# Patient Record
Sex: FEMALE | Race: OTHER | Employment: OTHER | URBAN - METROPOLITAN AREA
[De-identification: names, ages, dates, MRNs, and addresses within clinical notes are randomized per-mention and may not be internally consistent; named-entity substitution may affect disease eponyms.]

---

## 2017-01-01 ENCOUNTER — HOSPITAL ENCOUNTER (OUTPATIENT)
Dept: INFUSION THERAPY | Age: 67
Discharge: HOME | End: 2017-11-20
Payer: MEDICAID

## 2017-01-01 ENCOUNTER — HOSPITAL ENCOUNTER (OUTPATIENT)
Dept: LAB | Age: 67
Discharge: HOME | End: 2017-10-23
Payer: SUBSIDIZED

## 2017-01-01 ENCOUNTER — HOSPITAL ENCOUNTER (OUTPATIENT)
Dept: LAB | Age: 67
Discharge: HOME | End: 2017-12-04
Payer: MEDICAID

## 2017-01-01 ENCOUNTER — HOSPITAL ENCOUNTER (OUTPATIENT)
Dept: LAB | Age: 67
Discharge: HOME | End: 2017-11-30
Payer: MEDICAID

## 2017-01-01 ENCOUNTER — HOSPITAL ENCOUNTER (OUTPATIENT)
Dept: INFUSION THERAPY | Age: 67
Discharge: HOME | End: 2017-10-16
Payer: SUBSIDIZED

## 2017-01-01 ENCOUNTER — HOSPITAL ENCOUNTER (OUTPATIENT)
Dept: LAB | Age: 67
Discharge: HOME | End: 2017-11-06
Payer: MEDICAID

## 2017-01-01 ENCOUNTER — HOSPITAL ENCOUNTER (OUTPATIENT)
Dept: LAB | Age: 67
Discharge: HOME | End: 2017-12-18
Payer: MEDICAID

## 2017-01-01 ENCOUNTER — HOSPITAL ENCOUNTER (OUTPATIENT)
Dept: PET IMAGING | Age: 67
Discharge: HOME | End: 2017-12-14
Payer: SUBSIDIZED

## 2017-01-01 ENCOUNTER — HOSPITAL ENCOUNTER (OUTPATIENT)
Dept: LAB | Age: 67
Discharge: HOME | End: 2017-12-28
Payer: MEDICAID

## 2017-01-01 ENCOUNTER — HOSPITAL ENCOUNTER (OUTPATIENT)
Dept: LAB | Age: 67
Discharge: HOME | End: 2017-11-27
Payer: MEDICAID

## 2017-01-01 ENCOUNTER — HOSPITAL ENCOUNTER (OUTPATIENT)
Dept: LAB | Age: 67
Discharge: HOME | End: 2017-12-21
Payer: MEDICAID

## 2017-01-01 ENCOUNTER — HOSPITAL ENCOUNTER (OUTPATIENT)
Dept: LAB | Age: 67
Discharge: HOME | End: 2017-12-14
Payer: MEDICAID

## 2017-01-01 ENCOUNTER — HOSPITAL ENCOUNTER (OUTPATIENT)
Dept: LAB | Age: 67
Discharge: HOME | End: 2017-10-09
Payer: MEDICAID

## 2017-01-01 ENCOUNTER — HOSPITAL ENCOUNTER (OUTPATIENT)
Dept: LAB | Age: 67
Discharge: HOME | End: 2017-11-13
Payer: MEDICAID

## 2017-01-01 ENCOUNTER — HOSPITAL ENCOUNTER (OUTPATIENT)
Dept: INFUSION THERAPY | Age: 67
Discharge: HOME | End: 2017-12-04
Payer: SUBSIDIZED

## 2017-01-01 ENCOUNTER — HOSPITAL ENCOUNTER (OUTPATIENT)
Dept: INFUSION THERAPY | Age: 67
Discharge: HOME | End: 2017-11-13
Payer: MEDICAID

## 2017-01-01 ENCOUNTER — HOSPITAL ENCOUNTER (OUTPATIENT)
Dept: LAB | Age: 67
Discharge: HOME | End: 2017-10-16
Payer: SUBSIDIZED

## 2017-01-01 ENCOUNTER — HOSPITAL ENCOUNTER (OUTPATIENT)
Dept: INFUSION THERAPY | Age: 67
Discharge: HOME | End: 2017-12-18
Payer: SUBSIDIZED

## 2017-01-01 VITALS
OXYGEN SATURATION: 96 % | WEIGHT: 120 LBS | RESPIRATION RATE: 18 BRPM | DIASTOLIC BLOOD PRESSURE: 55 MMHG | TEMPERATURE: 97.9 F | BODY MASS INDEX: 21.95 KG/M2 | SYSTOLIC BLOOD PRESSURE: 139 MMHG | HEART RATE: 76 BPM

## 2017-01-01 VITALS
DIASTOLIC BLOOD PRESSURE: 52 MMHG | TEMPERATURE: 98 F | RESPIRATION RATE: 16 BRPM | OXYGEN SATURATION: 94 % | HEART RATE: 67 BPM | SYSTOLIC BLOOD PRESSURE: 133 MMHG

## 2017-01-01 VITALS
RESPIRATION RATE: 18 BRPM | TEMPERATURE: 97.7 F | HEART RATE: 68 BPM | SYSTOLIC BLOOD PRESSURE: 134 MMHG | DIASTOLIC BLOOD PRESSURE: 54 MMHG | OXYGEN SATURATION: 93 %

## 2017-01-01 DIAGNOSIS — I82.409 DEEP VEIN THROMBOSIS (DVT) OF LOWER EXTREMITY, UNSPECIFIED CHRONICITY, UNSPECIFIED LATERALITY, UNSPECIFIED VEIN (HCC): Chronic | ICD-10-CM

## 2017-01-01 DIAGNOSIS — E53.8 VITAMIN B12 DEFICIENCY: ICD-10-CM

## 2017-01-01 DIAGNOSIS — C79.51 BONY METASTASIS (HCC): ICD-10-CM

## 2017-01-01 DIAGNOSIS — D70.1 CHEMOTHERAPY-INDUCED NEUTROPENIA (HCC): ICD-10-CM

## 2017-01-01 DIAGNOSIS — D63.8 ANEMIA OF CHRONIC DISEASE: ICD-10-CM

## 2017-01-01 DIAGNOSIS — C78.00 CARCINOMA OF BREAST METASTATIC TO LUNG, UNSPECIFIED LATERALITY (HCC): Chronic | ICD-10-CM

## 2017-01-01 DIAGNOSIS — C50.919 CARCINOMA OF BREAST METASTATIC TO LUNG, UNSPECIFIED LATERALITY (HCC): Chronic | ICD-10-CM

## 2017-01-01 DIAGNOSIS — Z79.01 ANTICOAGULATED ON COUMADIN: Chronic | ICD-10-CM

## 2017-01-01 DIAGNOSIS — C50.919 MALIGNANT NEOPLASM OF FEMALE BREAST, UNSPECIFIED ESTROGEN RECEPTOR STATUS, UNSPECIFIED LATERALITY, UNSPECIFIED SITE OF BREAST (HCC): Chronic | ICD-10-CM

## 2017-01-01 DIAGNOSIS — C50.919 MALIGNANT NEOPLASM OF FEMALE BREAST (HCC): ICD-10-CM

## 2017-01-01 DIAGNOSIS — T45.1X5A CHEMOTHERAPY-INDUCED NEUTROPENIA (HCC): ICD-10-CM

## 2017-01-01 LAB
ABO + RH BLD: NORMAL
ALBUMIN SERPL-MCNC: 2.6 G/DL (ref 3.2–4.6)
ALBUMIN SERPL-MCNC: 2.6 G/DL (ref 3.2–4.6)
ALBUMIN SERPL-MCNC: 2.7 G/DL (ref 3.2–4.6)
ALBUMIN/GLOB SERPL: 0.5 {RATIO} (ref 1.2–3.5)
ALBUMIN/GLOB SERPL: 0.5 {RATIO} (ref 1.2–3.5)
ALBUMIN/GLOB SERPL: 0.6 {RATIO} (ref 1.2–3.5)
ALP SERPL-CCNC: 125 U/L (ref 50–136)
ALP SERPL-CCNC: 175 U/L (ref 50–136)
ALP SERPL-CCNC: 179 U/L (ref 50–136)
ALT SERPL-CCNC: 14 U/L (ref 12–65)
ALT SERPL-CCNC: 19 U/L (ref 12–65)
ALT SERPL-CCNC: 21 U/L (ref 12–65)
ANION GAP SERPL CALC-SCNC: 11 MMOL/L (ref 7–16)
ANION GAP SERPL CALC-SCNC: 12 MMOL/L (ref 7–16)
ANION GAP SERPL CALC-SCNC: 8 MMOL/L
ANION GAP SERPL CALC-SCNC: 8 MMOL/L (ref 7–16)
AST SERPL-CCNC: 21 U/L (ref 15–37)
AST SERPL-CCNC: 22 U/L (ref 15–37)
AST SERPL-CCNC: 31 U/L (ref 15–37)
BASOPHILS # BLD: 0 K/UL (ref 0–0.2)
BASOPHILS # BLD: 0.1 K/UL (ref 0–0.2)
BASOPHILS # BLD: 0.2 K/UL (ref 0–0.2)
BASOPHILS NFR BLD MANUAL: 8 % (ref 0–2)
BASOPHILS NFR BLD: 1 % (ref 0–2)
BASOPHILS NFR BLD: 2 % (ref 0–2)
BASOPHILS NFR BLD: 2 % (ref 0–2)
BILIRUB SERPL-MCNC: 0.5 MG/DL (ref 0.2–1.1)
BILIRUB SERPL-MCNC: 0.5 MG/DL (ref 0.2–1.1)
BILIRUB SERPL-MCNC: 0.9 MG/DL (ref 0.2–1.1)
BLD PROD TYP BPU: NORMAL
BLOOD GROUP ANTIBODIES SERPL: NORMAL
BPU ID: NORMAL
BUN SERPL-MCNC: 19 MG/DL (ref 8–23)
BUN SERPL-MCNC: 22 MG/DL (ref 8–23)
BUN SERPL-MCNC: 23 MG/DL (ref 8–23)
BUN SERPL-MCNC: 25 MG/DL (ref 8–23)
CALCIUM SERPL-MCNC: 7.5 MG/DL (ref 8.3–10.4)
CALCIUM SERPL-MCNC: 7.7 MG/DL (ref 8.3–10.4)
CALCIUM SERPL-MCNC: 7.9 MG/DL (ref 8.3–10.4)
CALCIUM SERPL-MCNC: 7.9 MG/DL (ref 8.3–10.4)
CALCIUM SERPL-MCNC: 8.5 MG/DL (ref 8.3–10.4)
CANCER AG15-3 SERPL-ACNC: 377.2 U/ML (ref 1–35)
CANCER AG15-3 SERPL-ACNC: 405.8 U/ML (ref 1–35)
CANCER AG15-3 SERPL-ACNC: 438.8 U/ML (ref 1–35)
CANCER AG27-29 SERPL-ACNC: 477.2 U/ML (ref 0–38.6)
CANCER AG27-29 SERPL-ACNC: 490.5 U/ML (ref 0–38.6)
CANCER AG27-29 SERPL-ACNC: 503.3 U/ML (ref 0–38.6)
CEA SERPL-MCNC: 49.5 NG/ML (ref 0–3)
CEA SERPL-MCNC: 49.5 NG/ML (ref 0–3)
CEA SERPL-MCNC: 51.1 NG/ML (ref 0–3)
CHLORIDE SERPL-SCNC: 105 MMOL/L (ref 98–107)
CHLORIDE SERPL-SCNC: 105 MMOL/L (ref 98–107)
CHLORIDE SERPL-SCNC: 109 MMOL/L (ref 98–107)
CHLORIDE SERPL-SCNC: 111 MMOL/L (ref 98–107)
CO2 SERPL-SCNC: 23 MMOL/L (ref 21–32)
CO2 SERPL-SCNC: 24 MMOL/L (ref 21–32)
CO2 SERPL-SCNC: 26 MMOL/L (ref 21–32)
CO2 SERPL-SCNC: 26 MMOL/L (ref 21–32)
CREAT SERPL-MCNC: 1.68 MG/DL (ref 0.6–1)
CREAT SERPL-MCNC: 1.88 MG/DL (ref 0.6–1)
CREAT SERPL-MCNC: 2.13 MG/DL (ref 0.6–1)
CREAT SERPL-MCNC: 2.33 MG/DL (ref 0.6–1)
CREAT SERPL-MCNC: 2.76 MG/DL (ref 0.6–1)
CROSSMATCH RESULT,%XM: NORMAL
DIFFERENTIAL METHOD BLD: ABNORMAL
EOSINOPHIL # BLD: 0 K/UL (ref 0–0.8)
EOSINOPHIL NFR BLD: 1 % (ref 0.5–7.8)
ERYTHROCYTE [DISTWIDTH] IN BLOOD BY AUTOMATED COUNT: 17.1 % (ref 11.9–14.6)
ERYTHROCYTE [DISTWIDTH] IN BLOOD BY AUTOMATED COUNT: 17.3 % (ref 11.9–14.6)
ERYTHROCYTE [DISTWIDTH] IN BLOOD BY AUTOMATED COUNT: 17.6 % (ref 11.9–14.6)
ERYTHROCYTE [DISTWIDTH] IN BLOOD BY AUTOMATED COUNT: 17.9 % (ref 11.9–14.6)
ERYTHROCYTE [DISTWIDTH] IN BLOOD BY AUTOMATED COUNT: 18.1 % (ref 11.9–14.6)
ERYTHROCYTE [DISTWIDTH] IN BLOOD BY AUTOMATED COUNT: 18.3 % (ref 11.9–14.6)
GLOBULIN SER CALC-MCNC: 4.7 G/DL (ref 2.3–3.5)
GLOBULIN SER CALC-MCNC: 4.8 G/DL (ref 2.3–3.5)
GLOBULIN SER CALC-MCNC: 5 G/DL (ref 2.3–3.5)
GLUCOSE SERPL-MCNC: 137 MG/DL (ref 65–100)
GLUCOSE SERPL-MCNC: 177 MG/DL (ref 65–100)
GLUCOSE SERPL-MCNC: 218 MG/DL (ref 65–100)
GLUCOSE SERPL-MCNC: 291 MG/DL (ref 65–100)
HCT VFR BLD AUTO: 20.5 % (ref 35.8–46.3)
HCT VFR BLD AUTO: 23.8 % (ref 35.8–46.3)
HCT VFR BLD AUTO: 23.8 % (ref 35.8–46.3)
HCT VFR BLD AUTO: 24 % (ref 35.8–46.3)
HCT VFR BLD AUTO: 24.8 % (ref 35.8–46.3)
HCT VFR BLD AUTO: 25.6 % (ref 35.8–46.3)
HGB BLD-MCNC: 7 G/DL (ref 11.7–15.4)
HGB BLD-MCNC: 7.8 G/DL (ref 11.7–15.4)
HGB BLD-MCNC: 8 G/DL (ref 11.7–15.4)
HGB BLD-MCNC: 8.1 G/DL (ref 11.7–15.4)
HGB BLD-MCNC: 8.3 G/DL (ref 11.7–15.4)
HGB BLD-MCNC: 8.7 G/DL (ref 11.7–15.4)
INR PPP: 1.6 (ref 0.9–1.2)
INR PPP: 2.2 (ref 0.9–1.2)
INR PPP: 2.5
INR PPP: 2.7 (ref 0.9–1.2)
INR PPP: 2.9
INR PPP: 3 (ref 0.9–1.2)
INR PPP: 3.8
INR PPP: 3.8 (ref 0.9–1.2)
INR PPP: 4.1 (ref 0.9–1.2)
INR PPP: >8 (ref 0.9–1.2)
LYMPHOCYTES # BLD: 0.2 K/UL (ref 0.5–4.6)
LYMPHOCYTES # BLD: 0.5 K/UL (ref 0.5–4.6)
LYMPHOCYTES # BLD: 0.6 K/UL (ref 0.5–4.6)
LYMPHOCYTES # BLD: 0.6 K/UL (ref 0.5–4.6)
LYMPHOCYTES # BLD: 0.8 K/UL (ref 0.5–4.6)
LYMPHOCYTES # BLD: 0.9 K/UL (ref 0.5–4.6)
LYMPHOCYTES NFR BLD MANUAL: 10 % (ref 16–44)
LYMPHOCYTES NFR BLD: 13 % (ref 13–44)
LYMPHOCYTES NFR BLD: 20 % (ref 13–44)
LYMPHOCYTES NFR BLD: 21 % (ref 13–44)
LYMPHOCYTES NFR BLD: 25 % (ref 13–44)
LYMPHOCYTES NFR BLD: 29 % (ref 13–44)
MAGNESIUM SERPL-MCNC: 2.1 MG/DL (ref 1.8–2.4)
MAGNESIUM SERPL-MCNC: 2.3 MG/DL (ref 1.8–2.4)
MAGNESIUM SERPL-MCNC: 2.4 MG/DL (ref 1.8–2.4)
MAGNESIUM SERPL-MCNC: 2.8 MG/DL (ref 1.8–2.4)
MCH RBC QN AUTO: 34.7 PG (ref 26.1–32.9)
MCH RBC QN AUTO: 35.1 PG (ref 26.1–32.9)
MCH RBC QN AUTO: 35.5 PG (ref 26.1–32.9)
MCH RBC QN AUTO: 35.9 PG (ref 26.1–32.9)
MCH RBC QN AUTO: 36.8 PG (ref 26.1–32.9)
MCH RBC QN AUTO: 37 PG (ref 26.1–32.9)
MCHC RBC AUTO-ENTMCNC: 32.8 G/DL (ref 31.4–35)
MCHC RBC AUTO-ENTMCNC: 33.3 G/DL (ref 31.4–35)
MCHC RBC AUTO-ENTMCNC: 33.5 G/DL (ref 31.4–35)
MCHC RBC AUTO-ENTMCNC: 34 G/DL (ref 31.4–35)
MCHC RBC AUTO-ENTMCNC: 34 G/DL (ref 31.4–35)
MCHC RBC AUTO-ENTMCNC: 34.1 G/DL (ref 31.4–35)
MCV RBC AUTO: 102 FL (ref 79.6–97.8)
MCV RBC AUTO: 103 FL (ref 79.6–97.8)
MCV RBC AUTO: 104.1 FL (ref 79.6–97.8)
MCV RBC AUTO: 107.4 FL (ref 79.6–97.8)
MCV RBC AUTO: 111.1 FL (ref 79.6–97.8)
MCV RBC AUTO: 112.3 FL (ref 79.6–97.8)
MONOCYTES # BLD: 0.1 K/UL (ref 0.1–1.3)
MONOCYTES # BLD: 0.2 K/UL (ref 0.1–1.3)
MONOCYTES # BLD: 0.3 K/UL (ref 0.1–1.3)
MONOCYTES # BLD: 0.3 K/UL (ref 0.1–1.3)
MONOCYTES # BLD: 0.4 K/UL (ref 0.1–1.3)
MONOCYTES # BLD: 0.6 K/UL (ref 0.1–1.3)
MONOCYTES NFR BLD MANUAL: 6 % (ref 3–9)
MONOCYTES NFR BLD: 13 % (ref 4–12)
MONOCYTES NFR BLD: 14 % (ref 4–12)
MONOCYTES NFR BLD: 14 % (ref 4–12)
MONOCYTES NFR BLD: 7 % (ref 4–12)
MONOCYTES NFR BLD: 8 % (ref 4–12)
NEUTS SEG # BLD: 1.4 K/UL (ref 1.7–8.2)
NEUTS SEG # BLD: 1.6 K/UL (ref 1.7–8.2)
NEUTS SEG # BLD: 1.7 K/UL (ref 1.7–8.2)
NEUTS SEG # BLD: 1.9 K/UL (ref 1.7–8.2)
NEUTS SEG # BLD: 2.7 K/UL (ref 1.7–8.2)
NEUTS SEG # BLD: 3.3 K/UL (ref 1.7–8.2)
NEUTS SEG NFR BLD MANUAL: 76 % (ref 47–75)
NEUTS SEG NFR BLD: 58 % (ref 43–78)
NEUTS SEG NFR BLD: 62 % (ref 43–78)
NEUTS SEG NFR BLD: 63 % (ref 43–78)
NEUTS SEG NFR BLD: 64 % (ref 43–78)
NEUTS SEG NFR BLD: 77 % (ref 43–78)
NRBC # BLD: 0 K/UL (ref 0–0.2)
NRBC # BLD: 0.01 K/UL (ref 0–0.2)
NRBC # BLD: 0.01 K/UL (ref 0–0.2)
NRBC BLD-RTO: 0 PER 100 WBC (ref 0–2)
PHOSPHATE SERPL-MCNC: 1.6 MG/DL (ref 2.3–3.7)
PHOSPHATE SERPL-MCNC: 2.5 MG/DL (ref 2.3–3.7)
PLATELET # BLD AUTO: 129 K/UL (ref 150–450)
PLATELET # BLD AUTO: 145 K/UL (ref 150–450)
PLATELET # BLD AUTO: 147 K/UL (ref 150–450)
PLATELET # BLD AUTO: 151 K/UL (ref 150–450)
PLATELET # BLD AUTO: 240 K/UL (ref 150–450)
PLATELET # BLD AUTO: 248 K/UL (ref 150–450)
PLATELET COMMENTS,PCOM: ADEQUATE
PLATELET COMMENTS,PCOM: SLIGHT
PMV BLD AUTO: 10.5 FL (ref 10.8–14.1)
PMV BLD AUTO: 10.6 FL (ref 10.8–14.1)
PMV BLD AUTO: 10.9 FL (ref 10.8–14.1)
PMV BLD AUTO: 11.2 FL (ref 10.8–14.1)
PMV BLD AUTO: 11.7 FL (ref 10.8–14.1)
PMV BLD AUTO: 12.7 FL (ref 10.8–14.1)
POTASSIUM SERPL-SCNC: 3.4 MMOL/L (ref 3.5–5.1)
POTASSIUM SERPL-SCNC: 3.6 MMOL/L (ref 3.5–5.1)
POTASSIUM SERPL-SCNC: 3.8 MMOL/L (ref 3.5–5.1)
POTASSIUM SERPL-SCNC: 4.2 MMOL/L (ref 3.5–5.1)
PROT SERPL-MCNC: 7.4 G/DL (ref 6.3–8.2)
PROT SERPL-MCNC: 7.4 G/DL (ref 6.3–8.2)
PROT SERPL-MCNC: 7.6 G/DL (ref 6.3–8.2)
PROTHROMBIN TIME: 19.4 SEC (ref 9.6–12)
PROTHROMBIN TIME: 26.5 SEC (ref 9.6–12)
PROTHROMBIN TIME: 30 SEC (ref 11.5–14.5)
PROTHROMBIN TIME: 32.1 SEC (ref 9.6–12)
PROTHROMBIN TIME: 34.8 SEC (ref 11.5–14.5)
PROTHROMBIN TIME: 35.6 SEC (ref 9.6–12)
PROTHROMBIN TIME: 45.1 SEC (ref 9.6–12)
PROTHROMBIN TIME: 45.4 SEC (ref 11.5–14.5)
PROTHROMBIN TIME: 49.8 SEC (ref 9.6–12)
PROTHROMBIN TIME: >96 SEC (ref 9.6–12)
RBC # BLD AUTO: 1.97 M/UL (ref 4.05–5.25)
RBC # BLD AUTO: 2.12 M/UL (ref 4.05–5.25)
RBC # BLD AUTO: 2.16 M/UL (ref 4.05–5.25)
RBC # BLD AUTO: 2.31 M/UL (ref 4.05–5.25)
RBC # BLD AUTO: 2.31 M/UL (ref 4.05–5.25)
RBC # BLD AUTO: 2.51 M/UL (ref 4.05–5.25)
RBC MORPH BLD: ABNORMAL
SODIUM SERPL-SCNC: 139 MMOL/L (ref 136–145)
SODIUM SERPL-SCNC: 143 MMOL/L (ref 136–145)
SPECIMEN EXP DATE BLD: NORMAL
STATUS OF UNIT,%ST: NORMAL
UNIT DIVISION, %UDIV: 0
WBC # BLD AUTO: 2.1 K/UL (ref 4.3–11.1)
WBC # BLD AUTO: 2.3 K/UL (ref 4.3–11.1)
WBC # BLD AUTO: 2.6 K/UL (ref 4.3–11.1)
WBC # BLD AUTO: 2.9 K/UL (ref 4.3–11.1)
WBC # BLD AUTO: 4.3 K/UL (ref 4.3–11.1)
WBC # BLD AUTO: 4.3 K/UL (ref 4.3–11.1)
WBC MORPH BLD: ABNORMAL
WBC MORPH BLD: ABNORMAL

## 2017-01-01 PROCEDURE — 86300 IMMUNOASSAY TUMOR CA 15-3: CPT | Performed by: INTERNAL MEDICINE

## 2017-01-01 PROCEDURE — 82378 CARCINOEMBRYONIC ANTIGEN: CPT | Performed by: INTERNAL MEDICINE

## 2017-01-01 PROCEDURE — 85025 COMPLETE CBC W/AUTO DIFF WBC: CPT | Performed by: INTERNAL MEDICINE

## 2017-01-01 PROCEDURE — 74011250636 HC RX REV CODE- 250/636: Performed by: INTERNAL MEDICINE

## 2017-01-01 PROCEDURE — 83735 ASSAY OF MAGNESIUM: CPT | Performed by: INTERNAL MEDICINE

## 2017-01-01 PROCEDURE — 84100 ASSAY OF PHOSPHORUS: CPT | Performed by: INTERNAL MEDICINE

## 2017-01-01 PROCEDURE — 96402 CHEMO HORMON ANTINEOPL SQ/IM: CPT

## 2017-01-01 PROCEDURE — 36415 COLL VENOUS BLD VENIPUNCTURE: CPT

## 2017-01-01 PROCEDURE — 96374 THER/PROPH/DIAG INJ IV PUSH: CPT

## 2017-01-01 PROCEDURE — 36416 COLLJ CAPILLARY BLOOD SPEC: CPT | Performed by: INTERNAL MEDICINE

## 2017-01-01 PROCEDURE — 96372 THER/PROPH/DIAG INJ SC/IM: CPT

## 2017-01-01 PROCEDURE — 85610 PROTHROMBIN TIME: CPT | Performed by: INTERNAL MEDICINE

## 2017-01-01 PROCEDURE — 36430 TRANSFUSION BLD/BLD COMPNT: CPT

## 2017-01-01 PROCEDURE — 80053 COMPREHEN METABOLIC PANEL: CPT | Performed by: INTERNAL MEDICINE

## 2017-01-01 PROCEDURE — 74011250637 HC RX REV CODE- 250/637: Performed by: NURSE PRACTITIONER

## 2017-01-01 PROCEDURE — 36415 COLL VENOUS BLD VENIPUNCTURE: CPT | Performed by: INTERNAL MEDICINE

## 2017-01-01 PROCEDURE — 80048 BASIC METABOLIC PNL TOTAL CA: CPT | Performed by: INTERNAL MEDICINE

## 2017-01-01 PROCEDURE — 77030003560 HC NDL HUBR BARD -A

## 2017-01-01 PROCEDURE — A9552 F18 FDG: HCPCS

## 2017-01-01 PROCEDURE — 86923 COMPATIBILITY TEST ELECTRIC: CPT

## 2017-01-01 PROCEDURE — 86900 BLOOD TYPING SEROLOGIC ABO: CPT

## 2017-01-01 PROCEDURE — 74011250636 HC RX REV CODE- 250/636: Performed by: NURSE PRACTITIONER

## 2017-01-01 PROCEDURE — 82310 ASSAY OF CALCIUM: CPT

## 2017-01-01 PROCEDURE — P9040 RBC LEUKOREDUCED IRRADIATED: HCPCS

## 2017-01-01 PROCEDURE — 96375 TX/PRO/DX INJ NEW DRUG ADDON: CPT

## 2017-01-01 PROCEDURE — 74011636320 HC RX REV CODE- 636/320: Performed by: INTERNAL MEDICINE

## 2017-01-01 PROCEDURE — 86644 CMV ANTIBODY: CPT

## 2017-01-01 PROCEDURE — 82565 ASSAY OF CREATININE: CPT

## 2017-01-01 PROCEDURE — 77030013131 HC IV BLD ST ICUM -A

## 2017-01-01 RX ORDER — LAMOTRIGINE 25 MG/1
500 TABLET ORAL ONCE
Status: COMPLETED | OUTPATIENT
Start: 2017-01-01 | End: 2017-01-01

## 2017-01-01 RX ORDER — HEPARIN 100 UNIT/ML
500 SYRINGE INTRAVENOUS AS NEEDED
Status: DISPENSED | OUTPATIENT
Start: 2017-01-01 | End: 2017-01-01

## 2017-01-01 RX ORDER — ACETAMINOPHEN 325 MG/1
650 TABLET ORAL ONCE
Status: COMPLETED | OUTPATIENT
Start: 2017-01-01 | End: 2017-01-01

## 2017-01-01 RX ORDER — DIPHENHYDRAMINE HYDROCHLORIDE 50 MG/ML
25 INJECTION, SOLUTION INTRAMUSCULAR; INTRAVENOUS ONCE
Status: COMPLETED | OUTPATIENT
Start: 2017-01-01 | End: 2017-01-01

## 2017-01-01 RX ORDER — SODIUM CHLORIDE 0.9 % (FLUSH) 0.9 %
10 SYRINGE (ML) INJECTION AS NEEDED
Status: DISCONTINUED | OUTPATIENT
Start: 2017-01-01 | End: 2017-01-01 | Stop reason: HOSPADM

## 2017-01-01 RX ORDER — CYANOCOBALAMIN 1000 UG/ML
1000 INJECTION, SOLUTION INTRAMUSCULAR; SUBCUTANEOUS ONCE
Status: COMPLETED | OUTPATIENT
Start: 2017-01-01 | End: 2017-01-01

## 2017-01-01 RX ORDER — FUROSEMIDE 10 MG/ML
20 INJECTION INTRAMUSCULAR; INTRAVENOUS ONCE
Status: COMPLETED | OUTPATIENT
Start: 2017-01-01 | End: 2017-01-01

## 2017-01-01 RX ORDER — SODIUM CHLORIDE 9 MG/ML
250 INJECTION, SOLUTION INTRAVENOUS AS NEEDED
Status: DISCONTINUED | OUTPATIENT
Start: 2017-01-01 | End: 2017-01-01 | Stop reason: HOSPADM

## 2017-01-01 RX ADMIN — DENOSUMAB 120 MG: 120 INJECTION SUBCUTANEOUS at 12:10

## 2017-01-01 RX ADMIN — ACETAMINOPHEN 650 MG: 325 TABLET, FILM COATED ORAL at 13:19

## 2017-01-01 RX ADMIN — DIPHENHYDRAMINE HYDROCHLORIDE 25 MG: 50 INJECTION, SOLUTION INTRAMUSCULAR; INTRAVENOUS at 16:20

## 2017-01-01 RX ADMIN — SODIUM CHLORIDE, PRESERVATIVE FREE 500 UNITS: 5 INJECTION INTRAVENOUS at 17:05

## 2017-01-01 RX ADMIN — Medication 10 ML: at 17:05

## 2017-01-01 RX ADMIN — ERYTHROPOIETIN 40000 UNITS: 40000 INJECTION, SOLUTION INTRAVENOUS; SUBCUTANEOUS at 11:04

## 2017-01-01 RX ADMIN — CYANOCOBALAMIN 1000 MCG: 1000 INJECTION, SOLUTION INTRAMUSCULAR at 11:02

## 2017-01-01 RX ADMIN — DIATRIZOATE MEGLUMINE AND DIATRIZOATE SODIUM 10 ML: 660; 100 LIQUID ORAL; RECTAL at 09:34

## 2017-01-01 RX ADMIN — FULVESTRANT 500 MG: 50 INJECTION INTRAMUSCULAR at 11:10

## 2017-01-01 RX ADMIN — FULVESTRANT 500 MG: 50 INJECTION INTRAMUSCULAR at 12:15

## 2017-01-01 RX ADMIN — FUROSEMIDE 20 MG: 10 INJECTION, SOLUTION INTRAMUSCULAR; INTRAVENOUS at 14:10

## 2017-01-01 RX ADMIN — TBO-FILGRASTIM 300 MCG: 300 INJECTION, SOLUTION SUBCUTANEOUS at 11:06

## 2017-01-01 RX ADMIN — DENOSUMAB 120 MG: 120 INJECTION SUBCUTANEOUS at 11:07

## 2017-01-01 RX ADMIN — SODIUM CHLORIDE 250 ML: 900 INJECTION, SOLUTION INTRAVENOUS at 13:45

## 2017-01-01 RX ADMIN — ERYTHROPOIETIN 40000 UNITS: 40000 INJECTION, SOLUTION INTRAVENOUS; SUBCUTANEOUS at 11:55

## 2017-01-01 RX ADMIN — ERYTHROPOIETIN 40000 UNITS: 40000 INJECTION, SOLUTION INTRAVENOUS; SUBCUTANEOUS at 11:05

## 2017-01-01 RX ADMIN — CYANOCOBALAMIN 1000 MCG: 1000 INJECTION, SOLUTION INTRAMUSCULAR at 11:55

## 2017-01-01 RX ADMIN — FULVESTRANT 500 MG: 50 INJECTION INTRAMUSCULAR at 13:29

## 2017-01-01 RX ADMIN — DENOSUMAB 120 MG: 120 INJECTION SUBCUTANEOUS at 13:29

## 2017-01-02 ENCOUNTER — HOSPITAL ENCOUNTER (OUTPATIENT)
Dept: LAB | Age: 67
Discharge: HOME | End: 2017-01-02
Payer: SUBSIDIZED

## 2017-01-02 DIAGNOSIS — I82.409 DEEP VEIN THROMBOSIS (DVT) OF LOWER EXTREMITY, UNSPECIFIED CHRONICITY, UNSPECIFIED LATERALITY, UNSPECIFIED VEIN (HCC): ICD-10-CM

## 2017-01-02 LAB
INR PPP: 2.5 (ref 0.9–1.2)
PROTHROMBIN TIME: 30.5 SEC (ref 9.6–12)

## 2017-01-02 PROCEDURE — 36415 COLL VENOUS BLD VENIPUNCTURE: CPT | Performed by: INTERNAL MEDICINE

## 2017-01-02 PROCEDURE — 85610 PROTHROMBIN TIME: CPT | Performed by: INTERNAL MEDICINE

## 2017-01-04 ENCOUNTER — APPOINTMENT (OUTPATIENT)
Dept: CT IMAGING | Age: 67
DRG: 189 | End: 2017-01-04
Attending: STUDENT IN AN ORGANIZED HEALTH CARE EDUCATION/TRAINING PROGRAM
Payer: SUBSIDIZED

## 2017-01-04 ENCOUNTER — APPOINTMENT (OUTPATIENT)
Dept: GENERAL RADIOLOGY | Age: 67
DRG: 189 | End: 2017-01-04
Attending: EMERGENCY MEDICINE
Payer: SUBSIDIZED

## 2017-01-04 ENCOUNTER — HOSPITAL ENCOUNTER (INPATIENT)
Age: 67
LOS: 8 days | Discharge: HOME | DRG: 189 | End: 2017-01-12
Attending: STUDENT IN AN ORGANIZED HEALTH CARE EDUCATION/TRAINING PROGRAM | Admitting: INTERNAL MEDICINE
Payer: SUBSIDIZED

## 2017-01-04 DIAGNOSIS — J18.9 COMMUNITY ACQUIRED PNEUMONIA: Primary | ICD-10-CM

## 2017-01-04 DIAGNOSIS — J96.01 ACUTE RESPIRATORY FAILURE WITH HYPOXIA (HCC): ICD-10-CM

## 2017-01-04 DIAGNOSIS — N17.9 ACUTE ON CHRONIC RENAL FAILURE (HCC): ICD-10-CM

## 2017-01-04 DIAGNOSIS — R77.8 TROPONIN LEVEL ELEVATED: ICD-10-CM

## 2017-01-04 DIAGNOSIS — J90 PLEURAL EFFUSION: ICD-10-CM

## 2017-01-04 DIAGNOSIS — N13.30 HYDRONEPHROSIS, UNSPECIFIED HYDRONEPHROSIS TYPE: ICD-10-CM

## 2017-01-04 DIAGNOSIS — J90 PLEURAL EFFUSION, RIGHT: ICD-10-CM

## 2017-01-04 DIAGNOSIS — J90 RECURRENT RIGHT PLEURAL EFFUSION: ICD-10-CM

## 2017-01-04 DIAGNOSIS — N18.9 CKD (CHRONIC KIDNEY DISEASE), UNSPECIFIED STAGE: Chronic | ICD-10-CM

## 2017-01-04 DIAGNOSIS — C78.00 BREAST CANCER METASTASIZED TO LUNG, UNSPECIFIED LATERALITY (HCC): ICD-10-CM

## 2017-01-04 DIAGNOSIS — C50.919 BREAST CANCER METASTASIZED TO LUNG, UNSPECIFIED LATERALITY (HCC): ICD-10-CM

## 2017-01-04 DIAGNOSIS — I10 ACCELERATED HYPERTENSION: ICD-10-CM

## 2017-01-04 DIAGNOSIS — R60.0 BILATERAL EDEMA OF LOWER EXTREMITY: ICD-10-CM

## 2017-01-04 DIAGNOSIS — R31.9 HEMATURIA: ICD-10-CM

## 2017-01-04 DIAGNOSIS — N18.9 ACUTE ON CHRONIC RENAL FAILURE (HCC): ICD-10-CM

## 2017-01-04 DIAGNOSIS — I82.409 DEEP VEIN THROMBOSIS (DVT) OF LOWER EXTREMITY, UNSPECIFIED CHRONICITY, UNSPECIFIED LATERALITY, UNSPECIFIED VEIN (HCC): ICD-10-CM

## 2017-01-04 LAB
ALBUMIN SERPL BCP-MCNC: 3.4 G/DL (ref 3.2–4.6)
ALBUMIN/GLOB SERPL: 0.9 {RATIO} (ref 1.2–3.5)
ALP SERPL-CCNC: 42 U/L (ref 50–136)
ALT SERPL-CCNC: 18 U/L (ref 12–65)
ANION GAP BLD CALC-SCNC: 11 MMOL/L (ref 7–16)
AST SERPL W P-5'-P-CCNC: 31 U/L (ref 15–37)
ATRIAL RATE: 51 BPM
ATRIAL RATE: 61 BPM
BASOPHILS # BLD AUTO: 0 K/UL (ref 0–0.2)
BASOPHILS # BLD: 1 % (ref 0–2)
BILIRUB SERPL-MCNC: 0.6 MG/DL (ref 0.2–1.1)
BUN SERPL-MCNC: 29 MG/DL (ref 8–23)
CALCIUM SERPL-MCNC: 8.3 MG/DL (ref 8.3–10.4)
CALCULATED P AXIS, ECG09: NORMAL DEGREES
CALCULATED P AXIS, ECG09: NORMAL DEGREES
CALCULATED R AXIS, ECG10: 71 DEGREES
CALCULATED R AXIS, ECG10: 75 DEGREES
CALCULATED T AXIS, ECG11: 62 DEGREES
CALCULATED T AXIS, ECG11: 77 DEGREES
CHLORIDE SERPL-SCNC: 116 MMOL/L (ref 98–107)
CO2 SERPL-SCNC: 20 MMOL/L (ref 21–32)
CREAT SERPL-MCNC: 2.7 MG/DL (ref 0.6–1)
DIAGNOSIS, 93000: NORMAL
DIAGNOSIS, 93000: NORMAL
DIASTOLIC BP, ECG02: NORMAL MMHG
DIASTOLIC BP, ECG02: NORMAL MMHG
DIFFERENTIAL METHOD BLD: ABNORMAL
EOSINOPHIL # BLD: 0.1 K/UL (ref 0–0.8)
EOSINOPHIL NFR BLD: 2 % (ref 0.5–7.8)
ERYTHROCYTE [DISTWIDTH] IN BLOOD BY AUTOMATED COUNT: 14.9 % (ref 11.9–14.6)
GLOBULIN SER CALC-MCNC: 3.8 G/DL (ref 2.3–3.5)
GLUCOSE SERPL-MCNC: 54 MG/DL (ref 65–100)
HCT VFR BLD AUTO: 25.6 % (ref 35.8–46.3)
HGB BLD-MCNC: 8.5 G/DL (ref 11.7–15.4)
IMM GRANULOCYTES # BLD: 0 K/UL (ref 0–0.5)
IMM GRANULOCYTES NFR BLD AUTO: 0 % (ref 0–5)
LYMPHOCYTES # BLD AUTO: 33 % (ref 13–44)
LYMPHOCYTES # BLD: 1.1 K/UL (ref 0.5–4.6)
MCH RBC QN AUTO: 36.8 PG (ref 26.1–32.9)
MCHC RBC AUTO-ENTMCNC: 33.2 G/DL (ref 31.4–35)
MCV RBC AUTO: 110.8 FL (ref 79.6–97.8)
MONOCYTES # BLD: 0.2 K/UL (ref 0.1–1.3)
MONOCYTES NFR BLD AUTO: 5 % (ref 4–12)
NEUTS SEG # BLD: 2 K/UL (ref 1.7–8.2)
NEUTS SEG NFR BLD AUTO: 59 % (ref 43–78)
P-R INTERVAL, ECG05: NORMAL MS
P-R INTERVAL, ECG05: NORMAL MS
PLATELET # BLD AUTO: 244 K/UL (ref 150–450)
PMV BLD AUTO: 10.5 FL (ref 10.8–14.1)
POTASSIUM SERPL-SCNC: 4.6 MMOL/L (ref 3.5–5.1)
PROT SERPL-MCNC: 7.2 G/DL (ref 6.3–8.2)
Q-T INTERVAL, ECG07: 470 MS
Q-T INTERVAL, ECG07: 502 MS
QRS DURATION, ECG06: 74 MS
QRS DURATION, ECG06: 76 MS
QTC CALCULATION (BEZET), ECG08: 441 MS
QTC CALCULATION (BEZET), ECG08: 457 MS
RBC # BLD AUTO: 2.31 M/UL (ref 4.05–5.25)
SODIUM SERPL-SCNC: 147 MMOL/L (ref 136–145)
SYSTOLIC BP, ECG01: NORMAL MMHG
SYSTOLIC BP, ECG01: NORMAL MMHG
TROPONIN I SERPL-MCNC: 0.12 NG/ML (ref 0.02–0.05)
VENTRICULAR RATE, ECG03: 50 BPM
VENTRICULAR RATE, ECG03: 53 BPM
WBC # BLD AUTO: 3.3 K/UL (ref 4.3–11.1)

## 2017-01-04 PROCEDURE — 71020 XR CHEST PA LAT: CPT

## 2017-01-04 PROCEDURE — 94640 AIRWAY INHALATION TREATMENT: CPT

## 2017-01-04 PROCEDURE — 93005 ELECTROCARDIOGRAM TRACING: CPT | Performed by: EMERGENCY MEDICINE

## 2017-01-04 PROCEDURE — 84484 ASSAY OF TROPONIN QUANT: CPT | Performed by: EMERGENCY MEDICINE

## 2017-01-04 PROCEDURE — 74011000250 HC RX REV CODE- 250: Performed by: INTERNAL MEDICINE

## 2017-01-04 PROCEDURE — 94760 N-INVAS EAR/PLS OXIMETRY 1: CPT

## 2017-01-04 PROCEDURE — 77030013140 HC MSK NEB VYRM -A

## 2017-01-04 PROCEDURE — 74011000250 HC RX REV CODE- 250: Performed by: STUDENT IN AN ORGANIZED HEALTH CARE EDUCATION/TRAINING PROGRAM

## 2017-01-04 PROCEDURE — 80053 COMPREHEN METABOLIC PANEL: CPT | Performed by: EMERGENCY MEDICINE

## 2017-01-04 PROCEDURE — 74011250637 HC RX REV CODE- 250/637: Performed by: INTERNAL MEDICINE

## 2017-01-04 PROCEDURE — 99285 EMERGENCY DEPT VISIT HI MDM: CPT | Performed by: STUDENT IN AN ORGANIZED HEALTH CARE EDUCATION/TRAINING PROGRAM

## 2017-01-04 PROCEDURE — 74011250636 HC RX REV CODE- 250/636: Performed by: STUDENT IN AN ORGANIZED HEALTH CARE EDUCATION/TRAINING PROGRAM

## 2017-01-04 PROCEDURE — 81001 URINALYSIS AUTO W/SCOPE: CPT | Performed by: INTERNAL MEDICINE

## 2017-01-04 PROCEDURE — 85025 COMPLETE CBC W/AUTO DIFF WBC: CPT | Performed by: EMERGENCY MEDICINE

## 2017-01-04 PROCEDURE — 65270000029 HC RM PRIVATE

## 2017-01-04 PROCEDURE — 99223 1ST HOSP IP/OBS HIGH 75: CPT | Performed by: INTERNAL MEDICINE

## 2017-01-04 PROCEDURE — 87040 BLOOD CULTURE FOR BACTERIA: CPT | Performed by: STUDENT IN AN ORGANIZED HEALTH CARE EDUCATION/TRAINING PROGRAM

## 2017-01-04 PROCEDURE — 93005 ELECTROCARDIOGRAM TRACING: CPT | Performed by: STUDENT IN AN ORGANIZED HEALTH CARE EDUCATION/TRAINING PROGRAM

## 2017-01-04 PROCEDURE — 77010033678 HC OXYGEN DAILY

## 2017-01-04 RX ORDER — ATENOLOL 25 MG/1
25 TABLET ORAL EVERY 12 HOURS
Status: DISCONTINUED | OUTPATIENT
Start: 2017-01-04 | End: 2017-01-12 | Stop reason: HOSPADM

## 2017-01-04 RX ORDER — MONTELUKAST SODIUM 10 MG/1
10 TABLET ORAL
Status: DISCONTINUED | OUTPATIENT
Start: 2017-01-04 | End: 2017-01-12 | Stop reason: HOSPADM

## 2017-01-04 RX ORDER — ALBUTEROL SULFATE 0.83 MG/ML
2.5 SOLUTION RESPIRATORY (INHALATION)
Status: DISCONTINUED | OUTPATIENT
Start: 2017-01-04 | End: 2017-01-12 | Stop reason: HOSPADM

## 2017-01-04 RX ORDER — METOCLOPRAMIDE HYDROCHLORIDE 5 MG/5ML
5 SOLUTION ORAL
Status: DISCONTINUED | OUTPATIENT
Start: 2017-01-04 | End: 2017-01-12 | Stop reason: HOSPADM

## 2017-01-04 RX ORDER — HYDROCODONE BITARTRATE AND ACETAMINOPHEN 10; 325 MG/1; MG/1
1 TABLET ORAL
Status: DISCONTINUED | OUTPATIENT
Start: 2017-01-04 | End: 2017-01-12 | Stop reason: HOSPADM

## 2017-01-04 RX ORDER — ALPRAZOLAM 0.5 MG/1
0.25 TABLET ORAL
Status: DISCONTINUED | OUTPATIENT
Start: 2017-01-04 | End: 2017-01-12 | Stop reason: HOSPADM

## 2017-01-04 RX ORDER — SODIUM CHLORIDE 0.9 % (FLUSH) 0.9 %
5-10 SYRINGE (ML) INJECTION AS NEEDED
Status: DISCONTINUED | OUTPATIENT
Start: 2017-01-04 | End: 2017-01-12 | Stop reason: HOSPADM

## 2017-01-04 RX ORDER — MEGESTROL ACETATE 40 MG/ML
200 SUSPENSION ORAL DAILY
Status: DISCONTINUED | OUTPATIENT
Start: 2017-01-05 | End: 2017-01-12 | Stop reason: HOSPADM

## 2017-01-04 RX ORDER — BUDESONIDE 0.5 MG/2ML
500 INHALANT ORAL
Status: DISCONTINUED | OUTPATIENT
Start: 2017-01-04 | End: 2017-01-12 | Stop reason: HOSPADM

## 2017-01-04 RX ORDER — SODIUM CHLORIDE 0.9 % (FLUSH) 0.9 %
5-10 SYRINGE (ML) INJECTION EVERY 8 HOURS
Status: DISCONTINUED | OUTPATIENT
Start: 2017-01-04 | End: 2017-01-12 | Stop reason: HOSPADM

## 2017-01-04 RX ORDER — GLIPIZIDE 5 MG/1
5 TABLET ORAL 2 TIMES DAILY
Status: DISCONTINUED | OUTPATIENT
Start: 2017-01-04 | End: 2017-01-08

## 2017-01-04 RX ORDER — IPRATROPIUM BROMIDE AND ALBUTEROL SULFATE 2.5; .5 MG/3ML; MG/3ML
3 SOLUTION RESPIRATORY (INHALATION)
Status: COMPLETED | OUTPATIENT
Start: 2017-01-04 | End: 2017-01-04

## 2017-01-04 RX ORDER — LEVOFLOXACIN 5 MG/ML
750 INJECTION, SOLUTION INTRAVENOUS
Status: ACTIVE | OUTPATIENT
Start: 2017-01-04 | End: 2017-01-05

## 2017-01-04 RX ORDER — AMLODIPINE BESYLATE 10 MG/1
10 TABLET ORAL DAILY
Status: DISCONTINUED | OUTPATIENT
Start: 2017-01-05 | End: 2017-01-12 | Stop reason: HOSPADM

## 2017-01-04 RX ORDER — FENTANYL 50 UG/1
1 PATCH TRANSDERMAL
Status: DISCONTINUED | OUTPATIENT
Start: 2017-01-04 | End: 2017-01-12 | Stop reason: HOSPADM

## 2017-01-04 RX ADMIN — GLIPIZIDE 5 MG: 5 TABLET ORAL at 20:20

## 2017-01-04 RX ADMIN — SODIUM CHLORIDE 500 ML: 900 INJECTION, SOLUTION INTRAVENOUS at 17:52

## 2017-01-04 RX ADMIN — MONTELUKAST SODIUM 10 MG: 10 TABLET, FILM COATED ORAL at 22:44

## 2017-01-04 RX ADMIN — ALPRAZOLAM 0.25 MG: 0.5 TABLET ORAL at 20:19

## 2017-01-04 RX ADMIN — BUDESONIDE 500 MCG: 0.5 INHALANT RESPIRATORY (INHALATION) at 19:51

## 2017-01-04 RX ADMIN — Medication 5 ML: at 22:46

## 2017-01-04 RX ADMIN — ALBUTEROL SULFATE 2.5 MG: 2.5 SOLUTION RESPIRATORY (INHALATION) at 19:51

## 2017-01-04 RX ADMIN — IPRATROPIUM BROMIDE AND ALBUTEROL SULFATE 3 ML: 2.5; .5 SOLUTION RESPIRATORY (INHALATION) at 17:05

## 2017-01-04 RX ADMIN — HYDROCODONE BITARTRATE AND ACETAMINOPHEN 1 TABLET: 10; 325 TABLET ORAL at 22:44

## 2017-01-04 RX ADMIN — METOCLOPRAMIDE HYDROCHLORIDE 5 MG: 5 SOLUTION ORAL at 22:44

## 2017-01-04 RX ADMIN — ATENOLOL 25 MG: 25 TABLET ORAL at 20:21

## 2017-01-04 NOTE — ED PROVIDER NOTES
HPI Comments: 21-year-old female  speaking patient rested emergency department with complaints of worsening shortness of breath and substernal chest pain ×3 weeks. Patient states her symptoms seemed to worsen yesterday bring her to the emergency department for evaluation today. She describes a tightness/stabbing sensation at the anterior chest with radiation bilateral arms and her back as well. Patient reports shortness of breath and nonproductive cough at home. She denies any nausea, vomiting, fever, chills, abdominal discomfort or changes in bowel or bladder habits. Of note the patient does have a history of DVT, CAD. In addition the patient has a history of renal insufficiency/failure with renal stent placement recently as well. Patient is also a breast cancer patient status post treatment. Currently taking Coumadin secondary to history of blood clots in the lower extremities. Patient reports some hematuria after starting Coumadin therapy, last INR checked on 1/2/2016 with a value of 2.5. Patient is a 78 y.o. female presenting with shortness of breath. The history is provided by the patient and the spouse. The history is limited by a language barrier. A  was used. Shortness of Breath   This is a new problem. The average episode lasts 3 weeks. The problem occurs continuously. The problem has been gradually worsening. Associated symptoms include cough, chest pain and leg swelling. Pertinent negatives include no fever, no headaches, no coryza, no rhinorrhea, no sore throat, no swollen glands, no ear pain, no neck pain, no sputum production, no hemoptysis, no wheezing, no PND, no orthopnea, no syncope, no vomiting, no abdominal pain, no rash, no leg pain and no claudication. She has had prior hospitalizations. She has had prior ED visits. Associated medical issues include CAD and DVT.  Associated medical issues do not include asthma, COPD, pneumonia, chronic lung disease, heart failure, past MI or recent surgery. Past Medical History:   Diagnosis Date    Acute on chronic diastolic congestive heart failure ([facility] 1/5/2016    Asthma      till age 31    Cancer ([facility]      roverto. breast ca mets bone/lung    Chemotherapy-induced neutropenia ([facility] 12/20/2016    Depression     Diabetes ([facility]      recently dx'ed; ype 2;  does not take at home    DM (diabetes mellitus) ([facility] 11/20/2012    HCAP (healthcare-associated pneumonia) 7/17/2013    Hypertension     Sepsis ([facility] 7/19/2016    Thromboembolus ([facility]      Left leg DVT    Unspecified adverse effect of anesthesia      In [facility] 28 yrs ago after second c -section-she was told her heart stopped d/t anesthesia-hospitalized for 5 days afterwards and followed by cardiologist       Past Surgical History:   Procedure Laterality Date    Hx vascular access  1/26/12    Hx gyn       2 c sections, ovarian cyst-roverto. Family History:   Problem Relation Age of Onset    Heart Attack Mother     Cancer Brother      doen not know details       Social History     Social History    Marital status:      Spouse name: N/A    Number of children: N/A    Years of education: N/A     Occupational History    Not on file. Social History Main Topics    Smoking status: Former Smoker     Packs/day: 1.00     Years: 3.00     Types: Cigarettes     Quit date: 1/1/1978    Smokeless tobacco: Never Used      Comment: quit 30 or more years ago    Alcohol use No    Drug use: No    Sexual activity: Not on file     Other Topics Concern    Not on file     Social History Narrative         ALLERGIES: Review of patient's allergies indicates no known allergies. Review of Systems   Constitutional: Negative for chills, diaphoresis and fever. HENT: Negative for congestion, ear pain, rhinorrhea, sneezing and sore throat. Eyes: Negative for visual disturbance. Respiratory: Positive for cough and shortness of breath.  Negative for hemoptysis, sputum production, chest tightness and wheezing. Cardiovascular: Positive for chest pain and leg swelling. Negative for orthopnea, claudication, syncope and PND. Gastrointestinal: Negative for abdominal pain, blood in stool, diarrhea, nausea and vomiting. Endocrine: Negative for polyuria. Genitourinary: Negative for difficulty urinating, dysuria, flank pain, hematuria and urgency. Musculoskeletal: Negative for back pain, myalgias, neck pain and neck stiffness. Skin: Negative for color change and rash. Neurological: Negative for dizziness, syncope, speech difficulty, weakness, light-headedness, numbness and headaches. Psychiatric/Behavioral: Negative for behavioral problems. Vitals:    01/04/17 1429   BP: 145/40   Pulse: 63   Resp: 18   Temp: 98.2 °F (36.8 °C)   SpO2: 90%   Weight: 60.8 kg (134 lb)   Height: 5' 5\" (1.651 m)            Physical Exam     MDM  Number of Diagnoses or Management Options  Community acquired pneumonia: new and requires workup  Pleural effusion: new and requires workup  Troponin level elevated:   Diagnosis management comments: EKG shows a normal sinus rhythm with a rate of 50 beats a minute. There is no evidence of acute ischemic change. Elevated troponin level noted patient was seated in the ED lobby. History of renal failure. Discussed options for chest imaging to rule out dissection versus PE. Recommends a VQ scan to rule out PE and potentially echo to evaluate for thoracic aortic dissection. Little utility for CT non-contrasted studies at this time. No evidence of acute ischemia on patient's initial EKG. Troponin elevation likely secondary to strain related to pneumonia/pleural effusion. Spoke with the pulmonologist on call who agrees to evaluate the patient for admission and potential thoracentesis.      Spoke with on call cardiology concerning trop elevation, agrees consult on patient as needed       Amount and/or Complexity of Data Reviewed  Clinical lab tests: ordered and reviewed  Tests in the radiology section of CPT®: ordered and reviewed  Tests in the medicine section of CPT®: ordered and reviewed  Independent visualization of images, tracings, or specimens: yes    Risk of Complications, Morbidity, and/or Mortality  Presenting problems: moderate  Diagnostic procedures: low  Management options: moderate    Patient Progress  Patient progress: stable    ED Course       Procedures

## 2017-01-04 NOTE — IP AVS SNAPSHOT
Garrett Ross 
 
 
 [address] [place] [address] 
[phone] Patient: Tobin Brito MRN: [id number] QQJ:3/72/[id number] You are allergic to the following No active allergies Recent Documentation Height Weight Breastfeeding? BMI OB Status Smoking Status 1.651 m 60.8 kg No 22.3 kg/m2 Postmenopausal Former Smoker Unresulted Labs Order Current Status AFB CULTURE + SMEAR W/RFLX ID FROM CULTURE Preliminary result Emergency Contacts Name Discharge Info Relation Home Work Mobile Juancarlos Leggett  Child [2] [phone] [address] [place] [address]  Spouse [3] [phone] About your hospitalization You were admitted on:  January 4, 2017 You last received care in the:  [facility] 8 MED SURG You were discharged on:  January 12, 2017 Unit phone number:  [phone] Why you were hospitalized Your primary diagnosis was:  Acute Respiratory Failure With Hypoxia (Hcc) Your diagnoses also included:  Pleural Effusion, Right, Bilateral Edema Of Lower Extremity, Accelerated Hypertension, Ckd (Chronic Kidney Disease), Breast Cancer Metastasized To Lung (Hcc), Dvt (Deep Venous Thrombosis) (Hcc), Hydronephrosis, Hematuria Providers Seen During Your Hospitalizations Provider Role Specialty Primary office phone Bryanna Ceballos DO Attending Provider Emergency Medicine [phone] Jenn Block MD Attending Provider Pulmonary Disease [phone] Your Primary Care Physician (PCP) Primary Care Physician Office Phone Office Fax  
 [place], [place] D ** None ** ** None ** Follow-up Information Follow up With Details Comments Contact Info Billy Davenport MD     
  
Your Appointments Tuesday January 17, 2017  7:55 AM EST  
LAB with [address]  
2405 [facility] INSURANCE [facility] Susana Stevenson [address] [place] [address]  
[phone] Tuesday January 17, 2017  8:30 AM EST PreChemo Follow Up with SOFI MICHAEL NP1 Kati Nuñez Hematology and Oncology [facility]) JJ/ Juancarlos Ferrariesias 33 [facility] [address]  
[phone] Tuesday January 17, 2017  9:15 AM EST Injection with NUR8  
ST. [address] Nate Spencer [phone] [facility] [address]  
[phone] [address] Current Discharge Medication List  
  
START taking these medications Dose & Instructions Dispensing Information Comments Morning Noon Evening Bedtime  
 furosemide 20 mg tablet Commonly known as:  LASIX Your next dose is: Today, Tomorrow Other:  _________ Dose:  20 mg Take 1 Tab by mouth as needed. Quantity:  20 Tab Refills:  1 CONTINUE these medications which have CHANGED Dose & Instructions Dispensing Information Comments Morning Noon Evening Bedtime  
 albuterol 90 mcg/actuation inhaler Commonly known as:  PROVENTIL HFA, VENTOLIN HFA, PROAIR HFA What changed:  how much to take Your next dose is: Today, Tomorrow Other:  _________ Dose:  2 Puff Take 2 Puffs by inhalation every six (6) hours as needed for Wheezing. Quantity:  1 Inhaler Refills:  11  
     
   
   
   
  
 megestrol 400 mg/10 mL (10 mL) suspension Commonly known as:  MEGACE What changed:  Another medication with the same name was removed. Continue taking this medication, and follow the directions you see here. Your next dose is: Today, Tomorrow Other:  _________ Dose:  200 mg Take 5 mL by mouth daily. Quantity:  240 mL Refills:  2  
     
   
   
   
  
 warfarin 2 mg tablet Commonly known as:  COUMADIN What changed:  Another medication with the same name was removed.  Continue taking this medication, and follow the directions you see here. Your next dose is: Today, Tomorrow Other:  _________ Dose:  2 mg Take 1 Tab by mouth daily. Quantity:  30 Tab Refills:  20  
     
   
   
   
  
  
CONTINUE these medications which have NOT CHANGED Dose & Instructions Dispensing Information Comments Morning Noon Evening Bedtime ALPRAZolam 0.25 mg tablet Commonly known as:  Freedom Curio Your next dose is: Today, Tomorrow Other:  _________ Dose:  0.25 mg Take 1 Tab by mouth three (3) times daily as needed for Anxiety. Max Daily Amount: 0.75 mg. Quantity:  90 Tab Refills:  2  
     
   
   
   
  
 amLODIPine 10 mg tablet Commonly known as:  Shanelle Darting Your next dose is: Today, Tomorrow Other:  _________ Dose:  10 mg Take 1 tablet by mouth daily. Quantity:  30 tablet Refills:  11  
     
   
   
   
  
 atenolol 25 mg tablet Commonly known as:  TENORMIN Your next dose is: Today, Tomorrow Other:  _________ Dose:  25 mg Take 1 Tab by mouth every twelve (12) hours. Quantity:  60 Tab Refills:  0  
     
   
   
   
  
 budesonide-formoterol 160-4.5 mcg/actuation HFA inhaler Commonly known as:  SYMBICORT Your next dose is: Today, Tomorrow Other:  _________ Dose:  2 Puff Take 2 Puffs by inhalation two (2) times a day. Quantity:  1 Inhaler Refills:  11  
     
   
   
   
  
 calcium-vitamin D 500 mg(1,250mg) -200 unit per tablet Commonly known as:  OYSTER SHELL Your next dose is: Today, Tomorrow Other:  _________ Dose:  1 Tab Take 1 Tab by mouth two (2) times daily (with meals). Quantity:  60 Tab Refills:  6  
     
   
   
   
  
 fentaNYL 50 mcg/hr PATCH Commonly known as:  Gerda Ligas Your next dose is: Today, Tomorrow Other:  _________ Dose:  1 Patch 1 Patch by TransDERmal route every seventy-two (72) hours. Max Daily Amount: 1 Patch. Indications: CHRONIC PAIN WITH OPIOID TOLERANCE Quantity:  10 Patch Refills:  0  
     
   
   
   
  
 glipiZIDE 5 mg tablet Commonly known as:  Susannah Isles Your next dose is: Today, Tomorrow Other:  _________ Dose:  5 mg Take 1 Tab by mouth two (2) times a day. Quantity:  60 Tab Refills:  5 HYDROcodone-acetaminophen  mg tablet Commonly known as:  Iven Heaton Your next dose is: Today, Tomorrow Other:  _________ Dose:  1 Tab Take 1 Tab by mouth every four (4) hours as needed. Max Daily Amount: 6 Tabs. Quantity:  90 Tab Refills:  0 LORazepam 1 mg tablet Commonly known as:  ATIVAN Your next dose is: Today, Tomorrow Other:  _________ Dose:  1 mg Take 1 Tab by mouth every six (6) hours as needed for Anxiety. Max Daily Amount: 4 mg. Quantity:  60 Tab Refills:  0  
     
   
   
   
  
 metoclopramide 5 mg/5 mL syrup Commonly known as:  REGLAN Your next dose is: Today, Tomorrow Other:  _________ Dose:  5 mg Take 5 mL by mouth Before breakfast, lunch, dinner and at bedtime. Quantity:  120 mL Refills:  0  
     
   
   
   
  
 montelukast 10 mg tablet Commonly known as:  SINGULAIR Your next dose is: Today, Tomorrow Other:  _________ Dose:  10 mg Take 1 Tab by mouth nightly. Quantity:  30 Tab Refills:  3  
     
   
   
   
  
 nystatin 100,000 unit/mL suspension Commonly known as:  MYCOSTATIN Your next dose is: Today, Tomorrow Other:  _________ Dose:  512992 Units Take 2 mL by mouth four (4) times daily. swish and spit Quantity:  480 mL Refills:  0  
     
   
   
   
  
 palbociclib 125 mg Cap Your next dose is: Today, Tomorrow Other:  _________  Dose:  125 mg  
 Take 125 mg by mouth daily. Take one pill once a day for 3 weeks and then off for one week Quantity:  21 Tab Refills:  5  
     
   
   
   
  
 potassium chloride 20 mEq tablet Commonly known as:  K-DUR, KLOR-CON Your next dose is: Today, Tomorrow Other:  _________ Dose:  20 mEq Take 1 Tab by mouth daily. Quantity:  30 Tab Refills:  3  
     
   
   
   
  
 zolpidem 10 mg tablet Commonly known as:  AMBIEN Your next dose is: Today, Tomorrow Other:  _________ Dose:  10 mg Take 1 Tab by mouth nightly as needed for Sleep. Max Daily Amount: 10 mg.  
 Quantity:  30 Tab Refills:  2 Where to Get Your Medications Information on where to get these meds will be given to you by the nurse or doctor. ! Ask your nurse or doctor about these medications  
  albuterol 90 mcg/actuation inhaler  
 furosemide 20 mg tablet Discharge Instructions Aprenda sobre el derrame pleural - [ Learning About Pleural Effusion ] Qué es el derrame pleural? 
El derrame pleural es la acumulación de líquido en el espacio entre los tejidos que recubren los pulmones y la pared torácica. Debido a la acumulación de líquido, es posible que los pulmones no puedan expandirse completamente, lo cual puede dificultar la respiración. [place], o parte de [place], puede colapsarse. El derrame pleural tiene muchas causas, caterina neumonía, cáncer, inflamación de los tejidos alrededor de los pulmones e insuficiencia cardíaca. El derrame pleural suele diagnosticarse por medio de kim radiografía y un examen físico. El médico escucha cómo el aire fluye en jesus pulmones. Cuáles son los síntomas? Los síntomas del derrame pleural pueden incluir: · Dificultad para respirar. · Falta de aire. · Dolor en el pecho. · [place]. · Tos. Un derrame pleural leve podría no causar ningún síntoma.  

Cómo se trata el derrame pleural? 
 Los médicos quizás tengan que tratar la afección que está causando el derrame pleural. Por ejemplo, pueden darle medicamentos para tratar la neumonía o la insuficiencia cardíaca congestiva. Un derrame pleural leve a menudo desaparece por sí solo sin tratamiento. Extracción de líquido El derrame pleural puede tratarse mediante la eliminación de líquido del espacio entre los tejidos que [employer]. [place] se hace con kim aguja que se coloca en el pecho (toracocentesis). Puede enviarse kim pequeña cantidad del líquido a un laboratorio para averiguar lo que está causando la acumulación de líquido. La extracción del líquido puede ayudar a [employer], tales caterina falta de aire y dolor en el pecho. Puede ayudar a que los pulmones se expandan más ampliamente. En algunos casos, si el derrame pleural no mejora, se puede colocar un catéter en el pecho. Neda es un tubo flexible que permite que el líquido drene de los pulmones. El catéter [employer] en el pecho hasta que el médico lo retira. Algunas personas pueden recibir un tratamiento que elimina el líquido y luego introduce un medicamento en la cavidad torácica. [place] ayuda a evitar que se vuelva a acumular demasiado líquido. La atención de seguimiento es kim parte clave de merrill tratamiento y seguridad. Asegúrese de hacer y acudir a todas las citas, y llame a merrill médico si está teniendo problemas. También es kim buena idea saber los resultados de jesus exámenes y mantener kim lista de los medicamentos que sharmila. Dónde puede encontrar más información en inglés? Joel Rodriguez a [email or web address]-abena.info/. Escriba D311 en la búsqueda para aprender más acerca de \"Aprenda sobre el derrame pleural - [ Learning About Pleural Effusion ]. \" 
Revisado: 23 enamorado, 2016 Versión del contenido: 11.1 © 6061-4279 [employer], [employer].  Las instrucciones de cuidado fueron adaptadas bajo licencia por [employer] (which disclaims liability or warranty for this information). Si usted tiene [place] Wendel afección médica o sobre estas instrucciones, siempre pregunte a merrill profesional de sherrie. [employer] SolutionsDiane niega toda garantía o responsabilidad por merrill uso de esta información. DISCHARGE SUMMARY from Nurse The following personal items are in your possession at time of discharge: 
 
Dental Appliances: None Visual Aid: None Home Medications: None Jewelry: None Clothing: Pajamas Other Valuables: None PATIENT INSTRUCTIONS: 
 
After general anesthesia or intravenous sedation, for 24 hours or while taking prescription Narcotics: · Limit your activities · Do not drive and operate hazardous machinery · Do not make important personal or business decisions · Do  not drink alcoholic beverages · If you have not urinated within 8 hours after discharge, please contact your surgeon on call. Report the following to your surgeon: 
· Excessive pain, swelling, redness or odor of or around the surgical area · Temperature over 100.5 · Nausea and vomiting lasting longer than 4 hours or if unable to take medications · Any signs of decreased circulation or nerve impairment to extremity: change in color, persistent  numbness, tingling, coldness or increase pain · Any questions What to do at Home: *  Please give a list of your current medications to your Primary Care Provider. *  Please update this list whenever your medications are discontinued, doses are 
    changed, or new medications (including over-the-counter products) are added. *  Please carry medication information at all times in case of emergency situations. These are general instructions for a healthy lifestyle: No smoking/ No tobacco products/ Avoid exposure to second hand smoke Surgeon General's Warning:  Quitting smoking now greatly reduces serious risk to your health. Obesity, smoking, and sedentary lifestyle greatly increases your risk for illness A healthy diet, regular physical exercise & weight monitoring are important for maintaining a healthy lifestyle You may be retaining fluid if you have a history of heart failure or if you experience any of the following symptoms:  Weight gain of 3 pounds or more overnight or 5 pounds in a week, increased swelling in our hands or feet or shortness of breath while lying flat in bed. Please call your doctor as soon as you notice any of these symptoms; do not wait until your next office visit. Recognize signs and symptoms of STROKE: 
 
F-face looks uneven A-arms unable to move or move unevenly S-speech slurred or non-existent T-time-call 911 as soon as signs and symptoms begin-DO NOT go Back to bed or wait to see if you get better-TIME IS BRAIN. Warning Signs of HEART ATTACK Call 911 if you have these symptoms: 
? Chest discomfort. Most heart attacks involve discomfort in the center of the chest that lasts more than a few minutes, or that goes away and comes back. It can feel like uncomfortable pressure, squeezing, fullness, or pain. ? Discomfort in other areas of the upper body. Symptoms can include pain or discomfort in one or both arms, the back, neck, jaw, or stomach. ? Shortness of breath with or without chest discomfort. ? Other signs may include breaking out in a cold sweat, nausea, or lightheadedness. Don't wait more than five minutes to call [address]! Fast action can save your life. Calling 911 is almost always the fastest way to get lifesaving treatment. Emergency Medical Services staff can begin treatment when they arrive  up to an hour sooner than if someone gets to the hospital by car. The discharge information has been reviewed with the patient. The patient verbalized understanding.  
 
Discharge medications reviewed with the patient and appropriate educational materials and side effects teaching were provided. Discharge Orders None MyChart Announcement We are excited to announce that we are making your provider's discharge notes available to you in [facility]. You will see these notes when they are completed and signed by the physician that discharged you from your recent hospital stay. If you have any questions or concerns about any information you see in [facility], please call the Health Information Department where you were seen or reach out to your Primary Care Provider for more information about your plan of care. Introducing \[facility] [place] [facility]\"" HEALTH SERVICES! Charles Rodriguez introduce portal paciente MyChart . Ahora se puede acceder a partes de merrill expediente médico, enviar por correo electrónico la oficina de merrill médico y solicitar renovaciones de medicamentos en línea. En merrill navegador de Internet , Bert Nageotte a [email or web address] Olivia casper en el usuario por Cleve Hodgkin? Renee shirley aquí en la sesión [facility]. Verá la página de registro [place]. Ingrese merrill código de [employer] mitzy y caterina aparece a continuación. Usted no tendrá que [employer] código después de beatrice completado el proceso de registro . Si usted no se inscribe antes de la fecha de caducidad , debe solicitar un nuevo código. · MyCSh[facility]ak Código de acceso : -[id number] Expires: 4/4/2017  2:27 PM 
 
Ingresa los últimos cuatro dígitos de merrill Número de Seguro Social ( xxxx ) y fecha de nacimiento ( dd / mm / aaaa ) caterina se indica y olivia clic en Enviar. ted será llevado a la siguiente página de registro . Crear un ID MyChart . Esta será merrill ID de inicio de sesión de MyChart y no puede ser [place] , por lo que pensar en kim que es Kathlynn Civil y fácil de recordar . Crear kim contraseña MyChart . ted puede cambiar merrill contraseña en cualquier momento . Ingrese merrill Password Reset de preguntas y Mej[place] .  [place] se puede utilizar en un momento posterior si usted olvida asher contraseña. Introduzca asher dirección de correo electrónico . Ghislaine Naranjo recibirá kim notificación por correo electrónico cuando la nueva información está disponible en MyChart . Sandra shirley en Registrarse. [place] Angely angel y descargar porciones de asher expediente médico. 
Olga Lidia clic en el enlace de descarga del menú Resumen para descargar kim copia portátil de asher información médica . Si tiene [employer] , por favor visite la sección de preguntas frecuentes del sitio web MyChart . Vanessa, MyChart NO es que se utilizará para las necesidades urgentes. Para emergencias médicas , llame al 911 . Ahora disponible en asher iPhone y Android ! General Information Please provide this summary of care documentation to your next provider. Patient Signature:  ____________________________________________________________ Date:  ____________________________________________________________  
  
[facility] Provider Signature:  ____________________________________________________________ Date:  ____________________________________________________________  
  
  
   
  
Talisha Michael Damon [address] [place] [address] 
[phone] Patient: Elenore Kussmaul MRN: [id number] QQU:[id number] Tiene alergias a lo siguiente No tiene alergias Documentación recientes Height Weight Está amamantando? BMI formerly [facility]) Estado obstétrico Estatus de tabaquísmo 1.651 m 60.8 kg No 22.3 kg/m2 Postmenopausal Former Smoker Unresulted Labs Order Current Status AFB CULTURE + SMEAR W/RFLX ID FROM CULTURE Preliminary result Emergency Contacts Name Discharge Info Relation Home Work Mobile Juancarlos Leggett  Child [2] [phone] 3 [address] [place] [address]  Spouse [3] [phone] Sobre asher hospitalización  Le admitieron el:  January 4, 2017 Asher tratamiento más reciente fue el: [facility] 8 MED SURG Le dieron de thania el:  January 12, 2017 Número de teléfono de la unidad:  [phone] Por qué le ingresaron Asher diagnosis primaria es:  Acute Respiratory Failure With Hypoxia (Hcc) Asher diagnosis también incluye:  Pleural Effusion, Right, Bilateral Edema Of Lower Extremity, Accelerated Hypertension, Ckd (Chronic Kidney Disease), Breast Cancer Metastasized To Lung (Hcc), Dvt (Deep Venous Thrombosis) (Hcc), Hydronephrosis, Hematuria Proveedores de verse fidelina courtney hospitalizaciones Personal Médico Rol Especialidad Teléfono principal de la ofmitchell Guanakito Vasquez DO Attending Provider Emergency Medicine [phone] Tasia Terrell MD Attending Provider Pulmonary Disease [phone] Asher médico de atención primaria (PCP ) Primary Care Physician Office Phone Office Fax  
 [place], [place] D ** None ** ** None ** Follow-up Information Follow up With Details Comments Contact Info Lefty Kruse MD     
  
Courtney citas Tuesday January 17, 2017  7:55 AM EST  
LAB with [address]  
1808 [facility]) Susana [address]  
[phone] Tuesday January 17, 2017  8:30 AM EST PreChemo Follow Up with SOFI MICHAEL NP1 [facility] Hematology and Oncology [facility]) JJ/ Juancarlos Carney 33 [facility] [address] [phone] Tuesday January 17, 2017  9:15 AM EST Injection with NUR8  
ST. [address] [facility] [address] [place] [phone] [facility] [address]  
[phone] [address] Aprobación de la gestión actual lista de medicamentos EMPIECE a julian [employer] Dosis e instrucciones Información de dispensación Comentarios Morning Noon Evening Bedtime  
 furosemide 20 mg tablet También conocido caterina:  LASIX Your next dose is: Today, Tomorrow Other:  _________ Dosis:  20 mg Take 1 Tab by mouth as needed. cantidad:  20 Tab  
recargas:  1 CONTINUAR estos medicamentos que [place] Dosis e instrucciones Información de dispensación Comentarios Morning Noon Evening Bedtime  
 albuterol 90 mcg/actuation inhaler También conocido caterina:  PROVENTIL HFA, VENTOLIN HFA, PROAIR HFA Lo que cambió:  dosis a julian Your next dose is: Today, Tomorrow Other:  _________ Dosis:  2 Puff Take 2 Puffs by inhalation every six (6) hours as needed for Wheezing. cantidad:  1 Inhaler  
recargas:  11  
     
   
   
   
  
 megestrol 400 mg/10 mL (10 mL) suspension También conocido caterina:  MEGACE Lo que cambió:  Otro medicamento con el mismo nombre ha sido eliminado. Siga tomando [employer] instrucciones que se muestran aquí. Your next dose is: Today, Tomorrow Other:  _________ Dosis:  200 mg Take 5 mL by mouth daily. cantidad:  240 mL  
recargas:  2  
     
   
   
   
  
 warfarin 2 mg tablet También conocido caterina:  COUMADIN Lo que cambió:  Otro medicamento con el mismo nombre ha sido eliminado. Siga tomando [employer] y [employer] instrucciones que se muestran aquí. Your next dose is: Today, Tomorrow Other:  _________ Dosis:  2 mg Take 1 Tab by mouth daily. cantidad:  30 Tab  
recargas:  20  
     
   
   
   
  
  
CONTINUAR estos medicamentos que no gan [place] Dosis e instrucciones Información de dispensación Comentarios Morning Noon Evening Bedtime ALPRAZolam 0.25 mg tablet También conocido caterina:  Jesse Issa Your next dose is: Today, Tomorrow Other:  _________ Dosis:  0.25 mg Take 1 Tab by mouth three (3) times daily as needed for Anxiety. Max Daily Amount: 0.75 mg.  
 cantidad:  90 Tab recargas:  2  
     
   
   
   
  
 amLODIPine 10 mg tablet También conocido caterina:  Prakash Dominguez Your next dose is: Today, Tomorrow Other:  _________ Dosis:  10 mg Take 1 tablet by mouth daily. cantidad:  30 tablet  
recargas:  11  
     
   
   
   
  
 atenolol 25 mg tablet También conocido caterina:  TENORMIN Your next dose is: Today, Tomorrow Other:  _________ Dosis:  25 mg Take 1 Tab by mouth every twelve (12) hours. cantidad:  60 Tab  
recargas:  0  
     
   
   
   
  
 budesonide-formoterol 160-4.5 mcg/actuation HFA inhaler También conocido caterina:  SYMBICORT Your next dose is: Today, Tomorrow Other:  _________ Dosis:  2 Puff Take 2 Puffs by inhalation two (2) times a day. cantidad:  1 Inhaler  
recargas:  11  
     
   
   
   
  
 calcium-vitamin D 500 mg(1,250mg) -200 unit per tablet También conocido caterina:  OYSTER SHELL Your next dose is: Today, Tomorrow Other:  _________ Dosis:  1 Tab Take 1 Tab by mouth two (2) times daily (with meals). cantidad:  60 Tab  
recargas:  6  
     
   
   
   
  
 fentaNYL 50 mcg/hr PATCH También conocido caterina:  [place] Mis Your next dose is: Today, Tomorrow Other:  _________ Dosis:  1 Patch 1 Patch by TransDERmal route every seventy-two (72) hours. Max Daily Amount: 1 Patch. Indications: CHRONIC PAIN WITH OPIOID TOLERANCE  
 cantidad:  10 Patch  
recargas:  0  
     
   
   
   
  
 glipiZIDE 5 mg tablet También conocido caterina:  Maxcine Emlore Your next dose is: Today, Tomorrow Other:  _________ Dosis:  5 mg Take 1 Tab by mouth two (2) times a day. cantidad:  60 Tab  
recargas:  5 HYDROcodone-acetaminophen  mg tablet También conocido caterina:  Christi Gomez Your next dose is: Today, Tomorrow Other:  _________ Dosis:  1 Tab Take 1 Tab by mouth every four (4) hours as needed. Max Daily Amount: 6 Tabs. cantidad:  90 Tab  
recargas:  0 LORazepam 1 mg tablet También conocido caterina:  ATIVAN Your next dose is: Today, Tomorrow Other:  _________ Dosis:  1 mg Take 1 Tab by mouth every six (6) hours as needed for Anxiety. Max Daily Amount: 4 mg.  
 cantidad:  60 Tab  
recargas:  0  
     
   
   
   
  
 metoclopramide 5 mg/5 mL syrup También conocido caterina:  REGLAN Your next dose is: Today, Tomorrow Other:  _________ Dosis:  5 mg Take 5 mL by mouth Before breakfast, lunch, dinner and at bedtime. cantidad:  120 mL  
recargas:  0  
     
   
   
   
  
 montelukast 10 mg tablet También conocido caterina:  SINGULAIR Your next dose is: Today, Tomorrow Other:  _________ Dosis:  10 mg Take 1 Tab by mouth nightly. cantidad:  30 Tab  
recargas:  3  
     
   
   
   
  
 nystatin 100,000 unit/mL suspension También conocido caterina:  MYCOSTATIN Your next dose is: Today, Tomorrow Other:  _________ Dosis:  495852 Units Take 2 mL by mouth four (4) times daily. swish and spit  
 cantidad:  480 mL  
recargas:  0  
     
   
   
   
  
 palbociclib 125 mg Cap Your next dose is: Today, Tomorrow Other:  _________ Dosis:  125 mg Take 125 mg by mouth daily. Take one pill once a day for 3 weeks and then off for one week  
 cantidad:  21 Tab  
recargas:  5  
     
   
   
   
  
 potassium chloride 20 mEq tablet También conocido caterina:  K-DUR, [employer] Your next dose is: Today, Tomorrow Other:  _________ Dosis:  20 mEq Take 1 Tab by mouth daily. cantidad:  30 Tab  
recargas:  3  
     
   
   
   
  
 zolpidem 10 mg tablet También conocido caterina:  Sondra Pearson Your next dose is: Today, Tomorrow Other:  _________  Dosis:  10 mg  
 Take 1 Tab by mouth nightly as needed for Sleep. Max Daily Amount: 10 mg.  
 cantidad:  30 Tab  
recargas:  2 Dónde puede recoger jesus medicamentos Información sobre dónde obtener estos medicamentos se le dará a usted por la enfermera o el médico.   
 ! Pregunte a merrill médico o enfermero/a sobre estos medicamentos  
  albuterol 90 mcg/actuation inhaler  
 furosemide 20 mg tablet Discharge Instructions Aprenda sobre el derrame pleural - [ Learning About Pleural Effusion ] Qué es el derrame pleural? 
El derrame pleural es la acumulación de líquido en el espacio entre los tejidos que recubren los pulmones y la pared torácica. Debido a la acumulación de líquido, es posible que los pulmones no puedan expandirse completamente, lo cual puede dificultar la respiración. El [place], o parte de Shira ramirez, puede colapsarse. El derrame pleural tiene muchas causas, caterina neumonía, cáncer, inflamación de los tejidos alrededor de los pulmones e insuficiencia cardíaca. El derrame pleural suele diagnosticarse por medio de kim radiografía y un examen físico. El médico escucha cómo el aire fluye en jesus pulmones. Cuáles son los síntomas? Los síntomas del derrame pleural pueden incluir: · Dificultad para respirar. · Falta de aire. · Dolor en el pecho. · Krysten Wero. · Tos. Un derrame pleural leve podría no causar ningún síntoma. Cómo se trata el derrame pleural? 
Los médicos quizás tengan que tratar la afección que está causando el derrame pleural. Por ejemplo, pueden darle medicamentos para tratar la neumonía o la insuficiencia cardíaca congestiva. Un derrame pleural leve a menudo desaparece por sí solo sin tratamiento. Extracción de líquido El derrame pleural puede tratarse mediante la eliminación de líquido del espacio entre los tejidos que [employer] [place] se hace con kim aguja que se coloca en el pecho (toracocentesis).  Puede enviarse Arthor Duncans pequeña cantidad del líquido a un laboratorio para averiguar lo que está causando la acumulación de líquido. La extracción del líquido puede ayudar a [employer], tales caterina falta de aire y dolor en el pecho. Puede ayudar a que los pulmones se expandan más ampliamente. En algunos casos, si el derrame pleural no mejora, se puede colocar un catéter en el pecho. Neda es un tubo flexible que permite que el líquido drene de los pulmones. El catéter [employer] en el pecho hasta que el médico lo retira. Algunas personas pueden recibir un tratamiento que elimina el líquido y luego introduce un medicamento en la cavidad torácica. [place] ayuda a evitar que se vuelva a acumular demasiado líquido. La atención de seguimiento es kim parte clave de merrill tratamiento y seguridad. Asegúrese de hacer y acudir a todas las citas, y llame a merrill médico si está teniendo problemas. También es kim buena idea saber los resultados de jesus exámenes y mantener kim lista de los medicamentos que sharmila. Dónde puede encontrar más información en inglés? Ni [place] a [email or web address]-abena.info/. Escriba G712 en la búsqueda para aprender más acerca de \"Aprenda sobre el derrame pleural - [ Learning About Pleural Effusion ]. \" 
Revisado: [address] [place], 2016 Versión del contenido: 11.1 © 7648-8738 Healthwise, Incorporated. Las instrucciones de cuidado fueron adaptadas bajo licencia por [employer] (which disclaims liability or warranty for this information). Si usted tiene [employer] Graff afección médica o sobre estas instrucciones, siempre pregunte a merrill profesional de sherrie. Healthwise, Incorporated niega toda garantía o responsabilidad por merrill uso de esta información. DISCHARGE SUMMARY from Nurse The following personal items are in your possession at time of discharge: 
 
Dental Appliances: None Visual Aid: None Home Medications: None Jewelry: None Clothing: Pajamas Other Valuables: None PATIENT INSTRUCTIONS: 
 
After general anesthesia or intravenous sedation, for 24 hours or while taking prescription Narcotics: · Limit your activities · Do not drive and operate hazardous machinery · Do not make important personal or business decisions · Do  not drink alcoholic beverages · If you have not urinated within 8 hours after discharge, please contact your surgeon on call. Report the following to your surgeon: 
· Excessive pain, swelling, redness or odor of or around the surgical area · Temperature over 100.5 · Nausea and vomiting lasting longer than 4 hours or if unable to take medications · Any signs of decreased circulation or nerve impairment to extremity: change in color, persistent  numbness, tingling, coldness or increase pain · Any questions What to do at Home: *  Please give a list of your current medications to your Primary Care Provider. *  Please update this list whenever your medications are discontinued, doses are 
    changed, or new medications (including over-the-counter products) are added. *  Please carry medication information at all times in case of emergency situations. These are general instructions for a healthy lifestyle: No smoking/ No tobacco products/ Avoid exposure to second hand smoke Surgeon General's Warning:  Quitting smoking now greatly reduces serious risk to your health. Obesity, smoking, and sedentary lifestyle greatly increases your risk for illness A healthy diet, regular physical exercise & weight monitoring are important for maintaining a healthy lifestyle You may be retaining fluid if you have a history of heart failure or if you experience any of the following symptoms:  Weight gain of 3 pounds or more overnight or 5 pounds in a week, increased swelling in our hands or feet or shortness of breath while lying flat in bed.   Please call your doctor as soon as you notice any of these symptoms; do not wait until your next office visit. Recognize signs and symptoms of STROKE: 
 
F-face looks uneven A-arms unable to move or move unevenly S-speech slurred or non-existent T-time-call 911 as soon as signs and symptoms begin-DO NOT go Back to bed or wait to see if you get better-TIME IS BRAIN. Warning Signs of HEART ATTACK Call 911 if you have these symptoms: 
? Chest discomfort. Most heart attacks involve discomfort in the center of the chest that lasts more than a few minutes, or that goes away and comes back. It can feel like uncomfortable pressure, squeezing, fullness, or pain. ? Discomfort in other areas of the upper body. Symptoms can include pain or discomfort in one or both arms, the back, neck, jaw, or stomach. ? Shortness of breath with or without chest discomfort. ? Other signs may include breaking out in a cold sweat, nausea, or lightheadedness. Don't wait more than five minutes to call [address]! Fast action can save your life. Calling 911 is almost always the fastest way to get lifesaving treatment. Emergency Medical Services staff can begin treatment when they arrive  up to an hour sooner than if someone gets to the hospital by car. The discharge information has been reviewed with the patient. The patient verbalized understanding. Discharge medications reviewed with the patient and appropriate educational materials and side effects teaching were provided. Discharge Orders Looking for Gamers Announcement We are excited to announce that we are making your provider's discharge notes available to you in [employer]. You will see these notes when they are completed and signed by the physician that discharged you from your recent hospital stay.   If you have any questions or concerns about any information you see in [employer], please call the Health Information Department where you were seen or reach out to your Primary Care Provider for more information about your plan of care. Introducing [facility]! Charles Rodriguez introduce portal paciente MyChart . Ahora se puede acceder a partes de merrill expediente médico, enviar por correo electrónico la oficina de merrill médico y solicitar renovaciones de medicamentos en línea. En merrill navegador de Internet , Tiffany Theodore a [email or web address] Olivia clic en el usuario por Dhiraj Hamm? Greg Bolden clic aquí en la sesión Deborha Majors. Verá la página de registro Sandy. Ingrese merrill código de [employer] mitzy y caterina aparece a continuación. Usted no tendrá que [employer] código después de beatrice completado el proceso de registro . Si usted no se inscribe antes de la fecha de caducidad , debe solicitar un nuevo código. · MyChart Código de acceso : -[id number] Expires: 4/4/2017  2:27 PM 
 
Ingresa los últimos cuatro dígitos de merrill Número de Seguro Social ( xxxx ) y fecha de nacimiento ( dd / mm / aaaa ) caterina se indica y olivia clic en Enviar. Usted será llevado a la siguiente página de registro . Crear un ID MyChart . Esta será merrill ID de inicio de sesión de MyChart y no puede ser [place] , por lo que pensar en kim que es [place] y fácil de recordar . Crear kim contraseña MyChart . Usted puede cambiar merrill contraseña en cualquier momento . Ingrese merrill Password Reset de preguntas y [place] . [place] se puede utilizar en un momento posterior si usted olvida merrill contraseña. Introduzca merrill dirección de correo electrónico . Mae Carl recibirá kim notificación por correo electrónico cuando la nueva información está disponible en MyChart . Jermaine Scott casper en Registrarse. Rekha Pretty angel y descargar porciones de merrill expediente médico. 
Olivia roniic en el enlace de descarga del menú Resumen para descargar kim copia portátil de merrill información médica . Si tiene [employer] , por favor visite la sección de preguntas frecuentes del sitio web MyChart .  Recuerde, MyChart NO es que se utilizará para las [employer]. Para emergencias médicas , llame al 911 . Ahora disponible en merrill iPhone y Android ! General Information Please provide this summary of care documentation to your next provider. Patient Signature:  ____________________________________________________________ Date:  ____________________________________________________________  
  
Joelene Batman Provider Signature:  ____________________________________________________________ Date:  ____________________________________________________________

## 2017-01-04 NOTE — ED TRIAGE NOTES
used during triage. Patient complains of shortness of breath and chest pain for a month. States she is being treated for cancer. Also states she has been on a blood thinner for 2 weeks and has noticed blood in her urine. States she had a stent placed by urology 3 weeks ago.

## 2017-01-04 NOTE — PROGRESS NOTES
available to cover any requests in the emergency room. Thank you for this referral,      Warden Gosselin, [facility]  /Patient [address]  [address] [place] [address], [facility]    [phone]

## 2017-01-04 NOTE — H&P
HISTORY AND PHYSICAL      Jorene Kanner    1/4/2017    Date of Admission:  1/4/2017    The patient's chart is reviewed and the patient is discussed with the staff. Subjective:     Patient is a 78 y.o.  female presents with shortness of breath. 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 She was admitted to this hospital in October with similar situation with obstructed stent and associated R pleural effusion which was felt may represent urinothorax. With thoracentesis and adjustment of stent this resolved. She has continue to follow with urology and oncology. Her oncologist has her on coumadin for her history of DVT in 2013. They had noticed increasing LE edema and performed a TTE with only diastolic dysfunction and a PET scan with moderate right effusion but no progression of disease. She came to the ER with dyspnea but minimal cough, no purulent sputum, and no fever. A CXR shows a large right sided effusion. Labs reveal mildly elevated BNP, creatinine stable in the mid 2's, and normal WBC. She reports some increased hematuria and difficulty urinating recently. She was to follow up with urology tomorrow. She is hypoxic, currently on 2L O2. Review of Systems  A comprehensive review of systems was negative except for that written in the HPI.     Patient Active Problem List   Diagnosis Code    Breast cancer ([facility] C50.919    Bony metastasis ([facility] C79.51    HTN (hypertension) I10    DM (diabetes mellitus) ([facility] E11.9    CKD (chronic kidney disease) N18.9    Iron deficiency anemia D50.9    Acute respiratory failure with hypoxia (HCC) J96.01    Hypomagnesemia E83.42    Hypokalemia E87.6    Asthma J45.909    Anemia D64.9    DVT (deep venous thrombosis) (HCC) I82.409    PND (paroxysmal nocturnal dyspnea) R06.00    SOB (shortness of breath) on exertion R06.02    Leg swelling M79.89    CHF (congestive heart failure) ([facility]) I50.9    Accelerated hypertension I10    Elevated troponin R79.89    Breast cancer metastasized to lung ([facility]) C50.919, C78.00    Pulmonary edema J81.1    Acute on chronic renal failure ([facility]) N17.9, N18.9    Pleural effusion, right J90    Hydronephrosis N13.30    Pancytopenia ([facility]) D61.818    Chemotherapy-induced neutropenia ([facility]) D70.1    Bilateral edema of lower extremity R60.0    Hematuria R31.9       Prior to Admission Medications   Prescriptions Last Dose Informant Patient Reported? Taking? ALPRAZolam (XANAX) 0.25 mg tablet   No No   Sig: Take 1 Tab by mouth three (3) times daily as needed for Anxiety. Max Daily Amount: 0.75 mg. HYDROcodone-acetaminophen (NORCO)  mg tablet   No No   Sig: Take 1 Tab by mouth every four (4) hours as needed. Max Daily Amount: 6 Tabs. LORazepam (ATIVAN) 1 mg tablet   No No   Sig: Take 1 Tab by mouth every six (6) hours as needed for Anxiety. Max Daily Amount: 4 mg. albuterol (PROVENTIL HFA, VENTOLIN HFA, PROAIR HFA) 90 mcg/actuation inhaler   No No   Sig: Take 1 Puff by inhalation every six (6) hours as needed for Wheezing. amlodipine (NORVASC) 10 mg tablet   No No   Sig: Take 1 tablet by mouth daily. atenolol (TENORMIN) 25 mg tablet   No No   Sig: Take 1 Tab by mouth every twelve (12) hours. budesonide-formoterol (SYMBICORT) 160-4.5 mcg/actuation HFA inhaler   No No   Sig: Take 2 Puffs by inhalation two (2) times a day. calcium-vitamin D (OYSTER SHELL) 500 mg(1,250mg) -200 unit per tablet   No No   Sig: Take 1 Tab by mouth two (2) times daily (with meals). fentaNYL (DURAGESIC) 50 mcg/hr PATCH   No No   Si Patch by TransDERmal route every seventy-two (72) hours. Max Daily Amount: 1 Patch. Indications: CHRONIC PAIN WITH OPIOID TOLERANCE   glipiZIDE (GLUCOTROL) 5 mg tablet   No No   Sig: Take 1 Tab by mouth two (2) times a day. megestrol (MEGACE) 400 mg/10 mL (10 mL) suspension   No No   Sig: Take 5 mL by mouth daily. megestrol (MEGACE) 400 mg/10 mL suspension   No No   Sig: Take 20 mL by mouth daily. metoclopramide (REGLAN) 5 mg/5 mL syrup   No No   Sig: Take 5 mL by mouth Before breakfast, lunch, dinner and at bedtime. montelukast (SINGULAIR) 10 mg tablet   No No   Sig: Take 1 Tab by mouth nightly. nystatin (MYCOSTATIN) 100,000 unit/mL suspension   No No   Sig: Take 2 mL by mouth four (4) times daily. swish and spit   palbociclib 125 mg cap   No No   Sig: Take 125 mg by mouth daily. Take one pill once a day for 3 weeks and then off for one week   potassium chloride (K-DUR, KLOR-CON) 20 mEq tablet   No No   Sig: Take 1 Tab by mouth daily. warfarin (COUMADIN) 1 mg tablet   No No   Sig: Take 1 Tab by mouth daily. warfarin (COUMADIN) 2 mg tablet   No No   Sig: Take 1 tab by mouth every Saturday and Sunday   Patient taking differently: 1.5 mg. Take 1 tab by mouth every Saturday and Sunday   warfarin (COUMADIN) 2 mg tablet   No No   Sig: Take 1 Tab by mouth daily. zolpidem (AMBIEN) 10 mg tablet   No No   Sig: Take 1 Tab by mouth nightly as needed for Sleep. Max Daily Amount: 10 mg.       Facility-Administered Medications: None       Past Medical History   Diagnosis Date    Acute on chronic diastolic congestive heart failure ([address] 1/5/2016    Asthma      till age 31    Cancer ([address]      roverto. breast ca mets bone/lung    Chemotherapy-induced neutropenia ([address] 12/20/2016    Depression     Diabetes ([address]      recently dx'ed; ype 2;  does not take at home    DM (diabetes mellitus) ([address] 11/20/2012    HCAP (healthcare-associated pneumonia) 7/17/2013    Hypertension     Sepsis ([address] 7/19/2016    Thromboembolus ([address]      Left leg DVT    Unspecified adverse effect of anesthesia      In [facility] yrs ago after second c -section-she was told her heart stopped d/t anesthesia-hospitalized for 5 days afterwards and followed by cardiologist     Past Surgical History   Procedure Laterality Date    Hx vascular access  1/26/12    Hx gyn       2 c sections, ovarian cyst-roverto. Social History     Social History    Marital status:      Spouse name: N/A    Number of children: N/A    Years of education: N/A     Occupational History    Not on file. Social History Main Topics    Smoking status: Former Smoker     Packs/day: 1.00     Years: 3.00     Types: Cigarettes     Quit date: 1/1/1978    Smokeless tobacco: Never Used      Comment: quit 30 or more years ago    Alcohol use No    Drug use: No    Sexual activity: Not on file     Other Topics Concern    Not on file     Social History Narrative     Family History   Problem Relation Age of Onset    Heart Attack Mother     Cancer Brother      doen not know details     No Known Allergies    Current Facility-Administered Medications   Medication Dose Route Frequency    sodium chloride 0.9 % bolus infusion 500 mL  500 mL IntraVENous ONCE    levoFLOXacin (LEVAQUIN) 750 mg in D5W IVPB  750 mg IntraVENous NOW     Current Outpatient Prescriptions   Medication Sig    warfarin (COUMADIN) 1 mg tablet Take 1 Tab by mouth daily.  warfarin (COUMADIN) 2 mg tablet Take 1 Tab by mouth daily.  fentaNYL (DURAGESIC) 50 mcg/hr PATCH 1 Patch by TransDERmal route every seventy-two (72) hours. Max Daily Amount: 1 Patch. Indications: CHRONIC PAIN WITH OPIOID TOLERANCE    glipiZIDE (GLUCOTROL) 5 mg tablet Take 1 Tab by mouth two (2) times a day.  palbociclib 125 mg cap Take 125 mg by mouth daily. Take one pill once a day for 3 weeks and then off for one week    zolpidem (AMBIEN) 10 mg tablet Take 1 Tab by mouth nightly as needed for Sleep. Max Daily Amount: 10 mg.    HYDROcodone-acetaminophen (NORCO)  mg tablet Take 1 Tab by mouth every four (4) hours as needed. Max Daily Amount: 6 Tabs.     budesonide-formoterol (SYMBICORT) 160-4.5 mcg/actuation HFA inhaler Take 2 Puffs by inhalation two (2) times a day.  LORazepam (ATIVAN) 1 mg tablet Take 1 Tab by mouth every six (6) hours as needed for Anxiety. Max Daily Amount: 4 mg.  atenolol (TENORMIN) 25 mg tablet Take 1 Tab by mouth every twelve (12) hours.  megestrol (MEGACE) 400 mg/10 mL suspension Take 20 mL by mouth daily.  metoclopramide (REGLAN) 5 mg/5 mL syrup Take 5 mL by mouth Before breakfast, lunch, dinner and at bedtime.  montelukast (SINGULAIR) 10 mg tablet Take 1 Tab by mouth nightly.  warfarin (COUMADIN) 2 mg tablet Take 1 tab by mouth every Saturday and Sunday (Patient taking differently: 1.5 mg. Take 1 tab by mouth every Saturday and Sunday)    ALPRAZolam (XANAX) 0.25 mg tablet Take 1 Tab by mouth three (3) times daily as needed for Anxiety. Max Daily Amount: 0.75 mg.  megestrol (MEGACE) 400 mg/10 mL (10 mL) suspension Take 5 mL by mouth daily.  potassium chloride (K-DUR, KLOR-CON) 20 mEq tablet Take 1 Tab by mouth daily.  albuterol (PROVENTIL HFA, VENTOLIN HFA, PROAIR HFA) 90 mcg/actuation inhaler Take 1 Puff by inhalation every six (6) hours as needed for Wheezing.  nystatin (MYCOSTATIN) 100,000 unit/mL suspension Take 2 mL by mouth four (4) times daily. swish and spit    amlodipine (NORVASC) 10 mg tablet Take 1 tablet by mouth daily.  calcium-vitamin D (OYSTER SHELL) 500 mg(1,250mg) -200 unit per tablet Take 1 Tab by mouth two (2) times daily (with meals). Objective:     Vitals:    01/04/17 1429 01/04/17 1705   BP: 145/40    Pulse: 63    Resp: 18    Temp: 98.2 °F (36.8 °C)    SpO2: 90% 96%   Weight: 134 lb (60.8 kg)    Height: 5' 5\" (1.651 m)        PHYSICAL EXAM     Constitutional:  the patient is well developed and in no acute distress  EENMT:  Sclera clear, pupils equal, oral mucosa moist  Respiratory: Decreased on the right. Cardiovascular:  RRR without M,G,R  Gastrointestinal: soft and non-tender; with positive bowel sounds. Musculoskeletal: warm without cyanosis.  There is 2+ B lower leg edema. Skin:  no jaundice or rashes, no wounds   Neurologic: no gross neuro deficits     Psychiatric:  alert and oriented x ppt    Chest Xray:  1/4/17  Large right pleural effusion, right lower lobe infiltrate,  portacatheter, cardiomegaly, clear left lung field      Recent Labs      01/04/17   1438  01/02/17   0827   WBC  3.3*   --    HGB  8.5*   --    HCT  25.6*   --    PLT  244   --    INR   --   2.5*     Recent Labs      01/04/17   1438   NA  147*   K  4.6   CL  116*   GLU  54*   CO2  20*   BUN  29*   CREA  2.70*   CA  8.3   TROIQ  0.12*   ALB  3.4   TBILI  0.6   ALT  18   SGOT  31     No results for input(s): PH, PCO2, PO2, HCO3 in the last 72 hours. No results for input(s): LCAD, LAC in the last 72 hours.     Assessment:  (Medical Decision Making)     Hospital Problems  Date Reviewed: 10/19/2016          Codes Class Noted POA    Bilateral edema of lower extremity ICD-10-CM: R60.0  ICD-9-CM: 782.3  1/4/2017 Unknown        Hematuria ICD-10-CM: R31.9  ICD-9-CM: 599.70  1/4/2017 Unknown        Hydronephrosis ICD-10-CM: N13.30  ICD-9-CM: 324  10/7/2016 Yes        Pleural effusion, right ICD-10-CM: J90  ICD-9-CM: 511.9  10/6/2016 Yes    Overview Addendum 10/8/2016  5:54 PM by Jose Cannon MD     10/6/16 R thoracentesis 800 ml: pleural fluid creatine 4.1 = likely urinothorax             Breast cancer metastasized to lung [facility]) ICD-10-CM: C50.919, C78.00  ICD-9-CM: 174.9, 197.0  10/30/2015 Yes        Accelerated hypertension ICD-10-CM: I10  ICD-9-CM: 401.0  10/20/2015 Yes        DVT (deep venous thrombosis) ([facility] ICD-10-CM: I82.409  ICD-9-CM: 453.40  11/19/2013 Yes    Overview Signed 11/19/2013  6:07 AM by Genoveva Toro     [birth year] There is nonocclusive thrombus within the left common femoral vein, superficial femoral vein, popliteal vein, and posterior tibial vein               * (Principal)Acute respiratory failure with hypoxia ([facility] ICD-10-CM: J96.01  ICD-9-CM: 518.81  7/15/2013 Yes Overview Signed 7/17/2013  6:15 AM by Zulema Yun     7-15-13 intubated   7-16-13 ;bronchoscopy with bal             CKD (chronic kidney disease) (Chronic) ICD-10-CM: N18.9  ICD-9-CM: 585.9  5/14/2013 Yes    Overview Signed 5/14/2013 11:35 AM by Sarah Rios RN     With acute rise- ? Dehydration and monitor                 Patient with dypsnea likely related to her enlarging R pleural effusion. Previously this was felt to be urinothorax related to her blocked ureteral stent. While she has some increased hematuria and dysuria, her creatinine has been stable whereas last time she had EMERY. Has significant LE edema but no change on recent TTE and BNP only mildly elevated. No reason to suspect infection. Plan:  (Medical Decision Making)     --Will admit for further medical management  --Supplemental O2   --Respiratory nebulizer treatments to match her home inhaler regimen. --will hold coumadin tonight and plan for thoracentesis on the right tomorrow. --patient is volume overloaded but will hold lasix until she has been seen by urology to make sure she has no signs of obstruction. --UA now.   --troponin mildly elevated on arrival. Do not suspect MI. Will repeat in 8 hours. --daily labs    More than 50% of the time documented was spent in face-to-face contact with the patient and in the care of the patient on the floor/unit where the patient is located.     Moraima Cruz MD

## 2017-01-05 ENCOUNTER — APPOINTMENT (OUTPATIENT)
Dept: GENERAL RADIOLOGY | Age: 67
DRG: 189 | End: 2017-01-05
Attending: INTERNAL MEDICINE
Payer: SUBSIDIZED

## 2017-01-05 ENCOUNTER — SURGERY (OUTPATIENT)
Age: 67
End: 2017-01-05

## 2017-01-05 LAB
ANION GAP BLD CALC-SCNC: 13 MMOL/L (ref 7–16)
APPEARANCE FLD: CLEAR
APPEARANCE UR: ABNORMAL
BACTERIA URNS QL MICRO: 0 /HPF
BILIRUB UR QL: ABNORMAL
BUN SERPL-MCNC: 31 MG/DL (ref 8–23)
CALCIUM SERPL-MCNC: 7.9 MG/DL (ref 8.3–10.4)
CASTS URNS QL MICRO: ABNORMAL /LPF
CHLORIDE SERPL-SCNC: 114 MMOL/L (ref 98–107)
CO2 SERPL-SCNC: 18 MMOL/L (ref 21–32)
COLOR FLD: YELLOW
COLOR UR: ABNORMAL
CREAT SERPL-MCNC: 2.85 MG/DL (ref 0.6–1)
CRYSTALS URNS QL MICRO: ABNORMAL /LPF
EPI CELLS #/AREA URNS HPF: ABNORMAL /HPF
ERYTHROCYTE [DISTWIDTH] IN BLOOD BY AUTOMATED COUNT: 15 % (ref 11.9–14.6)
GLUCOSE BLD STRIP.AUTO-MCNC: 140 MG/DL (ref 65–100)
GLUCOSE BLD STRIP.AUTO-MCNC: 185 MG/DL (ref 65–100)
GLUCOSE BLD STRIP.AUTO-MCNC: 37 MG/DL (ref 65–100)
GLUCOSE BLD STRIP.AUTO-MCNC: 38 MG/DL (ref 65–100)
GLUCOSE BLD STRIP.AUTO-MCNC: 40 MG/DL (ref 65–100)
GLUCOSE BLD STRIP.AUTO-MCNC: 51 MG/DL (ref 65–100)
GLUCOSE BLD STRIP.AUTO-MCNC: 56 MG/DL (ref 65–100)
GLUCOSE BLD STRIP.AUTO-MCNC: 87 MG/DL (ref 65–100)
GLUCOSE SERPL-MCNC: 53 MG/DL (ref 65–100)
GLUCOSE UR STRIP.AUTO-MCNC: NEGATIVE MG/DL
HCT VFR BLD AUTO: 26.4 % (ref 35.8–46.3)
HGB BLD-MCNC: 8.4 G/DL (ref 11.7–15.4)
HGB UR QL STRIP: ABNORMAL
INR PPP: 2.6 (ref 0.9–1.2)
INR PPP: 3.3 (ref 0.9–1.2)
KETONES UR QL STRIP.AUTO: ABNORMAL MG/DL
LEUKOCYTE ESTERASE UR QL STRIP.AUTO: ABNORMAL
MCH RBC QN AUTO: 36.1 PG (ref 26.1–32.9)
MCHC RBC AUTO-ENTMCNC: 31.8 G/DL (ref 31.4–35)
MCV RBC AUTO: 113.3 FL (ref 79.6–97.8)
NITRITE UR QL STRIP.AUTO: NEGATIVE
NUC CELL # FLD: <100 /CU MM
OTHER OBSERVATIONS,UCOM: ABNORMAL
PH UR STRIP: 5.5 [PH] (ref 5–9)
PLATELET # BLD AUTO: 223 K/UL (ref 150–450)
PMV BLD AUTO: 10.9 FL (ref 10.8–14.1)
POTASSIUM SERPL-SCNC: 4.4 MMOL/L (ref 3.5–5.1)
PROT UR STRIP-MCNC: 300 MG/DL
PROTHROMBIN TIME: 28.4 SEC (ref 9.6–12)
PROTHROMBIN TIME: 36.1 SEC (ref 9.6–12)
RBC # BLD AUTO: 2.33 M/UL (ref 4.05–5.25)
RBC # FLD: NORMAL /CU MM
RBC #/AREA URNS HPF: >100 /HPF
SODIUM SERPL-SCNC: 145 MMOL/L (ref 136–145)
SP GR UR REFRACTOMETRY: 1.02 (ref 1–1.02)
SPECIMEN SOURCE FLD: NORMAL
TROPONIN I SERPL-MCNC: 0.09 NG/ML (ref 0.02–0.05)
UROBILINOGEN UR QL STRIP.AUTO: 0.2 EU/DL (ref 0.2–1)
WBC # BLD AUTO: 3.3 K/UL (ref 4.3–11.1)
WBC URNS QL MICRO: ABNORMAL /HPF

## 2017-01-05 PROCEDURE — C1729 CATH, DRAINAGE: HCPCS | Performed by: INTERNAL MEDICINE

## 2017-01-05 PROCEDURE — 99232 SBSQ HOSP IP/OBS MODERATE 35: CPT | Performed by: INTERNAL MEDICINE

## 2017-01-05 PROCEDURE — 36430 TRANSFUSION BLD/BLD COMPNT: CPT

## 2017-01-05 PROCEDURE — 87116 MYCOBACTERIA CULTURE: CPT | Performed by: INTERNAL MEDICINE

## 2017-01-05 PROCEDURE — 82570 ASSAY OF URINE CREATININE: CPT | Performed by: INTERNAL MEDICINE

## 2017-01-05 PROCEDURE — 36415 COLL VENOUS BLD VENIPUNCTURE: CPT | Performed by: INTERNAL MEDICINE

## 2017-01-05 PROCEDURE — 85027 COMPLETE CBC AUTOMATED: CPT | Performed by: INTERNAL MEDICINE

## 2017-01-05 PROCEDURE — 65270000029 HC RM PRIVATE

## 2017-01-05 PROCEDURE — 71010 XR CHEST PORT: CPT

## 2017-01-05 PROCEDURE — 87205 SMEAR GRAM STAIN: CPT | Performed by: INTERNAL MEDICINE

## 2017-01-05 PROCEDURE — 88305 TISSUE EXAM BY PATHOLOGIST: CPT | Performed by: INTERNAL MEDICINE

## 2017-01-05 PROCEDURE — 94640 AIRWAY INHALATION TREATMENT: CPT

## 2017-01-05 PROCEDURE — 74011250637 HC RX REV CODE- 250/637: Performed by: INTERNAL MEDICINE

## 2017-01-05 PROCEDURE — 83615 LACTATE (LD) (LDH) ENZYME: CPT | Performed by: INTERNAL MEDICINE

## 2017-01-05 PROCEDURE — 0W993ZX DRAINAGE OF RIGHT PLEURAL CAVITY, PERCUTANEOUS APPROACH, DIAGNOSTIC: ICD-10-PCS | Performed by: INTERNAL MEDICINE

## 2017-01-05 PROCEDURE — 84484 ASSAY OF TROPONIN QUANT: CPT | Performed by: INTERNAL MEDICINE

## 2017-01-05 PROCEDURE — 85610 PROTHROMBIN TIME: CPT | Performed by: INTERNAL MEDICINE

## 2017-01-05 PROCEDURE — 74011000250 HC RX REV CODE- 250

## 2017-01-05 PROCEDURE — 76040000019: Performed by: INTERNAL MEDICINE

## 2017-01-05 PROCEDURE — 77010033711 HC HIGH FLOW OXYGEN

## 2017-01-05 PROCEDURE — 74011000250 HC RX REV CODE- 250: Performed by: INTERNAL MEDICINE

## 2017-01-05 PROCEDURE — 32555 ASPIRATE PLEURA W/ IMAGING: CPT | Performed by: INTERNAL MEDICINE

## 2017-01-05 PROCEDURE — 88112 CYTOPATH CELL ENHANCE TECH: CPT | Performed by: INTERNAL MEDICINE

## 2017-01-05 PROCEDURE — P9059 PLASMA, FRZ BETWEEN 8-24HOUR: HCPCS | Performed by: INTERNAL MEDICINE

## 2017-01-05 PROCEDURE — 82962 GLUCOSE BLOOD TEST: CPT

## 2017-01-05 PROCEDURE — 84157 ASSAY OF PROTEIN OTHER: CPT | Performed by: INTERNAL MEDICINE

## 2017-01-05 PROCEDURE — 80048 BASIC METABOLIC PNL TOTAL CA: CPT | Performed by: INTERNAL MEDICINE

## 2017-01-05 PROCEDURE — 82945 GLUCOSE OTHER FLUID: CPT | Performed by: INTERNAL MEDICINE

## 2017-01-05 PROCEDURE — 89050 BODY FLUID CELL COUNT: CPT | Performed by: INTERNAL MEDICINE

## 2017-01-05 PROCEDURE — 87102 FUNGUS ISOLATION CULTURE: CPT | Performed by: INTERNAL MEDICINE

## 2017-01-05 PROCEDURE — 77030013131 HC IV BLD ST ICUM -A

## 2017-01-05 PROCEDURE — 94760 N-INVAS EAR/PLS OXIMETRY 1: CPT

## 2017-01-05 RX ORDER — SODIUM CHLORIDE 9 MG/ML
250 INJECTION, SOLUTION INTRAVENOUS AS NEEDED
Status: DISCONTINUED | OUTPATIENT
Start: 2017-01-05 | End: 2017-01-12 | Stop reason: HOSPADM

## 2017-01-05 RX ORDER — DEXTROSE 50 % IN WATER (D50W) INTRAVENOUS SYRINGE
Status: COMPLETED
Start: 2017-01-05 | End: 2017-01-05

## 2017-01-05 RX ADMIN — ATENOLOL 25 MG: 25 TABLET ORAL at 22:48

## 2017-01-05 RX ADMIN — AMLODIPINE BESYLATE 10 MG: 10 TABLET ORAL at 08:59

## 2017-01-05 RX ADMIN — METOCLOPRAMIDE HYDROCHLORIDE 5 MG: 5 SOLUTION ORAL at 05:28

## 2017-01-05 RX ADMIN — HYDROCODONE BITARTRATE AND ACETAMINOPHEN 1 TABLET: 10; 325 TABLET ORAL at 19:45

## 2017-01-05 RX ADMIN — HYDROCODONE BITARTRATE AND ACETAMINOPHEN 1 TABLET: 10; 325 TABLET ORAL at 03:07

## 2017-01-05 RX ADMIN — BUDESONIDE 500 MCG: 0.5 INHALANT RESPIRATORY (INHALATION) at 07:17

## 2017-01-05 RX ADMIN — METOCLOPRAMIDE HYDROCHLORIDE 5 MG: 5 SOLUTION ORAL at 22:00

## 2017-01-05 RX ADMIN — ALPRAZOLAM 0.25 MG: 0.5 TABLET ORAL at 14:01

## 2017-01-05 RX ADMIN — MONTELUKAST SODIUM 10 MG: 10 TABLET, FILM COATED ORAL at 22:54

## 2017-01-05 RX ADMIN — ALPRAZOLAM 0.25 MG: 0.5 TABLET ORAL at 03:07

## 2017-01-05 RX ADMIN — METOCLOPRAMIDE HYDROCHLORIDE 5 MG: 5 SOLUTION ORAL at 17:21

## 2017-01-05 RX ADMIN — METOCLOPRAMIDE HYDROCHLORIDE 5 MG: 5 SOLUTION ORAL at 11:52

## 2017-01-05 RX ADMIN — DEXTROSE MONOHYDRATE 25 G: 25 INJECTION, SOLUTION INTRAVENOUS at 16:41

## 2017-01-05 RX ADMIN — BUDESONIDE 500 MCG: 0.5 INHALANT RESPIRATORY (INHALATION) at 20:32

## 2017-01-05 RX ADMIN — Medication 10 ML: at 22:54

## 2017-01-05 RX ADMIN — Medication 5 ML: at 05:28

## 2017-01-05 RX ADMIN — ATENOLOL 25 MG: 25 TABLET ORAL at 08:59

## 2017-01-05 RX ADMIN — ALBUTEROL SULFATE 2.5 MG: 2.5 SOLUTION RESPIRATORY (INHALATION) at 07:17

## 2017-01-05 RX ADMIN — ALBUTEROL SULFATE 2.5 MG: 2.5 SOLUTION RESPIRATORY (INHALATION) at 03:09

## 2017-01-05 RX ADMIN — MEGESTROL ACETATE 200 MG: 40 SUSPENSION ORAL at 08:59

## 2017-01-05 RX ADMIN — Medication 10 ML: at 14:58

## 2017-01-05 NOTE — PROGRESS NOTES
Pulmonary Daily Progress Note      Griselda Myers    1/5/2017    Date of Admission:  1/4/2017     Patient is a 78 y.o.  female presents with shortness of breath. 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 She was admitted to this hospital in October with similar situation with obstructed stent and associated R pleural effusion which was felt may represent urinothorax. With thoracentesis and adjustment of stent this resolved. She has continue to follow with urology and oncology. Her oncologist has her on coumadin for her history of DVT in 2013. They had noticed increasing LE edema and performed a TTE with only diastolic dysfunction and a PET scan with moderate right effusion but no progression of disease. She came to the ER with dyspnea but minimal cough, no purulent sputum, and no fever. A CXR shows a large right sided effusion. Labs reveal mildly elevated BNP, creatinine stable in the mid 2's, and normal WBC. She reports some increased hematuria and difficulty urinating recently. She was to follow up with urology tomorrow. She is hypoxic, currently on 2L O2. The patient's chart is reviewed and the patient is discussed with the staff. Subjective:     Patient in bed on 2 LPM. Feels cannot take a deep breath. Also feels miserable and did not sleep. Review of Systems  Limited as per above since speaks Armenian.          Current Facility-Administered Medications   Medication Dose Route Frequency    dextrose (D50W) 50% injection syrg        ALPRAZolam (XANAX) tablet 0.25 mg  0.25 mg Oral TID PRN    amLODIPine (NORVASC) tablet 10 mg  10 mg Oral DAILY    atenolol (TENORMIN) tablet 25 mg  25 mg Oral Q12H    fentaNYL (DURAGESIC) 50 mcg/hr patch 1 Patch  1 Patch TransDERmal Q72H    glipiZIDE (GLUCOTROL) tablet 5 mg  5 mg Oral BID    HYDROcodone-acetaminophen (NORCO)  mg tablet 1 Tab  1 Tab Oral Q4H PRN    megestrol (MEGACE) 400 mg/10 mL (10 mL) oral suspension 200 mg  200 mg Oral DAILY    metoclopramide (REGLAN) 5 mg/5 mL oral syrup 5 mg  5 mg Oral AC&HS    montelukast (SINGULAIR) tablet 10 mg  10 mg Oral QHS    palbociclib (IBRANCE) cap 125 mg  125 mg Oral DAILY    sodium chloride (NS) flush 5-10 mL  5-10 mL IntraVENous Q8H    sodium chloride (NS) flush 5-10 mL  5-10 mL IntraVENous PRN    albuterol (PROVENTIL VENTOLIN) nebulizer solution 2.5 mg  2.5 mg Nebulization Q4H PRN    budesonide (PULMICORT) 500 mcg/2 ml nebulizer suspension  500 mcg Nebulization BID RT           Objective:     Vitals:    01/05/17 0024 01/05/17 0309 01/05/17 0424 01/05/17 0717   BP: 131/49  138/57    Pulse: (!) 56  (!) 50    Resp: 20  19    Temp: 98.1 °F (36.7 °C)  97.8 °F (36.6 °C)    SpO2: 97% 97% 98% 97%   Weight:       Height:           PHYSICAL EXAM     Constitutional:  the patient is well developed and in mild acute distress  EENMT:  Sclera clear, pupils equal, oral mucosa moist  Respiratory: Decreased on the right. No wheezing  Cardiovascular:  RRR without M,G,R  Gastrointestinal: soft and non-tender; with positive bowel sounds. Musculoskeletal: warm without cyanosis. There is 2+ B lower leg edema.   Skin:  no jaundice or rashes, no wounds   Neurologic: no gross neuro deficits     Psychiatric:  alert and oriented x ppt    Chest Xray:  1/4/17  Large right pleural effusion, right lower lobe infiltrate,  portacatheter, cardiomegaly, clear left lung field      Recent Labs      01/04/17   1438  01/02/17   0827   WBC  3.3*   --    HGB  8.5*   --    HCT  25.6*   --    PLT  244   --    INR   --   2.5*     Recent Labs      01/04/17   1438   NA  147*   K  4.6   CL  116*   GLU  54*   CO2  20*   BUN  29*   CREA  2.70*   CA  8.3   TROIQ  0.12*   ALB  3.4   TBILI  0.6   ALT  18   SGOT  31     No results for input(s): PH, PCO2, PO2, HCO3 in the last 72 hours. No results for input(s): LCAD, LAC in the last 72 hours. Assessment:  (Medical Decision Making)     Hospital Problems  Date Reviewed: 10/19/2016          Codes Class Noted POA    Bilateral edema of lower extremity ICD-10-CM: R60.0  ICD-9-CM: 782.3  1/4/2017 Unknown        Hematuria ICD-10-CM: R31.9  ICD-9-CM: 599.70  1/4/2017 Unknown        Hydronephrosis ICD-10-CM: N13.30  ICD-9-CM: 461  10/7/2016 Yes        Pleural effusion, right ICD-10-CM: J90  ICD-9-CM: 511.9  10/6/2016 Yes    Overview Addendum 10/8/2016  5:54 PM by Cody Mota MD     10/6/16 R thoracentesis 800 ml: pleural fluid creatine 4.1 = likely urinothorax             Breast cancer metastasized to lung [facility]) ICD-10-CM: C50.919, C78.00  ICD-9-CM: 174.9, 197.0  10/30/2015 Yes        Accelerated hypertension ICD-10-CM: I10  ICD-9-CM: 401.0  10/20/2015 Yes        DVT (deep venous thrombosis) ([facility] ICD-10-CM: I82.409  ICD-9-CM: 453.40  11/19/2013 Yes    Overview Signed 11/19/2013  6:07 AM by Lucy Hong     52-74-27 There is nonocclusive thrombus within the left common femoral vein, superficial femoral vein, popliteal vein, and posterior tibial vein               * (Principal)Acute respiratory failure with hypoxia ([facility] ICD-10-CM: J96.01  ICD-9-CM: 518.81  7/15/2013 Yes    Overview Signed 7/17/2013  6:15 AM by Lucy Hong     7-15-13 intubated   7-16-13 ;bronchoscopy with bal             CKD (chronic kidney disease) (Chronic) ICD-10-CM: N18.9  ICD-9-CM: 585.9  5/14/2013 Yes    Overview Signed 5/14/2013 11:35 AM by Ann Mohan RN     With acute rise- ? Dehydration and monitor                 Patient with dypsnea likely related to her enlarging R pleural effusion. Previously this was felt to be urinothorax related to her blocked ureteral stent. While she has some increased hematuria and dysuria, her creatinine has been stable whereas last time she had EMERY.    Has significant LE edema but no change on recent TTE and BNP only mildly elevated. No reason to suspect infection. Plan:  (Medical Decision Making)      --awaiting INR to see if can drain fluid. Will check for Cr to see if has urinothorax again. --patient is volume overloaded but will hold lasix until she has been seen by urology to make sure she has no signs of obstruction. --Supplemental O2, will try airvo to see if help work of breathing  --Respiratory nebulizer treatments to match her home inhaler regimen. --UA is still pending as in TNI since was up slightly yeserday. Do not suspect MI  --daily labs    More than 50% of the time documented was spent in face-to-face contact with the patient and in the care of the patient on the floor/unit where the patient is located.     Mayank Mace MD

## 2017-01-05 NOTE — PROGRESS NOTES
Admission assessment. Shift assessment. Pt resting comfortably in bed with family at bedside. Alert and oriented x4. Respirations even and unlabored, no acute distress noted. Dual skin assessment completed with Gradie Friends LPN. Skin clean, dry, intact, and flaky. No evidence of redness or excoriation. BLE and BUE edema. Assessment completed as documented. Pt offers no complaints at this time. Call light in reach, pt encouraged to call for assistance. Will continue to monitor.

## 2017-01-05 NOTE — PROGRESS NOTES
Consent signed for Bronchoscopy and placed in chart. SQBS at 56; patient A/O, given two 4oz apple juice and joelle crackers.  Will continue POC

## 2017-01-05 NOTE — ROUTINE PROCESS
Pt sat up on side of bed for thoracentesis. Consent obtained. Time out performed. Pts vitals monitored throughout procedure. Left and right ultrasound done and pic taken of pleural fluid.  ~1250 ml yellow pleural fluid from R.  Pt tolerated procedure well with no adverse rxn. Specimens sent to the lab x 3 and labeled appropriately. Site dressed appropriately and report given to pts RN. CXR ordered post procedure. Pressure dressing placed to R thoracentesis site post pressure held x 10 min. TRANSFER - OUT REPORT:    Verbal report given to Bc rendon [facility]   for routine progression of care       Report consisted of patients Situation, Background, Assessment and   Recommendations(SBAR). Information from the following report(s) Procedure Summary was reviewed with the receiving nurse. Opportunity for questions and clarification was provided.

## 2017-01-05 NOTE — PROGRESS NOTES
Patient assisted to bathroom. Patient voided 300cc hazy bloody tinged urine. Urine collected and sent to lab. Dr on call paged in regards to patient's urine. Dr August Kenyon returned call; noted Urology consult ordered. No new orders placed.  Will continue POC

## 2017-01-05 NOTE — PROCEDURES
US Guided Thoracentesis Procedure Note--UNILATERAL    Time Out -- Correct patient identified and Everyone Agrees. Procedure:  Right  Thoracentesis with ultrasound guidance    Indication:  Right Pleural effusion     Summary:    After informed consent was obtained, the patient was positioned upright in the usual fashion.  Ultrasound was used to identify the optimal spot for pleural drainage.     The right  intercostal space was anesthetized with 1% Lidocaine to achieve adequate anesthesia.  The pleural space was entered with yellow fluid identified.  Lidocaine was instilled within the pleural space as well.  A small stab incision was made at the site of local anesthesia and the thoracentesis catheter was advanced into the pleural space. Approximately 1250 ml of fluid was obtained with ease.  The procedure was terminated after increased chest discomfort. Air was not aspirated during the procedure.  A bandaid was placed over the incision after adequate hemostasis was achieved.  A CXR is pending. EBL -- 1 ml    Patient stable post procedure    The pleural fluid will be sent for :protein, LDH, cytology, cell count, AFB, Creatine      Spoke with urology earlier. Will get US to r/o obstruction  INR was 2.6 prior to last FFP being given.   Sony Esquivel aware to keep pressure on thoracentesis site given risk for bleeding.     Signed By: Yeltiza Zhang MD

## 2017-01-05 NOTE — PROGRESS NOTES
TRANSFER - IN REPORT:    Verbal report received from Blair (monalisa) on Danyell James  being received from ED (unit) for routine progression of care      Report consisted of patients Situation, Background, Assessment and   Recommendations(SBAR). Information from the following report(s) SBAR, Kardex and ED Summary was reviewed with the receiving nurse. Opportunity for questions and clarification was provided. Assessment completed upon patients arrival to unit and care assumed.

## 2017-01-05 NOTE — PROGRESS NOTES
Reassessment completed. Pt resting comfortably in bed. Respirations even and unlabored. Will continue to monitor.

## 2017-01-05 NOTE — CONSULTS
[facility] [address]       Name:  Ever Monroe   MR#:  [id number]   :  1950   Account #:  [de-identified]   Date of Adm:  2017       DATE OF CONSULTATION: 2017      CONSULTING DOCTOR: Dr. Jessika Garza: History of right hydronephrosis. HISTORY OF ILLNESS: A 62-year-old [place] female who has   metastatic breast cancer. She has cancer involving the bone as   well as retroperitoneal disease. The retroperitoneal disease   causing right ureteral obstruction. She has been seen by Dr. Milady Tamayo and the right hydronephrosis has been managed   with an indwelling ureteral stent. She had a stent placed on   2016. The creatinine at that time had gone up to 3.32 and   had been elevated for some time. She receives chemotherapy from   Oncology. The patient was admitted on 2016, with elevated creatinine   up to 4. 19. CT without contrast showed severe right-sided   hydronephrosis despite the presence of a well positioned stent   on the right. She had replacement of the right ureteral stent on 10/07/2016,   by Dr. Judith Dan. She had an 8-Tajik 24 cm Percuflex stent placed   at that time. Left retrograde pyelogram did not show any   obstruction. She had evidence of fluid overload and also had an   increase in a right-sided pleural effusion. She had a   thoracentesis done on 10/06/2016, by Pulmonology. The fluid was   sent for creatinine level and it returned elevated at 4.1. However, on the same date, the serum creatinine was 4.04. She is currently on Coumadin for history of DVT in . Recently came to the ER with worsening dyspnea and cough with   chest x-ray showing a large right-sided pleural effusion. She   has a serum creatinine of 2.85. INR is 2.8. As noted, the chest   x-ray shows a large right-sided pleural effusion. We are asked   to make recommendations for possible right renal obstruction   with possible urinoma. Review of chart does show that she had PET CT scan done on   12/13/2016, and I have reviewed those images. There is no   significant hydronephrosis and there was a well positioned right   ureteral stent in place at that time. ASSESSMENT:   1. Metastatic breast cancer causing right-sided hydronephrosis,   stent placed in October 2016.   2. Recurrent right pleural effusion; question of urinothorax at   that time. However, because the serum creatinine was elevated,   it is essentially the same level as that found in the pleural   fluid, which would make one think that the fluid was not urine   in the thorax at that time. I am not aware of any imaging   studies that show a direct leak from the kidney at that point. RECOMMENDATIONS: I have discussed this with Dr. Larry Newby who is   going to perform a right thoracentesis on the patient today. She   is having some respiratory distress. Fluid will be sent for   creatinine. I am going to order a renal ultrasound to make sure   that there is no recurrent hydronephrosis. Her creatinine at   this time is near her baseline level and I think the previous   assumption of urothorax may not actually have been due to   urine in the pleural cavity. Will follow closely. Change stent   as needed.         MD Deny Leung / Eva Pearson   D:  01/05/2017   16:19   T:  01/05/2017   16:46   Job #:  [id number]

## 2017-01-05 NOTE — PROGRESS NOTES
Spoke with  [phone] and pt via phone. Pt complains of SOB, requesting breathing treatment. Will administer Norco for pain and Xanax for anxiety. Opportunity provided for pt to ask questions, pt offers no other complaints at this time. Will continue to monitor.

## 2017-01-05 NOTE — PROGRESS NOTES
Spoke with  number [phone] with pt via phone. Pt described SOB symptoms and need for pain medicine as well as home medication. Will administer Norco. Pt offers no further complaints at this time.

## 2017-01-05 NOTE — PROGRESS NOTES
Assumed care of patient. Assessment completed and documented, see docflow. Patient A/O. Sitting up in chair, getting breathing treatment. NAD noted at present. Respirations even and non labored. Patient nodded understanding to call for needs. Call light within reach. Will continue POC.

## 2017-01-05 NOTE — PROGRESS NOTES
Services    Services at [facility] is dedicated to helping providers establish a direct relationship with their non-English, limited-English proficient, or deaf patients. Layne Branch [phone] available    Thursday January 5th (8:00am - 4:30pm)       For on call Monday to Friday (4:30pm - 8:00am)   On call Weekends (24 hours)    please refer to the monthly calendar online in [address] [place] [address], for the corresponding . Thank you for this referral,      Layne Branch, [address] [facility]  /Patient [address] [facility].  [facility] [place] [address] [address] [facility]    [phone]

## 2017-01-06 ENCOUNTER — APPOINTMENT (OUTPATIENT)
Dept: GENERAL RADIOLOGY | Age: 67
DRG: 189 | End: 2017-01-06
Attending: INTERNAL MEDICINE
Payer: SUBSIDIZED

## 2017-01-06 ENCOUNTER — APPOINTMENT (OUTPATIENT)
Dept: ULTRASOUND IMAGING | Age: 67
DRG: 189 | End: 2017-01-06
Attending: UROLOGY
Payer: SUBSIDIZED

## 2017-01-06 LAB
ANION GAP BLD CALC-SCNC: 12 MMOL/L (ref 7–16)
BLD PROD TYP BPU: NORMAL
BLD PROD TYP BPU: NORMAL
BPU ID: NORMAL
BPU ID: NORMAL
BUN SERPL-MCNC: 28 MG/DL (ref 8–23)
CALCIUM SERPL-MCNC: 7.5 MG/DL (ref 8.3–10.4)
CHLORIDE SERPL-SCNC: 115 MMOL/L (ref 98–107)
CO2 SERPL-SCNC: 19 MMOL/L (ref 21–32)
CREAT FLD-MCNC: 2.72 MG/DL
CREAT SERPL-MCNC: 2.56 MG/DL (ref 0.6–1)
GLUCOSE BLD STRIP.AUTO-MCNC: 104 MG/DL (ref 65–100)
GLUCOSE BLD STRIP.AUTO-MCNC: 188 MG/DL (ref 65–100)
GLUCOSE BLD STRIP.AUTO-MCNC: 76 MG/DL (ref 65–100)
GLUCOSE BLD STRIP.AUTO-MCNC: 91 MG/DL (ref 65–100)
GLUCOSE FLD-MCNC: 57 MG/DL
GLUCOSE SERPL-MCNC: 104 MG/DL (ref 65–100)
INR PPP: 2.1 (ref 0.9–1.2)
LDH FLD L TO P-CCNC: 102 U/L
POTASSIUM SERPL-SCNC: 4.2 MMOL/L (ref 3.5–5.1)
PROT FLD-MCNC: 3.1 G/DL
PROTHROMBIN TIME: 22.5 SEC (ref 9.6–12)
SODIUM SERPL-SCNC: 146 MMOL/L (ref 136–145)
SPECIMEN SOURCE FLD: NORMAL
STATUS OF UNIT,%ST: NORMAL
STATUS OF UNIT,%ST: NORMAL
UNIT DIVISION, %UDIV: 0
UNIT DIVISION, %UDIV: NORMAL

## 2017-01-06 PROCEDURE — 76775 US EXAM ABDO BACK WALL LIM: CPT

## 2017-01-06 PROCEDURE — 71010 XR CHEST PORT: CPT

## 2017-01-06 PROCEDURE — 65270000029 HC RM PRIVATE

## 2017-01-06 PROCEDURE — 74011250636 HC RX REV CODE- 250/636: Performed by: INTERNAL MEDICINE

## 2017-01-06 PROCEDURE — 94640 AIRWAY INHALATION TREATMENT: CPT

## 2017-01-06 PROCEDURE — 80048 BASIC METABOLIC PNL TOTAL CA: CPT | Performed by: INTERNAL MEDICINE

## 2017-01-06 PROCEDURE — 94760 N-INVAS EAR/PLS OXIMETRY 1: CPT

## 2017-01-06 PROCEDURE — 74011250637 HC RX REV CODE- 250/637: Performed by: INTERNAL MEDICINE

## 2017-01-06 PROCEDURE — 77030021668 HC NEB PREFIL KT VYRM -A

## 2017-01-06 PROCEDURE — 82962 GLUCOSE BLOOD TEST: CPT

## 2017-01-06 PROCEDURE — 74011000250 HC RX REV CODE- 250: Performed by: INTERNAL MEDICINE

## 2017-01-06 PROCEDURE — 77010033678 HC OXYGEN DAILY

## 2017-01-06 PROCEDURE — 36415 COLL VENOUS BLD VENIPUNCTURE: CPT | Performed by: INTERNAL MEDICINE

## 2017-01-06 PROCEDURE — 99232 SBSQ HOSP IP/OBS MODERATE 35: CPT | Performed by: INTERNAL MEDICINE

## 2017-01-06 PROCEDURE — 85610 PROTHROMBIN TIME: CPT | Performed by: INTERNAL MEDICINE

## 2017-01-06 RX ORDER — FUROSEMIDE 10 MG/ML
40 INJECTION INTRAMUSCULAR; INTRAVENOUS DAILY
Status: COMPLETED | OUTPATIENT
Start: 2017-01-06 | End: 2017-01-07

## 2017-01-06 RX ORDER — AMOXICILLIN AND CLAVULANATE POTASSIUM 500; 125 MG/1; MG/1
1 TABLET, FILM COATED ORAL EVERY 12 HOURS
Status: COMPLETED | OUTPATIENT
Start: 2017-01-06 | End: 2017-01-08

## 2017-01-06 RX ORDER — AMOXICILLIN AND CLAVULANATE POTASSIUM 875; 125 MG/1; MG/1
1 TABLET, FILM COATED ORAL EVERY 12 HOURS
Status: DISCONTINUED | OUTPATIENT
Start: 2017-01-06 | End: 2017-01-06 | Stop reason: DRUGHIGH

## 2017-01-06 RX ADMIN — GLIPIZIDE 5 MG: 5 TABLET ORAL at 08:59

## 2017-01-06 RX ADMIN — METOCLOPRAMIDE HYDROCHLORIDE 5 MG: 5 SOLUTION ORAL at 06:03

## 2017-01-06 RX ADMIN — HYDROCODONE BITARTRATE AND ACETAMINOPHEN 1 TABLET: 10; 325 TABLET ORAL at 21:34

## 2017-01-06 RX ADMIN — ALPRAZOLAM 0.25 MG: 0.5 TABLET ORAL at 11:51

## 2017-01-06 RX ADMIN — ATENOLOL 25 MG: 25 TABLET ORAL at 21:34

## 2017-01-06 RX ADMIN — MEGESTROL ACETATE 200 MG: 40 SUSPENSION ORAL at 08:58

## 2017-01-06 RX ADMIN — FUROSEMIDE 40 MG: 10 INJECTION, SOLUTION INTRAMUSCULAR; INTRAVENOUS at 11:31

## 2017-01-06 RX ADMIN — GLIPIZIDE 5 MG: 5 TABLET ORAL at 17:46

## 2017-01-06 RX ADMIN — BUDESONIDE 500 MCG: 0.5 INHALANT RESPIRATORY (INHALATION) at 20:13

## 2017-01-06 RX ADMIN — AMOXICILLIN AND CLAVULANATE POTASSIUM 1 TABLET: 500; 125 TABLET, FILM COATED ORAL at 21:34

## 2017-01-06 RX ADMIN — AMOXICILLIN AND CLAVULANATE POTASSIUM 1 TABLET: 500; 125 TABLET, FILM COATED ORAL at 11:31

## 2017-01-06 RX ADMIN — METOCLOPRAMIDE HYDROCHLORIDE 5 MG: 5 SOLUTION ORAL at 16:42

## 2017-01-06 RX ADMIN — ATENOLOL 25 MG: 25 TABLET ORAL at 08:59

## 2017-01-06 RX ADMIN — Medication 10 ML: at 21:37

## 2017-01-06 RX ADMIN — BUDESONIDE 500 MCG: 0.5 INHALANT RESPIRATORY (INHALATION) at 07:25

## 2017-01-06 RX ADMIN — Medication 5 ML: at 06:04

## 2017-01-06 RX ADMIN — METOCLOPRAMIDE HYDROCHLORIDE 5 MG: 5 SOLUTION ORAL at 21:34

## 2017-01-06 RX ADMIN — AMLODIPINE BESYLATE 10 MG: 10 TABLET ORAL at 08:59

## 2017-01-06 RX ADMIN — METOCLOPRAMIDE HYDROCHLORIDE 5 MG: 5 SOLUTION ORAL at 11:30

## 2017-01-06 RX ADMIN — Medication 10 ML: at 11:31

## 2017-01-06 RX ADMIN — MONTELUKAST SODIUM 10 MG: 10 TABLET, FILM COATED ORAL at 21:34

## 2017-01-06 NOTE — PROGRESS NOTES
Pulmonary Daily Progress Note      Melvi Forbes    1/6/2017    Date of Admission:  1/4/2017     Patient is a 78 y.o.  female presents with shortness of breath. 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 She was admitted to this hospital in October with similar situation with obstructed stent and associated R pleural effusion which was felt may represent urinothorax. With thoracentesis and adjustment of stent this resolved. She has continue to follow with urology and oncology. Her oncologist has her on coumadin for her history of DVT in 2013. They had noticed increasing LE edema and performed a TTE with only diastolic dysfunction and a PET scan with moderate right effusion but no progression of disease. She came to the ER with dyspnea but minimal cough, no purulent sputum, and no fever. A CXR shows a large right sided effusion. Labs reveal mildly elevated BNP, creatinine stable in the mid 2's, and normal WBC. She reports some increased hematuria and difficulty urinating recently. She was to follow up with urology tomorrow. She is hypoxic, currently on 2L O2. Patient in and less dyspnea since thoracentesis. Removed 1.25 L from right chest on 1/5/17    The patient's chart is reviewed and the patient is discussed with the staff. Subjective:     Patient in and less dyspnea since thoracentesis. Removed 1.25 L from right chest on 1/5/17. Awaiting US of kidneys. Still with edema of legs but not hand as much today. Spoke to patient with  in room. Review of Systems  Limited as per above since speaks Malaysian.          Current Facility-Administered Medications   Medication Dose Route Frequency    furosemide (LASIX) injection 40 mg  40 mg IntraVENous DAILY    0.9% sodium chloride infusion 250 mL  250 mL IntraVENous PRN    ALPRAZolam (XANAX) tablet 0.25 mg  0.25 mg Oral TID PRN    amLODIPine (NORVASC) tablet 10 mg  10 mg Oral DAILY    atenolol (TENORMIN) tablet 25 mg  25 mg Oral Q12H    fentaNYL (DURAGESIC) 50 mcg/hr patch 1 Patch  1 Patch TransDERmal Q72H    glipiZIDE (GLUCOTROL) tablet 5 mg  5 mg Oral BID    HYDROcodone-acetaminophen (NORCO)  mg tablet 1 Tab  1 Tab Oral Q4H PRN    megestrol (MEGACE) 400 mg/10 mL (10 mL) oral suspension 200 mg  200 mg Oral DAILY    metoclopramide (REGLAN) 5 mg/5 mL oral syrup 5 mg  5 mg Oral AC&HS    montelukast (SINGULAIR) tablet 10 mg  10 mg Oral QHS    palbociclib (IBRANCE) cap 125 mg  125 mg Oral DAILY    sodium chloride (NS) flush 5-10 mL  5-10 mL IntraVENous Q8H    sodium chloride (NS) flush 5-10 mL  5-10 mL IntraVENous PRN    albuterol (PROVENTIL VENTOLIN) nebulizer solution 2.5 mg  2.5 mg Nebulization Q4H PRN    budesonide (PULMICORT) 500 mcg/2 ml nebulizer suspension  500 mcg Nebulization BID RT           Objective:     Vitals:    01/06/17 0018 01/06/17 0425 01/06/17 0700 01/06/17 0726   BP:  163/64 144/51    Pulse:  61 65    Resp:  20 19    Temp:  97.5 °F (36.4 °C) 99.2 °F (37.3 °C)    SpO2: 97% 98% 100% 100%   Weight:       Height:           PHYSICAL EXAM     Constitutional:  the patient is well developed and in mild acute distress  EENMT:  Sclera clear, pupils equal, oral mucosa moist  Respiratory: Decreased on the right but better than yesterday. Cardiovascular:  RRR without M,G,R  Gastrointestinal: soft and non-tender; with positive bowel sounds. Musculoskeletal: warm without cyanosis. There is 2+ B lower leg edema. Hand edema is less  Skin:  no jaundice or rashes, no wounds   Neurologic: no gross neuro deficits     Psychiatric:  alert and oriented x ppt    Chest Xray:  1/5 post tap with decrease in effusion    Patient in and less dyspnea since thoracentesis.  Removed 1.25 L from right chest on 1/5/17 1/4/17  Large right pleural effusion, right lower lobe infiltrate,  portacatheter, cardiomegaly, clear left lung field      Recent Labs      01/06/17   0806  01/05/17   1453  01/05/17   0704  01/04/17   1438   WBC   --    --   3.3*  3.3*   HGB   --    --   8.4*  8.5*   HCT   --    --   26.4*  25.6*   PLT   --    --   223  244   INR  2.1*  2.6*  3.3*   --      Recent Labs      01/06/17   0806  01/05/17   0704  01/04/17   1438   NA  146*  145  147*   K  4.2  4.4  4.6   CL  115*  114*  116*   GLU  104*  53*  54*   CO2  19*  18*  20*   BUN  28*  31*  29*   CREA  2.56*  2.85*  2.70*   CA  7.5*  7.9*  8.3   TROIQ   --   0.09*  0.12*   ALB   --    --   3.4   TBILI   --    --   0.6   ALT   --    --   18   SGOT   --    --   31     No results for input(s): PH, PCO2, PO2, HCO3 in the last 72 hours. No results for input(s): LCAD, LAC in the last 72 hours.     Assessment:  (Medical Decision Making)     Hospital Problems  Date Reviewed: 10/19/2016          Codes Class Noted POA    Bilateral edema of lower extremity ICD-10-CM: R60.0  ICD-9-CM: 782.3  1/4/2017 Unknown    Add lasix today    Hematuria ICD-10-CM: R31.9  ICD-9-CM: 599.70  1/4/2017 Unknown    better    Hydronephrosis ICD-10-CM: N13.30  ICD-9-CM: 539  10/7/2016 Yes    R/u with US    Pleural effusion, right ICD-10-CM: J90  ICD-9-CM: 511.9  10/6/2016 Yes    Overview Addendum 10/8/2016  5:54 PM by Jose Cannon MD     10/6/16 R thoracentesis 800 ml: pleural fluid creatine 4.1 = likely urinothorax         On 1/5/17 with 1.25 L removed with CR of 2.72 from pleural flud    Breast cancer metastasized to lung [facility]) ICD-10-CM: C50.919, C78.00  ICD-9-CM: 174.9, 197.0  10/30/2015 Yes        Accelerated hypertension ICD-10-CM: I10  ICD-9-CM: 401.0  10/20/2015 Yes        [facility] ICD-10-CM: I82.409  ICD-9-CM: 453.40  11/19/2013 Yes    Overview Signed 11/19/2013  6:07 AM by Genoveva Toro     12-86-81 There is nonocclusive thrombus within the left common femoral vein, superficial femoral vein, popliteal vein, and posterior tibial vein           On anticoagulation    * (Principal)Acute respiratory failure with hypoxia (HCC) ICD-10-CM: J96.01  ICD-9-CM: 518.81  7/15/2013 Yes    Overview Signed 7/17/2013  6:15 AM by Sherene Klinefelter     7-15-13 intubated   7-16-13 ;bronchoscopy with bal             CKD (chronic kidney disease) (Chronic) ICD-10-CM: N18.9  ICD-9-CM: 585.9  5/14/2013 Yes    Overview Signed 5/14/2013 11:35 AM by Cherelle Lowry RN     With acute rise- ? Dehydration and monitor                 Patient with dypsnea likely related to her enlarging R pleural effusion better post tap. Previously this was felt to be urinothorax related to her blocked ureteral stent. While she has some increased hematuria and dysuria, her creatinine has been stable whereas last time she had EMERY. Has significant LE edema but no change on recent TTE and BNP only mildly elevated. Plan:  (Medical Decision Making)      --awaiting US of renal system today  --will start lasix today, per discussion with urology yesterday  --fluid noted with Cr of 2.72 still likely urinothorax. F/u with urology  --get renal to see since may develop worsening renal failure and neeed to remove fluids. --Supplemental O2,    --Respiratory nebulizer treatments to match her home inhaler regimen. --UA  Is positive. Will start augmentin since was on coumadin and INR is 2.1 and was much higher last few dsay. --daily labs    More than 50% of the time documented was spent in face-to-face contact with the patient and in the care of the patient on the floor/unit where the patient is located.     Kirstie Parish MD

## 2017-01-06 NOTE — PROGRESS NOTES
Assumed care of patient. Assessment completed and documented, see docflow. Patient A/O, resting quietly in bed. 7LNC in place. NAD noted at present. Respirations even and non labored. Call light within reach. Will continue POC.

## 2017-01-06 NOTE — PROGRESS NOTES
Shift assessment. Pt resting comfortably in recliner. Alert and oriented x4. Respirations even and unlabored on hiflow O2, no acute distress noted. Opportunity provided to speak with , pt denies need. Assessment completed as documented. Pt offers no complaints at this time. Call light in reach, pt encouraged to call for assistance. Will continue to monitor.

## 2017-01-06 NOTE — PROGRESS NOTES
Had right thoracentesis yesterday, 1250 ml fluid obtained. Looks like she feels better. Creatinine level of pleural fluid is 2.72, similar to serum level of 2.85. Serum creatinine is near baseline. Don't feel this is due to urine leak. Renal US pending.

## 2017-01-07 LAB
ANION GAP BLD CALC-SCNC: 9 MMOL/L (ref 7–16)
BUN SERPL-MCNC: 30 MG/DL (ref 8–23)
CALCIUM SERPL-MCNC: 7.8 MG/DL (ref 8.3–10.4)
CHLORIDE SERPL-SCNC: 116 MMOL/L (ref 98–107)
CO2 SERPL-SCNC: 22 MMOL/L (ref 21–32)
CREAT SERPL-MCNC: 2.35 MG/DL (ref 0.6–1)
GLUCOSE BLD STRIP.AUTO-MCNC: 103 MG/DL (ref 65–100)
GLUCOSE BLD STRIP.AUTO-MCNC: 108 MG/DL (ref 65–100)
GLUCOSE BLD STRIP.AUTO-MCNC: 59 MG/DL (ref 65–100)
GLUCOSE BLD STRIP.AUTO-MCNC: 92 MG/DL (ref 65–100)
GLUCOSE SERPL-MCNC: 100 MG/DL (ref 65–100)
INR PPP: 2 (ref 0.9–1.2)
POTASSIUM SERPL-SCNC: 4.5 MMOL/L (ref 3.5–5.1)
PROTHROMBIN TIME: 21.9 SEC (ref 9.6–12)
SODIUM SERPL-SCNC: 147 MMOL/L (ref 136–145)

## 2017-01-07 PROCEDURE — 94760 N-INVAS EAR/PLS OXIMETRY 1: CPT

## 2017-01-07 PROCEDURE — 82962 GLUCOSE BLOOD TEST: CPT

## 2017-01-07 PROCEDURE — 74011250636 HC RX REV CODE- 250/636: Performed by: INTERNAL MEDICINE

## 2017-01-07 PROCEDURE — 36415 COLL VENOUS BLD VENIPUNCTURE: CPT | Performed by: INTERNAL MEDICINE

## 2017-01-07 PROCEDURE — 74011250637 HC RX REV CODE- 250/637: Performed by: INTERNAL MEDICINE

## 2017-01-07 PROCEDURE — 87086 URINE CULTURE/COLONY COUNT: CPT | Performed by: INTERNAL MEDICINE

## 2017-01-07 PROCEDURE — 65270000029 HC RM PRIVATE

## 2017-01-07 PROCEDURE — 99232 SBSQ HOSP IP/OBS MODERATE 35: CPT | Performed by: INTERNAL MEDICINE

## 2017-01-07 PROCEDURE — 85610 PROTHROMBIN TIME: CPT | Performed by: INTERNAL MEDICINE

## 2017-01-07 PROCEDURE — 80048 BASIC METABOLIC PNL TOTAL CA: CPT | Performed by: INTERNAL MEDICINE

## 2017-01-07 PROCEDURE — 74011000250 HC RX REV CODE- 250: Performed by: INTERNAL MEDICINE

## 2017-01-07 PROCEDURE — 94640 AIRWAY INHALATION TREATMENT: CPT

## 2017-01-07 RX ORDER — WARFARIN 1 MG/1
1 TABLET ORAL
Status: DISCONTINUED | OUTPATIENT
Start: 2017-01-07 | End: 2017-01-07

## 2017-01-07 RX ORDER — WARFARIN 2.5 MG/1
2.5 TABLET ORAL EVERY EVENING
Status: DISCONTINUED | OUTPATIENT
Start: 2017-01-07 | End: 2017-01-08

## 2017-01-07 RX ADMIN — ALPRAZOLAM 0.25 MG: 0.5 TABLET ORAL at 15:44

## 2017-01-07 RX ADMIN — MONTELUKAST SODIUM 10 MG: 10 TABLET, FILM COATED ORAL at 22:01

## 2017-01-07 RX ADMIN — GLIPIZIDE 5 MG: 5 TABLET ORAL at 08:33

## 2017-01-07 RX ADMIN — BUDESONIDE 500 MCG: 0.5 INHALANT RESPIRATORY (INHALATION) at 19:48

## 2017-01-07 RX ADMIN — ALBUTEROL SULFATE 2.5 MG: 2.5 SOLUTION RESPIRATORY (INHALATION) at 19:48

## 2017-01-07 RX ADMIN — AMOXICILLIN AND CLAVULANATE POTASSIUM 1 TABLET: 500; 125 TABLET, FILM COATED ORAL at 08:44

## 2017-01-07 RX ADMIN — METOCLOPRAMIDE HYDROCHLORIDE 5 MG: 5 SOLUTION ORAL at 12:04

## 2017-01-07 RX ADMIN — METOCLOPRAMIDE HYDROCHLORIDE 5 MG: 5 SOLUTION ORAL at 22:01

## 2017-01-07 RX ADMIN — HYDROCODONE BITARTRATE AND ACETAMINOPHEN 1 TABLET: 10; 325 TABLET ORAL at 22:00

## 2017-01-07 RX ADMIN — METOCLOPRAMIDE HYDROCHLORIDE 5 MG: 5 SOLUTION ORAL at 17:13

## 2017-01-07 RX ADMIN — AMLODIPINE BESYLATE 10 MG: 10 TABLET ORAL at 08:33

## 2017-01-07 RX ADMIN — BUDESONIDE 500 MCG: 0.5 INHALANT RESPIRATORY (INHALATION) at 08:27

## 2017-01-07 RX ADMIN — ATENOLOL 25 MG: 25 TABLET ORAL at 22:01

## 2017-01-07 RX ADMIN — Medication 10 ML: at 08:45

## 2017-01-07 RX ADMIN — GLIPIZIDE 5 MG: 5 TABLET ORAL at 17:13

## 2017-01-07 RX ADMIN — Medication 10 ML: at 22:11

## 2017-01-07 RX ADMIN — ATENOLOL 25 MG: 25 TABLET ORAL at 08:33

## 2017-01-07 RX ADMIN — Medication 5 ML: at 06:00

## 2017-01-07 RX ADMIN — METOCLOPRAMIDE HYDROCHLORIDE 5 MG: 5 SOLUTION ORAL at 06:02

## 2017-01-07 RX ADMIN — FUROSEMIDE 40 MG: 10 INJECTION, SOLUTION INTRAMUSCULAR; INTRAVENOUS at 08:33

## 2017-01-07 RX ADMIN — MEGESTROL ACETATE 200 MG: 40 SUSPENSION ORAL at 08:44

## 2017-01-07 RX ADMIN — WARFARIN SODIUM 2.5 MG: 2.5 TABLET ORAL at 17:16

## 2017-01-07 NOTE — PROGRESS NOTES
Pt sitting up in bedside chair. Resp even and unlabored. No signs of acute distress. Call light within reach.

## 2017-01-07 NOTE — PROGRESS NOTES
Admit Date: 1/4/2017      Subjective:          Review of Systems  Cardio-vascular: no chest pain, + SOB BETTER  GI: no N/V/D  : no dysuria, no hematuria    Objective:     Patient Vitals for the past 8 hrs:   BP Temp Pulse Resp SpO2   01/07/17 1100 153/72 98.4 °F (36.9 °C) 63 18 100 %   01/07/17 0827 - - - - 96 %   01/07/17 0700 156/69 98.2 °F (36.8 °C) 66 20 100 %     01/07 0701 - 01/07 1900  In: 480 [P.O.:480]  Out: -       Physical Exam:   Lungs: DECREASED bs  CV: RR,   Abdomen: soft, not tender, no rebound. Ext: + edema          Data Review   Recent Results (from the past 8 hour(s))   GLUCOSE, POC    Collection Time: 01/07/17  5:45 AM   Result Value Ref Range    Glucose (POC) 59 (L) 65 - 100 mg/dL   GLUCOSE, POC    Collection Time: 01/07/17  6:18 AM   Result Value Ref Range    Glucose (POC) 103 (H) 65 - 215 mg/dL   METABOLIC PANEL, BASIC    Collection Time: 01/07/17  6:35 AM   Result Value Ref Range    Sodium 147 (H) 136 - 145 mmol/L    Potassium 4.5 3.5 - 5.1 mmol/L    Chloride 116 (H) 98 - 107 mmol/L    CO2 22 21 - 32 mmol/L    Anion gap 9 7 - 16 mmol/L    Glucose 100 65 - 100 mg/dL    BUN 30 (H) 8 - 23 MG/DL    Creatinine 2.35 (H) 0.6 - 1.0 MG/DL    GFR est AA 27 (L) >60 ml/min/1.73m2    GFR est non-AA 22 (L) >60 ml/min/1.73m2    Calcium 7.8 (L) 8.3 - 10.4 MG/DL   PROTHROMBIN TIME + INR    Collection Time: 01/07/17  6:35 AM   Result Value Ref Range    Prothrombin time 21.9 (H) 9.6 - 12.0 sec    INR 2.0 (H) 0.9 - 1.2             Assessment:     Principal Problem:    Acute respiratory failure with hypoxia (HCC) (7/15/2013)      Overview: 7-15-13 intubated       7-16-13 ;bronchoscopy with bal    Active Problems:    CKD (chronic kidney disease) (5/14/2013)      Overview: With acute rise- ?  Dehydration and monitor      DVT (deep venous thrombosis) ([facility] (11/19/2013)      Overview: 11-18-13 There is nonocclusive thrombus within the left common femoral       vein, superficial femoral vein, popliteal vein, and posterior tibial vein            Accelerated hypertension (10/20/2015)      Breast cancer metastasized to lung ([address] (10/30/2015)      Pleural effusion, right (10/6/2016)      Overview: 10/6/16 R thoracentesis 800 ml: pleural fluid creatine 4.1 = likely       urinothorax      Hydronephrosis (10/7/2016)      Bilateral edema of lower extremity (1/4/2017)      Hematuria (1/4/2017)        Plan:     Continue with bucky Louis MD

## 2017-01-07 NOTE — PROGRESS NOTES
Spiritual care visit:  Reviewed notes prior to visit  Patient appears calm  Provided supportive presence   Assured patient of our continued support and prayers  Thankful for our care  Signed by Jose Covington, staff , [address] [place] [address]

## 2017-01-07 NOTE — PROGRESS NOTES
Assumed care of patient. Assessment completed and documented, see docflow. Patient sitting quietly in bedside chair. Alert and oriented x4. No signs of acute distress noted. O2 in place @ 7lnc. Pt denies pain. Call light within reach.  Will continue POC

## 2017-01-07 NOTE — PROGRESS NOTES
present at bedside during assessment by Dr. Dilma Brody MD    [address] [place] [address]  [phone]

## 2017-01-07 NOTE — PROGRESS NOTES
Warfarin dosing per pharmacist    Haroon Chicas is a 78 y.o. female. Height: 5' 5\" (165.1 cm)    Weight: 60.8 kg (134 lb)    Indication:  H/o DVT    Goal INR:  2-3    Home dose:  ~14 mg/week    Risk factors or significant drug interactions:  abxs    Other anticoagulants:  none    Daily Monitoring  Date  INR     Warfarin dose HGB              Notes  1/5  3.3/2.6  --  8.4    1/6  2.1  --  --  1/7    2.0  2.5 mg  --  Pharmacy consulted 1//7    Restarting warfarin after doses held considering procedures. Will restart with 2.5 mg tonight. Following daily INR. Thank you,  Kami Diallo, Pharm. D.   Clinical Pharmacist  [phone]

## 2017-01-07 NOTE — PROGRESS NOTES
No events/complaints. AVSS  Cr 2.35  LANA-min R hydro (improved from 7/19/16)  R ureteral obstruction. Cont serial stent changes with Dr. Victor Hugo Waters. No sign of urinothorax. Please call for questions/concerns.

## 2017-01-07 NOTE — CONSULTS
[address] [facility] [address]       Name:  Jose Manuel Loza   MR#:  [id number]   :  1950   Account #:  [de-identified]   Date of Adm:  2017       REASON FOR CONSULTATION: Chronic kidney disease with pleural   effusion. HISTORY: This patient is a 67-year-old [address]   female with a history of diastolic CHF, history of DVT, breast   cancer with metastases to the lung and bone, chronic kidney   disease, right ureteral obstruction with status post stent   placement. She was admitted for progressive shortness of breath. The patient was found to have right pleural effusion that was   tapped today with removal of about 1.3 liters, with some   improvement of symptoms. The patient had this same presentation   when she was admitted last 2016, with pleural effusion   and acute kidney injury on chronic kidney disease. Her   creatinine at that time was up to 4. A stent placed in the right   ureter because of the obstruction with some improvement of her   symptoms. Her creatinine went down to 2.5 prior to discharge. This   time, her creatinine on admission was 2.7, yesterday up to 2.85,   and today on 2017, 2.56. The patient also was started   on Lasix today IV 40 mg. Other labs showed the patient is anemic with a hemoglobin of   8.4. Urinalysis showed protein of 300, leukocyte moderate, RBC   more than 100. Her albumin is 3.4. REVIEW OF SYSTEMS: As above. Shortness of breath improved. No   fever. The patient describes some decreased urine output prior   to admission. No dysuria. No fever. No GI bleed. PREVIOUS MEDICAL HISTORY: History of breast cancer with   metastasis to the bone. History of hypertension. History of   chronic kidney disease stage 4. Type 2 diabetes mellitus. Iron   deficiency anemia. Hypoxia with respiratory failure. Asthma. DVT. Congestive heart failure, type diastolic. Hypertension. Pleural   effusion. Hematuria.     SURGICAL HISTORY: Two C-sections. SOCIAL HISTORY: Former smoker, quit in 1978. MEDICATIONS:    1. Amlodipine 10 mg a day. 2. Augmentin 1 tablet 500/125 q. 12 hours. 3. Tenormin 25 mg q 12 hours. 4. Pulmicort nebulizer. 5. Duragesic 50 mcg per hour, 1 patch every 3 days. 6. Lasix 40 mg IV a day. 7. Glipizide 5 mg twice a day. 8. Megace 200 mg once a day. 9. Reglan 5 mg at bedtime. 10. Singulair 10 mg a day. PHYSICAL EXAMINATION   VITAL SIGNS: Temperature is 98.4, heart rate 58, blood pressure   160/59. The patient had 100% oxygen saturation on 7 L earlier. GENERAL: She speaks in Uzbek with translation. She expressed   that her shortness of breath improved since the thoracentesis. NECK: No lymph nodes or thyromegaly. LUNGS: There are decreased breath sounds at the right side. HEART: Regular rhythm, systolic murmur 2/6 at left sternal   border. No gallop, no rub. ABDOMEN: Soft, mildly tender at the right, but no rebound, no   organomegaly, no mass. EXTREMITIES: There is trace edema. IMPRESSION    1. Chronic kidney disease stage 4, which seems to be stable, with   creatinine of 2.5.   2. Status post right ureteral obstruction with status post stent   replacement 3 weeks ago. It seems to be open now. The   hydronephrosis is much less than the previous described, with   slight improvement of her creatinine compared to the past.   3. Right pleural effusion with recurrence, may be in part   related to her chronic kidney disease. 4. Anemia. 5. Respiratory failure. 6. History of metastatic breast cancer. 7. History of deep venous thrombosis. RECOMMENDATIONS: I agree with diuretic and will continue to   follow.         MD CODY Hernandez / RAY   D:  01/06/2017   14:07   T:  01/06/2017   14:43   Job #:  [id number]

## 2017-01-07 NOTE — PROGRESS NOTES
Pulmonary Daily Progress Note      Rosa Maria Andrews    1/7/2017    Date of Admission:  1/4/2017     Patient is a 78 y.o.  female presents with shortness of breath. 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 She was admitted to this hospital in October with similar situation with obstructed stent and associated R pleural effusion which was felt may represent urinothorax. With thoracentesis and adjustment of stent this resolved. She has continue to follow with urology and oncology. Her oncologist has her on coumadin for her history of DVT in 2013. They had noticed increasing LE edema and performed a TTE with only diastolic dysfunction and a PET scan with moderate right effusion but no progression of disease. She came to the ER with dyspnea but minimal cough, no purulent sputum, and no fever. A CXR shows a large right sided effusion. Labs reveal mildly elevated BNP, creatinine stable in the mid 2's, and normal WBC. She reports some increased hematuria and difficulty urinating recently. She was to follow up with urology tomorrow. She is hypoxic, currently on 2L O2. Patient in and less dyspnea since thoracentesis. Removed 1.25 L from right chest on 1/5/17. Diuresing and edema in legs and arem less. Renal US noted marked improvement of prior right hydronephrolsis compared to old Ultasound  The patient's chart is reviewed and the patient is discussed with the staff. Subjective:     Patient is sittign in chair and still does not feel great. Aware overall shay than when she came in but still not great. No pain. On 3 LPM oxygen and does not have at home.  Edema is less    Review of Systems:  -Fever  -Headaches  -Chest pain  +Dyspnea, -wheezing,+cough  -Abdominal pain,- constipation  +Leg swelling (less)  All other organ systems grossly normal.        Current Facility-Administered Medications   Medication Dose Route Frequency    furosemide (LASIX) injection 40 mg  40 mg IntraVENous DAILY    amoxicillin-clavulanate (AUGMENTIN) 500-125 mg per tablet 1 Tab  1 Tab Oral Q12H    0.9% sodium chloride infusion 250 mL  250 mL IntraVENous PRN    ALPRAZolam (XANAX) tablet 0.25 mg  0.25 mg Oral TID PRN    amLODIPine (NORVASC) tablet 10 mg  10 mg Oral DAILY    atenolol (TENORMIN) tablet 25 mg  25 mg Oral Q12H    fentaNYL (DURAGESIC) 50 mcg/hr patch 1 Patch  1 Patch TransDERmal Q72H    glipiZIDE (GLUCOTROL) tablet 5 mg  5 mg Oral BID    HYDROcodone-acetaminophen (NORCO)  mg tablet 1 Tab  1 Tab Oral Q4H PRN    megestrol (MEGACE) 400 mg/10 mL (10 mL) oral suspension 200 mg  200 mg Oral DAILY    metoclopramide (REGLAN) 5 mg/5 mL oral syrup 5 mg  5 mg Oral AC&HS    montelukast (SINGULAIR) tablet 10 mg  10 mg Oral QHS    palbociclib (IBRANCE) cap 125 mg  125 mg Oral DAILY    sodium chloride (NS) flush 5-10 mL  5-10 mL IntraVENous Q8H    sodium chloride (NS) flush 5-10 mL  5-10 mL IntraVENous PRN    albuterol (PROVENTIL VENTOLIN) nebulizer solution 2.5 mg  2.5 mg Nebulization Q4H PRN    budesonide (PULMICORT) 500 mcg/2 ml nebulizer suspension  500 mcg Nebulization BID RT           Objective:     Vitals:    01/06/17 1943 01/06/17 2013 01/06/17 2328 01/07/17 0353   BP: 156/52  155/66 137/64   Pulse: 65  63 62   Resp: 18  18 18   Temp: 99 °F (37.2 °C)  98.4 °F (36.9 °C) 98.2 °F (36.8 °C)   SpO2: 100% 100% 94% 100%   Weight:       Height:           PHYSICAL EXAM     Constitutional:  the patient is well developed and in mild acute distress  EENMT:  Sclera clear, pupils equal, oral mucosa moist  Respiratory: Decreased on the right but better than yesterday. Cardiovascular:  RRR without M,G,R  Gastrointestinal: soft and non-tender; with positive bowel sounds. Musculoskeletal: warm without cyanosis.  There is 1+ B lower leg edema -- decreased   Skin:  no jaundice or rashes, no wounds   Neurologic: no gross neuro deficits     Psychiatric:  alert and oriented x ppt    Chest Xray:            Recent Labs      01/07/17   0635  01/06/17   0806  01/05/17   1453  01/05/17   0704   01/04/17   1438   WBC   --    --    --   3.3*   --   3.3*   HGB   --    --    --   8.4*   --   8.5*   HCT   --    --    --   26.4*   --   25.6*   PLT   --    --    --   223   --   244   INR  2.0*  2.1*  2.6*  3.3*   < >   --     < > = values in this interval not displayed. Recent Labs      01/06/17   0806  01/05/17   0704  01/04/17   1438   NA  146*  145  147*   K  4.2  4.4  4.6   CL  115*  114*  116*   GLU  104*  53*  54*   CO2  19*  18*  20*   BUN  28*  31*  29*   CREA  2.56*  2.85*  2.70*   CA  7.5*  7.9*  8.3   TROIQ   --   0.09*  0.12*   ALB   --    --   3.4   TBILI   --    --   0.6   ALT   --    --   18   SGOT   --    --   31     No results for input(s): PH, PCO2, PO2, HCO3 in the last 72 hours. No results for input(s): LCAD, LAC in the last 72 hours.     Assessment:  (Medical Decision Making)     Hospital Problems  Date Reviewed: 10/19/2016          Codes Class Noted POA    Bilateral edema of lower extremity ICD-10-CM: R60.0  ICD-9-CM: 782.3  1/4/2017 Unknown    Add lasix today    Hematuria ICD-10-CM: R31.9  ICD-9-CM: 599.70  1/4/2017 Unknown    better    Hydronephrosis ICD-10-CM: N13.30  ICD-9-CM: 796  10/7/2016 Yes    R/u with US    Pleural effusion, right ICD-10-CM: J90  ICD-9-CM: 511.9  10/6/2016 Yes    Overview Addendum 10/8/2016  5:54 PM by William Mandujano MD     10/6/16 R thoracentesis 800 ml: pleural fluid creatine 4.1 = likely urinothorax         On 1/5/17 with 1.25 L removed with CR of 2.72 from pleural flud    Breast cancer metastasized to lung [facility]) ICD-10-CM: C50.919, C78.00  ICD-9-CM: 174.9, 197.0  10/30/2015 Yes        Accelerated hypertension ICD-10-CM: I10  ICD-9-CM: 401.0  10/20/2015 Yes        DVT (deep venous thrombosis) ([facility] ICD-10-CM: I82.409  ICD-9-CM: 453.40  11/19/2013 Yes    Overview Signed 11/19/2013  6:07 AM by Curly Devi     13-17-80 There is nonocclusive thrombus within the left common femoral vein, superficial femoral vein, popliteal vein, and posterior tibial vein           On anticoagulation    * (Principal)Acute respiratory failure with hypoxia ([address] ICD-10-CM: J96.01  ICD-9-CM: 518.81  7/15/2013 Yes    Overview Signed 7/17/2013  6:15 AM by Curly Devi     7-15-13 intubated   7-16-13 ;bronchoscopy with bal             CKD (chronic kidney disease) (Chronic) ICD-10-CM: N18.9  ICD-9-CM: 585.9  5/14/2013 Yes    Overview Signed 5/14/2013 11:35 AM by Virginia De Souza RN     With acute rise- ? Dehydration and monitor                        Plan:  (Medical Decision Making)      --continue lasix IV, cr is pending. Edema is less Nephrology following. US noted not hydronephrosis now.    --in the future if effusion re-occurs may need additional taps  --Supplemental O2,    --continue nebs   --UA  Is positive. On Augmentin D2  --INR is 2.0. Will get pharmacy to regulate since do not plan further procedure. --daily labs  --discussed with  and patient is lonely, this obviously affecting how she feels    More than 50% of the time documented was spent in face-to-face contact with the patient and in the care of the patient on the floor/unit where the patient is located.     Adam Valderrama MD

## 2017-01-07 NOTE — PROGRESS NOTES
Shift assessment complete. Pt resting comfortably in bed. Alert and oriented x4. No signs of acute distress noted. O2 in place @ 7lnc. Pt denies pain. Call light within reach.

## 2017-01-08 LAB
ANION GAP BLD CALC-SCNC: 10 MMOL/L (ref 7–16)
BACTERIA SPEC CULT: NORMAL
BUN SERPL-MCNC: 33 MG/DL (ref 8–23)
CALCIUM SERPL-MCNC: 7.4 MG/DL (ref 8.3–10.4)
CHLORIDE SERPL-SCNC: 115 MMOL/L (ref 98–107)
CO2 SERPL-SCNC: 24 MMOL/L (ref 21–32)
CREAT SERPL-MCNC: 2.36 MG/DL (ref 0.6–1)
GLUCOSE BLD STRIP.AUTO-MCNC: 201 MG/DL (ref 65–100)
GLUCOSE BLD STRIP.AUTO-MCNC: 225 MG/DL (ref 65–100)
GLUCOSE BLD STRIP.AUTO-MCNC: 253 MG/DL (ref 65–100)
GLUCOSE BLD STRIP.AUTO-MCNC: 290 MG/DL (ref 65–100)
GLUCOSE BLD STRIP.AUTO-MCNC: 33 MG/DL (ref 65–100)
GLUCOSE BLD STRIP.AUTO-MCNC: 34 MG/DL (ref 65–100)
GLUCOSE BLD STRIP.AUTO-MCNC: 35 MG/DL (ref 65–100)
GLUCOSE BLD STRIP.AUTO-MCNC: 36 MG/DL (ref 65–100)
GLUCOSE BLD STRIP.AUTO-MCNC: 41 MG/DL (ref 65–100)
GLUCOSE BLD STRIP.AUTO-MCNC: 48 MG/DL (ref 65–100)
GLUCOSE SERPL-MCNC: 223 MG/DL (ref 65–100)
GRAM STN SPEC: NORMAL
GRAM STN SPEC: NORMAL
INR PPP: 2 (ref 0.9–1.2)
POTASSIUM SERPL-SCNC: 4.2 MMOL/L (ref 3.5–5.1)
PROTHROMBIN TIME: 22 SEC (ref 9.6–12)
SERVICE CMNT-IMP: NORMAL
SODIUM SERPL-SCNC: 149 MMOL/L (ref 136–145)

## 2017-01-08 PROCEDURE — 99232 SBSQ HOSP IP/OBS MODERATE 35: CPT | Performed by: INTERNAL MEDICINE

## 2017-01-08 PROCEDURE — 85610 PROTHROMBIN TIME: CPT | Performed by: INTERNAL MEDICINE

## 2017-01-08 PROCEDURE — 74011000250 HC RX REV CODE- 250: Performed by: INTERNAL MEDICINE

## 2017-01-08 PROCEDURE — 74011250637 HC RX REV CODE- 250/637: Performed by: INTERNAL MEDICINE

## 2017-01-08 PROCEDURE — 77030003560 HC NDL HUBR BARD -A

## 2017-01-08 PROCEDURE — 77010033678 HC OXYGEN DAILY

## 2017-01-08 PROCEDURE — 80048 BASIC METABOLIC PNL TOTAL CA: CPT | Performed by: INTERNAL MEDICINE

## 2017-01-08 PROCEDURE — 36415 COLL VENOUS BLD VENIPUNCTURE: CPT | Performed by: INTERNAL MEDICINE

## 2017-01-08 PROCEDURE — 94760 N-INVAS EAR/PLS OXIMETRY 1: CPT

## 2017-01-08 PROCEDURE — 65270000029 HC RM PRIVATE

## 2017-01-08 PROCEDURE — 94640 AIRWAY INHALATION TREATMENT: CPT

## 2017-01-08 PROCEDURE — 82962 GLUCOSE BLOOD TEST: CPT

## 2017-01-08 PROCEDURE — 74011000250 HC RX REV CODE- 250

## 2017-01-08 RX ORDER — DEXTROSE 40 %
1 GEL (GRAM) ORAL AS NEEDED
Status: DISCONTINUED | OUTPATIENT
Start: 2017-01-08 | End: 2017-01-12 | Stop reason: HOSPADM

## 2017-01-08 RX ORDER — DEXTROSE 50 % IN WATER (D50W) INTRAVENOUS SYRINGE
25
Status: COMPLETED | OUTPATIENT
Start: 2017-01-08 | End: 2017-01-08

## 2017-01-08 RX ORDER — DEXTROSE 40 %
GEL (GRAM) ORAL
Status: ACTIVE
Start: 2017-01-08 | End: 2017-01-08

## 2017-01-08 RX ORDER — DEXTROSE 50 % IN WATER (D50W) INTRAVENOUS SYRINGE
Status: COMPLETED
Start: 2017-01-08 | End: 2017-01-08

## 2017-01-08 RX ORDER — WARFARIN 2 MG/1
2 TABLET ORAL EVERY EVENING
Status: DISCONTINUED | OUTPATIENT
Start: 2017-01-08 | End: 2017-01-09

## 2017-01-08 RX ADMIN — BUDESONIDE 500 MCG: 0.5 INHALANT RESPIRATORY (INHALATION) at 07:47

## 2017-01-08 RX ADMIN — WARFARIN SODIUM 2 MG: 2 TABLET ORAL at 17:13

## 2017-01-08 RX ADMIN — BUDESONIDE 500 MCG: 0.5 INHALANT RESPIRATORY (INHALATION) at 20:08

## 2017-01-08 RX ADMIN — ALBUTEROL SULFATE 2.5 MG: 2.5 SOLUTION RESPIRATORY (INHALATION) at 20:08

## 2017-01-08 RX ADMIN — DEXTROSE MONOHYDRATE 25 G: 500 INJECTION PARENTERAL at 07:00

## 2017-01-08 RX ADMIN — Medication 10 ML: at 14:51

## 2017-01-08 RX ADMIN — ATENOLOL 25 MG: 25 TABLET ORAL at 21:02

## 2017-01-08 RX ADMIN — MONTELUKAST SODIUM 10 MG: 10 TABLET, FILM COATED ORAL at 21:02

## 2017-01-08 RX ADMIN — ALBUTEROL SULFATE 2.5 MG: 2.5 SOLUTION RESPIRATORY (INHALATION) at 07:47

## 2017-01-08 RX ADMIN — METOCLOPRAMIDE HYDROCHLORIDE 5 MG: 5 SOLUTION ORAL at 21:02

## 2017-01-08 RX ADMIN — Medication 10 ML: at 06:00

## 2017-01-08 RX ADMIN — ATENOLOL 25 MG: 25 TABLET ORAL at 09:13

## 2017-01-08 RX ADMIN — AMOXICILLIN AND CLAVULANATE POTASSIUM 1 TABLET: 500; 125 TABLET, FILM COATED ORAL at 09:19

## 2017-01-08 RX ADMIN — AMOXICILLIN AND CLAVULANATE POTASSIUM 1 TABLET: 500; 125 TABLET, FILM COATED ORAL at 00:25

## 2017-01-08 RX ADMIN — MEGESTROL ACETATE 200 MG: 40 SUSPENSION ORAL at 09:15

## 2017-01-08 RX ADMIN — DEXTROSE 50 % IN WATER (D50W) INTRAVENOUS SYRINGE 25 G: at 07:00

## 2017-01-08 RX ADMIN — HYDROCODONE BITARTRATE AND ACETAMINOPHEN 1 TABLET: 10; 325 TABLET ORAL at 19:51

## 2017-01-08 RX ADMIN — METOCLOPRAMIDE HYDROCHLORIDE 5 MG: 5 SOLUTION ORAL at 07:30

## 2017-01-08 RX ADMIN — Medication 10 ML: at 22:00

## 2017-01-08 RX ADMIN — AMLODIPINE BESYLATE 10 MG: 10 TABLET ORAL at 09:13

## 2017-01-08 RX ADMIN — METOCLOPRAMIDE HYDROCHLORIDE 5 MG: 5 SOLUTION ORAL at 16:22

## 2017-01-08 NOTE — PROGRESS NOTES
Pt is sitting up on side of the bed. Respirations are even and unlabored. No signs or symptoms of distress are noted. No SOB or pain is reported at this time. Call light is within reach. Will continue to monitor.

## 2017-01-08 NOTE — PROGRESS NOTES
Pulmonary Daily Progress Note      Priyank Esquivel    1/8/2017    Date of Admission:  1/4/2017     Patient is a 78 y.o.  female presents with shortness of breath. 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 She was admitted to this hospital in October with similar situation with obstructed stent and associated R pleural effusion which was felt may represent urinothorax. With thoracentesis and adjustment of stent this resolved. She has continue to follow with urology and oncology. Her oncologist has her on coumadin for her history of DVT in 2013. They had noticed increasing LE edema and performed a TTE with only diastolic dysfunction and a PET scan with moderate right effusion but no progression of disease. She came to the ER with dyspnea but minimal cough, no purulent sputum, and no fever. A CXR shows a large right sided effusion. Labs reveal mildly elevated BNP, creatinine stable in the mid 2's, and normal WBC. She reports some increased hematuria and difficulty urinating recently. She was to follow up with urology tomorrow. She is hypoxic, currently on 2L O2. Patient in and less dyspnea since thoracentesis. Removed 1.25 L from right chest on 1/5/17. Diuresing and edema in legs and arem less. Renal US noted marked improvement of prior right hydronephrolsis compared to old Ultasound  The patient's chart is reviewed and the patient is discussed with the staff. Subjective:     Having diarrhea today. Still does not feel well. On 4 LPM oxygen.      Review of Systems:  -Fever  -Headaches  -Chest pain  +Dyspnea, -wheezing,+cough  -Abdominal pain,- constipation  +Leg swelling (less)  All other organ systems grossly normal.        Current Facility-Administered Medications   Medication Dose Route Frequency    dextrose 40% (GLUTOSE) oral gel 1 Tube  1 Tube Oral PRN    warfarin (COUMADIN) tablet 2.5 mg  2.5 mg Oral QPM    0.9% sodium chloride infusion 250 mL  250 mL IntraVENous PRN    ALPRAZolam (XANAX) tablet 0.25 mg  0.25 mg Oral TID PRN    amLODIPine (NORVASC) tablet 10 mg  10 mg Oral DAILY    atenolol (TENORMIN) tablet 25 mg  25 mg Oral Q12H    fentaNYL (DURAGESIC) 50 mcg/hr patch 1 Patch  1 Patch TransDERmal Q72H    HYDROcodone-acetaminophen (NORCO)  mg tablet 1 Tab  1 Tab Oral Q4H PRN    megestrol (MEGACE) 400 mg/10 mL (10 mL) oral suspension 200 mg  200 mg Oral DAILY    metoclopramide (REGLAN) 5 mg/5 mL oral syrup 5 mg  5 mg Oral AC&HS    montelukast (SINGULAIR) tablet 10 mg  10 mg Oral QHS    palbociclib (IBRANCE) cap 125 mg  125 mg Oral DAILY    sodium chloride (NS) flush 5-10 mL  5-10 mL IntraVENous Q8H    sodium chloride (NS) flush 5-10 mL  5-10 mL IntraVENous PRN    albuterol (PROVENTIL VENTOLIN) nebulizer solution 2.5 mg  2.5 mg Nebulization Q4H PRN    budesonide (PULMICORT) 500 mcg/2 ml nebulizer suspension  500 mcg Nebulization BID RT           Objective:     Vitals:    01/07/17 1950 01/08/17 0003 01/08/17 0353 01/08/17 0700   BP: 155/80 138/56 144/69 139/59   Pulse: 75 66 65 64   Resp: 18 17 16 22   Temp: 99 °F (37.2 °C) 98 °F (36.7 °C) 97.7 °F (36.5 °C) 97.6 °F (36.4 °C)   SpO2: 98% 97% 100% 100%   Weight:       Height:           PHYSICAL EXAM     Constitutional:  the patient is well developed and in mild acute distress  EENMT:  Sclera clear, pupils equal, oral mucosa moist  Respiratory: Decreased on the right but better than yesterday. Cardiovascular:  RRR without M,G,R  Gastrointestinal: soft and non-tender; with positive bowel sounds. Musculoskeletal: warm without cyanosis.  There is 1+ B lower leg edema -- decreased   Skin:  no jaundice or rashes, no wounds   Neurologic: no gross neuro deficits     Psychiatric:  alert and oriented x ppt    Chest Xray:            Recent Labs      01/08/17 3805  01/07/17   0635  01/06/17   0806   INR  2.0*  2.0*  2.1*     Recent Labs      01/08/17   0902  01/07/17   0635  01/06/17   0806   NA  149*  147*  146*   K  4.2  4.5  4.2   CL  115*  116*  115*   GLU  223*  100  104*   CO2  24  22  19*   BUN  33*  30*  28*   CREA  2.36*  2.35*  2.56*   CA  7.4*  7.8*  7.5*     No results for input(s): PH, PCO2, PO2, HCO3 in the last 72 hours. No results for input(s): LCAD, LAC in the last 72 hours. Assessment:  (Medical Decision Making)     Hospital Problems  Date Reviewed: 10/19/2016          Codes Class Noted POA    Bilateral edema of lower extremity ICD-10-CM: R60.0  ICD-9-CM: 782.3  1/4/2017 Unknown    Less since recent diuresis.     Hematuria ICD-10-CM: R31.9  ICD-9-CM: 599.70  1/4/2017 Unknown    better    Hydronephrosis ICD-10-CM: N13.30  ICD-9-CM: 539  10/7/2016 Yes    No longer with this on US    Pleural effusion, right ICD-10-CM: J90  ICD-9-CM: 511.9  10/6/2016 Yes    Overview Addendum 10/8/2016  5:54 PM by Aristeo Beckford MD     10/6/16 R thoracentesis 800 ml: pleural fluid creatine 4.1 = likely urinothorax         On 1/5/17 with 1.25 L removed with CR of 2.72 from pleural flud    Breast cancer metastasized to lung [facility]) ICD-10-CM: C50.919, C78.00  ICD-9-CM: 174.9, 197.0  10/30/2015 Yes        Accelerated hypertension ICD-10-CM: I10  ICD-9-CM: 401.0  10/20/2015 Yes        DVT (deep venous thrombosis) ([facility] ICD-10-CM: I82.409  ICD-9-CM: 453.40  11/19/2013 Yes    Overview Signed 11/19/2013  6:07 AM by Preston Balderas     50-09-05 There is nonocclusive thrombus within the left common femoral vein, superficial femoral vein, popliteal vein, and posterior tibial vein           On anticoagulation    * (Principal)Acute respiratory failure with hypoxia ([address] ICD-10-CM: J96.01  ICD-9-CM: 518.81  7/15/2013 Yes    Overview Signed 7/17/2013  6:15 AM by Preston Balderas     7-15-13 intubated   7-16-13 ;bronchoscopy with bal             CKD (chronic kidney disease) (Chronic) ICD-10-CM: N18.9  ICD-9-CM: 585.9  5/14/2013 Yes    Overview Signed 5/14/2013 11:35 AM by Shayla Flores RN     With acute rise- ? Dehydration and monitor                    Diarrhea  --today x 1, will monitor      Plan:  (Medical Decision Making)      --cxr tomorrow to check effusion  --Supplemental O2,  Taper as tolerate  --continue nebs   --UA  Is positive. On Augmentin D3  --INR is 2.0. Will get pharmacy to regulate since do not plan further procedure. --f/u with renal and adding free water  --urology signed off.  --if able to come down off oxygen tomorrow hopefully home soon. More than 50% of the time documented was spent in face-to-face contact with the patient and in the care of the patient on the floor/unit where the patient is located.     Bernadette Castro MD

## 2017-01-08 NOTE — PROGRESS NOTES
Assumed care of patient. Assessment completed and documented, see docflow. Patient A/Ox3, sitting in bedside recliner. Nightshift RN, Dilia Prakash reports SQBS 31, rechecked 38. IV access lost. Attempts x 3 for IV access. This RN received verbal order to access Right [employer]. Night shift CC, Nurys Meyers RN accessed [employer] and 50Dextrose given. Rechecked SQBS, 253. Verbal orders received to d/c Glipizide. Patient denies pain. NAD noted at present. Respirations even and non labored. Door open, call light within reach;  will continue POC.

## 2017-01-08 NOTE — PROGRESS NOTES
Warfarin dosing per pharmacist    Marina Street is a 78 y.o. female. Height: 5' 5\" (165.1 cm)    Weight: 60.8 kg (134 lb)    Indication:  H/o DVT    Goal INR:  2-3    Home dose:  ~14 mg/week    Risk factors or significant drug interactions:  abxs    Other anticoagulants:  none    Daily Monitoring  Date  INR     Warfarin dose HGB              Notes  1/5  3.3/2.6  --  8.4    1/6  2.1  --  --  1/7    2.0  2.5 mg  --  Pharmacy consulted 1/7 1/8  2.0  2 mg  --    Warfarin restarted yesterday. Change to 2 mg this evening with home dose appears ~14 mg/wk. Following daily INR. Thank you,  Darek Draper, Pharm. D.   Clinical Pharmacist  [phone]

## 2017-01-08 NOTE — PROGRESS NOTES
Admit Date: 1/4/2017      Subjective:          Review of Systems  Cardio-vascular: no chest pain, SOB better  GI: no N/V/D  : no dysuria, no hematuria    Objective:     Patient Vitals for the past 8 hrs:   BP Temp Pulse Resp SpO2   01/08/17 0700 139/59 97.6 °F (36.4 °C) 64 22 100 %   01/08/17 0353 144/69 97.7 °F (36.5 °C) 65 16 100 %     01/08 0701 - 01/08 1900  In: 240 [P.O.:240]  Out: -       Physical Exam:   Lungs:decreased BS  CV: RR,   Abdomen: soft,  tender, no rebound.   Ext: + edema          Data Review   Recent Results (from the past 8 hour(s))   GLUCOSE, POC    Collection Time: 01/08/17  5:46 AM   Result Value Ref Range    Glucose (POC) 41 (L) 65 - 100 mg/dL   GLUCOSE, POC    Collection Time: 01/08/17  6:09 AM   Result Value Ref Range    Glucose (POC) 33 (LL) 65 - 100 mg/dL   GLUCOSE, POC    Collection Time: 01/08/17  6:11 AM   Result Value Ref Range    Glucose (POC) 35 (LL) 65 - 100 mg/dL   GLUCOSE, POC    Collection Time: 01/08/17  6:25 AM   Result Value Ref Range    Glucose (POC) 34 (LL) 65 - 100 mg/dL   GLUCOSE, POC    Collection Time: 01/08/17  6:35 AM   Result Value Ref Range    Glucose (POC) 36 (LL) 65 - 100 mg/dL   GLUCOSE, POC    Collection Time: 01/08/17  6:47 AM   Result Value Ref Range    Glucose (POC) 48 (L) 65 - 100 mg/dL   GLUCOSE, POC    Collection Time: 01/08/17  7:08 AM   Result Value Ref Range    Glucose (POC) 253 (H) 65 - 100 mg/dL   GLUCOSE, POC    Collection Time: 01/08/17  9:00 AM   Result Value Ref Range    Glucose (POC) 225 (H) 65 - 114 mg/dL   METABOLIC PANEL, BASIC    Collection Time: 01/08/17  9:02 AM   Result Value Ref Range    Sodium 149 (H) 136 - 145 mmol/L    Potassium 4.2 3.5 - 5.1 mmol/L    Chloride 115 (H) 98 - 107 mmol/L    CO2 24 21 - 32 mmol/L    Anion gap 10 7 - 16 mmol/L    Glucose 223 (H) 65 - 100 mg/dL    BUN 33 (H) 8 - 23 MG/DL    Creatinine 2.36 (H) 0.6 - 1.0 MG/DL    GFR est AA 26 (L) >60 ml/min/1.73m2    GFR est non-AA 22 (L) >60 ml/min/1.73m2    Calcium 7.4 (L) 8.3 - 10.4 MG/DL   PROTHROMBIN TIME + INR    Collection Time: 01/08/17  9:02 AM   Result Value Ref Range    Prothrombin time 22.0 (H) 9.6 - 12.0 sec    INR 2.0 (H) 0.9 - 1.2             Assessment:     Principal Problem:    Acute respiratory failure with hypoxia ([facility] (7/15/2013)      Overview: 7-15-13 intubated       7-16-13 ;bronchoscopy with bal    Active Problems:    CKD (chronic kidney disease) (5/14/2013)      Overview: With acute rise- ?  Dehydration and monitor      DVT (deep venous thrombosis) ([facility] (11/19/2013)      Overview: 11-18-13 There is nonocclusive thrombus within the left common femoral       vein, superficial femoral vein, popliteal vein, and posterior tibial vein            Accelerated hypertension (10/20/2015)      Breast cancer metastasized to lung ([facility] [address] (10/30/2015)      Pleural effusion, right (10/6/2016)      Overview: 10/6/16 R thoracentesis 800 ml: pleural fluid creatine 4.1 = likely       urinothorax      Hydronephrosis (10/7/2016)      Bilateral edema of lower extremity (1/4/2017)      Hematuria (1/4/2017)        Plan:     Renal F. Stable, but hypernatremia  Add free water abraham Elaine MD

## 2017-01-09 ENCOUNTER — HOME HEALTH ADMISSION (OUTPATIENT)
Dept: HOME HEALTH SERVICES | Facility: HOME HEALTH | Age: 67
End: 2017-01-09

## 2017-01-09 ENCOUNTER — APPOINTMENT (OUTPATIENT)
Dept: GENERAL RADIOLOGY | Age: 67
DRG: 189 | End: 2017-01-09
Attending: INTERNAL MEDICINE
Payer: SUBSIDIZED

## 2017-01-09 PROBLEM — I10 ACCELERATED HYPERTENSION: Status: ACTIVE | Noted: 2017-01-04

## 2017-01-09 PROBLEM — C50.919 BREAST CANCER METASTASIZED TO LUNG (HCC): Chronic | Status: ACTIVE | Noted: 2017-01-04

## 2017-01-09 PROBLEM — J90 PLEURAL EFFUSION, RIGHT: Status: ACTIVE | Noted: 2017-01-04

## 2017-01-09 PROBLEM — C78.00 BREAST CANCER METASTASIZED TO LUNG (HCC): Chronic | Status: ACTIVE | Noted: 2017-01-04

## 2017-01-09 PROBLEM — J96.01 ACUTE RESPIRATORY FAILURE WITH HYPOXIA (HCC): Status: ACTIVE | Noted: 2017-01-04

## 2017-01-09 PROBLEM — N13.30 HYDRONEPHROSIS: Chronic | Status: ACTIVE | Noted: 2017-01-04

## 2017-01-09 PROBLEM — N18.9 CKD (CHRONIC KIDNEY DISEASE): Chronic | Status: ACTIVE | Noted: 2017-01-04

## 2017-01-09 PROBLEM — I82.409 DVT (DEEP VENOUS THROMBOSIS) (HCC): Status: ACTIVE | Noted: 2017-01-04

## 2017-01-09 LAB
ANION GAP BLD CALC-SCNC: 10 MMOL/L (ref 7–16)
BACTERIA SPEC CULT: NORMAL
BUN SERPL-MCNC: 31 MG/DL (ref 8–23)
CALCIUM SERPL-MCNC: 7.1 MG/DL (ref 8.3–10.4)
CHLORIDE SERPL-SCNC: 116 MMOL/L (ref 98–107)
CO2 SERPL-SCNC: 22 MMOL/L (ref 21–32)
CREAT SERPL-MCNC: 2 MG/DL (ref 0.6–1)
GLUCOSE BLD STRIP.AUTO-MCNC: 166 MG/DL (ref 65–100)
GLUCOSE BLD STRIP.AUTO-MCNC: 169 MG/DL (ref 65–100)
GLUCOSE BLD STRIP.AUTO-MCNC: 244 MG/DL (ref 65–100)
GLUCOSE BLD STRIP.AUTO-MCNC: 272 MG/DL (ref 65–100)
GLUCOSE SERPL-MCNC: 157 MG/DL (ref 65–100)
HBA1C MFR BLD: <3.5 % (ref 4.8–6)
INR PPP: 2.3 (ref 0.9–1.2)
POTASSIUM SERPL-SCNC: 4.4 MMOL/L (ref 3.5–5.1)
PROTHROMBIN TIME: 25.4 SEC (ref 9.6–12)
SERVICE CMNT-IMP: NORMAL
SODIUM SERPL-SCNC: 148 MMOL/L (ref 136–145)

## 2017-01-09 PROCEDURE — 80048 BASIC METABOLIC PNL TOTAL CA: CPT | Performed by: INTERNAL MEDICINE

## 2017-01-09 PROCEDURE — 82962 GLUCOSE BLOOD TEST: CPT

## 2017-01-09 PROCEDURE — 74011000250 HC RX REV CODE- 250: Performed by: INTERNAL MEDICINE

## 2017-01-09 PROCEDURE — 36415 COLL VENOUS BLD VENIPUNCTURE: CPT | Performed by: INTERNAL MEDICINE

## 2017-01-09 PROCEDURE — 94760 N-INVAS EAR/PLS OXIMETRY 1: CPT

## 2017-01-09 PROCEDURE — 94640 AIRWAY INHALATION TREATMENT: CPT

## 2017-01-09 PROCEDURE — 85610 PROTHROMBIN TIME: CPT | Performed by: INTERNAL MEDICINE

## 2017-01-09 PROCEDURE — 99232 SBSQ HOSP IP/OBS MODERATE 35: CPT | Performed by: INTERNAL MEDICINE

## 2017-01-09 PROCEDURE — 71010 XR CHEST PORT: CPT

## 2017-01-09 PROCEDURE — 74011250637 HC RX REV CODE- 250/637: Performed by: INTERNAL MEDICINE

## 2017-01-09 PROCEDURE — 65270000029 HC RM PRIVATE

## 2017-01-09 PROCEDURE — 83036 HEMOGLOBIN GLYCOSYLATED A1C: CPT | Performed by: INTERNAL MEDICINE

## 2017-01-09 PROCEDURE — 77030003560 HC NDL HUBR BARD -A

## 2017-01-09 PROCEDURE — 77010033678 HC OXYGEN DAILY

## 2017-01-09 RX ORDER — WARFARIN 2 MG/1
2 TABLET ORAL SEE ADMIN INSTRUCTIONS
Status: DISCONTINUED | OUTPATIENT
Start: 2017-01-09 | End: 2017-01-11

## 2017-01-09 RX ADMIN — METOCLOPRAMIDE HYDROCHLORIDE 5 MG: 5 SOLUTION ORAL at 05:41

## 2017-01-09 RX ADMIN — BUDESONIDE 500 MCG: 0.5 INHALANT RESPIRATORY (INHALATION) at 20:35

## 2017-01-09 RX ADMIN — MONTELUKAST SODIUM 10 MG: 10 TABLET, FILM COATED ORAL at 22:39

## 2017-01-09 RX ADMIN — ALBUTEROL SULFATE 2.5 MG: 2.5 SOLUTION RESPIRATORY (INHALATION) at 20:35

## 2017-01-09 RX ADMIN — ALPRAZOLAM 0.25 MG: 0.5 TABLET ORAL at 22:38

## 2017-01-09 RX ADMIN — MEGESTROL ACETATE 200 MG: 40 SUSPENSION ORAL at 08:51

## 2017-01-09 RX ADMIN — METOCLOPRAMIDE HYDROCHLORIDE 5 MG: 5 SOLUTION ORAL at 15:45

## 2017-01-09 RX ADMIN — Medication 10 ML: at 05:40

## 2017-01-09 RX ADMIN — Medication 10 ML: at 15:45

## 2017-01-09 RX ADMIN — ATENOLOL 25 MG: 25 TABLET ORAL at 08:51

## 2017-01-09 RX ADMIN — ALPRAZOLAM 0.25 MG: 0.5 TABLET ORAL at 00:09

## 2017-01-09 RX ADMIN — AMLODIPINE BESYLATE 10 MG: 10 TABLET ORAL at 08:51

## 2017-01-09 RX ADMIN — Medication 5 ML: at 22:40

## 2017-01-09 RX ADMIN — METOCLOPRAMIDE HYDROCHLORIDE 5 MG: 5 SOLUTION ORAL at 22:40

## 2017-01-09 RX ADMIN — ATENOLOL 25 MG: 25 TABLET ORAL at 22:38

## 2017-01-09 RX ADMIN — BUDESONIDE 500 MCG: 0.5 INHALANT RESPIRATORY (INHALATION) at 06:23

## 2017-01-09 RX ADMIN — METOCLOPRAMIDE HYDROCHLORIDE 5 MG: 5 SOLUTION ORAL at 11:44

## 2017-01-09 NOTE — PROGRESS NOTES
Pt sitting in bedside chair eating breakfast. Respirations even and unlabored on5L hf nc. No complaints or needs at this time. Door open, call bell in reach.

## 2017-01-09 NOTE — PROGRESS NOTES
Pt resting in bed with family at bedside. SOB on exertion, currently on 5l hf nc. Per patient son, pt has no needs or complaints at this time. Will continue to monitor.

## 2017-01-09 NOTE — PROGRESS NOTES
[facility]        Subjective: No new complaints    Review of Systems -   General ROS: negative for - chills, fatigue or fever  Respiratory ROS: no cough, shortness of breath, or wheezing  Cardiovascular ROS: no chest pain or dyspnea on exertion  Gastrointestinal ROS: no abdominal pain, change in bowel habits, or black or bloody stools  Genito-Urinary ROS: no dysuria, trouble voiding, or hematuria  Neurological ROS: no TIA or stroke symptoms        Objective:    Vitals:    01/09/17 0038 01/09/17 0332 01/09/17 0624 01/09/17 0752   BP: 162/73 155/68  160/80   Pulse: 79 69  69   Resp: 22 20  20   Temp: 98.7 °F (37.1 °C) 99.2 °F (37.3 °C)  98.1 °F (36.7 °C)   SpO2: 100% 93% 99% 100%   Weight:       Height:           PE  Gen: in no acute distress  CV: reg rate  Chest: clear  Abd: soft  Ext/Access: no edema     . LAB  Recent Labs      01/09/17   0540  01/08/17   0902  01/07/17   0635   INR  2.3*  2.0*  2.0*     Recent Labs      01/09/17   0540  01/08/17   0902  01/07/17   0635   NA  148*  149*  147*   K  4.4  4.2  4.5   CL  116*  115*  116*   CO2  22  24  22   GLU  157*  223*  100   BUN  31*  33*  30*   CREA  2.00*  2.36*  2.35*   CA  7.1*  7.4*  7.8*           Radiology    A/P:   Patient Active Problem List   Diagnosis Code    Breast cancer ([facility] C50.919    Bony metastasis (HCC) C79.51    HTN (hypertension) I10    DM (diabetes mellitus) (HCC) E11.9    CKD (chronic kidney disease) N18.9    Iron deficiency anemia D50.9    Acute respiratory failure with hypoxia (HCC) J96.01    Hypomagnesemia E83.42    Hypokalemia E87.6    Asthma J45.909    Anemia D64.9    DVT (deep venous thrombosis) (HCC) I82.409    PND (paroxysmal nocturnal dyspnea) R06.00    SOB (shortness of breath) on exertion R06.02    Leg swelling M79.89    CHF (congestive heart failure) (HCC) I50.9    Accelerated hypertension I10    Elevated troponin R79.89    Breast cancer metastasized to lung (HCC) C50.919, C78.00    Pulmonary edema J81.1    Acute on chronic renal failure (HCC) N17.9, N18.9    Pleural effusion, right J90    Hydronephrosis N13.30    Pancytopenia (HCC) D61.818    Chemotherapy-induced neutropenia (HCC) D70.1    Bilateral edema of lower extremity R60.0    Hematuria R31.9       Renal function appears to be at baseline. Following.       Yanira Rushing MD

## 2017-01-09 NOTE — PROGRESS NOTES
Warfarin dosing per pharmacist    Roman Simon is a 78 y.o. female. Height: 5' 5\" (165.1 cm)    Weight: 60.8 kg (134 lb)    Indication:  H/o DVT    Goal INR:  2-3    Home dose:  ~14 mg/week    Risk factors or significant drug interactions:  abxs    Other anticoagulants:  none    Daily Monitoring  Date  INR     Warfarin dose HGB              Notes  1/5  3.3/2.6  --  8.4    1/6  2.1  --  --  1/7    2.0  2.5 mg  --  Pharmacy consulted 1/7 1/8  2.0  2 mg  --  1/9  2.3  Hold  --      Will hold warfarin today per Dr. Cielo Cassidy for possible thoracentesis. Follow for plans to restart.       Thank you,  Roselyn Trotter, PharmD  Clinical Pharmacist  [phone]

## 2017-01-09 NOTE — PROGRESS NOTES
Pt is sitting in bed with the head elevated. Respirations are even and unlabored. No signs or symptoms of distress are noted. No SOB or pain is reported at this time. Call light is within reach. Will continue to monitor.

## 2017-01-09 NOTE — PROGRESS NOTES
Pt sitting in bedside chair eating supper. Respirations even and unlabored on 5L hf nc. No complaints or needs at this time.

## 2017-01-09 NOTE — CONSULTS
[address] [facility] [address]       Name:  Macho Wise   MR#:  [id number]   :  1950   Account #:  [de-identified]   Date of Adm:  2017       DATE OF CONSULTATION: 2017      CONSULTING DOCTOR: Dr. Glen Rodriguez: Hematuria. HISTORY OF ILLNESS: We have been following the patient who has   metastatic breast cancer with right-sided hydronephrosis. Recently, was admitted with a right pleural effusion. A renal   ultrasound done on 2017, shows no hydronephrosis. The patient has been having occasional gross painless hematuria. I interviewed the patient with the assistance of a [place]   . Entire interaction lasted approximately 10 minutes. She states that she has been having gross hematuria   intermittently for about 4 weeks. She takes Coumadin. Her INR   currently 2.3. Coumadin being held for possible repeat   thoracentesis. She had a urinalysis done on 2017, which shows   large blood, 10-20 WBC, greater than 100 RBC, 0 bacteria. Urine culture from 2017, shows no growth. ASSESSMENT: Gross hematuria secondary to stent rubbing against   the bladder mucosa and anticoagulation. RECOMMENDATIONS: I told the patient that some hematuria is   unavoidable with the stent in her situation. I would recommend   that she get off her feet when sees gross hematuria and push   p.o. fluids. We will make her appointment with Dr. Preston Hardy to   discuss subsequent stent changes. All of her questions were   answered with the assistance of the .         MD Ayden Hahn / Sherryle Leech   D:  2017   14:39   T:  2017   14:58   Job #:  [id number]

## 2017-01-09 NOTE — PROGRESS NOTES
Progress Note    Patient: Jose Alejandro Chaudhary MRN: [id number]  SSN: [id number]    YOB: 1950  Age: 78 y.o. Sex: female      Admit Date: 1/4/2017    LOS: 5 days     Subjective:     Presents in street clothes resting in bed. Expresses anxiety regarding her current condition. Endorses CP, SOB. Reports she continues to have episodes of hematuria. Objective:     Vitals:    01/09/17 0332 01/09/17 0624 01/09/17 0752 01/09/17 1141   BP: 155/68  160/80 148/60   Pulse: 69  69 64   Resp: 20  20 20   Temp: 99.2 °F (37.3 °C)  98.1 °F (36.7 °C) 98.3 °F (36.8 °C)   SpO2: 93% 99% 100% 100%   Weight:       Height:            Intake and Output:  Current Shift: 01/09 0701 - 01/09 1900  In: 360 [P.O.:360]  Out: -   Last three shifts: 01/07 1901 - 01/09 0700  In: 800 [P.O.:800]  Out: -     Physical Exam:   GEN: Older  female with anxiety  HEENT: EOMI. Mucous membranes moist.  CARDIO: Regular rate and rhythm  LUNGS: Diminished breath sounds posterior right lung field  ABD: Nontender, nondistended. Bowel sounds minimal. No HSM. EXTREM: No cyanosis, clubbing, or edema in LE    Lab/Data Review:  Lab results reviewed. For significant abnormal values and values requiring intervention, see assessment and plan. Assessment:     Principal Problem:    Acute respiratory failure with hypoxia ([address] (7/15/2013)      Overview: 7-15-13 intubated       7-16-13 ;bronchoscopy with bal    Active Problems:    CKD (chronic kidney disease) (5/14/2013)      Overview: With acute rise- ?  Dehydration and monitor      DVT (deep venous thrombosis) ([address] (11/19/2013)      Overview: 11-18-13 There is nonocclusive thrombus within the left common femoral       vein, superficial femoral vein, popliteal vein, and posterior tibial vein            Accelerated hypertension (10/20/2015)      Breast cancer metastasized to lung ([address] (10/30/2015)      Pleural effusion, right (10/6/2016)      Overview: 10/6/16 R thoracentesis 800 ml: pleural fluid creatine 4.1 = likely       urinothorax      Hydronephrosis (10/7/2016)      Bilateral edema of lower extremity (1/4/2017)      Hematuria (1/4/2017)        Plan:     - Recent CXR demonstrates R sided pleural effusion not significantly changed from imaging prior to thoracocentesis  - Repeat thoracocentesis per discretion of pulmonary team - hold coumadin for procedure. INR currently 2.3.  - Cr improved to baseline- 2.00. BUN decreased to 31. GFR 26. No changes needed in management from nephrology standpoint.  - Wean O2 as tolerated  - Augmentin D4 for UTI  - Will continue to follow w/intervention if further needs arise      Signed By: Anoop Friedman. [place]     January 9, 2017            *ATTENTION:  This note has been created by a medical student for educational purposes only. Please do not refer to the content of this note for clinical decision-making, billing, or other purposes. Please see attending physicians note to obtain clinical information on this patient. *

## 2017-01-09 NOTE — PROGRESS NOTES
present at bedside during assessment by Dr. Nidia Fong MD    [address] [place] [address]  [phone]

## 2017-01-09 NOTE — PROGRESS NOTES
Pt will discharge to her son[place] home [place] ([facility] [address], [address]) therefore, [facility] nursing can see her.

## 2017-01-09 NOTE — PROGRESS NOTES
Care Management Interventions  PCP Verified by CM: Yes  Transition of Care Consult (CM Consult): [address]: Yes  Current Support Network: Lives with Spouse  Confirm Follow Up Transport: Family  Freedom of Choice Offered: Yes  Discharge Location  Discharge Placement: Home with home health  Pt is alert and oriented in all spheres. CNA interpreted for SW. Pt uses a walker to ambulate. Independent with ADLs. No home 02. Pt has had [facility] after hospital stays. SW will consult [place] for nursing diabetes education. SW following for additional dc needs.

## 2017-01-09 NOTE — PROGRESS NOTES
Pulmonary Daily Progress Note      Kenzie Vega    1/9/2017    Date of Admission:  1/4/2017     Patient is a 78 y.o.  female presents with shortness of breath. 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 She was admitted to this hospital in October with similar situation with obstructed stent and associated R pleural effusion which was felt may represent urinothorax. With thoracentesis and adjustment of stent this resolved. She has continue to follow with urology and oncology. Her oncologist has her on coumadin for her history of DVT in 2013. They had noticed increasing LE edema and performed a TTE with only diastolic dysfunction and a PET scan with moderate right effusion but no progression of disease. She came to the ER with dyspnea but minimal cough, no purulent sputum, and no fever. A CXR shows a large right sided effusion. Labs reveal mildly elevated BNP, creatinine stable in the mid 2's, and normal WBC. She reports some increased hematuria and difficulty urinating recently. She was to follow up with urology tomorrow. She is hypoxic, currently on 2L O2. Patient in and less dyspnea since thoracentesis. Removed 1.25 L from right chest on 1/5/17. Diuresing and edema in legs and arem less. Renal US noted marked improvement of prior right hydronephrolsis compared to old Ultasound  The patient's chart is reviewed and the patient is discussed with the staff.     Subjective:   Talking through   Asking to see urologist, still bleeding  Also asking for inhaler,porsche has nebulizer       Current Facility-Administered Medications   Medication Dose Route Frequency    dextrose 40% (GLUTOSE) oral gel 1 Tube  1 Tube Oral PRN    warfarin (COUMADIN) tablet 2 mg  2 mg Oral QPM    0.9% sodium chloride infusion 250 mL 250 mL IntraVENous PRN    ALPRAZolam (XANAX) tablet 0.25 mg  0.25 mg Oral TID PRN    amLODIPine (NORVASC) tablet 10 mg  10 mg Oral DAILY    atenolol (TENORMIN) tablet 25 mg  25 mg Oral Q12H    fentaNYL (DURAGESIC) 50 mcg/hr patch 1 Patch  1 Patch TransDERmal Q72H    HYDROcodone-acetaminophen (NORCO)  mg tablet 1 Tab  1 Tab Oral Q4H PRN    megestrol (MEGACE) 400 mg/10 mL (10 mL) oral suspension 200 mg  200 mg Oral DAILY    metoclopramide (REGLAN) 5 mg/5 mL oral syrup 5 mg  5 mg Oral AC&HS    montelukast (SINGULAIR) tablet 10 mg  10 mg Oral QHS    palbociclib (IBRANCE) cap 125 mg  125 mg Oral DAILY    sodium chloride (NS) flush 5-10 mL  5-10 mL IntraVENous Q8H    sodium chloride (NS) flush 5-10 mL  5-10 mL IntraVENous PRN    albuterol (PROVENTIL VENTOLIN) nebulizer solution 2.5 mg  2.5 mg Nebulization Q4H PRN    budesonide (PULMICORT) 500 mcg/2 ml nebulizer suspension  500 mcg Nebulization BID RT           Objective:     Vitals:    01/09/17 0038 01/09/17 0332 01/09/17 0624 01/09/17 0752   BP: 162/73 155/68  160/80   Pulse: 79 69  69   Resp: 22 20  20   Temp: 98.7 °F (37.1 °C) 99.2 °F (37.3 °C)  98.1 °F (36.7 °C)   SpO2: 100% 93% 99% 100%   Weight:       Height:           PHYSICAL EXAM     Constitutional:  the patient is well developed and in mild acute distress  EENMT:  Sclera clear, pupils equal, oral mucosa moist  Respiratory: Decreased on the right but better than yesterday. Cardiovascular:  RRR without M,G,R  Gastrointestinal: soft and non-tender; with positive bowel sounds. Musculoskeletal: warm without cyanosis.  There is 1+ B lower leg edema -- decreased   Skin:  no jaundice or rashes, no wounds   Neurologic: no gross neuro deficits     Psychiatric:  alert and oriented x ppt    Chest Xray:            Recent Labs      01/09/17   0540  01/08/17   0902  01/07/17   0635   INR  2.3*  2.0*  2.0*     Recent Labs      01/09/17   0540  01/08/17   0902  01/07/17   0635   NA  148*  149*  147*   K  4.4 4.2  4.5   CL  116*  115*  116*   GLU  157*  223*  100   CO2  22  24  22   BUN  31*  33*  30*   CREA  2.00*  2.36*  2.35*   CA  7.1*  7.4*  7.8*     No results for input(s): PH, PCO2, PO2, HCO3 in the last 72 hours. No results for input(s): LCAD, LAC in the last 72 hours. Assessment:  (Medical Decision Making)     Hospital Problems  Date Reviewed: 10/19/2016          Codes Class Noted POA    Bilateral edema of lower extremity ICD-10-CM: R60.0  ICD-9-CM: 782.3  1/4/2017 Unknown    Less since recent diuresis. Hematuria ICD-10-CM: R31.9  ICD-9-CM: 599.70  1/4/2017 Unknown    better    Hydronephrosis ICD-10-CM: N13.30  ICD-9-CM: 044  10/7/2016 Yes    No longer with this on US    Pleural effusion, right ICD-10-CM: J90  ICD-9-CM: 511.9  10/6/2016 Yes    Overview Addendum 10/8/2016  5:54 PM by Cody Mota MD     10/6/16 R thoracentesis 800 ml: pleural fluid creatine 4.1 = likely urinothorax         On 1/5/17 with 1.25 L removed with CR of 2.72 from pleural flud    Breast cancer metastasized to lung [facility]) ICD-10-CM: C50.919, C78.00  ICD-9-CM: 174.9, 197.0  10/30/2015 Yes        Accelerated hypertension ICD-10-CM: I10  ICD-9-CM: 401.0  10/20/2015 Yes        DVT (deep venous thrombosis) ([facility] ICD-10-CM: I82.409  ICD-9-CM: 453.40  11/19/2013 Yes    Overview Signed 11/19/2013  6:07 AM by Lucy Hong     09-69-52 There is nonocclusive thrombus within the left common femoral vein, superficial femoral vein, popliteal vein, and posterior tibial vein           On anticoagulation    * (Principal)Acute respiratory failure with hypoxia ([facility] ICD-10-CM: J96.01  ICD-9-CM: 518.81  7/15/2013 Yes    Overview Signed 7/17/2013  6:15 AM by Lucy Hong     7-15-13 intubated   7-16-13 ;bronchoscopy with bal             CKD (chronic kidney disease) (Chronic) ICD-10-CM: N18.9  ICD-9-CM: 585.9  5/14/2013 Yes    Overview Signed 5/14/2013 11:35 AM by Ann Mohan RN     With acute rise- ?  Dehydration and monitor Diarrhea  --today x 1, will monitor      Plan:  (Medical Decision Making)      - may need thoracentesis again   -will hold coumadin but await cytology, if malignant may need pleurax   - On Augmentin D4  -wean 18   --urology signed off but she is asking to see them again       More than 50% of the time documented was spent in face-to-face contact with the patient and in the care of the patient on the floor/unit where the patient is located.     Luiz Gallardo MD

## 2017-01-10 LAB
ANION GAP BLD CALC-SCNC: 10 MMOL/L (ref 7–16)
BUN SERPL-MCNC: 30 MG/DL (ref 8–23)
CALCIUM SERPL-MCNC: 7.8 MG/DL (ref 8.3–10.4)
CHLORIDE SERPL-SCNC: 115 MMOL/L (ref 98–107)
CO2 SERPL-SCNC: 24 MMOL/L (ref 21–32)
CREAT SERPL-MCNC: 1.85 MG/DL (ref 0.6–1)
GLUCOSE BLD STRIP.AUTO-MCNC: 131 MG/DL (ref 65–100)
GLUCOSE BLD STRIP.AUTO-MCNC: 253 MG/DL (ref 65–100)
GLUCOSE BLD STRIP.AUTO-MCNC: 319 MG/DL (ref 65–100)
GLUCOSE SERPL-MCNC: 108 MG/DL (ref 65–100)
INR PPP: 2.1 (ref 0.9–1.2)
POTASSIUM SERPL-SCNC: 4.6 MMOL/L (ref 3.5–5.1)
PROTHROMBIN TIME: 22.9 SEC (ref 9.6–12)
SODIUM SERPL-SCNC: 149 MMOL/L (ref 136–145)

## 2017-01-10 PROCEDURE — 36415 COLL VENOUS BLD VENIPUNCTURE: CPT | Performed by: INTERNAL MEDICINE

## 2017-01-10 PROCEDURE — 94760 N-INVAS EAR/PLS OXIMETRY 1: CPT

## 2017-01-10 PROCEDURE — 74011250636 HC RX REV CODE- 250/636: Performed by: INTERNAL MEDICINE

## 2017-01-10 PROCEDURE — 74011000250 HC RX REV CODE- 250: Performed by: INTERNAL MEDICINE

## 2017-01-10 PROCEDURE — 77010033678 HC OXYGEN DAILY

## 2017-01-10 PROCEDURE — 99232 SBSQ HOSP IP/OBS MODERATE 35: CPT | Performed by: INTERNAL MEDICINE

## 2017-01-10 PROCEDURE — 65270000029 HC RM PRIVATE

## 2017-01-10 PROCEDURE — 82962 GLUCOSE BLOOD TEST: CPT

## 2017-01-10 PROCEDURE — 74011250637 HC RX REV CODE- 250/637: Performed by: INTERNAL MEDICINE

## 2017-01-10 PROCEDURE — 85610 PROTHROMBIN TIME: CPT | Performed by: INTERNAL MEDICINE

## 2017-01-10 PROCEDURE — 80048 BASIC METABOLIC PNL TOTAL CA: CPT | Performed by: INTERNAL MEDICINE

## 2017-01-10 PROCEDURE — 94640 AIRWAY INHALATION TREATMENT: CPT

## 2017-01-10 RX ORDER — FUROSEMIDE 10 MG/ML
40 INJECTION INTRAMUSCULAR; INTRAVENOUS ONCE
Status: COMPLETED | OUTPATIENT
Start: 2017-01-10 | End: 2017-01-10

## 2017-01-10 RX ADMIN — Medication 5 ML: at 21:26

## 2017-01-10 RX ADMIN — AMLODIPINE BESYLATE 10 MG: 10 TABLET ORAL at 08:15

## 2017-01-10 RX ADMIN — METOCLOPRAMIDE HYDROCHLORIDE 5 MG: 5 SOLUTION ORAL at 06:29

## 2017-01-10 RX ADMIN — MONTELUKAST SODIUM 10 MG: 10 TABLET, FILM COATED ORAL at 21:26

## 2017-01-10 RX ADMIN — FUROSEMIDE 40 MG: 10 INJECTION, SOLUTION INTRAVENOUS at 09:32

## 2017-01-10 RX ADMIN — Medication 10 ML: at 14:50

## 2017-01-10 RX ADMIN — HYDROCODONE BITARTRATE AND ACETAMINOPHEN 1 TABLET: 10; 325 TABLET ORAL at 14:48

## 2017-01-10 RX ADMIN — HYDROCODONE BITARTRATE AND ACETAMINOPHEN 1 TABLET: 10; 325 TABLET ORAL at 19:31

## 2017-01-10 RX ADMIN — ALPRAZOLAM 0.25 MG: 0.5 TABLET ORAL at 08:34

## 2017-01-10 RX ADMIN — ATENOLOL 25 MG: 25 TABLET ORAL at 08:15

## 2017-01-10 RX ADMIN — BUDESONIDE 500 MCG: 0.5 INHALANT RESPIRATORY (INHALATION) at 19:55

## 2017-01-10 RX ADMIN — ALPRAZOLAM 0.25 MG: 0.5 TABLET ORAL at 19:31

## 2017-01-10 RX ADMIN — MEGESTROL ACETATE 200 MG: 40 SUSPENSION ORAL at 08:34

## 2017-01-10 RX ADMIN — ALBUTEROL SULFATE 2.5 MG: 2.5 SOLUTION RESPIRATORY (INHALATION) at 07:30

## 2017-01-10 RX ADMIN — Medication 5 ML: at 06:29

## 2017-01-10 RX ADMIN — METOCLOPRAMIDE HYDROCHLORIDE 5 MG: 5 SOLUTION ORAL at 21:26

## 2017-01-10 RX ADMIN — BUDESONIDE 500 MCG: 0.5 INHALANT RESPIRATORY (INHALATION) at 07:30

## 2017-01-10 RX ADMIN — ATENOLOL 25 MG: 25 TABLET ORAL at 21:26

## 2017-01-10 RX ADMIN — METOCLOPRAMIDE HYDROCHLORIDE 5 MG: 5 SOLUTION ORAL at 15:57

## 2017-01-10 RX ADMIN — METOCLOPRAMIDE HYDROCHLORIDE 5 MG: 5 SOLUTION ORAL at 11:19

## 2017-01-10 NOTE — PROGRESS NOTES
Progress Note    Patient: Saroj Lutz MRN: [id number]  SSN: [id number]    YOB: 1950  Age: 78 y.o. Sex: female      Admit Date: 1/4/2017    LOS: 6 days     Subjective:     Still experiencing SOB and some CP. No nausea, vomiting. Anxious. Objective:     Vitals:    01/10/17 0038 01/10/17 0348 01/10/17 0726 01/10/17 0730   BP: 165/65 161/76 150/72    Pulse: 66 65 69    Resp: 18 18 20    Temp: 99.5 °F (37.5 °C) 98.9 °F (37.2 °C) 98.5 °F (36.9 °C)    SpO2: 99% 98% 100% 100%   Weight:       Height:            Intake and Output:  Current Shift: 01/10 0701 - 01/10 1900  In: 200 [P.O.:200]  Out: -   Last three shifts: 01/08 1901 - 01/10 0700  In: 1160 [P.O.:1160]  Out: -     Physical Exam:   GEN: Anxious appearing  female who presents sitting up in bed in street clothes  HEENT: EOMI. Mucosa moist.  CARDIO: RRR, no MRG  LUNGS: CTAB anteriorly and posteriorly with some diminished breath sounds in posterior right lung field  ABD: Soft, nontender. BS present x 4. MSK: No cyanosis, clubbing, or edema in the extremities    Lab/Data Review:  Lab results reviewed. For significant abnormal values and values requiring intervention, see assessment and plan. Assessment:     Principal Problem:    Acute respiratory failure with hypoxia ([address] (1/4/2017)      Overview: 7-15-13 intubated       7-16-13 ;bronchoscopy with bal    Active Problems:    CKD (chronic kidney disease) (1/4/2017)      Overview: With acute rise- ?  Dehydration and monitor      DVT (deep venous thrombosis) ([address] (1/4/2017)      Overview: 11-18-13 There is nonocclusive thrombus within the left common femoral       vein, superficial femoral vein, popliteal vein, and posterior tibial vein            Accelerated hypertension (1/4/2017)      Breast cancer metastasized to lung ([address] (1/4/2017)      Pleural effusion, right (1/4/2017)      Overview: 10/6/16 R thoracentesis 800 ml: pleural fluid creatine 4.1 = likely       urinothorax Hydronephrosis (1/4/2017)      Bilateral edema of lower extremity (1/4/2017)      Hematuria (1/4/2017)        Plan:     - Cr 1.85 and BUN 30 - remaining stable below baseline.  - Encourage oral fluid intake as hypernatremia persists (Na 149)  - Continue abx for UTI per direction of primary team    Signed By: Goldie Friedman. [place]     January 10, 2017            *ATTENTION:  This note has been created by a medical student for educational purposes only. Please do not refer to the content of this note for clinical decision-making, billing, or other purposes. Please see attending physicians note to obtain clinical information on this patient. *

## 2017-01-10 NOTE — PROGRESS NOTES
Subjective:   Daily Progress Note: 1/10/2017 12:08 PM    No complaints    Current Facility-Administered Medications   Medication Dose Route Frequency    warfarin (COUMADIN) tablet 2 mg (on hold)  2 mg Oral See Admin Instructions    dextrose 40% (GLUTOSE) oral gel 1 Tube  1 Tube Oral PRN    0.9% sodium chloride infusion 250 mL  250 mL IntraVENous PRN    ALPRAZolam (XANAX) tablet 0.25 mg  0.25 mg Oral TID PRN    amLODIPine (NORVASC) tablet 10 mg  10 mg Oral DAILY    atenolol (TENORMIN) tablet 25 mg  25 mg Oral Q12H    fentaNYL (DURAGESIC) 50 mcg/hr patch 1 Patch  1 Patch TransDERmal Q72H    HYDROcodone-acetaminophen (NORCO)  mg tablet 1 Tab  1 Tab Oral Q4H PRN    megestrol (MEGACE) 400 mg/10 mL (10 mL) oral suspension 200 mg  200 mg Oral DAILY    metoclopramide (REGLAN) 5 mg/5 mL oral syrup 5 mg  5 mg Oral AC&HS    montelukast (SINGULAIR) tablet 10 mg  10 mg Oral QHS    palbociclib (IBRANCE) cap 125 mg  125 mg Oral DAILY    sodium chloride (NS) flush 5-10 mL  5-10 mL IntraVENous Q8H    sodium chloride (NS) flush 5-10 mL  5-10 mL IntraVENous PRN    albuterol (PROVENTIL VENTOLIN) nebulizer solution 2.5 mg  2.5 mg Nebulization Q4H PRN    budesonide (PULMICORT) 500 mcg/2 ml nebulizer suspension  500 mcg Nebulization BID RT               Objective:     Visit Vitals    /56 (BP 1 Location: Left arm, BP Patient Position: At rest)    Pulse 63    Temp 98 °F (36.7 °C)    Resp 18    Ht 5' 5\" (1.651 m)    Wt 60.8 kg (134 lb)    LMP 1998    SpO2 98%    Breastfeeding No    BMI 22.3 kg/m2    O2 Flow Rate (L/min): 4 l/min (decreased to 3L) O2 Device: Nasal cannula    Temp (24hrs), Av.7 °F (37.1 °C), Min:98 °F (36.7 °C), Max:99.5 °F (37.5 °C)      01/10 07 - 01/10 1900  In: 200 [P.O.:200]  Out: -   1 - 01/10 0700  In: 1160 [P.O.:1160]  Out: -       Visit Vitals    /56 (BP 1 Location: Left arm, BP Patient Position: At rest)    Pulse 63    Temp 98 °F (36.7 °C)    Resp 18    Ht 5' 5\" (1.651 m)    Wt 60.8 kg (134 lb)    LMP 06/21/1998    SpO2 98%    Breastfeeding No    BMI 22.3 kg/m2     Head: Normocephalic, without obvious abnormality  Neck: no JVD  Lungs: clear to auscultation bilaterally  Heart: regular rate and rhythm  Abdomen: soft, non-tender.   Extremities: no edema          Data Review    Recent Results (from the past 48 hour(s))   GLUCOSE, POC    Collection Time: 01/08/17  1:14 PM   Result Value Ref Range    Glucose (POC) 201 (H) 65 - 100 mg/dL   GLUCOSE, POC    Collection Time: 01/08/17  8:48 PM   Result Value Ref Range    Glucose (POC) 290 (H) 65 - 800 mg/dL   METABOLIC PANEL, BASIC    Collection Time: 01/09/17  5:40 AM   Result Value Ref Range    Sodium 148 (H) 136 - 145 mmol/L    Potassium 4.4 3.5 - 5.1 mmol/L    Chloride 116 (H) 98 - 107 mmol/L    CO2 22 21 - 32 mmol/L    Anion gap 10 7 - 16 mmol/L    Glucose 157 (H) 65 - 100 mg/dL    BUN 31 (H) 8 - 23 MG/DL    Creatinine 2.00 (H) 0.6 - 1.0 MG/DL    GFR est AA 32 (L) >60 ml/min/1.73m2    GFR est non-AA 26 (L) >60 ml/min/1.73m2    Calcium 7.1 (L) 8.3 - 10.4 MG/DL   PROTHROMBIN TIME + INR    Collection Time: 01/09/17  5:40 AM   Result Value Ref Range    Prothrombin time 25.4 (H) 9.6 - 12.0 sec    INR 2.3 (H) 0.9 - 1.2     GLUCOSE, POC    Collection Time: 01/09/17  6:03 AM   Result Value Ref Range    Glucose (POC) 169 (H) 65 - 100 mg/dL   GLUCOSE, POC    Collection Time: 01/09/17 10:43 AM   Result Value Ref Range    Glucose (POC) 166 (H) 65 - 100 mg/dL   GLUCOSE, POC    Collection Time: 01/09/17  4:00 PM   Result Value Ref Range    Glucose (POC) 244 (H) 65 - 100 mg/dL   HEMOGLOBIN A1C W/O EAGSTIMATION    Collection Time: 01/09/17  5:23 PM   Result Value Ref Range    Hemoglobin A1c <3.5 (L) 4.8 - 6.0 %   GLUCOSE, POC    Collection Time: 01/09/17  9:23 PM   Result Value Ref Range    Glucose (POC) 272 (H) 65 - 100 mg/dL   GLUCOSE, POC    Collection Time: 01/10/17  5:56 AM   Result Value Ref Range    Glucose (POC) 131 (H) 65 - 497 mg/dL   METABOLIC PANEL, BASIC    Collection Time: 01/10/17  6:48 AM   Result Value Ref Range    Sodium 149 (H) 136 - 145 mmol/L    Potassium 4.6 3.5 - 5.1 mmol/L    Chloride 115 (H) 98 - 107 mmol/L    CO2 24 21 - 32 mmol/L    Anion gap 10 7 - 16 mmol/L    Glucose 108 (H) 65 - 100 mg/dL    BUN 30 (H) 8 - 23 MG/DL    Creatinine 1.85 (H) 0.6 - 1.0 MG/DL    GFR est AA 35 (L) >60 ml/min/1.73m2    GFR est non-AA 29 (L) >60 ml/min/1.73m2    Calcium 7.8 (L) 8.3 - 10.4 MG/DL   PROTHROMBIN TIME + INR    Collection Time: 01/10/17  6:48 AM   Result Value Ref Range    Prothrombin time 22.9 (H) 9.6 - 12.0 sec    INR 2.1 (H) 0.9 - 1.2         Assessment     Patient Active Problem List    Diagnosis Date Noted    CKD (chronic kidney disease) 01/04/2017    Acute respiratory failure with hypoxia (HCC) 01/04/2017    DVT (deep venous thrombosis) ([facility]) 01/04/2017    Accelerated hypertension 01/04/2017    Breast cancer metastasized to lung (HCC) 01/04/2017    Pleural effusion, right 01/04/2017    Hydronephrosis 01/04/2017    Bilateral edema of lower extremity 01/04/2017    Hematuria 01/04/2017    Chemotherapy-induced neutropenia (HCC) 12/20/2016    Pancytopenia ([facility] 10/10/2016    HTN (hypertension) 10/07/2016    DM (diabetes mellitus) ([facility] 10/07/2016    Acute on chronic renal failure (HCC) 10/07/2016    Pulmonary edema 01/03/2016    Elevated troponin 10/20/2015    PND (paroxysmal nocturnal dyspnea) 02/25/2014    SOB (shortness of breath) on exertion 02/25/2014    Leg swelling 02/25/2014    CHF (congestive heart failure) ([facility]) 02/25/2014    Anemia 11/19/2013    Asthma 10/22/2013    Hypomagnesemia 08/04/2013    Hypokalemia 08/04/2013    Iron deficiency anemia 06/10/2013    Bony metastasis ([facility] 05/10/2012    Breast cancer ([facility] 01/23/2012           Problems Addressed by Nephrology     CKD 3  Hypernatremia    Plan     Renal function stable. PO fluids for hypernatremia.

## 2017-01-10 NOTE — PROGRESS NOTES
Shift assessment. Pt resting comfortably in bed. Alert and oriented x4. Respirations even and unlabored on 4L O2, no acute distress noted. Assessment completed as documented. Pt offers no complaints at this time. Call light in reach, pt encouraged to call for assistance. Will continue to monitor.

## 2017-01-10 NOTE — PROGRESS NOTES
Pt sitting up in bedside chair. Respirations even and unlabored on 5L hf nc. No complaints or needs at this time. Door open and call bell in reach.

## 2017-01-10 NOTE — PROGRESS NOTES
Language line  used to speak with patient. Pt concerns and questions addressed. Currently on 3L hf nc. Door open and call bell in reach.

## 2017-01-10 NOTE — PROGRESS NOTES
Warfarin dosing per pharmacist    Huntley Curling is a 78 y.o. female. Height: 5' 5\" (165.1 cm)    Weight: 60.8 kg (134 lb)    Indication:  H/o DVT    Goal INR:  2-3    Home dose:  ~14 mg/week    Risk factors or significant drug interactions:  abxs    Other anticoagulants:  none    Daily Monitoring  Date  INR     Warfarin dose HGB              Notes  1/5  3.3/2.6  --  8.4    1/6  2.1  --  --  1/7    2.0  2.5 mg  --  Pharmacy consulted 1/7 1/8  2.0  2 mg  --  1/9  2.3  Hold  --  1/10  2.1  Hold   --      Holding warfarin per Dr. Segundo Meredith for possible thoracentesis. Follow for plans to restart.       Thank you,  Reilly Mittal, PharmD  Clinical Pharmacist  [phone]

## 2017-01-10 NOTE — PROGRESS NOTES
Pt assessment and morning medication pass completed with assistance of language line . Pt complaint of anxiety. 0.25mg Xanax PO given for anxiety.

## 2017-01-10 NOTE — PROGRESS NOTES
Pulmonary Daily Progress Note      Cristina Carbajal    1/10/2017    Date of Admission:  1/4/2017     Patient is a 78 y.o.  female presents with shortness of breath. 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 She was admitted to this hospital in October with similar situation with obstructed stent and associated R pleural effusion which was felt may represent urinothorax. With thoracentesis and adjustment of stent this resolved. She has continue to follow with urology and oncology. Her oncologist has her on coumadin for her history of DVT in 2013. They had noticed increasing LE edema and performed a TTE with only diastolic dysfunction and a PET scan with moderate right effusion but no progression of disease. She came to the ER with dyspnea but minimal cough, no purulent sputum, and no fever. A CXR shows a large right sided effusion. Labs reveal mildly elevated BNP, creatinine stable in the mid 2's, and normal WBC. She reports some increased hematuria and difficulty urinating recently. She was to follow up with urology tomorrow. She is hypoxic, currently on 2L O2. Patient in and less dyspnea since thoracentesis. Removed 1.25 L from right chest on 1/5/17. Diuresing and edema in legs and arem less. Renal US noted marked improvement of prior right hydronephrolsis compared to old Ultasound  The patient's chart is reviewed and the patient is discussed with the staff.     Subjective:   Seen by urologist  No      Current Facility-Administered Medications   Medication Dose Route Frequency    warfarin (COUMADIN) tablet 2 mg (on hold)  2 mg Oral See Admin Instructions    dextrose 40% (GLUTOSE) oral gel 1 Tube  1 Tube Oral PRN    0.9% sodium chloride infusion 250 mL  250 mL IntraVENous PRN    ALPRAZolam (XANAX) tablet 0.25 mg  0.25 mg Oral TID PRN    amLODIPine (NORVASC) tablet 10 mg  10 mg Oral DAILY    atenolol (TENORMIN) tablet 25 mg  25 mg Oral Q12H    fentaNYL (DURAGESIC) 50 mcg/hr patch 1 Patch  1 Patch TransDERmal Q72H    HYDROcodone-acetaminophen (NORCO)  mg tablet 1 Tab  1 Tab Oral Q4H PRN    megestrol (MEGACE) 400 mg/10 mL (10 mL) oral suspension 200 mg  200 mg Oral DAILY    metoclopramide (REGLAN) 5 mg/5 mL oral syrup 5 mg  5 mg Oral AC&HS    montelukast (SINGULAIR) tablet 10 mg  10 mg Oral QHS    palbociclib (IBRANCE) cap 125 mg  125 mg Oral DAILY    sodium chloride (NS) flush 5-10 mL  5-10 mL IntraVENous Q8H    sodium chloride (NS) flush 5-10 mL  5-10 mL IntraVENous PRN    albuterol (PROVENTIL VENTOLIN) nebulizer solution 2.5 mg  2.5 mg Nebulization Q4H PRN    budesonide (PULMICORT) 500 mcg/2 ml nebulizer suspension  500 mcg Nebulization BID RT           Objective:     Vitals:    01/10/17 0726 01/10/17 0730 01/10/17 1114 01/10/17 1501   BP: 150/72  153/56 153/59   Pulse: 69  63 66   Resp: 20  18 20   Temp: 98.5 °F (36.9 °C)  98 °F (36.7 °C) 97.8 °F (36.6 °C)   SpO2: 100% 100% 98% 98%   Weight:       Height:           PHYSICAL EXAM     Constitutional:  the patient is well developed and in mild acute distress  EENMT:  Sclera clear, pupils equal, oral mucosa moist  Respiratory: Decreased on the right but better than yesterday. Cardiovascular:  RRR without M,G,R  Gastrointestinal: soft and non-tender; with positive bowel sounds. Musculoskeletal: warm without cyanosis.  There is 1+ B lower leg edema -- decreased   Skin:  no jaundice or rashes, no wounds   Neurologic: no gross neuro deficits     Psychiatric:  alert and oriented x ppt    Chest Xray:            Recent Labs      01/10/17   0648  01/09/17   0540  01/08/17   0902   INR  2.1*  2.3*  2.0*     Recent Labs      01/10/17   0648  01/09/17   0540  01/08/17   0902   NA  149*  148*  149*   K  4.6  4.4  4.2   CL  115*  116*  115*   GLU  108*  157* 223*   CO2  24  22  24   BUN  30*  31*  33*   CREA  1.85*  2.00*  2.36*   CA  7.8*  7.1*  7.4*     No results for input(s): PH, PCO2, PO2, HCO3 in the last 72 hours. No results for input(s): LCAD, LAC in the last 72 hours. Assessment:  (Medical Decision Making)     Hospital Problems  Date Reviewed: 10/19/2016          Codes Class Noted POA    Bilateral edema of lower extremity ICD-10-CM: R60.0  ICD-9-CM: 782.3  1/4/2017 Unknown    Less since recent diuresis. Hematuria ICD-10-CM: R31.9  ICD-9-CM: 599.70  1/4/2017 Unknown    better    Hydronephrosis ICD-10-CM: N13.30  ICD-9-CM: 431  10/7/2016 Yes    No longer with this on US    Pleural effusion, right ICD-10-CM: J90  ICD-9-CM: 511.9  10/6/2016 Yes    Overview Addendum 10/8/2016  5:54 PM by Justo Crocker MD     10/6/16 R thoracentesis 800 ml: pleural fluid creatine 4.1 = likely urinothorax         On 1/5/17 with 1.25 L removed with CR of 2.72 from pleural flud    Breast cancer metastasized to lung [facility]) ICD-10-CM: C50.919, C78.00  ICD-9-CM: 174.9, 197.0  10/30/2015 Yes        Accelerated hypertension ICD-10-CM: I10  ICD-9-CM: 401.0  10/20/2015 Yes        DVT (deep venous thrombosis) ([facility] ICD-10-CM: I82.409  ICD-9-CM: 453.40  11/19/2013 Yes    Overview Signed 11/19/2013  6:07 AM by Brent Pierre     23-28-34 There is nonocclusive thrombus within the left common femoral vein, superficial femoral vein, popliteal vein, and posterior tibial vein           On anticoagulation    * (Principal)[facility]ute [facility] ([facility] ICD-10-CM: J96.01  ICD-9-CM: 518.81  7/15/2013 Yes    Overview Signed 7/17/2013  6:15 AM by Brent Pierre     7-15-13 intubated   7-16-13 ;bronchoscopy with bal             CKD (chronic kidney disease) (Chronic) ICD-10-CM: N18.9  ICD-9-CM: 585.9  5/14/2013 Yes    Overview Signed 5/14/2013 11:35 AM by Ming Talavera RN     With acute rise- ?  Dehydration and monitor                    Diarrhea  --today x 1, will monitor      Plan: (Medical Decision Making)   -check US to effusion, likely malignant, if effusion back may need to consider pleurax  - On Augmentin D5         More than 50% of the time documented was spent in face-to-face contact with the patient and in the care of the patient on the floor/unit where the patient is located.     Anup Bernard MD

## 2017-01-11 ENCOUNTER — SURGERY (OUTPATIENT)
Age: 67
End: 2017-01-11

## 2017-01-11 LAB
ANION GAP BLD CALC-SCNC: 9 MMOL/L (ref 7–16)
BUN SERPL-MCNC: 32 MG/DL (ref 8–23)
CALCIUM SERPL-MCNC: 7.7 MG/DL (ref 8.3–10.4)
CHLORIDE SERPL-SCNC: 111 MMOL/L (ref 98–107)
CO2 SERPL-SCNC: 26 MMOL/L (ref 21–32)
CREAT SERPL-MCNC: 2.09 MG/DL (ref 0.6–1)
GLUCOSE BLD STRIP.AUTO-MCNC: 165 MG/DL (ref 65–100)
GLUCOSE BLD STRIP.AUTO-MCNC: 205 MG/DL (ref 65–100)
GLUCOSE BLD STRIP.AUTO-MCNC: 222 MG/DL (ref 65–100)
GLUCOSE BLD STRIP.AUTO-MCNC: 280 MG/DL (ref 65–100)
GLUCOSE SERPL-MCNC: 149 MG/DL (ref 65–100)
INR PPP: 1.8 (ref 0.9–1.2)
POTASSIUM SERPL-SCNC: 4.6 MMOL/L (ref 3.5–5.1)
PROTHROMBIN TIME: 19.5 SEC (ref 9.6–12)
SODIUM SERPL-SCNC: 146 MMOL/L (ref 136–145)

## 2017-01-11 PROCEDURE — 94760 N-INVAS EAR/PLS OXIMETRY 1: CPT

## 2017-01-11 PROCEDURE — 74011636637 HC RX REV CODE- 636/637: Performed by: INTERNAL MEDICINE

## 2017-01-11 PROCEDURE — 82962 GLUCOSE BLOOD TEST: CPT

## 2017-01-11 PROCEDURE — 74011250637 HC RX REV CODE- 250/637: Performed by: INTERNAL MEDICINE

## 2017-01-11 PROCEDURE — 77010033678 HC OXYGEN DAILY

## 2017-01-11 PROCEDURE — 36415 COLL VENOUS BLD VENIPUNCTURE: CPT | Performed by: INTERNAL MEDICINE

## 2017-01-11 PROCEDURE — 94640 AIRWAY INHALATION TREATMENT: CPT

## 2017-01-11 PROCEDURE — 80048 BASIC METABOLIC PNL TOTAL CA: CPT | Performed by: INTERNAL MEDICINE

## 2017-01-11 PROCEDURE — 85610 PROTHROMBIN TIME: CPT | Performed by: INTERNAL MEDICINE

## 2017-01-11 PROCEDURE — 99232 SBSQ HOSP IP/OBS MODERATE 35: CPT | Performed by: INTERNAL MEDICINE

## 2017-01-11 PROCEDURE — 74011000250 HC RX REV CODE- 250: Performed by: INTERNAL MEDICINE

## 2017-01-11 PROCEDURE — C1729 CATH, DRAINAGE: HCPCS | Performed by: INTERNAL MEDICINE

## 2017-01-11 PROCEDURE — 76040000019: Performed by: INTERNAL MEDICINE

## 2017-01-11 PROCEDURE — 32555 ASPIRATE PLEURA W/ IMAGING: CPT | Performed by: INTERNAL MEDICINE

## 2017-01-11 PROCEDURE — 65270000029 HC RM PRIVATE

## 2017-01-11 RX ORDER — INSULIN LISPRO 100 [IU]/ML
INJECTION, SOLUTION INTRAVENOUS; SUBCUTANEOUS
Status: DISCONTINUED | OUTPATIENT
Start: 2017-01-11 | End: 2017-01-12 | Stop reason: HOSPADM

## 2017-01-11 RX ORDER — HYDROCODONE BITARTRATE AND ACETAMINOPHEN 5; 325 MG/1; MG/1
1 TABLET ORAL ONCE
Status: COMPLETED | OUTPATIENT
Start: 2017-01-11 | End: 2017-01-11

## 2017-01-11 RX ADMIN — BUDESONIDE 500 MCG: 0.5 INHALANT RESPIRATORY (INHALATION) at 08:15

## 2017-01-11 RX ADMIN — ALBUTEROL SULFATE 2.5 MG: 2.5 SOLUTION RESPIRATORY (INHALATION) at 20:31

## 2017-01-11 RX ADMIN — METOCLOPRAMIDE HYDROCHLORIDE 5 MG: 5 SOLUTION ORAL at 11:41

## 2017-01-11 RX ADMIN — METOCLOPRAMIDE HYDROCHLORIDE 5 MG: 5 SOLUTION ORAL at 16:43

## 2017-01-11 RX ADMIN — ATENOLOL 25 MG: 25 TABLET ORAL at 22:42

## 2017-01-11 RX ADMIN — METOCLOPRAMIDE HYDROCHLORIDE 5 MG: 5 SOLUTION ORAL at 22:42

## 2017-01-11 RX ADMIN — ATENOLOL 25 MG: 25 TABLET ORAL at 09:03

## 2017-01-11 RX ADMIN — Medication 5 ML: at 22:43

## 2017-01-11 RX ADMIN — HYDROCODONE BITARTRATE AND ACETAMINOPHEN 1 TABLET: 10; 325 TABLET ORAL at 13:05

## 2017-01-11 RX ADMIN — INSULIN LISPRO 6 UNITS: 100 INJECTION, SOLUTION INTRAVENOUS; SUBCUTANEOUS at 16:44

## 2017-01-11 RX ADMIN — ALPRAZOLAM 0.25 MG: 0.5 TABLET ORAL at 11:41

## 2017-01-11 RX ADMIN — BUDESONIDE 500 MCG: 0.5 INHALANT RESPIRATORY (INHALATION) at 20:31

## 2017-01-11 RX ADMIN — Medication 5 ML: at 05:24

## 2017-01-11 RX ADMIN — AMLODIPINE BESYLATE 10 MG: 10 TABLET ORAL at 09:03

## 2017-01-11 RX ADMIN — MONTELUKAST SODIUM 10 MG: 10 TABLET, FILM COATED ORAL at 22:42

## 2017-01-11 RX ADMIN — MEGESTROL ACETATE 200 MG: 40 SUSPENSION ORAL at 09:03

## 2017-01-11 RX ADMIN — INSULIN LISPRO 4 UNITS: 100 INJECTION, SOLUTION INTRAVENOUS; SUBCUTANEOUS at 22:42

## 2017-01-11 RX ADMIN — METOCLOPRAMIDE HYDROCHLORIDE 5 MG: 5 SOLUTION ORAL at 05:23

## 2017-01-11 RX ADMIN — HYDROCODONE BITARTRATE AND ACETAMINOPHEN 1 TABLET: 5; 325 TABLET ORAL at 15:54

## 2017-01-11 RX ADMIN — Medication 10 ML: at 15:55

## 2017-01-11 NOTE — PROGRESS NOTES
Shift assessment complete. Pt resting quietly in bed with no signs of acute distress. Alert and oriented x4. O2 in place. Call light within reach. Pt instructed to call for assistance.

## 2017-01-11 NOTE — PROGRESS NOTES
[facility]        Subjective: No new complaints    Review of Systems -   General ROS: negative for - chills, fatigue or fever  Respiratory ROS: no cough, shortness of breath, or wheezing  Cardiovascular ROS: no chest pain or dyspnea on exertion  Gastrointestinal ROS: no abdominal pain, change in bowel habits, or black or bloody stools  Genito-Urinary ROS: no dysuria, trouble voiding, or hematuria  Neurological ROS: no TIA or stroke symptoms        Objective:    Vitals:    01/11/17 0711 01/11/17 0818 01/11/17 0820 01/11/17 1108   BP: 155/63   162/67   Pulse: 67   71   Resp: 18   20   Temp: 98.9 °F (37.2 °C)   97.6 °F (36.4 °C)   SpO2: 99% 98% 97% 98%   Weight:       Height:           PE  Gen: in no acute distress  CV: reg rate  Chest: clear  Abd: soft  Ext/Access: no edema     . LAB  Recent Labs      01/11/17   0716  01/10/17   0648  01/09/17   0540   INR  1.8*  2.1*  2.3*     Recent Labs      01/11/17   0716  01/10/17   0648  01/09/17   0540   NA  146*  149*  148*   K  4.6  4.6  4.4   CL  111*  115*  116*   CO2  26  24  22   GLU  149*  108*  157*   BUN  32*  30*  31*   CREA  2.09*  1.85*  2.00*   CA  7.7*  7.8*  7.1*           Radiology    A/P:   Patient Active Problem List   Diagnosis Code    Breast cancer ([facility] C50.919    Bony metastasis (HCC) C79.51    HTN (hypertension) I10    DM (diabetes mellitus) (HCC) E11.9    CKD (chronic kidney disease) N18.9    Iron deficiency anemia D50.9    Acute respiratory failure with hypoxia (HCC) J96.01    Hypomagnesemia E83.42    Hypokalemia E87.6    Asthma J45.909    Anemia D64.9    DVT (deep venous thrombosis) (HCC) I82.409    PND (paroxysmal nocturnal dyspnea) R06.00    SOB (shortness of breath) on exertion R06.02    Leg swelling M79.89    CHF (congestive heart failure) (HCC) I50.9    Accelerated hypertension I10    Elevated troponin R79.89    Breast cancer metastasized to lung (HCC) C50.919, C78.00    Pulmonary edema J81.1    Acute on chronic renal failure (HCC) N17.9, N18.9    Pleural effusion, right J90    Hydronephrosis N13.30    Pancytopenia (HCC) D61.818    Chemotherapy-induced neutropenia (HCC) D70.1    Bilateral edema of lower extremity R60.0    Hematuria R31.9       Renal function appears to be at baseline. Sodium is coming down.   Félix Burciaga MD

## 2017-01-11 NOTE — PROGRESS NOTES
Uneventful shift. Pt resting comfortably in bed. No signs of acute distress noted. Alert and oriented x4. Call light within reach. Report to be given to oncoming shift 7a-7p.

## 2017-01-11 NOTE — PROGRESS NOTES
Pt. Sitting on the side of the bed. Says she is \"ok. \" call light in reach. Instructed to call for assistance.

## 2017-01-11 NOTE — PROGRESS NOTES
present at bedside for signature of consent and for Thoracentesis Percutaneous procedure with Dr. Baron Melinda MD    [address] [place] [address]  [phone]

## 2017-01-11 NOTE — PROGRESS NOTES
Pt. resting with eyes closed at this time. Respirations even and unlabored. O2 in place @5l hi flow nc. Will monitor.

## 2017-01-11 NOTE — PROGRESS NOTES
Pt. resting quietly in bed. No acute distress noted. Respirations even and unlabored. O2 in place @ 5l hi flow nc. Call light in reach. Instructed to call for assistance.

## 2017-01-11 NOTE — PROGRESS NOTES
Pt sat up on side of bed for thoracentesis. Consent obtained. Time out performed. Pts vitals monitored throughout procedure. Bilateral ultrasound done and pic taken of pleural fluid. ~1350 ml pleural fluid from R.  Pt tolerated procedure well with no adverse rxn. Specimens was not sent to the lab. Site dressed appropriately and report given to pts Nieves. Lung sliding done and ultrasound findings reviewed by MD. CXR ordered post procedure.

## 2017-01-11 NOTE — INTERVAL H&P NOTE
H&P Update:  Hasmukh Alanis was seen and examined. History and physical has been reviewed. The patient has been examined.  There have been no significant clinical changes since the completion of the originally dated History and Physical.    Signed By: Vadim Woods MD     January 11, 2017 3:45 PM

## 2017-01-11 NOTE — PROGRESS NOTES
Berhane Triplett  Admission Date: 1/4/2017             Daily Progress Note: 1/11/2017    The patient's chart is reviewed and the patient is discussed with the staff. Berhane Triplett is a 68yoF with a h/o dCHF, DVT, breast ca with metastatic pleural effusion. She declines pleurX catheter and would like to pursue serial thoracentesis plan for now. She has also been seen by urology for ureteral stent and concern for urinothorax, but workup was negative    Subjective:   Patient with dyspnea and persistent effusion. Would rather have thora than pleurX  Had temp to 100.6 yesterday? None before or since.     Current Facility-Administered Medications   Medication Dose Route Frequency    warfarin (COUMADIN) tablet 2 mg (on hold)  2 mg Oral See Admin Instructions    dextrose 40% (GLUTOSE) oral gel 1 Tube  1 Tube Oral PRN    0.9% sodium chloride infusion 250 mL  250 mL IntraVENous PRN    ALPRAZolam (XANAX) tablet 0.25 mg  0.25 mg Oral TID PRN    amLODIPine (NORVASC) tablet 10 mg  10 mg Oral DAILY    atenolol (TENORMIN) tablet 25 mg  25 mg Oral Q12H    fentaNYL (DURAGESIC) 50 mcg/hr patch 1 Patch  1 Patch TransDERmal Q72H    HYDROcodone-acetaminophen (NORCO)  mg tablet 1 Tab  1 Tab Oral Q4H PRN    megestrol (MEGACE) 400 mg/10 mL (10 mL) oral suspension 200 mg  200 mg Oral DAILY    metoclopramide (REGLAN) 5 mg/5 mL oral syrup 5 mg  5 mg Oral AC&HS    montelukast (SINGULAIR) tablet 10 mg  10 mg Oral QHS    palbociclib (IBRANCE) cap 125 mg  125 mg Oral DAILY    sodium chloride (NS) flush 5-10 mL  5-10 mL IntraVENous Q8H    sodium chloride (NS) flush 5-10 mL  5-10 mL IntraVENous PRN    albuterol (PROVENTIL VENTOLIN) nebulizer solution 2.5 mg  2.5 mg Nebulization Q4H PRN    budesonide (PULMICORT) 500 mcg/2 ml nebulizer suspension  500 mcg Nebulization BID RT       Review of Systems  Constitutional: negative for fever, chills, sweats  Cardiovascular: negative for chest pain, palpitations, syncope, edema  Gastrointestinal:  negative for dysphagia, reflux, vomiting, diarrhea, abdominal pain, or melena  Neurologic:  negative for focal weakness, numbness, headache    Objective:     Vitals:    01/11/17 0711 01/11/17 0818 01/11/17 0820 01/11/17 1108   BP: 155/63   162/67   Pulse: 67   71   Resp: 18   20   Temp: 98.9 °F (37.2 °C)   97.6 °F (36.4 °C)   SpO2: 99% 98% 97% 98%   Weight:       Height:         Intake and Output:   01/09 1901 - 01/11 0700  In: 1251 [P.O.:1251]  Out: -   01/11 0701 - 01/11 1900  In: 300 [P.O.:300]  Out: -     Physical Exam:   Constitution:  the patient is well developed and in no acute distress  EENMT:  Sclera clear, pupils equal, oral mucosa moist  Respiratory: decreased on R  Cardiovascular:  RRR without M,G,R  Gastrointestinal: soft and non-tender; with positive bowel sounds. Musculoskeletal: warm without cyanosis. Skin:  no jaundice or rashes,  Neurologic: no gross neuro deficits     Psychiatric:  alert and oriented x 3    CHEST XRAY:   None today. Ultrasound pending    LAB  Recent Labs      01/11/17   1139  01/11/17   0532  01/10/17   2034  01/10/17   1533  01/10/17   0556   GLUCPOC  205*  165*  253*  319*  131*      Recent Labs      01/11/17   0716  01/10/17   0648  01/09/17   0540   INR  1.8*  2.1*  2.3*     Recent Labs      01/11/17   0716  01/10/17   0648  01/09/17   0540   NA  146*  149*  148*   K  4.6  4.6  4.4   CL  111*  115*  116*   CO2  26  24  22   GLU  149*  108*  157*   BUN  32*  30*  31*   CREA  2.09*  1.85*  2.00*   CA  7.7*  7.8*  7.1*     No results for input(s): PH, PCO2, PO2, HCO3 in the last 72 hours. No results for input(s): LCAD, LAC in the last 72 hours.       Assessment:  (Medical Decision Making)     Hospital Problems  Date Reviewed: 1/9/2017          Codes Class Noted POA    CKD (chronic kidney disease) (Chronic) ICD-10-CM: N18.9  ICD-9-CM: 585.9  1/4/2017 Yes    Overview Signed 5/14/2013 11:35 AM by Alee Hollis RN     With acute rise- ? Dehydration and monitor         Reportedly renal function at baseline per nephrology     * (Principal)Acute respiratory failure with hypoxia [facility]) ICD-10-CM: J96.01  ICD-9-CM: 518.81  1/4/2017 Yes    Overview Signed 7/17/2013  6:15 AM by Keyshawn Arambula     7-15-13 intubated   7-16-13 ;bronchoscopy with bal         Has 4lp O2 requirement    DVT (deep venous thrombosis) ([facility] ICD-10-CM: I82.409  ICD-9-CM: 453.40  1/4/2017 Yes    Overview Signed 11/19/2013  6:07 AM by Keyshawn Arambula     00-17-11 There is nonocclusive thrombus within the left common femoral vein, superficial femoral vein, popliteal vein, and posterior tibial vein               Accelerated hypertension ICD-10-CM: I10  ICD-9-CM: 401.0  1/4/2017 Yes    SBP only at 162 as high    Breast cancer metastasized to lung [facility]) (Chronic) ICD-10-CM: C50.919, C78.00  ICD-9-CM: 174.9, 197.0  1/4/2017 Yes        Pleural effusion, right ICD-10-CM: J90  ICD-9-CM: 511.9  1/4/2017 Yes    Overview Addendum 10/8/2016  5:54 PM by Temo Cabezas MD     10/6/16 R thoracentesis 800 ml: pleural fluid creatine 4.1 = likely urinothorax         Thoracentesis today    Hydronephrosis (Chronic) ICD-10-CM: N13.30  ICD-9-CM: 553  1/4/2017 Yes        Bilateral edema of lower extremity ICD-10-CM: R60.0  ICD-9-CM: 782.3  1/4/2017 Yes        Hematuria ICD-10-CM: R31.9  ICD-9-CM: 599.70  1/4/2017 Yes    stable          Plan:  (Medical Decision Making)     --US with thoracentesis today  --Continue free water for Na  --Consider discharge tomorrow    More than 50% of the time documented was spent in face-to-face contact with the patient and in the care of the patient on the floor/unit where the patient is located.     Jonathan Hutson MD

## 2017-01-11 NOTE — PROGRESS NOTES
Shift assessment completed. Pt. Alert and oriented x3. O2 in place @ 4l hi flow nc. Reports 6/10 pain in left abdomen. Doesn't want pain medicine. Repositioned. Sitting in chair. Call light in reach. Instructed to call for assistance.

## 2017-01-11 NOTE — PROGRESS NOTES
Pt. Sitting up in chair. No acute distress noted. O2 in place @ 5l hi flow nc. Looks drained. No needs at this time. Pt. States she is ready to get out of here. Call light in reach. Instructed to call for assistance.

## 2017-01-11 NOTE — PROGRESS NOTES
Pharmacy Note:     Pharmacy will sign off dosing warfarin. Pulmonology holding pending possible thoracentesis. Please re-consult pharmacy whenever coumadin is to be resumed.       Thank you,  Mell Barrientos, PharmD  Clinical Pharmacist  [phone]

## 2017-01-11 NOTE — PROGRESS NOTES
Problem: Nutrition Deficit  Goal: *Optimize nutritional status  Nutrition LOS Note: Day 7  Assessment  Diet order(s): [facility]  Food,Nutrition, and Pertinent History: Pt presents with h/o breast cancer with lung mets and CKD. Pt is predominately Azeri Speaking, RD used the language line to conduct interview. Pt reports good appetite. Denies any nausea, vomiting, constipation or diarrhea at this time. Reports a UBW around 132#. Labs are remarkable for Na 146, BUN 32, Creatinine 2.09, and GFR 25  Anthropometrics: Height: 5' 5\" (165.1 cm) ,Weight: 60.8 kg (134 lb), Body mass index is 22.3 kg/(m^2). BMI class of underweight for age >71  Macronutrient Needs:  · EER:  1465-3585 kcal /day (23-27 kcal/kg actual BW)  · EPR:  46-57 grams protein/day (0.8-1 grams/kg IBW) (GFR 25)  Intake/Comparative Standards: Average intake for past 14 recorded meal(s): 82%. This potentially meets ~100% of kcal and ~100% of protein needs     Nutrition Diagnosis: No nutrition diagnosis at this time     Intervention: Meals and snacks: Continue current diet. Veleria Peat Paula [place], [address], [address] [place] [address], [phone]

## 2017-01-11 NOTE — PROCEDURES
THORACENTESIS --RIGHT  Indication- RIGHT MALIGNANT PLEURAL EFFUSION  Volume removed- 1350 mL    Estimated Blood Loss-negligible    Postop Diagnosis-pleural effusion    After obtaining informed consent the patient was placed sitting up on the side of the bed and using ultrasound guidance the pleural fluid was located on the RIGHT side  and marked. The diaphragm and atelectatic lung were clearly visualized. The patient's skin was cleaned with ChloraPrep. Using sterile technique the skin was anesthetized with 1% Xylocaine and a stab wound made and a catheter inserted into the pleural space and 1350cc of fluid removed. The fluid was yellow. The patient tolerated the procedure well. Ultrasound revealed no evidence of pneumothorax.      Yesenia Burnham MD

## 2017-01-12 ENCOUNTER — APPOINTMENT (OUTPATIENT)
Dept: GENERAL RADIOLOGY | Age: 67
DRG: 189 | End: 2017-01-12
Attending: INTERNAL MEDICINE
Payer: SUBSIDIZED

## 2017-01-12 VITALS
BODY MASS INDEX: 22.33 KG/M2 | HEART RATE: 69 BPM | SYSTOLIC BLOOD PRESSURE: 142 MMHG | RESPIRATION RATE: 16 BRPM | DIASTOLIC BLOOD PRESSURE: 61 MMHG | OXYGEN SATURATION: 90 % | WEIGHT: 134 LBS | TEMPERATURE: 99.6 F | HEIGHT: 65 IN

## 2017-01-12 LAB
ANION GAP BLD CALC-SCNC: 11 MMOL/L (ref 7–16)
BUN SERPL-MCNC: 33 MG/DL (ref 8–23)
CALCIUM SERPL-MCNC: 7.1 MG/DL (ref 8.3–10.4)
CHLORIDE SERPL-SCNC: 112 MMOL/L (ref 98–107)
CO2 SERPL-SCNC: 23 MMOL/L (ref 21–32)
CREAT SERPL-MCNC: 2.27 MG/DL (ref 0.6–1)
GLUCOSE BLD STRIP.AUTO-MCNC: 123 MG/DL (ref 65–100)
GLUCOSE BLD STRIP.AUTO-MCNC: 196 MG/DL (ref 65–100)
GLUCOSE SERPL-MCNC: 233 MG/DL (ref 65–100)
INR PPP: 1.4 (ref 0.9–1.2)
POTASSIUM SERPL-SCNC: 4.6 MMOL/L (ref 3.5–5.1)
PROTHROMBIN TIME: 15.6 SEC (ref 9.6–12)
SODIUM SERPL-SCNC: 146 MMOL/L (ref 136–145)

## 2017-01-12 PROCEDURE — 36415 COLL VENOUS BLD VENIPUNCTURE: CPT | Performed by: INTERNAL MEDICINE

## 2017-01-12 PROCEDURE — 85610 PROTHROMBIN TIME: CPT | Performed by: INTERNAL MEDICINE

## 2017-01-12 PROCEDURE — 99239 HOSP IP/OBS DSCHRG MGMT >30: CPT | Performed by: INTERNAL MEDICINE

## 2017-01-12 PROCEDURE — 77010033678 HC OXYGEN DAILY

## 2017-01-12 PROCEDURE — 74011636637 HC RX REV CODE- 636/637: Performed by: INTERNAL MEDICINE

## 2017-01-12 PROCEDURE — 80048 BASIC METABOLIC PNL TOTAL CA: CPT | Performed by: INTERNAL MEDICINE

## 2017-01-12 PROCEDURE — 74011250637 HC RX REV CODE- 250/637: Performed by: INTERNAL MEDICINE

## 2017-01-12 PROCEDURE — 82962 GLUCOSE BLOOD TEST: CPT

## 2017-01-12 PROCEDURE — 94640 AIRWAY INHALATION TREATMENT: CPT

## 2017-01-12 PROCEDURE — 74011000250 HC RX REV CODE- 250: Performed by: INTERNAL MEDICINE

## 2017-01-12 PROCEDURE — 71010 XR CHEST SNGL V: CPT

## 2017-01-12 PROCEDURE — 94760 N-INVAS EAR/PLS OXIMETRY 1: CPT

## 2017-01-12 RX ORDER — ALBUTEROL SULFATE 90 UG/1
2 AEROSOL, METERED RESPIRATORY (INHALATION)
Qty: 1 INHALER | Refills: 11 | Status: SHIPPED | OUTPATIENT
Start: 2017-01-12 | End: 2017-05-16 | Stop reason: SDUPTHER

## 2017-01-12 RX ORDER — FUROSEMIDE 20 MG/1
20 TABLET ORAL AS NEEDED
Qty: 20 TAB | Refills: 1 | Status: ON HOLD | OUTPATIENT
Start: 2017-01-12 | End: 2017-02-17

## 2017-01-12 RX ADMIN — MEGESTROL ACETATE 200 MG: 40 SUSPENSION ORAL at 09:55

## 2017-01-12 RX ADMIN — INSULIN LISPRO 2 UNITS: 100 INJECTION, SOLUTION INTRAVENOUS; SUBCUTANEOUS at 12:36

## 2017-01-12 RX ADMIN — ALBUTEROL SULFATE 2.5 MG: 2.5 SOLUTION RESPIRATORY (INHALATION) at 07:36

## 2017-01-12 RX ADMIN — Medication 5 ML: at 06:03

## 2017-01-12 RX ADMIN — AMLODIPINE BESYLATE 10 MG: 10 TABLET ORAL at 09:51

## 2017-01-12 RX ADMIN — BUDESONIDE 500 MCG: 0.5 INHALANT RESPIRATORY (INHALATION) at 07:36

## 2017-01-12 RX ADMIN — ATENOLOL 25 MG: 25 TABLET ORAL at 09:52

## 2017-01-12 RX ADMIN — METOCLOPRAMIDE HYDROCHLORIDE 5 MG: 5 SOLUTION ORAL at 06:04

## 2017-01-12 RX ADMIN — METOCLOPRAMIDE HYDROCHLORIDE 5 MG: 5 SOLUTION ORAL at 12:37

## 2017-01-12 NOTE — PROGRESS NOTES
Reassessment completed for Ms. Leggett. Sleeping in bed, no s/s of distress. Sleeps with head at foot of bed. Call light in reach. Will monitor.

## 2017-01-12 NOTE — PROGRESS NOTES
Morning assessment completed. Patient sitting up on side of bed with breakfast tray, demonstrating slight dyspnea with exertion on 5L oxygen via nasal cannula. Verbalizes no complaints of discomfort at this time. Will continue to monitor.

## 2017-01-12 NOTE — PROGRESS NOTES
present at bedside during assessment by Dr. Romeo Conner, [address] [place] [address]  [phone]

## 2017-01-12 NOTE — PROGRESS NOTES
Discharge instructions and prescriptions provided and explained to the pt and family with the help of an . Med side effect sheet reviewed. Opportunity for questions provided. Pt leaving floor via wheelchair.

## 2017-01-12 NOTE — PROGRESS NOTES
Shift assessment completed for Ms. Leggett. Resting in bed, no s/s of distress. Alert and oriented x4. No complaints of pain or N/V. Resp even and unlabored, heart sounds normal. 5L NC. Skin warm dry & intact. Edema 2+ in right upper arm. Peripheral IV clean dry & intact. Bed in low locked position; call light in reach. No further needs. Will monitor.

## 2017-01-14 ENCOUNTER — HOME CARE VISIT (OUTPATIENT)
Dept: HOME HEALTH SERVICES | Facility: HOME HEALTH | Age: 67
End: 2017-01-14

## 2017-01-16 ENCOUNTER — HOSPITAL ENCOUNTER (EMERGENCY)
Age: 67
Discharge: HOME | End: 2017-01-16
Attending: EMERGENCY MEDICINE
Payer: SUBSIDIZED

## 2017-01-16 ENCOUNTER — APPOINTMENT (OUTPATIENT)
Dept: GENERAL RADIOLOGY | Age: 67
End: 2017-01-16
Attending: EMERGENCY MEDICINE
Payer: SUBSIDIZED

## 2017-01-16 VITALS
BODY MASS INDEX: 21.99 KG/M2 | TEMPERATURE: 97.8 F | DIASTOLIC BLOOD PRESSURE: 72 MMHG | SYSTOLIC BLOOD PRESSURE: 148 MMHG | WEIGHT: 132 LBS | OXYGEN SATURATION: 98 % | HEART RATE: 66 BPM | HEIGHT: 65 IN | RESPIRATION RATE: 16 BRPM

## 2017-01-16 DIAGNOSIS — E16.2 HYPOGLYCEMIA: Primary | ICD-10-CM

## 2017-01-16 LAB
ALBUMIN SERPL BCP-MCNC: 2.7 G/DL (ref 3.2–4.6)
ALBUMIN/GLOB SERPL: 0.7 {RATIO} (ref 1.2–3.5)
ALP SERPL-CCNC: 55 U/L (ref 50–136)
ALT SERPL-CCNC: 27 U/L (ref 12–65)
ANION GAP BLD CALC-SCNC: 8 MMOL/L (ref 7–16)
AST SERPL W P-5'-P-CCNC: 69 U/L (ref 15–37)
BILIRUB SERPL-MCNC: 0.4 MG/DL (ref 0.2–1.1)
BUN SERPL-MCNC: 39 MG/DL (ref 8–23)
CALCIUM SERPL-MCNC: 8.1 MG/DL (ref 8.3–10.4)
CHLORIDE SERPL-SCNC: 110 MMOL/L (ref 98–107)
CO2 SERPL-SCNC: 26 MMOL/L (ref 21–32)
CREAT SERPL-MCNC: 2.79 MG/DL (ref 0.6–1)
CRP SERPL-MCNC: 2.7 MG/DL (ref 0–0.9)
DIFFERENTIAL METHOD BLD: ABNORMAL
EOSINOPHIL # BLD: 0.1 K/UL (ref 0–0.8)
EOSINOPHIL NFR BLD MANUAL: 1 % (ref 1–8)
ERYTHROCYTE [DISTWIDTH] IN BLOOD BY AUTOMATED COUNT: 14.5 % (ref 11.9–14.6)
GLOBULIN SER CALC-MCNC: 4 G/DL (ref 2.3–3.5)
GLUCOSE BLD STRIP.AUTO-MCNC: 32 MG/DL (ref 65–100)
GLUCOSE BLD STRIP.AUTO-MCNC: 84 MG/DL (ref 65–100)
GLUCOSE BLD STRIP.AUTO-MCNC: 91 MG/DL (ref 65–100)
GLUCOSE SERPL-MCNC: 34 MG/DL (ref 65–100)
HCT VFR BLD AUTO: 23.9 % (ref 35.8–46.3)
HGB BLD-MCNC: 7.9 G/DL (ref 11.7–15.4)
INR PPP: 1.6 (ref 0.9–1.2)
LACTATE BLD-SCNC: 1.3 MMOL/L (ref 0.5–1.9)
LYMPHOCYTES # BLD: 0.7 K/UL (ref 0.5–4.6)
LYMPHOCYTES NFR BLD MANUAL: 14 % (ref 16–44)
MCH RBC QN AUTO: 38 PG (ref 26.1–32.9)
MCHC RBC AUTO-ENTMCNC: 33.1 G/DL (ref 31.4–35)
MCV RBC AUTO: 114.9 FL (ref 79.6–97.8)
MONOCYTES # BLD: 0.4 K/UL (ref 0.1–1.3)
MONOCYTES NFR BLD MANUAL: 8 % (ref 3–9)
NEUTS SEG # BLD: 4 K/UL (ref 1.7–8.2)
NEUTS SEG NFR BLD MANUAL: 77 % (ref 47–75)
PLATELET # BLD AUTO: 318 K/UL (ref 150–450)
PLATELET COMMENTS,PCOM: ADEQUATE
PMV BLD AUTO: 11.1 FL (ref 10.8–14.1)
POTASSIUM SERPL-SCNC: 5.2 MMOL/L (ref 3.5–5.1)
PROCALCITONIN SERPL-MCNC: 0.3 NG/ML
PROT SERPL-MCNC: 6.7 G/DL (ref 6.3–8.2)
PROTHROMBIN TIME: 17.3 SEC (ref 9.6–12)
RBC # BLD AUTO: 2.08 M/UL (ref 4.05–5.25)
RBC MORPH BLD: ABNORMAL
SODIUM SERPL-SCNC: 144 MMOL/L (ref 136–145)
WBC # BLD AUTO: 5.2 K/UL (ref 4.3–11.1)

## 2017-01-16 PROCEDURE — 71010 XR CHEST PORT: CPT

## 2017-01-16 PROCEDURE — 99285 EMERGENCY DEPT VISIT HI MDM: CPT | Performed by: EMERGENCY MEDICINE

## 2017-01-16 PROCEDURE — 86140 C-REACTIVE PROTEIN: CPT | Performed by: EMERGENCY MEDICINE

## 2017-01-16 PROCEDURE — 83605 ASSAY OF LACTIC ACID: CPT

## 2017-01-16 PROCEDURE — 85025 COMPLETE CBC W/AUTO DIFF WBC: CPT | Performed by: EMERGENCY MEDICINE

## 2017-01-16 PROCEDURE — 96361 HYDRATE IV INFUSION ADD-ON: CPT | Performed by: EMERGENCY MEDICINE

## 2017-01-16 PROCEDURE — 85610 PROTHROMBIN TIME: CPT | Performed by: EMERGENCY MEDICINE

## 2017-01-16 PROCEDURE — 96360 HYDRATION IV INFUSION INIT: CPT | Performed by: EMERGENCY MEDICINE

## 2017-01-16 PROCEDURE — 84145 PROCALCITONIN (PCT): CPT | Performed by: EMERGENCY MEDICINE

## 2017-01-16 PROCEDURE — 82962 GLUCOSE BLOOD TEST: CPT

## 2017-01-16 PROCEDURE — 74011000258 HC RX REV CODE- 258: Performed by: EMERGENCY MEDICINE

## 2017-01-16 PROCEDURE — 80053 COMPREHEN METABOLIC PANEL: CPT | Performed by: EMERGENCY MEDICINE

## 2017-01-16 RX ORDER — DEXTROSE MONOHYDRATE 100 MG/ML
250 INJECTION, SOLUTION INTRAVENOUS ONCE
Status: COMPLETED | OUTPATIENT
Start: 2017-01-16 | End: 2017-01-16

## 2017-01-16 RX ADMIN — DEXTROSE 250 ML: 10 SOLUTION INTRAVENOUS at 19:04

## 2017-01-16 NOTE — ED TRIAGE NOTES
Patient presents with low BGL and no IV access from EMS. They found her to be BGL 45 and then gave glucagon and last BGL 63 with also drinking soda.

## 2017-01-16 NOTE — ED NOTES
BGL was found to be at 32mg/dl at this time, patient supplied with 8oz of orange juice, Dr. Jose F Oakes aware of both items and is at bedside. According to son at bedside patient is acting like normal self,  also at bedside with oriented.

## 2017-01-17 ENCOUNTER — HOSPITAL ENCOUNTER (OUTPATIENT)
Dept: LAB | Age: 67
Discharge: HOME | End: 2017-01-17
Payer: SUBSIDIZED

## 2017-01-17 ENCOUNTER — HOSPITAL ENCOUNTER (OUTPATIENT)
Dept: INFUSION THERAPY | Age: 67
Discharge: HOME | End: 2017-01-17
Payer: SUBSIDIZED

## 2017-01-17 ENCOUNTER — DOCUMENTATION ONLY (OUTPATIENT)
Dept: ONCOLOGY | Age: 67
End: 2017-01-17

## 2017-01-17 DIAGNOSIS — C78.00 BREAST CANCER METASTASIZED TO LUNG, UNSPECIFIED LATERALITY (HCC): Chronic | ICD-10-CM

## 2017-01-17 DIAGNOSIS — C79.51 BONY METASTASIS (HCC): ICD-10-CM

## 2017-01-17 DIAGNOSIS — C50.111 MALIGNANT NEOPLASM OF CENTRAL PORTION OF RIGHT FEMALE BREAST (HCC): Chronic | ICD-10-CM

## 2017-01-17 DIAGNOSIS — C50.919 BREAST CANCER METASTASIZED TO LUNG, UNSPECIFIED LATERALITY (HCC): Chronic | ICD-10-CM

## 2017-01-17 DIAGNOSIS — D70.1 CHEMOTHERAPY-INDUCED NEUTROPENIA (HCC): ICD-10-CM

## 2017-01-17 DIAGNOSIS — T45.1X5A CHEMOTHERAPY-INDUCED NEUTROPENIA (HCC): ICD-10-CM

## 2017-01-17 LAB
ALBUMIN SERPL BCP-MCNC: 2.7 G/DL (ref 3.2–4.6)
ALBUMIN/GLOB SERPL: 0.8 {RATIO} (ref 1.2–3.5)
ALP SERPL-CCNC: 53 U/L (ref 50–136)
ALT SERPL-CCNC: 19 U/L (ref 12–65)
ANION GAP BLD CALC-SCNC: 6 MMOL/L (ref 7–16)
AST SERPL W P-5'-P-CCNC: 20 U/L (ref 15–37)
BASOPHILS # BLD AUTO: 0 K/UL (ref 0–0.2)
BASOPHILS # BLD: 1 % (ref 0–2)
BILIRUB SERPL-MCNC: 0.4 MG/DL (ref 0.2–1.1)
BUN SERPL-MCNC: 33 MG/DL (ref 8–23)
CALCIUM SERPL-MCNC: 8.2 MG/DL (ref 8.3–10.4)
CANCER AG15-3 SERPL-ACNC: 242.9 U/ML (ref 1–35)
CEA SERPL-MCNC: 29.5 NG/ML (ref 0–3)
CHLORIDE SERPL-SCNC: 111 MMOL/L (ref 98–107)
CO2 SERPL-SCNC: 26 MMOL/L (ref 23–32)
CREAT SERPL-MCNC: 2.72 MG/DL (ref 0.6–1)
DIFFERENTIAL METHOD BLD: ABNORMAL
EOSINOPHIL # BLD: 0 K/UL (ref 0–0.8)
EOSINOPHIL NFR BLD: 1 % (ref 0.5–7.8)
ERYTHROCYTE [DISTWIDTH] IN BLOOD BY AUTOMATED COUNT: 14.2 % (ref 11.9–14.6)
FERRITIN SERPL-MCNC: 1595 NG/ML (ref 8–388)
GLOBULIN SER CALC-MCNC: 3.5 G/DL (ref 2.3–3.5)
GLUCOSE SERPL-MCNC: 72 MG/DL (ref 65–100)
HCT VFR BLD AUTO: 21.9 % (ref 35.8–46.3)
HGB BLD-MCNC: 7.2 G/DL (ref 11.7–15.4)
IRON SATN MFR SERPL: 64 %
IRON SERPL-MCNC: 121 UG/DL (ref 35–150)
LYMPHOCYTES # BLD AUTO: 25 % (ref 13–44)
LYMPHOCYTES # BLD: 0.9 K/UL (ref 0.5–4.6)
MAGNESIUM SERPL-MCNC: 2.4 MG/DL (ref 1.8–2.4)
MCH RBC QN AUTO: 37.1 PG (ref 26.1–32.9)
MCHC RBC AUTO-ENTMCNC: 32.9 G/DL (ref 31.4–35)
MCV RBC AUTO: 112.9 FL (ref 79.6–97.8)
MONOCYTES # BLD: 0.2 K/UL (ref 0.1–1.3)
MONOCYTES NFR BLD AUTO: 6 % (ref 4–12)
NEUTS SEG # BLD: 2.5 K/UL (ref 1.7–8.2)
NEUTS SEG NFR BLD AUTO: 68 % (ref 43–78)
NRBC # BLD: 0.01 K/UL (ref 0–0.2)
PHOSPHATE SERPL-MCNC: 3.6 MG/DL (ref 2.3–3.7)
PLATELET # BLD AUTO: 255 K/UL (ref 150–450)
PMV BLD AUTO: 10.7 FL (ref 10.8–14.1)
POTASSIUM SERPL-SCNC: 3.9 MMOL/L (ref 3.5–5.1)
PROT SERPL-MCNC: 6.2 G/DL (ref 6.3–8.2)
RBC # BLD AUTO: 1.94 M/UL (ref 4.05–5.25)
SODIUM SERPL-SCNC: 143 MMOL/L (ref 136–145)
TIBC SERPL-MCNC: 190 UG/DL (ref 250–450)
WBC # BLD AUTO: 3.7 K/UL (ref 4.3–11.1)

## 2017-01-17 PROCEDURE — 82728 ASSAY OF FERRITIN: CPT | Performed by: NURSE PRACTITIONER

## 2017-01-17 PROCEDURE — 85025 COMPLETE CBC W/AUTO DIFF WBC: CPT | Performed by: NURSE PRACTITIONER

## 2017-01-17 PROCEDURE — 80053 COMPREHEN METABOLIC PANEL: CPT | Performed by: NURSE PRACTITIONER

## 2017-01-17 PROCEDURE — 86300 IMMUNOASSAY TUMOR CA 15-3: CPT | Performed by: NURSE PRACTITIONER

## 2017-01-17 PROCEDURE — 83735 ASSAY OF MAGNESIUM: CPT | Performed by: NURSE PRACTITIONER

## 2017-01-17 PROCEDURE — 74011250636 HC RX REV CODE- 250/636: Performed by: INTERNAL MEDICINE

## 2017-01-17 PROCEDURE — 96402 CHEMO HORMON ANTINEOPL SQ/IM: CPT

## 2017-01-17 PROCEDURE — 96372 THER/PROPH/DIAG INJ SC/IM: CPT

## 2017-01-17 PROCEDURE — 86900 BLOOD TYPING SEROLOGIC ABO: CPT | Performed by: NURSE PRACTITIONER

## 2017-01-17 PROCEDURE — 86923 COMPATIBILITY TEST ELECTRIC: CPT | Performed by: NURSE PRACTITIONER

## 2017-01-17 PROCEDURE — 86644 CMV ANTIBODY: CPT | Performed by: NURSE PRACTITIONER

## 2017-01-17 PROCEDURE — 83540 ASSAY OF IRON: CPT | Performed by: NURSE PRACTITIONER

## 2017-01-17 PROCEDURE — 84100 ASSAY OF PHOSPHORUS: CPT | Performed by: NURSE PRACTITIONER

## 2017-01-17 PROCEDURE — 36415 COLL VENOUS BLD VENIPUNCTURE: CPT | Performed by: NURSE PRACTITIONER

## 2017-01-17 PROCEDURE — 82378 CARCINOEMBRYONIC ANTIGEN: CPT | Performed by: NURSE PRACTITIONER

## 2017-01-17 RX ORDER — LAMOTRIGINE 25 MG/1
500 TABLET ORAL ONCE
Status: COMPLETED | OUTPATIENT
Start: 2017-01-17 | End: 2017-01-17

## 2017-01-17 RX ADMIN — FULVESTRANT 500 MG: 50 INJECTION INTRAMUSCULAR at 13:57

## 2017-01-17 RX ADMIN — DENOSUMAB 120 MG: 120 INJECTION SUBCUTANEOUS at 13:55

## 2017-01-17 NOTE — PROGRESS NOTES
Patient missed her 0915 infusion appt today because she was sick at home. She was able to come to infusion later in the day.

## 2017-01-17 NOTE — PROGRESS NOTES
SW received referral from JERONIMO Vázquez to assist pt with referral to home health with  services. HEMANTH met with pt, pt's , pt's son [place] [id number] [facility]. Pt's son stated that pt had previously had home health but he is not available to interpret for pt at all times and would like a home health referral for pt in her home in [facility] with consistent  services. SW called several home health services as [facility] does not cover [place] [facility]. [address] stated they have 2 bilingual PT assistants and telephonic  services for home health services in [place] [facility]. SW relayed this information to pt and pt's family. Pt's family verbalized agreement to [address] and requested they call pt's son when they need to confirm referral. HEMANTH collaborated with JERONIMO Gage to complete [facility] referral to [facility] with specific instructions regarding interpretor services requested and request to talk to son as point of contact. HEMANTH provided pt's son with SW contact information and encouraged him to call with any questions or concerns. Pt and pt's family verbalized understanding. No other needs identified at this time. HEMANTH intends to follow up PRN to assist with needs.

## 2017-01-17 NOTE — PROGRESS NOTES
Arrived to the [facility]. Faslodex and Xgeva completed. Patient tolerated well. Any issues or concerns during appointment: none. Patient aware of next infusion appointment on 1/18/17 at 0800. Discharged in wheelchair with family.

## 2017-01-17 NOTE — DISCHARGE INSTRUCTIONS
Aprenda sobre el nivel bajo de azúcar en la deb (hipoglucemia) en diabetes - [ Learning About Low Blood Sugar (Hypoglycemia) in Diabetes ]  ¿Qué es el nivel bajo de azúcar en la deb (hipoglucemia)? La hipoglucemia quiere decir que merrill azúcar en la deb es bajo y que merrill cuerpo (sobre todo merrill cerebro) no está recibiendo suficiente energía. Si tiene diabetes, merrill azúcar en la deb puede bajar demasiado si usted sharmila demasiada cantidad de algunos medicamentos para la diabetes. También puede bajar demasiado si omite kim comida. Y puede ocurrir si hace ejercicio muy arduo sin comer lo suficiente. Algunos medicamentos usados para tratar Binh Schein de sherrie también pueden causar hipoglucemia. ¿Cuáles son los síntomas? Los síntomas de hipoglucemia pueden presentarse rápidamente. Puede julian solo 10 o 15 minutos. Si ha tenido diabetes por muchos años, puede que no se dé cuenta de que merrill azúcar en la deb está bajo hasta que [place]. · Si merrill nivel de azúcar en la deb disminuye por debajo de 70 (hipoglucemia leve), puede sentirse cansado, ansioso, mareado, débil, tembloroso o sudoroso. Swathi Diaz tenga latidos cardíacos rápidos o visión borrosa. · Si merrill nivel de azúcar en la deb sigue disminuyendo (usualmente por debajo de 36), puede que cambie merrill comportamiento. Negro vez se sienta más irritable. Quizás le sea difícil concentrarse o hablar. Y puede que se sienta tambaleante cuando se para o camina. Quizás se vuelva demasiado débil o confuso para comer algo con azúcar que eleve merrill nivel de azúcar en la deb. · Si merrill nivel de azúcar en la deb disminuye demasiado (usualmente por debajo de 20), puede que se desmaye (pierda el conocimiento). O puede tener convulsiones o un ataque cerebral. Si tiene síntomas de hipoglucemia grave, requiere atención médica de inmediato. Si tuvo hipoglucemia fidelina la noche, puede que se despierte cansado o con un dolor de [place].  [place] tanto por la noche que merrill pijama o jesus sábanas estén húmedas cuando se despierte. ¿Cómo se trata la hipoglucemia? Usted puede tratar la hipoglucemia comiendo o tomando algo que contenga 15 gramos de carbohidratos. Deben ser alimentos con azúcar rápida. Vuelva a comprobar merrill azúcar en la deb 15 minutos después de beatrice ingerido un alimento con azúcar rápida para asegurarse de que el nivel está volviendo a los límites ideales para usted. Estos son algunos ejemplos de alimentos con azúcar rápida que contienen 15 gramos de carbohidratos:  · 3 o 4 pastillas de glucosa  · 1 tubo de gel de glucosa  · Caramelos duros (caterina 3 \"[employer]anc[employer]\" o entre 5 y 7 \"Life Savers\")  · 1 cucharada de miel  · 2 cucharadas de uvas pasas  · ½ taza a ¾ taza (4 a 6 onzas) de jugo de fruta o gaseosa regular (no dietética)  · 1 cucharada de azúcar  · 1 taza de [employer]  Si tiene problemas con hipoglucemia grave, puede que alguien deba inyectarle glucagón. Esta es kim hormona que eleva rápidamente los niveles de azúcar en la deb. ¿Cómo se previene la hipoglucemia? Puede julian medidas para prevenir la hipoglucemia. · Siga merrill plan de tratamiento. Use merrill insulina u otro medicamento para la diabetes exactamente caterina se lo indicó merrill médico. Hable con merrill médico si tiene hipoglucemia con frecuencia. Quizás deban ajustarle merrill medicamento si le está provocando la hipoglucemia. · Revísese los niveles de azúcar en la deb a menudo. [place] le ayuda a detectar cambios incipientes antes de que [place]. · Mantenga consigo un alimento con azúcar fácil de asimilar en lucho de que el azúcar en la deb disminuya a un nivel bajo. · Consuma comidas pequeñas con más frecuencia para que no sienta demasiada hambre entre comidas. No se salte comidas. · Equilibre el ejercicio adicional con [employer]. Revise merrill azúcar en la deb y aprenda cómo cambia tras el ejercicio.  Si merrill azúcar en la deb se mantiene en un nivel normal, puede que no necesite comer después de hacer ejercicio. · Restrinja la cantidad de alcohol que susan. El alcohol puede hacer que el bajo azúcar en la deb baje [facility]. No eric alcohol si tiene problemas para reconocer las señales incipientes de hipoglucemia. · Lleve un diario de jesus síntomas. [place] le ayuda a aprender a distinguir cuándo es posible que los cambios en merrill cuerpo señalen hipoglucemia. Y mantenga un registro de la frecuencia con la que tiene hipoglucemia, incluyendo cuándo comió la última vez y lo que comió. [place] le ayudará a darse cuenta de qué es lo que provoca que merrill azúcar en la deb disminuya. · Aprenda sobre la diabetes y la hipoglucemia. Los grupos de apoyo o un centro de educación sobre la diabetes pueden ayudarle a entender cómo [place], la dieta y el ejercicio a jesus niveles de azúcar en la deb. Dado que los niveles bajos de azúcar en la deb pueden convertirse rápidamente en Linda Handy, asegúrese de usar un brazalete o collar de alerta médica. [place] sirve para informar a la gente que usted tiene diabetes, de modo que puedan conseguirle ayuda. Puede comprarlos en la mayoría de las [facility]. Asegúrese de que jesus familiares, amigos y compañeros de trabajo conozcan los síntomas de la hipoglucemia. Enséñeles qué hacer para elevárselo. La atención de seguimiento es kim parte clave de merrill tratamiento y seguridad. Asegúrese de hacer y acudir a todas las citas, y llame a merrill médico si está teniendo problemas. También es kim buena idea saber los resultados de jesus exámenes y mantener kim lista de los medicamentos que sharmila. ¿Dónde puede encontrar más información en inglés? Pina Maxwell a http://rosie-abena.info/. Aury Flowers [id number] en la búsqueda para aprender más acerca de \"Aprenda sobre el nivel bajo de azúcar en la deb (hipoglucemia) en diabetes - [ Learning About Low Blood Sugar (Hypoglycemia) in Diabetes ]. \"  Revisado: [address] [place], 2016  Versión del contenido: 11.1  © 7551-7158 Healthwise, [email or web address]. Las instrucciones de cuidado fueron adaptadas bajo licencia por [facility] (which disclaims liability or warranty for this information). Si usted tiene [place] afección médica o sobre estas instrucciones, siempre pregunte a merrill profesional de sherrie. [employer], [email or web address] niega toda garantía o responsabilidad por merrill uso de esta información.

## 2017-01-17 NOTE — ED PROVIDER NOTES
HPI Comments: Patient presents to the ER complaining of some weakness. Patient's  states he found her laying on the floor in and weak. He checked patients blood sugar and it was noted to be 45, EMS was subsequently called. EMS did administer glucagon in route with improvement in blood sugar and mental status. Patient has a recent hospitalization secondary to shortness of breath and right-sided pleural effusion. Family states since been home. Patient has been doing fine. She has had some decreased appetite. Patient's son believes that the patient took her diabetes medications this morning without eating. There was no reported fever, vomiting or chills. Patient is a 78 y.o. female presenting with hypoglycemia. The history is provided by the patient, a caregiver, a relative and the EMS personnel. The history is limited by a language barrier. Low Blood Sugar    This is a new problem. The current episode started 1 to 2 hours ago. Associated symptoms include confusion and weakness. Pertinent negatives include no delusions and no hallucinations. Risk factors include a recent illness. Her past medical history is significant for diabetes.         Past Medical History:   Diagnosis Date    Acute on chronic diastolic congestive heart failure ([address] 1/5/2016    Asthma      till age 31    Cancer ([address]      roverto. breast ca mets bone/lung    Chemotherapy-induced neutropenia ([address] 12/20/2016    Depression     Diabetes ([address]      recently dx'ed; ype 2;  does not take at home    DM (diabetes mellitus) ([address] 11/20/2012    HCAP (healthcare-associated pneumonia) 7/17/2013    Hypertension     Sepsis ([address] 7/19/2016    Thromboembolus ([address]      Left leg DVT    Unspecified adverse effect of anesthesia      In [facility] yrs ago after second c -section-she was told her heart stopped d/t anesthesia-hospitalized for 5 days afterwards and followed by cardiologist       Past Surgical History:   Procedure Laterality Date    Hx vascular access  1/26/12    Hx gyn       2 c sections, ovarian cyst-roverto. Family History:   Problem Relation Age of Onset    Heart Attack Mother     Cancer Brother      doen not know details       Social History     Social History    Marital status:      Spouse name: N/A    Number of children: N/A    Years of education: N/A     Occupational History    Not on file. Social History Main Topics    Smoking status: Former Smoker     Packs/day: 1.00     Years: 3.00     Types: Cigarettes     Quit date: 1/1/1978    Smokeless tobacco: Never Used      Comment: quit 30 or more years ago    Alcohol use No    Drug use: No    Sexual activity: Not on file     Other Topics Concern    Not on file     Social History Narrative         ALLERGIES: Review of patient's allergies indicates no known allergies. Review of Systems   Constitutional: Positive for fatigue. Negative for fever and unexpected weight change. HENT: Negative for congestion, tinnitus and voice change. Eyes: Negative for photophobia and visual disturbance. Respiratory: Negative for chest tightness and shortness of breath. Cardiovascular: Negative for palpitations and leg swelling. Gastrointestinal: Negative for anal bleeding, nausea and vomiting. Endocrine: Negative for polydipsia, polyphagia and polyuria. Genitourinary: Negative for flank pain and urgency. Musculoskeletal: Negative for back pain and gait problem. Skin: Negative for pallor and rash. Allergic/Immunologic: Negative for food allergies and immunocompromised state. Neurological: Positive for weakness. Negative for syncope and speech difficulty. Hematological: Negative for adenopathy. Does not bruise/bleed easily. Psychiatric/Behavioral: Positive for confusion. Negative for behavioral problems and hallucinations. All other systems reviewed and are negative.       Vitals:    01/16/17 1819   BP: 154/66   Pulse: 63   Resp: 18   Temp: 98 °F (36.7 °C)   SpO2: 96%   Weight: 59.9 kg (132 lb)   Height: 5' 5\" (1.651 m)            Physical Exam   Constitutional: She is oriented to person, place, and time. She appears well-developed and well-nourished. HENT:   Head: Normocephalic and atraumatic. Mouth/Throat: Oropharynx is clear and moist.   Eyes: Conjunctivae and EOM are normal. Pupils are equal, round, and reactive to light. No scleral icterus. Neck: Normal range of motion. Neck supple. No thyromegaly present. Cardiovascular: Normal rate and regular rhythm. Pulmonary/Chest: Effort normal. No respiratory distress. She has no wheezes. Abdominal: Soft. Bowel sounds are normal.   Musculoskeletal: Normal range of motion. She exhibits no edema. Neurological: She is alert and oriented to person, place, and time. No cranial nerve deficit. Coordination normal.   Skin: Skin is warm. Nursing note and vitals reviewed. MDM  Number of Diagnoses or Management Options  Diagnosis management comments: Pt takes glipizide 5mg for DM, will give d10 here as Blood sugar is back down to 32    Will check labs to r/o any signs or symptoms of sepsis    10:26 PM  No signs or symptoms of sepsis, normal lactic acid and normal WBC    Blood sugars have remained in a normal range    Pt states she wants to go home. I have advised precautions of monitoring blood sugars and encouraging PO intake.           Amount and/or Complexity of Data Reviewed  Clinical lab tests: ordered and reviewed  Tests in the radiology section of CPT®: ordered and reviewed    Risk of Complications, Morbidity, and/or Mortality  Presenting problems: moderate  Diagnostic procedures: low  Management options: moderate    Patient Progress  Patient progress: stable    ED Course       Procedures           Results Include:    Recent Results (from the past 24 hour(s))   CBC WITH AUTOMATED DIFF    Collection Time: 01/16/17  6:40 PM   Result Value Ref Range    WBC 5.2 4.3 - 11.1 K/uL    RBC 2.08 (L) 4.05 - 5.25 M/uL    HGB 7.9 (L) 11.7 - 15.4 g/dL    HCT 23.9 (L) 35.8 - 46.3 %    .9 (H) 79.6 - 97.8 FL    MCH 38.0 (H) 26.1 - 32.9 PG    MCHC 33.1 31.4 - 35.0 g/dL    RDW 14.5 11.9 - 14.6 %    PLATELET 141 860 - 553 K/uL    MPV 11.1 10.8 - 14.1 FL    NEUTROPHILS 77 (H) 47 - 75 %    LYMPHOCYTES 14 (L) 16 - 44 %    MONOCYTES 8 3 - 9 %    EOSINOPHILS 1 1 - 8 %    ABS. NEUTROPHILS 4.0 1.7 - 8.2 K/UL    ABS. LYMPHOCYTES 0.7 0.5 - 4.6 K/UL    ABS. MONOCYTES 0.4 0.1 - 1.3 K/UL    ABS. EOSINOPHILS 0.1 0.0 - 0.8 K/UL    RBC COMMENTS SLIGHT  ANISOCYTOSIS + POIKILOCYTOSIS        PLATELET COMMENTS ADEQUATE      DF MANUAL     METABOLIC PANEL, COMPREHENSIVE    Collection Time: 01/16/17  6:40 PM   Result Value Ref Range    Sodium 144 136 - 145 mmol/L    Potassium 5.2 (H) 3.5 - 5.1 mmol/L    Chloride 110 (H) 98 - 107 mmol/L    CO2 26 21 - 32 mmol/L    Anion gap 8 7 - 16 mmol/L    Glucose 34 (LL) 65 - 100 mg/dL    BUN 39 (H) 8 - 23 MG/DL    Creatinine 2.79 (H) 0.6 - 1.0 MG/DL    GFR est AA 22 (L) >60 ml/min/1.73m2    GFR est non-AA 18 (L) >60 ml/min/1.73m2    Calcium 8.1 (L) 8.3 - 10.4 MG/DL    Bilirubin, total 0.4 0.2 - 1.1 MG/DL    ALT 27 12 - 65 U/L    AST 69 (H) 15 - 37 U/L    Alk.  phosphatase 55 50 - 136 U/L    Protein, total 6.7 6.3 - 8.2 g/dL    Albumin 2.7 (L) 3.2 - 4.6 g/dL    Globulin 4.0 (H) 2.3 - 3.5 g/dL    A-G Ratio 0.7 (L) 1.2 - 3.5     PROCALCITONIN    Collection Time: 01/16/17  6:40 PM   Result Value Ref Range    Procalcitonin 0.3 ng/mL   C REACTIVE PROTEIN, QT    Collection Time: 01/16/17  6:40 PM   Result Value Ref Range    C-Reactive protein 2.7 (H) 0.0 - 0.9 mg/dL   GLUCOSE, POC    Collection Time: 01/16/17  6:42 PM   Result Value Ref Range    Glucose (POC) 32 (LL) 65 - 100 mg/dL   PROTHROMBIN TIME + INR    Collection Time: 01/16/17  7:07 PM   Result Value Ref Range    Prothrombin time 17.3 (H) 9.6 - 12.0 sec    INR 1.6 (H) 0.9 - 1.2     POC LACTIC ACID    Collection Time: 01/16/17  7:10 PM   Result Value Ref Range    Lactic Acid (POC) 1.3 0.5 - 1.9 mmol/L   GLUCOSE, POC    Collection Time: 01/16/17  8:48 PM   Result Value Ref Range    Glucose (POC) 91 65 - 100 mg/dL

## 2017-01-17 NOTE — ED NOTES
Pt given sandwich tray and orange juice. Pt finished all of her orange juice and 75% of her meal tray. PT reports no nausea or abdominal discomfort after eating.

## 2017-01-18 ENCOUNTER — HOSPITAL ENCOUNTER (OUTPATIENT)
Dept: INFUSION THERAPY | Age: 67
Discharge: HOME | End: 2017-01-18
Payer: SUBSIDIZED

## 2017-01-18 ENCOUNTER — HOME HEALTH ADMISSION (OUTPATIENT)
Dept: HOME HEALTH SERVICES | Facility: HOME HEALTH | Age: 67
End: 2017-01-18

## 2017-01-18 VITALS
SYSTOLIC BLOOD PRESSURE: 169 MMHG | HEART RATE: 60 BPM | DIASTOLIC BLOOD PRESSURE: 67 MMHG | RESPIRATION RATE: 18 BRPM | OXYGEN SATURATION: 95 % | TEMPERATURE: 98.5 F | WEIGHT: 136.7 LBS | BODY MASS INDEX: 22.75 KG/M2

## 2017-01-18 DIAGNOSIS — T45.1X5A CHEMOTHERAPY-INDUCED NEUTROPENIA (HCC): ICD-10-CM

## 2017-01-18 DIAGNOSIS — C79.51 BONY METASTASIS (HCC): ICD-10-CM

## 2017-01-18 DIAGNOSIS — D70.1 CHEMOTHERAPY-INDUCED NEUTROPENIA (HCC): ICD-10-CM

## 2017-01-18 DIAGNOSIS — C50.111 MALIGNANT NEOPLASM OF CENTRAL PORTION OF RIGHT FEMALE BREAST (HCC): ICD-10-CM

## 2017-01-18 LAB
CANCER AG27-29 SERPL-ACNC: 269.6 U/ML (ref 0–38.6)
GLUCOSE BLD STRIP.AUTO-MCNC: 135 MG/DL (ref 65–100)

## 2017-01-18 PROCEDURE — 74011250637 HC RX REV CODE- 250/637: Performed by: NURSE PRACTITIONER

## 2017-01-18 PROCEDURE — 74011250636 HC RX REV CODE- 250/636: Performed by: NURSE PRACTITIONER

## 2017-01-18 PROCEDURE — 36430 TRANSFUSION BLD/BLD COMPNT: CPT

## 2017-01-18 PROCEDURE — 74011250636 HC RX REV CODE- 250/636: Performed by: INTERNAL MEDICINE

## 2017-01-18 PROCEDURE — 77030003560 HC NDL HUBR BARD -A

## 2017-01-18 PROCEDURE — 82962 GLUCOSE BLOOD TEST: CPT

## 2017-01-18 PROCEDURE — P9040 RBC LEUKOREDUCED IRRADIATED: HCPCS | Performed by: NURSE PRACTITIONER

## 2017-01-18 PROCEDURE — 77030018667 ADMN ST IV BLD FENW -A

## 2017-01-18 RX ORDER — HEPARIN 100 UNIT/ML
300-500 SYRINGE INTRAVENOUS AS NEEDED
Status: DISCONTINUED | OUTPATIENT
Start: 2017-01-18 | End: 2017-01-22 | Stop reason: HOSPADM

## 2017-01-18 RX ORDER — DIPHENHYDRAMINE HCL 25 MG
25 CAPSULE ORAL ONCE
Status: COMPLETED | OUTPATIENT
Start: 2017-01-18 | End: 2017-01-18

## 2017-01-18 RX ORDER — SODIUM CHLORIDE 9 MG/ML
250 INJECTION, SOLUTION INTRAVENOUS AS NEEDED
Status: DISCONTINUED | OUTPATIENT
Start: 2017-01-18 | End: 2017-01-22 | Stop reason: HOSPADM

## 2017-01-18 RX ORDER — SODIUM CHLORIDE 0.9 % (FLUSH) 0.9 %
10 SYRINGE (ML) INJECTION AS NEEDED
Status: DISCONTINUED | OUTPATIENT
Start: 2017-01-18 | End: 2017-01-22 | Stop reason: HOSPADM

## 2017-01-18 RX ORDER — ACETAMINOPHEN 325 MG/1
650 TABLET ORAL ONCE
Status: COMPLETED | OUTPATIENT
Start: 2017-01-18 | End: 2017-01-18

## 2017-01-18 RX ADMIN — SODIUM CHLORIDE, PRESERVATIVE FREE 500 UNITS: 5 INJECTION INTRAVENOUS at 12:38

## 2017-01-18 RX ADMIN — DIPHENHYDRAMINE HYDROCHLORIDE 25 MG: 25 CAPSULE ORAL at 08:28

## 2017-01-18 RX ADMIN — SODIUM CHLORIDE 250 ML: 900 INJECTION, SOLUTION INTRAVENOUS at 08:40

## 2017-01-18 RX ADMIN — ACETAMINOPHEN 650 MG: 325 TABLET, FILM COATED ORAL at 08:29

## 2017-01-18 RX ADMIN — Medication 10 ML: at 12:38

## 2017-01-18 NOTE — PROGRESS NOTES
Arrived to the [facility]. 2 units PRBCs completed. Patient tolerated well   Patient aware of next f/u at [place] [address] Judith Kulkarni on 1/20. No future OP appts at this time. Will email  to schedule next Xgeva/Faslodex. Pt discharged to home ambulatory. No distress noted.

## 2017-01-19 LAB
ABO + RH BLD: NORMAL
BLD PROD TYP BPU: NORMAL
BLD PROD TYP BPU: NORMAL
BLOOD GROUP ANTIBODIES SERPL: NORMAL
BPU ID: NORMAL
BPU ID: NORMAL
CROSSMATCH RESULT,%XM: NORMAL
CROSSMATCH RESULT,%XM: NORMAL
SPECIMEN EXP DATE BLD: NORMAL
STATUS OF UNIT,%ST: NORMAL
STATUS OF UNIT,%ST: NORMAL
UNIT DIVISION, %UDIV: 0
UNIT DIVISION, %UDIV: 0

## 2017-01-20 ENCOUNTER — HOSPITAL ENCOUNTER (OUTPATIENT)
Dept: LAB | Age: 67
Discharge: HOME | End: 2017-01-20
Payer: SUBSIDIZED

## 2017-01-20 ENCOUNTER — TELEPHONE (OUTPATIENT)
Dept: ONCOLOGY | Age: 67
End: 2017-01-20

## 2017-01-20 DIAGNOSIS — C78.00 BREAST CANCER METASTASIZED TO LUNG, UNSPECIFIED LATERALITY (HCC): Chronic | ICD-10-CM

## 2017-01-20 DIAGNOSIS — C50.919 BREAST CANCER METASTASIZED TO LUNG, UNSPECIFIED LATERALITY (HCC): Chronic | ICD-10-CM

## 2017-01-20 LAB
ALBUMIN SERPL BCP-MCNC: 2.9 G/DL (ref 3.2–4.6)
ALBUMIN/GLOB SERPL: 0.8 {RATIO} (ref 1.2–3.5)
ALP SERPL-CCNC: 54 U/L (ref 50–136)
ALT SERPL-CCNC: 19 U/L (ref 12–65)
ANION GAP BLD CALC-SCNC: 7 MMOL/L (ref 7–16)
AST SERPL W P-5'-P-CCNC: 24 U/L (ref 15–37)
BASOPHILS # BLD AUTO: 0.1 K/UL (ref 0–0.2)
BASOPHILS # BLD: 2 % (ref 0–2)
BILIRUB SERPL-MCNC: 0.5 MG/DL (ref 0.2–1.1)
BUN SERPL-MCNC: 30 MG/DL (ref 8–23)
CALCIUM SERPL-MCNC: 8.9 MG/DL (ref 8.3–10.4)
CHLORIDE SERPL-SCNC: 114 MMOL/L (ref 98–107)
CO2 SERPL-SCNC: 22 MMOL/L (ref 23–32)
CREAT SERPL-MCNC: 2.58 MG/DL (ref 0.6–1)
DIFFERENTIAL METHOD BLD: ABNORMAL
EOSINOPHIL # BLD: 0.1 K/UL (ref 0–0.8)
EOSINOPHIL NFR BLD: 2 % (ref 0.5–7.8)
ERYTHROCYTE [DISTWIDTH] IN BLOOD BY AUTOMATED COUNT: 20.1 % (ref 11.9–14.6)
GLOBULIN SER CALC-MCNC: 3.8 G/DL (ref 2.3–3.5)
GLUCOSE SERPL-MCNC: 149 MG/DL (ref 65–100)
HCT VFR BLD AUTO: 35.8 % (ref 35.8–46.3)
HGB BLD-MCNC: 12.1 G/DL (ref 11.7–15.4)
LYMPHOCYTES # BLD AUTO: 29 % (ref 13–44)
LYMPHOCYTES # BLD: 1.5 K/UL (ref 0.5–4.6)
MCH RBC QN AUTO: 34.1 PG (ref 26.1–32.9)
MCHC RBC AUTO-ENTMCNC: 33.8 G/DL (ref 31.4–35)
MCV RBC AUTO: 100.8 FL (ref 79.6–97.8)
MONOCYTES # BLD: 0.2 K/UL (ref 0.1–1.3)
MONOCYTES NFR BLD AUTO: 4 % (ref 4–12)
NEUTS SEG # BLD: 3.4 K/UL (ref 1.7–8.2)
NEUTS SEG NFR BLD AUTO: 64 % (ref 43–78)
NRBC # BLD: 0 K/UL (ref 0–0.2)
PLATELET # BLD AUTO: 228 K/UL (ref 150–450)
PMV BLD AUTO: 10.4 FL (ref 10.8–14.1)
POTASSIUM SERPL-SCNC: 4.1 MMOL/L (ref 3.5–5.1)
PROT SERPL-MCNC: 6.7 G/DL (ref 6.3–8.2)
RBC # BLD AUTO: 3.55 M/UL (ref 4.05–5.25)
SODIUM SERPL-SCNC: 143 MMOL/L (ref 136–145)
WBC # BLD AUTO: 5.3 K/UL (ref 4.3–11.1)

## 2017-01-20 PROCEDURE — 85025 COMPLETE CBC W/AUTO DIFF WBC: CPT | Performed by: NURSE PRACTITIONER

## 2017-01-20 PROCEDURE — 80053 COMPREHEN METABOLIC PANEL: CPT | Performed by: NURSE PRACTITIONER

## 2017-01-20 PROCEDURE — 36415 COLL VENOUS BLD VENIPUNCTURE: CPT | Performed by: NURSE PRACTITIONER

## 2017-01-20 NOTE — TELEPHONE ENCOUNTER
SW received return phone call from [address] [place] [address] at [facility] stating that they are at capacity with self-pay patients and could not accept referral for pt at this time. Breanna stated she would alert SW to when they have an opening for a self-pay patient. HEMANTH had contacted other [facility] agencies who stated similar circumstances and limited availability. HEMANTH updated medical team. Eboni Carrillo then called pt's son and explained SW would facilitate referral as soon as [facility] agency has opening. Pt's son verbalized understanding and did not identify any other issues. SW encouraged him to call with any questions and he verbalized understanding. SW intends to follow up PRN.

## 2017-01-27 ENCOUNTER — HOSPITAL ENCOUNTER (OUTPATIENT)
Dept: LAB | Age: 67
Discharge: HOME | End: 2017-01-27
Payer: SUBSIDIZED

## 2017-01-27 DIAGNOSIS — C50.111 MALIGNANT NEOPLASM OF CENTRAL PORTION OF RIGHT FEMALE BREAST (HCC): Chronic | ICD-10-CM

## 2017-01-27 LAB
INR PPP: 1.2 (ref 0.9–1.2)
PROTHROMBIN TIME: 13.9 SEC (ref 9.6–12)

## 2017-01-27 PROCEDURE — 85610 PROTHROMBIN TIME: CPT | Performed by: INTERNAL MEDICINE

## 2017-02-03 ENCOUNTER — HOSPITAL ENCOUNTER (OUTPATIENT)
Dept: LAB | Age: 67
Discharge: HOME | End: 2017-02-03
Payer: SUBSIDIZED

## 2017-02-03 DIAGNOSIS — I82.409 DEEP VEIN THROMBOSIS (DVT) OF LOWER EXTREMITY, UNSPECIFIED CHRONICITY, UNSPECIFIED LATERALITY, UNSPECIFIED VEIN (HCC): ICD-10-CM

## 2017-02-03 LAB
FUNGUS CULTURE, RFCO2T: NEGATIVE
FUNGUS SMEAR, RFCO1T: NORMAL
FUNGUS SPEC CULT: NORMAL
FUNGUS STAIN, 188244: NORMAL
INR PPP: 1.6 (ref 0.9–1.2)
PROTHROMBIN TIME: 19.8 SEC (ref 9.6–12)
REFLEX TO ID, RFCO3T: NORMAL
SPECIMEN SOURCE: NORMAL
SPECIMEN SOURCE: NORMAL

## 2017-02-03 PROCEDURE — 85610 PROTHROMBIN TIME: CPT | Performed by: INTERNAL MEDICINE

## 2017-02-10 ENCOUNTER — HOSPITAL ENCOUNTER (OUTPATIENT)
Dept: LAB | Age: 67
Discharge: HOME | End: 2017-02-10
Payer: SUBSIDIZED

## 2017-02-10 DIAGNOSIS — I82.409 DEEP VEIN THROMBOSIS (DVT) OF LOWER EXTREMITY, UNSPECIFIED CHRONICITY, UNSPECIFIED LATERALITY, UNSPECIFIED VEIN (HCC): ICD-10-CM

## 2017-02-10 LAB
INR PPP: 3 (ref 0.9–1.2)
PROTHROMBIN TIME: 36 SEC (ref 9.6–12)

## 2017-02-10 PROCEDURE — 85610 PROTHROMBIN TIME: CPT | Performed by: INTERNAL MEDICINE

## 2017-02-14 ENCOUNTER — HOSPITAL ENCOUNTER (OUTPATIENT)
Dept: INFUSION THERAPY | Age: 67
Discharge: HOME | End: 2017-02-14
Payer: SUBSIDIZED

## 2017-02-14 ENCOUNTER — HOSPITAL ENCOUNTER (OUTPATIENT)
Dept: LAB | Age: 67
Discharge: HOME | End: 2017-02-14
Payer: SUBSIDIZED

## 2017-02-14 ENCOUNTER — HOSPITAL ENCOUNTER (OUTPATIENT)
Dept: GENERAL RADIOLOGY | Age: 67
Discharge: HOME | End: 2017-02-14
Payer: SUBSIDIZED

## 2017-02-14 VITALS — HEART RATE: 53 BPM | OXYGEN SATURATION: 93 % | RESPIRATION RATE: 22 BRPM

## 2017-02-14 DIAGNOSIS — I50.22 CHRONIC SYSTOLIC CONGESTIVE HEART FAILURE (HCC): ICD-10-CM

## 2017-02-14 DIAGNOSIS — T45.1X5A CHEMOTHERAPY-INDUCED NEUTROPENIA (HCC): ICD-10-CM

## 2017-02-14 DIAGNOSIS — C79.51 BONY METASTASIS (HCC): ICD-10-CM

## 2017-02-14 DIAGNOSIS — C50.919 BREAST CANCER, STAGE 4, UNSPECIFIED LATERALITY (HCC): ICD-10-CM

## 2017-02-14 DIAGNOSIS — R06.02 SHORTNESS OF BREATH: ICD-10-CM

## 2017-02-14 DIAGNOSIS — D70.1 CHEMOTHERAPY-INDUCED NEUTROPENIA (HCC): ICD-10-CM

## 2017-02-14 LAB
ALBUMIN SERPL BCP-MCNC: 3.3 G/DL (ref 3.2–4.6)
ALBUMIN/GLOB SERPL: 0.8 {RATIO} (ref 1.2–3.5)
ALP SERPL-CCNC: 64 U/L (ref 50–136)
ALT SERPL-CCNC: 28 U/L (ref 12–65)
ANION GAP BLD CALC-SCNC: 9 MMOL/L (ref 7–16)
AST SERPL W P-5'-P-CCNC: 24 U/L (ref 15–37)
BASOPHILS # BLD AUTO: 0 K/UL (ref 0–0.2)
BASOPHILS # BLD: 1 % (ref 0–2)
BILIRUB SERPL-MCNC: 0.5 MG/DL (ref 0.2–1.1)
BNP SERPL-MCNC: 335 PG/ML
BUN SERPL-MCNC: 31 MG/DL (ref 8–23)
CALCIUM SERPL-MCNC: 8.2 MG/DL (ref 8.3–10.4)
CANCER AG15-3 SERPL-ACNC: 279.2 U/ML (ref 1–35)
CEA SERPL-MCNC: 34.9 NG/ML (ref 0–3)
CHLORIDE SERPL-SCNC: 110 MMOL/L (ref 98–107)
CO2 SERPL-SCNC: 23 MMOL/L (ref 23–32)
CREAT SERPL-MCNC: 2.42 MG/DL (ref 0.6–1)
DIFFERENTIAL METHOD BLD: ABNORMAL
EOSINOPHIL # BLD: 0.1 K/UL (ref 0–0.8)
EOSINOPHIL NFR BLD: 4 % (ref 0.5–7.8)
ERYTHROCYTE [DISTWIDTH] IN BLOOD BY AUTOMATED COUNT: 16 % (ref 11.9–14.6)
GLOBULIN SER CALC-MCNC: 3.9 G/DL (ref 2.3–3.5)
GLUCOSE SERPL-MCNC: 120 MG/DL (ref 65–100)
HCT VFR BLD AUTO: 31.7 % (ref 35.8–46.3)
HGB BLD-MCNC: 10.9 G/DL (ref 11.7–15.4)
LYMPHOCYTES # BLD AUTO: 34 % (ref 13–44)
LYMPHOCYTES # BLD: 1.1 K/UL (ref 0.5–4.6)
MAGNESIUM SERPL-MCNC: 3.1 MG/DL (ref 1.8–2.4)
MCH RBC QN AUTO: 34 PG (ref 26.1–32.9)
MCHC RBC AUTO-ENTMCNC: 34.4 G/DL (ref 31.4–35)
MCV RBC AUTO: 98.8 FL (ref 79.6–97.8)
MONOCYTES # BLD: 0.2 K/UL (ref 0.1–1.3)
MONOCYTES NFR BLD AUTO: 7 % (ref 4–12)
NEUTS SEG # BLD: 1.8 K/UL (ref 1.7–8.2)
NEUTS SEG NFR BLD AUTO: 54 % (ref 43–78)
NRBC # BLD: 0 K/UL (ref 0–0.2)
PLATELET # BLD AUTO: 189 K/UL (ref 150–450)
PLATELET COMMENTS,PCOM: ADEQUATE
PMV BLD AUTO: 10.6 FL (ref 10.8–14.1)
POTASSIUM SERPL-SCNC: 4.4 MMOL/L (ref 3.5–5.1)
PROT SERPL-MCNC: 7.2 G/DL (ref 6.3–8.2)
RBC # BLD AUTO: 3.21 M/UL (ref 4.05–5.25)
RBC MORPH BLD: ABNORMAL
SODIUM SERPL-SCNC: 142 MMOL/L (ref 136–145)
WBC # BLD AUTO: 3.2 K/UL (ref 4.3–11.1)
WBC MORPH BLD: ABNORMAL

## 2017-02-14 PROCEDURE — 83735 ASSAY OF MAGNESIUM: CPT | Performed by: INTERNAL MEDICINE

## 2017-02-14 PROCEDURE — 82378 CARCINOEMBRYONIC ANTIGEN: CPT | Performed by: INTERNAL MEDICINE

## 2017-02-14 PROCEDURE — 74011250636 HC RX REV CODE- 250/636: Performed by: INTERNAL MEDICINE

## 2017-02-14 PROCEDURE — 86300 IMMUNOASSAY TUMOR CA 15-3: CPT | Performed by: INTERNAL MEDICINE

## 2017-02-14 PROCEDURE — 96402 CHEMO HORMON ANTINEOPL SQ/IM: CPT

## 2017-02-14 PROCEDURE — 36415 COLL VENOUS BLD VENIPUNCTURE: CPT | Performed by: INTERNAL MEDICINE

## 2017-02-14 PROCEDURE — 85025 COMPLETE CBC W/AUTO DIFF WBC: CPT | Performed by: INTERNAL MEDICINE

## 2017-02-14 PROCEDURE — 83880 ASSAY OF NATRIURETIC PEPTIDE: CPT | Performed by: INTERNAL MEDICINE

## 2017-02-14 PROCEDURE — 71020 XR CHEST PA LAT: CPT

## 2017-02-14 PROCEDURE — 80053 COMPREHEN METABOLIC PANEL: CPT | Performed by: INTERNAL MEDICINE

## 2017-02-14 PROCEDURE — 96372 THER/PROPH/DIAG INJ SC/IM: CPT

## 2017-02-14 RX ORDER — LAMOTRIGINE 25 MG/1
500 TABLET ORAL ONCE
Status: COMPLETED | OUTPATIENT
Start: 2017-02-14 | End: 2017-02-14

## 2017-02-14 RX ADMIN — DENOSUMAB 120 MG: 120 INJECTION SUBCUTANEOUS at 11:52

## 2017-02-14 RX ADMIN — FULVESTRANT 500 MG: 50 INJECTION INTRAMUSCULAR at 12:00

## 2017-02-14 NOTE — PROGRESS NOTES
Arrived to the [facility]. xgeva and faslodex completed. Patient tolerated well.    Any issues or concerns during appointment: no.  Discharged via wheelchair to [address] [place] [address] for chest x-ray

## 2017-02-15 LAB — CANCER AG27-29 SERPL-ACNC: 296.7 U/ML (ref 0–38.6)

## 2017-02-17 ENCOUNTER — APPOINTMENT (OUTPATIENT)
Dept: GENERAL RADIOLOGY | Age: 67
End: 2017-02-17
Attending: INTERNAL MEDICINE
Payer: SUBSIDIZED

## 2017-02-17 ENCOUNTER — HOSPITAL ENCOUNTER (OUTPATIENT)
Age: 67
Setting detail: OBSERVATION
Discharge: HOME | End: 2017-02-18
Attending: INTERNAL MEDICINE | Admitting: INTERNAL MEDICINE
Payer: SUBSIDIZED

## 2017-02-17 ENCOUNTER — SURGERY (OUTPATIENT)
Age: 67
End: 2017-02-17

## 2017-02-17 DIAGNOSIS — R09.02 HYPOXIA: ICD-10-CM

## 2017-02-17 DIAGNOSIS — J90 PLEURAL EFFUSION, RIGHT: ICD-10-CM

## 2017-02-17 DIAGNOSIS — R06.02 SOB (SHORTNESS OF BREATH) ON EXERTION: ICD-10-CM

## 2017-02-17 DIAGNOSIS — C78.00 BREAST CANCER METASTASIZED TO LUNG, UNSPECIFIED LATERALITY (HCC): Chronic | ICD-10-CM

## 2017-02-17 DIAGNOSIS — C50.919 BREAST CANCER METASTASIZED TO LUNG, UNSPECIFIED LATERALITY (HCC): Chronic | ICD-10-CM

## 2017-02-17 PROBLEM — C79.51 BONY METASTASIS (HCC): Chronic | Status: ACTIVE | Noted: 2017-02-17

## 2017-02-17 PROBLEM — J96.01 ACUTE RESPIRATORY FAILURE WITH HYPOXIA (HCC): Status: ACTIVE | Noted: 2017-02-17

## 2017-02-17 LAB
ANION GAP BLD CALC-SCNC: 10 MMOL/L (ref 7–16)
BASOPHILS # BLD AUTO: 0 K/UL (ref 0–0.2)
BASOPHILS # BLD: 1 % (ref 0–2)
BUN SERPL-MCNC: 35 MG/DL (ref 8–23)
CALCIUM SERPL-MCNC: 7.5 MG/DL (ref 8.3–10.4)
CHLORIDE SERPL-SCNC: 112 MMOL/L (ref 98–107)
CO2 SERPL-SCNC: 23 MMOL/L (ref 21–32)
CREAT SERPL-MCNC: 2.75 MG/DL (ref 0.6–1)
DIFFERENTIAL METHOD BLD: ABNORMAL
EOSINOPHIL # BLD: 0.1 K/UL (ref 0–0.8)
EOSINOPHIL NFR BLD: 2 % (ref 0.5–7.8)
ERYTHROCYTE [DISTWIDTH] IN BLOOD BY AUTOMATED COUNT: 16.8 % (ref 11.9–14.6)
GLUCOSE BLD STRIP.AUTO-MCNC: 35 MG/DL (ref 65–100)
GLUCOSE BLD STRIP.AUTO-MCNC: 86 MG/DL (ref 65–100)
GLUCOSE SERPL-MCNC: 194 MG/DL (ref 65–100)
HCT VFR BLD AUTO: 26.7 % (ref 35.8–46.3)
HGB BLD-MCNC: 8.8 G/DL (ref 11.7–15.4)
IMM GRANULOCYTES # BLD: 0 K/UL (ref 0–0.5)
IMM GRANULOCYTES NFR BLD AUTO: 0.7 % (ref 0–5)
INR PPP: 1.3 (ref 0.9–1.2)
LYMPHOCYTES # BLD AUTO: 38 % (ref 13–44)
LYMPHOCYTES # BLD: 1.1 K/UL (ref 0.5–4.6)
MCH RBC QN AUTO: 33.2 PG (ref 26.1–32.9)
MCHC RBC AUTO-ENTMCNC: 33 G/DL (ref 31.4–35)
MCV RBC AUTO: 100.8 FL (ref 79.6–97.8)
MONOCYTES # BLD: 0.2 K/UL (ref 0.1–1.3)
MONOCYTES NFR BLD AUTO: 5 % (ref 4–12)
NEUTS SEG # BLD: 1.6 K/UL (ref 1.7–8.2)
NEUTS SEG NFR BLD AUTO: 53 % (ref 43–78)
PLATELET # BLD AUTO: 151 K/UL (ref 150–450)
PMV BLD AUTO: 11.1 FL (ref 10.8–14.1)
POTASSIUM SERPL-SCNC: 4.4 MMOL/L (ref 3.5–5.1)
PROTHROMBIN TIME: 14.6 SEC (ref 9.6–12)
RBC # BLD AUTO: 2.65 M/UL (ref 4.05–5.25)
SODIUM SERPL-SCNC: 145 MMOL/L (ref 136–145)
WBC # BLD AUTO: 2.9 K/UL (ref 4.3–11.1)

## 2017-02-17 PROCEDURE — 74011250637 HC RX REV CODE- 250/637: Performed by: INTERNAL MEDICINE

## 2017-02-17 PROCEDURE — 94760 N-INVAS EAR/PLS OXIMETRY 1: CPT

## 2017-02-17 PROCEDURE — 82962 GLUCOSE BLOOD TEST: CPT

## 2017-02-17 PROCEDURE — 85610 PROTHROMBIN TIME: CPT | Performed by: INTERNAL MEDICINE

## 2017-02-17 PROCEDURE — 74011000250 HC RX REV CODE- 250: Performed by: INTERNAL MEDICINE

## 2017-02-17 PROCEDURE — 88112 CYTOPATH CELL ENHANCE TECH: CPT | Performed by: INTERNAL MEDICINE

## 2017-02-17 PROCEDURE — 99218 HC RM OBSERVATION: CPT

## 2017-02-17 PROCEDURE — 71010 XR CHEST PORT: CPT

## 2017-02-17 PROCEDURE — 76040000019: Performed by: INTERNAL MEDICINE

## 2017-02-17 PROCEDURE — 88305 TISSUE EXAM BY PATHOLOGIST: CPT | Performed by: INTERNAL MEDICINE

## 2017-02-17 PROCEDURE — 94640 AIRWAY INHALATION TREATMENT: CPT

## 2017-02-17 PROCEDURE — 99220 PR INITIAL OBSERVATION CARE/DAY 70 MINUTES: CPT | Performed by: INTERNAL MEDICINE

## 2017-02-17 PROCEDURE — C1729 CATH, DRAINAGE: HCPCS | Performed by: INTERNAL MEDICINE

## 2017-02-17 PROCEDURE — 85025 COMPLETE CBC W/AUTO DIFF WBC: CPT | Performed by: INTERNAL MEDICINE

## 2017-02-17 PROCEDURE — 80048 BASIC METABOLIC PNL TOTAL CA: CPT | Performed by: INTERNAL MEDICINE

## 2017-02-17 PROCEDURE — 36415 COLL VENOUS BLD VENIPUNCTURE: CPT | Performed by: INTERNAL MEDICINE

## 2017-02-17 RX ORDER — MONTELUKAST SODIUM 10 MG/1
10 TABLET ORAL
Status: DISCONTINUED | OUTPATIENT
Start: 2017-02-17 | End: 2017-02-18 | Stop reason: HOSPADM

## 2017-02-17 RX ORDER — BUDESONIDE 0.5 MG/2ML
500 INHALANT ORAL
Status: DISCONTINUED | OUTPATIENT
Start: 2017-02-17 | End: 2017-02-18 | Stop reason: HOSPADM

## 2017-02-17 RX ORDER — FENTANYL 50 UG/1
1 PATCH TRANSDERMAL
Status: DISCONTINUED | OUTPATIENT
Start: 2017-02-18 | End: 2017-02-18 | Stop reason: HOSPADM

## 2017-02-17 RX ORDER — METOCLOPRAMIDE HYDROCHLORIDE 5 MG/5ML
5 SOLUTION ORAL
Status: DISCONTINUED | OUTPATIENT
Start: 2017-02-17 | End: 2017-02-18 | Stop reason: HOSPADM

## 2017-02-17 RX ORDER — POTASSIUM CHLORIDE 20 MEQ/1
20 TABLET, EXTENDED RELEASE ORAL DAILY
Status: DISCONTINUED | OUTPATIENT
Start: 2017-02-18 | End: 2017-02-18 | Stop reason: HOSPADM

## 2017-02-17 RX ORDER — GLIPIZIDE 5 MG/1
5 TABLET ORAL 2 TIMES DAILY WITH MEALS
Status: DISCONTINUED | OUTPATIENT
Start: 2017-02-17 | End: 2017-02-18 | Stop reason: HOSPADM

## 2017-02-17 RX ORDER — WARFARIN 2 MG/1
2 TABLET ORAL
Status: DISCONTINUED | OUTPATIENT
Start: 2017-02-17 | End: 2017-02-18 | Stop reason: HOSPADM

## 2017-02-17 RX ORDER — FERROUS SULFATE, DRIED 160(50) MG
1 TABLET, EXTENDED RELEASE ORAL 2 TIMES DAILY WITH MEALS
Status: DISCONTINUED | OUTPATIENT
Start: 2017-02-17 | End: 2017-02-18 | Stop reason: HOSPADM

## 2017-02-17 RX ORDER — ATENOLOL 50 MG/1
25 TABLET ORAL EVERY 12 HOURS
Status: DISCONTINUED | OUTPATIENT
Start: 2017-02-17 | End: 2017-02-18 | Stop reason: HOSPADM

## 2017-02-17 RX ORDER — MEGESTROL ACETATE 40 MG/ML
200 SUSPENSION ORAL DAILY
Status: DISCONTINUED | OUTPATIENT
Start: 2017-02-18 | End: 2017-02-18 | Stop reason: HOSPADM

## 2017-02-17 RX ORDER — ALBUTEROL SULFATE 0.83 MG/ML
2.5 SOLUTION RESPIRATORY (INHALATION)
Status: DISCONTINUED | OUTPATIENT
Start: 2017-02-17 | End: 2017-02-18 | Stop reason: HOSPADM

## 2017-02-17 RX ORDER — AMLODIPINE BESYLATE 10 MG/1
10 TABLET ORAL DAILY
Status: DISCONTINUED | OUTPATIENT
Start: 2017-02-18 | End: 2017-02-18 | Stop reason: HOSPADM

## 2017-02-17 RX ORDER — NYSTATIN 100000 [USP'U]/ML
200000 SUSPENSION ORAL 4 TIMES DAILY
Status: DISCONTINUED | OUTPATIENT
Start: 2017-02-17 | End: 2017-02-18 | Stop reason: HOSPADM

## 2017-02-17 RX ORDER — MIRTAZAPINE 15 MG/1
30 TABLET, FILM COATED ORAL
Status: DISCONTINUED | OUTPATIENT
Start: 2017-02-17 | End: 2017-02-18 | Stop reason: HOSPADM

## 2017-02-17 RX ORDER — ALPRAZOLAM 0.5 MG/1
0.25 TABLET ORAL
Status: DISCONTINUED | OUTPATIENT
Start: 2017-02-17 | End: 2017-02-18 | Stop reason: HOSPADM

## 2017-02-17 RX ORDER — ZOLPIDEM TARTRATE 5 MG/1
5 TABLET ORAL
Status: DISCONTINUED | OUTPATIENT
Start: 2017-02-17 | End: 2017-02-18 | Stop reason: HOSPADM

## 2017-02-17 RX ORDER — HYDROCODONE BITARTRATE AND ACETAMINOPHEN 10; 325 MG/1; MG/1
1 TABLET ORAL
Status: DISCONTINUED | OUTPATIENT
Start: 2017-02-17 | End: 2017-02-18 | Stop reason: HOSPADM

## 2017-02-17 RX ADMIN — ATENOLOL 25 MG: 50 TABLET ORAL at 21:49

## 2017-02-17 RX ADMIN — ALBUTEROL SULFATE 2.5 MG: 2.5 SOLUTION RESPIRATORY (INHALATION) at 20:54

## 2017-02-17 RX ADMIN — NYSTATIN 200000 UNITS: 100000 SUSPENSION ORAL at 16:29

## 2017-02-17 RX ADMIN — GLIPIZIDE 5 MG: 5 TABLET ORAL at 16:30

## 2017-02-17 RX ADMIN — METOCLOPRAMIDE HYDROCHLORIDE 5 MG: 5 SOLUTION ORAL at 21:51

## 2017-02-17 RX ADMIN — MONTELUKAST SODIUM 10 MG: 10 TABLET, FILM COATED ORAL at 21:50

## 2017-02-17 RX ADMIN — NYSTATIN 200000 UNITS: 100000 SUSPENSION ORAL at 21:50

## 2017-02-17 RX ADMIN — HYDROCODONE BITARTRATE AND ACETAMINOPHEN 1 TABLET: 10; 325 TABLET ORAL at 16:30

## 2017-02-17 RX ADMIN — BUDESONIDE 500 MCG: 0.5 INHALANT RESPIRATORY (INHALATION) at 20:54

## 2017-02-17 RX ADMIN — WARFARIN SODIUM 2 MG: 2 TABLET ORAL at 21:51

## 2017-02-17 RX ADMIN — MIRTAZAPINE 30 MG: 15 TABLET, FILM COATED ORAL at 21:49

## 2017-02-17 RX ADMIN — CALCIUM CARBONATE-VITAMIN D TAB 500 MG-200 UNIT 1 TABLET: 500-200 TAB at 16:30

## 2017-02-17 NOTE — PROGRESS NOTES
TRANSFER - IN REPORT:    Verbal report received from [address] [place] [address]  on Madhav Bauman  being received from [facility] lab for routine progression of care      Report consisted of patients Situation, Background, Assessment and   Recommendations(SBAR). Information from the following report(s) SBAR was reviewed with the receiving nurse. Opportunity for questions and clarification was provided. Assessment completed upon patients arrival to unit and care assumed.

## 2017-02-17 NOTE — IP AVS SNAPSHOT
Hector Rexford 
 
 
 [address] 
[phone] Patient: Marina [address] MRN: [id number] HQQ:0/37/[id number] You are allergic to the following No active allergies Recent Documentation Height Weight Breastfeeding? BMI OB Status Smoking Status 1.575 m 69.4 kg No 27.98 kg/m2 Postmenopausal Former Smoker Emergency Contacts Name Discharge Info Relation Home Work Mobile Juancarlos Leggett  Child [2] [phone] [address] [place] [address]  Spouse [3] [phone] About your hospitalization You were admitted on:  February 17, 2017 You last received care in the:  [facility] 8 MED SURG You were discharged on:  February 18, 2017 Unit phone number:  [phone] Why you were hospitalized Your primary diagnosis was:  Acute Respiratory Failure With Hypoxia (Hcc) Your diagnoses also included:  Breast Cancer (Hcc), Bony Metastasis (Hcc), 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  
 [place], [place] D ** None ** ** None ** Follow-up Information Follow up With Details Comments Contact Info Jacinta Gomez MD     
  
Your Appointments Wednesday February 22, 2017  9:00 AM EST  
LAB with [address]  
1808 [facility] OUTREACH INSURANCE [facility]) Susana [address]  
[phone] Wednesday February 22, 2017  9:30 AM EST Follow Up with SOFI MICHAEL NP1 Elisha Campbell [facility] Carney [address] [facility] [address]  
[phone] Tuesday March 14, 2017 10:45 AM EDT Infusion Lab with LAB CK  
[facility] INFUSION SERVICES [facility]) [address] Karimi Oswaldo [phone] [facility] [address]  
[phone] [address] Tuesday March 14, 2017 11:15 AM EDT Injection with NUR8  
ST. [address] ([address]) [address] Karimi Oswaldo [phone] [facility] [address]  
[phone] [address] Current Discharge Medication List  
  
CONTINUE these medications which have NOT CHANGED Dose & Instructions Dispensing Information Comments Morning Noon Evening Bedtime  
 albuterol 90 mcg/actuation inhaler Commonly known as:  PROVENTIL HFA, VENTOLIN HFA, PROAIR HFA Your next dose is: Today, Tomorrow Other:  _________ Dose:  2 Puff Take 2 Puffs by inhalation every six (6) hours as needed for Wheezing. Quantity:  1 Inhaler Refills:  11 ALPRAZolam 0.25 mg tablet Commonly known as:  Mosetta Harp Your next dose is: Today, Tomorrow Other:  _________ Dose:  0.25 mg Take 1 Tab by mouth three (3) times daily as needed for Anxiety. Max Daily Amount: 0.75 mg. Quantity:  90 Tab Refills:  2  
     
   
   
   
  
 amLODIPine 10 mg tablet Commonly known as:  Ilana Citron Your next dose is: Today, Tomorrow Other:  _________ Dose:  10 mg Take 1 tablet by mouth daily. Quantity:  30 tablet Refills:  11  
     
   
   
   
  
 atenolol 25 mg tablet Commonly known as:  TENORMIN Your next dose is: Today, Tomorrow Other:  _________ Dose:  25 mg Take 1 Tab by mouth every twelve (12) hours. Quantity:  60 Tab Refills:  0  
     
   
   
   
  
 budesonide-formoterol 160-4.5 mcg/actuation HFA inhaler Commonly known as:  SYMBICORT Your next dose is: Today, Tomorrow Other:  _________ Dose:  2 Puff Take 2 Puffs by inhalation two (2) times a day. Quantity:  1 Inhaler Refills:  11  
     
   
   
   
  
 calcium-vitamin D 500 mg(1,250mg) -200 unit per tablet Commonly known as:  OYSTER SHELL Your next dose is: Today, Tomorrow Other:  _________ Dose:  1 Tab Take 1 Tab by mouth two (2) times daily (with meals). Quantity:  60 Tab Refills:  6  
     
   
   
   
  
 fentaNYL 50 mcg/hr PATCH Commonly known as:  Reynold Naoma Your next dose is: Today, Tomorrow Other:  _________ Dose:  1 Patch 1 Patch by TransDERmal route every seventy-two (72) hours. Max Daily Amount: 1 Patch. Indications: CHRONIC PAIN WITH OPIOID TOLERANCE Quantity:  10 Patch Refills:  0  
     
   
   
   
  
 glipiZIDE 5 mg tablet Commonly known as:  Loura Josiah Your next dose is: Today, Tomorrow Other:  _________ Dose:  5 mg Take 1 Tab by mouth two (2) times a day. Quantity:  60 Tab Refills:  5 HYDROcodone-acetaminophen  mg tablet Commonly known as:  Karishma Juan David Your next dose is: Today, Tomorrow Other:  _________ Dose:  1 Tab Take 1 Tab by mouth every four (4) hours as needed. Max Daily Amount: 6 Tabs. Quantity:  90 Tab Refills:  0 LORazepam 1 mg tablet Commonly known as:  ATIVAN Your next dose is: Today, Tomorrow Other:  _________ Dose:  1 mg Take 1 Tab by mouth every six (6) hours as needed for Anxiety. Max Daily Amount: 4 mg. Quantity:  60 Tab Refills:  0  
     
   
   
   
  
 megestrol 400 mg/10 mL (10 mL) suspension Commonly known as:  MEGACE Your next dose is: Today, Tomorrow Other:  _________ Dose:  200 mg Take 5 mL by mouth daily. Quantity:  240 mL Refills:  2  
     
   
   
   
  
 metoclopramide 5 mg/5 mL syrup Commonly known as:  REGLAN Your next dose is: Today, Tomorrow Other:  _________ Dose:  5 mg Take 5 mL by mouth Before breakfast, lunch, dinner and at bedtime. Quantity:  120 mL Refills:  0  
     
   
   
   
  
 mirtazapine 30 mg tablet Commonly known as:  Consuella Cullens Your next dose is: Today, Tomorrow Other:  _________ Dose:  30 mg Take 1 Tab by mouth nightly. Quantity:  30 Tab Refills:  5  
     
   
   
   
  
 montelukast 10 mg tablet Commonly known as:  SINGULAIR Your next dose is: Today, Tomorrow Other:  _________ Dose:  10 mg Take 1 Tab by mouth nightly. Quantity:  30 Tab Refills:  3  
     
   
   
   
  
 nystatin 100,000 unit/mL suspension Commonly known as:  MYCOSTATIN Your next dose is: Today, Tomorrow Other:  _________ Dose:  841234 Units Take 2 mL by mouth four (4) times daily. swish and spit Quantity:  480 mL Refills:  0  
     
   
   
   
  
 palbociclib 125 mg Cap Your next dose is: Today, Tomorrow Other:  _________ Dose:  125 mg Take 125 mg by mouth daily. Take one pill once a day for 3 weeks and then off for one week Quantity:  21 Tab Refills:  5  
     
   
   
   
  
 potassium chloride 20 mEq tablet Commonly known as:  K-DUR, KLOR-CON Your next dose is: Today, Tomorrow Other:  _________ Dose:  20 mEq Take 1 Tab by mouth daily. Quantity:  30 Tab Refills:  3  
     
   
   
   
  
 warfarin 2 mg tablet Commonly known as:  COUMADIN Your next dose is: Today, Tomorrow Other:  _________ Dose:  2 mg Take 1 Tab by mouth daily. Quantity:  30 Tab Refills:  20  
     
   
   
   
  
 zolpidem 10 mg tablet Commonly known as:  AMBIEN Your next dose is: Today, Tomorrow Other:  _________ Dose:  10 mg Take 1 Tab by mouth nightly as needed for Sleep. Max Daily Amount: 10 mg.  
 Quantity:  30 Tab Refills:  2 Discharge Instructions DISCHARGE SUMMARY from Nurse The following personal items are in your possession at time of discharge: 
 
Dental Appliances: None Visual Aid: Glasses PATIENT INSTRUCTIONS: 
 
 
F-face looks uneven A-arms unable to move or move unevenly S-speech slurred or non-existent T-time-call 911 as soon as signs and symptoms begin-DO NOT go Back to bed or wait to see if you get better-TIME IS BRAIN. Warning Signs of HEART ATTACK Call 911 if you have these symptoms: 
? Chest discomfort. Most heart attacks involve discomfort in the center of the chest that lasts more than a few minutes, or that goes away and comes back. It can feel like uncomfortable pressure, squeezing, fullness, or pain. ? Discomfort in other areas of the upper body. Symptoms can include pain or discomfort in one or both arms, the back, neck, jaw, or stomach. ? Shortness of breath with or without chest discomfort. ? Other signs may include breaking out in a cold sweat, nausea, or lightheadedness. Don't wait more than five minutes to call [address]! Fast action can save your life. Calling 911 is almost always the fastest way to get lifesaving treatment. Emergency Medical Services staff can begin treatment when they arrive  up to an hour sooner than if someone gets to the hospital by car. The discharge information has been reviewed with the patient. The patient verbalized understanding. Discharge medications reviewed with the patient and appropriate educational materials and side effects teaching were provided. Falta de aire: Instrucciones de cuidado - [ Shortness of Breath: Care Instructions ] Instrucciones de cuidado La falta de aire tiene muchas causas. A veces, las afecciones [address] [place] [address], caterina la ansiedad, pueden ocasionar falta de Knebel. Algunas personas tienen kim leve falta de aire cuando hacen ejercicio. Las dificultades para respirar también pueden ser síntoma de un problema grave, caterina asma, enfermedad de los pulmones, enfisema, problemas en el corazón y neumonía. Si continúa merrill falta de aire, es posible que necesite exámenes y tratamiento. Esté alerta a cualquier cambio en la respiración y otros síntomas. La atención de seguimiento es kim parte clave de merrill tratamiento y seguridad. Asegúrese de hacer y acudir a todas las citas, y llame a merrill médico si está teniendo problemas. También es kim buena idea saber los resultados de los exámenes y mantener kim lista de los medicamentos que sharmila. Cómo puede cuidarse en el hogar? · No fume ni permita que otros fumen cerca de usted. Si necesita ayuda para dejar de fumar, hable con merrill médico [employer] y medicamentos para dejar de fumar. Éstos pueden aumentar jesus probabilidades de dejar el hábito para siempre. · Descanse y duerma lo suficiente. · [employer] medicamentos exactamente caternia le fueron recetados. Llame a merrill médico si miaco que está teniendo un problema con merrill medicamento. · Encuentre maneras saludables de [employer]. ¨ Olga Lidia ejercicio a diario. ¨ Duerma lo suficiente. ¨ Coma con regularidad y jaison. Cuándo debe pedir ayuda? Llame al 911 en cualquier momento que considere que necesita atención de emergencia. Por ejemplo, llame si: · Tiene kim grave falta de aire. · Tiene síntomas de un ataque al corazón. Estos podrían incluir: ¨ Dolor o presión en el pecho, o kim sensación extraña en el pecho. ¨ Sudoración. ¨ Falta de aire. ¨ Náuseas o vómito. ¨ Dolor, presión o kim sensación extraña en la espalda, el thomas, la mandíbula, la parte superior del abdomen, o en jose o ambos hombros o brazos. ¨ Aturdimiento o debilidad repentina. ¨ Latidos cardíacos rápidos o irregulares. Cuando llame al 911, es posible que el operador le diga que mastique 1 aspirina para adultos o 2 a 4 aspirinas de dosis baja. Espere a la ambulancia. No trate de conducir usted mismo un automóvil. Llame a asher médico ahora mismo o busque atención médica inmediata si: 
· La falta de aire empeora o Tamy Spine a tener respiración sibilante (con silbidos). La respiración sibilante es un nohemy abigail que se hace al respirar. · Se despierta en la noche sin aire o necesita elevar asher rosemarie sobre varias almohadas para poder respirar. · Le falta el aire después de kim actividad suave o cuando está descansando. Preste especial atención a los cambios en asher sherrie y asegúrese de comunicarse con asher médico si: 
· No mejora en los siguientes 1 a 2 días. Dónde puede encontrar más información en inglés? Joel Rodriguez a http://rosie-abena.info/. Maged Morales S780 en la búsqueda para aprender más acerca de \"Falta de aire: Instrucciones de cuidado - [ Shortness of Breath: Care Instructions ]. \" 
Revisado: 23 [place], 2016 Versión del contenido: 11.1 © 1251-8926 Healthwise, Incorporated. Las instrucciones de cuidado fueron adaptadas bajo licencia por Good Help Connections (which disclaims liability or warranty for this information). Si usted tiene [place] afección médica o sobre estas instrucciones, siempre pregunte a asher profesional de sherrie. Healthwise, Incorporated niega toda garantía o responsabilidad por asher uso de esta información. Toracocentesis: Sheryl Colby en el hogar - [ Thoracentesis: What to Expect at [facility] Asher recuperación La toracocentesis es un procedimiento para extraer líquido del espacio entre los pulmones y la pared del pecho (cavidad pleural). Neda procedimiento también podría conocerse con el nombre de \"punción torácica\".  Es normal tener kim pequeña cantidad de líquido en el espacio pleural. Good puede acumularse demasiado líquido debido a problemas caterina infección, insuficiencia cardíaca o cáncer del pulmón. Es posible que el procedimiento se haya hecho para aliviar la falta de aire y el dolor causados por la acumulación de líquido. También es posible que se le haya hecho radha procedimiento para que el médico pueda analizar el líquido para encontrar la causa de la acumulación. Merrill pecho podría estar adolorido en el lugar donde el médico insertó la aguja o el catéter a través de la piel (el sitio de punción). Por lo general, esto mejora después de [employer]. Puede volver al Brain Brazen o jesus actividades habituales en cuanto se sienta capaz de hacerlo. Si el médico envió a analizar el líquido a un laboratorio, podría tardar varios días en [employer]. El médico o el enfermero(a) hablará con usted acerca de los [place]. Esta hoja de [employer] idea general del tiempo que le llevará recuperarse. Sin embargo, cada persona se recupera a un ritmo diferente. Siga los pasos que encontrará a continuación para mejorar con la mayor rapidez posible. Cómo puede cuidarse en el hogar? Actividad · Descanse cuando se sienta cansado. Dormir lo suficiente le ayudará a recuperarse. · Evite las actividades vigorosas, caterina montar en bicicleta, trotar, levantar pesas o hacer ejercicios aeróbicos, hasta que merrill médico lo autorice. · Puede ducharse. No tome ariel de inmersión en kim aaliyah hasta que el sitio de punción haya sanado o hasta que merrill médico lo apruebe. · Pregúntele a merrill médico cuándo puede volver a conducir. · Es posible que necesite ausentarse del trabajo fidelina 1 ó 2 días. [facility] dependerá del tipo de trabajo que olivia y cómo se sienta. Dieta · Puede continuar con merrill dieta normal. 
· Britni abundantes líquidos (a menos que merrill médico le indique lo contrario). Medicamentos · Merrill médico le dirá si puede volver a julian jesus medicamentos y cuándo puede volver a hacerlo. También le dará indicaciones sobre cualquier medicamento nuevo que deba julian usted. · Si sharmila medicamentos que previenen la formación de coágulos de [place], caterina warfarina (Coumadin), clopidogrel (Plavix) o aspirina, asegúrese de hablar con merrill médico. Él o saeid le dirá si debe volver a julian estos medicamentos y en qué momento. Asegúrese de que entiende exactamente lo que el médico quiere que olivia. · [place] con los medicamentos. [place] los analgésicos (medicamentos para el dolor) exactamente caterina le fueron indicados. ¨ Si el médico le recetó un analgésico, tómelo según las indicaciones. ¨ Si no está tomando un analgésico recetado, pregúntele a merrill médico si puede julian jose de [address] ¨ No tome dos o más analgésicos al mismo tiempo a menos que el médico se lo haya indicado. Muchos analgésicos contienen acetaminofén, es decir, Tylenol. El exceso de acetaminofén (Tylenol) puede ser dañino. · Si le parece que el analgésico le está produciendo revoltura del estómago: [address] comidas (a menos que merrill médico le haya indicado lo contrario). ¨ Pídale al médico un analgésico diferente. · Si merrill médico le recetó antibióticos, tómelos según las indicaciones. No deje de tomarlos por el hecho de sentirse mejor. Debe julian todos los antibióticos hasta terminarlos. Cuidado del sitio de punción · Lave diariamente la farooq con Radha Busing tibia y séquela con toques suaves de toalla. No use peróxido de hidrógeno [place]) o alcohol porque pueden retrasar la sanación. Puede cubrir la farooq con un vendaje de gasa si supura o roza contra la ropa. Cambie el vendaje todos los días. · Mantenga la farooq limpia y seca. La atención de seguimiento es kim parte clave de merrill tratamiento y seguridad. Asegúrese de hacer y acudir a todas las citas, y llame a merrill médico si está teniendo problemas.  También es kim buena idea [employer] resultados de los exámenes y mantener kim lista de los medicamentos que sharmila. Cuándo debe pedir ayuda? Llame al 911 en cualquier momento que considere que necesita atención de emergencia. Por ejemplo, llame si: 
· Se desmayó (perdió el conocimiento). · Tiene graves dificultades para respirar. · Tiene dolor repentino en el pecho y falta de Knebel, o tose deb. Llame a merrill médico ahora mismo o busque atención médica inmediata si: · Tiene nueva falta de aire o kim falta de aire que empeora. · Tiene nuevo dolor de pecho o éste empeora, especialmente al respirar profundamente. · Siente el estómago revuelto o no puede retener líquidos en el estómago. · Tiene fiebre de más de 100°F (37.8°C). · El vendaje que cubre el sitio de punción está empapado con deb de color lee vivo. · [address], tales caterina: ¨ Aumento del dolor, la hinchazón, el enrojecimiento o la temperatura. ¨ Vetas rojizas que comienzan donde está el sitio de punción. ¨ Pus que supura del sitio de punción. ¨ Ganglios linfáticos inflamados en el thomas, las axilas o la ester. Nelly Michael. · Al toser expectora mucha más mucosidad (flema) que de costumbre o la mucosidad cambia de color. Preste especial atención a los cambios en merrill sherrie y asegúrese de comunicarse con merrill médico si tiene algún problema. Dónde puede encontrar más información en inglés? Ruslan Almonte a http://rosie-abena.info/. Glorya Sacks N801 en la búsqueda para aprender más acerca de \"Toracocentesis: [place] en el hogar - [ Thoracentesis: What to Expect at [facility] ]. \" 
Revisado: 23 enamorado, 2016 Versión del contenido: 11.1 © 2495-8360 [employer], [employer]. Las instrucciones de cuidado fueron adaptadas bajo licencia por Good Help Connections (which disclaims liability or warranty for this information).  Si usted tiene [place] afección médica o sobre estas instrucciones, siempre pregunte a merrill profesional de sherrie. Healthwise, Incorporated niega toda garantía o responsabilidad por merrill uso de esta información. Call [phone] with any problems. Call next week [phone] for results of pleural fluid. Review Pleurx information and call with questions. Wear home O2 at 2 lpm as ordered by Dr Mary Morris. Discharge Orders [facility] Announcement We are excited to announce that we are making your provider's discharge notes available to you in [email or web address]. You will see these notes when they are completed and signed by the physician that discharged you from your recent hospital stay. If you have any questions or concerns about any information you see in [email or web address], please call the Health Information Department where you were seen or reach out to your Primary Care Provider for more information about your plan of care. Introducing [facility] SERVICES! [facility] introduce portal paciente [email or web address] . Ahora se puede acceder a partes de merrill expediente médico, enviar por correo electrónico la oficina de merrill médico y solicitar renovaciones de medicamentos en línea. En merrill navegador de Internet , [place] Section a [email or web address] [email or web address]. [email or web address]/[employer] Inc Olivia clic en el usuario por Best oSria? Rosa Gasmen clic aquí en la sesión Malenajermain Ryan. Verá la página de registro [place]. Ingrese merrill código de Bank [employer] Mila mitzy y caterina aparece a continuación. Usted no tendrá que [employer] código después de beatrice completado el proceso de registro . Si usted no se inscribe antes de la fecha de caducidad , debe solicitar un nuevo código. · [email or web address] Código de acceso : [id number] Expires: 4/4/2017  2:27 PM 
 
Ingresa los últimos cuatro dígitos de merrill Número de Seguro Social ( xxxx ) y fecha de nacimiento ( dd / mm / aaaa ) caterina se indica y olivia clic en Enviar. Usted será llevado a la siguiente página de registro . Crear un ID MyChart .  Esta será merrill ID de inicio de sesión de MyChart y no puede ser [place] , por lo que pensar en kim que es west y fácil de recordar . Crear kim contraseña MyChart . Usted puede cambiar merrill contraseña en cualquier momento . Ingrese merrill Password Reset de preguntas y Mejía . Horace se puede utilizar en un momento posterior si usted olvida merrill contraseña. Introduzca merrill dirección de correo electrónico . Reymundo Estimmatt recibirá kim notificación por correo electrónico cuando la nueva información está disponible en TravelAIhart . Swati shirley en Registrarse. Flortera Conn angel y descargar porciones de merrill expediente médico. 
Olga Lidia shirley en el enlace de descarga del menú Resumen para descargar kim copia portátil de merrill información médica . Si tiene Karissa [employer] , por favor visite la sección de preguntas frecuentes del sitio web 1006.tvt . Recuerde, MyChart NO es que se utilizará para las necesidades urgentes. Para emergencias médicas , llame al 911 . Ahora disponible en merrill iPhone y Android ! General Information Please provide this summary of care documentation to your next provider. Patient Signature:  ____________________________________________________________ Date:  ____________________________________________________________  
  
Raiza Dykes Provider Signature:  ____________________________________________________________ Date:  ____________________________________________________________  
  
  
   
  
Jeri Damon [address] [facility] 
[phone] Patient: Huntley Curling MRN: [id number] BQA:[id number] Tiene alergias a lo siguiente No tiene alergias Documentación recientes Height Weight Está amamantando? BMI [facility]) Estado obstétrico Estatus de tabaquísmo 1.575 m 69.4 kg No 27.98 kg/m2 Postmenopausal Former Smoker Emergency Contacts Name Discharge Info Relation Home Work Mobile Juancarlos Leggett  Child [2] [phone] [address] [place] [address]  Spouse [3] [phone] Sobre asher hospitalización Le admitieron el:  February 17, 2017 Asher tratamiento más reciente fue el:  SFD 8 MED SURG Le dieron de thania el:  February 18, 2017 Número de teléfono de la unidad:  [phone] Por qué le ingresaron Asher diagnosis primaria es:  Acute Respiratory Failure With Hypoxia (Hcc) Asher diagnosis también incluye:  Breast Cancer (Hcc), Bony Metastasis (Hcc), 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  
 [place], [place] D ** None ** ** None ** Follow-up Information Follow up With Details Comments Contact Info Alexey Draper MD     
  
Courtney citas Wednesday February 22, 2017  9:00 AM EST  
LAB with [address]  
1808 [facility] INSURANCE [facility]) Susana [address]  
[phone] Wednesday February 22, 2017  9:30 AM EST Follow Up with SOFI MICHAEL NP1 [facility] Hematology and On[facility]) C/ Juancarlos Carney [address] [facility] [address]  
[phone] Tuesday March 14, 2017 10:45 AM EDT Infusion Lab with LAB CK  
[facility]) [address] Odilon Rogers [phone] [facility] [address]  
[phone] [address] Tuesday March 14, 2017 11:15 AM EDT Injection with NUR8  
ST. [address] [facility] Hospital ([facility]) [address] Odilon Rogers [phone] [facility] [address]  
[phone] [address] Aprobación de la gestión actual lista de medicamentos CONTINUAR estos medicamentos que no [place] Dosis e instrucciones Información de dispensación Comentarios Morning Noon Evening Bedtime  
 albuterol 90 mcg/actuation inhaler También conocido caterina:  PROVENTIL HFA, VENTOLIN HFA, PROAIR HFA Your next dose is: Today, Tomorrow Other:  _________ Dosis:  2 Puff Take 2 Puffs by inhalation every six (6) hours as needed for Wheezing. cantidad:  1 Inhaler  
recargas:  11 ALPRAZolam 0.25 mg tablet También conocido caterina:  Anne-Marie James Your next dose is: Today, Tomorrow Other:  _________ Dosis:  0.25 mg Take 1 Tab by mouth three (3) times daily as needed for Anxiety. Max Daily Amount: 0.75 mg.  
 cantidad:  90 Tab  
recargas:  2  
     
   
   
   
  
 amLODIPine 10 mg tablet También conocido caternia:  Fernando Haven Your next dose is: Today, Tomorrow Other:  _________ Dosis:  10 mg Take 1 tablet by mouth daily. cantidad:  30 tablet  
recargas:  11  
     
   
   
   
  
 atenolol 25 mg tablet También conocido caterina:  TENORMIN Your next dose is: Today, Tomorrow Other:  _________ Dosis:  25 mg Take 1 Tab by mouth every twelve (12) hours. cantidad:  60 Tab  
recargas:  0  
     
   
   
   
  
 budesonide-formoterol 160-4.5 mcg/actuation HFA inhaler También conocido caterina:  SYMBICORT Your next dose is: Today, Tomorrow Other:  _________ Dosis:  2 Puff Take 2 Puffs by inhalation two (2) times a day. cantidad:  1 Inhaler  
recargas:  11  
     
   
   
   
  
 calcium-vitamin D 500 mg(1,250mg) -200 unit per tablet También conocido caterina:  OYSTER SHELL Your next dose is: Today, Tomorrow Other:  _________ Dosis:  1 Tab Take 1 Tab by mouth two (2) times daily (with meals). cantidad:  60 Tab  
recargas:  6 fentaNYL 50 mcg/hr PATCH También conocido caterina:  Angel Paredes Your next dose is: Today, Tomorrow Other:  _________ Dosis:  1 Patch 1 Patch by TransDERmal route every seventy-two (72) hours. Max Daily Amount: 1 Patch. Indications: CHRONIC PAIN WITH OPIOID TOLERANCE  
 cantidad:  10 Patch  
recargas:  0  
     
   
   
   
  
 glipiZIDE 5 mg tablet También conocido caterina:  Marj Mccray Your next dose is: Today, Tomorrow Other:  _________ Dosis:  5 mg Take 1 Tab by mouth two (2) times a day. cantidad:  60 Tab  
recargas:  5 HYDROcodone-acetaminophen  mg tablet También conocido caterina:  Juan Bradley Your next dose is: Today, Tomorrow Other:  _________ Dosis:  1 Tab Take 1 Tab by mouth every four (4) hours as needed. Max Daily Amount: 6 Tabs. cantidad:  90 Tab  
recargas:  0 LORazepam 1 mg tablet También conocido caterina:  ATIVAN Your next dose is: Today, Tomorrow Other:  _________ Dosis:  1 mg Take 1 Tab by mouth every six (6) hours as needed for Anxiety. Max Daily Amount: 4 mg.  
 cantidad:  60 Tab  
recargas:  0  
     
   
   
   
  
 megestrol 400 mg/10 mL (10 mL) suspension También conocido caterina:  MEGACE Your next dose is: Today, Tomorrow Other:  _________ Dosis:  200 mg Take 5 mL by mouth daily. cantidad:  240 mL  
recargas:  2  
     
   
   
   
  
 metoclopramide 5 mg/5 mL syrup También conocido caterina:  REGLAN Your next dose is: Today, Tomorrow Other:  _________ Dosis:  5 mg Take 5 mL by mouth Before breakfast, lunch, dinner and at bedtime. cantidad:  120 mL  
recargas:  0  
     
   
   
   
  
 mirtazapine 30 mg tablet También conocido caterina:  Kelsy Cha Your next dose is: Today, Tomorrow Other:  _________ Dosis:  30 mg Take 1 Tab by mouth nightly. cantidad:  30 Tab recargas:  5  
     
   
   
   
  
 montelukast 10 mg tablet También conocido caterina:  SINGULAIR Your next dose is: Today, Tomorrow Other:  _________ Dosis:  10 mg Take 1 Tab by mouth nightly. cantidad:  30 Tab  
recargas:  3  
     
   
   
   
  
 nystatin 100,000 unit/mL suspension También conocido caterina:  MYCOSTATIN Your next dose is: Today, Tomorrow Other:  _________ Dosis:  758354 Units Take 2 mL by mouth four (4) times daily. swish and spit  
 cantidad:  480 mL  
recargas:  0  
     
   
   
   
  
 palbociclib 125 mg Cap Your next dose is: Today, Tomorrow Other:  _________ Dosis:  125 mg Take 125 mg by mouth daily. Take one pill once a day for 3 weeks and then off for one week  
 cantidad:  21 Tab  
recargas:  5  
     
   
   
   
  
 potassium chloride 20 mEq tablet También conocido caterina:  K-DUR, [employer] Your next dose is: Today, Tomorrow Other:  _________ Dosis:  20 mEq Take 1 Tab by mouth daily. cantidad:  30 Tab  
recargas:  3  
     
   
   
   
  
 warfarin 2 mg tablet También conocido caterina:  COUMADIN Your next dose is: Today, Tomorrow Other:  _________ Dosis:  2 mg Take 1 Tab by mouth daily. cantidad:  30 Tab  
recargas:  20  
     
   
   
   
  
 zolpidem 10 mg tablet También conocido caterina:  Tiffanie Navarro Your next dose is: Today, Tomorrow Other:  _________ Dosis:  10 mg Take 1 Tab by mouth nightly as needed for Sleep. Max Daily Amount: 10 mg.  
 cantidad:  30 Tab  
recargas:  2 Discharge Instructions DISCHARGE SUMMARY from Nurse The following personal items are in your possession at time of discharge: 
 
Dental Appliances: None Visual Aid: Glasses PATIENT INSTRUCTIONS: 
 
 
F-face looks uneven A-arms unable to move or move unevenly S-speech slurred or non-existent T-time-call 911 as soon as signs and symptoms begin-DO NOT go Back to bed or wait to see if you get better-TIME IS BRAIN. Warning Signs of HEART ATTACK Call 911 if you have these symptoms: 
? Chest discomfort. Most heart attacks involve discomfort in the center of the chest that lasts more than a few minutes, or that goes away and comes back. It can feel like uncomfortable pressure, squeezing, fullness, or pain. ? Discomfort in other areas of the upper body. Symptoms can include pain or discomfort in one or both arms, the back, neck, jaw, or stomach. ? Shortness of breath with or without chest discomfort. ? Other signs may include breaking out in a cold sweat, nausea, or lightheadedness. Don't wait more than five minutes to call [address]! Fast action can save your life. Calling 911 is almost always the fastest way to get lifesaving treatment. Emergency Medical Services staff can begin treatment when they arrive  up to an hour sooner than if someone gets to the hospital by car. The discharge information has been reviewed with the patient. The patient verbalized understanding. Discharge medications reviewed with the patient and appropriate educational materials and side effects teaching were provided. Falta de aire: Instrucciones de cuidado - [ Shortness of Breath: Care Instructions ] Instrucciones de cuidado La falta de aire tiene muchas causas. A veces, las afecciones [address], caterina la ansiedad, pueden ocasionar falta de Knebel. Algunas personas tienen kim leve falta de aire cuando hacen ejercicio.  Las dificultades para respirar también pueden ser síntoma de un problema grave, caterina asma, enfermedad de los pulmones, enfisema, problemas en el corazón y neumonía. Si continúa merrill falta de aire, es posible que necesite exámenes y tratamiento. Esté alerta a cualquier cambio en la respiración y otros síntomas. La atención de seguimiento es kim parte clave de merrill tratamiento y seguridad. Asegúrese de hacer y acudir a todas las citas, y llame a merrill médico si está teniendo problemas. También es kim buena idea saber los resultados de los exámenes y mantener kim lista de los medicamentos que sharmila. Cómo puede cuidarse en el hogar? · No fume ni permita que otros fumen cerca de usted. Si necesita ayuda para dejar de fumar, hable con merrill médico [employer] y medicamentos para dejar de fumar. Éstos pueden aumentar jesus probabilidades de dejar el hábito para siempre. · Descanse y duerma lo suficiente. · [employer] medicamentos exactamente caterina le fueron recetados. Llame a merrill médico si maico que está teniendo un problema con merrill medicamento. · Encuentre maneras saludables de [facility]. ¨ Olga Lidia ejercicio a diario. ¨ Duerma lo suficiente. ¨ Coma con regularidad y jaison. Cuándo debe pedir ayuda? Llame al 911 en cualquier momento que considere que necesita atención de emergencia. Por ejemplo, llame si: · Tiene kim grave falta de aire. · Tiene síntomas de un ataque al corazón. Estos podrían incluir: ¨ Dolor o presión en el pecho, o kim sensación extraña en el pecho. ¨ Sudoración. ¨ Falta de aire. ¨ Náuseas o vómito. ¨ Dolor, presión o kim sensación extraña en la espalda, el thomas, la mandíbula, la parte superior del abdomen, o en jose o ambos hombros o brazos. ¨ Aturdimiento o debilidad repentina. ¨ Latidos cardíacos rápidos o irregulares. Cuando llame al 911, es posible que el operador le diga que mastique 1 aspirina para adultos o 2 a 4 aspirinas de dosis baja. Espere a la ambulancia. No trate de conducir usted mismo un automóvil. Llame a asher médico ahora mismo o busque atención médica inmediata si: 
· La falta de aire empeora o Tobin Hoskins a tener respiración sibilante (con silbidos). La respiración sibilante es un nohemy abigail que se hace al respirar. · Se despierta en la noche sin aire o necesita elevar asher rosemarie sobre varias almohadas para poder respirar. · Le falta el aire después de kim actividad suave o cuando está descansando. Preste especial atención a los cambios en asher sherrie y asegúrese de comunicarse con asher médico si: 
· No mejora en los siguientes 1 a 2 días. Dónde puede encontrar más información en inglés? Ni Hutchison a [email or web address]-abena.[email or web address] Mathieu Chun S780 en la búsqueda para aprender más acerca de \"Falta de aire: Instrucciones de cuidado - [ Shortness of Breath: Care Instructions ]. \" 
Revisado: [address] [place], 2016 Versión del contenido: 11.1 © 2316-8705 Healthwise, Incorporated. Las instrucciones de cuidado fueron adaptadas bajo licencia por Good Help Connections (which disclaims liability or warranty for this information). Si usted tiene [place] afección médica o sobre estas instrucciones, siempre pregunte a asher profesional de sherrie. Healthwise, Incorporated niega toda garantía o responsabilidad por asher uso de esta información. Toracocentesis: Nova Schoenchen en el hogar - [ Thoracentesis: What to Expect at [facility] ] Asher recuperación La toracocentesis es un procedimiento para extraer líquido del espacio entre los pulmones y la pared del pecho (cavidad pleural). Neda procedimiento también podría conocerse con el nombre de \"punción torácica\". Es normal tener kim pequeña cantidad de líquido en el espacio pleural. Good puede acumularse demasiado líquido debido a problemas caterina infección, insuficiencia cardíaca o cáncer del pulmón. Es posible que el procedimiento se haya hecho para aliviar la falta de aire y el dolor causados por la acumulación de líquido.  También es posible que se le haya hecho radha procedimiento para que el médico pueda analizar el líquido para encontrar la causa de la acumulación. Merrill pecho podría estar adolorido en el lugar donde el médico insertó la aguja o el catéter a través de la piel (el sitio de punción). Por lo general, esto mejora después de [employer]. Puede volver al Richelle Jacob o jesus actividades habituales en cuanto se sienta capaz de hacerlo. Si el médico envió a analizar el líquido a un laboratorio, podría tardar varios días en [employer]. El médico o el enfermero(a) hablará con usted acerca de los [place]. Esta hoja de [employer] idea general del tiempo que le llevará recuperarse. Sin embargo, cada persona se recupera a un ritmo diferente. Siga los pasos que encontrará a continuación para mejorar con la mayor rapidez posible. Cómo puede cuidarse en el hogar? Actividad · Descanse cuando se sienta cansado. Dormir lo suficiente le ayudará a recuperarse. · Evite las actividades vigorosas, caterina montar en bicicleta, trotar, levantar pesas o hacer ejercicios aeróbicos, hasta que merrill médico lo autorice. · Puede ducharse. No tome ariel de inmersión en kim aaliyah hasta que el sitio de punción haya sanado o hasta que merrill médico lo apruebe. · Pregúntele a merrill médico cuándo puede volver a conducir. · Es posible que necesite ausentarse del trabajo fidelina 1 ó 2 días. [place] dependerá del tipo de trabajo que olivia y cómo se sienta. Dieta · Puede continuar con merrill dieta normal. 
· Britni abundantes líquidos (a menos que merrill médico le indique lo contrario). Medicamentos · Merrill médico le dirá si puede volver a julian jesus medicamentos y cuándo puede volver a hacerlo. También le dará indicaciones sobre cualquier medicamento nuevo que deba julian usted.  
· Si sharmila medicamentos que previenen la formación de coágulos de Larry, caterina warfarina (Coumadin), clopidogrel (Plavix) o aspirina, asegúrese de hablar con merrill médico. Él o saeid le dirá si debe volver a julian [employer] medicamentos y en qué momento. Asegúrese de que entiende exactamente lo que el médico quiere que olivia. · [place] con los medicamentos. [place] los analgésicos (medicamentos para el dolor) exactamente caterina le fueron indicados. ¨ Si el médico le recetó un analgésico, tómelo según las indicaciones. ¨ Si no está tomando un analgésico recetado, pregúntele a merrill médico si puede julian jose de [employer]. ¨ No tome dos o más analgésicos al mismo tiempo a menos que el médico se lo haya indicado. Muchos analgésicos contienen acetaminofén, es decir, Tylenol. El exceso de acetaminofén (Tylenol) puede ser dañino. · Si le parece que el analgésico le está produciendo revoltura del estómago: [address] comidas (a menos que merrill médico le haya indicado lo contrario). ¨ Pídale al médico un analgésico diferente. · Si merrill médico le recetó antibióticos, tómelos según las indicaciones. No deje de tomarlos por el hecho de sentirse mejor. Debe julian todos los antibióticos hasta terminarlos. Cuidado del sitio de punción · Lave diariamente la farooq con Tommy Esquivel tibia y séquela con toques suaves de toalla. No use peróxido de hidrógeno [employer]) o alcohol porque pueden retrasar la sanación. Puede cubrir la farooq con un vendaje de gasa si supura o roza contra la ropa. Cambie el vendaje todos los días. · Mantenga la farooq limpia y seca. La atención de seguimiento es kim parte clave de merrill tratamiento y seguridad. Asegúrese de hacer y acudir a todas las citas, y llame a merrill médico si está teniendo problemas. También es kim buena idea saber los resultados de los exámenes y mantener kim lista de los medicamentos que sharmila. Cuándo debe pedir ayuda? Llame al 911 en cualquier momento que considere que necesita atención de emergencia. Por ejemplo, llame si: 
· Se desmayó (perdió el conocimiento). · Tiene graves dificultades para respirar. · Tiene dolor repentino en el pecho y falta de Knebel, o tose deb. Llame a merrill médico ahora mismo o busque atención médica inmediata si: · Tiene nueva falta de aire o kim falta de aire que empeora. · Tiene nuevo dolor de pecho o éste empeora, especialmente al respirar profundamente. · Siente el estómago revuelto o no puede retener líquidos en el estómago. · Tiene fiebre de más de 100°F (37.8°C). · El vendaje que cubre el sitio de punción está empapado con deb de color lee vivo. · [address], tales caterina: ¨ Aumento del dolor, la hinchazón, el enrojecimiento o la temperatura. ¨ Vetas rojizas que comienzan donde está el sitio de punción. ¨ Pus que supura del sitio de punción. ¨ Ganglios linfáticos inflamados en el thomas, las axilas o la ester. Marycruz Zhong. · Al toser expectora mucha más mucosidad (flema) que de costumbre o la mucosidad cambia de color. Preste especial atención a los cambios en merrill sherrie y asegúrese de comunicarse con merrill médico si tiene algún problema. Dónde puede encontrar más información en inglés? Will Hinton a [email or web address]-abena.[email or web address] Myles Kaur Y297 en la búsqueda para aprender más acerca de \"Toracocentesis: Lisa Sykes en el hogar - [ Thoracentesis: What to Expect at [facility] ]. \" 
Revisado: [address] [place], 2016 Versión del contenido: 11.1 © 8013-7635 [employer] [employer], Incorporated. Las instrucciones de cuidado fueron adaptadas bajo licencia por Good Help Connections (which disclaims liability or warranty for this information). Si usted tiene [place] afección médica o sobre estas instrucciones, siempre pregunte a merrill profesional de sherrie. [employer] [employer] niega toda garantía o responsabilidad por merrill uso de esta información. Call [phone] with any problems. Call next week [phone] for results of pleural fluid. Review Pleurx information and call with questions. Wear home O2 at 2 lpm as ordered by Dr Paul Mittal. Discharge Orders Dattch MyChart Announcement We are excited to announce that we are making your provider's discharge notes available to you in InVenturehart. You will see these notes when they are completed and signed by the physician that discharged you from your recent hospital stay. If you have any questions or concerns about any information you see in InVenturehart, please call the Health Information Department where you were seen or reach out to your Primary Care Provider for more information about your plan of care. Introducing [facility]! [facility] introduce portal paciente MyChart . Ahora se puede acceder a partes de merrill expediente médico, enviar por correo electrónico la oficina de merrill médico y solicitar renovaciones de medicamentos en línea. En merrill navegador de Internet , Jad Due a [email or web address] Olivia clic en el usuario por Kevin Noe? Freedom Lozanooner clic aquí en la sesión Pamalee Halo. Verá la página de registro [place]. Ingrese merrill código de [employer] mitzy y caterina aparece a continuación. Usted no tendrá que [employer] código después de beatrice completado el proceso de registro . Si usted no se inscribe antes de la fecha de caducidad , debe solicitar un nuevo código. · [facility] Código de acceso : -[id number] Expires: 4/4/2017  2:27 PM 
 
Ingresa los últimos cuatro dígitos de merrill Número de Seguro Social ( xxxx ) y fecha de nacimiento ( dd / mm / aaaa ) caterina se indica y olivia clic en Enviar. ted será llevado a la siguiente página de registro . Crear un ID [facility] . Esta será merrill ID de inicio de sesión de MyChart y no puede ser [place] , por lo que pensar en kim que es Micah Anmol y fácil de recordar . Crear kim contraseña [facility] . ted puede cambiar merrill contraseña en cualquier momento . Ingrese merrill Password Reset de preguntas y Mej[place] . [place] se puede utilizar en un momento posterior si usted olvida merrill contraseña.  
Introduzca merrill dirección de correo electrónico . An Ash recibirá Beck Ritter notificación por correo electrónico cuando la nueva información está disponible en MyChart . Jackie shirley en Registrarse. Clearance Sitter angel y descargar porciones de merrill expediente médico. 
Olga Lidia shirley en el enlace de descarga del menú Resumen para descargar kim copia portátil de merrill información médica . Si tiene [employer] , por favor visite la sección de preguntas frecuentes del sitio web MyChart . Recuerde, MyChart NO es que se utilizará para las necesidades urgentes. Para emergencias médicas , llame al 911 . Ahora disponible en merrill iPhone y Android ! General Information Please provide this summary of care documentation to your next provider. Patient Signature:  ____________________________________________________________ Date:  ____________________________________________________________  
  
Radha Hayward Provider Signature:  ____________________________________________________________ Date:  ____________________________________________________________

## 2017-02-17 NOTE — H&P
HISTORY AND PHYSICAL      Danyell James    2/17/2017    Date of Admission:  2/17/2017    The patient's chart is reviewed and the patient is discussed with the staff. Subjective:     Patient is a 78 y.o.  female presents today for outpatient thoracentesis. On arrival, patient's oxygen saturation was 81% and placed on 2L. 1200 ml of yellow pleural fluid drained from right. Patient oxygen saturation remains low at 85% on room air. Patient now requiring 6L NC. Pleural fluid was sent for cytology. Patient has metastatic breast cancer with mets to lung and bone. She is a patient of Dr. Cole Cat with [facility]. Other history includes dCHF, old DVT, asthma, CKD, HTN, DM, and right ureteral obstruction with stent placement. She is being admitted for hypoxia. [address] [place] [address].     Review of Systems  A comprehensive review of systems was negative except for: Constitutional: positive for chills, fatigue and general weakness   Respiratory: positive for dyspnea on exertion  Cardiovascular: positive for fatigue, lower extremity edema, RLE and RUE edema > left, hx of HTn, CHF  Genitourinary: positive for hx hydronephrosis   Hematologic/lymphatic: positive for on warfarin, hx dvt   metastatic breast cancer with mets to lung and bone    Patient Active Problem List   Diagnosis Code    Breast cancer ([facility] C50.919    Bony metastasis ([facility] C79.51    HTN (hypertension) I10    DM (diabetes mellitus) ([facility] E11.9    CKD (chronic kidney disease) N18.9    Iron deficiency anemia D50.9    Acute respiratory failure with hypoxia (HCC) J96.01    Hypomagnesemia E83.42    Hypokalemia E87.6    Asthma J45.909    Anemia D64.9    DVT (deep venous thrombosis) ([facility]) I82.409    PND (paroxysmal nocturnal dyspnea) R06.00    SOB (shortness of breath) on exertion R06.02    Leg swelling M79.89    CHF (congestive heart failure) ([facility]) I50.9    Accelerated hypertension I10    Elevated troponin R79.89    Breast cancer metastasized to lung ([facility] C50.919, C78.00    Pulmonary edema J81.1    Acute on chronic renal failure (HCC) N17.9, N18.9    Pleural effusion, right J90    Hydronephrosis N13.30    Pancytopenia (HCC) D61.818    Chemotherapy-induced neutropenia (HCC) D70.1    Bilateral edema of lower extremity R60.0    Hematuria R31.9       Prior to Admission Medications   Prescriptions Last Dose Informant Patient Reported? Taking? ALPRAZolam (XANAX) 0.25 mg tablet 2017 at Unknown time  No Yes   Sig: Take 1 Tab by mouth three (3) times daily as needed for Anxiety. Max Daily Amount: 0.75 mg. HYDROcodone-acetaminophen (NORCO)  mg tablet 2017 at Unknown time  No Yes   Sig: Take 1 Tab by mouth every four (4) hours as needed. Max Daily Amount: 6 Tabs. LORazepam (ATIVAN) 1 mg tablet 2017 at Unknown time  No Yes   Sig: Take 1 Tab by mouth every six (6) hours as needed for Anxiety. Max Daily Amount: 4 mg. albuterol (PROVENTIL HFA, VENTOLIN HFA, PROAIR HFA) 90 mcg/actuation inhaler 2017 at Unknown time  No Yes   Sig: Take 2 Puffs by inhalation every six (6) hours as needed for Wheezing. amlodipine (NORVASC) 10 mg tablet 2017 at Unknown time  No Yes   Sig: Take 1 tablet by mouth daily. atenolol (TENORMIN) 25 mg tablet 2017 at Unknown time  No Yes   Sig: Take 1 Tab by mouth every twelve (12) hours. budesonide-formoterol (SYMBICORT) 160-4.5 mcg/actuation HFA inhaler 2017 at Unknown time  No Yes   Sig: Take 2 Puffs by inhalation two (2) times a day. calcium-vitamin D (OYSTER SHELL) 500 mg(1,250mg) -200 unit per tablet 2017 at Unknown time  No Yes   Sig: Take 1 Tab by mouth two (2) times daily (with meals). fentaNYL (DURAGESIC) 50 mcg/hr [facility] 2017  No No   Si Patch by TransDERmal route every seventy-two (72) hours. Max Daily Amount: 1 Patch.  Indications: CHRONIC PAIN WITH OPIOID TOLERANCE   furosemide (LASIX) 20 mg tablet Not Taking at Unknown time  No No   Sig: Take 1 Tab by mouth as needed. glipiZIDE (GLUCOTROL) 5 mg tablet 2/17/2017 at Unknown time  No Yes   Sig: Take 1 Tab by mouth two (2) times a day. megestrol (MEGACE) 400 mg/10 mL (10 mL) suspension 2/16/2017 at Unknown time  No Yes   Sig: Take 5 mL by mouth daily. metoclopramide (REGLAN) 5 mg/5 mL syrup 2/17/2017 at Unknown time  No Yes   Sig: Take 5 mL by mouth Before breakfast, lunch, dinner and at bedtime. mirtazapine (REMERON) 30 mg tablet Unknown at Unknown time  No No   Sig: Take 1 Tab by mouth nightly. montelukast (SINGULAIR) 10 mg tablet 2/17/2017 at Unknown time  No Yes   Sig: Take 1 Tab by mouth nightly. nystatin (MYCOSTATIN) 100,000 unit/mL suspension 2/17/2017 at Unknown time  No Yes   Sig: Take 2 mL by mouth four (4) times daily. swish and spit   palbociclib 125 mg cap 2/17/2017 at Unknown time  No Yes   Sig: Take 125 mg by mouth daily. Take one pill once a day for 3 weeks and then off for one week   potassium chloride (K-DUR, KLOR-CON) 20 mEq tablet 2/17/2017 at Unknown time  No Yes   Sig: Take 1 Tab by mouth daily. warfarin (COUMADIN) 2 mg tablet 2/14/2017  No No   Sig: Take 1 Tab by mouth daily. zolpidem (AMBIEN) 10 mg tablet   No No   Sig: Take 1 Tab by mouth nightly as needed for Sleep. Max Daily Amount: 10 mg.       Facility-Administered Medications: None       Past Medical History   Diagnosis Date    Acute on chronic diastolic congestive heart failure ([address] 1/5/2016    Asthma      till age 31    Cancer ([address]      roverto. breast ca mets bone/lung    Chemotherapy-induced neutropenia ([address] 12/20/2016    Depression     Diabetes ([facility]      recently dx'ed; ype 2;  does not take at home    DM (diabetes mellitus) ([address] 11/20/2012    HCAP (healthcare-associated pneumonia) 7/17/2013    Hypertension     Sepsis ([address] 7/19/2016    Thromboembolus ([facility]      Left leg DVT    Unspecified adverse effect of anesthesia      In [facility] yrs ago after second c -section-she was told her heart stopped d/t anesthesia-hospitalized for 5 days afterwards and followed by cardiologist     Past Surgical History   Procedure Laterality Date    Hx vascular access  1/26/12    Hx gyn       2 c sections, ovarian cyst-roverto. Social History     Social History    Marital status:      Spouse name: N/A    Number of children: N/A    Years of education: N/A     Occupational History    Not on file. Social History Main Topics    Smoking status: Former Smoker     Packs/day: 1.00     Years: 3.00     Types: Cigarettes     Quit date: 1/1/1978    Smokeless tobacco: Never Used      Comment: quit 30 or more years ago    Alcohol use No    Drug use: No    Sexual activity: Not on file     Other Topics Concern    Not on file     Social History Narrative     Family History   Problem Relation Age of Onset    Heart Attack Mother     Cancer Brother      doen not know details     No Known Allergies    No current facility-administered medications for this encounter. Objective:     Vitals:    02/17/17 1355 02/17/17 1411 02/17/17 1420 02/17/17 1438   BP:       Pulse:       Resp:       Temp:       SpO2: 90% 94% 90% 91%   Weight:       Height:           PHYSICAL EXAM     Constitutional:  the patient is well developed and in no acute distress, on 6L NC   EENMT:  Sclera clear, pupils equal, oral mucosa moist  Respiratory: crackles in bases, no wheezing   Cardiovascular:  RRR without M,G,R  Gastrointestinal: soft and non-tender; with positive bowel sounds. Musculoskeletal: warm without cyanosis. There is 2+ RLE edema, 1+ LLE edema, RUE edema > Left   Skin:  no jaundice or rashes, no open wounds   Neurologic: no gross neuro deficits     Psychiatric:  alert and oriented x 3    Chest Xray:    IMPRESSION: Limited exam due to patient positioning. No definite pneumothorax  status post thoracentesis.       No results for input(s): WBC, HGB, HCT, PLT, INR, HGBEXT, HCTEXT, PLTEXT, HGBEXT, HCTEXT, PLTEXT in the last 72 hours. No lab exists for component: INREXT, INREXT  No results for input(s): NA, K, CL, GLU, CO2, BUN, CREA, MG, PHOS, CA, TROIQ, ALB, TBIL, TBILI, GPT, ALT, SGOT, BNPP in the last 72 hours. No lab exists for component: TROIP  No results for input(s): PH, PCO2, PO2, HCO3 in the last 72 hours. No results for input(s): LCAD, LAC in the last 72 hours. Assessment:  (Medical Decision Making)     Hospital Problems  Date Reviewed: 2/17/2017          Codes Class Noted POA    Breast cancer ([facility] (Chronic) ICD-10-CM: C50.919  ICD-9-CM: 174.9  2/17/2017 Yes     Er+  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 SUV max on the right is 20.5 and on the left 31.5. There is matted high right axillary   lymphadenopathy as well as a more hypermetabolic low right axillary lymph node which measures 12 x 9 mm and has an SUV max of 15.4. Small bilateral pulmonary hilar metastases are also noted as well as extensive skeletal metastatic disease which includes both proximal femora, both scapulae, innumerable ribs, virtually every vertebra, and the bony pelvis. No displaced pathologic fracture is identified on the CT images. Ct scan 2-12 Innumerable diffuse osseous metastases. . Multiple diffuse tiny lung nodules, and right hilar lymphadenopathy, also suspicious for metastases. 3. Bilateral breast masses, compatible with known carcinoma.   CANCER ANTIGEN 27.29 1076   Oncology Flowsheet Day, Cycle cyclophosphamide (CYTOXAN) IV   1/27/2012 Day 1, Cycle 1 600 mg/m2 = 996 mg   2/17/2012 Day 1, Cycle 2 600 mg/m2 = 996 mg   3/9/2012 Day 1, Cycle 3 600 mg/m2 = 996 mg   3/30/2012 Day 1, Cycle 4 500 mg/m2 = 830 mg   4/20/2012 Day 1, Cycle 5 500 mg/m2 = 830 mg     Oncology Flowsheet DOCEtaxel (TAXOTERE) IV   1/27/2012 75 mg/m2 = 125 mg   2/17/2012 75 mg/m2 = 125 mg   3/9/2012 75 mg/m2 = 125 mg 3/30/2012 60 mg/m2 = 100 mg   4/20/2012 60 mg/m2 = 100 mg   7-19-12 post 6 cycles of TC improved breast masses switch to femara maintenence  Clear response on pet ct scan   Mixed response to chemotherapy: There has been a significant interval response in the right breast and axilla with decrease in size of spiculated right breast mass and numerous right axillary lymph nodes. Left breast mass also appears to have responded chemotherapy though there may be persistent chest wall invasion. 2. Interval evolution of widespread osseous metastatic disease with many lytic lesions now appearing more sclerotic. These are associated with continued FDG uptake which is difficult to differentiate from marrow reactivity given recent chemotherapy. At least one new osseous metastatic deposits is present in the posterior spinous process at L1. Focally intense uptake is also present at approximately T5  10-19-12 pain in arms legs back continued improvement on ct scan Decreased size of the largest lung nodules and near complete resolution . of many. No new masses in the chest, abdomen, or pelvis. 2. Decreased size of right axillary and right hilar lymph nodes. 3. Diffuse osseous lesions again seen. 4. Diverticulosis. 5. Atherosclerotic vascular disease ca 15-3 down to 50  On femara and aredia will see if we can get affinitor to start at next visit continue therapy  12-19-12 new headache and dizziness for MRI will start affinitor   1-14-13 improved pain breast mass right breast smaller width 7 cm x 5  6-05-13 femara and aredia now on affinitor x 5 months   7-13 tumor markers falling   8-20-13 post admission for pneumonia medications to restart breast mass smaller reduce affinitor to 7.5 mg   10-18-13 femara and affinitor pain right ilium intermittent markers smaller breast mass smaller on right   12-5-13 Reduce Afinitor to 5 mg daily. Continue coumadin for DVT. Continue Femara and Aredia. Repeat US and skeletal survey prior to next visit. 1-6-14 Feeling better with reduced dose. Ultrasound of breasts show decrease in mass sizes. Skeletal survey stable. Follow up in 2 months. Bony metastasis ([address] (Chronic) ICD-10-CM: C79.51  ICD-9-CM: 198.5  2/17/2017 Yes    Followed by [address] [facility]     * (Principal)Acute respiratory failure with hypoxia [facility]) ICD-10-CM: J96.01  ICD-9-CM: 518.81  2/17/2017 Yes    Requiring 6L NC, was previously not on home oxygen, Rose with case management notified and is setting up home oxygen with [facility]. Breast cancer metastasized to lung [facility]) (Chronic) ICD-10-CM: C50.919, C78.00  ICD-9-CM: 174.9, 197.0  2/17/2017 Yes    Thoracentesis today with 1200ml removed           Plan:  (Medical Decision Making)     --Will admit for further medical management  --Supplemental O2    --Respiratory nebulizer treatments  --Continue home meds   --BMP, CBC now  --Wean oxygen as tolerated     More than 50% of the time documented was spent in face-to-face contact with the patient and in the care of the patient on the floor/unit where the patient is located. Ton Davis NP  I have spoken with and examined the patient. I agree with the above assessment and plan as documented. Mrs. Leggett is having worsening hypoxemia, requiring 5lpm of O2. This was ongoing prior to her thoracentesis today and was not improved despite fluid drainage. Thus far, no evidence of acute infection. Gen: pleasant, NAD  Lungs:  Decreased on R  Heart:  RRR with no Murmur/Rubs/Gallops  --f/u pleural fluid studies  --will continue home medications  --arrange home O2 and f/u labs  --wean O2 as able overnight.     Jenny Meeks MD

## 2017-02-17 NOTE — PROGRESS NOTES
present for [address] from [facility] as well as for Isai.  Also present for admission with Amaya-JERONIMO    Thank you,      Jennifer [id number]  [address] [facility]  [phone]  [email or web address]

## 2017-02-17 NOTE — PROGRESS NOTES
TRANSFER - OUT REPORT:    Verbal report given to Annelle Soulier (RN) on Priyank Esquivel  being transferred to [address] [place] [address] for routine progression of care       Report consisted of patients Situation, Background, Assessment and   Recommendations(SBAR). Information from the following report(s) Procedure Summary was reviewed with the receiving nurse. Opportunity for questions and clarification was provided.

## 2017-02-17 NOTE — PROGRESS NOTES
Pt sat up on side of bed for thoracentesis. Consent obtained. Time out performed. Pts vitals monitored throughout procedure. Left and right ultrasound done and pic taken of pleural fluid.  ~1200 ml yellow pleural fluid from R.  Pt tolerated procedure fairly well with some pain post 1200 drained to gravity. Drainage stopped at this point. Specimens sent to the lab x 1 and labeled appropriately. Site dressed appropriately.    Lung sliding done and ultrasound findings reviewed by MD.

## 2017-02-17 NOTE — PROGRESS NOTES
RA spo2 85%. 2 lpm home O2 ordered by Dr Alyx Esparza. Norm Klinefelter at [facility] aware and will facilitate.

## 2017-02-17 NOTE — PROGRESS NOTES
Patient transported to room [address] by this RRT. Betsy is aware of patient arrival to the room.

## 2017-02-17 NOTE — PROGRESS NOTES
Patient stable with no complaints at this time. Pain medication given r/t back/chest pain; medication relieved pain. Call light within reach.   Report to be given to oncoming RN 7p-7a

## 2017-02-17 NOTE — PROGRESS NOTES
Kaitlin Pretty with [facility] to be notified of discharge to supply home O2. Rose case management aware.

## 2017-02-17 NOTE — PROGRESS NOTES
When placed on Spo2 monitor, SpO2 81% on RA. Placed on 2 lpm for SpO2 92%. Dr Diandra Jiménez made aware.

## 2017-02-17 NOTE — PROGRESS NOTES
Phone call received from [facility] lag regarding patient's current O2 demand. It appears that patient will require home O2 when discharged home. Patient is uninsured. Ken High from [address] [place] [address] ([phone] cell, [phone] office) said that if we contact him with the patient information when ready for discharge, he will work to assist patient with this need. Case Management will remain available to assist as needed. Care Management Interventions  PCP Verified by CM: Yes  Transition of Care Consult (CM Consult): Discharge Planning  Discharge Durable Medical Equipment: No  Physical Therapy Consult: No  Occupational Therapy Consult: No  Speech Therapy Consult: No  Current Support Network: Own Home  Confirm Follow Up Transport: Family  Plan discussed with Pt/Family/Caregiver: Yes  Freedom of Choice Offered: Yes  Discharge Location  Discharge Placement: Other: (To be determined.  Will likely require home O2.)

## 2017-02-17 NOTE — PROGRESS NOTES
Patient arrived to floor via staff to room 23-14-20-09 via staff from [facility]. Patient is alert and orientated with no distress noted. Patient is Upper sorbian speaking only so interpretor being used. Patient c/o pain 8/10 in back and chest from thoracentesis with 1200 mL removed. Respirations even and unlabored with heart rate regular. Duel skin assessment completed with Romelia Lazcano RN. No skin issues noted. Small incision with band aid applied to back from thoracentesis.  at bedside for support. Patient able to ambulate independently without assistance. Bed in low locked position with call light within reach. Will continue to monitor. Patient orientated to room and surroundings.

## 2017-02-18 VITALS
OXYGEN SATURATION: 92 % | HEART RATE: 57 BPM | TEMPERATURE: 98.4 F | SYSTOLIC BLOOD PRESSURE: 141 MMHG | RESPIRATION RATE: 20 BRPM | WEIGHT: 153 LBS | BODY MASS INDEX: 28.16 KG/M2 | HEIGHT: 62 IN | DIASTOLIC BLOOD PRESSURE: 54 MMHG

## 2017-02-18 LAB
GLUCOSE BLD STRIP.AUTO-MCNC: 36 MG/DL (ref 65–100)
GLUCOSE BLD STRIP.AUTO-MCNC: 38 MG/DL (ref 65–100)
GLUCOSE BLD STRIP.AUTO-MCNC: 48 MG/DL (ref 65–100)
GLUCOSE BLD STRIP.AUTO-MCNC: 82 MG/DL (ref 65–100)
GLUCOSE BLD STRIP.AUTO-MCNC: 91 MG/DL (ref 65–100)

## 2017-02-18 PROCEDURE — 74011250637 HC RX REV CODE- 250/637: Performed by: INTERNAL MEDICINE

## 2017-02-18 PROCEDURE — 82962 GLUCOSE BLOOD TEST: CPT

## 2017-02-18 PROCEDURE — 99217 PR OBSERVATION CARE DISCHARGE MANAGEMENT: CPT | Performed by: INTERNAL MEDICINE

## 2017-02-18 PROCEDURE — 94760 N-INVAS EAR/PLS OXIMETRY 1: CPT

## 2017-02-18 PROCEDURE — 74011000250 HC RX REV CODE- 250: Performed by: INTERNAL MEDICINE

## 2017-02-18 PROCEDURE — 94640 AIRWAY INHALATION TREATMENT: CPT

## 2017-02-18 PROCEDURE — 99218 HC RM OBSERVATION: CPT

## 2017-02-18 PROCEDURE — 77010033678 HC OXYGEN DAILY

## 2017-02-18 RX ADMIN — POTASSIUM CHLORIDE 20 MEQ: 20 TABLET, EXTENDED RELEASE ORAL at 08:09

## 2017-02-18 RX ADMIN — ALBUTEROL SULFATE 2.5 MG: 2.5 SOLUTION RESPIRATORY (INHALATION) at 00:29

## 2017-02-18 RX ADMIN — CALCIUM CARBONATE-VITAMIN D TAB 500 MG-200 UNIT 1 TABLET: 500-200 TAB at 08:09

## 2017-02-18 RX ADMIN — ALBUTEROL SULFATE 2.5 MG: 2.5 SOLUTION RESPIRATORY (INHALATION) at 04:10

## 2017-02-18 RX ADMIN — ALBUTEROL SULFATE 2.5 MG: 2.5 SOLUTION RESPIRATORY (INHALATION) at 07:54

## 2017-02-18 RX ADMIN — AMLODIPINE BESYLATE 10 MG: 10 TABLET ORAL at 08:08

## 2017-02-18 RX ADMIN — METOCLOPRAMIDE HYDROCHLORIDE 5 MG: 5 SOLUTION ORAL at 06:04

## 2017-02-18 RX ADMIN — BUDESONIDE 500 MCG: 0.5 INHALANT RESPIRATORY (INHALATION) at 07:54

## 2017-02-18 RX ADMIN — ATENOLOL 25 MG: 50 TABLET ORAL at 08:08

## 2017-02-18 NOTE — DISCHARGE SUMMARY
DISCHARGE NOTE    Bernarda Madrigal  Admission date:  2/17/2017  Discharge date:  2/18/17    Admitting Diagnosis:  Pleural effusion [J90]    Discharge Diagnoses:    Hospital Problems  Date Reviewed: 2/17/2017          Codes Class Noted POA    Breast cancer ([facility] [address] (Chronic) ICD-10-CM: C50.919  ICD-9-CM: 174.9  2/17/2017 Yes    Overview Addendum 2/27/2014 10:08 AM by Ev Gonzalez NP     Er+  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 SUV max on the right is 20.5 and on the left 31.5. There is matted high right axillary   lymphadenopathy as well as a more hypermetabolic low right axillary lymph node which measures 12 x 9 mm and has an SUV max of 15.4. Small bilateral pulmonary hilar metastases are also noted as well as extensive skeletal metastatic disease which includes both proximal femora, both scapulae, innumerable ribs, virtually every vertebra, and the bony pelvis. No displaced pathologic fracture is identified on the CT images. Ct scan 2-12 Innumerable diffuse osseous metastases. . Multiple diffuse tiny lung nodules, and right hilar lymphadenopathy, also suspicious for metastases. 3. Bilateral breast masses, compatible with known carcinoma.   CANCER ANTIGEN 27.29 1076   Oncology Flowsheet Day, Cycle cyclophosphamide (CYTOXAN) IV   1/27/2012 Day 1, Cycle 1 600 mg/m2 = 996 mg   2/17/2012 Day 1, Cycle 2 600 mg/m2 = 996 mg   3/9/2012 Day 1, Cycle 3 600 mg/m2 = 996 mg   3/30/2012 Day 1, Cycle 4 500 mg/m2 = 830 mg   4/20/2012 Day 1, Cycle 5 500 mg/m2 = 830 mg     Oncology Flowsheet DOCEtaxel (TAXOTERE) IV   1/27/2012 75 mg/m2 = 125 mg   2/17/2012 75 mg/m2 = 125 mg   3/9/2012 75 mg/m2 = 125 mg   3/30/2012 60 mg/m2 = 100 mg   4/20/2012 60 mg/m2 = 100 mg   7-19-12 post 6 cycles of TC improved breast masses switch to femara maintenence  Clear response on pet ct scan   Mixed response to chemotherapy: There has been a significant interval response in the right breast and axilla with decrease in size of spiculated right breast mass and numerous right axillary lymph nodes. Left breast mass also appears to have responded chemotherapy though there may be persistent chest wall invasion. 2. Interval evolution of widespread osseous metastatic disease with many lytic lesions now appearing more sclerotic. These are associated with continued FDG uptake which is difficult to differentiate from marrow reactivity given recent chemotherapy. At least one new osseous metastatic deposits is present in the posterior spinous process at L1. Focally intense uptake is also present at approximately T5  10-19-12 pain in arms legs back continued improvement on ct scan Decreased size of the largest lung nodules and near complete resolution . of many. No new masses in the chest, abdomen, or pelvis. 2. Decreased size of right axillary and right hilar lymph nodes. 3. Diffuse osseous lesions again seen. 4. Diverticulosis. 5. Atherosclerotic vascular disease ca 15-3 down to 50  On femara and aredia will see if we can get affinitor to start at next visit continue therapy  12-19-12 new headache and dizziness for MRI will start affinitor   1-14-13 improved pain breast mass right breast smaller width 7 cm x 5  6-05-13 femara and aredia now on affinitor x 5 months   7-13 tumor markers falling   8-20-13 post admission for pneumonia medications to restart breast mass smaller reduce affinitor to 7.5 mg   10-18-13 femara and affinitor pain right ilium intermittent markers smaller breast mass smaller on right   12-5-13 Reduce Afinitor to 5 mg daily. Continue coumadin for DVT. Continue Femara and Aredia. Repeat US and skeletal survey prior to next visit. 1-6-14 Feeling better with reduced dose. Ultrasound of breasts show decrease in mass sizes. Skeletal survey stable. Follow up in 2 months.               Bony metastasis (HCC) (Chronic) ICD-10-CM: C79.51  ICD-9-CM: 198.5  2/17/2017 Yes        * (Principal)Acute respiratory failure with hypoxia [facility]) ICD-10-CM: J96.01  ICD-9-CM: 518.81  2/17/2017 Yes        Breast cancer metastasized to lung [facility]) (Chronic) ICD-10-CM: C50.919, C78.00  ICD-9-CM: 174.9, 197.0  2/17/2017 Yes              Consultants:   None    Studies/Procedures:  Thoracentesis R  CXR    Condition on Discharge:    Stable    Disposition:    Home      Presenting Illness / Hospital course:  Patient is a 78 y.o.  female with a history of dCHR, prior DVT, asthma, CKD< HTN, DM and R ureteral obstruction s/p stent placement. Patient also has metastatic breast CA with mets to lung/bone. She is followed by Dr. Vern Martinez with [facility]. She presented for outpatient thoracentesis. On arrival, patient's oxygen saturation was 81% and placed on 2L. She underwent R thoracentesis with drain to gravity with removal of 1200 ml of yellow pleural fluid. Pleural fluid was sent for cytology. Post procedure patient developed pain and worsening hypoxia. CXR obtained and without PTX. With pain following thoracentesis, patient is felt to have trapped lung physiology and would not recommend removal of more than 1 liter of pleural fluid if thoracentesis is again necessary. O2 was increased and patient required O2 at 5 lpm to maintain sats in the 90s. She was admitted for hypoxemia and social work was consulted to assist with arrangement of home O2. O2 has been weaned to 2 lpm and patient is now felt stable for discharge home with o2 and Heme/Onc follow up.        LAB  Recent Labs      02/17/17   1717   WBC  2.9*   HGB  8.8*   HCT  26.7*   PLT  151   INR  1.3*     Recent Labs      02/17/17   1717   NA  145   K  4.4   CL  112*   CO2  23   BUN  35*   CREA  2.75*   CA  7.5*       Discharge Medications:   Current Discharge Medication List      CONTINUE these medications which have NOT CHANGED    Details   HYDROcodone-acetaminophen (NORCO)  mg tablet Take 1 Tab by mouth every four (4) hours as needed. Max Daily Amount: 6 Tabs. Qty: 90 Tab, Refills: 0      LORazepam (ATIVAN) 1 mg tablet Take 1 Tab by mouth every six (6) hours as needed for Anxiety. Max Daily Amount: 4 mg. Qty: 60 Tab, Refills: 0      montelukast (SINGULAIR) 10 mg tablet Take 1 Tab by mouth nightly. Qty: 30 Tab, Refills: 3    Associated Diagnoses: Malignant neoplasm of right female breast, unspecified site of breast ([address] Breast cancer metastasized to lung, unspecified laterality ([facility] [address] Cancer associated pain; Malignant neoplasm of left female breast, unspecified site of breast (HCC)      albuterol (PROVENTIL HFA, VENTOLIN HFA, PROAIR HFA) 90 mcg/actuation inhaler Take 2 Puffs by inhalation every six (6) hours as needed for Wheezing. Qty: 1 Inhaler, Refills: 11      glipiZIDE (GLUCOTROL) 5 mg tablet Take 1 Tab by mouth two (2) times a day. Qty: 60 Tab, Refills: 5    Associated Diagnoses: Bilateral malignant neoplasm of breast in female, unspecified site of breast (HCC)      palbociclib 125 mg cap Take 125 mg by mouth daily. Take one pill once a day for 3 weeks and then off for one week  Qty: 21 Tab, Refills: 5    Associated Diagnoses: Breast cancer metastasized to lung, unspecified laterality (HCC)      budesonide-formoterol (SYMBICORT) 160-4.5 mcg/actuation HFA inhaler Take 2 Puffs by inhalation two (2) times a day. Qty: 1 Inhaler, Refills: 11      atenolol (TENORMIN) 25 mg tablet Take 1 Tab by mouth every twelve (12) hours. Qty: 60 Tab, Refills: 0      metoclopramide (REGLAN) 5 mg/5 mL syrup Take 5 mL by mouth Before breakfast, lunch, dinner and at bedtime. Qty: 120 mL, Refills: 0      ALPRAZolam (XANAX) 0.25 mg tablet Take 1 Tab by mouth three (3) times daily as needed for Anxiety. Max Daily Amount: 0.75 mg. Qty: 90 Tab, Refills: 2      megestrol (MEGACE) 400 mg/10 mL (10 mL) suspension Take 5 mL by mouth daily.   Qty: 240 mL, Refills: 2    Associated Diagnoses: Anorexia potassium chloride (K-DUR, KLOR-CON) 20 mEq tablet Take 1 Tab by mouth daily. Qty: 30 Tab, Refills: 3    Associated Diagnoses: Hypokalemia      nystatin (MYCOSTATIN) 100,000 unit/mL suspension Take 2 mL by mouth four (4) times daily. swish and spit  Qty: 480 mL, Refills: 0      amlodipine (NORVASC) 10 mg tablet Take 1 tablet by mouth daily. Qty: 30 tablet, Refills: 11      calcium-vitamin D (OYSTER SHELL) 500 mg(1,250mg) -200 unit per tablet Take 1 Tab by mouth two (2) times daily (with meals). Qty: 60 Tab, Refills: 6      mirtazapine (REMERON) 30 mg tablet Take 1 Tab by mouth nightly. Qty: 30 Tab, Refills: 5    Associated Diagnoses: Malignant neoplasm of central portion of right female breast (HCC)      fentaNYL (DURAGESIC) 50 mcg/hr PATCH 1 Patch by TransDERmal route every seventy-two (72) hours. Max Daily Amount: 1 Patch. Indications: CHRONIC PAIN WITH OPIOID TOLERANCE  Qty: 10 Patch, Refills: 0    Associated Diagnoses: Breast cancer metastasized to lung, unspecified laterality ([facility] [address] Cancer associated pain; Malignant neoplasm of left female breast, unspecified site of breast ([facility] [address] Malignant neoplasm of right female breast, unspecified site of breast ([facility] [address]      warfarin (COUMADIN) 2 mg tablet Take 1 Tab by mouth daily. Qty: 30 Tab, Refills: 20    Associated Diagnoses: Deep vein thrombosis (DVT) of lower extremity, unspecified chronicity, unspecified laterality, unspecified vein (HCC)      zolpidem (AMBIEN) 10 mg tablet Take 1 Tab by mouth nightly as needed for Sleep. Max Daily Amount: 10 mg.  Qty: 30 Tab, Refills: 2    Associated Diagnoses: Primary insomnia           Condition on Discharge:    Stable    Followup/Outpt Studies:  --Follow up appointment with [facility] as previously scheduled  --[facility].  --Total discharge greater than 30 minutes in duration.     More than 50% of the time documented was spent in face-to-face contact with the patient and in the care of the patient on the floor/unit where the patient is located. Tai Balloon, NP    Effusion is starting to develop trapped lung physiology. Should limit drainage volume to 1L or less. Pleurex can be considered. Oxygen is being arranged as soon as ambulating oximetry has been completed. I have spoken with and examined the patient. I agree with the above assessment and plan as documented. Gen: pleasant, slightly dyspneic  Lungs:  Crackles on R>L  Heart:  RRR with no Murmur/Rubs/Gallops  Ext: no edema    --Discharge today with supplemental oxygen. --[facility]. Pleurex education information provided and can schedule pleurex if desired.     MD Zbigniew yAala MD

## 2017-02-18 NOTE — PROGRESS NOTES
Patient in bed resting with no complaints at this time. Patient is alert and orientated with no distress noted. IV intact and patent with no s/s of infection noted. Respirations even and unlabored with heart rate regular. Patient able to ambulate independently without assistance. Bed in low locked position with call light within reach. Will continue to monitor.

## 2017-02-18 NOTE — PROGRESS NOTES
Oxygen Qualifier       Room air: SpO2 with O2 and liter flow   Resting SpO2  84% 92% on 3L   Ambulating SpO2  79% 82% on 1L  84% on 2L  85% on 3L  86% on 4L  88% on 5L  92% on 6L     Pt ambulated and was able to return back to 3LPM NC during rest.  Completed by:    Pop Lay, RT

## 2017-02-18 NOTE — DISCHARGE INSTRUCTIONS
DISCHARGE SUMMARY from Nurse    The following personal items are in your possession at time of discharge:    Dental Appliances: None  Visual Aid: Glasses                            PATIENT INSTRUCTIONS:    After general anesthesia or intravenous sedation, for 24 hours or while taking prescription Narcotics:  · Limit your activities  · Do not drive and operate hazardous machinery  · Do not make important personal or business decisions  · Do  not drink alcoholic beverages  · If you have not urinated within 8 hours after discharge, please contact your surgeon on call. Report the following to your surgeon:  · Excessive pain, swelling, redness or odor of or around the surgical area  · Temperature over 100.5  · Nausea and vomiting lasting longer than 4 hours or if unable to take medications  · Any signs of decreased circulation or nerve impairment to extremity: change in color, persistent  numbness, tingling, coldness or increase pain  · Any questions        What to do at Home:  Recommended activity: Activity as tolerated. If you experience any of the following symptoms fever, chills, or fatigue unrelieved by rest, please follow up with PCP. *  Please give a list of your current medications to your Primary Care Provider. *  Please update this list whenever your medications are discontinued, doses are      changed, or new medications (including over-the-counter products) are added. *  Please carry medication information at all times in case of emergency situations. These are general instructions for a healthy lifestyle:    No smoking/ No tobacco products/ Avoid exposure to second hand smoke    Surgeon General's Warning:  Quitting smoking now greatly reduces serious risk to your health.     Obesity, smoking, and sedentary lifestyle greatly increases your risk for illness    A healthy diet, regular physical exercise & weight monitoring are important for maintaining a healthy lifestyle    You may be retaining fluid if you have a history of heart failure or if you experience any of the following symptoms:  Weight gain of 3 pounds or more overnight or 5 pounds in a week, increased swelling in our hands or feet or shortness of breath while lying flat in bed. Please call your doctor as soon as you notice any of these symptoms; do not wait until your next office visit. Recognize signs and symptoms of STROKE:    F-face looks uneven    A-arms unable to move or move unevenly    S-speech slurred or non-existent    T-time-call 911 as soon as signs and symptoms begin-DO NOT go       Back to bed or wait to see if you get better-TIME IS BRAIN. Warning Signs of HEART ATTACK     Call 911 if you have these symptoms:   Chest discomfort. Most heart attacks involve discomfort in the center of the chest that lasts more than a few minutes, or that goes away and comes back. It can feel like uncomfortable pressure, squeezing, fullness, or pain.  Discomfort in other areas of the upper body. Symptoms can include pain or discomfort in one or both arms, the back, neck, jaw, or stomach.  Shortness of breath with or without chest discomfort.  Other signs may include breaking out in a cold sweat, nausea, or lightheadedness. Don't wait more than five minutes to call 911 - MINUTES MATTER! Fast action can save your life. Calling 911 is almost always the fastest way to get lifesaving treatment. Emergency Medical Services staff can begin treatment when they arrive -- up to an hour sooner than if someone gets to the hospital by car. The discharge information has been reviewed with the patient. The patient verbalized understanding. Discharge medications reviewed with the patient and appropriate educational materials and side effects teaching were provided.                Falta de aire: Instrucciones de cuidado - [ Shortness of Breath: Care Instructions ]  Instrucciones de cuidado  La falta de aire tiene muchas causas. A veces, las afecciones [address] [place] [address], caterina la ansiedad, pueden ocasionar falta de Knebel. Algunas personas tienen kim leve falta de aire cuando hacen ejercicio. Las dificultades para respirar también pueden ser síntoma de un problema grave, caterina asma, enfermedad de los pulmones, enfisema, problemas en el corazón y neumonía. Si continúa merrill falta de aire, es posible que necesite exámenes y tratamiento. Esté alerta a cualquier cambio en la respiración y otros síntomas. La atención de seguimiento es kim parte clave de merrill tratamiento y seguridad. Asegúrese de hacer y acudir a todas las citas, y llame a merrill médico si está teniendo problemas. También es kim buena idea saber los resultados de los exámenes y mantener kim lista de los medicamentos que sharmila. ¿Cómo puede cuidarse en el hogar? · No fume ni permita que otros fumen cerca de usted. Si necesita ayuda para dejar de fumar, hable con merrill médico [employer] y medicamentos para dejar de fumar. Éstos pueden aumentar jesus probabilidades de dejar el hábito para siempre. · Descanse y duerma lo suficiente. · [employer] medicamentos exactamente caterina le fueron recetados. Llame a merrill médico si maico que está teniendo un problema con merrill medicamento. · Encuentre maneras saludables de [employer]. ¨ Olga Lidia ejercicio a diario. ¨ Duerma lo suficiente. ¨ Coma con regularidad y jaison. ¿Cuándo debe pedir ayuda? Llame al 911 en cualquier momento que considere que necesita atención de emergencia. Por ejemplo, llame si:  · Tiene kim grave falta de aire. · Tiene síntomas de un ataque al corazón. Estos podrían incluir:  ¨ Dolor o presión en el pecho, o kim sensación extraña en el pecho. ¨ Sudoración. ¨ Falta de aire. ¨ Náuseas o vómito. ¨ Dolor, presión o kim sensación extraña en la espalda, el thomas, la mandíbula, la parte superior del abdomen, o en jose o ambos hombros o brazos. ¨ Aturdimiento o debilidad repentina. ¨ Latidos cardíacos rápidos o irregulares.   Cuando llame al 911, es posible que el operador le diga que mastique 1 aspirina para adultos o 2 a 4 aspirinas de dosis baja. Espere a la ambulancia. No trate de conducir usted mismo un automóvil. Llame a merrill médico ahora mismo o busque atención médica inmediata si:  · La falta de aire empeora o Estefanía Pressman a tener respiración sibilante (con silbidos). La respiración sibilante es un nohemy abigail que se hace al respirar. · Se despierta en la noche sin aire o necesita elevar merrill rosemarie sobre varias almohadas para poder respirar. · Le falta el aire después de kim actividad suave o cuando está descansando. Preste especial atención a los cambios en merrill sherrie y asegúrese de comunicarse con merrill médico si:  · No mejora en los siguientes 1 a 2 días. ¿Dónde puede encontrar más información en inglés? [place] a http://rosie-abena.info/. Rosalynd Life S780 en la búsqueda para aprender más acerca de \"Falta de aire: Instrucciones de cuidado - [ Shortness of Breath: Care Instructions ]. \"  Revisado: 23 [place], 2016  Versión del contenido: 11.1  © 0735-4209 Healthwise, Incorporated. Las instrucciones de cuidado fueron adaptadas bajo licencia por [employer] (which disclaims liability or warranty for this information). Si usted tiene [place] afección médica o sobre estas instrucciones, siempre pregunte a merrill profesional de sherrie. Healthwise, Incorporated niega toda garantía o responsabilidad por merrill uso de esta información. Toracocentesis: Carlos Manuel Saucedo en el hogar - [ Thoracentesis: What to Expect at [facility] ]  Merrill recuperación  La toracocentesis es un procedimiento para extraer líquido del espacio entre los pulmones y la pared del pecho (cavidad pleural). Neda procedimiento también podría conocerse con el nombre de \"punción torácica\".  Es normal tener kim pequeña cantidad de líquido en el espacio pleural. Good puede acumularse demasiado líquido debido a problemas caterina infección, insuficiencia cardíaca o cáncer del pulmón. Es posible que el procedimiento se haya hecho para aliviar la falta de aire y el dolor causados por la acumulación de líquido. También es posible que se le haya hecho radha procedimiento para que el médico pueda analizar el líquido para encontrar la causa de la acumulación. Merrill pecho podría estar adolorido en el lugar donde el médico insertó la aguja o el catéter a través de la piel (el sitio de punción). Por lo general, esto mejora después de [employer]. Puede volver al [place] o jesus actividades habituales en cuanto se sienta capaz de hacerlo. Si el médico envió a analizar el líquido a un laboratorio, podría tardar varios días en [employer]. El médico o el enfermero(a) hablará con usted acerca de los [place]. Esta hoja de [employer] idea general del tiempo que le llevará recuperarse. Sin embargo, cada persona se recupera a un ritmo diferente. Siga los pasos que encontrará a continuación para mejorar con la mayor rapidez posible. ¿Cómo puede cuidarse en el hogar? Actividad  · Descanse cuando se sienta cansado. Dormir lo suficiente le ayudará a recuperarse. · Evite las actividades vigorosas, caterina montar en bicicleta, trotar, levantar pesas o hacer ejercicios aeróbicos, hasta que merrill médico lo autorice. · Puede ducharse. No tome ariel de inmersión en kim aaliyah hasta que el sitio de punción haya sanado o hasta que merrill médico lo apruebe. · Pregúntele a merrill médico cuándo puede volver a conducir. · Es posible que necesite ausentarse del trabajo fidelina 1 ó 2 días. [place] dependerá del tipo de trabajo que olivia y cómo se sienta. Dieta  · Puede continuar con merrill dieta normal.  · Britni abundantes líquidos (a menos que merrill médico le indique lo contrario). Medicamentos  · Merrill médico le dirá si puede volver a julian jesus medicamentos y cuándo puede volver a hacerlo. También le dará indicaciones sobre cualquier medicamento nuevo que deba julian usted.   · Si sharmila medicamentos que previenen la formación de coágulos de deb, caterina warfarina (Coumadin), clopidogrel (Plavix) o aspirina, asegúrese de hablar con merrill médico. Él o saeid le dirá si debe volver a julian estos medicamentos y en qué momento. Asegúrese de que entiende exactamente lo que el médico quiere que olivia. · [place] con los medicamentos. [place] los analgésicos (medicamentos para el dolor) exactamente caterina le fueron indicados. ¨ Si el médico le recetó un analgésico, tómelo según las indicaciones. ¨ Si no está tomando un analgésico recetado, pregúntele a merrill médico si puede julian jose de [place]. ¨ No tome dos o más analgésicos al mismo tiempo a menos que el médico se lo haya indicado. Muchos analgésicos contienen acetaminofén, es decir, Tylenol. El exceso de acetaminofén (Tylenol) puede ser dañino. · Si le parece que el analgésico le está produciendo revoltura del estómago:  ¨ [place] el medicamento después de las comidas (a menos que merrill médico le haya indicado lo contrario). ¨ [place] al médico un analgésico diferente. · Si merrill médico le recetó antibióticos, tómelos según las indicaciones. No deje de tomarlos por el hecho de sentirse mejor. Debe julian todos los antibióticos hasta terminarlos. Cuidado del sitio de punción  · Lave diariamente la farooq con agua jabonosa tibia y séquela con toques suaves de toalla. No use peróxido de hidrógeno [place]) o alcohol porque pueden retrasar la sanación. Puede cubrir la farooq con un vendaje de gasa si supura o roza contra la ropa. Cambie el vendaje todos los días. · Mantenga la farooq limpia y seca. La atención de seguimiento es kim parte clave de merrill tratamiento y seguridad. Asegúrese de hacer y acudir a todas las citas, y llame a merrill médico si está teniendo problemas. También es kim buena idea saber los resultados de los exámenes y mantener kim lista de los medicamentos que sharmila. ¿Cuándo debe pedir ayuda? Llame al 911 en cualquier momento que considere que necesita atención de emergencia. Por ejemplo, llame si:  · Se desmayó (perdió el conocimiento). · Tiene graves dificultades para respirar. · Tiene dolor repentino en el pecho y falta de K[place], o tose deb. Llame a merrill médico ahora mismo o busque atención médica inmediata si:  · Tiene nueva falta de aire o kim falta de aire que empeora. · Tiene nuevo dolor de pecho o éste empeora, especialmente al respirar profundamente. · Siente el estómago revuelto o no puede retener líquidos en el estómago. · Tiene fiebre de más de 100°F (37.8°C). · El vendaje que cubre el sitio de punción está empapado con deb de color lee vivo. · Tiene señales de infección, tales caterina:  ¨ Aumento del dolor, la hinchazón, el enrojecimiento o la temperatura. ¨ Vetas rojizas que comienzan donde está el sitio de punción. ¨ Pus que supura del sitio de punción. ¨ Ganglios linfáticos inflamados en el thomas, las axilas o la ester. Earnie Betsey. · Al toser expectora mucha más mucosidad (flema) que de costumbre o la mucosidad cambia de color. Preste especial atención a los cambios en merrill sherrie y asegúrese de comunicarse con merrill médico si tiene algún problema. ¿Dónde puede encontrar más información en inglés? Dacia Ramesh a http://rosie-abena.info/. [place] N216 en la búsqueda para aprender más acerca de \"Toracocentesis: Garza Lucretia en el hogar - [ Thoracentesis: What to Expect at [facility] ]. \"  Revisado: 23 [place], 2016  Versión del contenido: 11.1  © 1901-2015 Healthwise, Incorporated. Las instrucciones de cuidado fueron adaptadas bajo licencia por [employer] (which disclaims liability or warranty for this information). Si usted tiene [place] afección médica o sobre estas instrucciones, siempre pregunte a merrill profesional de sherrie. Healthwise, Incorporated niega toda garantía o responsabilidad por merrill uso de esta información. Call [phone] with any problems. Call next week [phone] for results of pleural fluid.   Review Pleurx information and call with questions. Wear home O2 at 2 lpm as ordered by Dr Michelle Sparrow.

## 2017-02-18 NOTE — PROGRESS NOTES
Patient given discharge instructions with verbal understanding. Patient taken down by staff via w/c to . Patient given paper copy of discharge instructions in [address] [facility] and [address]

## 2017-02-18 NOTE — PROGRESS NOTES
Pt resting in bed and is alert and oriented x 4. She denies pain and is on 4 L NC. RR even and unlabored. Call light in reach and pt instructed to call for assistance if needed. Will monitor.

## 2017-02-18 NOTE — PROGRESS NOTES
Patient asymptomatic, talking and answering questions appropriately. Patient given juice and peanut butter. Will recheck.

## 2017-02-20 LAB
ACID FAST STN SPEC: NEGATIVE
INR BLD: NORMAL
MYCOBACTERIUM SPEC QL CULT: NEGATIVE
SPECIMEN PREPARATION: NORMAL
SPECIMEN SOURCE: NORMAL

## 2017-02-21 NOTE — PROGRESS NOTES
[facility] (86 provider) needs a new prescription for 02 because the pt is saying they only need a 5 liter concentrator and not a 10 liter concentrator. The 10 liter concentrator is $100 more than the 5 liter and the pt cannot afford this and doesn't need it (according to the pt). Sw called pt's son and requested that the pt ask for a qualifier and a prescription for 02 at her next MD appt and have it sent to [facility]. Pt's son wasn't sure when the pt had her next appt but was going to follow up.

## 2017-02-22 ENCOUNTER — HOSPITAL ENCOUNTER (OUTPATIENT)
Dept: LAB | Age: 67
Discharge: HOME | End: 2017-02-22
Payer: SUBSIDIZED

## 2017-02-22 DIAGNOSIS — C50.919 BREAST CANCER METASTASIZED TO LUNG, UNSPECIFIED LATERALITY (HCC): Chronic | ICD-10-CM

## 2017-02-22 DIAGNOSIS — C78.00 BREAST CANCER METASTASIZED TO LUNG, UNSPECIFIED LATERALITY (HCC): Chronic | ICD-10-CM

## 2017-02-22 DIAGNOSIS — I82.409 DEEP VEIN THROMBOSIS (DVT) OF LOWER EXTREMITY, UNSPECIFIED CHRONICITY, UNSPECIFIED LATERALITY, UNSPECIFIED VEIN (HCC): ICD-10-CM

## 2017-02-22 LAB
ALBUMIN SERPL BCP-MCNC: 2.9 G/DL (ref 3.2–4.6)
ALBUMIN/GLOB SERPL: 0.8 {RATIO} (ref 1.2–3.5)
ALP SERPL-CCNC: 61 U/L (ref 50–136)
ALT SERPL-CCNC: 23 U/L (ref 12–65)
ANION GAP BLD CALC-SCNC: 9 MMOL/L (ref 7–16)
AST SERPL W P-5'-P-CCNC: 22 U/L (ref 15–37)
BILIRUB SERPL-MCNC: 0.4 MG/DL (ref 0.2–1.1)
BUN SERPL-MCNC: 46 MG/DL (ref 8–23)
CALCIUM SERPL-MCNC: 8.2 MG/DL (ref 8.3–10.4)
CHLORIDE SERPL-SCNC: 112 MMOL/L (ref 98–107)
CO2 SERPL-SCNC: 23 MMOL/L (ref 23–32)
CREAT SERPL-MCNC: 2.62 MG/DL (ref 0.6–1)
DIFFERENTIAL METHOD BLD: ABNORMAL
EOSINOPHIL # BLD: 0.1 K/UL (ref 0–0.8)
EOSINOPHIL NFR BLD MANUAL: 6 % (ref 1–8)
ERYTHROCYTE [DISTWIDTH] IN BLOOD BY AUTOMATED COUNT: 17.8 % (ref 11.9–14.6)
GLOBULIN SER CALC-MCNC: 3.7 G/DL (ref 2.3–3.5)
GLUCOSE SERPL-MCNC: 73 MG/DL (ref 65–100)
HCT VFR BLD AUTO: 26.6 % (ref 35.8–46.3)
HGB BLD-MCNC: 9 G/DL (ref 11.7–15.4)
INR PPP: 1.3 (ref 0.9–1.2)
LYMPHOCYTES # BLD: 0.9 K/UL (ref 0.5–4.6)
LYMPHOCYTES NFR BLD MANUAL: 36 % (ref 16–44)
MCH RBC QN AUTO: 34.2 PG (ref 26.1–32.9)
MCHC RBC AUTO-ENTMCNC: 33.8 G/DL (ref 31.4–35)
MCV RBC AUTO: 101.1 FL (ref 79.6–97.8)
NEUTS SEG # BLD: 1.4 K/UL (ref 1.7–8.2)
NEUTS SEG NFR BLD MANUAL: 58 % (ref 47–75)
NRBC # BLD: 0 K/UL (ref 0–0.2)
PLATELET # BLD AUTO: 170 K/UL (ref 150–450)
PLATELET COMMENTS,PCOM: ADEQUATE
PMV BLD AUTO: 10.6 FL (ref 10.8–14.1)
POTASSIUM SERPL-SCNC: 3.9 MMOL/L (ref 3.5–5.1)
PROT SERPL-MCNC: 6.6 G/DL (ref 6.3–8.2)
PROTHROMBIN TIME: 15.4 SEC (ref 9.6–12)
RBC # BLD AUTO: 2.63 M/UL (ref 4.05–5.25)
RBC MORPH BLD: ABNORMAL
SODIUM SERPL-SCNC: 144 MMOL/L (ref 136–145)
WBC # BLD AUTO: 2.4 K/UL (ref 4.3–11.1)
WBC MORPH BLD: ABNORMAL

## 2017-02-22 PROCEDURE — 85610 PROTHROMBIN TIME: CPT | Performed by: INTERNAL MEDICINE

## 2017-02-22 PROCEDURE — 80053 COMPREHEN METABOLIC PANEL: CPT | Performed by: NURSE PRACTITIONER

## 2017-02-22 PROCEDURE — 85025 COMPLETE CBC W/AUTO DIFF WBC: CPT | Performed by: NURSE PRACTITIONER

## 2017-02-22 PROCEDURE — 36415 COLL VENOUS BLD VENIPUNCTURE: CPT | Performed by: NURSE PRACTITIONER

## 2017-02-23 NOTE — PROCEDURES
THORACENTESIS -- RIGHT  Indication- recurrent right pleural effusion    Estimated Blood Loss-negligible    Postop Diagnosis-pleural effusion    After obtaining informed consent the patient was placed sitting up on the side of the bed and using ultrasound guidance the pleural fluid was located on the right  side  and marked. The diaphragm and atelectatic lung were clearly visualized. The patient's skin was cleaned with ChloraPrep. Using sterile technique the skin was anesthetized with 1% Xylocaine and a stab wound made and a catheter inserted into the pleural space and 1200 cc of fluid removed. The fluid was yellow. The patient tolerated the procedure well. The pleural fluid is sent for studies. Ultrasound revealed no evidence of pneumothorax. There where no complications and negligible blood loss. The patient was hypoxemic before and after the procedure and is admitted to manage her hypoxemia.     Garett Allred MD

## 2017-03-03 ENCOUNTER — HOSPITAL ENCOUNTER (OUTPATIENT)
Dept: LAB | Age: 67
Discharge: HOME | End: 2017-03-03
Payer: SUBSIDIZED

## 2017-03-03 DIAGNOSIS — I82.409 ACUTE DEEP VEIN THROMBOSIS (DVT) OF OTHER VEIN OF LOWER EXTREMITY: ICD-10-CM

## 2017-03-03 LAB
INR PPP: 2.3 (ref 0.9–1.2)
PROTHROMBIN TIME: 27.4 SEC (ref 9.6–12)

## 2017-03-03 PROCEDURE — 36416 COLLJ CAPILLARY BLOOD SPEC: CPT | Performed by: INTERNAL MEDICINE

## 2017-03-03 PROCEDURE — 85610 PROTHROMBIN TIME: CPT | Performed by: INTERNAL MEDICINE

## 2017-03-14 ENCOUNTER — HOSPITAL ENCOUNTER (OUTPATIENT)
Dept: LAB | Age: 67
Discharge: HOME | End: 2017-03-14
Payer: SUBSIDIZED

## 2017-03-14 ENCOUNTER — HOSPITAL ENCOUNTER (OUTPATIENT)
Dept: INFUSION THERAPY | Age: 67
Discharge: HOME | End: 2017-03-14
Payer: SUBSIDIZED

## 2017-03-14 DIAGNOSIS — C50.911 BILATERAL MALIGNANT NEOPLASM OF BREAST IN FEMALE, UNSPECIFIED SITE OF BREAST: ICD-10-CM

## 2017-03-14 DIAGNOSIS — T45.1X5A CHEMOTHERAPY-INDUCED NEUTROPENIA (HCC): ICD-10-CM

## 2017-03-14 DIAGNOSIS — C79.51 BONY METASTASIS (HCC): ICD-10-CM

## 2017-03-14 DIAGNOSIS — I82.409 ACUTE DEEP VEIN THROMBOSIS (DVT) OF OTHER VEIN OF LOWER EXTREMITY: ICD-10-CM

## 2017-03-14 DIAGNOSIS — C50.912 BILATERAL MALIGNANT NEOPLASM OF BREAST IN FEMALE, UNSPECIFIED SITE OF BREAST: ICD-10-CM

## 2017-03-14 DIAGNOSIS — D70.1 CHEMOTHERAPY-INDUCED NEUTROPENIA (HCC): ICD-10-CM

## 2017-03-14 LAB
ALBUMIN SERPL BCP-MCNC: 3.2 G/DL (ref 3.2–4.6)
ALBUMIN/GLOB SERPL: 0.8 {RATIO} (ref 1.2–3.5)
ALP SERPL-CCNC: 65 U/L (ref 50–136)
ALT SERPL-CCNC: 34 U/L (ref 12–65)
ANION GAP BLD CALC-SCNC: 8 MMOL/L (ref 7–16)
AST SERPL W P-5'-P-CCNC: 33 U/L (ref 15–37)
BASOPHILS # BLD AUTO: 0.1 K/UL (ref 0–0.2)
BASOPHILS # BLD: 2 % (ref 0–2)
BILIRUB SERPL-MCNC: 0.5 MG/DL (ref 0.2–1.1)
BUN SERPL-MCNC: 21 MG/DL (ref 8–23)
CALCIUM SERPL-MCNC: 9.1 MG/DL (ref 8.3–10.4)
CANCER AG15-3 SERPL-ACNC: 272.4 U/ML (ref 1–35)
CEA SERPL-MCNC: 30.8 NG/ML (ref 0–3)
CHLORIDE SERPL-SCNC: 109 MMOL/L (ref 98–107)
CO2 SERPL-SCNC: 27 MMOL/L (ref 23–32)
CREAT SERPL-MCNC: 1.71 MG/DL (ref 0.6–1)
DIFFERENTIAL METHOD BLD: ABNORMAL
EOSINOPHIL # BLD: 0.1 K/UL (ref 0–0.8)
EOSINOPHIL NFR BLD: 2 % (ref 0.5–7.8)
ERYTHROCYTE [DISTWIDTH] IN BLOOD BY AUTOMATED COUNT: 14.9 % (ref 11.9–14.6)
GLOBULIN SER CALC-MCNC: 3.9 G/DL (ref 2.3–3.5)
GLUCOSE SERPL-MCNC: 93 MG/DL (ref 65–100)
HCT VFR BLD AUTO: 29.5 % (ref 35.8–46.3)
HGB BLD-MCNC: 9.8 G/DL (ref 11.7–15.4)
INR PPP: 2.3 (ref 0.9–1.2)
LYMPHOCYTES # BLD AUTO: 22 % (ref 13–44)
LYMPHOCYTES # BLD: 1.3 K/UL (ref 0.5–4.6)
MAGNESIUM SERPL-MCNC: 2.7 MG/DL (ref 1.8–2.4)
MCH RBC QN AUTO: 33.4 PG (ref 26.1–32.9)
MCHC RBC AUTO-ENTMCNC: 33.2 G/DL (ref 31.4–35)
MCV RBC AUTO: 100.7 FL (ref 79.6–97.8)
MONOCYTES # BLD: 0.7 K/UL (ref 0.1–1.3)
MONOCYTES NFR BLD AUTO: 11 % (ref 4–12)
NEUTS SEG # BLD: 4 K/UL (ref 1.7–8.2)
NEUTS SEG NFR BLD AUTO: 64 % (ref 43–78)
NRBC # BLD: 0 K/UL (ref 0–0.2)
PHOSPHATE SERPL-MCNC: 3.9 MG/DL (ref 2.3–3.7)
PLATELET # BLD AUTO: 294 K/UL (ref 150–450)
PMV BLD AUTO: 9.8 FL (ref 10.8–14.1)
POTASSIUM SERPL-SCNC: 3.7 MMOL/L (ref 3.5–5.1)
PROT SERPL-MCNC: 7.1 G/DL (ref 6.3–8.2)
PROTHROMBIN TIME: 27.9 SEC (ref 9.6–12)
RBC # BLD AUTO: 2.93 M/UL (ref 4.05–5.25)
SODIUM SERPL-SCNC: 144 MMOL/L (ref 136–145)
WBC # BLD AUTO: 6.2 K/UL (ref 4.3–11.1)

## 2017-03-14 PROCEDURE — 74011250636 HC RX REV CODE- 250/636: Performed by: INTERNAL MEDICINE

## 2017-03-14 PROCEDURE — 85610 PROTHROMBIN TIME: CPT | Performed by: INTERNAL MEDICINE

## 2017-03-14 PROCEDURE — 96372 THER/PROPH/DIAG INJ SC/IM: CPT

## 2017-03-14 PROCEDURE — 84100 ASSAY OF PHOSPHORUS: CPT | Performed by: INTERNAL MEDICINE

## 2017-03-14 PROCEDURE — 83735 ASSAY OF MAGNESIUM: CPT | Performed by: INTERNAL MEDICINE

## 2017-03-14 PROCEDURE — 82378 CARCINOEMBRYONIC ANTIGEN: CPT | Performed by: INTERNAL MEDICINE

## 2017-03-14 PROCEDURE — 86300 IMMUNOASSAY TUMOR CA 15-3: CPT | Performed by: INTERNAL MEDICINE

## 2017-03-14 PROCEDURE — 96402 CHEMO HORMON ANTINEOPL SQ/IM: CPT

## 2017-03-14 PROCEDURE — 80053 COMPREHEN METABOLIC PANEL: CPT | Performed by: INTERNAL MEDICINE

## 2017-03-14 PROCEDURE — 36415 COLL VENOUS BLD VENIPUNCTURE: CPT | Performed by: INTERNAL MEDICINE

## 2017-03-14 PROCEDURE — 85025 COMPLETE CBC W/AUTO DIFF WBC: CPT | Performed by: INTERNAL MEDICINE

## 2017-03-14 RX ORDER — LAMOTRIGINE 25 MG/1
500 TABLET ORAL ONCE
Status: COMPLETED | OUTPATIENT
Start: 2017-03-14 | End: 2017-03-14

## 2017-03-14 RX ADMIN — FULVESTRANT 500 MG: 50 INJECTION INTRAMUSCULAR at 12:32

## 2017-03-14 RX ADMIN — DENOSUMAB 120 MG: 120 INJECTION SUBCUTANEOUS at 12:31

## 2017-03-14 NOTE — PROGRESS NOTES
Arrived to the [facility]. Gisela Yip and Faslodex completed. Patient tolerated well. Any issues or concerns during appointment: none. Patient aware of next infusion appointment on 4/11/17 at 0915. Discharged in wheelchair with .

## 2017-03-15 LAB — CANCER AG27-29 SERPL-ACNC: 330.2 U/ML (ref 0–38.6)

## 2017-03-28 ENCOUNTER — HOSPITAL ENCOUNTER (OUTPATIENT)
Dept: LAB | Age: 67
Discharge: HOME | End: 2017-03-28
Payer: SUBSIDIZED

## 2017-03-28 DIAGNOSIS — I82.409 ACUTE DEEP VEIN THROMBOSIS (DVT) OF OTHER VEIN OF LOWER EXTREMITY: ICD-10-CM

## 2017-03-28 LAB
INR PPP: 5.1 (ref 0.9–1.2)
PROTHROMBIN TIME: 61.7 SEC (ref 9.6–12)

## 2017-03-28 PROCEDURE — 85610 PROTHROMBIN TIME: CPT | Performed by: INTERNAL MEDICINE

## 2017-03-28 PROCEDURE — 36416 COLLJ CAPILLARY BLOOD SPEC: CPT | Performed by: INTERNAL MEDICINE

## 2017-03-31 ENCOUNTER — HOSPITAL ENCOUNTER (OUTPATIENT)
Dept: LAB | Age: 67
Discharge: HOME | End: 2017-03-31
Payer: SUBSIDIZED

## 2017-03-31 DIAGNOSIS — I82.409 ACUTE DEEP VEIN THROMBOSIS (DVT) OF OTHER VEIN OF LOWER EXTREMITY: ICD-10-CM

## 2017-03-31 LAB
INR PPP: 3.4 (ref 0.9–1.2)
PROTHROMBIN TIME: 40.5 SEC (ref 9.6–12)

## 2017-03-31 PROCEDURE — 85610 PROTHROMBIN TIME: CPT | Performed by: INTERNAL MEDICINE

## 2017-03-31 PROCEDURE — 36416 COLLJ CAPILLARY BLOOD SPEC: CPT | Performed by: INTERNAL MEDICINE

## 2017-04-11 ENCOUNTER — HOSPITAL ENCOUNTER (OUTPATIENT)
Dept: LAB | Age: 67
Discharge: HOME | End: 2017-04-11
Payer: SUBSIDIZED

## 2017-04-11 ENCOUNTER — HOSPITAL ENCOUNTER (OUTPATIENT)
Dept: GENERAL RADIOLOGY | Age: 67
Discharge: HOME | End: 2017-04-11
Attending: INTERNAL MEDICINE
Payer: SUBSIDIZED

## 2017-04-11 ENCOUNTER — HOSPITAL ENCOUNTER (OUTPATIENT)
Dept: INFUSION THERAPY | Age: 67
Discharge: HOME | End: 2017-04-11
Payer: SUBSIDIZED

## 2017-04-11 DIAGNOSIS — D70.1 CHEMOTHERAPY-INDUCED NEUTROPENIA (HCC): ICD-10-CM

## 2017-04-11 DIAGNOSIS — C78.00 BREAST CANCER METASTASIZED TO LUNG, UNSPECIFIED LATERALITY (HCC): Chronic | ICD-10-CM

## 2017-04-11 DIAGNOSIS — J90 PLEURAL EFFUSION, RIGHT: ICD-10-CM

## 2017-04-11 DIAGNOSIS — C50.919 BREAST CANCER METASTASIZED TO LUNG, UNSPECIFIED LATERALITY (HCC): Chronic | ICD-10-CM

## 2017-04-11 DIAGNOSIS — I50.22 CHRONIC SYSTOLIC CONGESTIVE HEART FAILURE (HCC): ICD-10-CM

## 2017-04-11 DIAGNOSIS — T45.1X5A CHEMOTHERAPY-INDUCED NEUTROPENIA (HCC): ICD-10-CM

## 2017-04-11 DIAGNOSIS — C79.51 BONY METASTASIS (HCC): ICD-10-CM

## 2017-04-11 DIAGNOSIS — I82.409 ACUTE DEEP VEIN THROMBOSIS (DVT) OF OTHER VEIN OF LOWER EXTREMITY: ICD-10-CM

## 2017-04-11 LAB
ALBUMIN SERPL BCP-MCNC: 2.9 G/DL (ref 3.2–4.6)
ALBUMIN/GLOB SERPL: 0.7 {RATIO} (ref 1.2–3.5)
ALP SERPL-CCNC: 54 U/L (ref 50–136)
ALT SERPL-CCNC: 20 U/L (ref 12–65)
ANION GAP BLD CALC-SCNC: 8 MMOL/L (ref 7–16)
AST SERPL W P-5'-P-CCNC: 19 U/L (ref 15–37)
BILIRUB SERPL-MCNC: 0.3 MG/DL (ref 0.2–1.1)
BNP SERPL-MCNC: 449 PG/ML
BUN SERPL-MCNC: 32 MG/DL (ref 8–23)
CALCIUM SERPL-MCNC: 8.1 MG/DL (ref 8.3–10.4)
CANCER AG15-3 SERPL-ACNC: 258.3 U/ML (ref 1–35)
CEA SERPL-MCNC: 32.9 NG/ML (ref 0–3)
CHLORIDE SERPL-SCNC: 110 MMOL/L (ref 98–107)
CO2 SERPL-SCNC: 25 MMOL/L (ref 23–32)
CREAT SERPL-MCNC: 1.99 MG/DL (ref 0.6–1)
DIFFERENTIAL METHOD BLD: ABNORMAL
EOSINOPHIL # BLD: 0.1 K/UL (ref 0–0.8)
EOSINOPHIL NFR BLD MANUAL: 2 % (ref 1–8)
ERYTHROCYTE [DISTWIDTH] IN BLOOD BY AUTOMATED COUNT: 14.6 % (ref 11.9–14.6)
GLOBULIN SER CALC-MCNC: 3.9 G/DL (ref 2.3–3.5)
GLUCOSE SERPL-MCNC: 63 MG/DL (ref 65–100)
HCT VFR BLD AUTO: 24.5 % (ref 35.8–46.3)
HGB BLD-MCNC: 8.2 G/DL (ref 11.7–15.4)
INR PPP: 3.8 (ref 0.9–1.2)
LYMPHOCYTES # BLD: 0.5 K/UL (ref 0.5–4.6)
LYMPHOCYTES NFR BLD MANUAL: 18 % (ref 16–44)
MAGNESIUM SERPL-MCNC: 2.9 MG/DL (ref 1.8–2.4)
MCH RBC QN AUTO: 33.5 PG (ref 26.1–32.9)
MCHC RBC AUTO-ENTMCNC: 33.5 G/DL (ref 31.4–35)
MCV RBC AUTO: 100 FL (ref 79.6–97.8)
MONOCYTES # BLD: 0.1 K/UL (ref 0.1–1.3)
MONOCYTES NFR BLD MANUAL: 4 % (ref 3–9)
NEUTS SEG # BLD: 2.2 K/UL (ref 1.7–8.2)
NEUTS SEG NFR BLD MANUAL: 76 % (ref 47–75)
NRBC # BLD: 0 K/UL (ref 0–0.2)
PLATELET # BLD AUTO: 130 K/UL (ref 150–450)
PLATELET COMMENTS,PCOM: ADEQUATE
PMV BLD AUTO: 10.5 FL (ref 10.8–14.1)
POTASSIUM SERPL-SCNC: 3.7 MMOL/L (ref 3.5–5.1)
PROT SERPL-MCNC: 6.8 G/DL (ref 6.3–8.2)
PROTHROMBIN TIME: 45.4 SEC (ref 9.6–12)
RBC # BLD AUTO: 2.45 M/UL (ref 4.05–5.25)
RBC MORPH BLD: ABNORMAL
RBC MORPH BLD: ABNORMAL
SODIUM SERPL-SCNC: 143 MMOL/L (ref 136–145)
WBC # BLD AUTO: 2.9 K/UL (ref 4.3–11.1)
WBC MORPH BLD: ABNORMAL

## 2017-04-11 PROCEDURE — 96372 THER/PROPH/DIAG INJ SC/IM: CPT

## 2017-04-11 PROCEDURE — 86300 IMMUNOASSAY TUMOR CA 15-3: CPT | Performed by: INTERNAL MEDICINE

## 2017-04-11 PROCEDURE — 80053 COMPREHEN METABOLIC PANEL: CPT | Performed by: INTERNAL MEDICINE

## 2017-04-11 PROCEDURE — 83735 ASSAY OF MAGNESIUM: CPT | Performed by: INTERNAL MEDICINE

## 2017-04-11 PROCEDURE — 83880 ASSAY OF NATRIURETIC PEPTIDE: CPT | Performed by: INTERNAL MEDICINE

## 2017-04-11 PROCEDURE — 85610 PROTHROMBIN TIME: CPT | Performed by: INTERNAL MEDICINE

## 2017-04-11 PROCEDURE — 96402 CHEMO HORMON ANTINEOPL SQ/IM: CPT

## 2017-04-11 PROCEDURE — 71020 XR CHEST PA LAT: CPT

## 2017-04-11 PROCEDURE — 74011250636 HC RX REV CODE- 250/636: Performed by: INTERNAL MEDICINE

## 2017-04-11 PROCEDURE — 85025 COMPLETE CBC W/AUTO DIFF WBC: CPT | Performed by: INTERNAL MEDICINE

## 2017-04-11 PROCEDURE — 82378 CARCINOEMBRYONIC ANTIGEN: CPT | Performed by: INTERNAL MEDICINE

## 2017-04-11 PROCEDURE — 36415 COLL VENOUS BLD VENIPUNCTURE: CPT | Performed by: INTERNAL MEDICINE

## 2017-04-11 RX ORDER — LAMOTRIGINE 25 MG/1
500 TABLET ORAL ONCE
Status: COMPLETED | OUTPATIENT
Start: 2017-04-11 | End: 2017-04-11

## 2017-04-11 RX ADMIN — DENOSUMAB 120 MG: 120 INJECTION SUBCUTANEOUS at 11:20

## 2017-04-11 RX ADMIN — FULVESTRANT 500 MG: 50 INJECTION INTRAMUSCULAR at 11:15

## 2017-04-11 NOTE — PROGRESS NOTES
Arrived to the [facility]. Faslodex/xgeva administered. Patient tolerated well. Any issues or concerns during appointment: None. Patient aware of next infusion appointment on May 16th at [address] [place] [address]. Discharged in wheelchair accompanied by son.

## 2017-04-12 LAB — CANCER AG27-29 SERPL-ACNC: 304.2 U/ML (ref 0–38.6)

## 2017-04-13 ENCOUNTER — HOSPITAL ENCOUNTER (OUTPATIENT)
Dept: LAB | Age: 67
Discharge: HOME | End: 2017-04-13
Payer: SUBSIDIZED

## 2017-04-13 DIAGNOSIS — I82.409 ACUTE DEEP VEIN THROMBOSIS (DVT) OF OTHER VEIN OF LOWER EXTREMITY: ICD-10-CM

## 2017-04-13 DIAGNOSIS — N36.8 BLOODY URETHRAL DISCHARGE: ICD-10-CM

## 2017-04-13 LAB
APPEARANCE UR: ABNORMAL
BACTERIA URNS QL MICRO: NORMAL /HPF
BILIRUB UR QL: NEGATIVE
CASTS URNS QL MICRO: NORMAL /LPF
COLOR UR: YELLOW
CRYSTALS URNS QL MICRO: 0 /LPF
EPI CELLS #/AREA URNS HPF: NORMAL /HPF
GLUCOSE UR STRIP.AUTO-MCNC: NEGATIVE MG/DL
HGB UR QL STRIP: ABNORMAL
INR PPP: 2.5 (ref 0.9–1.2)
KETONES UR QL STRIP.AUTO: NEGATIVE MG/DL
LEUKOCYTE ESTERASE UR QL STRIP.AUTO: ABNORMAL
MUCOUS THREADS URNS QL MICRO: 0 /LPF
NITRITE UR QL STRIP.AUTO: NEGATIVE
PH UR STRIP: 6 [PH] (ref 5–9)
PROT UR STRIP-MCNC: ABNORMAL MG/DL
PROTHROMBIN TIME: 30.4 SEC (ref 9.6–12)
RBC #/AREA URNS HPF: >100 /HPF
SP GR UR REFRACTOMETRY: 1.02 (ref 1–1.02)
UROBILINOGEN UR QL STRIP.AUTO: 0.2 EU/DL (ref 0.2–1)
WBC URNS QL MICRO: NORMAL /HPF

## 2017-04-13 PROCEDURE — 85610 PROTHROMBIN TIME: CPT | Performed by: NURSE PRACTITIONER

## 2017-04-13 PROCEDURE — 81015 MICROSCOPIC EXAM OF URINE: CPT | Performed by: INTERNAL MEDICINE

## 2017-04-13 PROCEDURE — 36416 COLLJ CAPILLARY BLOOD SPEC: CPT | Performed by: NURSE PRACTITIONER

## 2017-04-13 PROCEDURE — 81003 URINALYSIS AUTO W/O SCOPE: CPT | Performed by: INTERNAL MEDICINE

## 2017-04-20 ENCOUNTER — HOSPITAL ENCOUNTER (OUTPATIENT)
Dept: LAB | Age: 67
Discharge: HOME | End: 2017-04-20
Payer: SUBSIDIZED

## 2017-04-20 DIAGNOSIS — N36.8 BLOODY URETHRAL DISCHARGE: ICD-10-CM

## 2017-04-20 DIAGNOSIS — C50.111 MALIGNANT NEOPLASM OF CENTRAL PORTION OF RIGHT FEMALE BREAST (HCC): Chronic | ICD-10-CM

## 2017-04-20 DIAGNOSIS — R31.9 HEMATURIA: ICD-10-CM

## 2017-04-20 LAB
BASOPHILS # BLD AUTO: 0 K/UL (ref 0–0.2)
BASOPHILS # BLD: 1 % (ref 0–2)
DIFFERENTIAL METHOD BLD: ABNORMAL
EOSINOPHIL # BLD: 0 K/UL (ref 0–0.8)
EOSINOPHIL NFR BLD: 1 % (ref 0.5–7.8)
ERYTHROCYTE [DISTWIDTH] IN BLOOD BY AUTOMATED COUNT: 16.9 % (ref 11.9–14.6)
HCT VFR BLD AUTO: 25.7 % (ref 35.8–46.3)
HGB BLD-MCNC: 8.5 G/DL (ref 11.7–15.4)
INR PPP: 3 (ref 0.9–1.2)
LYMPHOCYTES # BLD AUTO: 29 % (ref 13–44)
LYMPHOCYTES # BLD: 1.1 K/UL (ref 0.5–4.6)
MCH RBC QN AUTO: 34.1 PG (ref 26.1–32.9)
MCHC RBC AUTO-ENTMCNC: 33.1 G/DL (ref 31.4–35)
MCV RBC AUTO: 103.2 FL (ref 79.6–97.8)
MONOCYTES # BLD: 0.6 K/UL (ref 0.1–1.3)
MONOCYTES NFR BLD AUTO: 15 % (ref 4–12)
NEUTS SEG # BLD: 2.2 K/UL (ref 1.7–8.2)
NEUTS SEG NFR BLD AUTO: 55 % (ref 43–78)
NRBC # BLD: 0 K/UL (ref 0–0.2)
PLATELET # BLD AUTO: 154 K/UL (ref 150–450)
PMV BLD AUTO: 11 FL (ref 10.8–14.1)
PROTHROMBIN TIME: 35.4 SEC (ref 9.6–12)
RBC # BLD AUTO: 2.49 M/UL (ref 4.05–5.25)
WBC # BLD AUTO: 4 K/UL (ref 4.3–11.1)

## 2017-04-20 PROCEDURE — 85610 PROTHROMBIN TIME: CPT | Performed by: INTERNAL MEDICINE

## 2017-04-20 PROCEDURE — 36416 COLLJ CAPILLARY BLOOD SPEC: CPT | Performed by: INTERNAL MEDICINE

## 2017-04-20 PROCEDURE — 85025 COMPLETE CBC W/AUTO DIFF WBC: CPT | Performed by: NURSE PRACTITIONER

## 2017-05-04 ENCOUNTER — HOSPITAL ENCOUNTER (OUTPATIENT)
Dept: LAB | Age: 67
Discharge: HOME | End: 2017-05-04
Payer: SUBSIDIZED

## 2017-05-04 DIAGNOSIS — I82.409 ACUTE DEEP VEIN THROMBOSIS (DVT) OF OTHER VEIN OF LOWER EXTREMITY: ICD-10-CM

## 2017-05-04 LAB
INR PPP: 3.4 (ref 0.9–1.2)
PROTHROMBIN TIME: 40.7 SEC (ref 9.6–12)

## 2017-05-04 PROCEDURE — 36416 COLLJ CAPILLARY BLOOD SPEC: CPT | Performed by: INTERNAL MEDICINE

## 2017-05-04 PROCEDURE — 85610 PROTHROMBIN TIME: CPT | Performed by: INTERNAL MEDICINE

## 2017-05-11 ENCOUNTER — HOSPITAL ENCOUNTER (OUTPATIENT)
Dept: LAB | Age: 67
Discharge: HOME | End: 2017-05-11
Payer: SUBSIDIZED

## 2017-05-11 DIAGNOSIS — I82.409 ACUTE DEEP VEIN THROMBOSIS (DVT) OF OTHER VEIN OF LOWER EXTREMITY: ICD-10-CM

## 2017-05-11 LAB
INR PPP: 2.3 (ref 0.9–1.2)
PROTHROMBIN TIME: 28 SEC (ref 9.6–12)

## 2017-05-11 PROCEDURE — 85610 PROTHROMBIN TIME: CPT | Performed by: INTERNAL MEDICINE

## 2017-05-11 PROCEDURE — 36416 COLLJ CAPILLARY BLOOD SPEC: CPT | Performed by: INTERNAL MEDICINE

## 2017-05-16 ENCOUNTER — HOSPITAL ENCOUNTER (OUTPATIENT)
Dept: INFUSION THERAPY | Age: 67
Discharge: HOME | End: 2017-05-16
Payer: SELF-PAY

## 2017-05-16 ENCOUNTER — HOSPITAL ENCOUNTER (OUTPATIENT)
Dept: LAB | Age: 67
Discharge: HOME | End: 2017-05-16
Payer: SUBSIDIZED

## 2017-05-16 DIAGNOSIS — C78.00 BREAST CANCER METASTASIZED TO LUNG, UNSPECIFIED LATERALITY (HCC): ICD-10-CM

## 2017-05-16 DIAGNOSIS — C50.111 MALIGNANT NEOPLASM OF CENTRAL PORTION OF RIGHT FEMALE BREAST (HCC): Chronic | ICD-10-CM

## 2017-05-16 DIAGNOSIS — C50.919 BREAST CANCER METASTASIZED TO LUNG, UNSPECIFIED LATERALITY (HCC): ICD-10-CM

## 2017-05-16 DIAGNOSIS — C79.51 BONY METASTASIS (HCC): ICD-10-CM

## 2017-05-16 DIAGNOSIS — I82.409 ACUTE DEEP VEIN THROMBOSIS (DVT) OF OTHER VEIN OF LOWER EXTREMITY: ICD-10-CM

## 2017-05-16 LAB
ALBUMIN SERPL BCP-MCNC: 2.8 G/DL (ref 3.2–4.6)
ALBUMIN/GLOB SERPL: 0.7 {RATIO} (ref 1.2–3.5)
ALP SERPL-CCNC: 60 U/L (ref 50–136)
ALT SERPL-CCNC: 18 U/L (ref 12–65)
ANION GAP BLD CALC-SCNC: 7 MMOL/L (ref 7–16)
AST SERPL W P-5'-P-CCNC: 20 U/L (ref 15–37)
BASOPHILS # BLD AUTO: 0 K/UL (ref 0–0.2)
BASOPHILS # BLD: 0 % (ref 0–2)
BILIRUB SERPL-MCNC: 0.6 MG/DL (ref 0.2–1.1)
BUN SERPL-MCNC: 27 MG/DL (ref 8–23)
CALCIUM SERPL-MCNC: 8 MG/DL (ref 8.3–10.4)
CANCER AG15-3 SERPL-ACNC: 284.9 U/ML (ref 1–35)
CEA SERPL-MCNC: 38.1 NG/ML (ref 0–3)
CHLORIDE SERPL-SCNC: 106 MMOL/L (ref 98–107)
CO2 SERPL-SCNC: 27 MMOL/L (ref 21–32)
CREAT SERPL-MCNC: 1.72 MG/DL (ref 0.6–1)
DIFFERENTIAL METHOD BLD: ABNORMAL
EOSINOPHIL # BLD: 0 K/UL (ref 0–0.8)
EOSINOPHIL NFR BLD: 1 % (ref 0.5–7.8)
ERYTHROCYTE [DISTWIDTH] IN BLOOD BY AUTOMATED COUNT: 18.9 % (ref 11.9–14.6)
GLOBULIN SER CALC-MCNC: 4.1 G/DL (ref 2.3–3.5)
GLUCOSE SERPL-MCNC: 120 MG/DL (ref 65–100)
HCT VFR BLD AUTO: 22.8 % (ref 35.8–46.3)
HGB BLD-MCNC: 7.5 G/DL (ref 11.7–15.4)
INR PPP: 4.3 (ref 0.9–1.2)
LYMPHOCYTES # BLD AUTO: 22 % (ref 13–44)
LYMPHOCYTES # BLD: 0.8 K/UL (ref 0.5–4.6)
MCH RBC QN AUTO: 35.4 PG (ref 26.1–32.9)
MCHC RBC AUTO-ENTMCNC: 32.9 G/DL (ref 31.4–35)
MCV RBC AUTO: 107.5 FL (ref 79.6–97.8)
MONOCYTES # BLD: 0.4 K/UL (ref 0.1–1.3)
MONOCYTES NFR BLD AUTO: 11 % (ref 4–12)
NEUTS SEG # BLD: 2.4 K/UL (ref 1.7–8.2)
NEUTS SEG NFR BLD AUTO: 66 % (ref 43–78)
NRBC # BLD: 0.01 K/UL (ref 0–0.2)
PLATELET # BLD AUTO: 180 K/UL (ref 150–450)
PMV BLD AUTO: 10.9 FL (ref 10.8–14.1)
POTASSIUM SERPL-SCNC: 3.7 MMOL/L (ref 3.5–5.1)
PROT SERPL-MCNC: 6.9 G/DL (ref 6.3–8.2)
PROTHROMBIN TIME: 51.2 SEC (ref 9.6–12)
RBC # BLD AUTO: 2.12 M/UL (ref 4.05–5.25)
SODIUM SERPL-SCNC: 140 MMOL/L (ref 136–145)
WBC # BLD AUTO: 3.6 K/UL (ref 4.3–11.1)

## 2017-05-16 PROCEDURE — 82378 CARCINOEMBRYONIC ANTIGEN: CPT | Performed by: INTERNAL MEDICINE

## 2017-05-16 PROCEDURE — 74011250636 HC RX REV CODE- 250/636: Performed by: NURSE PRACTITIONER

## 2017-05-16 PROCEDURE — 86300 IMMUNOASSAY TUMOR CA 15-3: CPT | Performed by: INTERNAL MEDICINE

## 2017-05-16 PROCEDURE — 80053 COMPREHEN METABOLIC PANEL: CPT | Performed by: INTERNAL MEDICINE

## 2017-05-16 PROCEDURE — 36415 COLL VENOUS BLD VENIPUNCTURE: CPT | Performed by: INTERNAL MEDICINE

## 2017-05-16 PROCEDURE — 96402 CHEMO HORMON ANTINEOPL SQ/IM: CPT

## 2017-05-16 PROCEDURE — 85610 PROTHROMBIN TIME: CPT | Performed by: INTERNAL MEDICINE

## 2017-05-16 PROCEDURE — 96372 THER/PROPH/DIAG INJ SC/IM: CPT

## 2017-05-16 PROCEDURE — 85025 COMPLETE CBC W/AUTO DIFF WBC: CPT | Performed by: INTERNAL MEDICINE

## 2017-05-16 RX ORDER — LAMOTRIGINE 25 MG/1
500 TABLET ORAL ONCE
Status: COMPLETED | OUTPATIENT
Start: 2017-05-16 | End: 2017-05-16

## 2017-05-16 RX ADMIN — DENOSUMAB 120 MG: 120 INJECTION SUBCUTANEOUS at 10:40

## 2017-05-16 RX ADMIN — FULVESTRANT 500 MG: 50 INJECTION INTRAMUSCULAR at 10:32

## 2017-05-16 NOTE — PROGRESS NOTES
Pt arrived via w/c to [facility]. faslodex given IM to bilateral hips. xgeva given sq to abdomen. Pt aware of next appt with MD on 6/14/17 No future appts with [facility] at this time.  Pt discharged via w/c

## 2017-05-17 LAB — CANCER AG27-29 SERPL-ACNC: 323.9 U/ML (ref 0–38.6)

## 2017-05-23 ENCOUNTER — HOSPITAL ENCOUNTER (OUTPATIENT)
Dept: LAB | Age: 67
Discharge: HOME | End: 2017-05-23
Payer: SUBSIDIZED

## 2017-05-23 DIAGNOSIS — C50.111 MALIGNANT NEOPLASM OF CENTRAL PORTION OF RIGHT FEMALE BREAST (HCC): Chronic | ICD-10-CM

## 2017-05-23 DIAGNOSIS — M79.89 LEG SWELLING: ICD-10-CM

## 2017-05-23 DIAGNOSIS — C78.00 BREAST CANCER METASTASIZED TO LUNG, UNSPECIFIED LATERALITY (HCC): Chronic | ICD-10-CM

## 2017-05-23 DIAGNOSIS — R60.0 BILATERAL EDEMA OF LOWER EXTREMITY: ICD-10-CM

## 2017-05-23 DIAGNOSIS — C50.919 BREAST CANCER METASTASIZED TO LUNG, UNSPECIFIED LATERALITY (HCC): Chronic | ICD-10-CM

## 2017-05-23 DIAGNOSIS — R06.02 SOB (SHORTNESS OF BREATH) ON EXERTION: ICD-10-CM

## 2017-05-23 DIAGNOSIS — C79.51 BONY METASTASIS (HCC): Chronic | ICD-10-CM

## 2017-05-23 DIAGNOSIS — I82.409 ACUTE DEEP VEIN THROMBOSIS (DVT) OF OTHER VEIN OF LOWER EXTREMITY: ICD-10-CM

## 2017-05-23 LAB
INR PPP: 4.9 (ref 0.9–1.2)
PROTHROMBIN TIME: 59 SEC (ref 9.6–12)

## 2017-05-23 PROCEDURE — 36416 COLLJ CAPILLARY BLOOD SPEC: CPT | Performed by: INTERNAL MEDICINE

## 2017-05-23 PROCEDURE — 85610 PROTHROMBIN TIME: CPT | Performed by: INTERNAL MEDICINE

## 2017-05-24 ENCOUNTER — APPOINTMENT (OUTPATIENT)
Dept: GENERAL RADIOLOGY | Age: 67
DRG: 208 | End: 2017-05-24
Attending: INTERNAL MEDICINE
Payer: SUBSIDIZED

## 2017-05-24 ENCOUNTER — APPOINTMENT (OUTPATIENT)
Dept: GENERAL RADIOLOGY | Age: 67
DRG: 208 | End: 2017-05-24
Attending: EMERGENCY MEDICINE
Payer: SUBSIDIZED

## 2017-05-24 ENCOUNTER — HOSPITAL ENCOUNTER (INPATIENT)
Age: 67
LOS: 9 days | Discharge: HOME HEALTH | DRG: 208 | End: 2017-06-02
Attending: EMERGENCY MEDICINE | Admitting: INTERNAL MEDICINE
Payer: SUBSIDIZED

## 2017-05-24 DIAGNOSIS — J18.9 PNEUMONIA OF BOTH LUNGS DUE TO INFECTIOUS ORGANISM, UNSPECIFIED PART OF LUNG: Primary | ICD-10-CM

## 2017-05-24 DIAGNOSIS — N18.9 CKD (CHRONIC KIDNEY DISEASE), UNSPECIFIED STAGE: Chronic | ICD-10-CM

## 2017-05-24 DIAGNOSIS — I82.409 ACUTE DEEP VEIN THROMBOSIS (DVT) OF OTHER VEIN OF LOWER EXTREMITY: Chronic | ICD-10-CM

## 2017-05-24 DIAGNOSIS — I35.0 MODERATE AORTIC STENOSIS: Chronic | ICD-10-CM

## 2017-05-24 DIAGNOSIS — C50.919 BREAST CANCER METASTASIZED TO LUNG, UNSPECIFIED LATERALITY (HCC): Chronic | ICD-10-CM

## 2017-05-24 DIAGNOSIS — R60.0 BILATERAL EDEMA OF LOWER EXTREMITY: ICD-10-CM

## 2017-05-24 DIAGNOSIS — E87.70 HYPERVOLEMIA, UNSPECIFIED HYPERVOLEMIA TYPE: ICD-10-CM

## 2017-05-24 DIAGNOSIS — F41.9 ANXIETY: ICD-10-CM

## 2017-05-24 DIAGNOSIS — N18.9 ACUTE ON CHRONIC RENAL FAILURE (HCC): ICD-10-CM

## 2017-05-24 DIAGNOSIS — Z79.01 ANTICOAGULATED ON COUMADIN: Chronic | ICD-10-CM

## 2017-05-24 DIAGNOSIS — E11.22 TYPE 2 DIABETES MELLITUS WITH CHRONIC KIDNEY DISEASE, WITHOUT LONG-TERM CURRENT USE OF INSULIN, UNSPECIFIED CKD STAGE (HCC): Chronic | ICD-10-CM

## 2017-05-24 DIAGNOSIS — J90 PLEURAL EFFUSION, RIGHT: Chronic | ICD-10-CM

## 2017-05-24 DIAGNOSIS — E87.0 HYPERNATREMIA: ICD-10-CM

## 2017-05-24 DIAGNOSIS — M79.89 LEG SWELLING: ICD-10-CM

## 2017-05-24 DIAGNOSIS — J96.21 ACUTE ON CHRONIC RESPIRATORY FAILURE WITH HYPOXIA (HCC): ICD-10-CM

## 2017-05-24 DIAGNOSIS — C78.00 BREAST CANCER METASTASIZED TO LUNG, UNSPECIFIED LATERALITY (HCC): Chronic | ICD-10-CM

## 2017-05-24 DIAGNOSIS — N17.9 ACUTE ON CHRONIC RENAL FAILURE (HCC): ICD-10-CM

## 2017-05-24 DIAGNOSIS — R09.02 HYPOXIA: ICD-10-CM

## 2017-05-24 DIAGNOSIS — J90 PLEURAL EFFUSION: ICD-10-CM

## 2017-05-24 PROBLEM — J96.00 ACUTE RESPIRATORY FAILURE (HCC): Status: ACTIVE | Noted: 2017-02-17

## 2017-05-24 PROBLEM — J96.20 ACUTE ON CHRONIC RESPIRATORY FAILURE (HCC): Status: ACTIVE | Noted: 2017-05-24

## 2017-05-24 PROBLEM — J96.01 ACUTE RESPIRATORY FAILURE WITH HYPOXEMIA (HCC): Status: ACTIVE | Noted: 2017-05-24

## 2017-05-24 LAB
ANION GAP BLD CALC-SCNC: 14 MMOL/L (ref 7–16)
ANION GAP BLD CALC-SCNC: 15 MMOL/L (ref 7–16)
ARTERIAL PATENCY WRIST A: ABNORMAL
ARTERIAL PATENCY WRIST A: POSITIVE
BACTERIA SPEC CULT: NORMAL
BASE DEFICIT BLDA-SCNC: 3.5 MMOL/L (ref 0–2)
BASE DEFICIT BLDA-SCNC: 7.2 MMOL/L (ref 0–2)
BASOPHILS # BLD AUTO: 0.2 K/UL (ref 0–0.2)
BASOPHILS # BLD: 2 % (ref 0–2)
BDY SITE: ABNORMAL
BDY SITE: ABNORMAL
BUN SERPL-MCNC: 30 MG/DL (ref 8–23)
BUN SERPL-MCNC: 33 MG/DL (ref 8–23)
CALCIUM SERPL-MCNC: 7.1 MG/DL (ref 8.3–10.4)
CALCIUM SERPL-MCNC: 8 MG/DL (ref 8.3–10.4)
CHLORIDE SERPL-SCNC: 113 MMOL/L (ref 98–107)
CHLORIDE SERPL-SCNC: 116 MMOL/L (ref 98–107)
CO2 SERPL-SCNC: 19 MMOL/L (ref 21–32)
CO2 SERPL-SCNC: 20 MMOL/L (ref 21–32)
COHGB MFR BLD: 0.3 % (ref 0.5–1.5)
COHGB MFR BLD: 0.6 % (ref 0.5–1.5)
CREAT SERPL-MCNC: 2.34 MG/DL (ref 0.6–1)
CREAT SERPL-MCNC: 2.47 MG/DL (ref 0.6–1)
DIFFERENTIAL METHOD BLD: ABNORMAL
DO-HGB BLD-MCNC: 10 % (ref 0–5)
DO-HGB BLD-MCNC: 14 % (ref 0–5)
EOSINOPHIL # BLD: 0 K/UL (ref 0–0.8)
EOSINOPHIL NFR BLD: 0 % (ref 0.5–7.8)
ERYTHROCYTE [DISTWIDTH] IN BLOOD BY AUTOMATED COUNT: 19.5 % (ref 11.9–14.6)
FIO2 ON VENT: 100 %
GAS FLOW.O2 O2 DELIVERY SYS: 15 L/MIN
GLUCOSE SERPL-MCNC: 172 MG/DL (ref 65–100)
GLUCOSE SERPL-MCNC: 187 MG/DL (ref 65–100)
HCO3 BLDA-SCNC: 19 MMOL/L (ref 22–26)
HCO3 BLDA-SCNC: 21 MMOL/L (ref 22–26)
HCT VFR BLD AUTO: 24.4 % (ref 35.8–46.3)
HGB BLD-MCNC: 7.8 G/DL (ref 11.7–15.4)
HGB BLDMV-MCNC: 6.9 GM/DL (ref 11.7–15)
HGB BLDMV-MCNC: 8.7 GM/DL (ref 11.7–15)
IMM GRANULOCYTES # BLD: 0 K/UL (ref 0–0.5)
IMM GRANULOCYTES NFR BLD AUTO: 0.1 % (ref 0–5)
INR PPP: 2.9 (ref 0.9–1.2)
LACTATE BLD-SCNC: 3.7 MMOL/L (ref 0.5–1.9)
LACTATE BLD-SCNC: 4.7 MMOL/L (ref 0.5–1.9)
LYMPHOCYTES # BLD AUTO: 7 % (ref 13–44)
LYMPHOCYTES # BLD: 0.7 K/UL (ref 0.5–4.6)
MAGNESIUM SERPL-MCNC: 2.8 MG/DL (ref 1.8–2.4)
MCH RBC QN AUTO: 35.3 PG (ref 26.1–32.9)
MCHC RBC AUTO-ENTMCNC: 32 G/DL (ref 31.4–35)
MCV RBC AUTO: 110.4 FL (ref 79.6–97.8)
METHGB MFR BLD: 0.6 % (ref 0–1.5)
METHGB MFR BLD: 1 % (ref 0–1.5)
MONOCYTES # BLD: 0.3 K/UL (ref 0.1–1.3)
MONOCYTES NFR BLD AUTO: 3 % (ref 4–12)
NEUTS SEG # BLD: 9.5 K/UL (ref 1.7–8.2)
NEUTS SEG NFR BLD AUTO: 88 % (ref 43–78)
OXYHGB MFR BLDA: 85.5 % (ref 94–97)
OXYHGB MFR BLDA: 88 % (ref 94–97)
PCO2 BLDA: 36 MMHG (ref 35–45)
PCO2 BLDA: 39 MMHG (ref 35–45)
PEEP RESPIRATORY: 8 CM[H2O]
PH BLDA: 7.3 [PH] (ref 7.35–7.45)
PH BLDA: 7.39 [PH] (ref 7.35–7.45)
PHOSPHATE SERPL-MCNC: 4.1 MG/DL (ref 2.3–3.7)
PLATELET # BLD AUTO: 498 K/UL (ref 150–450)
PMV BLD AUTO: 9.6 FL (ref 10.8–14.1)
PO2 BLDA: 59 MMHG (ref 75–100)
PO2 BLDA: 62 MMHG (ref 75–100)
POTASSIUM SERPL-SCNC: 4.7 MMOL/L (ref 3.5–5.1)
POTASSIUM SERPL-SCNC: 5.1 MMOL/L (ref 3.5–5.1)
PROCALCITONIN SERPL-MCNC: 1 NG/ML
PROTHROMBIN TIME: 31.3 SEC (ref 9.6–12)
RBC # BLD AUTO: 2.21 M/UL (ref 4.05–5.25)
RESP RATE: 15
SAO2 % BLD: 86 % (ref 92–98.5)
SAO2 % BLD: 89 % (ref 92–98.5)
SERVICE CMNT-IMP: ABNORMAL
SERVICE CMNT-IMP: ABNORMAL
SERVICE CMNT-IMP: NORMAL
SODIUM SERPL-SCNC: 147 MMOL/L (ref 136–145)
SODIUM SERPL-SCNC: 150 MMOL/L (ref 136–145)
VENTILATION MODE VENT: ABNORMAL
VT SETTING VENT: 350 ML
WBC # BLD AUTO: 10.8 K/UL (ref 4.3–11.1)

## 2017-05-24 PROCEDURE — 5A1945Z RESPIRATORY VENTILATION, 24-96 CONSECUTIVE HOURS: ICD-10-PCS | Performed by: INTERNAL MEDICINE

## 2017-05-24 PROCEDURE — 94664 DEMO&/EVAL PT USE INHALER: CPT

## 2017-05-24 PROCEDURE — 77030013131 HC IV BLD ST ICUM -A

## 2017-05-24 PROCEDURE — 74011000258 HC RX REV CODE- 258: Performed by: EMERGENCY MEDICINE

## 2017-05-24 PROCEDURE — 87040 BLOOD CULTURE FOR BACTERIA: CPT | Performed by: EMERGENCY MEDICINE

## 2017-05-24 PROCEDURE — 96361 HYDRATE IV INFUSION ADD-ON: CPT | Performed by: EMERGENCY MEDICINE

## 2017-05-24 PROCEDURE — 96365 THER/PROPH/DIAG IV INF INIT: CPT | Performed by: EMERGENCY MEDICINE

## 2017-05-24 PROCEDURE — 74011000250 HC RX REV CODE- 250: Performed by: INTERNAL MEDICINE

## 2017-05-24 PROCEDURE — 86900 BLOOD TYPING SEROLOGIC ABO: CPT | Performed by: INTERNAL MEDICINE

## 2017-05-24 PROCEDURE — 71010 XR CHEST PORT: CPT

## 2017-05-24 PROCEDURE — 94002 VENT MGMT INPAT INIT DAY: CPT

## 2017-05-24 PROCEDURE — 99223 1ST HOSP IP/OBS HIGH 75: CPT | Performed by: INTERNAL MEDICINE

## 2017-05-24 PROCEDURE — 93005 ELECTROCARDIOGRAM TRACING: CPT | Performed by: EMERGENCY MEDICINE

## 2017-05-24 PROCEDURE — 36600 WITHDRAWAL OF ARTERIAL BLOOD: CPT

## 2017-05-24 PROCEDURE — 85610 PROTHROMBIN TIME: CPT | Performed by: EMERGENCY MEDICINE

## 2017-05-24 PROCEDURE — 87641 MR-STAPH DNA AMP PROBE: CPT | Performed by: INTERNAL MEDICINE

## 2017-05-24 PROCEDURE — 84145 PROCALCITONIN (PCT): CPT | Performed by: NURSE PRACTITIONER

## 2017-05-24 PROCEDURE — 80048 BASIC METABOLIC PNL TOTAL CA: CPT | Performed by: EMERGENCY MEDICINE

## 2017-05-24 PROCEDURE — 74011250636 HC RX REV CODE- 250/636

## 2017-05-24 PROCEDURE — 86923 COMPATIBILITY TEST ELECTRIC: CPT | Performed by: INTERNAL MEDICINE

## 2017-05-24 PROCEDURE — 77030008771 HC TU NG SALEM SUMP -A

## 2017-05-24 PROCEDURE — 74000 XR ABD (KUB): CPT

## 2017-05-24 PROCEDURE — 85025 COMPLETE CBC W/AUTO DIFF WBC: CPT | Performed by: EMERGENCY MEDICINE

## 2017-05-24 PROCEDURE — 74011250636 HC RX REV CODE- 250/636: Performed by: INTERNAL MEDICINE

## 2017-05-24 PROCEDURE — 65610000001 HC ROOM ICU GENERAL

## 2017-05-24 PROCEDURE — 77030031476 HC EXCH HEAT MOISTW FLTR HALY -A

## 2017-05-24 PROCEDURE — 77030019605

## 2017-05-24 PROCEDURE — 96366 THER/PROPH/DIAG IV INF ADDON: CPT | Performed by: EMERGENCY MEDICINE

## 2017-05-24 PROCEDURE — 94640 AIRWAY INHALATION TREATMENT: CPT

## 2017-05-24 PROCEDURE — 74011250636 HC RX REV CODE- 250/636: Performed by: EMERGENCY MEDICINE

## 2017-05-24 PROCEDURE — 83605 ASSAY OF LACTIC ACID: CPT

## 2017-05-24 PROCEDURE — 0BH17EZ INSERTION OF ENDOTRACHEAL AIRWAY INTO TRACHEA, VIA NATURAL OR ARTIFICIAL OPENING: ICD-10-PCS | Performed by: INTERNAL MEDICINE

## 2017-05-24 PROCEDURE — 99285 EMERGENCY DEPT VISIT HI MDM: CPT | Performed by: EMERGENCY MEDICINE

## 2017-05-24 PROCEDURE — 96375 TX/PRO/DX INJ NEW DRUG ADDON: CPT | Performed by: EMERGENCY MEDICINE

## 2017-05-24 PROCEDURE — 31500 INSERT EMERGENCY AIRWAY: CPT | Performed by: INTERNAL MEDICINE

## 2017-05-24 PROCEDURE — 36415 COLL VENOUS BLD VENIPUNCTURE: CPT | Performed by: INTERNAL MEDICINE

## 2017-05-24 PROCEDURE — 82803 BLOOD GASES ANY COMBINATION: CPT

## 2017-05-24 PROCEDURE — 74011000250 HC RX REV CODE- 250

## 2017-05-24 PROCEDURE — 83735 ASSAY OF MAGNESIUM: CPT | Performed by: INTERNAL MEDICINE

## 2017-05-24 PROCEDURE — 80048 BASIC METABOLIC PNL TOTAL CA: CPT | Performed by: INTERNAL MEDICINE

## 2017-05-24 PROCEDURE — 87070 CULTURE OTHR SPECIMN AEROBIC: CPT | Performed by: INTERNAL MEDICINE

## 2017-05-24 PROCEDURE — 84100 ASSAY OF PHOSPHORUS: CPT | Performed by: INTERNAL MEDICINE

## 2017-05-24 PROCEDURE — 74011250637 HC RX REV CODE- 250/637: Performed by: INTERNAL MEDICINE

## 2017-05-24 PROCEDURE — 77030005515 HC CATH URETH FOL14 BARD -B

## 2017-05-24 RX ORDER — PROPOFOL 10 MG/ML
INJECTION, EMULSION INTRAVENOUS
Status: ACTIVE
Start: 2017-05-24 | End: 2017-05-25

## 2017-05-24 RX ORDER — BUDESONIDE 0.5 MG/2ML
500 INHALANT ORAL
Status: DISCONTINUED | OUTPATIENT
Start: 2017-05-24 | End: 2017-06-02 | Stop reason: HOSPADM

## 2017-05-24 RX ORDER — ETOMIDATE 2 MG/ML
20 INJECTION INTRAVENOUS ONCE
Status: COMPLETED | OUTPATIENT
Start: 2017-05-24 | End: 2017-05-24

## 2017-05-24 RX ORDER — SODIUM CHLORIDE 0.9 % (FLUSH) 0.9 %
5-10 SYRINGE (ML) INJECTION EVERY 8 HOURS
Status: DISCONTINUED | OUTPATIENT
Start: 2017-05-24 | End: 2017-06-02 | Stop reason: HOSPADM

## 2017-05-24 RX ORDER — FENTANYL 50 UG/1
1 PATCH TRANSDERMAL
Status: DISCONTINUED | OUTPATIENT
Start: 2017-05-24 | End: 2017-05-25

## 2017-05-24 RX ORDER — HYDROCODONE BITARTRATE AND ACETAMINOPHEN 10; 325 MG/1; MG/1
1 TABLET ORAL
Status: DISCONTINUED | OUTPATIENT
Start: 2017-05-24 | End: 2017-06-02 | Stop reason: HOSPADM

## 2017-05-24 RX ORDER — PROPOFOL 10 MG/ML
5-50 VIAL (ML) INTRAVENOUS
Status: DISCONTINUED | OUTPATIENT
Start: 2017-05-24 | End: 2017-05-29

## 2017-05-24 RX ORDER — SODIUM CHLORIDE 0.9 % (FLUSH) 0.9 %
5-10 SYRINGE (ML) INJECTION AS NEEDED
Status: DISCONTINUED | OUTPATIENT
Start: 2017-05-24 | End: 2017-06-02 | Stop reason: HOSPADM

## 2017-05-24 RX ORDER — ALBUTEROL SULFATE 2.5 MG/.5ML
2.5 SOLUTION RESPIRATORY (INHALATION)
Status: DISCONTINUED | OUTPATIENT
Start: 2017-05-24 | End: 2017-06-02 | Stop reason: HOSPADM

## 2017-05-24 RX ORDER — FUROSEMIDE 10 MG/ML
40 INJECTION INTRAMUSCULAR; INTRAVENOUS EVERY 12 HOURS
Status: DISCONTINUED | OUTPATIENT
Start: 2017-05-24 | End: 2017-05-26

## 2017-05-24 RX ORDER — MONTELUKAST SODIUM 10 MG/1
10 TABLET ORAL
Status: DISCONTINUED | OUTPATIENT
Start: 2017-05-24 | End: 2017-06-02 | Stop reason: HOSPADM

## 2017-05-24 RX ORDER — ZOLPIDEM TARTRATE 5 MG/1
5 TABLET ORAL
Status: DISCONTINUED | OUTPATIENT
Start: 2017-05-24 | End: 2017-06-02 | Stop reason: HOSPADM

## 2017-05-24 RX ORDER — FUROSEMIDE 10 MG/ML
40 INJECTION INTRAMUSCULAR; INTRAVENOUS
Status: COMPLETED | OUTPATIENT
Start: 2017-05-24 | End: 2017-05-24

## 2017-05-24 RX ORDER — SODIUM CHLORIDE 0.9 % (FLUSH) 0.9 %
5-10 SYRINGE (ML) INJECTION EVERY 8 HOURS
Status: DISCONTINUED | OUTPATIENT
Start: 2017-05-24 | End: 2017-05-27 | Stop reason: SDUPTHER

## 2017-05-24 RX ORDER — MIRTAZAPINE 15 MG/1
30 TABLET, FILM COATED ORAL
Status: DISCONTINUED | OUTPATIENT
Start: 2017-05-24 | End: 2017-06-02 | Stop reason: HOSPADM

## 2017-05-24 RX ORDER — AMLODIPINE BESYLATE 10 MG/1
10 TABLET ORAL DAILY
Status: DISCONTINUED | OUTPATIENT
Start: 2017-05-25 | End: 2017-06-02 | Stop reason: HOSPADM

## 2017-05-24 RX ORDER — MORPHINE SULFATE 2 MG/ML
2 INJECTION, SOLUTION INTRAMUSCULAR; INTRAVENOUS
Status: DISCONTINUED | OUTPATIENT
Start: 2017-05-24 | End: 2017-05-26

## 2017-05-24 RX ORDER — SODIUM CHLORIDE 0.9 % (FLUSH) 0.9 %
5-10 SYRINGE (ML) INJECTION AS NEEDED
Status: DISCONTINUED | OUTPATIENT
Start: 2017-05-24 | End: 2017-05-27 | Stop reason: SDUPTHER

## 2017-05-24 RX ORDER — SODIUM CHLORIDE 9 MG/ML
125 INJECTION, SOLUTION INTRAVENOUS CONTINUOUS
Status: DISCONTINUED | OUTPATIENT
Start: 2017-05-24 | End: 2017-05-24

## 2017-05-24 RX ORDER — ATENOLOL 25 MG/1
25 TABLET ORAL EVERY 12 HOURS
Status: DISCONTINUED | OUTPATIENT
Start: 2017-05-24 | End: 2017-05-27

## 2017-05-24 RX ORDER — MORPHINE SULFATE 2 MG/ML
2 INJECTION, SOLUTION INTRAMUSCULAR; INTRAVENOUS
Status: COMPLETED | OUTPATIENT
Start: 2017-05-24 | End: 2017-05-24

## 2017-05-24 RX ORDER — LORAZEPAM 2 MG/ML
0.5 INJECTION INTRAMUSCULAR
Status: DISCONTINUED | OUTPATIENT
Start: 2017-05-24 | End: 2017-05-24 | Stop reason: ALTCHOICE

## 2017-05-24 RX ORDER — SODIUM CHLORIDE 9 MG/ML
250 INJECTION, SOLUTION INTRAVENOUS AS NEEDED
Status: DISCONTINUED | OUTPATIENT
Start: 2017-05-24 | End: 2017-05-27 | Stop reason: SDUPTHER

## 2017-05-24 RX ORDER — ETOMIDATE 2 MG/ML
INJECTION INTRAVENOUS
Status: COMPLETED
Start: 2017-05-24 | End: 2017-05-24

## 2017-05-24 RX ORDER — DIAZEPAM 10 MG/2ML
INJECTION INTRAMUSCULAR
Status: COMPLETED
Start: 2017-05-24 | End: 2017-05-24

## 2017-05-24 RX ORDER — FENTANYL CITRATE-0.9 % NACL/PF 25 MCG/ML
25-200 PLASTIC BAG, INJECTION (ML) INJECTION
Status: DISCONTINUED | OUTPATIENT
Start: 2017-05-24 | End: 2017-05-29

## 2017-05-24 RX ORDER — MORPHINE SULFATE 2 MG/ML
INJECTION, SOLUTION INTRAMUSCULAR; INTRAVENOUS
Status: COMPLETED
Start: 2017-05-24 | End: 2017-05-24

## 2017-05-24 RX ORDER — ALPRAZOLAM 0.5 MG/1
0.25 TABLET ORAL
Status: DISCONTINUED | OUTPATIENT
Start: 2017-05-24 | End: 2017-05-30

## 2017-05-24 RX ORDER — MEGESTROL ACETATE 40 MG/ML
200 SUSPENSION ORAL DAILY
Status: DISCONTINUED | OUTPATIENT
Start: 2017-05-25 | End: 2017-06-02 | Stop reason: HOSPADM

## 2017-05-24 RX ORDER — LORAZEPAM 1 MG/1
1 TABLET ORAL
Status: DISCONTINUED | OUTPATIENT
Start: 2017-05-24 | End: 2017-06-02 | Stop reason: HOSPADM

## 2017-05-24 RX ORDER — DIAZEPAM 10 MG/2ML
2.5 INJECTION INTRAMUSCULAR
Status: COMPLETED | OUTPATIENT
Start: 2017-05-24 | End: 2017-05-24

## 2017-05-24 RX ADMIN — SODIUM CHLORIDE 125 ML/HR: 900 INJECTION, SOLUTION INTRAVENOUS at 09:21

## 2017-05-24 RX ADMIN — Medication 10 ML: at 22:42

## 2017-05-24 RX ADMIN — Medication 2 MG: at 14:34

## 2017-05-24 RX ADMIN — SODIUM CHLORIDE 500 ML: 900 INJECTION, SOLUTION INTRAVENOUS at 09:59

## 2017-05-24 RX ADMIN — BUDESONIDE 500 MCG: 0.5 SUSPENSION RESPIRATORY (INHALATION) at 19:40

## 2017-05-24 RX ADMIN — Medication 50 MCG/HR: at 15:24

## 2017-05-24 RX ADMIN — CEFTRIAXONE SODIUM 1 G: 1 INJECTION, POWDER, FOR SOLUTION INTRAMUSCULAR; INTRAVENOUS at 11:45

## 2017-05-24 RX ADMIN — PROPOFOL 25 MCG/KG/MIN: 10 INJECTION, EMULSION INTRAVENOUS at 15:07

## 2017-05-24 RX ADMIN — FUROSEMIDE 40 MG: 10 INJECTION, SOLUTION INTRAMUSCULAR; INTRAVENOUS at 12:03

## 2017-05-24 RX ADMIN — DIAZEPAM 2.5 MG: 5 INJECTION, SOLUTION INTRAMUSCULAR; INTRAVENOUS at 10:55

## 2017-05-24 RX ADMIN — Medication 10 ML: at 16:41

## 2017-05-24 RX ADMIN — ETOMIDATE 20 MG: 2 INJECTION INTRAVENOUS at 15:00

## 2017-05-24 RX ADMIN — DIAZEPAM 2.5 MG: 10 INJECTION INTRAMUSCULAR at 10:55

## 2017-05-24 RX ADMIN — ALBUTEROL SULFATE 2.5 MG: 2.5 SOLUTION RESPIRATORY (INHALATION) at 19:40

## 2017-05-24 RX ADMIN — Medication 2 MG: at 11:59

## 2017-05-24 RX ADMIN — FUROSEMIDE 40 MG: 10 INJECTION, SOLUTION INTRAMUSCULAR; INTRAVENOUS at 21:04

## 2017-05-24 RX ADMIN — ETOMIDATE 20 MG: 2 INJECTION, SOLUTION INTRAVENOUS at 15:00

## 2017-05-24 RX ADMIN — AZITHROMYCIN MONOHYDRATE 500 MG: 500 INJECTION, POWDER, LYOPHILIZED, FOR SOLUTION INTRAVENOUS at 21:05

## 2017-05-24 RX ADMIN — METHYLPREDNISOLONE SODIUM SUCCINATE 40 MG: 125 INJECTION, POWDER, FOR SOLUTION INTRAMUSCULAR; INTRAVENOUS at 16:36

## 2017-05-24 RX ADMIN — METHYLPREDNISOLONE SODIUM SUCCINATE 40 MG: 125 INJECTION, POWDER, FOR SOLUTION INTRAMUSCULAR; INTRAVENOUS at 22:42

## 2017-05-24 NOTE — PROGRESS NOTES
Propofol gtt increased to 35 mcg/kg/min. Fentanyl gtt started at 50 mcg/hr for RASS +2. Wrist restraints and mitts in place.

## 2017-05-24 NOTE — ED PROVIDER NOTES
HPI Comments: 68-year-old lady with a history of breast cancer that has metastasized to her bones and lungs who now presents with concerns about significant shortness of breath. Patient says that she has had to have fluid drained off her of her long couple of times in the past.  She denies any specific fevers or vomiting. She says the symptoms have gotten significantly worse over the past couple of days. Addition, she says she has significant pain on her right side. She describes it as an 8 out of 10. Elements of this note were created using speech recognition software. As such, errors of speech recognition may be present. Patient is a 78 y.o. female presenting with shortness of breath. The history is provided by the patient. Shortness of Breath   Associated symptoms include cough. Pertinent negatives include no fever, no headaches, no rhinorrhea, no sore throat, no wheezing, no chest pain, no vomiting, no abdominal pain and no rash. Past Medical History:   Diagnosis Date    Acute on chronic diastolic congestive heart failure ([address] 1/5/2016    Asthma     till age 31    Cancer ([address]     roverto. breast ca mets bone/lung    Chemotherapy-induced neutropenia ([address] 12/20/2016    Depression     Diabetes ([address]     recently dx'ed; ype 2;  does not take at home    DM (diabetes mellitus) ([address] 11/20/2012    HCAP (healthcare-associated pneumonia) 7/17/2013    Hypertension     Sepsis ([address] 7/19/2016    Thromboembolus ([address]     Left leg DVT    Unspecified adverse effect of anesthesia     [facility] -[facility]-she was told her heart stopped d/t anesthesia-hospitalized for 5 days afterwards and followed by cardiologist       Past Surgical History:   Procedure Laterality Date    HX GYN      2 c sections, ovarian cyst-roverto.     HX VASCULAR ACCESS  1/26/12         Family History:   Problem Relation Age of Onset    Heart Attack Mother     Cancer Brother      doen not know details       Social History     Social History    Marital status:      Spouse name: N/A    Number of children: N/A    Years of education: N/A     Occupational History    Not on file. Social History Main Topics    Smoking status: Former Smoker     Packs/day: 1.00     Years: 3.00     Types: Cigarettes     Quit date: 1/1/1978    Smokeless tobacco: Never Used      Comment: quit 30 or more years ago    Alcohol use No    Drug use: No    Sexual activity: Not on file     Other Topics Concern    Not on file     Social History Narrative         ALLERGIES: Review of patient's allergies indicates no known allergies. Review of Systems   Constitutional: Negative for chills, diaphoresis and fever. HENT: Negative for congestion, rhinorrhea and sore throat. Eyes: Negative for redness and visual disturbance. Respiratory: Positive for cough and shortness of breath. Negative for chest tightness and wheezing. Cardiovascular: Negative for chest pain and palpitations. Gastrointestinal: Negative for abdominal pain, blood in stool, diarrhea, nausea and vomiting. Endocrine: Negative for polydipsia and polyuria. Genitourinary: Negative for dysuria and hematuria. Musculoskeletal: Negative for arthralgias, myalgias and neck stiffness. Skin: Negative for rash. Allergic/Immunologic: Negative for environmental allergies and food allergies. Neurological: Negative for dizziness, weakness and headaches. Hematological: Negative for adenopathy. Does not bruise/bleed easily. Psychiatric/Behavioral: Negative for confusion and sleep disturbance. The patient is not nervous/anxious. Vitals:    05/24/17 0852   BP: 166/67   Pulse: 90   Resp: (!) 38   SpO2: (!) 70%            Physical Exam   Constitutional: She is oriented to person, place, and time. She appears well-developed and well-nourished. HENT:   Head: Normocephalic and atraumatic.    Eyes: Conjunctivae and EOM are normal. Pupils are equal, round, and reactive to light. Neck: Normal range of motion. Cardiovascular: Normal rate and regular rhythm. Pulmonary/Chest: She is in respiratory distress. She has no wheezes. She has rales. She exhibits no tenderness. Significant coarse rhonchi bilaterally  Patient is tachypneic   Abdominal: Soft. Bowel sounds are normal. There is no rebound and no guarding. Musculoskeletal: Normal range of motion. She exhibits no edema or tenderness. Lymphadenopathy:     She has no cervical adenopathy. Neurological: She is alert and oriented to person, place, and time. Skin: Skin is warm and dry. Psychiatric: She has a normal mood and affect. Nursing note and vitals reviewed. MDM  Number of Diagnoses or Management Options  Hypoxia:   Pleural effusion:   Pneumonia of both lungs due to infectious organism, unspecified part of lung:   Diagnosis management comments: Patient's port well except input but apparently we are unable to draw blood out of it. They do not yet have her INR back. Her chest x-ray shows a large right pleural effusion. Her lactic acid is elevated to 4.7. It is difficult to say for lactic acid is elevated due to sepsis and a pneumonia or if it was due to her prolonged hypoxia before she was placed on a non-rebreather. Given the large pleural effusion I do not think at this time that 30 mL/kg of IV fluid is indicated. She is not hypotensive. I will start some fluids at a maintenance rate after a smaller bolus. 10:25 AM  The patient's O2 sats had been doing well in the mid to upper 90s on 100% nonrebreather. However, she still exhibits signs of air hunger and wants to sit in the tripod position due to the large pleural effusion. We've been unable to get any blood from her and we are unable to get her into a Trendelenburg position to try for an EJ. Yesterday her INR was greater than 4 so I do not want to do a central vein stick for fear of bleeding.   We will put her on BiPAP to see if that eases her air hunger. 11:37 AM  atient seems to physically be responding well to the BiPAP and the small dose of Valium. I discussed the case with the intensivist who kindly agreed to see the patient.    ===================================================================  This patient is critically ill and there is a high probability of of imminent or life threatening deterioration in the patient's condition without immediate management. The nature of the patient's clinical problem is: hypoxia and pleural effusion    I have spent 45 minutes in direct patient care, documentation, review of labs/xrays/old records, discussion with pulmonologist and family . The time involved in the performance of separately reportable procedures was not counted toward critical care time.      Lesley Ramirez MD; 5/24/2017 @12:55 PM  ===================================================================        ED Course       Procedures

## 2017-05-24 NOTE — PROCEDURES
Procedure: elective/emergent intubation    Indication: respiratory insufficiency    Anesthesia- Rapid sequence:  , Etomidate 20mg(0.5mg'kg) ,       After assessing the airway, the patient underwent preoxygenation with 100% FiO2 for 5 min. After adequate sedation and paralisys emergent intubation was performed. A MAC, 3.0/ blade  laryngoscope was used to visualize the epiglottis and vocal chords. After positive identification of the vocal chords, an # .7.5 ET tube was placed into the trachea with direct visualization. The tube was seen passing through the vocal chords without / with some difficulty. CO2 colorimetry was employed immediately to verify tube in airway with /without appropriate color change indicating detection/lack of CO2. Water vapor was seen within the ET tube, and auscultation of the abdomen revealed no bubbling souds. Auscultation  and inspection of the chest after intubation showed symmetric chest excursion and symmetric air entry bilaterally. Chest X-ray has been ordered and is pending. The patient has been placed on a mechanical ventilator. There were no complications.     Noemí Leblanc MD

## 2017-05-24 NOTE — PROGRESS NOTES
Bedside and Verbal shift change report given to Eli by Jen Ayoub. Report included the following information SBAR, Kardex, ED Summary, Intake/Output, MAR, Accordion, Recent Results, Med Rec Status and Cardiac Rhythm NSR.

## 2017-05-24 NOTE — PROGRESS NOTES
Patient was intubated with a number 7.5 ET Tube. Tube placement verified by auscultation and ETCO2 monitor. ET Tube is secured at the 23 cm heather at the lip and on the right side. Patient was intubated by Dr Basilio Morales on the 1 attempt. Breath sounds are diminished. Patient is Negative for subcutaneous air and chest excursion is symmetric. Trachea is midline. Patient is also Negative for cyanosis. Patient placed on ventilator on documented settings. All alarms are set and audible. Resuscitation bag is at the head of the bed.       Ventilator Settings  Mode FIO2 Rate Tidal Volume Pressure PEEP I:E Ratio   PRVC  100%   15 350 ml     8 cm H20  1:3.45      Peak airway pressure: 33.5 cm H2O   Minute ventilation: 11.2 l/min     ABG:   Recent Labs      05/24/17   1035   PH  7.30*   PCO2  39   PO2  62*   HCO3  19*

## 2017-05-24 NOTE — IP AVS SNAPSHOT
Edith Pandya 
 
 
 [address] [facility] [address] [facility] 
[phone] Patient: Antoine Stevens MRN: [id number] JOT:[id number] You are allergic to the following No active allergies Recent Documentation Height Weight BMI OB Status Smoking Status 1.575 m 63.9 kg 25.77 kg/m2 Postmenopausal Former Smoker Unresulted Labs Order Current Status COLLECTION COMMENT Collected (05/26/17 1600) AFB CULTURE + SMEAR W/RFLX ID FROM CULTURE Preliminary result Emergency Contacts Name Discharge Info Relation Home Work Mobile Juancarlos Leggett  Child [2] [phone] [address] [place] [address]  Spouse [3] [phone] About your hospitalization You were admitted on:  May 24, 2017 You last received care in the:  [facility] 8 MED SURG You were discharged on:  June 2, 2017 Unit phone number:  [phone] Why you were hospitalized Your primary diagnosis was:  Acute On Chronic Respiratory Failure (Hcc) Your diagnoses also included:  Ckd (Chronic Kidney Disease), Dvt (Deep Venous Thrombosis) (Hcc), Breast Cancer Metastasized To Lung (Hcc), Pleural Effusion, Right, Moderate Aortic Stenosis, Anticoagulated On Coumadin, Volume Overload, Dm (Diabetes Mellitus) (Hcc), Leg Swelling, Acute On Chronic Renal Failure (Hcc), Anxiety, Hypernatremia Providers Seen During Your Hospitalizations Provider Role Specialty Primary office phone Roxana Bills MD Attending Provider Emergency Medicine [phone] Emma Nieves MD Attending Provider Pulmonary Disease [phone] Your Primary Care Physician (PCP) Primary Care Physician Office Phone Office Fax  
 [place], [place] D ** None ** ** None ** Follow-up Information Follow up With Details Comments Contact Info  
 dr Abdoulaye Johns On 6/2/2017 need appointment in 3-5 days//office to call patient with appt. [phone] Malena Parham NP On 7/14/2017 9:50 a.m. CXR- needed,  11 [facility] [address] 
[phone] eVe Bell MD On 6/14/2017 8:30 a.m. per Chiquita-appt. was previously made [address] 9181 [facility] [address] 
[phone] Your Appointments Thursday June 08, 2017 PLEURAL CATHETER INSERTION, ULTRASOUND with Rikki Bocanegra MD  
[facility] ([address] [place] [address]) 304 [facility]  
[phone] Wednesday June 14, 2017  8:30 AM EDT  
LAB with [address]  
1808 [facility] OUTREACH INSURANCE [address]) Susana Mendes [address] [place] [address]  
[phone] Wednesday June 14, 2017  9:00 AM EDT Follow Up with Vee Bell MD  
[facility] Hematology and Oncology [facility] C/ Juancarlos Carney 33 [facility] [address]  
[phone] Friday July 14, 2017 11:00 AM EDT HOSPITAL with Malena Parham NP [facility] ([facility]) [facility] [address]  
[phone] Current Discharge Medication List  
  
START taking these medications Dose & Instructions Dispensing Information Comments Morning Noon Evening Bedtime DULoxetine 20 mg capsule Commonly known as:  CYMBALTA Your next dose is:  Tomorrow Morning Dose:  20 mg Take 1 Cap by mouth daily. Indications: NEUROPATHIC PAIN Quantity:  30 Cap Refills:  0  
     
  
   
   
   
  
 furosemide 40 mg tablet Commonly known as:  LASIX Your next dose is:  Tomorrow Morning Dose:  40 mg Take 1 Tab by mouth daily. Quantity:  30 Tab Refills:  0  
     
  
   
   
   
  
 hydrALAZINE 50 mg tablet Commonly known as:  APRESOLINE Your next dose is: This evening Dose:  50 mg Take 1 Tab by mouth three (3) times daily. Quantity:  90 Tab Refills:  1 CONTINUE these medications which have CHANGED Dose & Instructions Dispensing Information Comments Morning Noon Evening Bedtime  
 atenolol 25 mg tablet Commonly known as:  TENORMIN What changed:  when to take this Your next dose is:  Tomorrow Morning Dose:  25 mg Take 1 Tab by mouth daily. Quantity:  60 Tab Refills:  0  
     
  
   
   
   
  
 zolpidem 5 mg tablet Commonly known as:  AMBIEN What changed:   
- medication strength 
- how much to take Your next dose is: Take on as needed schedule at bedtime Dose:  5 mg Take 1 Tab by mouth nightly as needed for Sleep. Max Daily Amount: 5 mg. Quantity:  30 Tab Refills:  0 CONTINUE these medications which have NOT CHANGED Dose & Instructions Dispensing Information Comments Morning Noon Evening Bedtime  
 albuterol 90 mcg/actuation inhaler Commonly known as:  PROVENTIL HFA, VENTOLIN HFA, PROAIR HFA Your next dose is: Take on as needed schedule Dose:  2 Puff Take 2 Puffs by inhalation every six (6) hours as needed for Wheezing. Quantity:  1 Inhaler Refills:  11 ALPRAZolam 0.25 mg tablet Commonly known as:  Bridger Late Your next dose is: Take on as needed schedule Dose:  0.25 mg Take 1 Tab by mouth three (3) times daily as needed for Anxiety. Max Daily Amount: 0.75 mg. Quantity:  90 Tab Refills:  2  
     
   
   
   
  
 amLODIPine 10 mg tablet Commonly known as:  Rose Rivas Your next dose is:  Tomorrow Morning Dose:  10 mg Take 1 Tab by mouth daily. Quantity:  30 Tab Refills:  11  
     
  
   
   
   
  
 budesonide-formoterol 160-4.5 mcg/actuation HFA inhaler Commonly known as:  SYMBICORT Your next dose is: This evening Dose:  2 Puff Take 2 Puffs by inhalation two (2) times a day. Quantity:  1 Inhaler Refills:  11  
 fentaNYL 50 mcg/hr PATCH Commonly known as:  Javan Cheatham Your last dose was:  6/1 at 2 pm  
Your next dose is:  6/4 at 2pm  
   
 Dose:  1 Patch 1 Patch by TransDERmal route every seventy-two (72) hours. Max Daily Amount: 1 Patch. Indications: Chronic Pain with Opioid Tolerance Quantity:  10 Patch Refills:  0 HYDROcodone-acetaminophen  mg tablet Commonly known as:  Kenneth Daklarry Your next dose is: Take on as needed schedule Dose:  1 Tab Take 1 Tab by mouth every four (4) hours as needed. Max Daily Amount: 6 Tabs. Quantity:  90 Tab Refills:  0 LORazepam 1 mg tablet Commonly known as:  ATIVAN Your next dose is: Take on as needed schedule Dose:  1 mg Take 1 Tab by mouth every six (6) hours as needed for Anxiety. Max Daily Amount: 4 mg. Quantity:  60 Tab Refills:  0  
     
   
   
   
  
 megestrol 400 mg/10 mL (10 mL) suspension Commonly known as:  MEGACE Your next dose is:  Tomorrow Morning Dose:  200 mg Take 5 mL by mouth daily. Quantity:  240 mL Refills:  2  
     
  
   
   
   
  
 mirtazapine 30 mg tablet Commonly known as:  Carlus Inch Your next dose is: Take tonight Dose:  30 mg Take 1 Tab by mouth nightly. Quantity:  30 Tab Refills:  1  
     
   
   
   
  
  
 montelukast 10 mg tablet Commonly known as:  SINGULAIR Your next dose is: Take tonight Dose:  10 mg Take 1 Tab by mouth nightly. Quantity:  30 Tab Refills:  3  
     
   
   
   
  
  
 palbociclib 125 mg Cap Your next dose is: Take on as needed schedule Dose:  125 mg Take 125 mg by mouth daily. Take one pill once a day for 3 weeks and then off for one week Quantity:  21 Tab Refills:  5 STOP taking these medications   
 ciprofloxacin HCl 500 mg tablet Commonly known as:  CIPRO  
   
  
 glipiZIDE 5 mg tablet Commonly known as:  GLUCOTROL  
   
  
 warfarin 2 mg tablet Commonly known as:  COUMADIN Where to Get Your Medications These medications were sent to [address], [address] [facility] [address] Phone:  [phone]  
  atenolol 25 mg tablet DULoxetine 20 mg capsule  
 furosemide 40 mg tablet  
 hydrALAZINE 50 mg tablet  
 mirtazapine 30 mg tablet Information on where to get these meds will be given to you by the nurse or doctor. ! Ask your nurse or doctor about these medications  
  zolpidem 5 mg tablet Discharge Instructions Pleurex catheter to be place on June 8. Please arrive at 10 am. 
Nothing to eat after Midnight on June 7. Have someone to drive you there and home Continue to hold coumadin [address] Lab number [id number] Aprenda sobre el derrame pleural - [ Learning About Pleural Effusion ] Qué es el derrame pleural? 
El derrame pleural es la acumulación de líquido en el espacio entre los tejidos que recubren los pulmones y la pared torácica. Debido a la acumulación de líquido, es posible que los pulmones no puedan expandirse completamente, lo cual puede dificultar la respiración. [place], o parte de Shira Pruden, puede colapsarse. El derrame pleural tiene muchas causas, caterina neumonía, cáncer, inflamación de los tejidos alrededor de los pulmones e insuficiencia cardíaca. El derrame pleural suele diagnosticarse por medio de kim radiografía y un examen físico. El médico escucha cómo el aire fluye en jesus pulmones. Cuáles son los síntomas? Los síntomas del derrame pleural pueden incluir: · Dificultad para respirar. · Falta de aire. · Dolor en el pecho. · Dena Gordon. · Tos. Un derrame pleural leve podría no causar ningún síntoma.  

Cómo se trata el derrame pleural? 
Los médicos quizás tengan que tratar la afección que está causando el derrame pleural. Por ejemplo, pueden darle medicamentos para tratar la neumonía o la insuficiencia cardíaca congestiva. Un derrame pleural leve a menudo desaparece por sí solo sin tratamiento. Extracción de líquido El derrame pleural puede tratarse mediante la eliminación de líquido del espacio entre los tejidos que [employer]. Carrizales se hace con kim aguja que se coloca en el pecho (toracocentesis). Puede enviarse kim pequeña cantidad del líquido a un laboratorio para averiguar lo que está causando la acumulación de líquido. La extracción del líquido puede ayudar a [employer], tales caterina falta de aire y dolor en el pecho. Puede ayudar a que los pulmones se expandan más ampliamente. En algunos casos, si el derrame pleural no mejora, se puede colocar un catéter en el pecho. Neda es un tubo flexible que permite que el líquido drene de los pulmones. El catéter [employer] en el pecho hasta que el médico lo retira. Algunas personas pueden recibir un tratamiento que elimina el líquido y luego introduce un medicamento en la cavidad torácica. Carrizales ayuda a evitar que se vuelva a acumular demasiado líquido. La atención de seguimiento es kim parte clave de merrill tratamiento y seguridad. Asegúrese de hacer y acudir a todas las citas, y llame a merrill médico si está teniendo problemas. También es kim buena idea saber los resultados de jesus exámenes y mantener kim lista de los medicamentos que sharmila. Dónde puede encontrar más información en inglés? Tai Men a [email or web address]-abena.info/. Escriba E844 en la búsqueda para aprender más acerca de \"Aprenda sobre el derrame pleural - [ Learning About Pleural Effusion ]. \" 
Revisado: 23 enamorado, 2016 Versión del contenido: 11.2 © 0047-7504 Ecelles Carson, [employer]. Las instrucciones de cuidado fueron adaptadas bajo licencia por [employer] (which disclaims liability or warranty for this information).  Si usted tiene preguntas sobre kim afección médica o sobre estas instrucciones, siempre pregunte a merrill profesional de sherrie. [facility] niega toda garantía o responsabilidad por merrill uso de esta información. Sepsis: Instrucciones de cuidado - [ Sepsis: Care Instructions ] Instrucciones de cuidado La sepsis es kim infección que se ha extendido por todo el cuerpo. Es kim afección que pone en peligro la luann y a menudo provoca kim presión arterial extremadamente baja. [place] puede provocar problemas en muchos diferentes órganos. La causa de la sepsis no está siempre sunitha, lawanda puede ocurrir caterina parte de kim enfermedad crónica o repentina. A veces hasta kim enfermedad leve puede producirla. La atención de seguimiento es kim parte clave de merrill tratamiento y seguridad. Asegúrese de hacer y acudir a todas las citas, y llame a merrill médico si está teniendo problemas. También es kim buena idea saber los resultados de los exámenes y mantener kim lista de los medicamentos que sharmila. Cómo puede cuidarse en el hogar? · Si merrill médico le recetó antibióticos, tómelos según las indicaciones. No deje de tomarlos por el hecho de sentirse mejor. Debe julian todos los antibióticos hasta terminarlos. · Britni abundantes líquidos, suficientes para que merrill orina sea de color amarillo marcus o transparente caterina el agua. Elija beber agua u otros líquidos jonathan sin cafeína hasta que se sienta mejor. Si tiene kim enfermedad del riñón, del corazón o del hígado y tiene que [place]'s líquidos, hable con merrill médico antes de aumentar merrill consumo. Puede probar bebidas rehidratantes, caterina Gatorade o Powerade. · No britni alcohol. · Siga kim dieta saludable. Incluya en merrill dieta diaria frutas, vegetales y granos integrales. · Caminar es kim manera sencilla de hacer ejercicio. Poco a poco, aumente la distancia que camine cada día. Asegúrese de que merrill médico sepa que usted va a comenzar un programa de ejercicios. · No fume ni use otros productos derivados del tabaco. Si necesita ayuda para dejar de fumar, hable con merrill médico sobre los programas y medicamentos para dejar de fumar. Estos pueden aumentar jesus probabilidades de dejar el hábito para siempre. Cuándo debe pedir ayuda? Llame al 911 en cualquier momento que considere que necesita atención de emergencia. Por ejemplo, llame si: 
· Se desmayó (perdió el conocimiento). Llame a merrill médico ahora mismo o busque atención médica inmediata si: · Tiene fiebre o escalofríos. · Tiene la piel fría, pálida o pegajosa. · Siente mareos o aturdimiento, o que está a punto de [place]. · Tiene algún síntoma nuevo, caterina tos, dolor en kim parte del cuerpo o problemas urinarios. Preste especial atención a los cambios en merrill sherrie y asegúrese de comunicarse con merrill médico si: 
· No mejora caterina se esperaba. Dónde puede encontrar más información en inglés? Lonny Mejia a http://rosie-abena.info/. Iver Neeta A987 en la búsqueda para aprender más acerca de \"Sepsis: Instrucciones de cuidado - [ Sepsis: Care Instructions ]. \" 
Revisado: 27 enamorado, 2016 Versión del contenido: 11.2 © 6471-5318 Healthwise, Incorporated. Las instrucciones de cuidado fueron adaptadas bajo licencia por [employer] (which disclaims liability or warranty for this information). Si usted tiene Shannon [place] afección médica o sobre estas instrucciones, siempre pregunte a merrill profesional de sherrie. Healthwise, Incorporated niega toda garantía o responsabilidad por merrill uso de esta información. Discharge Orders None MyChart Announcement We are excited to announce that we are making your provider's discharge notes available to you in [facility]. You will see these notes when they are completed and signed by the physician that discharged you from your recent hospital stay.   If you have any questions or concerns about any information you see in MyChart, please call the Health Information Department where you were seen or reach out to your Primary Care Provider for more information about your plan of care. Introducing [facility]! Charles Rodriguez introduce portal paciente MyChart . Ahora se puede acceder a partes de merrill expediente médico, enviar por correo electrónico la oficina de merrill médico y solicitar renovaciones de medicamentos en línea. En merrill navegador de Internet , Teofilo carmona [email or web address] Olivia clic en el usuario por Glory Yanez? Serenity Wilson clic aquí en la sesión Amaya Burroughs. Verá la página de registro [place]. Ingrese merrill código de [facility] Mila mitzy y caterina aparece a continuación. Usted no tendrá que [employer] código después de beatrice completado el proceso de registro . Si usted no se inscribe antes de la fecha de caducidad , debe solicitar un nuevo código. · MyChart Código de acceso : [facility] Expires: 7/6/2017 12:55 PM 
 
Ingresa los últimos cuatro dígitos de merrill Número de Seguro Social ( xxxx ) y fecha de nacimiento ( dd / mm / aaaa ) caterina se indica y olivia clic en Enviar. ted será llevado a la siguiente página de registro . Crear un ID MyChart . Esta será merrill ID de inicio de sesión de MyChart y no puede ser [place] , por lo que pensar en kim que es Elmon Pion y fácil de recordar . Crear kim contraseña MyChart . ted puede cambiar merrill contraseña en cualquier momento . Ingrese merrill Password Reset de preguntas y Mejía . Singer se puede utilizar en un momento posterior si usted olvida merrill contraseña. Introduzca merrill dirección de correo electrónico . Brandee Peraza recibirá kim notificación por correo electrónico cuando la nueva información está disponible en MyChart . Babs Roses clic en Registrarse. Dayna Fischer angel y descargar porciones de merrill expediente médico. 
Olivia clic en el enlace de descarga del menú Resumen para descargar kim copia portátil de merrill información médica . Si karen [employer] , por favor visite la sección de preguntas frecuentes del sitio web MyChart . Emily Mendez NO es que se utilizará para las necesidades urgentes. Para emergencias médicas , llame al 911 . Ahora disponible en asher iPhone y Android ! General Information Please provide this summary of care documentation to your next provider. Patient Signature:  ____________________________________________________________ Date:  ____________________________________________________________  
  
Rema Smart Provider Signature:  ____________________________________________________________ Date:  ____________________________________________________________  
  
  
   
  
[address] [facility] 
[phone] Patient: Annabel Villalpando MRN: [id number] EAI:6/93/5991 Tiene alergias a lo siguiente No tiene alergias Documentación recientes Height Weight BMI (IMC) Estado obstétrico Estatus de tabaquísmo 1.575 m 63.9 kg 25.77 kg/m2 Postmenopausal Former Smoker Unresulted Labs Order Current Status COLLECTION COMMENT Collected (05/26/17 1600) AFB CULTURE + SMEAR W/RFLX ID FROM CULTURE Preliminary result Emergency Contacts Name Discharge Info Relation Home Work Mobile Betsey,Pablo  Child [2] [phone] [address] [place] [address]  Spouse [3] [phone] Sobre asher hospitalización Le admitieron el:  May 24, 2017 Asher tratamiento más reciente fue el:  [facility] 8 MED SURG Le dieron de thania el:  June 2, 2017 Número de teléfono de la unidad:  [phone] Por qué le ingresaron Asher diagnosis primaria es:  Acute On Chronic Respiratory Failure (Hcc)  Asher diagnosis también incluye:  Ckd (Chronic Kidney Disease), Dvt (Deep Venous Thrombosis) (Hcc), Breast Cancer Metastasized To Lung (Hcc), Pleural Effusion, Right, Moderate Aortic Stenosis, Anticoagulated On Coumadin, Volume Overload, Dm (Diabetes Mellitus) (Hcc), Leg Swelling, Acute On Chronic Renal Failure (Hcc), Anxiety, Hypernatremia Proveedores de verse fidelina courtney hospitalizaciones Personal Médico Rol Especialidad Teléfono principal de la oficina Kortney Werner MD Attending Provider Emergency Medicine [phone] Jay Antunez MD Attending Provider Pulmonary Disease [phone] Asher médico de atención primaria (PCP ) Primary Care Physician Office Phone Office Fax  
 [place], [place] D ** None ** ** None ** Follow-up Information Follow up With Details Comments Contact Info  
 dr Zuri Zheng On 6/2/2017 need appointment in 3-5 days//office to call patient with appt. [phone] Srini Barr NP On 7/14/2017 9:50 a.m. CXR- needed,  [address] [place] [address] [facility] [address] 
[phone] Hector Hodge MD On 6/14/2017 8:30 a.m. per Chiquita-appt. was [facility] [address] [address] 9185 [facility] [address] 
[phone] Courtney citas Thursday June 08, 2017 PLEURAL CATHETER INSERTION, ULTRASOUND with Carole Shaw MD  
[facility] ([address] [place] [address]) [address] [place] [address]  
[phone] Wednesday June 14, 2017  8:30 AM EDT  
LAB with [address]  
7541 [facility] OUTREACH INSURANCE [facility]) [address] 148 [facility]  
[phone] Wednesday June 14, 2017  9:00 AM EDT Follow Up with Hector Hodge MD  
[facility] Hematology and Oncology [facility]) JJ/ Juancarlos Carney 33 [facility] [address]  
[phone] Friday July 14, 2017 11:00 AM EDT HOSPITAL with Srini Barr NP [facility] ([facility]) [address]  [address] [place] 7177 [facility]  
[phone] Aprobación de la gestión actual lista de medicamentos EMPIECE a julian [facility] Dosis e instrucciones Información de dispensación Comentarios Morning Noon Evening Bedtime DULoxetine 20 mg capsule También conocido ctaerina:  CYMBALTA Your next dose is:  Tomorrow Morning Dosis:  20 mg Take 1 Cap by mouth daily. Indications: NEUROPATHIC PAIN  
 cantidad:  30 Cap  
recargas:  0  
     
  
   
   
   
  
 furosemide 40 mg tablet También conocido caterina:  LASIX Your next dose is:  Tomorrow Morning Dosis:  40 mg Take 1 Tab by mouth daily. cantidad:  30 Tab  
recargas:  0  
     
  
   
   
   
  
 hydrALAZINE 50 mg tablet También conocido caterina:  APRESOLINE Your next dose is: This evening Dosis:  50 mg Take 1 Tab by mouth three (3) times daily. cantidad:  90 Tab  
recargas:  1 CONTINUAR estos medicamentos que [place] Dosis e instrucciones Información de dispensación Comentarios Morning Noon Evening Bedtime  
 atenolol 25 mg tablet También conocido caterina:  TENORMIN Lo que cambió:  cuándo lo debe julian Your next dose is:  Tomorrow Morning Dosis:  25 mg Take 1 Tab by mouth daily. cantidad:  60 Tab  
recargas:  0  
     
  
   
   
   
  
 zolpidem 5 mg tablet También conocido caterina:  Chely Mallet Lo que cambió:   
- concentración del medicamento 
- dosis a julian Your next dose is: Take on as needed schedule at bedtime Dosis:  5 mg Take 1 Tab by mouth nightly as needed for Sleep. Max Daily Amount: 5 mg.  
 cantidad:  30 Tab  
recargas:  0 CONTINUAR estos medicamentos que no [place] Dosis e instrucciones Información de dispensación Comentarios Morning Noon Evening Bedtime  
 albuterol 90 mcg/actuation inhaler También conocido caterina:  PROVENTIL HFA, VENTOLIN HFA, PROAIR HFA  
 Your next dose is: Take on as needed schedule Dosis:  2 Puff Take 2 Puffs by inhalation every six (6) hours as needed for Wheezing. cantidad:  1 Inhaler  
recargas:  11 ALPRAZolam 0.25 mg tablet También conocido caterina:  Vasu Phoenix Your next dose is: Take on as needed schedule Dosis:  0.25 mg Take 1 Tab by mouth three (3) times daily as needed for Anxiety. Max Daily Amount: 0.75 mg.  
 cantidad:  90 Tab  
recargas:  2  
     
   
   
   
  
 amLODIPine 10 mg tablet También conocido caterina:  Tye Roman Your next dose is:  Tomorrow Morning Dosis:  10 mg Take 1 Tab by mouth daily. cantidad:  30 Tab  
recargas:  11  
     
  
   
   
   
  
 budesonide-formoterol 160-4.5 mcg/actuation HFA inhaler También conocido caterina:  SYMBICORT Your next dose is: This evening Dosis:  2 Puff Take 2 Puffs by inhalation two (2) times a day. cantidad:  1 Inhaler  
recargas:  11  
     
  
   
   
  
   
  
 fentaNYL 50 mcg/hr PATCH También conocido caterina:  Clarence Abts Your last dose was:  6/1 at 2 pm  
Your next dose is:  6/4 at 2pm  
   
 Dosis:  1 Patch 1 Patch by TransDERmal route every seventy-two (72) hours. Max Daily Amount: 1 Patch. Indications: Chronic Pain with Opioid Tolerance  
 cantidad:  10 Patch  
recargas:  0 HYDROcodone-acetaminophen  mg tablet También conocido caterina:  Kathryn Hurt Your next dose is: Take on as needed schedule Dosis:  1 Tab Take 1 Tab by mouth every four (4) hours as needed. Max Daily Amount: 6 Tabs. cantidad:  90 Tab  
recargas:  0 LORazepam 1 mg tablet También conocido caterina:  ATIVAN Your next dose is: Take on as needed schedule Dosis:  1 mg Take 1 Tab by mouth every six (6) hours as needed for Anxiety. Max Daily Amount: 4 mg.  
 cantidad:  60 Tab  
recargas:  0  
     
   
   
   
  
 megestrol 400 mg/10 mL (10 mL) suspension También conocido caterina:  MEGACE Your next dose is:  Tomorrow Morning Dosis:  200 mg Take 5 mL by mouth daily. cantidad:  240 mL  
recargas:  2  
     
  
   
   
   
  
 mirtazapine 30 mg tablet También conocido caterina:  Chrystine Plate Your next dose is: Take tonight Dosis:  30 mg Take 1 Tab by mouth nightly. cantidad:  30 Tab  
recargas:  1  
     
   
   
   
  
  
 montelukast 10 mg tablet También conocido caterina:  SINGULAIR Your next dose is: Take tonight Dosis:  10 mg Take 1 Tab by mouth nightly. cantidad:  30 Tab  
recargas:  3  
     
   
   
   
  
  
 palbociclib 125 mg Cap Your next dose is: Take on as needed schedule Dosis:  125 mg Take 125 mg by mouth daily. Take one pill once a day for 3 weeks and then off for one week  
 cantidad:  21 Tab  
recargas:  5 DEJE de julian estos medicamentos   
 ciprofloxacin HCl 500 mg tablet También conocido caterina:  CIPRO  
   
  
 glipiZIDE 5 mg tablet También conocido caterina:  GLUCOTROL  
   
  
 warfarin 2 mg tablet También conocido caterina:  COUMADIN Dónde puede recoger jesus medicamentos Georgana Marts a [address]  [address], [address] [place] [address] Dr [id number] Teléfono:  [phone]  
  atenolol 25 mg tablet DULoxetine 20 mg capsule  
 furosemide 40 mg tablet  
 hydrALAZINE 50 mg tablet  
 mirtazapine 30 mg tablet Información sobre dónde obtener estos medicamentos se le dará a usted por la enfermera o el médico.   
 ! Pregunte a merrill médico o enfermero/a sobre estos medicamentos  
  zolpidem 5 mg tablet Discharge Instructions Pleurex catheter to be place on June 8. Please arrive at 10 am. 
Nothing to eat after Midnight on June 7. Have someone to drive you there and home Continue to hold coumadin [address] Lab number [phone] Aprenda sobre el derrame pleural - [ Learning About Pleural Effusion ] Qué es el derrame pleural? 
El derrame pleural es la acumulación de líquido en el espacio entre los tejidos que recubren los pulmones y la pared torácica. Debido a la acumulación de líquido, es posible que los pulmones no puedan expandirse completamente, lo cual puede dificultar la respiración. El [place], o parte de Shira ramirez, puede colapsarse. El derrame pleural tiene muchas causas, caterina neumonía, cáncer, inflamación de los tejidos alrededor de los pulmones e insuficiencia cardíaca. El derrame pleural suele diagnosticarse por medio de kim radiografía y un examen físico. El médico escucha cómo el aire fluye en jesus pulmones. Cuáles son los síntomas? Los síntomas del derrame pleural pueden incluir: · Dificultad para respirar. · Falta de aire. · Dolor en el pecho. · Dianne Fryer. · Tos. Un derrame pleural leve podría no causar ningún síntoma. Cómo se trata el derrame pleural? 
Los médicos quizás tengan que tratar la afección que está causando el derrame pleural. Por ejemplo, pueden darle medicamentos para tratar la neumonía o la insuficiencia cardíaca congestiva. Un derrame pleural leve a menudo desaparece por sí solo sin tratamiento. Extracción de líquido El derrame pleural puede tratarse mediante la eliminación de líquido del espacio entre los tejidos que [employer]. [place] se hace con kim aguja que se coloca en el pecho (toracocentesis). Puede enviarse kim pequeña cantidad del líquido a un laboratorio para averiguar lo que está causando la acumulación de líquido. La extracción del líquido puede ayudar a [employer], tales caterina falta de aire y dolor en el pecho. Puede ayudar a que los pulmones se expandan más ampliamente. En algunos casos, si el derrame pleural no mejora, se puede colocar un catéter en el pecho.  Neda es un tubo flexible que permite que el líquido drene de los pulmones. El catéter [employer] en el pecho hasta que el médico lo retira. Algunas personas pueden recibir un tratamiento que elimina el líquido y luego introduce un medicamento en la cavidad torácica. [place] ayuda a evitar que se vuelva a acumular demasiado líquido. La atención de seguimiento es kim parte clave de merrill tratamiento y seguridad. Asegúrese de hacer y acudir a todas las citas, y llame a merrill médico si está teniendo problemas. También es kim buena idea saber los resultados de jesus exámenes y mantener kim lista de los medicamentos que sharmila. Dónde puede encontrar más información en inglés? Estee Perales a http://rosie-abena.info/. Escriba A119 en la búsqueda para aprender más acerca de \"Aprenda sobre el derrame pleural - [ Learning About Pleural Effusion ]. \" 
Revisado: 23 Danielson, 2016 Versión del contenido: 11.2 © 7245-3904 Healthwise, Incorporated. Las instrucciones de cuidado fueron adaptadas bajo licencia por [employer] (which disclaims liability or warranty for this information). Si usted tiene [place] afección médica o sobre estas instrucciones, siempre pregunte a merrill profesional de sherrie. Healthwise, Incorporated niega toda garantía o responsabilidad por merrill uso de esta información. Sepsis: Instrucciones de cuidado - [ Sepsis: Care Instructions ] Instrucciones de cuidado La sepsis es kim infección que se ha extendido por todo el cuerpo. Es kim afección que pone en peligro la luann y a menudo provoca kim presión arterial extremadamente baja. [place] puede provocar problemas en muchos diferentes órganos. La causa de la sepsis no está siempre sunitha, lawanda puede ocurrir caterina parte de kim enfermedad crónica o repentina. A veces hasta kim enfermedad leve puede producirla. La atención de seguimiento es kim parte clave de merrill tratamiento y seguridad.  Asegúrese de hacer y acudir a todas las citas, y llame a merrill médico si está teniendo problemas. También es kim buena idea saber los resultados de los exámenes y mantener kim lista de los medicamentos que sharmila. Cómo puede cuidarse en el hogar? · Si merrill médico le recetó antibióticos, tómelos según las indicaciones. No deje de tomarlos por el hecho de sentirse mejor. Debe julian todos los antibióticos hasta terminarlos. · Britni abundantes líquidos, suficientes para que merrill orina sea de color amarillo marcus o transparente caterina el agua. Elija beber agua u otros líquidos jonathan sin cafeína hasta que se sienta mejor. Si tiene kim enfermedad del riñón, del corazón o del hígado y tiene que [facility] líquidos, hable con merrill médico antes de aumentar merrill consumo. Puede probar bebidas rehidratantes, caterina Gatorade o Powerade. · No britni alcohol. · Siga kim dieta saludable. Incluya en merrill dieta diaria frutas, vegetales y granos integrales. · Caminar es kim manera sencilla de hacer ejercicio. Poco a poco, aumente la distancia que camine cada día. Asegúrese de que merrill médico sepa que usted va a comenzar un programa de ejercicios. · No fume ni use otros productos derivados del tabaco. Si necesita ayuda para dejar de fumar, hable con merrill médico sobre los programas y medicamentos para dejar de fumar. Estos pueden aumentar jesus probabilidades de dejar el hábito para siempre. Cuándo debe pedir ayuda? Llame al 911 en cualquier momento que considere que necesita atención de emergencia. Por ejemplo, llame si: 
· Se desmayó (perdió el conocimiento). Llame a merrill médico ahora mismo o busque atención médica inmediata si: · Tiene fiebre o escalofríos. · Tiene la piel fría, pálida o pegajosa. · Siente mareos o aturdimiento, o que está a punto de [place]. · Tiene algún síntoma nuevo, caterina tos, dolor en kim parte del cuerpo o problemas urinarios. Preste especial atención a los cambios en merrill sherrie y asegúrese de comunicarse con merrill médico si: 
· No mejora caterina se esperaba. Dónde puede encontrar más información en inglés? Lonny Mejia a http://rosie-abena.info/. Iver Neeta L864 en la búsqueda para aprender más acerca de \"Sepsis: Instrucciones de cuidado - [ Sepsis: Care Instructions ]. \" 
Revisado: 27 enamorado, 2016 Versión del contenido: 11.2 © 6090-7107 Healthwise, Incorporated. Las instrucciones de cuidado fueron adaptadas bajo licencia por [employer] (which disclaims liability or warranty for this information). Si usted tiene [place] afección médica o sobre estas instrucciones, siempre pregunte a merrill profesional de sherrie. Healthwise, Incorporated niega toda garantía o responsabilidad por merrill uso de esta información. Discharge Orders Training Amigo Announcement We are excited to announce that we are making your provider's discharge notes available to you in [address]. You will see these notes when they are completed and signed by the physician that discharged you from your recent hospital stay. If you have any questions or concerns about any information you see in [address], please call the Health Information Department where you were seen or reach out to your Primary Care Provider for more information about your plan of care. Introducing [facility]! [facility] introduce portal paciente [address] . Ahora se puede acceder a partes de merrill expediente médico, enviar por correo electrónico la oficina de merrill médico y solicitar renovaciones de medicamentos en línea. En merrill navegador de Internet , Linda Velasquez a [email or web address] Olga Lidia casper en el usuario por Alvin Sharif? Mika shirley aquí en la sesión Katerin Jac. Verá la página de registro [place]. Ingrese merrill código de Bank  Mila mitzy y caterina aparece a continuación. Usted no tendrá que [employer] código después de beatrice completado el proceso de registro . Si usted no se inscribe antes de la fecha de caducidad , debe solicitar un nuevo código. · MyChart Código de acceso : [facility] Expires: 7/6/2017 12:55 PM 
 
Ingresa los últimos cuatro dígitos de merrill Número de Seguro Social ( xxxx ) y fecha de nacimiento ( dd / mm / aaaa ) caterina se indica y olga lidia clic en Enviar. Usted será llevado a la siguiente página de registro . Crear un ID MyChart . Esta será merrill ID de inicio de sesión de MyChart y no puede ser [place] , por lo que pensar en kim que es Bobetta Kerbs y fácil de recordar . Crear kim contraseña MyChart . Usted puede cambiar merrill contraseña en cualquier momento . Ingrese merrill Password Reset de preguntas y Mej[place] . [place] se puede utilizar en un momento posterior si usted olvida merrill contraseña. Introduzca merrill dirección de correo electrónico . Diane Denier recibirá kim notificación por correo electrónico cuando la nueva información está disponible en MyChart . Jari Kayser clic en Registrarse. Jason Greet angel y descargar porciones de merrill expediente médico. 
Olga Lidia clic en el enlace de descarga del menú Resumen para descargar kim copia portátil de merrill información médica . Si tiene [employer] , por favor visite la sección de preguntas frecuentes del sitio web MyChart . Recuerde, MyChart NO es que se utilizará para las necesidades urgentes. Para emergencias médicas , llame al 911 . Ahora disponible en merrill iPhone y Android ! General Information Please provide this summary of care documentation to your next provider. Patient Signature:  ____________________________________________________________ Date:  ____________________________________________________________  
  
Newby Ledger Provider Signature:  ____________________________________________________________ Date:  ____________________________________________________________

## 2017-05-24 NOTE — PROGRESS NOTES
Unable to place NGT after 3 attempts. Unable to obtain peripheral IV after 5 attempts. Unable to administer scheduled atenolol due to no NGT; BP stable with systolic in the 980L-916T. Unable to administer scheduled azithromycin due to incompatibility with continuous fentanyl and propofol and limited IV access. Admission skin assessment. No abnormalities to sacrum. Allevyn placed.

## 2017-05-24 NOTE — ED NOTES
TRANSFER - OUT REPORT:    Verbal report given to Jaky Mccullough Kelby  being transferred to [address] [place] [address] routine progression of care       Report consisted of patients Situation, Background, Assessment and   Recommendations(SBAR). Information from the following report(s) SBAR, ED Summary, Procedure Summary, Intake/Output, MAR, Recent Results and Cardiac Rhythm nsr was reviewed with the receiving nurse. Lines:   Venous Access Device left chest Port (Active)        Opportunity for questions and clarification was provided.       Patient transported with:   Monitor  O2 @ 15 liters  Registered Nurse

## 2017-05-24 NOTE — PROGRESS NOTES
present at bedside during assessment with unit nurses, signature of consents for procedures, assessment with Dr. Getachew Berg and family update.     [address] [facility]  [phone]

## 2017-05-24 NOTE — H&P
[facility] H&P :  5/24/2017    Date of Admission:  5/24/2017      Subjective:     Patient is a 78 y.o. female presents with dyspnea. She was last seen by us in February of 2017 for right pleural effusion. She has dCHR, prior DVT on coumadin, CKD,  Moderate AS, HTN, DM and R ureteral obstruction s/p stent placement. She is followed by Dr Dianna Hawkins for metastatic breast CA with mets to lung/bone. In February, she underwent R thoracentesis with drain to gravity with removal of 1200 ml of yellow pleural fluid. She developed pain after thoracentesis and pain was felt to be from trapped lung physiology and recommended not to remove more than 1 liter of pleural fluid if thoracentesis is again necessary. She was hypoxemic and home O2 arranged. She had a CXR in April 2017 with persistent effusion. She was seen by Oncology on 5/16/2017 and was having dyspnea during that time with fatigue. She was given lasix as well and has taken lasix until 3 days ago. She has plans to see [facility] on June 2. Today in the ER, her CXR shows effusion on right which is unchanged and new right perihilar infiltrate. We were asked to admit her for hypoxemia with persistent effusion, new infiltrate with metastatic breast cancer with acute on chronic renal failure and moderate AS. She is currently requiring BiPAP and we were asked to admit to the ICU.        Past Medical History:   Diagnosis Date    Acute on chronic diastolic congestive heart failure ([address] 1/5/2016    Asthma     till age 31    Cancer ([address]     roverto. breast ca mets bone/lung    Chemotherapy-induced neutropenia ([address] 12/20/2016    Depression     Diabetes ([address]     recently dx'ed; ype 2;  does not take at home    DM (diabetes mellitus) ([address] 11/20/2012    HCAP (healthcare-associated pneumonia) 7/17/2013    Hypertension     Sepsis ([address] 7/19/2016    Thromboembolus ([address]     Left leg DVT    Unspecified adverse effect of anesthesia     In [employer] [facility] 28 yrs ago after second c -section-she was told her heart stopped d/t anesthesia-hospitalized for 5 days afterwards and followed by cardiologist      Past Surgical History:   Procedure Laterality Date    HX GYN      2 c sections, ovarian cyst-roverto.  HX VASCULAR ACCESS  1/26/12        Social History   Substance Use Topics    Smoking status: Former Smoker     Packs/day: 1.00     Years: 3.00     Types: Cigarettes     Quit date: 1/1/1978    Smokeless tobacco: Never Used      Comment: quit 30 or more years ago    Alcohol use No      Family History   Problem Relation Age of Onset    Heart Attack Mother     Cancer Brother      doen not know details      No Known Allergies   Prior to Admission Medications   Prescriptions Last Dose Informant Patient Reported? Taking? ALPRAZolam (XANAX) 0.25 mg tablet   No No   Sig: Take 1 Tab by mouth three (3) times daily as needed for Anxiety. Max Daily Amount: 0.75 mg. HYDROcodone-acetaminophen (NORCO)  mg tablet   No No   Sig: Take 1 Tab by mouth every four (4) hours as needed. Max Daily Amount: 6 Tabs. LORazepam (ATIVAN) 1 mg tablet   No No   Sig: Take 1 Tab by mouth every six (6) hours as needed for Anxiety. Max Daily Amount: 4 mg. albuterol (PROVENTIL HFA, VENTOLIN HFA, PROAIR HFA) 90 mcg/actuation inhaler   No No   Sig: Take 2 Puffs by inhalation every six (6) hours as needed for Wheezing. amLODIPine (NORVASC) 10 mg tablet   No No   Sig: Take 1 Tab by mouth daily. amoxicillin-clavulanate (AUGMENTIN) 500-125 mg per tablet   No No   Sig: Take 1 Tab by mouth two (2) times a day. atenolol (TENORMIN) 25 mg tablet   No No   Sig: Take 1 Tab by mouth every twelve (12) hours. budesonide-formoterol (SYMBICORT) 160-4.5 mcg/actuation HFA inhaler   No No   Sig: Take 2 Puffs by inhalation two (2) times a day. calcium-vitamin D (OYSTER SHELL) 500 mg(1,250mg) -200 unit per tablet   No No   Sig: Take 1 Tab by mouth two (2) times daily (with meals).    ciprofloxacin HCl (CIPRO) 500 mg tablet   No No   Sig: Take 1 Tab by mouth two (2) times a day. fentaNYL (DURAGESIC) 50 mcg/hr PATCH   No No   Si Patch by TransDERmal route every seventy-two (72) hours. Max Daily Amount: 1 Patch. Indications: Chronic Pain with Opioid Tolerance   glipiZIDE (GLUCOTROL) 5 mg tablet   No No   Sig: Take 1 Tab by mouth two (2) times a day. megestrol (MEGACE) 400 mg/10 mL (10 mL) suspension   No No   Sig: Take 5 mL by mouth daily. metoclopramide (REGLAN) 5 mg/5 mL syrup   No No   Sig: Take 5 mL by mouth Before breakfast, lunch, dinner and at bedtime. mirtazapine (REMERON) 30 mg tablet   No No   Sig: Take 1 Tab by mouth nightly. montelukast (SINGULAIR) 10 mg tablet   No No   Sig: Take 1 Tab by mouth nightly. nystatin (MYCOSTATIN) 100,000 unit/mL suspension   No No   Sig: Take 2 mL by mouth four (4) times daily. swish and spit   palbociclib 125 mg cap   No No   Sig: Take 125 mg by mouth daily. Take one pill once a day for 3 weeks and then off for one week   potassium chloride (K-DUR, KLOR-CON) 20 mEq tablet   No No   Sig: Take 1 Tab by mouth daily. warfarin (COUMADIN) 2 mg tablet   No No   Sig: Take 1 Tab by mouth daily. zolpidem (AMBIEN) 10 mg tablet   No No   Sig: Take 1 Tab by mouth nightly as needed for Sleep. Max Daily Amount: 10 mg. Facility-Administered Medications: None       MEDS SCHEDULED:    Current Facility-Administered Medications   Medication Dose Route Frequency    sodium chloride (NS) flush 5-10 mL  5-10 mL IntraVENous Q8H    sodium chloride (NS) flush 5-10 mL  5-10 mL IntraVENous PRN    cefTRIAXone (ROCEPHIN) 1 g in 0.9% sodium chloride (MBP/ADV) 50 mL  1 g IntraVENous NOW    0.9% sodium chloride infusion  125 mL/hr IntraVENous CONTINUOUS    morphine injection 2 mg  2 mg IntraVENous NOW     Current Outpatient Prescriptions   Medication Sig    fentaNYL (DURAGESIC) 50 mcg/hr PATCH 1 Patch by TransDERmal route every seventy-two (72) hours.  Max Daily Amount: 1 Patch. Indications: Chronic Pain with Opioid Tolerance    HYDROcodone-acetaminophen (NORCO)  mg tablet Take 1 Tab by mouth every four (4) hours as needed. Max Daily Amount: 6 Tabs.  montelukast (SINGULAIR) 10 mg tablet Take 1 Tab by mouth nightly.  albuterol (PROVENTIL HFA, VENTOLIN HFA, PROAIR HFA) 90 mcg/actuation inhaler Take 2 Puffs by inhalation every six (6) hours as needed for Wheezing.  amLODIPine (NORVASC) 10 mg tablet Take 1 Tab by mouth daily.  ciprofloxacin HCl (CIPRO) 500 mg tablet Take 1 Tab by mouth two (2) times a day.  amoxicillin-clavulanate (AUGMENTIN) 500-125 mg per tablet Take 1 Tab by mouth two (2) times a day.  LORazepam (ATIVAN) 1 mg tablet Take 1 Tab by mouth every six (6) hours as needed for Anxiety. Max Daily Amount: 4 mg.  mirtazapine (REMERON) 30 mg tablet Take 1 Tab by mouth nightly.  warfarin (COUMADIN) 2 mg tablet Take 1 Tab by mouth daily.  glipiZIDE (GLUCOTROL) 5 mg tablet Take 1 Tab by mouth two (2) times a day.  palbociclib 125 mg cap Take 125 mg by mouth daily. Take one pill once a day for 3 weeks and then off for one week    zolpidem (AMBIEN) 10 mg tablet Take 1 Tab by mouth nightly as needed for Sleep. Max Daily Amount: 10 mg.    budesonide-formoterol (SYMBICORT) 160-4.5 mcg/actuation HFA inhaler Take 2 Puffs by inhalation two (2) times a day.  atenolol (TENORMIN) 25 mg tablet Take 1 Tab by mouth every twelve (12) hours.  metoclopramide (REGLAN) 5 mg/5 mL syrup Take 5 mL by mouth Before breakfast, lunch, dinner and at bedtime.  ALPRAZolam (XANAX) 0.25 mg tablet Take 1 Tab by mouth three (3) times daily as needed for Anxiety. Max Daily Amount: 0.75 mg.  megestrol (MEGACE) 400 mg/10 mL (10 mL) suspension Take 5 mL by mouth daily.  potassium chloride (K-DUR, KLOR-CON) 20 mEq tablet Take 1 Tab by mouth daily.  nystatin (MYCOSTATIN) 100,000 unit/mL suspension Take 2 mL by mouth four (4) times daily.  swish and spit    calcium-vitamin D (OYSTER SHELL) 500 mg(1,250mg) -200 unit per tablet Take 1 Tab by mouth two (2) times daily (with meals). Review of Systems  Constitutional: positive for fatigue and malaise  Respiratory: positive for dyspnea on exertion  Cardiovascular: positive for dyspnea, lower extremity edema  Musculoskeletal:positive for limited mobility    Objective:     Vitals:    05/24/17 0852 05/24/17 0918 05/24/17 1103   BP: 166/67     Pulse: 90     Resp: (!) 38     SpO2: (!) 70%  97%   Weight:  125 lb (56.7 kg)    Height:  5' 2\" (1.575 m)                PHYSICAL EXAM     Physical Exam:   General:  Alert, cooperative,appear SOB on BiPAP   Eyes:  Conjunctivae/corneas clear. Nose: BiPAP   Mouth/Throat: Lips, mucosa, and tongue pink and intact. Neck: Supple, symmetrical.   Respiratory:   Decreased on right, increased RR. rhonchi to auscultation bilaterally on BiPAP   Cardiovascular:  Regular rate and rhythm, S1, S2, no murmur, click, rub or gallop. Telemetry monitor:NSR   GI:   Soft, non-tender. Bowel sounds active X 4 Q. Extremities: Extremities symmetrical, atraumatic, no cyanosis, 3+ edema. Pulses: 2+ and symmetric all extremities. Skin: Skin color, texture, turgor normal. No rashes or lesions       Neurologic: Debilitated,SOB. Alert and oriented. CHEST X-RAYS:  Today      4/11      CULTURES: ordered    LABS      Recent Labs      05/24/17   1111  05/24/17   1017  05/23/17   0836   NA   --   147*   --    K   --   5.1   --    CL   --   113*   --    GLU   --   172*   --    CO2   --   19*   --    BUN   --   30*   --    CREA   --   2.34*   --    INR  2.9*   --   4.9*     Recent Labs      05/24/17   1035   PH  7.30*   PCO2  39   PO2  62*   HCO3  19*     Echocardiogram    SUMMARY:    -  Left ventricle: Systolic function was normal. Ejection fraction was  estimated in the range of 55 % to 60 %. There were no regional wall motion  abnormalities.  Doppler parameters were consistent with mild diastolic  dysfunction (grade 1). -  Left atrium: The atrium was mildly dilated. -  Aortic valve: The aortic valve area by the continuity equation was 1.02   cm2. The findings were most consistent with moderate aortic stenosis. -  Pericardium: A small pericardial effusion was identified. The fluid  exhibited a fibrinous appearance. There was no evidence of hemodynamic  Compromise. Assessment:     Hospital Problems  Date Reviewed: 5/16/2017          Codes Class Noted POA    Acute on chronic respiratory failure (HCC) ICD-10-CM: J96.20  ICD-9-CM: 518.84  5/24/2017 Yes        Moderate aortic stenosis (Chronic) ICD-10-CM: I35.0  ICD-9-CM: 424.1  5/24/2017 Yes        Anticoagulated on Coumadin (Chronic) ICD-10-CM: Z51.81, Z79.01  ICD-9-CM: V58.83, V58.61  5/24/2017 Yes        Volume overload ICD-10-CM: E87.70  ICD-9-CM: 276.69  5/24/2017 Yes        Breast cancer metastasized to lung [facility]) (Chronic) ICD-10-CM: C50.919, C78.00  ICD-9-CM: 174.9, 197.0  2/17/2017 Yes        CKD (chronic kidney disease) (Chronic) ICD-10-CM: N18.9  ICD-9-CM: 585.9  1/4/2017 Yes    Overview Signed 5/14/2013 11:35 AM by Sajan Carroll RN     With acute rise- ?  Dehydration and monitor             DVT (deep venous thrombosis) (HCC) (Chronic) ICD-10-CM: I82.409  ICD-9-CM: 453.40  1/4/2017 Yes    Overview Signed 11/19/2013  6:07 AM by Soy Benites     89-13-37 There is nonocclusive thrombus within the left common femoral vein, superficial femoral vein, popliteal vein, and posterior tibial vein               Pleural effusion, right (Chronic) ICD-10-CM: J90  ICD-9-CM: 511.9  1/4/2017 Yes    Overview Addendum 10/8/2016  5:54 PM by Josh Grimes MD     10/6/16 R thoracentesis 800 ml: pleural fluid creatine 4.1 = likely urinothorax             DM (diabetes mellitus) ([address] (Chronic) ICD-10-CM: E11.9  ICD-9-CM: 250.00  10/7/2016 Yes        Acute on chronic renal failure ([facility] ICD-10-CM: N17.9, N18.9  ICD-9-CM: 584.9, 585.9  10/7/2016 Yes Leg swelling ICD-10-CM: M79.89  ICD-9-CM: 729.81  2/25/2014 Yes    Overview Signed 2/25/2014  2:45 PM by Phill Agustin NP     Bilateral                       Plan:   -Admit to the ICU for NIPPV  -Check PCT, afebrile with normal WBC. Abx for now- CAP  -Gross edema with acute on chronic renal failure. Stop IV fluids. Lasix. May need nephrology to see  -Hold coumadin, may need Thoracentesis   -Full code per the family  -I/O, denson  -follow labs and CXR    [employer], NP-C  More than 50% of time documented was spent in face-to-face contact with the patient and in the care of the patient on the floor/unit where the patient is located. Lungs:  Crackles bilateral  Heart:  RRR with no Murmur/Rubs/Gallops    Additional Comments:  Try bipap for now, may need intubation, inr is high -may need thoracentesis at some point    I have spoken with and examined the patient. I agree with the above assessment and plan as documented.     Ashlee Hong MD

## 2017-05-24 NOTE — ED NOTES
Patient states feeling increase in shortness of breath when putting feet up in bed. Patient in sitting up to bedside. NRB in place. 95% on NRB. Provider to bedside.

## 2017-05-24 NOTE — PROGRESS NOTES
TRANSFER - IN REPORT:    Verbal report received from [place] (name) on Shyla Flores  being received from ER (unit) for routine progression of care      Report consisted of patients Situation, Background, Assessment and   Recommendations(SBAR). Information from the following report(s) SBAR, Kardex, ED Summary, Intake/Output, MAR, Recent Results and Cardiac Rhythm NSR was reviewed with the receiving nurse. Opportunity for questions and clarification was provided. Assessment completed upon patients arrival to unit and care assumed.

## 2017-05-24 NOTE — PROGRESS NOTES
Per Dr. Marlon Hall, patient to be intubated due to shortness of breath, dyspnea, increased work of breathing, O2 sat 89-92 on BIPAP 80%. Patient states she understands. 20 mg Etomidate administered. Patient intubated by Dr. Marlon Hall. ETT 23 @ lips. PRVC 80%. Tolerated well.

## 2017-05-24 NOTE — PROGRESS NOTES
present at bedside during triage and assessment by unit nurse and Dr. Kirby Anguiano.     [address] [facility]  [phone]

## 2017-05-24 NOTE — PROGRESS NOTES
Patient arrived to ICU. Placed on monitor, moved to bed. Noted to be in respiratory distress, placed on 100% Bipap. Dr. Chaya Lacy called to assess patient.

## 2017-05-24 NOTE — PROGRESS NOTES
Bedside shift change report given to Eli DECKER (oncoming nurse) by Tabby Woodard (offgoing nurse). Report included the following information SBAR, Kardex, ED Summary, Procedure Summary, Intake/Output, MAR, Recent Results and Cardiac Rhythm NSR. Fentanyl gtt verified and patient turned.

## 2017-05-24 NOTE — ED TRIAGE NOTES
Pt presents with shortness of breath. Pt has hx of breast ca and a dvt. Pt sats on 4 lpm were 70%. Pt placed on nrb and sats filemon to 91%.

## 2017-05-24 NOTE — PROGRESS NOTES
present at bedside during assessment with Dr. Rohit Armstrong and NP    [address] [facility]  [phone]

## 2017-05-24 NOTE — PROGRESS NOTES
Ventilator check complete; patient has a #7.5 ET tube secured at the 22 at the teeth. Patient is sedated. Patient is able to follow commands. Breath sounds are coarse and diminished. Trachea is midline, Negative for subcutaneous air, and chest excursion is symmetric. Patient is also Negative for cyanosis and is Negative for pitting edema. All alarms are set and audible. Resuscitation bag is at the head of the bed.       Ventilator Settings  Mode FIO2 Rate Tidal Volume Pressure PEEP I:E Ratio   PRVC  60%   15 350 ml     10 cm H20  1:3.45      Peak airway pressure: 24.9 cm H2O   Minute ventilation: 5.5 l/min     ABG:   Recent Labs      05/24/17   1730  05/24/17   1035   PH  7.39  7.30*   PCO2  36  39   PO2  59*  62*   HCO3  21*  19*         Ileana Hamilton, RT

## 2017-05-25 ENCOUNTER — APPOINTMENT (OUTPATIENT)
Dept: GENERAL RADIOLOGY | Age: 67
DRG: 208 | End: 2017-05-25
Attending: INTERNAL MEDICINE
Payer: SUBSIDIZED

## 2017-05-25 LAB
ALBUMIN SERPL BCP-MCNC: 2.2 G/DL (ref 3.2–4.6)
ALBUMIN/GLOB SERPL: 0.6 {RATIO} (ref 1.2–3.5)
ALP SERPL-CCNC: 53 U/L (ref 50–136)
ALT SERPL-CCNC: 16 U/L (ref 12–65)
ANION GAP BLD CALC-SCNC: 12 MMOL/L (ref 7–16)
APPEARANCE FLD: NORMAL
ARTERIAL PATENCY WRIST A: POSITIVE
AST SERPL W P-5'-P-CCNC: 22 U/L (ref 15–37)
ATRIAL RATE: 95 BPM
BASE DEFICIT BLDA-SCNC: 5.5 MMOL/L (ref 0–2)
BDY SITE: ABNORMAL
BILIRUB SERPL-MCNC: 0.3 MG/DL (ref 0.2–1.1)
BUN SERPL-MCNC: 42 MG/DL (ref 8–23)
CALCIUM SERPL-MCNC: 6.9 MG/DL (ref 8.3–10.4)
CALCULATED P AXIS, ECG09: 61 DEGREES
CALCULATED R AXIS, ECG10: 67 DEGREES
CALCULATED T AXIS, ECG11: 171 DEGREES
CHLORIDE SERPL-SCNC: 115 MMOL/L (ref 98–107)
CO2 SERPL-SCNC: 22 MMOL/L (ref 21–32)
COHGB MFR BLD: 0.5 % (ref 0.5–1.5)
COLOR FLD: NORMAL
CREAT SERPL-MCNC: 2.94 MG/DL (ref 0.6–1)
DIAGNOSIS, 93000: NORMAL
DO-HGB BLD-MCNC: 3 % (ref 0–5)
GLOBULIN SER CALC-MCNC: 4 G/DL (ref 2.3–3.5)
GLUCOSE SERPL-MCNC: 185 MG/DL (ref 65–100)
HCO3 BLDA-SCNC: 19 MMOL/L (ref 22–26)
HGB BLDMV-MCNC: 7.1 GM/DL (ref 11.7–15)
INR PPP: 3.1 (ref 0.9–1.2)
LYMPHOCYTES NFR FLD: 64 %
METHGB MFR BLD: 1.1 % (ref 0–1.5)
MONOS+MACROS NFR FLD: 3 %
NEUTS SEG NFR FLD: 33 %
NUC CELL # FLD: 309 /CU MM
OXYHGB MFR BLDA: 95.6 % (ref 94–97)
P-R INTERVAL, ECG05: 204 MS
PCO2 BLDA: 31 MMHG (ref 35–45)
PEEP RESPIRATORY: 10 CM[H2O]
PH BLDA: 7.4 [PH] (ref 7.35–7.45)
PO2 BLDA: 98 MMHG (ref 75–100)
POTASSIUM SERPL-SCNC: 4.9 MMOL/L (ref 3.5–5.1)
PROT SERPL-MCNC: 6.2 G/DL (ref 6.3–8.2)
PROTHROMBIN TIME: 34.4 SEC (ref 9.6–12)
Q-T INTERVAL, ECG07: 330 MS
QRS DURATION, ECG06: 74 MS
QTC CALCULATION (BEZET), ECG08: 414 MS
RBC # FLD: NORMAL /CU MM
RESP RATE: 15
SAO2 % BLD: 97 % (ref 92–98.5)
SERVICE CMNT-IMP: ABNORMAL
SODIUM SERPL-SCNC: 149 MMOL/L (ref 136–145)
SPECIMEN SOURCE FLD: NORMAL
VENTILATION MODE VENT: ABNORMAL
VENTRICULAR RATE, ECG03: 95 BPM
VT SETTING VENT: 350 ML

## 2017-05-25 PROCEDURE — 36415 COLL VENOUS BLD VENIPUNCTURE: CPT | Performed by: INTERNAL MEDICINE

## 2017-05-25 PROCEDURE — 84157 ASSAY OF PROTEIN OTHER: CPT | Performed by: INTERNAL MEDICINE

## 2017-05-25 PROCEDURE — 85610 PROTHROMBIN TIME: CPT | Performed by: INTERNAL MEDICINE

## 2017-05-25 PROCEDURE — 87116 MYCOBACTERIA CULTURE: CPT | Performed by: INTERNAL MEDICINE

## 2017-05-25 PROCEDURE — 0W993ZX DRAINAGE OF RIGHT PLEURAL CAVITY, PERCUTANEOUS APPROACH, DIAGNOSTIC: ICD-10-PCS | Performed by: INTERNAL MEDICINE

## 2017-05-25 PROCEDURE — 77030018798 HC PMP KT ENTRL FED COVD -A

## 2017-05-25 PROCEDURE — 65610000001 HC ROOM ICU GENERAL

## 2017-05-25 PROCEDURE — 77030032490 HC SLV COMPR SCD KNE COVD -B

## 2017-05-25 PROCEDURE — 94640 AIRWAY INHALATION TREATMENT: CPT

## 2017-05-25 PROCEDURE — 71010 XR CHEST PORT: CPT

## 2017-05-25 PROCEDURE — 36600 WITHDRAWAL OF ARTERIAL BLOOD: CPT

## 2017-05-25 PROCEDURE — 74011250637 HC RX REV CODE- 250/637: Performed by: INTERNAL MEDICINE

## 2017-05-25 PROCEDURE — 32555 ASPIRATE PLEURA W/ IMAGING: CPT | Performed by: INTERNAL MEDICINE

## 2017-05-25 PROCEDURE — 87102 FUNGUS ISOLATION CULTURE: CPT | Performed by: INTERNAL MEDICINE

## 2017-05-25 PROCEDURE — 83615 LACTATE (LD) (LDH) ENZYME: CPT | Performed by: INTERNAL MEDICINE

## 2017-05-25 PROCEDURE — 77030031476 HC EXCH HEAT MOISTW FLTR HALY -A

## 2017-05-25 PROCEDURE — 36430 TRANSFUSION BLD/BLD COMPNT: CPT

## 2017-05-25 PROCEDURE — 74011000258 HC RX REV CODE- 258: Performed by: INTERNAL MEDICINE

## 2017-05-25 PROCEDURE — 80053 COMPREHEN METABOLIC PANEL: CPT | Performed by: INTERNAL MEDICINE

## 2017-05-25 PROCEDURE — P9059 PLASMA, FRZ BETWEEN 8-24HOUR: HCPCS | Performed by: INTERNAL MEDICINE

## 2017-05-25 PROCEDURE — 74000 XR ABD (KUB): CPT

## 2017-05-25 PROCEDURE — 87205 SMEAR GRAM STAIN: CPT | Performed by: INTERNAL MEDICINE

## 2017-05-25 PROCEDURE — C1729 CATH, DRAINAGE: HCPCS | Performed by: INTERNAL MEDICINE

## 2017-05-25 PROCEDURE — 99233 SBSQ HOSP IP/OBS HIGH 50: CPT | Performed by: INTERNAL MEDICINE

## 2017-05-25 PROCEDURE — 82945 GLUCOSE OTHER FLUID: CPT | Performed by: INTERNAL MEDICINE

## 2017-05-25 PROCEDURE — 88305 TISSUE EXAM BY PATHOLOGIST: CPT | Performed by: INTERNAL MEDICINE

## 2017-05-25 PROCEDURE — 74011000250 HC RX REV CODE- 250: Performed by: INTERNAL MEDICINE

## 2017-05-25 PROCEDURE — 77030008771 HC TU NG SALEM SUMP -A

## 2017-05-25 PROCEDURE — 94003 VENT MGMT INPAT SUBQ DAY: CPT

## 2017-05-25 PROCEDURE — 76040000019: Performed by: INTERNAL MEDICINE

## 2017-05-25 PROCEDURE — 74011250636 HC RX REV CODE- 250/636: Performed by: INTERNAL MEDICINE

## 2017-05-25 PROCEDURE — 71010 XR CHEST SNGL V: CPT

## 2017-05-25 PROCEDURE — 88112 CYTOPATH CELL ENHANCE TECH: CPT | Performed by: INTERNAL MEDICINE

## 2017-05-25 PROCEDURE — 89050 BODY FLUID CELL COUNT: CPT | Performed by: INTERNAL MEDICINE

## 2017-05-25 PROCEDURE — 82803 BLOOD GASES ANY COMBINATION: CPT

## 2017-05-25 RX ORDER — FAMOTIDINE 40 MG/5ML
20 POWDER, FOR SUSPENSION ORAL 2 TIMES DAILY
Status: DISCONTINUED | OUTPATIENT
Start: 2017-05-25 | End: 2017-05-26

## 2017-05-25 RX ORDER — SODIUM CHLORIDE 9 MG/ML
250 INJECTION, SOLUTION INTRAVENOUS AS NEEDED
Status: DISCONTINUED | OUTPATIENT
Start: 2017-05-25 | End: 2017-05-27 | Stop reason: SDUPTHER

## 2017-05-25 RX ADMIN — AZITHROMYCIN MONOHYDRATE 500 MG: 500 INJECTION, POWDER, LYOPHILIZED, FOR SOLUTION INTRAVENOUS at 22:47

## 2017-05-25 RX ADMIN — ATENOLOL 25 MG: 25 TABLET ORAL at 22:04

## 2017-05-25 RX ADMIN — BUDESONIDE 500 MCG: 0.5 SUSPENSION RESPIRATORY (INHALATION) at 07:26

## 2017-05-25 RX ADMIN — ALBUTEROL SULFATE 2.5 MG: 2.5 SOLUTION RESPIRATORY (INHALATION) at 01:10

## 2017-05-25 RX ADMIN — PROPOFOL 20 MCG/KG/MIN: 10 INJECTION, EMULSION INTRAVENOUS at 13:52

## 2017-05-25 RX ADMIN — Medication 10 ML: at 22:04

## 2017-05-25 RX ADMIN — MIRTAZAPINE 30 MG: 15 TABLET, FILM COATED ORAL at 22:04

## 2017-05-25 RX ADMIN — FUROSEMIDE 40 MG: 10 INJECTION, SOLUTION INTRAMUSCULAR; INTRAVENOUS at 22:03

## 2017-05-25 RX ADMIN — FUROSEMIDE 40 MG: 10 INJECTION, SOLUTION INTRAMUSCULAR; INTRAVENOUS at 09:38

## 2017-05-25 RX ADMIN — METHYLPREDNISOLONE SODIUM SUCCINATE 40 MG: 125 INJECTION, POWDER, FOR SOLUTION INTRAMUSCULAR; INTRAVENOUS at 22:03

## 2017-05-25 RX ADMIN — FAMOTIDINE 20 MG: 40 POWDER, FOR SUSPENSION ORAL at 17:20

## 2017-05-25 RX ADMIN — MONTELUKAST SODIUM 10 MG: 10 TABLET, FILM COATED ORAL at 22:04

## 2017-05-25 RX ADMIN — MEGESTROL ACETATE 200 MG: 40 SUSPENSION ORAL at 09:51

## 2017-05-25 RX ADMIN — ALBUTEROL SULFATE 2.5 MG: 2.5 SOLUTION RESPIRATORY (INHALATION) at 15:12

## 2017-05-25 RX ADMIN — BUDESONIDE 500 MCG: 0.5 SUSPENSION RESPIRATORY (INHALATION) at 21:26

## 2017-05-25 RX ADMIN — PROPOFOL 40 MCG/KG/MIN: 10 INJECTION, EMULSION INTRAVENOUS at 03:01

## 2017-05-25 RX ADMIN — Medication 10 ML: at 05:29

## 2017-05-25 RX ADMIN — Medication 10 ML: at 13:07

## 2017-05-25 RX ADMIN — CEFTRIAXONE 1 G: 1 INJECTION, POWDER, FOR SOLUTION INTRAMUSCULAR; INTRAVENOUS at 11:09

## 2017-05-25 RX ADMIN — MIRTAZAPINE 30 MG: 15 TABLET, FILM COATED ORAL at 00:07

## 2017-05-25 RX ADMIN — AMLODIPINE BESYLATE 10 MG: 10 TABLET ORAL at 09:38

## 2017-05-25 RX ADMIN — METHYLPREDNISOLONE SODIUM SUCCINATE 40 MG: 125 INJECTION, POWDER, FOR SOLUTION INTRAMUSCULAR; INTRAVENOUS at 13:06

## 2017-05-25 RX ADMIN — ALBUTEROL SULFATE 2.5 MG: 2.5 SOLUTION RESPIRATORY (INHALATION) at 07:26

## 2017-05-25 RX ADMIN — MONTELUKAST SODIUM 10 MG: 10 TABLET, FILM COATED ORAL at 00:07

## 2017-05-25 RX ADMIN — ATENOLOL 25 MG: 25 TABLET ORAL at 09:38

## 2017-05-25 RX ADMIN — METHYLPREDNISOLONE SODIUM SUCCINATE 40 MG: 125 INJECTION, POWDER, FOR SOLUTION INTRAMUSCULAR; INTRAVENOUS at 05:29

## 2017-05-25 RX ADMIN — ALBUTEROL SULFATE 2.5 MG: 2.5 SOLUTION RESPIRATORY (INHALATION) at 21:26

## 2017-05-25 RX ADMIN — FAMOTIDINE 20 MG: 40 POWDER, FOR SUSPENSION ORAL at 11:09

## 2017-05-25 RX ADMIN — PROPOFOL 20 MCG/KG/MIN: 10 INJECTION, EMULSION INTRAVENOUS at 22:58

## 2017-05-25 NOTE — PROGRESS NOTES
Problem: Nutrition Deficit  Goal: *Optimize nutritional status  Nutrition:  Nutrition Consult for TF Management and Electrolyte Replacement Management. (Dr. Jeffery Gil)  Assessment:  Anthropometrics:              Ht - 5'2\", wgt - 62 kg (ICU bed 5/25/17), BMI 25 c/w \"overweight\", edema - 3+ BLEs  Macronutrient Needs:  Estimated calorie needs - 7643-4481 dolores/day (22-25 dolores/kg/day)  Estimated protein needs - 40-60 gm pro/day (0.8-1.2 gm pro/kgIBW/day) (GFR 17 ml/min - EMERY on CKD)  Max CHO/day - 195 gm CHO/day (50% dolores/day)   Fluid/day - 1.3-1.6 liters/day (1 ml/dolores/day)  Intake/Comparative Standards: The patient is currently NPO with Diprivan infusing @ 6.8 ml/hr (180 calories/day) which meets 13% of her calorie and 0% of her protein needs. Pertinent Labs/Hemodynamic Parameters:              AM glucose 185, BUN 42, creatinine 2.94 and sodium 149; MAP - 82. Pertinent Medications/Drips:              Solumedrol, Lasix, Megace, Remeron; Fentanyl and Diprivan drips. GI Function/Activity:              Active bowel sounds, semi-soft abdomen, last bowel movement was on 5/24. Diet:              NPO. Food/Nutrition History:              78year old lady with a h/o diabetes and breast cancer with mets to lung and bone admitted with acute on chronic respiratory failure. She was intubated 5/24 and continues to require ventilator support. Diagnosis (Nutrition): Inadequate energy intake related to NPO status as evidenced by the patient being intubated and ventilated and requires TF for nutrition support. Intervention:  Meals and Snacks: NPO. Enteral Nutrition: Start TF with Nepro @ 11 ml/hr with a 24 ml/hr water flush via NGT. Increase TF as tolerated to the goal rate of 31 ml/hr -  1339 calories/day (98% calorie goal), 60 grams protein/day (100% protein goal), 124 grams CHO/day (does not exceed max CHO limit) and 1320 ml water/day (100% fluid goal).   Mineral Supplement Therapy: Nutrition Support Orders for Electrolyte Management Replacements are activated and placed on the MAR. Nutrition-Related Medication Management: POC glucose and SSI coverage. Should we discontinue Remeron and Megace while on TF? Coordination of Nutrition Care by a Nutrition Professional: AM ICU rounds, collaboration with Amy Parker RN. Krystina Good.  Tyler Mittal  [phone]

## 2017-05-25 NOTE — PROGRESS NOTES
present at bedside for family update with Dr. Gracie Perry and Dr. Jeffery Gil.     [address] [facility]  [phone]

## 2017-05-25 NOTE — PROGRESS NOTES
Care Daily Progress Note: 5/25/2017  Admission Date: 5/24/2017     The patient's chart is reviewed and the patient is discussed with the staff. Subjective:   77 y/o was last seen by us in February of 2017 for right pleural effusion. She has dCHR, prior DVT on coumadin, CKD, Moderate AS, HTN, DM and R ureteral obstruction s/p stent placement. She is followed by Dr Yoli Benites for metastatic breast CA with mets to lung/bone. In February, she underwent R thoracentesis with drain to gravity with removal of 1200 ml of yellow pleural fluid. She developed pain after thoracentesis and pain was felt to be from trapped lung physiology and recommended not to remove more than 1 liter of pleural fluid if thoracentesis is again necessary. She was hypoxemic and home O2 arranged.      She had a CXR in April 2017 with persistent effusion. She was seen by Oncology on 5/16/2017 and was having dyspnea during that time with fatigue. She was given lasix as well and has taken lasix until 3 days ago. She has plans to see [facility] on June 2. Today in the ER, her CXR shows effusion on right which is unchanged and new right perihilar infiltrate. We were asked to admit her for hypoxemia with persistent effusion, new infiltrate with metastatic breast cancer with acute on chronic renal failure and moderate AS  She was admitted to ICU on bipap but continued to be in resp.  Distress so she was intubated and is sedated on propofol      Current Facility-Administered Medications   Medication Dose Route Frequency    famotidine (PEPCID) 40 mg/5 mL (8 mg/mL) oral suspension 20 mg  20 mg Per NG tube BID    sodium chloride (NS) flush 5-10 mL  5-10 mL IntraVENous Q8H    sodium chloride (NS) flush 5-10 mL  5-10 mL IntraVENous PRN    ALPRAZolam (XANAX) tablet 0.25 mg  0.25 mg Oral TID PRN    amLODIPine (NORVASC) tablet 10 mg  10 mg Oral DAILY    atenolol (TENORMIN) tablet 25 mg  25 mg Oral Q12H    fentaNYL (DURAGESIC) 50 mcg/hr patch 1 Patch  1 Patch TransDERmal Q72H    HYDROcodone-acetaminophen (NORCO)  mg tablet 1 Tab  1 Tab Oral Q4H PRN    LORazepam (ATIVAN) tablet 1 mg  1 mg Oral Q6H PRN    megestrol (MEGACE) 400 mg/10 mL (10 mL) oral suspension 200 mg  200 mg Oral DAILY    mirtazapine (REMERON) tablet 30 mg  30 mg Oral QHS    montelukast (SINGULAIR) tablet 10 mg  10 mg Oral QHS    zolpidem (AMBIEN) tablet 5 mg  5 mg Oral QHS PRN    sodium chloride (NS) flush 5-10 mL  5-10 mL IntraVENous Q8H    sodium chloride (NS) flush 5-10 mL  5-10 mL IntraVENous PRN    azithromycin (ZITHROMAX) 500 mg in 0.9% sodium chloride (MBP/ADV) 250 mL  500 mg IntraVENous Q24H    cefTRIAXone (ROCEPHIN) 1 g in 0.9% sodium chloride (MBP/ADV) 50 mL  1 g IntraVENous Q24H    methylPREDNISolone (PF) (SOLU-MEDROL) injection 40 mg  40 mg IntraVENous Q8H    morphine injection 2 mg  2 mg IntraVENous Q4H PRN    furosemide (LASIX) injection 40 mg  40 mg IntraVENous Q12H    budesonide (PULMICORT) 500 mcg/2 ml nebulizer suspension  500 mcg Nebulization BID RT    And    albuterol CONCENTRATE 2.5mg/0.5 mL neb soln  2.5 mg Nebulization Q6H RT    fentaNYL in normal saline (pf) 25 mcg/mL infusion   mcg/hr IntraVENous TITRATE    propofol (DIPRIVAN) infusion  5-50 mcg/kg/min IntraVENous TITRATE    0.9% sodium chloride infusion 250 mL  250 mL IntraVENous PRN       Review of Systems   Unobtainable due to patient status. Objective:     Vitals:    05/25/17 0732 05/25/17 0802 05/25/17 0832 05/25/17 0902   BP: 149/65 142/63 117/56 130/58   Pulse: 64 64 67 67   Resp: 13 20 19 13   Temp:       SpO2: 98% 100% 100% 100%   Weight:       Height:           Intake and Output:   05/23 1901 - 05/25 0700  In: 473.2 [I.V.:473.2]  Out: 675 [Urine:675]  05/25 0701 - 05/25 1900  In: 85   Out: 50 [Urine:50]    Physical Exam:          Constitutional:  intubated and mechanically ventilated.   EENMT:  Sclera clear, pupils equal, oral mucosa moist  Respiratory: decreased breath sounds on the R  Cardiovascular:  RRR with no M,G,R;  Gastrointestinal:  soft with no tenderness; positive bowel sounds present  Musculoskeletal:  warm with no cyanosis, trace lower leg edema  Skin:  no jaundice or ecchymosis  Neurologic: no gross neuro deficits     Psychiatric:  sedated     LINES:  ETT, port    DRIPS:  Propofol, fentanyl    CXR:        Ventilator Settings  Mode FIO2 Rate Tidal Volume Pressure PEEP   Pressure support  40 %    350 ml  12 cm H2O  10 cm H20      Peak airway pressure: 21.7 cm H2O   Minute ventilation: 8.2 l/min     ABG:   Recent Labs      05/25/17   0418  05/24/17   1730  05/24/17   1035   PH  7.40  7.39  7.30*   PCO2  31*  36  39   PO2  98  59*  62*   HCO3  19*  21*  19*        LAB  No results for input(s): GLUCPOC in the last 72 hours. No lab exists for component: [place]  Recent Labs      05/24/17   1111  05/23/17   0836   WBC  10.8   --    HGB  7.8*   --    HCT  24.4*   --    PLT  498*   --    INR  2.9*  4.9*     Recent Labs      05/25/17   0500  05/24/17   1550  05/24/17   1017   NA  149*  150*  147*   K  4.9  4.7  5.1   CL  115*  116*  113*   CO2  22  20*  19*   GLU  185*  187*  172*   BUN  42*  33*  30*   CREA  2.94*  2.47*  2.34*   MG   --   2.8*   --    PHOS   --   4.1*   --    CA  6.9*  7.1*  8.0*   ALB  2.2*   --    --    SGOT  22   --    --      No results for input(s): LCAD, LAC in the last 72 hours.       Assessment:  (Medical Decision Making)     Hospital Problems  Date Reviewed: 5/16/2017          Codes Class Noted POA    * (Principal)Acute on chronic respiratory failure ([facility] [address] ICD-10-CM: J96.20  ICD-9-CM: 518.84  5/24/2017 Yes    On vent support    Moderate aortic stenosis (Chronic) ICD-10-CM: I35.0  ICD-9-CM: 424.1  5/24/2017 Yes        Anticoagulated on Coumadin (Chronic) ICD-10-CM: Z51.81, Z79.01  ICD-9-CM: V58.83, V58.61  5/24/2017 Yes    Check inr    Volume overload ICD-10-CM: E87.70  ICD-9-CM: 276.69  5/24/2017 Yes    ongoing    Breast cancer metastasized to lung [facility]) (Chronic) ICD-10-CM: C50.919, C78.00  ICD-9-CM: 174.9, 197.0  2/17/2017 Yes        CKD (chronic kidney disease) (Chronic) ICD-10-CM: N18.9  ICD-9-CM: 585.9  1/4/2017 Yes    Overview Signed 5/14/2013 11:35 AM by Renetta Barrientos RN     With acute rise- ? Dehydration and monitor             DVT (deep venous thrombosis) (HCC) (Chronic) ICD-10-CM: I82.409  ICD-9-CM: 453.40  1/4/2017 Yes    Overview Signed 11/19/2013  6:07 AM by Woo Vigil     95-08-98 There is nonocclusive thrombus within the left common femoral vein, superficial femoral vein, popliteal vein, and posterior tibial vein               Pleural effusion, right (Chronic) ICD-10-CM: J90  ICD-9-CM: 511.9  1/4/2017 Yes    Overview Addendum 10/8/2016  5:54 PM by Alfredo Fountain MD     10/6/16 R thoracentesis 800 ml: pleural fluid creatine 4.1 = likely urinothorax         May be loculated    DM (diabetes mellitus) ([facility] [address] (Chronic) ICD-10-CM: E11.9  ICD-9-CM: 250.00  10/7/2016 Yes        Acute on chronic renal failure ([facility] [address] ICD-10-CM: N17.9, N18.9  ICD-9-CM: 584.9, 585.9  10/7/2016 Yes    worse    Leg swelling ICD-10-CM: M79.89  ICD-9-CM: 729.81  2/25/2014 Yes    Overview Signed 2/25/2014  2:45 PM by Marleta Kanner, NP     Bilateral                     Plan:  (Medical Decision Making)   1    Thoracentesis today if inr is ok  2     Vent support- currently on pressure support  3    May need bronch  --    More than 50% of the time documented was spent in face-to-face contact with the patient and in the care of the patient on the floor/unit where the patient is located.     Gerald Marcelino MD

## 2017-05-25 NOTE — H&P
Date of Surgery Update:  Annabel Villalpando was seen and examined. History and physical has been reviewed. The patient has been examined.  There have been no significant clinical changes since the completion of the originally dated History and Physical.    Signed By: Nubia Diop MD     May 25, 2017 4:15 PM

## 2017-05-25 NOTE — PROGRESS NOTES
Assessment complete. Chart reviewed. Patient awakens to voice, follows commands, nods appropriately. Fentanyl weaned to 25 mcg/hr and propofol weaned to 20 mcg/kg/min for sedation vacation. ETT 23 @ lips on PRVC with FiO2 50% and PEEP 10. Lung sounds coarse upper bilat, coarse lower left, and diminished lower right. NSR. BP stable systolic in the 516D. Bilat radial pulses palpable and +2, bilat pedal and post tibial pulses palpable and +1. 1+ pitting edema BUE, 3+ pitting edema BLE. NGT 60 cm R nare, bowel sounds active. Ruby draining hazy, lizzy urine, oliguric. Moves all extremities. No abnormalities to skin. VSS. NAD. Will continue to monitor.

## 2017-05-25 NOTE — PROGRESS NOTES
Dr. Joseph Huynh RT, and Javier RT at bedside for thoracentesis. Patient assisted to sitting up on the side of the bed. 2 units of FFP complete.

## 2017-05-25 NOTE — PROGRESS NOTES
Ventilator check complete; patient has a #7.5 ET tube secured at the 22 at the teeth. Patient is sedated. Patient is not able to follow commands. Breath sounds are diminished. Trachea is midline, Negative for subcutaneous air, and chest excursion is symmetric. Patient is also Negative for cyanosis. All alarms are set and audible. Resuscitation bag is at the head of the bed.       Ventilator Settings  Mode FIO2 Rate Tidal Volume Pressure PEEP I:E Ratio   PS  40 %      12 cm H2O  10 cm H20        Peak airway pressure: 21.7 cm H2O   Minute ventilation: 8.2 l/min     ABG:   Recent Labs      05/25/17   0418  05/24/17   1730  05/24/17   1035   PH  7.40  7.39  7.30*   PCO2  31*  36  39   PO2  98  59*  62*   HCO3  19*  21*  19*         Alissa Talamantes, RT

## 2017-05-25 NOTE — PROGRESS NOTES
Per Dr. Zenaida Salamanca, hold blood administration. Hgb 7.8. Minimal bleeding through ETT, no other signs of bleeding.

## 2017-05-25 NOTE — PROGRESS NOTES
Bedside shift change report given to Jaky DECKER (oncoming nurse) by Omega Mcelroy RN (offgoing nurse). Report included the following information SBAR, Kardex, Intake/Output, MAR, Recent Results and Cardiac Rhythm NSR. Fentanyl gtt verified at 50 mcg/hr. Propofol @ 40 mcg/kg/min.

## 2017-05-25 NOTE — PROGRESS NOTES
Bedside shift change report given to Mariel Durand (oncoming nurse) by Lizzette Lerma (offgoing nurse). Report included the following information SBAR, Kardex, ED Summary, Procedure Summary, Intake/Output, MAR, Recent Results and Cardiac Rhythm NSR. Patient turned. Fentanyl gtt signed off.

## 2017-05-25 NOTE — PROGRESS NOTES
Patient to receive 2 units FFP stat prior to thoracentesis per Dr. Zenaida Salamanca. Patient's NGT fell out while patient was being repositioned.

## 2017-05-25 NOTE — PROCEDURES
PROCEDURE:    DIAGNOSTIC/THERAPEUTIC THORACENTESIS/PLEURAL MANNOMETRY. PRE-OP DIAGNOSIS:    R PLEURAL EFFUSION    POST-OP DIAGNOSIS:    R PLEURAL EFFUSION    ASSISTANT:    Pamela    ANESTHESIA:    LOCAL ANESTHESIA WITH 1% LIDOCAINE 10 CC TOTAL. CHEST ULTRASOUND FINDINGS:    A Turbo-M, Sonosite ultrasound with a 5-16 mHz probe was used to image the chest and localize the pleural effusion on the /Right chest.    A large/ anechoic space was seen on the right consistent with an uncomplicated pleural effusion. DESCRIPTION OF PROCEDURE:    After obtaining informed consent and localizing the safest location for thoracentesis, the  9th intercostal space was marked with a blunt, plastic needle cap in the mid scapular line. An [employer] AK-0100 Pleral-Seal thoracentesis kit was used to perform the procedure. The skin was  cleansed with the supplied  chlorhexididne swab and then draped in the usual fasion. Using the previously marked location as a giude, a 22 G 1.5 inch needle was used to inject 10 cc of 1% lidocaine into the skin and subcutaneous tissue, as well as onto the underlying rib and inter-costal muscles, pleural fluid was aspirated to assure proper location, prior to removing the anesthesia needle. A 3mm  incision was then made, with the supplied scalpel in the usual fashion to facilitate the insertiopn of the thoracentesis needle. The needle with an 8French thoracentesis catheter was then introduced into the chest through the previously made incision in the usual fashion, the rib localized with the needle, and the catheter then marched over the rib into the pleural space. After aspirating fluid, the thoracentesis catheter was then placed into the chest using the needle itself as a trocar. The needle was then removed and the catheter was attached to the supplied tubing without complication.     950 cc of /serosanguinous/ fluid, was aspirated and sent for analysis. .    Fluid was sent for the following tests:    Cell count with differential  LDH  Glucose  Total protein  Cytology    AFB  Fungus  Routine culture and Gram stain      Post procedure US not done- cxr pending.     EBL:     2 ml      COMPLICATIONS:    none    Nikkie Baez MD

## 2017-05-25 NOTE — PROGRESS NOTES
Pt sat up on side of bed for thoracentesis. Critical Care consent noted on chart. Time out performed. Pts vitals monitored throughout procedure. Right ultrasound done and pic taken of pleural fluid.  ~950 ml lizzy colored pleural fluid from R.  Pt tolerated procedure well with no adverse rxn. Specimens sent to the lab x 3 and labeled appropriately. Site dressed appropriately and report given to pts JERONIMO Schrader who was at bedside throughout procedure.   R Lung sliding done and ultrasound findings reviewed by MD.

## 2017-05-25 NOTE — CONSULTS
[facility]        Inpatient Hematology / Oncology Consult Note    Reason for Consult:  Acute respiratory failure with hypoxemia [facility])  Referring Physician:  Tracie Bender MD    History of Present Illness:  Ms. Helen Mccall a 77 yo female known patient of Dr. Maribell Santana being treated for metastatic breast to bones and lungs. She presented to ER for increased SOB. In the ER, her CXR shows effusion on right which is unchanged and new right perihilar infiltrate. She was admitted to ICU on bipap but continued to be in resp. distress so she was intubated and is sedated on propofol    Ms. Helen Mccall was originally diagnosed 2011. She has received multiple lines of therapy with the latest being monthly fulvestrant and daily Ibrance. Doll Side should be held until Ms. Leggett recovers from this hypoxic event. Review of Systems: Unable to obtain. Patient on vent and sedated. No Known Allergies  Past Medical History:   Diagnosis Date    Acute on chronic diastolic congestive heart failure ([address] 1/5/2016    Asthma     till age 31    Cancer ([address]     roverto. breast ca mets bone/lung    Chemotherapy-induced neutropenia ([address] 12/20/2016    Depression     Diabetes ([address]     recently dx'ed; ype 2;  does not take at home    DM (diabetes mellitus) ([address] 11/20/2012    HCAP (healthcare-associated pneumonia) 7/17/2013    Hypertension     Sepsis ([address] 7/19/2016    Thromboembolus ([address]     Left leg DVT    Unspecified adverse effect of anesthesia     In [facility] yrs ago after second c -section-she was told her heart stopped d/t anesthesia-hospitalized for 5 days afterwards and followed by cardiologist     Past Surgical History:   Procedure Laterality Date    HX GYN      2 c sections, ovarian cyst-roverto.     HX VASCULAR ACCESS  1/26/12     Family History   Problem Relation Age of Onset    Heart Attack Mother     Cancer Brother      doen not know details     Social History     Social History    Marital status:      Spouse name: N/A    Number of children: N/A    Years of education: N/A     Occupational History    Not on file.      Social History Main Topics    Smoking status: Former Smoker     Packs/day: 1.00     Years: 3.00     Types: Cigarettes     Quit date: 1/1/1978    Smokeless tobacco: Never Used      Comment: quit 30 or more years ago    Alcohol use No    Drug use: No    Sexual activity: Not on file     Other Topics Concern    Not on file     Social History Narrative     Current Facility-Administered Medications   Medication Dose Route Frequency Provider Last Rate Last Dose    sodium chloride (NS) flush 5-10 mL  5-10 mL IntraVENous Q8H Patricia Mora MD   10 mL at 05/25/17 0529    sodium chloride (NS) flush 5-10 mL  5-10 mL IntraVENous PRN Patricia Mora MD        ALPRAZolam [place]) tablet 0.25 mg  0.25 mg Oral TID PRN Luna Virk MD        amLODIPine (NORVASC) tablet 10 mg  10 mg Oral DAILY Luna Virk MD        atenolol (TENORMIN) tablet 25 mg  25 mg Oral Q12H Luna Virk MD   Stopped at 05/24/17 2100    fentaNYL (DURAGESIC) 50 mcg/hr patch 1 Patch  1 Patch TransDERmal Q72H Luna Virk MD   1 Patch at 05/24/17 1636    HYDROcodone-acetaminophen (NORCO)  mg tablet 1 Tab  1 Tab Oral Q4H PRN Luna Virk MD        LORazepam (ATIVAN) tablet 1 mg  1 mg Oral Q6H PRN Luna Virk MD        megestrol (MEGACE) 400 mg/10 mL (10 mL) oral suspension 200 mg  200 mg Oral DAILY Luna Virk MD        mirtazapine (REMERON) tablet 30 mg  30 mg Oral QHS Luna Virk MD   30 mg at 05/25/17 0007    montelukast (SINGULAIR) tablet 10 mg  10 mg Oral QHS Luna Virk MD   10 mg at 05/25/17 0007    zolpidem (AMBIEN) tablet 5 mg  5 mg Oral QHS PRN Luna Virk MD        sodium chloride (NS) flush 5-10 mL  5-10 mL IntraVENous Q8H Luna Virk MD   10 mL at 05/25/17 0529    sodium chloride (NS) flush 5-10 mL  5-10 mL IntraVENous PRN Suellen Rinne, MD        azithromycin [facility]) 500 mg in 0.9% sodium chloride (MBP/ADV) 250 mL  500 mg IntraVENous Q24H Suellen Rinne,  mL/hr at 05/24/17 2105 500 mg at 05/24/17 2105    cefTRIAXone (ROCEPHIN) 1 g in 0.9% sodium chloride (MBP/ADV) 50 mL  1 g IntraVENous Q24H Suellen Rinne, MD        methylPREDNISolone (PF) (SOLU-MEDROL) injection 40 mg  40 mg IntraVENous Q8H Suellen Rinne, MD   40 mg at 05/25/17 9648    morphine injection 2 mg  2 mg IntraVENous Q4H PRN Suellen Rinne, MD   2 mg at 05/24/17 1434    furosemide (LASIX) injection 40 mg  40 mg IntraVENous Q12H Suellen Rinne, MD   40 mg at 05/24/17 2104    budesonide (PULMICORT) 500 mcg/2 ml nebulizer suspension  500 mcg Nebulization BID RT Suellen Rinne, MD   500 mcg at 05/25/17 1178    And    albuterol CONCENTRATE 2.5mg/0.5 mL neb soln  2.5 mg Nebulization Q6H RT Suellen Rinne, MD   2.5 mg at 05/25/17 0726    fentaNYL in normal saline (pf) 25 mcg/mL infusion   mcg/hr IntraVENous TITRATE Suellen Rinne, MD 1 mL/hr at 05/25/17 0704 25 mcg/hr at 05/25/17 0704    propofol (DIPRIVAN) infusion  5-50 mcg/kg/min IntraVENous TITRATE Suellen Rinne, MD 6.8 mL/hr at 05/25/17 0704 20 mcg/kg/min at 05/25/17 0704    0.9% sodium chloride infusion 250 mL  250 mL IntraVENous PRN Emely Forde MD           OBJECTIVE:  Patient Vitals for the past 8 hrs:   BP Temp Pulse Resp SpO2 Weight   05/25/17 0902 130/58 - 67 13 100 % -   05/25/17 0832 117/56 - 67 19 100 % -   05/25/17 0802 142/63 - 64 20 100 % -   05/25/17 0732 149/65 - 64 13 98 % -   05/25/17 0726 - - 65 18 97 % -   05/25/17 0702 131/60 99.4 °F (37.4 °C) 64 11 100 % -   05/25/17 0632 125/58 - 66 17 100 % -   05/25/17 0602 147/68 - 61 14 100 % -   05/25/17 0502 130/90 - 65 12 100 % -   05/25/17 0426 - - 65 15 100 % -   05/25/17 0410 - - 64 15 100 % -   05/25/17 0401 114/56 98.7 °F (37.1 °C) 64 29 100 % -   17 0400 - - - - - 136 lb 11 oz (62 kg)   17 0301 128/61 - 65 15 100 % -   17 0205 121/55 - 67 19 100 % -     Temp (24hrs), Av.8 °F (37.1 °C), Min:98.3 °F (36.8 °C), Max:99.4 °F (37.4 °C)         Physical Exam:  Constitutional: Critical female in ICU bed on vent. HEENT: Normocephalic and atraumatic. Lymph node   Deferred   Skin Warm and dry. No bruising and no rash noted. No erythema. No pallor. Respiratory Decreased breath sounds on the R   CVS Normal rate, regular rhythm and normal S1 and S2. No murmurs, gallops, or rubs. Abdomen Soft, nontender and nondistended, normoactive bowel sounds   Neuro Sedated   MSK Deferred   Psych Sedated       Labs:    Recent Results (from the past 24 hour(s))   METABOLIC PANEL, BASIC    Collection Time: 17 10:17 AM   Result Value Ref Range    Sodium 147 (H) 136 - 145 mmol/L    Potassium 5.1 3.5 - 5.1 mmol/L    Chloride 113 (H) 98 - 107 mmol/L    CO2 19 (L) 21 - 32 mmol/L    Anion gap 15 7 - 16 mmol/L    Glucose 172 (H) 65 - 100 mg/dL    BUN 30 (H) 8 - 23 MG/DL    Creatinine 2.34 (H) 0.6 - 1.0 MG/DL    GFR est AA 27 (L) >60 ml/min/1.73m2    GFR est non-AA 22 (L) >60 ml/min/1.73m2    Calcium 8.0 (L) 8.3 - 10.4 MG/DL   BLOOD GAS, ARTERIAL    Collection Time: 17 10:35 AM   Result Value Ref Range    pH 7.30 (L) 7.35 - 7.45      PCO2 39 35.0 - 45.0 mmHg    PO2 62 (L) 75.0 - 100.0 mmHg    BICARBONATE 19 (L) 22.0 - 26.0 mmol/L    BASE DEFICIT 7.2 (H) 0 - 2 mmol/L    TOTAL HEMOGLOBIN 8.7 (L) 11.7 - 15.0 GM/DL    O2 SAT 86 (L) 92.0 - 98.5 %    ARTERIAL O2 HGB 85.5 (L) 94.0 - 97.0 %    CARBOXYHEMOGLOBIN 0.3 (L) 0.5 - 1.5 %    METHEMOGLOBIN 0.6 0.0 - 1.5 %    DEOXYHEMOGLOBIN 14 (H) 0.0 - 5.0 %    SITE RB     ALLENS TEST NA     FIO2 100.0 %    O2 FLOW 15.00 L/min    Respiratory comment: Dr. Nghia Willingham at 2017 10  17 AM. Read back.     CBC WITH AUTOMATED DIFF    Collection Time: 17 11:11 AM   Result Value Ref Range    WBC 10.8 4.3 - 11.1 K/uL    RBC 2.21 (L) 4.05 - 5.25 M/uL    HGB 7.8 (L) 11.7 - 15.4 g/dL    HCT 24.4 (L) 35.8 - 46.3 %    .4 (H) 79.6 - 97.8 FL    MCH 35.3 (H) 26.1 - 32.9 PG    MCHC 32.0 31.4 - 35.0 g/dL    RDW 19.5 (H) 11.9 - 14.6 %    PLATELET 537 (H) 290 - 450 K/uL    MPV 9.6 (L) 10.8 - 14.1 FL    DF AUTOMATED      NEUTROPHILS 88 (H) 43 - 78 %    LYMPHOCYTES 7 (L) 13 - 44 %    MONOCYTES 3 (L) 4.0 - 12.0 %    EOSINOPHILS 0 (L) 0.5 - 7.8 %    BASOPHILS 2 0.0 - 2.0 %    IMMATURE GRANULOCYTES 0.1 0.0 - 5.0 %    ABS. NEUTROPHILS 9.5 (H) 1.7 - 8.2 K/UL    ABS. LYMPHOCYTES 0.7 0.5 - 4.6 K/UL    ABS. MONOCYTES 0.3 0.1 - 1.3 K/UL    ABS. EOSINOPHILS 0.0 0.0 - 0.8 K/UL    ABS. BASOPHILS 0.2 0.0 - 0.2 K/UL    ABS. IMM.  GRANS. 0.0 0.0 - 0.5 K/UL   PROTHROMBIN TIME + INR    Collection Time: 05/24/17 11:11 AM   Result Value Ref Range    Prothrombin time 31.3 (H) 9.6 - 12.0 sec    INR 2.9 (H) 0.9 - 1.2     POC LACTIC ACID    Collection Time: 05/24/17 11:20 AM   Result Value Ref Range    Lactic Acid (POC) 3.7 (H) 0.5 - 1.9 mmol/L   PROCALCITONIN    Collection Time: 05/24/17 11:46 AM   Result Value Ref Range    Procalcitonin 1.0 ng/mL   MRSA SCREEN - PCR (NASAL)    Collection Time: 05/24/17  2:36 PM   Result Value Ref Range    Special Requests: NO SPECIAL REQUESTS      Culture result:        MRSA target DNA is not detected (presumptive not colonized with MRSA)   METABOLIC PANEL, BASIC    Collection Time: 05/24/17  3:50 PM   Result Value Ref Range    Sodium 150 (H) 136 - 145 mmol/L    Potassium 4.7 3.5 - 5.1 mmol/L    Chloride 116 (H) 98 - 107 mmol/L    CO2 20 (L) 21 - 32 mmol/L    Anion gap 14 7 - 16 mmol/L    Glucose 187 (H) 65 - 100 mg/dL    BUN 33 (H) 8 - 23 MG/DL    Creatinine 2.47 (H) 0.6 - 1.0 MG/DL    GFR est AA 25 (L) >60 ml/min/1.73m2    GFR est non-AA 21 (L) >60 ml/min/1.73m2    Calcium 7.1 (L) 8.3 - 10.4 MG/DL   MAGNESIUM    Collection Time: 05/24/17  3:50 PM   Result Value Ref Range    Magnesium 2.8 (H) 1.8 - 2.4 mg/dL PHOSPHORUS    Collection Time: 05/24/17  3:50 PM   Result Value Ref Range    Phosphorus 4.1 (H) 2.3 - 3.7 MG/DL   BLOOD GAS, ARTERIAL    Collection Time: 05/24/17  5:30 PM   Result Value Ref Range    pH 7.39 7.35 - 7.45      PCO2 36 35.0 - 45.0 mmHg    PO2 59 (L) 75.0 - 100.0 mmHg    BICARBONATE 21 (L) 22.0 - 26.0 mmol/L    BASE DEFICIT 3.5 (H) 0 - 2 mmol/L    TOTAL HEMOGLOBIN 6.9 (LL) 11.7 - 15.0 GM/DL    O2 SAT 89 (L) 92.0 - 98.5 %    ARTERIAL O2 HGB 88.0 (L) 94.0 - 97.0 %    CARBOXYHEMOGLOBIN 0.6 0.5 - 1.5 %    METHEMOGLOBIN 1.0 0.0 - 1.5 %    DEOXYHEMOGLOBIN 10 (H) 0.0 - 5.0 %    SITE LR     ALLENS TEST POSITIVE      MODE PRVC     Tidal volume 350.0      RATE 15.0      PEEP/CPAP 8.0      Respiratory comment: Jaky DECKER at 5 24 2017 5 39 11 PM. Read back. TYPE & CROSSMATCH    Collection Time: 05/24/17  8:25 PM   Result Value Ref Range    Crossmatch Expiration 05/27/2017     ABO/Rh(D) A POSITIVE     Antibody screen NEG     Unit number [id number]     Blood component type [facility] AS5     Unit division 00     Status of unit DISCARDED,INCINERATED     Crossmatch result Compatible     Unit number [id number]     Blood component type [facility] AS5     Unit division 00     Status of unit ALLOCATED     Crossmatch result Compatible    CULTURE, RESPIRATORY/SPUTUM/BRONCH W GRAM STAIN    Collection Time: 05/24/17 11:30 PM   Result Value Ref Range    Special Requests: NO SPECIAL REQUESTS      GRAM STAIN 0 TO 15  WBCS SEEN       GRAM STAIN NO EPITHELIAL CELLS SEEN      GRAM STAIN NO DEFINITE ORGANISM SEEN      GRAM STAIN MUCOUS  2+        Culture result:        NO GROWTH AFTER SHORT PERIOD OF INCUBATION. FURTHER RESULTS TO FOLLOW AFTER OVERNIGHT INCUBATION.    BLOOD GAS, ARTERIAL    Collection Time: 05/25/17  4:18 AM   Result Value Ref Range    pH 7.40 7.35 - 7.45      PCO2 31 (L) 35.0 - 45.0 mmHg    PO2 98 75.0 - 100.0 mmHg    BICARBONATE 19 (L) 22.0 - 26.0 mmol/L    BASE DEFICIT 5.5 (H) 0 - 2 mmol/L    TOTAL HEMOGLOBIN 7.1 (L) 11.7 - 15.0 GM/DL    O2 SAT 97 92.0 - 98.5 %    ARTERIAL O2 HGB 95.6 94.0 - 97.0 %    CARBOXYHEMOGLOBIN 0.5 0.5 - 1.5 %    METHEMOGLOBIN 1.1 0.0 - 1.5 %    DEOXYHEMOGLOBIN 3 0.0 - 5.0 %    SITE LR     ALLENS TEST POSITIVE      MODE PRVC     Tidal volume 350.0      RATE 15.0      PEEP/CPAP 10.0      Respiratory comment: Tamara NARANJO RRT at 5 25 2017 4 23 16 AM. Read back. METABOLIC PANEL, COMPREHENSIVE    Collection Time: 05/25/17  5:00 AM   Result Value Ref Range    Sodium 149 (H) 136 - 145 mmol/L    Potassium 4.9 3.5 - 5.1 mmol/L    Chloride 115 (H) 98 - 107 mmol/L    CO2 22 21 - 32 mmol/L    Anion gap 12 7 - 16 mmol/L    Glucose 185 (H) 65 - 100 mg/dL    BUN 42 (H) 8 - 23 MG/DL    Creatinine 2.94 (H) 0.6 - 1.0 MG/DL    GFR est AA 20 (L) >60 ml/min/1.73m2    GFR est non-AA 17 (L) >60 ml/min/1.73m2    Calcium 6.9 (L) 8.3 - 10.4 MG/DL    Bilirubin, total 0.3 0.2 - 1.1 MG/DL    ALT (SGPT) 16 12 - 65 U/L    AST (SGOT) 22 15 - 37 U/L    Alk. phosphatase 53 50 - 136 U/L    Protein, total 6.2 (L) 6.3 - 8.2 g/dL    Albumin 2.2 (L) 3.2 - 4.6 g/dL    Globulin 4.0 (H) 2.3 - 3.5 g/dL    A-G Ratio 0.6 (L) 1.2 - 3.5         Imaging:  [unfilled]    ASSESSMENT:  Problem List  Date Reviewed: 5/16/2017          Codes Class Noted    * (Principal)Acute on chronic respiratory failure ([facility] ICD-10-CM: J96.20  ICD-9-CM: 518.84  5/24/2017        Moderate aortic stenosis (Chronic) ICD-10-CM: I35.0  ICD-9-CM: 424.1  5/24/2017        Anticoagulated on Coumadin (Chronic) ICD-10-CM: Z51.81, Z79.01  ICD-9-CM: V58.83, V58.61  5/24/2017        Volume overload ICD-10-CM: E87.70  ICD-9-CM: 276.69  5/24/2017        Breast cancer ([facility] (Chronic) ICD-10-CM: C50.919  ICD-9-CM: 174.9  2/17/2017    Overview Addendum 2/27/2014 10:08 AM by Cassandra Meneses NP     Er+  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  SUV max on the right is 20.5 and on the left 31.5. There is matted high right axillary   lymphadenopathy as well as a more hypermetabolic low right axillary lymph node which measures 12 x 9 mm and has an SUV max of 15.4. Small bilateral pulmonary hilar metastases are also noted as well as extensive skeletal metastatic disease which includes both proximal femora, both scapulae, innumerable ribs, virtually every vertebra, and the bony pelvis. No displaced pathologic fracture is identified on the CT images. Ct scan 2-12 Innumerable diffuse osseous metastases. . Multiple diffuse tiny lung nodules, and right hilar lymphadenopathy, also suspicious for metastases. 3. Bilateral breast masses, compatible with known carcinoma. CANCER ANTIGEN 27.29 1076   Oncology Flowsheet Day, Cycle cyclophosphamide (CYTOXAN) IV   1/27/2012 Day 1, Cycle 1 600 mg/m2 = 996 mg   2/17/2012 Day 1, Cycle 2 600 mg/m2 = 996 mg   3/9/2012 Day 1, Cycle 3 600 mg/m2 = 996 mg   3/30/2012 Day 1, Cycle 4 500 mg/m2 = 830 mg   4/20/2012 Day 1, Cycle 5 500 mg/m2 = 830 mg     Oncology Flowsheet DOCEtaxel (TAXOTERE) IV   1/27/2012 75 mg/m2 = 125 mg   2/17/2012 75 mg/m2 = 125 mg   3/9/2012 75 mg/m2 = 125 mg   3/30/2012 60 mg/m2 = 100 mg   4/20/2012 60 mg/m2 = 100 mg   7-19-12 post 6 cycles of TC improved breast masses switch to femara maintenence  Clear response on pet ct scan   Mixed response to chemotherapy: There has been a significant interval response in the right breast and axilla with decrease in size of spiculated right breast mass and numerous right axillary lymph nodes. Left breast mass also appears to have responded chemotherapy though there may be persistent chest wall invasion. 2. Interval evolution of widespread osseous metastatic disease with many lytic lesions now appearing more sclerotic. These are associated with continued FDG uptake which is difficult to differentiate from marrow reactivity given recent chemotherapy.  At least one new osseous metastatic deposits is present in the posterior spinous process at L1. Focally intense uptake is also present at approximately T5  10-19-12 pain in arms legs back continued improvement on ct scan Decreased size of the largest lung nodules and near complete resolution . of many. No new masses in the chest, abdomen, or pelvis. 2. Decreased size of right axillary and right hilar lymph nodes. 3. Diffuse osseous lesions again seen. 4. Diverticulosis. 5. Atherosclerotic vascular disease ca 15-3 down to 50  On femara and aredia will see if we can get affinitor to start at next visit continue therapy  12-19-12 new headache and dizziness for MRI will start affinitor   1-14-13 improved pain breast mass right breast smaller width 7 cm x 5  6-05-13 femara and aredia now on affinitor x 5 months   7-13 tumor markers falling   8-20-13 post admission for pneumonia medications to restart breast mass smaller reduce affinitor to 7.5 mg   10-18-13 femara and affinitor pain right ilium intermittent markers smaller breast mass smaller on right   12-5-13 Reduce Afinitor to 5 mg daily. Continue coumadin for DVT. Continue Femara and Aredia. Repeat US and skeletal survey prior to next visit. 1-6-14 Feeling better with reduced dose. Ultrasound of breasts show decrease in mass sizes. Skeletal survey stable. Follow up in 2 months. Bony metastasis (HCC) (Chronic) ICD-10-CM: C79.51  ICD-9-CM: 198.5  2/17/2017        Acute respiratory failure [facility]) ICD-10-CM: J96.00  ICD-9-CM: 518.81  2/17/2017        Breast cancer metastasized to lung [facility]) (Chronic) ICD-10-CM: C50.919, C78.00  ICD-9-CM: 174.9, 197.0  2/17/2017        CKD (chronic kidney disease) (Chronic) ICD-10-CM: N18.9  ICD-9-CM: 585.9  1/4/2017    Overview Signed 5/14/2013 11:35 AM by Elaine Navarrete RN     With acute rise- ?  Dehydration and monitor             DVT (deep venous thrombosis) (HCC) (Chronic) ICD-10-CM: I82.409  ICD-9-CM: 453.40  1/4/2017    Overview Signed 11/19/2013  6:07 AM by Oral Gomez Cassie Jo     [id number] There is nonocclusive thrombus within the left common femoral vein, superficial femoral vein, popliteal vein, and posterior tibial vein               Accelerated hypertension ICD-10-CM: I10  ICD-9-CM: 401.0  1/4/2017        Pleural effusion, right (Chronic) ICD-10-CM: J90  ICD-9-CM: 511.9  1/4/2017    Overview Addendum 10/8/2016  5:54 PM by Clay Purcell MD     10/6/16 R thoracentesis 800 ml: pleural fluid creatine 4.1 = likely urinothorax             Hydronephrosis (Chronic) ICD-10-CM: N13.30  ICD-9-CM: 277  1/4/2017        Bilateral edema of lower extremity ICD-10-CM: R60.0  ICD-9-CM: 782.3  1/4/2017        Hematuria ICD-10-CM: R31.9  ICD-9-CM: 599.70  1/4/2017        Chemotherapy-induced neutropenia ([facility] ICD-10-CM: D70.1  ICD-9-CM: 288.03, E933.1  12/20/2016        Pancytopenia ([facility] ICD-10-CM: Q24.049  ICD-9-CM: 284.19  10/10/2016        HTN (hypertension) (Chronic) ICD-10-CM: I10  ICD-9-CM: 401.9  10/7/2016        DM (diabetes mellitus) ([facility] (Chronic) ICD-10-CM: E11.9  ICD-9-CM: 250.00  10/7/2016        Acute on chronic renal failure ([facility] ICD-10-CM: N17.9, N18.9  ICD-9-CM: 584.9, 585.9  10/7/2016        Pulmonary edema ICD-10-CM: J81.1  ICD-9-CM: 363  1/3/2016        Elevated troponin ICD-10-CM: R74.8  ICD-9-CM: 790.6  10/20/2015        PND (paroxysmal nocturnal dyspnea) ICD-10-CM: R06.00  ICD-9-CM: 786.09  2/25/2014        Leg swelling ICD-10-CM: M79.89  ICD-9-CM: 729.81  2/25/2014    Overview Signed 2/25/2014  2:45 PM by Phill Agustin NP     Bilateral               CHF (congestive heart failure) ([facility] ICD-10-CM: I50.9  ICD-9-CM: 428.0  2/25/2014        Anemia ICD-10-CM: D64.9  ICD-9-CM: 285.9  11/19/2013    Overview Signed 11/19/2013  6:06 AM by Sami Gu     [birth year] admitted with anemia and transfused              Asthma (Chronic) ICD-10-CM: J45.909  ICD-9-CM: 493.90  10/22/2013    Overview Addendum 10/22/2013 10:55 AM by Navya Diaz NP     8/2013: started on Symbicort  10/22/13: Added singulair             Hypomagnesemia ICD-10-CM: E83.42  ICD-9-CM: 275.2  8/4/2013        Hypokalemia ICD-10-CM: E87.6  ICD-9-CM: 276.8  8/4/2013        Iron deficiency anemia (Chronic) ICD-10-CM: D50.9  ICD-9-CM: 280.9  6/10/2013                RECOMMENDATIONS:  -Breast cancer with mets to bones and lungs  5/25 Currently on monthly fulvestrant and daily Ibrance. Hold Ibrance.    -Respiratory failure  05/25 Pulmonary managing    Lab studies and imaging studies were personally reviewed. Pertinent old records were reviewed. Thank you for allowing us to participate in the care of Ms. Leggett. We will follow along loosely.           [facility]  [id number] [place] [address]  Office : [phone]  Fax : [phone]

## 2017-05-26 ENCOUNTER — APPOINTMENT (OUTPATIENT)
Dept: ULTRASOUND IMAGING | Age: 67
DRG: 208 | End: 2017-05-26
Attending: EMERGENCY MEDICINE
Payer: SUBSIDIZED

## 2017-05-26 ENCOUNTER — APPOINTMENT (OUTPATIENT)
Dept: GENERAL RADIOLOGY | Age: 67
DRG: 208 | End: 2017-05-26
Attending: INTERNAL MEDICINE
Payer: SUBSIDIZED

## 2017-05-26 LAB
ANION GAP BLD CALC-SCNC: 17 MMOL/L (ref 7–16)
APPEARANCE UR: ABNORMAL
ARTERIAL PATENCY WRIST A: ABNORMAL
BACTERIA URNS QL MICRO: 0 /HPF
BASE DEFICIT BLDA-SCNC: 2.4 MMOL/L (ref 0–2)
BDY SITE: ABNORMAL
BILIRUB UR QL: NEGATIVE
BLD PROD TYP BPU: NORMAL
BLD PROD TYP BPU: NORMAL
BPU ID: NORMAL
BPU ID: NORMAL
BUN SERPL-MCNC: 58 MG/DL (ref 8–23)
CALCIUM SERPL-MCNC: 7 MG/DL (ref 8.3–10.4)
CASTS URNS QL MICRO: ABNORMAL /LPF
CHLORIDE SERPL-SCNC: 110 MMOL/L (ref 98–107)
CO2 SERPL-SCNC: 19 MMOL/L (ref 21–32)
COHGB MFR BLD: 1 % (ref 0.5–1.5)
COLOR UR: YELLOW
CREAT SERPL-MCNC: 3.66 MG/DL (ref 0.6–1)
CREAT UR-MCNC: 50.1 MG/DL
DO-HGB BLD-MCNC: 10 % (ref 0–5)
EPI CELLS #/AREA URNS HPF: ABNORMAL /HPF
ERYTHROCYTE [DISTWIDTH] IN BLOOD BY AUTOMATED COUNT: 20.3 % (ref 11.9–14.6)
FIO2 ON VENT: 35 %
GLUCOSE BLD STRIP.AUTO-MCNC: 222 MG/DL (ref 65–100)
GLUCOSE BLD STRIP.AUTO-MCNC: 293 MG/DL (ref 65–100)
GLUCOSE BLD STRIP.AUTO-MCNC: 374 MG/DL (ref 65–100)
GLUCOSE BLD STRIP.AUTO-MCNC: 374 MG/DL (ref 65–100)
GLUCOSE BLD STRIP.AUTO-MCNC: 375 MG/DL (ref 65–100)
GLUCOSE FLD-MCNC: 183 MG/DL
GLUCOSE SERPL-MCNC: 281 MG/DL (ref 65–100)
GLUCOSE UR STRIP.AUTO-MCNC: 250 MG/DL
HCO3 BLDA-SCNC: 22 MMOL/L (ref 22–26)
HCT VFR BLD AUTO: 21.1 % (ref 35.8–46.3)
HCT VFR BLD AUTO: 24.6 % (ref 35.8–46.3)
HGB BLD-MCNC: 6.8 G/DL (ref 11.7–15.4)
HGB BLD-MCNC: 8.2 G/DL (ref 11.7–15.4)
HGB BLDMV-MCNC: 7.1 GM/DL (ref 11.7–15)
HGB UR QL STRIP: ABNORMAL
INR PPP: 2.3 (ref 0.9–1.2)
KETONES UR QL STRIP.AUTO: NEGATIVE MG/DL
LDH FLD L TO P-CCNC: 294 U/L
LEUKOCYTE ESTERASE UR QL STRIP.AUTO: ABNORMAL
MAGNESIUM SERPL-MCNC: 3.1 MG/DL (ref 1.8–2.4)
MCH RBC QN AUTO: 36.8 PG (ref 26.1–32.9)
MCHC RBC AUTO-ENTMCNC: 32.2 G/DL (ref 31.4–35)
MCV RBC AUTO: 114.1 FL (ref 79.6–97.8)
METHGB MFR BLD: 0.5 % (ref 0–1.5)
NITRITE UR QL STRIP.AUTO: NEGATIVE
OTHER OBSERVATIONS,UCOM: ABNORMAL
OXYHGB MFR BLDA: 88.4 % (ref 94–97)
PCO2 BLDA: 34 MMHG (ref 35–45)
PEEP RESPIRATORY: 10 CM[H2O]
PH BLDA: 7.42 [PH] (ref 7.35–7.45)
PH UR STRIP: 6 [PH] (ref 5–9)
PHOSPHATE SERPL-MCNC: 6 MG/DL (ref 2.3–3.7)
PLATELET # BLD AUTO: 256 K/UL (ref 150–450)
PMV BLD AUTO: 10.8 FL (ref 10.8–14.1)
PO2 BLDA: 58 MMHG (ref 75–100)
POTASSIUM SERPL-SCNC: 4.9 MMOL/L (ref 3.5–5.1)
PROT FLD-MCNC: 2.7 G/DL
PROT UR STRIP-MCNC: 300 MG/DL
PROT UR-MCNC: 225 MG/DL
PROT/CREAT UR-RTO: 4.5
PROTHROMBIN TIME: 24.9 SEC (ref 9.6–12)
RBC # BLD AUTO: 1.85 M/UL (ref 4.05–5.25)
RBC #/AREA URNS HPF: >100 /HPF
RESP RATE: 15
SAO2 % BLD: 90 % (ref 92–98.5)
SERVICE CMNT-IMP: ABNORMAL
SODIUM SERPL-SCNC: 146 MMOL/L (ref 136–145)
SP GR UR REFRACTOMETRY: 1.01 (ref 1–1.02)
SPECIMEN SOURCE FLD: NORMAL
STATUS OF UNIT,%ST: NORMAL
STATUS OF UNIT,%ST: NORMAL
UNIT DIVISION, %UDIV: 0
UNIT DIVISION, %UDIV: 0
UROBILINOGEN UR QL STRIP.AUTO: 0.2 EU/DL (ref 0.2–1)
VENTILATION MODE VENT: ABNORMAL
VT SETTING VENT: 350 ML
WBC # BLD AUTO: 4 K/UL (ref 4.3–11.1)
WBC URNS QL MICRO: ABNORMAL /HPF

## 2017-05-26 PROCEDURE — 77030013032 HC MSK BPAP/CPAP FISP -B

## 2017-05-26 PROCEDURE — 77030012793 HC CIRC VNTLTR FISP -B

## 2017-05-26 PROCEDURE — P9040 RBC LEUKOREDUCED IRRADIATED: HCPCS | Performed by: INTERNAL MEDICINE

## 2017-05-26 PROCEDURE — 86644 CMV ANTIBODY: CPT | Performed by: INTERNAL MEDICINE

## 2017-05-26 PROCEDURE — 82962 GLUCOSE BLOOD TEST: CPT

## 2017-05-26 PROCEDURE — 30233N1 TRANSFUSION OF NONAUTOLOGOUS RED BLOOD CELLS INTO PERIPHERAL VEIN, PERCUTANEOUS APPROACH: ICD-10-PCS | Performed by: NURSE PRACTITIONER

## 2017-05-26 PROCEDURE — 77030013131 HC IV BLD ST ICUM -A

## 2017-05-26 PROCEDURE — 76775 US EXAM ABDO BACK WALL LIM: CPT

## 2017-05-26 PROCEDURE — 85018 HEMOGLOBIN: CPT | Performed by: INTERNAL MEDICINE

## 2017-05-26 PROCEDURE — 71010 XR CHEST PORT: CPT

## 2017-05-26 PROCEDURE — 74011250636 HC RX REV CODE- 250/636: Performed by: INTERNAL MEDICINE

## 2017-05-26 PROCEDURE — 82803 BLOOD GASES ANY COMBINATION: CPT

## 2017-05-26 PROCEDURE — 36430 TRANSFUSION BLD/BLD COMPNT: CPT

## 2017-05-26 PROCEDURE — 94003 VENT MGMT INPAT SUBQ DAY: CPT

## 2017-05-26 PROCEDURE — 65610000001 HC ROOM ICU GENERAL

## 2017-05-26 PROCEDURE — 74011636637 HC RX REV CODE- 636/637: Performed by: INTERNAL MEDICINE

## 2017-05-26 PROCEDURE — 85027 COMPLETE CBC AUTOMATED: CPT | Performed by: INTERNAL MEDICINE

## 2017-05-26 PROCEDURE — 94660 CPAP INITIATION&MGMT: CPT

## 2017-05-26 PROCEDURE — 99233 SBSQ HOSP IP/OBS HIGH 50: CPT | Performed by: INTERNAL MEDICINE

## 2017-05-26 PROCEDURE — 84156 ASSAY OF PROTEIN URINE: CPT | Performed by: INTERNAL MEDICINE

## 2017-05-26 PROCEDURE — 36600 WITHDRAWAL OF ARTERIAL BLOOD: CPT

## 2017-05-26 PROCEDURE — 81001 URINALYSIS AUTO W/SCOPE: CPT | Performed by: INTERNAL MEDICINE

## 2017-05-26 PROCEDURE — 36415 COLL VENOUS BLD VENIPUNCTURE: CPT | Performed by: INTERNAL MEDICINE

## 2017-05-26 PROCEDURE — 77010033711 HC HIGH FLOW OXYGEN

## 2017-05-26 PROCEDURE — 74011000258 HC RX REV CODE- 258: Performed by: INTERNAL MEDICINE

## 2017-05-26 PROCEDURE — 83735 ASSAY OF MAGNESIUM: CPT | Performed by: INTERNAL MEDICINE

## 2017-05-26 PROCEDURE — 92610 EVALUATE SWALLOWING FUNCTION: CPT

## 2017-05-26 PROCEDURE — 80048 BASIC METABOLIC PNL TOTAL CA: CPT | Performed by: INTERNAL MEDICINE

## 2017-05-26 PROCEDURE — 74011000250 HC RX REV CODE- 250: Performed by: INTERNAL MEDICINE

## 2017-05-26 PROCEDURE — 94640 AIRWAY INHALATION TREATMENT: CPT

## 2017-05-26 PROCEDURE — 74011250637 HC RX REV CODE- 250/637: Performed by: NURSE PRACTITIONER

## 2017-05-26 PROCEDURE — 77030021668 HC NEB PREFIL KT VYRM -A

## 2017-05-26 PROCEDURE — 85610 PROTHROMBIN TIME: CPT | Performed by: INTERNAL MEDICINE

## 2017-05-26 PROCEDURE — 84100 ASSAY OF PHOSPHORUS: CPT | Performed by: INTERNAL MEDICINE

## 2017-05-26 PROCEDURE — 74011250637 HC RX REV CODE- 250/637: Performed by: INTERNAL MEDICINE

## 2017-05-26 RX ORDER — MORPHINE SULFATE 2 MG/ML
2 INJECTION, SOLUTION INTRAMUSCULAR; INTRAVENOUS
Status: DISCONTINUED | OUTPATIENT
Start: 2017-05-26 | End: 2017-06-02 | Stop reason: HOSPADM

## 2017-05-26 RX ORDER — HYDRALAZINE HYDROCHLORIDE 20 MG/ML
10 INJECTION INTRAMUSCULAR; INTRAVENOUS
Status: DISCONTINUED | OUTPATIENT
Start: 2017-05-26 | End: 2017-06-02 | Stop reason: HOSPADM

## 2017-05-26 RX ORDER — FAMOTIDINE 40 MG/5ML
20 POWDER, FOR SUSPENSION ORAL DAILY
Status: DISCONTINUED | OUTPATIENT
Start: 2017-05-26 | End: 2017-06-02 | Stop reason: HOSPADM

## 2017-05-26 RX ORDER — SODIUM CHLORIDE 9 MG/ML
250 INJECTION, SOLUTION INTRAVENOUS AS NEEDED
Status: DISCONTINUED | OUTPATIENT
Start: 2017-05-26 | End: 2017-06-02 | Stop reason: HOSPADM

## 2017-05-26 RX ORDER — HYDRALAZINE HYDROCHLORIDE 10 MG/1
10 TABLET, FILM COATED ORAL 3 TIMES DAILY
Status: DISCONTINUED | OUTPATIENT
Start: 2017-05-26 | End: 2017-05-27

## 2017-05-26 RX ORDER — WARFARIN 1 MG/1
1 TABLET ORAL EVERY EVENING
Status: DISCONTINUED | OUTPATIENT
Start: 2017-05-26 | End: 2017-05-29

## 2017-05-26 RX ADMIN — ALPRAZOLAM 0.25 MG: 0.25 TABLET ORAL at 08:38

## 2017-05-26 RX ADMIN — WARFARIN SODIUM 1 MG: 1 TABLET ORAL at 18:31

## 2017-05-26 RX ADMIN — ALBUTEROL SULFATE 2.5 MG: 2.5 SOLUTION RESPIRATORY (INHALATION) at 08:17

## 2017-05-26 RX ADMIN — BUDESONIDE 500 MCG: 0.5 SUSPENSION RESPIRATORY (INHALATION) at 08:17

## 2017-05-26 RX ADMIN — Medication 10 ML: at 13:36

## 2017-05-26 RX ADMIN — MONTELUKAST SODIUM 10 MG: 10 TABLET, FILM COATED ORAL at 21:39

## 2017-05-26 RX ADMIN — MIRTAZAPINE 30 MG: 15 TABLET, FILM COATED ORAL at 21:39

## 2017-05-26 RX ADMIN — Medication 10 ML: at 21:40

## 2017-05-26 RX ADMIN — HYDRALAZINE HYDROCHLORIDE 10 MG: 10 TABLET, FILM COATED ORAL at 15:56

## 2017-05-26 RX ADMIN — MEGESTROL ACETATE 200 MG: 40 SUSPENSION ORAL at 09:55

## 2017-05-26 RX ADMIN — ATENOLOL 25 MG: 25 TABLET ORAL at 21:39

## 2017-05-26 RX ADMIN — Medication 25 MCG/HR: at 22:50

## 2017-05-26 RX ADMIN — Medication 2 MG: at 13:25

## 2017-05-26 RX ADMIN — BUDESONIDE 500 MCG: 0.5 SUSPENSION RESPIRATORY (INHALATION) at 18:36

## 2017-05-26 RX ADMIN — Medication 10 ML: at 06:18

## 2017-05-26 RX ADMIN — HYDRALAZINE HYDROCHLORIDE 10 MG: 10 TABLET, FILM COATED ORAL at 19:54

## 2017-05-26 RX ADMIN — ATENOLOL 25 MG: 25 TABLET ORAL at 08:40

## 2017-05-26 RX ADMIN — Medication 2 MG: at 21:42

## 2017-05-26 RX ADMIN — LORAZEPAM 1 MG: 1 TABLET ORAL at 19:54

## 2017-05-26 RX ADMIN — INSULIN HUMAN 15 UNITS: 100 INJECTION, SOLUTION PARENTERAL at 15:14

## 2017-05-26 RX ADMIN — AMLODIPINE BESYLATE 10 MG: 10 TABLET ORAL at 08:39

## 2017-05-26 RX ADMIN — HYDRALAZINE HYDROCHLORIDE 10 MG: 20 INJECTION INTRAMUSCULAR; INTRAVENOUS at 16:57

## 2017-05-26 RX ADMIN — ALPRAZOLAM 0.25 MG: 0.25 TABLET ORAL at 16:36

## 2017-05-26 RX ADMIN — ALBUTEROL SULFATE 2.5 MG: 2.5 SOLUTION RESPIRATORY (INHALATION) at 01:50

## 2017-05-26 RX ADMIN — Medication 2 MG: at 19:18

## 2017-05-26 RX ADMIN — ALBUTEROL SULFATE 2.5 MG: 2.5 SOLUTION RESPIRATORY (INHALATION) at 14:58

## 2017-05-26 RX ADMIN — ALBUTEROL SULFATE 2.5 MG: 2.5 SOLUTION RESPIRATORY (INHALATION) at 18:36

## 2017-05-26 RX ADMIN — PROPOFOL 20 MCG/KG/MIN: 10 INJECTION, EMULSION INTRAVENOUS at 06:18

## 2017-05-26 RX ADMIN — FAMOTIDINE 20 MG: 40 POWDER, FOR SUSPENSION ORAL at 09:54

## 2017-05-26 RX ADMIN — METHYLPREDNISOLONE SODIUM SUCCINATE 40 MG: 125 INJECTION, POWDER, FOR SOLUTION INTRAMUSCULAR; INTRAVENOUS at 06:18

## 2017-05-26 RX ADMIN — AZITHROMYCIN MONOHYDRATE 500 MG: 500 INJECTION, POWDER, LYOPHILIZED, FOR SOLUTION INTRAVENOUS at 21:39

## 2017-05-26 RX ADMIN — INSULIN HUMAN 15 UNITS: 100 INJECTION, SOLUTION PARENTERAL at 16:35

## 2017-05-26 RX ADMIN — CEFTRIAXONE 1 G: 1 INJECTION, POWDER, FOR SOLUTION INTRAMUSCULAR; INTRAVENOUS at 10:35

## 2017-05-26 NOTE — PROGRESS NOTES
Patient with critical hemoglobin 6.8. Betty Orozco NP notified, orders received to transfuse 1 unit PRBCs.

## 2017-05-26 NOTE — PROGRESS NOTES
Bedside shift change report given to Jaky DCEKER (oncoming nurse) by Andreina Evans (offgoing nurse). Report included the following information SBAR, Kardex, Intake/Output, MAR, Recent Results and Cardiac Rhythm NSR.      Fentanyl gtt verified and patient turned

## 2017-05-26 NOTE — PROGRESS NOTES
Annmarie Medina  Admission Date: 5/24/2017             ICU Daily Progress Note: 5/26/2017   79 y/o was last seen by us in February of 2017 for right pleural effusion. She has dCHR, prior DVT on coumadin, CKD, Moderate AS, HTN, DM and R ureteral obstruction s/p stent placement. She is followed by Dr Rolando Katz for metastatic breast CA with mets to lung/bone. In February, she underwent R thoracentesis with drain to gravity with removal of 1200 ml of yellow pleural fluid. She developed pain after thoracentesis and pain was felt to be from trapped lung physiology and recommended not to remove more than 1 liter of pleural fluid if thoracentesis is again necessary. She was hypoxemic and home O2 arranged.       She had a CXR in April 2017 with persistent effusion. She was seen by Oncology on 5/16/2017 and was having dyspnea during that time with fatigue. She was given lasix as well and has taken lasix until 3 days ago. She has plans to see [facility] on June 2. Today in the ER, her CXR shows effusion on right which is unchanged and new right perihilar infiltrate.  We were asked to admit her for hypoxemia with persistent effusion, new infiltrate with metastatic breast cancer with acute on chronic renal failure and moderate AS  Subjective:     Since admission:  Decompensated- intubated on 5/24  Seen by Oncology -holding chemo  Thoracentesis on right- 950 cc of /serosanguinous/ fluid    Current Facility-Administered Medications   Medication Dose Route Frequency    famotidine (PEPCID) 40 mg/5 mL (8 mg/mL) oral suspension 20 mg  20 mg Per NG tube DAILY    0.9% sodium chloride infusion 250 mL  250 mL IntraVENous PRN    sodium chloride (NS) flush 5-10 mL  5-10 mL IntraVENous Q8H    sodium chloride (NS) flush 5-10 mL  5-10 mL IntraVENous PRN    ALPRAZolam (XANAX) tablet 0.25 mg  0.25 mg Oral TID PRN    amLODIPine (NORVASC) tablet 10 mg  10 mg Oral DAILY    atenolol (TENORMIN) tablet 25 mg  25 mg Oral Q12H    HYDROcodone-acetaminophen (NORCO)  mg tablet 1 Tab  1 Tab Oral Q4H PRN    LORazepam (ATIVAN) tablet 1 mg  1 mg Oral Q6H PRN    megestrol (MEGACE) 400 mg/10 mL (10 mL) oral suspension 200 mg  200 mg Oral DAILY    mirtazapine (REMERON) tablet 30 mg  30 mg Oral QHS    montelukast (SINGULAIR) tablet 10 mg  10 mg Oral QHS    zolpidem (AMBIEN) tablet 5 mg  5 mg Oral QHS PRN    sodium chloride (NS) flush 5-10 mL  5-10 mL IntraVENous Q8H    sodium chloride (NS) flush 5-10 mL  5-10 mL IntraVENous PRN    azithromycin (ZITHROMAX) 500 mg in 0.9% sodium chloride (MBP/ADV) 250 mL  500 mg IntraVENous Q24H    cefTRIAXone (ROCEPHIN) 1 g in 0.9% sodium chloride (MBP/ADV) 50 mL  1 g IntraVENous Q24H    morphine injection 2 mg  2 mg IntraVENous Q4H PRN    furosemide (LASIX) injection 40 mg  40 mg IntraVENous Q12H    budesonide (PULMICORT) 500 mcg/2 ml nebulizer suspension  500 mcg Nebulization BID RT    And    albuterol CONCENTRATE 2.5mg/0.5 mL neb soln  2.5 mg Nebulization Q6H RT    fentaNYL in normal saline (pf) 25 mcg/mL infusion   mcg/hr IntraVENous TITRATE    propofol (DIPRIVAN) infusion  5-50 mcg/kg/min IntraVENous TITRATE    0.9% sodium chloride infusion 250 mL  250 mL IntraVENous PRN         Objective:     Vitals:    05/26/17 0818 05/26/17 0821 05/26/17 0832 05/26/17 0901   BP:   150/65 157/70   Pulse: 77 72 66 67   Resp: 20 26 17 16   Temp:       SpO2: 97% 96% 97% 98%   Weight:       Height:         Intake and Output:   05/24 1901 - 05/26 0700  In: 2358.2 [I.V.:988.5]  Out: 1960 [Urine:1010]  05/26 0701 - 05/26 1900  In: 0   Out: 80 [Urine:80]    Physical Exam:          GEN: acutely ill, intubated  HEENT:  ETT   NECK:  no JVD, no retractions, no thyromegaly or masses,   LUNGS:  Clear anteriorly   HEART:  RRR with  M,no G,R;  ABDOMEN:  soft with no tenderness; positive bowel sounds present  EXTREMITIES:  warm with no cyanosis, no lower leg edema  SKIN:  no jaundice or ecchymosis   NEURO:  alert on vent    LINES: ETT, denson, port  DRIPS:fentanyl, propofol  NUTRITION: TF    Ventilator Settings  Mode FIO2 Rate Tidal Volume Pressure PEEP   Pressure support (weaned to PS 12)  35 %    350 ml (MD aware of vt 350)  12 cm H2O  10 cm H20      Peak airway pressure: 20 cm H2O   Minute ventilation: 9.3 l/min       CHEST XRAY:     LAB  Recent Labs      05/24/17   1111   WBC  10.8   HGB  7.8*   HCT  24.4*   PLT  498*     Recent Labs      05/26/17   0335  05/25/17   1115  05/25/17   0500  05/24/17   1550  05/24/17   1111   NA  146*   --   149*  150*   --    K  4.9   --   4.9  4.7   --    CL  110*   --   115*  116*   --    CO2  19*   --   22  20*   --    GLU  281*   --   185*  187*   --    BUN  58*   --   42*  33*   --    CREA  3.66*   --   2.94*  2.47*   --    MG  3.1*   --    --   2.8*   --    PHOS  6.0*   --    --   4.1*   --    INR  2.3*  3.1*   --    --   2.9*     Recent Labs      05/26/17   0255  05/25/17   0418  05/24/17   1730   PH  7.42  7.40  7.39   PCO2  34*  31*  36   PO2  58*  98  59*   HCO3  22  19*  21*       Cultures: NTD    Assessment:     Patient Active Problem List   Diagnosis Code    [facility] C50.919    Bony metastasis (HCC) C79.51    HTN (hypertension) I10    DM (diabetes mellitus) (HCC) E11.9    CKD (chronic kidney disease) N18.9    Iron deficiency anemia D50.9    Acute respiratory failure (HCC) J96.00    Hypomagnesemia E83.42    Hypokalemia E87.6    Asthma J45.909    Anemia D64.9    DVT (deep venous thrombosis) (HCC) I82.409    PND (paroxysmal nocturnal dyspnea) R06.00    Leg swelling M79.89    CHF (congestive heart failure) (HCC) I50.9    Accelerated hypertension I10    Elevated troponin R74.8    Breast cancer metastasized to lung (HCC) C50.919, C78.00    Pulmonary edema J81.1    Acute on chronic renal failure (HCC) N17.9, N18.9    Pleural effusion, right J90    Hydronephrosis N13.30    Pancytopenia (HCC) D61.818    Chemotherapy-induced neutropenia (HCC) D70.1    Bilateral edema of lower extremity R60.0    Hematuria R31.9    Acute on chronic respiratory failure (HCC) J96.20    Moderate aortic stenosis I35.0    Anticoagulated on Coumadin Z51.81, Z79.01    Volume overload E87.70       Plan     Hospital Problems  Date Reviewed: 5/16/2017          Codes Class Noted POA    * (Principal)Acute on chronic respiratory failure ([address] ICD-10-CM: J96.20  ICD-9-CM: 518.84  5/24/2017 Yes    Improved. Hope to extubate    Moderate aortic stenosis (Chronic) ICD-10-CM: I35.0  ICD-9-CM: 424.1  5/24/2017 Yes    Plans to see cardiology June 2    Anticoagulated on Coumadin (Chronic) ICD-10-CM: Z51.81, Z79.01  ICD-9-CM: V58.83, V58.61  5/24/2017 Yes        Volume overload ICD-10-CM: E87.70  ICD-9-CM: 276.69  5/24/2017 Yes    Better, now with acute renal failure    Breast cancer metastasized to lung [facility]) (Chronic) ICD-10-CM: C50.919, C78.00  ICD-9-CM: 174.9, 197.0  2/17/2017 Yes        CKD (chronic kidney disease) (Chronic) ICD-10-CM: N18.9  ICD-9-CM: 585.9  1/4/2017 Yes    Overview Signed 5/14/2013 11:35 AM by Mickie Stevens RN     With acute rise- ?  Dehydration and monitor             DVT (deep venous thrombosis) (HCC) (Chronic) ICD-10-CM: I82.409  ICD-9-CM: 453.40  1/4/2017 Yes    Overview Signed 11/19/2013  6:07 AM by Juli Merlin     31-65-56 There is nonocclusive thrombus within the left common femoral vein, superficial femoral vein, popliteal vein, and posterior tibial vein               Pleural effusion, right (Chronic) ICD-10-CM: J90  ICD-9-CM: 511.9  1/4/2017 Yes    Overview Addendum 10/8/2016  5:54 PM by Venkata Smith MD     10/6/16 R thoracentesis 800 ml: pleural fluid creatine 4.1 = likely urinothorax             DM (diabetes mellitus) ([facility] (Chronic) ICD-10-CM: E11.9  ICD-9-CM: 250.00  10/7/2016 Yes        Acute on chronic renal failure (HCC) ICD-10-CM: N17.9, N18.9  ICD-9-CM: 584.9, 585.9  10/7/2016 Yes    Worse after diuretics    Leg swelling ICD-10-CM: M79.89  ICD-9-CM: 729.81  2/25/2014 Yes    Overview Signed 2/25/2014  2:45 PM by Cassandra Meneses NP     Bilateral                     -- PS trials since yesterday and work to extubate today  -- renal function worse. Ask neprhology to see. Hold lasix. pleural fluid is exudative. Pending cytology. -- control HTN   --rocephin, zithromax D 3   --wean propofol off. Add fentanyl patch back and try and get off drip. --awaiting interpretor   --restart coumadin, pharmacy to dose    Maryse Simon NP    More than 50% of time documented was spent in face-to-face contact with the patient and in the care of the patient on the floor/unit where the patient is located. Lungs: decreased sounds in Right lung bases. Heart S1 and S2 audible, no murmers or rubs appreciated  Other:    Plan to extubate today  Hold tube feeding    I have spoken with and examined the patient. I have reviewed the history, examination, assessment, and plan and agree with the above. Isela Lackey MD      This note was signed electronically.

## 2017-05-26 NOTE — PROGRESS NOTES
Rounded with Macy Duarte. Interpreting Services offered when needed.           Thank you for this referral,       Ty Kely, [facility]  /Patient [address] [facility].  [address] [place] [address]    [phone]

## 2017-05-26 NOTE — PROGRESS NOTES
Spiritual Care visit. Initial Visit. [place] speaking couple.  folded his hands and indicated that he would pray; consent received when  nodded.     Spiritual Care Assessment/Progress Notes    Shyla Flores [id number]  [id number]    1950  78 y.o.  female    Patient Telephone Number: [phone] (home)   Jehovah's witness Affiliation: Bonny Haskins   Language: [place]ayan   Extended Emergency Contact Information  Primary Emergency Contact: BetseyJuancarlos  Address: [address] [place] [address], [address] [place] [address] Phone: [phone]  Relation: Child  Secondary Emergency Contact: [address] [facility] Phone: [phone]  Relation: Spouse   Patient Active Problem List    Diagnosis Date Noted    Acute on chronic respiratory failure ([address] 05/24/2017    Moderate aortic stenosis 05/24/2017    Anticoagulated on Coumadin 05/24/2017    Volume overload 05/24/2017    Breast cancer ([address] 02/17/2017    Bony metastasis ([address] 02/17/2017    Acute respiratory failure ([address] 02/17/2017    Breast cancer metastasized to lung ([address] 02/17/2017    CKD (chronic kidney disease) 01/04/2017    DVT (deep venous thrombosis) ([address] 01/04/2017    Accelerated hypertension 01/04/2017    Pleural effusion, right 01/04/2017    Hydronephrosis 01/04/2017    Bilateral edema of lower extremity 01/04/2017    Hematuria 01/04/2017    Chemotherapy-induced neutropenia ([address] 12/20/2016    Pancytopenia ([address] 10/10/2016    HTN (hypertension) 10/07/2016    DM (diabetes mellitus) ([address] 10/07/2016    Acute on chronic renal failure ([address] 10/07/2016    Pulmonary edema 01/03/2016    Elevated troponin 10/20/2015    PND (paroxysmal nocturnal dyspnea) 02/25/2014    Leg swelling 02/25/2014    CHF (congestive heart failure) ([address] 02/25/2014    Anemia 11/19/2013    Asthma 10/22/2013    Hypomagnesemia 08/04/2013    Hypokalemia 08/04/2013    Iron deficiency anemia 06/10/2013 Date: 5/26/2017       Level of Sabianism/Spiritual Activity:  []         Involved in gabriella tradition/spiritual practice    []         Not involved in gabriella tradition/spiritual practice  []         Spiritually oriented    []         Claims no spiritual orientation    []         seeking spiritual identity  []         Feels alienated from Judaism practice/tradition  []         Feels angry about Judaism practice/tradition  [x]         Spirituality/Judaism tradition IS a resource for coping at this time. []         Not able to assess due to medical condition    Services Provided Today:  []         crisis intervention    []         reading Scriptures  [x]         spiritual assessment    [x]         prayer  []         empathic listening/emotional support  []         rites and rituals (cite in comments)  []         life review     []         Judaism support  []         theological development   []         advocacy  []         ethical dialog     []         blessing  []         bereavement support    [x]         support to family  []         anticipatory grief support   []         help with AMD  []         spiritual guidance    []         meditation      Spiritual Care Needs  []         Emotional Support  []         Spiritual/Sabianism Care  []         Loss/Adjustment  []         Advocacy/Referral                /Ethics  []         No needs expressed at               this time  []         Other: (note in               comments)  [address]  []         Follow up visits with               pt/family  []         Provide materials  []         Schedule sacraments  []         Contact Community               Clergy  [x]         Follow up as needed  []         Other: (note in               comments)     Comments: Spiritual Assessment     Advance Directives  No [address] is on patient's chart. Only a limited POA for other purposes. Category/Code Status Full. Family Support  Yes.   Spiritual Support  Aura Shields   Visit by Apolinar Reynaga M.Ed., Th.B. ,Staff

## 2017-05-26 NOTE — CONSULTS
[facility] CONSULT NOTE    We were asked to see the patient at the request of Dr. Myles Bernheim for EMERY on CKD. Admission Date:  5/24/2017    Admission Diagnosis:  Pleural effusion [J90]    History of Present Illness:    Patient is a 78year old Cape Verdean speaking female patient with metastatic breast cancer admitted with shortness of breath. She has had a few hospitalizations recently with complications from pleural effusions and EMERY on CKD. We saw her in October at which time she required a few dialysis treatments, but ultimately recovered. She has a history of hydronephrosis felt to be 2/2 metastatic retroperitoneal disease that required stenting in July 2016. Most recently (in February), she was admitted for a pleural effusion that required thoracentesis of more than 1L. She has recently been seen by Oncology, who has been treating her shortness of breath with PRN lasix and home O2. A  is used and the  reports that her chemotherapy was stopped recently due to her feeling fatigued and overall poorly. On arrival, her Cr= 2.34, up to 3.66 today. Her recent baseline appears to be around 1.8. Currently, she admits to chest pain and shortness of breath. She was recently extubated and is on Optiflow. She is receiving a blood transfusion. She was treated for a UTI around 1 month ago and had hematuria then, but denies currently. She saw Urology earlier this month and repeat UA was negative for signs of UTI. She denies changes in appetite.      Past Medical History:   Diagnosis Date    Acute on chronic diastolic congestive heart failure ([address] 1/5/2016    Asthma     till age 31    Cancer ([address]     roverto. breast ca mets bone/lung    Chemotherapy-induced neutropenia ([address] 12/20/2016    Depression     Diabetes ([address]     recently dx'ed; ype 2;  does not take at home    DM (diabetes mellitus) ([address] 11/20/2012    HCAP (healthcare-associated pneumonia) 7/17/2013    Hypertension     Sepsis ([facility] 7/19/2016    Thromboembolus ([facility]     Left leg DVT    Unspecified adverse effect of anesthesia     [facility] -[facility]-she was told her heart stopped d/t anesthesia-hospitalized for 5 days afterwards and followed by cardiologist      Past Surgical History:   Procedure Laterality Date    HX GYN      2 c sections, ovarian cyst-roverto.     HX VASCULAR ACCESS  1/26/12      Current Facility-Administered Medications   Medication Dose Route Frequency    famotidine (PEPCID) 40 mg/5 mL (8 mg/mL) oral suspension 20 mg  20 mg Per NG tube DAILY    hydrALAZINE (APRESOLINE) tablet 10 mg  10 mg Oral TID    warfarin (COUMADIN) tablet 1 mg - pharmacy dosing   1 mg Oral QPM    0.9% sodium chloride infusion 250 mL  250 mL IntraVENous PRN    insulin regular (NOVOLIN R, HUMULIN R) injection 15 Units  15 Units SubCUTAneous ONCE    0.9% sodium chloride infusion 250 mL  250 mL IntraVENous PRN    sodium chloride (NS) flush 5-10 mL  5-10 mL IntraVENous Q8H    sodium chloride (NS) flush 5-10 mL  5-10 mL IntraVENous PRN    ALPRAZolam (XANAX) tablet 0.25 mg  0.25 mg Oral TID PRN    amLODIPine (NORVASC) tablet 10 mg  10 mg Oral DAILY    atenolol (TENORMIN) tablet 25 mg  25 mg Oral Q12H    HYDROcodone-acetaminophen (NORCO)  mg tablet 1 Tab  1 Tab Oral Q4H PRN    LORazepam (ATIVAN) tablet 1 mg  1 mg Oral Q6H PRN    megestrol (MEGACE) 400 mg/10 mL (10 mL) oral suspension 200 mg  200 mg Oral DAILY    mirtazapine (REMERON) tablet 30 mg  30 mg Oral QHS    montelukast (SINGULAIR) tablet 10 mg  10 mg Oral QHS    zolpidem (AMBIEN) tablet 5 mg  5 mg Oral QHS PRN    sodium chloride (NS) flush 5-10 mL  5-10 mL IntraVENous Q8H    sodium chloride (NS) flush 5-10 mL  5-10 mL IntraVENous PRN    azithromycin (ZITHROMAX) 500 mg in 0.9% sodium chloride (MBP/ADV) 250 mL  500 mg IntraVENous Q24H    cefTRIAXone (ROCEPHIN) 1 g in 0.9% sodium chloride (MBP/ADV) 50 mL  1 g IntraVENous Q24H    morphine injection 2 mg  2 mg IntraVENous Q4H PRN    budesonide (PULMICORT) 500 mcg/2 ml nebulizer suspension  500 mcg Nebulization BID RT    And    albuterol CONCENTRATE 2.5mg/0.5 mL neb soln  2.5 mg Nebulization Q6H RT    fentaNYL in normal saline (pf) 25 mcg/mL infusion   mcg/hr IntraVENous TITRATE    propofol (DIPRIVAN) infusion  5-50 mcg/kg/min IntraVENous TITRATE    0.9% sodium chloride infusion 250 mL  250 mL IntraVENous PRN     No Known Allergies   Social History   Substance Use Topics    Smoking status: Former Smoker     Packs/day: 1.00     Years: 3.00     Types: Cigarettes     Quit date: 1/1/1978    Smokeless tobacco: Never Used      Comment: quit 30 or more years ago    Alcohol use No      Family History   Problem Relation Age of Onset    Heart Attack Mother     Cancer Brother      doen not know details        Review of Systems:  +CP, SOB.   +a little swelling  10 point ROS obtained and otherwise negative. Objective:  Vitals:    05/26/17 1439 05/26/17 1440 05/26/17 1504 05/26/17 1531   BP:  168/74 174/77 163/72   Pulse: 78 77 75 70   Resp: 27 (!) 35 (!) 33 28   Temp:  98.8 °F (37.1 °C) 98.8 °F (37.1 °C) 98.5 °F (36.9 °C)   SpO2: 95% 95% 91% 90%   Weight:       Height:           Intake/Output Summary (Last 24 hours) at 05/26/17 1632  Last data filed at 05/26/17 1344   Gross per 24 hour   Intake          1400.32 ml   Output             1147 ml   Net           253.32 ml     Wt Readings from Last 3 Encounters:   05/26/17 61.1 kg (134 lb 11.2 oz)   04/28/17 58.5 kg (129 lb)   04/11/17 58.5 kg (129 lb)       GEN - increased work of breathing. Appears somewhat anxious. Neck - no JVD  CV - S1, S2, no rub. murmur  Lung - coarse rhonchi lungs expand symmetrically  Chest wall - normal appearance  Abd - soft, nontender  Ext - trace BLE edema  Neurologic - nonfocal  Genitourinary - bladder nonpalpable. Ruby with yellow urine.       Data Review:     Recent Labs      05/26/17   0335  05/25/17   1115 05/24/17   1111   WBC  4.0*   --   10.8   HGB  6.8*   --   7.8*   HCT  21.1*   --   24.4*   PLT  256   --   498*   INR  2.3*  3.1*  2.9*        Recent Labs      05/26/17   0335  05/25/17   0500  05/24/17   1550   NA  146*  149*  150*   K  4.9  4.9  4.7   CL  110*  115*  116*   CO2  19*  22  20*   BUN  58*  42*  33*   CREA  3.66*  2.94*  2.47*   CA  7.0*  6.9*  7.1*   GLU  281*  185*  187*   MG  3.1*   --   2.8*   PHOS  6.0*   --   4.1*         Assessment/Plan:     Principal Problem:    Acute on chronic respiratory failure (HCC) (5/24/2017)    Active Problems:    DM (diabetes mellitus) ([facility] (10/7/2016)      CKD (chronic kidney disease) (1/4/2017)      Overview: With acute rise- ? Dehydration and monitor      DVT (deep venous thrombosis) ([facility] (1/4/2017)      Overview: 11-18-13 There is nonocclusive thrombus within the left common femoral       vein, superficial femoral vein, popliteal vein, and posterior tibial vein            Leg swelling (2/25/2014)      Overview: Bilateral            Breast cancer metastasized to lung ([facility] (2/17/2017)      Acute on chronic renal failure (HCC) (10/7/2016)      Pleural effusion, right (1/4/2017)      Overview: 10/6/16 R thoracentesis 800 ml: pleural fluid creatine 4.1 = likely       urinothorax      5/25/2017: Thoracentesis on right- 950 cc of /serosanguinous/ fluid      Moderate aortic stenosis (5/24/2017)      Anticoagulated on Coumadin (5/24/2017)      Volume overload (5/24/2017)      78year old female patient with medical history significant (but not limited to) metastatic breast cancer with h/o retroperitoneal metastases causing ureteral obstruction requiring right ureteral stent placement in July 2016 admitted for shortness of breath/ pleural effusion. -- EMERY on CKD 4: Check renal US to assess for obstructive uropathy. She may have been dried out by lasix and agree with holding for now. Will also check some urine studies.  Pending findings of ultrasound and clinical course, consider Palliative discussions. -- Pleural effusions: with question of past urinothorax?  S/p thora on 5/25 with removal of 950mL  -- h/o DVT on [facility]  -- DM  -- Aortic stenosis

## 2017-05-26 NOTE — PROGRESS NOTES
Assessment completed. Pt opens eyes to voice. Pt nods yes to pain. Fent gtt increased. Vss. /65, HR 71, O2 sat 99%. Purposeful movement noted to all four extremities. Bilat soft wrist restraints in place for safety. Assessment details in flow sheets.

## 2017-05-26 NOTE — PROGRESS NOTES
Patient c/o lower back pain 8/10. Afebrile. NSR. BP hypertensive systolic in the 556I, respiratory rate in the 30s, no complaints of shortness of breath, no stridor auscultated, and no bruising noted to bilat flank area. Dr. Gwyn Pressley called and updated on patient's s/sx. Orders received for stat chest xray and to decrease infusion to 50 ml/hr. Will continue to closely monitor.

## 2017-05-26 NOTE — PROGRESS NOTES
STG: Pt will tolerate mechanical soft textures/thin liquids without signs/sx of aspiration with 100% accuracy  STG: Pt will participate with trials of regular textures for potential diet advancement as respiratory status allows  LTG: Pt will tolerate the least restrictive diet at discharge without respiratory compromise    Speech language pathology: bedside swallow note: Initial Assessment    NAME/AGE/GENDER: Dal Cockayne is a 78 y.o. female  DATE: 5/26/2017  PRIMARY DIAGNOSIS: Pleural effusion [J90]  Procedure(s) (LRB):  ULTRASOUND (Right)  THORACENTESIS (Right) 1 Day Post-Op  ICD-10: Treatment Diagnosis: dysphagia; unspecified R13.12  INTERDISCIPLINARY COLLABORATION: RN and   PRECAUTIONS/ALLERGIES: Review of patient's allergies indicates no known allergies. ASSESSMENT:Based on the objective data described below, Ms. Leggett presents with no overt signs/sx of aspiration. Currenly on airvo s/p extubation earlier today following a two day intubation period. She is fully alert and was able to feed herself with occasional assistance. Reports mild discomfort swallowing likely from very recent removal of ETT. Voice is slightly hoarse. Patient's  was present. , Willa Ryder, was present to translate. Patient and family deny prior dysphagia but report patient does easily fatigue. Patient reports mild back pain. RN recently administered pain medications. Oral motor exam is normal with natural dentition. She tolerated thin liquids via cup and straw with no overt signs/sx of aspiration. Tolerated pureed, mixed, and solids with only mild increased mastication time with the cracker. RR does increase with eating/drinking in general equally across consistencies due to high 02 demands and decreased endurance. Recommend diabetic mechanical soft textures/thin liquids. Tray set-up/intermittent assistance. Fully upright with small bites/sips.   Will follow for diet tolerance and potential advancement to regular textures as respiratory status improves. Anticipate continued improvement in slightly hoarse vocal quality and mild odynophagia further s/p extubation. Patient will benefit from skilled intervention to address the below impairments. ?????? ? ? This section established at most recent assessment??????????  PROBLEM LIST (Impairments causing functional limitations):  1. Dysphagia  2. Mild dysphonia  REHABILITATION POTENTIAL FOR STATED GOALS: Good  PLAN OF CARE:   Patient will benefit from skilled intervention to address the following impairments. RECOMMENDATIONS AND PLANNED INTERVENTIONS (Benefits and precautions of therapy have been discussed with the patient.):  · PO:  Mechanical soft with chopped meat and vegetables  · Liquids:  regular thin  MEDICATIONS:  · With liquid one at a time  COMPENSATORY STRATEGIES/MODIFICATIONS INCLUDING:  · Small sips and bites  OTHER RECOMMENDATIONS (including follow up treatment recommendations): · Family training/education  · Patient education  · diet tolerance  RECOMMENDED DIET MODIFICATIONS DISCUSSED WITH:  · Medical Sub-Specialist  · Nursing  · Family  · Patient  FREQUENCY/DURATION: Continue to follow patient 3x a week or until short term goals are met to address above goals. RECOMMENDED REHABILITATION/EQUIPMENT: (at time of discharge pending progress):   Continue Skilled Therapy. SUBJECTIVE:   Tired but cooperative. History of Present Injury/Illness: Ms. Pawel Callahan  has a past medical history of Acute on chronic diastolic congestive heart failure ([facility] (1/5/2016); Asthma; Cancer ([facility] [address] Chemotherapy-induced neutropenia (HCC) (12/20/2016); Depression; Diabetes ([address] DM (diabetes mellitus) ([facility] [address] (11/20/2012); HCAP (healthcare-associated pneumonia) (7/17/2013); Hypertension; Sepsis ([address] (7/19/2016); Thromboembolus ([address] and Unspecified adverse effect of anesthesia. She also  has a past surgical history that includes vascular access (1/26/12) and gyn.   Present Symptoms: s/p extubation  Pain Intensity 1: 3  Pain Location 1: Back  Pain Orientation 1: Lower  Pain Intervention(s) 1: Medication (see MAR)  Current Medications:   No current facility-administered medications on file prior to encounter. Current Outpatient Prescriptions on File Prior to Encounter   Medication Sig Dispense Refill    fentaNYL (DURAGESIC) 50 mcg/hr PATCH 1 Patch by TransDERmal route every seventy-two (72) hours. Max Daily Amount: 1 Patch. Indications: Chronic Pain with Opioid Tolerance 10 Patch 0    HYDROcodone-acetaminophen (NORCO)  mg tablet Take 1 Tab by mouth every four (4) hours as needed. Max Daily Amount: 6 Tabs. 90 Tab 0    montelukast (SINGULAIR) 10 mg tablet Take 1 Tab by mouth nightly. 30 Tab 3    albuterol (PROVENTIL HFA, VENTOLIN HFA, PROAIR HFA) 90 mcg/actuation inhaler Take 2 Puffs by inhalation every six (6) hours as needed for Wheezing. 1 Inhaler 11    amLODIPine (NORVASC) 10 mg tablet Take 1 Tab by mouth daily. 30 Tab 11    ciprofloxacin HCl (CIPRO) 500 mg tablet Take 1 Tab by mouth two (2) times a day. 10 Tab 0    LORazepam (ATIVAN) 1 mg tablet Take 1 Tab by mouth every six (6) hours as needed for Anxiety. Max Daily Amount: 4 mg. 60 Tab 0    warfarin (COUMADIN) 2 mg tablet Take 1 Tab by mouth daily. 30 Tab 20    glipiZIDE (GLUCOTROL) 5 mg tablet Take 1 Tab by mouth two (2) times a day. 60 Tab 5    palbociclib 125 mg cap Take 125 mg by mouth daily. Take one pill once a day for 3 weeks and then off for one week 21 Tab 5    budesonide-formoterol (SYMBICORT) 160-4.5 mcg/actuation HFA inhaler Take 2 Puffs by inhalation two (2) times a day. 1 Inhaler 11    atenolol (TENORMIN) 25 mg tablet Take 1 Tab by mouth every twelve (12) hours. 60 Tab 0    ALPRAZolam (XANAX) 0.25 mg tablet Take 1 Tab by mouth three (3) times daily as needed for Anxiety. Max Daily Amount: 0.75 mg. 90 Tab 2    megestrol (MEGACE) 400 mg/10 mL (10 mL) suspension Take 5 mL by mouth daily.  240 mL 2 Current Dietary Status: NPO pending consult      Social History/Home Situation: home with   Home Environment: Private residence  # Steps to Enter: 1  One/Two Story Residence: One story  Living Alone: No  Support Systems: Family member(s)  Patient Expects to be Discharged to[de-identified] Private residence  Current DME Used/Available at Home: Wheelchair, Oxygen, portable (wheelchair if needed, O2 3L)  OBJECTIVE:   Respiratory Status:  Heated;Humidifier; Hi flow nasal cannula  50 l/min  CXR Results:Chest appearance is overall stable compared to the prior exam  MRI/CT Results: n/a  Oral Motor Structure/Speech:       Cognitive and Communication Status:  Neurologic State: Drowsy; Eyes open to voice  Orientation Level: Oriented X4  Cognition: Follows commands;Decreased attention/concentration;Poor safety awareness     Auditory Comprehension:      Oral Assessment:  Oral Assessment  Labial: No impairment  Dentition: Natural;Intact  Oral Hygiene: adequate  Lingual: No impairment    P.O. Trials:  Patient Position: upright in bed  The patient was given tsp to straw amounts of the following:   Consistency Presented: Puree; Solid; Thin liquid;Mixed consistency  How Presented: Self-fed/presented;SLP-fed/presented;Cup/sip;Spoon;Straw    ORAL PHASE:  Bolus Acceptance: No impairment  Bolus Formation/Control: Impaired  Propulsion: No impairment  Type of Impairment: Delayed;Mastication  Oral Residue: None    PHARYNGEAL PHASE:  Initiation of Swallow: No impairment  Laryngeal Elevation: Functional  Aspiration Signs/Symptoms: Increase in RR  Vocal Quality: Hoarse           Pharyngeal Phase Characteristics: Easily fatigued       OTHER OBSERVATIONS:  Rate/bite size: WNL   Endurance:  Impaired     Tool Used: Dysphagia Outcome and Severity Scale (COURTNEY)    Score Comments   Normal Diet  [] 7 With no strategies or extra time needed   Functional Swallow  [] 6 May have mild oral or pharyngeal delay       Mild Dysphagia    [x] 5 Which may require one diet consistency restricted (those who demonstrate penetration which is entirely cleared on MBS would be included)   Mild-Moderate Dysphagia  [] 4 With 1-2 diet consistencies restricted       Moderate Dysphagia  [] 3 With 2 or more diet consistencies restricted       Moderately Severe Dysphagia  [] 2 With partial PO strategies (trials with ST only)       Severe Dysphagia  [] 1 With inability to tolerate any PO safely          Score:  Initial: 5 Most Recent: X (Date: -- )   Interpretation of Tool: The Dysphagia Outcome and Severity Scale (COURTNEY) is a simple, easy-to-use, 7-point scale developed to systematically rate the functional severity of dysphagia based on objective assessment and make recommendations for diet level, independence level, and type of nutrition. Score 7 6 5 4 3 2 1   Modifier CH CI CJ CK CL CM CN   ?  Swallowing:     - CURRENT STATUS: CJ - 20%-39% impaired, limited or restricted    - GOAL STATUS:  CH - 0% impaired, limited or restricted    - D/C STATUS:  ---------------To be determined---------------  Payor: SELF PAY / Plan: [facility] SELF PAY / Product Type: Self Pay /     TREATMENT:    (In addition to Assessment/Re-Assessment sessions the following treatments were rendered)  Assessment/Reassessment only, no treatment provided today  MODALITIES:                                                                    ORAL MOTOR  EXERCISES:                                                                                                                                                                      LARYNGEAL / PHARYNGEAL EXERCISES:                                                                                                                                     __________________________________________________________________________________________________  Safety:   After treatment position/precautions:  · RN notified  · Family at bedside  · Upright in Bed  Progression/Medical Necessity:   · Skilled intervention continues to be required due to patient still consuming a modified diet and unable to attend/participate in therapy as expected. Compliance with Program/Exercises: Will assess as treatment progresses. Reason for Continuation of Services/Other Comments:  · Patient continues to require skilled intervention due to patient unable to attend/participate in therapy as expected. Recommendations/Intent for next treatment session: \"Treatment next visit will focus on diet tolerance\".     Total Treatment Duration:  Time In: 1425  Time Out: [facility] MS, CCC-SLP

## 2017-05-26 NOTE — PROGRESS NOTES
Dr. Sheyla Acharya and Les Ready NP at bedside with patient, patient's , and interpretor. Tube feedings stopped and stomach contents aspirated and discarded at Dr. Mitesh Lagunas request for possible extubation. Propofol off, fentanyl gtt infusing @ 25 mcg/hr.

## 2017-05-26 NOTE — PROGRESS NOTES
Patient's BS remains 374 after administration of 15 units regular insulin. Orders received from Dr. Obi Ku to administer another 15 units regular insulin one time.

## 2017-05-26 NOTE — INTERDISCIPLINARY ROUNDS
Interdisciplinary team rounds were held 5/26/2017 with the following team members:Care Management, Nursing, Nurse Practitioner, Pastoral Care, Pharmacy, Physician, Respiratory Therapy and Clinical Coordinator. Plan of care discussed. See clinical pathway and/or care plan for interventions and desired outcomes.

## 2017-05-26 NOTE — PROGRESS NOTES
Problem: Nutrition Deficit  Goal: *Optimize nutritional status  Nutrition F/U:  TF Management for [facility]. Assessment:  Weight 61.1 kg (ICU bed 5/26/17), edema - 3+ pitting BLEs. The patient remains intubated, ventilated, NPO and TF-dependent. TF was started yesterday at 1600 and advanced to her goal rate without difficulty. Good GI tolerance is reported. Diprivan has been weaned off and extubation is expected. Labs are remarkable for AM glucose 281, BUN 58, creatinine 3.66, sodium 146, phosphorus 6 and magnesium 3.1. Renal is being consulted. Macronutrient Needs:  Estimated calorie needs - 7102-0769 dolores/day (22-25 dolores/kg/day)  Estimated protein needs - 40-60 gm pro/day (0.8-1.2 gm pro/kgIBW/day) (GFR 17 ml/min - EMERY on CKD)  Max CHO/day - 195 gm CHO/day (50% dolores/day)   Fluid/day - 1.3-1.6 liters/day (1 ml/dolores/day)  Intake/Comparative Standards:  Current intake of TF (Nepro @ 31 ml/hr with a 24 ml/hr water flush via NGT provides 1339 calories/day (98% calorie goal), 60 grams protein/day (100% protein goal), 124 grams CHO/day (does not exceed max CHO limit) and 1320 ml water/day (100% fluid goal). Intervention:   Meals and Snacks: If extubated and appropriate for an oral diet, her diet should be ordered as diabetic renal.  Enteral Nutrition: If not extubated, continue same TF and water flush. Mineral Supplement Therapy: Nutrition Support Orders/Electrolyte Management Replacement Protocols are active on the MAR. Coordination of Nutrition Care by a Nutrition Professional: AM ICU rounds, collaboration with Garett Bermeo RN. Patito Li.  Devan Louise  [phone]

## 2017-05-26 NOTE — PROGRESS NOTES
Assessment complete. Chart reviewed. Patient awakens to voice, nods appropriately, follows commands. Propofol weaned to 10 mcg/kg/min and fentanyl weaned to 25 mcg/hr for sedation vacation. ETT 23 @ lip, on PRVC 35% PEEP 9, lung sounds clear upper bilat, coarse diminished lower left, diminished lower right. NSR. BP stable. Radial pulses palpable and +2 bilat, pedal and post tibial pulses palpable and +1 bilat. 1+ pitting edema to BUE. 3+ pitting edema to BLE. NGT 60 cm R nare, Nepro infusing at 31 ml/hr with 24 ml/hr water flush. Bowel sounds hypoactive. Ruby patent, draining lizzy/red tinged urine. Moves all extremities, some weakness. No abnormalities to skin. Restraints in place. Will continue to monitor.

## 2017-05-26 NOTE — PROGRESS NOTES
Blood transfusion complete. Patient unable to complete unit of blood at tolerated rate of 50 ml/hr. 265 ml infused.   Patient hypertensive, but afebrile, no complaints of pain, NSR.

## 2017-05-26 NOTE — PROGRESS NOTES
Patient extubated to AIRVO per Dr. Arcelia De La Torre verbal order    Respiratory Mechanics completed and are as follows:  Weaning Parameters  Spontaneous Breathing Trial Complete: Yes  Resp Rate Observed: 18  Ve: 8.9  VT: 441  Mars Agitation Sedation Scale (RASS): Alert and calm  Patient extubated to a 50l AIRVO and 50% FIO2. Patient is able to communicate and is negative for stridor. Breath sounds are diminished. No complications with extubation.      Bismark Carlos, RT

## 2017-05-26 NOTE — PROGRESS NOTES
Dr. Amaris Langston updated on patient's . Orders received to administer 15 units regular insulin one time.

## 2017-05-26 NOTE — PROGRESS NOTES
Ventilator check complete; patient has a #7.5 ET tube secured at the 22 at the teeth. Patient is sedated. Patient is able to follow minimal commands. Breath sounds are coarse and diminished. Trachea is midline, Negative for subcutaneous air, and chest excursion is symmetric. Patient is also Negative for cyanosis and is positive for pitting edema. All alarms are set and audible. Resuscitation bag is at the head of the bed.       Ventilator Settings  Mode FIO2 Rate Tidal Volume Pressure PEEP I:E Ratio   PRVC  40 % 15 350 ml    10 cm H20 (Pt found on 10 cmH2O; I made no changes.)  1:3.45      Peak airway pressure: 16 cm H2O   Minute ventilation: 6.8 l/min     ABG:   Recent Labs      05/25/17   0418  05/24/17   1730  05/24/17   1035   PH  7.40  7.39  7.30*   PCO2  31*  36  39   PO2  98  59*  62*   HCO3  19*  21*  19*         Tanesha Dominguez, RT

## 2017-05-26 NOTE — PROGRESS NOTES
Bedside shift change report given to [address] (oncoming nurse) by Jayson Arias (offgoing nurse). Report included the following information SBAR, Kardex, Intake/Output, MAR, Recent Results and Cardiac Rhythm NSR. Fentanyl gtt verified and new bag given to [address]

## 2017-05-26 NOTE — PROGRESS NOTES
Per Emiliano Weathers, patient states her pain is 2/10 in her lower back at this time. Transfusion remains at 2/10. VSS. NAD.

## 2017-05-26 NOTE — PROGRESS NOTES
Warfarin dosing per pharmacist    Anila Parada is a 78 y.o. female. Height: 5' 2\" (157.5 cm)    Weight: 61.1 kg (134 lb 11.2 oz)    Indication:  H/o DVT    Goal INR:  2-3    Home dose:  Per AC note on 5/23, pt has recently had dose decrease to 1 mg hs (7 mg/week)    Risk factors or significant drug interactions:  azithromycin    Other anticoagulants:  none    Daily Monitoring  Date  INR     Warfarin dose HGB              Notes  5/26  2.3  1 mg  7.8      Pt coumadin has been held since admission and pt received FFP for thoracentesis. Pt has elevated INR at last anticoagulation visit per notes and patient's dose was decreased to 1 mg hs from 2 mg hs. Will give home dose now given interaction with azithromycin. Pt may need a few doses of 2 mg if INR not rising appropriately.      Thank you,  Jarek Alfonso, PharmD  Clinical Pharmacist  [phone]

## 2017-05-27 ENCOUNTER — APPOINTMENT (OUTPATIENT)
Dept: GENERAL RADIOLOGY | Age: 67
DRG: 208 | End: 2017-05-27
Attending: INTERNAL MEDICINE
Payer: SUBSIDIZED

## 2017-05-27 LAB
ANION GAP BLD CALC-SCNC: 12 MMOL/L (ref 7–16)
BACTERIA SPEC CULT: NORMAL
BUN SERPL-MCNC: 72 MG/DL (ref 8–23)
CALCIUM SERPL-MCNC: 6.7 MG/DL (ref 8.3–10.4)
CHLORIDE SERPL-SCNC: 113 MMOL/L (ref 98–107)
CO2 SERPL-SCNC: 22 MMOL/L (ref 21–32)
CREAT SERPL-MCNC: 3.55 MG/DL (ref 0.6–1)
GLUCOSE BLD STRIP.AUTO-MCNC: 210 MG/DL (ref 65–100)
GLUCOSE BLD STRIP.AUTO-MCNC: 332 MG/DL (ref 65–100)
GLUCOSE BLD STRIP.AUTO-MCNC: 342 MG/DL (ref 65–100)
GLUCOSE BLD STRIP.AUTO-MCNC: 347 MG/DL (ref 65–100)
GLUCOSE SERPL-MCNC: 210 MG/DL (ref 65–100)
GRAM STN SPEC: NORMAL
INR PPP: 2.3 (ref 0.9–1.2)
POTASSIUM SERPL-SCNC: 5 MMOL/L (ref 3.5–5.1)
PROTHROMBIN TIME: 25.6 SEC (ref 9.6–12)
SERVICE CMNT-IMP: NORMAL
SODIUM SERPL-SCNC: 147 MMOL/L (ref 136–145)

## 2017-05-27 PROCEDURE — 74011250636 HC RX REV CODE- 250/636: Performed by: INTERNAL MEDICINE

## 2017-05-27 PROCEDURE — 74011250637 HC RX REV CODE- 250/637: Performed by: INTERNAL MEDICINE

## 2017-05-27 PROCEDURE — 74011000250 HC RX REV CODE- 250: Performed by: INTERNAL MEDICINE

## 2017-05-27 PROCEDURE — 74011636637 HC RX REV CODE- 636/637: Performed by: INTERNAL MEDICINE

## 2017-05-27 PROCEDURE — 77010033711 HC HIGH FLOW OXYGEN

## 2017-05-27 PROCEDURE — 74011000258 HC RX REV CODE- 258: Performed by: INTERNAL MEDICINE

## 2017-05-27 PROCEDURE — 85610 PROTHROMBIN TIME: CPT | Performed by: INTERNAL MEDICINE

## 2017-05-27 PROCEDURE — 71010 XR CHEST PORT: CPT

## 2017-05-27 PROCEDURE — 82962 GLUCOSE BLOOD TEST: CPT

## 2017-05-27 PROCEDURE — 65610000001 HC ROOM ICU GENERAL

## 2017-05-27 PROCEDURE — 36415 COLL VENOUS BLD VENIPUNCTURE: CPT | Performed by: INTERNAL MEDICINE

## 2017-05-27 PROCEDURE — 99233 SBSQ HOSP IP/OBS HIGH 50: CPT | Performed by: INTERNAL MEDICINE

## 2017-05-27 PROCEDURE — 80048 BASIC METABOLIC PNL TOTAL CA: CPT | Performed by: INTERNAL MEDICINE

## 2017-05-27 PROCEDURE — 94640 AIRWAY INHALATION TREATMENT: CPT

## 2017-05-27 PROCEDURE — 74011250637 HC RX REV CODE- 250/637: Performed by: NURSE PRACTITIONER

## 2017-05-27 RX ORDER — ATENOLOL 25 MG/1
50 TABLET ORAL EVERY 12 HOURS
Status: DISCONTINUED | OUTPATIENT
Start: 2017-05-27 | End: 2017-05-27

## 2017-05-27 RX ORDER — DEXTROSE MONOHYDRATE 50 MG/ML
50 INJECTION, SOLUTION INTRAVENOUS CONTINUOUS
Status: DISCONTINUED | OUTPATIENT
Start: 2017-05-27 | End: 2017-05-28

## 2017-05-27 RX ORDER — ATENOLOL 50 MG/1
25 TABLET ORAL DAILY
Status: DISCONTINUED | OUTPATIENT
Start: 2017-05-27 | End: 2017-06-02 | Stop reason: HOSPADM

## 2017-05-27 RX ORDER — HYDRALAZINE HYDROCHLORIDE 25 MG/1
25 TABLET, FILM COATED ORAL 3 TIMES DAILY
Status: DISCONTINUED | OUTPATIENT
Start: 2017-05-27 | End: 2017-05-28

## 2017-05-27 RX ADMIN — ALPRAZOLAM 0.25 MG: 0.25 TABLET ORAL at 21:06

## 2017-05-27 RX ADMIN — MIRTAZAPINE 30 MG: 15 TABLET, FILM COATED ORAL at 21:06

## 2017-05-27 RX ADMIN — HYDRALAZINE HYDROCHLORIDE 25 MG: 25 TABLET, FILM COATED ORAL at 17:13

## 2017-05-27 RX ADMIN — ALBUTEROL SULFATE 2.5 MG: 2.5 SOLUTION RESPIRATORY (INHALATION) at 21:26

## 2017-05-27 RX ADMIN — ALPRAZOLAM 0.25 MG: 0.25 TABLET ORAL at 17:31

## 2017-05-27 RX ADMIN — BUDESONIDE 500 MCG: 0.5 SUSPENSION RESPIRATORY (INHALATION) at 21:26

## 2017-05-27 RX ADMIN — HYDRALAZINE HYDROCHLORIDE 10 MG: 20 INJECTION INTRAMUSCULAR; INTRAVENOUS at 04:56

## 2017-05-27 RX ADMIN — MONTELUKAST SODIUM 10 MG: 10 TABLET, FILM COATED ORAL at 21:07

## 2017-05-27 RX ADMIN — WARFARIN SODIUM 1 MG: 1 TABLET ORAL at 17:13

## 2017-05-27 RX ADMIN — FAMOTIDINE 20 MG: 40 POWDER, FOR SUSPENSION ORAL at 08:54

## 2017-05-27 RX ADMIN — HUMAN INSULIN 8 UNITS: 100 INJECTION, SOLUTION SUBCUTANEOUS at 22:00

## 2017-05-27 RX ADMIN — HYDRALAZINE HYDROCHLORIDE 10 MG: 10 TABLET, FILM COATED ORAL at 08:46

## 2017-05-27 RX ADMIN — AZITHROMYCIN MONOHYDRATE 500 MG: 500 INJECTION, POWDER, LYOPHILIZED, FOR SOLUTION INTRAVENOUS at 21:10

## 2017-05-27 RX ADMIN — ALBUTEROL SULFATE 2.5 MG: 2.5 SOLUTION RESPIRATORY (INHALATION) at 15:07

## 2017-05-27 RX ADMIN — HYDRALAZINE HYDROCHLORIDE 25 MG: 25 TABLET, FILM COATED ORAL at 21:07

## 2017-05-27 RX ADMIN — LORAZEPAM 1 MG: 1 TABLET ORAL at 14:53

## 2017-05-27 RX ADMIN — ALBUTEROL SULFATE 2.5 MG: 2.5 SOLUTION RESPIRATORY (INHALATION) at 08:14

## 2017-05-27 RX ADMIN — BUDESONIDE 500 MCG: 0.5 SUSPENSION RESPIRATORY (INHALATION) at 08:14

## 2017-05-27 RX ADMIN — DEXTROSE MONOHYDRATE 50 ML/HR: 5 INJECTION, SOLUTION INTRAVENOUS at 09:01

## 2017-05-27 RX ADMIN — AMLODIPINE BESYLATE 10 MG: 10 TABLET ORAL at 08:46

## 2017-05-27 RX ADMIN — Medication 10 ML: at 05:00

## 2017-05-27 RX ADMIN — ALBUTEROL SULFATE 2.5 MG: 2.5 SOLUTION RESPIRATORY (INHALATION) at 01:43

## 2017-05-27 RX ADMIN — INSULIN HUMAN 4 UNITS: 100 INJECTION, SOLUTION PARENTERAL at 11:39

## 2017-05-27 RX ADMIN — Medication 10 ML: at 14:00

## 2017-05-27 RX ADMIN — ATENOLOL 25 MG: 50 TABLET ORAL at 11:16

## 2017-05-27 RX ADMIN — Medication 2 MG: at 11:15

## 2017-05-27 RX ADMIN — INSULIN HUMAN 4 UNITS: 100 INJECTION, SOLUTION PARENTERAL at 17:17

## 2017-05-27 RX ADMIN — CEFTRIAXONE 1 G: 1 INJECTION, POWDER, FOR SOLUTION INTRAMUSCULAR; INTRAVENOUS at 11:16

## 2017-05-27 RX ADMIN — Medication 10 ML: at 21:09

## 2017-05-27 RX ADMIN — MEGESTROL ACETATE 200 MG: 40 SUSPENSION ORAL at 08:49

## 2017-05-27 NOTE — PROGRESS NOTES
Rounded with Pawel Callahan, RN, Interpreting Services offered when needed.           Thank you for this referral,       Isidra Dhillon, [facility]  /Patient Relations    [facility].  [address] [facility], [address] [place] [address]    [phone]

## 2017-05-27 NOTE — PROGRESS NOTES
Bedside shift report received from [address]. gtts verified. Pt denies pain. Pt sleeping comfortably.

## 2017-05-27 NOTE — PROGRESS NOTES
Bang Graham  Admission Date: 5/24/2017             ICU Daily Progress Note: 5/27/2017   79 y/o was last seen by us in February of 2017 for right pleural effusion. She has dCHR, prior DVT on coumadin, CKD, Moderate AS, HTN, DM and R ureteral obstruction s/p stent placement. She is followed by Dr Rosemary Payne for metastatic breast CA with mets to lung/bone. In February, she underwent R thoracentesis with drain to gravity with removal of 1200 ml of yellow pleural fluid. She developed pain after thoracentesis and pain was felt to be from trapped lung physiology and recommended not to remove more than 1 liter of pleural fluid if thoracentesis is again necessary. She was hypoxemic and home O2 arranged.       She had a CXR in April 2017 with persistent effusion. She was seen by Oncology on 5/16/2017 and was having dyspnea during that time with fatigue. She was given lasix as well and has taken lasix until 3 days ago. She has plans to see [facility] on June 2. Today in the ER, her CXR shows effusion on right which is unchanged and new right perihilar infiltrate. We were asked to admit her for hypoxemia with persistent effusion, new infiltrate with metastatic breast cancer with acute on chronic renal failure and moderate AS    Decompensated- intubated on 5/24  Seen by Oncology -holding chemo  Thoracentesis on right- 950 cc of /serosanguinous/ fluid  Subjective:     Extubated on 5/26. Had to go on BIPAP last night. Awake and feels ok. Wants to take mask off. Making urine and in negative balance by about 100+ ml.       Current Facility-Administered Medications   Medication Dose Route Frequency    famotidine (PEPCID) 40 mg/5 mL (8 mg/mL) oral suspension 20 mg  20 mg Per NG tube DAILY    hydrALAZINE (APRESOLINE) tablet 10 mg  10 mg Oral TID    warfarin (COUMADIN) tablet 1 mg - pharmacy dosing   1 mg Oral QPM    0.9% sodium chloride infusion 250 mL  250 mL IntraVENous PRN    hydrALAZINE (APRESOLINE) 20 mg/mL injection 10 mg  10 mg IntraVENous Q6H PRN    morphine injection 2 mg  2 mg IntraVENous Q4H PRN    ALPRAZolam (XANAX) tablet 0.25 mg  0.25 mg Oral TID PRN    amLODIPine (NORVASC) tablet 10 mg  10 mg Oral DAILY    atenolol (TENORMIN) tablet 25 mg  25 mg Oral Q12H    HYDROcodone-acetaminophen (NORCO)  mg tablet 1 Tab  1 Tab Oral Q4H PRN    LORazepam (ATIVAN) tablet 1 mg  1 mg Oral Q6H PRN    megestrol (MEGACE) 400 mg/10 mL (10 mL) oral suspension 200 mg  200 mg Oral DAILY    mirtazapine (REMERON) tablet 30 mg  30 mg Oral QHS    montelukast (SINGULAIR) tablet 10 mg  10 mg Oral QHS    zolpidem (AMBIEN) tablet 5 mg  5 mg Oral QHS PRN    sodium chloride (NS) flush 5-10 mL  5-10 mL IntraVENous Q8H    sodium chloride (NS) flush 5-10 mL  5-10 mL IntraVENous PRN    azithromycin (ZITHROMAX) 500 mg in 0.9% sodium chloride (MBP/ADV) 250 mL  500 mg IntraVENous Q24H    cefTRIAXone (ROCEPHIN) 1 g in 0.9% sodium chloride (MBP/ADV) 50 mL  1 g IntraVENous Q24H    budesonide (PULMICORT) 500 mcg/2 ml nebulizer suspension  500 mcg Nebulization BID RT    And    albuterol CONCENTRATE 2.5mg/0.5 mL neb soln  2.5 mg Nebulization Q6H RT    fentaNYL in normal saline (pf) 25 mcg/mL infusion   mcg/hr IntraVENous TITRATE    propofol (DIPRIVAN) infusion  5-50 mcg/kg/min IntraVENous TITRATE         Objective:     Vitals:    05/27/17 0533 05/27/17 0602 05/27/17 0632 05/27/17 0639   BP: 162/69 158/69 159/84    Pulse: (!) 55 63 60    Resp: 12 13 17    Temp:       SpO2: 99% 97% 96%    Weight:    136 lb 14.5 oz (62.1 kg)   Height:         Intake and Output:   05/25 1901 - 05/27 0700  In: 1899.5 [P.O.:120; I.V.:704]  Out: 1787 [Urine:1780]       Physical Exam:          GEN: acutely ill, on BIPAP at 15/10 and 50%  HEENT:  NC/AT. NECK:  no JVD, no retractions, no thyromegaly or masses,   LUNGS:  Decreased in Right base > left.    HEART:  RRR with  M,no G,R;  ABDOMEN:  soft with no tenderness; positive bowel sounds present  EXTREMITIES: warm with no cyanosis, no lower leg edema  SKIN:  no jaundice or ecchymosis   NEURO:  alert on BIPAP. Moving all limbs and answering questions. LINES: BIPAP, denson, port         CHEST XRAY: pending. LAB  Recent Labs      05/26/17   1924  05/26/17   0335  05/24/17   1111   WBC   --   4.0*  10.8   HGB  8.2*  6.8*  7.8*   HCT  24.6*  21.1*  24.4*   PLT   --   256  498*     Recent Labs      05/27/17   0423  05/26/17   0335  05/25/17   1115  05/25/17   0500  05/24/17   1550   NA  147*  146*   --   149*  150*   K  5.0  4.9   --   4.9  4.7   CL  113*  110*   --   115*  116*   CO2  22  19*   --   22  20*   GLU  210*  281*   --   185*  187*   BUN  72*  58*   --   42*  33*   CREA  3.55*  3.66*   --   2.94*  2.47*   MG   --   3.1*   --    --   2.8*   PHOS   --   6.0*   --    --   4.1*   INR  2.3*  2.3*  3.1*   --    --      Recent Labs      05/26/17   0255  05/25/17   0418  05/24/17   1730   PH  7.42  7.40  7.39   PCO2  34*  31*  36   PO2  58*  98  59*   HCO3  22  19*  21*       Cultures: NTD    Assessment:         Hospital Problems  Date Reviewed: 5/16/2017          Codes Class Noted POA    * (Principal)Acute on chronic respiratory failure ([facility] [address] ICD-10-CM: J96.20  ICD-9-CM: 518.84  5/24/2017 Yes    Off vent and on BIPAP since last night. Moderate aortic stenosis (Chronic) ICD-10-CM: I35.0  ICD-9-CM: 424.1  5/24/2017 Yes    Plans to see cardiology June 2    Anticoagulated on Coumadin (Chronic) ICD-10-CM: Z51.81, Z79.01  ICD-9-CM: V58.83, V58.61  5/24/2017 Yes        Volume overload ICD-10-CM: E87.70  ICD-9-CM: 276.69  5/24/2017 Yes    Better, now with acute renal failure, but Cr at mid 3's. Making urine, nephrology following.      Breast cancer metastasized to lung [facility]) (Chronic) ICD-10-CM: C50.919, C78.00  ICD-9-CM: 174.9, 197.0  2/17/2017 Yes        CKD (chronic kidney disease) (Chronic) ICD-10-CM: N18.9  ICD-9-CM: 585.9  1/4/2017 Yes    Overview Signed 5/14/2013 11:35 AM by Tara Cuadra RN     With acute rise- ? Dehydration and monitor             DVT (deep venous thrombosis) (HCC) (Chronic) ICD-10-CM: I82.409  ICD-9-CM: 453.40  1/4/2017 Yes    Overview Signed 11/19/2013  6:07 AM by Halima Desouza     [id number] There is nonocclusive thrombus within the left common femoral vein, superficial femoral vein, popliteal vein, and posterior tibial vein               Pleural effusion, right (Chronic) ICD-10-CM: J90  ICD-9-CM: 511.9  1/4/2017 Yes    Overview Addendum 10/8/2016  5:54 PM by Kayla Whelan MD     10/6/16 R thoracentesis 800 ml: pleural fluid creatine 4.1 = likely urinothorax             DM (diabetes mellitus) ([facility] [address] (Chronic) ICD-10-CM: E11.9  ICD-9-CM: 250.00  10/7/2016 Yes        Acute on chronic renal failure (HCC) ICD-10-CM: N17.9, N18.9  ICD-9-CM: 584.9, 585.9  10/7/2016 Yes    See above    Leg swelling ICD-10-CM: M79.89  ICD-9-CM: 729.81  2/25/2014 Yes    Overview Signed 2/25/2014  2:45 PM by Vicki Sol NP     Bilateral                   Anemia  --s/p transfusion and up. Plan     --try airvo/optiflow from BiPAP for now. Restart BIPAP if needed  --xr today  --f/u with nephrology. Help appreciated. Cr still in mid 3's but making urine  -- control HTN, will go up on atenolol to 50 q12  --rocephin, zithromax D 4, cultures are negative. --on coumadin, pharmacy dosing. INR at 2.3 today.   --PT/OT today. --continue remaining treatment. Dona Cabrales MD    More than 50% of time documented was spent in face-to-face contact with the patient and in the care of the patient on the floor/unit where the patient is located. This note was signed electronically.

## 2017-05-27 NOTE — PROGRESS NOTES
Pt HR when asleep is 50s-low 60s. Held 0900 50 mg Atenolol dose. Spoke to Dr. Giorgi Miguel. Per MD order, lowered atenolol dose back to 25 mg and increased hydralazine dose to 25mg.

## 2017-05-27 NOTE — PROGRESS NOTES
Warfarin dosing per pharmacist    Catrachita Taylor is a 78 y.o. female. Height: 5' 2\" (157.5 cm)    Weight: 62.1 kg (136 lb 14.5 oz)    Indication:  H/o DVT    Goal INR:  2-3    Home dose:  Per AC note on 5/23, pt has recently had dose decrease to 1 mg hs (7 mg/week)    Risk factors or significant drug interactions:  azithromycin    Other anticoagulants:  none    Daily Monitoring  Date  INR     Warfarin dose HGB              Notes  5/26  2.3  1 mg  8.2  5/27  2.3  1 mg  ---      Pt coumadin has been held since admission and pt received FFP for thoracentesis. Pt has elevated INR at last anticoagulation visit per notes and patient's dose was decreased to 1 mg hs from 2 mg hs. INR therapeutic. Continue warfarin 1 mg qhs. Daily INR. Pharmacy will continue to follow. Please call with any questions.     Thank you,  Ernie Thomson, PharmD  Clinical Pharmacist  [phone]

## 2017-05-27 NOTE — PROGRESS NOTES
Pt c/o of \"tightness\" when breathing in upper airway/throat. RT called to bedside. Morphine given. O2 sat 94%. RR 28. Per family, this is not a new development and occurs daily at home. Will continue to monitor.

## 2017-05-27 NOTE — PROGRESS NOTES
Subjective:   Daily Progress Note: 5/27/2017 8:38 AM    Comfortable.  No specific complaints    Current Facility-Administered Medications   Medication Dose Route Frequency    insulin regular (NOVOLIN R, HUMULIN R) injection   SubCUTAneous AC&HS    atenolol (TENORMIN) tablet 50 mg  50 mg Oral Q12H    famotidine (PEPCID) 40 mg/5 mL (8 mg/mL) oral suspension 20 mg  20 mg Per NG tube DAILY    hydrALAZINE (APRESOLINE) tablet 10 mg  10 mg Oral TID    warfarin (COUMADIN) tablet 1 mg - pharmacy dosing   1 mg Oral QPM    0.9% sodium chloride infusion 250 mL  250 mL IntraVENous PRN    hydrALAZINE (APRESOLINE) 20 mg/mL injection 10 mg  10 mg IntraVENous Q6H PRN    morphine injection 2 mg  2 mg IntraVENous Q4H PRN    ALPRAZolam (XANAX) tablet 0.25 mg  0.25 mg Oral TID PRN    amLODIPine (NORVASC) tablet 10 mg  10 mg Oral DAILY    HYDROcodone-acetaminophen (NORCO)  mg tablet 1 Tab  1 Tab Oral Q4H PRN    LORazepam (ATIVAN) tablet 1 mg  1 mg Oral Q6H PRN    megestrol (MEGACE) 400 mg/10 mL (10 mL) oral suspension 200 mg  200 mg Oral DAILY    mirtazapine (REMERON) tablet 30 mg  30 mg Oral QHS    montelukast (SINGULAIR) tablet 10 mg  10 mg Oral QHS    zolpidem (AMBIEN) tablet 5 mg  5 mg Oral QHS PRN    sodium chloride (NS) flush 5-10 mL  5-10 mL IntraVENous Q8H    sodium chloride (NS) flush 5-10 mL  5-10 mL IntraVENous PRN    azithromycin (ZITHROMAX) 500 mg in 0.9% sodium chloride (MBP/ADV) 250 mL  500 mg IntraVENous Q24H    cefTRIAXone (ROCEPHIN) 1 g in 0.9% sodium chloride (MBP/ADV) 50 mL  1 g IntraVENous Q24H    budesonide (PULMICORT) 500 mcg/2 ml nebulizer suspension  500 mcg Nebulization BID RT    And    albuterol CONCENTRATE 2.5mg/0.5 mL neb soln  2.5 mg Nebulization Q6H RT    fentaNYL in normal saline (pf) 25 mcg/mL infusion   mcg/hr IntraVENous TITRATE    propofol (DIPRIVAN) infusion  5-50 mcg/kg/min IntraVENous TITRATE               Objective:     Visit Vitals    /84    Pulse 60    Temp 98.4 °F (36.9 °C)    Resp 17    Ht 5' 2\" (1.575 m)    Wt 62.1 kg (136 lb 14.5 oz)    LMP 1998    SpO2 96%    BMI 25.04 kg/m2    O2 Flow Rate (L/min): 50 l/min O2 Device: Heated, Hi flow nasal cannula    Temp (24hrs), Av.6 °F (37 °C), Min:98.4 °F (36.9 °C), Max:98.9 °F (37.2 °C)         1901 -  0700  In: 1899.5 [P.O.:120; I.V.:704]  Out: 1787 [Urine:1780]      Visit Vitals    /84    Pulse 60    Temp 98.4 °F (36.9 °C)    Resp 17    Ht 5' 2\" (1.575 m)    Wt 62.1 kg (136 lb 14.5 oz)    LMP 1998    SpO2 96%    BMI 25.04 kg/m2        Lungs:reduced BS  bilaterally  Heart: regular rate and rhythm  Abdomen: soft, non-tender. Extremities: no edema          Data Review    Recent Results (from the past 48 hour(s))   PROTHROMBIN TIME + INR    Collection Time: 17 11:15 AM   Result Value Ref Range    Prothrombin time 34.4 (H) 9.6 - 12.0 sec    INR 3.1 (H) 0.9 - 1.2     FFP, ALLOCATE    Collection Time: 17  1:30 PM   Result Value Ref Range    Unit number [id number]     Blood component type FP24H,THAW     Unit division 00     Status of unit TRANSFUSED     Unit number [id number]     Blood component type FP24H,THAW     Unit division 00     Status of unit TRANSFUSED    CELL COUNT, BODY FLUID    Collection Time: 17  4:00 PM   Result Value Ref Range    BODY FLUID TYPE PLEURAL FLUID      FLUID COLOR ORANGE      FLUID APPEARANCE TURBID      FLUID RBC COUNT 54414 /cu mm    FLD NUCLEATED CELLS 309 /cu mm    FLD SEGS 33 %    FLD LYMPHS 64 %    FLD MONO/MACROPHAGE 3 %   GLUCOSE, FLUID    Collection Time: 17  4:00 PM   Result Value Ref Range    Fluid Type: PLEURAL FLUID      Glucose, body fld. 183 MG/DL   LD, BODY FLUID    Collection Time: 17  4:00 PM   Result Value Ref Range    Fluid Type: PLEURAL FLUID      LD, body fld.  294 U/L   PROTEIN TOTAL, FLUID    Collection Time: 17  4:00 PM   Result Value Ref Range    Fluid Type: PLEURAL FLUID      Protein total, body fld. 2.7 g/dL   CULTURE, BODY FLUID W GRAM STAIN    Collection Time: 05/25/17  4:00 PM   Result Value Ref Range    Special Requests: RIGHT      GRAM STAIN 0 TO 7  WBCS SEEN       GRAM STAIN NO DEFINITE ORGANISM SEEN      Culture result:        NO GROWTH AFTER SHORT PERIOD OF INCUBATION. FURTHER RESULTS TO FOLLOW AFTER OVERNIGHT INCUBATION. BLOOD GAS, ARTERIAL    Collection Time: 05/26/17  2:55 AM   Result Value Ref Range    pH 7.42 7.35 - 7.45      PCO2 34 (L) 35.0 - 45.0 mmHg    PO2 58 (L) 75.0 - 100.0 mmHg    BICARBONATE 22 22.0 - 26.0 mmol/L    BASE DEFICIT 2.4 (H) 0 - 2 mmol/L    TOTAL HEMOGLOBIN 7.1 (L) 11.7 - 15.0 GM/DL    O2 SAT 90 (L) 92.0 - 98.5 %    ARTERIAL O2 HGB 88.4 (L) 94.0 - 97.0 %    CARBOXYHEMOGLOBIN 1.0 0.5 - 1.5 %    METHEMOGLOBIN 0.5 0.0 - 1.5 %    DEOXYHEMOGLOBIN 10 (H) 0.0 - 5.0 %    SITE LB     ALLENS TEST NA     MODE PRVC     FIO2 35.0 %    Tidal volume 350.0      RATE 15.0      PEEP/CPAP 10.0      Respiratory comment: Melanie DECKER at 5 26 2017 3 02 57 AM. Read back.     METABOLIC PANEL, BASIC    Collection Time: 05/26/17  3:35 AM   Result Value Ref Range    Sodium 146 (H) 136 - 145 mmol/L    Potassium 4.9 3.5 - 5.1 mmol/L    Chloride 110 (H) 98 - 107 mmol/L    CO2 19 (L) 21 - 32 mmol/L    Anion gap 17 (H) 7 - 16 mmol/L    Glucose 281 (H) 65 - 100 mg/dL    BUN 58 (H) 8 - 23 MG/DL    Creatinine 3.66 (H) 0.6 - 1.0 MG/DL    GFR est AA 16 (L) >60 ml/min/1.73m2    GFR est non-AA 13 (L) >60 ml/min/1.73m2    Calcium 7.0 (L) 8.3 - 10.4 MG/DL   MAGNESIUM    Collection Time: 05/26/17  3:35 AM   Result Value Ref Range    Magnesium 3.1 (H) 1.8 - 2.4 mg/dL   PHOSPHORUS    Collection Time: 05/26/17  3:35 AM   Result Value Ref Range    Phosphorus 6.0 (H) 2.3 - 3.7 MG/DL   PROTHROMBIN TIME + INR    Collection Time: 05/26/17  3:35 AM   Result Value Ref Range    Prothrombin time 24.9 (H) 9.6 - 12.0 sec    INR 2.3 (H) 0.9 - 1.2     CBC W/O DIFF    Collection Time: 05/26/17  3:35 AM   Result Value Ref Range    WBC 4.0 (L) 4.3 - 11.1 K/uL    RBC 1.85 (L) 4.05 - 5.25 M/uL    HGB 6.8 (LL) 11.7 - 15.4 g/dL    HCT 21.1 (L) 35.8 - 46.3 %    .1 (H) 79.6 - 97.8 FL    MCH 36.8 (H) 26.1 - 32.9 PG    MCHC 32.2 31.4 - 35.0 g/dL    RDW 20.3 (H) 11.9 - 14.6 %    PLATELET 032 345 - 635 K/uL    MPV 10.8 10.8 - 14.1 FL   GLUCOSE, POC    Collection Time: 05/26/17  3:05 PM   Result Value Ref Range    Glucose (POC) 374 (H) 65 - 100 mg/dL   GLUCOSE, POC    Collection Time: 05/26/17  3:57 PM   Result Value Ref Range    Glucose (POC) 375 (H) 65 - 100 mg/dL   GLUCOSE, POC    Collection Time: 05/26/17  4:00 PM   Result Value Ref Range    Glucose (POC) 374 (H) 65 - 100 mg/dL   GLUCOSE, POC    Collection Time: 05/26/17  5:38 PM   Result Value Ref Range    Glucose (POC) 293 (H) 65 - 100 mg/dL   URINALYSIS W/ RFLX MICROSCOPIC    Collection Time: 05/26/17  6:25 PM   Result Value Ref Range    Color YELLOW      Appearance CLOUDY      Specific gravity 1.013 1.001 - 1.023      pH (UA) 6.0 5.0 - 9.0      Protein 300 (A) NEG mg/dL    Glucose 250 mg/dL    Ketone NEGATIVE  NEG mg/dL    Bilirubin NEGATIVE  NEG      Blood LARGE (A) NEG      Urobilinogen 0.2 0.2 - 1.0 EU/dL    Nitrites NEGATIVE  NEG      Leukocyte Esterase TRACE (A) NEG      WBC 10-20 0 /hpf    RBC >100 0 /hpf    Epithelial cells 5-10 0 /hpf    Bacteria 0 0 /hpf    Casts 0-3 0 /lpf    Other observations RESULTS VERIFIED MANUALLY     PROTEIN/CREATININE RATIO, URINE    Collection Time: 05/26/17  6:25 PM   Result Value Ref Range    Protein, urine random 225 (H) <11.9 mg/dL    Creatinine, urine 50.10 mg/dL    Protein/Creat.  urine Ratio 4.5     GLUCOSE, POC    Collection Time: 05/26/17  6:53 PM   Result Value Ref Range    Glucose (POC) 222 (H) 65 - 100 mg/dL   HGB & HCT    Collection Time: 05/26/17  7:24 PM   Result Value Ref Range    HGB 8.2 (L) 11.7 - 15.4 g/dL    HCT 24.6 (L) 35.8 - 38.7 %   METABOLIC PANEL, BASIC    Collection Time: 05/27/17  4:23 AM   Result Value Ref Range Sodium 147 (H) 136 - 145 mmol/L    Potassium 5.0 3.5 - 5.1 mmol/L    Chloride 113 (H) 98 - 107 mmol/L    CO2 22 21 - 32 mmol/L    Anion gap 12 7 - 16 mmol/L    Glucose 210 (H) 65 - 100 mg/dL    BUN 72 (H) 8 - 23 MG/DL    Creatinine 3.55 (H) 0.6 - 1.0 MG/DL    GFR est AA 16 (L) >60 ml/min/1.73m2    GFR est non-AA 14 (L) >60 ml/min/1.73m2    Calcium 6.7 (L) 8.3 - 10.4 MG/DL   PROTHROMBIN TIME + INR    Collection Time: 05/27/17  4:23 AM   Result Value Ref Range    Prothrombin time 25.6 (H) 9.6 - 12.0 sec    INR 2.3 (H) 0.9 - 1.2     GLUCOSE, POC    Collection Time: 05/27/17  7:49 AM   Result Value Ref Range    Glucose (POC) 210 (H) 65 - 100 mg/dL       Assessment     Patient Active Problem List    Diagnosis Date Noted    Acute on chronic respiratory failure (HCC) 05/24/2017    Moderate aortic stenosis 05/24/2017    Anticoagulated on Coumadin 05/24/2017    Volume overload 05/24/2017    Breast cancer ([facility] [address] 02/17/2017    Bony metastasis ([facility] [address] 02/17/2017    Acute respiratory failure ([facility] [address] 02/17/2017    Breast cancer metastasized to lung ([facility] [address] 02/17/2017    CKD (chronic kidney disease) 01/04/2017    DVT (deep venous thrombosis) (HCC) 01/04/2017    Accelerated hypertension 01/04/2017    Pleural effusion, right 01/04/2017    Hydronephrosis 01/04/2017    Bilateral edema of lower extremity 01/04/2017    Hematuria 01/04/2017    Chemotherapy-induced neutropenia (HCC) 12/20/2016    Pancytopenia ([address] 10/10/2016    HTN (hypertension) 10/07/2016    DM (diabetes mellitus) ([facility] [address] 10/07/2016    Acute on chronic renal failure ([facility] [address] 10/07/2016    Pulmonary edema 01/03/2016    Elevated troponin 10/20/2015    PND (paroxysmal nocturnal dyspnea) 02/25/2014    Leg swelling 02/25/2014    CHF (congestive heart failure) ([facility] [address] 02/25/2014    Anemia 11/19/2013    Asthma 10/22/2013    Hypomagnesemia 08/04/2013    Hypokalemia 08/04/2013    Iron deficiency anemia 06/10/2013           Problems Addressed by Nephrology EMERY - prerenal? +/- obstructionm  CKD4    Plan     Will add some d5W. Renal function is stable. Good urine output.

## 2017-05-27 NOTE — PROGRESS NOTES
Patient was groggy. She did wake up to my voice. Patient said she was [employer] when asked. Will correct chart when patient is more alert and can be verified. There was no family present during my visit. Prayer. Patient was calm at the end of the visit.   Signed by chaplain Kobe

## 2017-05-27 NOTE — PROGRESS NOTES
Pt assessed. Dyspnea noted. Pt admits to SOB. Morphine given. /72. HR 68. O2 sat 92% with Airvo at 50%. Denies pain. Afebrile.

## 2017-05-28 PROBLEM — F41.9 ANXIETY: Status: ACTIVE | Noted: 2017-05-28

## 2017-05-28 LAB
ANION GAP BLD CALC-SCNC: 9 MMOL/L (ref 7–16)
BACTERIA SPEC CULT: NORMAL
BASOPHILS # BLD AUTO: 0 K/UL (ref 0–0.2)
BASOPHILS # BLD: 0 % (ref 0–2)
BUN SERPL-MCNC: 77 MG/DL (ref 8–23)
CALCIUM SERPL-MCNC: 6.6 MG/DL (ref 8.3–10.4)
CHLORIDE SERPL-SCNC: 111 MMOL/L (ref 98–107)
CO2 SERPL-SCNC: 26 MMOL/L (ref 21–32)
CREAT SERPL-MCNC: 3.57 MG/DL (ref 0.6–1)
DIFFERENTIAL METHOD BLD: ABNORMAL
EOSINOPHIL # BLD: 0 K/UL (ref 0–0.8)
EOSINOPHIL NFR BLD: 0 % (ref 0.5–7.8)
ERYTHROCYTE [DISTWIDTH] IN BLOOD BY AUTOMATED COUNT: 22.4 % (ref 11.9–14.6)
GLUCOSE BLD STRIP.AUTO-MCNC: 165 MG/DL (ref 65–100)
GLUCOSE BLD STRIP.AUTO-MCNC: 237 MG/DL (ref 65–100)
GLUCOSE BLD STRIP.AUTO-MCNC: 287 MG/DL (ref 65–100)
GLUCOSE BLD STRIP.AUTO-MCNC: 303 MG/DL (ref 65–100)
GLUCOSE SERPL-MCNC: 237 MG/DL (ref 65–100)
GRAM STN SPEC: NORMAL
GRAM STN SPEC: NORMAL
HCT VFR BLD AUTO: 29.5 % (ref 35.8–46.3)
HGB BLD-MCNC: 9.5 G/DL (ref 11.7–15.4)
IMM GRANULOCYTES # BLD: 0 K/UL (ref 0–0.5)
IMM GRANULOCYTES NFR BLD AUTO: 0.4 % (ref 0–5)
INR PPP: 2.2 (ref 0.9–1.2)
LYMPHOCYTES # BLD AUTO: 11 % (ref 13–44)
LYMPHOCYTES # BLD: 0.6 K/UL (ref 0.5–4.6)
MCH RBC QN AUTO: 34.1 PG (ref 26.1–32.9)
MCHC RBC AUTO-ENTMCNC: 32.2 G/DL (ref 31.4–35)
MCV RBC AUTO: 105.7 FL (ref 79.6–97.8)
MONOCYTES # BLD: 0.1 K/UL (ref 0.1–1.3)
MONOCYTES NFR BLD AUTO: 3 % (ref 4–12)
NEUTS SEG # BLD: 4.6 K/UL (ref 1.7–8.2)
NEUTS SEG NFR BLD AUTO: 86 % (ref 43–78)
PLATELET # BLD AUTO: 284 K/UL (ref 150–450)
PMV BLD AUTO: 9.6 FL (ref 10.8–14.1)
POTASSIUM SERPL-SCNC: 4.1 MMOL/L (ref 3.5–5.1)
PROTHROMBIN TIME: 24.3 SEC (ref 9.6–12)
RBC # BLD AUTO: 2.79 M/UL (ref 4.05–5.25)
SERVICE CMNT-IMP: NORMAL
SODIUM SERPL-SCNC: 146 MMOL/L (ref 136–145)
WBC # BLD AUTO: 5.4 K/UL (ref 4.3–11.1)

## 2017-05-28 PROCEDURE — 74011250637 HC RX REV CODE- 250/637: Performed by: INTERNAL MEDICINE

## 2017-05-28 PROCEDURE — 94640 AIRWAY INHALATION TREATMENT: CPT

## 2017-05-28 PROCEDURE — 77010033711 HC HIGH FLOW OXYGEN

## 2017-05-28 PROCEDURE — 74011250636 HC RX REV CODE- 250/636: Performed by: INTERNAL MEDICINE

## 2017-05-28 PROCEDURE — 85025 COMPLETE CBC W/AUTO DIFF WBC: CPT | Performed by: INTERNAL MEDICINE

## 2017-05-28 PROCEDURE — 80048 BASIC METABOLIC PNL TOTAL CA: CPT | Performed by: INTERNAL MEDICINE

## 2017-05-28 PROCEDURE — 77030026438 HC STYL ET INTUB CARD -A

## 2017-05-28 PROCEDURE — 74011000258 HC RX REV CODE- 258: Performed by: INTERNAL MEDICINE

## 2017-05-28 PROCEDURE — 65610000001 HC ROOM ICU GENERAL

## 2017-05-28 PROCEDURE — 85610 PROTHROMBIN TIME: CPT | Performed by: INTERNAL MEDICINE

## 2017-05-28 PROCEDURE — 36415 COLL VENOUS BLD VENIPUNCTURE: CPT | Performed by: INTERNAL MEDICINE

## 2017-05-28 PROCEDURE — 99233 SBSQ HOSP IP/OBS HIGH 50: CPT | Performed by: INTERNAL MEDICINE

## 2017-05-28 PROCEDURE — 82962 GLUCOSE BLOOD TEST: CPT

## 2017-05-28 PROCEDURE — 74011000250 HC RX REV CODE- 250: Performed by: INTERNAL MEDICINE

## 2017-05-28 PROCEDURE — 77030021668 HC NEB PREFIL KT VYRM -A

## 2017-05-28 PROCEDURE — 74011636637 HC RX REV CODE- 636/637: Performed by: INTERNAL MEDICINE

## 2017-05-28 PROCEDURE — 97530 THERAPEUTIC ACTIVITIES: CPT

## 2017-05-28 PROCEDURE — 97163 PT EVAL HIGH COMPLEX 45 MIN: CPT

## 2017-05-28 RX ORDER — HYDRALAZINE HYDROCHLORIDE 50 MG/1
50 TABLET, FILM COATED ORAL 3 TIMES DAILY
Status: DISCONTINUED | OUTPATIENT
Start: 2017-05-28 | End: 2017-06-02 | Stop reason: HOSPADM

## 2017-05-28 RX ORDER — FUROSEMIDE 40 MG/1
40 TABLET ORAL
Status: DISCONTINUED | OUTPATIENT
Start: 2017-05-28 | End: 2017-05-30

## 2017-05-28 RX ADMIN — AZITHROMYCIN MONOHYDRATE 500 MG: 500 INJECTION, POWDER, LYOPHILIZED, FOR SOLUTION INTRAVENOUS at 22:09

## 2017-05-28 RX ADMIN — FUROSEMIDE 40 MG: 40 TABLET ORAL at 10:44

## 2017-05-28 RX ADMIN — HYDRALAZINE HYDROCHLORIDE 25 MG: 25 TABLET, FILM COATED ORAL at 08:28

## 2017-05-28 RX ADMIN — MONTELUKAST SODIUM 10 MG: 10 TABLET, FILM COATED ORAL at 22:10

## 2017-05-28 RX ADMIN — HUMAN INSULIN 2 UNITS: 100 INJECTION, SOLUTION SUBCUTANEOUS at 07:32

## 2017-05-28 RX ADMIN — HYDRALAZINE HYDROCHLORIDE 10 MG: 20 INJECTION INTRAMUSCULAR; INTRAVENOUS at 06:15

## 2017-05-28 RX ADMIN — ALBUTEROL SULFATE 2.5 MG: 2.5 SOLUTION RESPIRATORY (INHALATION) at 07:44

## 2017-05-28 RX ADMIN — ALBUTEROL SULFATE 2.5 MG: 2.5 SOLUTION RESPIRATORY (INHALATION) at 19:29

## 2017-05-28 RX ADMIN — MIRTAZAPINE 30 MG: 15 TABLET, FILM COATED ORAL at 22:10

## 2017-05-28 RX ADMIN — LORAZEPAM 1 MG: 1 TABLET ORAL at 09:10

## 2017-05-28 RX ADMIN — HUMAN INSULIN 8 UNITS: 100 INJECTION, SOLUTION SUBCUTANEOUS at 16:21

## 2017-05-28 RX ADMIN — WARFARIN SODIUM 1 MG: 1 TABLET ORAL at 17:04

## 2017-05-28 RX ADMIN — ALBUTEROL SULFATE 2.5 MG: 2.5 SOLUTION RESPIRATORY (INHALATION) at 14:09

## 2017-05-28 RX ADMIN — HYDRALAZINE HYDROCHLORIDE 10 MG: 20 INJECTION INTRAMUSCULAR; INTRAVENOUS at 01:20

## 2017-05-28 RX ADMIN — AMLODIPINE BESYLATE 10 MG: 10 TABLET ORAL at 08:27

## 2017-05-28 RX ADMIN — FAMOTIDINE 20 MG: 40 POWDER, FOR SUSPENSION ORAL at 09:27

## 2017-05-28 RX ADMIN — ALPRAZOLAM 0.25 MG: 0.25 TABLET ORAL at 22:10

## 2017-05-28 RX ADMIN — ALBUTEROL SULFATE 2.5 MG: 2.5 SOLUTION RESPIRATORY (INHALATION) at 01:35

## 2017-05-28 RX ADMIN — Medication 2 MG: at 09:16

## 2017-05-28 RX ADMIN — HUMAN INSULIN 6 UNITS: 100 INJECTION, SOLUTION SUBCUTANEOUS at 22:09

## 2017-05-28 RX ADMIN — Medication 10 ML: at 06:00

## 2017-05-28 RX ADMIN — HYDRALAZINE HYDROCHLORIDE 50 MG: 50 TABLET, FILM COATED ORAL at 22:10

## 2017-05-28 RX ADMIN — ATENOLOL 25 MG: 50 TABLET ORAL at 08:28

## 2017-05-28 RX ADMIN — Medication 10 ML: at 22:00

## 2017-05-28 RX ADMIN — BUDESONIDE 500 MCG: 0.5 SUSPENSION RESPIRATORY (INHALATION) at 07:44

## 2017-05-28 RX ADMIN — Medication 10 ML: at 14:00

## 2017-05-28 RX ADMIN — BUDESONIDE 500 MCG: 0.5 SUSPENSION RESPIRATORY (INHALATION) at 19:29

## 2017-05-28 RX ADMIN — CEFTRIAXONE 1 G: 1 INJECTION, POWDER, FOR SOLUTION INTRAMUSCULAR; INTRAVENOUS at 10:45

## 2017-05-28 RX ADMIN — MEGESTROL ACETATE 200 MG: 40 SUSPENSION ORAL at 08:29

## 2017-05-28 RX ADMIN — FUROSEMIDE 40 MG: 40 TABLET ORAL at 16:15

## 2017-05-28 RX ADMIN — HUMAN INSULIN 4 UNITS: 100 INJECTION, SOLUTION SUBCUTANEOUS at 12:01

## 2017-05-28 RX ADMIN — Medication 2 MG: at 23:22

## 2017-05-28 RX ADMIN — HYDRALAZINE HYDROCHLORIDE 50 MG: 50 TABLET, FILM COATED ORAL at 16:15

## 2017-05-28 NOTE — PROGRESS NOTES
Subjective:   Daily Progress Note: 5/28/2017 9:19 AM    C/O SOB, unchanged    Current Facility-Administered Medications   Medication Dose Route Frequency    furosemide (LASIX) tablet 40 mg  40 mg Oral ACB&D    atenolol (TENORMIN) tablet 25 mg  25 mg Oral DAILY    hydrALAZINE (APRESOLINE) tablet 25 mg  25 mg Oral TID    insulin regular (NOVOLIN R, HUMULIN R) injection   SubCUTAneous AC&HS    famotidine (PEPCID) 40 mg/5 mL (8 mg/mL) oral suspension 20 mg  20 mg Per NG tube DAILY    warfarin (COUMADIN) tablet 1 mg - pharmacy dosing   1 mg Oral QPM    0.9% sodium chloride infusion 250 mL  250 mL IntraVENous PRN    hydrALAZINE (APRESOLINE) 20 mg/mL injection 10 mg  10 mg IntraVENous Q6H PRN    morphine injection 2 mg  2 mg IntraVENous Q4H PRN    ALPRAZolam (XANAX) tablet 0.25 mg  0.25 mg Oral TID PRN    amLODIPine (NORVASC) tablet 10 mg  10 mg Oral DAILY    HYDROcodone-acetaminophen (NORCO)  mg tablet 1 Tab  1 Tab Oral Q4H PRN    LORazepam (ATIVAN) tablet 1 mg  1 mg Oral Q6H PRN    megestrol (MEGACE) 400 mg/10 mL (10 mL) oral suspension 200 mg  200 mg Oral DAILY    mirtazapine (REMERON) tablet 30 mg  30 mg Oral QHS    montelukast (SINGULAIR) tablet 10 mg  10 mg Oral QHS    zolpidem (AMBIEN) tablet 5 mg  5 mg Oral QHS PRN    sodium chloride (NS) flush 5-10 mL  5-10 mL IntraVENous Q8H    sodium chloride (NS) flush 5-10 mL  5-10 mL IntraVENous PRN    azithromycin (ZITHROMAX) 500 mg in 0.9% sodium chloride (MBP/ADV) 250 mL  500 mg IntraVENous Q24H    cefTRIAXone (ROCEPHIN) 1 g in 0.9% sodium chloride (MBP/ADV) 50 mL  1 g IntraVENous Q24H    budesonide (PULMICORT) 500 mcg/2 ml nebulizer suspension  500 mcg Nebulization BID RT    And    albuterol CONCENTRATE 2.5mg/0.5 mL neb soln  2.5 mg Nebulization Q6H RT    fentaNYL in normal saline (pf) 25 mcg/mL infusion   mcg/hr IntraVENous TITRATE    propofol (DIPRIVAN) infusion  5-50 mcg/kg/min IntraVENous TITRATE               Objective:     Visit Vitals    /72    Pulse 70    Temp 98.4 °F (36.9 °C)    Resp 18    Ht 5' 2\" (1.575 m)    Wt 65.8 kg (145 lb 1 oz)    LMP 1998    SpO2 96%    BMI 26.53 kg/m2    O2 Flow Rate (L/min): 50 l/min O2 Device: Heated, Hi flow nasal cannula    Temp (24hrs), Av.4 °F (36.9 °C), Min:98 °F (36.7 °C), Max:98.9 °F (37.2 °C)         1901 -  0700  In: 1902.4 [P.O.:300; I.V.:1602.4]  Out:  [Urine:1960]      Visit Vitals    /72    Pulse 70    Temp 98.4 °F (36.9 °C)    Resp 18    Ht 5' 2\" (1.575 m)    Wt 65.8 kg (145 lb 1 oz)    LMP 1998    SpO2 96%    BMI 26.53 kg/m2       Lungs: decreased both bases bilaterally  Heart: regular rate and rhythm  Abdomen: soft, non-tender.   Extremities: tr edema          Data Review    Recent Results (from the past 50 hour(s))   GLUCOSE, POC    Collection Time: 17  3:05 PM   Result Value Ref Range    Glucose (POC) 374 (H) 65 - 100 mg/dL   GLUCOSE, POC    Collection Time: 17  3:57 PM   Result Value Ref Range    Glucose (POC) 375 (H) 65 - 100 mg/dL   GLUCOSE, POC    Collection Time: 17  4:00 PM   Result Value Ref Range    Glucose (POC) 374 (H) 65 - 100 mg/dL   GLUCOSE, POC    Collection Time: 17  5:38 PM   Result Value Ref Range    Glucose (POC) 293 (H) 65 - 100 mg/dL   URINALYSIS W/ RFLX MICROSCOPIC    Collection Time: 17  6:25 PM   Result Value Ref Range    Color YELLOW      Appearance CLOUDY      Specific gravity 1.013 1.001 - 1.023      pH (UA) 6.0 5.0 - 9.0      Protein 300 (A) NEG mg/dL    Glucose 250 mg/dL    Ketone NEGATIVE  NEG mg/dL    Bilirubin NEGATIVE  NEG      Blood LARGE (A) NEG      Urobilinogen 0.2 0.2 - 1.0 EU/dL    Nitrites NEGATIVE  NEG      Leukocyte Esterase TRACE (A) NEG      WBC 10-20 0 /hpf    RBC >100 0 /hpf    Epithelial cells 5-10 0 /hpf    Bacteria 0 0 /hpf    Casts 0-3 0 /lpf    Other observations RESULTS VERIFIED MANUALLY     PROTEIN/CREATININE RATIO, URINE    Collection Time: 17 6:25 PM   Result Value Ref Range    Protein, urine random 225 (H) <11.9 mg/dL    Creatinine, urine 50.10 mg/dL    Protein/Creat.  urine Ratio 4.5     GLUCOSE, POC    Collection Time: 05/26/17  6:53 PM   Result Value Ref Range    Glucose (POC) 222 (H) 65 - 100 mg/dL   HGB & HCT    Collection Time: 05/26/17  7:24 PM   Result Value Ref Range    HGB 8.2 (L) 11.7 - 15.4 g/dL    HCT 24.6 (L) 35.8 - 30.7 %   METABOLIC PANEL, BASIC    Collection Time: 05/27/17  4:23 AM   Result Value Ref Range    Sodium 147 (H) 136 - 145 mmol/L    Potassium 5.0 3.5 - 5.1 mmol/L    Chloride 113 (H) 98 - 107 mmol/L    CO2 22 21 - 32 mmol/L    Anion gap 12 7 - 16 mmol/L    Glucose 210 (H) 65 - 100 mg/dL    BUN 72 (H) 8 - 23 MG/DL    Creatinine 3.55 (H) 0.6 - 1.0 MG/DL    GFR est AA 16 (L) >60 ml/min/1.73m2    GFR est non-AA 14 (L) >60 ml/min/1.73m2    Calcium 6.7 (L) 8.3 - 10.4 MG/DL   PROTHROMBIN TIME + INR    Collection Time: 05/27/17  4:23 AM   Result Value Ref Range    Prothrombin time 25.6 (H) 9.6 - 12.0 sec    INR 2.3 (H) 0.9 - 1.2     GLUCOSE, POC    Collection Time: 05/27/17  7:49 AM   Result Value Ref Range    Glucose (POC) 210 (H) 65 - 100 mg/dL   GLUCOSE, POC    Collection Time: 05/27/17 11:33 AM   Result Value Ref Range    Glucose (POC) 342 (H) 65 - 100 mg/dL   GLUCOSE, POC    Collection Time: 05/27/17  5:14 PM   Result Value Ref Range    Glucose (POC) 347 (H) 65 - 100 mg/dL   GLUCOSE, POC    Collection Time: 05/27/17  9:13 PM   Result Value Ref Range    Glucose (POC) 332 (H) 65 - 676 mg/dL   METABOLIC PANEL, BASIC    Collection Time: 05/28/17  3:55 AM   Result Value Ref Range    Sodium 146 (H) 136 - 145 mmol/L    Potassium 4.1 3.5 - 5.1 mmol/L    Chloride 111 (H) 98 - 107 mmol/L    CO2 26 21 - 32 mmol/L    Anion gap 9 7 - 16 mmol/L    Glucose 237 (H) 65 - 100 mg/dL    BUN 77 (H) 8 - 23 MG/DL    Creatinine 3.57 (H) 0.6 - 1.0 MG/DL    GFR est AA 16 (L) >60 ml/min/1.73m2    GFR est non-AA 14 (L) >60 ml/min/1.73m2    Calcium 6.6 (L) 8.3 - 10.4 MG/DL   PROTHROMBIN TIME + INR    Collection Time: 05/28/17  3:55 AM   Result Value Ref Range    Prothrombin time 24.3 (H) 9.6 - 12.0 sec    INR 2.2 (H) 0.9 - 1.2     GLUCOSE, POC    Collection Time: 05/28/17  7:27 AM   Result Value Ref Range    Glucose (POC) 165 (H) 65 - 100 mg/dL       Assessment     Patient Active Problem List    Diagnosis Date Noted    Acute on chronic respiratory failure ([address] 05/24/2017    Moderate aortic stenosis 05/24/2017    Anticoagulated on Coumadin 05/24/2017    Volume overload 05/24/2017    Breast cancer ([address] 02/17/2017    Bony metastasis ([address] 02/17/2017    Acute respiratory failure ([facility] 02/17/2017    Breast cancer metastasized to lung ([address] 02/17/2017    CKD (chronic kidney disease) 01/04/2017    DVT (deep venous thrombosis) ([address] 01/04/2017    Accelerated hypertension 01/04/2017    Pleural effusion, right 01/04/2017    Hydronephrosis 01/04/2017    Bilateral edema of lower extremity 01/04/2017    Hematuria 01/04/2017    Chemotherapy-induced neutropenia (HCC) 12/20/2016    Pancytopenia ([address] 10/10/2016    HTN (hypertension) 10/07/2016    DM (diabetes mellitus) ([address] 10/07/2016    Acute on chronic renal failure ([facility] 10/07/2016    Pulmonary edema 01/03/2016    Elevated troponin 10/20/2015    PND (paroxysmal nocturnal dyspnea) 02/25/2014    Leg swelling 02/25/2014    CHF (congestive heart failure) ([address] 02/25/2014    Anemia 11/19/2013    Asthma 10/22/2013    Hypomagnesemia 08/04/2013    Hypokalemia 08/04/2013    Iron deficiency anemia 06/10/2013           Problems Addressed by Nephrology     EMERY - Prerenal  Hypernatremia    Plan     Renal function is stable with preserved urine output. Her sodium has mostly corrected. Given her tenuous volume situation, would stop D5W and start low dose PO Lasix for comfort. This can be titrated. She is a very poor dialysis candidate and dialysis would not meaningfully improve her prognosis.  No current indication.

## 2017-05-28 NOTE — PROGRESS NOTES
Problem: Mobility Impaired (Adult and Pediatric)  Goal: *Acute Goals and Plan of Care (Insert Text)    LTG:  (1.)Ms. Leggett will move from supine to sit and sit to supine , scoot up and down and roll side to side in bed with STAND BY ASSIST within 8 day(s). (2.)Ms. Leggett will transfer from bed to chair and chair to bed with STAND BY ASSIST using the least restrictive device within 8 day(s). (3.)Ms. Leggett will ambulate with MINIMAL ASSIST for 20+ feet with the least restrictive device within 8 day(s). ________________________________________________________________________________________________      PHYSICAL THERAPY: INITIAL ASSESSMENT, TREATMENT DAY: DAY OF ASSESSMENT, AM 5/28/2017  INPATIENT: Hospital Day: 5  Payor: SELF PAY / Plan: [facility] SELF PAY / Product Type: Self Pay /      NAME/AGE/GENDER: Breezy Post is a 78 y.o. female      PRIMARY DIAGNOSIS: Pleural effusion [J90] Acute on chronic respiratory failure (HCC) Acute on chronic respiratory failure (HCC)  Procedure(s) (LRB):  ULTRASOUND (Right)  THORACENTESIS (Right)  3 Days Post-Op  ICD-10: Treatment Diagnosis:       · Generalized Muscle Weakness (M62.81)  · Difficulty in walking, Not elsewhere classified (R26.2)   Precaution/Allergies:  Review of patient's allergies indicates no known allergies. ASSESSMENT:      Ms. Adele Middleton presents supine in bed. Needed interpretor to acquire data. Mostly out of breath answering questions in 1-2 word answer due to OOB. Initially refused any activity, but explanied reason and pt agreed. It is reported that pt lilves at home and ambulates in house with no AD, but limited distance, chair - bathroom. Going outside to car requires 2x assist from  and son. Her impairments include decreased functional mobility, decreased balance, decreased strength, decreased safety awareness, increased risk for falls.  Pt would benefit from further PT while in house to address these impairments to help improve to prior level of independence. Main limitation is lines and unable to ambulate today due to line limits. Able to stand and tends to lean back despite cues to lean forward. Presents with LE weakness in standing collapsinging into valgus. Able tot stand for 1min with support and trnf to chair. Main limitation in trnf is lines and balance. In chair and there at end of session RN present. Anticipate pt will require continued skilled PT following discharge from hospital due to poor balance, safety and fall risk. This section established at most recent assessment   PROBLEM LIST (Impairments causing functional limitations):  1. Decreased Strength  2. Decreased ADL/Functional Activities  3. Decreased Transfer Abilities  4. Decreased Ambulation Ability/Technique  5. Decreased Balance  6. Decreased Activity Tolerance  7. Decreased Pacing Skills  8. Increased Fatigue  9. Increased Shortness of Breath  10. Decreased Flexibility/Joint Mobility    INTERVENTIONS PLANNED: (Benefits and precautions of physical therapy have been discussed with the patient.)  1. Balance Exercise  2. Bed Mobility  3. Gait Training  4. Home Exercise Program (HEP)  5. Neuromuscular Re-education/Strengthening  6. Range of Motion (ROM)  7. Therapeutic Activites  8. Therapeutic Exercise/Strengthening  9. Transfer Training  10. Group Therapy      TREATMENT PLAN: Frequency/Duration: 3 times a week for duration of hospital stay  Rehabilitation Potential For Stated Goals: FAIR      RECOMMENDED REHABILITATION/EQUIPMENT: (at time of discharge pending progress): Continue Skilled Therapy, Home Health: Physical Therapy, Rehab and Adaptive Equipment: Walkers, Type: Rolling Walker. HISTORY:   History of Present Injury/Illness (Reason for Referral):  78 y.o. female presents with dyspnea. She was last seen by us in February of 2017 for right pleural effusion.  She has dCHR, prior DVT on coumadin, CKD, Moderate AS, HTN, DM and R ureteral obstruction s/p stent placement. She is followed by Dr Brigitte Potter for metastatic breast CA with mets to lung/bone. In February, she underwent R thoracentesis with drain to gravity with removal of 1200 ml of yellow pleural fluid. She developed pain after thoracentesis and pain was felt to be from trapped lung physiology and recommended not to remove more than 1 liter of pleural fluid if thoracentesis is again necessary. She was hypoxemic and home O2 arranged. She had a CXR in April 2017 with persistent effusion. She was seen by Oncology on 5/16/2017 and was having dyspnea during that time with fatigue. She was given lasix as well and has taken lasix until 3 days ago. She has plans to see [facility] on June 2. Today in the ER, her CXR shows effusion on right which is unchanged and new right perihilar infiltrate. We were asked to admit her for hypoxemia with persistent effusion, new infiltrate with metastatic breast cancer with acute on chronic renal failure and moderate AS. She is currently requiring BiPAP and we were asked to admit to the ICU  Past Medical History/Comorbidities:   Ms. Andrey Little  has a past medical history of Acute on chronic diastolic congestive heart failure ([address] (1/5/2016); Asthma; Cancer ([address] Chemotherapy-induced neutropenia (HCC) (12/20/2016); Depression; Diabetes ([address] DM (diabetes mellitus) ([address] (11/20/2012); HCAP (healthcare-associated pneumonia) (7/17/2013); Hypertension; Sepsis ([address] (7/19/2016); Thromboembolus ([address] and Unspecified adverse effect of anesthesia. Ms. Andrey Little  has a past surgical history that includes vascular access (1/26/12) and gyn.   Social History/Living Environment:   Home Environment: Private residence  # Steps to Enter: 1  One/Two Story Residence: One story  Living Alone: No  Support Systems: Spouse/Significant Other/Partner, Family member(s)  Patient Expects to be Discharged to[de-identified] Skilled nursing facility  Current DME Used/Available at Home: None  Prior Level of Function/Work/Activity:  Pt limited household ambulator reports she does not use AD, but possibly furniture walker. Number of Personal Factors/Comorbidities that affect the Plan of Care: 3+: HIGH COMPLEXITY   EXAMINATION:   Most Recent Physical Functioning:   Gross Assessment:  AROM: Grossly decreased, non-functional  Strength: Grossly decreased, non-functional  Coordination: Generally decreased, functional  Tone: Normal  Sensation: Intact               Posture:  Posture (WDL): Exceptions to WDL  Posture Assessment: Genu valgus left, Genu valgus right, Trunk flexion  Balance:  Sitting: Impaired  Sitting - Static: Fair (occasional)  Sitting - Dynamic: Fair (occasional)  Standing: Impaired  Standing - Static: Poor  Standing - Dynamic : Poor Bed Mobility:  Rolling: Moderate assistance  Supine to Sit: Moderate assistance  Scooting: Minimum assistance  Wheelchair Mobility:     Transfers:  Sit to Stand: Moderate assistance  Stand to Sit: Moderate assistance  Bed to Chair: Moderate assistance  Gait:             Body Structures Involved:  1. Nerves  2. Heart  3. Lungs  4. Bones  5. Joints  6. Muscles  7. Ligaments Body Functions Affected:  1. Mental  2. Sensory/Pain  3. Cardio  4. Respiratory  5. Neuromusculoskeletal  6. Movement Related  7. language barrier Activities and Participation Affected:  1. Learning and Applying Knowledge  2. General Tasks and Demands  3. Communication  4. Mobility  5. Self Care  6. Community, Social and [place]   Number of elements that affect the Plan of Care: 4+: HIGH COMPLEXITY   CLINICAL PRESENTATION:   Presentation: Evolving clinical presentation with unstable and unpredictable characteristics: HIGH COMPLEXITY   CLINICAL DECISION MAKIN [facility] Mobility Inpatient Short Form  How much difficulty does the patient currently have. .. Unable A Lot A Little None   1. Turning over in bed (including adjusting bedclothes, sheets and blankets)? [ ] 1   [ ] 2   [X] 3   [ ] 4   2. Sitting down on and standing up from a chair with arms ( e.g., wheelchair, bedside commode, etc.)   [ ] 1   [X] 2   [ ] 3   [ ] 4   3. Moving from lying on back to sitting on the side of the bed? [ ] 1   [X] 2   [ ] 3   [ ] 4   How much help from another person does the patient currently need. .. Total A Lot A Little None   4. Moving to and from a bed to a chair (including a wheelchair)? [ ] 1   [ ] 2   [X] 3   [ ] 4   5. Need to walk in hospital room? [ ] 1   [X] 2   [ ] 3   [ ] 4   6. Climbing 3-5 steps with a railing? [X] 1   [ ] 2   [ ] 3   [ ] 4   © 2007, Trustees of [address] [place] [id number], under license to [employer]. All rights reserved    Score:  Initial: 13 Most Recent: X (Date: -- )     Interpretation of Tool:  Represents activities that are increasingly more difficult (i.e. Bed mobility, Transfers, Gait). Score 24 23 22-20 19-15 14-10 9-7 6       Modifier CH CI CJ CK CL CM CN         · Mobility - Walking and Moving Around:               - CURRENT STATUS:    CL - 60%-79% impaired, limited or restricted               - GOAL STATUS:           CL - 60%-79% impaired, limited or restricted               - D/C STATUS:                       ---------------To be determined---------------  Payor: SELF PAY / Plan: [facility] SELF PAY / Product Type: Self Pay /       Medical Necessity:     · Skilled intervention continues to be required due to decreased function. Reason for Services/Other Comments:  · Patient continues to require skilled intervention due to medical complications and patient unable to attend/participate in therapy as expected. Use of outcome tool(s) and clinical judgement create a POC that gives a: Difficult prediction of patient's progress: HIGH COMPLEXITY                 TREATMENT:   (In addition to Assessment/Re-Assessment sessions the following treatments were rendered)   Pre-treatment Symptoms/Complaints:   Only edda, pt language difficulty  Pain: Initial:   Pain Intensity 1: 0  Post Session:  No response      Therapeutic Activity (   10min):  Therapeutic activities per grid below to improve mobility, strength, balance and coordination. Required moderate verbal cues to promote static and dynamic balance in standing, promote coordination of bilateral, lower extremity(s) and promote motor control of bilateral, lower extremity(s). Braces/Orthotics/Lines/Etc:   · IV  · denson catheter  · on flow machine  · O2 Device: Heated, Hi flow nasal cannula  Treatment/Session Assessment:    · Response to Treatment:  See above  · Interdisciplinary Collaboration:  · Physical Therapist  · Registered Nurse  ·   · After treatment position/precautions:  · Up in chair  · Call light within reach  · RN notified  · Compliance with Program/Exercises: Will assess as treatment progresses. · Recommendations/Intent for next treatment session: \"Next visit will focus on advancements to more challenging activities and reduction in assistance provided\".   Total Treatment Duration:  PT Patient Time In/Time Out  Time In: 1000  Time Out: [address], [place]

## 2017-05-28 NOTE — PROGRESS NOTES
Arti Hutchison  Admission Date: 5/24/2017             ICU Daily Progress Note: 5/28/2017   79 y/o was last seen by us in February of 2017 for right pleural effusion. She has dCHR, prior DVT on coumadin, CKD, Moderate AS, HTN, DM and R ureteral obstruction s/p stent placement. She is followed by Dr Marcela Levine for metastatic breast CA with mets to lung/bone. In February, she underwent R thoracentesis with drain to gravity with removal of 1200 ml of yellow pleural fluid. She developed pain after thoracentesis and pain was felt to be from trapped lung physiology and recommended not to remove more than 1 liter of pleural fluid if thoracentesis is again necessary. She was hypoxemic and home O2 arranged.       She had a CXR in April 2017 with persistent effusion. She was seen by Oncology on 5/16/2017 and was having dyspnea during that time with fatigue. She was given lasix as well and has taken lasix until 3 days ago. She has plans to see [facility] on June 2. Today in the ER, her CXR shows effusion on right which is unchanged and new right perihilar infiltrate. We were asked to admit her for hypoxemia with persistent effusion, new infiltrate with metastatic breast cancer with acute on chronic renal failure and moderate AS    Decompensated- intubated on 5/24  Seen by Oncology -holding chemo  Thoracentesis on right- 950 cc of /serosanguinous/ fluid  Extubated on 5/26. Had to go on BIPAP QHS  Subjective:     Patient in bed, weak and on airvo. Did use BIPAP QHS. States also with anxiety and given ativan earlier. No wheezing, no fevers, no cough.      Review of Systems:  -Fever  -Headaches  -Chest pain  -Dyspnea, -wheezing-, cough  -Abdominal pain-, constipation  -Leg swelling  All other organ systems grossly normal.        Current Facility-Administered Medications   Medication Dose Route Frequency    furosemide (LASIX) tablet 40 mg  40 mg Oral ACB&D    atenolol (TENORMIN) tablet 25 mg  25 mg Oral DAILY    hydrALAZINE (APRESOLINE) tablet 25 mg  25 mg Oral TID    insulin regular (NOVOLIN R, HUMULIN R) injection   SubCUTAneous AC&HS    famotidine (PEPCID) 40 mg/5 mL (8 mg/mL) oral suspension 20 mg  20 mg Per NG tube DAILY    warfarin (COUMADIN) tablet 1 mg - pharmacy dosing   1 mg Oral QPM    0.9% sodium chloride infusion 250 mL  250 mL IntraVENous PRN    hydrALAZINE (APRESOLINE) 20 mg/mL injection 10 mg  10 mg IntraVENous Q6H PRN    morphine injection 2 mg  2 mg IntraVENous Q4H PRN    ALPRAZolam (XANAX) tablet 0.25 mg  0.25 mg Oral TID PRN    amLODIPine (NORVASC) tablet 10 mg  10 mg Oral DAILY    HYDROcodone-acetaminophen (NORCO)  mg tablet 1 Tab  1 Tab Oral Q4H PRN    LORazepam (ATIVAN) tablet 1 mg  1 mg Oral Q6H PRN    megestrol (MEGACE) 400 mg/10 mL (10 mL) oral suspension 200 mg  200 mg Oral DAILY    mirtazapine (REMERON) tablet 30 mg  30 mg Oral QHS    montelukast (SINGULAIR) tablet 10 mg  10 mg Oral QHS    zolpidem (AMBIEN) tablet 5 mg  5 mg Oral QHS PRN    sodium chloride (NS) flush 5-10 mL  5-10 mL IntraVENous Q8H    sodium chloride (NS) flush 5-10 mL  5-10 mL IntraVENous PRN    azithromycin (ZITHROMAX) 500 mg in 0.9% sodium chloride (MBP/ADV) 250 mL  500 mg IntraVENous Q24H    cefTRIAXone (ROCEPHIN) 1 g in 0.9% sodium chloride (MBP/ADV) 50 mL  1 g IntraVENous Q24H    budesonide (PULMICORT) 500 mcg/2 ml nebulizer suspension  500 mcg Nebulization BID RT    And    albuterol CONCENTRATE 2.5mg/0.5 mL neb soln  2.5 mg Nebulization Q6H RT    fentaNYL in normal saline (pf) 25 mcg/mL infusion   mcg/hr IntraVENous TITRATE    propofol (DIPRIVAN) infusion  5-50 mcg/kg/min IntraVENous TITRATE         Objective:     Vitals:    05/28/17 0732 05/28/17 0748 05/28/17 0827 05/28/17 0828   BP: 168/73  160/72 160/72   Pulse: (!) 57  67 70   Resp: 18      Temp: 98.4 °F (36.9 °C)      SpO2: 97% 96%     Weight:       Height:         Intake and Output:   05/26 1901 - 05/28 0700  In: 1902.4 [P.O.:300; I.V.:1602.4]  Out: 1960 [Urine:1960]  05/28 0701 - 05/28 1900  In: 213.3 [I.V.:213.3]  Out: -     Physical Exam:          GEN: acutely ill, weak looking WF in bed on Airvo at 50 LPM and 50%  HEENT:  NC/AT. NECK:  no JVD, no retractions, no thyromegaly or masses,   LUNGS:  Decreased in Right base > left. HEART:  RRR with  M,no G,R;  ABDOMEN:  soft with no tenderness; positive bowel sounds present  EXTREMITIES:  warm with no cyanosis, no lower leg edema  SKIN:  no jaundice or ecchymosis   NEURO:  alert and answering questions. Moving all limbs and answering questions. LINES: airvo, denson, port         CHEST XRAY: yesterday with Right sided effusion with noted mild congestion in left chest    .    LAB  Recent Labs      05/26/17   1924  05/26/17   0335   WBC   --   4.0*   HGB  8.2*  6.8*   HCT  24.6*  21.1*   PLT   --   256     Recent Labs      05/28/17   0355  05/27/17   0423  05/26/17   0335   NA  146*  147*  146*   K  4.1  5.0  4.9   CL  111*  113*  110*   CO2  26  22  19*   GLU  237*  210*  281*   BUN  77*  72*  58*   CREA  3.57*  3.55*  3.66*   MG   --    --   3.1*   PHOS   --    --   6.0*   INR  2.2*  2.3*  2.3*     Recent Labs      05/26/17   0255   PH  7.42   PCO2  34*   PO2  58*   HCO3  22       Cultures: NTD    Assessment:         Hospital Problems  Date Reviewed: 5/16/2017          Codes Class Noted POA    * (Principal)Acute on chronic respiratory failure ([facility] [address] ICD-10-CM: J96.20  ICD-9-CM: 518.84  5/24/2017 Yes    Off vent and on BIPAP since last night. Moderate aortic stenosis (Chronic) ICD-10-CM: I35.0  ICD-9-CM: 424.1  5/24/2017 Yes    Plans to see cardiology June 2    Anticoagulated on Coumadin (Chronic) ICD-10-CM: Z51.81, Z79.01  ICD-9-CM: V58.83, V58.61  5/24/2017 Yes        Volume overload ICD-10-CM: E87.70  ICD-9-CM: 276.69  5/24/2017 Yes    Better, now with acute renal failure, but Cr at mid 3's. Making urine, nephrology following and feel not  Good HD canidate. Still in positive balance. Breast cancer metastasized to lung [facility]) (Chronic) ICD-10-CM: C50.919, C78.00  ICD-9-CM: 174.9, 197.0  2/17/2017 Yes    --follows with oncology. CKD (chronic kidney disease) (Chronic) ICD-10-CM: N18.9  ICD-9-CM: 585.9  1/4/2017 Yes    Overview Signed 5/14/2013 11:35 AM by Syl Lowery RN     With acute rise- ? Dehydration and monitor             DVT (deep venous thrombosis) (HCC) (Chronic) ICD-10-CM: I82.409  ICD-9-CM: 453.40  1/4/2017 Yes    Overview Signed 11/19/2013  6:07 AM by France Lopez     42-23-31 There is nonocclusive thrombus within the left common femoral vein, superficial femoral vein, popliteal vein, and posterior tibial vein               Pleural effusion, right (Chronic) ICD-10-CM: J90  ICD-9-CM: 511.9  1/4/2017 Yes    Overview Addendum 10/8/2016  5:54 PM by Jaswinder Reynoso MD     10/6/16 R thoracentesis 800 ml: pleural fluid creatine 4.1 = likely urinothorax             DM (diabetes mellitus) ([address] (Chronic) ICD-10-CM: E11.9  ICD-9-CM: 250.00  10/7/2016 Yes        Acute on chronic renal failure (HCC) ICD-10-CM: N17.9, N18.9  ICD-9-CM: 584.9, 585.9  10/7/2016 Yes    See above    Leg swelling ICD-10-CM: M79.89  ICD-9-CM: 729.81  2/25/2014 Yes    Overview Signed 2/25/2014  2:45 PM by Duglas Weathers NP     Bilateral                   Anemia  --s/p transfusion and up after that    Anxiety  --see below    Plan     --tolerating  airvo at 50 LP arnaldo 50%, will taper. On BIPAP QHS as needed. --continue anxiolytics  --f/u with nephrology. Help appreciated. Cr still in mid 3's but making urine. NOt a good HD candidate per last note by them. -- control HTN, still elevated went up on hydralazine and will increased to 50 TID today  --rocephin, zithromax D 5, cultures are negative. --on coumadin, pharmacy dosing. INR at 2.2 today.   --PT/OT today. --labs tomorrow  --consider palliative care to see on tomorrow. --continue remaining treatment.       Nina Berg MD    More than 50% of time documented was spent in face-to-face contact with the patient and in the care of the patient on the floor/unit where the patient is located. This note was signed electronically.

## 2017-05-28 NOTE — PROGRESS NOTES
present for nurse assessment with Poornima-JERONIMO, also for Dr. Geni Galvan, Dr. Duane Pierre, as well as for therapy with Tavares.     Thank you,      Randall Rojas  CERTIFIED HEALTHCARE    [place]  [phone]  [email or web address]

## 2017-05-28 NOTE — PROGRESS NOTES
Bedside shift change report given to Lawrence RN (oncoming nurse) by Rashida Daniel RN (offgoing nurse).  Report included the following information SBAR, Kardex, Procedure Summary, Intake/Output, MAR, Recent Results, Med Rec Status and Cardiac Rhythm NSR, SB.

## 2017-05-28 NOTE — PROGRESS NOTES
present when the nurse spoke to the family member.     Thank you,      Benjamin Greenberg  CERTIFIED HEALTHCARE    [place]  [phone]  [email or web address]

## 2017-05-28 NOTE — PROGRESS NOTES
Warfarin dosing per pharmacist    Dal Cockayne is a 78 y.o. female. Height: 5' 2\" (157.5 cm)    Weight: 65.8 kg (145 lb 1 oz)    Indication:  H/o DVT    Goal INR:  2-3    Home dose:  Per AC note on 5/23, pt has recently had dose decrease to 1 mg hs (7 mg/week)    Risk factors or significant drug interactions:  azithromycin    Other anticoagulants:  none    Daily Monitoring  Date  INR     Warfarin dose HGB              Notes  5/26  2.3  1 mg  8.2  5/27  2.3  1 mg  ---  5/28  2.2  1 mg  ---      Pt coumadin has been held since admission and pt received FFP for thoracentesis. Pt had elevated INR at last anticoagulation visit per notes and patient's dose was decreased from 2 mg hs to 1 mg hs. INR therapeutic. Continue warfarin 1 mg qhs. Daily INR. Pharmacy will continue to follow. Please call with any questions.     Thank you,  Slava Chun, PharmD  Clinical Pharmacist  [phone]

## 2017-05-28 NOTE — PROGRESS NOTES
Bedside shift report received from JERONIMO Rizo. gtts verified. Pt sleeping comfortably with Airvo. O2 sat 99%. HR 55. /70.

## 2017-05-29 LAB
ANION GAP BLD CALC-SCNC: 10 MMOL/L (ref 7–16)
BACTERIA SPEC CULT: NORMAL
BACTERIA SPEC CULT: NORMAL
BASOPHILS # BLD AUTO: 0 K/UL (ref 0–0.2)
BASOPHILS # BLD: 0 % (ref 0–2)
BUN SERPL-MCNC: 72 MG/DL (ref 8–23)
CALCIUM SERPL-MCNC: 6.7 MG/DL (ref 8.3–10.4)
CHLORIDE SERPL-SCNC: 112 MMOL/L (ref 98–107)
CO2 SERPL-SCNC: 27 MMOL/L (ref 21–32)
CREAT SERPL-MCNC: 3.12 MG/DL (ref 0.6–1)
DIFFERENTIAL METHOD BLD: ABNORMAL
EOSINOPHIL # BLD: 0 K/UL (ref 0–0.8)
EOSINOPHIL NFR BLD: 1 % (ref 0.5–7.8)
ERYTHROCYTE [DISTWIDTH] IN BLOOD BY AUTOMATED COUNT: 21.1 % (ref 11.9–14.6)
GLUCOSE BLD STRIP.AUTO-MCNC: 121 MG/DL (ref 65–100)
GLUCOSE BLD STRIP.AUTO-MCNC: 212 MG/DL (ref 65–100)
GLUCOSE BLD STRIP.AUTO-MCNC: 240 MG/DL (ref 65–100)
GLUCOSE BLD STRIP.AUTO-MCNC: 241 MG/DL (ref 65–100)
GLUCOSE SERPL-MCNC: 130 MG/DL (ref 65–100)
HCT VFR BLD AUTO: 29.6 % (ref 35.8–46.3)
HGB BLD-MCNC: 9.9 G/DL (ref 11.7–15.4)
IMM GRANULOCYTES # BLD: 0 K/UL (ref 0–0.5)
IMM GRANULOCYTES NFR BLD AUTO: 0.5 % (ref 0–5)
INR PPP: 2 (ref 0.9–1.2)
LYMPHOCYTES # BLD AUTO: 17 % (ref 13–44)
LYMPHOCYTES # BLD: 0.7 K/UL (ref 0.5–4.6)
MAGNESIUM SERPL-MCNC: 2.6 MG/DL (ref 1.8–2.4)
MCH RBC QN AUTO: 34.6 PG (ref 26.1–32.9)
MCHC RBC AUTO-ENTMCNC: 33.4 G/DL (ref 31.4–35)
MCV RBC AUTO: 103.5 FL (ref 79.6–97.8)
MONOCYTES # BLD: 0.2 K/UL (ref 0.1–1.3)
MONOCYTES NFR BLD AUTO: 5 % (ref 4–12)
NEUTS SEG # BLD: 3 K/UL (ref 1.7–8.2)
NEUTS SEG NFR BLD AUTO: 77 % (ref 43–78)
PHOSPHATE SERPL-MCNC: 3.7 MG/DL (ref 2.3–3.7)
PLATELET # BLD AUTO: 260 K/UL (ref 150–450)
PMV BLD AUTO: 9.8 FL (ref 10.8–14.1)
POTASSIUM SERPL-SCNC: 3.5 MMOL/L (ref 3.5–5.1)
PROTHROMBIN TIME: 21.5 SEC (ref 9.6–12)
RBC # BLD AUTO: 2.86 M/UL (ref 4.05–5.25)
SERVICE CMNT-IMP: NORMAL
SERVICE CMNT-IMP: NORMAL
SODIUM SERPL-SCNC: 149 MMOL/L (ref 136–145)
WBC # BLD AUTO: 3.9 K/UL (ref 4.3–11.1)

## 2017-05-29 PROCEDURE — 36415 COLL VENOUS BLD VENIPUNCTURE: CPT | Performed by: INTERNAL MEDICINE

## 2017-05-29 PROCEDURE — 65270000029 HC RM PRIVATE

## 2017-05-29 PROCEDURE — 77010033711 HC HIGH FLOW OXYGEN

## 2017-05-29 PROCEDURE — 74011250637 HC RX REV CODE- 250/637: Performed by: INTERNAL MEDICINE

## 2017-05-29 PROCEDURE — 85025 COMPLETE CBC W/AUTO DIFF WBC: CPT | Performed by: INTERNAL MEDICINE

## 2017-05-29 PROCEDURE — 94760 N-INVAS EAR/PLS OXIMETRY 1: CPT

## 2017-05-29 PROCEDURE — 99232 SBSQ HOSP IP/OBS MODERATE 35: CPT | Performed by: INTERNAL MEDICINE

## 2017-05-29 PROCEDURE — 74011000250 HC RX REV CODE- 250: Performed by: INTERNAL MEDICINE

## 2017-05-29 PROCEDURE — 84100 ASSAY OF PHOSPHORUS: CPT | Performed by: INTERNAL MEDICINE

## 2017-05-29 PROCEDURE — 85610 PROTHROMBIN TIME: CPT | Performed by: INTERNAL MEDICINE

## 2017-05-29 PROCEDURE — 82962 GLUCOSE BLOOD TEST: CPT

## 2017-05-29 PROCEDURE — 94640 AIRWAY INHALATION TREATMENT: CPT

## 2017-05-29 PROCEDURE — 74011250636 HC RX REV CODE- 250/636: Performed by: INTERNAL MEDICINE

## 2017-05-29 PROCEDURE — 74011000258 HC RX REV CODE- 258: Performed by: INTERNAL MEDICINE

## 2017-05-29 PROCEDURE — 83735 ASSAY OF MAGNESIUM: CPT | Performed by: INTERNAL MEDICINE

## 2017-05-29 PROCEDURE — 80048 BASIC METABOLIC PNL TOTAL CA: CPT | Performed by: INTERNAL MEDICINE

## 2017-05-29 PROCEDURE — 97166 OT EVAL MOD COMPLEX 45 MIN: CPT

## 2017-05-29 PROCEDURE — 74011636637 HC RX REV CODE- 636/637: Performed by: INTERNAL MEDICINE

## 2017-05-29 RX ORDER — FENTANYL 50 UG/1
1 PATCH TRANSDERMAL
Status: DISCONTINUED | OUTPATIENT
Start: 2017-05-29 | End: 2017-06-02 | Stop reason: HOSPADM

## 2017-05-29 RX ORDER — WARFARIN 1 MG/1
2 TABLET ORAL EVERY EVENING
Status: DISCONTINUED | OUTPATIENT
Start: 2017-05-29 | End: 2017-05-31

## 2017-05-29 RX ADMIN — MEGESTROL ACETATE 200 MG: 40 SUSPENSION ORAL at 08:24

## 2017-05-29 RX ADMIN — Medication 50 MCG/HR: at 00:36

## 2017-05-29 RX ADMIN — ALBUTEROL SULFATE 2.5 MG: 2.5 SOLUTION RESPIRATORY (INHALATION) at 07:01

## 2017-05-29 RX ADMIN — FUROSEMIDE 40 MG: 40 TABLET ORAL at 16:26

## 2017-05-29 RX ADMIN — WARFARIN SODIUM 2 MG: 1 TABLET ORAL at 22:19

## 2017-05-29 RX ADMIN — AMLODIPINE BESYLATE 10 MG: 10 TABLET ORAL at 08:24

## 2017-05-29 RX ADMIN — HUMAN INSULIN 4 UNITS: 100 INJECTION, SOLUTION SUBCUTANEOUS at 16:31

## 2017-05-29 RX ADMIN — FAMOTIDINE 20 MG: 40 POWDER, FOR SUSPENSION ORAL at 08:24

## 2017-05-29 RX ADMIN — ATENOLOL 25 MG: 50 TABLET ORAL at 08:24

## 2017-05-29 RX ADMIN — Medication 5 ML: at 22:20

## 2017-05-29 RX ADMIN — MONTELUKAST SODIUM 10 MG: 10 TABLET, FILM COATED ORAL at 22:19

## 2017-05-29 RX ADMIN — ZOLPIDEM TARTRATE 5 MG: 5 TABLET ORAL at 22:20

## 2017-05-29 RX ADMIN — BUDESONIDE 500 MCG: 0.5 SUSPENSION RESPIRATORY (INHALATION) at 20:24

## 2017-05-29 RX ADMIN — HYDRALAZINE HYDROCHLORIDE 50 MG: 50 TABLET, FILM COATED ORAL at 22:18

## 2017-05-29 RX ADMIN — HYDRALAZINE HYDROCHLORIDE 50 MG: 50 TABLET, FILM COATED ORAL at 16:26

## 2017-05-29 RX ADMIN — BUDESONIDE 500 MCG: 0.5 SUSPENSION RESPIRATORY (INHALATION) at 07:01

## 2017-05-29 RX ADMIN — HYDRALAZINE HYDROCHLORIDE 50 MG: 50 TABLET, FILM COATED ORAL at 08:24

## 2017-05-29 RX ADMIN — HUMAN INSULIN 4 UNITS: 100 INJECTION, SOLUTION SUBCUTANEOUS at 11:30

## 2017-05-29 RX ADMIN — Medication 10 ML: at 06:00

## 2017-05-29 RX ADMIN — ALBUTEROL SULFATE 2.5 MG: 2.5 SOLUTION RESPIRATORY (INHALATION) at 13:27

## 2017-05-29 RX ADMIN — HUMAN INSULIN 4 UNITS: 100 INJECTION, SOLUTION SUBCUTANEOUS at 22:00

## 2017-05-29 RX ADMIN — Medication 10 ML: at 14:00

## 2017-05-29 RX ADMIN — MIRTAZAPINE 30 MG: 15 TABLET, FILM COATED ORAL at 22:19

## 2017-05-29 RX ADMIN — CEFTRIAXONE 1 G: 1 INJECTION, POWDER, FOR SOLUTION INTRAMUSCULAR; INTRAVENOUS at 11:17

## 2017-05-29 RX ADMIN — FUROSEMIDE 40 MG: 40 TABLET ORAL at 07:22

## 2017-05-29 RX ADMIN — AZITHROMYCIN MONOHYDRATE 500 MG: 500 INJECTION, POWDER, LYOPHILIZED, FOR SOLUTION INTRAVENOUS at 22:19

## 2017-05-29 RX ADMIN — ALBUTEROL SULFATE 2.5 MG: 2.5 SOLUTION RESPIRATORY (INHALATION) at 02:26

## 2017-05-29 RX ADMIN — ALBUTEROL SULFATE 2.5 MG: 2.5 SOLUTION RESPIRATORY (INHALATION) at 20:24

## 2017-05-29 NOTE — PROGRESS NOTES
TRANSFER - OUT REPORT:    Verbal report given to Flaquito matias RN(name) on Catrachita Taylor  being transferred to [facility](unit) for routine progression of care       Report consisted of patients Situation, Background, Assessment and   Recommendations(SBAR). Information from the following report(s) SBAR, Kardex, Intake/Output, MAR, Recent Results and Cardiac Rhythm NSR/Sinus kenji was reviewed with the receiving nurse. Lines:   Venous Access Device left chest Port (Active)   [employer] Being Utilized Yes 5/29/2017  7:32 AM   Criteria for Appropriate Use Limited/no vessel suitable for conventional peripheral access 5/29/2017  7:32 AM   Site Assessment Clean, dry, & intact 5/29/2017 11:37 AM   Date of Last Dressing Change 05/24/17 5/29/2017 11:37 AM   Dressing Status Clean, dry, & intact 5/29/2017 11:37 AM   Dressing Type Disk with Chlorhexadine gluconate (CHG); Transparent 5/29/2017  7:32 AM   Positive Blood Return (Medial Site) No 5/28/2017  7:32 AM   Alcohol Cap Used No 5/29/2017  7:32 AM        Opportunity for questions and clarification was provided.       Patient transported with:   O2 @ 5 liters  Tech

## 2017-05-29 NOTE — PROGRESS NOTES
Problem: Self Care Deficits Care Plan (Adult)  Goal: *Acute Goals and Plan of Care (Insert Text)  1. Melissa Moulton will complete bedside commode/toilet transfers with contact guard assistance. 2. Melissa Moulton will complete toileting with contact guard assistance. 3. Melissa Moulton will complete self-feeding with supervision/setup. 4. Melissa Moulton will complete grooming/oral care with supervision/setup. 5. Melissa Moulton will complete UE exercise 15 minutes with 3 or less rest breaks in prep for ADL. Time frame: 4 - 7 visits      OCCUPATIONAL THERAPY: Initial Assessment 5/29/2017  INPATIENT: Hospital Day: 6  Payor: SELF PAY / Plan: [facility] SELF PAY / Product Type: Self Pay /      NAME/AGE/GENDER: Melissa Moulton is a 78 y.o. female      PRIMARY DIAGNOSIS:  Pleural effusion [J90] Acute on chronic respiratory failure (HCC) Acute on chronic respiratory failure (HCC)  Procedure(s) (LRB):  ULTRASOUND (Right)  THORACENTESIS (Right)  4 Days Post-Op  ICD-10: Treatment Diagnosis:        · Generalized Muscle Weakness (M62.81)   Precautions/Allergies:         Review of patient's allergies indicates no known allergies. ASSESSMENT:      Ms. Andrey Little presents with breathing difficulty with history significant for metastatic breast CA with mets to lung/bone. Interpretor present. She has been wearing 3 L via nasal cannula with increased oxygen requirements in the ICU. She presents with profound debility/weakness and is essentially dependent for activities of daily living except for self-feeding/grooming tasks. Melissa Moulton presents with the following problems and would benefit from occupational therapy to maximize independence with self-care,instrumental activities of daily living, and functional transfers/mobility for activities of daily living/instrumental activities of daily living via the stated goals.       This section established at most recent assessment   PROBLEM LIST (Impairments causing functional limitations):  1. Decreased Strength  2. Decreased ADL/Functional Activities  3. Decreased Transfer Abilities  4. Decreased Balance  5. Increased Pain  6. Decreased Activity Tolerance  7. Decreased Pacing Skills  8. Decreased Work Simplification/Energy Conservation Techniques  9. Increased Fatigue  10. Increased Shortness of Breath  11. Decreased Flexibility/Joint Mobility  12. Edema/Girth  13. Decreased Traverse with Home Exercise Program    INTERVENTIONS PLANNED: (Benefits and precautions of occupational therapy have been discussed with the patient.)  1. Activities of daily living training  2. Therapeutic activity  3. Therapeutic exercise      TREATMENT PLAN: Frequency/Duration: Follow patient 3 times/week to address above goals. Rehabilitation Potential For Stated Goals: GOOD      RECOMMENDED REHABILITATION/EQUIPMENT: (at time of discharge pending progress): Continue Skilled Therapy. OCCUPATIONAL PROFILE AND HISTORY:   History of Present Injury/Illness (Reason for Referral):  Per MD H & P: Patient is a 78 y.o. female presents with dyspnea. She was last seen by us in February of 2017 for right pleural effusion. She has dCHR, prior DVT on coumadin, CKD, Moderate AS, HTN, DM and R ureteral obstruction s/p stent placement. She is followed by Dr Gloria Jacob for metastatic breast CA with mets to lung/bone. In February, she underwent R thoracentesis with drain to gravity with removal of 1200 ml of yellow pleural fluid. She developed pain after thoracentesis and pain was felt to be from trapped lung physiology and recommended not to remove more than 1 liter of pleural fluid if thoracentesis is again necessary. She was hypoxemic and home O2 arranged. She had a CXR in April 2017 with persistent effusion. She was seen by Oncology on 5/16/2017 and was having dyspnea during that time with fatigue. She was given lasix as well and has taken lasix until 3 days ago.  She has plans to see [facility] on June 2. Today in the ER, her CXR shows effusion on right which is unchanged and new right perihilar infiltrate. We were asked to admit her for hypoxemia with persistent effusion, new infiltrate with metastatic breast cancer with acute on chronic renal failure and moderate AS. She is currently requiring BiPAP and we were asked to admit to the ICU. Past Medical History/Comorbidities:   Ms. Royal Beauchamp  has a past medical history of Acute on chronic diastolic congestive heart failure ([address] (1/5/2016); Asthma; Cancer ([address] Chemotherapy-induced neutropenia (HCC) (12/20/2016); Depression; Diabetes ([address] DM (diabetes mellitus) ([address] (11/20/2012); HCAP (healthcare-associated pneumonia) (7/17/2013); Hypertension; Sepsis ([address] (7/19/2016); Thromboembolus ([facility] [address] and Unspecified adverse effect of anesthesia. Ms. Royal Beauchamp  has a past surgical history that includes vascular access (1/26/12) and gyn. Social History/Living Environment:   Home Environment: Private residence  # Steps to Enter: 1  One/Two Story Residence: One story  Living Alone: No  Support Systems: Spouse/Significant Other/Partner, Family member(s)  Patient Expects to be Discharged to[de-identified] Skilled nursing facility  Current DME Used/Available at Home: None  Prior Level of Function/Work/Activity:  Lives with  and wearing 3 L oxygen via nasal cannula. Cooking and walking to restroom, etc., but getting more fatigued lately per . When leaves house uses a wheelchair/transport chair? Cultural Preferences:          Belarusian speaking. Number of Personal Factors/Comorbidities that affect the Plan of Care: Expanded review of therapy/medical records (1-2):  MODERATE COMPLEXITY   ASSESSMENT OF OCCUPATIONAL PERFORMANCE[de-identified]   Activities of Daily Living:           Basic ADLs (From Assessment) Complex ADLs (From Assessment)   Basic ADL  Feeding: Moderate assistance  Oral Facial Hygiene/Grooming: Maximum assistance  Bathing:  Total assistance  Upper Body Dressing: Maximum assistance  Lower Body Dressing: Total assistance  Toileting: Total assistance Instrumental ADL  Meal Preparation: Total assistance  Homemaking: Total assistance   Grooming/Bathing/Dressing Activities of Daily Living     Cognitive Retraining  Safety/Judgement: Fall prevention                       Bed/Mat Mobility  Supine to Sit: Minimum assistance  Sit to Stand: Moderate assistance  Bed to Chair: Moderate assistance  Scooting: Minimum assistance          Most Recent Physical Functioning:   Gross Assessment:  AROM: Generally decreased, functional  Strength: Generally decreased, functional (Grossly 3- to 4-/5)  Coordination: Generally decreased, functional                    Balance:  Sitting: Impaired  Sitting - Static: Fair (occasional)  Sitting - Dynamic: Fair (occasional)  Standing: Impaired  Standing - Static: Poor  Standing - Dynamic : Poor  Bed Mobility:  Supine to Sit: Minimum assistance  Scooting: Minimum assistance  Transfers:  Sit to Stand: Moderate assistance  Stand to Sit: Moderate assistance  Bed to Chair: Moderate assistance                 Patient Vitals for the past 6 hrs:       BP BP Patient Position SpO2 O2 Flow Rate (L/min) Pulse   05/29/17 0931 155/67 - 99 % - 65   05/29/17 1002 147/63 - 99 % - 64   05/29/17 1031 150/69 - 98 % - 68   05/29/17 1102 136/63 - 98 % - 62   05/29/17 1131 148/67 At rest;Head of bed elevated (Comment degrees) 96 % - 61   05/29/17 1202 143/66 - 96 % - (!) 57   05/29/17 1302 149/68 - 96 % - (!) 57   05/29/17 1328 - - 96 % 30 l/min -   05/29/17 1331 128/61 - 96 % - (!) 59        Mental Status  Neurologic State: Drowsy, Alert  Orientation Level: Oriented to person  Cognition: Follows commands  Perception: Appears intact  Perseveration: No perseveration noted  Safety/Judgement: Fall prevention                               Physical Skills Involved:  1. Range of Motion  2. Balance  3. Strength  4. Activity Tolerance  5. Gross Motor Control  6.  Pain (Chronic) Cognitive Skills Affected (resulting in the inability to perform in a timely and safe manner):  1. difficult to assess Psychosocial Skills Affected:  1. Habits/Routines  2. Environmental Adaptation  3. Social Roles   Number of elements that affect the Plan of Care: 3-5:  MODERATE COMPLEXITY   CLINICAL DECISION MAKIN[address] [place] [address] AM-PAC 6 Clicks   Basic Mobility Inpatient Short Form  How much help from another person does the patient currently need. .. Total A Lot A Little None   1. Putting on and taking off regular lower body clothing? [X] 1   [ ] 2   [ ] 3   [ ] 4   2. Bathing (including washing, rinsing, drying)? [X] 1   [ ] 2   [ ] 3   [ ] 4   3. Toileting, which includes using toilet, bedpan or urinal?   [X] 1   [ ] 2   [ ] 3   [ ] 4   4. Putting on and taking off regular upper body clothing? [X] 1   [ ] 2   [ ] 3   [ ] 4   5. Taking care of personal grooming such as brushing teeth? [ ] 1   [X] 2   [ ] 3   [ ] 4   6. Eating meals? [ ] 1   [X] 2   [ ] 3   [ ] 4   © , Trustees of [address] [place] [address], under license to [employer]. All rights reserved    Score:  Initial: 8 Most Recent: X (Date: -- )     Interpretation of Tool:  Represents activities that are increasingly more difficult (i.e. Bed mobility, Transfers, Gait). Score 24 23 22-20 19-15 14-10 9-7 6       Modifier CH CI CJ CK CL CM CN         · Self Care:               - CURRENT STATUS:    CM - 80%-99% impaired, limited or restricted               - GOAL STATUS:           CJ - 20%-39% impaired, limited or restricted               - D/C STATUS:                       ---------------To be determined---------------  Payor: SELF PAY / Plan: [facility] SELF PAY / Product Type: Self Pay /       Medical Necessity:     · Patient demonstrates fair rehab potential due to higher previous functional level.   Reason for Services/Other Comments:  · Patient continues to require skilled intervention due to decreased independence with ADL and functional mobility for ADL. Assessment Modification and Need for Assistance:  1. No modification or assistance (Low)  2. Minimal to moderate modification or assistance (Moderate)  3. Significant modification or assistance (High)     Assessment Modifications Needed:  · No modification or assistance (Low)  · Cueing (Verbal, Tactile. Cristiane Smith )  · Physical Assistance  ·   · Minimal to moderate modification or assistance (Moderate)  · Significant modification or assistance (High)   Assessment process, impact of co-morbidities, assessment modification\need for assistance, and selection of interventions: MODERATE COMPLEXITY          TREATMENT:   (In addition to Assessment/Re-Assessment sessions the following treatments were rendered)      Pre-treatment Symptoms/Complaints:    Pain: Initial:   Pain Intensity 1: 0  Post Session:  same      Assessment/Reassessment only, no treatment provided today     Braces/Orthotics/Lines/Etc:   · IV  · denson catheter  · O2 Device: Heated, Hi flow nasal cannula  Treatment/Session Assessment:    · Response to Treatment:  No treatment rendered. Assessment only. · Interdisciplinary Collaboration:  · Occupational Therapist  · Registered Nurse  · interpretor  · After treatment position/precautions:  · Up in chair, Bed/Chair-wheels locked, Caregiver at bedside, Call light within reach and RN notified  · Compliance with Program/Exercises: Will assess as treatment progresses. · Recommendations/Intent for next treatment session: \"Next visit will focus on advancements to more challenging activities\".   Total Treatment Duration:  OT Patient Time In/Time Out  Time In: 1422  Time Out: [address] Ang[place], OTD, OTR/L

## 2017-05-29 NOTE — PROGRESS NOTES
Critical Care Daily Progress Note: 5/29/2017    Lincoln Fitzpatrick   Admission Date: 5/24/2017         The patient's chart is reviewed and the patient is discussed with the staff. Subjective:     77 y/o admitted 5/24 was last seen by us in February of 2017 for right pleural effusion. She has dCHR, prior DVT on coumadin, CKD, Moderate AS, HTN, DM and R ureteral obstruction s/p stent placement. She is followed by Dr Abby Rodgers for metastatic breast CA with mets to lung/bone. In February, she underwent R thoracentesis with drain to gravity with removal of 1200 ml of yellow pleural fluid. She developed pain after thoracentesis and pain was felt to be from trapped lung physiology and recommended not to remove more than 1 liter of pleural fluid if thoracentesis is again necessary. She was hypoxemic and home O2 arranged.       She had a CXR in April 2017 with persistent effusion. She was seen by Oncology on 5/16/2017 and was having dyspnea during that time with fatigue. She was given lasix as well and has taken lasix until 3 days ago. S. On admit her CXR shows effusion on right which is unchanged and new right perihilar infiltrate. We were asked to admit her for hypoxemia with persistent effusion, new infiltrate with metastatic breast cancer with acute on chronic renal failure and moderate AS     Decompensated- intubated on 5/24  Seen by Oncology -holding chemo  Thoracentesis on right- 950 cc of /serosanguinous/ fluid  Extubated on 5/26.  Had to go on BIPAP QHS now on airvo 38% appears comfortable    Current Facility-Administered Medications   Medication Dose Route Frequency    warfarin (COUMADIN) tablet 2 mg - pharmacy dosing  2 mg Oral QPM    furosemide (LASIX) tablet 40 mg  40 mg Oral ACB&D    hydrALAZINE (APRESOLINE) tablet 50 mg  50 mg Oral TID    atenolol (TENORMIN) tablet 25 mg  25 mg Oral DAILY    insulin regular (NOVOLIN R, HUMULIN R) injection   SubCUTAneous AC&HS    famotidine (PEPCID) 40 mg/5 mL (8 mg/mL) oral suspension 20 mg  20 mg Per NG tube DAILY    0.9% sodium chloride infusion 250 mL  250 mL IntraVENous PRN    hydrALAZINE (APRESOLINE) 20 mg/mL injection 10 mg  10 mg IntraVENous Q6H PRN    morphine injection 2 mg  2 mg IntraVENous Q4H PRN    ALPRAZolam (XANAX) tablet 0.25 mg  0.25 mg Oral TID PRN    amLODIPine (NORVASC) tablet 10 mg  10 mg Oral DAILY    HYDROcodone-acetaminophen (NORCO)  mg tablet 1 Tab  1 Tab Oral Q4H PRN    LORazepam (ATIVAN) tablet 1 mg  1 mg Oral Q6H PRN    megestrol (MEGACE) 400 mg/10 mL (10 mL) oral suspension 200 mg  200 mg Oral DAILY    mirtazapine (REMERON) tablet 30 mg  30 mg Oral QHS    montelukast (SINGULAIR) tablet 10 mg  10 mg Oral QHS    zolpidem (AMBIEN) tablet 5 mg  5 mg Oral QHS PRN    sodium chloride (NS) flush 5-10 mL  5-10 mL IntraVENous Q8H    sodium chloride (NS) flush 5-10 mL  5-10 mL IntraVENous PRN    azithromycin (ZITHROMAX) 500 mg in 0.9% sodium chloride (MBP/ADV) 250 mL  500 mg IntraVENous Q24H    cefTRIAXone (ROCEPHIN) 1 g in 0.9% sodium chloride (MBP/ADV) 50 mL  1 g IntraVENous Q24H    budesonide (PULMICORT) 500 mcg/2 ml nebulizer suspension  500 mcg Nebulization BID RT    And    albuterol CONCENTRATE 2.5mg/0.5 mL neb soln  2.5 mg Nebulization Q6H RT    fentaNYL in normal saline (pf) 25 mcg/mL infusion   mcg/hr IntraVENous TITRATE    propofol (DIPRIVAN) infusion  5-50 mcg/kg/min IntraVENous TITRATE       Review of Systems    Constitutional:  negative for fever, chills, sweats  Cardiovascular:  negative for chest pain, palpitations, syncope, edema  Gastrointestinal:  negative for dysphagia, reflux, vomiting, diarrhea, abdominal pain, or melena  Neurologic:  negative for focal weakness, numbness, headache      Objective:     Vitals:    05/29/17 0732 05/29/17 0802 05/29/17 0824 05/29/17 0831   BP: 176/74 148/81 148/81 165/72   Pulse: 76 62 61 65   Resp: 29 24  26   Temp: 98.4 °F (36.9 °C)      SpO2: 97% 96%  97%   Weight: Height:           Intake and Output:   05/27 1901 - 05/29 0700  In: 1565.5 [P.O.:180; I.V.:1385.5]  Out: 3560 [Urine:3560]  05/29 0701 - 05/29 1900  In: 120 [P.O.:120]  Out: 550 [Urine:550]    Physical Exam:          Constitutional:  the patient is well developed and in no acute distress  EENMT:  Sclera clear, pupils equal, oral mucosa moist  Respiratory: some crackles on the left  Cardiovascular:  RRR without M,G,R  Gastrointestinal: soft and non-tender; with positive bowel sounds. Musculoskeletal: warm without cyanosis. There is no lower leg edema. Skin:  no jaundice or rashes, no wounds   Neurologic: no gross neuro deficits     Psychiatric:  alert     LINES:   port    DRIPS:   none    CXR: none since 5/27      LAB  Recent Labs      05/29/17   0725  05/28/17   2205  05/28/17   1614  05/28/17   1156  05/28/17   0727   GLUCPOC  121*  287*  303*  237*  165*      Recent Labs      05/29/17   0350  05/28/17   0355  05/27/17   0423  05/26/17   1924   WBC  3.9*  5.4   --    --    HGB  9.9*  9.5*   --   8.2*   HCT  29.6*  29.5*   --   24.6*   PLT  260  284   --    --    INR  2.0*  2.2*  2.3*   --      Recent Labs      05/29/17   0350  05/28/17   0355  05/27/17   0423   NA  149*  146*  147*   K  3.5  4.1  5.0   CL  112*  111*  113*   CO2  27  26  22   GLU  130*  237*  210*   BUN  72*  77*  72*   CREA  3.12*  3.57*  3.55*   MG  2.6*   --    --    PHOS  3.7   --    --    CA  6.7*  6.6*  6.7*     No results for input(s): PH, PCO2, PO2, HCO3 in the last 72 hours. No results for input(s): LCAD, LAC in the last 72 hours.     Assessment:  (Medical Decision Making)     Hospital Problems  Date Reviewed: 5/26/2017          Codes Class Noted POA    Anxiety ICD-10-CM: F41.9  ICD-9-CM: 300.00  5/28/2017 Unknown        * (Principal)Acute on chronic respiratory failure ([facility] ICD-10-CM: J96.20  ICD-9-CM: 518.84  5/24/2017 Yes    Tolerating extubation    Moderate aortic stenosis (Chronic) ICD-10-CM: I35.0  ICD-9-CM: 424.1  5/24/2017 Yes Anticoagulated on Coumadin (Chronic) ICD-10-CM: Z51.81, Z79.01  ICD-9-CM: V58.83, V58.61  5/24/2017 Yes        Volume overload ICD-10-CM: E87.70  ICD-9-CM: 276.69  5/24/2017 Yes    better    Breast cancer metastasized to lung [facility]) (Chronic) ICD-10-CM: C50.919, C78.00  ICD-9-CM: 174.9, 197.0  2/17/2017 Yes        CKD (chronic kidney disease) (Chronic) ICD-10-CM: N18.9  ICD-9-CM: 585.9  1/4/2017 Yes    Overview Signed 5/14/2013 11:35 AM by Cassi Bach RN     With acute rise- ? Dehydration and monitor             DVT (deep venous thrombosis) (HCC) (Chronic) ICD-10-CM: I82.409  ICD-9-CM: 453.40  1/4/2017 Yes    Overview Signed 11/19/2013  6:07 AM by Bryon Quiñonez     81-68-25 There is nonocclusive thrombus within the left common femoral vein, superficial femoral vein, popliteal vein, and posterior tibial vein               Pleural effusion, right (Chronic) ICD-10-CM: J90  ICD-9-CM: 511.9  1/4/2017 Yes    Overview Addendum 5/26/2017 10:11 AM by Dacia Woodward NP     10/6/16 R thoracentesis 800 ml: pleural fluid creatine 4.1 = likely urinothorax  5/25/2017: Thoracentesis on right- 950 cc of /serosanguinous/ fluid             DM (diabetes mellitus) (HCC) (Chronic) ICD-10-CM: E11.9  ICD-9-CM: 250.00  10/7/2016 Yes        Acute on chronic renal failure ([facility] [address] ICD-10-CM: N17.9, N18.9  ICD-9-CM: 584.9, 585.9  10/7/2016 Yes    Cr 3.12    Leg swelling ICD-10-CM: M79.89  ICD-9-CM: 729.81  2/25/2014 Yes    Overview Signed 2/25/2014  2:45 PM by Brad Ramos NP     Bilateral           better          Plan:  (Medical Decision Making)   1 --  Wean o2 as tolerated  2    Move to floor soon  3    May need pleurx on R  4    cxr tomorrow    More than 50% of the time documented was spent in face-to-face contact with the patient and in the care of the patient on the floor/unit where the patient is located.     Natalie Hector MD

## 2017-05-29 NOTE — PROGRESS NOTES
Subjective:   Daily Progress Note: 5/29/2017 8:33 AM    Feeling better    Current Facility-Administered Medications   Medication Dose Route Frequency    warfarin (COUMADIN) tablet 2 mg - pharmacy dosing  2 mg Oral QPM    furosemide (LASIX) tablet 40 mg  40 mg Oral ACB&D    hydrALAZINE (APRESOLINE) tablet 50 mg  50 mg Oral TID    atenolol (TENORMIN) tablet 25 mg  25 mg Oral DAILY    insulin regular (NOVOLIN R, HUMULIN R) injection   SubCUTAneous AC&HS    famotidine (PEPCID) 40 mg/5 mL (8 mg/mL) oral suspension 20 mg  20 mg Per NG tube DAILY    0.9% sodium chloride infusion 250 mL  250 mL IntraVENous PRN    hydrALAZINE (APRESOLINE) 20 mg/mL injection 10 mg  10 mg IntraVENous Q6H PRN    morphine injection 2 mg  2 mg IntraVENous Q4H PRN    ALPRAZolam (XANAX) tablet 0.25 mg  0.25 mg Oral TID PRN    amLODIPine (NORVASC) tablet 10 mg  10 mg Oral DAILY    HYDROcodone-acetaminophen (NORCO)  mg tablet 1 Tab  1 Tab Oral Q4H PRN    LORazepam (ATIVAN) tablet 1 mg  1 mg Oral Q6H PRN    megestrol (MEGACE) 400 mg/10 mL (10 mL) oral suspension 200 mg  200 mg Oral DAILY    mirtazapine (REMERON) tablet 30 mg  30 mg Oral QHS    montelukast (SINGULAIR) tablet 10 mg  10 mg Oral QHS    zolpidem (AMBIEN) tablet 5 mg  5 mg Oral QHS PRN    sodium chloride (NS) flush 5-10 mL  5-10 mL IntraVENous Q8H    sodium chloride (NS) flush 5-10 mL  5-10 mL IntraVENous PRN    azithromycin (ZITHROMAX) 500 mg in 0.9% sodium chloride (MBP/ADV) 250 mL  500 mg IntraVENous Q24H    cefTRIAXone (ROCEPHIN) 1 g in 0.9% sodium chloride (MBP/ADV) 50 mL  1 g IntraVENous Q24H    budesonide (PULMICORT) 500 mcg/2 ml nebulizer suspension  500 mcg Nebulization BID RT    And    albuterol CONCENTRATE 2.5mg/0.5 mL neb soln  2.5 mg Nebulization Q6H RT    fentaNYL in normal saline (pf) 25 mcg/mL infusion   mcg/hr IntraVENous TITRATE    propofol (DIPRIVAN) infusion  5-50 mcg/kg/min IntraVENous TITRATE               Objective:     Visit Vitals    /81    Pulse 61    Temp 97.7 °F (36.5 °C)    Resp 14    Ht 5' 2\" (1.575 m)    Wt 62.5 kg (137 lb 12.6 oz)    LMP 1998    SpO2 95%    BMI 25.2 kg/m2    O2 Flow Rate (L/min): 35 l/min O2 Device: Heated, Hi flow nasal cannula    Temp (24hrs), Av.7 °F (36.5 °C), Min:97.3 °F (36.3 °C), Max:98 °F (36.7 °C)      701 - 1900  In: -   Out: 550 [Urine:550]  1901 -  0700  In: 1565.5 [P.O.:180; I.V.:1385.5]  Out: 3560 [Urine:3560]      Visit Vitals    /81    Pulse 61    Temp 97.7 °F (36.5 °C)    Resp 14    Ht 5' 2\" (1.575 m)    Wt 62.5 kg (137 lb 12.6 oz)    LMP 1998    SpO2 95%    BMI 25.2 kg/m2       Lungs: clear to auscultation bilaterally  Heart: regular rate and rhythm  Abdomen: soft, non-tender.   Extremities: + edema          Data Review    Recent Results (from the past 48 hour(s))   GLUCOSE, POC    Collection Time: 17 11:33 AM   Result Value Ref Range    Glucose (POC) 342 (H) 65 - 100 mg/dL   GLUCOSE, POC    Collection Time: 17  5:14 PM   Result Value Ref Range    Glucose (POC) 347 (H) 65 - 100 mg/dL   GLUCOSE, POC    Collection Time: 17  9:13 PM   Result Value Ref Range    Glucose (POC) 332 (H) 65 - 945 mg/dL   METABOLIC PANEL, BASIC    Collection Time: 17  3:55 AM   Result Value Ref Range    Sodium 146 (H) 136 - 145 mmol/L    Potassium 4.1 3.5 - 5.1 mmol/L    Chloride 111 (H) 98 - 107 mmol/L    CO2 26 21 - 32 mmol/L    Anion gap 9 7 - 16 mmol/L    Glucose 237 (H) 65 - 100 mg/dL    BUN 77 (H) 8 - 23 MG/DL    Creatinine 3.57 (H) 0.6 - 1.0 MG/DL    GFR est AA 16 (L) >60 ml/min/1.73m2    GFR est non-AA 14 (L) >60 ml/min/1.73m2    Calcium 6.6 (L) 8.3 - 10.4 MG/DL   PROTHROMBIN TIME + INR    Collection Time: 17  3:55 AM   Result Value Ref Range    Prothrombin time 24.3 (H) 9.6 - 12.0 sec    INR 2.2 (H) 0.9 - 1.2     CBC WITH AUTOMATED DIFF    Collection Time: 17  3:55 AM   Result Value Ref Range    WBC 5.4 4.3 - 11.1 K/uL    RBC 2.79 (L) 4.05 - 5.25 M/uL    HGB 9.5 (L) 11.7 - 15.4 g/dL    HCT 29.5 (L) 35.8 - 46.3 %    .7 (H) 79.6 - 97.8 FL    MCH 34.1 (H) 26.1 - 32.9 PG    MCHC 32.2 31.4 - 35.0 g/dL    RDW 22.4 (H) 11.9 - 14.6 %    PLATELET 068 960 - 281 K/uL    MPV 9.6 (L) 10.8 - 14.1 FL    DF AUTOMATED      NEUTROPHILS 86 (H) 43 - 78 %    LYMPHOCYTES 11 (L) 13 - 44 %    MONOCYTES 3 (L) 4.0 - 12.0 %    EOSINOPHILS 0 (L) 0.5 - 7.8 %    BASOPHILS 0 0.0 - 2.0 %    IMMATURE GRANULOCYTES 0.4 0.0 - 5.0 %    ABS. NEUTROPHILS 4.6 1.7 - 8.2 K/UL    ABS. LYMPHOCYTES 0.6 0.5 - 4.6 K/UL    ABS. MONOCYTES 0.1 0.1 - 1.3 K/UL    ABS. EOSINOPHILS 0.0 0.0 - 0.8 K/UL    ABS. BASOPHILS 0.0 0.0 - 0.2 K/UL    ABS. IMM.  GRANS. 0.0 0.0 - 0.5 K/UL   GLUCOSE, POC    Collection Time: 05/28/17  7:27 AM   Result Value Ref Range    Glucose (POC) 165 (H) 65 - 100 mg/dL   GLUCOSE, POC    Collection Time: 05/28/17 11:56 AM   Result Value Ref Range    Glucose (POC) 237 (H) 65 - 100 mg/dL   GLUCOSE, POC    Collection Time: 05/28/17  4:14 PM   Result Value Ref Range    Glucose (POC) 303 (H) 65 - 100 mg/dL   GLUCOSE, POC    Collection Time: 05/28/17 10:05 PM   Result Value Ref Range    Glucose (POC) 287 (H) 65 - 100 mg/dL   PROTHROMBIN TIME + INR    Collection Time: 05/29/17  3:50 AM   Result Value Ref Range    Prothrombin time 21.5 (H) 9.6 - 12.0 sec    INR 2.0 (H) 0.9 - 1.2     CBC WITH AUTOMATED DIFF    Collection Time: 05/29/17  3:50 AM   Result Value Ref Range    WBC 3.9 (L) 4.3 - 11.1 K/uL    RBC 2.86 (L) 4.05 - 5.25 M/uL    HGB 9.9 (L) 11.7 - 15.4 g/dL    HCT 29.6 (L) 35.8 - 46.3 %    .5 (H) 79.6 - 97.8 FL    MCH 34.6 (H) 26.1 - 32.9 PG    MCHC 33.4 31.4 - 35.0 g/dL    RDW 21.1 (H) 11.9 - 14.6 %    PLATELET 176 911 - 166 K/uL    MPV 9.8 (L) 10.8 - 14.1 FL    DF AUTOMATED      NEUTROPHILS 77 43 - 78 %    LYMPHOCYTES 17 13 - 44 %    MONOCYTES 5 4.0 - 12.0 %    EOSINOPHILS 1 0.5 - 7.8 %    BASOPHILS 0 0.0 - 2.0 %    IMMATURE GRANULOCYTES 0.5 0.0 - 5.0 %    ABS. NEUTROPHILS 3.0 1.7 - 8.2 K/UL    ABS. LYMPHOCYTES 0.7 0.5 - 4.6 K/UL    ABS. MONOCYTES 0.2 0.1 - 1.3 K/UL    ABS. EOSINOPHILS 0.0 0.0 - 0.8 K/UL    ABS. BASOPHILS 0.0 0.0 - 0.2 K/UL    ABS. IMM.  GRANS. 0.0 0.0 - 0.5 K/UL   METABOLIC PANEL, BASIC    Collection Time: 05/29/17  3:50 AM   Result Value Ref Range    Sodium 149 (H) 136 - 145 mmol/L    Potassium 3.5 3.5 - 5.1 mmol/L    Chloride 112 (H) 98 - 107 mmol/L    CO2 27 21 - 32 mmol/L    Anion gap 10 7 - 16 mmol/L    Glucose 130 (H) 65 - 100 mg/dL    BUN 72 (H) 8 - 23 MG/DL    Creatinine 3.12 (H) 0.6 - 1.0 MG/DL    GFR est AA 19 (L) >60 ml/min/1.73m2    GFR est non-AA 16 (L) >60 ml/min/1.73m2    Calcium 6.7 (L) 8.3 - 10.4 MG/DL   MAGNESIUM    Collection Time: 05/29/17  3:50 AM   Result Value Ref Range    Magnesium 2.6 (H) 1.8 - 2.4 mg/dL   PHOSPHORUS    Collection Time: 05/29/17  3:50 AM   Result Value Ref Range    Phosphorus 3.7 2.3 - 3.7 MG/DL   GLUCOSE, POC    Collection Time: 05/29/17  7:25 AM   Result Value Ref Range    Glucose (POC) 121 (H) 65 - 100 mg/dL       Assessment     Patient Active Problem List    Diagnosis Date Noted    Anxiety 05/28/2017    Acute on chronic respiratory failure (HCC) 05/24/2017    Moderate aortic stenosis 05/24/2017    Anticoagulated on Coumadin 05/24/2017    Volume overload 05/24/2017    Breast cancer ([facility] 02/17/2017    Bony metastasis ([facility] 02/17/2017    Acute respiratory failure ([facility] 02/17/2017    Breast cancer metastasized to lung ([facility] 02/17/2017    CKD (chronic kidney disease) 01/04/2017    DVT (deep venous thrombosis) (HCC) 01/04/2017    Accelerated hypertension 01/04/2017    Pleural effusion, right 01/04/2017    Hydronephrosis 01/04/2017    Bilateral edema of lower extremity 01/04/2017    Hematuria 01/04/2017    Chemotherapy-induced neutropenia (HCC) 12/20/2016    Pancytopenia ([facility] 10/10/2016    HTN (hypertension) 10/07/2016    [facility]di[facility] 10/07/2016    Acute on chronic renal failure ([facility] 10/07/2016    Pulmonary edema 01/03/2016    Elevated troponin 10/20/2015    PND (paroxysmal nocturnal dyspnea) 02/25/2014    Leg swelling 02/25/2014    [facility]congestive heart failure[facility] ([facility]) 02/25/2014    Anemia 11/19/2013    Asthma 10/22/2013    Hypomagnesemia 08/04/2013    Hypokalemia 08/04/2013    Iron deficiency anemia 06/10/2013           Problems Addressed by Nephrology     EMERY - prerenal  Hypernatremia    Plan     Breathing better. Good response to PO Lasix. Hypernatremia about stable - would like for her to correct this herself. Can back off the diuretics in the next day or two if need be.

## 2017-05-29 NOTE — PROGRESS NOTES
present at bedside during family update with unit nurse.     [address] [facility]  [phone]

## 2017-05-29 NOTE — PROGRESS NOTES
Patient arrived to room [phone], language phone placed @ bedside. Patient and spouse familiar with phone. Patient is on 5l via n/c, sitting up in bed @ this time. Breath sounds diminished R side. Dual skin assessment with Huang Claudio RN, no breakdown. Ruby patent with clear yellow urine.

## 2017-05-29 NOTE — PROGRESS NOTES
Warfarin dosing per pharmacist    Melissa Moulton is a 78 y.o. female. Height: 5' 2\" (157.5 cm)    Weight: 62.5 kg (137 lb 12.6 oz)    Indication:  H/o DVT    Goal INR:  2-3    Home dose:  Per AC note on 5/23, pt has recently had dose decrease to 1 mg hs (7 mg/week)    Risk factors or significant drug interactions:  azithromycin    Other anticoagulants:  none    Daily Monitoring  Date  INR     Warfarin dose HGB              Notes  5/26  2.3  1 mg  8.2  5/27  2.3  1 mg  ---  5/28  2.2  1 mg  ---  5/29  2.0  2 mg  9.9      Pt coumadin has been held since admission and pt received FFP for thoracentesis. Pt had elevated INR at last anticoagulation visit per notes and patient's dose was decreased from 2 mg hs to 1 mg hs. INR therapeutic but looks to be slowly trending down on 1 mg dose. Patient may need alternating 1 mg and 2 mg schedule. Will give 2 mg tonight. Daily INR. Pharmacy will continue to follow. Please call with any questions.     Thank you,  Sita Bazan, PharmD  Clinical Pharmacist  [phone]

## 2017-05-29 NOTE — PROGRESS NOTES
Bedside shift change report given to Lawrence RN (oncoming nurse) by Elizabeth Costello RN (offgoing nurse).  Report included the following information SBAR, Kardex, Intake/Output, MAR, Recent Results, Med Rec Status and Cardiac Rhythm SB, NSR.

## 2017-05-29 NOTE — PROGRESS NOTES
TRANSFER - IN REPORT:    Verbal report received from Poornima(name) on Maya Mays  being received from ICU(unit) for routine progression of care      Report consisted of patients Situation, Background, Assessment and   Recommendations(SBAR). Information from the following report(s) SBAR was reviewed with the receiving nurse. Opportunity for questions and clarification was provided. Assessment completed upon patients arrival to unit and care assumed.

## 2017-05-30 ENCOUNTER — APPOINTMENT (OUTPATIENT)
Dept: GENERAL RADIOLOGY | Age: 67
DRG: 208 | End: 2017-05-30
Attending: INTERNAL MEDICINE
Payer: SUBSIDIZED

## 2017-05-30 PROBLEM — E87.0 HYPERNATREMIA: Status: ACTIVE | Noted: 2017-05-30

## 2017-05-30 LAB
ABO + RH BLD: NORMAL
ANION GAP BLD CALC-SCNC: 9 MMOL/L (ref 7–16)
BASOPHILS # BLD AUTO: 0 K/UL (ref 0–0.2)
BASOPHILS # BLD: 0 % (ref 0–2)
BLD PROD TYP BPU: NORMAL
BLD PROD TYP BPU: NORMAL
BLOOD GROUP ANTIBODIES SERPL: NORMAL
BPU ID: NORMAL
BPU ID: NORMAL
BUN SERPL-MCNC: 67 MG/DL (ref 8–23)
CALCIUM SERPL-MCNC: 6.8 MG/DL (ref 8.3–10.4)
CHLORIDE SERPL-SCNC: 111 MMOL/L (ref 98–107)
CO2 SERPL-SCNC: 28 MMOL/L (ref 21–32)
CREAT SERPL-MCNC: 2.84 MG/DL (ref 0.6–1)
CROSSMATCH RESULT,%XM: NORMAL
CROSSMATCH RESULT,%XM: NORMAL
DIFFERENTIAL METHOD BLD: ABNORMAL
EOSINOPHIL # BLD: 0 K/UL (ref 0–0.8)
EOSINOPHIL NFR BLD: 1 % (ref 0.5–7.8)
ERYTHROCYTE [DISTWIDTH] IN BLOOD BY AUTOMATED COUNT: 20.6 % (ref 11.9–14.6)
GLUCOSE BLD STRIP.AUTO-MCNC: 149 MG/DL (ref 65–100)
GLUCOSE BLD STRIP.AUTO-MCNC: 258 MG/DL (ref 65–100)
GLUCOSE BLD STRIP.AUTO-MCNC: 279 MG/DL (ref 65–100)
GLUCOSE BLD STRIP.AUTO-MCNC: 302 MG/DL (ref 65–100)
GLUCOSE SERPL-MCNC: 165 MG/DL (ref 65–100)
HCT VFR BLD AUTO: 28.6 % (ref 35.8–46.3)
HGB BLD-MCNC: 9.5 G/DL (ref 11.7–15.4)
IMM GRANULOCYTES # BLD: 0 K/UL (ref 0–0.5)
IMM GRANULOCYTES NFR BLD AUTO: 0.2 % (ref 0–5)
INR PPP: 1.6 (ref 0.9–1.2)
LYMPHOCYTES # BLD AUTO: 15 % (ref 13–44)
LYMPHOCYTES # BLD: 0.7 K/UL (ref 0.5–4.6)
MCH RBC QN AUTO: 34.1 PG (ref 26.1–32.9)
MCHC RBC AUTO-ENTMCNC: 33.2 G/DL (ref 31.4–35)
MCV RBC AUTO: 102.5 FL (ref 79.6–97.8)
MONOCYTES # BLD: 0.2 K/UL (ref 0.1–1.3)
MONOCYTES NFR BLD AUTO: 4 % (ref 4–12)
NEUTS SEG # BLD: 3.7 K/UL (ref 1.7–8.2)
NEUTS SEG NFR BLD AUTO: 80 % (ref 43–78)
PLATELET # BLD AUTO: 228 K/UL (ref 150–450)
PMV BLD AUTO: 10.1 FL (ref 10.8–14.1)
POTASSIUM SERPL-SCNC: 3.3 MMOL/L (ref 3.5–5.1)
PROTHROMBIN TIME: 17.4 SEC (ref 9.6–12)
RBC # BLD AUTO: 2.79 M/UL (ref 4.05–5.25)
SODIUM SERPL-SCNC: 148 MMOL/L (ref 136–145)
SPECIMEN EXP DATE BLD: NORMAL
STATUS OF UNIT,%ST: NORMAL
STATUS OF UNIT,%ST: NORMAL
UNIT DIVISION, %UDIV: 0
UNIT DIVISION, %UDIV: 0
WBC # BLD AUTO: 4.6 K/UL (ref 4.3–11.1)

## 2017-05-30 PROCEDURE — 74011250636 HC RX REV CODE- 250/636: Performed by: INTERNAL MEDICINE

## 2017-05-30 PROCEDURE — 85025 COMPLETE CBC W/AUTO DIFF WBC: CPT | Performed by: INTERNAL MEDICINE

## 2017-05-30 PROCEDURE — 82962 GLUCOSE BLOOD TEST: CPT

## 2017-05-30 PROCEDURE — 94760 N-INVAS EAR/PLS OXIMETRY 1: CPT

## 2017-05-30 PROCEDURE — 77010033678 HC OXYGEN DAILY

## 2017-05-30 PROCEDURE — 99232 SBSQ HOSP IP/OBS MODERATE 35: CPT | Performed by: INTERNAL MEDICINE

## 2017-05-30 PROCEDURE — 71010 XR CHEST SNGL V: CPT

## 2017-05-30 PROCEDURE — 94640 AIRWAY INHALATION TREATMENT: CPT

## 2017-05-30 PROCEDURE — 74011000250 HC RX REV CODE- 250: Performed by: INTERNAL MEDICINE

## 2017-05-30 PROCEDURE — 74011250637 HC RX REV CODE- 250/637: Performed by: INTERNAL MEDICINE

## 2017-05-30 PROCEDURE — 65270000029 HC RM PRIVATE

## 2017-05-30 PROCEDURE — 36415 COLL VENOUS BLD VENIPUNCTURE: CPT | Performed by: INTERNAL MEDICINE

## 2017-05-30 PROCEDURE — 97530 THERAPEUTIC ACTIVITIES: CPT

## 2017-05-30 PROCEDURE — 85610 PROTHROMBIN TIME: CPT | Performed by: INTERNAL MEDICINE

## 2017-05-30 PROCEDURE — 74011636637 HC RX REV CODE- 636/637: Performed by: INTERNAL MEDICINE

## 2017-05-30 PROCEDURE — 77030003560 HC NDL HUBR BARD -A

## 2017-05-30 PROCEDURE — 76450000000

## 2017-05-30 PROCEDURE — 80048 BASIC METABOLIC PNL TOTAL CA: CPT | Performed by: INTERNAL MEDICINE

## 2017-05-30 PROCEDURE — 74011000258 HC RX REV CODE- 258: Performed by: INTERNAL MEDICINE

## 2017-05-30 RX ORDER — WARFARIN 1 MG/1
1 TABLET ORAL
Status: COMPLETED | OUTPATIENT
Start: 2017-05-30 | End: 2017-05-30

## 2017-05-30 RX ORDER — DEXTROSE MONOHYDRATE 50 MG/ML
50 INJECTION, SOLUTION INTRAVENOUS CONTINUOUS
Status: DISCONTINUED | OUTPATIENT
Start: 2017-05-30 | End: 2017-06-02 | Stop reason: HOSPADM

## 2017-05-30 RX ORDER — DULOXETIN HYDROCHLORIDE 20 MG/1
20 CAPSULE, DELAYED RELEASE ORAL DAILY
Status: DISCONTINUED | OUTPATIENT
Start: 2017-05-31 | End: 2017-06-02 | Stop reason: HOSPADM

## 2017-05-30 RX ORDER — FUROSEMIDE 40 MG/1
40 TABLET ORAL DAILY
Status: DISCONTINUED | OUTPATIENT
Start: 2017-05-31 | End: 2017-06-01

## 2017-05-30 RX ADMIN — LORAZEPAM 1 MG: 1 TABLET ORAL at 22:55

## 2017-05-30 RX ADMIN — MONTELUKAST SODIUM 10 MG: 10 TABLET, FILM COATED ORAL at 22:52

## 2017-05-30 RX ADMIN — CEFTRIAXONE 1 G: 1 INJECTION, POWDER, FOR SOLUTION INTRAMUSCULAR; INTRAVENOUS at 11:24

## 2017-05-30 RX ADMIN — HYDROCODONE BITARTRATE AND ACETAMINOPHEN 1 TABLET: 10; 325 TABLET ORAL at 08:40

## 2017-05-30 RX ADMIN — MEGESTROL ACETATE 200 MG: 40 SUSPENSION ORAL at 08:36

## 2017-05-30 RX ADMIN — HYDRALAZINE HYDROCHLORIDE 50 MG: 50 TABLET, FILM COATED ORAL at 22:52

## 2017-05-30 RX ADMIN — HYDROCODONE BITARTRATE AND ACETAMINOPHEN 1 TABLET: 10; 325 TABLET ORAL at 20:31

## 2017-05-30 RX ADMIN — FUROSEMIDE 40 MG: 40 TABLET ORAL at 06:28

## 2017-05-30 RX ADMIN — DEXTROSE MONOHYDRATE 50 ML/HR: 5 INJECTION, SOLUTION INTRAVENOUS at 09:14

## 2017-05-30 RX ADMIN — AZITHROMYCIN MONOHYDRATE 500 MG: 500 INJECTION, POWDER, LYOPHILIZED, FOR SOLUTION INTRAVENOUS at 22:52

## 2017-05-30 RX ADMIN — HYDROCODONE BITARTRATE AND ACETAMINOPHEN 1 TABLET: 10; 325 TABLET ORAL at 14:27

## 2017-05-30 RX ADMIN — ZOLPIDEM TARTRATE 5 MG: 5 TABLET ORAL at 22:53

## 2017-05-30 RX ADMIN — ATENOLOL 25 MG: 50 TABLET ORAL at 08:40

## 2017-05-30 RX ADMIN — HYDRALAZINE HYDROCHLORIDE 50 MG: 50 TABLET, FILM COATED ORAL at 16:01

## 2017-05-30 RX ADMIN — BUDESONIDE 500 MCG: 0.5 SUSPENSION RESPIRATORY (INHALATION) at 21:07

## 2017-05-30 RX ADMIN — AMLODIPINE BESYLATE 10 MG: 10 TABLET ORAL at 08:39

## 2017-05-30 RX ADMIN — HUMAN INSULIN 6 UNITS: 100 INJECTION, SOLUTION SUBCUTANEOUS at 22:52

## 2017-05-30 RX ADMIN — HYDRALAZINE HYDROCHLORIDE 50 MG: 50 TABLET, FILM COATED ORAL at 08:39

## 2017-05-30 RX ADMIN — WARFARIN SODIUM 1 MG: 1 TABLET ORAL at 11:22

## 2017-05-30 RX ADMIN — WARFARIN SODIUM 2 MG: 1 TABLET ORAL at 22:52

## 2017-05-30 RX ADMIN — FAMOTIDINE 20 MG: 40 POWDER, FOR SUSPENSION ORAL at 08:52

## 2017-05-30 RX ADMIN — ALBUTEROL SULFATE 2.5 MG: 2.5 SOLUTION RESPIRATORY (INHALATION) at 21:07

## 2017-05-30 RX ADMIN — HUMAN INSULIN 8 UNITS: 100 INJECTION, SOLUTION SUBCUTANEOUS at 16:04

## 2017-05-30 RX ADMIN — Medication 5 ML: at 14:00

## 2017-05-30 RX ADMIN — HUMAN INSULIN 6 UNITS: 100 INJECTION, SOLUTION SUBCUTANEOUS at 11:43

## 2017-05-30 RX ADMIN — ALBUTEROL SULFATE 2.5 MG: 2.5 SOLUTION RESPIRATORY (INHALATION) at 01:32

## 2017-05-30 RX ADMIN — MIRTAZAPINE 30 MG: 15 TABLET, FILM COATED ORAL at 22:52

## 2017-05-30 RX ADMIN — ALBUTEROL SULFATE 2.5 MG: 2.5 SOLUTION RESPIRATORY (INHALATION) at 08:08

## 2017-05-30 RX ADMIN — Medication 5 ML: at 06:27

## 2017-05-30 RX ADMIN — ALBUTEROL SULFATE 2.5 MG: 2.5 SOLUTION RESPIRATORY (INHALATION) at 14:27

## 2017-05-30 RX ADMIN — Medication 5 ML: at 22:53

## 2017-05-30 NOTE — PROGRESS NOTES
SPEECH PATHOLOGY NOTE:    Second attempt to see patient. Bilingual nun speaking with patient and . Patient tolerating mechanical soft diet per family. Will re-attempt follow up for diet tolerance tomorrow.     Joe Posey MS, CCC-SLP

## 2017-05-30 NOTE — PROGRESS NOTES
Problem: Mobility Impaired (Adult and Pediatric)  Goal: *Acute Goals and Plan of Care (Insert Text)    LTG:  (1.)Ms. Leggett will move from supine to sit and sit to supine , scoot up and down and roll side to side in bed with STAND BY ASSIST within 8 day(s). (2.)Ms. Leggett will transfer from bed to chair and chair to bed with STAND BY ASSIST using the least restrictive device within 8 day(s). (3.)Ms. Leggett will ambulate with MINIMAL ASSIST for 20+ feet with the least restrictive device within 8 day(s). ________________________________________________________________________________________________      PHYSICAL THERAPY: Daily Note, Treatment Day: 1st and PM 5/30/2017  INPATIENT: Hospital Day: 7  Payor: SELF PAY / Plan: [facility] SELF PAY / Product Type: Self Pay /      NAME/AGE/GENDER: Angelito Webb is a 78 y.o. female      PRIMARY DIAGNOSIS: Pleural effusion [J90] Acute on chronic respiratory failure (HCC) Acute on chronic respiratory failure (HCC)  Procedure(s) (LRB):  ULTRASOUND (Right)  THORACENTESIS (Right)  5 Days Post-Op  ICD-10: Treatment Diagnosis:       · Generalized Muscle Weakness (M62.81)  · Difficulty in walking, Not elsewhere classified (R26.2)   Precaution/Allergies:  Review of patient's allergies indicates no known allergies. ASSESSMENT:      Ms. Harvinder Long presents sitting up in the chair having just used the commode. Was able to mostly communicate with her and family member with the little English they have and the little Thai I have. She agreed to walk. Took off O2 and sats stayed in low 90's but quickly fell to high 80's after walking about 5 feet. I believe family said she does use it at home. Donned on 2L and patient walked about 20 feet further to hallway, made a U turn and returned to the chair by the window. She was fatigued and SOB but not in distress. Encouraged her to stay in chair for awhile. Progress made.              This section established at most recent assessment PROBLEM LIST (Impairments causing functional limitations):  1. Decreased Strength  2. Decreased ADL/Functional Activities  3. Decreased Transfer Abilities  4. Decreased Ambulation Ability/Technique  5. Decreased Balance  6. Decreased Activity Tolerance  7. Decreased Pacing Skills  8. Increased Fatigue  9. Increased Shortness of Breath  10. Decreased Flexibility/Joint Mobility    INTERVENTIONS PLANNED: (Benefits and precautions of physical therapy have been discussed with the patient.)  1. Balance Exercise  2. Bed Mobility  3. Gait Training  4. Home Exercise Program (HEP)  5. Neuromuscular Re-education/Strengthening  6. Range of Motion (ROM)  7. Therapeutic Activites  8. Therapeutic Exercise/Strengthening  9. Transfer Training  10. Group Therapy      TREATMENT PLAN: Frequency/Duration: 3 times a week for duration of hospital stay  Rehabilitation Potential For Stated Goals: FAIR      RECOMMENDED REHABILITATION/EQUIPMENT: (at time of discharge pending progress): Continue Skilled Therapy, Home Health: Physical Therapy, Rehab and Adaptive Equipment: Walkers, Type: Rolling Walker. HISTORY:   History of Present Injury/Illness (Reason for Referral):  78 y.o. female presents with dyspnea. She was last seen by us in February of 2017 for right pleural effusion. She has dCHR, prior DVT on coumadin, CKD, Moderate AS, HTN, DM and R ureteral obstruction s/p stent placement. She is followed by Dr Shawna Shabazz for metastatic breast CA with mets to lung/bone. In February, she underwent R thoracentesis with drain to gravity with removal of 1200 ml of yellow pleural fluid. She developed pain after thoracentesis and pain was felt to be from trapped lung physiology and recommended not to remove more than 1 liter of pleural fluid if thoracentesis is again necessary. She was hypoxemic and home O2 arranged. She had a CXR in April 2017 with persistent effusion.  She was seen by Oncology on 5/16/2017 and was having dyspnea during that time with fatigue. She was given lasix as well and has taken lasix until 3 days ago. She has plans to see [facility] on June 2. Today in the ER, her CXR shows effusion on right which is unchanged and new right perihilar infiltrate. We were asked to admit her for hypoxemia with persistent effusion, new infiltrate with metastatic breast cancer with acute on chronic renal failure and moderate AS. She is currently requiring BiPAP and we were asked to admit to the ICU  Past Medical History/Comorbidities:   Ms. Adilson Cutler  has a past medical history of Acute on chronic diastolic congestive heart failure ([address] (1/5/2016); Asthma; Cancer ([facility] [address] Chemotherapy-induced neutropenia (HCC) (12/20/2016); Depression; Diabetes ([address] DM (diabetes mellitus) ([address] (11/20/2012); HCAP (healthcare-associated pneumonia) (7/17/2013); Hypertension; Sepsis ([address] (7/19/2016); Thromboembolus ([address] and Unspecified adverse effect of anesthesia. Ms. Adilson Cutler  has a past surgical history that includes vascular access (1/26/12) and gyn. Social History/Living Environment:   Home Environment: Private residence  # Steps to Enter: 1  One/Two Story Residence: One story  Living Alone: No  Support Systems: Spouse/Significant Other/Partner, Family member(s)  Patient Expects to be Discharged to[de-identified] Skilled nursing facility  Current DME Used/Available at Home: None  Prior Level of Function/Work/Activity:  Pt limited household ambulator reports she does not use AD, but possibly furniture walker.        Number of Personal Factors/Comorbidities that affect the Plan of Care: 3+: HIGH COMPLEXITY   EXAMINATION:   Most Recent Physical Functioning:   Gross Assessment:                  Posture:     Balance:  Standing - Static: Fair  Standing - Dynamic : Fair Bed Mobility:     Wheelchair Mobility:     Transfers:  Sit to Stand: Contact guard assistance  Stand to Sit: Contact guard assistance  Gait:     Distance (ft): 30 Feet (ft)  Assistive Device: Walker, rolling  Ambulation - Level of Assistance: Contact guard assistance;Minimal assistance       Body Structures Involved:  1. Nerves  2. Heart  3. Lungs  4. Bones  5. Joints  6. Muscles  7. Ligaments Body Functions Affected:  1. Mental  2. Sensory/Pain  3. Cardio  4. Respiratory  5. Neuromusculoskeletal  6. Movement Related  7. language barrier Activities and Participation Affected:  1. Learning and Applying Knowledge  2. General Tasks and Demands  3. Communication  4. Mobility  5. Self Care  6. Community, Social and [place]   Number of elements that affect the Plan of Care: 4+: HIGH COMPLEXITY   CLINICAL PRESENTATION:   Presentation: Evolving clinical presentation with unstable and unpredictable characteristics: HIGH COMPLEXITY   CLINICAL DECISION MAKIN [facility] Inpatient Short Form  How much difficulty does the patient currently have. .. Unable A Lot A Little None   1. Turning over in bed (including adjusting bedclothes, sheets and blankets)? [ ] 1   [ ] 2   [X] 3   [ ] 4   2. Sitting down on and standing up from a chair with arms ( e.g., wheelchair, bedside commode, etc.)   [ ] 1   [X] 2   [ ] 3   [ ] 4   3. Moving from lying on back to sitting on the side of the bed? [ ] 1   [X] 2   [ ] 3   [ ] 4   How much help from another person does the patient currently need. .. Total A Lot A Little None   4. Moving to and from a bed to a chair (including a wheelchair)? [ ] 1   [ ] 2   [X] 3   [ ] 4   5. Need to walk in hospital room? [ ] 1   [X] 2   [ ] 3   [ ] 4   6. Climbing 3-5 steps with a railing? [X] 1   [ ] 2   [ ] 3   [ ] 4   © , Trustees of [address] [place] [address] [address] [id number], under license to [employer]. All rights reserved    Score:  Initial: 13 Most Recent: X (Date: -- )     Interpretation of Tool:  Represents activities that are increasingly more difficult (i.e. Bed mobility, Transfers, Gait).        Score 24 23 22-20 19-15 14-10 9-7 6 Modifier CH CI CJ CK CL CM CN         · Mobility - Walking and Moving Around:               - CURRENT STATUS:    CL - 60%-79% impaired, limited or restricted               - GOAL STATUS:           CL - 60%-79% impaired, limited or restricted               - D/C STATUS:                       ---------------To be determined---------------  Payor: SELF PAY / Plan: [facility] SELF PAY / Product Type: Self Pay /       Medical Necessity:     · Skilled intervention continues to be required due to decreased function. Reason for Services/Other Comments:  · Patient continues to require skilled intervention due to medical complications and patient unable to attend/participate in therapy as expected. Use of outcome tool(s) and clinical judgement create a POC that gives a: Difficult prediction of patient's progress: HIGH COMPLEXITY                 TREATMENT:   (In addition to Assessment/Re-Assessment sessions the following treatments were rendered)   Pre-treatment Symptoms/Complaints: Only grunt, pt language difficulty  Pain: Initial:   Pain Intensity 1: 0  Post Session:  No response      Therapeutic Activity: (    15 minutes): Therapeutic activities including Chair transfers, Ambulation on level ground and monitored sats to improve mobility, strength, balance and endurance. Required minimal   to promote static and dynamic balance in standing. Braces/Orthotics/Lines/Etc:   · IV, denson catheter and 2L  Treatment/Session Assessment:    · Response to Treatment:  See above  · Interdisciplinary Collaboration:  · Physical Therapy Assistant and Registered Nurse  · After treatment position/precautions:  · Up in chair, Bed/Chair-wheels locked, Call light within reach, RN notified and Family at bedside  · Compliance with Program/Exercises: Will assess as treatment progresses. · Recommendations/Intent for next treatment session:   \"Next visit will focus on advancements to more challenging activities and reduction in assistance provided\".   Total Treatment Duration:  PT Patient Time In/Time Out  Time In: 1400  Time Out: 1415     Gabriele De La Vega, PTA

## 2017-05-30 NOTE — PROGRESS NOTES
Warfarin dosing per pharmacist    Stefan Galaviz is a 78 y.o. female. Height: 5' 2\" (157.5 cm)    Weight: 67.8 kg (149 lb 8 oz)    Indication:  H/o DVT    Goal INR:  2-3    Home dose:  Per AC note on 5/23, pt has recently had dose decrease to 1 mg hs (7 mg/week)    Risk factors or significant drug interactions:  azithromycin    Other anticoagulants:  none    Daily Monitoring  Date  INR     Warfarin dose HGB              Notes  5/26  2.3  1 mg  8.2  5/27  2.3  1 mg  ---  5/28  2.2  1 mg  ---  5/29  2.0  2 mg  9.9   5/30  1.6  2 mg  9.5     Pt coumadin has been held since admission and pt received FFP for thoracentesis. Pt had elevated INR at last anticoagulation visit per notes and patient's dose was decreased from 2 mg hs to 1 mg hs. INR down to 1.6 today. Will give 1 mg now and 2 mg hs. Pt will probably need alternating dosing schedule between 1 mg and 2 mg. Pharmacy will continue to follow. Please call with any questions.     Thank you,  Carli Darby, PharmD  Clinical Pharmacist  [phone]

## 2017-05-30 NOTE — PROGRESS NOTES
Pt resting quietly in bed, HOB elevated. Pt easily arousable to voice on 5L O2 via NC. Lung sounds clear, diminished on right, S1/S2 audible, peripheral pulses palpable, extremities warm. Pt has denson draining clear yellow urine to bag at bedside. Call light in place, pt instructed to call for assistance as needed.

## 2017-05-30 NOTE — ROUTINE PROCESS
Patient received 10/325 Norco at 0840 complains of 6 pain associated with her lower back will continue to monitor her pain.

## 2017-05-30 NOTE — PROGRESS NOTES
Daily Progress Note: 5/30/2017    [facility]   Admission Date: 5/24/2017         The patient's chart is reviewed and the patient is discussed with the staff. 77 y/o admitted 5/24 was last seen by us in February of 2017 for right pleural effusion. She has dCHR, prior DVT on coumadin, CKD, Moderate AS, HTN, DM and R ureteral obstruction s/p stent placement. She is followed by Dr Melecio Lion for metastatic breast CA with mets to lung/bone. In February, she underwent R thoracentesis with drain to gravity with removal of 1200 ml of yellow pleural fluid. She developed pain after thoracentesis and pain was felt to be from trapped lung physiology and recommended not to remove more than 1 liter of pleural fluid if thoracentesis is again necessary. She was hypoxemic and home O2 arranged.       She had a CXR in April 2017 with persistent effusion. She was seen by Oncology on 5/16/2017 and was having dyspnea during that time with fatigue. She was given lasix as well and has taken lasix until 3 days ago. S. On admit her CXR shows effusion on right which is unchanged and new right perihilar infiltrate. We were asked to admit her for hypoxemia with persistent effusion, new infiltrate with metastatic breast cancer with acute on chronic renal failure and moderate AS     Decompensated- intubated on 5/24  Seen by Oncology -holding chemo  Thoracentesis on right- 950 cc of /serosanguinous/ fluid       Subjective:       Extubated on 5/26. Had to go on BIPAP QHS now on NC. Says she has not been OOB. Uses oxygen at home.     Current Facility-Administered Medications   Medication Dose Route Frequency    warfarin (COUMADIN) tablet 2 mg - pharmacy dosing  2 mg Oral QPM    fentaNYL (DURAGESIC) 50 mcg/hr patch 1 Patch  1 Patch TransDERmal Q72H    furosemide (LASIX) tablet 40 mg  40 mg Oral ACB&D    hydrALAZINE (APRESOLINE) tablet 50 mg  50 mg Oral TID    atenolol (TENORMIN) tablet 25 mg  25 mg Oral DAILY    insulin regular (NOVOLIN R, HUMULIN R) injection   SubCUTAneous AC&HS    famotidine (PEPCID) 40 mg/5 mL (8 mg/mL) oral suspension 20 mg  20 mg Per NG tube DAILY    0.9% sodium chloride infusion 250 mL  250 mL IntraVENous PRN    hydrALAZINE (APRESOLINE) 20 mg/mL injection 10 mg  10 mg IntraVENous Q6H PRN    morphine injection 2 mg  2 mg IntraVENous Q4H PRN    ALPRAZolam (XANAX) tablet 0.25 mg  0.25 mg Oral TID PRN    amLODIPine (NORVASC) tablet 10 mg  10 mg Oral DAILY    HYDROcodone-acetaminophen (NORCO)  mg tablet 1 Tab  1 Tab Oral Q4H PRN    LORazepam (ATIVAN) tablet 1 mg  1 mg Oral Q6H PRN    megestrol (MEGACE) 400 mg/10 mL (10 mL) oral suspension 200 mg  200 mg Oral DAILY    mirtazapine (REMERON) tablet 30 mg  30 mg Oral QHS    montelukast (SINGULAIR) tablet 10 mg  10 mg Oral QHS    zolpidem (AMBIEN) tablet 5 mg  5 mg Oral QHS PRN    sodium chloride (NS) flush 5-10 mL  5-10 mL IntraVENous Q8H    sodium chloride (NS) flush 5-10 mL  5-10 mL IntraVENous PRN    azithromycin (ZITHROMAX) 500 mg in 0.9% sodium chloride (MBP/ADV) 250 mL  500 mg IntraVENous Q24H    cefTRIAXone (ROCEPHIN) 1 g in 0.9% sodium chloride (MBP/ADV) 50 mL  1 g IntraVENous Q24H    budesonide (PULMICORT) 500 mcg/2 ml nebulizer suspension  500 mcg Nebulization BID RT    And    albuterol CONCENTRATE 2.5mg/0.5 mL neb soln  2.5 mg Nebulization Q6H RT       Review of Systems    Constitutional:  negative for fever, chills, sweats  Cardiovascular:  negative for chest pain, palpitations, syncope, edema  Gastrointestinal:  negative for dysphagia, reflux, vomiting, diarrhea, abdominal pain, or melena  Neurologic:  negative for focal weakness, numbness, headache      Objective:     Vitals:    05/30/17 0004 05/30/17 0133 05/30/17 0342 05/30/17 0503   BP: 137/55  139/63    Pulse: 71  70    Resp: 20  20    Temp: 98.1 °F (36.7 °C)  97.9 °F (36.6 °C)    SpO2: 97% 96% 95%    Weight:    149 lb 8 oz (67.8 kg)   Height:           Intake and Output:   05/28 1901 - 05/30 0700  In: 568.2 [P.O.:240; I.V.:328.2]  Out: 61681 [Urine:70546]       Physical Exam:          Constitutional:  the patient is well developed and in no acute distress  EENMT:  Sclera clear, pupils equal, oral mucosa moist  Respiratory: some crackles on the left  Cardiovascular:  RRR with harsh SM  Gastrointestinal: soft and non-tender; with positive bowel sounds. Musculoskeletal: warm without cyanosis. There is no lower leg edema. Skin:  no jaundice or rashes, no wounds   Neurologic: no gross neuro deficits     Psychiatric:  alert     LINES:   port    CXR:           LAB  Recent Labs      05/30/17   0528  05/29/17   2031  05/29/17   1628  05/29/17   1126  05/29/17   0725   GLUCPOC  149*  241*  212*  240*  121*      Recent Labs      05/29/17   0350  05/28/17   0355   WBC  3.9*  5.4   HGB  9.9*  9.5*   HCT  29.6*  29.5*   PLT  260  284   INR  2.0*  2.2*     Recent Labs      05/29/17   0350  05/28/17   0355   NA  149*  146*   K  3.5  4.1   CL  112*  111*   CO2  27  26   GLU  130*  237*   BUN  72*  77*   CREA  3.12*  3.57*   MG  2.6*   --    PHOS  3.7   --    CA  6.7*  6.6*     No results for input(s): PH, PCO2, PO2, HCO3 in the last 72 hours. No results for input(s): LCAD, LAC in the last 72 hours. Assessment:  (Medical Decision Making)     Hospital Problems  Date Reviewed: 5/26/2017          Codes Class Noted POA    Hypernatremia ICD-10-CM: E87.0  ICD-9-CM: 276.0  5/30/2017 Unknown    Needs free water.     Anxiety ICD-10-CM: F41.9  ICD-9-CM: 300.00  5/28/2017 Unknown    Stable    * (Principal)Acute on chronic respiratory failure (HCC) ICD-10-CM: J96.20  ICD-9-CM: 518.84  5/24/2017 Yes    Weaned to 3L    Moderate aortic stenosis (Chronic) ICD-10-CM: I35.0  ICD-9-CM: 424.1  5/24/2017 Yes    + Murmur    Anticoagulated on Coumadin (Chronic) ICD-10-CM: Z51.81, Z79.01  ICD-9-CM: V58.83, V58.61  5/24/2017 Yes    INR 2.0    Volume overload ICD-10-CM: E87.70  ICD-9-CM: 276.69  5/24/2017 Yes    Better    Breast cancer metastasized to lung [facility]) (Chronic) ICD-10-CM: C50.919, C78.00  ICD-9-CM: 174.9, 197.0  2/17/2017 Yes        CKD (chronic kidney disease) (Chronic) ICD-10-CM: N18.9  ICD-9-CM: 585.9  1/4/2017 Yes    Overview Signed 5/14/2013 11:35 AM by Johan Agudelo RN     With acute rise- ? Dehydration and monitor         Cr 3.12    DVT (deep venous thrombosis) (HCC) (Chronic) ICD-10-CM: I82.409  ICD-9-CM: 453.40  1/4/2017 Yes    Overview Signed 11/19/2013  6:07 AM by Marlin Valera     21-86-22 There is nonocclusive thrombus within the left common femoral vein, superficial femoral vein, popliteal vein, and posterior tibial vein               Pleural effusion, right (Chronic) ICD-10-CM: J90  ICD-9-CM: 511.9  1/4/2017 Yes    Overview Addendum 5/26/2017 10:11 AM by aNtty Schrader NP     10/6/16 R thoracentesis 800 ml: pleural fluid creatine 4.1 = likely urinothorax  5/25/2017: Thoracentesis on right- 950 cc of /serosanguinous/ fluid             DM (diabetes mellitus) ([facility] [address] (Chronic) ICD-10-CM: E11.9  ICD-9-CM: 250.00  10/7/2016 Yes        Acute on chronic renal failure (HCC) ICD-10-CM: N17.9, N18.9  ICD-9-CM: 584.9, 585.9  10/7/2016 Yes    Ongoing    Leg swelling ICD-10-CM: M79.89  ICD-9-CM: 729.81  2/25/2014 Yes    Overview Signed 2/25/2014  2:45 PM by Erika Sosa NP     Bilateral           Resolved            Plan:  (Medical Decision Making)     Wean oxygen as tolerated. PT/OT. Increase free water. Look into Rx for urinothorax- nothing in Up to Date      More than 50% of the time documented was spent in face-to-face contact with the patient and in the care of the patient on the floor/unit where the patient is located.     Abbey Patel., MD

## 2017-05-30 NOTE — PROGRESS NOTES
Subjective:   Daily Progress Note: 5/30/2017 8:33 AM    Feeling miserable. Pain, weakness.   presents    Current Facility-Administered Medications   Medication Dose Route Frequency    dextrose 5% infusion  50 mL/hr IntraVENous CONTINUOUS    warfarin (COUMADIN) tablet 2 mg - pharmacy dosing  2 mg Oral QPM    fentaNYL (DURAGESIC) 50 mcg/hr patch 1 Patch  1 Patch TransDERmal Q72H    furosemide (LASIX) tablet 40 mg  40 mg Oral ACB&D    hydrALAZINE (APRESOLINE) tablet 50 mg  50 mg Oral TID    atenolol (TENORMIN) tablet 25 mg  25 mg Oral DAILY    insulin regular (NOVOLIN R, HUMULIN R) injection   SubCUTAneous AC&HS    famotidine (PEPCID) 40 mg/5 mL (8 mg/mL) oral suspension 20 mg  20 mg Per NG tube DAILY    0.9% sodium chloride infusion 250 mL  250 mL IntraVENous PRN    hydrALAZINE (APRESOLINE) 20 mg/mL injection 10 mg  10 mg IntraVENous Q6H PRN    morphine injection 2 mg  2 mg IntraVENous Q4H PRN    ALPRAZolam (XANAX) tablet 0.25 mg  0.25 mg Oral TID PRN    amLODIPine (NORVASC) tablet 10 mg  10 mg Oral DAILY    HYDROcodone-acetaminophen (NORCO)  mg tablet 1 Tab  1 Tab Oral Q4H PRN    LORazepam (ATIVAN) tablet 1 mg  1 mg Oral Q6H PRN    megestrol (MEGACE) 400 mg/10 mL (10 mL) oral suspension 200 mg  200 mg Oral DAILY    mirtazapine (REMERON) tablet 30 mg  30 mg Oral QHS    montelukast (SINGULAIR) tablet 10 mg  10 mg Oral QHS    zolpidem (AMBIEN) tablet 5 mg  5 mg Oral QHS PRN    sodium chloride (NS) flush 5-10 mL  5-10 mL IntraVENous Q8H    sodium chloride (NS) flush 5-10 mL  5-10 mL IntraVENous PRN    azithromycin (ZITHROMAX) 500 mg in 0.9% sodium chloride (MBP/ADV) 250 mL  500 mg IntraVENous Q24H    cefTRIAXone (ROCEPHIN) 1 g in 0.9% sodium chloride (MBP/ADV) 50 mL  1 g IntraVENous Q24H    budesonide (PULMICORT) 500 mcg/2 ml nebulizer suspension  500 mcg Nebulization BID RT    And    albuterol CONCENTRATE 2.5mg/0.5 mL neb soln  2.5 mg Nebulization Q6H RT               Objective: Visit Vitals    /49 (BP 1 Location: Right arm)    Pulse 70    Temp 99.5 °F (37.5 °C)    Resp 18    Ht 5' 2\" (1.575 m)    Wt 67.8 kg (149 lb 8 oz)    LMP 1998    SpO2 95%    BMI 27.34 kg/m2    O2 Flow Rate (L/min): 2 l/min (decreased from 3) O2 Device: Nasal cannula    Temp (24hrs), Av.3 °F (36.8 °C), Min:97.8 °F (36.6 °C), Max:99.5 °F (37.5 °C)          190 -  0700  In: 568.2 [P.O.:240; I.V.:328.2]  Out: 65410 [Urine:33547]      Visit Vitals    /49 (BP 1 Location: Right arm)    Pulse 70    Temp 99.5 °F (37.5 °C)    Resp 18    Ht 5' 2\" (1.575 m)    Wt 67.8 kg (149 lb 8 oz)    LMP 1998    SpO2 95%    BMI 27.34 kg/m2       Lungs: bilateral rales bilaterally  Heart: regular rate and rhythm  Abdomen: soft, non-tender.   Extremities: trace edema          Data Review    Recent Results (from the past 48 hour(s))   GLUCOSE, POC    Collection Time: 17 11:56 AM   Result Value Ref Range    Glucose (POC) 237 (H) 65 - 100 mg/dL   GLUCOSE, POC    Collection Time: 17  4:14 PM   Result Value Ref Range    Glucose (POC) 303 (H) 65 - 100 mg/dL   GLUCOSE, POC    Collection Time: 17 10:05 PM   Result Value Ref Range    Glucose (POC) 287 (H) 65 - 100 mg/dL   PROTHROMBIN TIME + INR    Collection Time: 17  3:50 AM   Result Value Ref Range    Prothrombin time 21.5 (H) 9.6 - 12.0 sec    INR 2.0 (H) 0.9 - 1.2     CBC WITH AUTOMATED DIFF    Collection Time: 17  3:50 AM   Result Value Ref Range    WBC 3.9 (L) 4.3 - 11.1 K/uL    RBC 2.86 (L) 4.05 - 5.25 M/uL    HGB 9.9 (L) 11.7 - 15.4 g/dL    HCT 29.6 (L) 35.8 - 46.3 %    .5 (H) 79.6 - 97.8 FL    MCH 34.6 (H) 26.1 - 32.9 PG    MCHC 33.4 31.4 - 35.0 g/dL    RDW 21.1 (H) 11.9 - 14.6 %    PLATELET 917 464 - 922 K/uL    MPV 9.8 (L) 10.8 - 14.1 FL    DF AUTOMATED      NEUTROPHILS 77 43 - 78 %    LYMPHOCYTES 17 13 - 44 %    MONOCYTES 5 4.0 - 12.0 %    EOSINOPHILS 1 0.5 - 7.8 %    BASOPHILS 0 0.0 - 2.0 % IMMATURE GRANULOCYTES 0.5 0.0 - 5.0 %    ABS. NEUTROPHILS 3.0 1.7 - 8.2 K/UL    ABS. LYMPHOCYTES 0.7 0.5 - 4.6 K/UL    ABS. MONOCYTES 0.2 0.1 - 1.3 K/UL    ABS. EOSINOPHILS 0.0 0.0 - 0.8 K/UL    ABS. BASOPHILS 0.0 0.0 - 0.2 K/UL    ABS. IMM.  GRANS. 0.0 0.0 - 0.5 K/UL   METABOLIC PANEL, BASIC    Collection Time: 05/29/17  3:50 AM   Result Value Ref Range    Sodium 149 (H) 136 - 145 mmol/L    Potassium 3.5 3.5 - 5.1 mmol/L    Chloride 112 (H) 98 - 107 mmol/L    CO2 27 21 - 32 mmol/L    Anion gap 10 7 - 16 mmol/L    Glucose 130 (H) 65 - 100 mg/dL    BUN 72 (H) 8 - 23 MG/DL    Creatinine 3.12 (H) 0.6 - 1.0 MG/DL    GFR est AA 19 (L) >60 ml/min/1.73m2    GFR est non-AA 16 (L) >60 ml/min/1.73m2    Calcium 6.7 (L) 8.3 - 10.4 MG/DL   MAGNESIUM    Collection Time: 05/29/17  3:50 AM   Result Value Ref Range    Magnesium 2.6 (H) 1.8 - 2.4 mg/dL   PHOSPHORUS    Collection Time: 05/29/17  3:50 AM   Result Value Ref Range    Phosphorus 3.7 2.3 - 3.7 MG/DL   GLUCOSE, POC    Collection Time: 05/29/17  7:25 AM   Result Value Ref Range    Glucose (POC) 121 (H) 65 - 100 mg/dL   GLUCOSE, POC    Collection Time: 05/29/17 11:26 AM   Result Value Ref Range    Glucose (POC) 240 (H) 65 - 100 mg/dL   GLUCOSE, POC    Collection Time: 05/29/17  4:28 PM   Result Value Ref Range    Glucose (POC) 212 (H) 65 - 100 mg/dL   GLUCOSE, POC    Collection Time: 05/29/17  8:31 PM   Result Value Ref Range    Glucose (POC) 241 (H) 65 - 100 mg/dL   GLUCOSE, POC    Collection Time: 05/30/17  5:28 AM   Result Value Ref Range    Glucose (POC) 149 (H) 65 - 100 mg/dL   PROTHROMBIN TIME + INR    Collection Time: 05/30/17  7:55 AM   Result Value Ref Range    Prothrombin time 17.4 (H) 9.6 - 12.0 sec    INR 1.6 (H) 0.9 - 1.2     METABOLIC PANEL, BASIC    Collection Time: 05/30/17  7:55 AM   Result Value Ref Range    Sodium 148 (H) 136 - 145 mmol/L    Potassium 3.3 (L) 3.5 - 5.1 mmol/L    Chloride 111 (H) 98 - 107 mmol/L    CO2 28 21 - 32 mmol/L    Anion gap 9 7 - 16 mmol/L    Glucose 165 (H) 65 - 100 mg/dL    BUN 67 (H) 8 - 23 MG/DL    Creatinine 2.84 (H) 0.6 - 1.0 MG/DL    GFR est AA 21 (L) >60 ml/min/1.73m2    GFR est non-AA 18 (L) >60 ml/min/1.73m2    Calcium 6.8 (L) 8.3 - 10.4 MG/DL   CBC WITH AUTOMATED DIFF    Collection Time: 05/30/17  7:55 AM   Result Value Ref Range    WBC 4.6 4.3 - 11.1 K/uL    RBC 2.79 (L) 4.05 - 5.25 M/uL    HGB 9.5 (L) 11.7 - 15.4 g/dL    HCT 28.6 (L) 35.8 - 46.3 %    .5 (H) 79.6 - 97.8 FL    MCH 34.1 (H) 26.1 - 32.9 PG    MCHC 33.2 31.4 - 35.0 g/dL    RDW 20.6 (H) 11.9 - 14.6 %    PLATELET 821 837 - 096 K/uL    MPV 10.1 (L) 10.8 - 14.1 FL    DF AUTOMATED      NEUTROPHILS 80 (H) 43 - 78 %    LYMPHOCYTES 15 13 - 44 %    MONOCYTES 4 4.0 - 12.0 %    EOSINOPHILS 1 0.5 - 7.8 %    BASOPHILS 0 0.0 - 2.0 %    IMMATURE GRANULOCYTES 0.2 0.0 - 5.0 %    ABS. NEUTROPHILS 3.7 1.7 - 8.2 K/UL    ABS. LYMPHOCYTES 0.7 0.5 - 4.6 K/UL    ABS. MONOCYTES 0.2 0.1 - 1.3 K/UL    ABS. EOSINOPHILS 0.0 0.0 - 0.8 K/UL    ABS. BASOPHILS 0.0 0.0 - 0.2 K/UL    ABS. IMM.  GRANS. 0.0 0.0 - 0.5 K/UL       Assessment     Patient Active Problem List    Diagnosis Date Noted    Hypernatremia 05/30/2017    Anxiety 05/28/2017    [facility] 05/24/2017    Moderate aortic stenosis 05/24/2017    Anticoagulated on Coumadin 05/24/2017    Volume overload 05/24/2017    [facility] 02/17/2017    Bony metastasis ([facility] 02/17/2017    Acute respiratory failure ([facility] 02/17/2017    Breast cancer metastasized to lung ([facility] 02/17/2017    CKD (chronic kidney disease) 01/04/2017    DVT (deep venous thrombosis) ([facility] 01/04/2017    Accelerated hypertension 01/04/2017    Pleural effusion, right 01/04/2017    Hydronephrosis 01/04/2017    Bilateral edema of lower extremity 01/04/2017    Hematuria 01/04/2017    Chemotherapy-induced neutropenia (HCC) 12/20/2016    Pancytopenia ([facility] 10/10/2016    HTN (hypertension) 10/07/2016    DM (diabetes mellitus) ([facility] 10/07/2016    Acute on chronic renal failure ([facility] 10/07/2016    Pulmonary edema 01/03/2016    Elevated troponin 10/20/2015    PND (paroxysmal nocturnal dyspnea) 02/25/2014    Leg swelling 02/25/2014    CHF (congestive heart failure) ([facility] 02/25/2014    Anemia 11/19/2013    Asthma 10/22/2013    Hypomagnesemia 08/04/2013    Hypokalemia 08/04/2013    Iron deficiency anemia 06/10/2013           Problems Addressed by Nephrology     EMERY on CKD 4- now nearing her baseline again  Hypernatremia  Respiratory Failure: better  Pleural effusion    Plan     Decrease lasix to once/day given the excess UOP  Will ask palliative care to help with her multiple pain and fatigue issues.   Following labs

## 2017-05-30 NOTE — PROGRESS NOTES
present at bedside during assessment with Dr. Sabrina Chong.     [address] [place] [address]  [phone]

## 2017-05-30 NOTE — PROGRESS NOTES
Patient in bed resting with no visual signs of pain or discomfort. Call light within reach. Will continue to monitor.

## 2017-05-30 NOTE — PROGRESS NOTES
Problem: Interdisciplinary Rounds  Goal: Interdisciplinary Rounds  Outcome: Progressing Towards Goal  Interdisciplinary team rounds were held 5/30/2017 with the following team members:Care Management, Nursing and Clinical Coordinator and the patient. Plan of care discussed. See clinical pathway and/or care plan for interventions and desired outcomes.

## 2017-05-30 NOTE — PROGRESS NOTES
Pt resting quietly in bed on 3L O2 via NC. No s/s of acute distress noted. Report given to oncoming RN.

## 2017-05-30 NOTE — PROGRESS NOTES
[facility]        Inpatient Hematology / Oncology Progress Note      Admission Date: 2017  8:41 AM  Reason for Admission/Hospital Course: Pleural effusion [J90]      24 Hour Events:  Afebrile, VSS  Used  for translation   at bedside      ROS:  Constitutional: +fatigue +malaise +weakness Negative for fever, chills  CV: Negative for chest pain, palpitations, edema. Respiratory: +dyspnea Negative for cough, wheezing. GI: Negative for nausea, abdominal pain, diarrhea. 10 point review of systems is otherwise negative with the exception of the elements mentioned above in the HPI. No Known Allergies    OBJECTIVE:  Patient Vitals for the past 8 hrs:   BP Temp Pulse Resp SpO2   17 1158 137/59 99.1 °F (37.3 °C) 69 16 92 %   17 0808 - - - - 95 %   17 0800 131/49 99.5 °F (37.5 °C) 70 18 -   17 0756 150/59 98.5 °F (36.9 °C) 72 21 95 %     Temp (24hrs), Av.4 °F (36.9 °C), Min:97.8 °F (36.6 °C), Max:99.5 °F (37.5 °C)     0701 -  1900  In: 150 [P.O.:150]  Out: 1500 [Urine:1500]    Physical Exam:  Constitutional: Well developed, well nourished female in no acute distress, sitting comfortably in the hospital bed.  at bedside   HEENT: Normocephalic and atraumatic. Oropharynx is clear, mucous membranes are moist.  Sclerae anicteric. Neck supple without JVD. Lymph node   Deferred   Skin Warm and dry. No bruising and no rash noted. No erythema. No pallor. Respiratory Lungs are clear to auscultation bilaterally without wheezes, rales or rhonchi, normal air exchange without accessory muscle use. CVS Normal rate, regular rhythm and normal S1 and S2. No murmurs, gallops, or rubs. Abdomen Soft, nontender and nondistended, normoactive bowel sounds. No palpable mass. No hepatosplenomegaly. Neuro Grossly nonfocal with no obvious sensory or motor deficits.    MSK Normal range of motion in general.  No edema and no tenderness. Psych Appropriate mood and affect. Labs:    Recent Labs      05/30/17   0755  05/29/17   0350  05/28/17   0355   WBC  4.6  3.9*  5.4   RBC  2.79*  2.86*  2.79*   HGB  9.5*  9.9*  9.5*   HCT  28.6*  29.6*  29.5*   MCV  102.5*  103.5*  105.7*   MCH  34.1*  34.6*  34.1*   MCHC  33.2  33.4  32.2   RDW  20.6*  21.1*  22.4*   PLT  228  260  284   GRANS  80*  77  86*   LYMPH  15  17  11*   MONOS  4  5  3*   EOS  1  1  0*   BASOS  0  0  0   IG  0.2  0.5  0.4   DF  AUTOMATED  AUTOMATED  AUTOMATED   ANEU  3.7  3.0  4.6   ABL  0.7  0.7  0.6   ABM  0.2  0.2  0.1   ANTONETTE  0.0  0.0  0.0   ABB  0.0  0.0  0.0   AIG  0.0  0.0  0.0      Recent Labs      05/30/17   0755  05/29/17   0350  05/28/17   0355   NA  148*  149*  146*   K  3.3*  3.5  4.1   CL  111*  112*  111*   CO2  28  27  26   AGAP  9  10  9   GLU  165*  130*  237*   BUN  67*  72*  77*   CREA  2.84*  3.12*  3.57*   GFRAA  21*  19*  16*   GFRNA  18*  16*  14*   CA  6.8*  6.7*  6.6*   MG   --   2.6*   --    PHOS   --   3.7   --          Imaging:  Portable Chest X-ray 5/24/2017     Indication: Chest pain     Comparison: 4/11/2017     Findings: Portable AP view demonstrates new left perihilar infiltrates and  small left pleural effusion. Large right pleural effusion unchanged. Central  venous port remains.     IMPRESSION  Impression: New right perihilar infiltrate. No change large right pleural  Effusion. Chest X-ray  5/25/2017     INDICATION: Follow-up right thoracentesis     A portable AP view of the chest was obtained.     FINDINGS: There is persistent bibasilar infiltrate/atelectasis, right greater  than left. There is improved aeration the right lung base post thoracentesis. There is no pneumothorax. There is stable cardia megaly.  Support tubing  remains in place.      IMPRESSION  IMPRESSION: Bibasilar infiltrate/atelectasis, right greater than left    Renal ultrasound, 5/26/2017     History: Acute renal failure.     Comparison: None.     Technique: Grayscale and doppler images of the kidneys and bladder were obtained  using a 3-5 MHz linear transducer.     Findings: The right kidney measures 10.2 cm, and the left kidney measures 9.5 cm  in greatest longitudinal length. No shadowing stones are seen. Moderate right  hydronephrosis is seen. The proximal aspect of a stent is seen in the right  renal pelvis. Moderate right renal cortical thickening is seen. Renal  parenchymal echogenicity is increased consistent with chronic medical renal  changes.     The urinary bladder is poorly distended limiting assessment. The distal aspect  of the stent as well as what appears to be a Ruby catheter balloon are seen  within the urinary bladder. Small scattered ascites is seen.     IMPRESSION  IMPRESSION:   1. Right renal stent which appears to be in good position. However, persistent  moderate hydronephrosis of the right kidney is seen.     2. No left renal hydronephrosis.     3. Small scattered ascites. CHEST RADIOGRAPH, 1 views, 5/27/2017     History: Check pleural effusion.     Technique: Portable frontal view of the chest.      Comparison: Chest radiograph 5/26/2017     Findings:   A stable left-sided venous port is seen. Stable mild to moderate cardiomegaly is  seen. Lungs are expanded without appreciable pneumothorax. Basilar airspace  changes and effusions are seen. The left basilar pleural effusion appears  slightly worsened although remains small.     IMPRESSION  IMPRESSION:   1. Stable basilar airspace changes although basilar effusions appear slightly  worsened particularly on the left.     ASSESSMENT:    Problem List  Date Reviewed: 5/26/2017          Codes Class Noted    Hypernatremia ICD-10-CM: E87.0  ICD-9-CM: 276.0  5/30/2017        Anxiety ICD-10-CM: F41.9  ICD-9-CM: 300.00  5/28/2017        * (Principal)Acute on chronic respiratory failure ([facility] ICD-10-CM: J96.20  ICD-9-CM: 518.84  5/24/2017        Moderate aortic stenosis (Chronic) ICD-10-CM: I35.0  ICD-9-CM: 424.1  5/24/2017        Anticoagulated on Coumadin (Chronic) ICD-10-CM: Z51.81, Z79.01  ICD-9-CM: V58.83, V58.61  5/24/2017        Volume overload ICD-10-CM: E87.70  ICD-9-CM: 276.69  5/24/2017        Breast cancer ([facility] (Chronic) ICD-10-CM: C50.919  ICD-9-CM: 174.9  2/17/2017    Overview Addendum 2/27/2014 10:08 AM by Joselito Arenas NP     Er+  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 SUV max on the right is 20.5 and on the left 31.5. There is matted high right axillary   lymphadenopathy as well as a more hypermetabolic low right axillary lymph node which measures 12 x 9 mm and has an SUV max of 15.4. Small bilateral pulmonary hilar metastases are also noted as well as extensive skeletal metastatic disease which includes both proximal femora, both scapulae, innumerable ribs, virtually every vertebra, and the bony pelvis. No displaced pathologic fracture is identified on the CT images. Ct scan 2-12 Innumerable diffuse osseous metastases. . Multiple diffuse tiny lung nodules, and right hilar lymphadenopathy, also suspicious for metastases. 3. Bilateral breast masses, compatible with known carcinoma. CANCER ANTIGEN 27.29 1076   Oncology Flowsheet Day, Cycle cyclophosphamide (CYTOXAN) IV   1/27/2012 Day 1, Cycle 1 600 mg/m2 = 996 mg   2/17/2012 Day 1, Cycle 2 600 mg/m2 = 996 mg   3/9/2012 Day 1, Cycle 3 600 mg/m2 = 996 mg   3/30/2012 Day 1, Cycle 4 500 mg/m2 = 830 mg   4/20/2012 Day 1, Cycle 5 500 mg/m2 = 830 mg     Oncology Flowsheet DOCEtaxel (TAXOTERE) IV   1/27/2012 75 mg/m2 = 125 mg   2/17/2012 75 mg/m2 = 125 mg   3/9/2012 75 mg/m2 = 125 mg   3/30/2012 60 mg/m2 = 100 mg   4/20/2012 60 mg/m2 = 100 mg   7-19-12 post 6 cycles of TC improved breast masses switch to femara maintenence  Clear response on pet ct scan   Mixed response to chemotherapy:  There has been a significant interval response in the right breast and axilla with decrease in size of spiculated right breast mass and numerous right axillary lymph nodes. Left breast mass also appears to have responded chemotherapy though there may be persistent chest wall invasion. 2. Interval evolution of widespread osseous metastatic disease with many lytic lesions now appearing more sclerotic. These are associated with continued FDG uptake which is difficult to differentiate from marrow reactivity given recent chemotherapy. At least one new osseous metastatic deposits is present in the posterior spinous process at L1. Focally intense uptake is also present at approximately T5  10-19-12 pain in arms legs back continued improvement on ct scan Decreased size of the largest lung nodules and near complete resolution . of many. No new masses in the chest, abdomen, or pelvis. 2. Decreased size of right axillary and right hilar lymph nodes. 3. Diffuse osseous lesions again seen. 4. Diverticulosis. 5. Atherosclerotic vascular disease ca 15-3 down to 50  On femara and aredia will see if we can get affinitor to start at next visit continue therapy  12-19-12 new headache and dizziness for MRI will start affinitor   1-14-13 improved pain breast mass right breast smaller width 7 cm x 5  6-05-13 femara and aredia now on affinitor x 5 months   7-13 tumor markers falling   8-20-13 post admission for pneumonia medications to restart breast mass smaller reduce affinitor to 7.5 mg   10-18-13 femara and affinitor pain right ilium intermittent markers smaller breast mass smaller on right   12-5-13 Reduce Afinitor to 5 mg daily. Continue coumadin for DVT. Continue Femara and Aredia. Repeat US and skeletal survey prior to next visit. 1-6-14 Feeling better with reduced dose. Ultrasound of breasts show decrease in mass sizes. Skeletal survey stable. Follow up in 2 months.               Bony metastasis ([address] (Chronic) ICD-10-CM: C79.51  ICD-9-CM: 198.5  2/17/2017 Acute respiratory failure (HCC) ICD-10-CM: J96.00  ICD-9-CM: 518.81  2/17/2017        Breast cancer metastasized to lung [facility]) (Chronic) ICD-10-CM: C50.919, C78.00  ICD-9-CM: 174.9, 197.0  2/17/2017        CKD (chronic kidney disease) (Chronic) ICD-10-CM: N18.9  ICD-9-CM: 585.9  1/4/2017    Overview Signed 5/14/2013 11:35 AM by Debra Hayden RN     With acute rise- ?  Dehydration and monitor             DVT (deep venous thrombosis) (HCC) (Chronic) ICD-10-CM: I82.409  ICD-9-CM: 453.40  1/4/2017    Overview Signed 11/19/2013  6:07 AM by Jared Loya     99-64-89 There is nonocclusive thrombus within the left common femoral vein, superficial femoral vein, popliteal vein, and posterior tibial vein               Accelerated hypertension ICD-10-CM: I10  ICD-9-CM: 401.0  1/4/2017        Pleural effusion, right (Chronic) ICD-10-CM: J90  ICD-9-CM: 511.9  1/4/2017    Overview Addendum 5/26/2017 10:11 AM by Maryse Simon NP     10/6/16 R thoracentesis 800 ml: pleural fluid creatine 4.1 = likely urinothorax  5/25/2017: Thoracentesis on right- 950 cc of /serosanguinous/ fluid             Hydronephrosis (Chronic) ICD-10-CM: N13.30  ICD-9-CM: 591  1/4/2017        Bilateral edema of lower extremity ICD-10-CM: R60.0  ICD-9-CM: 782.3  1/4/2017        Hematuria ICD-10-CM: R31.9  ICD-9-CM: 599.70  1/4/2017        Chemotherapy-induced neutropenia (HCC) ICD-10-CM: D70.1  ICD-9-CM: 288.03, E933.1  12/20/2016        Pancytopenia ([address] ICD-10-CM: V90.567  ICD-9-CM: 284.19  10/10/2016        HTN (hypertension) (Chronic) ICD-10-CM: I10  ICD-9-CM: 401.9  10/7/2016        DM (diabetes mellitus) ([facility] (Chronic) ICD-10-CM: E11.9  ICD-9-CM: 250.00  10/7/2016        Acute on chronic renal failure (HCC) ICD-10-CM: N17.9, N18.9  ICD-9-CM: 584.9, 585.9  10/7/2016        Pulmonary edema ICD-10-CM: J81.1  ICD-9-CM: 315  1/3/2016        Elevated troponin ICD-10-CM: R74.8  ICD-9-CM: 790.6  10/20/2015        PND (paroxysmal nocturnal dyspnea) ICD-10-CM: R06.00  ICD-9-CM: 786.09  2/25/2014        Leg swelling ICD-10-CM: M79.89  ICD-9-CM: 729.81  2/25/2014    Overview Signed 2/25/2014  2:45 PM by Brad Ramos NP     Bilateral               CHF (congestive heart failure) ([facility] ICD-10-CM: I50.9  ICD-9-CM: 428.0  2/25/2014        Anemia ICD-10-CM: D64.9  ICD-9-CM: 285.9  11/19/2013    Overview Signed 11/19/2013  6:06 AM by Bryon Quiñonez     86-69-30 admitted with anemia and transfused              Asthma (Chronic) ICD-10-CM: J45.909  ICD-9-CM: 493.90  10/22/2013    Overview Addendum 10/22/2013 10:55 AM by Rj Brown NP     8/2013: started on Symbicort  10/22/13: Added singulair             Hypomagnesemia ICD-10-CM: H54.46  ICD-9-CM: 275.2  8/4/2013        Hypokalemia ICD-10-CM: E87.6  ICD-9-CM: 276.8  8/4/2013        Iron deficiency anemia (Chronic) ICD-10-CM: D50.9  ICD-9-CM: 280.9  6/10/2013            Ms. Manjit Ahuja is a 67yo female patient of Dr. Burris Both with metastatic breast cancer. She was admitted for hypoxemia with persistent effusion. She required intubation for respiratory failure. PLAN:  -Breast cancer with mets to bones and lungs  5/25 Currently on monthly fulvestrant and daily Ibrance. Hold Ibrance until pt follows up with Dr. Burris Both after discharge.     -Respiratory failure  05/25 Pulmonary managing  5/30  Pt was intubated 5/24-26. Now on O2 via NC. Thoracentesis performed on 5/25 - 950cc serosanguinous fluid removed and sent to lab. Pulm following. PC following to help with multiple pain and fatigue issues. Thank you for allowing us to participate in the care of Ms. Leggett. We will sign off for now. Please have her follow up with Dr. Burris Both after discharge. If we can be of any more assistance, please do not hesitate to ask.             Cassidy France NP   [facility]  [address] [address]  Office : [phone]  Fax : [phone]         Attending Addendum:  I personally evaluated the patient with Zoe Marie, N.P.,  and agree with the assessment, findings and plan as documented. Appears stable, we will sign off, she should follow-up with Dr. Woo Saini after discharge.               Lenore Travis MD  [address] [place] [address]  Office : [phone]  Fax : [phone]

## 2017-05-30 NOTE — PROGRESS NOTES
Rounded with staff, Interpreting Services offered when needed.           Thank you for this referral,       Lionel Valladares, [facility]  /[id number]  [address]    [phone]

## 2017-05-30 NOTE — PROGRESS NOTES
Patient sitting up in chair and patient seems well at this time. Patient has been given pain medications earlier and tolerated well. Patient stable at this time.  at bedside for support. Call light within reach.   Report to be given to oncoming RN 7p-7a

## 2017-05-30 NOTE — PROGRESS NOTES
Patient in bed resting with no complaints at this time. Patient is alert and orientated with no distress noted. IV intact and patent with no s/s of infection noted. Respirations even and unlabored with heart rate regular. Patient understands and answers yes no appropriately but only speaks Samoan. Will use  when needed. Patient unable to ambulate independently without assistance; needs x1 r/t weakness and dyspnea. Bed in low locked position with call light within reach. Patient instructed to call if assistance is needed. Will continue to monitor.

## 2017-05-30 NOTE — PROGRESS NOTES
SPEECH PATHOLOGY NOTE:    Attempted to see patient for diet tolerance. PT getting ready to work with her. Will re-attempt later today schedule permitting.     Kasey Gold MS, CCC-SLP

## 2017-05-30 NOTE — PROGRESS NOTES
present at bedside during assessment with Dr. Jordyn Stein.     [address] [place] [address]  [phone]

## 2017-05-30 NOTE — CONSULTS
Palliative Care    Patient: Ash Madrigal MRN: [id number]  SSN: [id number]    YOB: 1950  Age: 78 y.o. Sex: female       Date of Request: 5/30/2017  Date of Consult:  5/30/2017  Reason for Consult:  pain and symptom management and support and education  Requesting Physician: Italo Gonsalez     Assessment/Plan:     Principal Diagnosis:    Pain, nerve  M79.2-  Continue fentanyl patch, cymbalta for neuropathic component    Additional Diagnoses:   · Debility, Unspecified  R53.81  · Dyspnea  R06.00- likely related to pleural effusion, possible contribution from CHF/ aortic stenosis. Discussed with Pulmonary, Urology to be consulted regarding possibility of urinothorax  · Fatigue, Lethargy  R53.83  · Neuropathy, Peripheral  G62.9  · Counseling, Encounter for Medical Advice  Z71.9  · Encounter for Palliative Care  Z51.5    Palliative Performance Scale (PPS):  PPS: 50    Medical Decision Making:   Reviewed and summarized admission, progress notes, Oncology consult  Discussed case with appropriate providers. Dr. Sachin Rocha, Dr. Anneliese Rees laboratory and x-ray data. CBC, CMP, CXR    See above regarding symptom management. Also discussed with patient with aide of . Overall she states she feels tired and is questioning the ongoing benefit of her cancer treatment, despite understanding that her \"numbers\" have been stable. She lives at home with her  who works during the day. She has two sons, one of whom lives locally. Patient with several simultaneous serious medical conditions that will present treatment decisions (renal failure, aortic stenosis, metastatic breast cancer, recurrent pleural effusion). Will likely plan to follow patient when she returns to [facility] to continue discussions. Will discuss findings with members of the interdisciplinary team.      Thank you for this referral.   The Palliative Care team is available from 8 am to 4:30 pm Monday-Friday.   Medical management during other hours is per the primary attending service. .    Subjective:     History obtained from:  Patient    Chief Complaint: shortness of breath  History of Present Illness:  77 y/o female with long history of metastatic breast cancer most recently being treated with Faslodex/palbociclib. Now admitted with respiratory failure requiring temporary intubation, found to have large R pleural effusion which has an elevated concentration of creatinine raising question of urinothorax. Patient has stent in R ureter. Also with CKD with creatinine 3 range, as well as moderate aortic stenosis. Advance Directive: Not received      Code Status:  Full Code            Health Care Power of : Unknown    Past Medical History:   Diagnosis Date    Acute on chronic diastolic congestive heart failure ([address] 1/5/2016    Asthma     till age 31    Cancer ([address]     roverto. breast ca mets bone/lung    Chemotherapy-induced neutropenia ([address] 12/20/2016    Depression     Diabetes ([address]     recently dx'ed; ype 2;  does not take at home    DM (diabetes mellitus) ([address] 11/20/2012    HCAP (healthcare-associated pneumonia) 7/17/2013    Hypertension     Sepsis ([address] 7/19/2016    Thromboembolus ([address]     Left leg DVT    Unspecified adverse effect of anesthesia     In [facility] yrs ago after second c -section-she was told her heart stopped d/t anesthesia-hospitalized for 5 days afterwards and followed by cardiologist      Past Surgical History:   Procedure Laterality Date    HX GYN      2 c sections, ovarian cyst-roverto.     HX VASCULAR ACCESS  1/26/12     Family History   Problem Relation Age of Onset    Heart Attack Mother     Cancer Brother      doen not know details      Social History   Substance Use Topics    Smoking status: Former Smoker     Packs/day: 1.00     Years: 3.00     Types: Cigarettes     Quit date: 1/1/1978    Smokeless tobacco: Never Used      Comment: quit 30 or more years ago   Patrick Walls Alcohol use No     Prior to Admission medications    Medication Sig Start Date End Date Taking? Authorizing Provider   fentaNYL (DURAGESIC) 50 mcg/hr PATCH 1 Patch by TransDERmal route every seventy-two (72) hours. Max Daily Amount: 1 Patch. Indications: Chronic Pain with Opioid Tolerance 5/16/17  Yes Yolette Davis MD   HYDROcodone-acetaminophen [facility])  mg tablet Take 1 Tab by mouth every four (4) hours as needed. Max Daily Amount: 6 Tabs. 5/16/17  Yes Yolette Davis MD   montelukast (SINGULAIR) 10 mg tablet Take 1 Tab by mouth nightly. 5/16/17  Yes Yolette Davis MD   albuterol (PROVENTIL HFA, VENTOLIN HFA, PROAIR HFA) 90 mcg/actuation inhaler Take 2 Puffs by inhalation every six (6) hours as needed for Wheezing. 5/16/17  Yes Yolette Davis MD   amLODIPine (NORVASC) 10 mg tablet Take 1 Tab by mouth daily. 5/16/17  Yes Yolette Davis MD   ciprofloxacin HCl (CIPRO) 500 mg tablet Take 1 Tab by mouth two (2) times a day. 5/2/17  Yes Jennifer Stinson NP   LORazepam (ATIVAN) 1 mg tablet Take 1 Tab by mouth every six (6) hours as needed for Anxiety. Max Daily Amount: 4 mg. 2/14/17  Yes Vee Bell MD   warfarin (COUMADIN) 2 mg tablet Take 1 Tab by mouth daily. 12/26/16  Yes Vee Bell MD   glipiZIDE (GLUCOTROL) 5 mg tablet Take 1 Tab by mouth two (2) times a day. 11/22/16  Yes Vee Bell MD   palbociclib 125 mg cap Take 125 mg by mouth daily. Take one pill once a day for 3 weeks and then off for one week 11/21/16  Yes Vee Bell MD   budesonide-formoterol [facility]) 160-4.5 mcg/actuation HFA inhaler Take 2 Puffs by inhalation two (2) times a day. 10/25/16  Yes Vee Bell MD   atenolol (TENORMIN) 25 mg tablet Take 1 Tab by mouth every twelve (12) hours. 10/19/16  Yes Vee Bell MD   ALPRAZolam Lossie Drew) 0.25 mg tablet Take 1 Tab by mouth three (3) times daily as needed for Anxiety.  Max Daily Amount: 0.75 mg. 6/6/16  Yes Vee Bell MD   megestrol (MEGACE) 400 mg/10 mL (10 mL) suspension Take 5 mL by mouth daily. 1/12/16  Yes Sandhya Linda, NP       No Known Allergies     Review of Systems:  A comprehensive review of systems was negative except for: burning pain in heels, intermittent smothering sensation, generalized fatigue     Objective:     Visit Vitals    /59    Pulse 69    Temp 99.1 °F (37.3 °C)    Resp 16    Ht 5' 2\" (1.575 m)    Wt 67.8 kg (149 lb 8 oz)    SpO2 92%    BMI 27.34 kg/m2        Physical Exam:    General:  Cooperative. No acute distress. Eyes:  Conjunctivae/corneas clear    Nose: Nares normal. Septum midline. Neck: Supple, symmetrical, trachea midline, no JVD   Lungs:   Crackles R base   Heart:  7-7/0 systolic murmur, harsh   Abdomen:   Soft, non-tender, non-distended   Extremities: atraumatic, no cyanosis or edema   Skin: Skin color, texture, turgor normal. No rash or lesions. Neurologic: Nonfocal   Psych: Alert and oriented, somewhat flat affect      Assessment:     Hospital Problems  Date Reviewed: 5/26/2017          Codes Class Noted POA    Hypernatremia ICD-10-CM: E87.0  ICD-9-CM: 276.0  5/30/2017 Unknown        Anxiety ICD-10-CM: F41.9  ICD-9-CM: 300.00  5/28/2017 Unknown        * (Principal)Acute on chronic respiratory failure ([address] ICD-10-CM: J96.20  ICD-9-CM: 518.84  5/24/2017 Yes        Moderate aortic stenosis (Chronic) ICD-10-CM: I35.0  ICD-9-CM: 424.1  5/24/2017 Yes        Anticoagulated on Coumadin (Chronic) ICD-10-CM: Z51.81, Z79.01  ICD-9-CM: V58.83, V58.61  5/24/2017 Yes        Volume overload ICD-10-CM: E87.70  ICD-9-CM: 276.69  5/24/2017 Yes        Breast cancer metastasized to lung [facility]) (Chronic) ICD-10-CM: C50.919, C78.00  ICD-9-CM: 174.9, 197.0  2/17/2017 Yes        CKD (chronic kidney disease) (Chronic) ICD-10-CM: N18.9  ICD-9-CM: 585.9  1/4/2017 Yes    Overview Signed 5/14/2013 11:35 AM by Selina Mcdonald RN     With acute rise- ?  Dehydration and monitor             DVT (deep venous thrombosis) (HCC) (Chronic) ICD-10-CM: I82.409  ICD-9-CM: 453.40  1/4/2017 Yes    Overview Signed 11/19/2013  6:07 AM by Bernarda Nathan     09-18-19 There is nonocclusive thrombus within the left common femoral vein, superficial femoral vein, popliteal vein, and posterior tibial vein               Pleural effusion, right (Chronic) ICD-10-CM: J90  ICD-9-CM: 511.9  1/4/2017 Yes    Overview Addendum 5/26/2017 10:11 AM by Óscar Villegas NP     10/6/16 R thoracentesis 800 ml: pleural fluid creatine 4.1 = likely urinothorax  5/25/2017: Thoracentesis on right- 950 cc of /serosanguinous/ fluid             DM (diabetes mellitus) (HCC) (Chronic) ICD-10-CM: E11.9  ICD-9-CM: 250.00  10/7/2016 Yes        Acute on chronic renal failure ([facility] ICD-10-CM: N17.9, N18.9  ICD-9-CM: 584.9, 585.9  10/7/2016 Yes        Leg swelling ICD-10-CM: M79.89  ICD-9-CM: 729.81  2/25/2014 Yes    Overview Signed 2/25/2014  2:45 PM by Palak Peterson NP     Bilateral                     Signed By: Carolyne Aguilar MD     May 30, 2017

## 2017-05-31 LAB
ANION GAP BLD CALC-SCNC: 11 MMOL/L (ref 7–16)
BUN SERPL-MCNC: 58 MG/DL (ref 8–23)
CALCIUM SERPL-MCNC: 6.7 MG/DL (ref 8.3–10.4)
CHLORIDE SERPL-SCNC: 109 MMOL/L (ref 98–107)
CO2 SERPL-SCNC: 26 MMOL/L (ref 21–32)
CREAT SERPL-MCNC: 2.55 MG/DL (ref 0.6–1)
GLUCOSE BLD STRIP.AUTO-MCNC: 130 MG/DL (ref 65–100)
GLUCOSE BLD STRIP.AUTO-MCNC: 219 MG/DL (ref 65–100)
GLUCOSE BLD STRIP.AUTO-MCNC: 247 MG/DL (ref 65–100)
GLUCOSE BLD STRIP.AUTO-MCNC: 256 MG/DL (ref 65–100)
GLUCOSE SERPL-MCNC: 143 MG/DL (ref 65–100)
INR PPP: 1.8 (ref 0.9–1.2)
POTASSIUM SERPL-SCNC: 3.5 MMOL/L (ref 3.5–5.1)
PROTHROMBIN TIME: 19.6 SEC (ref 9.6–12)
SODIUM SERPL-SCNC: 146 MMOL/L (ref 136–145)

## 2017-05-31 PROCEDURE — 74011250636 HC RX REV CODE- 250/636: Performed by: INTERNAL MEDICINE

## 2017-05-31 PROCEDURE — 74011636637 HC RX REV CODE- 636/637: Performed by: INTERNAL MEDICINE

## 2017-05-31 PROCEDURE — 36415 COLL VENOUS BLD VENIPUNCTURE: CPT | Performed by: INTERNAL MEDICINE

## 2017-05-31 PROCEDURE — 99232 SBSQ HOSP IP/OBS MODERATE 35: CPT | Performed by: INTERNAL MEDICINE

## 2017-05-31 PROCEDURE — 74011000258 HC RX REV CODE- 258: Performed by: INTERNAL MEDICINE

## 2017-05-31 PROCEDURE — 65270000029 HC RM PRIVATE

## 2017-05-31 PROCEDURE — 85610 PROTHROMBIN TIME: CPT | Performed by: INTERNAL MEDICINE

## 2017-05-31 PROCEDURE — 80048 BASIC METABOLIC PNL TOTAL CA: CPT | Performed by: INTERNAL MEDICINE

## 2017-05-31 PROCEDURE — 77010033678 HC OXYGEN DAILY

## 2017-05-31 PROCEDURE — 74011250637 HC RX REV CODE- 250/637: Performed by: INTERNAL MEDICINE

## 2017-05-31 PROCEDURE — 94640 AIRWAY INHALATION TREATMENT: CPT

## 2017-05-31 PROCEDURE — 74011000250 HC RX REV CODE- 250: Performed by: INTERNAL MEDICINE

## 2017-05-31 PROCEDURE — 97150 GROUP THERAPEUTIC PROCEDURES: CPT

## 2017-05-31 PROCEDURE — 97530 THERAPEUTIC ACTIVITIES: CPT

## 2017-05-31 PROCEDURE — 82962 GLUCOSE BLOOD TEST: CPT

## 2017-05-31 PROCEDURE — 94760 N-INVAS EAR/PLS OXIMETRY 1: CPT

## 2017-05-31 PROCEDURE — 92526 ORAL FUNCTION THERAPY: CPT

## 2017-05-31 RX ADMIN — AZITHROMYCIN MONOHYDRATE 500 MG: 500 INJECTION, POWDER, LYOPHILIZED, FOR SOLUTION INTRAVENOUS at 22:32

## 2017-05-31 RX ADMIN — DEXTROSE MONOHYDRATE 50 ML/HR: 5 INJECTION, SOLUTION INTRAVENOUS at 22:33

## 2017-05-31 RX ADMIN — HYDRALAZINE HYDROCHLORIDE 50 MG: 50 TABLET, FILM COATED ORAL at 08:08

## 2017-05-31 RX ADMIN — ZOLPIDEM TARTRATE 5 MG: 5 TABLET ORAL at 22:30

## 2017-05-31 RX ADMIN — ALBUTEROL SULFATE 2.5 MG: 2.5 SOLUTION RESPIRATORY (INHALATION) at 07:52

## 2017-05-31 RX ADMIN — FUROSEMIDE 40 MG: 40 TABLET ORAL at 08:08

## 2017-05-31 RX ADMIN — HUMAN INSULIN 4 UNITS: 100 INJECTION, SOLUTION SUBCUTANEOUS at 16:19

## 2017-05-31 RX ADMIN — HYDROCODONE BITARTRATE AND ACETAMINOPHEN 1 TABLET: 10; 325 TABLET ORAL at 16:21

## 2017-05-31 RX ADMIN — ALBUTEROL SULFATE 2.5 MG: 2.5 SOLUTION RESPIRATORY (INHALATION) at 15:32

## 2017-05-31 RX ADMIN — LORAZEPAM 1 MG: 1 TABLET ORAL at 19:59

## 2017-05-31 RX ADMIN — MONTELUKAST SODIUM 10 MG: 10 TABLET, FILM COATED ORAL at 22:31

## 2017-05-31 RX ADMIN — HUMAN INSULIN 4 UNITS: 100 INJECTION, SOLUTION SUBCUTANEOUS at 22:30

## 2017-05-31 RX ADMIN — ALBUTEROL SULFATE 2.5 MG: 2.5 SOLUTION RESPIRATORY (INHALATION) at 20:03

## 2017-05-31 RX ADMIN — MEGESTROL ACETATE 200 MG: 40 SUSPENSION ORAL at 08:08

## 2017-05-31 RX ADMIN — DEXTROSE MONOHYDRATE 50 ML/HR: 5 INJECTION, SOLUTION INTRAVENOUS at 05:24

## 2017-05-31 RX ADMIN — MIRTAZAPINE 30 MG: 15 TABLET, FILM COATED ORAL at 22:31

## 2017-05-31 RX ADMIN — HYDRALAZINE HYDROCHLORIDE 50 MG: 50 TABLET, FILM COATED ORAL at 22:31

## 2017-05-31 RX ADMIN — Medication 5 ML: at 22:32

## 2017-05-31 RX ADMIN — BUDESONIDE 500 MCG: 0.5 SUSPENSION RESPIRATORY (INHALATION) at 07:52

## 2017-05-31 RX ADMIN — HUMAN INSULIN 6 UNITS: 100 INJECTION, SOLUTION SUBCUTANEOUS at 11:43

## 2017-05-31 RX ADMIN — HYDRALAZINE HYDROCHLORIDE 50 MG: 50 TABLET, FILM COATED ORAL at 16:20

## 2017-05-31 RX ADMIN — Medication 5 ML: at 05:26

## 2017-05-31 RX ADMIN — Medication 5 ML: at 14:00

## 2017-05-31 RX ADMIN — BUDESONIDE 500 MCG: 0.5 SUSPENSION RESPIRATORY (INHALATION) at 20:03

## 2017-05-31 RX ADMIN — CEFTRIAXONE 1 G: 1 INJECTION, POWDER, FOR SOLUTION INTRAMUSCULAR; INTRAVENOUS at 11:44

## 2017-05-31 RX ADMIN — FAMOTIDINE 20 MG: 40 POWDER, FOR SUSPENSION ORAL at 08:11

## 2017-05-31 RX ADMIN — AMLODIPINE BESYLATE 10 MG: 10 TABLET ORAL at 08:07

## 2017-05-31 RX ADMIN — ATENOLOL 25 MG: 50 TABLET ORAL at 08:07

## 2017-05-31 RX ADMIN — DULOXETINE 20 MG: 20 CAPSULE, DELAYED RELEASE ORAL at 08:08

## 2017-05-31 NOTE — PROGRESS NOTES
Problem: Mobility Impaired (Adult and Pediatric)  Goal: *Acute Goals and Plan of Care (Insert Text)    LTG:  (1.)Ms. Leggett will move from supine to sit and sit to supine , scoot up and down and roll side to side in bed with STAND BY ASSIST within 8 day(s). (2.)Ms. Leggett will transfer from bed to chair and chair to bed with STAND BY ASSIST using the least restrictive device within 8 day(s). (3.)Ms. Leggett will ambulate with MINIMAL ASSIST for 20+ feet with the least restrictive device within 8 day(s). ________________________________________________________________________________________________      PHYSICAL THERAPY: Daily Note, Treatment Day: 2nd and PM 5/31/2017  INPATIENT: Hospital Day: 8  Payor: SELF PAY / Plan: [facility] SELF PAY / Product Type: Self Pay /      NAME/AGE/GENDER: Breezy Post is a 78 y.o. female      PRIMARY DIAGNOSIS: Pleural effusion [J90]  Pleural effusion [J90] Acute on chronic respiratory failure (HCC) Acute on chronic respiratory failure (HCC)  Procedure(s) (LRB):  ULTRASOUND (Right)  THORACENTESIS (Right)  6 Days Post-Op  ICD-10: Treatment Diagnosis:       · Generalized Muscle Weakness (M62.81)  · Difficulty in walking, Not elsewhere classified (R26.2)   Precaution/Allergies:  Review of patient's allergies indicates no known allergies. ASSESSMENT:      Ms. Adele Middleton presents sitting up in the chair with  at bedside. Pt stood and was able to ambulate about 25' using rolling walker and CGA/min assist, O2 at 2L/min throughout treatment. Pt did require a couple of rest breaks during ambulation and cues for pursed lip breathing. Pt's O2 sats were 95% after ambulation, however pt was SOB. Pt returned to chair and was left up with spouse present. Slow progress with ambulation distance due to SOB. Will continue with POC. This section established at most recent assessment   PROBLEM LIST (Impairments causing functional limitations):  1.  Decreased Strength  2. Decreased ADL/Functional Activities  3. Decreased Transfer Abilities  4. Decreased Ambulation Ability/Technique  5. Decreased Balance  6. Decreased Activity Tolerance  7. Decreased Pacing Skills  8. Increased Fatigue  9. Increased Shortness of Breath  10. Decreased Flexibility/Joint Mobility    INTERVENTIONS PLANNED: (Benefits and precautions of physical therapy have been discussed with the patient.)  1. Balance Exercise  2. Bed Mobility  3. Gait Training  4. Home Exercise Program (HEP)  5. Neuromuscular Re-education/Strengthening  6. Range of Motion (ROM)  7. Therapeutic Activites  8. Therapeutic Exercise/Strengthening  9. Transfer Training  10. Group Therapy      TREATMENT PLAN: Frequency/Duration: 3 times a week for duration of hospital stay  Rehabilitation Potential For Stated Goals: FAIR      RECOMMENDED REHABILITATION/EQUIPMENT: (at time of discharge pending progress): Continue Skilled Therapy, Home Health: Physical Therapy, Rehab and Adaptive Equipment: Walkers, Type: Rolling Walker. HISTORY:   History of Present Injury/Illness (Reason for Referral):  78 y.o. female presents with dyspnea. She was last seen by us in February of 2017 for right pleural effusion. She has dCHR, prior DVT on coumadin, CKD, Moderate AS, HTN, DM and R ureteral obstruction s/p stent placement. She is followed by Dr Charmayne Pattee for metastatic breast CA with mets to lung/bone. In February, she underwent R thoracentesis with drain to gravity with removal of 1200 ml of yellow pleural fluid. She developed pain after thoracentesis and pain was felt to be from trapped lung physiology and recommended not to remove more than 1 liter of pleural fluid if thoracentesis is again necessary. She was hypoxemic and home O2 arranged. She had a CXR in April 2017 with persistent effusion. She was seen by Oncology on 5/16/2017 and was having dyspnea during that time with fatigue.  She was given lasix as well and has taken lasix until 3 days ago. She has plans to see [facility] Cardiology on June 2. Today in the ER, her CXR shows effusion on right which is unchanged and new right perihilar infiltrate. We were asked to admit her for hypoxemia with persistent effusion, new infiltrate with metastatic breast cancer with acute on chronic renal failure and moderate AS. She is currently requiring BiPAP and we were asked to admit to the ICU  Past Medical History/Comorbidities:   Ms. Pawel Callahan  has a past medical history of Acute on chronic diastolic congestive heart failure ([address] (1/5/2016); Asthma; Cancer ([facility] Chemotherapy-induced neutropenia (HCC) (12/20/2016); Depression; Diabetes ([address] DM (diabetes mellitus) ([facility] (11/20/2012); HCAP (healthcare-associated pneumonia) (7/17/2013); Hypertension; Sepsis ([facility] (7/19/2016); Thromboembolus ([facility] and Unspecified adverse effect of anesthesia. Ms. Pawel Callahan  has a past surgical history that includes vascular access (1/26/12) and gyn. Social History/Living Environment:   Home Environment: Private residence  # Steps to Enter: 1  One/Two Story Residence: One story  Living Alone: No  Support Systems: Spouse/Significant Other/Partner, Family member(s)  Patient Expects to be Discharged to[de-identified] Skilled nursing facility  Current DME Used/Available at Home: None  Prior Level of Function/Work/Activity:  Pt limited household ambulator reports she does not use AD, but possibly furniture walker.        Number of Personal Factors/Comorbidities that affect the Plan of Care: 3+: HIGH COMPLEXITY   EXAMINATION:   Most Recent Physical Functioning:   Gross Assessment:                  Posture:     Balance:  Sitting - Static: Good (unsupported)  Sitting - Dynamic: Fair (occasional)  Standing - Static: Fair  Standing - Dynamic : Fair Bed Mobility:     Wheelchair Mobility:     Transfers:  Sit to Stand: Contact guard assistance  Stand to Sit: Contact guard assistance  Gait:     Base of Support: Narrowed  Speed/Lizzette: Slow  Step Length: Left shortened;Right shortened  Distance (ft): 25 Feet (ft)  Assistive Device: Walker, rolling  Ambulation - Level of Assistance: Contact guard assistance;Minimal assistance       Body Structures Involved:  1. Nerves  2. Heart  3. Lungs  4. Bones  5. Joints  6. Muscles  7. Ligaments Body Functions Affected:  1. Mental  2. Sensory/Pain  3. Cardio  4. Respiratory  5. Neuromusculoskeletal  6. Movement Related  7. language barrier Activities and Participation Affected:  1. Learning and Applying Knowledge  2. General Tasks and Demands  3. Communication  4. Mobility  5. Self Care  6. Community, Social and [place]   Number of elements that affect the Plan of Care: 4+: HIGH COMPLEXITY   CLINICAL PRESENTATION:   Presentation: Evolving clinical presentation with unstable and unpredictable characteristics: HIGH COMPLEXITY   CLINICAL DECISION MAKIN [facility] Inpatient Short Form  How much difficulty does the patient currently have. .. Unable A Lot A Little None   1. Turning over in bed (including adjusting bedclothes, sheets and blankets)? [ ] 1   [ ] 2   [X] 3   [ ] 4   2. Sitting down on and standing up from a chair with arms ( e.g., wheelchair, bedside commode, etc.)   [ ] 1   [X] 2   [ ] 3   [ ] 4   3. Moving from lying on back to sitting on the side of the bed? [ ] 1   [X] 2   [ ] 3   [ ] 4   How much help from another person does the patient currently need. .. Total A Lot A Little None   4. Moving to and from a bed to a chair (including a wheelchair)? [ ] 1   [ ] 2   [X] 3   [ ] 4   5. Need to walk in hospital room? [ ] 1   [X] 2   [ ] 3   [ ] 4   6. Climbing 3-5 steps with a railing? [X] 1   [ ] 2   [ ] 3   [ ] 4   © , Trustees of [address] [place] [address] [address] [address], under license to [employer].  All rights reserved    Score:  Initial: 13 Most Recent: X (Date: -- )     Interpretation of Tool:  Represents activities that are increasingly more difficult (i.e. Bed mobility, Transfers, Gait). Score 24 23 22-20 19-15 14-10 9-7 6       Modifier CH CI CJ CK CL CM CN         · Mobility - Walking and Moving Around:               - CURRENT STATUS:    CL - 60%-79% impaired, limited or restricted               - GOAL STATUS:           CL - 60%-79% impaired, limited or restricted               - D/C STATUS:                       ---------------To be determined---------------  Payor: SELF PAY / Plan: [facility] SELF PAY / Product Type: Self Pay /       Medical Necessity:     · Skilled intervention continues to be required due to decreased function. Reason for Services/Other Comments:  · Patient continues to require skilled intervention due to medical complications and patient unable to attend/participate in therapy as expected. Use of outcome tool(s) and clinical judgement create a POC that gives a: Difficult prediction of patient's progress: HIGH COMPLEXITY                 TREATMENT:      Pre-treatment Symptoms/Complaints:  No complaints  Pain: Initial:      Post Session:  No response      Therapeutic Activity: (    14 minutes): Therapeutic activities including Chair transfers, Ambulation on level ground and monitored sats to improve mobility, strength, balance and endurance. Required minimal   to promote static and dynamic balance in standing as well as pursed lip breathing. Group Therapeutic Exercise: (  0 minutes):  Exercises per grid below to improve mobility, strength and balance. Required minimal verbal and manual cues to promote proper body posture and promote proper body mechanics. Progressed range and repetitions as indicated.      Date:  5/31/17 Date:   Date:     ACTIVITY/EXERCISE AM PM AM PM AM PM   Ambulation:           Distance  Device  Duration Group exs        Seated Heel Raises X 15 B        Seated Toe Raises X 15 B        Seated Long Arc Quads X 15 B        Seated Marching X 15 B        Seated Hip Abduction X 15 B                 B = bilateral; AA = active assistive; A = active; P = passive           Braces/Orthotics/Lines/Etc:   · IV, denson catheter and 2L  Treatment/Session Assessment:    · Response to Treatment:  See above  · Interdisciplinary Collaboration:  · Physical Therapy Assistant and Registered Nurse  · After treatment position/precautions:  · Up in chair, Bed/Chair-wheels locked, Call light within reach, RN notified and Family at bedside  · Compliance with Program/Exercises: Will assess as treatment progresses. · Recommendations/Intent for next treatment session: \"Next visit will focus on advancements to more challenging activities and reduction in assistance provided\".   Total Treatment Duration:  PT Patient Time In/Time Out  Time In: 1320  Time Out: [address], PTA

## 2017-05-31 NOTE — PROGRESS NOTES
Patient in bed resting with no complaints at this time. Patient is alert and orientated with no distress noted. IV intact and patent with no s/s of infection noted. Respirations even and unlabored with heart rate regular. Patient unable to ambulate independently without assistance; needs x1 r/t weakness and pain. Patient understands English and does answer appropriately.  here if needed, will use if necessary. Bed in low locked position with call light within reach. Patient instructed to call if assistance is needed. Will continue to monitor.

## 2017-05-31 NOTE — PROGRESS NOTES
Patient denies significant pain in her flank. Afebrile vital signs stable  Alert and oriented  Creatinine is down to 2.55  Impression recurrent right pleural effusion with left hydronephrosis status post stent placement in October    Plan: We'll sign off for now. When pulmonary feels the patient is best optimized to undergo anesthesia she should have the stent exchanged as it has been greater than 6 months since it has been in.

## 2017-05-31 NOTE — PROGRESS NOTES
STG: Pt will tolerate regular/thin liquids no straws without signs/sx of aspiration with 100% accuracy (goal updated 5/31/17)  STG: Pt will participate with trials of regular textures for potential diet advancement as respiratory status allows  LTG: Pt will tolerate the least restrictive diet at discharge without respiratory compromise    Speech language pathology: bedside swallow note: Daily Note 1    NAME/AGE/GENDER: Nickie Singh is a 78 y.o. female  DATE: 5/31/2017  PRIMARY DIAGNOSIS: Pleural effusion [J90]  Pleural effusion [J90]  Procedure(s) (LRB):  ULTRASOUND (Right)  THORACENTESIS (Right) 6 Days Post-Op  ICD-10: Treatment Diagnosis: dysphagia; unspecified R13.12  INTERDISCIPLINARY COLLABORATION: RN and   PRECAUTIONS/ALLERGIES: Review of patient's allergies indicates no known allergies. ASSESSMENT:Patient seen for diet tolerance with use of  phone. Voice remains slightly hoarse but without complaints of odynophagia. Patient and family deny swallowing difficulty. Patient requiring less 02 with decreased RR compared with previous assessment when initially extubated on 5/25. Tolerated mixed, regular, and thin liquids by single cup sip without overt signs/sx of aspiration. Delayed cough x1 of 3 trials when taking a larger sip via the straw. Recommend diabetic regular diet/thin liquids by cup. No straws. Fully upright with small bites/sips. Will follow for diet tolerance. Patient will benefit from skilled intervention to address the below impairments. ?????? ? ? This section established at most recent assessment??????????  PROBLEM LIST (Impairments causing functional limitations):  1. Dysphagia  2. Mild dysphonia  REHABILITATION POTENTIAL FOR STATED GOALS: Good  PLAN OF CARE:   Patient will benefit from skilled intervention to address the following impairments.   RECOMMENDATIONS AND PLANNED INTERVENTIONS (Benefits and precautions of therapy have been discussed with the patient.):  · PO:  Regular  · Liquids:  regular thin by cup  MEDICATIONS:  · With liquid one at a time  COMPENSATORY STRATEGIES/MODIFICATIONS INCLUDING:  · Small sips and bites  OTHER RECOMMENDATIONS (including follow up treatment recommendations): · Family training/education  · Patient education  · diet tolerance  RECOMMENDED DIET MODIFICATIONS DISCUSSED WITH:  · Medical Sub-Specialist  · Nursing  · Family  · Patient  FREQUENCY/DURATION: Continue to follow patient 3x a week or until short term goals are met to address above goals. RECOMMENDED REHABILITATION/EQUIPMENT: (at time of discharge pending progress):   Continue Skilled Therapy. SUBJECTIVE:   Seems depressed. History of Present Injury/Illness: Ms. Drew Cervantes  has a past medical history of Acute on chronic diastolic congestive heart failure ([facility] (1/5/2016); Asthma; Cancer [facility] Chemotherapy-induced neutropenia (HCC) (12/20/2016); Depression; Diabetes ([facility] DM (diabetes mellitus) ([facility] (11/20/2012); HCAP (healthcare-associated pneumonia) (7/17/2013); Hypertension; Sepsis ([facility] (7/19/2016); Thromboembolus ([facility] and Unspecified adverse effect of anesthesia. She also  has a past surgical history that includes vascular access (1/26/12) and gyn. Present Symptoms: s/p extubation  Pain Intensity 1: 0  Pain Location 1: Head  Pain Orientation 1: Anterior  Pain Intervention(s) 1: Medication (see MAR)  Current Medications:   No current facility-administered medications on file prior to encounter. Current Outpatient Prescriptions on File Prior to Encounter   Medication Sig Dispense Refill    fentaNYL (DURAGESIC) 50 mcg/hr PATCH 1 Patch by TransDERmal route every seventy-two (72) hours. Max Daily Amount: 1 Patch. Indications: Chronic Pain with Opioid Tolerance 10 Patch 0    HYDROcodone-acetaminophen (NORCO)  mg tablet Take 1 Tab by mouth every four (4) hours as needed. Max Daily Amount: 6 Tabs.  90 Tab 0    montelukast (SINGULAIR) 10 mg tablet Take 1 Tab by mouth nightly. 30 Tab 3    albuterol (PROVENTIL HFA, VENTOLIN HFA, PROAIR HFA) 90 mcg/actuation inhaler Take 2 Puffs by inhalation every six (6) hours as needed for Wheezing. 1 Inhaler 11    amLODIPine (NORVASC) 10 mg tablet Take 1 Tab by mouth daily. 30 Tab 11    ciprofloxacin HCl (CIPRO) 500 mg tablet Take 1 Tab by mouth two (2) times a day. 10 Tab 0    LORazepam (ATIVAN) 1 mg tablet Take 1 Tab by mouth every six (6) hours as needed for Anxiety. Max Daily Amount: 4 mg. 60 Tab 0    warfarin (COUMADIN) 2 mg tablet Take 1 Tab by mouth daily. 30 Tab 20    glipiZIDE (GLUCOTROL) 5 mg tablet Take 1 Tab by mouth two (2) times a day. 60 Tab 5    palbociclib 125 mg cap Take 125 mg by mouth daily. Take one pill once a day for 3 weeks and then off for one week 21 Tab 5    budesonide-formoterol (SYMBICORT) 160-4.5 mcg/actuation HFA inhaler Take 2 Puffs by inhalation two (2) times a day. 1 Inhaler 11    atenolol (TENORMIN) 25 mg tablet Take 1 Tab by mouth every twelve (12) hours. 60 Tab 0    ALPRAZolam (XANAX) 0.25 mg tablet Take 1 Tab by mouth three (3) times daily as needed for Anxiety. Max Daily Amount: 0.75 mg. 90 Tab 2    megestrol (MEGACE) 400 mg/10 mL (10 mL) suspension Take 5 mL by mouth daily.  240 mL 2     Current Dietary Status: [facility] soft/thin      Social History/Home Situation: home with   Home Environment: Private residence  # Steps to Enter: 1  One/Two Story Residence: One story  Living Alone: No  Support Systems: Spouse/Significant Other/Partner, Family member(s)  Patient Expects to be Discharged to[de-identified] Skilled nursing facility  Current DME Used/Available at Home: None  OBJECTIVE:   Respiratory Status:  Nasal cannula  3 l/min  CXR Results:Chest appearance is overall stable compared to the prior exam  MRI/CT Results: n/a  Oral Motor Structure/Speech:  Oral-Motor Structure/Motor Speech  Labial: No impairment  Dentition: Intact, Natural  Oral Hygiene: adequate  Lingual: No impairment    Cognitive and Communication Status:  Neurologic State: Alert  Orientation Level: Oriented X4  Cognition: Appropriate for age attention/concentration  Perception: Appears intact  Auditory Comprehension:      Oral Assessment:  Oral Assessment  Labial: No impairment  Dentition: Intact; Natural    P.O. Trials: The patient was given tsp to straw amounts of the following:   Consistency Presented: Thin liquid; Solid;Mixed consistency  How Presented: Self-fed/presented;Straw;Successive swallows;Cup/sip    ORAL PHASE:  Bolus Acceptance: No impairment  Bolus Formation/Control: Impaired  Propulsion: No impairment  Type of Impairment: Delayed (mild)  Oral Residue: None    PHARYNGEAL PHASE:  Initiation of Swallow: No impairment  Laryngeal Elevation: Functional  Aspiration Signs/Symptoms: Delayed cough/throat clear  Vocal Quality: Hoarse           Pharyngeal Phase Characteristics: Poor endurance      OTHER OBSERVATIONS:  Rate/bite size: WNL   Endurance:  Impaired     Tool Used: Dysphagia Outcome and Severity Scale (COURTNEY)    Score Comments   Normal Diet  [] 7 With no strategies or extra time needed   Functional Swallow  [] 6 May have mild oral or pharyngeal delay       Mild Dysphagia    [x] 5 Which may require one diet consistency restricted (those who demonstrate penetration which is entirely cleared on MBS would be included)   Mild-Moderate Dysphagia  [] 4 With 1-2 diet consistencies restricted       Moderate Dysphagia  [] 3 With 2 or more diet consistencies restricted       Moderately Severe Dysphagia  [] 2 With partial PO strategies (trials with ST only)       Severe Dysphagia  [] 1 With inability to tolerate any PO safely          Score:  Initial: 5 Most Recent: X (Date: -- )   Interpretation of Tool: The Dysphagia Outcome and Severity Scale (COURTNEY) is a simple, easy-to-use, 7-point scale developed to systematically rate the functional severity of dysphagia based on objective assessment and make recommendations for diet level, independence level, and type of nutrition. Score 7 6 5 4 3 2 1   Modifier CH CI CJ CK CL CM CN   ? Swallowing:     - CURRENT STATUS: CJ - 20%-39% impaired, limited or restricted    - GOAL STATUS:  CH - 0% impaired, limited or restricted    - D/C STATUS:  ---------------To be determined---------------  Payor: SELF PAY / Plan: [facility] SELF PAY / Product Type: Self Pay /     TREATMENT:    (In addition to Assessment/Re-Assessment sessions the following treatments were rendered)  Assessment/Reassessment only, no treatment provided today  MODALITIES:                                                                    ORAL MOTOR  EXERCISES:                                                                                                                                                                      LARYNGEAL / PHARYNGEAL EXERCISES:                                                                                                                                     __________________________________________________________________________________________________  Safety:   After treatment position/precautions:  · RN notified  · Family at bedside  · Upright in Bed  Progression/Medical Necessity:   · Skilled intervention continues to be required due to patient still consuming a modified diet and unable to attend/participate in therapy as expected. Compliance with Program/Exercises: Will assess as treatment progresses. Reason for Continuation of Services/Other Comments:  · Patient continues to require skilled intervention due to patient unable to attend/participate in therapy as expected. Recommendations/Intent for next treatment session: \"Treatment next visit will focus on diet tolerance\".     Total Treatment Duration:  Time In: 1400  Time Out: Afshin Kruse MS, CCC-SLP

## 2017-05-31 NOTE — PROGRESS NOTES
Problem: Self Care Deficits Care Plan (Adult)  Goal: *Acute Goals and Plan of Care (Insert Text)  1. Annabel Villalpando will complete bedside commode/toilet transfers with contact guard assistance. 2. Annabel Villalpando will complete toileting with contact guard assistance. 3. Annabel Villalpando will complete self-feeding with supervision/setup. 4. Annabel Villalpando will complete grooming/oral care with supervision/setup. 5. Annabel Villalpando will complete UE exercise 15 minutes with 3 or less rest breaks in prep for ADL. Time frame: 4 - 7 visits      OCCUPATIONAL THERAPY: Daily Note, Treatment Day: 1st and AM 5/31/2017  INPATIENT: Hospital Day: 8  Payor: SELF PAY / Plan: [facility] SELF PAY / Product Type: Self Pay /      NAME/AGE/GENDER: Annabel Villalpando is a 78 y.o. female      PRIMARY DIAGNOSIS:  Pleural effusion [J90]  Pleural effusion [J90] Acute on chronic respiratory failure (HCC) Acute on chronic respiratory failure (HCC)  Procedure(s) (LRB):  ULTRASOUND (Right)  THORACENTESIS (Right)  6 Days Post-Op  ICD-10: Treatment Diagnosis:        · Generalized Muscle Weakness (M62.81)   Precautions/Allergies:         Review of patient's allergies indicates no known allergies. ASSESSMENT:   Ms. Willow Arellano was admitted for the above diagnosis. Pt was brought to therapy gym to participate in group exercises (in grid below) to increase UE strength and activity tolerance to perform mobility and ADLs. Pt required visual, verbal, and tactile cueing to complete exercises. Pt tolerated session well. Returned to room by recliner. Pt will continue to benefit from skilled OT during stay. This section established at most recent assessment   PROBLEM LIST (Impairments causing functional limitations):  1. Decreased Strength  2. Decreased ADL/Functional Activities  3. Decreased Transfer Abilities  4. Decreased Balance  5. Increased Pain  6. Decreased Activity Tolerance  7. Decreased Pacing Skills  8.  Decreased Work Simplification/Energy Conservation Techniques  9. Increased Fatigue  10. Increased Shortness of Breath  11. Decreased Flexibility/Joint Mobility  12. Edema/Girth  13. Decreased [place] with Home Exercise Program    INTERVENTIONS PLANNED: (Benefits and precautions of occupational therapy have been discussed with the patient.)  1. Activities of daily living training  2. Therapeutic activity  3. Therapeutic exercise      TREATMENT PLAN: Frequency/Duration: Follow patient 3 times/week to address above goals. Rehabilitation Potential For Stated Goals: GOOD      RECOMMENDED REHABILITATION/EQUIPMENT: (at time of discharge pending progress): Continue Skilled Therapy. OCCUPATIONAL PROFILE AND HISTORY:   History of Present Injury/Illness (Reason for Referral):  Per MD H & P: Patient is a 78 y.o. female presents with dyspnea. She was last seen by us in February of 2017 for right pleural effusion. She has dCHR, prior DVT on coumadin, CKD, Moderate AS, HTN, DM and R ureteral obstruction s/p stent placement. She is followed by Dr Jim Cooley for metastatic breast CA with mets to lung/bone. In February, she underwent R thoracentesis with drain to gravity with removal of 1200 ml of yellow pleural fluid. She developed pain after thoracentesis and pain was felt to be from trapped lung physiology and recommended not to remove more than 1 liter of pleural fluid if thoracentesis is again necessary. She was hypoxemic and home O2 arranged. She had a CXR in April 2017 with persistent effusion. She was seen by Oncology on 5/16/2017 and was having dyspnea during that time with fatigue. She was given lasix as well and has taken lasix until 3 days ago. She has plans to see [facility] on June 2. Today in the ER, her CXR shows effusion on right which is unchanged and new right perihilar infiltrate.  We were asked to admit her for hypoxemia with persistent effusion, new infiltrate with metastatic breast cancer with acute on chronic renal failure and moderate AS. She is currently requiring BiPAP and we were asked to admit to the ICU. Past Medical History/Comorbidities:   Ms. Reynaldo John  has a past medical history of Acute on chronic diastolic congestive heart failure ([facility] (1/5/2016); Asthma; Cancer ([facility] Chemotherapy-induced neutropenia (HCC) (12/20/2016); Depression; Diabetes ([facility] DM (diabetes mellitus) ([facility] (11/20/2012); HCAP (healthcare-associated pneumonia) (7/17/2013); Hypertension; Sepsis ([facility] (7/19/2016); Thromboembolus ([facility] and Unspecified adverse effect of anesthesia. Ms. Reynaldo John  has a past surgical history that includes vascular access (1/26/12) and gyn. Social History/Living Environment:   Home Environment: Private residence  # Steps to Enter: 1  One/Two Story Residence: One story  Living Alone: No  Support Systems: Spouse/Significant Other/Partner, Family member(s)  Patient Expects to be Discharged to[de-identified] Skilled nursing facility  Current DME Used/Available at Home: None  Prior Level of Function/Work/Activity:  Lives with  and wearing 3 L oxygen via nasal cannula. Cooking and walking to restroom, etc., but getting more fatigued lately per . When leaves house uses a wheelchair/transport chair? Cultural Preferences:          Scottish speaking. Number of Personal Factors/Comorbidities that affect the Plan of Care: Expanded review of therapy/medical records (1-2):  MODERATE COMPLEXITY   ASSESSMENT OF OCCUPATIONAL PERFORMANCE[de-identified]   Activities of Daily Living:           Basic ADLs (From Assessment) Complex ADLs (From Assessment)   Basic ADL  Feeding: Moderate assistance  Oral Facial Hygiene/Grooming: Maximum assistance  Bathing: Total assistance  Upper Body Dressing: Maximum assistance  Lower Body Dressing: Total assistance  Toileting: Total assistance Instrumental ADL  Meal Preparation: Total assistance  Homemaking:  Total assistance   Grooming/Bathing/Dressing Activities of Daily Living     Cognitive Retraining  Safety/Judgement: Awareness of environment                                  Most Recent Physical Functioning:   Gross Assessment:                       Balance:  Sitting: Impaired  Sitting - Static: Good (unsupported)  Sitting - Dynamic: Fair (occasional)  Bed Mobility:     Transfers:                    Patient Vitals for the past 6 hrs:   BP BP Patient Position SpO2 O2 Flow Rate (L/min) Pulse   17 0729 135/57 At rest 94 % - 67   17 0752 - - 93 % 2 l/min -   17 1055 132/50 Sitting 97 % - 67        Mental Status  Neurologic State: Alert, Appropriate for age  Orientation Level: Oriented X4  Cognition: Follows commands  Perception: Appears intact  Perseveration: No perseveration noted  Safety/Judgement: Awareness of environment                               Physical Skills Involved:  1. Range of Motion  2. Balance  3. Strength  4. Activity Tolerance  5. Gross Motor Control  6. Pain (Chronic) Cognitive Skills Affected (resulting in the inability to perform in a timely and safe manner):  1. difficult to assess Psychosocial Skills Affected:  1. Habits/Routines  2. Environmental Adaptation  3. Social Roles   Number of elements that affect the Plan of Care: 3-5:  MODERATE COMPLEXITY   CLINICAL DECISION MAKIN [facility] [address] [id number] AM-PAC 6 Clicks   Basic Mobility Inpatient Short Form  How much help from another person does the patient currently need. .. Total A Lot A Little None   1. Putting on and taking off regular lower body clothing? [X] 1   [ ] 2   [ ] 3   [ ] 4   2. Bathing (including washing, rinsing, drying)? [X] 1   [ ] 2   [ ] 3   [ ] 4   3. Toileting, which includes using toilet, bedpan or urinal?   [X] 1   [ ] 2   [ ] 3   [ ] 4   4. Putting on and taking off regular upper body clothing? [X] 1   [ ] 2   [ ] 3   [ ] 4   5. Taking care of personal grooming such as brushing teeth? [ ] 1   [X] 2   [ ] 3   [ ] 4   6. Eating meals?    [ ] 1   [X] 2   [ ] 3   [ ] 4   © 2007, Trustees of [address] [place] [address] [address] [id number], under license to [employer]. All rights reserved    Score:  Initial: 8 Most Recent: X (Date: -- )     Interpretation of Tool:  Represents activities that are increasingly more difficult (i.e. Bed mobility, Transfers, Gait). Score 24 23 22-20 19-15 14-10 9-7 6       Modifier CH CI CJ CK CL CM CN         · Self Care:               - CURRENT STATUS:    CM - 80%-99% impaired, limited or restricted               - GOAL STATUS:           CJ - 20%-39% impaired, limited or restricted               - D/C STATUS:                       ---------------To be determined---------------  Payor: SELF PAY / Plan: [facility] / Product Type: Self Pay /       Medical Necessity:     · Patient demonstrates fair rehab potential due to higher previous functional level. Reason for Services/Other Comments:  · Patient continues to require skilled intervention due to decreased independence with ADL and functional mobility for ADL. Assessment Modification and Need for Assistance:  1. No modification or assistance (Low)  2. Minimal to moderate modification or assistance (Moderate)  3. Significant modification or assistance (High)     Assessment Modifications Needed:  · No modification or assistance (Low)  · Cueing (Verbal, Tactile. Gamal Basket Gamal Basket Gamal Basket )  · Physical Assistance  ·   · Minimal to moderate modification or assistance (Moderate)  · Significant modification or assistance (High)   Assessment process, impact of co-morbidities, assessment modification\need for assistance, and selection of interventions: MODERATE COMPLEXITY          TREATMENT:   (In addition to Assessment/Re-Assessment sessions the following treatments were rendered)      Pre-treatment Symptoms/Complaints:    Pain: Initial:   Pain Intensity 1: 0  Post Session:  same      Group Therapeutic Exercise: (10 minutes):  Exercises per grid below to improve mobility, strength and activity tolerance.   Required minimal visual, verbal and tactile cues to promote proper body alignment and promote proper body mechanics. Progressed resistance and repetitions as indicated. UE Exercises (with theraband and 2# dowel) Date:  5/31/2017 Date:   Date:     Activity/Exercise Parameters Parameters Parameters   Shoulder Abd/Adduction 15 reps      Shoulder Flexion 15 reps with dowel     Elbow Flexion 20 reps with dowel     Punches 15 reps with dowel                              Braces/Orthotics/Lines/Etc:   · IV  · denson catheter  · O2 Device: Heated, Hi flow nasal cannula  Treatment/Session Assessment:    · Response to Treatment:  No treatment rendered. Assessment only. · Interdisciplinary Collaboration:  · Certified Occupational Therapy Assistant and Registered Nurse  · After treatment position/precautions:  · Up in chair, Bed/Chair-wheels locked, Call light within reach and RN notified  · Compliance with Program/Exercises: Will assess as treatment progresses. · Recommendations/Intent for next treatment session: \"Next visit will focus on advancements to more challenging activities\".   Total Treatment Duration:  OT Patient Time In/Time Out  Time In: 1120  Time Out: [address] [place]

## 2017-05-31 NOTE — PROGRESS NOTES
Warfarin dosing per pharmacist    Annmarie Medina is a 78 y.o. female. Height: 5' 2\" (157.5 cm)    Weight: 66.7 kg (147 lb)    Indication:  H/o DVT    Goal INR:  2-3    Home dose:  Per AC note on 5/23, pt has recently had dose decrease to 1 mg hs (7 mg/week)    Risk factors or significant drug interactions:  azithromycin    Other anticoagulants:  none    Daily Monitoring  Date  INR     Warfarin dose HGB              Notes  5/26  2.3  1 mg  8.2  5/27  2.3  1 mg  ---  5/28  2.2  1 mg  ---  5/29  2.0  2 mg  9.9   5/30  1.6  2 mg+ 1 mg 9.5  5/31  1.8  2 mg     Pt coumadin has been held since admission and pt received FFP for thoracentesis. Pt had elevated INR at last anticoagulation visit per notes and patient's dose was decreased from 2 mg hs to 1 mg hs. INR up to 1.8 mg today. Will give 2 mg hs. Pt will probably need alternating dosing schedule between 1 mg and 2 mg. Pharmacy will continue to follow. Please call with any questions.     Thank you,  Jeremias Patient, PharmD  Clinical Pharmacist  [phone]

## 2017-05-31 NOTE — CONSULTS
[address]       Name:  Ricki Huizar   MR#:  [id number]   :  1950   Account #:  [de-identified]   Date of Adm:  2017       REASON FOR CONSULTATION: Evaluate stent function and possible   urine thorax. HISTORY OF PRESENT ILLNESS: The patient has metastatic breast   cancer. She had a right stent placed by Dr. Bee Owens initially   and then this was changed by me in 2016. The patient had   a recent ultrasound which shows moderate right hydronephrosis. The patient's creatinine on 2017 was 1.72. It has been   approximately 3.5, but has been declining over the last few   days. The creatinine today is 2.84 and the BUN is 67 and the   sodium is 148. The patient has chronic pain in many areas. Her   flank pain is not particularly worse. The patient had evidence   of a Pseudomonas UTI in April. The only creatinine that I see on   the fluid drawn from thoracentesis was in January and a   creatinine then was 2.72, which is not in keeping with this   being urine. If this were urine, the creatinine would be   markedly higher than that. PAST MEDICAL HISTORY: Again, is significant for the metastatic   breast cancer. The patient also has congestive heart failure,   depression, diabetes mellitus, hypertension, history of   thromboembolism, and again, recurrent pleural effusion. PAST SURGICAL HISTORY: Her previous surgeries include 2 C-  sections, surgery related to her breast cancer, and removal,   bilateral ovarian cysts. SOCIAL HISTORY: The patient is . She stopped smoking in   . She does not use ethanol. FAMILY HISTORY: Positive for myocardial infarction in her mother. CURRENT MEDICATIONS: Include:   1. Xanax 0.25 mg t.i.d. p.r.n.. 2. Norco 10 one q.4h. p.r.n.. 3. Ativan 1 mg q.6 p.r.n.. 4. Albuterol inhaler 2 puffs q.6h. p.r.n.. 5. Norvasc 10 mg daily. 6. Tenormin 25 mg q.12h.   7. Symbicort inhaler 2 puffs b.i.d..    8. Duragesic patch 50 mcg an hour. 9. Glucotrol 5 mg b.i.d. 10. Megace 5 mL daily. 11. Reglan 5 mL before each meal.   12. Remeron 30 mg at bedtime. 13. Singulair 10 mg at bedtime. 14. Palbociclib 125 mg daily. 15. Potassium chloride 20 mEq daily. 16. Coumadin 2 mg daily. 17. Ambien 10 mg at bedtime p.r.n. Canary Malady ALLERGIES: THE PATIENT HAS NO KNOWN DRUG ALLERGIES. REVIEW OF SYSTEMS: Positive for pain in multiple areas, for   fatigue, malaise, dyspnea on exertion, and lower extremity   edema. PHYSICAL EXAMINATION   GENERAL: Reveals a lethargic  female who does not appear   in distress. VITAL SIGNS: The temperature is 99.1, pulse 69, blood pressure   137/59, respirations 16. LABORATORY DATA: The creatinine again today is 2.84 and the BUN,   however, is 67. The hemoglobin is 9.5, hematocrit 28.6. Again, the   creatinine measurement and the thoracentesis fluid from January   was 2.72. I am not aware of any other measurements of   creatinine. IMPRESSION: The moderate hydronephrosis seen on the ultrasound   is not necessarily significant. The patient will continue to   have reflux with a stent in. Her creatinine appears to be coming   down, so I would recommend we watch this for now. At some point,   certainly she will need the stent changed and it probably should   be changed within the next month and maybe before she is even   discharged, but I do not think it is an urgent matter. She is   very ill overall. Additionally, she, I do not think, in any way   has urine in the pleural fluid. Again, if this were urine in her   pleural fluid indicating some type of fistulous process, the   creatinine would be significantly higher than 2.72. It would be   in double digits for sure. We will see what her creatinine looks   like tomorrow and at some point arrange a stent exchange, but I   do not see the urgency of that at this time.         Glennda Georgia, MD [facility] / [facility]   D:  05/30/2017   15:21 T:  05/30/2017   15:46   Job #:  [id number]

## 2017-05-31 NOTE — PROGRESS NOTES
Patient stable with no complaints at this time. Patient sitting up in chair watching TV and talking with  who is at bedside for support. Patient has had an uneventful day. Call light within reach.   Report to be given to oncoming RN 7p-7a

## 2017-05-31 NOTE — PROGRESS NOTES
Problem: Nutrition Deficit  Goal: *Optimize nutritional status  Nutrition F/U: Day 7  Assessment:  Weight 66.7 kg (8th floor bed 5/31/17), edema - 2+ BLEs. The patient has been extubated and moved to the floor. Patient reports that her appetite is doing ok. Denies any po difficulties at this time. Reports that her UBW is around 130 lbs. States that she has had some constipation but she did have a bowel movement yesterday. Patient is currently scheduled for pluer-x drain placement tomorrow. Patient's chemo currently remains on hold. Macronutrient Needs:  Estimated calorie needs - 1980-3734 dolores/day (22-25 dolores/kg/day)  Estimated protein needs - 40-60 gm pro/day (0.8-1.2 gm pro/kgIBW/day) (GFR 17 ml/min - EMERY on CKD)  Max CHO/day - 195 gm CHO/day (50% dolores/day)   Fluid/day - 1.3-1.6 liters/day (1 ml/dolores/day)  Intake/Comparative Standards:  Based on the past 8 recorded meals the patient is consuming 100% of her meals. This potentially meets 100% of estimated kcal and protein needs. Intervention:   Meals and Snacks: CCHO per SLP  Oral Nutrition Supplement:  is inquiring about ensure. Ensure High protein BID  Mineral Supplement Therapy: Nutrition Support Orders/Electrolyte Management Replacement Protocols are active on the MAR. Zoila Evans [address], [address] [place] [address], LD   [phone]

## 2017-05-31 NOTE — PROGRESS NOTES
Problem: Mobility Impaired (Adult and Pediatric)  Goal: *Acute Goals and Plan of Care (Insert Text)    LTG:  (1.)Ms. Leggett will move from supine to sit and sit to supine , scoot up and down and roll side to side in bed with STAND BY ASSIST within 8 day(s). (2.)Ms. Leggett will transfer from bed to chair and chair to bed with STAND BY ASSIST using the least restrictive device within 8 day(s). (3.)Ms. Leggett will ambulate with MINIMAL ASSIST for 20+ feet with the least restrictive device within 8 day(s). ________________________________________________________________________________________________      PHYSICAL THERAPY: Daily Note, Treatment Day: 2nd and AM 5/31/2017  INPATIENT: Hospital Day: 8  Payor: SELF PAY / Plan: [facility] SELF PAY / Product Type: Self Pay /      NAME/AGE/GENDER: Arti Hutchison is a 78 y.o. female      PRIMARY DIAGNOSIS: Pleural effusion [J90]  Pleural effusion [J90] Acute on chronic respiratory failure (HCC) Acute on chronic respiratory failure (HCC)  Procedure(s) (LRB):  ULTRASOUND (Right)  THORACENTESIS (Right)  6 Days Post-Op  ICD-10: Treatment Diagnosis:       · Generalized Muscle Weakness (M62.81)  · Difficulty in walking, Not elsewhere classified (R26.2)   Precaution/Allergies:  Review of patient's allergies indicates no known allergies. ASSESSMENT:      Ms. Margaret Christianson presents sitting up in the chair and was brought to therapy gym by [address] Pt was able to participate in group exercises as below. Pt did well following verbal and visual commands. Pt was taken back to her room by [facility] and left up in chair. Pt did well with exercises. Making good progress. will continue with POC. This section established at most recent assessment   PROBLEM LIST (Impairments causing functional limitations):  1. Decreased Strength  2. Decreased ADL/Functional Activities  3. Decreased Transfer Abilities  4. Decreased Ambulation Ability/Technique  5. Decreased Balance  6.  Decreased Activity Tolerance  7. Decreased Pacing Skills  8. Increased Fatigue  9. Increased Shortness of Breath  10. Decreased Flexibility/Joint Mobility    INTERVENTIONS PLANNED: (Benefits and precautions of physical therapy have been discussed with the patient.)  1. Balance Exercise  2. Bed Mobility  3. Gait Training  4. Home Exercise Program (HEP)  5. Neuromuscular Re-education/Strengthening  6. Range of Motion (ROM)  7. Therapeutic Activites  8. Therapeutic Exercise/Strengthening  9. Transfer Training  10. Group Therapy      TREATMENT PLAN: Frequency/Duration: 3 times a week for duration of hospital stay  Rehabilitation Potential For Stated Goals: FAIR      RECOMMENDED REHABILITATION/EQUIPMENT: (at time of discharge pending progress): Continue Skilled Therapy, Home Health: Physical Therapy, Rehab and Adaptive Equipment: Walkers, Type: Rolling Walker. HISTORY:   History of Present Injury/Illness (Reason for Referral):  78 y.o. female presents with dyspnea. She was last seen by us in February of 2017 for right pleural effusion. She has dCHR, prior DVT on coumadin, CKD, Moderate AS, HTN, DM and R ureteral obstruction s/p stent placement. She is followed by Dr Herminia Chicas for metastatic breast CA with mets to lung/bone. In February, she underwent R thoracentesis with drain to gravity with removal of 1200 ml of yellow pleural fluid. She developed pain after thoracentesis and pain was felt to be from trapped lung physiology and recommended not to remove more than 1 liter of pleural fluid if thoracentesis is again necessary. She was hypoxemic and home O2 arranged. She had a CXR in April 2017 with persistent effusion. She was seen by Oncology on 5/16/2017 and was having dyspnea during that time with fatigue. She was given lasix as well and has taken lasix until 3 days ago. She has plans to see [facility] on June 2.  Today in the ER, her CXR shows effusion on right which is unchanged and new right perihilar infiltrate. We were asked to admit her for hypoxemia with persistent effusion, new infiltrate with metastatic breast cancer with acute on chronic renal failure and moderate AS. She is currently requiring BiPAP and we were asked to admit to the ICU  Past Medical History/Comorbidities:   Ms. Reynaldo John  has a past medical history of Acute on chronic diastolic congestive heart failure ([address] (1/5/2016); Asthma; Cancer ([address] Chemotherapy-induced neutropenia (HCC) (12/20/2016); Depression; Diabetes ([address] DM (diabetes mellitus) ([address] (11/20/2012); HCAP (healthcare-associated pneumonia) (7/17/2013); Hypertension; Sepsis ([address] (7/19/2016); Thromboembolus ([address] and Unspecified adverse effect of anesthesia. Ms. Reynaldo John  has a past surgical history that includes vascular access (1/26/12) and gyn. Social History/Living Environment:   Home Environment: Private residence  # Steps to Enter: 1  One/Two Story Residence: One story  Living Alone: No  Support Systems: Spouse/Significant Other/Partner, Family member(s)  Patient Expects to be Discharged to[de-identified] Skilled nursing facility  Current DME Used/Available at Home: None  Prior Level of Function/Work/Activity:  Pt limited household ambulator reports she does not use AD, but possibly furniture walker. Number of Personal Factors/Comorbidities that affect the Plan of Care: 3+: HIGH COMPLEXITY   EXAMINATION:   Most Recent Physical Functioning:   Gross Assessment:                  Posture:     Balance:    Bed Mobility:     Wheelchair Mobility:     Transfers:     Gait:             Body Structures Involved:  1. Nerves  2. Heart  3. Lungs  4. Bones  5. Joints  6. Muscles  7. Ligaments Body Functions Affected:  1. Mental  2. Sensory/Pain  3. Cardio  4. Respiratory  5. Neuromusculoskeletal  6. Movement Related  7. language barrier Activities and Participation Affected:  1. Learning and Applying Knowledge  2. General Tasks and Demands  3. Communication  4. Mobility  5.  Self Care  6. Community, Social and [place]   Number of elements that affect the Plan of Care: 4+: HIGH COMPLEXITY   CLINICAL PRESENTATION:   Presentation: Evolving clinical presentation with unstable and unpredictable characteristics: HIGH COMPLEXITY   CLINICAL DECISION MAKIN [facility] Mobility Inpatient Short Form  How much difficulty does the patient currently have. .. Unable A Lot A Little None   1. Turning over in bed (including adjusting bedclothes, sheets and blankets)? [ ] 1   [ ] 2   [X] 3   [ ] 4   2. Sitting down on and standing up from a chair with arms ( e.g., wheelchair, bedside commode, etc.)   [ ] 1   [X] 2   [ ] 3   [ ] 4   3. Moving from lying on back to sitting on the side of the bed? [ ] 1   [X] 2   [ ] 3   [ ] 4   How much help from another person does the patient currently need. .. Total A Lot A Little None   4. Moving to and from a bed to a chair (including a wheelchair)? [ ] 1   [ ] 2   [X] 3   [ ] 4   5. Need to walk in hospital room? [ ] 1   [X] 2   [ ] 3   [ ] 4   6. Climbing 3-5 steps with a railing? [X] 1   [ ] 2   [ ] 3   [ ] 4   © , Trustees of [address] [place] [address] [address] [address], under license to [employer]. All rights reserved    Score:  Initial: 13 Most Recent: X (Date: -- )     Interpretation of Tool:  Represents activities that are increasingly more difficult (i.e. Bed mobility, Transfers, Gait).        Score 24 23 22-20 19-15 14-10 9-7 6       Modifier CH CI CJ CK CL CM CN         · Mobility - Walking and Moving Around:               - CURRENT STATUS:    CL - 60%-79% impaired, limited or restricted               - GOAL STATUS:           CL - 60%-79% impaired, limited or restricted               - D/C STATUS:                       ---------------To be determined---------------  Payor: SELF PAY / Plan: [facility] SELF PAY / Product Type: Self Pay /       Medical Necessity:     · Skilled intervention continues to be required due to decreased function. Reason for Services/Other Comments:  · Patient continues to require skilled intervention due to medical complications and patient unable to attend/participate in therapy as expected. Use of outcome tool(s) and clinical judgement create a POC that gives a: Difficult prediction of patient's progress: HIGH COMPLEXITY                 TREATMENT:      Pre-treatment Symptoms/Complaints:  No complaints  Pain: Initial:      Post Session:  No response      Therapeutic Activity: (    0 minutes): Therapeutic activities including Chair transfers, Ambulation on level ground and monitored sats to improve mobility, strength, balance and endurance. Required minimal   to promote static and dynamic balance in standing. Group Therapeutic Exercise: (  10 minutes):  Exercises per grid below to improve mobility, strength and balance. Required minimal verbal and manual cues to promote proper body posture and promote proper body mechanics. Progressed range and repetitions as indicated. Date:  5/31/17 Date:   Date:     ACTIVITY/EXERCISE AM PM AM PM AM PM   Ambulation:           Distance  Device  Duration Group exs        Seated Heel Raises X 15 B        Seated Toe Raises X 15 B        Seated Long Arc Quads X 15 B        Seated Marching X 15 B        Seated Hip Abduction X 15 B                 B = bilateral; AA = active assistive; A = active; P = passive           Braces/Orthotics/Lines/Etc:   · IV, denson catheter and 2L  Treatment/Session Assessment:    · Response to Treatment:  See above  · Interdisciplinary Collaboration:  · Physical Therapy Assistant and Registered Nurse  · After treatment position/precautions:  · Up in chair, Bed/Chair-wheels locked, Call light within reach, RN notified and Family at bedside  · Compliance with Program/Exercises: Will assess as treatment progresses. · Recommendations/Intent for next treatment session:   \"Next visit will focus on advancements to more challenging activities and reduction in assistance provided\".   Total Treatment Duration:  PT Patient Time In/Time Out  Time In: 1110  Time Out: [address], PTA

## 2017-05-31 NOTE — PROGRESS NOTES
Pt resting quietly in bed, HOB elevated. PT A&Ox4 on 1L O2 via NC. Lung sounds diminished, S1/S2 audible, peripheral pulses palpable, extremities warm. Pt has denson draining clear yellow urine to bag at bedside. Pt reporting headache at this time. Call light in place, pt instructed to call for assistance as needed.

## 2017-05-31 NOTE — PROGRESS NOTES
[facility]        Subjective:  CKD4   Doing \"OK\"    Review of Systems -   Could not be obtained as  was not here. Objective:    Vitals:    05/31/17 0515 05/31/17 0627 05/31/17 0729 05/31/17 0752   BP: 137/57  135/57    Pulse: 72  67    Resp: 18  18    Temp: 99.6 °F (37.6 °C)  98.7 °F (37.1 °C)    SpO2: 92%  94% 93%   Weight:  66.7 kg (147 lb)     Height:           PE  Gen: in no acute distress  CV: reg rate  Chest: clear  Abd: soft  Ext/Access: no edema     . LAB  Recent Labs      05/31/17   0651 05/30/17 0755 05/29/17   0350   WBC   --   4.6  3.9*   HGB   --   9.5*  9.9*   HCT   --   28.6*  29.6*   PLT   --   228  260   INR  1.8*  1.6*  2.0*     Recent Labs      05/31/17 0651 05/30/17 0755 05/29/17   0350   NA  146*  148*  149*   K  3.5  3.3*  3.5   CL  109*  111*  112*   CO2  26  28  27   GLU  143*  165*  130*   BUN  58*  67*  72*   CREA  2.55*  2.84*  3.12*   MG   --    --   2.6*   PHOS   --    --   3.7   CA  6.7*  6.8*  6.7*           Radiology    A/P:   Patient Active Problem List   Diagnosis Code    Breast cancer ([address] C50.919    Bony metastasis (HCC) C79.51    HTN (hypertension) I10    DM (diabetes mellitus) (HCC) E11.9    CKD (chronic kidney disease) N18.9    Iron deficiency anemia D50.9    Acute respiratory failure (HCC) J96.00    Hypomagnesemia E83.42    Hypokalemia E87.6    Asthma J45.909    Anemia D64.9    DVT (deep venous thrombosis) (HCC) I82.409    PND (paroxysmal nocturnal dyspnea) R06.00    Leg swelling M79.89    CHF (congestive heart failure) (HCC) I50.9    Accelerated hypertension I10    Elevated troponin R74.8    Breast cancer metastasized to lung (HCC) C50.919, C78.00    Pulmonary edema J81.1    Acute on chronic renal failure (HCC) N17.9, N18.9    Pleural effusion, right J90    Hydronephrosis N13.30    Pancytopenia (HCC) D61.818    Chemotherapy-induced neutropenia (HCC) D70.1    Bilateral edema of lower extremity R60.0    Hematuria R31.9    Acute on chronic respiratory failure (HCC) J96.20    Moderate aortic stenosis I35.0    Anticoagulated on Coumadin Z51.81, Z79.01    Volume overload E87.70    Anxiety F41.9    Hypernatremia E87.0       Renal function and Na are stable. Following.        Chirag Noyola MD

## 2017-05-31 NOTE — PROGRESS NOTES
Pt resting quietly in bed on 2L O2 via NC. No s/s of acute distress noted. Report given to oncoming RN.

## 2017-05-31 NOTE — PROGRESS NOTES
Daily Progress Note: 5/31/2017    Ivon Durand   Admission Date: 5/24/2017         The patient's chart is reviewed and the patient is discussed with the staff. 79 y/o admitted 5/24 was last seen by us in February of 2017 for right pleural effusion. She has dCHR, prior DVT on coumadin, CKD, Moderate AS, HTN, DM and R ureteral obstruction s/p stent placement. She is followed by Dr Ruth Fernandes for metastatic breast CA with mets to lung/bone. In February, she underwent R thoracentesis with drain to gravity with removal of 1200 ml of yellow pleural fluid. She developed pain after thoracentesis and pain was felt to be from trapped lung physiology and recommended not to remove more than 1 liter of pleural fluid if thoracentesis is again necessary. She was hypoxemic and home O2 arranged.       She had a CXR in April 2017 with persistent effusion. She was seen by Oncology on 5/16/2017 and was having dyspnea during that time with fatigue. She was given lasix as well and has taken lasix until 3 days ago. S. On admit her CXR shows effusion on right which is unchanged and new right perihilar infiltrate. We were asked to admit her for hypoxemia with persistent effusion, new infiltrate with metastatic breast cancer with acute on chronic renal failure and moderate AS     Decompensated- intubated on 5/24  Seen by Oncology -holding chemo  Thoracentesis on right- 950 cc of /serosanguinous/ fluid       Subjective:       Seems comfortable on 3L. Not able to communicate well with her today. Urology's help appreciated. Pleural fluid cytology negative. She has had a moderate- large R effusion for the past 9 months.     Current Facility-Administered Medications   Medication Dose Route Frequency    dextrose 5% infusion  50 mL/hr IntraVENous CONTINUOUS    furosemide (LASIX) tablet 40 mg  40 mg Oral DAILY    DULoxetine (CYMBALTA) capsule 20 mg  20 mg Oral DAILY    warfarin (COUMADIN) tablet 2 mg - pharmacy dosing  2 mg Oral QPM  fentaNYL (DURAGESIC) 50 mcg/hr patch 1 Patch  1 Patch TransDERmal Q72H    hydrALAZINE (APRESOLINE) tablet 50 mg  50 mg Oral TID    atenolol (TENORMIN) tablet 25 mg  25 mg Oral DAILY    insulin regular (NOVOLIN R, HUMULIN R) injection   SubCUTAneous AC&HS    famotidine (PEPCID) 40 mg/5 mL (8 mg/mL) oral suspension 20 mg  20 mg Per NG tube DAILY    0.9% sodium chloride infusion 250 mL  250 mL IntraVENous PRN    hydrALAZINE (APRESOLINE) 20 mg/mL injection 10 mg  10 mg IntraVENous Q6H PRN    morphine injection 2 mg  2 mg IntraVENous Q4H PRN    amLODIPine (NORVASC) tablet 10 mg  10 mg Oral DAILY    HYDROcodone-acetaminophen (NORCO)  mg tablet 1 Tab  1 Tab Oral Q4H PRN    LORazepam (ATIVAN) tablet 1 mg  1 mg Oral Q6H PRN    megestrol (MEGACE) 400 mg/10 mL (10 mL) oral suspension 200 mg  200 mg Oral DAILY    mirtazapine (REMERON) tablet 30 mg  30 mg Oral QHS    montelukast (SINGULAIR) tablet 10 mg  10 mg Oral QHS    zolpidem (AMBIEN) tablet 5 mg  5 mg Oral QHS PRN    sodium chloride (NS) flush 5-10 mL  5-10 mL IntraVENous Q8H    sodium chloride (NS) flush 5-10 mL  5-10 mL IntraVENous PRN    azithromycin (ZITHROMAX) 500 mg in 0.9% sodium chloride (MBP/ADV) 250 mL  500 mg IntraVENous Q24H    cefTRIAXone (ROCEPHIN) 1 g in 0.9% sodium chloride (MBP/ADV) 50 mL  1 g IntraVENous Q24H    budesonide (PULMICORT) 500 mcg/2 ml nebulizer suspension  500 mcg Nebulization BID RT    And    albuterol CONCENTRATE 2.5mg/0.5 mL neb soln  2.5 mg Nebulization Q6H RT       Review of Systems    Constitutional:  negative for fever, chills, sweats  Cardiovascular:  negative for chest pain, palpitations, syncope, edema  Gastrointestinal:  negative for dysphagia, reflux, vomiting, diarrhea, abdominal pain, or melena  Neurologic:  negative for focal weakness, numbness, headache      Objective:     Vitals:    05/30/17 2107 05/31/17 0025 05/31/17 0515 05/31/17 0627   BP:  141/54 137/57    Pulse:  71 72    Resp:  18 18 Temp:  99.2 °F (37.3 °C) 99.6 °F (37.6 °C)    SpO2: 93% 93% 92%    Weight:    147 lb (66.7 kg)   Height:           Intake and Output:   05/29 1901 - 05/31 0700  In: 4229 [P.O.:370; I.V.:1171]  Out: 42255 [Urine:00330]       Physical Exam:          Constitutional:  the patient is well developed and in no acute distress  EENMT:  Sclera clear, pupils equal, oral mucosa moist  Respiratory: few scattered crackles; decreased BS on R  Cardiovascular:  RRR with harsh SM  Gastrointestinal: soft and non-tender; with positive bowel sounds. Musculoskeletal: warm without cyanosis. There is no lower leg edema. Skin:  no jaundice or rashes, no wounds   Neurologic: no gross neuro deficits     Psychiatric:  alert     LINES:   port    CXR:     5/30:          LAB  Recent Labs      05/31/17   0538  05/30/17   2034  05/30/17   1556  05/30/17   1134  05/30/17   0528   GLUCPOC  130*  279*  302*  258*  149*      Recent Labs      05/31/17   0651  05/30/17   0755  05/29/17   0350   WBC   --   4.6  3.9*   HGB   --   9.5*  9.9*   HCT   --   28.6*  29.6*   PLT   --   228  260   INR  1.8*  1.6*  2.0*     Recent Labs      05/30/17   0755  05/29/17   0350   NA  148*  149*   K  3.3*  3.5   CL  111*  112*   CO2  28  27   GLU  165*  130*   BUN  67*  72*   CREA  2.84*  3.12*   MG   --   2.6*   PHOS   --   3.7   CA  6.8*  6.7*     No results for input(s): PH, PCO2, PO2, HCO3 in the last 72 hours. No results for input(s): LCAD, LAC in the last 72 hours. Assessment:  (Medical Decision Making)     Hospital Problems  Date Reviewed: 5/26/2017          Codes Class Noted POA    Hypernatremia ICD-10-CM: E87.0  ICD-9-CM: 276.0  5/30/2017 Unknown    Now getting free water.     Anxiety ICD-10-CM: F41.9  ICD-9-CM: 300.00  5/28/2017 Unknown    Stable    * (Principal)Acute on chronic respiratory failure ([facility] ICD-10-CM: J96.20  ICD-9-CM: 518.84  5/24/2017 Yes    Weaned to 3L    Moderate aortic stenosis (Chronic) ICD-10-CM: I35.0  ICD-9-CM: 424.1  5/24/2017 Yes    + Murmur    Anticoagulated on Coumadin (Chronic) ICD-10-CM: Z51.81, Z79.01  ICD-9-CM: V58.83, V58.61  5/24/2017 Yes    INR 1.8    Volume overload ICD-10-CM: E87.70  ICD-9-CM: 276.69  5/24/2017 Yes    Better    Breast cancer metastasized to lung [facility]) (Chronic) ICD-10-CM: C50.919, C78.00  ICD-9-CM: 174.9, 197.0  2/17/2017 Yes        CKD (chronic kidney disease) (Chronic) ICD-10-CM: N18.9  ICD-9-CM: 585.9  1/4/2017 Yes    Overview Signed 5/14/2013 11:35 AM by Juventino Bishop RN     With acute rise- ? Dehydration and monitor         Cr 3.12    DVT (deep venous thrombosis) (HCC) (Chronic) ICD-10-CM: I82.409  ICD-9-CM: 453.40  1/4/2017 Yes    Overview Signed 11/19/2013  6:07 AM by Amee Shafer     [id number] There is nonocclusive thrombus within the left common femoral vein, superficial femoral vein, popliteal vein, and posterior tibial vein               Pleural effusion, right (Chronic) ICD-10-CM: J90  ICD-9-CM: 511.9  1/4/2017 Yes    Overview Addendum 5/26/2017 10:11 AM by Kyara Barnes NP     10/6/16 R thoracentesis 800 ml: pleural fluid creatine 4.1 = likely urinothorax  5/25/2017: Thoracentesis on right- 950 cc of /serosanguinous/ fluid         Has been present for months with rapid recurrence. Likely needs Pleur-x.    DM (diabetes mellitus) ([facility] [address] (Chronic) ICD-10-CM: E11.9  ICD-9-CM: 250.00  10/7/2016 Yes        Acute on chronic renal failure (HCC) ICD-10-CM: N17.9, N18.9  ICD-9-CM: 584.9, 585.9  10/7/2016 Yes    Ongoing    Leg swelling ICD-10-CM: M79.89  ICD-9-CM: 729.81  2/25/2014 Yes    Overview Signed 2/25/2014  2:45 PM by Efren Palomares NP     Bilateral           Resolved            Plan:  (Medical Decision Making)     Wean oxygen as tolerated. PT/OT. Continue free water. Will discuss pleur-x with her with  when available.       More than 50% of the time documented was spent in face-to-face contact with the patient and in the care of the patient on the floor/unit where the patient is located. Yessy Lynn MD      ADDENDUM    Discussed Pleur-x with pt through . She is agreeable to proceed. Will schedule for tomorrow. NPO after MN ordered and coumadin stopped.  INR in Linda Larson MD

## 2017-05-31 NOTE — PROGRESS NOTES
Patient voices no concerns at this time. Patient sitting in chair with  at side for support. Patient denies any pain. Call light within reach. Will continue to monitor.

## 2017-05-31 NOTE — PROGRESS NOTES
Pt resting quietly in bed, HOB elevated. PT A&Ox4 on 2L O2 via NC. Lung sounds clear with diminished bases, S1/S2 audible, peripheral pulses palpable, extremities warm. Pt has denson draining clear yellow urine to bag at bedside. Pt denies pain, requesting medication for anxiety. Call light in place, pt instructed to call for assistance as needed.

## 2017-05-31 NOTE — PROGRESS NOTES
present at bedside during update with Dr. Amara Hodgson., MD    [address] [place] [address]  [phone]

## 2017-06-01 LAB
ANION GAP BLD CALC-SCNC: 9 MMOL/L (ref 7–16)
BUN SERPL-MCNC: 49 MG/DL (ref 8–23)
CALCIUM SERPL-MCNC: 6.7 MG/DL (ref 8.3–10.4)
CHLORIDE SERPL-SCNC: 106 MMOL/L (ref 98–107)
CO2 SERPL-SCNC: 29 MMOL/L (ref 21–32)
CREAT SERPL-MCNC: 2.41 MG/DL (ref 0.6–1)
GLUCOSE BLD STRIP.AUTO-MCNC: 110 MG/DL (ref 65–100)
GLUCOSE BLD STRIP.AUTO-MCNC: 146 MG/DL (ref 65–100)
GLUCOSE BLD STRIP.AUTO-MCNC: 152 MG/DL (ref 65–100)
GLUCOSE BLD STRIP.AUTO-MCNC: 283 MG/DL (ref 65–100)
GLUCOSE SERPL-MCNC: 143 MG/DL (ref 65–100)
INR PPP: 2 (ref 0.9–1.2)
POTASSIUM SERPL-SCNC: 3.4 MMOL/L (ref 3.5–5.1)
PROTHROMBIN TIME: 21.6 SEC (ref 9.6–12)
SODIUM SERPL-SCNC: 144 MMOL/L (ref 136–145)

## 2017-06-01 PROCEDURE — 74011250637 HC RX REV CODE- 250/637: Performed by: INTERNAL MEDICINE

## 2017-06-01 PROCEDURE — 99232 SBSQ HOSP IP/OBS MODERATE 35: CPT | Performed by: INTERNAL MEDICINE

## 2017-06-01 PROCEDURE — 77010033678 HC OXYGEN DAILY

## 2017-06-01 PROCEDURE — 94760 N-INVAS EAR/PLS OXIMETRY 1: CPT

## 2017-06-01 PROCEDURE — 74011000258 HC RX REV CODE- 258: Performed by: INTERNAL MEDICINE

## 2017-06-01 PROCEDURE — 36415 COLL VENOUS BLD VENIPUNCTURE: CPT | Performed by: INTERNAL MEDICINE

## 2017-06-01 PROCEDURE — 97530 THERAPEUTIC ACTIVITIES: CPT

## 2017-06-01 PROCEDURE — 80048 BASIC METABOLIC PNL TOTAL CA: CPT | Performed by: INTERNAL MEDICINE

## 2017-06-01 PROCEDURE — 74011636637 HC RX REV CODE- 636/637: Performed by: INTERNAL MEDICINE

## 2017-06-01 PROCEDURE — 85610 PROTHROMBIN TIME: CPT | Performed by: INTERNAL MEDICINE

## 2017-06-01 PROCEDURE — 94640 AIRWAY INHALATION TREATMENT: CPT

## 2017-06-01 PROCEDURE — 74011250636 HC RX REV CODE- 250/636: Performed by: INTERNAL MEDICINE

## 2017-06-01 PROCEDURE — 65270000029 HC RM PRIVATE

## 2017-06-01 PROCEDURE — 82962 GLUCOSE BLOOD TEST: CPT

## 2017-06-01 PROCEDURE — 74011000250 HC RX REV CODE- 250: Performed by: INTERNAL MEDICINE

## 2017-06-01 RX ORDER — FUROSEMIDE 40 MG/1
40 TABLET ORAL DAILY
Status: DISCONTINUED | OUTPATIENT
Start: 2017-06-02 | End: 2017-06-02 | Stop reason: HOSPADM

## 2017-06-01 RX ADMIN — FUROSEMIDE 40 MG: 40 TABLET ORAL at 08:13

## 2017-06-01 RX ADMIN — BUDESONIDE 500 MCG: 0.5 SUSPENSION RESPIRATORY (INHALATION) at 07:41

## 2017-06-01 RX ADMIN — HYDRALAZINE HYDROCHLORIDE 50 MG: 50 TABLET, FILM COATED ORAL at 08:18

## 2017-06-01 RX ADMIN — ALBUTEROL SULFATE 2.5 MG: 2.5 SOLUTION RESPIRATORY (INHALATION) at 07:41

## 2017-06-01 RX ADMIN — MONTELUKAST SODIUM 10 MG: 10 TABLET, FILM COATED ORAL at 21:36

## 2017-06-01 RX ADMIN — AMLODIPINE BESYLATE 10 MG: 10 TABLET ORAL at 08:18

## 2017-06-01 RX ADMIN — Medication 5 ML: at 14:00

## 2017-06-01 RX ADMIN — DEXTROSE MONOHYDRATE 50 ML/HR: 5 INJECTION, SOLUTION INTRAVENOUS at 21:36

## 2017-06-01 RX ADMIN — MEGESTROL ACETATE 200 MG: 40 SUSPENSION ORAL at 08:13

## 2017-06-01 RX ADMIN — FAMOTIDINE 20 MG: 40 POWDER, FOR SUSPENSION ORAL at 08:20

## 2017-06-01 RX ADMIN — ATENOLOL 25 MG: 50 TABLET ORAL at 08:18

## 2017-06-01 RX ADMIN — BUDESONIDE 500 MCG: 0.5 SUSPENSION RESPIRATORY (INHALATION) at 20:13

## 2017-06-01 RX ADMIN — DULOXETINE 20 MG: 20 CAPSULE, DELAYED RELEASE ORAL at 08:18

## 2017-06-01 RX ADMIN — CEFTRIAXONE 1 G: 1 INJECTION, POWDER, FOR SOLUTION INTRAMUSCULAR; INTRAVENOUS at 11:40

## 2017-06-01 RX ADMIN — MIRTAZAPINE 30 MG: 15 TABLET, FILM COATED ORAL at 21:36

## 2017-06-01 RX ADMIN — Medication 5 ML: at 22:51

## 2017-06-01 RX ADMIN — ALBUTEROL SULFATE 2.5 MG: 2.5 SOLUTION RESPIRATORY (INHALATION) at 01:35

## 2017-06-01 RX ADMIN — HYDRALAZINE HYDROCHLORIDE 50 MG: 50 TABLET, FILM COATED ORAL at 16:40

## 2017-06-01 RX ADMIN — ALBUTEROL SULFATE 2.5 MG: 2.5 SOLUTION RESPIRATORY (INHALATION) at 13:28

## 2017-06-01 RX ADMIN — HYDRALAZINE HYDROCHLORIDE 50 MG: 50 TABLET, FILM COATED ORAL at 21:37

## 2017-06-01 RX ADMIN — HUMAN INSULIN 6 UNITS: 100 INJECTION, SOLUTION SUBCUTANEOUS at 16:30

## 2017-06-01 RX ADMIN — AZITHROMYCIN MONOHYDRATE 500 MG: 500 INJECTION, POWDER, LYOPHILIZED, FOR SOLUTION INTRAVENOUS at 21:36

## 2017-06-01 RX ADMIN — Medication 5 ML: at 05:49

## 2017-06-01 RX ADMIN — ALBUTEROL SULFATE 2.5 MG: 2.5 SOLUTION RESPIRATORY (INHALATION) at 20:14

## 2017-06-01 NOTE — PROGRESS NOTES
Patient in bed resting with no complaints at this time. Patient is alert and orientated with no distress noted. IV intact and patent with no s/s of infection noted. Respirations even and unlabored with heart rate regular. Patient unable to ambulate independently without assistance; needs x1 r/t weakness and dyspnea. Bed in low locked position with call light within reach. Patient instructed to call if assistance is needed. Will continue to monitor. Patient NPO for procedure later this afternoon. Interpretor on standby if needed.

## 2017-06-01 NOTE — PROGRESS NOTES
Palliative Care Progress Note    Patient: Mariana Waters MRN: [id number]  SSN: [id number]    YOB: 1950  Age: 78 y.o. Sex: female       Assessment/Plan:     Chief Complaint/Interval History: Renal function back to baseline, seen by Urology     Principal Diagnosis:    · Debility, Unspecified  R53.81    Additional Diagnoses:   · Dyspnea  R06.00  · Neuropathy, Peripheral  G62.9  · Pain, nerve  M79.2-  Cymbalta started, await response, may be delayed  · Counseling, Encounter for Medical Advice  Z71.9  · Encounter for Palliative Care  Z51.5    Palliative Performance Scale (PPS)  PPS: 50    Medical Decision Making:   Reviewed and summarized recent notes, Urology consult  Discussed case with appropriate providers. Reviewed laboratory and x-ray data. CBC, CMP, cytology    Urology has ruled out urinothorax. Patient still with neuropathic type pain in heels, likely related to past chemotherapy. Cymbalta started. Will plan to followup at [facility] when she returns to see Oncology. Will discuss findings with members of the interdisciplinary team.         More than 50% of this 15 minute visit was spent counseling and coordination of care as outlined above. Subjective:     Review of Systems:  A comprehensive review of systems was negative except for: intermittent shortness of breath, generalized weakness     Objective:     Visit Vitals    /53    Pulse 74    Temp 98.7 °F (37.1 °C)    Resp 18    Ht 5' 2\" (1.575 m)    Wt 78.3 kg (172 lb 9.6 oz)    SpO2 95%    BMI 31.57 kg/m2        Physical Exam:     General:  Cooperative. No acute distress. Eyes:  Conjunctivae/corneas clear    Nose: Nares normal. Septum midline. Neck: Supple, symmetrical, trachea midline, no JVD   Lungs:  Crackles R base   Heart:  6-2/4 systolic murmur, harsh   Abdomen:  Soft, non-tender, non-distended   Extremities: atraumatic, no cyanosis or edema   Skin: Skin color, texture, turgor normal. No rash or lesions. Neurologic: Nonfocal   Psych: Alert and oriented, somewhat flat affect            Signed By: Oralia Garcia MD     June 1, 2017

## 2017-06-01 NOTE — PROGRESS NOTES
Problem: Mobility Impaired (Adult and Pediatric)  Goal: *Acute Goals and Plan of Care (Insert Text)    LTG:  (1.)Ms. Leggett will move from supine to sit and sit to supine , scoot up and down and roll side to side in bed with STAND BY ASSIST within 8 day(s). (2.)Ms. Leggett will transfer from bed to chair and chair to bed with STAND BY ASSIST using the least restrictive device within 8 day(s). (3.)Ms. Leggett will ambulate with MINIMAL ASSIST for 20+ feet with the least restrictive device within 8 day(s). ________________________________________________________________________________________________      PHYSICAL THERAPY: Daily Note, Treatment Day: 3rd and AM 6/1/2017  INPATIENT: Hospital Day: 9  Payor: SELF PAY / Plan: [facility] SELF PAY / Product Type: Self Pay /      NAME/AGE/GENDER: Beth Fournier is a 78 y.o. female      PRIMARY DIAGNOSIS: Pleural effusion [J90]  Pleural effusion [J90] Acute on chronic respiratory failure (HCC) Acute on chronic respiratory failure (HCC)  Procedure(s) (LRB):  ULTRASOUND (Right)  THORACENTESIS (Right)  7 Days Post-Op  ICD-10: Treatment Diagnosis:       · Generalized Muscle Weakness (M62.81)  · Difficulty in walking, Not elsewhere classified (R26.2)   Precaution/Allergies:  Review of patient's allergies indicates no known allergies. ASSESSMENT:      Ms. Kamala Mcmillan presents sitting up in bed and willing to walk with a little encouragement from myself and . She got herself to the EOB and stood into walker with CGA. She increased her gait distance to about 80 feet with no LOB and slow gait. Left up in the chair. Good progress today. This section established at most recent assessment   PROBLEM LIST (Impairments causing functional limitations):  1. Decreased Strength  2. Decreased ADL/Functional Activities  3. Decreased Transfer Abilities  4. Decreased Ambulation Ability/Technique  5. Decreased Balance  6. Decreased Activity Tolerance  7.  Decreased Pacing Skills  8. Increased Fatigue  9. Increased Shortness of Breath  10. Decreased Flexibility/Joint Mobility    INTERVENTIONS PLANNED: (Benefits and precautions of physical therapy have been discussed with the patient.)  1. Balance Exercise  2. Bed Mobility  3. Gait Training  4. Home Exercise Program (HEP)  5. Neuromuscular Re-education/Strengthening  6. Range of Motion (ROM)  7. Therapeutic Activites  8. Therapeutic Exercise/Strengthening  9. Transfer Training  10. Group Therapy      TREATMENT PLAN: Frequency/Duration: 3 times a week for duration of hospital stay  Rehabilitation Potential For Stated Goals: FAIR      RECOMMENDED REHABILITATION/EQUIPMENT: (at time of discharge pending progress): Continue Skilled Therapy, Home Health: Physical Therapy, Rehab and Adaptive Equipment: Walkers, Type: Rolling Walker. HISTORY:   History of Present Injury/Illness (Reason for Referral):  78 y.o. female presents with dyspnea. She was last seen by us in February of 2017 for right pleural effusion. She has dCHR, prior DVT on coumadin, CKD, Moderate AS, HTN, DM and R ureteral obstruction s/p stent placement. She is followed by Dr Santosh Lara for metastatic breast CA with mets to lung/bone. In February, she underwent R thoracentesis with drain to gravity with removal of 1200 ml of yellow pleural fluid. She developed pain after thoracentesis and pain was felt to be from trapped lung physiology and recommended not to remove more than 1 liter of pleural fluid if thoracentesis is again necessary. She was hypoxemic and home O2 arranged. She had a CXR in April 2017 with persistent effusion. She was seen by Oncology on 5/16/2017 and was having dyspnea during that time with fatigue. She was given lasix as well and has taken lasix until 3 days ago. She has plans to see [facility] on June 2. Today in the ER, her CXR shows effusion on right which is unchanged and new right perihilar infiltrate.  We were asked to admit her for hypoxemia with persistent effusion, new infiltrate with metastatic breast cancer with acute on chronic renal failure and moderate AS. She is currently requiring BiPAP and we were asked to admit to the ICU  Past Medical History/Comorbidities:   Ms. Andrey Little  has a past medical history of Acute on chronic diastolic congestive heart failure ([facility] [address] (1/5/2016); Asthma; Cancer ([facility] Chemotherapy-induced neutropenia (HCC) (12/20/2016); Depression; Diabetes ([address] DM (diabetes mellitus) ([facility] [facility] (11/20/2012); HCAP (healthcare-associated pneumonia) (7/17/2013); Hypertension; Sepsis ([facility] (7/19/2016); Thromboembolus ([facility] and Unspecified adverse effect of anesthesia. Ms. Andrey Little  has a past surgical history that includes vascular access (1/26/12) and gyn. Social History/Living Environment:   Home Environment: Private residence  # Steps to Enter: 1  One/Two Story Residence: One story  Living Alone: No  Support Systems: Spouse/Significant Other/Partner, Family member(s)  Patient Expects to be Discharged to[de-identified] Skilled nursing facility  Current DME Used/Available at Home: None  Prior Level of Function/Work/Activity:  Pt limited household ambulator reports she does not use AD, but possibly furniture walker. Number of Personal Factors/Comorbidities that affect the Plan of Care: 3+: HIGH COMPLEXITY   EXAMINATION:   Most Recent Physical Functioning:   Gross Assessment:                  Posture:     Balance:    Bed Mobility:  Supine to Sit: Modified independent  Scooting: Independent  Wheelchair Mobility:     Transfers:  Sit to Stand: Contact guard assistance  Stand to Sit: Contact guard assistance;Minimum assistance  Gait:     Speed/Lizzette: Slow  Distance (ft): 80 Feet (ft)  Assistive Device: Walker, rolling  Ambulation - Level of Assistance: Contact guard assistance       Body Structures Involved:  1. Nerves  2. Heart  3. Lungs  4. Bones  5. Joints  6. Muscles  7.  Ligaments Body Functions Affected:  1. Mental  2. Sensory/Pain  3. Cardio  4. Respiratory  5. Neuromusculoskeletal  6. Movement Related  7. language barrier Activities and Participation Affected:  1. Learning and Applying Knowledge  2. General Tasks and Demands  3. Communication  4. Mobility  5. Self Care  6. Community, Social and [facility]   Number of elements that affect the Plan of Care: 4+: HIGH COMPLEXITY   CLINICAL PRESENTATION:   Presentation: Evolving clinical presentation with unstable and unpredictable characteristics: HIGH COMPLEXITY   CLINICAL DECISION MAKIN [facility] Mobility Inpatient Short Form  How much difficulty does the patient currently have. .. Unable A Lot A Little None   1. Turning over in bed (including adjusting bedclothes, sheets and blankets)? [ ] 1   [ ] 2   [X] 3   [ ] 4   2. Sitting down on and standing up from a chair with arms ( e.g., wheelchair, bedside commode, etc.)   [ ] 1   [X] 2   [ ] 3   [ ] 4   3. Moving from lying on back to sitting on the side of the bed? [ ] 1   [X] 2   [ ] 3   [ ] 4   How much help from another person does the patient currently need. .. Total A Lot A Little None   4. Moving to and from a bed to a chair (including a wheelchair)? [ ] 1   [ ] 2   [X] 3   [ ] 4   5. Need to walk in hospital room? [ ] 1   [X] 2   [ ] 3   [ ] 4   6. Climbing 3-5 steps with a railing? [X] 1   [ ] 2   [ ] 3   [ ] 4   © , Trustees of [address] [place], [place] [address], under license to [employer]. All rights reserved    Score:  Initial: 13 Most Recent: X (Date: -- )     Interpretation of Tool:  Represents activities that are increasingly more difficult (i.e. Bed mobility, Transfers, Gait).        Score 24 23 22-20 19-15 14-10 9-7 6       Modifier CH CI CJ CK CL CM CN         · Mobility - Walking and Moving Around:               - CURRENT STATUS:    CL - 60%-79% impaired, limited or restricted               - GOAL STATUS:           CL - 60%-79% impaired, limited or restricted               - D/C STATUS:                       ---------------To be determined---------------  Payor: SELF PAY / Plan: [facility] SELF PAY / Product Type: Self Pay /       Medical Necessity:     · Skilled intervention continues to be required due to decreased function. Reason for Services/Other Comments:  · Patient continues to require skilled intervention due to medical complications and patient unable to attend/participate in therapy as expected. Use of outcome tool(s) and clinical judgement create a POC that gives a: Difficult prediction of patient's progress: HIGH COMPLEXITY                 TREATMENT:      Pre-treatment Symptoms/Complaints:  No complaints  Pain: Initial:   Pain Intensity 1: 0  Post Session:  No response      Therapeutic Activity: (    15 minutes): Therapeutic activities including bed transfers, Chair transfers, Ambulation on level ground and monitored sats to improve mobility, strength, balance and endurance. Required minimal   to promote static and dynamic balance in standing with use of the walker. Date:  5/31/17 Date:   Date:     ACTIVITY/EXERCISE AM PM AM PM AM PM   Ambulation:           Distance  Device  Duration Group exs        Seated Heel Raises X 15 B        Seated Toe Raises X 15 B        Seated Long Arc Quads X 15 B        Seated Marching X 15 B        Seated Hip Abduction X 15 B                 B = bilateral; AA = active assistive; A = active; P = passive           Braces/Orthotics/Lines/Etc:   · IV, denson catheter and 2L  Treatment/Session Assessment:    · Response to Treatment:  Tolerated well. Some SOB but no in a lot of distress  · Interdisciplinary Collaboration:  · Physical Therapy Assistant and Registered Nurse  · After treatment position/precautions:  · Up in chair, Bed/Chair-wheels locked, Call light within reach, RN notified and Family at bedside  · Compliance with Program/Exercises:  Will assess as treatment progresses. · Recommendations/Intent for next treatment session: \"Next visit will focus on advancements to more challenging activities and reduction in assistance provided\".   Total Treatment Duration:  PT Patient Time In/Time Out  Time In: 1145  Time Out: 1200     Bartolo Schilling, PTA

## 2017-06-01 NOTE — PROGRESS NOTES
SPEECH PATHOLOGY NOTE:    Patient is currently NPO for procedure. Will follow up tomorrow for diet tolerance.     Katarina Jordan MS, CCC-SLP

## 2017-06-01 NOTE — PROGRESS NOTES
Subjective:   Daily Progress Note: 6/1/2017 8:33 AM    Feeling better today. Breathing is stable. Edema improved.     Current Facility-Administered Medications   Medication Dose Route Frequency    dextrose 5% infusion  50 mL/hr IntraVENous CONTINUOUS    furosemide (LASIX) tablet 40 mg  40 mg Oral DAILY    DULoxetine (CYMBALTA) capsule 20 mg  20 mg Oral DAILY    fentaNYL (DURAGESIC) 50 mcg/hr patch 1 Patch  1 Patch TransDERmal Q72H    hydrALAZINE (APRESOLINE) tablet 50 mg  50 mg Oral TID    atenolol (TENORMIN) tablet 25 mg  25 mg Oral DAILY    insulin regular (NOVOLIN R, HUMULIN R) injection   SubCUTAneous AC&HS    famotidine (PEPCID) 40 mg/5 mL (8 mg/mL) oral suspension 20 mg  20 mg Per NG tube DAILY    0.9% sodium chloride infusion 250 mL  250 mL IntraVENous PRN    hydrALAZINE (APRESOLINE) 20 mg/mL injection 10 mg  10 mg IntraVENous Q6H PRN    morphine injection 2 mg  2 mg IntraVENous Q4H PRN    amLODIPine (NORVASC) tablet 10 mg  10 mg Oral DAILY    HYDROcodone-acetaminophen (NORCO)  mg tablet 1 Tab  1 Tab Oral Q4H PRN    LORazepam (ATIVAN) tablet 1 mg  1 mg Oral Q6H PRN    megestrol (MEGACE) 400 mg/10 mL (10 mL) oral suspension 200 mg  200 mg Oral DAILY    mirtazapine (REMERON) tablet 30 mg  30 mg Oral QHS    montelukast (SINGULAIR) tablet 10 mg  10 mg Oral QHS    zolpidem (AMBIEN) tablet 5 mg  5 mg Oral QHS PRN    sodium chloride (NS) flush 5-10 mL  5-10 mL IntraVENous Q8H    sodium chloride (NS) flush 5-10 mL  5-10 mL IntraVENous PRN    azithromycin (ZITHROMAX) 500 mg in 0.9% sodium chloride (MBP/ADV) 250 mL  500 mg IntraVENous Q24H    cefTRIAXone (ROCEPHIN) 1 g in 0.9% sodium chloride (MBP/ADV) 50 mL  1 g IntraVENous Q24H    budesonide (PULMICORT) 500 mcg/2 ml nebulizer suspension  500 mcg Nebulization BID RT    And    albuterol CONCENTRATE 2.5mg/0.5 mL neb soln  2.5 mg Nebulization Q6H RT               Objective:     Visit Vitals    /53    Pulse 74    Temp 98.7 °F (37.1 °C)    Resp 18    Ht 5' 2\" (1.575 m)    Wt 78.3 kg (172 lb 9.6 oz)    LMP 1998    SpO2 95%    BMI 31.57 kg/m2    O2 Flow Rate (L/min): 2 l/min O2 Device: Nasal cannula    Temp (24hrs), Av.8 °F (37.1 °C), Min:98.4 °F (36.9 °C), Max:99.4 °F (37.4 °C)         1901 -  0700  In: 1181 [P.O.:580; I.V.:1171]  Out:  [Urine:]      Visit Vitals    /53    Pulse 74    Temp 98.7 °F (37.1 °C)    Resp 18    Ht 5' 2\" (1.575 m)    Wt 78.3 kg (172 lb 9.6 oz)    LMP 1998    SpO2 95%    BMI 31.57 kg/m2       Lungs: scattered rales bilaterally  Heart: regular rate and rhythm  Abdomen: soft, non-tender.   Extremities: trace edema          Data Review    Recent Results (from the past 48 hour(s))   GLUCOSE, POC    Collection Time: 17 11:34 AM   Result Value Ref Range    Glucose (POC) 258 (H) 65 - 100 mg/dL   GLUCOSE, POC    Collection Time: 17  3:56 PM   Result Value Ref Range    Glucose (POC) 302 (H) 65 - 100 mg/dL   GLUCOSE, POC    Collection Time: 17  8:34 PM   Result Value Ref Range    Glucose (POC) 279 (H) 65 - 100 mg/dL   GLUCOSE, POC    Collection Time: 17  5:38 AM   Result Value Ref Range    Glucose (POC) 130 (H) 65 - 100 mg/dL   PROTHROMBIN TIME + INR    Collection Time: 17  6:51 AM   Result Value Ref Range    Prothrombin time 19.6 (H) 9.6 - 12.0 sec    INR 1.8 (H) 0.9 - 1.2     METABOLIC PANEL, BASIC    Collection Time: 17  6:51 AM   Result Value Ref Range    Sodium 146 (H) 136 - 145 mmol/L    Potassium 3.5 3.5 - 5.1 mmol/L    Chloride 109 (H) 98 - 107 mmol/L    CO2 26 21 - 32 mmol/L    Anion gap 11 7 - 16 mmol/L    Glucose 143 (H) 65 - 100 mg/dL    BUN 58 (H) 8 - 23 MG/DL    Creatinine 2.55 (H) 0.6 - 1.0 MG/DL    GFR est AA 24 (L) >60 ml/min/1.73m2    GFR est non-AA 20 (L) >60 ml/min/1.73m2    Calcium 6.7 (L) 8.3 - 10.4 MG/DL   GLUCOSE, POC    Collection Time: 17 10:44 AM   Result Value Ref Range    Glucose (POC) 256 (H) 65 - 100 mg/dL   GLUCOSE, POC    Collection Time: 05/31/17  4:16 PM   Result Value Ref Range    Glucose (POC) 247 (H) 65 - 100 mg/dL   GLUCOSE, POC    Collection Time: 05/31/17  8:16 PM   Result Value Ref Range    Glucose (POC) 219 (H) 65 - 100 mg/dL   GLUCOSE, POC    Collection Time: 06/01/17  6:10 AM   Result Value Ref Range    Glucose (POC) 146 (H) 65 - 100 mg/dL       Assessment     Patient Active Problem List    Diagnosis Date Noted    Hypernatremia 05/30/2017    Anxiety 05/28/2017    Acute on chronic respiratory failure ([address] 05/24/2017    Moderate aortic stenosis 05/24/2017    Anticoagulated on Coumadin 05/24/2017    Volume overload 05/24/2017    Breast cancer ([facility] 02/17/2017    Bony metastasis ([facility] 02/17/2017    Acute respiratory failure ([facility] 02/17/2017    Breast cancer metastasized to lung ([facility] [facility] 02/17/2017    CKD (chronic kidney disease) 01/04/2017    DVT (deep venous thrombosis) ([facility] [facility] 01/04/2017    Accelerated hypertension 01/04/2017    Pleural effusion, right 01/04/2017    Hydronephrosis 01/04/2017    Bilateral edema of lower extremity 01/04/2017    Hematuria 01/04/2017    Chemotherapy-induced neutropenia (HCC) 12/20/2016    Pancytopenia ([address] 10/10/2016    HTN (hypertension) 10/07/2016    DM (diabetes mellitus) ([facility] 10/07/2016    Acute on chronic renal failure ([facility] [facility] 10/07/2016    Pulmonary edema 01/03/2016    Elevated troponin 10/20/2015    PND (paroxysmal nocturnal dyspnea) 02/25/2014    Leg swelling 02/25/2014    CHF (congestive heart failure) ([address] 02/25/2014    Anemia 11/19/2013    Asthma 10/22/2013    Hypomagnesemia 08/04/2013    Hypokalemia 08/04/2013    Iron deficiency anemia 06/10/2013           Problems Addressed by Nephrology     EMERY on CKD 4- now about at baseline  Hypernatremia: better  Respiratory Failure: better  Pleural effusion    Plan     On Free water  DC IV lasix and change to PO  Daily labs

## 2017-06-01 NOTE — ROUTINE PROCESS
Pleurx cancelled for today due to INR 2.0 per Dr Jaspreet Sparrow. Dr Gore Karen aware and asks that Coumadin be continually held for reevaluation at a later point. Aamya DECKER aware of plan of care.

## 2017-06-01 NOTE — PROGRESS NOTES
Patient voices no concerns and denies any pain at this time. Patient pluerx has been cancelled r/t INR high. Instructed to continue to hold coumadin and drain placement will be readdressed. Patient in bed relaxing. Call light within reach. Will continue to monitor.

## 2017-06-01 NOTE — PROGRESS NOTES
Bang Graham  Admission Date: 5/24/2017             Daily Progress Note: 6/1/2017    The patient's chart is reviewed and the patient is discussed with the staff. 79 y/o admitted 5/24 was last seen by us in February of 2017 for right pleural effusion. She has dCHR, prior DVT on coumadin, CKD, Moderate AS, HTN, DM and R ureteral obstruction s/p stent placement. She is followed by Dr Rosemary Payne for metastatic breast CA with mets to lung/bone. In February, she underwent R thoracentesis with drain to gravity with removal of 1200 ml of yellow pleural fluid. She developed pain after thoracentesis and pain was felt to be from trapped lung physiology and recommended not to remove more than 1 liter of pleural fluid if thoracentesis is again necessary. She was hypoxemic and home O2 arranged.       She had a CXR in April 2017 with persistent effusion. She was seen by Oncology on 5/16/2017 and was having dyspnea during that time with fatigue. She was given lasix as well and has taken lasix until 3 days ago. S. On admit her CXR shows effusion on right which is unchanged and new right perihilar infiltrate. We were asked to admit her for hypoxemia with persistent effusion, new infiltrate with metastatic breast cancer with acute on chronic renal failure and moderate AS      Decompensated- intubated on 5/24  Seen by Oncology -holding chemo  Thoracentesis on right- 950 cc of /serosanguinous/ fluid    Subjective:     Patient speaks minimal english. No acute changes overnight. Still on 2L O2. Scheduled for pleurx this afternoon but no morning INR drawn yet.      Current Facility-Administered Medications   Medication Dose Route Frequency    [START ON 6/2/2017] furosemide (LASIX) tablet 40 mg  40 mg Oral DAILY    dextrose 5% infusion  50 mL/hr IntraVENous CONTINUOUS    DULoxetine (CYMBALTA) capsule 20 mg  20 mg Oral DAILY    fentaNYL (DURAGESIC) 50 mcg/hr patch 1 Patch  1 Patch TransDERmal Q72H    hydrALAZINE (APRESOLINE) tablet 50 mg  50 mg Oral TID    atenolol (TENORMIN) tablet 25 mg  25 mg Oral DAILY    insulin regular (NOVOLIN R, HUMULIN R) injection   SubCUTAneous AC&HS    famotidine (PEPCID) 40 mg/5 mL (8 mg/mL) oral suspension 20 mg  20 mg Per NG tube DAILY    0.9% sodium chloride infusion 250 mL  250 mL IntraVENous PRN    hydrALAZINE (APRESOLINE) 20 mg/mL injection 10 mg  10 mg IntraVENous Q6H PRN    morphine injection 2 mg  2 mg IntraVENous Q4H PRN    amLODIPine (NORVASC) tablet 10 mg  10 mg Oral DAILY    HYDROcodone-acetaminophen (NORCO)  mg tablet 1 Tab  1 Tab Oral Q4H PRN    LORazepam (ATIVAN) tablet 1 mg  1 mg Oral Q6H PRN    megestrol (MEGACE) 400 mg/10 mL (10 mL) oral suspension 200 mg  200 mg Oral DAILY    mirtazapine (REMERON) tablet 30 mg  30 mg Oral QHS    montelukast (SINGULAIR) tablet 10 mg  10 mg Oral QHS    zolpidem (AMBIEN) tablet 5 mg  5 mg Oral QHS PRN    sodium chloride (NS) flush 5-10 mL  5-10 mL IntraVENous Q8H    sodium chloride (NS) flush 5-10 mL  5-10 mL IntraVENous PRN    azithromycin (ZITHROMAX) 500 mg in 0.9% sodium chloride (MBP/ADV) 250 mL  500 mg IntraVENous Q24H    cefTRIAXone (ROCEPHIN) 1 g in 0.9% sodium chloride (MBP/ADV) 50 mL  1 g IntraVENous Q24H    budesonide (PULMICORT) 500 mcg/2 ml nebulizer suspension  500 mcg Nebulization BID RT    And    albuterol CONCENTRATE 2.5mg/0.5 mL neb soln  2.5 mg Nebulization Q6H RT       Review of Systems  Unobtainable due to patient status.     Objective:     Vitals:    06/01/17 0654 06/01/17 0744 06/01/17 0806 06/01/17 0813   BP:   159/62 148/53   Pulse:   69 74   Resp:   18    Temp:   98.7 °F (37.1 °C)    SpO2:  96% 95%    Weight: 172 lb 9.6 oz (78.3 kg)      Height:         Intake and Output:   05/30 1901 - 06/01 0700  In: 9628 [P.O.:580; I.V.:1171]  Out: 1955 [Urine:1955]       Physical Exam:   Constitution:  the patient is well developed and in no acute distress  EENMT:  Sclera clear, pupils equal, oral mucosa moist  Respiratory: Mild B rhonchi  Cardiovascular:  RRR without M,G,R  Gastrointestinal: soft and non-tender; with positive bowel sounds. Musculoskeletal: warm without cyanosis. There is trace B lower leg edema. Skin:  no jaundice or rashes, no wounds   Neurologic: no gross neuro deficits     Psychiatric:  alert and calm. CHEST XRAY:   5/30/17  IMPRESSION: Pulmonary edema and bilateral pleural effusions unchanged. LAB  No lab exists for component: [place]   Recent Labs      05/31/17   0651  05/30/17   0755   WBC   --   4.6   HGB   --   9.5*   HCT   --   28.6*   PLT   --   228   INR  1.8*  1.6*     Recent Labs      05/31/17   0651  05/30/17   0755   NA  146*  148*   K  3.5  3.3*   CL  109*  111*   CO2  26  28   GLU  143*  165*   BUN  58*  67*   CREA  2.55*  2.84*   CA  6.7*  6.8*     No results for input(s): PH, PCO2, PO2, HCO3 in the last 72 hours. Assessment:  (Medical Decision Making)     Hospital Problems  Date Reviewed: 5/26/2017          Codes Class Noted POA    Hypernatremia ICD-10-CM: E87.0  ICD-9-CM: 276.0  5/30/2017 Unknown        Anxiety ICD-10-CM: F41.9  ICD-9-CM: 300.00  5/28/2017 Unknown        * (Principal)Acute on chronic respiratory failure ([facility] ICD-10-CM: J96.20  ICD-9-CM: 518.84  5/24/2017 Yes        Moderate aortic stenosis (Chronic) ICD-10-CM: I35.0  ICD-9-CM: 424.1  5/24/2017 Yes        Anticoagulated on Coumadin (Chronic) ICD-10-CM: Z51.81, Z79.01  ICD-9-CM: V58.83, V58.61  5/24/2017 Yes        Volume overload ICD-10-CM: E87.70  ICD-9-CM: 276.69  5/24/2017 Yes        Breast cancer metastasized to lung [facility]) (Chronic) ICD-10-CM: C50.919, C78.00  ICD-9-CM: 174.9, 197.0  2/17/2017 Yes        CKD (chronic kidney disease) (Chronic) ICD-10-CM: N18.9  ICD-9-CM: 585.9  1/4/2017 Yes    Overview Signed 5/14/2013 11:35 AM by Mickie Stevens RN     With acute rise- ?  Dehydration and monitor             DVT (deep venous thrombosis) (HCC) (Chronic) ICD-10-CM: I82.409  ICD-9-CM: 453.40 1/4/2017 Yes    Overview Signed 11/19/2013  6:07 AM by Halima Desouza     [id number] There is nonocclusive thrombus within the left common femoral vein, superficial femoral vein, popliteal vein, and posterior tibial vein               Pleural effusion, right (Chronic) ICD-10-CM: J90  ICD-9-CM: 511.9  1/4/2017 Yes    Overview Addendum 5/26/2017 10:11 AM by Nimco Awad NP     10/6/16 R thoracentesis 800 ml: pleural fluid creatine 4.1 = likely urinothorax  5/25/2017: Thoracentesis on right- 950 cc of /serosanguinous/ fluid             DM (diabetes mellitus) (HCC) (Chronic) ICD-10-CM: E11.9  ICD-9-CM: 250.00  10/7/2016 Yes        Acute on chronic renal failure (HCC) ICD-10-CM: N17.9, N18.9  ICD-9-CM: 584.9, 585.9  10/7/2016 Yes        Leg swelling ICD-10-CM: M79.89  ICD-9-CM: 729.81  2/25/2014 Yes    Overview Signed 2/25/2014  2:45 PM by Vicki Sol NP     Bilateral                   Patient admitted with respiratory failure. Initial cxr showed new right infiltrate with no change in previously known R sided effusion. Required ICU stay for a time. Has been on abx, day 9 of CAP coverage. Now she is scheduled for R pleurx catheter placement. As the effusion has been present and was stable in size at the time of her admission, I am not confident that this will make a huge difference in her acute symptoms. Plan:  (Medical Decision Making)   -already scheduled for pleurx. This can proceed and we will monitor if it makes any improvement in her breathing. Needs INR now, however, as she has been on coumadin up until yesterday. If too high to perform would simply cancel the procedure and follow up as an outpatient.   -day 9 of abx targeting CAP. Will d/c ceftriaxone and azithromycin.   -likely close to being able to discharge. Consult case management to assess any needs.      Patient Active Problem List   Diagnosis Code    Breast cancer ([facility] C50.919    Bony metastasis ([facility] [address] C79.51    HTN (hypertension) I10    DM (diabetes mellitus) ([facility] [address] E11.9    CKD (chronic kidney disease) N18.9    Iron deficiency anemia D50.9    Acute respiratory failure (HCC) J96.00    Hypomagnesemia E83.42    Hypokalemia E87.6    Asthma J45.909    Anemia D64.9    DVT (deep venous thrombosis) ([facility]) I82.409    PND (paroxysmal nocturnal dyspnea) R06.00    Leg swelling M79.89    CHF (congestive heart failure) ([facility]) I50.9    Accelerated hypertension I10    Elevated troponin R74.8    Breast cancer metastasized to lung ([facility]) C50.919, C78.00    Pulmonary edema J81.1    Acute on chronic renal failure (HCC) N17.9, N18.9    Pleural effusion, right J90    Hydronephrosis N13.30    Pancytopenia ([facility]) D61.818    Chemotherapy-induced neutropenia ([facility]) D70.1    Bilateral edema of lower extremity R60.0    Hematuria R31.9    Acute on chronic respiratory failure ([facility]) J96.20    Moderate aortic stenosis I35.0    Anticoagulated on Coumadin Z51.81, Z79.01    Volume overload E87.70    Anxiety F41.9    Hypernatremia E87.0       Jayce Kraft MD

## 2017-06-01 NOTE — PROGRESS NOTES
Patient stable with no complaints at this time.  at bedside for support. Interpretor phone at bedside if needed. Call light within reach.   Report to be given to oncoming RN 7p-7a

## 2017-06-01 NOTE — DISCHARGE SUMMARY
Discharge Note    Nati Liao  Admission date:  5/24/2017  Discharge date:  6/2/2017    Admitting Diagnosis:  Pleural effusion [J90]  Pleural effusion [J90]    Discharge Diagnoses:   Hospital Problems  Date Reviewed: 5/26/2017          Codes Class Noted POA    Hypernatremia ICD-10-CM: E87.0  ICD-9-CM: 276.0  5/30/2017 Unknown        Anxiety ICD-10-CM: F41.9  ICD-9-CM: 300.00  5/28/2017 Unknown        * (Principal)Acute on chronic respiratory failure ([facility] [address] ICD-10-CM: J96.20  ICD-9-CM: 518.84  5/24/2017 Yes        Moderate aortic stenosis (Chronic) ICD-10-CM: I35.0  ICD-9-CM: 424.1  5/24/2017 Yes        Anticoagulated on Coumadin (Chronic) ICD-10-CM: Z51.81, Z79.01  ICD-9-CM: V58.83, V58.61  5/24/2017 Yes        Volume overload ICD-10-CM: E87.70  ICD-9-CM: 276.69  5/24/2017 Yes        Breast cancer metastasized to lung [facility]) (Chronic) ICD-10-CM: C50.919, C78.00  ICD-9-CM: 174.9, 197.0  2/17/2017 Yes        CKD (chronic kidney disease) (Chronic) ICD-10-CM: N18.9  ICD-9-CM: 585.9  1/4/2017 Yes    Overview Signed 5/14/2013 11:35 AM by Con Villalpando RN     With acute rise- ?  Dehydration and monitor             DVT (deep venous thrombosis) (HCC) (Chronic) ICD-10-CM: I82.409  ICD-9-CM: 453.40  1/4/2017 Yes    Overview Signed 11/19/2013  6:07 AM by Zakiya Edmond     57-51-07 There is nonocclusive thrombus within the left common femoral vein, superficial femoral vein, popliteal vein, and posterior tibial vein               Pleural effusion, right (Chronic) ICD-10-CM: J90  ICD-9-CM: 511.9  1/4/2017 Yes    Overview Addendum 5/26/2017 10:11 AM by Damita Snellen, NP     10/6/16 R thoracentesis 800 ml: pleural fluid creatine 4.1 = likely urinothorax  5/25/2017: Thoracentesis on right- 950 cc of /serosanguinous/ fluid             DM (diabetes mellitus) ([facility] [address] (Chronic) ICD-10-CM: E11.9  ICD-9-CM: 250.00  10/7/2016 Yes        Acute on chronic renal failure ([facility] [address] ICD-10-CM: N17.9, N18.9  ICD-9-CM: 584.9, 585.9  10/7/2016 Yes        Leg swelling ICD-10-CM: M79.89  ICD-9-CM: 729.81  2/25/2014 Yes    Overview Signed 2/25/2014  2:45 PM by Duglas Weathers NP     Bilateral                     Consultants: Urology, Palliative Care, Nephrology, Oncology    Studies/Procedures:  CXR  U/S of retroperitoneum       Condition on Discharge:  stable    Disposition:  Discharge home with home health      Presenting Illness:  Patient is a 78 y.o. female presents with dyspnea.     She was last seen by us in February of 2017 for right pleural effusion. She has dCHR, prior DVT on coumadin, CKD, Moderate AS, HTN, DM and R ureteral obstruction s/p stent placement. She is followed by Dr Melecio Lion for metastatic breast CA with mets to lung/bone. In February, she underwent R thoracentesis with drain to gravity with removal of 1200 ml of yellow pleural fluid. She developed pain after thoracentesis and pain was felt to be from trapped lung physiology and recommended not to remove more than 1 liter of pleural fluid if thoracentesis is again necessary. She was hypoxemic and home O2 arranged.      She had a CXR in April 2017 with persistent effusion. She was seen by Oncology on 5/16/2017 and was having dyspnea during that time with fatigue. She was given lasix as well and has taken lasix until 3 days ago. She has plans to see [facility] on June 2. Today in the ER, her CXR shows effusion on right which is unchanged and new right perihilar infiltrate. We were asked to admit her for hypoxemia with persistent effusion, new infiltrate with metastatic breast cancer with acute on chronic renal failure and moderate AS.     She is currently requiring BiPAP and we were asked to admit to the ICU. Hospital course:  Pt was admitted and decompensated requiring intubation. She was seen by oncology and her chemotherapy treatments were held. She had R thoracentesis with 500mL serosanguinous fluid removed on 5/25/17. Pt improved and was extubated on 5/26/17.  Urology was consulted to evaluate for urinothorax. It was felt that she did not have urinothorax but chronic effusion. She improved and was weaned to 2L. She will be discharged home today with home health. She will hold Coumadin until she returns on 6/8 for pleurex catheter placement. She will then resume Coumadin after that. She is to ambulate with respiratory therapy to determine home O2 needs on exertion. Physical Exam:   Constitution:  the patient is well developed and in no acute distress, on 2L O2  EENMT:  Sclera clear, pupils equal, oral mucosa moist  Respiratory: mild rhonchi  Cardiovascular:  RRR without M,G,R  Gastrointestinal: soft and non-tender; with positive bowel sounds. Musculoskeletal: warm without cyanosis. There is trace lower leg edema. Skin:  no jaundice or rashes, no wounds   Neurologic: no gross neuro deficits     Psychiatric:  alert and calm      LAB  Recent Labs      06/01/17   1124  05/31/17   0651  05/30/17   0755   WBC   --    --   4.6   HGB   --    --   9.5*   HCT   --    --   28.6*   PLT   --    --   228   INR  2.0*  1.8*  1.6*     Recent Labs      06/01/17   1124  05/31/17   0651  05/30/17   0755   NA  144  146*  148*   K  3.4*  3.5  3.3*   CL  106  109*  111*   CO2  29  26  28   BUN  49*  58*  67*   CREA  2.41*  2.55*  2.84*     No results for input(s): PH, PCO2, PO2, HCO3 in the last 72 hours. Discharge Medications:   Current Discharge Medication List      START taking these medications    Details   furosemide (LASIX) 40 mg tablet Take 1 Tab by mouth daily. Qty: 30 Tab, Refills: 0      DULoxetine (CYMBALTA) 20 mg capsule Take 1 Cap by mouth daily. Indications: NEUROPATHIC PAIN  Qty: 30 Cap, Refills: 0      hydrALAZINE (APRESOLINE) 50 mg tablet Take 1 Tab by mouth three (3) times daily. Qty: 90 Tab, Refills: 1         CONTINUE these medications which have CHANGED    Details   mirtazapine (REMERON) 30 mg tablet Take 1 Tab by mouth nightly.   Qty: 30 Tab, Refills: 1      zolpidem (AMBIEN) 5 mg tablet Take 1 Tab by mouth nightly as needed for Sleep. Max Daily Amount: 5 mg. Qty: 30 Tab, Refills: 0      atenolol (TENORMIN) 25 mg tablet Take 1 Tab by mouth daily. Qty: 60 Tab, Refills: 0         CONTINUE these medications which have NOT CHANGED    Details   fentaNYL (DURAGESIC) 50 mcg/hr PATCH 1 Patch by TransDERmal route every seventy-two (72) hours. Max Daily Amount: 1 Patch. Indications: Chronic Pain with Opioid Tolerance  Qty: 10 Patch, Refills: 0    Associated Diagnoses: Breast cancer metastasized to lung, unspecified laterality; Cancer associated pain; Malignant neoplasm of left female breast, unspecified site of breast [facility] Malignant neoplasm of right female breast, unspecified site of breast (HCC)      HYDROcodone-acetaminophen (NORCO)  mg tablet Take 1 Tab by mouth every four (4) hours as needed. Max Daily Amount: 6 Tabs. Qty: 90 Tab, Refills: 0      montelukast (SINGULAIR) 10 mg tablet Take 1 Tab by mouth nightly. Qty: 30 Tab, Refills: 3    Associated Diagnoses: Malignant neoplasm of right female breast, unspecified site of breast ([facility] Breast cancer metastasized to lung, unspecified laterality; Cancer associated pain; Malignant neoplasm of left female breast, unspecified site of breast (HCC)      albuterol (PROVENTIL HFA, VENTOLIN HFA, PROAIR HFA) 90 mcg/actuation inhaler Take 2 Puffs by inhalation every six (6) hours as needed for Wheezing. Qty: 1 Inhaler, Refills: 11      amLODIPine (NORVASC) 10 mg tablet Take 1 Tab by mouth daily. Qty: 30 Tab, Refills: 11      LORazepam (ATIVAN) 1 mg tablet Take 1 Tab by mouth every six (6) hours as needed for Anxiety. Max Daily Amount: 4 mg. Qty: 60 Tab, Refills: 0      palbociclib 125 mg cap Take 125 mg by mouth daily.  Take one pill once a day for 3 weeks and then off for one week  Qty: 21 Tab, Refills: 5    Associated Diagnoses: Breast cancer metastasized to lung, unspecified laterality      budesonide-formoterol (SYMBICORT) 160-4.5 mcg/actuation HFA inhaler Take 2 Puffs by inhalation two (2) times a day. Qty: 1 Inhaler, Refills: 11      ALPRAZolam (XANAX) 0.25 mg tablet Take 1 Tab by mouth three (3) times daily as needed for Anxiety. Max Daily Amount: 0.75 mg. Qty: 90 Tab, Refills: 2      megestrol (MEGACE) 400 mg/10 mL (10 mL) suspension Take 5 mL by mouth daily. Qty: 240 mL, Refills: 2    Associated Diagnoses: Anorexia         STOP taking these medications       ciprofloxacin HCl (CIPRO) 500 mg tablet Comments:   Reason for Stopping:         warfarin (COUMADIN) 2 mg tablet Comments:   Reason for Stopping:         glipiZIDE (GLUCOTROL) 5 mg tablet Comments:   Reason for Stopping: Followup/Outpt Studies:  --Follow up with transitional care appointment with [facility] in 4 weeks with cxr.  --Home [address]  --Total discharge greater than 30 minutes in duration. More than 50% of the time documented was spent in face-to-face contact with the patient and in the care of the patient on the floor/unit where the patient is located. SHAHAB Alves        Lungs:  Decreased at right base  Heart:  RRR with no Murmur/Rubs/Gallops    Additional Comments:    Patient admitted with shortness of breath, required intubation. Treated for PNA. Pleural effusion drained but had appeared stable in size going back to December. Unclear if the cause of her dyspnea or if it was more from PNA. She thinks she felt better after thoracentesis but she was intubated at the time. Will attempt outpatient pleurx to see if this improves her breathing. Ready for discharge. Hold coumadin until pleurx completed. I have spoken with and examined the patient. I agree with the above assessment and plan as documented.     Nohelia Dennison MD

## 2017-06-01 NOTE — PROGRESS NOTES
Left msg for son ([phone]) to contact SW to set up [place] at his home since pt lives out of [address] area. Pt's spouse is concerned that pt has small 02 tanks rather than large tanks. SW following up with [facility] and pt's son.

## 2017-06-01 NOTE — PROGRESS NOTES
Pharmacy Note: Warfarin Consult    Warfarin discontinued by Dr. Sachin Rocha for Pleur-X placement. Pharmacy will sign off. Please re-consult us as needed.     Thank you,  Choco Soni, PharmD  Clinical Pharmacist  [phone]

## 2017-06-01 NOTE — PROGRESS NOTES
Jaky DECKER aware of pleurx placement today, probably this afternoon. INR ordered per Dr Eliseo Smith note to insure anticoagulation at safe level to proceed.

## 2017-06-02 ENCOUNTER — HOME HEALTH ADMISSION (OUTPATIENT)
Dept: HOME HEALTH SERVICES | Facility: HOME HEALTH | Age: 67
End: 2017-06-02

## 2017-06-02 VITALS
DIASTOLIC BLOOD PRESSURE: 58 MMHG | BODY MASS INDEX: 25.93 KG/M2 | WEIGHT: 140.9 LBS | OXYGEN SATURATION: 97 % | HEIGHT: 62 IN | RESPIRATION RATE: 20 BRPM | SYSTOLIC BLOOD PRESSURE: 128 MMHG | HEART RATE: 50 BPM | TEMPERATURE: 98.9 F

## 2017-06-02 LAB
GLUCOSE BLD STRIP.AUTO-MCNC: 122 MG/DL (ref 65–100)
GLUCOSE BLD STRIP.AUTO-MCNC: 196 MG/DL (ref 65–100)
INR PPP: 2.1 (ref 0.9–1.2)
PROTHROMBIN TIME: 22.6 SEC (ref 9.6–12)

## 2017-06-02 PROCEDURE — 85610 PROTHROMBIN TIME: CPT | Performed by: INTERNAL MEDICINE

## 2017-06-02 PROCEDURE — 74011250637 HC RX REV CODE- 250/637: Performed by: INTERNAL MEDICINE

## 2017-06-02 PROCEDURE — 94760 N-INVAS EAR/PLS OXIMETRY 1: CPT

## 2017-06-02 PROCEDURE — 82962 GLUCOSE BLOOD TEST: CPT

## 2017-06-02 PROCEDURE — 99239 HOSP IP/OBS DSCHRG MGMT >30: CPT | Performed by: INTERNAL MEDICINE

## 2017-06-02 PROCEDURE — 77010033678 HC OXYGEN DAILY

## 2017-06-02 PROCEDURE — 36415 COLL VENOUS BLD VENIPUNCTURE: CPT | Performed by: INTERNAL MEDICINE

## 2017-06-02 PROCEDURE — 97530 THERAPEUTIC ACTIVITIES: CPT

## 2017-06-02 PROCEDURE — 94640 AIRWAY INHALATION TREATMENT: CPT

## 2017-06-02 PROCEDURE — 74011000250 HC RX REV CODE- 250: Performed by: INTERNAL MEDICINE

## 2017-06-02 RX ORDER — ZOLPIDEM TARTRATE 5 MG/1
5 TABLET ORAL
Qty: 30 TAB | Refills: 0 | Status: SHIPPED | OUTPATIENT
Start: 2017-06-02 | End: 2018-01-01

## 2017-06-02 RX ORDER — MIRTAZAPINE 30 MG/1
30 TABLET, FILM COATED ORAL
Qty: 30 TAB | Refills: 1 | Status: SHIPPED | OUTPATIENT
Start: 2017-06-02 | End: 2018-01-01 | Stop reason: SDUPTHER

## 2017-06-02 RX ORDER — ATENOLOL 25 MG/1
25 TABLET ORAL DAILY
Qty: 60 TAB | Refills: 0 | Status: SHIPPED | OUTPATIENT
Start: 2017-06-02 | End: 2017-07-14

## 2017-06-02 RX ORDER — HYDRALAZINE HYDROCHLORIDE 50 MG/1
50 TABLET, FILM COATED ORAL 3 TIMES DAILY
Qty: 90 TAB | Refills: 1 | Status: SHIPPED | OUTPATIENT
Start: 2017-06-02 | End: 2017-01-01 | Stop reason: SDUPTHER

## 2017-06-02 RX ORDER — FUROSEMIDE 40 MG/1
40 TABLET ORAL DAILY
Qty: 30 TAB | Refills: 0 | Status: SHIPPED | OUTPATIENT
Start: 2017-06-02 | End: 2017-07-20 | Stop reason: SDUPTHER

## 2017-06-02 RX ORDER — DULOXETIN HYDROCHLORIDE 20 MG/1
20 CAPSULE, DELAYED RELEASE ORAL DAILY
Qty: 30 CAP | Refills: 0 | Status: SHIPPED | OUTPATIENT
Start: 2017-06-02 | End: 2018-01-01

## 2017-06-02 RX ADMIN — AMLODIPINE BESYLATE 10 MG: 10 TABLET ORAL at 08:06

## 2017-06-02 RX ADMIN — ALBUTEROL SULFATE 2.5 MG: 2.5 SOLUTION RESPIRATORY (INHALATION) at 07:56

## 2017-06-02 RX ADMIN — FAMOTIDINE 20 MG: 40 POWDER, FOR SUSPENSION ORAL at 08:05

## 2017-06-02 RX ADMIN — BUDESONIDE 500 MCG: 0.5 SUSPENSION RESPIRATORY (INHALATION) at 07:56

## 2017-06-02 RX ADMIN — ATENOLOL 25 MG: 50 TABLET ORAL at 08:06

## 2017-06-02 RX ADMIN — MEGESTROL ACETATE 200 MG: 40 SUSPENSION ORAL at 07:46

## 2017-06-02 RX ADMIN — Medication 10 ML: at 05:13

## 2017-06-02 RX ADMIN — ALBUTEROL SULFATE 2.5 MG: 2.5 SOLUTION RESPIRATORY (INHALATION) at 01:37

## 2017-06-02 RX ADMIN — DULOXETINE 20 MG: 20 CAPSULE, DELAYED RELEASE ORAL at 08:06

## 2017-06-02 RX ADMIN — HYDRALAZINE HYDROCHLORIDE 50 MG: 50 TABLET, FILM COATED ORAL at 08:06

## 2017-06-02 RX ADMIN — FUROSEMIDE 40 MG: 40 TABLET ORAL at 08:06

## 2017-06-02 NOTE — PROGRESS NOTES
Problem: Self Care Deficits Care Plan (Adult)  Goal: *Acute Goals and Plan of Care (Insert Text)  1. Haven Beverly will complete bedside commode/toilet transfers with contact guard assistance. 2. Haven Beverly will complete toileting with contact guard assistance. 3. Haven Beverly will complete self-feeding with supervision/setup. 4. Haven Beverly will complete grooming/oral care with supervision/setup. 5. Haven Beverly will complete UE exercise 15 minutes with 3 or less rest breaks in prep for ADL. Time frame: 4 - 7 visits      OCCUPATIONAL THERAPY: Daily Note, Treatment Day: 2nd and AM 6/2/2017  INPATIENT: Hospital Day: 10  Payor: SELF PAY / Plan: [facility] SELF PAY / Product Type: Self Pay /      NAME/AGE/GENDER: Haven Beverly is a 78 y.o. female      PRIMARY DIAGNOSIS:  Pleural effusion [J90]  Pleural effusion [J90]  Pleural effusion [J90] Acute on chronic respiratory failure (HCC) Acute on chronic respiratory failure (HCC)  Procedure(s) (LRB):  ULTRASOUND (Right)  THORACENTESIS (Right)  8 Days Post-Op  ICD-10: Treatment Diagnosis:        · Generalized Muscle Weakness (M62.81)   Precautions/Allergies:         Review of patient's allergies indicates no known allergies. ASSESSMENT:   Ms. Ralph Garsia was admitted for the above diagnosis. Pt presents sitting up in chair upon arrival. Pt was able to practiced functional mobility in hallway to increase activity tolerance and strength. RT present at this time as well to qualify patient for O2 use. Pt c/o fatigue. Returned to room and to recliner. Pt left with  in room. Pt was discharged home today. This section established at most recent assessment   PROBLEM LIST (Impairments causing functional limitations):  1. Decreased Strength  2. Decreased ADL/Functional Activities  3. Decreased Transfer Abilities  4. Decreased Balance  5. Increased Pain  6. Decreased Activity Tolerance  7. Decreased Pacing Skills  8.  Decreased Work Simplification/Energy Conservation Techniques  9. Increased Fatigue  10. Increased Shortness of Breath  11. Decreased Flexibility/Joint Mobility  12. Edema/Girth  13. Decreased [place] with Home Exercise Program    INTERVENTIONS PLANNED: (Benefits and precautions of occupational therapy have been discussed with the patient.)  1. Activities of daily living training  2. Therapeutic activity  3. Therapeutic exercise      TREATMENT PLAN: Frequency/Duration: Follow patient 3 times/week to address above goals. Rehabilitation Potential For Stated Goals: GOOD      RECOMMENDED REHABILITATION/EQUIPMENT: (at time of discharge pending progress): Continue Skilled Therapy. OCCUPATIONAL PROFILE AND HISTORY:   History of Present Injury/Illness (Reason for Referral):  Per MD H & P: Patient is a 78 y.o. female presents with dyspnea. She was last seen by us in February of 2017 for right pleural effusion. She has dCHR, prior DVT on coumadin, CKD, Moderate AS, HTN, DM and R ureteral obstruction s/p stent placement. She is followed by Dr Marcela Levine for metastatic breast CA with mets to lung/bone. In February, she underwent R thoracentesis with drain to gravity with removal of 1200 ml of yellow pleural fluid. She developed pain after thoracentesis and pain was felt to be from trapped lung physiology and recommended not to remove more than 1 liter of pleural fluid if thoracentesis is again necessary. She was hypoxemic and home O2 arranged. She had a CXR in April 2017 with persistent effusion. She was seen by Oncology on 5/16/2017 and was having dyspnea during that time with fatigue. She was given lasix as well and has taken lasix until 3 days ago. She has plans to see [facility] on June 2. Today in the ER, her CXR shows effusion on right which is unchanged and new right perihilar infiltrate.  We were asked to admit her for hypoxemia with persistent effusion, new infiltrate with metastatic breast cancer with acute on chronic renal failure and moderate AS. She is currently requiring BiPAP and we were asked to admit to the ICU. Past Medical History/Comorbidities:   Ms. Néstor Munoz  has a past medical history of Acute on chronic diastolic congestive heart failure ([facility] [address] (1/5/2016); Asthma; Cancer ([facility] [address] Chemotherapy-induced neutropenia (HCC) (12/20/2016); Depression; Diabetes ([facility] [address] DM (diabetes mellitus) ([facility] [address] (11/20/2012); HCAP (healthcare-associated pneumonia) (7/17/2013); Hypertension; Sepsis ([facility] [address] (7/19/2016); Thromboembolus ([facility] [address] and Unspecified adverse effect of anesthesia. Ms. Néstor Munoz  has a past surgical history that includes vascular access (1/26/12) and gyn. Social History/Living Environment:   Home Environment: Private residence  # Steps to Enter: 1  One/Two Story Residence: One story  Living Alone: No  Support Systems: Spouse/Significant Other/Partner, Family member(s)  Patient Expects to be Discharged to[de-identified] Skilled nursing facility  Current DME Used/Available at Home: None  Prior Level of Function/Work/Activity:  Lives with  and wearing 3 L oxygen via nasal cannula. Cooking and walking to restroom, etc., but getting more fatigued lately per . When leaves house uses a wheelchair/transport chair? Cultural Preferences:          South African speaking. Number of Personal Factors/Comorbidities that affect the Plan of Care: Expanded review of therapy/medical records (1-2):  MODERATE COMPLEXITY   ASSESSMENT OF OCCUPATIONAL PERFORMANCE[de-identified]   Activities of Daily Living:           Basic ADLs (From Assessment) Complex ADLs (From Assessment)   Basic ADL  Feeding: Moderate assistance  Oral Facial Hygiene/Grooming: Maximum assistance  Bathing: Total assistance  Upper Body Dressing: Maximum assistance  Lower Body Dressing: Total assistance  Toileting: Total assistance Instrumental ADL  Meal Preparation: Total assistance  Homemaking:  Total assistance   Grooming/Bathing/Dressing Activities of Daily Living     Cognitive Retraining  Safety/Judgement: Awareness of environment                       Bed/Mat Mobility  Supine to Sit: Modified independent  Sit to Stand: Contact guard assistance          Most Recent Physical Functioning:   Gross Assessment:                       Balance:  Sitting: Impaired  Sitting - Static: Good (unsupported)  Sitting - Dynamic: Fair (occasional)  Standing: Impaired  Standing - Static: Good  Standing - Dynamic : Fair  Bed Mobility:  Supine to Sit: Modified independent  Transfers:  Sit to Stand: Contact guard assistance  Stand to Sit: Stand-by asssistance                 Patient Vitals for the past 6 hrs:   BP SpO2 O2 Flow Rate (L/min) Pulse   17 0723 150/48 95 % - 67   17 0756 - 96 % 2 l/min -   17 1132 128/58 97 % - (!) 50        Mental Status  Neurologic State: Alert, Appropriate for age  Orientation Level: Oriented X4  Cognition: Follows commands  Perception: Appears intact  Perseveration: No perseveration noted  Safety/Judgement: Awareness of environment                               Physical Skills Involved:  1. Range of Motion  2. Balance  3. Strength  4. Activity Tolerance  5. Gross Motor Control  6. Pain (Chronic) Cognitive Skills Affected (resulting in the inability to perform in a timely and safe manner):  1. difficult to assess Psychosocial Skills Affected:  1. Habits/Routines  2. Environmental Adaptation  3. Social Roles   Number of elements that affect the Plan of Care: 3-5:  MODERATE COMPLEXITY   CLINICAL DECISION MAKIN [facility] [address] [address] AM-PAC 6 Clicks   Basic Mobility Inpatient Short Form  How much help from another person does the patient currently need. .. Total A Lot A Little None   1. Putting on and taking off regular lower body clothing? [X] 1   [ ] 2   [ ] 3   [ ] 4   2. Bathing (including washing, rinsing, drying)? [X] 1   [ ] 2   [ ] 3   [ ] 4   3. Toileting, which includes using toilet, bedpan or urinal?   [X] 1   [ ] 2   [ ] 3   [ ] 4   4.   Putting on and taking off regular upper body clothing? [X] 1   [ ] 2   [ ] 3   [ ] 4   5. Taking care of personal grooming such as brushing teeth? [ ] 1   [X] 2   [ ] 3   [ ] 4   6. Eating meals? [ ] 1   [X] 2   [ ] 3   [ ] 4   © 2007, Trustees of [address] [place] [address] [address] [address], under license to [employer]. All rights reserved    Score:  Initial: 8 Most Recent: X (Date: -- )     Interpretation of Tool:  Represents activities that are increasingly more difficult (i.e. Bed mobility, Transfers, Gait). Score 24 23 22-20 19-15 14-10 9-7 6       Modifier CH CI CJ CK CL CM CN         · Self Care:               - CURRENT STATUS:    CM - 80%-99% impaired, limited or restricted               - GOAL STATUS:           CJ - 20%-39% impaired, limited or restricted               - D/C STATUS:                       ---------------To be determined---------------  Payor: SELF PAY / Plan: [facility] SELF PAY / Product Type: Self Pay /       Medical Necessity:     · Patient demonstrates fair rehab potential due to higher previous functional level. Reason for Services/Other Comments:  · Patient continues to require skilled intervention due to decreased independence with ADL and functional mobility for ADL. Assessment Modification and Need for Assistance:  1. No modification or assistance (Low)  2. Minimal to moderate modification or assistance (Moderate)  3. Significant modification or assistance (High)     Assessment Modifications Needed:  · No modification or assistance (Low)  · Cueing (Verbal, Tactile. Huy Michael )  · Physical Assistance  ·   · Minimal to moderate modification or assistance (Moderate)  · Significant modification or assistance (High)   Assessment process, impact of co-morbidities, assessment modification\need for assistance, and selection of interventions: MODERATE COMPLEXITY          TREATMENT:   (In addition to Assessment/Re-Assessment sessions the following treatments were rendered)      Pre-treatment Symptoms/Complaints:    Pain: Initial:   Pain Intensity 1: 0  Post Session:  same      Therapeutic Activity: (10 minutes): Therapeutic activities including functional mobility on level ground to improve mobility, strength and activity tolerance. Required CGA to promote static and dynamic balance in standing. UE Exercises (with theraband and 2# dowel) Date:  5/31/2017 Date:   Date:     Activity/Exercise Parameters Parameters Parameters   Shoulder Abd/Adduction 15 reps      Shoulder Flexion 15 reps with dowel     Elbow Flexion 20 reps with dowel     Punches 15 reps with dowel                              Braces/Orthotics/Lines/Etc:   · IV  · denson catheter  · O2 Device: Heated, Hi flow nasal cannula  Treatment/Session Assessment:    · Response to Treatment:  No treatment rendered. Assessment only. · Interdisciplinary Collaboration:  · Certified Occupational Therapy Assistant, Registered Nurse and Respiratory Therapist  · After treatment position/precautions:  · Up in chair, Bed/Chair-wheels locked, Call light within reach and Family at bedside  · Compliance with Program/Exercises: Will assess as treatment progresses. · Recommendations/Intent for next treatment session: \"Next visit will focus on advancements to more challenging activities\".   Total Treatment Duration:  OT Patient Time In/Time Out  Time In: 1150  Time Out: [address] Hui Kristopher

## 2017-06-02 NOTE — PROGRESS NOTES
Problem: Mobility Impaired (Adult and Pediatric)  Goal: *Acute Goals and Plan of Care (Insert Text)    LTG:  (1.)Ms. Leggett will move from supine to sit and sit to supine , scoot up and down and roll side to side in bed with STAND BY ASSIST within 8 day(s). (2.)Ms. Leggett will transfer from bed to chair and chair to bed with STAND BY ASSIST using the least restrictive device within 8 day(s). (3.)Ms. Leggett will ambulate with MINIMAL ASSIST for 20+ feet with the least restrictive device within 8 day(s). ________________________________________________________________________________________________      PHYSICAL THERAPY: Daily Note, Treatment Day: 4th and AM 6/2/2017  INPATIENT: Hospital Day: 10  Payor: SELF PAY / Plan: [facility] SELF PAY / Product Type: Self Pay /      NAME/AGE/GENDER: Ryan Lama is a 78 y.o. female      PRIMARY DIAGNOSIS: Pleural effusion [J90]  Pleural effusion [J90]  Pleural effusion [J90] Acute on chronic respiratory failure (HCC) Acute on chronic respiratory failure (HCC)  Procedure(s) (LRB):  ULTRASOUND (Right)  THORACENTESIS (Right)  8 Days Post-Op  ICD-10: Treatment Diagnosis:       · Generalized Muscle Weakness (M62.81)  · Difficulty in walking, Not elsewhere classified (R26.2)   Precaution/Allergies:  Review of patient's allergies indicates no known allergies. ASSESSMENT:      Ms. Hamilton Kamara presents sitting up in bed and willing to walk. Today she increased her gait distance to 250 feet. She is slow but steady and safe. Excellent progress with mobility today looking better every day. Left up in the chair. Hopefully going home today. This section established at most recent assessment   PROBLEM LIST (Impairments causing functional limitations):  1. Decreased Strength  2. Decreased ADL/Functional Activities  3. Decreased Transfer Abilities  4. Decreased Ambulation Ability/Technique  5. Decreased Balance  6. Decreased Activity Tolerance  7.  Decreased Pacing Skills  8. Increased Fatigue  9. Increased Shortness of Breath  10. Decreased Flexibility/Joint Mobility    INTERVENTIONS PLANNED: (Benefits and precautions of physical therapy have been discussed with the patient.)  1. Balance Exercise  2. Bed Mobility  3. Gait Training  4. Home Exercise Program (HEP)  5. Neuromuscular Re-education/Strengthening  6. Range of Motion (ROM)  7. Therapeutic Activites  8. Therapeutic Exercise/Strengthening  9. Transfer Training  10. Group Therapy      TREATMENT PLAN: Frequency/Duration: 3 times a week for duration of hospital stay  Rehabilitation Potential For Stated Goals: FAIR      RECOMMENDED REHABILITATION/EQUIPMENT: (at time of discharge pending progress): Continue Skilled Therapy, Home Health: Physical Therapy, Rehab and Adaptive Equipment: Walkers, Type: Rolling Walker. HISTORY:   History of Present Injury/Illness (Reason for Referral):  78 y.o. female presents with dyspnea. She was last seen by us in February of 2017 for right pleural effusion. She has dCHR, prior DVT on coumadin, CKD, Moderate AS, HTN, DM and R ureteral obstruction s/p stent placement. She is followed by Dr Eber Campos for metastatic breast CA with mets to lung/bone. In February, she underwent R thoracentesis with drain to gravity with removal of 1200 ml of yellow pleural fluid. She developed pain after thoracentesis and pain was felt to be from trapped lung physiology and recommended not to remove more than 1 liter of pleural fluid if thoracentesis is again necessary. She was hypoxemic and home O2 arranged. She had a CXR in April 2017 with persistent effusion. She was seen by Oncology on 5/16/2017 and was having dyspnea during that time with fatigue. She was given lasix as well and has taken lasix until 3 days ago. She has plans to see [address] [place] [address] [address] [facility] on June 2. Today in the ER, her CXR shows effusion on right which is unchanged and new right perihilar infiltrate.  We were asked to admit her for hypoxemia with persistent effusion, new infiltrate with metastatic breast cancer with acute on chronic renal failure and moderate AS. She is currently requiring BiPAP and we were asked to admit to the ICU  Past Medical History/Comorbidities:   Ms. Ralph Garsia  has a past medical history of Acute on chronic diastolic congestive heart failure ([facility] (1/5/2016); Asthma; Cancer ([facility] [address] [facility] Chemotherapy-induced neutropenia (HCC) (12/20/2016); Depression; Diabetes ([facility] [facility] DM (diabetes mellitus) ([facility] [facility] (11/20/2012); HCAP (healthcare-associated pneumonia) (7/17/2013); Hypertension; Sepsis ([facility] [facility] (7/19/2016); Thromboembolus ([facility] [address] [facility] and Unspecified adverse effect of anesthesia. Ms. Ralph Garsia  has a past surgical history that includes vascular access (1/26/12) and gyn. Social History/Living Environment:   Home Environment: Private residence  # Steps to Enter: 1  One/Two Story Residence: One story  Living Alone: No  Support Systems: Spouse/Significant Other/Partner, Family member(s)  Patient Expects to be Discharged to[de-identified] Skilled nursing facility  Current DME Used/Available at Home: None  Prior Level of Function/Work/Activity:  Pt limited household ambulator reports she does not use AD, but possibly furniture walker. Number of Personal Factors/Comorbidities that affect the Plan of Care: 3+: HIGH COMPLEXITY   EXAMINATION:   Most Recent Physical Functioning:   Gross Assessment:                  Posture:     Balance:    Bed Mobility:  Supine to Sit: Modified independent  Wheelchair Mobility:     Transfers:  Sit to Stand: Stand-by asssistance  Stand to Sit: Stand-by asssistance  Gait:     Speed/Lizzette: Slow  Distance (ft): 250 Feet (ft)  Assistive Device: Walker, rolling  Ambulation - Level of Assistance: Stand-by asssistance;Contact guard assistance       Body Structures Involved:  1. Nerves  2. Heart  3. Lungs  4. Bones  5. Joints  6. Muscles  7.  Ligaments Body Functions Affected:  1. Mental  2. Sensory/Pain  3. Cardio  4. Respiratory  5. Neuromusculoskeletal  6. Movement Related  7. language barrier Activities and Participation Affected:  1. Learning and Applying Knowledge  2. General Tasks and Demands  3. Communication  4. Mobility  5. Self Care  6. Community, Social and [place]   Number of elements that affect the Plan of Care: 4+: HIGH COMPLEXITY   CLINICAL PRESENTATION:   Presentation: Evolving clinical presentation with unstable and unpredictable characteristics: HIGH COMPLEXITY   CLINICAL DECISION MAKIN [facility] Mobility Inpatient Short Form  How much difficulty does the patient currently have. .. Unable A Lot A Little None   1. Turning over in bed (including adjusting bedclothes, sheets and blankets)? [ ] 1   [ ] 2   [X] 3   [ ] 4   2. Sitting down on and standing up from a chair with arms ( e.g., wheelchair, bedside commode, etc.)   [ ] 1   [X] 2   [ ] 3   [ ] 4   3. Moving from lying on back to sitting on the side of the bed? [ ] 1   [X] 2   [ ] 3   [ ] 4   How much help from another person does the patient currently need. .. Total A Lot A Little None   4. Moving to and from a bed to a chair (including a wheelchair)? [ ] 1   [ ] 2   [X] 3   [ ] 4   5. Need to walk in hospital room? [ ] 1   [X] 2   [ ] 3   [ ] 4   6. Climbing 3-5 steps with a railing? [X] 1   [ ] 2   [ ] 3   [ ] 4   © , Trustees of [address] [place], [place] [address], under license to [employer]. All rights reserved    Score:  Initial: 13 Most Recent: X (Date: -- )     Interpretation of Tool:  Represents activities that are increasingly more difficult (i.e. Bed mobility, Transfers, Gait).        Score 24 23 22-20 19-15 14-10 9-7 6       Modifier CH CI CJ CK CL CM CN         · Mobility - Walking and Moving Around:               - CURRENT STATUS:    CL - 60%-79% impaired, limited or restricted               - GOAL STATUS:           CL - 60%-79% impaired, limited or restricted               - D/C STATUS:                       ---------------To be determined---------------  Payor: SELF PAY / Plan: [facility] SELF PAY / Product Type: Self Pay /       Medical Necessity:     · Skilled intervention continues to be required due to decreased function. Reason for Services/Other Comments:  · Patient continues to require skilled intervention due to medical complications and patient unable to attend/participate in therapy as expected. Use of outcome tool(s) and clinical judgement create a POC that gives a: Difficult prediction of patient's progress: HIGH COMPLEXITY                 TREATMENT:      Pre-treatment Symptoms/Complaints:  No complaints  Pain: Initial:   Pain Intensity 1: 0  Post Session:  No response      Therapeutic Activity: (    20 minutes): Therapeutic activities including bed transfers, Chair transfers, Ambulation on level ground and monitored sats to improve mobility, strength, balance and endurance. Required minimal   to promote static and dynamic balance in standing with use of the walker. Date:  5/31/17 Date:   Date:     ACTIVITY/EXERCISE AM PM AM PM AM PM   Ambulation:           Distance  Device  Duration Group exs        Seated Heel Raises X 15 B        Seated Toe Raises X 15 B        Seated Long Arc Quads X 15 B        Seated Marching X 15 B        Seated Hip Abduction X 15 B                 B = bilateral; AA = active assistive; A = active; P = passive           Braces/Orthotics/Lines/Etc:   · IV, denson catheter and 2L  Treatment/Session Assessment:    · Response to Treatment:  Tolerated well. · Interdisciplinary Collaboration:  · Physical Therapy Assistant and Registered Nurse  · After treatment position/precautions:  · Up in chair, Bed/Chair-wheels locked, Call light within reach, RN notified and Family at bedside  · Compliance with Program/Exercises: Will assess as treatment progresses.   · Recommendations/Intent for next treatment session: \"Next visit will focus on advancements to more challenging activities and reduction in assistance provided\".   Total Treatment Duration:  PT Patient Time In/Time Out  Time In: 1010  Time Out: [address] [facility], \[facility]\""

## 2017-06-02 NOTE — ROUTINE PROCESS
Olga Frances RN aware that we will place Pleurx on 6/8/17 at 1100. Dr Brad Navarrete will do procedure. Coumadin is to remain on hold until after procedure. RN will place prep on discharge instructions.  has bronch lab numbers for questions.

## 2017-06-02 NOTE — PROGRESS NOTES
[facility] Nephrology        Subjective: CKD  No new complaints    Review of Systems -   General ROS: negative for - chills or fever  Respiratory ROS: no cough, shortness of breath, or wheezing  Cardiovascular ROS: no chest pain or dyspnea on exertion  Gastrointestinal ROS: no abdominal pain, change in bowel habits, or black or bloody stools  Genito-Urinary ROS: no dysuria, trouble voiding, or hematuria  Neurological ROS: no TIA or stroke symptoms        Objective:    Vitals:    06/02/17 0338 06/02/17 0554 06/02/17 0723 06/02/17 0756   BP: 146/59  150/48    Pulse: 72  67    Resp: 18  18    Temp: 98.1 °F (36.7 °C)  99.1 °F (37.3 °C)    SpO2: 94%  95% 96%   Weight:  63.9 kg (140 lb 14.4 oz)     Height:           PE  Gen: in no acute distress  CV: reg rate  Chest: clear  Abd: soft  Ext/Access: no edema       . LAB  Recent Labs      06/01/17   1124  05/31/17   0651   INR  2.0*  1.8*     Recent Labs      06/01/17   1124  05/31/17   0651   NA  144  146*   K  3.4*  3.5   CL  106  109*   CO2  29  26   GLU  143*  143*   BUN  49*  58*   CREA  2.41*  2.55*   CA  6.7*  6.7*           Radiology    A/P:   Patient Active Problem List   Diagnosis Code    Breast cancer ([facility] C50.919    Bony metastasis (HCC) C79.51    HTN (hypertension) I10    DM (diabetes mellitus) (HCC) E11.9    CKD (chronic kidney disease) N18.9    Iron deficiency anemia D50.9    Acute respiratory failure (HCC) J96.00    Hypomagnesemia E83.42    Hypokalemia E87.6    Asthma J45.909    Anemia D64.9    DVT (deep venous thrombosis) (HCC) I82.409    PND (paroxysmal nocturnal dyspnea) R06.00    Leg swelling M79.89    CHF (congestive heart failure) (HCC) I50.9    Accelerated hypertension I10    Elevated troponin R74.8    Breast cancer metastasized to lung (HCC) C50.919, C78.00    Pulmonary edema J81.1    Acute on chronic renal failure (HCC) N17.9, N18.9    Pleural effusion, right J90    Hydronephrosis N13.30    Pancytopenia (HCC) H57.671    Chemotherapy-induced neutropenia (HCC) D70.1    Bilateral edema of lower extremity R60.0    Hematuria R31.9    Acute on chronic respiratory failure (HCC) J96.20    Moderate aortic stenosis I35.0    Anticoagulated on Coumadin Z51.81, Z79.01    Volume overload E87.70    Anxiety F41.9    Hypernatremia E87.0       Renal function is stable. Wt is coming down   Following.       No Ocasio MD

## 2017-06-02 NOTE — PROGRESS NOTES
Patient in bed resting with no complaints at this time. Patient is alert and orientated with no distress noted. Life port intact and patent with no s/s of infection noted. Respirations even and unlabored with heart rate regular. Patient unable to ambulate independently without assistance; needs x1 r/t weakness. Bed in low locked position with call light within reach. Patient instructed to call if assistance is needed. Will continue to monitor. Interpretor phone at bedside if needed.

## 2017-06-02 NOTE — PROGRESS NOTES
at bedside with Dr. Gabe Arredondo MD. Pt  was updated over the phone. Thank you for this referral,      Leola Carter, [facility]  /Patient [address]  [address] [place] [address], [address] [place] [address]    [phone]

## 2017-06-02 NOTE — PROGRESS NOTES
HEMANTH faxed [facility] order to [facility] since pt lives in [place] [facility] [place] [facility] and [facility] doesn't service this area. HEMANTH followed up with [facility] because pt complained that [facility] only gave them small tanks. [facility] explained that pt's son requested small tanks but they can provide pt with large tanks for the same ponce. Kimberlee Raman will see pt on Sunday. HEMANTH contacted pt's son to inform him that [facility] accepted pt.

## 2017-06-02 NOTE — PROGRESS NOTES
Discharge instructions and prescriptions provided and explained to the pt and spouse with the assistance of an interpretor. Med side effect sheet reviewed. Opportunity for questions provided. Primary nurse removing denson and needle from port. Instructed to call when ready to leave.

## 2017-06-02 NOTE — DISCHARGE INSTRUCTIONS
Pleurex catheter to be place on June 8. Please arrive at 10 am.  Nothing to eat after Midnight on June 7. Have someone to drive you there and home  Continue to hold coumadin  Bronch Lab number [phone]     Aprenda sobre el derrame pleural - [ Learning About Pleural Effusion ]  ¿Qué es el derrame pleural?  El derrame pleural es la acumulación de líquido en el espacio entre los tejidos que recubren los pulmones y la pared torácica. Debido a la acumulación de líquido, es posible que los pulmones no puedan expandirse completamente, lo cual puede dificultar la respiración. El [place], o parte de Shira ramirez, puede colapsarse. El derrame pleural tiene muchas causas, caterina neumonía, cáncer, inflamación de los tejidos alrededor de los pulmones e insuficiencia cardíaca. El derrame pleural suele diagnosticarse por medio de kim radiografía y un examen físico. El médico escucha cómo el aire fluye en jesus pulmones. ¿Cuáles son los síntomas? Los síntomas del derrame pleural pueden incluir:  · Dificultad para respirar. · Falta de aire. · Dolor en el pecho. · Mariza Levo. · Tos. Un derrame pleural leve podría no causar ningún síntoma. ¿Cómo se trata el derrame pleural?  Los médicos quizás tengan que tratar la afección que está causando el derrame pleural. Por ejemplo, pueden darle medicamentos para tratar la neumonía o la insuficiencia cardíaca congestiva. Un derrame pleural leve a menudo desaparece por sí solo sin tratamiento. Extracción de líquido  El derrame pleural puede tratarse mediante la eliminación de líquido del espacio entre los tejidos que [employer]. [place] se hace con kim aguja que se coloca en el pecho (toracocentesis). Puede enviarse kim pequeña cantidad del líquido a un laboratorio para averiguar lo que está causando la acumulación de líquido. La extracción del líquido puede ayudar a [employer], tales caterina falta de aire y dolor en el pecho.  Puede ayudar a que los pulmones se expandan más ampliamente. En algunos casos, si el derrame pleural no mejora, se puede colocar un catéter en el pecho. Neda es un tubo flexible que permite que el líquido drene de los pulmones. El catéter [employer] en el pecho hasta que el médico lo retira. Algunas personas pueden recibir un tratamiento que elimina el líquido y luego introduce un medicamento en la cavidad torácica. [place] ayuda a evitar que se vuelva a acumular demasiado líquido. La atención de seguimiento es kim parte clave de merrill tratamiento y seguridad. Asegúrese de hacer y acudir a todas las citas, y llame a merrill médico si está teniendo problemas. También es kim buena idea saber los resultados de jesus exámenes y mantener kim lista de los medicamentos que sharmila. ¿Dónde puede encontrar más información en inglés? Dee Cuellar a http://rosie-abena.info/. Escriba R800 en la búsqueda para aprender más acerca de \"Aprenda sobre el derrame pleural - [ Learning About Pleural Effusion ]. \"  Revisado: [address] [place], 2016  Versión del contenido: 11.2  © 0061-6011 Healthwise, Incorporated. Las instrucciones de cuidado fueron adaptadas bajo licencia por [employer] (which disclaims liability or warranty for this information). Si usted tiene [place] afección médica o sobre estas instrucciones, siempre pregunte a merrill profesional de sherrie. Healthwise, Incorporated niega toda garantía o responsabilidad por merrill uso de esta información. Sepsis: Instrucciones de cuidado - [ Sepsis: Care Instructions ]  Instrucciones de cuidado  La sepsis es kim infección que se ha extendido por todo el cuerpo. Es kim afección que pone en peligro la luann y a menudo provoca kim presión arterial extremadamente baja. [place] puede provocar problemas en muchos diferentes órganos. La causa de la sepsis no está siempre sunitha, lawanda puede ocurrir caterina parte de kim enfermedad crónica o repentina. A veces hasta kim enfermedad leve puede producirla.   La atención de seguimiento es kim parte clave de merrill tratamiento y seguridad. Asegúrese de hacer y acudir a todas las citas, y llame a merrill médico si está teniendo problemas. También es kim buena idea saber los resultados de los exámenes y mantener kim lista de los medicamentos que sharmila. ¿Cómo puede cuidarse en el hogar? · Si merrill médico le recetó antibióticos, tómelos según las indicaciones. No deje de tomarlos por el hecho de sentirse mejor. Debe julian todos los antibióticos hasta terminarlos. · Britni abundantes líquidos, suficientes para que merrill orina sea de color amarillo marcus o transparente caterina el agua. Elija beber agua u otros líquidos jonathan sin cafeína hasta que se sienta mejor. Si tiene kim enfermedad del riñón, del corazón o del hígado y tiene que Gloria's líquidos, hable con merrill médico antes de aumentar merrill consumo. Puede probar bebidas rehidratantes, caterina Gatorade o Powerade. · No britni alcohol. · Siga kim dieta saludable. Incluya en merrill dieta diaria frutas, vegetales y granos integrales. · Caminar es kim manera sencilla de hacer ejercicio. Poco a poco, aumente la distancia que camine cada día. Asegúrese de que merrill médico sepa que usted va a comenzar un programa de ejercicios. · No fume ni use otros productos derivados del tabaco. Si necesita ayuda para dejar de fumar, hable con merrill médico sobre los programas y medicamentos para dejar de fumar. Estos pueden aumentar jesus probabilidades de dejar el hábito para siempre. ¿Cuándo debe pedir ayuda? Llame al 911 en cualquier momento que considere que necesita atención de emergencia. Por ejemplo, llame si:  · Se desmayó (perdió el conocimiento). Llame a merrill médico ahora mismo o busque atención médica inmediata si:  · Tiene fiebre o escalofríos. · Tiene la piel fría, pálida o pegajosa. · Siente mareos o aturdimiento, o que está a punto de [place]. · Tiene algún síntoma nuevo, caterina tos, dolor en kim parte del cuerpo o problemas urinarios.   Preste especial atención a los cambios en merrill sherrie y asegúrese de comunicarse con merrill médico si:  · No mejora caterina se esperaba. ¿Dónde puede encontrar más información en inglés? [place] a http://rosie-abena.info/. Kina Avaloson M001 en la búsqueda para aprender más acerca de \"Sepsis: Instrucciones de cuidado - [ Sepsis: Care Instructions ]. \"  Revisado: [address] [place], 2016  Versión del contenido: 11.2  © 2151-0606 Healthwise, Incorporated. Las instrucciones de cuidado fueron adaptadas bajo licencia por [employer] (which disclaims liability or warranty for this information). Si usted tiene [place] afección médica o sobre estas instrucciones, siempre pregunte a merrill profesional de sherrie. Healthwise, Incorporated niega toda garantía o responsabilidad por merrill uso de esta información.

## 2017-06-02 NOTE — PROGRESS NOTES
Pt is resting quietly. No needs voiced at this time. No signs of distress noted. Call light is within reach. Will continue to monitor.

## 2017-06-02 NOTE — PROGRESS NOTES
Oxygen Qualifier       Room air: SpO2 with O2 and liter flow   Resting SpO2  92%  96% on 2L   Ambulating SpO2  86% 87% 1, 92 2L       Completed by:    Francy Morton RT

## 2017-06-08 ENCOUNTER — HOSPITAL ENCOUNTER (OUTPATIENT)
Age: 67
Discharge: HOME | End: 2017-06-08
Attending: INTERNAL MEDICINE | Admitting: INTERNAL MEDICINE
Payer: SUBSIDIZED

## 2017-06-08 VITALS
HEART RATE: 61 BPM | BODY MASS INDEX: 25.76 KG/M2 | RESPIRATION RATE: 18 BRPM | DIASTOLIC BLOOD PRESSURE: 75 MMHG | WEIGHT: 140 LBS | HEIGHT: 62 IN | OXYGEN SATURATION: 93 % | SYSTOLIC BLOOD PRESSURE: 172 MMHG

## 2017-06-08 DIAGNOSIS — J90 PLEURAL EFFUSION, RIGHT: Chronic | ICD-10-CM

## 2017-06-08 LAB — INR BLD: NORMAL

## 2017-06-08 PROCEDURE — 99153 MOD SED SAME PHYS/QHP EA: CPT | Performed by: INTERNAL MEDICINE

## 2017-06-08 PROCEDURE — 88112 CYTOPATH CELL ENHANCE TECH: CPT | Performed by: INTERNAL MEDICINE

## 2017-06-08 PROCEDURE — 76040000025: Performed by: INTERNAL MEDICINE

## 2017-06-08 PROCEDURE — 77030028127 HC DRN KT PLEURX SYS BD -D: Performed by: INTERNAL MEDICINE

## 2017-06-08 PROCEDURE — 85610 PROTHROMBIN TIME: CPT | Performed by: INTERNAL MEDICINE

## 2017-06-08 PROCEDURE — 77030003560 HC NDL HUBR BARD -A: Performed by: INTERNAL MEDICINE

## 2017-06-08 PROCEDURE — 74011250636 HC RX REV CODE- 250/636: Performed by: INTERNAL MEDICINE

## 2017-06-08 PROCEDURE — 99152 MOD SED SAME PHYS/QHP 5/>YRS: CPT | Performed by: INTERNAL MEDICINE

## 2017-06-08 PROCEDURE — 88305 TISSUE EXAM BY PATHOLOGIST: CPT | Performed by: INTERNAL MEDICINE

## 2017-06-08 PROCEDURE — 74011250636 HC RX REV CODE- 250/636

## 2017-06-08 PROCEDURE — 32550 INSERT PLEURAL CATH: CPT | Performed by: INTERNAL MEDICINE

## 2017-06-08 PROCEDURE — C1729 CATH, DRAINAGE: HCPCS | Performed by: INTERNAL MEDICINE

## 2017-06-08 RX ORDER — HEPARIN 100 UNIT/ML
300 SYRINGE INTRAVENOUS AS NEEDED
Status: DISCONTINUED | OUTPATIENT
Start: 2017-06-08 | End: 2017-06-08 | Stop reason: HOSPADM

## 2017-06-08 RX ORDER — FENTANYL CITRATE 50 UG/ML
50 INJECTION, SOLUTION INTRAMUSCULAR; INTRAVENOUS
Status: DISCONTINUED | OUTPATIENT
Start: 2017-06-08 | End: 2017-06-08 | Stop reason: HOSPADM

## 2017-06-08 RX ORDER — SODIUM CHLORIDE 9 MG/ML
25 INJECTION, SOLUTION INTRAVENOUS CONTINUOUS
Status: DISCONTINUED | OUTPATIENT
Start: 2017-06-08 | End: 2017-06-08 | Stop reason: HOSPADM

## 2017-06-08 RX ORDER — SODIUM CHLORIDE 0.9 % (FLUSH) 0.9 %
5-10 SYRINGE (ML) INJECTION EVERY 8 HOURS
Status: DISCONTINUED | OUTPATIENT
Start: 2017-06-08 | End: 2017-06-08 | Stop reason: HOSPADM

## 2017-06-08 RX ORDER — MIDAZOLAM HYDROCHLORIDE 1 MG/ML
.25-5 INJECTION, SOLUTION INTRAMUSCULAR; INTRAVENOUS
Status: DISCONTINUED | OUTPATIENT
Start: 2017-06-08 | End: 2017-06-08 | Stop reason: HOSPADM

## 2017-06-08 RX ORDER — SODIUM CHLORIDE 0.9 % (FLUSH) 0.9 %
5-10 SYRINGE (ML) INJECTION AS NEEDED
Status: DISCONTINUED | OUTPATIENT
Start: 2017-06-08 | End: 2017-06-08 | Stop reason: HOSPADM

## 2017-06-08 RX ADMIN — FENTANYL CITRATE 50 MCG: 50 INJECTION, SOLUTION INTRAMUSCULAR; INTRAVENOUS at 11:12

## 2017-06-08 RX ADMIN — SODIUM CHLORIDE, PRESERVATIVE FREE 300 UNITS: 5 INJECTION INTRAVENOUS at 13:03

## 2017-06-08 RX ADMIN — SODIUM CHLORIDE 25 ML/HR: 900 INJECTION, SOLUTION INTRAVENOUS at 11:25

## 2017-06-08 RX ADMIN — FENTANYL CITRATE 50 MCG: 50 INJECTION, SOLUTION INTRAMUSCULAR; INTRAVENOUS at 11:21

## 2017-06-08 RX ADMIN — MIDAZOLAM HYDROCHLORIDE 1 MG: 1 INJECTION, SOLUTION INTRAMUSCULAR; INTRAVENOUS at 11:25

## 2017-06-08 RX ADMIN — MIDAZOLAM HYDROCHLORIDE 1 MG: 1 INJECTION, SOLUTION INTRAMUSCULAR; INTRAVENOUS at 11:12

## 2017-06-08 RX ADMIN — MIDAZOLAM HYDROCHLORIDE 1 MG: 1 INJECTION, SOLUTION INTRAMUSCULAR; INTRAVENOUS at 11:17

## 2017-06-08 NOTE — DISCHARGE INSTRUCTIONS
[address] [facility]. [address]ane [address] INSTRUCTIONS    General Information:     A plastic catheter has been inserted through your skin and into either your pleural space (the sac surrounding your lung). This has been done because you have needed frequent Thoracentesis. This catheter will provide you a way to drain off the fluid every couple of days. Your doctor will order a home health nurse to help you learn to do the drain and to take care of the catheter. Home Care Instructions: You can resume your regular diet and medication regimen. Do not drink alcohol, drive, or make any important legal decisions in the next 24 hours. Do not lift anything heavier than a gallon of milk, or do anything strenuous for the next 24 hours. You should not shower for 24 hours. You should cover the tube with plastic wrap and tape to keep it dry when you shower. Do not take a bath, swim or immerse yourself in water as long as you have this catheter. You should clean the tube and change the dressing every time you drain the fluid, and as needed. Keep the dressing clean and dry. Keep a journal of how often you drain the fluid, how much fluid you drain, and the character of the fluid. Call If:     You should call your Physician or [place] if you have any bleeding other than a small spot on your bandage. Call if you have any signs of infection, fever, or increased pain at the site of the tube. Call if you should have leakage around the tube, a change in the appearance of the fluid, or if the tube gets pulled out some or all the way out. Call us, your home health nurse, or your regular doctor if you have any concerns or questions about your catheter. Follow-Up Instructions: Please see your ordering doctor as he/she has requested.    [facility] [phone] been contacted to help you drain your pleurx daily for the next 7 days and then every other day-drain 500 cc slowly or as she tolerates. Sutures need to be removed in 10-14 days-your home health RN can do this. Take your pain medicine as scheduled and resume your Coumadin this evening at previous dosage and schedule.       To Reach Us:  [phone]      Patient Signature:  Date: 6/8/2017  Discharging Nurse:

## 2017-06-08 NOTE — H&P (VIEW-ONLY)
Pablo Otoole  Admission Date: 5/24/2017             Daily Progress Note: 6/1/2017    The patient's chart is reviewed and the patient is discussed with the staff. 77 y/o admitted 5/24 was last seen by us in February of 2017 for right pleural effusion. She has dCHR, prior DVT on coumadin, CKD, Moderate AS, HTN, DM and R ureteral obstruction s/p stent placement. She is followed by Dr Sonia Cabrera for metastatic breast CA with mets to lung/bone. In February, she underwent R thoracentesis with drain to gravity with removal of 1200 ml of yellow pleural fluid. She developed pain after thoracentesis and pain was felt to be from trapped lung physiology and recommended not to remove more than 1 liter of pleural fluid if thoracentesis is again necessary. She was hypoxemic and home O2 arranged.       She had a CXR in April 2017 with persistent effusion. She was seen by Oncology on 5/16/2017 and was having dyspnea during that time with fatigue. She was given lasix as well and has taken lasix until 3 days ago. S. On admit her CXR shows effusion on right which is unchanged and new right perihilar infiltrate. We were asked to admit her for hypoxemia with persistent effusion, new infiltrate with metastatic breast cancer with acute on chronic renal failure and moderate AS      Decompensated- intubated on 5/24  Seen by Oncology -holding chemo  Thoracentesis on right- 950 cc of /serosanguinous/ fluid    Subjective:     Patient speaks minimal english. No acute changes overnight. Still on 2L O2. Scheduled for pleurx this afternoon but no morning INR drawn yet.      Current Facility-Administered Medications   Medication Dose Route Frequency    [START ON 6/2/2017] furosemide (LASIX) tablet 40 mg  40 mg Oral DAILY    dextrose 5% infusion  50 mL/hr IntraVENous CONTINUOUS    DULoxetine (CYMBALTA) capsule 20 mg  20 mg Oral DAILY    fentaNYL (DURAGESIC) 50 mcg/hr patch 1 Patch  1 Patch TransDERmal Q72H    hydrALAZINE (APRESOLINE) tablet 50 mg  50 mg Oral TID    atenolol (TENORMIN) tablet 25 mg  25 mg Oral DAILY    insulin regular (NOVOLIN R, HUMULIN R) injection   SubCUTAneous AC&HS    famotidine (PEPCID) 40 mg/5 mL (8 mg/mL) oral suspension 20 mg  20 mg Per NG tube DAILY    0.9% sodium chloride infusion 250 mL  250 mL IntraVENous PRN    hydrALAZINE (APRESOLINE) 20 mg/mL injection 10 mg  10 mg IntraVENous Q6H PRN    morphine injection 2 mg  2 mg IntraVENous Q4H PRN    amLODIPine (NORVASC) tablet 10 mg  10 mg Oral DAILY    HYDROcodone-acetaminophen (NORCO)  mg tablet 1 Tab  1 Tab Oral Q4H PRN    LORazepam (ATIVAN) tablet 1 mg  1 mg Oral Q6H PRN    megestrol (MEGACE) 400 mg/10 mL (10 mL) oral suspension 200 mg  200 mg Oral DAILY    mirtazapine (REMERON) tablet 30 mg  30 mg Oral QHS    montelukast (SINGULAIR) tablet 10 mg  10 mg Oral QHS    zolpidem (AMBIEN) tablet 5 mg  5 mg Oral QHS PRN    sodium chloride (NS) flush 5-10 mL  5-10 mL IntraVENous Q8H    sodium chloride (NS) flush 5-10 mL  5-10 mL IntraVENous PRN    azithromycin (ZITHROMAX) 500 mg in 0.9% sodium chloride (MBP/ADV) 250 mL  500 mg IntraVENous Q24H    cefTRIAXone (ROCEPHIN) 1 g in 0.9% sodium chloride (MBP/ADV) 50 mL  1 g IntraVENous Q24H    budesonide (PULMICORT) 500 mcg/2 ml nebulizer suspension  500 mcg Nebulization BID RT    And    albuterol CONCENTRATE 2.5mg/0.5 mL neb soln  2.5 mg Nebulization Q6H RT       Review of Systems  Unobtainable due to patient status.     Objective:     Vitals:    06/01/17 0654 06/01/17 0744 06/01/17 0806 06/01/17 0813   BP:   159/62 148/53   Pulse:   69 74   Resp:   18    Temp:   98.7 °F (37.1 °C)    SpO2:  96% 95%    Weight: 172 lb 9.6 oz (78.3 kg)      Height:         Intake and Output:   05/30 1901 - 06/01 0700  In: 1043 [P.O.:580; I.V.:1171]  Out: 1955 [Urine:1955]       Physical Exam:   Constitution:  the patient is well developed and in no acute distress  EENMT:  Sclera clear, pupils equal, oral mucosa moist  Respiratory: Mild B rhonchi  Cardiovascular:  RRR without M,G,R  Gastrointestinal: soft and non-tender; with positive bowel sounds. Musculoskeletal: warm without cyanosis. There is trace B lower leg edema. Skin:  no jaundice or rashes, no wounds   Neurologic: no gross neuro deficits     Psychiatric:  alert and calm. CHEST XRAY:   5/30/17  IMPRESSION: Pulmonary edema and bilateral pleural effusions unchanged. LAB  No lab exists for component: [place]   Recent Labs      05/31/17   0651  05/30/17   0755   WBC   --   4.6   HGB   --   9.5*   HCT   --   28.6*   PLT   --   228   INR  1.8*  1.6*     Recent Labs      05/31/17   0651  05/30/17   0755   NA  146*  148*   K  3.5  3.3*   CL  109*  111*   CO2  26  28   GLU  143*  165*   BUN  58*  67*   CREA  2.55*  2.84*   CA  6.7*  6.8*     No results for input(s): PH, PCO2, PO2, HCO3 in the last 72 hours. Assessment:  (Medical Decision Making)     Hospital Problems  Date Reviewed: 5/26/2017          Codes Class Noted POA    Hypernatremia ICD-10-CM: E87.0  ICD-9-CM: 276.0  5/30/2017 Unknown        Anxiety ICD-10-CM: F41.9  ICD-9-CM: 300.00  5/28/2017 Unknown        * (Principal)Acute on chronic respiratory failure ([facility] [address] ICD-10-CM: J96.20  ICD-9-CM: 518.84  5/24/2017 Yes        Moderate aortic stenosis (Chronic) ICD-10-CM: I35.0  ICD-9-CM: 424.1  5/24/2017 Yes        Anticoagulated on Coumadin (Chronic) ICD-10-CM: Z51.81, Z79.01  ICD-9-CM: V58.83, V58.61  5/24/2017 Yes        Volume overload ICD-10-CM: E87.70  ICD-9-CM: 276.69  5/24/2017 Yes        Breast cancer metastasized to lung [facility]) (Chronic) ICD-10-CM: C50.919, C78.00  ICD-9-CM: 174.9, 197.0  2/17/2017 Yes        CKD (chronic kidney disease) (Chronic) ICD-10-CM: N18.9  ICD-9-CM: 585.9  1/4/2017 Yes    Overview Signed 5/14/2013 11:35 AM by Paris Robert RN     With acute rise- ?  Dehydration and monitor             DVT (deep venous thrombosis) (HCC) (Chronic) ICD-10-CM: I82.409  ICD-9-CM: 453.40 1/4/2017 Yes    Overview Signed 11/19/2013  6:07 AM by Jaun Lamyle     04[birth year] There is nonocclusive thrombus within the left common femoral vein, superficial femoral vein, popliteal vein, and posterior tibial vein               Pleural effusion, right (Chronic) ICD-10-CM: J90  ICD-9-CM: 511.9  1/4/2017 Yes    Overview Addendum 5/26/2017 10:11 AM by Arcelia Patrick NP     10/6/16 R thoracentesis 800 ml: pleural fluid creatine 4.1 = likely urinothorax  5/25/2017: Thoracentesis on right- 950 cc of /serosanguinous/ fluid             DM (diabetes mellitus) (HCC) (Chronic) ICD-10-CM: E11.9  ICD-9-CM: 250.00  10/7/2016 Yes        Acute on chronic renal failure (HCC) ICD-10-CM: N17.9, N18.9  ICD-9-CM: 584.9, 585.9  10/7/2016 Yes        Leg swelling ICD-10-CM: M79.89  ICD-9-CM: 729.81  2/25/2014 Yes    Overview Signed 2/25/2014  2:45 PM by Sandip Almonte NP     Bilateral                   Patient admitted with respiratory failure. Initial cxr showed new right infiltrate with no change in previously known R sided effusion. Required ICU stay for a time. Has been on abx, day 9 of CAP coverage. Now she is scheduled for R pleurx catheter placement. As the effusion has been present and was stable in size at the time of her admission, I am not confident that this will make a huge difference in her acute symptoms. Plan:  (Medical Decision Making)   -already scheduled for pleurx. This can proceed and we will monitor if it makes any improvement in her breathing. Needs INR now, however, as she has been on coumadin up until yesterday. If too high to perform would simply cancel the procedure and follow up as an outpatient.   -day 9 of abx targeting CAP. Will d/c ceftriaxone and azithromycin.   -likely close to being able to discharge. Consult case management to assess any needs.      Patient Active Problem List   Diagnosis Code    Breast cancer ([facility] [address] C50.919    Bony metastasis ([facility] [address] C79.51    HTN (hypertension) I10    DM (diabetes mellitus) ([facility] [address] E11.9    CKD (chronic kidney disease) N18.9    Iron deficiency anemia D50.9    Acute respiratory failure (HCC) J96.00    Hypomagnesemia E83.42    Hypokalemia E87.6    Asthma J45.909    Anemia D64.9    DVT (deep venous thrombosis) ([facility]) I82.409    PND (paroxysmal nocturnal dyspnea) R06.00    Leg swelling M79.89    CHF (congestive heart failure) ([facility]) I50.9    Accelerated hypertension I10    Elevated troponin R74.8    Breast cancer metastasized to lung ([facility]) C50.919, C78.00    Pulmonary edema J81.1    Acute on chronic renal failure (HCC) N17.9, N18.9    Pleural effusion, right J90    Hydronephrosis N13.30    Pancytopenia ([facility]) D61.818    Chemotherapy-induced neutropenia ([facility]) D70.1    Bilateral edema of lower extremity R60.0    Hematuria R31.9    Acute on chronic respiratory failure (HCC) J96.20    Moderate aortic stenosis I35.0    Anticoagulated on Coumadin Z51.81, Z79.01    Volume overload E87.70    Anxiety F41.9    Hypernatremia E87.0       Gabe Arredondo MD

## 2017-06-08 NOTE — ROUTINE PROCESS
Attempted to call [facility] [phone] for pleurx drainage instructions. RN will return my phone call. Assisted Dr Marija Molina with R pleural catheter insertion using conscious sedation and sterile technique. Patient tolerated well. Once placed, 800 cc drained slowly and patient began to experience pain-drainage stopped at this point and pain diminished. [facility]  available throughout procedure, prep and recovery for questions.

## 2017-06-08 NOTE — PROCEDURES
PROCEDURE:    PLACEMENT OF PLEURX CATHTER ([id number])        INDICATION:    RECURRENT PLEURAL EFFUSION ON R    OPERATORS:    Anabel Hollis MD    EQUIPEMENT:    SONOSITE TURBO M ULTRASOUND. PLEURX CATHERE PLACEMENT KIT. STERILE ULTRASOUND SHEATH  STERILE DRAPE. VACCUM BOTTLE    ANESTHESIA AND CONSCIOUS SEDDATON:    FENTANYL 100 mcg IV  VERSED 3mg IV  1% LIDOCAINE  15 ml  For local anesthesia    SUMMARY:    After obtaining informed consent, the chest was imaged using a [employer] portable US with 15 Mhz probe. A plural effusion was identified on the R.  Chest wall thickness,  distance to lung and diaphragm was measured and the optimum acces point in the 8th IC space, anterior axilary line was marked with a blunt needle cap. A point 5 cm below that point was also marked with blunt needle cap. Following this the skin was prepped with chlorhexidine and draped in the usual fashion. A sterile sheath was applied to the US transducer and real time imaging was used with sterile technique to aid in proper placement of catheter. The chest was then re imaged to verify the heather in correct location and 15 ml of 1% lidocaine was then used to anesthetize the skin, subcutaneous tissue, rib, intercostal muscles at the insertion site and the projected  tunnel track. An introducer needle provided in the plurx catheter kit was then used to access the pleura in the usual fashion. After aspirating pleural fluid, the syringe was removed and the provided guide wire was introduced into the pleura. Ultrasound was then used to verify the guidewire within the pleural space in correct position. Following this a 1cm incision was made at the guide wire insertion site. A 1.5 cm incision made through the skin and subcutaneous tissue at the previously marked tunnel insertion site.   The provided tunneler was then used to form a tunnel between the distal and proximal incisions and the plurex catheter was then tunneled, in the usual fashion without difficulty. The pleurx cuff was then placed within 1 cm from the distal incision . Attention was the directed to the guidewire insertion site and progressive dilation was performed with the provided dilators without difficulty. An introducer/dilator with peal off sheath was then placed into the pleural cavity assuring free movement of the guidewire during insertion. The inner trocar was then unscrewed and removed from the peel off introducer as one unit along with the guide wire. The distal, fenestrated end of the plerx catheter was then inserted into the peel off sheath and after assuring the majority of the catheter in the chest the peel off catheter was removed while securing the pleurx in position with the thumb without difficulty. After assuring proper placement of catheter at the insertion site the overlying wound was sutured with provided 2.0 silk suture taking care not to damage the underlying pleurx catheter. The distal wound and pleurx exit site was then sutured with 1 suture and the catheter itself tied in place taking care not to occlude the catheter in the process. The pleural effusion was the drained after attaching the adapter with catheter to a vaccum bottle. 800 ml of pleural fluid was then drained without difficulty. After draining the fluid, the provided cap was used to cap the external end of the pleurex and a sponge dressing, provided in the kit was then placed around the insertion site in the usual fashion. Following this the catheter was coiled atop the dressing and provided transparent dressing applied. The procedure was completed without complication and the patient tolerated the procedure very well. US confirmed proper position of pleurx catheter within the costophrenic recess.     Mabel Medrano MD.

## 2017-06-08 NOTE — ROUTINE PROCESS
Patient to lobby via w/c with portable oxygen,  Soraida Baptiste present to provided translation of education and discharge instructions for patient and . Port de-accessed and heparinized.

## 2017-06-08 NOTE — INTERVAL H&P NOTE
H&P Update:  Anila Parada was seen and examined. History and physical has been reviewed. The patient has been examined.  There have been no significant clinical changes since the completion of the originally dated History and Physical.    Signed By: Anabel Hollis MD     June 8, 2017 2:12 PM

## 2017-06-08 NOTE — ROUTINE PROCESS
Mana DECKER at [facility] called back and was given instructions on how to drain pleurx. She is aware to remove sutures in 2 week. She took verbal orders per Dr Disha Villegas.

## 2017-06-08 NOTE — IP AVS SNAPSHOT
[address] [facility], [place] 
 
 
 [address] [place] [address] 
[phone] Patient: Cherie Romo MRN: [id number] QLS:8/17/9436 You are allergic to the following No active allergies Recent Documentation Height Weight Breastfeeding? BMI OB Status Smoking Status 1.575 m 63.5 kg No 25.61 kg/m2 Postmenopausal Former Smoker Emergency Contacts Name Discharge Info Relation Home Work Mobile Juancarlos Leggett  Child [2] [phone] [address] [place] [address]  Spouse [3] [phone] About your hospitalization You were admitted on:  June 8, 2017 You last received care in the:  [facility] You were discharged on:  June 8, 2017 Unit phone number:  [phone] Why you were hospitalized Your primary diagnosis was:  Not on File Providers Seen During Your Hospitalizations Provider Role Specialty Primary office phone Taunya Boxer, MD Attending Provider Pulmonary Disease [phone] Your Primary Care Physician (PCP) Primary Care Physician Office Phone Office Fax  
 [place], [place] D ** None ** ** None ** Follow-up Information None Your Appointments Wednesday June 14, 2017  8:30 AM EDT  
LAB with [address]  
[address] [place] [address] OUTREACH INSURANCE [facility]) Susana [address]  
[phone] Wednesday June 14, 2017  9:00 AM EDT Follow Up with MD Josephine Sanchez Laverne Hematology and Oncology [facility]) C/ Juancarlos Carney 33 [facility] [address]  
[phone] Wednesday June 14, 2017  9:00 AM EDT Follow Up with SOFI Galloway Hematology and Oncology [facility]) C/ Juancarlos Carney 33 [facility] [address]  
[phone] Wednesday June 14, 2017  9:15 AM EDT Injection with NUR3  
ST. [address] [place] [address] St ([facility]) [address] [place] [employer] [phone] [facility] [address]  
[phone] [address] Friday July 14, 2017 11:00 AM EDT HOSPITAL with SHIRLEY Carlsono [facility]monary [facility] ([facility]) [address] 300 [place] 5601 [facility] Kaleigh [facility]  
[phone] Current Discharge Medication List  
  
ASK your doctor about these medications Dose & Instructions Dispensing Information Comments Morning Noon Evening Bedtime  
 albuterol 90 mcg/actuation inhaler Commonly known as:  PROVENTIL HFA, VENTOLIN HFA, PROAIR HFA Your last dose was: Your next dose is:    
   
   
 Dose:  2 Puff Take 2 Puffs by inhalation every six (6) hours as needed for Wheezing. Quantity:  1 Inhaler Refills:  11 ALPRAZolam 0.25 mg tablet Commonly known as:  Maple Chan Your last dose was: Your next dose is:    
   
   
 Dose:  0.25 mg Take 1 Tab by mouth three (3) times daily as needed for Anxiety. Max Daily Amount: 0.75 mg. Quantity:  90 Tab Refills:  2  
     
   
   
   
  
 amLODIPine 10 mg tablet Commonly known as:  [place] Your last dose was: Your next dose is:    
   
   
 Dose:  10 mg Take 1 Tab by mouth daily. Quantity:  30 Tab Refills:  11  
     
   
   
   
  
 atenolol 25 mg tablet Commonly known as:  TENORMIN Your last dose was: Your next dose is:    
   
   
 Dose:  25 mg Take 1 Tab by mouth daily. Quantity:  60 Tab Refills:  0  
     
   
   
   
  
 budesonide-formoterol 160-4.5 mcg/actuation HFA inhaler Commonly known as:  SYMBICORT Your last dose was: Your next dose is:    
   
   
 Dose:  2 Puff Take 2 Puffs by inhalation two (2) times a day. Quantity:  1 Inhaler Refills:  11 DULoxetine 20 mg capsule Commonly known as:  CYMBALTA Your last dose was: Your next dose is:    
   
   
 Dose:  20 mg Take 1 Cap by mouth daily. Indications: NEUROPATHIC PAIN Quantity:  30 Cap Refills:  0  
     
   
   
   
  
 fentaNYL 50 mcg/hr PATCH Commonly known as:  Javan [place] Your last dose was: Your next dose is:    
   
   
 Dose:  1 Patch 1 Patch by TransDERmal route every seventy-two (72) hours. Max Daily Amount: 1 Patch. Indications: Chronic Pain with Opioid Tolerance Quantity:  10 Patch Refills:  0  
     
   
   
   
  
 furosemide 40 mg tablet Commonly known as:  LASIX Your last dose was: Your next dose is:    
   
   
 Dose:  40 mg Take 1 Tab by mouth daily. Quantity:  30 Tab Refills:  0  
     
   
   
   
  
 hydrALAZINE 50 mg tablet Commonly known as:  APRESOLINE Your last dose was: Your next dose is:    
   
   
 Dose:  50 mg Take 1 Tab by mouth three (3) times daily. Quantity:  90 Tab Refills:  1 HYDROcodone-acetaminophen  mg tablet Commonly known as:  Zhangole Daklarry Your last dose was: Your next dose is:    
   
   
 Dose:  1 Tab Take 1 Tab by mouth every four (4) hours as needed. Max Daily Amount: 6 Tabs. Quantity:  90 Tab Refills:  0 LORazepam 1 mg tablet Commonly known as:  ATIVAN Your last dose was: Your next dose is:    
   
   
 Dose:  1 mg Take 1 Tab by mouth every six (6) hours as needed for Anxiety. Max Daily Amount: 4 mg. Quantity:  60 Tab Refills:  0  
     
   
   
   
  
 megestrol 400 mg/10 mL (10 mL) suspension Commonly known as:  MEGACE Your last dose was: Your next dose is:    
   
   
 Dose:  200 mg Take 5 mL by mouth daily. Quantity:  240 mL Refills:  2  
     
   
   
   
  
 mirtazapine 30 mg tablet Commonly known as:  Carlus Inch Your last dose was:     
   
Your next dose is:    
   
   
 Dose:  30 mg  
 Take 1 Tab by mouth nightly. Quantity:  30 Tab Refills:  1  
     
   
   
   
  
 montelukast 10 mg tablet Commonly known as:  SINGULAIR Your last dose was: Your next dose is:    
   
   
 Dose:  10 mg Take 1 Tab by mouth nightly. Quantity:  30 Tab Refills:  3  
     
   
   
   
  
 palbociclib 125 mg Cap Your last dose was: Your next dose is:    
   
   
 Dose:  125 mg Take 125 mg by mouth daily. Take one pill once a day for 3 weeks and then off for one week Quantity:  21 Tab Refills:  5  
     
   
   
   
  
 zolpidem 5 mg tablet Commonly known as:  AMBIEN Your last dose was: Your next dose is:    
   
   
 Dose:  5 mg Take 1 Tab by mouth nightly as needed for Sleep. Max Daily Amount: 5 mg. Quantity:  30 Tab Refills:  0 Discharge Instructions Shonda [address] PLEURAL CATHETER  DISCHARGE INSTRUCTIONS General Information: A plastic catheter has been inserted through your skin and into either your pleural space (the sac surrounding your lung). This has been done because you have needed frequent Thoracentesis. This catheter will provide you a way to drain off the fluid every couple of days. Your doctor will order a home health nurse to help you learn to do the drain and to take care of the catheter. Home Care Instructions: You can resume your regular diet and medication regimen. Do not drink alcohol, drive, or make any important legal decisions in the next 24 hours. Do not lift anything heavier than a gallon of milk, or do anything strenuous for the next 24 hours. You should not shower for 24 hours. You should cover the tube with plastic wrap and tape to keep it dry when you shower. Do not take a bath, swim or immerse yourself in water as long as you have this catheter.  You should clean the tube and change the dressing every time you drain the fluid, and as needed. Keep the dressing clean and dry. Keep a journal of how often you drain the fluid, how much fluid you drain, and the character of the fluid. Call If: 
   You should call your Physician or [address] if you have any bleeding other than a small spot on your bandage. Call if you have any signs of infection, fever, or increased pain at the site of the tube. Call if you should have leakage around the tube, a change in the appearance of the fluid, or if the tube gets pulled out some or all the way out. Call us, your home health nurse, or your regular doctor if you have any concerns or questions about your catheter. Follow-Up Instructions: Please see your ordering doctor as he/she has requested. [facility] [phone] been contacted to help you drain your pleurx daily for the next 7 days and then every other day-drain 500 cc slowly or as she tolerates. Sutures need to be removed in 10-14 days-your home health RN can do this. Take your pain medicine as scheduled and resume your Coumadin this evening at previous dosage and schedule. To Reach Us:  [phone] Patient Signature: 
Date: 6/8/2017 Discharging Nurse:  
 
 
Discharge Orders None Introducing [facility] & HEALTH SERVICES! [facility] introduce portal paciente Emily . Ahora se puede acceder a partes de merrill expediente médico, enviar por correo electrónico la oficina de merrill médico y solicitar renovaciones de medicamentos en línea. En merrill navegador de Internet , Louis Rincon a [email or web address]://[email or web address] [employer]. com/mychart Olga Lidia clic en el usuario por Rianna Simpler? Earnest Divine clic aquí en la sesión Lovella Alert. Verá la página de registro Evarts. Ingrese merrill código de Bank  Mila mitzy y caterina aparece a continuación. Usted no tendrá que [employer] código después de beatrice completado el proceso de registro . Si usted no se inscribe antes de la fecha de caducidad , debe solicitar un nuevo código. · MyChart Código de acceso : [facility] Expires: 7/6/2017 12:55 PM 
 
Ingresa los últimos cuatro dígitos de merrill Número de Seguro Social ( xxxx ) y fecha de nacimiento ( dd / mm / aaaa ) caterina se indica y olga lidia clic en Enviar. Usted será llevado a la siguiente página de registro . Crear un ID MyChart . Esta será merrill ID de inicio de sesión de MyChart y no puede ser [place] , por lo que pensar en kim que es Bobetta Kerbs y fácil de recordar . Crear kim contraseña MyChart . Usted puede cambiar merrill contraseña en cualquier momento . Ingrese merrill Password Reset de preguntas y Mejía . [place] se puede utilizar en un momento posterior si usted olvida merrill contraseña. Introduzca merrill dirección de correo electrónico . Diane Denier recibirá kim notificación por correo electrónico cuando la nueva información está disponible en MyChart . Jari Kayser clic en Registrarse. Jason Greet angel y descargar porciones de merrill expediente médico. 
Olga Lidia clic en el enlace de descarga del menú Resumen para descargar kim copia portátil de merrill información médica . Si tiene [employer] , por favor visite la sección de preguntas frecuentes del sitio web MyChart . Recuerde, MyChart NO es que se utilizará para las necesidades urgentes. Para emergencias médicas , llame al 911 . Ahora disponible en merrill iPhone y Android ! General Information Please provide this summary of care documentation to your next provider. Patient Signature:  ____________________________________________________________ Date:  ____________________________________________________________  
  
Newby Ledger Provider Signature:  ____________________________________________________________ Date:  ____________________________________________________________  
  
  
   
  
[address] 
[phone] Patient: Jl Paige MRN: [id number] YKI:[birth year] Tiene alergias a lo siguiente No tiene alergias Documentación recientes Height Weight Está amamantando? BMI [facility]) Estado obstétrico Estatus de tabaquísmo 1.575 m 63.5 kg No 25.61 kg/m2 Postmenopausal Former Smoker Emergency Contacts Name Discharge Info Relation Home Work Mobile Juancarlos Leggett  Child [2] [phone] [address] [place] [address]  Spouse [3] [phone] Sobre asher hospitalización Le admitieron el:  June 8, 2017 Asher tratamiento más reciente fue el:  [facility] Le dieron de thania el:  June 8, 2017 Número de teléfono de la unidad:  [phone] Por qué le ingresaron Asher diagnosis primaria es:  No está archivado/a Proveedores de verse fidelina courtney hospitalizaciones Personal Médico Rol Especialidad Teléfono principal de la oficina Loralee Bumpers, MD Attending Provider Pulmonary Disease [phone] Asher médico de atención primaria (PCP ) Primary Care Physician Office Phone Office Fax  
 [place], [place] D ** None ** ** None ** Follow-up Information [facility] Courtney citas Wednesday June 14, 2017  8:30 AM EDT  
LAB with [address]  
18[facility] [address]  
[phone] Wednesday June 14, 2017  9:00 AM EDT Follow Up with Gilbert Armstrong MD  
[facility] Hematology and Oncology [facility]) C/ Juancarlos Carney 33 [facility] [address]  
[phone] Wednesday June 14, 2017  9:00 AM EDT Follow Up with SOFI Chang [facility] Hematology and Oncology [facility]) JJ/ Juancarlos Carney 33 [facility] [address]  
[phone] Wednesday June 14, 2017  9:15 AM EDT Injection with [facility]  
ST. [address] [place] [address] ([facility]) [address] Amaury Diaz [phone] [facility] [address] [phone] [address] Friday July 14, 2017 11:00 AM EDT HOSPITAL with Feli Murillo, NP [place] [facility] ([facility]) [address] 300 [place] 56[address] [facility] Kaleigh [facility]  
[phone] Aprobación de la gestión actual lista de medicamentos CONSULTE con merrill médico sobre [employer] Dosis e instrucciones Información de dispensación Comentarios Morning Noon Evening Bedtime  
 albuterol 90 mcg/actuation inhaler También conocido caterina:  PROVENTIL HFA, VENTOLIN HFA, PROAIR HFA Your last dose was: Your next dose is:    
   
   
 Dosis:  2 Puff Take 2 Puffs by inhalation every six (6) hours as needed for Wheezing. cantidad:  1 Inhaler  
recargas:  11 ALPRAZolam 0.25 mg tablet También conocido caterina:  Jason Inch Your last dose was: Your next dose is:    
   
   
 Dosis:  0.25 mg Take 1 Tab by mouth three (3) times daily as needed for Anxiety. Max Daily Amount: 0.75 mg.  
 cantidad:  90 Tab  
recargas:  2  
     
   
   
   
  
 amLODIPine 10 mg tablet También conocido caterina:  Joao Pupa Your last dose was: Your next dose is:    
   
   
 Dosis:  10 mg Take 1 Tab by mouth daily. cantidad:  30 Tab  
recargas:  11  
     
   
   
   
  
 atenolol 25 mg tablet También conocido caterina:  TENORMIN Your last dose was: Your next dose is:    
   
   
 Dosis:  25 mg Take 1 Tab by mouth daily. cantidad:  60 Tab  
recargas:  0  
     
   
   
   
  
 budesonide-formoterol 160-4.5 mcg/actuation HFA inhaler También conocido caterina:  SYMBICORT Your last dose was: Your next dose is:    
   
   
 Dosis:  2 Puff Take 2 Puffs by inhalation two (2) times a day. cantidad:  1 Inhaler  
recargas:  11 DULoxetine 20 mg capsule También conocido caterina:  CYMBALTA Your last dose was: Your next dose is:    
   
   
 Dosis:  20 mg Take 1 Cap by mouth daily. Indications: NEUROPATHIC PAIN  
 cantidad:  30 Cap  
recargas:  0  
     
   
   
   
  
 fentaNYL 50 mcg/hr PATCH También conocido caterina:  Argelia David Your last dose was: Your next dose is:    
   
   
 Dosis:  1 Patch 1 Patch by TransDERmal route every seventy-two (72) hours. Max Daily Amount: 1 Patch. Indications: Chronic Pain with Opioid Tolerance  
 cantidad:  10 Patch  
recargas:  0  
     
   
   
   
  
 furosemide 40 mg tablet También conocido caterina:  LASIX Your last dose was: Your next dose is:    
   
   
 Dosis:  40 mg Take 1 Tab by mouth daily. cantidad:  30 Tab  
recargas:  0  
     
   
   
   
  
 hydrALAZINE 50 mg tablet También conocido caterina:  APRESOLINE Your last dose was: Your next dose is:    
   
   
 Dosis:  50 mg Take 1 Tab by mouth three (3) times daily. cantidad:  90 Tab  
recargas:  1 HYDROcodone-acetaminophen  mg tablet También conocido caterina:  Deborah Sherrell Your last dose was: Your next dose is:    
   
   
 Dosis:  1 Tab Take 1 Tab by mouth every four (4) hours as needed. Max Daily Amount: 6 Tabs. cantidad:  90 Tab  
recargas:  0 LORazepam 1 mg tablet También conocido caterina:  ATIVAN Your last dose was: Your next dose is:    
   
   
 Dosis:  1 mg Take 1 Tab by mouth every six (6) hours as needed for Anxiety. Max Daily Amount: 4 mg.  
 cantidad:  60 Tab  
recargas:  0  
     
   
   
   
  
 megestrol 400 mg/10 mL (10 mL) suspension También conocido caterina:  MEGACE Your last dose was: Your next dose is:    
   
   
 Dosis:  200 mg Take 5 mL by mouth daily. cantidad:  240 mL  
recargas:  2  
     
   
   
   
  
 mirtazapine 30 mg tablet También conocido caterina:  Andrew Tim Your last dose was:     
   
Your next dose is:    
   
   
 Dosis:  30 mg  
 Take 1 Tab by mouth nightly. cantidad:  30 Tab  
recargas:  1  
     
   
   
   
  
 montelukast 10 mg tablet También conocido caterina:  SINGULAIR Your last dose was: Your next dose is:    
   
   
 Dosis:  10 mg Take 1 Tab by mouth nightly. cantidad:  30 Tab  
recargas:  3  
     
   
   
   
  
 palbociclib 125 mg Cap Your last dose was: Your next dose is:    
   
   
 Dosis:  125 mg Take 125 mg by mouth daily. Take one pill once a day for 3 weeks and then off for one week  
 cantidad:  21 Tab  
recargas:  5  
     
   
   
   
  
 zolpidem 5 mg tablet También conocido caternia:  Geneva Prom Your last dose was: Your next dose is:    
   
   
 Dosis:  5 mg Take 1 Tab by mouth nightly as needed for Sleep. Max Daily Amount: 5 mg.  
 cantidad:  30 Tab  
recargas:  0 Discharge Instructions Ramonai 34 [address] PLEURAL CATHETER  DISCHARGE INSTRUCTIONS General Information: A plastic catheter has been inserted through your skin and into either your pleural space (the sac surrounding your lung). This has been done because you have needed frequent Thoracentesis. This catheter will provide you a way to drain off the fluid every couple of days. Your doctor will order a home health nurse to help you learn to do the drain and to take care of the catheter. Home Care Instructions: You can resume your regular diet and medication regimen. Do not drink alcohol, drive, or make any important legal decisions in the next 24 hours. Do not lift anything heavier than a gallon of milk, or do anything strenuous for the next 24 hours. You should not shower for 24 hours. You should cover the tube with plastic wrap and tape to keep it dry when you shower. Do not take a bath, swim or immerse yourself in water as long as you have this catheter.  You should clean the tube and change the dressing every time you drain the fluid, and as needed. Keep the dressing clean and dry. Keep a journal of how often you drain the fluid, how much fluid you drain, and the character of the fluid. Call If: 
   You should call your Physician or [address] if you have any bleeding other than a small spot on your bandage. Call if you have any signs of infection, fever, or increased pain at the site of the tube. Call if you should have leakage around the tube, a change in the appearance of the fluid, or if the tube gets pulled out some or all the way out. Call us, your home health nurse, or your regular doctor if you have any concerns or questions about your catheter. Follow-Up Instructions: Please see your ordering doctor as he/she has requested. [facility] [phone] been contacted to help you drain your pleurx daily for the next 7 days and then every other day-drain 500 cc slowly or as she tolerates. Sutures need to be removed in 10-14 days-your home health RN can do this. Take your pain medicine as scheduled and resume your Coumadin this evening at previous dosage and schedule. To Reach Us:  [phone] Patient Signature: 
Date: 6/8/2017 Discharging Nurse:  
 
 
Discharge Orders [employer] Introducing [facility] & HEALTH SERVICES! [facility] introduce portal paciente [employer] [facility] . Ahora se puede acceder a partes de merrill expediente médico, enviar por correo electrónico la oficina de merrill médico y solicitar renovaciones de medicamentos en línea. En merrill navegador de Internet , Alvin Money a https://[email or web address] Rock Entertainmenthart. [employer]. com/mychart Olga Lidia clic en el usuario por [place]? Walterine Jada clic aquí en la sesión Ninoska BloYoomly. Verá la página de registro Intervale. Ingrese merrill código de [facility] Mila mitzy y caterina aparece a continuación. Usted no tendrá que [employer] código después de beatrice completado el proceso de registro . Si usted no se inscribe antes de la fecha de caducidad , debe solicitar un nuevo código. · MyChart Código de acceso : [facility] Expires: 7/6/2017 12:55 PM 
 
Ingresa los últimos cuatro dígitos de merrill Número de Seguro Social ( xxxx ) y fecha de nacimiento ( dd / mm / aaaa ) caterina se indica y olga lidia clic en Enviar. Usted será llevado a la siguiente página de registro . Crear un ID MyChart . Esta será merrill ID de inicio de sesión de MyChart y no puede ser [place] , por lo que pensar en kim que es Zuri Awkward y fácil de recordar . Crear kim contraseña MyChart . Usted puede cambiar merrill contraseña en cualquier momento . Ingrese merrill Password Reset de preguntas y Mejía . [place] se puede utilizar en un momento posterior si usted olvida merrill contraseña. Introduzca merrill dirección de correo electrónico . Jolynn Selby recibirá kim notificación por correo electrónico cuando la nueva información está disponible en MyChart . Hernán Coupe clic en Registrarse. Osorio Ast angel y descargar porciones de merrill expediente médico. 
Olga Lidia clic en el enlace de descarga del menú Resumen para descargar kim copia portátil de merrill información médica . Si tiene [employer] , por favor visite la sección de preguntas frecuentes del sitio web MyChart . Recuerde, MyChart NO es que se utilizará para las necesidades urgentes. Para emergencias médicas , llame al 911 . Ahora disponible en merrill iPhone y Android ! General Information Please provide this summary of care documentation to your next provider. Patient Signature:  ____________________________________________________________ Date:  ____________________________________________________________  
  
Rubén Bough Provider Signature:  ____________________________________________________________ Date:  ____________________________________________________________

## 2017-06-14 ENCOUNTER — HOSPITAL ENCOUNTER (OUTPATIENT)
Dept: INFUSION THERAPY | Age: 67
Discharge: HOME | End: 2017-06-14
Payer: SUBSIDIZED

## 2017-06-14 ENCOUNTER — HOSPITAL ENCOUNTER (OUTPATIENT)
Dept: LAB | Age: 67
Discharge: HOME | End: 2017-06-14
Payer: SUBSIDIZED

## 2017-06-14 DIAGNOSIS — C79.51 BONY METASTASIS (HCC): ICD-10-CM

## 2017-06-14 DIAGNOSIS — C50.919 BREAST CANCER METASTASIZED TO LUNG, UNSPECIFIED LATERALITY (HCC): Chronic | ICD-10-CM

## 2017-06-14 DIAGNOSIS — D70.1 CHEMOTHERAPY-INDUCED NEUTROPENIA (HCC): ICD-10-CM

## 2017-06-14 DIAGNOSIS — C78.00 BREAST CANCER METASTASIZED TO LUNG, UNSPECIFIED LATERALITY (HCC): Chronic | ICD-10-CM

## 2017-06-14 DIAGNOSIS — T45.1X5A CHEMOTHERAPY-INDUCED NEUTROPENIA (HCC): ICD-10-CM

## 2017-06-14 DIAGNOSIS — I82.409 ACUTE DEEP VEIN THROMBOSIS (DVT) OF OTHER VEIN OF LOWER EXTREMITY: ICD-10-CM

## 2017-06-14 LAB
ALBUMIN SERPL BCP-MCNC: 2.6 G/DL (ref 3.2–4.6)
ALBUMIN/GLOB SERPL: 0.7 {RATIO} (ref 1.2–3.5)
ALP SERPL-CCNC: 103 U/L (ref 50–136)
ALT SERPL-CCNC: 24 U/L (ref 12–65)
ANION GAP BLD CALC-SCNC: 8 MMOL/L (ref 7–16)
AST SERPL W P-5'-P-CCNC: 24 U/L (ref 15–37)
BASOPHILS # BLD AUTO: 0.1 K/UL (ref 0–0.2)
BASOPHILS # BLD: 1 % (ref 0–2)
BILIRUB SERPL-MCNC: 0.4 MG/DL (ref 0.2–1.1)
BUN SERPL-MCNC: 32 MG/DL (ref 8–23)
CALCIUM SERPL-MCNC: 7.5 MG/DL (ref 8.3–10.4)
CANCER AG15-3 SERPL-ACNC: 324.6 U/ML (ref 1–35)
CEA SERPL-MCNC: 38.7 NG/ML (ref 0–3)
CHLORIDE SERPL-SCNC: 110 MMOL/L (ref 98–107)
CO2 SERPL-SCNC: 27 MMOL/L (ref 21–32)
CREAT SERPL-MCNC: 2.15 MG/DL (ref 0.6–1)
DIFFERENTIAL METHOD BLD: ABNORMAL
EOSINOPHIL # BLD: 0.1 K/UL (ref 0–0.8)
EOSINOPHIL NFR BLD: 2 % (ref 0.5–7.8)
ERYTHROCYTE [DISTWIDTH] IN BLOOD BY AUTOMATED COUNT: 14.6 % (ref 11.9–14.6)
GLOBULIN SER CALC-MCNC: 4 G/DL (ref 2.3–3.5)
GLUCOSE SERPL-MCNC: 128 MG/DL (ref 65–100)
HCT VFR BLD AUTO: 26.2 % (ref 35.8–46.3)
HGB BLD-MCNC: 8.7 G/DL (ref 11.7–15.4)
INR PPP: 1.3 (ref 0.9–1.2)
LYMPHOCYTES # BLD AUTO: 14 % (ref 13–44)
LYMPHOCYTES # BLD: 1 K/UL (ref 0.5–4.6)
MAGNESIUM SERPL-MCNC: 2 MG/DL (ref 1.8–2.4)
MCH RBC QN AUTO: 35.4 PG (ref 26.1–32.9)
MCHC RBC AUTO-ENTMCNC: 33.2 G/DL (ref 31.4–35)
MCV RBC AUTO: 106.5 FL (ref 79.6–97.8)
MONOCYTES # BLD: 0.6 K/UL (ref 0.1–1.3)
MONOCYTES NFR BLD AUTO: 9 % (ref 4–12)
NEUTS SEG # BLD: 5.1 K/UL (ref 1.7–8.2)
NEUTS SEG NFR BLD AUTO: 74 % (ref 43–78)
NRBC # BLD: 0 K/UL (ref 0–0.2)
PLATELET # BLD AUTO: 392 K/UL (ref 150–450)
PMV BLD AUTO: 9.6 FL (ref 10.8–14.1)
POTASSIUM SERPL-SCNC: 3.3 MMOL/L (ref 3.5–5.1)
PROT SERPL-MCNC: 6.6 G/DL (ref 6.3–8.2)
PROTHROMBIN TIME: 15 SEC (ref 9.6–12)
RBC # BLD AUTO: 2.46 M/UL (ref 4.05–5.25)
SODIUM SERPL-SCNC: 145 MMOL/L (ref 136–145)
WBC # BLD AUTO: 7 K/UL (ref 4.3–11.1)

## 2017-06-14 PROCEDURE — 86300 IMMUNOASSAY TUMOR CA 15-3: CPT | Performed by: INTERNAL MEDICINE

## 2017-06-14 PROCEDURE — 80053 COMPREHEN METABOLIC PANEL: CPT | Performed by: INTERNAL MEDICINE

## 2017-06-14 PROCEDURE — 83735 ASSAY OF MAGNESIUM: CPT | Performed by: INTERNAL MEDICINE

## 2017-06-14 PROCEDURE — 85025 COMPLETE CBC W/AUTO DIFF WBC: CPT | Performed by: INTERNAL MEDICINE

## 2017-06-14 PROCEDURE — 96372 THER/PROPH/DIAG INJ SC/IM: CPT

## 2017-06-14 PROCEDURE — 82378 CARCINOEMBRYONIC ANTIGEN: CPT | Performed by: INTERNAL MEDICINE

## 2017-06-14 PROCEDURE — 96402 CHEMO HORMON ANTINEOPL SQ/IM: CPT

## 2017-06-14 PROCEDURE — 36415 COLL VENOUS BLD VENIPUNCTURE: CPT | Performed by: INTERNAL MEDICINE

## 2017-06-14 PROCEDURE — 74011250636 HC RX REV CODE- 250/636: Performed by: INTERNAL MEDICINE

## 2017-06-14 PROCEDURE — 85610 PROTHROMBIN TIME: CPT | Performed by: INTERNAL MEDICINE

## 2017-06-14 RX ORDER — LAMOTRIGINE 25 MG/1
500 TABLET ORAL ONCE
Status: COMPLETED | OUTPATIENT
Start: 2017-06-14 | End: 2017-06-14

## 2017-06-14 RX ADMIN — FULVESTRANT 500 MG: 50 INJECTION INTRAMUSCULAR at 10:55

## 2017-06-14 RX ADMIN — DENOSUMAB 120 MG: 120 INJECTION SUBCUTANEOUS at 10:51

## 2017-06-14 NOTE — PROGRESS NOTES
Arrived to [facility] to receive Faslodex/Xgeva. Tolerated well. Issues or concerns during appointment:  Patient reports having to go to immigration office and receive vaccines, but unsure which ones. Per Dr. Surinder Sidhu, ok for patient to receive vaccines there. Aware of next appointment on 7/13 at 8:30 AM.  Discharged via w/c accompanied by family.

## 2017-06-14 NOTE — ROUTINE PROCESS
Due to expense of Pleurx supplies, ok to drop to twice weekly drainage schedule per Dr Sanders Boeck. Patient draining about 200 cc per drainage per Emilee Wynne. Reached out to patient financial services who state they are unable to help with cost of supplies.

## 2017-06-15 LAB — CANCER AG27-29 SERPL-ACNC: 419.7 U/ML (ref 0–38.6)

## 2017-06-21 ENCOUNTER — HOSPITAL ENCOUNTER (OUTPATIENT)
Dept: LAB | Age: 67
Discharge: HOME | End: 2017-06-21
Payer: SUBSIDIZED

## 2017-06-21 DIAGNOSIS — Z79.01 ANTICOAGULATED ON COUMADIN: Chronic | ICD-10-CM

## 2017-06-21 LAB
INR PPP: 1.6 (ref 0.9–1.2)
PROTHROMBIN TIME: 19.1 SEC (ref 9.6–12)

## 2017-06-21 PROCEDURE — 36416 COLLJ CAPILLARY BLOOD SPEC: CPT | Performed by: INTERNAL MEDICINE

## 2017-06-21 PROCEDURE — 85610 PROTHROMBIN TIME: CPT | Performed by: INTERNAL MEDICINE

## 2017-06-23 LAB
FUNGUS CULTURE, RFCO2T: NEGATIVE
FUNGUS SMEAR, RFCO1T: NORMAL
FUNGUS SPEC CULT: NORMAL
FUNGUS STAIN, 188244: NORMAL
REFLEX TO ID, RFCO3T: NORMAL
SPECIMEN SOURCE: NORMAL
SPECIMEN SOURCE: NORMAL

## 2017-06-28 ENCOUNTER — HOSPITAL ENCOUNTER (OUTPATIENT)
Dept: LAB | Age: 67
Discharge: HOME | End: 2017-06-28
Payer: SUBSIDIZED

## 2017-06-28 DIAGNOSIS — I82.409 ACUTE DEEP VEIN THROMBOSIS (DVT) OF OTHER VEIN OF LOWER EXTREMITY: Chronic | ICD-10-CM

## 2017-06-28 LAB
INR PPP: 2.3 (ref 0.9–1.2)
PROTHROMBIN TIME: 27.6 SEC (ref 9.6–12)

## 2017-06-28 PROCEDURE — 85610 PROTHROMBIN TIME: CPT | Performed by: INTERNAL MEDICINE

## 2017-06-28 PROCEDURE — 36416 COLLJ CAPILLARY BLOOD SPEC: CPT | Performed by: INTERNAL MEDICINE

## 2017-07-03 ENCOUNTER — APPOINTMENT (OUTPATIENT)
Dept: ONCOLOGY | Age: 67
End: 2017-07-03
Attending: INTERNAL MEDICINE

## 2017-07-03 ENCOUNTER — HOSPITAL ENCOUNTER (OUTPATIENT)
Dept: ONCOLOGY | Age: 67
Discharge: HOME | End: 2017-07-03

## 2017-07-03 NOTE — PROGRESS NOTES
Canceled today's initial evaluationswith nurse navigator and OT in [address] as patient is currently receiving home health. Spoke with patient's son to verify and advised him to call us back after she is discharged from home health if she would like to pursue outpatient rehab services.

## 2017-07-05 ENCOUNTER — HOSPITAL ENCOUNTER (OUTPATIENT)
Dept: LAB | Age: 67
Discharge: HOME | End: 2017-07-05
Payer: SUBSIDIZED

## 2017-07-05 DIAGNOSIS — I82.409 ACUTE DEEP VEIN THROMBOSIS (DVT) OF OTHER VEIN OF LOWER EXTREMITY: Chronic | ICD-10-CM

## 2017-07-05 LAB
INR PPP: 1.9 (ref 0.9–1.2)
PROTHROMBIN TIME: 23 SEC (ref 9.6–12)

## 2017-07-05 PROCEDURE — 85610 PROTHROMBIN TIME: CPT | Performed by: INTERNAL MEDICINE

## 2017-07-05 PROCEDURE — 36416 COLLJ CAPILLARY BLOOD SPEC: CPT | Performed by: INTERNAL MEDICINE

## 2017-07-08 ENCOUNTER — APPOINTMENT (OUTPATIENT)
Dept: GENERAL RADIOLOGY | Age: 67
DRG: 291 | End: 2017-07-08
Attending: EMERGENCY MEDICINE
Payer: SUBSIDIZED

## 2017-07-08 ENCOUNTER — HOSPITAL ENCOUNTER (INPATIENT)
Age: 67
LOS: 6 days | Discharge: HOME HEALTH | DRG: 291 | End: 2017-07-14
Attending: EMERGENCY MEDICINE | Admitting: INTERNAL MEDICINE
Payer: SUBSIDIZED

## 2017-07-08 DIAGNOSIS — I82.599 CHRONIC DEEP VEIN THROMBOSIS (DVT) OF OTHER VEIN OF LOWER EXTREMITY: Chronic | ICD-10-CM

## 2017-07-08 DIAGNOSIS — E87.70 HYPERVOLEMIA, UNSPECIFIED HYPERVOLEMIA TYPE: ICD-10-CM

## 2017-07-08 DIAGNOSIS — C78.00 BREAST CANCER METASTASIZED TO LUNG, UNSPECIFIED LATERALITY (HCC): Chronic | ICD-10-CM

## 2017-07-08 DIAGNOSIS — I50.22 CHRONIC SYSTOLIC CONGESTIVE HEART FAILURE (HCC): ICD-10-CM

## 2017-07-08 DIAGNOSIS — L73.9 FOLLICULITIS: ICD-10-CM

## 2017-07-08 DIAGNOSIS — C50.919 BREAST CANCER METASTASIZED TO LUNG, UNSPECIFIED LATERALITY (HCC): Chronic | ICD-10-CM

## 2017-07-08 DIAGNOSIS — N18.9 CKD (CHRONIC KIDNEY DISEASE), UNSPECIFIED STAGE: Chronic | ICD-10-CM

## 2017-07-08 DIAGNOSIS — J90 PLEURAL EFFUSION, RIGHT: Chronic | ICD-10-CM

## 2017-07-08 DIAGNOSIS — R79.1 SUBTHERAPEUTIC INTERNATIONAL NORMALIZED RATIO (INR): ICD-10-CM

## 2017-07-08 DIAGNOSIS — J81.1 CHRONIC PULMONARY EDEMA: ICD-10-CM

## 2017-07-08 DIAGNOSIS — Z79.01 ANTICOAGULATED ON COUMADIN: Chronic | ICD-10-CM

## 2017-07-08 DIAGNOSIS — C79.51 BONY METASTASIS (HCC): Chronic | ICD-10-CM

## 2017-07-08 DIAGNOSIS — J96.21 ACUTE ON CHRONIC RESPIRATORY FAILURE WITH HYPOXIA (HCC): Primary | ICD-10-CM

## 2017-07-08 DIAGNOSIS — F41.9 ANXIETY: ICD-10-CM

## 2017-07-08 DIAGNOSIS — I82.409 DEEP VEIN THROMBOSIS (DVT) OF LOWER EXTREMITY, UNSPECIFIED CHRONICITY, UNSPECIFIED LATERALITY, UNSPECIFIED VEIN (HCC): Chronic | ICD-10-CM

## 2017-07-08 DIAGNOSIS — I50.30 HEART FAILURE WITH PRESERVED EJECTION FRACTION (HCC): ICD-10-CM

## 2017-07-08 DIAGNOSIS — Z86.718 HISTORY OF DVT (DEEP VEIN THROMBOSIS): ICD-10-CM

## 2017-07-08 DIAGNOSIS — D64.9 ANEMIA, UNSPECIFIED TYPE: ICD-10-CM

## 2017-07-08 DIAGNOSIS — N17.9 ACUTE ON CHRONIC RENAL FAILURE (HCC): ICD-10-CM

## 2017-07-08 DIAGNOSIS — N18.9 ACUTE ON CHRONIC RENAL FAILURE (HCC): ICD-10-CM

## 2017-07-08 DIAGNOSIS — J81.0 ACUTE PULMONARY EDEMA (HCC): ICD-10-CM

## 2017-07-08 DIAGNOSIS — E11.22 TYPE 2 DIABETES MELLITUS WITH CHRONIC KIDNEY DISEASE, WITHOUT LONG-TERM CURRENT USE OF INSULIN, UNSPECIFIED CKD STAGE (HCC): Chronic | ICD-10-CM

## 2017-07-08 DIAGNOSIS — I35.0 AORTIC STENOSIS, MODERATE: Chronic | ICD-10-CM

## 2017-07-08 PROBLEM — J96.91 RESPIRATORY FAILURE WITH HYPOXIA (HCC): Status: ACTIVE | Noted: 2017-07-08

## 2017-07-08 LAB
ALBUMIN SERPL BCP-MCNC: 3.4 G/DL (ref 3.2–4.6)
ALBUMIN/GLOB SERPL: 0.7 {RATIO} (ref 1.2–3.5)
ALP SERPL-CCNC: 122 U/L (ref 50–136)
ALT SERPL-CCNC: 29 U/L (ref 12–65)
ANION GAP BLD CALC-SCNC: 11 MMOL/L (ref 7–16)
APTT PPP: 62 SEC (ref 23.5–31.7)
ARTERIAL PATENCY WRIST A: POSITIVE
AST SERPL W P-5'-P-CCNC: 34 U/L (ref 15–37)
ATRIAL RATE: 69 BPM
BACTERIA SPEC CULT: NORMAL
BACTERIA URNS QL MICRO: NORMAL /HPF
BASE DEFICIT BLDA-SCNC: 2.3 MMOL/L (ref 0–2)
BASOPHILS # BLD AUTO: 0.1 K/UL (ref 0–0.2)
BASOPHILS # BLD: 1 % (ref 0–2)
BDY SITE: ABNORMAL
BILIRUB SERPL-MCNC: 0.8 MG/DL (ref 0.2–1.1)
BNP SERPL-MCNC: 3072 PG/ML
BUN SERPL-MCNC: 42 MG/DL (ref 8–23)
CALCIUM SERPL-MCNC: 8.6 MG/DL (ref 8.3–10.4)
CALCULATED P AXIS, ECG09: 73 DEGREES
CALCULATED R AXIS, ECG10: 75 DEGREES
CALCULATED T AXIS, ECG11: -103 DEGREES
CASTS URNS QL MICRO: 0 /LPF
CHLORIDE SERPL-SCNC: 110 MMOL/L (ref 98–107)
CO2 SERPL-SCNC: 23 MMOL/L (ref 21–32)
COHGB MFR BLD: 0.1 % (ref 0.5–1.5)
CREAT SERPL-MCNC: 2.39 MG/DL (ref 0.6–1)
CRYSTALS URNS QL MICRO: 0 /LPF
DIAGNOSIS, 93000: NORMAL
DIFFERENTIAL METHOD BLD: ABNORMAL
DO-HGB BLD-MCNC: 5 % (ref 0–5)
EOSINOPHIL # BLD: 0.1 K/UL (ref 0–0.8)
EOSINOPHIL NFR BLD: 1 % (ref 0.5–7.8)
EPI CELLS #/AREA URNS HPF: NORMAL /HPF
ERYTHROCYTE [DISTWIDTH] IN BLOOD BY AUTOMATED COUNT: 15 % (ref 11.9–14.6)
FIO2 ON VENT: 50 %
GLOBULIN SER CALC-MCNC: 5 G/DL (ref 2.3–3.5)
GLUCOSE SERPL-MCNC: 173 MG/DL (ref 65–100)
HCO3 BLDA-SCNC: 20 MMOL/L (ref 22–26)
HCT VFR BLD AUTO: 29.6 % (ref 35.8–46.3)
HGB BLD-MCNC: 10.1 G/DL (ref 11.7–15.4)
HGB BLDMV-MCNC: 8.9 GM/DL (ref 11.7–15)
IMM GRANULOCYTES # BLD: 0 K/UL (ref 0–0.5)
IMM GRANULOCYTES NFR BLD AUTO: 0.2 % (ref 0–5)
INR PPP: 1.4 (ref 0.9–1.2)
LACTATE BLD-SCNC: 3 MMOL/L (ref 0.5–1.9)
LYMPHOCYTES # BLD AUTO: 46 % (ref 13–44)
LYMPHOCYTES # BLD: 2.3 K/UL (ref 0.5–4.6)
MAGNESIUM SERPL-MCNC: 2.7 MG/DL (ref 1.8–2.4)
MCH RBC QN AUTO: 35.2 PG (ref 26.1–32.9)
MCHC RBC AUTO-ENTMCNC: 34.1 G/DL (ref 31.4–35)
MCV RBC AUTO: 103.1 FL (ref 79.6–97.8)
METHGB MFR BLD: 0.4 % (ref 0–1.5)
MONOCYTES # BLD: 0.2 K/UL (ref 0.1–1.3)
MONOCYTES NFR BLD AUTO: 3 % (ref 4–12)
MUCOUS THREADS URNS QL MICRO: 0 /LPF
NEUTS SEG # BLD: 2.4 K/UL (ref 1.7–8.2)
NEUTS SEG NFR BLD AUTO: 49 % (ref 43–78)
OXYHGB MFR BLDA: 94.7 % (ref 94–97)
P-R INTERVAL, ECG05: 170 MS
PCO2 BLDA: 27 MMHG (ref 35–45)
PH BLDA: 7.49 [PH] (ref 7.35–7.45)
PLATELET # BLD AUTO: 174 K/UL (ref 150–450)
PMV BLD AUTO: 11.3 FL (ref 10.8–14.1)
PO2 BLDA: 79 MMHG (ref 75–100)
POTASSIUM SERPL-SCNC: 4.3 MMOL/L (ref 3.5–5.1)
PROCALCITONIN SERPL-MCNC: 0.6 NG/ML
PROT SERPL-MCNC: 8.4 G/DL (ref 6.3–8.2)
PROTHROMBIN TIME: 15.4 SEC (ref 9.6–12)
Q-T INTERVAL, ECG07: 386 MS
QRS DURATION, ECG06: 72 MS
QTC CALCULATION (BEZET), ECG08: 413 MS
RBC # BLD AUTO: 2.87 M/UL (ref 4.05–5.25)
RBC #/AREA URNS HPF: NORMAL /HPF
SAO2 % BLD: 95 % (ref 92–98.5)
SERVICE CMNT-IMP: ABNORMAL
SERVICE CMNT-IMP: NORMAL
SODIUM SERPL-SCNC: 144 MMOL/L (ref 136–145)
TROPONIN I BLD-MCNC: 0.07 NG/ML (ref 0–0.08)
VENTILATION MODE VENT: ABNORMAL
VENTRICULAR RATE, ECG03: 69 BPM
WBC # BLD AUTO: 4.9 K/UL (ref 4.3–11.1)
WBC URNS QL MICRO: NORMAL /HPF

## 2017-07-08 PROCEDURE — 83880 ASSAY OF NATRIURETIC PEPTIDE: CPT | Performed by: EMERGENCY MEDICINE

## 2017-07-08 PROCEDURE — 94660 CPAP INITIATION&MGMT: CPT

## 2017-07-08 PROCEDURE — 36415 COLL VENOUS BLD VENIPUNCTURE: CPT | Performed by: INTERNAL MEDICINE

## 2017-07-08 PROCEDURE — 74011250637 HC RX REV CODE- 250/637: Performed by: INTERNAL MEDICINE

## 2017-07-08 PROCEDURE — 99223 1ST HOSP IP/OBS HIGH 75: CPT | Performed by: INTERNAL MEDICINE

## 2017-07-08 PROCEDURE — 85730 THROMBOPLASTIN TIME PARTIAL: CPT | Performed by: INTERNAL MEDICINE

## 2017-07-08 PROCEDURE — 99285 EMERGENCY DEPT VISIT HI MDM: CPT | Performed by: EMERGENCY MEDICINE

## 2017-07-08 PROCEDURE — 94640 AIRWAY INHALATION TREATMENT: CPT

## 2017-07-08 PROCEDURE — 80053 COMPREHEN METABOLIC PANEL: CPT | Performed by: EMERGENCY MEDICINE

## 2017-07-08 PROCEDURE — 84484 ASSAY OF TROPONIN QUANT: CPT

## 2017-07-08 PROCEDURE — 83605 ASSAY OF LACTIC ACID: CPT

## 2017-07-08 PROCEDURE — 87641 MR-STAPH DNA AMP PROBE: CPT | Performed by: INTERNAL MEDICINE

## 2017-07-08 PROCEDURE — 93005 ELECTROCARDIOGRAM TRACING: CPT | Performed by: EMERGENCY MEDICINE

## 2017-07-08 PROCEDURE — 77030005520 HC CATH URETH FOL38 BARD -A

## 2017-07-08 PROCEDURE — 85025 COMPLETE CBC W/AUTO DIFF WBC: CPT | Performed by: EMERGENCY MEDICINE

## 2017-07-08 PROCEDURE — 84145 PROCALCITONIN (PCT): CPT | Performed by: EMERGENCY MEDICINE

## 2017-07-08 PROCEDURE — 65610000001 HC ROOM ICU GENERAL

## 2017-07-08 PROCEDURE — 71010 XR CHEST PORT: CPT

## 2017-07-08 PROCEDURE — 36600 WITHDRAWAL OF ARTERIAL BLOOD: CPT

## 2017-07-08 PROCEDURE — 51702 INSERT TEMP BLADDER CATH: CPT | Performed by: EMERGENCY MEDICINE

## 2017-07-08 PROCEDURE — 82803 BLOOD GASES ANY COMBINATION: CPT

## 2017-07-08 PROCEDURE — 77030032490 HC SLV COMPR SCD KNE COVD -B

## 2017-07-08 PROCEDURE — 74011000250 HC RX REV CODE- 250

## 2017-07-08 PROCEDURE — 87040 BLOOD CULTURE FOR BACTERIA: CPT | Performed by: EMERGENCY MEDICINE

## 2017-07-08 PROCEDURE — 96365 THER/PROPH/DIAG IV INF INIT: CPT | Performed by: EMERGENCY MEDICINE

## 2017-07-08 PROCEDURE — 83735 ASSAY OF MAGNESIUM: CPT | Performed by: EMERGENCY MEDICINE

## 2017-07-08 PROCEDURE — 96375 TX/PRO/DX INJ NEW DRUG ADDON: CPT | Performed by: EMERGENCY MEDICINE

## 2017-07-08 PROCEDURE — 81015 MICROSCOPIC EXAM OF URINE: CPT | Performed by: EMERGENCY MEDICINE

## 2017-07-08 PROCEDURE — 74011250637 HC RX REV CODE- 250/637: Performed by: EMERGENCY MEDICINE

## 2017-07-08 PROCEDURE — 0T9B70Z DRAINAGE OF BLADDER WITH DRAINAGE DEVICE, VIA NATURAL OR ARTIFICIAL OPENING: ICD-10-PCS | Performed by: INTERNAL MEDICINE

## 2017-07-08 PROCEDURE — 85610 PROTHROMBIN TIME: CPT | Performed by: EMERGENCY MEDICINE

## 2017-07-08 PROCEDURE — 74011250636 HC RX REV CODE- 250/636: Performed by: EMERGENCY MEDICINE

## 2017-07-08 PROCEDURE — 77030019605

## 2017-07-08 RX ORDER — MIRTAZAPINE 15 MG/1
30 TABLET, FILM COATED ORAL
Status: DISCONTINUED | OUTPATIENT
Start: 2017-07-08 | End: 2017-07-14 | Stop reason: HOSPADM

## 2017-07-08 RX ORDER — MONTELUKAST SODIUM 10 MG/1
10 TABLET ORAL
Status: DISCONTINUED | OUTPATIENT
Start: 2017-07-08 | End: 2017-07-14 | Stop reason: HOSPADM

## 2017-07-08 RX ORDER — ALPRAZOLAM 0.5 MG/1
0.25 TABLET ORAL
Status: DISCONTINUED | OUTPATIENT
Start: 2017-07-08 | End: 2017-07-14 | Stop reason: HOSPADM

## 2017-07-08 RX ORDER — FUROSEMIDE 10 MG/ML
40 INJECTION INTRAMUSCULAR; INTRAVENOUS
Status: COMPLETED | OUTPATIENT
Start: 2017-07-08 | End: 2017-07-08

## 2017-07-08 RX ORDER — FENTANYL 50 UG/1
1 PATCH TRANSDERMAL
Status: DISCONTINUED | OUTPATIENT
Start: 2017-07-08 | End: 2017-07-14 | Stop reason: HOSPADM

## 2017-07-08 RX ORDER — DULOXETIN HYDROCHLORIDE 20 MG/1
20 CAPSULE, DELAYED RELEASE ORAL DAILY
Status: DISCONTINUED | OUTPATIENT
Start: 2017-07-09 | End: 2017-07-14 | Stop reason: HOSPADM

## 2017-07-08 RX ORDER — ZOLPIDEM TARTRATE 5 MG/1
5 TABLET ORAL
Status: DISCONTINUED | OUTPATIENT
Start: 2017-07-08 | End: 2017-07-14 | Stop reason: HOSPADM

## 2017-07-08 RX ORDER — HEPARIN SODIUM 5000 [USP'U]/100ML
18-36 INJECTION, SOLUTION INTRAVENOUS
Status: DISCONTINUED | OUTPATIENT
Start: 2017-07-08 | End: 2017-07-12

## 2017-07-08 RX ORDER — IPRATROPIUM BROMIDE AND ALBUTEROL SULFATE 2.5; .5 MG/3ML; MG/3ML
SOLUTION RESPIRATORY (INHALATION)
Status: COMPLETED
Start: 2017-07-08 | End: 2017-07-08

## 2017-07-08 RX ORDER — ATENOLOL 50 MG/1
25 TABLET ORAL DAILY
Status: DISCONTINUED | OUTPATIENT
Start: 2017-07-09 | End: 2017-07-09

## 2017-07-08 RX ORDER — AMLODIPINE BESYLATE 10 MG/1
10 TABLET ORAL DAILY
Status: DISCONTINUED | OUTPATIENT
Start: 2017-07-09 | End: 2017-07-14 | Stop reason: HOSPADM

## 2017-07-08 RX ORDER — IPRATROPIUM BROMIDE 0.5 MG/2.5ML
0.5 SOLUTION RESPIRATORY (INHALATION)
Status: DISCONTINUED | OUTPATIENT
Start: 2017-07-08 | End: 2017-07-08

## 2017-07-08 RX ORDER — HYDRALAZINE HYDROCHLORIDE 50 MG/1
50 TABLET, FILM COATED ORAL 3 TIMES DAILY
Status: DISCONTINUED | OUTPATIENT
Start: 2017-07-08 | End: 2017-07-14 | Stop reason: HOSPADM

## 2017-07-08 RX ORDER — FUROSEMIDE 10 MG/ML
40 INJECTION INTRAMUSCULAR; INTRAVENOUS DAILY
Status: DISCONTINUED | OUTPATIENT
Start: 2017-07-09 | End: 2017-07-14 | Stop reason: HOSPADM

## 2017-07-08 RX ORDER — ALBUTEROL SULFATE 0.83 MG/ML
2.5 SOLUTION RESPIRATORY (INHALATION)
Status: DISCONTINUED | OUTPATIENT
Start: 2017-07-08 | End: 2017-07-08

## 2017-07-08 RX ORDER — MUPIROCIN CALCIUM 20 MG/G
CREAM TOPICAL 3 TIMES DAILY
Status: DISCONTINUED | OUTPATIENT
Start: 2017-07-08 | End: 2017-07-14 | Stop reason: HOSPADM

## 2017-07-08 RX ORDER — HYDROCODONE BITARTRATE AND ACETAMINOPHEN 10; 325 MG/1; MG/1
1 TABLET ORAL
Status: DISCONTINUED | OUTPATIENT
Start: 2017-07-08 | End: 2017-07-14 | Stop reason: HOSPADM

## 2017-07-08 RX ORDER — SODIUM CHLORIDE 0.9 % (FLUSH) 0.9 %
5-10 SYRINGE (ML) INJECTION AS NEEDED
Status: DISCONTINUED | OUTPATIENT
Start: 2017-07-08 | End: 2017-07-14 | Stop reason: HOSPADM

## 2017-07-08 RX ORDER — MEGESTROL ACETATE 40 MG/ML
200 SUSPENSION ORAL DAILY
Status: DISCONTINUED | OUTPATIENT
Start: 2017-07-09 | End: 2017-07-14 | Stop reason: HOSPADM

## 2017-07-08 RX ORDER — WARFARIN SODIUM 5 MG/1
5 TABLET ORAL
Status: DISCONTINUED | OUTPATIENT
Start: 2017-07-08 | End: 2017-07-08

## 2017-07-08 RX ORDER — PANTOPRAZOLE SODIUM 40 MG/1
40 TABLET, DELAYED RELEASE ORAL
Status: DISCONTINUED | OUTPATIENT
Start: 2017-07-09 | End: 2017-07-14 | Stop reason: HOSPADM

## 2017-07-08 RX ORDER — IPRATROPIUM BROMIDE AND ALBUTEROL SULFATE 2.5; .5 MG/3ML; MG/3ML
3 SOLUTION RESPIRATORY (INHALATION)
Status: DISCONTINUED | OUTPATIENT
Start: 2017-07-08 | End: 2017-07-14 | Stop reason: HOSPADM

## 2017-07-08 RX ORDER — SODIUM CHLORIDE 0.9 % (FLUSH) 0.9 %
5-10 SYRINGE (ML) INJECTION EVERY 8 HOURS
Status: DISCONTINUED | OUTPATIENT
Start: 2017-07-08 | End: 2017-07-14 | Stop reason: HOSPADM

## 2017-07-08 RX ORDER — HEPARIN SODIUM 5000 [USP'U]/ML
4000 INJECTION, SOLUTION INTRAVENOUS; SUBCUTANEOUS ONCE
Status: COMPLETED | OUTPATIENT
Start: 2017-07-08 | End: 2017-07-08

## 2017-07-08 RX ORDER — LORAZEPAM 1 MG/1
1 TABLET ORAL
Status: DISCONTINUED | OUTPATIENT
Start: 2017-07-08 | End: 2017-07-14 | Stop reason: HOSPADM

## 2017-07-08 RX ORDER — AMOXICILLIN 250 MG
1 CAPSULE ORAL
Status: DISCONTINUED | OUTPATIENT
Start: 2017-07-08 | End: 2017-07-14 | Stop reason: HOSPADM

## 2017-07-08 RX ORDER — CEPHALEXIN 500 MG/1
500 CAPSULE ORAL EVERY 8 HOURS
Status: DISCONTINUED | OUTPATIENT
Start: 2017-07-08 | End: 2017-07-08 | Stop reason: SDUPTHER

## 2017-07-08 RX ADMIN — MUPIROCIN: 2 CREAM TOPICAL at 22:00

## 2017-07-08 RX ADMIN — IPRATROPIUM BROMIDE AND ALBUTEROL SULFATE 3 ML: 2.5; .5 SOLUTION RESPIRATORY (INHALATION) at 20:18

## 2017-07-08 RX ADMIN — HEPARIN SODIUM AND DEXTROSE 18 UNITS/KG/HR: 5000; 5 INJECTION INTRAVENOUS at 17:37

## 2017-07-08 RX ADMIN — HYDRALAZINE HYDROCHLORIDE 50 MG: 50 TABLET, FILM COATED ORAL at 21:38

## 2017-07-08 RX ADMIN — HEPARIN SODIUM 4000 UNITS: 5000 INJECTION, SOLUTION INTRAVENOUS; SUBCUTANEOUS at 17:36

## 2017-07-08 RX ADMIN — WARFARIN SODIUM 3 MG: 2 TABLET ORAL at 22:05

## 2017-07-08 RX ADMIN — MIRTAZAPINE 30 MG: 15 TABLET, FILM COATED ORAL at 21:38

## 2017-07-08 RX ADMIN — CEPHALEXIN 500 MG: 500 CAPSULE ORAL at 21:38

## 2017-07-08 RX ADMIN — MONTELUKAST SODIUM 10 MG: 10 TABLET, COATED ORAL at 21:38

## 2017-07-08 RX ADMIN — FUROSEMIDE 40 MG: 10 INJECTION, SOLUTION INTRAMUSCULAR; INTRAVENOUS at 16:47

## 2017-07-08 RX ADMIN — IPRATROPIUM BROMIDE AND ALBUTEROL SULFATE 3 ML: .5; 3 SOLUTION RESPIRATORY (INHALATION) at 20:18

## 2017-07-08 NOTE — H&P
History and Physical/Consult  Chinmay Farfan    7/8/2017    Date of Admission:  7/8/2017  Patient known to us from [facility] recent admission 6/2017:  Patient is a 78 y. o.  and non english speaking female presents with dyspnea.      She was last seen by us in February of 2017 for right pleural effusion. She has dCHR, prior DVT on coumadin, CKD, Moderate AS, HTN, DM and R ureteral obstruction s/p stent placement. She is followed by Dr Makeda Osuna for metastatic breast CA with mets to lung/bone. In February, she underwent R thoracentesis with drain to gravity with removal of 1200 ml of yellow pleural fluid. She developed pain after thoracentesis and pain was felt to be from trapped lung physiology and recommended not to remove more than 1 liter of pleural fluid if thoracentesis is again necessary. She was hypoxemic and home O2 arranged.       She had a CXR in April 2017 with persistent effusion. She was seen by Oncology on 5/16/2017 and was having dyspnea during that time with fatigue. She was given lasix as well and has taken lasix until 3 days ago. She has plans to see [facility] on June 2. Today in the ER, her CXR shows effusion on right which is unchanged and new right perihilar infiltrate. We were asked to admit her for hypoxemia with persistent effusion, new infiltrate with metastatic breast cancer with acute on chronic renal failure and moderate AS. Pt was admitted and decompensated requiring intubation. She was seen by oncology and her chemotherapy treatments were held. She had R thoracentesis with 500mL serosanguinous fluid removed on 5/25/17. Pt improved and was extubated on 5/26/17. Urology was consulted to evaluate for urinothorax. It was felt that she did not have urinothorax but chronic effusion. She improved and was weaned to 2L. She will be discharged home today with home health. She will hold Coumadin until she returns on 6/8 for pleurex catheter placement.  She will then resume Coumadin after that.   She is to ambulate with respiratory therapy to determine home O2 needs on exertion. Subjective:     Complained of a 1 day history of SOB and developed respiratory distress and was brought to ER, where she was promptly placed on NIPPV due to respiratory distress. She had a Plurex catheter placed on the R w/o complication on 1/8/75 and had been draing as planned at home. Has history of DVT on chronic coumadin Rx but found to be subtherapeutic with INR 1.4 today. Given respiratory distress and Cr > 2.0 CTPE protocol was deferred and patient empirically started on heparin GTT. Also , on CXR had bilateral vascular congestion and was given lasix with good response. Overall by the time I saw patient she was much improved and in less respiratory distress on NIPPV. No fevers, chills or cough noted by patient. She was started on cephalexin PO by oncology due to R groin folliculitis/abscess for the past 5 days. Unfortunately neither she nor her  speak any english and history was taken from record and through interprerter. Prior to Admission Medications   Prescriptions Last Dose Informant Patient Reported? Taking? ALPRAZolam (XANAX) 0.25 mg tablet   No No   Sig: Take 1 Tab by mouth three (3) times daily as needed for Anxiety. Max Daily Amount: 0.75 mg. DULoxetine (CYMBALTA) 20 mg capsule   No No   Sig: Take 1 Cap by mouth daily. Indications: NEUROPATHIC PAIN   HYDROcodone-acetaminophen (NORCO)  mg tablet   No No   Sig: Take 1 Tab by mouth every four (4) hours as needed. Max Daily Amount: 6 Tabs. LORazepam (ATIVAN) 1 mg tablet   No No   Sig: Take 1 Tab by mouth every six (6) hours as needed for Anxiety. Max Daily Amount: 4 mg. albuterol (PROVENTIL HFA, VENTOLIN HFA, PROAIR HFA) 90 mcg/actuation inhaler   No No   Sig: Take 2 Puffs by inhalation every six (6) hours as needed for Wheezing. amLODIPine (NORVASC) 10 mg tablet   No No   Sig: Take 1 Tab by mouth daily.    atenolol (TENORMIN) 25 mg tablet   No No   Sig: Take 1 Tab by mouth daily. budesonide-formoterol (SYMBICORT) 160-4.5 mcg/actuation HFA inhaler   No No   Sig: Take 2 Puffs by inhalation two (2) times a day. cephALEXin (KEFLEX) 500 mg capsule   No No   Sig: Take 1 Cap by mouth four (4) times daily for 7 days. fentaNYL (DURAGESIC) 50 mcg/hr PATCH   No No   Si Patch by TransDERmal route every seventy-two (72) hours. Max Daily Amount: 1 Patch. Indications: Chronic Pain with Opioid Tolerance   furosemide (LASIX) 40 mg tablet   No No   Sig: Take 1 Tab by mouth daily. hydrALAZINE (APRESOLINE) 50 mg tablet   No No   Sig: Take 1 Tab by mouth three (3) times daily. megestrol (MEGACE) 400 mg/10 mL (10 mL) suspension   No No   Sig: Take 5 mL by mouth daily. mirtazapine (REMERON) 30 mg tablet   No No   Sig: Take 1 Tab by mouth nightly. montelukast (SINGULAIR) 10 mg tablet   No No   Sig: Take 1 Tab by mouth nightly. palbociclib 125 mg cap   No No   Sig: Take 125 mg by mouth daily. Take one pill once a day for 3 weeks and then off for one week   raNITIdine (ZANTAC) 150 mg tablet   No No   Sig: Take 1 Tab by mouth daily. zolpidem (AMBIEN) 5 mg tablet   No No   Sig: Take 1 Tab by mouth nightly as needed for Sleep. Max Daily Amount: 5 mg.       Facility-Administered Medications: None       Review of Systems  Denies: fevers, chills, sweats, fatigue, malaise, anorexia, weight loss   Denies: blurry vision, loss of vision, eye pain, photophobia  Denies: hearing loss, ringing in the ears, earache, epistaxis  Denies: chest pain, palpitations, syncope, orthopnea, paroxysmal nocturnal dyspnea, claudication  Denies: dysphagia, odynophagia, nausea, vomiting, diarrhea, constipation, abdominal pain, jaundice, melena   Denies: frequency, dysuria, nocturia, urinary incontinence, stones, hematuria  Denies: polydipsia/polyuria, skin changes, temperature intolerance, unexpected weight gain  Denies: back pain, joint pain, joint swelling, muscle pain, muscle weakness  Denies: bleeding problems, blood transfusions, bruising, pallor, swollen lymph nodes  Denies: headache, dysarthria, blurred vision, diplopia,seizure, focal deficits. Admits to:  SOB, respiratory distress, groin abscess/foliculitis          Past Medical History:   Diagnosis Date    Acute on chronic diastolic congestive heart failure ([facility] 1/5/2016    Asthma     till age 31    Cancer ([facility]     roverto. breast ca mets bone/lung    Chemotherapy-induced neutropenia ([facility] 12/20/2016    Depression     Diabetes ([facility]     recently dx'ed; ype 2;  does not take at home    DM (diabetes mellitus) ([facility] 11/20/2012    HCAP (healthcare-associated pneumonia) 7/17/2013    Hypertension     Sepsis ([facility] 7/19/2016    Thromboembolus ([facility]     Left leg DVT    Unspecified adverse effect of anesthesia     In [facility] yrs ago after second c -section-she was told her heart stopped d/t anesthesia-hospitalized for 5 days afterwards and followed by cardiologist     Past Surgical History:   Procedure Laterality Date    HX GYN      2 c sections, ovarian cyst-roverto.  HX VASCULAR ACCESS  1/26/12     Social History     Social History    Marital status:      Spouse name: N/A    Number of children: N/A    Years of education: N/A     Occupational History    Not on file.      Social History Main Topics    Smoking status: Former Smoker     Packs/day: 1.00     Years: 3.00     Types: Cigarettes     Quit date: 1/1/1978    Smokeless tobacco: Never Used      Comment: quit 30 or more years ago    Alcohol use No    Drug use: No    Sexual activity: Not on file     Other Topics Concern    Not on file     Social History Narrative     Family History   Problem Relation Age of Onset    Heart Attack Mother     Cancer Brother      doen not know details     No Known Allergies    Current Facility-Administered Medications   Medication Dose Route Frequency    heparin 25,000 units in dextrose 500 mL infusion  18-36 Units/kg/hr IntraVENous TITRATE    mupirocin calcium (BACTROBAN) 2 % cream   Topical TID     Current Outpatient Prescriptions   Medication Sig    cephALEXin (KEFLEX) 500 mg capsule Take 1 Cap by mouth four (4) times daily for 7 days.  fentaNYL (DURAGESIC) 50 mcg/hr PATCH 1 Patch by TransDERmal route every seventy-two (72) hours. Max Daily Amount: 1 Patch. Indications: Chronic Pain with Opioid Tolerance    raNITIdine (ZANTAC) 150 mg tablet Take 1 Tab by mouth daily.  mirtazapine (REMERON) 30 mg tablet Take 1 Tab by mouth nightly.  zolpidem (AMBIEN) 5 mg tablet Take 1 Tab by mouth nightly as needed for Sleep. Max Daily Amount: 5 mg.  atenolol (TENORMIN) 25 mg tablet Take 1 Tab by mouth daily.  furosemide (LASIX) 40 mg tablet Take 1 Tab by mouth daily.  DULoxetine (CYMBALTA) 20 mg capsule Take 1 Cap by mouth daily. Indications: NEUROPATHIC PAIN    hydrALAZINE (APRESOLINE) 50 mg tablet Take 1 Tab by mouth three (3) times daily.  HYDROcodone-acetaminophen (NORCO)  mg tablet Take 1 Tab by mouth every four (4) hours as needed. Max Daily Amount: 6 Tabs.  montelukast (SINGULAIR) 10 mg tablet Take 1 Tab by mouth nightly.  albuterol (PROVENTIL HFA, VENTOLIN HFA, PROAIR HFA) 90 mcg/actuation inhaler Take 2 Puffs by inhalation every six (6) hours as needed for Wheezing.  amLODIPine (NORVASC) 10 mg tablet Take 1 Tab by mouth daily.  LORazepam (ATIVAN) 1 mg tablet Take 1 Tab by mouth every six (6) hours as needed for Anxiety. Max Daily Amount: 4 mg.  palbociclib 125 mg cap Take 125 mg by mouth daily. Take one pill once a day for 3 weeks and then off for one week    budesonide-formoterol (SYMBICORT) 160-4.5 mcg/actuation HFA inhaler Take 2 Puffs by inhalation two (2) times a day.  ALPRAZolam (XANAX) 0.25 mg tablet Take 1 Tab by mouth three (3) times daily as needed for Anxiety. Max Daily Amount: 0.75 mg.     megestrol (MEGACE) 400 mg/10 mL (10 mL) suspension Take 5 mL by mouth daily.       Objective:     Vitals:    07/08/17 1721 07/08/17 1728 07/08/17 1729 07/08/17 1739   BP: 196/77 177/77 174/73 176/73   Pulse: 66 66 67 67   Resp: (!) 41 (!) 35 (!) 45 26   Temp:       SpO2: 100% 100% 100% 100%   Weight:       Height:         PHYSICAL EXAM     Gen- the patient is well developed and in no acute distress on NIPPV(improved with it and lasix)  HEENT- PERRL, EOMI, no scleral icterus       nose without alar flaring or epistaxis                  oral muscosa moist without cyanosis  Neck- no JVD or retractions  Lungs- CTA with occasional crackles  Heart- RRR without M,G,R  Abd- soft and non-tender; with positive bowel sounds. Ext- warm without cyanosis. There is 1+ lower leg edema, symetric  Skin- no jaundice or rashes, R groin small abscess/folliculitis with purulent drainage wounds   Neuro- alert and oriented x 3. No gross sensorimotor deficits are present.     Recent Labs      07/08/17   1620   WBC  4.9   HGB  10.1*   HCT  29.6*   PLT  174     Recent Labs      07/08/17   1620   NA  144   K  4.3   CL  110*   GLU  173*   CO2  23   BUN  42*   CREA  2.39*   MG  2.7*   INR  1.4*     Recent Labs      07/08/17   1630   PH  7.49*   PCO2  27*   PO2  79   HCO3  20*     IMPRESSION: cardiomegaly, mild pulmonary vascular congestion, bilateral lower  lobe infiltrates, remote right T7 rib fracture  Assessment:  (Medical Decision Making)     Patient Active Problem List   Diagnosis Code    Breast cancer ([facility] C50.919    Bony metastasis ([facility] C79.51    HTN (hypertension) I10    DM (diabetes mellitus) ([facility] E11.9    CKD (chronic kidney disease) N18.9    Iron deficiency anemia D50.9    Acute respiratory failure (HCC) J96.00    Hypomagnesemia E83.42    Hypokalemia E87.6    Asthma J45.909    Anemia D64.9    DVT (deep venous thrombosis) (HCC) I82.409    PND (paroxysmal nocturnal dyspnea) R06.00    Leg swelling M79.89    CHF (congestive heart failure) ([facility]) I50.9    Accelerated hypertension I10    Elevated troponin R74.8    Breast cancer metastasized to lung (HCC) C50.919, C78.00    Pulmonary edema J81.1    Acute on chronic renal failure (HCC) N17.9, N18.9    Pleural effusion, right J90    Hydronephrosis N13.30    Pancytopenia (HCC) D61.818    Chemotherapy-induced neutropenia (HCC) D70.1    Bilateral edema of lower extremity R60.0    Hematuria R31.9    Acute on chronic respiratory failure (HCC) J96.20    Moderate aortic stenosis I35.0    Anticoagulated on Coumadin Z51.81, Z79.01    Volume overload E87.70    Anxiety F41.9    Hypernatremia E87.0    Respiratory failure with hypoxia (HCC) J96.91       Plan:  (Medical Decision Making)     Hospital Problems  Date Reviewed: 7/5/2017          Codes Class Noted POA    Respiratory failure with hypoxia [facility]) ICD-10-CM: J96.91  ICD-9-CM: 518.81  7/8/2017 Unknown    Patient high risk for PE given cancer and DVT history. Agree with empiric Rx with heparin till a definitive test can be done(a challenge given chronic chest changes for VQ, and CRI for CTPE). She also has known CHF and seems rto have a decompensation. Will Rx with Lasix IV  Wean NIPPV as tolerated and once off BiPAP likely perform CTPE protocol. Resume coumadin at 5 mg / day(on 1.5pills per patient)  Monitor INR daily  PUD prophylaxis  Admit to ICU for NIPPV- code status will need to be discussed with patient in conjunction with oncology at some point.           Deni Mendoza MD

## 2017-07-08 NOTE — IP AVS SNAPSHOT
Current Discharge Medication List  
  
START taking these medications Dose & Instructions Dispensing Information Comments Morning Noon Evening Bedtime  
 albuterol-ipratropium 2.5 mg-0.5 mg/3 ml Nebu Commonly known as:  Janette Calhoun Your last dose was:  07/14/17 8am  
   
 Dose:  3 mL  
3 mL by Nebulization route every six (6) hours as needed. Quantity:  360 Nebule Refills:  11  
     
   
   
   
  
 carvedilol 3.125 mg tablet Commonly known as:  Adeel Casillas Your next dose is:  07/14/17 5pm  
   
 Dose:  3.125 mg Take 1 Tab by mouth two (2) times daily (with meals). Quantity:  60 Tab Refills:  11  
     
  
   
   
  
   
  
 * warfarin 3 mg tablet Commonly known as:  COUMADIN Your next dose is:  07/15/17 Dose:  3 mg Take 1 Tab by mouth every evening. Quantity:  30 Tab Refills:  0  
     
   
   
   
  
 * warfarin 1 mg tablet Commonly known as:  COUMADIN Start taking on:  7/15/2017 Dose:  1.5 mg Take 1.5 Tabs by mouth every evening. Quantity:  45 Tab Refills:  0  
     
   
   
   
  
 * Notice: This list has 2 medication(s) that are the same as other medications prescribed for you. Read the directions carefully, and ask your doctor or other care provider to review them with you. CONTINUE these medications which have CHANGED Dose & Instructions Dispensing Information Comments Morning Noon Evening Bedtime  
 cephALEXin 500 mg capsule Commonly known as:  Prudence Leavens What changed:  when to take this Your next dose is:  07/14/17 9pm  
   
 Dose:  500 mg Take 1 Cap by mouth every eight (8) hours. Quantity:  15 Cap Refills:  0 CONTINUE these medications which have NOT CHANGED Dose & Instructions Dispensing Information Comments Morning Noon Evening Bedtime  
 albuterol 90 mcg/actuation inhaler Commonly known as:  PROVENTIL HFA, VENTOLIN HFA, PROAIR HFA Your next dose is:  As needed Dose:  2 Puff Take 2 Puffs by inhalation every six (6) hours as needed for Wheezing. Quantity:  1 Inhaler Refills:  11 ALPRAZolam 0.25 mg tablet Commonly known as:  Donata Carton Your next dose is:  As needed Dose:  0.25 mg Take 1 Tab by mouth three (3) times daily as needed for Anxiety. Max Daily Amount: 0.75 mg. Quantity:  90 Tab Refills:  2  
     
   
   
   
  
 amLODIPine 10 mg tablet Commonly known as:  Haisandy Wilmington Your next dose is:  07/15/17 Dose:  10 mg Take 1 Tab by mouth daily. Quantity:  30 Tab Refills:  11  
     
   
   
   
  
 budesonide-formoterol 160-4.5 mcg/actuation HFA inhaler Commonly known as:  SYMBICORT Your next dose is:  Resume home schedule Dose:  2 Puff Take 2 Puffs by inhalation two (2) times a day. Quantity:  1 Inhaler Refills:  11 DULoxetine 20 mg capsule Commonly known as:  CYMBALTA Your next dose is:  07/15/17 Dose:  20 mg Take 1 Cap by mouth daily. Indications: NEUROPATHIC PAIN Quantity:  30 Cap Refills:  0  
     
   
   
   
  
 fentaNYL 50 mcg/hr PATCH Commonly known as:  Lisseth Syedly Your next dose is:  07/14/17 8pm  
   
 Dose:  1 Patch 1 Patch by TransDERmal route every seventy-two (72) hours. Max Daily Amount: 1 Patch. Indications: Chronic Pain with Opioid Tolerance Quantity:  10 Patch Refills:  0  
     
   
   
   
  
 furosemide 40 mg tablet Commonly known as:  LASIX Your next dose is:  07/15/17 Dose:  40 mg Take 1 Tab by mouth daily. Quantity:  30 Tab Refills:  0  
     
   
   
   
  
 hydrALAZINE 50 mg tablet Commonly known as:  APRESOLINE Your next dose is:  Resume home schedule Dose:  50 mg Take 1 Tab by mouth three (3) times daily. Quantity:  90 Tab Refills:  1 HYDROcodone-acetaminophen  mg tablet Commonly known as:  Reuben Selvin Your next dose is:  As needed Dose:  1 Tab Take 1 Tab by mouth every four (4) hours as needed. Max Daily Amount: 6 Tabs. Quantity:  90 Tab Refills:  0 LORazepam 1 mg tablet Commonly known as:  ATIVAN Your next dose is:  As needed Dose:  1 mg Take 1 Tab by mouth every six (6) hours as needed for Anxiety. Max Daily Amount: 4 mg. Quantity:  60 Tab Refills:  0  
     
   
   
   
  
 megestrol 400 mg/10 mL (10 mL) suspension Commonly known as:  MEGACE Your next dose is:  07/15/17 Dose:  200 mg Take 5 mL by mouth daily. Quantity:  240 mL Refills:  2  
     
   
   
   
  
 mirtazapine 30 mg tablet Commonly known as:  Stacy Zena Your next dose is:  07/14/17 10pm  
   
 Dose:  30 mg Take 1 Tab by mouth nightly. Quantity:  30 Tab Refills:  1  
     
   
   
   
  
 montelukast 10 mg tablet Commonly known as:  SINGULAIR Your next dose is:  07/14/17 10pm  
   
 Dose:  10 mg Take 1 Tab by mouth nightly. Quantity:  30 Tab Refills:  3  
     
   
   
   
  
 palbociclib 125 mg Cap Your next dose is:  Resume home schedule Dose:  125 mg Take 125 mg by mouth daily. Take one pill once a day for 3 weeks and then off for one week Quantity:  21 Tab Refills:  5  
     
   
   
   
  
 raNITIdine 150 mg tablet Commonly known as:  ZANTAC Your next dose is:  07/15/17 Dose:  150 mg Take 1 Tab by mouth daily. Quantity:  30 Tab Refills:  11  
     
   
   
   
  
 zolpidem 5 mg tablet Commonly known as:  AMBIEN Your next dose is:  As needed Dose:  5 mg Take 1 Tab by mouth nightly as needed for Sleep. Max Daily Amount: 5 mg. Quantity:  30 Tab Refills:  0 STOP taking these medications   
 atenolol 25 mg tablet Commonly known as:  TENORMIN Where to Get Your Medications These medications were sent to [address] [place] [address] [address]  [facility] , [address] [place] [address]  [address] Phone:  [phone]  
  albuterol-ipratropium 2.5 mg-0.5 mg/3 ml Nebu  
 carvedilol 3.125 mg tablet  
 cephALEXin 500 mg capsule  
 warfarin 1 mg tablet  
 warfarin 3 mg tablet

## 2017-07-08 NOTE — ED TRIAGE NOTES
Pt arrives complaining of sob and chest pain. Pt's family states the chest pain is worse with exertion. Pt is normally on 2L of O2 at home, states the patient has tubing draining her lung, per family. Pt is a cancer patient and is receiving chemotherapy pills. Pt visibly sob.

## 2017-07-08 NOTE — PROGRESS NOTES
Warfarin dosing per pharmacist    Tarsha Breonna is a 78 y.o. female. Height: 5' 3\" (160 cm)    Weight: 54.4 kg (120 lb)    Indication:  History of DVT    Goal INR:  2-3    Home dose:  Per last anticoagulation visit 1.5 mg (1 mg x 1.5) every day    Risk factors or significant drug interactions:  --    Other anticoagulants:  heparin    Daily Monitoring  Date  INR     Warfarin dose HGB              Notes  7/8  1.4  3 mg  10.1    Pt seen at anticoagulation visit on 7/5/17 with INR of 1.9 and no adjustments made. INR now 1.4. Will give 3 mg tonight and likely resume home dose tomorrow night. Pt on heparin bridge.      Thank you,  Domitila Pittman, PharmD  Clinical Pharmacist  [phone]

## 2017-07-08 NOTE — PROGRESS NOTES
present at bedside in ED during assessment with unit nurse.     [address] [facility]  [phone]

## 2017-07-08 NOTE — ED PROVIDER NOTES
HPI Comments: Presents with complaint of shortness of breath. Family reports it started yesterday and has gotten progressively worse. States she was seen by oncology yesterday and started on  Keflex and that's what appears to have made it worse per the son. Patient has a history of metastatic cancer with a Pleurx and had 300 cc drained yesterday. She is [place]-speaking and son speaks [place] but the acuity of her condition requires early intervention. Denies chest pain. She's been taking her Coumadin. Patient is a 78 y.o. female presenting with shortness of breath. The history is provided by the patient and a relative. The history is limited by a language barrier and the condition of the patient.  used: Son speaks [place],  called. Shortness of Breath   This is a new problem. The problem occurs continuously. The current episode started yesterday. The problem has been rapidly worsening. Associated symptoms include leg swelling. Pertinent negatives include no fever and no chest pain. She has had prior hospitalizations. She has had prior ED visits. She has had prior ICU admissions. Associated medical issues include pneumonia, chronic lung disease, heart failure and DVT.         Past Medical History:   Diagnosis Date    Acute on chronic diastolic congestive heart failure ([address] 1/5/2016    Asthma     till age 31    Cancer ([address]     roverto. breast ca mets bone/lung    Chemotherapy-induced neutropenia ([address] 12/20/2016    Depression     Diabetes ([address]     recently dx'ed; ype 2;  does not take at home    DM (diabetes mellitus) ([address] 11/20/2012    HCAP (healthcare-associated pneumonia) 7/17/2013    Hypertension     Sepsis ([address] 7/19/2016    Thromboembolus ([address]     Left leg DVT    Unspecified adverse effect of anesthesia     In [facility] yrs ago after second c -section-she was told her heart stopped d/t anesthesia-hospitalized for 5 days afterwards and followed by cardiologist       Past Surgical History:   Procedure Laterality Date    HX GYN      2 c sections, ovarian cyst-roverto.  HX VASCULAR ACCESS  1/26/12         Family History:   Problem Relation Age of Onset    Heart Attack Mother     Cancer Brother      doen not know details       Social History     Social History    Marital status:      Spouse name: N/A    Number of children: N/A    Years of education: N/A     Occupational History    Not on file. Social History Main Topics    Smoking status: Former Smoker     Packs/day: 1.00     Years: 3.00     Types: Cigarettes     Quit date: 1/1/1978    Smokeless tobacco: Never Used      Comment: quit 30 or more years ago    Alcohol use No    Drug use: No    Sexual activity: Not on file     Other Topics Concern    Not on file     Social History Narrative         ALLERGIES: Review of patient's allergies indicates no known allergies. Review of Systems   Unable to perform ROS: Severe respiratory distress   Constitutional: Negative for chills and fever. Respiratory: Positive for shortness of breath. Cardiovascular: Positive for leg swelling. Negative for chest pain. Vitals:    07/08/17 1557   BP: 165/62   Pulse: 69   Resp: 24   Temp: 99.8 °F (37.7 °C)   SpO2: 94%   Weight: 54.4 kg (120 lb)   Height: 5' 3\" (1.6 m)            Physical Exam   Constitutional: She is oriented to person, place, and time. She appears well-developed and well-nourished. She appears distressed (wworking very hard to breathe and looks very ill). HENT:   Head: Normocephalic and atraumatic. Neck: Normal range of motion. Neck supple. Cardiovascular: Normal rate and regular rhythm. Pulmonary/Chest: She is in respiratory distress. She has no wheezes. She has rales. Abdominal: Soft. She exhibits no distension. There is no tenderness. There is no rebound and no guarding. Musculoskeletal: She exhibits edema (1-2+).    Neurological: She is alert and oriented to person, place, and time. No cranial nerve deficit. Skin: Skin is warm. She is diaphoretic. Psychiatric: Her mood appears anxious. Her speech is rapid and/or pressured. Nursing note and vitals reviewed. MDM  Number of Diagnoses or Management Options  Acute on chronic respiratory failure with hypoxia [facility]):   Acute pulmonary edema ([address]):   History of DVT (deep vein thrombosis):   Subtherapeutic international normalized ratio (INR):   Diagnosis management comments: Was admitted last month with acute respiratory failure and required intubation in the ICU on BiPAP. She has a history of DVT. She is currently undergoing treatment for metastatic breast cancer. She was treated for hospital-acquired pneumonia on her last admission. She had a Pleurx placed for recurrent pleural effusion. Patient with normal white count afebrile normal pro-calcitonin no significant infiltrate on chest x-ray. Her lactic elevation is likely from her cancer not from sepsis. Discussed giving antibiotics with Dr. Rachel Tran and he did not want to do that. Given her INR 1.4 history of DVT in acute respiratory failure while on Keflex  To started on a heparin drip 4 suspect PE. Admitted to the ICU. CT when more stable    Care was d/w pt/family using .    ===================================================================  This patient is critically ill and there is a high probability of of imminent or life threatening deterioration in the patient's condition without immediate management. The nature of the patient's clinical problem is: ARF    I have spent 35 minutes in direct patient care, documentation, review of labs/xrays/old records, discussion with Family, Staff, Colleague, Nursing . The time involved in the performance of separately reportable procedures was not counted toward critical care time.      Nancy Morrison MD; 7/8/2017 @6:33 PM  =================================================================== Amount and/or Complexity of Data Reviewed  Clinical lab tests: ordered and reviewed  Decide to obtain previous medical records or to obtain history from someone other than the patient: yes (Son and )  Review and summarize past medical records: yes  Discuss the patient with other providers: yes  Independent visualization of images, tracings, or specimens: yes (NSR no ST changes unchanged from previous)    Risk of Complications, Morbidity, and/or Mortality  Presenting problems: high  Diagnostic procedures: moderate  Management options: high    Critical Care  Total time providing critical care: 30-74 minutes    Patient Progress  Patient progress: improved (critical)    ED Course       Procedures

## 2017-07-08 NOTE — ROUTINE PROCESS
TRANSFER - OUT REPORT:    Verbal report given to receiving RN on Ashely Raymond  being transferred to room 3111 ICU for routine progression of care       Report consisted of patients Situation, Background, Assessment and   Recommendations(SBAR). Information from the following report(s) SBAR, ED Summary and Recent Results was reviewed with the receiving nurse. Lines:   Venous Access Device left chest Port (Active)        Opportunity for questions and clarification was provided.       Patient transported with:   O2 @ BiPap liters   RN  Monitor  Respiratory Therapy

## 2017-07-08 NOTE — IP AVS SNAPSHOT
Natalya Ray 
 
 
 [address] 
[phone] Patient: Ishmael Funk MRN: [id number] KAH:[id number] You are allergic to the following No active allergies Recent Documentation Height Weight Breastfeeding? BMI OB Status Smoking Status 1.6 m 53.9 kg No 21.04 kg/m2 Postmenopausal Former Smoker Emergency Contacts Name Discharge Info Relation Home Work Mobile Juancarlos Legegtt  Child [2] [phone] [address] [place] [address]  Spouse [3] [phone] About your hospitalization You were admitted on:  July 8, 2017 You last received care in the:  [facility] 8 MED SURG You were discharged on:  July 14, 2017 Unit phone number:  [phone] Why you were hospitalized Your primary diagnosis was:  Respiratory Failure With Hypoxia (Hcc) Your diagnoses also included:  Breast Cancer (Hcc), Bony Metastasis (Hcc), Htn (Hypertension), Dm (Diabetes Mellitus) (Hcc), Ckd (Chronic Kidney Disease), Dvt (Deep Venous Thrombosis) (Hcc), Heart Failure With Preserved Ejection Fraction (Hcc), Type 2 Diabetes Mellitus Without Complication (Hcc), Folliculitis, Iron Deficiency Anemia, Anticoagulated On Coumadin, Aortic Stenosis, Moderate Providers Seen During Your Hospitalizations Provider Role Specialty Primary office phone Dwain Ruth MD Attending Provider Emergency Medicine [phone] Salvador Hill MD Attending Provider Pulmonary Disease [phone] Your Primary Care Physician (PCP) Primary Care Physician Office Phone Office Fax  
 [place], [place] D ** None ** ** None ** Follow-up Information Follow up With Details Comments Contact Info Abiola Richter MD  Office is closed. Please call Monday to schedule a follow up next week with INR check and follow up [phone] Livan Gastelum MD On 7/31/2017 9:30am at [address] [facility] [facility] [address] Cardiology [facility] [address] 
[phone] Vicenta Alcantara MD On 7/20/2017 12:30pm lab 1:00pm appointment [address] [facility] [address] [facility] [address] 
[phone] Julisa Madden NP On 8/11/2017 10:00 am Cirilo Mcgill [facility] [address] 
[phone] [address] [place] 250 Trisha Jerome 151 [address] 
[phone] Your Appointments Thursday July 20, 2017 12:30 PM EDT  
LAB with [address]  
1808 [facility] OUTREACH INSURANCE [facility] Susana Mendes 426 187 [facility]  
[phone] Thursday July 20, 2017  1:00 PM EDT Follow Up with Vicenta Alcantara MD  
[facility] Hematology and Oncology [facility] JJ/ Juancarlos Carney 33 [facility] [address]  
[phone] Monday July 31, 2017  9:30 AM EDT HOSPITAL FOLLOW-UP with Elaine Jaquez MD  
[facility] Cardiology ([address]) [address] 
[address] [facility]  
[phone] Friday August 11, 2017 11:00 AM EDT  
(Arrive by 10:00 AM) HOSPITAL with Julisa Madden NP [facility] ([facility]) [address] 300 [facility]  
[phone] Current Discharge Medication List  
  
START taking these medications Dose & Instructions Dispensing Information Comments Morning Noon Evening Bedtime  
 albuterol-ipratropium 2.5 mg-0.5 mg/3 ml Nebu Commonly known as:  Heber Brandt Your last dose was:  07/14/17 8am  
   
 Dose:  3 mL  
3 mL by Nebulization route every six (6) hours as needed. Quantity:  360 Nebule Refills:  11  
     
   
   
   
  
 carvedilol 3.125 mg tablet Commonly known as:  Rosa Hay Your next dose is:  07/14/17 5pm  
   
 Dose:  3.125 mg Take 1 Tab by mouth two (2) times daily (with meals). Quantity:  60 Tab Refills:  11  
     
  
   
   
  
   
  
 * warfarin 3 mg tablet Commonly known as:  COUMADIN Your next dose is:  07/15/17 Dose:  3 mg Take 1 Tab by mouth every evening. Quantity:  30 Tab Refills:  0  
     
   
   
   
  
 * warfarin 1 mg tablet Commonly known as:  COUMADIN Start taking on:  7/15/2017 Dose:  1.5 mg Take 1.5 Tabs by mouth every evening. Quantity:  45 Tab Refills:  0  
     
   
   
   
  
 * Notice: This list has 2 medication(s) that are the same as other medications prescribed for you. Read the directions carefully, and ask your doctor or other care provider to review them with you. CONTINUE these medications which have CHANGED Dose & Instructions Dispensing Information Comments Morning Noon Evening Bedtime  
 cephALEXin 500 mg capsule Commonly known as:  Kurtis Toribio What changed:  when to take this Your next dose is:  07/14/17 9pm  
   
 Dose:  500 mg Take 1 Cap by mouth every eight (8) hours. Quantity:  15 Cap Refills:  0 CONTINUE these medications which have NOT CHANGED Dose & Instructions Dispensing Information Comments Morning Noon Evening Bedtime  
 albuterol 90 mcg/actuation inhaler Commonly known as:  PROVENTIL HFA, VENTOLIN HFA, PROAIR HFA Your next dose is:  As needed Dose:  2 Puff Take 2 Puffs by inhalation every six (6) hours as needed for Wheezing. Quantity:  1 Inhaler Refills:  11 ALPRAZolam 0.25 mg tablet Commonly known as:  Phoebe Reji Your next dose is:  As needed Dose:  0.25 mg Take 1 Tab by mouth three (3) times daily as needed for Anxiety. Max Daily Amount: 0.75 mg. Quantity:  90 Tab Refills:  2  
     
   
   
   
  
 amLODIPine 10 mg tablet Commonly known as:  Remonia Grates Your next dose is:  07/15/17 Dose:  10 mg Take 1 Tab by mouth daily. Quantity:  30 Tab Refills:  11  
     
   
   
   
  
 budesonide-formoterol 160-4.5 mcg/actuation HFA inhaler Commonly known as:  SYMBICORT Your next dose is:  Resume home schedule Dose:  2 Puff Take 2 Puffs by inhalation two (2) times a day. Quantity:  1 Inhaler Refills:  11 DULoxetine 20 mg capsule Commonly known as:  CYMBALTA Your next dose is:  07/15/17 Dose:  20 mg Take 1 Cap by mouth daily. Indications: NEUROPATHIC PAIN Quantity:  30 Cap Refills:  0  
     
   
   
   
  
 fentaNYL 50 mcg/hr PATCH Commonly known as:  Arville Romy Your next dose is:  07/14/17 8pm  
   
 Dose:  1 Patch 1 Patch by TransDERmal route every seventy-two (72) hours. Max Daily Amount: 1 Patch. Indications: Chronic Pain with Opioid Tolerance Quantity:  10 Patch Refills:  0  
     
   
   
   
  
 furosemide 40 mg tablet Commonly known as:  LASIX Your next dose is:  07/15/17 Dose:  40 mg Take 1 Tab by mouth daily. Quantity:  30 Tab Refills:  0  
     
   
   
   
  
 hydrALAZINE 50 mg tablet Commonly known as:  APRESOLINE Your next dose is:  Resume home schedule Dose:  50 mg Take 1 Tab by mouth three (3) times daily. Quantity:  90 Tab Refills:  1 HYDROcodone-acetaminophen  mg tablet Commonly known as:  Jolly Hernandez Your next dose is:  As needed Dose:  1 Tab Take 1 Tab by mouth every four (4) hours as needed. Max Daily Amount: 6 Tabs. Quantity:  90 Tab Refills:  0 LORazepam 1 mg tablet Commonly known as:  ATIVAN Your next dose is:  As needed Dose:  1 mg Take 1 Tab by mouth every six (6) hours as needed for Anxiety. Max Daily Amount: 4 mg. Quantity:  60 Tab Refills:  0  
     
   
   
   
  
 megestrol 400 mg/10 mL (10 mL) suspension Commonly known as:  MEGACE Your next dose is:  07/15/17 Dose:  200 mg Take 5 mL by mouth daily. Quantity:  240 mL Refills:  2  
     
   
   
   
  
 mirtazapine 30 mg tablet Commonly known as:  Kirstie Hurt  
 Your next dose is:  07/14/17 10pm  
   
 Dose:  30 mg Take 1 Tab by mouth nightly. Quantity:  30 Tab Refills:  1  
     
   
   
   
  
 montelukast 10 mg tablet Commonly known as:  SINGULAIR Your next dose is:  07/14/17 10pm  
   
 Dose:  10 mg Take 1 Tab by mouth nightly. Quantity:  30 Tab Refills:  3  
     
   
   
   
  
 palbociclib 125 mg Cap Your next dose is:  Resume home schedule Dose:  125 mg Take 125 mg by mouth daily. Take one pill once a day for 3 weeks and then off for one week Quantity:  21 Tab Refills:  5  
     
   
   
   
  
 raNITIdine 150 mg tablet Commonly known as:  ZANTAC Your next dose is:  07/15/17 Dose:  150 mg Take 1 Tab by mouth daily. Quantity:  30 Tab Refills:  11  
     
   
   
   
  
 zolpidem 5 mg tablet Commonly known as:  AMBIEN Your next dose is:  As needed Dose:  5 mg Take 1 Tab by mouth nightly as needed for Sleep. Max Daily Amount: 5 mg. Quantity:  30 Tab Refills:  0 STOP taking these medications   
 atenolol 25 mg tablet Commonly known as:  TENORMIN Where to Get Your Medications These medications were sent to [address] [place] [address] [address] [address] Phone:  [phone]  
  albuterol-ipratropium 2.5 mg-0.5 mg/3 ml Nebu  
 carvedilol 3.125 mg tablet  
 cephALEXin 500 mg capsule  
 warfarin 1 mg tablet  
 warfarin 3 mg tablet Discharge Instructions DISCHARGE SUMMARY from Nurse The following personal items are in your possession at time of discharge: 
 
Dental Appliances: None, At home Visual Aid: Glasses Home Medications: None Jewelry: None Clothing: None Other Valuables: None Personal Items Sent to Safe: none PATIENT INSTRUCTIONS: 
 
After general anesthesia or intravenous sedation, for 24 hours or while taking prescription Narcotics: · Limit your activities · Do not drive and operate hazardous machinery · Do not make important personal or business decisions · Do  not drink alcoholic beverages · If you have not urinated within 8 hours after discharge, please contact your surgeon on call. Report the following to your surgeon: 
· Excessive pain, swelling, redness or odor of or around the surgical area · Temperature over 100.5 · Nausea and vomiting lasting longer than 4 hours or if unable to take medications · Any signs of decreased circulation or nerve impairment to extremity: change in color, persistent  numbness, tingling, coldness or increase pain · Any questions What to do at Home: 
Recommended activity: Activity as tolerated. If you experience any of the following symptoms temp > 100.4, unrelieved pain, nausea or vomiting, shortness of breath or fatigue not relieved with rest, please follow up with MD. 
 
 
*  Please give a list of your current medications to your Primary Care Provider. *  Please update this list whenever your medications are discontinued, doses are 
    changed, or new medications (including over-the-counter products) are added. *  Please carry medication information at all times in case of emergency situations. These are general instructions for a healthy lifestyle: No smoking/ No tobacco products/ Avoid exposure to second hand smoke Surgeon General's Warning:  Quitting smoking now greatly reduces serious risk to your health. Obesity, smoking, and sedentary lifestyle greatly increases your risk for illness A healthy diet, regular physical exercise & weight monitoring are important for maintaining a healthy lifestyle You may be retaining fluid if you have a history of heart failure or if you experience any of the following symptoms:  Weight gain of 3 pounds or more overnight or 5 pounds in a week, increased swelling in our hands or feet or shortness of breath while lying flat in bed. Please call your doctor as soon as you notice any of these symptoms; do not wait until your next office visit. Recognize signs and symptoms of STROKE: 
 
F-face looks uneven A-arms unable to move or move unevenly S-speech slurred or non-existent T-time-call 911 as soon as signs and symptoms begin-DO NOT go Back to bed or wait to see if you get better-TIME IS BRAIN. Warning Signs of HEART ATTACK Call 911 if you have these symptoms: 
? Chest discomfort. Most heart attacks involve discomfort in the center of the chest that lasts more than a few minutes, or that goes away and comes back. It can feel like uncomfortable pressure, squeezing, fullness, or pain. ? Discomfort in other areas of the upper body. Symptoms can include pain or discomfort in one or both arms, the back, neck, jaw, or stomach. ? Shortness of breath with or without chest discomfort. ? Other signs may include breaking out in a cold sweat, nausea, or lightheadedness. Don't wait more than five minutes to call [address]! Fast action can save your life. Calling 911 is almost always the fastest way to get lifesaving treatment. Emergency Medical Services staff can begin treatment when they arrive  up to an hour sooner than if someone gets to the hospital by car. The discharge information has been reviewed with the patient. The patient verbalized understanding. Discharge medications reviewed with the patient and appropriate educational materials and side effects teaching were provided. Jane York: Instrucciones de cuidado - [ Heart Failure: Care Instructions ] Instrucciones de cuidado La insuficiencia cardíaca ocurre cuando el corazón no bombea toda la deb que el cuerpo necesita. Insuficiencia no significa que merrill corazón foss dejado de bombear, sino que no bombea flannery jaison caterina debería.  Con el tiempo, esto causa kim acumulación de líquido en los pulmones y en otras partes del cuerpo. La acumulación de líquido puede causar falta de aire, fatiga, hinchazón en los tobillos y [place]. Puede sentirse mejor y vivir más tiempo si sharmila jesus medicamentos con regularidad, reduce el sodio (sal) en la dieta, controla asher peso todos los días y realiza cambios en asher estilo de luann. La atención de seguimiento es kim parte clave de asher tratamiento y seguridad. Asegúrese de hacer y acudir a todas las citas, y llame a asher médico si está teniendo problemas. También es kim buena idea saber los resultados de jesus exámenes y mantener kim lista de los medicamentos que sharmila. Cómo puede cuidarse en el hogar? Medicamentos · [place] con los medicamentos. [employer] medicamentos exactamente caterina le fueron recetados. Llame a asher médico si maico estar teniendo un problema con asher medicamento. · No tome ningún tipo de vitaminas, medicamentos de venta lilibeth ni productos herbarios sin hablar nain con asher médico. No tome ibuprofeno (Advil o Motrin) ni naproxeno (Aleve) sin nain hablar con asher médico. Podrían empeorar asher insuficiencia cardíaca. · Es posible que esté tomando algunos de los siguientes medicamentos. ¨ Los betabloqueantes pueden desacelerar la frecuencia cardíaca, disminuir la presión arterial y mejorar asher dolencia. Amie un betabloqueante podría disminuir las probabilidades de que necesite ser hospitalizado. ¨ Los inhibidores de la enzima convertidora de la angiotensina (IECA) reducen la carga de trabajo del corazón, disminuyen la presión arterial y reducen la hinchazón. Amie un inhibidor de la ECA puede disminuir las probabilidades de que necesite volver a ser hospitalizado. ¨ Los bloqueadores de los receptores de la angiotensina II (ARB, por jesus siglas en inglés) funcionan caterina los inhibidores de la ECA. Asher médico podría recetarlos en lugar de los inhibidores de la ECA. ¨ Los diuréticos reducen la hinchazón. ¨ Los suplementos de potasio reemplazan radha importante mineral que, a veces, se pierde con los diuréticos. ¨ La aspirina y los anticoagulantes previenen la formación de coágulos de Larry, que pueden causar un ataque cerebral o un ataque al corazón. Recibirá [employer] medicamentos específicos recetados por merrill médico. 
Alimentación · Merrill médico puede sugerirle que limite el sodio a 2,000 miligramos (mg) al día o menos. Eso es menos de 1 cucharadita de sal al día, incluida toda la sal que consume al cocinar o en alimentos envasados. Las personas obtienen la mayor cantidad de sodio de los alimentos procesados. Las comidas rápidas o de restaurante también tienden a ser muy ricas en sodio. · Pregúntele a merrill médico cuánto líquido puede beber cada día. Es posible que tenga que [place]'s líquidos. Peso · Pésese sin ropa todos los días a la [employer] Registre merrill peso. Llame a merrill médico si tiene un aumento repentino de peso, caterina más de 2 libras (0.9 kg) a 3 libras (1.4 kg) en un día o 5 libras (2.3 kg) en kim semana. (Merrill médico podría sugerirle unos límites diferentes de aumento de [place]). Un aumento de peso repentino podría significar que merrill insuficiencia cardíaca está empeorando. Nivel de [place] · Comience con ejercicio suave (si merrill médico lo aprueba). Aun cuando solo olivia un poco, el ejercicio le ayudará a estar más alok, tener más [place], y [place] merrill peso y estrés. Caminar es kim manera fácil de hacer ejercicio. Comience caminando un poco más de lo que caminó antes. Poco a poco, aumente la distancia. · Cuando olivia ejercicio, esté alerta a señales que indiquen que merrill corazón se esté esforzando demasiado. Si no puede hablar mientras hace ejercicio, se está esforzando demasiado. Si le falta el aire, se marea o tiene dolor en el [facility] si[place] y descanse.  
· Si se siente agotado el día después de hacer ejercicio, camine más despacio o camine kim distancia sruinder hasta que pueda aumentar a un mejor ritmo. · Descanse lo suficiente de noche. Dormir con 1 o 2 almohadas debajo de la rosemarie y la parte superior del cuerpo podría ayudarle a respirar mejor. Cambios en el estilo de luann · No fume. Fumar puede empeorar merrill afección cardíaca. Si necesita ayuda para dejar de fumar, hable con merrill médico sobre programas y medicamentos para dejar de fumar. Estos pueden aumentar jesus probabilidades de dejar el hábito para siempre. Dejar de fumar puede ser la medida más importante que pueda julian para proteger merrill corazón. · Limite el alcohol a 2 bebidas al día si es hombre, y 1 bebida al día si es mindi. Beber demasiado alcohol puede causarle problemas de [place]. · Evite enfermarse de resfriados y gripe. Hágase poner la vacuna antineumocócica. Si ya le gan puesto kim antes, consulte a merrill médico para saber si necesita otra dosis. Aplíquese kim vacuna contra la gripe (\"flu shot\") cada año. Si tiene que estar cerca de personas con resfriados o gripe, lávese las perry con frecuencia. Cuándo debe pedir ayuda? Llame al 911 si tiene síntomas de insuficiencia cardíaca repentina, tales caterina: · Tiene graves dificultades para respirar. · Al toser expulsa mucosidad rosácea y espumosa. · Tiene latidos cardíacos irregulares o rápidos. Llame a merrill médico ahora mismo o busque atención médica inmediata si: · Presenta kim nueva o mayor falta de aire. · Siente mareos o aturdimiento, o que está a punto de [place]. · Tiene un aumento repentino de [place], caterina más de 2 libras (0.9 kg) a 3 libras (1.4 kg) en un día o 5 libras (2.3 kg) en kim semana. (Merrill médico podría sugerirle unos límites diferentes de aumento de [place]). · [place] piernas, los tobillos o los pies más hinchados. · De repente se siente tan cansado o débil que no puede hacer las [place].  
Preste especial atención a los cambios en merrill sherrie y asegúrese de comunicarse con merrill médico si: 
 · Tiene nuevos síntomas. Dónde puede encontrar más información en inglés? Kenny Lopez a http://rosie-abena.info/. Meggan Fails Y599 en la búsqueda para aprender más acerca de \"Insuficiencia cardíaca: Instrucciones de cuidado - [ Heart Failure: Care Instructions ]. \" 
Revisado: 3 izabela, 2017 Versión del contenido: 11.3 © 2006-2017 Healthwise, Incorporated. Las instrucciones de cuidado fueron adaptadas bajo licencia por [employer] (which disclaims liability or warranty for this information). Si usted tiene [place]go afección médica o sobre estas instrucciones, siempre pregunte a merrill profesional de sherrie. Healthwise, Incorporated niega toda garantía o responsabilidad por merrill uso de esta información. Heart Failure: Care Instructions Your Care Instructions Heart failure occurs when your heart does not pump as much blood as the body needs. Failure does not mean that the heart has stopped pumping but rather that it is not pumping as well as it should. Over time, this causes fluid buildup in your lungs and other parts of your body. Fluid buildup can cause shortness of breath, fatigue, swollen ankles, and other problems. By taking medicines regularly, reducing sodium (salt) in your diet, checking your weight every day, and making lifestyle changes, you can feel better and live longer. Follow-up care is a key part of your treatment and safety. Be sure to make and go to all appointments, and call your doctor if you are having problems. It's also a good idea to know your test results and keep a list of the medicines you take. How can you care for yourself at home? Medicines · Be safe with medicines. Take your medicines exactly as prescribed. Call your doctor if you think you are having a problem with your medicine.  
· Do not take any vitamins, over-the-counter medicine, or herbal products without talking to your doctor first. Morgan Navarrete not take ibuprofen (Advil or Motrin) and naproxen (Aleve) without talking to your doctor first. They could make your heart failure worse. · You may be taking some of the following medicine. ¨ Beta-blockers can slow heart rate, decrease blood pressure, and improve your condition. Taking a beta-blocker may lower your chance of needing to be hospitalized. ¨ Angiotensin-converting enzyme inhibitors (ACEIs) reduce the heart's workload, lower blood pressure, and reduce swelling. Taking an ACEI may lower your chance of needing to be hospitalized again. ¨ Angiotensin II receptor blockers (ARBs) work like ACEIs. Your doctor may prescribe them instead of ACEIs. ¨ Diuretics, also called water pills, reduce swelling. ¨ Potassium supplements replace this important mineral, which is sometimes lost with diuretics. ¨ Aspirin and other blood thinners prevent blood clots, which can cause a stroke or heart attack. You will get more details on the specific medicines your doctor prescribes. Diet · Your doctor may suggest that you limit sodium to 2,000 milligrams (mg) a day or less. That is less than 1 teaspoon of salt a day, including all the salt you eat in cooking or in packaged foods. People get most of their sodium from processed foods. Fast food and restaurant meals also tend to be very high in sodium. · Ask your doctor how much liquid you can drink each day. You may have to limit liquids. Weight · Weigh yourself without clothing at the same time each day. Record your weight. Call your doctor if you have a sudden weight gain, such as more than 2 to 3 pounds in a day or 5 pounds in a week. (Your doctor may suggest a different range of weight gain.) A sudden weight gain may mean that your heart failure is getting worse. Activity level · Start light exercise (if your doctor says it is okay).  Even if you can only do a small amount, exercise will help you get stronger, have more energy, and manage your weight and your stress. Walking is an easy way to get exercise. Start out by walking a little more than you did before. Bit by bit, increase the amount you walk. · When you exercise, watch for signs that your heart is working too hard. You are pushing yourself too hard if you cannot talk while you are exercising. If you become short of breath or dizzy or have chest pain, stop, sit down, and rest. 
· If you feel \"wiped out\" the day after you exercise, walk slower or for a shorter distance until you can work up to a better pace. · Get enough rest at night. Sleeping with 1 or 2 pillows under your upper body and head may help you breathe easier. Lifestyle changes · Do not smoke. Smoking can make a heart condition worse. If you need help quitting, talk to your doctor about stop-smoking programs and medicines. These can increase your chances of quitting for good. Quitting smoking may be the most important step you can take to protect your heart. · Limit alcohol to 2 drinks a day for men and 1 drink a day for women. Too much alcohol can cause health problems. · Avoid getting sick from colds and the flu. Get a pneumococcal vaccine shot. If you have had one before, ask your doctor whether you need another dose. Get a flu shot each year. If you must be around people with colds or the flu, wash your hands often. When should you call for help? Call 911 if you have symptoms of sudden heart failure such as: 
· You have severe trouble breathing. · You cough up pink, foamy mucus. · You have a new irregular or rapid heartbeat. Call your doctor now or seek immediate medical care if: 
· You have new or increased shortness of breath. · You are dizzy or lightheaded, or you feel like you may faint. · You have sudden weight gain, such as more than 2 to 3 pounds in a day or 5 pounds in a week. (Your doctor may suggest a different range of weight gain.) · You have increased swelling in your legs, ankles, or feet. · You are suddenly so tired or weak that you cannot do your usual activities. Watch closely for changes in your health, and be sure to contact your doctor if: 
· You develop new symptoms. Where can you learn more? Go to http://rosie-abena.info/. Enter G866 in the search box to learn more about \"Heart Failure: Care Instructions. \" Current as of: April 3, 2017 Content Version: 11.3 © 8348-2264 [employer]. Care instructions adapted under license by thredUP (which disclaims liability or warranty for this information). If you have questions about a medical condition or this instruction, always ask your healthcare professional. [address] any warranty or liability for your use of this information. Anemia: Instrucciones de cuidado - [ Anemia: Care Instructions ] Instrucciones de cuidado La anemia es un bajo nivel de glóbulos rojos, que son los que transportan el oxígeno por el organismo. Muchas cosas pueden causar anemia. La falta de mandy es kim de las causas más comunes. Merrill organismo necesita mandy para producir hemoglobina, kim sustancia de los glóbulos rojos que transporta el oxígeno de los pulmones a las células del organismo. Si no hay suficiente mandy, el organismo produce glóbulos rojos más pequeños y en surinder cantidad. Debido a ello, las células de merrill organismo no obtienen suficiente oxígeno y usted se sentirá cansado y débil. Y puede tener dificultad para concentrarse. El sangrado es la causa más común de la falta de mandy. Podría tener sangrado menstrual abundante o hemorragias causadas por afecciones tales caterina úlceras, hemorroides o cáncer. El uso habitual de aspirina u otros medicamentos antiinflamatorios (caterina ibuprofeno) también puede causar sangrado en [employer].  La falta de mandy en merrill [address] causar anemia, sobre todo en los [employer] que el organismo necesita más mandy, caterina fidelina el Juanice [place], la infancia y la adolescencia. Es posible que merrill médico le haya recetado pastillas de mandy. El tratamiento puede julian algunos meses hasta que jesus niveles de mandy regresen a la normalidad. Merrill médico también puede sugerirle que consuma alimentos ricos en mandy, caterina carne y frijoles (habichuelas). Existen muchas otras causas de la anemia. No siempre es causada por la falta de mandy. Descubrir la causa específica de merrill anemia le ayudará a merrill médico a encontrar el tratamiento adecuado para usted. La atención de seguimiento es kim parte clave de merrill tratamiento y seguridad. Asegúrese de hacer y acudir a todas las citas, y llame a merrill médico si está teniendo problemas. También es kim buena idea saber los resultados de los exámenes y mantener kim lista de los medicamentos que sharmila. Cómo puede cuidarse en el hogar? · [employer] medicamentos exactamente caterina le fueron recetados. Llame a merrill médico si maico estar teniendo problemas con merrill medicamento. · Si merrill médico le recomienda pastillas de mandy, tómelas según las indicaciones: ¨ Trate de julian las pastillas con el estómago vacío 1 hora antes de comer o 2 horas después de comer. Good podría necesitar julian el mandy con la comida para evitar el malestar estomacal. 
¨ No tome antiácidos, leche o bebidas con cafeína (caterina café, té o bebidas de cola) al [employer] o 2 horas antes o después de beatrice tomado las pastillas de mandy. Estos pueden dificultar la absorción del mandy en el organismo. ¨ La vitamina C (de alimentos o suplementos) ayuda a merrill organismo a absorber el mandy. Trate de julian las pastillas de mandy con un vaso de jugo de naranja o con otro tipo de alimento rico en vitamina C, caterina las frutas cítricas.  
¨ Las pastillas de mandy podrían causar [employer] [facility] [employer] acidez estomacal, náuseas, diarrea, estreñimiento y retortijones. Asegúrese de beber abundantes líquidos e incluya en merrill dieta diaria frutas, verduras y [place]. Las pastillas de mandy muchas veces hacen que jesus evacuaciones brian oscuras o verdosas. ¨ Si olvida julian merrill pastilla de mandy, no tome kim dosis doble la próxima vez. ¨ Mantenga las pastillas de mandy fuera del alcance de los niños pequeños. La sobredosis de mandy puede ser [employer]. · Siga los consejos de merrill médico acerca del consumo de alimentos ricos en mandy. Entre estos se encuentran las benita funez, los [place], las aves, los [place], los frijoles, las uvas pasas, el pan integral y las verduras de hoja omero. · Prepare las verduras al vapor para que puedan retener merrill contenido de mandy. Cuándo debe pedir ayuda? Llame al 911 en cualquier momento que considere que necesita atención de [place]. Por ejemplo, llame si: · Tiene síntomas de un ataque al corazón. Estos podrían incluir: ¨ Dolor o presión en el pecho, o kim sensación extraña en el pecho. ¨ Sudoración. ¨ Falta de aire. ¨ Náuseas o vómito. ¨ Dolor, presión o kim sensación extraña en la espalda, el thomas, la mandíbula, la parte superior del abdomen o en jose o ambos hombros o brazos. ¨ Aturdimiento o debilidad repentina. ¨ Latidos del corazón rápidos o irregulares. Después de llamar al 911, es posible que el operador le diga que mastique 1 aspirina para adultos o de 2 a 4 aspirinas de dosis baja. Espere a kim ambulancia. No intente conducir usted mismo. · Se desmayó (perdió el conocimiento). Llame a merrill médico ahora mismo o busque atención médica inmediata si: · Presenta nueva o mayor falta de aire. · Siente mareos o aturdimiento, o que está a punto de [place]. · La fatiga y la debilidad continúan o empeoran. · Tiene cualquier sangrado anormal, caterina: 
¨ Hemorragias (sangrado) nasales.  
¨ Sangrado vaginal que es distinto (más intenso, más frecuente, en un momento diferente del mes) del que usted [facility]. ¨ Heces sanguinolentas o negras, o sangrado por el recto. ¨ Orina sanguinolenta o de color murillo. Preste especial atención a los cambios en merrill sherrie y asegúrese de comunicarse con merrill médico si: 
· No mejora caterina se esperaba. Dónde puede encontrar más información en inglés? Jordyn Scrivener a http://rosie-abena.info/. Kristian Castillo F216 en la búsqueda para aprender más acerca de \"Anemia: Instrucciones de cuidado - [ Anemia: Care Instructions ]. \" 
Revisado: 13 octubre, 2016 Versión del contenido: 11.3 © 2703-5197 Healthwise, Incorporated. Las instrucciones de cuidado fueron adaptadas bajo licencia por Good Help Connections (which disclaims liability or warranty for this information). Si usted tiene [place] afección médica o sobre estas instrucciones, siempre pregunte a merrill profesional de sherrie. Healthwise, Incorporated niega toda garantía o responsabilidad por merrill uso de esta información. Aprenda sobre hipoxemia - [ Learning About Hypoxemia ] Qué es la hipoxemia? Hipoxemia quiere decir que usted no tiene suficiente oxígeno Street[place]. Es el resultado de enfermedades que le afectan el corazón o los pulmones. Estas incluyen insuficiencia cardíaca, EPOC y fibrosis pulmonar (cicatrización de los pulmones). Estar a grandes altitudes [employer] a la hipoxemia. Qué sucede cuando usted tiene hipoxemia? El oxígeno llega a la deb a través de los pulmones. La deb transporta el oxígeno a todas las partes del cuerpo. Cuando usted tiene muy poco oxígeno en la deb, el cuerpo no recibe lo suficiente. Con muy poco oxígeno, el pedro y Roderick Luba partes del cuerpo no le funcionan 1 [facility]. Cuáles son los síntomas? Además de los síntomas de lo que esté causando merrill [employer] [place]: · Se sienta cansado con rapidez. · Sienta falta de aliento cuando está [place]. · Sienta que el corazón le está latiendo alok o está acelerado. · Se sienta débil o mareado. · Se sienta confuso. Cómo se trata la hipoxemia? Asher médico le va a hacer pruebas para averiguar cuánto oxígeno hay en la deb. Buscará la causa de asher hipoxemia y tratará sadi problema. Por ejemplo, si usted tiene insuficiencia cardíaca, quizás requiera medicamentos que ayudan al corazón a bombear mejor. · Si asher hipoxemia no es grave, puede que asher médico le dé oxígeno por medio de kim máscara o cánula nasal. [place] cánula es un tubo rosario con dos aberturas que encajan yu dentro de la Laura. · Si asher hipoxemia es grave, quizás le pongan kim sonda para respirar en la tráquea. La sonda de respiración está conectada a kmi máquina que bombea aire a jesus pulmones. Esta máquina se llama respirador. · Si usted tiene un problema a jose juan plazo con hipoxemia, puede que asher médico le recomiende que use oxígeno con regularidad. Algunas personas lo necesitan todo Yahoo. Otras lo necesitan de vez en cuando a lo jose juan del día o por la noche. Asher médico le dirá cuánto oxígeno necesita y cada cuánto usarlo. La atención de seguimiento es kim parte clave de asher tratamiento y seguridad. Asegúrese de hacer y acudir a todas las citas, y llame a asher médico si está teniendo problemas. También es kim buena idea saber los resultados de jesus exámenes y mantener kim lista de los medicamentos que sharmila. Dónde puede encontrar más información en inglés? Oralia Grimes a http://sherin.info/. Escriba M375 en la búsqueda para aprender más acerca de \"Aprenda sobre hipoxemia - [ Learning About Hypoxemia ]. \" 
Revisado: 25 marzo, 2017 Versión del contenido: 11.3 © 8479-9264 [employer], [place]. Las instrucciones de cuidado fueron adaptadas bajo licencia por Good Help Connections (which disclaims liability or warranty for this information).  Si usted tiene preguntas sobre kim afección médica o sobre estas instrucciones, siempre pregunte a merrill profesional de sherrie. Healthwise, [facility] niega toda garantía o responsabilidad por merrill uso de esta información. Discharge Orders None MyChart Announcement We are excited to announce that we are making your provider's discharge notes available to you in toucanBox. You will see these notes when they are completed and signed by the physician that discharged you from your recent hospital stay. If you have any questions or concerns about any information you see in toucanBox, please call the Health Information Department where you were seen or reach out to your Primary Care Provider for more information about your plan of care. Introducing [facility]! [facility] introduce portal paciente toucanBox . Ahora se puede acceder a partes de merrill expediente médico, enviar por correo electrónico la oficina de merrill médico y solicitar renovaciones de medicamentos en línea. En merrill navegador de Internet , Rebecca Hooker a [email or web address] Olivia clic en el usuario por Fausto Grijalva? Beulah Bake clic aquí en la sesión Coral Vazquez. Verá la página de registro [place]. Ingrese merrill código de [employer] mitzy y caterina aparece a continuación. Usted no tendrá que [employer] código después de beatrice completado el proceso de registro . Si usted no se inscribe antes de la fecha de caducidad , debe solicitar un nuevo código. · [place] Código de acceso : [id number] Expires: 10/6/2017  4:01 PM 
 
Ingresa los últimos cuatro dígitos de merrill Número de Seguro Social ( xxxx ) y fecha de nacimiento ( dd / mm / aaaa ) caterina se indica y olivia clic en Enviar. Usted será llevado a la siguiente página de registro . Crear un ID [place] . Esta será merrill ID de inicio de sesión de MyChart y no puede ser [place] , por lo que pensar en kim que es Mayela Bijan y fácil de recordar . Crear kim contraseña MyChart .  Usted puede cambiar merrill contraseña en cualquier momento . Ingrese asher Password Reset de preguntas y Mejía . [place]andy[place] se puede utilizar en un momento posterior si usted olvida asher contraseña. Introduzca asher dirección de correo electrónico . Pinky Fabry recibirá kim notificación por correo electrónico cuando la nueva información está disponible en MyChart . Osbaldoea Andrew clic en Registrarse. Corena Kumar angel y descargar porciones de asher expediente médico. 
Olga Lidia roniic en el enlace de descarga del menú Resumen para descargar kim copia portátil de asher información médica . Si tiene [employer] , por favor visite la sección de preguntas frecuentes del sitio web Leapsethart . Recuerde, MyChart NO es que se utilizará para las necesidades urgentes. Para emergencias médicas , llame al 911 . Ahora disponible en asher iPhone y Android ! General Information Please provide this summary of care documentation to your next provider. Patient Signature:  ____________________________________________________________ Date:  ____________________________________________________________  
  
[facility] Provider Signature:  ____________________________________________________________ Date:  ____________________________________________________________  
  
  
   
  
[address] [facility] 
[phone] Patient: Jerry Rodriguez MRN: [id number] OPJ:[id number] Tiene alergias a lo siguiente No tiene alergias Documentación recientes Height Weight Está amamantando? BMI [facility]) Estado obstétrico Estatus de tabaquísmo 1.6 m 53.9 kg No 21.04 kg/m2 Postmenopausal Former Smoker Emergency Contacts Name Discharge Info Relation Home Work Mobile Betsey,Juancarlos  Child [2] [phone] [address] [place] [address]  Spouse [3] [phone] Sobre asher hospitalización  Le admitieron el:  July 8, 2017 Asher tratamiento más reciente fue el:  SFD 8 MED SURG  
 Le dieron de thania el:  July 14, 2017 Número de teléfono de la unidad:  [phone] Por qué le ingresaron Asher diagnosis primaria es:  Respiratory Failure With Hypoxia (Hcc) Asher diagnosis también incluye:  Breast Cancer (Hcc), Bony Metastasis (Hcc), Htn (Hypertension), Dm (Diabetes Mellitus) (Hcc), Ckd (Chronic Kidney Disease), Dvt (Deep Venous Thrombosis) (Hcc), Heart Failure With Preserved Ejection Fraction (Hcc), Type 2 Diabetes Mellitus Without Complication (Hcc), Folliculitis, Iron Deficiency Anemia, Anticoagulated On Coumadin, Aortic Stenosis, Moderate Proveedores de verse fidelina courtney hospitalizaciones Personal Médico Rol Especialidad Teléfono principal de la oficina Ema Almeida MD Attending Provider Emergency Medicine [phone] Doris Fontaine MD Attending Provider Pulmonary Disease [phone] Asher médico de atención primaria (PCP ) Primary Care Physician Office Phone Office Fax  
 [place], [address] ** None ** ** None ** Follow-up Information Follow up With Details Comments Contact Info Alexa Hill MD  Office is closed. Please call Monday to schedule a follow up next week with INR check and follow up [phone] Elaine Jaquez MD On 7/31/2017 9:30am at [place] location [address] [facility] Cardiology [facility] [address] 
[phone] Vicenta Alcantara MD On 7/20/2017 12:30pm lab 1:00pm appointment [address] [address] [facility] [address] [facility] [address] 
[phone] Julisa Madden NP On 8/11/2017 10:00 am [facility] [place] [address] 
[phone] [address] [place] [address] Trisha Jerome [address] [address] 
[phone] Courtney citas Thursday July 20, 2017 12:30 PM EDT  
LAB with [address]  
[address] [facility] Susana Mendes [address] [place] [address]  
[phone] Thursday July 20, 2017  1:00 PM EDT Follow Up with Fito Aranda MD  
[facility] Hematology and Oncology [facility]) JJ/ Juancarlos Carney [address] [place] [address]  
[phone] Monday July 31, 2017  9:30 AM EDT HOSPITAL FOLLOW-UP with Latosha Peraza MD  
[facility] Cardiology ([address] [facility]) [address] 
[address]  
[phone] Friday August 11, 2017 11:00 AM EDT  
(Arrive by 10:00 AM) HOSPITAL with Media Hany, NP [facility] ([facility]) [address]  
[phone] Aprobación de la gestión actual lista de medicamentos EMPIECE a julian WAFU Dosis e instrucciones Información de dispensación Comentarios Morning Noon Evening Bedtime  
 albuterol-ipratropium 2.5 mg-0.5 mg/3 ml Nebu También conocido caterina:  Oddis Pillion Your last dose was:  07/14/17 8am  
   
 Dosis:  3 mL  
3 mL by Nebulization route every six (6) hours as needed. cantidad:  360 Nebule  
recargas:  11  
     
   
   
   
  
 carvedilol 3.125 mg tablet También conocido caterina:  COREG Your next dose is:  07/14/17 5pm  
   
 Dosis:  3.125 mg Take 1 Tab by mouth two (2) times daily (with meals). cantidad:  60 Tab  
recargas:  11  
     
  
   
   
  
   
  
 * warfarin 3 mg tablet También conocido caterina:  COUMADIN Your next dose is:  07/15/17 Dosis:  3 mg Take 1 Tab by mouth every evening. cantidad:  30 Tab  
recargas:  0  
     
   
   
   
  
 * warfarin 1 mg tablet También conocido caterina:  COUMADIN Empiece a julian el:  7/15/2017 Dosis:  1.5 mg Take 1.5 Tabs by mouth every evening. cantidad:  45 Tab  
recargas:  0  
     
   
   
   
  
 * Aviso:  Esta lista contiene medicamentos que son iguales a otros medicamentos recetados para usted.  Manisha las instrucciones con Jayy Lower y pida a merrill personal médico que revise la lista de medicamentos y las instrucciones correspondientes con usted. CONTINUAR estos medicamentos que [place] Dosis e instrucciones Información de dispensación Comentarios Morning Noon Evening Bedtime  
 cephALEXin 500 mg capsule También conocido caterina:  Charley Robel Lo que cambió:  cuándo lo debe julian Your next dose is:  07/14/17 9pm  
   
 Dosis:  500 mg Take 1 Cap by mouth every eight (8) hours. cantidad:  15 Cap  
recargas:  0 CONTINUAR estos medicamentos que no [place] Dosis e instrucciones Información de dispensación Comentarios Morning Noon Evening Bedtime  
 albuterol 90 mcg/actuation inhaler También conocido caterina:  PROVENTIL HFA, VENTOLIN HFA, PROAIR HFA Your next dose is:  As needed Dosis:  2 Puff Take 2 Puffs by inhalation every six (6) hours as needed for Wheezing. cantidad:  1 Inhaler  
recargas:  11 ALPRAZolam 0.25 mg tablet También conocido caterina:  Donata Carton Your next dose is:  As needed Dosis:  0.25 mg Take 1 Tab by mouth three (3) times daily as needed for Anxiety. Max Daily Amount: 0.75 mg.  
 cantidad:  90 Tab  
recargas:  2  
     
   
   
   
  
 amLODIPine 10 mg tablet También conocido caterina:  Linda Ramirez Your next dose is:  07/15/17 Dosis:  10 mg Take 1 Tab by mouth daily. cantidad:  30 Tab  
recargas:  11  
     
   
   
   
  
 budesonide-formoterol 160-4.5 mcg/actuation HFA inhaler También conocido caterina:  SYMBICORT Your next dose is:  Resume home schedule Dosis:  2 Puff Take 2 Puffs by inhalation two (2) times a day. cantidad:  1 Inhaler  
recargas:  11 DULoxetine 20 mg capsule También conocido caterina:  CYMBALTA Your next dose is:  07/15/17 Dosis:  20 mg Take 1 Cap by mouth daily. Indications: NEUROPATHIC PAIN  
 cantidad:  30 Cap  
recargas:  0 fentaNYL 50 mcg/hr PATCH También conocido caterina:  Elizabeth Davenport Your next dose is:  07/14/17 8pm  
   
 Dosis:  1 Patch 1 Patch by TransDERmal route every seventy-two (72) hours. Max Daily Amount: 1 Patch. Indications: Chronic Pain with Opioid Tolerance  
 cantidad:  10 Patch  
recargas:  0  
     
   
   
   
  
 furosemide 40 mg tablet También conocido caterina:  LASIX Your next dose is:  07/15/17 Dosis:  40 mg Take 1 Tab by mouth daily. cantidad:  30 Tab  
recargas:  0  
     
   
   
   
  
 hydrALAZINE 50 mg tablet También conocido caterina:  APRESOLINE Your next dose is:  Resume home schedule Dosis:  50 mg Take 1 Tab by mouth three (3) times daily. cantidad:  90 Tab  
recargas:  1 HYDROcodone-acetaminophen  mg tablet También conocido caterina:  Myron Plum Your next dose is:  As needed Dosis:  1 Tab Take 1 Tab by mouth every four (4) hours as needed. Max Daily Amount: 6 Tabs. cantidad:  90 Tab  
recargas:  0 LORazepam 1 mg tablet También conocido caterina:  ATIVAN Your next dose is:  As needed Dosis:  1 mg Take 1 Tab by mouth every six (6) hours as needed for Anxiety. Max Daily Amount: 4 mg.  
 cantidad:  60 Tab  
recargas:  0  
     
   
   
   
  
 megestrol 400 mg/10 mL (10 mL) suspension También conocido caterina:  MEGACE Your next dose is:  07/15/17 Dosis:  200 mg Take 5 mL by mouth daily. cantidad:  240 mL  
recargas:  2  
     
   
   
   
  
 mirtazapine 30 mg tablet También conocido caterina:  Chilo Tucker Your next dose is:  07/14/17 10pm  
   
 Dosis:  30 mg Take 1 Tab by mouth nightly. cantidad:  30 Tab  
recargas:  1  
     
   
   
   
  
 montelukast 10 mg tablet También conocido caterina:  SINGULAIR Your next dose is:  07/14/17 10pm  
   
 Dosis:  10 mg Take 1 Tab by mouth nightly. cantidad:  30 Tab  
recargas:  3  
     
   
   
   
  
 palbociclib 125 mg Cap Your next dose is:  Resume home schedule Dosis:  125 mg Take 125 mg by mouth daily. Take one pill once a day for 3 weeks and then off for one week  
 cantidad:  21 Tab  
recargas:  5  
     
   
   
   
  
 raNITIdine 150 mg tablet También conocido caterina:  ZANTAC Your next dose is:  07/15/17 Dosis:  150 mg Take 1 Tab by mouth daily. cantidad:  30 Tab  
recargas:  11  
     
   
   
   
  
 zolpidem 5 mg tablet También conocido caterina:  Mauri Heaton Your next dose is:  As needed Dosis:  5 mg Take 1 Tab by mouth nightly as needed for Sleep. Max Daily Amount: 5 mg.  
 cantidad:  30 Tab  
recargas:  0 DEJE de julian estos medicamentos   
 atenolol 25 mg tablet También conocido caterina:  TENORMIN  
   
  
  
  
Dónde puede recoger jesus medicamentos Srinivas carmona [address]  [address] [facility] [address], [address] [place] [address] [address] [id number] Teléfono:  [phone]  
  albuterol-ipratropium 2.5 mg-0.5 mg/3 ml Nebu  
 carvedilol 3.125 mg tablet  
 cephALEXin 500 mg capsule  
 warfarin 1 mg tablet  
 warfarin 3 mg tablet Discharge Instructions DISCHARGE SUMMARY from Nurse The following personal items are in your possession at time of discharge: 
 
Dental Appliances: None, At home Visual Aid: Glasses Home Medications: None Jewelry: None Clothing: None Other Valuables: None Personal Items Sent to Safe: none PATIENT INSTRUCTIONS: 
 
After general anesthesia or intravenous sedation, for 24 hours or while taking prescription Narcotics: · Limit your activities · Do not drive and operate hazardous machinery · Do not make important personal or business decisions · Do  not drink alcoholic beverages · If you have not urinated within 8 hours after discharge, please contact your surgeon on call. Report the following to your surgeon: · Excessive pain, swelling, redness or odor of or around the surgical area · Temperature over 100.5 · Nausea and vomiting lasting longer than 4 hours or if unable to take medications · Any signs of decreased circulation or nerve impairment to extremity: change in color, persistent  numbness, tingling, coldness or increase pain · Any questions What to do at Home: 
Recommended activity: Activity as tolerated. If you experience any of the following symptoms temp > 100.4, unrelieved pain, nausea or vomiting, shortness of breath or fatigue not relieved with rest, please follow up with MD. 
 
 
*  Please give a list of your current medications to your Primary Care Provider. *  Please update this list whenever your medications are discontinued, doses are 
    changed, or new medications (including over-the-counter products) are added. *  Please carry medication information at all times in case of emergency situations. These are general instructions for a healthy lifestyle: No smoking/ No tobacco products/ Avoid exposure to second hand smoke Surgeon General's Warning:  Quitting smoking now greatly reduces serious risk to your health. Obesity, smoking, and sedentary lifestyle greatly increases your risk for illness A healthy diet, regular physical exercise & weight monitoring are important for maintaining a healthy lifestyle You may be retaining fluid if you have a history of heart failure or if you experience any of the following symptoms:  Weight gain of 3 pounds or more overnight or 5 pounds in a week, increased swelling in our hands or feet or shortness of breath while lying flat in bed. Please call your doctor as soon as you notice any of these symptoms; do not wait until your next office visit. Recognize signs and symptoms of STROKE: 
 
F-face looks uneven A-arms unable to move or move unevenly S-speech slurred or non-existent T-time-call 911 as soon as signs and symptoms begin-DO NOT go Back to bed or wait to see if you get better-TIME IS BRAIN. Warning Signs of HEART ATTACK Call 911 if you have these symptoms: 
? Chest discomfort. Most heart attacks involve discomfort in the center of the chest that lasts more than a few minutes, or that goes away and comes back. It can feel like uncomfortable pressure, squeezing, fullness, or pain. ? Discomfort in other areas of the upper body. Symptoms can include pain or discomfort in one or both arms, the back, neck, jaw, or stomach. ? Shortness of breath with or without chest discomfort. ? Other signs may include breaking out in a cold sweat, nausea, or lightheadedness. Don't wait more than five minutes to call [address]! Fast action can save your life. Calling 911 is almost always the fastest way to get lifesaving treatment. Emergency Medical Services staff can begin treatment when they arrive  up to an hour sooner than if someone gets to the hospital by car. The discharge information has been reviewed with the patient. The patient verbalized understanding. Discharge medications reviewed with the patient and appropriate educational materials and side effects teaching were provided. Princess Kaufman: Instrucciones de cuidado - [ Heart Failure: Care Instructions ] Instrucciones de cuidado La insuficiencia cardíaca ocurre cuando el corazón no bombea toda la deb que el cuerpo necesita. Insuficiencia no significa que merrill corazón foss dejado de bombear, sino que no bombea flannery jaison caterina debería. Con el tiempo, esto causa kim acumulación de líquido [employer] pulmones y en otras partes del cuerpo. La acumulación de líquido puede causar falta de aire, fatiga, hinchazón en los tobillos y [place].  Puede sentirse mejor y vivir más tiempo si sharmila jesus medicamentos con regularidad, reduce el sodio (sal) en la dieta, controla asher peso todos los días y realiza cambios en asher estilo de luann. La atención de seguimiento es kim parte clave de asher tratamiento y seguridad. Asegúrese de hacer y acudir a todas las citas, y llame a asher médico si está teniendo problemas. También es kim buena idea saber los resultados de jesus exámenes y mantener kim lista de los medicamentos que sharmila. Cómo puede cuidarse en el hogar? Medicamentos · [place] con los medicamentos. [employer] medicamentos exactamente caterina le fueron recetados. Llame a asher médico si maico estar teniendo un problema con asher medicamento. · No tome ningún tipo de vitaminas, medicamentos de venta lilibeth ni productos herbarios sin hablar nain con asher médico. No tome ibuprofeno (Advil o Motrin) ni naproxeno (Aleve) sin nain hablar con asher médico. Podrían empeorar asher insuficiencia cardíaca. · Es posible que esté tomando algunos de los siguientes medicamentos. ¨ Los betabloqueantes pueden desacelerar la frecuencia cardíaca, disminuir la presión arterial y mejorar asher dolencia. Amie un betabloqueante podría disminuir las probabilidades de que necesite ser hospitalizado. ¨ Los inhibidores de la enzima convertidora de la angiotensina (IECA) reducen la carga de trabajo del corazón, disminuyen la presión arterial y reducen la hinchazón. Amie un inhibidor de la ECA puede disminuir las probabilidades de que necesite volver a ser hospitalizado. ¨ Los bloqueadores de los receptores de la angiotensina II (ARB, por jesus siglas en inglés) funcionan caterina los inhibidores de la ECA. Asher médico podría recetarlos en lugar de los inhibidores de la ECA. ¨ Los diuréticos reducen la hinchazón. ¨ Los suplementos de potasio reemplazan radha importante mineral que, a veces, se pierde con los diuréticos. ¨ La aspirina y los anticoagulantes previenen la formación de coágulos de [place], que pueden causar un ataque cerebral o un ataque al corazón. Recibirá [employer] medicamentos específicos recetados por merrill médico. 
Alimentación · Merrill médico puede sugerirle que limite el sodio a 2,000 miligramos (mg) al día o menos. Eso es menos de 1 cucharadita de sal al día, incluida toda la sal que consume al cocinar o en alimentos envasados. Las personas obtienen la mayor cantidad de sodio de los alimentos procesados. Las comidas rápidas o de restaurante también tienden a ser muy ricas en sodio. · Pregúntele a merrill médico cuánto líquido puede beber cada día. Es posible que tenga que [place]'s líquidos. Peso · Pésese sin ropa todos los días a la [employer] Registre merrill peso. Llame a merrill médico si tiene un aumento repentino de peso, caterina más de 2 libras (0.9 kg) a 3 libras (1.4 kg) en un día o 5 libras (2.3 kg) en kim semana. (Merrill médico podría sugerirle unos límites diferentes de aumento de [place]). Un aumento de peso repentino podría significar que merrill insuficiencia cardíaca está empeorando. Nivel de [place] · Comience con ejercicio suave (si merrill médico lo aprueba). Aun cuando solo olivia un poco, el ejercicio le ayudará a estar más alok, tener más [place], y [place] merrill peso y estrés. Caminar es kim manera fácil de hacer ejercicio. Comience caminando un poco más de lo que caminó antes. Poco a poco, aumente la distancia. · Cuando olivia ejercicio, esté alerta a señales que indiquen que merrill corazón se esté esforzando demasiado. Si no puede hablar mientras hace ejercicio, se está esforzando demasiado. Si le falta el aire, se marea o tiene dolor en el [facility], siéntese y descanse. · Si se siente agotado el día después de hacer ejercicio, camine más despacio o camine kim distancia surinder hasta que pueda aumentar a un mejor ritmo. · Descanse lo suficiente de noche. Dormir con 1 o 2 almohadas debajo de la rosemarie y la parte superior del cuerpo podría ayudarle a respirar mejor. Cambios en el estilo de luann · No fume. Fumar puede empeorar asher afección cardíaca. Si necesita ayuda para dejar de fumar, hable con asher médico sobre programas y medicamentos para dejar de fumar. Estos pueden aumentar jesus probabilidades de dejar el hábito para siempre. Dejar de fumar puede ser la medida más importante que pueda julian para proteger asher corazón. · Limite el alcohol a 2 bebidas al día si es hombre, y 1 bebida al día si es mindi. Beber demasiado alcohol puede causarle problemas de [place]. · Evite enfermarse de resfriados y gripe. Hágase poner la vacuna antineumocócica. Si ya le gan puesto kim antes, consulte a asher médico para saber si necesita otra dosis. Aplíquese kim vacuna contra la gripe (\"flu shot\") cada año. Si tiene que estar cerca de personas con resfriados o gripe, lávese las perry con frecuencia. Cuándo debe pedir ayuda? Llame al 911 si tiene síntomas de insuficiencia cardíaca repentina, tales caterina: · Tiene graves dificultades para respirar. · Al toser expulsa mucosidad rosácea y espumosa. · Tiene latidos cardíacos irregulares o rápidos. Llame a asher médico ahora mismo o busque atención médica inmediata si: · Presenta kim nueva o mayor falta de aire. · Siente mareos o aturdimiento, o que está a punto de [place]. · Tiene un aumento repentino de [place], caterina más de 2 libras (0.9 kg) a 3 libras (1.4 kg) en un día o 5 libras (2.3 kg) en kim semana. (Asher médico podría sugerirle unos límites diferentes de aumento de [place]). · [place] piernas, los tobillos o los pies más hinchados. · De repente se siente tan cansado o débil que no puede hacer las [facility]. Preste especial atención a los cambios en asher sherrie y asegúrese de comunicarse con asher médico si: · Tiene nuevos síntomas. Dónde puede encontrar más información en inglés? Soila Navarrete a [email or web address]-abena.[email or web address]  
Mellisa Gale [id number] en la búsqueda para aprender más acerca de \"Insuficiencia cardíaca: Instrucciones de cuidado - [ Heart Failure: Care Instructions ]. \" 
Revisado: 3 izabela, 2017 Versión del contenido: 11.3 © 5455-5372 Healthwise, Incorporated. Las instrucciones de cuidado fueron adaptadas bajo licencia por [employer] (which disclaims liability or warranty for this information). Si usted tiene [place] afección médica o sobre estas instrucciones, siempre pregunte a merrill profesional de sherrie. Healthwise, Incorporated niega toda garantía o responsabilidad por merrill uso de esta información. Heart Failure: Care Instructions Your Care Instructions Heart failure occurs when your heart does not pump as much blood as the body needs. Failure does not mean that the heart has stopped pumping but rather that it is not pumping as well as it should. Over time, this causes fluid buildup in your lungs and other parts of your body. Fluid buildup can cause shortness of breath, fatigue, swollen ankles, and other problems. By taking medicines regularly, reducing sodium (salt) in your diet, checking your weight every day, and making lifestyle changes, you can feel better and live longer. Follow-up care is a key part of your treatment and safety. Be sure to make and go to all appointments, and call your doctor if you are having problems. It's also a good idea to know your test results and keep a list of the medicines you take. How can you care for yourself at home? Medicines · Be safe with medicines. Take your medicines exactly as prescribed. Call your doctor if you think you are having a problem with your medicine. · Do not take any vitamins, over-the-counter medicine, or herbal products without talking to your doctor first. Louie Griffins not take ibuprofen (Advil or Motrin) and naproxen (Aleve) without talking to your doctor first. They could make your heart failure worse. · You may be taking some of the following medicine. ¨ Beta-blockers can slow heart rate, decrease blood pressure, and improve your condition. Taking a beta-blocker may lower your chance of needing to be hospitalized. ¨ Angiotensin-converting enzyme inhibitors (ACEIs) reduce the heart's workload, lower blood pressure, and reduce swelling. Taking an ACEI may lower your chance of needing to be hospitalized again. ¨ Angiotensin II receptor blockers (ARBs) work like ACEIs. Your doctor may prescribe them instead of ACEIs. ¨ Diuretics, also called water pills, reduce swelling. ¨ Potassium supplements replace this important mineral, which is sometimes lost with diuretics. ¨ Aspirin and other blood thinners prevent blood clots, which can cause a stroke or heart attack. You will get more details on the specific medicines your doctor prescribes. Diet · Your doctor may suggest that you limit sodium to 2,000 milligrams (mg) a day or less. That is less than 1 teaspoon of salt a day, including all the salt you eat in cooking or in packaged foods. People get most of their sodium from processed foods. Fast food and restaurant meals also tend to be very high in sodium. · Ask your doctor how much liquid you can drink each day. You may have to limit liquids. Weight · Weigh yourself without clothing at the same time each day. Record your weight. Call your doctor if you have a sudden weight gain, such as more than 2 to 3 pounds in a day or 5 pounds in a week. (Your doctor may suggest a different range of weight gain.) A sudden weight gain may mean that your heart failure is getting worse. Activity level · Start light exercise (if your doctor says it is okay). Even if you can only do a small amount, exercise will help you get stronger, have more energy, and manage your weight and your stress. Walking is an easy way to get exercise. Start out by walking a little more than you did before. Bit by bit, increase the amount you walk. · When you exercise, watch for signs that your heart is working too hard. You are pushing yourself too hard if you cannot talk while you are exercising. If you become short of breath or dizzy or have chest pain, stop, sit down, and rest. 
· If you feel \"wiped out\" the day after you exercise, walk slower or for a shorter distance until you can work up to a better pace. · Get enough rest at night. Sleeping with 1 or 2 pillows under your upper body and head may help you breathe easier. Lifestyle changes · Do not smoke. Smoking can make a heart condition worse. If you need help quitting, talk to your doctor about stop-smoking programs and medicines. These can increase your chances of quitting for good. Quitting smoking may be the most important step you can take to protect your heart. · Limit alcohol to 2 drinks a day for men and 1 drink a day for women. Too much alcohol can cause health problems. · Avoid getting sick from colds and the flu. Get a pneumococcal vaccine shot. If you have had one before, ask your doctor whether you need another dose. Get a flu shot each year. If you must be around people with colds or the flu, wash your hands often. When should you call for help? Call 911 if you have symptoms of sudden heart failure such as: 
· You have severe trouble breathing. · You cough up pink, foamy mucus. · You have a new irregular or rapid heartbeat. Call your doctor now or seek immediate medical care if: 
· You have new or increased shortness of breath. · You are dizzy or lightheaded, or you feel like you may faint. · You have sudden weight gain, such as more than 2 to 3 pounds in a day or 5 pounds in a week. (Your doctor may suggest a different range of weight gain.) · You have increased swelling in your legs, ankles, or feet. · You are suddenly so tired or weak that you cannot do your usual activities. Watch closely for changes in your health, and be sure to contact your doctor if: · You develop new symptoms. Where can you learn more? Go to http://rosie-abena.info/. Enter A073 in the search box to learn more about \"Heart Failure: Care Instructions. \" Current as of: April 3, 2017 Content Version: 11.3 © 8349-9228 [employer]. Care instructions adapted under license by [employer] (which disclaims liability or warranty for this information). If you have questions about a medical condition or this instruction, always ask your healthcare professional. [place] [address] any warranty or liability for your use of this information. Anemia: Instrucciones de cuidado - [ Anemia: Care Instructions ] Instrucciones de cuidado La anemia es un bajo nivel de glóbulos rojos, que son los que transportan el oxígeno por el organismo. Muchas cosas pueden causar anemia. La falta de mandy es kim de las causas más comunes. Merrill organismo necesita mandy para producir hemoglobina, kim sustancia de los glóbulos rojos que transporta el oxígeno de los pulmones a las células del organismo. Si no hay suficiente mandy, el organismo produce glóbulos rojos más pequeños y en surinder cantidad. Debido a ello, las células de merrill organismo no obtienen suficiente oxígeno y usted se sentirá cansado y débil. Y puede tener dificultad para concentrarse. El sangrado es la causa más común de la falta de mandy. Podría tener sangrado menstrual abundante o hemorragias causadas por afecciones tales caterina úlceras, hemorroides o cáncer. El uso habitual de aspirina u otros medicamentos antiinflamatorios (caterina ibuprofeno) también puede causar sangrado en [employer]. La falta de mandy en merrill dieta también puede causar anemia, sobre todo en los momentos en los que el organismo necesita más mandy, caterina fidelina el [facility], la infancia y la adolescencia. Es posible que merrill médico le haya recetado pastillas de [place].  El tratamiento puede julian algunos meses hasta que jesus niveles de mandy regresen a la normalidad. Merrill médico también puede sugerirle que consuma alimentos ricos en mandy, caterina carne y frijoles (habichuelas). Existen muchas otras causas de la anemia. No siempre es causada por la falta de mandy. Descubrir la causa específica de merrill anemia le ayudará a merrill médico a encontrar el tratamiento adecuado para usted. La atención de seguimiento es kim parte clave de merrill tratamiento y seguridad. Asegúrese de hacer y acudir a todas las citas, y llame a merrill médico si está teniendo problemas. También es kim buena idea saber los resultados de los exámenes y mantener kim lista de los medicamentos que sharmila. Cómo puede cuidarse en el hogar? · [employer] medicamentos exactamente caterina le fueron recetados. Llame a merrill médico si maico estar teniendo problemas con merrill medicamento. · Si merrill médico le recomienda pastillas de mandy, tómelas según las indicaciones: ¨ Trate de julian las pastillas con el estómago vacío 1 hora antes de comer o 2 horas después de comer. Good podría necesitar julian el mandy con la comida para evitar el malestar estomacal. 
¨ No tome antiácidos, leche o bebidas con cafeína (caterina café, té o bebidas de cola) al [employer] o 2 horas antes o después de beatrice tomado las pastillas de mandy. Estos pueden dificultar la absorción del mandy en el organismo. ¨ La vitamina C (de alimentos o suplementos) ayuda a merrill organismo a absorber el mandy. Trate de julian las pastillas de mandy con un vaso de jugo de naranja o con otro tipo de alimento rico en vitamina C, caterina las frutas cítricas. ¨ Las [employer] de [facility] podrían causar [employer], caterina acidez [place], Patrick, diarrea, estreñimiento y retortijones. Asegúrese de beber abundantes líquidos e incluya en merrill dieta diaria frutas, verduras y [place]. Las pastillas de mandy muchas veces hacen que jesus evacuaciones brian oscuras o verdosas. ¨ Si olvida julian merrill pastilla de mandy, no tome kim dosis doble la próxima vez. ¨ Mantenga las pastillas de mandy fuera del alcance de los niños pequeños. La sobredosis de mandy puede ser [employer]. · Siga los consejos de merrill médico acerca del consumo de alimentos ricos en [facility]. Entre estos se encuentran las benita funez, los [place], las aves, los [place], los frijoles, las uvas pasas, el pan integral y las verduras de hoja omero. · Prepare las verduras al vapor para que puedan retener merrill contenido de mandy. Cuándo debe pedir ayuda? Llame al 911 en cualquier momento que considere que necesita atención de [place]. Por ejemplo, llame si: · Tiene síntomas de un ataque al corazón. Estos podrían incluir: ¨ Dolor o presión en el pecho, o kmi sensación extraña en el pecho. ¨ Sudoración. ¨ Falta de aire. ¨ Náuseas o vómito. ¨ Dolor, presión o kim sensación extraña en la espalda, el thomas, la mandíbula, la parte superior del abdomen o en jose o ambos hombros o brazos. ¨ Aturdimiento o debilidad repentina. ¨ Latidos del corazón rápidos o irregulares. Después de llamar al 911, es posible que el operador le diga que mastique 1 aspirina para adultos o de 2 a 4 aspirinas de dosis baja. Espere a kim ambulancia. No intente conducir usted mismo. · Se desmayó (perdió el conocimiento). Llame a merrill médico ahora mismo o busque atención médica inmediata si: · Presenta nueva o mayor falta de aire. · Siente mareos o aturdimiento, o que está a punto de [place]. · La fatiga y la debilidad continúan o empeoran. · Tiene cualquier sangrado anormal, caterina: 
¨ Hemorragias (sangrado) nasales. ¨ Sangrado vaginal que es distinto (más intenso, más frecuente, en un momento diferente del mes) del que [facility] tener. ¨ Heces sanguinolentas o negras, o sangrado por el recto. ¨ Orina sanguinolenta o de color murillo.  
Preste especial atención a los cambios en merrill sherrie y asegúrese de comunicarse con merrill médico si: 
 · No mejora caterina se esperaba. Dónde puede encontrar más información en inglés? Siri José a [email or web address]-abena.info/. [place] [id number] en la búsqueda para aprender más acerca de \"Anemia: Instrucciones de cuidado - [ Anemia: Care Instructions ]. \" 
Revisado: 13 octubre, 2016 Versión del contenido: 11.3 © 2558-9725 Healthwise, Incorporated. Las instrucciones de cuidado fueron adaptadas bajo licencia por [employer] (which disclaims liability or warranty for this information). Si usted tiene [place] afección médica o sobre estas instrucciones, siempre pregunte a asher profesional de sherrie. Healthwise, Incorporated niega toda garantía o responsabilidad por asher uso de esta información. Aprenda sobre hipoxemia - [ Learning About Hypoxemia ] Qué es la hipoxemia? Hipoxemia quiere decir que usted no tiene suficiente oxígeno [place]. Es el resultado de enfermedades que le afectan el corazón o los pulmones. Estas incluyen insuficiencia cardíaca, EPOC y fibrosis pulmonar (cicatrización de los pulmones). Estar a grandes altitudes [employer] a la hipoxemia. Qué sucede cuando usted tiene hipoxemia? El oxígeno llega a la deb a través de los pulmones. La deb transporta el oxígeno a todas las partes del cuerpo. Cuando usted tiene muy poco oxígeno en la deb, el cuerpo no recibe lo suficiente. Con muy poco oxígeno, el corazón y [place] partes del cuerpo no le funcionan [address]. Cuáles son los síntomas? Además de los síntomas de lo que esté causando asher Joeline Ralphs: · Se sienta cansado con rapidez. · Sienta falta de aliento cuando está [place]. · Sienta que el corazón le está latiendo alok o está acelerado. · Se sienta débil o mareado. · Se sienta confuso. Cómo se trata la hipoxemia? Asher médico le va a hacer pruebas para averiguar cuánto oxígeno hay en la deb. Buscará la causa de asher hipoxemia y tratará sadi problema.  Por ejemplo, si usted tiene insuficiencia cardíaca, quizás requiera medicamentos que ayudan al corazón a bombear mejor. · Si merrill hipoxemia no es grave, puede que merrill médico le dé oxígeno por medio de kim máscara o cánula nasal. Youlanda Pilar cánula es un tubo rosario con dos aberturas que encajan yu dentro de la [place]. · Si merrill hipoxemia es grave, quizás le pongan kim sonda para respirar en la tráquea. La sonda de respiración está conectada a kim máquina que bombea aire a jesus pulmones. Esta máquina se llama respirador. · Si usted tiene un problema a jose juan plazo con hipoxemia, puede que merrill médico le recomiende que use oxígeno con regularidad. Algunas personas lo necesitan todo [employer]. Otras lo necesitan de vez en cuando a lo jose juan del día o por la noche. Merrill médico le dirá cuánto oxígeno necesita y cada cuánto usarlo. La atención de seguimiento es kim parte clave de merrill tratamiento y seguridad. Asegúrese de hacer y acudir a todas las citas, y llame a merrill médico si está teniendo problemas. También es kim buena idea saber los resultados de jesus exámenes y mantener kim lista de los medicamentos que sharmila. Dónde puede encontrar más información en inglés? Elder Slimmer a [email or web address]-abena.info/. Escriba M375 en la búsqueda para aprender más acerca de \"Aprenda sobre hipoxemia - [ Learning About Hypoxemia ]. \" 
Revisado: 25 marzo, 2017 Versión del contenido: 11.3 © 4631-4580 [employer], [employer]OG[employer]. Las instrucciones de cuidado fueron adaptadas bajo licencia por [employer] (which disclaims liability or warranty for this information). Si usted tiene [place] afección médica o sobre estas instrucciones, siempre pregunte a merrill profesional de sherrie. [facility] niega toda garantía o responsabilidad por merrill uso de esta información. Discharge Orders [employer] MyChart Announcement  We are excited to announce that we are making your provider's discharge notes available to you in MyChart. You will see these notes when they are completed and signed by the physician that discharged you from your recent hospital stay. If you have any questions or concerns about any information you see in [place], please call the Health Information Department where you were seen or reach out to your Primary Care Provider for more information about your plan of care. Introducing [facility] & HEALTH SERVICES! Charles Rodriguez introduce portal paciente MyChart . Ahora se puede acceder a partes de merrill expediente médico, enviar por correo electrónico la oficina de merrill médico y solicitar renovaciones de medicamentos en línea. En merrill navegador de Internet , Alessian [place] a [email or web address] Olivia clic en el usuario por Vira Zhou? Kyle tamayoic aquí en la sesión Lauran Holstein. Verá la página de registro [place]. Ingrese merrill código de [facility] Mila mitzy y caterina aparece a continuación. ted no tendrá que [employer] código después de beatrice completado el proceso de registro . Si usted no se inscribe antes de la fecha de caducidad , debe solicitar un nuevo código. · [facility] Código de acceso : [email or web address]- Expires: 10/6/2017  4:01 PM 
 
Ingresa los últimos cuatro dígitos de emrrill Número de Seguro Social ( xxxx ) y fecha de nacimiento ( dd / mm / aaaa ) caterina se indica y olivia clic en Enviar. orin será llevado a la siguiente página de registro . Crear un ID MyChart . Esta será merrill ID de inicio de sesión de MyChart y no puede ser [place] , por lo que pensar en kim que es [place] y fácil de recordar . Crear kim contraseña MyChart . orin puede cambiar merrill contraseña en cualquier momento . Ingrese merrill Password Reset de preguntas y [place] . [place] se puede utilizar en un momento posterior si usted olvida merrill contraseña. Introduzca merrill dirección de correo electrónico . Taylor Heredias recibirá kim notificación por correo electrónico cuando la nueva información está disponible en MyChart . Deepak shirley en Registrarse. Rebekah Degree angel y descargar porciones de merrill expediente médico. 
Olga Lidia casper en el enlace de descarga del menú Resumen para descargar kim copia portátil de merrill información médica . Si tiene [employer] , por favor visite la sección de preguntas frecuentes del sitio web MyChart . Recuerde, MyChart NO es que se utilizará para las necesidades urgentes. Para emergencias médicas , llame al 911 . Ahora disponible en merrill iPhone y Android ! General Information Please provide this summary of care documentation to your next provider. Patient Signature:  ____________________________________________________________ Date:  ____________________________________________________________  
  
Lakesha Pressley Provider Signature:  ____________________________________________________________ Date:  ____________________________________________________________

## 2017-07-09 ENCOUNTER — APPOINTMENT (OUTPATIENT)
Dept: GENERAL RADIOLOGY | Age: 67
DRG: 291 | End: 2017-07-09
Attending: INTERNAL MEDICINE
Payer: SUBSIDIZED

## 2017-07-09 LAB
ANION GAP BLD CALC-SCNC: 8 MMOL/L (ref 7–16)
APTT PPP: 74.5 SEC (ref 23.5–31.7)
BASOPHILS # BLD AUTO: 0 K/UL (ref 0–0.2)
BASOPHILS # BLD: 1 % (ref 0–2)
BUN SERPL-MCNC: 43 MG/DL (ref 8–23)
CALCIUM SERPL-MCNC: 7.2 MG/DL (ref 8.3–10.4)
CHLORIDE SERPL-SCNC: 112 MMOL/L (ref 98–107)
CO2 SERPL-SCNC: 26 MMOL/L (ref 21–32)
CREAT SERPL-MCNC: 2.38 MG/DL (ref 0.6–1)
DIFFERENTIAL METHOD BLD: ABNORMAL
EOSINOPHIL # BLD: 0 K/UL (ref 0–0.8)
EOSINOPHIL NFR BLD: 1 % (ref 0.5–7.8)
ERYTHROCYTE [DISTWIDTH] IN BLOOD BY AUTOMATED COUNT: 14.7 % (ref 11.9–14.6)
GLUCOSE SERPL-MCNC: 104 MG/DL (ref 65–100)
HCT VFR BLD AUTO: 21.4 % (ref 35.8–46.3)
HCT VFR BLD AUTO: 22.4 % (ref 35.8–46.3)
HCT VFR BLD AUTO: 23 % (ref 35.8–46.3)
HGB BLD-MCNC: 7.2 G/DL (ref 11.7–15.4)
HGB BLD-MCNC: 7.6 G/DL (ref 11.7–15.4)
HGB BLD-MCNC: 7.7 G/DL (ref 11.7–15.4)
IMM GRANULOCYTES # BLD: 0 K/UL (ref 0–0.5)
IMM GRANULOCYTES NFR BLD AUTO: 0 % (ref 0–5)
INR PPP: 1.5 (ref 0.9–1.2)
LYMPHOCYTES # BLD AUTO: 46 % (ref 13–44)
LYMPHOCYTES # BLD: 1 K/UL (ref 0.5–4.6)
MCH RBC QN AUTO: 34.3 PG (ref 26.1–32.9)
MCHC RBC AUTO-ENTMCNC: 33.6 G/DL (ref 31.4–35)
MCV RBC AUTO: 101.9 FL (ref 79.6–97.8)
MONOCYTES # BLD: 0.1 K/UL (ref 0.1–1.3)
MONOCYTES NFR BLD AUTO: 5 % (ref 4–12)
NEUTS SEG # BLD: 1.1 K/UL (ref 1.7–8.2)
NEUTS SEG NFR BLD AUTO: 47 % (ref 43–78)
PLATELET # BLD AUTO: 106 K/UL (ref 150–450)
PLATELET COMMENTS,PCOM: ABNORMAL
PMV BLD AUTO: 11.5 FL (ref 10.8–14.1)
POTASSIUM SERPL-SCNC: 3.9 MMOL/L (ref 3.5–5.1)
PROTHROMBIN TIME: 16.4 SEC (ref 9.6–12)
RBC # BLD AUTO: 2.1 M/UL (ref 4.05–5.25)
RBC MORPH BLD: ABNORMAL
RBC MORPH BLD: ABNORMAL
SODIUM SERPL-SCNC: 146 MMOL/L (ref 136–145)
WBC # BLD AUTO: 2.2 K/UL (ref 4.3–11.1)
WBC MORPH BLD: ABNORMAL

## 2017-07-09 PROCEDURE — 74011000258 HC RX REV CODE- 258: Performed by: INTERNAL MEDICINE

## 2017-07-09 PROCEDURE — 74011250636 HC RX REV CODE- 250/636: Performed by: INTERNAL MEDICINE

## 2017-07-09 PROCEDURE — 80048 BASIC METABOLIC PNL TOTAL CA: CPT | Performed by: INTERNAL MEDICINE

## 2017-07-09 PROCEDURE — 71010 XR CHEST SNGL V: CPT

## 2017-07-09 PROCEDURE — 74011250637 HC RX REV CODE- 250/637: Performed by: INTERNAL MEDICINE

## 2017-07-09 PROCEDURE — 94640 AIRWAY INHALATION TREATMENT: CPT

## 2017-07-09 PROCEDURE — 85730 THROMBOPLASTIN TIME PARTIAL: CPT | Performed by: INTERNAL MEDICINE

## 2017-07-09 PROCEDURE — 85025 COMPLETE CBC W/AUTO DIFF WBC: CPT | Performed by: INTERNAL MEDICINE

## 2017-07-09 PROCEDURE — 74011000250 HC RX REV CODE- 250: Performed by: INTERNAL MEDICINE

## 2017-07-09 PROCEDURE — 74011250636 HC RX REV CODE- 250/636: Performed by: EMERGENCY MEDICINE

## 2017-07-09 PROCEDURE — 85018 HEMOGLOBIN: CPT | Performed by: INTERNAL MEDICINE

## 2017-07-09 PROCEDURE — 94660 CPAP INITIATION&MGMT: CPT

## 2017-07-09 PROCEDURE — 65610000001 HC ROOM ICU GENERAL

## 2017-07-09 PROCEDURE — 85610 PROTHROMBIN TIME: CPT | Performed by: INTERNAL MEDICINE

## 2017-07-09 PROCEDURE — 36415 COLL VENOUS BLD VENIPUNCTURE: CPT | Performed by: INTERNAL MEDICINE

## 2017-07-09 PROCEDURE — 99233 SBSQ HOSP IP/OBS HIGH 50: CPT | Performed by: INTERNAL MEDICINE

## 2017-07-09 PROCEDURE — 77010033678 HC OXYGEN DAILY

## 2017-07-09 RX ORDER — ATENOLOL 50 MG/1
25 TABLET ORAL DAILY
Status: DISCONTINUED | OUTPATIENT
Start: 2017-07-09 | End: 2017-07-10

## 2017-07-09 RX ORDER — WARFARIN 1 MG/1
1.5 TABLET ORAL EVERY EVENING
Status: DISCONTINUED | OUTPATIENT
Start: 2017-07-09 | End: 2017-07-14

## 2017-07-09 RX ADMIN — MIRTAZAPINE 30 MG: 15 TABLET, FILM COATED ORAL at 21:44

## 2017-07-09 RX ADMIN — IPRATROPIUM BROMIDE AND ALBUTEROL SULFATE 3 ML: 2.5; .5 SOLUTION RESPIRATORY (INHALATION) at 20:21

## 2017-07-09 RX ADMIN — HYDRALAZINE HYDROCHLORIDE 50 MG: 50 TABLET, FILM COATED ORAL at 09:03

## 2017-07-09 RX ADMIN — PANTOPRAZOLE SODIUM 40 MG: 40 TABLET, DELAYED RELEASE ORAL at 07:27

## 2017-07-09 RX ADMIN — HYDRALAZINE HYDROCHLORIDE 50 MG: 50 TABLET, FILM COATED ORAL at 21:44

## 2017-07-09 RX ADMIN — CEFAZOLIN SODIUM 1 G: 1 INJECTION, POWDER, FOR SOLUTION INTRAMUSCULAR; INTRAVENOUS at 18:44

## 2017-07-09 RX ADMIN — Medication 10 ML: at 13:35

## 2017-07-09 RX ADMIN — HYDRALAZINE HYDROCHLORIDE 50 MG: 50 TABLET, FILM COATED ORAL at 17:39

## 2017-07-09 RX ADMIN — MONTELUKAST SODIUM 10 MG: 10 TABLET, COATED ORAL at 21:44

## 2017-07-09 RX ADMIN — IPRATROPIUM BROMIDE AND ALBUTEROL SULFATE 3 ML: 2.5; .5 SOLUTION RESPIRATORY (INHALATION) at 08:12

## 2017-07-09 RX ADMIN — MUPIROCIN: 2 CREAM TOPICAL at 10:01

## 2017-07-09 RX ADMIN — IPRATROPIUM BROMIDE AND ALBUTEROL SULFATE 3 ML: 2.5; .5 SOLUTION RESPIRATORY (INHALATION) at 15:36

## 2017-07-09 RX ADMIN — MUPIROCIN: 2 CREAM TOPICAL at 21:45

## 2017-07-09 RX ADMIN — FUROSEMIDE 40 MG: 10 INJECTION, SOLUTION INTRAMUSCULAR; INTRAVENOUS at 09:03

## 2017-07-09 RX ADMIN — ATENOLOL 25 MG: 50 TABLET ORAL at 01:58

## 2017-07-09 RX ADMIN — MEGESTROL ACETATE 200 MG: 40 SUSPENSION ORAL at 09:08

## 2017-07-09 RX ADMIN — MUPIROCIN: 2 CREAM TOPICAL at 17:35

## 2017-07-09 RX ADMIN — CEFAZOLIN SODIUM 1 G: 1 INJECTION, POWDER, FOR SOLUTION INTRAMUSCULAR; INTRAVENOUS at 05:55

## 2017-07-09 RX ADMIN — HEPARIN SODIUM AND DEXTROSE 18 UNITS/KG/HR: 5000; 5 INJECTION INTRAVENOUS at 18:49

## 2017-07-09 RX ADMIN — WARFARIN SODIUM 1.5 MG: 1 TABLET ORAL at 17:39

## 2017-07-09 RX ADMIN — IPRATROPIUM BROMIDE AND ALBUTEROL SULFATE 3 ML: 2.5; .5 SOLUTION RESPIRATORY (INHALATION) at 05:15

## 2017-07-09 RX ADMIN — AMLODIPINE BESYLATE 10 MG: 10 TABLET ORAL at 09:02

## 2017-07-09 NOTE — PROGRESS NOTES
Critical Care Daily Progress Note: 7/9/2017    Danita Domínguez   Admission Date: 7/8/2017         The patient's chart is reviewed and the patient is discussed with the staff. Patient known to us from [facility] recent admission 6/2017:  Patient is a 78 y. o.  and non english speaking female presents with dyspnea.      She was last seen by us in February of 2017 for right pleural effusion. She has dCHR, prior DVT on coumadin, CKD, Moderate AS, HTN, DM and R ureteral obstruction s/p stent placement. She is followed by Dr Woo Adams for metastatic breast CA with mets to lung/bone. In February, she underwent R thoracentesis with drain to gravity with removal of 1200 ml of yellow pleural fluid. She developed pain after thoracentesis and pain was felt to be from trapped lung physiology and recommended not to remove more than 1 liter of pleural fluid if thoracentesis is again necessary. She was hypoxemic and home O2 arranged.       She had a CXR in April 2017 with persistent effusion. She was seen by Oncology on 5/16/2017 and was having dyspnea during that time with fatigue. She was given lasix as well and has taken lasix until 3 days ago. She has plans to see [facility] on June 2. Today in the ER, her CXR shows effusion on right which is unchanged and new right perihilar infiltrate. We were asked to admit her for hypoxemia with persistent effusion, new infiltrate with metastatic breast cancer with acute on chronic renal failure and moderate AS. Pt was admitted and decompensated requiring intubation. She was seen by oncology and her chemotherapy treatments were held. She had R thoracentesis with 500mL serosanguinous fluid removed on 5/25/17. Pt improved and was extubated on 5/26/17. Urology was consulted to evaluate for urinothorax. It was felt that she did not have urinothorax but chronic effusion. She improved and was weaned to 2L. She will be discharged home today with home health.  She will hold Coumadin until she returns on 6/8 for pleurex catheter placement. She will then resume Coumadin after that. She is to ambulate with respiratory therapy to determine home O2 needs on exertion.     Complained of a 1 day history of SOB and developed respiratory distress and was brought to ER, where she was promptly placed on NIPPV due to respiratory distress. She had a Plurex catheter placed on the R w/o complication on [birth year]1/49 and had been draing as planned at home. Has history of DVT on chronic coumadin Rx but found to be subtherapeutic with INR 1.4 today. Given respiratory distress and Cr > 2.0 CTPE protocol was deferred and patient empirically started on heparin GTT. Also , on CXR had bilateral vascular congestion and was given lasix with good response. Overall by the time I saw patient she was much improved and in less respiratory distress on NIPPV. No fevers, chills or cough noted by patient. She was started on cephalexin PO by oncology due to R groin folliculitis/abscess for the past 5 days. Unfortunately neither she nor her  speak any english and history was taken from record and through interprerter.            Subjective:     No overnight events, feels better and is off NIPPV    Current Facility-Administered Medications   Medication Dose Route Frequency    atenolol (TENORMIN) tablet 25 mg  25 mg Oral DAILY    warfarin (COUMADIN) tablet 1.5 mg  1.5 mg Oral QPM    heparin 25,000 units in dextrose 500 mL infusion  18-36 Units/kg/hr IntraVENous TITRATE    ALPRAZolam (XANAX) tablet 0.25 mg  0.25 mg Oral TID PRN    amLODIPine (NORVASC) tablet 10 mg  10 mg Oral DAILY    duloxetine (CYMBALTA) capsule 20 mg - PATIENT SUPPLIED  20 mg Oral DAILY    fentaNYL (DURAGESIC) 50 mcg/hr patch 1 Patch  1 Patch TransDERmal Q72H    hydrALAZINE (APRESOLINE) tablet 50 mg  50 mg Oral TID    HYDROcodone-acetaminophen (NORCO)  mg tablet 1 Tab  1 Tab Oral Q4H PRN    LORazepam (ATIVAN) tablet 1 mg  1 mg Oral Q6H PRN    megestrol (MEGACE) 400 mg/10 mL (10 mL) oral suspension 200 mg  200 mg Oral DAILY    mirtazapine (REMERON) tablet 30 mg  30 mg Oral QHS    montelukast (SINGULAIR) tablet 10 mg  10 mg Oral QHS    zolpidem (AMBIEN) tablet 5 mg  5 mg Oral QHS PRN    sodium chloride (NS) flush 5-10 mL  5-10 mL IntraVENous Q8H    sodium chloride (NS) flush 5-10 mL  5-10 mL IntraVENous PRN    promethazine (PHENERGAN) with saline injection 12.5 mg  12.5 mg IntraVENous Q6H PRN    senna-docusate (PERICOLACE) 8.6-50 mg per tablet 1 Tab  1 Tab Oral BID PRN    furosemide (LASIX) injection 40 mg  40 mg IntraVENous DAILY    pantoprazole (PROTONIX) tablet 40 mg  40 mg Oral ACB    mupirocin calcium (BACTROBAN) 2 % cream   Topical TID    albuterol-ipratropium (DUO-NEB) 2.5 MG-0.5 MG/3 ML  3 mL Nebulization Q6H RT    ceFAZolin (ANCEF) 1 g in 0.9% sodium chloride (MBP/ADV) 50 mL  1 g IntraVENous Q12H       Objective:     Vitals:    07/09/17 0813 07/09/17 0815 07/09/17 0901 07/09/17 0910   BP:   (!) 203/81    Pulse:   65    Resp:   23    Temp:       SpO2: 96% 96% 100% 99%   Weight:       Height:           Intake and Output:   07/07 1901 - 07/09 0700  In: -   Out: 1400 [Urine:1400]       Physical Exam:          Gen:  the patient is well developed and in no acute distress on NC  HEENT:  Sclera clear, pupils equal, oral mucosa moist  Lungs: CTA  Heart:  RRR without M,G,R  Abd: soft and non-tender; with positive bowel sounds. Ext: warm without cyanosis. There is trace lower leg edema.   Skin:  no jaundice or rashes, R groin folliculitis much better wounds   Neuro: no gross neuro deficits     Psychiatric:  alert and oriented x 3    LINES:  [place]      CHEST XRAY:           LAB  Recent Labs      07/09/17   0705  07/09/17   0620  07/08/17   1620   WBC  2.2*   --   4.9   HGB  7.2*   --   10.1*   HCT  21.4*   --   29.6*   PLT  106*   --   174   INR   --   1.5*  1.4*     Recent Labs      07/09/17   0620  07/08/17   1620   NA  146* 144   K  3.9  4.3   CL  112*  110*   CO2  26  23   GLU  104*  173*   BUN  43*  42*   CREA  2.38*  2.39*   MG   --   2.7*   CA  7.2*  8.6   ALB   --   3.4   SGOT   --   34     Recent Labs      07/08/17   1630   PH  7.49*   PCO2  27*   PO2  79   HCO3  20*     No results for input(s): LCAD, LAC in the last 72 hours. Assessment:  (Medical Decision Making)     Patient Active Problem List   Diagnosis Code    Breast cancer ([facility] C50.919    [facility] C79.51    HTN (hypertension) I10    DM (diabetes mellitus) ([facility] E11.9    CKD (chronic kidney disease) N18.9    Iron deficiency anemia D50.9    Acute respiratory failure (HCC) J96.00    Hypomagnesemia E83.42    Hypokalemia E87.6    Asthma J45.909    Anemia D64.9    DVT (deep venous thrombosis) ([facility] I82.409    PND (paroxysmal nocturnal dyspnea) R06.00    Leg swelling M79.89    CHF (congestive heart failure) ([facility]) I50.9    Accelerated hypertension I10    Elevated troponin R74.8    Breast cancer metastasized to lung ([facility]) C50.919, C78.00    Pulmonary edema J81.1    Acute on chronic renal failure (HCC) N17.9, N18.9    Pleural effusion, right J90    Hydronephrosis N13.30    Pancytopenia ([facility]) D61.818    Chemotherapy-induced neutropenia ([facility]) D70.1    Bilateral edema of lower extremity R60.0    Hematuria R31.9    Acute on chronic respiratory failure (HCC) J96.20    Moderate aortic stenosis I35.0    Anticoagulated on Coumadin Z51.81, Z79.01    Volume overload E87.70    Anxiety F41.9    Hypernatremia E87.0    Respiratory failure with hypoxia ([facility]) J96.91         Plan:  (Medical Decision Making)     Hospital Problems  Date Reviewed: 7/5/2017          Codes Class Noted POA    Respiratory failure with hypoxia ([facility] ICD-10-CM: J96.91  ICD-9-CM: 518.81  7/8/2017 Unknown    Better- ? PE and or chf and volume OL. Now that she is off NIPPV and feels better- suspect CHF a major cause for decompensation.   Hb dropped on heparin with sub therapeutic INR. PE still cannot be excluded Renal function abnormal- risk of progressive renal failure with contrast administration high. Patient on chronic AC any way with subtherapeutic INR. Identifying A PE WILL NOT ACTUALLY  HOWEVER AND GIVEN THE RISKS AND LACK OF CHANGE IN MANAGEMENT  IF IDENTIFIED WE WILL PROBABLY DEFER CTPE. Of concern is her drop in Hb while on heparin- continue to observe and transfuse if it drops below 7- if bleeding an issue, may need a GFF and cessation of anticoagulants. For now continue to dose coumadin to goal INR        Discussed with nurse.       More than 50% of time documented was spent in face-to-face contact with the patient and in the care of the patient on the floor/unit where the patient is located.            Sue Tineo MD

## 2017-07-09 NOTE — PROGRESS NOTES
BP [id number] and 363/45 recycled.   Pt did not receive all her meds yesterday , talked with pharm and will start Atenolol 25 mg dose as a now dose

## 2017-07-09 NOTE — PROGRESS NOTES
Pt is drowsy. She will awaken. BP systolic 065-064 after receiving meds. Good UOP. Pt remains on Bipap. Lab has drawn the PTT awaiting results.    Will continue to monitor

## 2017-07-09 NOTE — PROGRESS NOTES
Pt admitted to ICU from ED.  present and talking with patient for us. Pt understands limited English. Pt is drowsy but arouseable. Skin is intact. Right groin has an open area with purulent drainage. New gauze and tegaderm placed. Pt has right plural drain sealed site wnl.   Sacrum has a black scab on the sacrum that is dry and appears to be healing

## 2017-07-09 NOTE — PROGRESS NOTES
TRANSFER - IN REPORT:    Verbal report received from [facility] on Javier Skains  being received from ed(unit) for routine progression of care      Report consisted of patients Situation, Background, Assessment and   Recommendations(SBAR). Information from the following report(s) Kardex and MAR was reviewed with the receiving nurse. Opportunity for questions and clarification was provided. Assessment completed upon patients arrival to unit and care assumed.

## 2017-07-09 NOTE — PROGRESS NOTES
Spiritual Care Assessment/Progress Notes    Bharati Peña [id number]  [id number]    1950  78 y.o.  female    Patient Telephone Number: [phone] (home)   Presybeterian Affiliation: Jonathan   Language: Gibraltarian   Extended Emergency Contact Information  Primary Emergency Contact: Juancarlos Leggett  Address: [address] [place] [address] Phone: [phone]  Relation: Child  Secondary Emergency Contact: [address] Phone: [phone]  Relation: Spouse   Patient Active Problem List    Diagnosis Date Noted    Respiratory failure with hypoxia ([address] 07/08/2017    Hypernatremia 05/30/2017    Anxiety 05/28/2017    Acute on chronic respiratory failure ([address] 05/24/2017    Moderate aortic stenosis 05/24/2017    Anticoagulated on Coumadin 05/24/2017    Volume overload 05/24/2017    Breast cancer ([address] 02/17/2017    Bony metastasis ([address] 02/17/2017    Acute respiratory failure ([address] 02/17/2017    Breast cancer metastasized to lung ([address] 02/17/2017    CKD (chronic kidney disease) 01/04/2017    DVT (deep venous thrombosis) ([address] 01/04/2017    Accelerated hypertension 01/04/2017    Pleural effusion, right 01/04/2017    Hydronephrosis 01/04/2017    Bilateral edema of lower extremity 01/04/2017    Hematuria 01/04/2017    Chemotherapy-induced neutropenia (HCC) 12/20/2016    Pancytopenia ([address] 10/10/2016    HTN (hypertension) 10/07/2016    DM (diabetes mellitus) ([address] 10/07/2016    Acute on chronic renal failure ([address] 10/07/2016    Pulmonary edema 01/03/2016    Elevated troponin 10/20/2015    PND (paroxysmal nocturnal dyspnea) 02/25/2014    Leg swelling 02/25/2014    CHF (congestive heart failure) ([address] 02/25/2014    Anemia 11/19/2013    Asthma 10/22/2013    Hypomagnesemia 08/04/2013    Hypokalemia 08/04/2013    Iron deficiency anemia 06/10/2013        Date: 7/9/2017       Level of Presybeterian/Spiritual Activity:  [x]         Involved in gabriella tradition/spiritual practice    []         Not involved in gabriella tradition/spiritual practice  [x]         Spiritually oriented    []         Claims no spiritual orientation    []         seeking spiritual identity  [x]         Feels alienated from Gnosticism practice/tradition  []         Feels angry about Gnosticism practice/tradition  [x]         Spirituality/Gnosticism tradition is a resource for coping at this time. []         Not able to assess due to medical condition    Services Provided Today:  []         crisis intervention    []         reading Scriptures  [x]         spiritual assessment    []         prayer  []         empathic listening/emotional support  []         rites and rituals (cite in comments)  []         life review     []         Gnosticism support  []         theological development   []         advocacy  []         ethical dialog     []         blessing  []         bereavement support    [x]         support to family  []         anticipatory grief support   []         help with AMD  []         spiritual guidance    []         meditation      Spiritual Care Needs  []         Emotional Support  []         Spiritual/Advent Care  []         Loss/Adjustment  []         Advocacy/Referral                /Ethics  []         No needs expressed at               this time  []         Other: (note in               comments)  [address]  [x]         Follow up visits with               pt/family  []         Provide materials  []         Schedule sacraments  []         Contact Community               Clergy  []         Follow up as needed  []         Other: (note in               comments)     Comments:     Patient and family speak very little [place]. Brief visit to support.     Teagan Edwards

## 2017-07-09 NOTE — PROGRESS NOTES
Bedside and Verbal shift change report given to Eli (oncoming nurse) by Frutoso Schwab (offgoing nurse). Report included the following information SBAR, Kardex, ED Summary, Intake/Output, MAR, Accordion, Recent Results, Med Rec Status and Cardiac Rhythm NSR.

## 2017-07-09 NOTE — PROGRESS NOTES
50 mcg Fentanyl  Patch pt was wearing on admission removed and replaced with a new 50 mcg patch.  Witness by Deni Sharpe RN

## 2017-07-09 NOTE — PROGRESS NOTES
Patient taken off bipap and placed on 4lnc with no complications. O2 saturation is 96% at this time and RR is 18. Will continue to monitor.

## 2017-07-10 PROBLEM — D50.9 IRON DEFICIENCY ANEMIA: Chronic | Status: ACTIVE | Noted: 2017-07-10

## 2017-07-10 PROBLEM — L73.9 FOLLICULITIS: Status: ACTIVE | Noted: 2017-07-10

## 2017-07-10 PROBLEM — E11.9 TYPE 2 DIABETES MELLITUS WITHOUT COMPLICATION (HCC): Status: ACTIVE | Noted: 2017-07-10

## 2017-07-10 LAB
ANION GAP BLD CALC-SCNC: 14 MMOL/L (ref 7–16)
APTT PPP: 74.2 SEC (ref 23.5–31.7)
APTT PPP: >110 SEC (ref 23.5–31.7)
BUN SERPL-MCNC: 36 MG/DL (ref 8–23)
CALCIUM SERPL-MCNC: 7.3 MG/DL (ref 8.3–10.4)
CHLORIDE SERPL-SCNC: 110 MMOL/L (ref 98–107)
CO2 SERPL-SCNC: 22 MMOL/L (ref 21–32)
CREAT SERPL-MCNC: 2.65 MG/DL (ref 0.6–1)
GLUCOSE BLD STRIP.AUTO-MCNC: 130 MG/DL (ref 65–100)
GLUCOSE BLD STRIP.AUTO-MCNC: 203 MG/DL (ref 65–100)
GLUCOSE SERPL-MCNC: 204 MG/DL (ref 65–100)
HCT VFR BLD AUTO: 21.4 % (ref 35.8–46.3)
HCT VFR BLD AUTO: 21.9 % (ref 35.8–46.3)
HGB BLD-MCNC: 7.2 G/DL (ref 11.7–15.4)
HGB BLD-MCNC: 7.3 G/DL (ref 11.7–15.4)
INR PPP: 1.9 (ref 0.9–1.2)
MAGNESIUM SERPL-MCNC: 2.1 MG/DL (ref 1.8–2.4)
POTASSIUM SERPL-SCNC: 3.6 MMOL/L (ref 3.5–5.1)
PROTHROMBIN TIME: 20.9 SEC (ref 9.6–12)
SODIUM SERPL-SCNC: 146 MMOL/L (ref 136–145)

## 2017-07-10 PROCEDURE — 94640 AIRWAY INHALATION TREATMENT: CPT

## 2017-07-10 PROCEDURE — 77010033678 HC OXYGEN DAILY

## 2017-07-10 PROCEDURE — 85018 HEMOGLOBIN: CPT | Performed by: INTERNAL MEDICINE

## 2017-07-10 PROCEDURE — 82962 GLUCOSE BLOOD TEST: CPT

## 2017-07-10 PROCEDURE — 74011250637 HC RX REV CODE- 250/637: Performed by: INTERNAL MEDICINE

## 2017-07-10 PROCEDURE — 74011250637 HC RX REV CODE- 250/637: Performed by: NURSE PRACTITIONER

## 2017-07-10 PROCEDURE — 94760 N-INVAS EAR/PLS OXIMETRY 1: CPT

## 2017-07-10 PROCEDURE — 74011636637 HC RX REV CODE- 636/637: Performed by: INTERNAL MEDICINE

## 2017-07-10 PROCEDURE — 74011250636 HC RX REV CODE- 250/636: Performed by: INTERNAL MEDICINE

## 2017-07-10 PROCEDURE — 80048 BASIC METABOLIC PNL TOTAL CA: CPT | Performed by: INTERNAL MEDICINE

## 2017-07-10 PROCEDURE — 36415 COLL VENOUS BLD VENIPUNCTURE: CPT | Performed by: EMERGENCY MEDICINE

## 2017-07-10 PROCEDURE — 85730 THROMBOPLASTIN TIME PARTIAL: CPT | Performed by: INTERNAL MEDICINE

## 2017-07-10 PROCEDURE — 74011000250 HC RX REV CODE- 250: Performed by: INTERNAL MEDICINE

## 2017-07-10 PROCEDURE — 85610 PROTHROMBIN TIME: CPT | Performed by: EMERGENCY MEDICINE

## 2017-07-10 PROCEDURE — 74011000258 HC RX REV CODE- 258: Performed by: INTERNAL MEDICINE

## 2017-07-10 PROCEDURE — 65660000000 HC RM CCU STEPDOWN

## 2017-07-10 PROCEDURE — C8929 TTE W OR WO FOL WCON,DOPPLER: HCPCS

## 2017-07-10 PROCEDURE — A4300 CATH IMPL VASC ACCESS PORTAL: HCPCS

## 2017-07-10 PROCEDURE — 83735 ASSAY OF MAGNESIUM: CPT | Performed by: INTERNAL MEDICINE

## 2017-07-10 PROCEDURE — 99233 SBSQ HOSP IP/OBS HIGH 50: CPT | Performed by: INTERNAL MEDICINE

## 2017-07-10 PROCEDURE — 94762 N-INVAS EAR/PLS OXIMTRY CONT: CPT

## 2017-07-10 RX ORDER — CEPHALEXIN 500 MG/1
500 CAPSULE ORAL EVERY 8 HOURS
Status: DISCONTINUED | OUTPATIENT
Start: 2017-07-10 | End: 2017-07-14 | Stop reason: HOSPADM

## 2017-07-10 RX ORDER — CARVEDILOL 3.12 MG/1
3.12 TABLET ORAL 2 TIMES DAILY WITH MEALS
Status: DISCONTINUED | OUTPATIENT
Start: 2017-07-10 | End: 2017-07-14 | Stop reason: HOSPADM

## 2017-07-10 RX ORDER — INSULIN LISPRO 100 [IU]/ML
INJECTION, SOLUTION INTRAVENOUS; SUBCUTANEOUS
Status: DISCONTINUED | OUTPATIENT
Start: 2017-07-10 | End: 2017-07-14 | Stop reason: HOSPADM

## 2017-07-10 RX ADMIN — AMLODIPINE BESYLATE 10 MG: 10 TABLET ORAL at 08:38

## 2017-07-10 RX ADMIN — FUROSEMIDE 40 MG: 10 INJECTION, SOLUTION INTRAMUSCULAR; INTRAVENOUS at 08:38

## 2017-07-10 RX ADMIN — HYDRALAZINE HYDROCHLORIDE 50 MG: 50 TABLET, FILM COATED ORAL at 21:42

## 2017-07-10 RX ADMIN — CEPHALEXIN 500 MG: 500 CAPSULE ORAL at 12:48

## 2017-07-10 RX ADMIN — PERFLUTREN 1 ML: 6.52 INJECTION, SUSPENSION INTRAVENOUS at 10:00

## 2017-07-10 RX ADMIN — MEGESTROL ACETATE 200 MG: 40 SUSPENSION ORAL at 08:40

## 2017-07-10 RX ADMIN — ATENOLOL 25 MG: 50 TABLET ORAL at 08:38

## 2017-07-10 RX ADMIN — INSULIN LISPRO 4 UNITS: 100 INJECTION, SOLUTION INTRAVENOUS; SUBCUTANEOUS at 17:18

## 2017-07-10 RX ADMIN — PANTOPRAZOLE SODIUM 40 MG: 40 TABLET, DELAYED RELEASE ORAL at 08:38

## 2017-07-10 RX ADMIN — IPRATROPIUM BROMIDE AND ALBUTEROL SULFATE 3 ML: 2.5; .5 SOLUTION RESPIRATORY (INHALATION) at 13:23

## 2017-07-10 RX ADMIN — MUPIROCIN: 2 CREAM TOPICAL at 16:05

## 2017-07-10 RX ADMIN — HYDRALAZINE HYDROCHLORIDE 50 MG: 50 TABLET, FILM COATED ORAL at 16:04

## 2017-07-10 RX ADMIN — Medication 5 ML: at 21:42

## 2017-07-10 RX ADMIN — CEFAZOLIN SODIUM 1 G: 1 INJECTION, POWDER, FOR SOLUTION INTRAMUSCULAR; INTRAVENOUS at 06:04

## 2017-07-10 RX ADMIN — MUPIROCIN: 2 CREAM TOPICAL at 08:38

## 2017-07-10 RX ADMIN — IPRATROPIUM BROMIDE AND ALBUTEROL SULFATE 3 ML: 2.5; .5 SOLUTION RESPIRATORY (INHALATION) at 07:30

## 2017-07-10 RX ADMIN — MIRTAZAPINE 30 MG: 15 TABLET, FILM COATED ORAL at 21:42

## 2017-07-10 RX ADMIN — CEPHALEXIN 500 MG: 500 CAPSULE ORAL at 21:42

## 2017-07-10 RX ADMIN — HYDRALAZINE HYDROCHLORIDE 50 MG: 50 TABLET, FILM COATED ORAL at 08:37

## 2017-07-10 RX ADMIN — CARVEDILOL 3.12 MG: 3.12 TABLET, FILM COATED ORAL at 16:04

## 2017-07-10 RX ADMIN — MUPIROCIN: 2 CREAM TOPICAL at 21:43

## 2017-07-10 RX ADMIN — Medication 10 ML: at 14:47

## 2017-07-10 RX ADMIN — IPRATROPIUM BROMIDE AND ALBUTEROL SULFATE 3 ML: 2.5; .5 SOLUTION RESPIRATORY (INHALATION) at 02:24

## 2017-07-10 RX ADMIN — IPRATROPIUM BROMIDE AND ALBUTEROL SULFATE 3 ML: 2.5; .5 SOLUTION RESPIRATORY (INHALATION) at 20:48

## 2017-07-10 RX ADMIN — WARFARIN SODIUM 1.5 MG: 1 TABLET ORAL at 17:19

## 2017-07-10 RX ADMIN — CARVEDILOL 3.12 MG: 3.12 TABLET, FILM COATED ORAL at 10:57

## 2017-07-10 RX ADMIN — MONTELUKAST SODIUM 10 MG: 10 TABLET, COATED ORAL at 21:42

## 2017-07-10 NOTE — PROGRESS NOTES
PTT result of >110. Heparin gtt held at this time per order. Will start gtt in 1 hour and decrease gtt by 3 units/kg/hr per order.

## 2017-07-10 NOTE — PROGRESS NOTES
[facility] CRITICAL CARE OUTREACH NURSE PROGRESS REPORT      SUBJECTIVE: Called to assess patient secondary to transfer from critical care. MEWS Score: 2 (07/10/17 1204)  Vitals:    07/10/17 1100 07/10/17 1204 07/10/17 1300 07/10/17 1327   BP: 150/67 151/72 124/56    Pulse: 70 68 68    Resp: 21 25 (!) 31    Temp:  98.6 °F (37 °C)     SpO2: 97% 97% 98% 98%   Weight:       Height:          EKG: normal EKG, normal sinus rhythm, unchanged from previous tracings. LAB DATA:    Recent Labs      07/10/17   1306  07/09/17   0620  07/08/17   1620   NA  146*  146*  144   K  3.6  3.9  4.3   CL  110*  112*  110*   CO2  22  26  23   AGAP  14  8  11   GLU  204*  104*  173*   BUN  36*  43*  42*   CREA  2.65*  2.38*  2.39*   GFRAA  23*  26*  26*   GFRNA  19*  22*  22*   CA  7.3*  7.2*  8.6   MG  2.1   --   2.7*   ALB   --    --   3.4   TP   --    --   8.4*   GLOB   --    --   5.0*   AGRAT   --    --   0.7*   ALT   --    --   29        Recent Labs      07/10/17   1306  07/10/17   0231  07/09/17   1918   07/09/17   0705  07/08/17   1620   WBC   --    --    --    --   2.2*  4.9   HGB  7.3*  7.2*  7.6*   < >  7.2*  10.1*   HCT  21.9*  21.4*  22.4*   < >  21.4*  29.6*   PLT   --    --    --    --   106*  174    < > = values in this interval not displayed. OBJECTIVE: saw pt in ICU prior to transfer. Pain Assessment  Pain Intensity 1: 0 (07/10/17 1204)        Patient Stated Pain Goal: 0         ASSESSMENT:  Pt awake and alert. Follows commands. C/o slight SOB that resolved with elevating HOB. O2 saturation 99% on 2L via NC. Scattered wheezing noted in upper lobes. No peripheral edema noted. VSS. NAD. PLAN:  Will continue to follow per outreach protocol.

## 2017-07-10 NOTE — CONSULTS
[facility] Cardiology Consult                Date of  Admission: 7/8/2017  3:57 PM     Primary Care Physician:  Dr. Torito Olson  Primary Cardiologist:  Dr. Sol Celaya  Referring Physician:  Dr. Susan Pitts Physician:  Dr. Dimitrios Alfredo    CC/Reason for consult:  Heart failure management      Katelyn Tse is a 78 y.o. female with PMH of HFpEF (EF 55-60% with mod AS on echo 12/16), breast CA with cuca to bone and lung, depression, anxiety, DVT (on coumadin), DM, and HTN, who presented to the ED with shortness of breath. She developed respiratory distress and presented to the ED. She was noted to be hypertensive with BPs as high as 209/83 and sat of 89% on RA. The patient was placed on BIPAP in the ED. CXR showed mild pulmonary congestion with bilateral pleural effusions. BNP was elevated at 3072. Creatinine 2.38, which is where she has been on last several BMPs. Patient reports feeling well up until the day before admission and she became very short of breath, particularly with exertion. Patient also reports orthopnea. Given her history of DVT she was placed on heparin drip and will have VQ scan later today. She was admitted to ICU and has received IV lasix with good response. She is -2.8L and reports improvement in dyspnea, however, she has not ambulated. She is on 2L NC with oxygen sats of 96%. Plurex drain remains in place.        Patient Active Problem List   Diagnosis Code    Breast cancer ([facility] C50.919    Bony metastasis ([facility] [address] C79.51    HTN (hypertension) I10    DM (diabetes mellitus) ([facility] [address] E11.9    CKD (chronic kidney disease) N18.9    Iron deficiency anemia D50.9    Acute respiratory failure (HCC) J96.00    Hypomagnesemia E83.42    Hypokalemia E87.6    Asthma J45.909    Anemia D64.9    DVT (deep venous thrombosis) (HCC) I82.409    PND (paroxysmal nocturnal dyspnea) R06.00    Leg swelling M79.89    CHF (congestive heart failure) ([facility]) I50.9    Accelerated hypertension I10    Elevated troponin R74.8    Breast cancer metastasized to lung (HCC) C50.919, C78.00    Pulmonary edema J81.1    Acute on chronic renal failure (HCC) N17.9, N18.9    Pleural effusion, right J90    Hydronephrosis N13.30    Pancytopenia (HCC) D61.818    Chemotherapy-induced neutropenia (HCC) D70.1    Bilateral edema of lower extremity R60.0    Hematuria R31.9    Acute on chronic respiratory failure (HCC) J96.20    Moderate aortic stenosis I35.0    Anticoagulated on Coumadin Z51.81, Z79.01    Volume overload E87.70    Anxiety F41.9    Hypernatremia E87.0    Respiratory failure with hypoxia ([facility]) J96.91       Past Medical History:   Diagnosis Date    Acute on chronic diastolic congestive heart failure ([address] 1/5/2016    Asthma     till age 31    Cancer ([address]     roverto. breast ca mets bone/lung    Chemotherapy-induced neutropenia ([address] 12/20/2016    Depression     Diabetes ([address]     recently dx'ed; ype 2;  does not take at home    DM (diabetes mellitus) ([address] 11/20/2012    HCAP (healthcare-associated pneumonia) 7/17/2013    Hypertension     Sepsis ([address] 7/19/2016    Thromboembolus ([address]     Left leg DVT    Unspecified adverse effect of anesthesia     In [facility] yrs ago after second c -section-she was told her heart stopped d/t anesthesia-hospitalized for 5 days afterwards and followed by cardiologist      Past Surgical History:   Procedure Laterality Date    HX GYN      2 c sections, ovarian cyst-roverto.     HX VASCULAR ACCESS  1/26/12     No Known Allergies   Family History   Problem Relation Age of Onset    Heart Attack Mother     Cancer Brother      doen not know details        Current Facility-Administered Medications   Medication Dose Route Frequency    atenolol (TENORMIN) tablet 25 mg  25 mg Oral DAILY    warfarin (COUMADIN) tablet 1.5 mg  1.5 mg Oral QPM    ceFAZolin (ANCEF) 1 g in 0.9% sodium chloride (MBP/ADV) 50 mL MBP  1 g IntraVENous Q12H    heparin 25,000 units in dextrose 500 mL infusion 18-36 Units/kg/hr IntraVENous TITRATE    ALPRAZolam (XANAX) tablet 0.25 mg  0.25 mg Oral TID PRN    amLODIPine (NORVASC) tablet 10 mg  10 mg Oral DAILY    duloxetine (CYMBALTA) capsule 20 mg - PATIENT SUPPLIED  20 mg Oral DAILY    fentaNYL (DURAGESIC) 50 mcg/hr patch 1 Patch  1 Patch TransDERmal Q72H    hydrALAZINE (APRESOLINE) tablet 50 mg  50 mg Oral TID    HYDROcodone-acetaminophen (NORCO)  mg tablet 1 Tab  1 Tab Oral Q4H PRN    LORazepam (ATIVAN) tablet 1 mg  1 mg Oral Q6H PRN    megestrol (MEGACE) 400 mg/10 mL (10 mL) oral suspension 200 mg  200 mg Oral DAILY    mirtazapine (REMERON) tablet 30 mg  30 mg Oral QHS    montelukast (SINGULAIR) tablet 10 mg  10 mg Oral QHS    zolpidem (AMBIEN) tablet 5 mg  5 mg Oral QHS PRN    sodium chloride (NS) flush 5-10 mL  5-10 mL IntraVENous Q8H    sodium chloride (NS) flush 5-10 mL  5-10 mL IntraVENous PRN    promethazine (PHENERGAN) with saline injection 12.5 mg  12.5 mg IntraVENous Q6H PRN    senna-docusate (PERICOLACE) 8.6-50 mg per tablet 1 Tab  1 Tab Oral BID PRN    furosemide (LASIX) injection 40 mg  40 mg IntraVENous DAILY    pantoprazole (PROTONIX) tablet 40 mg  40 mg Oral ACB    mupirocin calcium (BACTROBAN) 2 % cream   Topical TID    albuterol-ipratropium (DUO-NEB) 2.5 MG-0.5 MG/3 ML  3 mL Nebulization Q6H RT       Review of Systems   HENT: Negative. Eyes: Negative. Respiratory: Positive for shortness of breath. Cardiovascular: Positive for orthopnea and leg swelling. Gastrointestinal: Negative. Genitourinary: Negative. Musculoskeletal: Negative. Skin: Negative. Neurological: Positive for weakness. Psychiatric/Behavioral: Negative.          Physical Exam  Vitals:    07/10/17 0345 07/10/17 0400 07/10/17 0730 07/10/17 0837   BP: 145/66 145/67  150/70   Pulse: 66 66  77   Resp: 26 (!) 32     Temp:  98.6 °F (37 °C)     SpO2: 95% 96% 96%    Weight:       Height:           Physical Exam:  Physical Exam   Constitutional: She is oriented to person, place, and time. She appears unhealthy. Eyes: Pupils are equal, round, and reactive to light. Neck: Normal range of motion. Cardiovascular: Normal rate. Pulmonary/Chest: Effort normal. She has decreased breath sounds in the right lower field. She has rales in the left lower field. Abdominal: Soft. Bowel sounds are normal.   Musculoskeletal: Normal range of motion. Neurological: She is alert and oriented to person, place, and time. Skin: Skin is warm and dry. Psychiatric: Mood, memory, affect and judgment normal.       Cardiographics    Telemetry: normal sinus rhythm  ECG: normal sinus rhythm with inferolateral ischemia pattern  Echocardiogram: pending  Labs:   Recent Labs      07/10/17   0231  07/09/17   1918   07/09/17   0705  07/09/17   0620  07/08/17   1620   NA   --    --    --    --   146*  144   K   --    --    --    --   3.9  4.3   MG   --    --    --    --    --   2.7*   BUN   --    --    --    --   43*  42*   CREA   --    --    --    --   2.38*  2.39*   GLU   --    --    --    --   104*  173*   WBC   --    --    --   2.2*   --   4.9   HGB  7.2*  7.6*   < >  7.2*   --   10.1*   HCT  21.4*  22.4*   < >  21.4*   --   29.6*   PLT   --    --    --   106*   --   174   INR  1.9*   --    --    --   1.5*  1.4*    < > = values in this interval not displayed. Assessment/Plan:     Assessment:          Heart failure with preserved ejection fraction-- continue diuresis with IV lasix. Repeat echo. Plan on [facility] to evaluate if ischemic heart disease is component in heart failure. Change atenolol to coreg 3.125. No ACE/ARB due to renal failure. Strict I/Os and daily weights. Monitor renal function closely with diuresis. Respiratory failure with hypoxia-- improved. Patient on 2L NC.         Breast cancer -- following oncology OP      Overview: Er+  pr + her-2 lawanda negative stage 4           Examination of the 3D tomographic PET images demonstrates marked hypermetabolism associated with both primary breast carcinomas. SUV max on       the right is 20.5 and on the left 31.5. There is matted high right       axillary       lymphadenopathy as well as a more hypermetabolic low right axillary lymph       node which measures 12 x 9 mm and has an SUV max of 15.4. Small bilateral       pulmonary hilar metastases are also noted as well as extensive skeletal       metastatic disease which includes both proximal femora, both scapulae,       innumerable ribs, virtually every vertebra, and the bony pelvis. No       displaced pathologic fracture is identified on the CT images. Ct scan 2-12 Innumerable diffuse osseous metastases. . Multiple diffuse       tiny lung nodules, and right hilar lymphadenopathy, also suspicious for       metastases. 3. Bilateral breast masses, compatible with known carcinoma. Bony metastasis-- see above      HTN (hypertension) -- changing atenolol to coreg, continue norvasc and hydralazine. Titrate coreg up as tolerated. DM (diabetes mellitus) -- monitor BGL. CKD (chronic kidney disease)-- creatinine 2.38, has been in 2-2.5 range on last several BMPs, continue to monitor closely with IV diuresis. DVT (deep venous thrombosis) -- on coumadin, INR 1.9 today. Overview: 11-18-13 There is nonocclusive thrombus within the left common femoral       vein, superficial femoral vein, popliteal vein, and posterior tibial vein          Thank you very much for this referral. We appreciate the opportunity to participate in this patient's care. We will follow along with above stated plan.     Junior Deja NP  Consulting MD: [facility] nathaniel

## 2017-07-10 NOTE — PROGRESS NOTES
Date of Outreach Update:  Jay Jay Cooley was seen and assessed. MEWS Score: 1 (07/10/17 1517)  Vitals:    07/10/17 1300 07/10/17 1327 07/10/17 1401 07/10/17 1517   BP: 124/56  127/63 127/54   Pulse: 68  69 70   Resp: (!) 31  26 20   Temp:    99.7 °F (37.6 °C)   SpO2: 98% 98% 99% 96%   Weight:       Height:             Pain Assessment  Pain Intensity 1: 0 (07/10/17 1806)        Patient Stated Pain Goal: 0      Previous Outreach assessment has been reviewed. There have been no significant clinical changes since the completion of the last dated Outreach assessment. Will continue to follow up per outreach protocol.     Signed By:   Quynh Castillo RN    July 10, 2017 6:45 PM

## 2017-07-10 NOTE — PROGRESS NOTES
Interdisciplinary team rounds were held 7/10/2017 with the following team members:Nursing, Nurse Practitioner, Nutrition, Palliative Care, Pastoral Care, Pharmacy, Physical Therapy, Physician, Respiratory Therapy and Clinical Coordinator and the patient. Plan of care discussed. See clinical pathway and/or care plan for interventions and desired outcomes.

## 2017-07-10 NOTE — PROGRESS NOTES
Critical Care Daily Progress Note: 7/10/2017    Gabriela Valencia   Admission Date: 7/8/2017         The patient's chart is reviewed and the patient is discussed with the staff. Gabriela Valencia is a 67yo F with a PMH of metastatic breast cancer (lung, bone, pleura) complicated by malignant pleural effusion s/p pleurX catheter insertion on 6/8/17, moderate AS who was admitted on 7/8 with recurrent respiratory distress with pulmonary vascular congestion requiring BiPAP. She has a recent history of R groin folliculitis as well. Subjective: In last 2 days hemoglobin has declined from 10.1 to 7.2 without obvious source of bleeding (heparin ongoing though). She is currently 99% on 2lpm.  TTE pending. Diuresed 1.4L yesterday (net).     Current Facility-Administered Medications   Medication Dose Route Frequency    carvedilol (COREG) tablet 3.125 mg  3.125 mg Oral BID WITH MEALS    perflutren lipid microspheres (DEFINITY) in NS bolus IV  1 mL IntraVENous PRN    warfarin (COUMADIN) tablet 1.5 mg  1.5 mg Oral QPM    ceFAZolin (ANCEF) 1 g in 0.9% sodium chloride (MBP/ADV) 50 mL MBP  1 g IntraVENous Q12H    heparin 25,000 units in dextrose 500 mL infusion  18-36 Units/kg/hr IntraVENous TITRATE    ALPRAZolam (XANAX) tablet 0.25 mg  0.25 mg Oral TID PRN    amLODIPine (NORVASC) tablet 10 mg  10 mg Oral DAILY    duloxetine (CYMBALTA) capsule 20 mg - PATIENT SUPPLIED  20 mg Oral DAILY    fentaNYL (DURAGESIC) 50 mcg/hr patch 1 Patch  1 Patch TransDERmal Q72H    hydrALAZINE (APRESOLINE) tablet 50 mg  50 mg Oral TID    HYDROcodone-acetaminophen (NORCO)  mg tablet 1 Tab  1 Tab Oral Q4H PRN    LORazepam (ATIVAN) tablet 1 mg  1 mg Oral Q6H PRN    megestrol (MEGACE) 400 mg/10 mL (10 mL) oral suspension 200 mg  200 mg Oral DAILY    mirtazapine (REMERON) tablet 30 mg  30 mg Oral QHS    montelukast (SINGULAIR) tablet 10 mg  10 mg Oral QHS    zolpidem (AMBIEN) tablet 5 mg  5 mg Oral QHS PRN    sodium chloride (NS) flush 5-10 mL  5-10 mL IntraVENous Q8H    sodium chloride (NS) flush 5-10 mL  5-10 mL IntraVENous PRN    promethazine (PHENERGAN) with saline injection 12.5 mg  12.5 mg IntraVENous Q6H PRN    senna-docusate (PERICOLACE) 8.6-50 mg per tablet 1 Tab  1 Tab Oral BID PRN    furosemide (LASIX) injection 40 mg  40 mg IntraVENous DAILY    pantoprazole (PROTONIX) tablet 40 mg  40 mg Oral ACB    mupirocin calcium (BACTROBAN) 2 % cream   Topical TID    albuterol-ipratropium (DUO-NEB) 2.5 MG-0.5 MG/3 ML  3 mL Nebulization Q6H RT       Review of Systems  Constitutional:  negative for fever, chills, sweats  Cardiovascular:  negative for chest pain, palpitations, syncope, edema  Gastrointestinal:  negative for dysphagia, reflux, vomiting, diarrhea, abdominal pain, or melena  Neurologic:  negative for focal weakness, numbness, headache      Objective:     Vitals:    07/10/17 1031 07/10/17 1046 07/10/17 1057 07/10/17 1100   BP: 148/67 147/67 147/67 150/67   Pulse: 69 69 69 70   Resp: 23 (!) 33  21   Temp:       SpO2: 99% 99%  97%   Weight:       Height:           Intake and Output:   07/08 1901 - 07/10 0700  In: 814.8 [I.V.:814.8]  Out: 3700 [Urine:3700]  07/10 0701 - 07/10 1900  In: 240 [P.O.:240]  Out: -     Physical Exam:          Constitutional:  the patient is well developed and in no acute distress  EENMT:  Sclera clear, pupils equal, oral mucosa moist  Respiratory: CTAB  Cardiovascular:  RRR with 5/6 SM at aortic position  Gastrointestinal: soft and non-tender; with positive bowel sounds. Musculoskeletal: warm without cyanosis. There is no lower leg edema. Skin:  no jaundice or rashes, no wounds   Neurologic: no gross neuro deficits     Psychiatric:  alert and oriented x 3    LINES:  PIVs, port    DRIPS:   Heparin drip    CXR: none today    LAB  No results for input(s): GLUCPOC in the last 72 hours.     No lab exists for component: [place]   Recent Labs      07/10/17   0231  07/09/17   1918  07/09/17 1316  07/09/17   0705  07/09/17   0620  07/08/17   1620   WBC   --    --    --   2.2*   --   4.9   HGB  7.2*  7.6*  7.7*  7.2*   --   10.1*   HCT  21.4*  22.4*  23.0*  21.4*   --   29.6*   PLT   --    --    --   106*   --   174   INR  1.9*   --    --    --   1.5*  1.4*     Recent Labs      07/09/17   0620  07/08/17   1620   NA  146*  144   K  3.9  4.3   CL  112*  110*   CO2  26  23   GLU  104*  173*   BUN  43*  42*   CREA  2.38*  2.39*   MG   --   2.7*   CA  7.2*  8.6   ALB   --   3.4   TBILI   --   0.8   ALT   --   29   SGOT   --   34     Recent Labs      07/08/17   1630   PH  7.49*   PCO2  27*   PO2  79   HCO3  20*     No results for input(s): LCAD, LAC in the last 72 hours. Assessment:  (Medical Decision Making)     Hospital Problems  Date Reviewed: 7/5/2017          Codes Class Noted POA    * (Principal)Respiratory failure with hypoxia [facility]) ICD-10-CM: J96.91  ICD-9-CM: 518.81  7/8/2017 Unknown    Now on 2lpm, transfer to [facility]    Breast cancer ([facility] [address] (Chronic) ICD-10-CM: C50.919  ICD-9-CM: 174.9  2/17/2017 Yes    Overview Addendum 2/27/2014 10:08 AM by Libia Don NP     Er+  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 SUV max on the right is 20.5 and on the left 31.5. There is matted high right axillary   lymphadenopathy as well as a more hypermetabolic low right axillary lymph node which measures 12 x 9 mm and has an SUV max of 15.4. Small bilateral pulmonary hilar metastases are also noted as well as extensive skeletal metastatic disease which includes both proximal femora, both scapulae, innumerable ribs, virtually every vertebra, and the bony pelvis. No displaced pathologic fracture is identified on the CT images. Ct scan 2-12 Innumerable diffuse osseous metastases. . Multiple diffuse tiny lung nodules, and right hilar lymphadenopathy, also suspicious for metastases. 3.  Bilateral breast masses, compatible with known carcinoma. CANCER ANTIGEN 27.29 1076   Oncology Flowsheet Day, Cycle cyclophosphamide (CYTOXAN) IV   1/27/2012 Day 1, Cycle 1 600 mg/m2 = 996 mg   2/17/2012 Day 1, Cycle 2 600 mg/m2 = 996 mg   3/9/2012 Day 1, Cycle 3 600 mg/m2 = 996 mg   3/30/2012 Day 1, Cycle 4 500 mg/m2 = 830 mg   4/20/2012 Day 1, Cycle 5 500 mg/m2 = 830 mg     Oncology Flowsheet DOCEtaxel (TAXOTERE) IV   1/27/2012 75 mg/m2 = 125 mg   2/17/2012 75 mg/m2 = 125 mg   3/9/2012 75 mg/m2 = 125 mg   3/30/2012 60 mg/m2 = 100 mg   4/20/2012 60 mg/m2 = 100 mg   7-19-12 post 6 cycles of TC improved breast masses switch to femara maintenence  Clear response on pet ct scan   Mixed response to chemotherapy: There has been a significant interval response in the right breast and axilla with decrease in size of spiculated right breast mass and numerous right axillary lymph nodes. Left breast mass also appears to have responded chemotherapy though there may be persistent chest wall invasion. 2. Interval evolution of widespread osseous metastatic disease with many lytic lesions now appearing more sclerotic. These are associated with continued FDG uptake which is difficult to differentiate from marrow reactivity given recent chemotherapy. At least one new osseous metastatic deposits is present in the posterior spinous process at L1. Focally intense uptake is also present at approximately T5  10-19-12 pain in arms legs back continued improvement on ct scan Decreased size of the largest lung nodules and near complete resolution . of many. No new masses in the chest, abdomen, or pelvis. 2. Decreased size of right axillary and right hilar lymph nodes. 3. Diffuse osseous lesions again seen. 4. Diverticulosis. 5.  Atherosclerotic vascular disease ca 15-3 down to 50  On femara and aredia will see if we can get affinitor to start at next visit continue therapy  12-19-12 new headache and dizziness for MRI will start affinitor   1-14-13 improved pain breast mass right breast smaller width 7 cm x 5  6-05-13 femara and aredia now on affinitor x 5 months   7-13 tumor markers falling   8-20-13 post admission for pneumonia medications to restart breast mass smaller reduce affinitor to 7.5 mg   10-18-13 femara and affinitor pain right ilium intermittent markers smaller breast mass smaller on right   12-5-13 Reduce Afinitor to 5 mg daily. Continue coumadin for DVT. Continue Femara and Aredia. Repeat US and skeletal survey prior to next visit. 1-6-14 Feeling better with reduced dose. Ultrasound of breasts show decrease in mass sizes. Skeletal survey stable. Follow up in 2 months. Bony metastasis (HCC) (Chronic) ICD-10-CM: C79.51  ICD-9-CM: 198.5  2/17/2017 Yes    Continue home pain regimen    CKD (chronic kidney disease) (Chronic) ICD-10-CM: N18.9  ICD-9-CM: 585.9  1/4/2017 Yes    Overview Signed 5/14/2013 11:35 AM by Shola Garcia RN     With acute rise- ? Dehydration and monitor             DVT (deep venous thrombosis) (HCC) (Chronic) ICD-10-CM: I82.409  ICD-9-CM: 453.40  1/4/2017 Yes    Overview Signed 11/19/2013  6:07 AM by Mahnaz Gillespie     60-66-42 There is nonocclusive thrombus within the left common femoral vein, superficial femoral vein, popliteal vein, and posterior tibial vein           On heparin    HTN (hypertension) (Chronic) ICD-10-CM: I10  ICD-9-CM: 401.9  10/7/2016 Yes        DM (diabetes mellitus) ([facility] [address] (Chronic) ICD-10-CM: E11.9  ICD-9-CM: 250.00  10/7/2016 Yes    With kidney disease. Add FSBS and sliding scale    Heart failure with preserved ejection fraction ([facility] [address] ICD-10-CM: I50.30  ICD-9-CM: 428.9  2/25/2014 Yes          Folliculitis:  Continue keflex. Anemia:  As below, check fOBT and monitor Hgb on heparin drip. Plan:  (Medical Decision Making)     --Check FOBT, check Hgb once more today before moving to daily. Has been stable on heparin which will be continued.   --Continue lasix, nightly CPAP  --F/u TTE and possible cath tomorrow  --Continue lasix 40mg  --transfer to floor with remote telemetry    More than 50% of the time documented was spent in face-to-face contact with the patient and in the care of the patient on the floor/unit where the patient is located.     Celestino Umanzor MD

## 2017-07-10 NOTE — PROGRESS NOTES
TRANSFER - OUT REPORT:    Verbal report given to RN(name) on Connie Sands  being transferred to 836(unit) for routine progression of care       Report consisted of patients Situation, Background, Assessment and   Recommendations(SBAR). Information from the following report(s) SBAR, Kardex, ED Summary, MAR, Recent Results, Med Rec Status and Cardiac Rhythm NSR was reviewed with the receiving nurse. Lines:   Peripheral IV 07/08/17 Right Antecubital (Active)   Site Assessment Clean, dry, & intact 7/10/2017  8:00 AM   Phlebitis Assessment 0 7/10/2017  8:00 AM   Infiltration Assessment 0 7/10/2017  8:00 AM   Dressing Status Clean, dry, & intact 7/10/2017  8:00 AM   Dressing Type Transparent;Tape 7/10/2017  8:00 AM   Hub Color/Line Status Pink;Patent; Flushed; Infusing 7/10/2017  8:00 AM   Alcohol Cap Used No 7/10/2017  8:00 AM        Opportunity for questions and clarification was provided.       Patient transported with:   O2 @ 2 liters

## 2017-07-10 NOTE — PROGRESS NOTES
Warfarin dosing per pharmacist    Gabriela Valencia is a 78 y.o. female. Height: 5' 3\" (160 cm)    Weight: 54 kg (119 lb 0.8 oz)    Indication:  History of DVT    Goal INR:  2-3    Home dose:  Per last anticoagulation visit 1.5 mg (1 mg x 1.5) every day    Risk factors or significant drug interactions:  --    Other anticoagulants:  heparin    Daily Monitoring  Date  INR     Warfarin dose HGB              Notes  7/8  1.4  3 mg  10.1  7/9  1.5  1.5 mg  --  7/10  1.9  1.5 mg  7.2    Pt seen at anticoagulation visit on 7/5/17 with INR of 1.9 and no adjustments made. Pt received 3 mg last night for sub therapeutic INR. Will give 1.5 mg agin tonight as patient has history of extreme sensitivity to warfarin. Pt is being bridged with heparin.       Thank you,  Kian Norris, PharmD  Clinical Pharmacist  [phone]

## 2017-07-10 NOTE — ROUTINE PROCESS
TRANSFER - IN REPORT:    Verbal report received from [facility] RN(name) on [facility]   being received from ICU(unit) for routine progression of care      Report consisted of patients Situation, Background, Assessment and   Recommendations(SBAR). Information from the following report(s) SBAR and Kardex was reviewed with the receiving nurse. Opportunity for questions and clarification was provided. Assessment completed upon patients arrival to unit and care assumed. ANG and at rest, no c/o pain. Oriented to room and bed controls, family at bedside. Skin assessment completed with Swetha Hernandez RN: bilateral heels blanchable and intact: abdomen, sacrum, BLE/BUE, back intact; right groin with lesion, redressed with Bactroban, 4x4 and Tegaderm; right flank with Pleurex catheter with dry dressing.

## 2017-07-10 NOTE — PROGRESS NOTES
Physical Therapy Note:    Participated in interdisciplinary rounds in ICU and chart reviewed. Patient is experiencing decrease in function from baseline. Patient would benefit from skilled acute therapy to increase independence with self care/ADLs, strength, endurance, and functional mobility. Recommend PT/OT consult when medically stable and MD agrees.     Thank you for your consideration,  LAUREN ThompsonT

## 2017-07-10 NOTE — PROGRESS NOTES
Rounded with staff, Interpreting Services offered when needed.           Thank you for this referral,       Erica Hickey, [facility]  /Patient [address]  [address] [facility], [address] [place] [address]    [phone]

## 2017-07-10 NOTE — PROGRESS NOTES
Received shift report from [address] [place] [address]. Patient with left thorax pleurx. Patient reports drained every Monday and Thursday. Received verbal order per Dr Dennise Snellen to drain pleurx, order confirmed and read back.

## 2017-07-11 ENCOUNTER — APPOINTMENT (OUTPATIENT)
Dept: ULTRASOUND IMAGING | Age: 67
DRG: 291 | End: 2017-07-11
Attending: INTERNAL MEDICINE
Payer: SUBSIDIZED

## 2017-07-11 PROBLEM — E11.9 TYPE 2 DIABETES MELLITUS WITHOUT COMPLICATION (HCC): Chronic | Status: ACTIVE | Noted: 2017-07-10

## 2017-07-11 PROBLEM — J96.91 RESPIRATORY FAILURE WITH HYPOXIA (HCC): Status: RESOLVED | Noted: 2017-07-08 | Resolved: 2017-07-11

## 2017-07-11 LAB
ACID FAST STN SPEC: NEGATIVE
ANION GAP BLD CALC-SCNC: 12 MMOL/L (ref 7–16)
APTT PPP: 59.4 SEC (ref 23.5–31.7)
APTT PPP: 84.9 SEC (ref 23.5–31.7)
APTT PPP: 91.2 SEC (ref 23.5–31.7)
BUN SERPL-MCNC: 39 MG/DL (ref 8–23)
CALCIUM SERPL-MCNC: 6.7 MG/DL (ref 8.3–10.4)
CHLORIDE SERPL-SCNC: 112 MMOL/L (ref 98–107)
CO2 SERPL-SCNC: 22 MMOL/L (ref 21–32)
CREAT SERPL-MCNC: 2.86 MG/DL (ref 0.6–1)
GLUCOSE BLD STRIP.AUTO-MCNC: 109 MG/DL (ref 65–100)
GLUCOSE BLD STRIP.AUTO-MCNC: 124 MG/DL (ref 65–100)
GLUCOSE BLD STRIP.AUTO-MCNC: 183 MG/DL (ref 65–100)
GLUCOSE BLD STRIP.AUTO-MCNC: 215 MG/DL (ref 65–100)
GLUCOSE SERPL-MCNC: 137 MG/DL (ref 65–100)
HCT VFR BLD AUTO: 22.2 % (ref 35.8–46.3)
HGB BLD-MCNC: 7.5 G/DL (ref 11.7–15.4)
INR PPP: 2.1 (ref 0.9–1.2)
MAGNESIUM SERPL-MCNC: 2 MG/DL (ref 1.8–2.4)
MYCOBACTERIUM SPEC QL CULT: NEGATIVE
POTASSIUM SERPL-SCNC: 4.2 MMOL/L (ref 3.5–5.1)
PROTHROMBIN TIME: 22.7 SEC (ref 9.6–12)
SODIUM SERPL-SCNC: 146 MMOL/L (ref 136–145)
SPECIMEN PREPARATION: NORMAL
SPECIMEN SOURCE: NORMAL

## 2017-07-11 PROCEDURE — 94640 AIRWAY INHALATION TREATMENT: CPT

## 2017-07-11 PROCEDURE — 85730 THROMBOPLASTIN TIME PARTIAL: CPT | Performed by: INTERNAL MEDICINE

## 2017-07-11 PROCEDURE — 74011250636 HC RX REV CODE- 250/636: Performed by: INTERNAL MEDICINE

## 2017-07-11 PROCEDURE — 74011000250 HC RX REV CODE- 250: Performed by: INTERNAL MEDICINE

## 2017-07-11 PROCEDURE — 74011250636 HC RX REV CODE- 250/636: Performed by: EMERGENCY MEDICINE

## 2017-07-11 PROCEDURE — 85610 PROTHROMBIN TIME: CPT | Performed by: EMERGENCY MEDICINE

## 2017-07-11 PROCEDURE — 93970 EXTREMITY STUDY: CPT

## 2017-07-11 PROCEDURE — 36415 COLL VENOUS BLD VENIPUNCTURE: CPT | Performed by: EMERGENCY MEDICINE

## 2017-07-11 PROCEDURE — 77030003560 HC NDL HUBR BARD -A

## 2017-07-11 PROCEDURE — 85018 HEMOGLOBIN: CPT | Performed by: EMERGENCY MEDICINE

## 2017-07-11 PROCEDURE — 74011250637 HC RX REV CODE- 250/637: Performed by: INTERNAL MEDICINE

## 2017-07-11 PROCEDURE — 65660000000 HC RM CCU STEPDOWN

## 2017-07-11 PROCEDURE — 99232 SBSQ HOSP IP/OBS MODERATE 35: CPT | Performed by: INTERNAL MEDICINE

## 2017-07-11 PROCEDURE — 94760 N-INVAS EAR/PLS OXIMETRY 1: CPT

## 2017-07-11 PROCEDURE — 74011250636 HC RX REV CODE- 250/636: Performed by: NURSE PRACTITIONER

## 2017-07-11 PROCEDURE — 77010033678 HC OXYGEN DAILY

## 2017-07-11 PROCEDURE — 74011636637 HC RX REV CODE- 636/637: Performed by: INTERNAL MEDICINE

## 2017-07-11 PROCEDURE — 83735 ASSAY OF MAGNESIUM: CPT | Performed by: NURSE PRACTITIONER

## 2017-07-11 PROCEDURE — 80048 BASIC METABOLIC PNL TOTAL CA: CPT | Performed by: NURSE PRACTITIONER

## 2017-07-11 PROCEDURE — 74011250637 HC RX REV CODE- 250/637: Performed by: NURSE PRACTITIONER

## 2017-07-11 PROCEDURE — 97162 PT EVAL MOD COMPLEX 30 MIN: CPT

## 2017-07-11 PROCEDURE — 82962 GLUCOSE BLOOD TEST: CPT

## 2017-07-11 RX ORDER — MORPHINE SULFATE 2 MG/ML
2 INJECTION, SOLUTION INTRAMUSCULAR; INTRAVENOUS
Status: DISCONTINUED | OUTPATIENT
Start: 2017-07-11 | End: 2017-07-14 | Stop reason: HOSPADM

## 2017-07-11 RX ADMIN — MUPIROCIN: 2 CREAM TOPICAL at 22:25

## 2017-07-11 RX ADMIN — MORPHINE SULFATE 2 MG: 2 INJECTION, SOLUTION INTRAMUSCULAR; INTRAVENOUS at 21:10

## 2017-07-11 RX ADMIN — MONTELUKAST SODIUM 10 MG: 10 TABLET, COATED ORAL at 22:12

## 2017-07-11 RX ADMIN — MEGESTROL ACETATE 200 MG: 40 SUSPENSION ORAL at 09:08

## 2017-07-11 RX ADMIN — PANTOPRAZOLE SODIUM 40 MG: 40 TABLET, DELAYED RELEASE ORAL at 06:06

## 2017-07-11 RX ADMIN — HEPARIN SODIUM AND DEXTROSE 15 UNITS/KG/HR: 5000; 5 INJECTION INTRAVENOUS at 02:19

## 2017-07-11 RX ADMIN — MUPIROCIN: 2 CREAM TOPICAL at 16:43

## 2017-07-11 RX ADMIN — IPRATROPIUM BROMIDE AND ALBUTEROL SULFATE 3 ML: 2.5; .5 SOLUTION RESPIRATORY (INHALATION) at 13:32

## 2017-07-11 RX ADMIN — CEPHALEXIN 500 MG: 500 CAPSULE ORAL at 06:06

## 2017-07-11 RX ADMIN — HYDRALAZINE HYDROCHLORIDE 50 MG: 50 TABLET, FILM COATED ORAL at 22:12

## 2017-07-11 RX ADMIN — WARFARIN SODIUM 1.5 MG: 1 TABLET ORAL at 16:42

## 2017-07-11 RX ADMIN — CARVEDILOL 3.12 MG: 3.12 TABLET, FILM COATED ORAL at 09:07

## 2017-07-11 RX ADMIN — INSULIN LISPRO 4 UNITS: 100 INJECTION, SOLUTION INTRAVENOUS; SUBCUTANEOUS at 22:19

## 2017-07-11 RX ADMIN — IPRATROPIUM BROMIDE AND ALBUTEROL SULFATE 3 ML: 2.5; .5 SOLUTION RESPIRATORY (INHALATION) at 20:52

## 2017-07-11 RX ADMIN — IPRATROPIUM BROMIDE AND ALBUTEROL SULFATE 3 ML: 2.5; .5 SOLUTION RESPIRATORY (INHALATION) at 07:37

## 2017-07-11 RX ADMIN — MIRTAZAPINE 30 MG: 15 TABLET, FILM COATED ORAL at 22:12

## 2017-07-11 RX ADMIN — CEPHALEXIN 500 MG: 500 CAPSULE ORAL at 22:12

## 2017-07-11 RX ADMIN — HYDRALAZINE HYDROCHLORIDE 50 MG: 50 TABLET, FILM COATED ORAL at 16:42

## 2017-07-11 RX ADMIN — Medication 5 ML: at 14:00

## 2017-07-11 RX ADMIN — CARVEDILOL 3.12 MG: 3.12 TABLET, FILM COATED ORAL at 16:42

## 2017-07-11 RX ADMIN — HYDRALAZINE HYDROCHLORIDE 50 MG: 50 TABLET, FILM COATED ORAL at 09:07

## 2017-07-11 RX ADMIN — MUPIROCIN: 2 CREAM TOPICAL at 09:13

## 2017-07-11 RX ADMIN — CEPHALEXIN 500 MG: 500 CAPSULE ORAL at 13:43

## 2017-07-11 RX ADMIN — AMLODIPINE BESYLATE 10 MG: 10 TABLET ORAL at 09:07

## 2017-07-11 RX ADMIN — INSULIN LISPRO 2 UNITS: 100 INJECTION, SOLUTION INTRAVENOUS; SUBCUTANEOUS at 16:30

## 2017-07-11 RX ADMIN — Medication 5 ML: at 22:19

## 2017-07-11 RX ADMIN — Medication 5 ML: at 06:06

## 2017-07-11 RX ADMIN — HYDROCODONE BITARTRATE AND ACETAMINOPHEN 1 TABLET: 10; 325 TABLET ORAL at 16:48

## 2017-07-11 RX ADMIN — FUROSEMIDE 40 MG: 10 INJECTION, SOLUTION INTRAMUSCULAR; INTRAVENOUS at 09:07

## 2017-07-11 NOTE — PROGRESS NOTES
Problem: Mobility Impaired (Adult and Pediatric)  Goal: *Acute Goals and Plan of Care (Insert Text)  STG:  (1.)Ms. Leggett will move from supine to sit and sit to supine , scoot up and down and roll side to side with SUPERVISION within 3 day(s). (2.)Ms. Leggett will transfer from bed to chair and chair to bed with CONTACT GUARD ASSIST using the least restrictive device within 3 day(s). (3.)Ms. Leggett will ambulate with CONTACT GUARD ASSIST for 30 feet with the least restrictive device within 3 day(s). LTG:  (1.)Ms. Leggett will move from supine to sit and sit to supine , scoot up and down and roll side to side in bed with INDEPENDENT within 7 day(s). (2.)Ms. Leggett will transfer from bed to chair and chair to bed with SUPERVISION using the least restrictive device within 7 day(s). (3.)Ms. Leggett will ambulate with STAND BY ASSIST for 150 feet with the least restrictive device within 7 day(s). ________________________________________________________________________________________________      PHYSICAL THERAPY: INITIAL ASSESSMENT, TREATMENT DAY: DAY OF ASSESSMENT, PM 7/11/2017  INPATIENT: Hospital Day: 4  Payor: Syd Champagne / Plan: [facility] % / Product Type: [employer] [place] /      NAME/AGE/GENDER: Farhad March is a 78 y.o. female      PRIMARY DIAGNOSIS: Respiratory failure with hypoxia (HCC) Respiratory failure with hypoxia ([address] Respiratory failure with hypoxia ([address]        ICD-10: Treatment Diagnosis:       · Generalized Muscle Weakness (M62.81)  · Difficulty in walking, Not elsewhere classified (R26.2)   Precaution/Allergies:  Review of patient's allergies indicates no known allergies. ASSESSMENT:      Ms. Tere Blanc is supine in bed upon contact and agreeable to PT evaluation this afternoon with much encouragement and Serbian speaking nursing assistant present to assist with translation. Pt reports 0/10 pain prior to activity but significant feelings of overall generalized weakness.  Pt reports living in 1 story home with her  with 0 steps to enter. Pt reports use of rolling walker for household ambulation and 0 falls in past 6 months. She reports receiving [facility] PT services prior to hospitalization. Pt reports use of home supplemental O2 24/7 (pt unable to recall how many liters). Pt is SBA with transition supine to sit EOB and CGA with sit to stand to RW. Pt took lateral side steps to EOB and then quickly returned to sitting. Pt reported increased B calf pain/tightness with standing at RW and taking steps. No tenderness or increased warmth noted with palpation in sitting. Pt reports increased fatigue with just that little amount of activity. Pt returned to supine in bed with SBA and left with all needs met and within reach. Dinah Jameson will benefit from skilled PT (medically necessary) to address decreased strength, decreased balance, decreased functional tolerance, decreased cardiopulmonary endurance affecting participation in basic ADLs and functional tasks. This section established at most recent assessment   PROBLEM LIST (Impairments causing functional limitations):  1. Decreased Strength  2. Decreased ADL/Functional Activities  3. Decreased Ambulation Ability/Technique  4. Decreased Balance  5. Increased Pain  6. Decreased Activity Tolerance  7. Decreased Pacing Skills  8. Decreased Work Simplification/Energy Conservation Techniques  9. Increased Fatigue  10. Increased Shortness of Breath    INTERVENTIONS PLANNED: (Benefits and precautions of physical therapy have been discussed with the patient.)  1. Balance Exercise  2. Bed Mobility  3. Family Education  4. Gait Training  5. Home Exercise Program (HEP)  6. Neuromuscular Re-education/Strengthening  7. Therapeutic Activites  8. Therapeutic Exercise/Strengthening  9.  Group Therapy      TREATMENT PLAN: Frequency/Duration: 3 times a week for duration of hospital stay  Rehabilitation Potential For Stated Goals: [facility] REHABILITATION/EQUIPMENT: (at time of discharge pending progress): Continue Skilled Therapy and rehab versus HHPT-dependent on pt progress. HISTORY:   History of Present Injury/Illness (Reason for Referral):  See H&P below  Patient known to us from [facility] recent admission 6/2017:  Patient is a 78 y. o.  and non english speaking female presents with dyspnea. She was last seen by us in February of 2017 for right pleural effusion. She has dCHR, prior DVT on coumadin, CKD, Moderate AS, HTN, DM and R ureteral obstruction s/p stent placement. She is followed by Dr Alfred Arias for metastatic breast CA with mets to lung/bone. In February, she underwent R thoracentesis with drain to gravity with removal of 1200 ml of yellow pleural fluid. She developed pain after thoracentesis and pain was felt to be from trapped lung physiology and recommended not to remove more than 1 liter of pleural fluid if thoracentesis is again necessary. She was hypoxemic and home O2 arranged. She had a CXR in April 2017 with persistent effusion. She was seen by Oncology on 5/16/2017 and was having dyspnea during that time with fatigue. She was given lasix as well and has taken lasix until 3 days ago. She has plans to see [facility] Cardiology on June 2. Today in the ER, her CXR shows effusion on right which is unchanged and new right perihilar infiltrate. We were asked to admit her for hypoxemia with persistent effusion, new infiltrate with metastatic breast cancer with acute on chronic renal failure and moderate AS. Pt was admitted and decompensated requiring intubation. She was seen by oncology and her chemotherapy treatments were held. She had R thoracentesis with 500mL serosanguinous fluid removed on 5/25/17. Pt improved and was extubated on 5/26/17. Urology was consulted to evaluate for urinothorax. It was felt that she did not have urinothorax but chronic effusion. She improved and was weaned to 2L.  She will be discharged home today with home health. She will hold Coumadin until she returns on 6/8 for pleurex catheter placement. She will then resume Coumadin after that. She is to ambulate with respiratory therapy to determine home O2 needs on exertion. Past Medical History/Comorbidities:   Ms. Leann Morales  has a past medical history of Acute on chronic diastolic congestive heart failure ([facility] (1/5/2016); Asthma; Cancer ([facility] Chemotherapy-induced neutropenia (HCC) (12/20/2016); Depression; Diabetes ([facility] DM (diabetes mellitus) ([facility] (11/20/2012); HCAP (healthcare-associated pneumonia) (7/17/2013); Hypertension; Sepsis ([facility] (7/19/2016); Thromboembolus ([facility] and Unspecified adverse effect of anesthesia. Ms. Leann Morales  has a past surgical history that includes vascular access (1/26/12) and gyn. Social History/Living Environment:   Home Environment: Private residence  # Steps to Enter: 0  One/Two Story Residence: One story  Living Alone: No  Support Systems: Spouse/Significant Other/Partner  Patient Expects to be Discharged to[de-identified] Private residence  Current DME Used/Available at Home: Jackeline Elise  Prior Level of Function/Work/Activity:  Lives with , use of RW for household distances, use of supplemental O2 at baseline      Number of Personal Factors/Comorbidities that affect the Plan of Care: 3+: HIGH COMPLEXITY   EXAMINATION:   Most Recent Physical Functioning:   Gross Assessment:  AROM: Generally decreased, functional  Strength: Generally decreased, functional  Coordination: Generally decreased, functional  Sensation: Intact               Posture:     Balance:  Sitting: Intact; Without support  Standing: Impaired;Pull to stand; With support Bed Mobility:  Supine to Sit: Stand-by asssistance  Sit to Supine: Stand-by asssistance  Wheelchair Mobility:     Transfers:  Sit to Stand: Contact guard assistance  Stand to Sit: Contact guard assistance  Gait:     Base of Support: Narrowed  Speed/Lizzette: Slow  Step Length: Left shortened;Right shortened  Distance (ft): 3 Feet (ft) (lateral steps to [facility])  Assistive Device: Walker, rolling  Ambulation - Level of Assistance: Contact guard assistance  Interventions: Safety awareness training; Tactile cues; Verbal cues       Body Structures Involved:  1. Heart  2. Lungs  3. Bones  4. Joints  5. Muscles Body Functions Affected:  1. Sensory/Pain  2. Cardio  3. Respiratory  4. Neuromusculoskeletal  5. Movement Related Activities and Participation Affected:  1. General Tasks and Demands  2. Mobility  3. Self Care  4. Domestic Life  5. Interpersonal Interactions and Relationships  6. Community, Social and [place] [facility]   Number of elements that affect the Plan of Care: 4+: HIGH COMPLEXITY   CLINICAL PRESENTATION:   Presentation: Evolving clinical presentation with changing clinical characteristics: MODERATE COMPLEXITY   CLINICAL DECISION MAKIN [facility] Mobility Inpatient Short Form  How much difficulty does the patient currently have. .. Unable A Lot A Little None   1. Turning over in bed (including adjusting bedclothes, sheets and blankets)? [ ] 1   [ ] 2   [X] 3   [ ] 4   2. Sitting down on and standing up from a chair with arms ( e.g., wheelchair, bedside commode, etc.)   [ ] 1   [ ] 2   [X] 3   [ ] 4   3. Moving from lying on back to sitting on the side of the bed? [ ] 1   [ ] 2   [X] 3   [ ] 4   How much help from another person does the patient currently need. .. Total A Lot A Little None   4. Moving to and from a bed to a chair (including a wheelchair)? [ ] 1   [X] 2   [ ] 3   [ ] 4   5. Need to walk in hospital room? [ ] 1   [X] 2   [ ] 3   [ ] 4   6. Climbing 3-5 steps with a railing? [ ] 1   [X] 2   [ ] 3   [ ] 4   © , Trustees of [address] [place] [address] [address] [address], under license to [employer].  All rights reserved    Score:  Initial: 15 Most Recent: X (Date: -- )     Interpretation of Tool:  Represents activities that are increasingly more difficult (i.e. Bed mobility, Transfers, Gait). Score 24 23 22-20 19-15 14-10 9-7 6       Modifier CH CI CJ CK CL CM CN         · Mobility - Walking and Moving Around:               - CURRENT STATUS:    CK - 40%-59% impaired, limited or restricted               - GOAL STATUS:           CJ - 20%-39% impaired, limited or restricted               - D/C STATUS:                       ---------------To be determined---------------  Payor: JESSE / Plan: [facility] % / Product Type: Jesse /       Medical Necessity:     · Patient is expected to demonstrate progress in strength, balance, coordination and functional technique to decrease assistance required with gait and functional mobility. Reason for Services/Other Comments:  · Patient continues to require skilled intervention due to decreased strength, decreased balance, decreased functional tolerance, decreased cardiopulmonary endurance affecting participation in basic ADLs and functional tasks. Use of outcome tool(s) and clinical judgement create a POC that gives a: Questionable prediction of patient's progress: MODERATE COMPLEXITY                 TREATMENT:   (In addition to Assessment/Re-Assessment sessions the following treatments were rendered)   Pre-treatment Symptoms/Complaints:  Significant weakness  Pain: Initial:   Pain Intensity 1: 0  Post Session:  0/10, fatigue      Assessment/Reassessment only, no treatment provided today     Braces/Orthotics/Lines/Etc:   · IV  · denson catheter  · O2 Device: Nasal cannula  Treatment/Session Assessment:    · Response to Treatment:  Pt has decreased tolerance for functional activity.  Able to take lateral step at EOB but fatigued quickly CGA  · Interdisciplinary Collaboration:  · Physical Therapist  · Registered Nurse  · Certified [address]  · After treatment position/precautions:  · Supine in bed  · Bed/Chair-wheels locked  · Bed in low position  · Call light within reach  · Compliance with Program/Exercises: Will assess as treatment progresses. · Recommendations/Intent for next treatment session: \"Next visit will focus on advancements to more challenging activities and reduction in assistance provided\".   Total Treatment Duration:  PT Patient Time In/Time Out  Time In: 1410  Time Out: [address]

## 2017-07-11 NOTE — PROGRESS NOTES
[address] CRITICAL CARE OUTREACH NURSE PROGRESS REPORT      SUBJECTIVE: Called to assess patient secondary to outreach program.      MEWS Score: 1 (07/10/17 2333)  Vitals:    07/10/17 1517 07/10/17 1935 07/10/17 2049 07/10/17 2333   BP: 127/54 131/51  159/64   Pulse: 70 70  70   Resp: 20 20  20   Temp: 99.7 °F (37.6 °C) 98.5 °F (36.9 °C)  98.3 °F (36.8 °C)   SpO2: 96% 95% 95% 97%   Weight:       Height:          EKG: normal EKG, normal sinus rhythm, unchanged from previous tracings. LAB DATA:    Recent Labs      07/10/17   1306  07/09/17   0620  07/08/17   1620   NA  146*  146*  144   K  3.6  3.9  4.3   CL  110*  112*  110*   CO2  22  26  23   AGAP  14  8  11   GLU  204*  104*  173*   BUN  36*  43*  42*   CREA  2.65*  2.38*  2.39*   GFRAA  23*  26*  26*   GFRNA  19*  22*  22*   CA  7.3*  7.2*  8.6   MG  2.1   --   2.7*   ALB   --    --   3.4   TP   --    --   8.4*   GLOB   --    --   5.0*   AGRAT   --    --   0.7*   ALT   --    --   29        Recent Labs      07/10/17   1306  07/10/17   0231  07/09/17   1918   07/09/17   0705  07/08/17   1620   WBC   --    --    --    --   2.2*  4.9   HGB  7.3*  7.2*  7.6*   < >  7.2*  10.1*   HCT  21.9*  21.4*  22.4*   < >  21.4*  29.6*   PLT   --    --    --    --   106*  174    < > = values in this interval not displayed. OBJECTIVE: On arrival to room, I found patient to be resting quietly. Pain Assessment  Pain Intensity 1: 0 (07/10/17 1806)        Patient Stated Pain Goal: 0       ASSESSMENT:  Pt alert and oriented. O2 sats 97% on 2L NC. Lungs are CTA. Bowel sounds are active. Ruby intact and draining clear yellow urine. Pt denies needs at this time. PLAN:  Continue to monitor per outreach protocol. June Hector RN.

## 2017-07-11 NOTE — PROGRESS NOTES
Warfarin dosing per pharmacist    Radha Morris is a 78 y.o. female. Height: 5' 3\" (160 cm)    Weight: 52.3 kg (115 lb 4.8 oz)    Indication:  History of DVT    Goal INR:  2-3    Home dose:  Per last anticoagulation visit 1.5 mg (1 mg x 1.5) every day    Risk factors or significant drug interactions:  --    Other anticoagulants:  heparin    Daily Monitoring  Date  INR     Warfarin dose HGB              Notes  7/8  1.4  3 mg  10.1  7/9  1.5  1.5 mg  --  7/10  1.9  1.5 mg  7.2  7/11  2.1  1.5 mg  7.5    Pt seen at anticoagulation visit on 7/5/17 with INR of 1.9 and no adjustments made. Pt received 3 mg last night for sub therapeutic INR. INR appropriately trending up and therapeutic today. Continue warfarin 1.5 mg qhs. Patient has history of extreme sensitivity to warfarin. Continue heparin gtt bridge x 1 more day. Pharmacy will continue to follow. Please call with any questions.     Thank you,  Roldan Nava, PharmD  Clinical Pharmacist  [phone]

## 2017-07-11 NOTE — PROGRESS NOTES
Patient voices no concerns at this time. Patient resting in bed with eyes closed and no s/s of pain or discomfort. Call light within reach. Will continue to monitor.

## 2017-07-11 NOTE — PROGRESS NOTES
IV heparin rate has been adjusted based on the most recent PTT results. Lab Results      Component                Value               Date/Time                 aPTT                     91.2                07/11/2017 03:05 AM   Rated decreased by 2 units to 13u/kg/hr with rate of 14.1ml/hr. Next scheduled PTT 7/11 at 1030.

## 2017-07-11 NOTE — PROGRESS NOTES
Jerry Rodriguez  Admission Date: 7/8/2017             Daily Progress Note: 7/11/2017     Jerry Rodriguez is a 67yo F with a PMH of metastatic breast cancer (lung, bone) complicated by chronic (though never shown to be malignant) pleural effusion s/p pleurX catheter insertion on 6/8/17, moderate AS who was admitted on 7/8 with recurrent respiratory distress with pulmonary vascular congestion requiring BiPAP. She has a recent history of R groin folliculitis as well. DVT- nonocclusive within the left femoral vein, superficial femoral vein, popliteal vein, and posterior tibial vein. On heparin. Hemoglobin has declined from 10.1 to 7.2 without obvious source of bleeding (heparin ongoing though). She is currently 99% on 2lpm.  TTE showing moderate aortic stenosis and mild diastolic heart failure. Diuresis of 5.4 liters since admission. Cardiology following. Subjective:     Montserratian speaking only. Transferred to the floor now.  On NC and CPAP Q HS  Cardiology planning [facility] at some point    Review of Systems  Constitutional: negative for fevers, chills, sweats, fatigue, malaise, anorexia and weight loss  Respiratory: positive for dyspnea on exertion  Cardiovascular: negative for chest pain, chest pressure/discomfort, exertional chest pressure/discomfort, claudication    Current Facility-Administered Medications   Medication Dose Route Frequency    carvedilol (COREG) tablet 3.125 mg  3.125 mg Oral BID WITH MEALS    cephALEXin (KEFLEX) capsule 500 mg  500 mg Oral Q8H    insulin lispro (HUMALOG) injection   SubCUTAneous AC&HS    warfarin (COUMADIN) tablet 1.5 mg  1.5 mg Oral QPM    heparin 25,000 units in dextrose 500 mL infusion  18-36 Units/kg/hr IntraVENous TITRATE    ALPRAZolam (XANAX) tablet 0.25 mg  0.25 mg Oral TID PRN    amLODIPine (NORVASC) tablet 10 mg  10 mg Oral DAILY    duloxetine (CYMBALTA) capsule 20 mg - PATIENT SUPPLIED  20 mg Oral DAILY    fentaNYL (DURAGESIC) 50 mcg/hr patch 1 Patch  1 Patch TransDERmal Q72H    hydrALAZINE (APRESOLINE) tablet 50 mg  50 mg Oral TID    HYDROcodone-acetaminophen (NORCO)  mg tablet 1 Tab  1 Tab Oral Q4H PRN    LORazepam (ATIVAN) tablet 1 mg  1 mg Oral Q6H PRN    megestrol (MEGACE) 400 mg/10 mL (10 mL) oral suspension 200 mg  200 mg Oral DAILY    mirtazapine (REMERON) tablet 30 mg  30 mg Oral QHS    montelukast (SINGULAIR) tablet 10 mg  10 mg Oral QHS    zolpidem (AMBIEN) tablet 5 mg  5 mg Oral QHS PRN    sodium chloride (NS) flush 5-10 mL  5-10 mL IntraVENous Q8H    sodium chloride (NS) flush 5-10 mL  5-10 mL IntraVENous PRN    promethazine (PHENERGAN) with saline injection 12.5 mg  12.5 mg IntraVENous Q6H PRN    senna-docusate (PERICOLACE) 8.6-50 mg per tablet 1 Tab  1 Tab Oral BID PRN    furosemide (LASIX) injection 40 mg  40 mg IntraVENous DAILY    pantoprazole (PROTONIX) tablet 40 mg  40 mg Oral ACB    mupirocin calcium (BACTROBAN) 2 % cream   Topical TID    albuterol-ipratropium (DUO-NEB) 2.5 MG-0.5 MG/3 ML  3 mL Nebulization Q6H RT         Objective:     Vitals:    07/10/17 2333 07/11/17 0311 07/11/17 0517 07/11/17 0711   BP: 159/64 149/59  147/65   Pulse: 70 66  67   Resp: 20 20  20   Temp: 98.3 °F (36.8 °C) 99.1 °F (37.3 °C)  98.8 °F (37.1 °C)   SpO2: 97% 98%  97%   Weight:   115 lb 4.8 oz (52.3 kg)    Height:         Intake and Output:   07/09 1901 - 07/11 0700  In: 1054.8 [P.O.:240; I.V.:814.8]  Out: 4201 [Urine:3950; Drains:250]       Physical Exam:          Constitutional: the patient is well develop  HEENT: Sclera clear, pupils equal, oral mucosa moist  Lungs: clear anteriorly,decreased bases on 2 lpm  Cardiovascular: RRR with M,no G,R  Abd/GI: soft and non-tender; with positive bowel sounds. Ext: warm without cyanosis. There is no lower leg edema.   Musculoskeletal: moves all four extremities with equal strength  Skin: no jaundice or rashes, no wounds   Neuro: no gross neuro deficits       Lines/Drains: IV, pleurex- right  Nutrition: cardiac    CHEST XRAY:       LAB  Recent Labs      07/11/17   0600  07/10/17   1306  07/10/17   0231   07/09/17   0705  07/08/17   1620   WBC   --    --    --    --   2.2*  4.9   HGB  7.5*  7.3*  7.2*   < >  7.2*  10.1*   HCT  22.2*  21.9*  21.4*   < >  21.4*  29.6*   PLT   --    --    --    --   106*  174    < > = values in this interval not displayed.      Recent Labs      07/11/17   0600  07/10/17   1306  07/10/17   0231  07/09/17   0620  07/08/17   1620   NA   --   146*   --   146*  144   K   --   3.6   --   3.9  4.3   CL   --   110*   --   112*  110*   CO2   --   22   --   26  23   GLU   --   204*   --   104*  173*   BUN   --   36*   --   43*  42*   CREA   --   2.65*   --   2.38*  2.39*   MG   --   2.1   --    --   2.7*   INR  2.1*   --   1.9*  1.5*  1.4*     Recent Labs      07/08/17   1630   PH  7.49*   PCO2  27*   PO2  79   HCO3  20*       Assessment:     Patient Active Problem List   Diagnosis Code    Breast cancer ([facility] C50.919    Bony metastasis (HCC) C79.51    HTN (hypertension) I10    DM (diabetes mellitus) (HCC) E11.9    CKD (chronic kidney disease) N18.9    Iron deficiency anemia D50.9    Acute respiratory failure (HCC) J96.00    Hypomagnesemia E83.42    Hypokalemia E87.6    Asthma J45.909    Anemia D64.9    DVT (deep venous thrombosis) (HCC) I82.409    PND (paroxysmal nocturnal dyspnea) R06.00    Leg swelling M79.89    Heart failure with preserved ejection fraction (HCC) I50.30    Accelerated hypertension I10    Elevated troponin R74.8    Breast cancer metastasized to lung (HCC) C50.919, C78.00    Pulmonary edema J81.1    Acute on chronic renal failure (HCC) N17.9, N18.9    Pleural effusion, right J90    Hydronephrosis N13.30    Pancytopenia (HCC) D61.818    Chemotherapy-induced neutropenia ([facility]) D70.1    Bilateral edema of lower extremity R60.0    Hematuria R31.9    Acute on chronic respiratory failure ([facility]) J96.20    Moderate aortic stenosis I35.0    Anticoagulated on Coumadin Z51.81, Z79.01    Volume overload E87.70    Anxiety F41.9    Hypernatremia E87.0    Respiratory failure with hypoxia (HCC) J96.91    Type 2 diabetes mellitus without complication (HCC) W66.5    Folliculitis R08.2       Plan     Hospital Problems  Date Reviewed: 7/5/2017          Codes Class Noted POA    Iron deficiency anemia (Chronic) ICD-10-CM: D50.9  ICD-9-CM: 280.9  7/10/2017 Yes        Type 2 diabetes mellitus without complication ([facility] KOF-09-DP: E11.9  ICD-9-CM: 250.00  7/10/2017 Unknown    662-694 range    Folliculitis EYV-36-DOUGLAS: L73.9  ICD-9-CM: 704.8  7/10/2017 Unknown        * (Principal)Respiratory failure with hypoxia ([facility] ICD-10-CM: J96.91  ICD-9-CM: 518.81  7/8/2017 Unknown        Breast cancer (HCC) (Chronic) ICD-10-CM: C50.919  ICD-9-CM: 174.9  2/17/2017 Yes    Overview Addendum 2/27/2014 10:08 AM by Kenneth Newberry NP     Er+  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 SUV max on the right is 20.5 and on the left 31.5. There is matted high right axillary   lymphadenopathy as well as a more hypermetabolic low right axillary lymph node which measures 12 x 9 mm and has an SUV max of 15.4. Small bilateral pulmonary hilar metastases are also noted as well as extensive skeletal metastatic disease which includes both proximal femora, both scapulae, innumerable ribs, virtually every vertebra, and the bony pelvis. No displaced pathologic fracture is identified on the CT images. Ct scan 2-12 Innumerable diffuse osseous metastases. . Multiple diffuse tiny lung nodules, and right hilar lymphadenopathy, also suspicious for metastases. 3. Bilateral breast masses, compatible with known carcinoma.   CANCER ANTIGEN 27.29 1076   Oncology Flowsheet Day, Cycle cyclophosphamide (CYTOXAN) IV   1/27/2012 Day 1, Cycle 1 600 mg/m2 = 996 mg   2/17/2012 Day 1, Cycle 2 600 mg/m2 = 996 mg 3/9/2012 Day 1, Cycle 3 600 mg/m2 = 996 mg   3/30/2012 Day 1, Cycle 4 500 mg/m2 = 830 mg   4/20/2012 Day 1, Cycle 5 500 mg/m2 = 830 mg     Oncology Flowsheet DOCEtaxel (TAXOTERE) IV   1/27/2012 75 mg/m2 = 125 mg   2/17/2012 75 mg/m2 = 125 mg   3/9/2012 75 mg/m2 = 125 mg   3/30/2012 60 mg/m2 = 100 mg   4/20/2012 60 mg/m2 = 100 mg   7-19-12 post 6 cycles of TC improved breast masses switch to femara maintenence  Clear response on pet ct scan   Mixed response to chemotherapy: There has been a significant interval response in the right breast and axilla with decrease in size of spiculated right breast mass and numerous right axillary lymph nodes. Left breast mass also appears to have responded chemotherapy though there may be persistent chest wall invasion. 2. Interval evolution of widespread osseous metastatic disease with many lytic lesions now appearing more sclerotic. These are associated with continued FDG uptake which is difficult to differentiate from marrow reactivity given recent chemotherapy. At least one new osseous metastatic deposits is present in the posterior spinous process at L1. Focally intense uptake is also present at approximately T5  10-19-12 pain in arms legs back continued improvement on ct scan Decreased size of the largest lung nodules and near complete resolution . of many. No new masses in the chest, abdomen, or pelvis. 2. Decreased size of right axillary and right hilar lymph nodes. 3. Diffuse osseous lesions again seen. 4. Diverticulosis. 5.  Atherosclerotic vascular disease ca 15-3 down to 50  On femara and irmaa will see if we can get affinitor to start at next visit continue therapy  12-19-12 new headache and dizziness for MRI will start affinitor   1-14-13 improved pain breast mass right breast smaller width 7 cm x 5  6-05-13 femara and irmaa now on affinitor x 5 months   7-13 tumor markers falling   8-20-13 post admission for pneumonia medications to restart breast mass smaller reduce affinitor to 7.5 mg   10-18-13 femara and affinitor pain right ilium intermittent markers smaller breast mass smaller on right   12-5-13 Reduce Afinitor to 5 mg daily. Continue coumadin for DVT. Continue Femara and Aredia. Repeat US and skeletal survey prior to next visit. 1-6-14 Feeling better with reduced dose. Ultrasound of breasts show decrease in mass sizes. Skeletal survey stable. Follow up in 2 months. Bony metastasis (HCC) (Chronic) ICD-10-CM: C79.51  ICD-9-CM: 198.5  2/17/2017 Yes        CKD (chronic kidney disease) (Chronic) ICD-10-CM: N18.9  ICD-9-CM: 585.9  1/4/2017 Yes    Overview Signed 5/14/2013 11:35 AM by Chely Mcclelland RN     With acute rise- ? Dehydration and monitor             DVT (deep venous thrombosis) (HCC) (Chronic) ICD-10-CM: I82.409  ICD-9-CM: 453.40  1/4/2017 Yes    Overview Signed 11/19/2013  6:07 AM by Deysi Banuelos     [id number] There is nonocclusive thrombus within the left common femoral vein, superficial femoral vein, popliteal vein, and posterior tibial vein               HTN (hypertension) (Chronic) ICD-10-CM: I10  ICD-9-CM: 401.9  10/7/2016 Yes        DM (diabetes mellitus) ([facility] (Chronic) ICD-10-CM: E11.9  ICD-9-CM: 250.00  10/7/2016 Yes        Heart failure with preserved ejection fraction ([facility] ICD-10-CM: I50.30  ICD-9-CM: 428.9  2/25/2014 Yes    98.8          -- on NC 2 lpm now  -- cardiology following. Valvular disease. + diuresis of ~ 5.4 liters. Lasix 40 mg daily. x  -- Heparin drip and hgb 7.5 (STABLE) without active bleeding. On coumadin- day 1 INR therapeutic range. 2.1 (on chronically for DVT)  -- mobilize    -- assess O2 needs, continue CPAP     Burgess Alyssa NP    More than 50% of time documented was spent in face-to-face contact with the patient and in the care of the patient on the floor/unit where the patient is located. Lungs:  CTA B anteriorly. Heart:  RRR with 4/6 systolic murmur.      Additional Comments:    Patient admitted with shortness of breath. Possibility of PE raised but with BNP of 3000, no R heart strain on TTE, and improvement with diuresis I suspect heart failure was the cause. Was already on coumadin for DVT, back on that and INR today is therapeutic. Cont heparin gtt for 1 more day then stop. Cardiology following for possible heart cath. I have spoken with and examined the patient. I agree with the above assessment and plan as documented.     Omid Ludwig MD

## 2017-07-11 NOTE — PROGRESS NOTES
Patient on telemetry monitor, tele called patient with 6 beat run of Vtach, patient vital signs taken, no c/o pain, discomfort or SOB,  on call physician paged.

## 2017-07-11 NOTE — ROUTINE PROCESS
CHF teaching held; [employer] only, will arrange interrupter for later edds [employer] CHF Planner: 10mins    Start 2 L/ D FR    Palliative Care score: AP on file

## 2017-07-11 NOTE — PROGRESS NOTES
present in ultrasound . Additional services can be given upon request.          Thank you for this referral,      Dominik Hernandez, [address] [facility] [address] /Patient [address] [facility].  [address]    [phone]

## 2017-07-11 NOTE — PROGRESS NOTES
Patient in bed resting with no complaints at this time. Patient is alert and orientated with no distress noted. IV intact and patent with no s/s of infection noted. Patient does have life port which is not being used at this time. Respirations even and unlabored with heart rate regular. Ruby intact and patent draining yellow urine. Patient unable to ambulate independently without assistance; needs x1 r/t weakness. Bed in low locked position with call light within reach. Patient instructed to call if assistance is needed. Will continue to monitor.

## 2017-07-11 NOTE — PROGRESS NOTES
Received call back from Dr. Damion Pierre office from staff, staff informed of V-tach vitals signs and patient condition, staff states they will notify physician.

## 2017-07-11 NOTE — PROGRESS NOTES
Patient stable with no complaints at this time. Patient does understand and speak english somewhat. Patient with family at bedside for support. Patient relaxing in bed watching TV. Patient denies any pain or discomfort. Call light within reach.   Report to be given to oncoming RN 7p-7a

## 2017-07-11 NOTE — PROGRESS NOTES
Pleurx drain using sterile technique. Clear lizzy 250ml of drainage noted. Site cleaned with alcohol, drain sponge and 4x4 gauze applied and secured with tegaderm. Site clean, dry and intact, no redness or irritation noted. Patient tolerated well.

## 2017-07-12 LAB
ANION GAP BLD CALC-SCNC: 8 MMOL/L (ref 7–16)
APTT PPP: 58.3 SEC (ref 23.5–31.7)
BUN SERPL-MCNC: 40 MG/DL (ref 8–23)
CALCIUM SERPL-MCNC: 6.8 MG/DL (ref 8.3–10.4)
CHLORIDE SERPL-SCNC: 112 MMOL/L (ref 98–107)
CO2 SERPL-SCNC: 25 MMOL/L (ref 21–32)
CREAT SERPL-MCNC: 2.9 MG/DL (ref 0.6–1)
GLUCOSE BLD STRIP.AUTO-MCNC: 139 MG/DL (ref 65–100)
GLUCOSE BLD STRIP.AUTO-MCNC: 159 MG/DL (ref 65–100)
GLUCOSE BLD STRIP.AUTO-MCNC: 190 MG/DL (ref 65–100)
GLUCOSE BLD STRIP.AUTO-MCNC: 224 MG/DL (ref 65–100)
GLUCOSE SERPL-MCNC: 133 MG/DL (ref 65–100)
HCT VFR BLD AUTO: 21.1 % (ref 35.8–46.3)
HGB BLD-MCNC: 7 G/DL (ref 11.7–15.4)
INR PPP: 2 (ref 0.9–1.2)
POTASSIUM SERPL-SCNC: 4.4 MMOL/L (ref 3.5–5.1)
PROTHROMBIN TIME: 22.1 SEC (ref 9.6–12)
SODIUM SERPL-SCNC: 145 MMOL/L (ref 136–145)

## 2017-07-12 PROCEDURE — 65660000000 HC RM CCU STEPDOWN

## 2017-07-12 PROCEDURE — 74011000250 HC RX REV CODE- 250: Performed by: INTERNAL MEDICINE

## 2017-07-12 PROCEDURE — 94640 AIRWAY INHALATION TREATMENT: CPT

## 2017-07-12 PROCEDURE — 80048 BASIC METABOLIC PNL TOTAL CA: CPT | Performed by: EMERGENCY MEDICINE

## 2017-07-12 PROCEDURE — 74011636637 HC RX REV CODE- 636/637: Performed by: INTERNAL MEDICINE

## 2017-07-12 PROCEDURE — 74011250637 HC RX REV CODE- 250/637: Performed by: NURSE PRACTITIONER

## 2017-07-12 PROCEDURE — 74011250636 HC RX REV CODE- 250/636: Performed by: INTERNAL MEDICINE

## 2017-07-12 PROCEDURE — 94760 N-INVAS EAR/PLS OXIMETRY 1: CPT

## 2017-07-12 PROCEDURE — 99232 SBSQ HOSP IP/OBS MODERATE 35: CPT | Performed by: INTERNAL MEDICINE

## 2017-07-12 PROCEDURE — 85730 THROMBOPLASTIN TIME PARTIAL: CPT | Performed by: INTERNAL MEDICINE

## 2017-07-12 PROCEDURE — 36415 COLL VENOUS BLD VENIPUNCTURE: CPT | Performed by: EMERGENCY MEDICINE

## 2017-07-12 PROCEDURE — 74011250637 HC RX REV CODE- 250/637: Performed by: INTERNAL MEDICINE

## 2017-07-12 PROCEDURE — 85610 PROTHROMBIN TIME: CPT | Performed by: EMERGENCY MEDICINE

## 2017-07-12 PROCEDURE — 77030003560 HC NDL HUBR BARD -A

## 2017-07-12 PROCEDURE — 82962 GLUCOSE BLOOD TEST: CPT

## 2017-07-12 PROCEDURE — 85018 HEMOGLOBIN: CPT | Performed by: EMERGENCY MEDICINE

## 2017-07-12 PROCEDURE — 77010033678 HC OXYGEN DAILY

## 2017-07-12 RX ADMIN — INSULIN LISPRO 2 UNITS: 100 INJECTION, SOLUTION INTRAVENOUS; SUBCUTANEOUS at 16:30

## 2017-07-12 RX ADMIN — HYDROCODONE BITARTRATE AND ACETAMINOPHEN 1 TABLET: 10; 325 TABLET ORAL at 21:00

## 2017-07-12 RX ADMIN — WARFARIN SODIUM 1.5 MG: 1 TABLET ORAL at 16:36

## 2017-07-12 RX ADMIN — Medication 5 ML: at 14:00

## 2017-07-12 RX ADMIN — Medication 5 ML: at 05:22

## 2017-07-12 RX ADMIN — MUPIROCIN: 2 CREAM TOPICAL at 23:49

## 2017-07-12 RX ADMIN — CEPHALEXIN 500 MG: 500 CAPSULE ORAL at 21:00

## 2017-07-12 RX ADMIN — CARVEDILOL 3.12 MG: 3.12 TABLET, FILM COATED ORAL at 09:31

## 2017-07-12 RX ADMIN — MIRTAZAPINE 30 MG: 15 TABLET, FILM COATED ORAL at 21:00

## 2017-07-12 RX ADMIN — CEPHALEXIN 500 MG: 500 CAPSULE ORAL at 12:02

## 2017-07-12 RX ADMIN — AMLODIPINE BESYLATE 10 MG: 10 TABLET ORAL at 09:31

## 2017-07-12 RX ADMIN — Medication 10 ML: at 23:48

## 2017-07-12 RX ADMIN — INSULIN LISPRO 2 UNITS: 100 INJECTION, SOLUTION INTRAVENOUS; SUBCUTANEOUS at 06:20

## 2017-07-12 RX ADMIN — MEGESTROL ACETATE 200 MG: 40 SUSPENSION ORAL at 09:31

## 2017-07-12 RX ADMIN — INSULIN LISPRO 4 UNITS: 100 INJECTION, SOLUTION INTRAVENOUS; SUBCUTANEOUS at 21:09

## 2017-07-12 RX ADMIN — CARVEDILOL 3.12 MG: 3.12 TABLET, FILM COATED ORAL at 16:36

## 2017-07-12 RX ADMIN — HYDRALAZINE HYDROCHLORIDE 50 MG: 50 TABLET, FILM COATED ORAL at 08:22

## 2017-07-12 RX ADMIN — IPRATROPIUM BROMIDE AND ALBUTEROL SULFATE 3 ML: 2.5; .5 SOLUTION RESPIRATORY (INHALATION) at 20:10

## 2017-07-12 RX ADMIN — MONTELUKAST SODIUM 10 MG: 10 TABLET, COATED ORAL at 21:00

## 2017-07-12 RX ADMIN — IPRATROPIUM BROMIDE AND ALBUTEROL SULFATE 3 ML: 2.5; .5 SOLUTION RESPIRATORY (INHALATION) at 14:27

## 2017-07-12 RX ADMIN — IPRATROPIUM BROMIDE AND ALBUTEROL SULFATE 3 ML: 2.5; .5 SOLUTION RESPIRATORY (INHALATION) at 08:22

## 2017-07-12 RX ADMIN — PANTOPRAZOLE SODIUM 40 MG: 40 TABLET, DELAYED RELEASE ORAL at 05:22

## 2017-07-12 RX ADMIN — HYDRALAZINE HYDROCHLORIDE 50 MG: 50 TABLET, FILM COATED ORAL at 21:00

## 2017-07-12 RX ADMIN — CEPHALEXIN 500 MG: 500 CAPSULE ORAL at 05:21

## 2017-07-12 RX ADMIN — MUPIROCIN: 2 CREAM TOPICAL at 16:37

## 2017-07-12 RX ADMIN — FUROSEMIDE 40 MG: 10 INJECTION, SOLUTION INTRAMUSCULAR; INTRAVENOUS at 09:30

## 2017-07-12 RX ADMIN — MUPIROCIN: 2 CREAM TOPICAL at 09:00

## 2017-07-12 RX ADMIN — HYDRALAZINE HYDROCHLORIDE 50 MG: 50 TABLET, FILM COATED ORAL at 16:36

## 2017-07-12 NOTE — PROGRESS NOTES
Warfarin dosing per pharmacist    Josué Ohara is a 78 y.o. female. Height: 5' 3\" (160 cm)    Weight: 53.6 kg (118 lb 1.6 oz)    Indication:  History of DVT    Goal INR:  2-3    Home dose:  Per last anticoagulation visit 1.5 mg (1 mg x 1.5) every day    Risk factors or significant drug interactions:  --    Other anticoagulants:  heparin    Daily Monitoring  Date  INR     Warfarin dose HGB              Notes  7/8  1.4  3 mg  10.1  7/9  1.5  1.5 mg  --  7/10  1.9  1.5 mg  7.2  7/11  2.1  1.5 mg  7.5  7/12  2.0  1.5 mg  7.0    Pt seen at anticoagulation visit on 7/5/17 with INR of 1.9 and no adjustments made. INR subtherapeutic on admission. INR to 2.0 today. Will continue 1.5 mg dose tonight. If INR decreases again can give another 3 mg dose and then continue a once weekly 3 mg dose at home. Thank you,  Mike Powers, Pharm. D.   Clinical Pharmacist  [phone]

## 2017-07-12 NOTE — PROGRESS NOTES
Less sob. Does admit to some chest pain. Her HFpEF is multifactorial but I suspect ischemic dysfunction is a likely contributor. Unfortunately not a cath candidate due to her metastatic cancer. Cont. Bb and loop diuretic rx. Will follow from a distance.

## 2017-07-12 NOTE — PROGRESS NOTES
Critical Care Outreach Nurse Progress Report:    Subjective: In to assess pt secondary to outreach protocol. MEWS Score: 1 (07/12/17 0417)    Vitals:    07/12/17 0824 07/12/17 1046 07/12/17 1311 07/12/17 1427   BP:  148/88 135/64    Pulse:  68 64    Resp:  18 18    Temp:  98.9 °F (37.2 °C) 99.2 °F (37.3 °C)    SpO2: 99% 95% 98% 98%   Weight:       Height:            Objective: Patient in bed with  at bedside. Pain Intensity 1: 0 (07/12/17 1315)        Patient Stated Pain Goal: 0    Assessment: Alert and oriented. States she is still having some chest pain. MD aware. Was up walking in the halls with physical therapy earlier today and felt fine. VSS. On room air with sats 98%. Plan: Chest pain noted by Dr. Yuly Olivo.

## 2017-07-12 NOTE — PROGRESS NOTES
Patient resting in bed with no complaints at this time. Patient is alert and orientated with no distress noted. IV intact and patient with no s/s of infection noted. Respirations even and unlabored with heart rate regular. Patient unable to ambulate independently without assistance; needs x1 r/t weakness and dyspena. Bed in low locked position with call light within reach. Patient instructed to call if assistance is needed. Will continue to monitor.

## 2017-07-12 NOTE — PROGRESS NOTES
Patient resting in bed with eyes closed, O2 via n/c in place @ 3L, no c/o SOB or pain, IV heparin infusing, will continue to monitor.

## 2017-07-12 NOTE — PROGRESS NOTES
Patient resting in bed with eyes closed, IV fluids infusing, tele monitor in place, RR even and unlabored, will continue to monitor, call light within reach.

## 2017-07-12 NOTE — PROGRESS NOTES
Patient voices no concerns at this time. Patient in bed relaxing. Family at bedside for support. Call light within reach. Will continue to monitor.

## 2017-07-12 NOTE — PROGRESS NOTES
[facility] CRITICAL CARE OUTREACH NURSE PROGRESS REPORT      SUBJECTIVE: Called to assess patient secondary to [employer] program protocol  . MEWS Score: 1 (07/11/17 2006)  Vitals:    07/11/17 1543 07/11/17 2006 07/11/17 2052 07/11/17 2339   BP: 138/69 116/51  136/51   Pulse: 74 64  69   Resp: 18 16  18   Temp: 98.5 °F (36.9 °C) 99 °F (37.2 °C)  99.1 °F (37.3 °C)   SpO2: 95% 98% 95% 100%   Weight:       Height:          EKG: normal EKG, normal sinus rhythm, unchanged from previous tracings, normal sinus rhythm. LAB DATA:    Recent Labs      07/11/17   2241  07/10/17   1306  07/09/17   0620   NA  146*  146*  146*   K  4.2  3.6  3.9   CL  112*  110*  112*   CO2  22  22  26   AGAP  12  14  8   GLU  137*  204*  104*   BUN  39*  36*  43*   CREA  2.86*  2.65*  2.38*   GFRAA  21*  23*  26*   GFRNA  17*  19*  22*   CA  6.7*  7.3*  7.2*   MG  2.0  2.1   --         Recent Labs      07/11/17   0600  07/10/17   1306  07/10/17   0231   07/09/17   0705   WBC   --    --    --    --   2.2*   HGB  7.5*  7.3*  7.2*   < >  7.2*   HCT  22.2*  21.9*  21.4*   < >  21.4*   PLT   --    --    --    --   106*    < > = values in this interval not displayed. OBJECTIVE: On arrival to room, I found patient to be laying in bed. Pain Assessment  Pain Intensity 1: 0 (07/11/17 1920)        Patient Stated Pain Goal: 0                                 ASSESSMENT:  Pt speaks mostly Malay. Complaining of chest pressure. Spoke to primary RN and we discussed the need to call the MD and get some orders. Pain meds were given and labs were drawn. Pt felt better before I left. PLAN:  Will continue to follow per outreach program protocol.      Kanchan Cuenca RN

## 2017-07-12 NOTE — PROGRESS NOTES
Patient states dyspnea and improved, denies pain and discomfort @ this time, RR even and unlabored, will continue to monitor.

## 2017-07-12 NOTE — PROGRESS NOTES
Lifeport accessed per MD with no difficulty. Port will not draw blood but will push smoothly. Needle removed r/t infection risk for nonuse. Patient explained and verbalized understanding.

## 2017-07-12 NOTE — PROGRESS NOTES
Oxygen Qualifier       Room air: SpO2 with O2 and liter flow   Resting SpO2  90%  90% on Room Air   Ambulating SpO2  87%  88% on 1L   91% on 2L       Completed by:    Nayana Danielson

## 2017-07-12 NOTE — PROGRESS NOTES
Date of Outreach Update:  Gregorio Seu was seen and assessed. Previous Outreach assessment has been reviewed. There have been no significant clinical changes since the completion of the last dated Outreach assessment. Will continue to follow up per outreach protocol.     Signed By:   Keyonna Tolbert RN    July 12, 2017 6:15 AM

## 2017-07-12 NOTE — PROGRESS NOTES
Date of Outreach Update:  Katelyn Tse was seen and assessed. Previous Outreach assessment has been reviewed. There have been no significant clinical changes since the completion of the last dated Outreach assessment. Will continue to follow up per outreach protocol.     Signed By:   Ihsan Crockett RN    July 12, 2017 6:15 AM

## 2017-07-12 NOTE — PROGRESS NOTES
Report called to facility to [facility]. Time was given for questions and answers along with name and number left if questions should arise. [facility] transport to  at 11:30 and transport to facility.  to meet at facility.

## 2017-07-12 NOTE — PROGRESS NOTES
Patient stable with no complaints at this time. Ruby out per order without difficulty earlier today. Patient resting in bed. Call light within reach.   Report to be given to oncoming  RN 7p-7a

## 2017-07-12 NOTE — PROGRESS NOTES
Patient c/o dyspnea and SOB, O2 sat @ 98% on 3L via n/c, c/o pressure to chest on call cardiologist paged.

## 2017-07-12 NOTE — PROGRESS NOTES
John Small  Admission Date: 7/8/2017             Daily Progress Note: 7/12/2017     Mateo Hernandez a 69yo F with a PMH of metastatic breast cancer (lung, bone) complicated by chronic (though never shown to be malignant) pleural effusion s/p pleurX catheter insertion on 6/8/17, moderate AS who was admitted on 7/8 with recurrent respiratory distress with pulmonary vascular congestion requiring BiPAP.  She has a recent history of R groin folliculitis as well. She is [place] citizen speaking only. She wears CPAP Q HS.      DVT- nonocclusive within the left femoral vein, superficial femoral vein, popliteal vein, and posterior tibial vein. On heparin. Hemoglobin has declined from 10.1 to 7.2 without obvious source of bleeding (heparin ongoing though).  She is currently 99% on 2lpm.  TTE showing moderate aortic stenosis and mild diastolic heart failure without R heart strain.  Diuresis of 5.4 liters since admission and improved. Cardiology following. Suspect heart failure was the cause. Renal insufficiency and no plans for [facility]. Subjective:     sats are 100%, no plans for [address] [place] [address]. Cr 2.9.      Review of Systems  Constitutional: negative for fevers, chills, sweats and fatigue  Respiratory: negative for cough, sputum, dyspnea on exertion, emphysema or chronic bronchitis  Cardiovascular: negative for chest pain, chest pressure/discomfort, palpitations, irregular heart beats, near-syncope, syncope    Current Facility-Administered Medications   Medication Dose Route Frequency    morphine injection 2 mg  2 mg IntraVENous Q4H PRN    carvedilol (COREG) tablet 3.125 mg  3.125 mg Oral BID WITH MEALS    cephALEXin (KEFLEX) capsule 500 mg  500 mg Oral Q8H    insulin lispro (HUMALOG) injection   SubCUTAneous AC&HS    warfarin (COUMADIN) tablet 1.5 mg  1.5 mg Oral QPM    heparin 25,000 units in dextrose 500 mL infusion  18-36 Units/kg/hr IntraVENous TITRATE    ALPRAZolam (XANAX) tablet 0.25 mg  0.25 mg Oral TID PRN   [facility] amLODIPine (NORVASC) tablet 10 mg  10 mg Oral DAILY    duloxetine (CYMBALTA) capsule 20 mg - PATIENT SUPPLIED  20 mg Oral DAILY    fentaNYL (DURAGESIC) 50 mcg/hr patch 1 Patch  1 Patch TransDERmal Q72H    hydrALAZINE (APRESOLINE) tablet 50 mg  50 mg Oral TID    HYDROcodone-acetaminophen (NORCO)  mg tablet 1 Tab  1 Tab Oral Q4H PRN    LORazepam (ATIVAN) tablet 1 mg  1 mg Oral Q6H PRN    megestrol (MEGACE) 400 mg/10 mL (10 mL) oral suspension 200 mg  200 mg Oral DAILY    mirtazapine (REMERON) tablet 30 mg  30 mg Oral QHS    montelukast (SINGULAIR) tablet 10 mg  10 mg Oral QHS    zolpidem (AMBIEN) tablet 5 mg  5 mg Oral QHS PRN    sodium chloride (NS) flush 5-10 mL  5-10 mL IntraVENous Q8H    sodium chloride (NS) flush 5-10 mL  5-10 mL IntraVENous PRN    promethazine (PHENERGAN) with saline injection 12.5 mg  12.5 mg IntraVENous Q6H PRN    senna-docusate (PERICOLACE) 8.6-50 mg per tablet 1 Tab  1 Tab Oral BID PRN    furosemide (LASIX) injection 40 mg  40 mg IntraVENous DAILY    pantoprazole (PROTONIX) tablet 40 mg  40 mg Oral ACB    mupirocin calcium (BACTROBAN) 2 % cream   Topical TID    albuterol-ipratropium (DUO-NEB) 2.5 MG-0.5 MG/3 ML  3 mL Nebulization Q6H RT         Objective:     Vitals:    07/11/17 2052 07/11/17 2339 07/12/17 0417 07/12/17 0503   BP:  136/51 143/58    Pulse:  69 70    Resp:  18 18    Temp:  99.1 °F (37.3 °C) 99.7 °F (37.6 °C)    SpO2: 95% 100% 98%    Weight:    118 lb 1.6 oz (53.6 kg)   Height:         Intake and Output:   07/10 1901 - 07/12 0700  In: -   Out: 8246 [Urine:2900; Drains:250]       Physical Exam:          Constitutional: the patient is weak  HEENT: Sclera clear, pupils equal, oral mucosa moist  Lungs: decreased bases on NC  Cardiovascular: RRR with Grade IV/VI M,G,R  Abd/GI: soft and non-tender; with positive bowel sounds. Ext: warm without cyanosis. There is no lower leg edema.   Musculoskeletal: moves all four extremities with equal strength  Skin: no jaundice or rashes, no wounds   Neuro: no gross neuro deficits       Lines/Drains: IV, pleurex  Nutrition: cardiac    CHEST XRAY:       LAB  Recent Labs      07/12/17   0355  07/11/17   0600  07/10/17   1306   HGB  7.0*  7.5*  7.3*   HCT  21.1*  22.2*  21.9*     Recent Labs      07/12/17   0355  07/11/17   2241  07/11/17   0600  07/10/17   1306  07/10/17   0231   NA  145  146*   --   146*   --    K  4.4  4.2   --   3.6   --    CL  112*  112*   --   110*   --    CO2  25  22   --   22   --    GLU  133*  137*   --   204*   --    BUN  40*  39*   --   36*   --    CREA  2.90*  2.86*   --   2.65*   --    MG   --   2.0   --   2.1   --    INR  2.0*   --   2.1*   --   1.9*     LE duplex  1. No evidence of DVT in the right leg.   2.  Chronic thrombosis of the proximal and mid left superficial femoral vein    Assessment:     Patient Active Problem List   Diagnosis Code    Breast cancer ([facility] C50.919    Bony metastasis ([facility] C79.51    HTN (hypertension) I10    DM (diabetes mellitus) ([facility] E11.9    CKD (chronic kidney disease) N18.9    Iron deficiency anemia D50.9    Acute respiratory failure (HCC) J96.00    Hypomagnesemia E83.42    Hypokalemia E87.6    Asthma J45.909    Anemia D64.9    DVT (deep venous thrombosis) ([facility] I82.409    PND (paroxysmal nocturnal dyspnea) R06.00    Leg swelling M79.89    Heart failure with preserved ejection fraction (HCC) I50.30    Accelerated hypertension I10    Elevated troponin R74.8    Breast cancer metastasized to lung (HCC) C50.919, C78.00    Pulmonary edema J81.1    Acute on chronic renal failure (HCC) N17.9, N18.9    Pleural effusion, right J90    Hydronephrosis N13.30    Pancytopenia (HCC) D61.818    Chemotherapy-induced neutropenia (HCC) D70.1    Bilateral edema of lower extremity R60.0    Hematuria R31.9    Acute on chronic respiratory failure (HCC) J96.20    Aortic stenosis, moderate I35.0    Anticoagulated on Coumadin Z51.81, Z79.01    Volume overload E87.70  Anxiety F41.9    Hypernatremia E87.0    Type 2 diabetes mellitus without complication (HCC) D58.9    Folliculitis M35.9       Plan     Hospital Problems  Date Reviewed: 7/5/2017                        Codes Class Noted POA     Iron deficiency anemia (Chronic) ICD-10-CM: D50.9  ICD-9-CM: 494. 9   7/10/2017 Yes      Hgb stable     Type 2 diabetes mellitus without complication (HCC) SYE-38-IR: E11.9  ICD-9-CM: 250.00   7/10/2017 Unknown     579-863 range     Folliculitis HBQ-06-UE: L73.9  ICD-9-CM: 841. 8   7/10/2017 Unknown      on keflex     * (Principal)Respiratory failure with hypoxia [facility]) ICD-10-CM: J96.91  ICD-9-CM: 518.81   7/8/2017 Unknown      resolved     Breast cancer (HCC) (Chronic) ICD-10-CM: C50.919  ICD-9-CM: 174. 9   2/17/2017 Yes     Overview Addendum 2/27/2014 10:08 AM by Machelle York NP       Er+  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 SUV max on the right is 20.5 and on the left 31.5. There is matted high right axillary   lymphadenopathy as well as a more hypermetabolic low right axillary lymph node which measures 12 x 9 mm and has an SUV max of 15.4. Small bilateral pulmonary hilar metastases are also noted as well as extensive skeletal metastatic disease which includes both proximal femora, both scapulae, innumerable ribs, virtually every vertebra, and the bony pelvis. No displaced pathologic fracture is identified on the CT images. Ct scan 2-12 Innumerable diffuse osseous metastases. . Multiple diffuse tiny lung nodules, and right hilar lymphadenopathy, also suspicious for metastases. 3. Bilateral breast masses, compatible with known carcinoma.   CANCER ANTIGEN 27.29 1076   Oncology Flowsheet Day, Cycle cyclophosphamide (CYTOXAN) IV   1/27/2012 Day 1, Cycle 1 600 mg/m2 = 996 mg   2/17/2012 Day 1, Cycle 2 600 mg/m2 = 996 mg   3/9/2012 Day 1, Cycle 3 600 mg/m2 = 996 mg 3/30/2012 Day 1, Cycle 4 500 mg/m2 = 830 mg   4/20/2012 Day 1, Cycle 5 500 mg/m2 = 830 mg      Oncology Flowsheet DOCEtaxel (TAXOTERE) IV   1/27/2012 75 mg/m2 = 125 mg   2/17/2012 75 mg/m2 = 125 mg   3/9/2012 75 mg/m2 = 125 mg   3/30/2012 60 mg/m2 = 100 mg   4/20/2012 60 mg/m2 = 100 mg   7-19-12 post 6 cycles of TC improved breast masses switch to femara maintenence  Clear response on pet ct scan   Mixed response to chemotherapy: There has been a significant interval response in the right breast and axilla with decrease in size of spiculated right breast mass and numerous right axillary lymph nodes. Left breast mass also appears to have responded chemotherapy though there may be persistent chest wall invasion. 2. Interval evolution of widespread osseous metastatic disease with many lytic lesions now appearing more sclerotic. These are associated with continued FDG uptake which is difficult to differentiate from marrow reactivity given recent chemotherapy. At least one new osseous metastatic deposits is present in the posterior spinous process at L1. Focally intense uptake is also present at approximately T5  10-19-12 pain in arms legs back continued improvement on ct scan Decreased size of the largest lung nodules and near complete resolution . of many. No new masses in the chest, abdomen, or pelvis. 2. Decreased size of right axillary and right hilar lymph nodes. 3. Diffuse osseous lesions again seen. 4. Diverticulosis. 5.  Atherosclerotic vascular disease ca 15-3 down to 50  On femara and aredia will see if we can get affinitor to start at next visit continue therapy  12-19-12 new headache and dizziness for MRI will start affinitor   1-14-13 improved pain breast mass right breast smaller width 7 cm x 5  6-05-13 femara and aredia now on affinitor x 5 months   7-13 tumor markers falling   8-20-13 post admission for pneumonia medications to restart breast mass smaller reduce affinitor to 7.5 mg   10-18-13 femara and affinitor pain right ilium intermittent markers smaller breast mass smaller on right   12-5-13 Reduce Afinitor to 5 mg daily. Continue coumadin for DVT. Continue Femara and Aredia. Repeat US and skeletal survey prior to next visit. 1-6-14 Feeling better with reduced dose. Ultrasound of breasts show decrease in mass sizes. Skeletal survey stable. Follow up in 2 months.                Bony metastasis (HCC) (Chronic) ICD-10-CM: C79.51  ICD-9-CM: 198. 5   2/17/2017 Yes           CKD (chronic kidney disease) (Chronic) ICD-10-CM: N18.9  ICD-9-CM: 987. 9   1/4/2017 Yes     Overview Signed 5/14/2013 11:35 AM by Yared Mesa RN       With acute rise- ? Dehydration and monitor               DVT (deep venous thrombosis) (HCC) (Chronic) ICD-10-CM: I82.409  ICD-9-CM: 453.40   1/4/2017 Yes     Overview Signed 11/19/2013  6:07 AM by Camron Gifford       [id number] There is nonocclusive thrombus within the left common femoral vein, superficial femoral vein, popliteal vein, and posterior tibial vein                  HTN (hypertension) (Chronic) ICD-10-CM: I10  ICD-9-CM: 401. 9   10/7/2016 Yes           DM (diabetes mellitus) ([facility] (Chronic) ICD-10-CM: E11.9  ICD-9-CM: 250.00   10/7/2016 Yes           Heart failure with preserved ejection fraction ([facility] ICD-10-CM: I50.30  ICD-9-CM: 428. 9   2/25/2014 Yes                 -- CKD. Baseline looks to be ~ 2.4. No ACE-I or ARB  -- SW for home health  -- Assess O2 needs  --mobilize  -- convert lasix to oral (on 40 mg daily at home)  --pleurex: Pleurx drain using sterile technique. Clear lizzy 250ml of drainage noted on 7/10. Not malignant. --stop heparin drip, INR therapeutic for 2 days  -- recommend O/P PC for multiple co-morbidities with multiple admissions, POC, code status  --work towards discharge     Donell Mejía NP    More than 50% of time documented was spent in face-to-face contact with the patient and in the care of the patient on the floor/unit where the patient is located. Lungs:  clear  Heart:  RRR with no Murmur/Rubs/Gallops    Additional Comments:  Try to drain fluid one more time,  Home soon    I have spoken with and examined the patient. I agree with the above assessment and plan as documented.     Arturo Seth MD

## 2017-07-12 NOTE — PROGRESS NOTES
Patient sitting up in bed in room, O2 via n/c in place @ 3L, IV heparin infusing, no c/o pain or dyspnea, will continue to monitor, call light within reach.

## 2017-07-12 NOTE — PROGRESS NOTES
Received call back from Kirby Jordan NP, orders placed for STAT BNP and MG and Morphine order for SOB/Dyspnea.

## 2017-07-12 NOTE — PROGRESS NOTES
Patient sitting up in bed in room, O2 via n/c in place @ 3L/min, RR even and unlabored, IV intact and infusing heparin drip, denson cath in place output clear/yellow, patient on tele monitor, no c/o pain or discomfort @ this time, will continue to monitor.

## 2017-07-13 ENCOUNTER — APPOINTMENT (OUTPATIENT)
Dept: GENERAL RADIOLOGY | Age: 67
DRG: 291 | End: 2017-07-13
Attending: INTERNAL MEDICINE
Payer: SUBSIDIZED

## 2017-07-13 LAB
ANION GAP BLD CALC-SCNC: 9 MMOL/L (ref 7–16)
BACTERIA SPEC CULT: NORMAL
BACTERIA SPEC CULT: NORMAL
BUN SERPL-MCNC: 43 MG/DL (ref 8–23)
CALCIUM SERPL-MCNC: 6.8 MG/DL (ref 8.3–10.4)
CHLORIDE SERPL-SCNC: 110 MMOL/L (ref 98–107)
CO2 SERPL-SCNC: 25 MMOL/L (ref 21–32)
CREAT SERPL-MCNC: 2.65 MG/DL (ref 0.6–1)
GLUCOSE BLD STRIP.AUTO-MCNC: 132 MG/DL (ref 65–100)
GLUCOSE BLD STRIP.AUTO-MCNC: 135 MG/DL (ref 65–100)
GLUCOSE BLD STRIP.AUTO-MCNC: 181 MG/DL (ref 65–100)
GLUCOSE BLD STRIP.AUTO-MCNC: 183 MG/DL (ref 65–100)
GLUCOSE SERPL-MCNC: 124 MG/DL (ref 65–100)
HCT VFR BLD AUTO: 21.4 % (ref 35.8–46.3)
HGB BLD-MCNC: 7.1 G/DL (ref 11.7–15.4)
INR PPP: 1.9 (ref 0.9–1.2)
POTASSIUM SERPL-SCNC: 4.4 MMOL/L (ref 3.5–5.1)
PROTHROMBIN TIME: 20.8 SEC (ref 9.6–12)
SERVICE CMNT-IMP: NORMAL
SERVICE CMNT-IMP: NORMAL
SODIUM SERPL-SCNC: 144 MMOL/L (ref 136–145)

## 2017-07-13 PROCEDURE — 94640 AIRWAY INHALATION TREATMENT: CPT

## 2017-07-13 PROCEDURE — 77010033678 HC OXYGEN DAILY

## 2017-07-13 PROCEDURE — 94760 N-INVAS EAR/PLS OXIMETRY 1: CPT

## 2017-07-13 PROCEDURE — 99232 SBSQ HOSP IP/OBS MODERATE 35: CPT | Performed by: INTERNAL MEDICINE

## 2017-07-13 PROCEDURE — 74011250637 HC RX REV CODE- 250/637: Performed by: INTERNAL MEDICINE

## 2017-07-13 PROCEDURE — 71010 XR CHEST SNGL V: CPT

## 2017-07-13 PROCEDURE — 74011250636 HC RX REV CODE- 250/636: Performed by: INTERNAL MEDICINE

## 2017-07-13 PROCEDURE — 36415 COLL VENOUS BLD VENIPUNCTURE: CPT | Performed by: EMERGENCY MEDICINE

## 2017-07-13 PROCEDURE — 74011000250 HC RX REV CODE- 250: Performed by: INTERNAL MEDICINE

## 2017-07-13 PROCEDURE — 74011636637 HC RX REV CODE- 636/637: Performed by: INTERNAL MEDICINE

## 2017-07-13 PROCEDURE — 97530 THERAPEUTIC ACTIVITIES: CPT

## 2017-07-13 PROCEDURE — 82962 GLUCOSE BLOOD TEST: CPT

## 2017-07-13 PROCEDURE — 65660000000 HC RM CCU STEPDOWN

## 2017-07-13 PROCEDURE — 74011250637 HC RX REV CODE- 250/637: Performed by: NURSE PRACTITIONER

## 2017-07-13 PROCEDURE — 80048 BASIC METABOLIC PNL TOTAL CA: CPT | Performed by: EMERGENCY MEDICINE

## 2017-07-13 PROCEDURE — 85018 HEMOGLOBIN: CPT | Performed by: EMERGENCY MEDICINE

## 2017-07-13 PROCEDURE — 85610 PROTHROMBIN TIME: CPT | Performed by: EMERGENCY MEDICINE

## 2017-07-13 RX ADMIN — Medication 5 ML: at 21:36

## 2017-07-13 RX ADMIN — MUPIROCIN: 2 CREAM TOPICAL at 21:38

## 2017-07-13 RX ADMIN — HYDRALAZINE HYDROCHLORIDE 50 MG: 50 TABLET, FILM COATED ORAL at 16:24

## 2017-07-13 RX ADMIN — CEPHALEXIN 500 MG: 500 CAPSULE ORAL at 12:10

## 2017-07-13 RX ADMIN — INSULIN LISPRO 2 UNITS: 100 INJECTION, SOLUTION INTRAVENOUS; SUBCUTANEOUS at 21:36

## 2017-07-13 RX ADMIN — MONTELUKAST SODIUM 10 MG: 10 TABLET, COATED ORAL at 21:35

## 2017-07-13 RX ADMIN — MIRTAZAPINE 30 MG: 15 TABLET, FILM COATED ORAL at 21:35

## 2017-07-13 RX ADMIN — MUPIROCIN: 2 CREAM TOPICAL at 16:27

## 2017-07-13 RX ADMIN — INSULIN LISPRO 2 UNITS: 100 INJECTION, SOLUTION INTRAVENOUS; SUBCUTANEOUS at 16:25

## 2017-07-13 RX ADMIN — FUROSEMIDE 40 MG: 10 INJECTION, SOLUTION INTRAMUSCULAR; INTRAVENOUS at 08:26

## 2017-07-13 RX ADMIN — WARFARIN SODIUM 1.5 MG: 1 TABLET ORAL at 16:24

## 2017-07-13 RX ADMIN — CARVEDILOL 3.12 MG: 3.12 TABLET, FILM COATED ORAL at 08:26

## 2017-07-13 RX ADMIN — HYDRALAZINE HYDROCHLORIDE 50 MG: 50 TABLET, FILM COATED ORAL at 21:35

## 2017-07-13 RX ADMIN — PANTOPRAZOLE SODIUM 40 MG: 40 TABLET, DELAYED RELEASE ORAL at 06:30

## 2017-07-13 RX ADMIN — CARVEDILOL 3.12 MG: 3.12 TABLET, FILM COATED ORAL at 16:24

## 2017-07-13 RX ADMIN — HYDRALAZINE HYDROCHLORIDE 50 MG: 50 TABLET, FILM COATED ORAL at 08:26

## 2017-07-13 RX ADMIN — IPRATROPIUM BROMIDE AND ALBUTEROL SULFATE 3 ML: 2.5; .5 SOLUTION RESPIRATORY (INHALATION) at 08:23

## 2017-07-13 RX ADMIN — CEPHALEXIN 500 MG: 500 CAPSULE ORAL at 21:35

## 2017-07-13 RX ADMIN — AMLODIPINE BESYLATE 10 MG: 10 TABLET ORAL at 08:26

## 2017-07-13 RX ADMIN — CEPHALEXIN 500 MG: 500 CAPSULE ORAL at 06:07

## 2017-07-13 RX ADMIN — IPRATROPIUM BROMIDE AND ALBUTEROL SULFATE 3 ML: 2.5; .5 SOLUTION RESPIRATORY (INHALATION) at 21:09

## 2017-07-13 RX ADMIN — Medication 5 ML: at 14:00

## 2017-07-13 RX ADMIN — Medication 10 ML: at 06:07

## 2017-07-13 RX ADMIN — MUPIROCIN: 2 CREAM TOPICAL at 08:30

## 2017-07-13 RX ADMIN — IPRATROPIUM BROMIDE AND ALBUTEROL SULFATE 3 ML: 2.5; .5 SOLUTION RESPIRATORY (INHALATION) at 14:07

## 2017-07-13 RX ADMIN — MEGESTROL ACETATE 200 MG: 40 SUSPENSION ORAL at 08:26

## 2017-07-13 NOTE — PROGRESS NOTES
Patient voices no concerns at this time.  at bedside for support. Call light within reach. Will continue to monitor.

## 2017-07-13 NOTE — PROGRESS NOTES
Patient c/o BLE leg pain, sensitive to touch, no edema or redness noted, prn po norco provided @ this time, will continue to monitor.

## 2017-07-13 NOTE — PROGRESS NOTES
Patient is resting in bed with eyes closed, O2 via n/c @ 3L, remote tele in place, patient NSR @ 64, will continue to monitor.

## 2017-07-13 NOTE — PROGRESS NOTES
Problem: Mobility Impaired (Adult and Pediatric)  Goal: *Acute Goals and Plan of Care (Insert Text)  STG:  (1.)Ms. Leggett will move from supine to sit and sit to supine , scoot up and down and roll side to side with SUPERVISION within 3 day(s). (2.)Ms. Leggett will transfer from bed to chair and chair to bed with CONTACT GUARD ASSIST using the least restrictive device within 3 day(s). (3.)Ms. Leggett will ambulate with CONTACT GUARD ASSIST for 30 feet with the least restrictive device within 3 day(s). LTG:  (1.)Ms. Leggett will move from supine to sit and sit to supine , scoot up and down and roll side to side in bed with INDEPENDENT within 7 day(s). (2.)Ms. Leggett will transfer from bed to chair and chair to bed with SUPERVISION using the least restrictive device within 7 day(s). (3.)Ms. Leggett will ambulate with STAND BY ASSIST for 150 feet with the least restrictive device within 7 day(s). ________________________________________________________________________________________________      PHYSICAL THERAPY: INITIAL ASSESSMENT, TREATMENT DAY: DAY OF ASSESSMENT, PM 7/13/2017  INPATIENT: Hospital Day: 6  Payor: Pam Skaggs / Plan: [facility] % / Product Type: [place] /      NAME/AGE/GENDER: Gregorio Rogers is a 78 y.o. female      PRIMARY DIAGNOSIS: Respiratory failure with hypoxia (HCC) Respiratory failure with hypoxia ([address] Respiratory failure with hypoxia ([address]        ICD-10: Treatment Diagnosis:       · Generalized Muscle Weakness (M62.81)  · Difficulty in walking, Not elsewhere classified (R26.2)   Precaution/Allergies:  Review of patient's allergies indicates no known allergies. ASSESSMENT:      Ms. Lj Magallanes is supine in bed upon contact with her /family member at the bedside. She was agreeable to therapy. She performed BLE AROM exercises in supine and sitting without difficulty. She is independent with her bed mobility with good sitting balance. Sit to stand is at supervision.   She ambulated without an assistive device for 36' with CGA and O2. Her resting O2 sats on 2L was  97% in sitting. He O2 sats after ambulation on 2L was 94%. She behaves as if she is very fatigued and unable to tolerate much but she actually physically does very well without compromising her O2 intake. She did not want to ambulate a 2nd time and returned to supine in her bed. She was pleasant and cooperative with therapy. This section established at most recent assessment   PROBLEM LIST (Impairments causing functional limitations):  1. Decreased Strength  2. Decreased ADL/Functional Activities  3. Decreased Ambulation Ability/Technique  4. Decreased Balance  5. Increased Pain  6. Decreased Activity Tolerance  7. Decreased Pacing Skills  8. Decreased Work Simplification/Energy Conservation Techniques  9. Increased Fatigue  10. Increased Shortness of Breath    INTERVENTIONS PLANNED: (Benefits and precautions of physical therapy have been discussed with the patient.)  1. Balance Exercise  2. Bed Mobility  3. Family Education  4. Gait Training  5. Home Exercise Program (HEP)  6. Neuromuscular Re-education/Strengthening  7. Therapeutic Activites  8. Therapeutic Exercise/Strengthening  9. Group Therapy      TREATMENT PLAN: Frequency/Duration: 3 times a week for duration of hospital stay  Rehabilitation Potential For Stated Goals: FAIR      RECOMMENDED REHABILITATION/EQUIPMENT: (at time of discharge pending progress): Continue Skilled Therapy and rehab versus HHPT-dependent on pt progress. HISTORY:   History of Present Injury/Illness (Reason for Referral):  See H&P below  Patient known to us from [facility] recent admission 6/2017:  Patient is a 78 y. o.  and non english speaking female presents with dyspnea. She was last seen by us in February of 2017 for right pleural effusion. She has dCHR, prior DVT on coumadin, CKD, Moderate AS, HTN, DM and R ureteral obstruction s/p stent placement. She is followed by Dr Woo Adams for metastatic breast CA with mets to lung/bone. In February, she underwent R thoracentesis with drain to gravity with removal of 1200 ml of yellow pleural fluid. She developed pain after thoracentesis and pain was felt to be from trapped lung physiology and recommended not to remove more than 1 liter of pleural fluid if thoracentesis is again necessary. She was hypoxemic and home O2 arranged. She had a CXR in April 2017 with persistent effusion. She was seen by Oncology on 5/16/2017 and was having dyspnea during that time with fatigue. She was given lasix as well and has taken lasix until 3 days ago. She has plans to see [facility] on June 2. Today in the ER, her CXR shows effusion on right which is unchanged and new right perihilar infiltrate. We were asked to admit her for hypoxemia with persistent effusion, new infiltrate with metastatic breast cancer with acute on chronic renal failure and moderate AS. Pt was admitted and decompensated requiring intubation. She was seen by oncology and her chemotherapy treatments were held. She had R thoracentesis with 500mL serosanguinous fluid removed on 5/25/17. Pt improved and was extubated on 5/26/17. Urology was consulted to evaluate for urinothorax. It was felt that she did not have urinothorax but chronic effusion. She improved and was weaned to 2L. She will be discharged home today with home health. She will hold Coumadin until she returns on 6/8 for pleurex catheter placement. She will then resume Coumadin after that. She is to ambulate with respiratory therapy to determine home O2 needs on exertion. Past Medical History/Comorbidities:   Ms. Anushka Way  has a past medical history of Acute on chronic diastolic congestive heart failure ([address] (1/5/2016); Asthma; Cancer ([address] Chemotherapy-induced neutropenia (HCC) (12/20/2016);  Depression; Diabetes ([address] DM (diabetes mellitus) ([facility] (11/20/2012); HCAP (healthcare-associated pneumonia) (7/17/2013); Hypertension; Sepsis ([facility] (7/19/2016); Thromboembolus ([facility] and Unspecified adverse effect of anesthesia. Ms. Khalif Moser  has a past surgical history that includes vascular access (1/26/12) and gyn. Social History/Living Environment:   Home Environment: Private residence  # Steps to Enter: 0  One/Two Story Residence: One story  Living Alone: No  Support Systems: Spouse/Significant Other/Partner  Patient Expects to be Discharged to[de-identified] Private residence  Current DME Used/Available at Home: Walker  Prior Level of Function/Work/Activity:  Lives with , use of RW for household distances, use of supplemental O2 at baseline      Number of Personal Factors/Comorbidities that affect the Plan of Care: 3+: HIGH COMPLEXITY   EXAMINATION:   Most Recent Physical Functioning:   Gross Assessment:                  Posture:     Balance:  Sitting: Intact  Standing: Impaired  Standing - Static: Good  Standing - Dynamic : Fair Bed Mobility:  Supine to Sit: Stand-by asssistance  Sit to Supine: Stand-by asssistance  Wheelchair Mobility:     Transfers:  Sit to Stand: Stand-by asssistance  Stand to Sit: Supervision  Gait:     Base of Support: Narrowed  Speed/Lizzette: Pace decreased (<100 feet/min)  Step Length: Left shortened;Right shortened  Distance (ft): 40 Feet (ft)  Assistive Device: Other (comment) (none - patient did not want to use the r/walker)  Ambulation - Level of Assistance: Contact guard assistance  Interventions: Safety awareness training       Body Structures Involved:  1. Heart  2. Lungs  3. Bones  4. Joints  5. Muscles Body Functions Affected:  1. Sensory/Pain  2. Cardio  3. Respiratory  4. Neuromusculoskeletal  5. Movement Related Activities and Participation Affected:  1. General Tasks and Demands  2. Mobility  3. Self Care  4. Domestic Life  5. Interpersonal Interactions and Relationships  6.  Community, Social and [place]   Number of elements that affect the Plan of Care: 4+: HIGH COMPLEXITY   CLINICAL PRESENTATION:   Presentation: Evolving clinical presentation with changing clinical characteristics: MODERATE COMPLEXITY   CLINICAL DECISION MAKIN [facility] Mobility Inpatient Short Form  How much difficulty does the patient currently have. .. Unable A Lot A Little None   1. Turning over in bed (including adjusting bedclothes, sheets and blankets)? [ ] 1   [ ] 2   [X] 3   [ ] 4   2. Sitting down on and standing up from a chair with arms ( e.g., wheelchair, bedside commode, etc.)   [ ] 1   [ ] 2   [X] 3   [ ] 4   3. Moving from lying on back to sitting on the side of the bed? [ ] 1   [ ] 2   [X] 3   [ ] 4   How much help from another person does the patient currently need. .. Total A Lot A Little None   4. Moving to and from a bed to a chair (including a wheelchair)? [ ] 1   [X] 2   [ ] 3   [ ] 4   5. Need to walk in hospital room? [ ] 1   [X] 2   [ ] 3   [ ] 4   6. Climbing 3-5 steps with a railing? [ ] 1   [X] 2   [ ] 3   [ ] 4   © , Trustees of [address] [place] [address], under license to [employer]. All rights reserved    Score:  Initial: 15 Most Recent: X (Date: -- )     Interpretation of Tool:  Represents activities that are increasingly more difficult (i.e. Bed mobility, Transfers, Gait).        Score 24 23 22-20 19-15 14-10 9-7 6       Modifier CH CI CJ CK CL CM CN         · Mobility - Walking and Moving Around:               - CURRENT STATUS:    CK - 40%-59% impaired, limited or restricted               - GOAL STATUS:           CJ - 20%-39% impaired, limited or restricted               - D/C STATUS:                       ---------------To be determined---------------  Payor: JESSE / Plan: [facility] % / Product Type: Jesse /       Medical Necessity:     · Patient is expected to demonstrate progress in strength, balance, coordination and functional technique to decrease assistance required with gait and functional mobility. Reason for Services/Other Comments:  · Patient continues to require skilled intervention due to decreased strength, decreased balance, decreased functional tolerance, decreased cardiopulmonary endurance affecting participation in basic ADLs and functional tasks. Use of outcome tool(s) and clinical judgement create a POC that gives a: Questionable prediction of patient's progress: MODERATE COMPLEXITY                 TREATMENT:   (In addition to Assessment/Re-Assessment sessions the following treatments were rendered)   Pre-treatment Symptoms/Complaints:  Significant weakness  Pain: Initial:      Post Session:  0/10, fatigue      Therapeutic Activity: (    20 minutes): Therapeutic activities including Bed transfers, Ambulation on level ground and sit to stand and standing activities to improve mobility, strength and balance. Required minimal Safety awareness training to promote static and dynamic balance in standing. She ambulated 36' without an assistive device with her O2 sats remaining at 94% on 2L. She participated in BLE AROM exercises in supine 2 x 10 reps. races/Orthotics/Lines/Etc:   · IV    · NC 2L  Treatment/Session Assessment:    · Response to Treatment:  Pt appeared more motivated and eager to get up and walk and was focused more on her O2 sats after walking. She was pleasant and cooperative. · Interdisciplinary Collaboration:  · Registered Nurse  · After treatment position/precautions:  · Supine in bed, Bed/Chair-wheels locked, Bed in low position, Call light within reach and Family at bedside  · Compliance with Program/Exercises: Will assess as treatment progresses. · Recommendations/Intent for next treatment session: \"Next visit will focus on advancements to more challenging activities and reduction in assistance provided\".   Total Treatment Duration:PT Patient Time In/Time Out  Time In: 1115  Time Out: 200 [facility]

## 2017-07-13 NOTE — PROGRESS NOTES
Patient sitting up in bed in room, alert and oriented X3, O2 via n/c in place, IV intact, tele monitor in place, patient NSR @ 66, will continue to monitor, call light within reach.

## 2017-07-13 NOTE — DISCHARGE SUMMARY
Discharge Note    Katelyn Tse  Admission date:  7/8/2017  Discharge date:  7/14/2017     Admitting Diagnosis:  Respiratory failure with hypoxia [facility])    Discharge Diagnoses:   Hospital Problems  Date Reviewed: 7/13/2017          Codes Class Noted POA    Iron deficiency anemia (Chronic) ICD-10-CM: D50.9  ICD-9-CM: 280.9  7/10/2017 Yes        Type 2 diabetes mellitus without complication (HCC) (Chronic) ICD-10-CM: E11.9  ICD-9-CM: 250.00  0/16/6169 Yes        Folliculitis KOL-38-LS: L73.9  ICD-9-CM: 704.8  7/10/2017 Yes        Aortic stenosis, moderate (Chronic) ICD-10-CM: I35.0  ICD-9-CM: 424.1  5/24/2017 Yes        Anticoagulated on Coumadin (Chronic) ICD-10-CM: Z51.81, Z79.01  ICD-9-CM: V58.83, V58.61  5/24/2017 Yes        Breast cancer (HCC) (Chronic) ICD-10-CM: C50.919  ICD-9-CM: 174.9  2/17/2017 Yes    Overview Addendum 2/27/2014 10:08 AM by Peterson Garza NP     Er+  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 SUV max on the right is 20.5 and on the left 31.5. There is matted high right axillary   lymphadenopathy as well as a more hypermetabolic low right axillary lymph node which measures 12 x 9 mm and has an SUV max of 15.4. Small bilateral pulmonary hilar metastases are also noted as well as extensive skeletal metastatic disease which includes both proximal femora, both scapulae, innumerable ribs, virtually every vertebra, and the bony pelvis. No displaced pathologic fracture is identified on the CT images. Ct scan 2-12 Innumerable diffuse osseous metastases. . Multiple diffuse tiny lung nodules, and right hilar lymphadenopathy, also suspicious for metastases. 3. Bilateral breast masses, compatible with known carcinoma.   CANCER ANTIGEN 27.29 1076   Oncology Flowsheet Day, Cycle cyclophosphamide (CYTOXAN) IV   1/27/2012 Day 1, Cycle 1 600 mg/m2 = 996 mg   2/17/2012 Day 1, Cycle 2 600 mg/m2 = 996 mg   3/9/2012 Day 1, Cycle 3 600 mg/m2 = 996 mg   3/30/2012 Day 1, Cycle 4 500 mg/m2 = 830 mg   4/20/2012 Day 1, Cycle 5 500 mg/m2 = 830 mg     Oncology Flowsheet DOCEtaxel (TAXOTERE) IV   1/27/2012 75 mg/m2 = 125 mg   2/17/2012 75 mg/m2 = 125 mg   3/9/2012 75 mg/m2 = 125 mg   3/30/2012 60 mg/m2 = 100 mg   4/20/2012 60 mg/m2 = 100 mg   7-19-12 post 6 cycles of TC improved breast masses switch to femara maintenence  Clear response on pet ct scan   Mixed response to chemotherapy: There has been a significant interval response in the right breast and axilla with decrease in size of spiculated right breast mass and numerous right axillary lymph nodes. Left breast mass also appears to have responded chemotherapy though there may be persistent chest wall invasion. 2. Interval evolution of widespread osseous metastatic disease with many lytic lesions now appearing more sclerotic. These are associated with continued FDG uptake which is difficult to differentiate from marrow reactivity given recent chemotherapy. At least one new osseous metastatic deposits is present in the posterior spinous process at L1. Focally intense uptake is also present at approximately T5  10-19-12 pain in arms legs back continued improvement on ct scan Decreased size of the largest lung nodules and near complete resolution . of many. No new masses in the chest, abdomen, or pelvis. 2. Decreased size of right axillary and right hilar lymph nodes. 3. Diffuse osseous lesions again seen. 4. Diverticulosis. 5.  Atherosclerotic vascular disease ca 15-3 down to 50  On femara and aredia will see if we can get affinitor to start at next visit continue therapy  12-19-12 new headache and dizziness for MRI will start affinitor   1-14-13 improved pain breast mass right breast smaller width 7 cm x 5  6-05-13 femara and aredia now on affinitor x 5 months   7-13 tumor markers falling   8-20-13 post admission for pneumonia medications to restart breast mass smaller reduce affinitor to 7.5 mg   10-18-13 femara and affinitor pain right ilium intermittent markers smaller breast mass smaller on right   12-5-13 Reduce Afinitor to 5 mg daily. Continue coumadin for DVT. Continue Femara and Aredia. Repeat US and skeletal survey prior to next visit. 1-6-14 Feeling better with reduced dose. Ultrasound of breasts show decrease in mass sizes. Skeletal survey stable. Follow up in 2 months. Bony metastasis (HCC) (Chronic) ICD-10-CM: C79.51  ICD-9-CM: 198.5  2/17/2017 Yes        CKD (chronic kidney disease) (Chronic) ICD-10-CM: N18.9  ICD-9-CM: 585.9  1/4/2017 Yes    Overview Signed 5/14/2013 11:35 AM by Sarah Olivier RN     With acute rise- ? Dehydration and monitor             DVT (deep venous thrombosis) (HCC) (Chronic) ICD-10-CM: I82.409  ICD-9-CM: 453.40  1/4/2017 Yes    Overview Signed 11/19/2013  6:07 AM by Edmond Fam     [id number] There is nonocclusive thrombus within the left common femoral vein, superficial femoral vein, popliteal vein, and posterior tibial vein               HTN (hypertension) (Chronic) ICD-10-CM: I10  ICD-9-CM: 401.9  10/7/2016 Yes        DM (diabetes mellitus) ([facility] (Chronic) ICD-10-CM: E11.9  ICD-9-CM: 250.00  10/7/2016 Yes        Heart failure with preserved ejection fraction [facility]) ICD-10-CM: I50.30  ICD-9-CM: 428.9  2/25/2014 Yes              Consultants:cardiology    Studies/Procedures:      Condition on Discharge:  stable    Disposition:  Discharge home with [facility] course:  Bernadette Pool a 69yo F with a PMH of metastatic breast cancer (lung, bone) complicated by chronic (though never shown to be malignant) pleural effusion s/p pleurX catheter insertion on 6/8/17, moderate AS who was admitted on 7/8 with recurrent respiratory distress with pulmonary vascular congestion requiring BiPAP.  She has a recent history of R groin folliculitis as well. She is [place] speaking only.  She wears CPAP QHS.  She was hospitalized 5/24-6/2. She presented to the ER on 7/8/17 with shortness of breath and hypertensive urgency. She was placed on BiPAP. There was concern for PE but pt's renal failure precludes CTA chest. LE doppler ultrasound revealed chronic LLE thrombosis. Pt was started on Coumadin. Pt's CXR improved and small effusion noted. Pt's pleurex was not drained on Thursday. She was weaned off O2 and INR 1.8. Pt is felt stable for discharge home today. She will need INR check next week. Physical Exam:   Constitution:  the patient is well developed and in no acute distress, on RA  EENMT:  Sclera clear, pupils equal, oral mucosa moist  Respiratory: fairly clear  Cardiovascular:  RRR without M,G,R  Gastrointestinal: soft and non-tender; with positive bowel sounds. Musculoskeletal: warm without cyanosis. There is no lower leg edema. Skin:  no jaundice or rashes   Neurologic: no gross neuro deficits     Psychiatric:  alert and oriented x 3      LAB  Recent Labs      07/13/17   0559  07/12/17   0355  07/11/17   0600   HGB  7.1*  7.0*  7.5*   HCT  21.4*  21.1*  22.2*   INR  1.9*  2.0*  2.1*     Recent Labs      07/13/17   0559  07/12/17   0355  07/11/17   2241   NA  144  145  146*   K  4.4  4.4  4.2   CL  110*  112*  112*   CO2  25  25  22   BUN  43*  40*  39*   CREA  2.65*  2.90*  2.86*   MG   --    --   2.0     No results for input(s): PH, PCO2, PO2, HCO3 in the last 72 hours. Discharge Medications:   Current Discharge Medication List      START taking these medications    Details   albuterol-ipratropium (DUO-NEB) 2.5 mg-0.5 mg/3 ml nebu 3 mL by Nebulization route every six (6) hours as needed. Qty: 360 Nebule, Refills: 11      carvedilol (COREG) 3.125 mg tablet Take 1 Tab by mouth two (2) times daily (with meals). Qty: 60 Tab, Refills: 11      !! warfarin (COUMADIN) 1 mg tablet Take 1.5 Tabs by mouth every evening.   Qty: 45 Tab, Refills: 0      !! warfarin (COUMADIN) 3 mg tablet Take 1 Tab by mouth every evening. Qty: 30 Tab, Refills: 0       !! - Potential duplicate medications found. Please discuss with provider. CONTINUE these medications which have CHANGED    Details   cephALEXin (KEFLEX) 500 mg capsule Take 1 Cap by mouth every eight (8) hours. Qty: 15 Cap, Refills: 0         CONTINUE these medications which have NOT CHANGED    Details   fentaNYL (DURAGESIC) 50 mcg/hr PATCH 1 Patch by TransDERmal route every seventy-two (72) hours. Max Daily Amount: 1 Patch. Indications: Chronic Pain with Opioid Tolerance  Qty: 10 Patch, Refills: 0    Associated Diagnoses: Breast cancer metastasized to lung, unspecified laterality ([address] Cancer associated pain; Malignant neoplasm of left female breast, unspecified site of breast ([address] Malignant neoplasm of right female breast, unspecified site of breast (HCC)      raNITIdine (ZANTAC) 150 mg tablet Take 1 Tab by mouth daily. Qty: 30 Tab, Refills: 11      mirtazapine (REMERON) 30 mg tablet Take 1 Tab by mouth nightly. Qty: 30 Tab, Refills: 1      zolpidem (AMBIEN) 5 mg tablet Take 1 Tab by mouth nightly as needed for Sleep. Max Daily Amount: 5 mg. Qty: 30 Tab, Refills: 0      furosemide (LASIX) 40 mg tablet Take 1 Tab by mouth daily. Qty: 30 Tab, Refills: 0      DULoxetine (CYMBALTA) 20 mg capsule Take 1 Cap by mouth daily. Indications: NEUROPATHIC PAIN  Qty: 30 Cap, Refills: 0      hydrALAZINE (APRESOLINE) 50 mg tablet Take 1 Tab by mouth three (3) times daily. Qty: 90 Tab, Refills: 1      HYDROcodone-acetaminophen (NORCO)  mg tablet Take 1 Tab by mouth every four (4) hours as needed. Max Daily Amount: 6 Tabs. Qty: 90 Tab, Refills: 0      montelukast (SINGULAIR) 10 mg tablet Take 1 Tab by mouth nightly. Qty: 30 Tab, Refills: 3    Associated Diagnoses: Malignant neoplasm of right female breast, unspecified site of breast ([address] Breast cancer metastasized to lung, unspecified laterality ([address]  Cancer associated pain; Malignant neoplasm of left female breast, unspecified site of breast ([facility]      albuterol (PROVENTIL HFA, VENTOLIN HFA, PROAIR HFA) 90 mcg/actuation inhaler Take 2 Puffs by inhalation every six (6) hours as needed for Wheezing. Qty: 1 Inhaler, Refills: 11      amLODIPine (NORVASC) 10 mg tablet Take 1 Tab by mouth daily. Qty: 30 Tab, Refills: 11      LORazepam (ATIVAN) 1 mg tablet Take 1 Tab by mouth every six (6) hours as needed for Anxiety. Max Daily Amount: 4 mg. Qty: 60 Tab, Refills: 0      palbociclib 125 mg cap Take 125 mg by mouth daily. Take one pill once a day for 3 weeks and then off for one week  Qty: 21 Tab, Refills: 5    Associated Diagnoses: Breast cancer metastasized to lung, unspecified laterality (HCC)      budesonide-formoterol (SYMBICORT) 160-4.5 mcg/actuation HFA inhaler Take 2 Puffs by inhalation two (2) times a day. Qty: 1 Inhaler, Refills: 11      ALPRAZolam (XANAX) 0.25 mg tablet Take 1 Tab by mouth three (3) times daily as needed for Anxiety. Max Daily Amount: 0.75 mg. Qty: 90 Tab, Refills: 2      megestrol (MEGACE) 400 mg/10 mL (10 mL) suspension Take 5 mL by mouth daily. Qty: 240 mL, Refills: 2    Associated Diagnoses: Anorexia         STOP taking these medications       atenolol (TENORMIN) 25 mg tablet Comments:   Reason for Stopping: Followup/Outpt Studies:  --Follow up appointment with [facility] [facility] in 4 weeks with cxr.  --follow up with [facility] in 2 weeks  --follow up with Dr. Justice Alvarez next week with INR check  --follow up with [facility]  next week  --Home [address]  --Total discharge greater than 30 minutes in duration. More than 50% of the time documented was spent in face-to-face contact with the patient and in the care of the patient on the floor/unit where the patient is located.     SHAHAB Plata

## 2017-07-13 NOTE — PROGRESS NOTES
Warfarin dosing per pharmacist    Jen Capps is a 78 y.o. female. Height: 5' 3\" (160 cm)    Weight: 53.9 kg (118 lb 12.8 oz)    Indication:  History of DVT    Goal INR:  2-3    Home dose:  Per last anticoagulation visit 1.5 mg (1 mg x 1.5) every day    Risk factors or significant drug interactions:  --    Other anticoagulants:  heparin    Daily Monitoring  Date  INR     Warfarin dose HGB              Notes  7/8  1.4  3 mg  10.1  7/9  1.5  1.5 mg  --  7/10  1.9  1.5 mg  7.2  7/11  2.1  1.5 mg  7.5  7/12  2.0  1.5 mg  7.0  7/13  1.9  1.5 mg  7.1      Pt seen at anticoagulation visit on 7/5/17 with INR of 1.9 and no adjustments made. INR subtherapeutic on admission. Will continue 1.5 mg dose tonight. If INR decreases again can give another 3 mg dose and then continue a once weekly 3 mg dose at home.       Thank you,  Donavon Portillo, PharmD

## 2017-07-13 NOTE — PROGRESS NOTES
Patient resting in bed with eyes closed, O2 via n/c in place, RR even and unlabored, no c/o SOB during shift, remote tele monitor in place,  will continue to monitor, call light within reach.

## 2017-07-13 NOTE — PROGRESS NOTES
Patient in bed resting with no complaints at this time. Patient is alert and orientated with no distress noted. IV intact and patent with no s/s of infection noted. Respirations even and unlabored with heart rate regular. Patient unle to ambulate independently without assistance; needs x1 r/t weakness. Bed in low locked position with call light within reach. Patient instructed to call if assistance is needed. Will continue to monitor.

## 2017-07-13 NOTE — PROGRESS NOTES
Connie Sands  Admission Date: 7/8/2017             Daily Progress Note: 7/13/2017    The patient's chart is reviewed and the patient is discussed with the staff. Jenifer Callejas a 67yo F with a PMH of metastatic breast cancer (lung, bone) complicated by chronic (though never shown to be malignant) pleural effusion s/p pleurX catheter insertion on 6/8/17, moderate AS who was admitted on 7/8 with recurrent respiratory distress with pulmonary vascular congestion requiring BiPAP.  She has a recent history of R groin folliculitis as well. She is Italian speaking only. She wears CPAP QHS. She was hospitalized 5/24-6/2. She presented to the ER on 7/8/17 with shortness of breath and hypertensive urgency. She was placed on BiPAP. There was concern for PE but pt's renal failure precludes CTA chest. LE doppler ultrasound revealed chronic LLE thrombosis. Pt is now on Coumadin. Subjective:     Pt sitting up in bed on 2L O 2.  at bedside. Pt reports she feels ok.      Current Facility-Administered Medications   Medication Dose Route Frequency    morphine injection 2 mg  2 mg IntraVENous Q4H PRN    carvedilol (COREG) tablet 3.125 mg  3.125 mg Oral BID WITH MEALS    cephALEXin (KEFLEX) capsule 500 mg  500 mg Oral Q8H    insulin lispro (HUMALOG) injection   SubCUTAneous AC&HS    warfarin (COUMADIN) tablet 1.5 mg  1.5 mg Oral QPM    ALPRAZolam (XANAX) tablet 0.25 mg  0.25 mg Oral TID PRN    amLODIPine (NORVASC) tablet 10 mg  10 mg Oral DAILY    duloxetine (CYMBALTA) capsule 20 mg - PATIENT SUPPLIED  20 mg Oral DAILY    fentaNYL (DURAGESIC) 50 mcg/hr patch 1 Patch  1 Patch TransDERmal Q72H    hydrALAZINE (APRESOLINE) tablet 50 mg  50 mg Oral TID    HYDROcodone-acetaminophen (NORCO)  mg tablet 1 Tab  1 Tab Oral Q4H PRN    LORazepam (ATIVAN) tablet 1 mg  1 mg Oral Q6H PRN    megestrol (MEGACE) 400 mg/10 mL (10 mL) oral suspension 200 mg  200 mg Oral DAILY    mirtazapine (REMERON) tablet 30 mg  30 mg Oral QHS    montelukast (SINGULAIR) tablet 10 mg  10 mg Oral QHS    zolpidem (AMBIEN) tablet 5 mg  5 mg Oral QHS PRN    sodium chloride (NS) flush 5-10 mL  5-10 mL IntraVENous Q8H    sodium chloride (NS) flush 5-10 mL  5-10 mL IntraVENous PRN    promethazine (PHENERGAN) with saline injection 12.5 mg  12.5 mg IntraVENous Q6H PRN    senna-docusate (PERICOLACE) 8.6-50 mg per tablet 1 Tab  1 Tab Oral BID PRN    furosemide (LASIX) injection 40 mg  40 mg IntraVENous DAILY    pantoprazole (PROTONIX) tablet 40 mg  40 mg Oral ACB    mupirocin calcium (BACTROBAN) 2 % cream   Topical TID    albuterol-ipratropium (DUO-NEB) 2.5 MG-0.5 MG/3 ML  3 mL Nebulization Q6H RT       Review of Systems  Constitutional: negative for fever, chills, sweats  Cardiovascular: negative for chest pain, palpitations, syncope, edema  Gastrointestinal:  negative for dysphagia, reflux, vomiting, diarrhea, abdominal pain, or melena  Neurologic:  negative for focal weakness, numbness, headache    Objective:     Vitals:    07/13/17 0547 07/13/17 0722 07/13/17 0823 07/13/17 1105   BP:  149/82  136/65   Pulse:  64  65   Resp:  18  18   Temp:  98.6 °F (37 °C)  98.8 °F (37.1 °C)   SpO2:  98% 99% 98%   Weight: 118 lb 12.8 oz (53.9 kg)      Height:         Intake and Output:   07/11 1901 - 07/13 0700  In: 270 [P.O.:270]  Out: 700 [Urine:700]       Physical Exam:   Constitution:  the patient is well developed and in no acute distress, on 2L   EENMT:  Sclera clear, pupils equal, oral mucosa moist  Respiratory: diminished with crackles   Cardiovascular:  RRR without M,G,R  Gastrointestinal: soft and non-tender; with positive bowel sounds. Musculoskeletal: warm without cyanosis. There is no lower leg edema.   Skin:  no jaundice or rashes  Neurologic: no gross neuro deficits     Psychiatric:  alert and oriented x 3         LAB  Recent Labs      07/13/17   1122  07/13/17   0558  07/12/17   2103  07/12/17   1657  07/12/17   1049 GLUCPOC  135*  132*  224*  190*  139*      Recent Labs      07/13/17   0559  07/12/17   0355  07/11/17   0600  07/10/17   1306   HGB  7.1*  7.0*  7.5*  7.3*   HCT  21.4*  21.1*  22.2*  21.9*   INR  1.9*  2.0*  2.1*   --      Recent Labs      07/13/17   0559  07/12/17   0355  07/11/17   2241  07/10/17   1306   NA  144  145  146*  146*   K  4.4  4.4  4.2  3.6   CL  110*  112*  112*  110*   CO2  25  25  22  22   GLU  124*  133*  137*  204*   BUN  43*  40*  39*  36*   CREA  2.65*  2.90*  2.86*  2.65*   MG   --    --   2.0  2.1   CA  6.8*  6.8*  6.7*  7.3*     No results for input(s): PH, PCO2, PO2, HCO3 in the last 72 hours. No results for input(s): LCAD, LAC in the last 72 hours. Assessment:  (Medical Decision Making)     Hospital Problems  Date Reviewed: 7/13/2017          Codes Class Noted POA    Iron deficiency anemia (Chronic)-chronic  ICD-10-CM: D50.9  ICD-9-CM: 280.9  7/10/2017 Yes        Type 2 diabetes mellitus without complication (HCC) (Chronic)-[facility] ICD-10-CM: E11.9  ICD-9-CM: 250.00  9/47/0470 Yes        Folliculitis [facility]-YB: L73.9  ICD-9-CM: 704.8  7/10/2017 Yes        Aortic stenosis, moderate (Chronic)-chronic  ICD-10-CM: I35.0  ICD-9-CM: 424.1  5/24/2017 Yes        Anticoagulated on Coumadin (Chronic)-pharmacy dosing ICD-10-CM: Z51.81, Z79.01  ICD-9-CM: V58.83, V58.61  5/24/2017 Yes        Breast cancer (HCC) (Chronic) ICD-10-CM: C50.919  ICD-9-CM: 174.9  2/17/2017 Yes    Overview Addendum 2/27/2014 10:08 AM by Peterson Garza NP     Er+  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 SUV max on the right is 20.5 and on the left 31.5. There is matted high right axillary   lymphadenopathy as well as a more hypermetabolic low right axillary lymph node which measures 12 x 9 mm and has an SUV max of 15.4.  Small bilateral pulmonary hilar metastases are also noted as well as extensive skeletal metastatic disease which includes both proximal femora, both scapulae, innumerable ribs, virtually every vertebra, and the bony pelvis. No displaced pathologic fracture is identified on the CT images. Ct scan 2-12 Innumerable diffuse osseous metastases. . Multiple diffuse tiny lung nodules, and right hilar lymphadenopathy, also suspicious for metastases. 3. Bilateral breast masses, compatible with known carcinoma. CANCER ANTIGEN 27.29 1076   Oncology Flowsheet Day, Cycle cyclophosphamide (CYTOXAN) IV   1/27/2012 Day 1, Cycle 1 600 mg/m2 = 996 mg   2/17/2012 Day 1, Cycle 2 600 mg/m2 = 996 mg   3/9/2012 Day 1, Cycle 3 600 mg/m2 = 996 mg   3/30/2012 Day 1, Cycle 4 500 mg/m2 = 830 mg   4/20/2012 Day 1, Cycle 5 500 mg/m2 = 830 mg     Oncology Flowsheet DOCEtaxel (TAXOTERE) IV   1/27/2012 75 mg/m2 = 125 mg   2/17/2012 75 mg/m2 = 125 mg   3/9/2012 75 mg/m2 = 125 mg   3/30/2012 60 mg/m2 = 100 mg   4/20/2012 60 mg/m2 = 100 mg   7-19-12 post 6 cycles of TC improved breast masses switch to femara maintenence  Clear response on pet ct scan   Mixed response to chemotherapy: There has been a significant interval response in the right breast and axilla with decrease in size of spiculated right breast mass and numerous right axillary lymph nodes. Left breast mass also appears to have responded chemotherapy though there may be persistent chest wall invasion. 2. Interval evolution of widespread osseous metastatic disease with many lytic lesions now appearing more sclerotic. These are associated with continued FDG uptake which is difficult to differentiate from marrow reactivity given recent chemotherapy. At least one new osseous metastatic deposits is present in the posterior spinous process at L1. Focally intense uptake is also present at approximately T5  10-19-12 pain in arms legs back continued improvement on ct scan Decreased size of the largest lung nodules and near complete resolution . of many.  No new masses in the chest, abdomen, or pelvis. 2. Decreased size of right axillary and right hilar lymph nodes. 3. Diffuse osseous lesions again seen. 4. Diverticulosis. 5. Atherosclerotic vascular disease ca 15-3 down to 50  On femara and aredia will see if we can get affinitor to start at next visit continue therapy  12-19-12 new headache and dizziness for MRI will start affinitor   1-14-13 improved pain breast mass right breast smaller width 7 cm x 5  6-05-13 femara and aredia now on affinitor x 5 months   7-13 tumor markers falling   8-20-13 post admission for pneumonia medications to restart breast mass smaller reduce affinitor to 7.5 mg   10-18-13 femara and affinitor pain right ilium intermittent markers smaller breast mass smaller on right   12-5-13 Reduce Afinitor to 5 mg daily. Continue coumadin for DVT. Continue Femara and Aredia. Repeat US and skeletal survey prior to next visit. 1-6-14 Feeling better with reduced dose. Ultrasound of breasts show decrease in mass sizes. Skeletal survey stable. Follow up in 2 months. Bony metastasis (HCC) (Chronic) ICD-10-CM: C79.51  ICD-9-CM: 198.5  2/17/2017 Yes        CKD (chronic kidney disease) (Chronic) ICD-10-CM: N18.9  ICD-9-CM: 585.9  1/4/2017 Yes    Overview Signed 5/14/2013 11:35 AM by José Donahue RN     With acute rise- ?  Dehydration and monitor             DVT (deep venous thrombosis) (HCC) (Chronic) ICD-10-CM: I82.409  ICD-9-CM: 453.40  1/4/2017 Yes    Overview Signed 11/19/2013  6:07 AM by Paula Nearing     [id number] There is nonocclusive thrombus within the left common femoral vein, superficial femoral vein, popliteal vein, and posterior tibial vein               HTN (hypertension) (Chronic) ICD-10-CM: I10  ICD-9-CM: 401.9  10/7/2016 Yes        [facility] ([facility]ronic) ICD-10-CM: E11.9  ICD-9-CM: 250.00  10/7/2016 Yes        Heart failure with preserved ejection fraction (HCC)-on lasix  ICD-10-CM: I50.30  ICD-9-CM: 428.9  2/25/2014 Yes Plan:  (Medical Decision Making)     --wean O2 as tolerated  --continue lasix  --continue keflex  --drain pleurex and check cxr    More than 50% of the time documented was spent in face-to-face contact with the patient and in the care of the patient on the floor/unit where the patient is located. SHAHAB Gutierrez  Lungs:   Decreased breath sounds  Heart:  RRR with no Murmur/Rubs/Gallops    Additional Comments:  Drain fluid and repeat cxr- home tomorrow? I have spoken with and examined the patient. I agree with the above assessment and plan as documented.     Arturo Seth MD

## 2017-07-14 ENCOUNTER — APPOINTMENT (OUTPATIENT)
Dept: GENERAL RADIOLOGY | Age: 67
DRG: 291 | End: 2017-07-14
Attending: INTERNAL MEDICINE
Payer: SUBSIDIZED

## 2017-07-14 VITALS
BODY MASS INDEX: 21.05 KG/M2 | DIASTOLIC BLOOD PRESSURE: 60 MMHG | OXYGEN SATURATION: 94 % | RESPIRATION RATE: 18 BRPM | SYSTOLIC BLOOD PRESSURE: 128 MMHG | WEIGHT: 118.8 LBS | TEMPERATURE: 98.5 F | HEART RATE: 68 BPM | HEIGHT: 63 IN

## 2017-07-14 LAB
GLUCOSE BLD STRIP.AUTO-MCNC: 156 MG/DL (ref 65–100)
GLUCOSE BLD STRIP.AUTO-MCNC: 173 MG/DL (ref 65–100)
INR PPP: 1.8 (ref 0.9–1.2)
PROTHROMBIN TIME: 19.7 SEC (ref 9.6–12)

## 2017-07-14 PROCEDURE — 74011000250 HC RX REV CODE- 250: Performed by: INTERNAL MEDICINE

## 2017-07-14 PROCEDURE — 74011636637 HC RX REV CODE- 636/637: Performed by: INTERNAL MEDICINE

## 2017-07-14 PROCEDURE — 99239 HOSP IP/OBS DSCHRG MGMT >30: CPT | Performed by: INTERNAL MEDICINE

## 2017-07-14 PROCEDURE — 74011250637 HC RX REV CODE- 250/637: Performed by: INTERNAL MEDICINE

## 2017-07-14 PROCEDURE — 82962 GLUCOSE BLOOD TEST: CPT

## 2017-07-14 PROCEDURE — 36415 COLL VENOUS BLD VENIPUNCTURE: CPT | Performed by: EMERGENCY MEDICINE

## 2017-07-14 PROCEDURE — 94640 AIRWAY INHALATION TREATMENT: CPT

## 2017-07-14 PROCEDURE — 71010 XR CHEST SNGL V: CPT

## 2017-07-14 PROCEDURE — 74011250637 HC RX REV CODE- 250/637: Performed by: NURSE PRACTITIONER

## 2017-07-14 PROCEDURE — 85610 PROTHROMBIN TIME: CPT | Performed by: EMERGENCY MEDICINE

## 2017-07-14 PROCEDURE — 74011250636 HC RX REV CODE- 250/636: Performed by: INTERNAL MEDICINE

## 2017-07-14 RX ORDER — WARFARIN 1 MG/1
3 TABLET ORAL EVERY EVENING
Status: DISCONTINUED | OUTPATIENT
Start: 2017-07-14 | End: 2017-07-14 | Stop reason: HOSPADM

## 2017-07-14 RX ORDER — CEPHALEXIN 500 MG/1
500 CAPSULE ORAL EVERY 8 HOURS
Qty: 15 CAP | Refills: 0 | Status: SHIPPED | OUTPATIENT
Start: 2017-07-14 | End: 2017-07-31

## 2017-07-14 RX ORDER — WARFARIN 3 MG/1
3 TABLET ORAL EVERY EVENING
Qty: 30 TAB | Refills: 0 | Status: ON HOLD | OUTPATIENT
Start: 2017-07-14 | End: 2018-01-01

## 2017-07-14 RX ORDER — WARFARIN 1 MG/1
1.5 TABLET ORAL EVERY EVENING
Status: DISCONTINUED | OUTPATIENT
Start: 2017-07-15 | End: 2017-07-14 | Stop reason: HOSPADM

## 2017-07-14 RX ORDER — CARVEDILOL 3.12 MG/1
3.12 TABLET ORAL 2 TIMES DAILY WITH MEALS
Qty: 60 TAB | Refills: 11 | Status: SHIPPED | OUTPATIENT
Start: 2017-07-14 | End: 2018-01-01 | Stop reason: SDUPTHER

## 2017-07-14 RX ORDER — IPRATROPIUM BROMIDE AND ALBUTEROL SULFATE 2.5; .5 MG/3ML; MG/3ML
3 SOLUTION RESPIRATORY (INHALATION)
Qty: 360 NEBULE | Refills: 11 | Status: SHIPPED | OUTPATIENT
Start: 2017-07-14 | End: 2018-01-01 | Stop reason: SDUPTHER

## 2017-07-14 RX ORDER — WARFARIN 1 MG/1
1.5 TABLET ORAL EVERY EVENING
Qty: 45 TAB | Refills: 0 | Status: SHIPPED | OUTPATIENT
Start: 2017-07-15 | End: 2018-01-01

## 2017-07-14 RX ADMIN — MEGESTROL ACETATE 200 MG: 40 SUSPENSION ORAL at 08:36

## 2017-07-14 RX ADMIN — HYDRALAZINE HYDROCHLORIDE 50 MG: 50 TABLET, FILM COATED ORAL at 08:36

## 2017-07-14 RX ADMIN — Medication 5 ML: at 05:19

## 2017-07-14 RX ADMIN — CARVEDILOL 3.12 MG: 3.12 TABLET, FILM COATED ORAL at 08:36

## 2017-07-14 RX ADMIN — MUPIROCIN: 2 CREAM TOPICAL at 08:42

## 2017-07-14 RX ADMIN — INSULIN LISPRO 2 UNITS: 100 INJECTION, SOLUTION INTRAVENOUS; SUBCUTANEOUS at 11:34

## 2017-07-14 RX ADMIN — AMLODIPINE BESYLATE 10 MG: 10 TABLET ORAL at 08:36

## 2017-07-14 RX ADMIN — CEPHALEXIN 500 MG: 500 CAPSULE ORAL at 14:43

## 2017-07-14 RX ADMIN — IPRATROPIUM BROMIDE AND ALBUTEROL SULFATE 3 ML: 2.5; .5 SOLUTION RESPIRATORY (INHALATION) at 08:02

## 2017-07-14 RX ADMIN — CEPHALEXIN 500 MG: 500 CAPSULE ORAL at 05:19

## 2017-07-14 RX ADMIN — PANTOPRAZOLE SODIUM 40 MG: 40 TABLET, DELAYED RELEASE ORAL at 05:20

## 2017-07-14 RX ADMIN — FUROSEMIDE 40 MG: 10 INJECTION, SOLUTION INTRAMUSCULAR; INTRAVENOUS at 08:36

## 2017-07-14 RX ADMIN — STANDARDIZED SENNA CONCENTRATE AND DOCUSATE SODIUM 1 TABLET: 8.6; 5 TABLET, FILM COATED ORAL at 08:36

## 2017-07-14 RX ADMIN — HYDROCODONE BITARTRATE AND ACETAMINOPHEN 1 TABLET: 10; 325 TABLET ORAL at 03:57

## 2017-07-14 RX ADMIN — INSULIN LISPRO 2 UNITS: 100 INJECTION, SOLUTION INTRAVENOUS; SUBCUTANEOUS at 05:40

## 2017-07-14 NOTE — DISCHARGE INSTRUCTIONS
DISCHARGE SUMMARY from Nurse    The following personal items are in your possession at time of discharge:    Dental Appliances: None, At home  Visual Aid: Glasses     Home Medications: None  Jewelry: None  Clothing: None  Other Valuables: None  Personal Items Sent to Safe: none          PATIENT INSTRUCTIONS:    After general anesthesia or intravenous sedation, for 24 hours or while taking prescription Narcotics:  · Limit your activities  · Do not drive and operate hazardous machinery  · Do not make important personal or business decisions  · Do  not drink alcoholic beverages  · If you have not urinated within 8 hours after discharge, please contact your surgeon on call. Report the following to your surgeon:  · Excessive pain, swelling, redness or odor of or around the surgical area  · Temperature over 100.5  · Nausea and vomiting lasting longer than 4 hours or if unable to take medications  · Any signs of decreased circulation or nerve impairment to extremity: change in color, persistent  numbness, tingling, coldness or increase pain  · Any questions        What to do at Home:  Recommended activity: Activity as tolerated. If you experience any of the following symptoms temp > 100.4, unrelieved pain, nausea or vomiting, shortness of breath or fatigue not relieved with rest, please follow up with MD.      *  Please give a list of your current medications to your Primary Care Provider. *  Please update this list whenever your medications are discontinued, doses are      changed, or new medications (including over-the-counter products) are added. *  Please carry medication information at all times in case of emergency situations. These are general instructions for a healthy lifestyle:    No smoking/ No tobacco products/ Avoid exposure to second hand smoke    Surgeon General's Warning:  Quitting smoking now greatly reduces serious risk to your health.     Obesity, smoking, and sedentary lifestyle greatly increases your risk for illness    A healthy diet, regular physical exercise & weight monitoring are important for maintaining a healthy lifestyle    You may be retaining fluid if you have a history of heart failure or if you experience any of the following symptoms:  Weight gain of 3 pounds or more overnight or 5 pounds in a week, increased swelling in our hands or feet or shortness of breath while lying flat in bed. Please call your doctor as soon as you notice any of these symptoms; do not wait until your next office visit. Recognize signs and symptoms of STROKE:    F-face looks uneven    A-arms unable to move or move unevenly    S-speech slurred or non-existent    T-time-call 911 as soon as signs and symptoms begin-DO NOT go       Back to bed or wait to see if you get better-TIME IS BRAIN. Warning Signs of HEART ATTACK     Call 911 if you have these symptoms:   Chest discomfort. Most heart attacks involve discomfort in the center of the chest that lasts more than a few minutes, or that goes away and comes back. It can feel like uncomfortable pressure, squeezing, fullness, or pain.  Discomfort in other areas of the upper body. Symptoms can include pain or discomfort in one or both arms, the back, neck, jaw, or stomach.  Shortness of breath with or without chest discomfort.  Other signs may include breaking out in a cold sweat, nausea, or lightheadedness. Don't wait more than five minutes to call 911 - MINUTES MATTER! Fast action can save your life. Calling 911 is almost always the fastest way to get lifesaving treatment. Emergency Medical Services staff can begin treatment when they arrive -- up to an hour sooner than if someone gets to the hospital by car. The discharge information has been reviewed with the patient. The patient verbalized understanding.     Discharge medications reviewed with the patient and appropriate educational materials and side effects teaching were provided. Opal Del Valle: Instrucciones de cuidado - [ Heart Failure: Care Instructions ]  Instrucciones de cuidado    La insuficiencia cardíaca ocurre cuando el corazón no bombea toda la deb que el cuerpo necesita. Insuficiencia no significa que merrill corazón foss dejado de bombear, sino que no bombea flannery jaison caterina debería. Con el tiempo, esto causa kim acumulación de líquido [employer] pulmones y en otras partes del cuerpo. La acumulación de líquido puede causar falta de aire, fatiga, hinchazón en los tobillos y [place]. Puede sentirse mejor y vivir más tiempo si sharmila jesus medicamentos con regularidad, reduce el sodio (sal) en la dieta, controla merrill peso todos los días y realiza cambios en merrill estilo de luann. La atención de seguimiento es kim parte clave de merrill tratamiento y seguridad. Asegúrese de hacer y acudir a todas las citas, y llame a merrill médico si está teniendo problemas. También es kim buena idea saber los resultados de jesus exámenes y mantener kim lista de los medicamentos que sharmila. ¿Cómo puede cuidarse en el hogar? Medicamentos  · [place] con los medicamentos. [employer] medicamentos exactamente caterina le fueron recetados. Llame a merrill médico si maico estar teniendo un problema con merrill medicamento. · No tome ningún tipo de vitaminas, medicamentos de venta lilibeth ni productos herbarios sin hablar nain con merrill médico. No tome ibuprofeno (Advil o Motrin) ni naproxeno (Aleve) sin nain hablar con merrill médico. Podrían empeorar merrlil insuficiencia cardíaca. · Es posible que esté tomando algunos de los siguientes medicamentos. ¨ Los betabloqueantes pueden desacelerar la frecuencia cardíaca, disminuir la presión arterial y mejorar merrill dolencia. Amie un betabloqueante podría disminuir las probabilidades de que necesite ser hospitalizado.   ¨ Los inhibidores de la enzima convertidora de la angiotensina (IECA) reducen la carga de trabajo del corazón, disminuyen la presión arterial y reducen la hinchazón. Amie un inhibidor de la ECA puede disminuir las probabilidades de que necesite volver a ser hospitalizado. ¨ Los bloqueadores de los receptores de la angiotensina II (ARB, por jesus siglas en inglés) funcionan caterina los inhibidores de la ECA. Merrill médico podría recetarlos en lugar de los inhibidores de la ECA. ¨ Los diuréticos reducen la hinchazón. ¨ Los suplementos de potasio reemplazan radha importante mineral que, a veces, se pierde con los diuréticos. ¨ La aspirina y los anticoagulantes previenen la formación de coágulos de [employer], que pueden causar un ataque cerebral o un ataque al corazón. Recibirá [employer] medicamentos específicos recetados por merrill médico.  Alimentación  · Merrill médico puede sugerirle que limite el sodio a 2,000 miligramos (mg) al día o menos. Eso es menos de 1 cucharadita de sal al día, incluida toda la sal que consume al cocinar o en alimentos envasados. Las personas obtienen la mayor cantidad de sodio de los alimentos procesados. Las comidas rápidas o de restaurante también tienden a ser muy ricas en sodio. · Pregúntele a merrill médico cuánto líquido puede beber cada día. Es posible que tenga que Gloria's líquidos. Peso  · Pésese sin ropa todos los días a la [employer] Registre merrill peso. Llame a merrill médico si tiene un aumento repentino de peso, caterina más de 2 libras (0.9 kg) a 3 libras (1.4 kg) en un día o 5 libras (2.3 kg) en kim semana. (Merrill médico podría sugerirle unos límites diferentes de aumento de [place]). Un aumento de peso repentino podría significar que merrill insuficiencia cardíaca está empeorando. Nivel de [place]  · Comience con ejercicio suave (si merrill médico lo aprueba). Aun cuando solo olivia un poco, el ejercicio le ayudará a estar más alok, tener más [place], y [place] merrill peso y estrés. Caminar es kim manera fácil de hacer ejercicio. Comience caminando un poco más de lo que caminó antes. Poco a poco, aumente la distancia.   · Cuando olivia ejercicio, esté alerta a señales que indiquen que merrill corazón se esté esforzando demasiado. Si no puede hablar mientras hace ejercicio, se está esforzando demasiado. Si le falta el aire, se marea o tiene dolor en el [facility], siéntese y descanse. · Si se siente agotado el día después de hacer ejercicio, camine más despacio o camine kim distancia surinder hasta que pueda aumentar a un mejor ritmo. · Descanse lo suficiente de noche. Dormir con 1 o 2 almohadas debajo de la rosemarie y la parte superior del cuerpo podría ayudarle a respirar mejor. Cambios en el estilo de luann  · No fume. Fumar puede empeorar merrill afección cardíaca. Si necesita ayuda para dejar de fumar, hable con merrill médico sobre programas y medicamentos para dejar de fumar. Estos pueden aumentar jesus probabilidades de dejar el hábito para siempre. Dejar de fumar puede ser la medida más importante que pueda julian para proteger merrill corazón. · Limite el alcohol a 2 bebidas al día si es hombre, y 1 bebida al día si es mindi. Beber demasiado alcohol puede causarle problemas de [place]. · Evite enfermarse de resfriados y gripe. Hágase poner la vacuna antineumocócica. Si ya le gan puesto kim antes, consulte a merrill médico para saber si necesita otra dosis. Aplíquese kim vacuna contra la gripe (\"flu shot\") cada año. Si tiene que estar cerca de personas con resfriados o gripe, lávese las perry con frecuencia. ¿Cuándo debe pedir ayuda? Llame al 911 si tiene síntomas de insuficiencia cardíaca repentina, tales caterina:  · Tiene graves dificultades para respirar. · Al toser expulsa mucosidad rosácea y espumosa. · Tiene latidos cardíacos irregulares o rápidos. Llame a merrill médico ahora mismo o busque atención médica inmediata si:  · Presenta kim nueva o mayor falta de aire. · Siente mareos o aturdimiento, o que está a punto de [place]. · Tiene un aumento repentino de [place], caterina más de 2 libras (0.9 kg) a 3 libras (1.4 kg) en un día o 5 libras (2.3 kg) en kim semana.  (Merrill médico podría sugerirle unos límites diferentes de aumento de [place]). · [place] piernas, los tobillos o los pies más hinchados. · De repente se siente tan cansado o débil que no puede hacer las [place]. Preste especial atención a los cambios en merrill sherrie y asegúrese de comunicarse con merrill médico si:  · Tiene nuevos síntomas. ¿Dónde puede encontrar más información en inglés? Charla Candelario a http://rosie-abena.info/. Samir Her V132 en la búsqueda para aprender más acerca de \"Insuficiencia cardíaca: Instrucciones de cuidado - [ Heart Failure: Care Instructions ]. \"  Revisado: 3 abril, 2017  Versión del contenido: 11.3  © 6120-3235 Healthwise, Incorporated. Las instrucciones de cuidado fueron adaptadas bajo licencia por [employer] (which disclaims liability or warranty for this information). Si usted tiene [place] afección médica o sobre estas instrucciones, siempre pregunte a merrill profesional de sherrie. Healthwise, Incorporated niega toda garantía o responsabilidad por merrill uso de esta información. Heart Failure: Care Instructions  Your Care Instructions    Heart failure occurs when your heart does not pump as much blood as the body needs. Failure does not mean that the heart has stopped pumping but rather that it is not pumping as well as it should. Over time, this causes fluid buildup in your lungs and other parts of your body. Fluid buildup can cause shortness of breath, fatigue, swollen ankles, and other problems. By taking medicines regularly, reducing sodium (salt) in your diet, checking your weight every day, and making lifestyle changes, you can feel better and live longer. Follow-up care is a key part of your treatment and safety. Be sure to make and go to all appointments, and call your doctor if you are having problems. It's also a good idea to know your test results and keep a list of the medicines you take.   How can you care for yourself at home?  Medicines  · Be safe with medicines. Take your medicines exactly as prescribed. Call your doctor if you think you are having a problem with your medicine. · Do not take any vitamins, over-the-counter medicine, or herbal products without talking to your doctor first. Martina Johnson not take ibuprofen (Advil or Motrin) and naproxen (Aleve) without talking to your doctor first. They could make your heart failure worse. · You may be taking some of the following medicine. ¨ Beta-blockers can slow heart rate, decrease blood pressure, and improve your condition. Taking a beta-blocker may lower your chance of needing to be hospitalized. ¨ Angiotensin-converting enzyme inhibitors (ACEIs) reduce the heart's workload, lower blood pressure, and reduce swelling. Taking an ACEI may lower your chance of needing to be hospitalized again. ¨ Angiotensin II receptor blockers (ARBs) work like ACEIs. Your doctor may prescribe them instead of ACEIs. ¨ Diuretics, also called water pills, reduce swelling. ¨ Potassium supplements replace this important mineral, which is sometimes lost with diuretics. ¨ Aspirin and other blood thinners prevent blood clots, which can cause a stroke or heart attack. You will get more details on the specific medicines your doctor prescribes. Diet  · Your doctor may suggest that you limit sodium to 2,000 milligrams (mg) a day or less. That is less than 1 teaspoon of salt a day, including all the salt you eat in cooking or in packaged foods. People get most of their sodium from processed foods. Fast food and restaurant meals also tend to be very high in sodium. · Ask your doctor how much liquid you can drink each day. You may have to limit liquids. Weight  · Weigh yourself without clothing at the same time each day. Record your weight. Call your doctor if you have a sudden weight gain, such as more than 2 to 3 pounds in a day or 5 pounds in a week.  (Your doctor may suggest a different range of weight gain.) A sudden weight gain may mean that your heart failure is getting worse. Activity level  · Start light exercise (if your doctor says it is okay). Even if you can only do a small amount, exercise will help you get stronger, have more energy, and manage your weight and your stress. Walking is an easy way to get exercise. Start out by walking a little more than you did before. Bit by bit, increase the amount you walk. · When you exercise, watch for signs that your heart is working too hard. You are pushing yourself too hard if you cannot talk while you are exercising. If you become short of breath or dizzy or have chest pain, stop, sit down, and rest.  · If you feel \"wiped out\" the day after you exercise, walk slower or for a shorter distance until you can work up to a better pace. · Get enough rest at night. Sleeping with 1 or 2 pillows under your upper body and head may help you breathe easier. Lifestyle changes  · Do not smoke. Smoking can make a heart condition worse. If you need help quitting, talk to your doctor about stop-smoking programs and medicines. These can increase your chances of quitting for good. Quitting smoking may be the most important step you can take to protect your heart. · Limit alcohol to 2 drinks a day for men and 1 drink a day for women. Too much alcohol can cause health problems. · Avoid getting sick from colds and the flu. Get a pneumococcal vaccine shot. If you have had one before, ask your doctor whether you need another dose. Get a flu shot each year. If you must be around people with colds or the flu, wash your hands often. When should you call for help? Call 911 if you have symptoms of sudden heart failure such as:  · You have severe trouble breathing. · You cough up pink, foamy mucus. · You have a new irregular or rapid heartbeat. Call your doctor now or seek immediate medical care if:  · You have new or increased shortness of breath.   · You are dizzy or lightheaded, or you feel like you may faint. · You have sudden weight gain, such as more than 2 to 3 pounds in a day or 5 pounds in a week. (Your doctor may suggest a different range of weight gain.)  · You have increased swelling in your legs, ankles, or feet. · You are suddenly so tired or weak that you cannot do your usual activities. Watch closely for changes in your health, and be sure to contact your doctor if:  · You develop new symptoms. Where can you learn more? Go to http://rosie-abena.info/. Enter R210 in the search box to learn more about \"Heart Failure: Care Instructions. \"  Current as of: April 3, 2017  Content Version: 11.3  © 0843-8360 [employer]. Care instructions adapted under license by [employer] (which disclaims liability or warranty for this information). If you have questions about a medical condition or this instruction, always ask your healthcare professional. [place] [address] any warranty or liability for your use of this information. Anemia: Instrucciones de cuidado - [ Anemia: Care Instructions ]  Instrucciones de cuidado    La anemia es un bajo nivel de glóbulos rojos, que son los que transportan el oxígeno por el organismo. Muchas cosas pueden causar anemia. La falta de mandy es kim de las causas más comunes. Merrill organismo necesita mandy para producir hemoglobina, kim sustancia de los glóbulos rojos que transporta el oxígeno de los pulmones a las células del organismo. Si no hay suficiente mandy, el organismo produce glóbulos rojos más pequeños y en surinder cantidad. Debido a ello, las células de merrill organismo no obtienen suficiente oxígeno y usted se sentirá cansado y débil. Y puede tener dificultad para concentrarse. El sangrado es la causa más común de la falta de mandy. Podría tener sangrado menstrual abundante o hemorragias causadas por afecciones tales caterina úlceras, hemorroides o cáncer.  El uso habitual de aspirina u otros medicamentos antiinflamatorios (caterina ibuprofeno) también puede causar sangrado en [employer]. La falta de mandy en merrill dieta también puede causar anemia, sobre todo en los momentos en los que el organismo necesita más mandy, caterina fidelina el [place], la infancia y la adolescencia. Es posible que merrill médico le haya recetado pastillas de mandy. El tratamiento puede julian algunos meses hasta que jesus niveles de mandy regresen a la normalidad. Merrill médico también puede sugerirle que consuma alimentos ricos en mandy, caterina carne y frijoles (habichuelas). Existen muchas otras causas de la anemia. No siempre es causada por la falta de mandy. Descubrir la causa específica de merrill anemia le ayudará a merrill médico a encontrar el tratamiento adecuado para usted. La atención de seguimiento es kim parte clave de merrill tratamiento y seguridad. Asegúrese de hacer y acudir a todas las citas, y llame a merrill médico si está teniendo problemas. También es kim buena idea saber los resultados de los exámenes y mantener kim lista de los medicamentos que sharmila. ¿Cómo puede cuidarse en el hogar? · [employer] medicamentos exactamente caterina le fueron recetados. Llame a merrill médico si maico estar teniendo problemas con merrill medicamento. · Si merrill médico le recomienda pastillas de mandy, tómelas según las indicaciones:  ¨ Trate de julian las pastillas con el estómago vacío 1 hora antes de comer o 2 horas después de comer. Good podría necesitar julian el mandy con la comida para evitar el malestar estomacal.  ¨ No tome antiácidos, leche o bebidas con cafeína (caterina café, té o bebidas de cola) al [employer] o 2 horas antes o después de beatrice tomado las pastillas de mandy. Estos pueden dificultar la absorción del mandy en el organismo. ¨ La vitamina C (de alimentos o suplementos) ayuda a merrill organismo a absorber el mandy.  Trate de julian las pastillas de mandy con un vaso de jugo de naranja o con otro tipo de alimento rico en vitamina C, caterina las frutas cítricas. ¨ Las [place] [employer] de [facility] podrían causar [employer], caterina acidez [place], [place], diarrea, estreñimiento y retortijones. Asegúrese de beber abundantes líquidos e incluya en merrill dieta diaria frutas, verduras y [place]. Las pastillas de mandy muchas veces hacen que jesus evacuaciones brian oscuras o verdosas. ¨ Si olvida julian merrill pastilla de mandy, no tome kim dosis doble la próxima vez. ¨ Mantenga las pastillas de mandy fuera del alcance de los niños pequeños. La sobredosis de mandy puede ser [employer]. · Siga los consejos de merrill médico acerca del consumo de alimentos ricos en [facility]. Entre estos se encuentran las benita funez, los [place], las aves, los [place], los frijoles, las uvas pasas, el pan integral y las verduras de hoja omero. · Prepare las verduras al vapor para que puedan retener merrill contenido de mandy. ¿Cuándo debe pedir ayuda? Llame al 911 en cualquier momento que considere que necesita atención de [place]. Por ejemplo, llame si:  · Tiene síntomas de un ataque al corazón. Estos podrían incluir:  ¨ Dolor o presión en el pecho, o kim sensación extraña en el pecho. ¨ Sudoración. ¨ Falta de aire. ¨ Náuseas o vómito. ¨ Dolor, presión o kim sensación extraña en la espalda, el thomas, la mandíbula, la parte superior del abdomen o en jose o ambos hombros o brazos. ¨ Aturdimiento o debilidad repentina. ¨ Latidos del corazón rápidos o irregulares. Después de llamar al 911, es posible que el operador le diga que mastique 1 aspirina para adultos o de 2 a 4 aspirinas de dosis baja. Espere a kim ambulancia. No intente conducir usted mismo. · Se desmayó (perdió el conocimiento). Llame a merrill médico ahora mismo o busque atención médica inmediata si:  · Presenta nueva o mayor falta de aire. · Siente mareos o aturdimiento, o que está a punto de [place]. · La fatiga y la debilidad continúan o empeoran.   · Tiene cualquier sangrado anormal, caterina:  ¨ Hemorragias (sangrado) nasales. ¨ Sangrado vaginal que es distinto (más intenso, más frecuente, en un momento diferente del mes) del que usted \[facility] [place] [facility]\"" and [facility] tener. ¨ Heces sanguinolentas o negras, o sangrado por el recto. ¨ Orina sanguinolenta o de color murillo. Preste especial atención a los cambios en merrill sherrie y asegúrese de comunicarse con merrill médico si:  · No mejora caterina se esperaba. ¿Dónde puede encontrar más información en inglés? [place] a http://velez-abena.info/. Barry Dyer [id number] en la búsqueda para aprender más acerca de \"Anemia: Instrucciones de cuidado - [ Anemia: Care Instructions ]. \"  Revisado: 13 octubre, 2016  Versión del contenido: 11.3  © 4186-4375 Healthwise, Incorporated. Las instrucciones de cuidado fueron adaptadas bajo licencia por Good [employer] Connections (which disclaims liability or warranty for this information). Si usted tiene [place] afección médica o sobre estas instrucciones, siempre pregunte a merrill profesional de sherrie. Healthwise, Incorporated niega toda garantía o responsabilidad por merrill uso de esta información. Aprenda sobre hipoxemia - [ Learning About Hypoxemia ]  ¿Qué es la hipoxemia? Hipoxemia quiere decir que usted no tiene suficiente oxígeno [employer]. Es el resultado de enfermedades que le afectan el corazón o los pulmones. Estas incluyen insuficiencia cardíaca, EPOC y fibrosis pulmonar (cicatrización de los pulmones). Estar a grandes altitudes [employer] a la hipoxemia. ¿Qué sucede cuando usted tiene hipoxemia? El oxígeno llega a la deb a través de los pulmones. La deb transporta el oxígeno a todas las partes del cuerpo. Cuando usted tiene muy poco oxígeno en la deb, el cuerpo no recibe lo suficiente. Con muy poco oxígeno, el corazón y [place] partes del cuerpo no le funcionan [address]. ¿Cuáles son los síntomas? Además de los síntomas de lo que esté causando merrill hipoxemia, quizás:  · Se sienta cansado con rapidez.   · Lutricia Bronson de [employer]. · Sienta que el corazón le está latiendo alok o está acelerado. · Se sienta débil o mareado. · Se sienta confuso. ¿Cómo se trata la hipoxemia? Asher médico le va a hacer pruebas para averiguar cuánto oxígeno hay en la deb. Buscará la causa de asher hipoxemia y tratará sadi problema. Por ejemplo, si usted tiene insuficiencia cardíaca, quizás requiera medicamentos que ayudan al corazón a bombear mejor. · Si asher hipoxemia no es grave, puede que asher médico le dé oxígeno por medio de kim máscara o cánula nasal. Earla Medellin cánula es un tubo rosario con dos aberturas que encajan yu dentro de la Laura. · Si asher hipoxemia es grave, quizás le pongan kim sonda para respirar en la tráquea. La sonda de respiración está conectada a kim máquina que bombea aire a jesus pulmones. Esta máquina se llama respirador. · Si usted tiene un problema a jose juan plazo con hipoxemia, puede que asher médico le recomiende que use oxígeno con regularidad. Algunas personas lo necesitan todo [employer]. Otras lo necesitan de vez en cuando a lo jose juan del día o por la noche. Asher médico le dirá cuánto oxígeno necesita y cada cuánto usarlo. La atención de seguimiento es kim parte clave de asher tratamiento y seguridad. Asegúrese de hacer y acudir a todas las citas, y llame a asher médico si está teniendo problemas. También es kim buena idea saber los resultados de jesus exámenes y mantener kim lista de los medicamentos que sharmila. ¿Dónde puede encontrar más información en inglés? Alexandre Mcmanus a http://sherin.info/. Escriba M375 en la búsqueda para aprender más acerca de \"Aprenda sobre hipoxemia - [ Learning About Hypoxemia ]. \"  Revisado: 25 marzo, 2017  Versión del contenido: 11.3  © 1520-5305 [employer]. Las instrucciones de cuidado fueron adaptadas bajo licencia por [employer] (which disclaims liability or warranty for this information).  Si usted tiene preguntas sobre kim afección Reese o sobre estas instrucciones, siempre pregunte a merrill profesional de sherrie. [facility] niega toda garantía o responsabilidad por merrill uso de esta información.

## 2017-07-14 NOTE — PROGRESS NOTES
Pt is sitting up in bed watching television. No SOB or pain is reported at this time. No signs of distress are noted. Call light is within reach. Will continue to monitor.

## 2017-07-14 NOTE — PROGRESS NOTES
Patient voices no concerns at this time. Patient relaxing in bed with  at bedside. Call light within reach. Will continue to monitor.

## 2017-07-14 NOTE — PROGRESS NOTES
Warfarin dosing per pharmacist    Winnie Galeana is a 78 y.o. female. Height: 5' 3\" (160 cm)    Weight: 53.9 kg (118 lb 12.8 oz)    Indication:  History of DVT    Goal INR:  2-3    Home dose:  Per last anticoagulation visit 1.5 mg (1 mg x 1.5) every day    Risk factors or significant drug interactions:  --    Other anticoagulants:  heparin    Daily Monitoring  Date  INR     Warfarin dose HGB              Notes  7/8  1.4  3 mg  10.1  7/9  1.5  1.5 mg  ---  7/10  1.9  1.5 mg  7.2  7/11  2.1  1.5 mg  7.5  7/12  2.0  1.5 mg  7.0  7/13  1.9  1.5 mg  7.1  7/14  1.8  3 mg  ---      Pt seen at anticoagulation visit on 7/5/17 with INR of 1.9 and no adjustments made. INR subtherapeutic on admission. INR down to 1.8. Will give 3 mg tonight with recommendation of continuing 1.5 mg qhs and once weekly 3 mg dose. Daily INR. Pharmacy will continue to follow. Please call with any questions.     Thank you,  Christa Mccartney, PharmD  Clinical Pharmacist  [phone]

## 2017-07-14 NOTE — PROGRESS NOTES
Patient in bed resting with no complaints at this time. Patient is alert and orientated with no distress noted. IV intact and patent with no s/s of infection noted. Respirations even and unlabored with heart rate regular. Patient yelled out in pain when legs touched. Patient states its bone pain and they hurt all the time. Patient offered pain meds but she refused. Patient unable to ambulate independently without assistance; needs x1 r/t dyspnea. Bed in low locked position with call light within reach. Patient instructed to call if assistance is needed. Will continue to monitor.

## 2017-07-14 NOTE — PROGRESS NOTES
present casandra bedside during Nurse [facility] Discharge Instructions , Prescriptions and Appointments assessment

## 2017-07-14 NOTE — PROGRESS NOTES
LMSW consulted regarding supportive care. Per chart review pt was last D/C home with [facility] care as pt lives in [facility] [facility] does not serve [facility]. Pt also has Home O2 with [facility] per chart. Buddy Carbajal is referenced as being supportive to pt and assisting with care. Pt has scheduled follow up at [facility]. Pt has a pending application for hospital sponsorship. Visited with pt/spouse who speak limited [place]. Will follow plan of care and assist further as needed.

## 2017-07-16 NOTE — PROGRESS NOTES
Pt was D/C home 7/14 with spouse. Nebulizer ordered but pt/spouse opted not to purchase one as they have one owned by son that pt can use. Referral to resume care called/faxed to [facility] to resume home care. Received call back from [facility] on Saturday 7/15 that Brian Johns with [facility] had refused to accept pt back for services and offered no explanation except that she was D/C from their care and would not accept back. Per chart review it appears that [facility] was ordinally ordered by Oncology. LMSW updated Oncology NP covering this weekend Arina Capps and also emailed Tonie Kayser, LMSW at [facility] who follows pt to please follow up to determine the appropriate plan of care for this pt in regards to [facility]. Per CC pt's next scheduled appt with [place] is on 7/20/17.

## 2017-07-17 ENCOUNTER — DOCUMENTATION ONLY (OUTPATIENT)
Dept: ONCOLOGY | Age: 67
End: 2017-07-17

## 2017-07-17 NOTE — PROGRESS NOTES
SW received referral from in [facility] to [facility] now that [facility] has been discharged from hospital. SW called [address] [facility] ([facility]) to facilitate referral. They requested new order be sent from pt's oncologist to [phone] [facility] for resumption of services. SW relayed this to pt's RN Jenelle Silver for coordination of care and new HH order. HEMANTH provided [facility] with  contact information should follow up be needed. HEMANTH then received call back from [facility] stating that they were unable to provide pleurex supplies for pt as she is self-pay but could provide home services. HEMANTH consulted with pt's pulmonologist's office regarding possible resources for supplies. HEMANTH called and left voicemail for [facility] at [phone] regarding self-pay patients and is awaiting response. HEMANTH intends to follow up.

## 2017-07-19 ENCOUNTER — TELEPHONE (OUTPATIENT)
Dept: ONCOLOGY | Age: 67
End: 2017-07-19

## 2017-07-20 ENCOUNTER — HOSPITAL ENCOUNTER (OUTPATIENT)
Dept: LAB | Age: 67
Discharge: HOME | End: 2017-07-20
Payer: SUBSIDIZED

## 2017-07-20 ENCOUNTER — DOCUMENTATION ONLY (OUTPATIENT)
Dept: ONCOLOGY | Age: 67
End: 2017-07-20

## 2017-07-20 ENCOUNTER — HOSPITAL ENCOUNTER (OUTPATIENT)
Dept: INFUSION THERAPY | Age: 67
Discharge: HOME | End: 2017-07-20
Payer: SUBSIDIZED

## 2017-07-20 DIAGNOSIS — T45.1X5A CHEMOTHERAPY-INDUCED NEUTROPENIA (HCC): ICD-10-CM

## 2017-07-20 DIAGNOSIS — C79.51 BONY METASTASIS (HCC): ICD-10-CM

## 2017-07-20 DIAGNOSIS — C50.111 MALIGNANT NEOPLASM OF CENTRAL PORTION OF RIGHT FEMALE BREAST (HCC): Chronic | ICD-10-CM

## 2017-07-20 DIAGNOSIS — Z79.01 ANTICOAGULATED ON COUMADIN: Chronic | ICD-10-CM

## 2017-07-20 DIAGNOSIS — D70.1 CHEMOTHERAPY-INDUCED NEUTROPENIA (HCC): ICD-10-CM

## 2017-07-20 LAB
ALBUMIN SERPL BCP-MCNC: 3 G/DL (ref 3.2–4.6)
ALBUMIN/GLOB SERPL: 0.7 {RATIO} (ref 1.2–3.5)
ALP SERPL-CCNC: 71 U/L (ref 50–136)
ALT SERPL-CCNC: 21 U/L (ref 12–65)
ANION GAP BLD CALC-SCNC: 12 MMOL/L (ref 7–16)
AST SERPL W P-5'-P-CCNC: 20 U/L (ref 15–37)
BASOPHILS # BLD AUTO: 0.1 K/UL (ref 0–0.2)
BASOPHILS # BLD: 2 % (ref 0–2)
BILIRUB SERPL-MCNC: 0.4 MG/DL (ref 0.2–1.1)
BUN SERPL-MCNC: 43 MG/DL (ref 8–23)
CALCIUM SERPL-MCNC: 8.2 MG/DL (ref 8.3–10.4)
CANCER AG15-3 SERPL-ACNC: 412.1 U/ML (ref 1–35)
CEA SERPL-MCNC: 49.6 NG/ML (ref 0–3)
CHLORIDE SERPL-SCNC: 114 MMOL/L (ref 98–107)
CO2 SERPL-SCNC: 19 MMOL/L (ref 21–32)
CREAT SERPL-MCNC: 2.44 MG/DL (ref 0.6–1)
DIFFERENTIAL METHOD BLD: ABNORMAL
EOSINOPHIL # BLD: 0 K/UL (ref 0–0.8)
EOSINOPHIL NFR BLD: 1 % (ref 0.5–7.8)
ERYTHROCYTE [DISTWIDTH] IN BLOOD BY AUTOMATED COUNT: 15.8 % (ref 11.9–14.6)
GLOBULIN SER CALC-MCNC: 4.1 G/DL (ref 2.3–3.5)
GLUCOSE SERPL-MCNC: 143 MG/DL (ref 65–100)
HCT VFR BLD AUTO: 23.8 % (ref 35.8–46.3)
HGB BLD-MCNC: 8 G/DL (ref 11.7–15.4)
INR PPP: 2 (ref 0.9–1.2)
LYMPHOCYTES # BLD AUTO: 14 % (ref 13–44)
LYMPHOCYTES # BLD: 0.8 K/UL (ref 0.5–4.6)
MAGNESIUM SERPL-MCNC: 2.1 MG/DL (ref 1.8–2.4)
MCH RBC QN AUTO: 36 PG (ref 26.1–32.9)
MCHC RBC AUTO-ENTMCNC: 33.6 G/DL (ref 31.4–35)
MCV RBC AUTO: 107.2 FL (ref 79.6–97.8)
MONOCYTES # BLD: 0.6 K/UL (ref 0.1–1.3)
MONOCYTES NFR BLD AUTO: 12 % (ref 4–12)
NEUTS SEG # BLD: 3.8 K/UL (ref 1.7–8.2)
NEUTS SEG NFR BLD AUTO: 72 % (ref 43–78)
NRBC # BLD: 0 K/UL (ref 0–0.2)
PLATELET # BLD AUTO: 306 K/UL (ref 150–450)
PMV BLD AUTO: 10.2 FL (ref 10.8–14.1)
POTASSIUM SERPL-SCNC: 4 MMOL/L (ref 3.5–5.1)
PROT SERPL-MCNC: 7.1 G/DL (ref 6.3–8.2)
PROTHROMBIN TIME: 20.8 SEC (ref 9.6–12)
RBC # BLD AUTO: 2.22 M/UL (ref 4.05–5.25)
SODIUM SERPL-SCNC: 145 MMOL/L (ref 136–145)
WBC # BLD AUTO: 5.3 K/UL (ref 4.3–11.1)

## 2017-07-20 PROCEDURE — 83735 ASSAY OF MAGNESIUM: CPT | Performed by: INTERNAL MEDICINE

## 2017-07-20 PROCEDURE — 86300 IMMUNOASSAY TUMOR CA 15-3: CPT | Performed by: INTERNAL MEDICINE

## 2017-07-20 PROCEDURE — 96402 CHEMO HORMON ANTINEOPL SQ/IM: CPT

## 2017-07-20 PROCEDURE — 36415 COLL VENOUS BLD VENIPUNCTURE: CPT | Performed by: INTERNAL MEDICINE

## 2017-07-20 PROCEDURE — 96372 THER/PROPH/DIAG INJ SC/IM: CPT

## 2017-07-20 PROCEDURE — 74011250636 HC RX REV CODE- 250/636: Performed by: INTERNAL MEDICINE

## 2017-07-20 PROCEDURE — 77030003560 HC NDL HUBR BARD -A

## 2017-07-20 PROCEDURE — 82378 CARCINOEMBRYONIC ANTIGEN: CPT | Performed by: INTERNAL MEDICINE

## 2017-07-20 PROCEDURE — 85025 COMPLETE CBC W/AUTO DIFF WBC: CPT | Performed by: INTERNAL MEDICINE

## 2017-07-20 PROCEDURE — 80053 COMPREHEN METABOLIC PANEL: CPT | Performed by: INTERNAL MEDICINE

## 2017-07-20 PROCEDURE — 85610 PROTHROMBIN TIME: CPT | Performed by: INTERNAL MEDICINE

## 2017-07-20 RX ORDER — SODIUM CHLORIDE 0.9 % (FLUSH) 0.9 %
5-10 SYRINGE (ML) INJECTION AS NEEDED
Status: DISCONTINUED | OUTPATIENT
Start: 2017-07-20 | End: 2017-07-23 | Stop reason: HOSPADM

## 2017-07-20 RX ORDER — HEPARIN 100 UNIT/ML
500 SYRINGE INTRAVENOUS AS NEEDED
Status: DISPENSED | OUTPATIENT
Start: 2017-07-20 | End: 2017-07-21

## 2017-07-20 RX ORDER — LAMOTRIGINE 25 MG/1
500 TABLET ORAL ONCE
Status: COMPLETED | OUTPATIENT
Start: 2017-07-20 | End: 2017-07-20

## 2017-07-20 RX ADMIN — FULVESTRANT 500 MG: 50 INJECTION INTRAMUSCULAR at 14:52

## 2017-07-20 RX ADMIN — SODIUM CHLORIDE, PRESERVATIVE FREE 500 UNITS: 5 INJECTION INTRAVENOUS at 15:19

## 2017-07-20 RX ADMIN — Medication 10 ML: at 15:19

## 2017-07-20 RX ADMIN — DENOSUMAB 120 MG: 120 INJECTION SUBCUTANEOUS at 14:50

## 2017-07-20 NOTE — TELEPHONE ENCOUNTER
SW followed up with [facility] who stated they no longer have a program for pts without insurance. HEMANTH relayed this in voicemail to pt's pulmonologist's office. HEMANTH intends to follow up with pt at her appt.

## 2017-07-20 NOTE — PROGRESS NOTES
Arrived to the [facility]. Vincent Kumar completed. Patient tolerated well. Patient asked for port flush   Any issues or concerns during appointment: none. Patient aware of next infusion appointment on 08/17  at 1115 . Discharged via wheelchair.

## 2017-07-20 NOTE — PROGRESS NOTES
SW followed up with pt, her , and  at MD appointment. SW updated pt on status of home health referral and \[facility]\""'s inability to continue services. Pt stated she has supplies at home and requested that [facility] continue services. SW called [facility] and relayed this. SW awaiting return call from [facility]. Pt stated she intends to move to her son's home within the month in [place]. HEMANTH attempted to call [facility] Allison Zuñiga to confirm [facility] could provide services to pt once she moves and SW is awaiting return call. SW intends to update family regarding home health. No other needs. SW intends to follow up.

## 2017-07-21 ENCOUNTER — TELEPHONE (OUTPATIENT)
Dept: ONCOLOGY | Age: 67
End: 2017-07-21

## 2017-07-21 ENCOUNTER — HOME HEALTH ADMISSION (OUTPATIENT)
Dept: HOME HEALTH SERVICES | Facility: HOME HEALTH | Age: 67
End: 2017-07-21
Payer: SUBSIDIZED

## 2017-07-21 LAB — CANCER AG27-29 SERPL-ACNC: 481.5 U/ML (ref 0–38.6)

## 2017-07-21 NOTE — TELEPHONE ENCOUNTER
HEMANTH attempted to locate pleurx supplies for pt to ensure continuity of care for pt's home health through [facility]. HEMANTH unable to ensure ongoing supplies. HEMANTH consulted with Betsey Sweet in [facility] intake who stated pt's services and supplies would be covered by her hospital sponsorship should pt live in [place]. HEMANTH spoke with pt's son via phone who stated the pt is in the process of moving to [place] and would be living at his home in [place] for now. HEMANTH coordinated with JERONIMO Linares to complete referral to [facility] and followed up with [facility] intake to update them on pt's residence and to contact pt's son, per pt and son request, to begin [facility] services. HEMANTH called [facility]  to update them on pt's referral status. No other needs identified. HEMANTH intends to follow up PRN.

## 2017-07-24 ENCOUNTER — HOME CARE VISIT (OUTPATIENT)
Dept: SCHEDULING | Facility: HOME HEALTH | Age: 67
End: 2017-07-24
Payer: SUBSIDIZED

## 2017-07-24 PROCEDURE — 400013 HH SOC

## 2017-07-24 PROCEDURE — G0299 HHS/HOSPICE OF RN EA 15 MIN: HCPCS

## 2017-07-27 ENCOUNTER — HOSPITAL ENCOUNTER (OUTPATIENT)
Dept: LAB | Age: 67
Discharge: HOME | End: 2017-07-27
Payer: SUBSIDIZED

## 2017-07-27 DIAGNOSIS — I82.409 ACUTE DEEP VEIN THROMBOSIS (DVT) OF OTHER VEIN OF LOWER EXTREMITY: Chronic | ICD-10-CM

## 2017-07-27 LAB
INR PPP: 2.2 (ref 0.9–1.2)
PROTHROMBIN TIME: 25.8 SEC (ref 9.6–12)

## 2017-07-27 PROCEDURE — 36416 COLLJ CAPILLARY BLOOD SPEC: CPT | Performed by: INTERNAL MEDICINE

## 2017-07-27 PROCEDURE — 85610 PROTHROMBIN TIME: CPT | Performed by: INTERNAL MEDICINE

## 2017-07-28 ENCOUNTER — HOME CARE VISIT (OUTPATIENT)
Dept: SCHEDULING | Facility: HOME HEALTH | Age: 67
End: 2017-07-28
Payer: SUBSIDIZED

## 2017-07-28 VITALS
HEART RATE: 73 BPM | OXYGEN SATURATION: 96 % | RESPIRATION RATE: 16 BRPM | DIASTOLIC BLOOD PRESSURE: 59 MMHG | TEMPERATURE: 98 F | SYSTOLIC BLOOD PRESSURE: 155 MMHG

## 2017-07-28 PROCEDURE — G0151 HHCP-SERV OF PT,EA 15 MIN: HCPCS

## 2017-07-30 ENCOUNTER — HOME CARE VISIT (OUTPATIENT)
Dept: SCHEDULING | Facility: HOME HEALTH | Age: 67
End: 2017-07-30
Payer: SUBSIDIZED

## 2017-07-30 VITALS
HEART RATE: 60 BPM | SYSTOLIC BLOOD PRESSURE: 142 MMHG | TEMPERATURE: 97.6 F | RESPIRATION RATE: 18 BRPM | OXYGEN SATURATION: 96 % | DIASTOLIC BLOOD PRESSURE: 68 MMHG

## 2017-07-30 PROCEDURE — G0299 HHS/HOSPICE OF RN EA 15 MIN: HCPCS

## 2017-07-31 PROCEDURE — A7048 VACUUM DRAIN BOTTLE/TUBE KIT: HCPCS

## 2017-08-01 ENCOUNTER — HOME CARE VISIT (OUTPATIENT)
Dept: SCHEDULING | Facility: HOME HEALTH | Age: 67
End: 2017-08-01
Payer: SUBSIDIZED

## 2017-08-01 VITALS
TEMPERATURE: 97 F | SYSTOLIC BLOOD PRESSURE: 130 MMHG | DIASTOLIC BLOOD PRESSURE: 70 MMHG | RESPIRATION RATE: 16 BRPM | OXYGEN SATURATION: 98 % | HEART RATE: 61 BPM

## 2017-08-01 PROCEDURE — G0151 HHCP-SERV OF PT,EA 15 MIN: HCPCS

## 2017-08-03 ENCOUNTER — HOME CARE VISIT (OUTPATIENT)
Dept: SCHEDULING | Facility: HOME HEALTH | Age: 67
End: 2017-08-03
Payer: SUBSIDIZED

## 2017-08-03 VITALS
SYSTOLIC BLOOD PRESSURE: 156 MMHG | RESPIRATION RATE: 20 BRPM | DIASTOLIC BLOOD PRESSURE: 60 MMHG | TEMPERATURE: 98.3 F | OXYGEN SATURATION: 98 % | HEART RATE: 65 BPM

## 2017-08-03 VITALS
SYSTOLIC BLOOD PRESSURE: 148 MMHG | HEART RATE: 64 BPM | TEMPERATURE: 97 F | DIASTOLIC BLOOD PRESSURE: 55 MMHG | OXYGEN SATURATION: 97 % | RESPIRATION RATE: 19 BRPM

## 2017-08-03 PROCEDURE — G0299 HHS/HOSPICE OF RN EA 15 MIN: HCPCS

## 2017-08-03 PROCEDURE — G0151 HHCP-SERV OF PT,EA 15 MIN: HCPCS

## 2017-08-07 ENCOUNTER — HOME CARE VISIT (OUTPATIENT)
Dept: SCHEDULING | Facility: HOME HEALTH | Age: 67
End: 2017-08-07
Payer: SUBSIDIZED

## 2017-08-07 VITALS
DIASTOLIC BLOOD PRESSURE: 60 MMHG | HEART RATE: 72 BPM | TEMPERATURE: 97.7 F | RESPIRATION RATE: 19 BRPM | SYSTOLIC BLOOD PRESSURE: 132 MMHG

## 2017-08-07 PROCEDURE — G0157 HHC PT ASSISTANT EA 15: HCPCS

## 2017-08-09 ENCOUNTER — HOME CARE VISIT (OUTPATIENT)
Dept: SCHEDULING | Facility: HOME HEALTH | Age: 67
End: 2017-08-09
Payer: SUBSIDIZED

## 2017-08-09 ENCOUNTER — HOSPITAL ENCOUNTER (OUTPATIENT)
Dept: LAB | Age: 67
Discharge: HOME | End: 2017-08-09
Payer: SUBSIDIZED

## 2017-08-09 VITALS
HEART RATE: 78 BPM | TEMPERATURE: 97.9 F | OXYGEN SATURATION: 93 % | SYSTOLIC BLOOD PRESSURE: 140 MMHG | DIASTOLIC BLOOD PRESSURE: 72 MMHG | RESPIRATION RATE: 19 BRPM

## 2017-08-09 DIAGNOSIS — I82.409 DEEP VEIN THROMBOSIS (DVT) OF LOWER EXTREMITY, UNSPECIFIED CHRONICITY, UNSPECIFIED LATERALITY, UNSPECIFIED VEIN (HCC): Chronic | ICD-10-CM

## 2017-08-09 LAB
INR PPP: 2.9 (ref 0.9–1.2)
PROTHROMBIN TIME: 34.3 SEC (ref 9.6–12)

## 2017-08-09 PROCEDURE — 85610 PROTHROMBIN TIME: CPT | Performed by: INTERNAL MEDICINE

## 2017-08-09 PROCEDURE — 36416 COLLJ CAPILLARY BLOOD SPEC: CPT | Performed by: INTERNAL MEDICINE

## 2017-08-09 PROCEDURE — G0157 HHC PT ASSISTANT EA 15: HCPCS

## 2017-08-11 ENCOUNTER — HOSPITAL ENCOUNTER (OUTPATIENT)
Dept: GENERAL RADIOLOGY | Age: 67
Discharge: HOME | End: 2017-08-11
Payer: SUBSIDIZED

## 2017-08-11 DIAGNOSIS — C50.919 BREAST CANCER METASTASIZED TO LUNG, UNSPECIFIED LATERALITY (HCC): Chronic | ICD-10-CM

## 2017-08-11 DIAGNOSIS — C78.00 BREAST CANCER METASTASIZED TO LUNG, UNSPECIFIED LATERALITY (HCC): Chronic | ICD-10-CM

## 2017-08-11 DIAGNOSIS — J90 PLEURAL EFFUSION, RIGHT: Chronic | ICD-10-CM

## 2017-08-11 PROCEDURE — 71020 XR CHEST PA LAT: CPT

## 2017-08-12 ENCOUNTER — HOME CARE VISIT (OUTPATIENT)
Dept: SCHEDULING | Facility: HOME HEALTH | Age: 67
End: 2017-08-12
Payer: SUBSIDIZED

## 2017-08-12 PROCEDURE — G0299 HHS/HOSPICE OF RN EA 15 MIN: HCPCS

## 2017-08-14 ENCOUNTER — HOME CARE VISIT (OUTPATIENT)
Dept: SCHEDULING | Facility: HOME HEALTH | Age: 67
End: 2017-08-14
Payer: SUBSIDIZED

## 2017-08-14 VITALS
DIASTOLIC BLOOD PRESSURE: 62 MMHG | RESPIRATION RATE: 16 BRPM | HEART RATE: 65 BPM | SYSTOLIC BLOOD PRESSURE: 124 MMHG | OXYGEN SATURATION: 98 % | TEMPERATURE: 99 F

## 2017-08-14 VITALS
RESPIRATION RATE: 18 BRPM | DIASTOLIC BLOOD PRESSURE: 60 MMHG | TEMPERATURE: 97 F | OXYGEN SATURATION: 96 % | SYSTOLIC BLOOD PRESSURE: 140 MMHG | HEART RATE: 72 BPM

## 2017-08-14 PROCEDURE — G0157 HHC PT ASSISTANT EA 15: HCPCS

## 2017-08-16 ENCOUNTER — HOME CARE VISIT (OUTPATIENT)
Dept: SCHEDULING | Facility: HOME HEALTH | Age: 67
End: 2017-08-16
Payer: SUBSIDIZED

## 2017-08-16 VITALS
RESPIRATION RATE: 16 BRPM | SYSTOLIC BLOOD PRESSURE: 136 MMHG | HEART RATE: 65 BPM | DIASTOLIC BLOOD PRESSURE: 61 MMHG | OXYGEN SATURATION: 95 % | TEMPERATURE: 97 F

## 2017-08-16 VITALS
TEMPERATURE: 99 F | HEART RATE: 76 BPM | DIASTOLIC BLOOD PRESSURE: 60 MMHG | RESPIRATION RATE: 18 BRPM | SYSTOLIC BLOOD PRESSURE: 160 MMHG | OXYGEN SATURATION: 94 %

## 2017-08-16 PROCEDURE — G0151 HHCP-SERV OF PT,EA 15 MIN: HCPCS

## 2017-08-16 PROCEDURE — A7048 VACUUM DRAIN BOTTLE/TUBE KIT: HCPCS

## 2017-08-16 PROCEDURE — G0299 HHS/HOSPICE OF RN EA 15 MIN: HCPCS

## 2017-08-17 ENCOUNTER — HOSPITAL ENCOUNTER (OUTPATIENT)
Dept: INFUSION THERAPY | Age: 67
Discharge: HOME | End: 2017-08-17
Payer: SUBSIDIZED

## 2017-08-17 ENCOUNTER — HOSPITAL ENCOUNTER (OUTPATIENT)
Dept: LAB | Age: 67
Discharge: HOME | End: 2017-08-17
Payer: SUBSIDIZED

## 2017-08-17 DIAGNOSIS — C79.51 BONY METASTASIS (HCC): ICD-10-CM

## 2017-08-17 DIAGNOSIS — C50.919 BREAST CANCER METASTASIZED TO LUNG, UNSPECIFIED LATERALITY (HCC): ICD-10-CM

## 2017-08-17 DIAGNOSIS — C78.00 BREAST CANCER METASTASIZED TO LUNG, UNSPECIFIED LATERALITY (HCC): ICD-10-CM

## 2017-08-17 DIAGNOSIS — C50.919 MALIGNANT NEOPLASM OF FEMALE BREAST, UNSPECIFIED LATERALITY, UNSPECIFIED SITE OF BREAST: Chronic | ICD-10-CM

## 2017-08-17 LAB
ALBUMIN SERPL-MCNC: 3 G/DL (ref 3.2–4.6)
ALBUMIN/GLOB SERPL: 0.7 {RATIO} (ref 1.2–3.5)
ALP SERPL-CCNC: 60 U/L (ref 50–136)
ALT SERPL-CCNC: 18 U/L (ref 12–65)
ANION GAP SERPL CALC-SCNC: 6 MMOL/L (ref 7–16)
AST SERPL-CCNC: 18 U/L (ref 15–37)
BASOPHILS # BLD: 0 K/UL (ref 0–0.2)
BASOPHILS NFR BLD: 1 % (ref 0–2)
BILIRUB SERPL-MCNC: 0.4 MG/DL (ref 0.2–1.1)
BUN SERPL-MCNC: 39 MG/DL (ref 8–23)
CALCIUM SERPL-MCNC: 8 MG/DL (ref 8.3–10.4)
CEA SERPL-MCNC: 53.9 NG/ML (ref 0–3)
CHLORIDE SERPL-SCNC: 113 MMOL/L (ref 98–107)
CO2 SERPL-SCNC: 24 MMOL/L (ref 21–32)
CREAT SERPL-MCNC: 2.17 MG/DL (ref 0.6–1)
DIFFERENTIAL METHOD BLD: ABNORMAL
EOSINOPHIL # BLD: 0 K/UL (ref 0–0.8)
EOSINOPHIL NFR BLD: 1 % (ref 0.5–7.8)
ERYTHROCYTE [DISTWIDTH] IN BLOOD BY AUTOMATED COUNT: 15.9 % (ref 11.9–14.6)
GLOBULIN SER CALC-MCNC: 4.4 G/DL (ref 2.3–3.5)
GLUCOSE SERPL-MCNC: 121 MG/DL (ref 65–100)
HCT VFR BLD AUTO: 23.5 % (ref 35.8–46.3)
HGB BLD-MCNC: 8 G/DL (ref 11.7–15.4)
LYMPHOCYTES # BLD: 1.2 K/UL (ref 0.5–4.6)
LYMPHOCYTES NFR BLD: 26 % (ref 13–44)
MAGNESIUM SERPL-MCNC: 2.6 MG/DL (ref 1.8–2.4)
MCH RBC QN AUTO: 35.4 PG (ref 26.1–32.9)
MCHC RBC AUTO-ENTMCNC: 34 G/DL (ref 31.4–35)
MCV RBC AUTO: 104 FL (ref 79.6–97.8)
MONOCYTES # BLD: 0.6 K/UL (ref 0.1–1.3)
MONOCYTES NFR BLD: 14 % (ref 4–12)
NEUTS SEG # BLD: 2.6 K/UL (ref 1.7–8.2)
NEUTS SEG NFR BLD: 58 % (ref 43–78)
NRBC # BLD: 0.01 K/UL (ref 0–0.2)
PLATELET # BLD AUTO: 184 K/UL (ref 150–450)
PMV BLD AUTO: 11 FL (ref 10.8–14.1)
POTASSIUM SERPL-SCNC: 3.5 MMOL/L (ref 3.5–5.1)
PROT SERPL-MCNC: 7.4 G/DL (ref 6.3–8.2)
RBC # BLD AUTO: 2.26 M/UL (ref 4.05–5.25)
SODIUM SERPL-SCNC: 143 MMOL/L (ref 136–145)
WBC # BLD AUTO: 4.5 K/UL (ref 4.3–11.1)

## 2017-08-17 PROCEDURE — 82378 CARCINOEMBRYONIC ANTIGEN: CPT | Performed by: INTERNAL MEDICINE

## 2017-08-17 PROCEDURE — 96372 THER/PROPH/DIAG INJ SC/IM: CPT

## 2017-08-17 PROCEDURE — 80053 COMPREHEN METABOLIC PANEL: CPT | Performed by: INTERNAL MEDICINE

## 2017-08-17 PROCEDURE — 96402 CHEMO HORMON ANTINEOPL SQ/IM: CPT

## 2017-08-17 PROCEDURE — 85025 COMPLETE CBC W/AUTO DIFF WBC: CPT | Performed by: INTERNAL MEDICINE

## 2017-08-17 PROCEDURE — 74011250636 HC RX REV CODE- 250/636: Performed by: NURSE PRACTITIONER

## 2017-08-17 PROCEDURE — 36415 COLL VENOUS BLD VENIPUNCTURE: CPT | Performed by: INTERNAL MEDICINE

## 2017-08-17 PROCEDURE — 83735 ASSAY OF MAGNESIUM: CPT | Performed by: INTERNAL MEDICINE

## 2017-08-17 PROCEDURE — 86300 IMMUNOASSAY TUMOR CA 15-3: CPT | Performed by: INTERNAL MEDICINE

## 2017-08-17 RX ORDER — LAMOTRIGINE 25 MG/1
500 TABLET ORAL ONCE
Status: COMPLETED | OUTPATIENT
Start: 2017-08-17 | End: 2017-08-17

## 2017-08-17 RX ADMIN — FULVESTRANT 500 MG: 50 INJECTION INTRAMUSCULAR at 11:42

## 2017-08-17 RX ADMIN — DENOSUMAB 120 MG: 120 INJECTION SUBCUTANEOUS at 11:40

## 2017-08-18 LAB — CANCER AG27-29 SERPL-ACNC: 483.1 U/ML (ref 0–38.6)

## 2017-08-25 ENCOUNTER — HOME CARE VISIT (OUTPATIENT)
Dept: SCHEDULING | Facility: HOME HEALTH | Age: 67
End: 2017-08-25
Payer: SUBSIDIZED

## 2017-08-25 PROCEDURE — G0299 HHS/HOSPICE OF RN EA 15 MIN: HCPCS

## 2017-08-27 VITALS
RESPIRATION RATE: 18 BRPM | SYSTOLIC BLOOD PRESSURE: 160 MMHG | TEMPERATURE: 98.7 F | OXYGEN SATURATION: 96 % | HEART RATE: 80 BPM | DIASTOLIC BLOOD PRESSURE: 66 MMHG

## 2017-08-31 ENCOUNTER — HOME CARE VISIT (OUTPATIENT)
Dept: SCHEDULING | Facility: HOME HEALTH | Age: 67
End: 2017-08-31
Payer: SUBSIDIZED

## 2017-08-31 VITALS
HEART RATE: 80 BPM | TEMPERATURE: 97.6 F | DIASTOLIC BLOOD PRESSURE: 62 MMHG | SYSTOLIC BLOOD PRESSURE: 126 MMHG | RESPIRATION RATE: 16 BRPM

## 2017-08-31 PROCEDURE — G0299 HHS/HOSPICE OF RN EA 15 MIN: HCPCS

## 2017-09-01 ENCOUNTER — HOSPITAL ENCOUNTER (OUTPATIENT)
Dept: LAB | Age: 67
Discharge: HOME | End: 2017-09-01
Payer: MEDICAID

## 2017-09-01 DIAGNOSIS — I82.409 DEEP VEIN THROMBOSIS (DVT) OF LOWER EXTREMITY, UNSPECIFIED CHRONICITY, UNSPECIFIED LATERALITY, UNSPECIFIED VEIN (HCC): Chronic | ICD-10-CM

## 2017-09-01 LAB
INR PPP: 5.8 (ref 0.9–1.2)
PROTHROMBIN TIME: 69.1 SEC (ref 9.6–12)

## 2017-09-01 PROCEDURE — 36416 COLLJ CAPILLARY BLOOD SPEC: CPT | Performed by: INTERNAL MEDICINE

## 2017-09-01 PROCEDURE — 85610 PROTHROMBIN TIME: CPT | Performed by: INTERNAL MEDICINE

## 2017-09-05 ENCOUNTER — HOSPITAL ENCOUNTER (OUTPATIENT)
Dept: LAB | Age: 67
Discharge: HOME | End: 2017-09-05
Payer: MEDICAID

## 2017-09-05 DIAGNOSIS — Z79.01 ANTICOAGULATED ON COUMADIN: Chronic | ICD-10-CM

## 2017-09-05 LAB
INR PPP: 3.6 (ref 0.9–1.2)
PROTHROMBIN TIME: 43.6 SEC (ref 9.6–12)

## 2017-09-05 PROCEDURE — 85610 PROTHROMBIN TIME: CPT | Performed by: INTERNAL MEDICINE

## 2017-09-05 PROCEDURE — 36416 COLLJ CAPILLARY BLOOD SPEC: CPT | Performed by: INTERNAL MEDICINE

## 2017-09-12 ENCOUNTER — HOSPITAL ENCOUNTER (OUTPATIENT)
Age: 67
Setting detail: OUTPATIENT SURGERY
Discharge: HOME | End: 2017-09-12
Attending: INTERNAL MEDICINE | Admitting: INTERNAL MEDICINE
Payer: MEDICAID

## 2017-09-12 VITALS
HEART RATE: 74 BPM | TEMPERATURE: 99.6 F | SYSTOLIC BLOOD PRESSURE: 145 MMHG | WEIGHT: 120 LBS | DIASTOLIC BLOOD PRESSURE: 64 MMHG | RESPIRATION RATE: 16 BRPM | OXYGEN SATURATION: 93 % | BODY MASS INDEX: 22.08 KG/M2 | HEIGHT: 62 IN

## 2017-09-12 DIAGNOSIS — J90 PLEURAL EFFUSION, RIGHT: Chronic | ICD-10-CM

## 2017-09-12 PROCEDURE — 32552 REMOVE LUNG CATHETER: CPT | Performed by: INTERNAL MEDICINE

## 2017-09-12 NOTE — H&P (VIEW-ONLY)
[place] RN reporting that pleurx cath has not drained anything since August 2nd; discussed w/ Dr. Tonja Whitmore and ok to have removed, review of anticoagulation status show need to get INR therapeutic; will plan to hold coumadin 5 days prior to procedure; scheduled in [address] [place] [address] Ave lab for 9/12/17, 12:00pm arrival at admitting dept. Will have Vietnamese speaking staff contact patient/family w/ instructions. Reminder call on 9.6.17 to hold coumadin from 9.7 to 9. 12.

## 2017-09-12 NOTE — INTERVAL H&P NOTE
H&P Update:  Radha Morris was seen and examined. History and physical has been reviewed. The patient has been examined. There have been no significant clinical changes since the completion of the originally dated History and Physical.  Here for removal of plurex catheter.     Signed By: Isidro Bautista MD     September 12, 2017 1:46 PM

## 2017-09-12 NOTE — PROGRESS NOTES
present at bedside during registration, signature of consent for procedure and during removal of pleural catheter with Dr. Florecita Yanez and unit nurse.     [address]  [phone]

## 2017-09-12 NOTE — PROGRESS NOTES
Pt here for R pleurex catheter removal.  Consent obtained. Time out peltrasound done post and MD reviewed findings. rformed. Vitals monitored throughout procedure. Pt rolled on L side for R pleurex catheter removal.  Site cleaned in sterile fashion by MD.  Pleurex catheter removed from R thorax by MD in sterile fashion after local anesthetic injected into R thorax site. Catheter removed and dressed with 4x4's and tegaderm. No sedation medication given for procedure per MD order. Pt given written discharge instructions including follow-up appointment,  potential side effects and physical changes to be aware of, and physician contact number. Pt d/c'ed via WC to 's care by Dominik Babb. Pt and family verbalized understanding of discharge instructions. MD spoke with pts family.

## 2017-09-12 NOTE — PROCEDURES
PROCEDURE:    REMOVAL OF PLEURX CATHETER (50751)    SUMMARY:    The plerx exit site was cleansed with chlorhexidine and 10 ml of 1% lidocaine was used to anesthetize the area. The plurx cuff location was identified by palpation . Small straight darryl forceps were then used to bluntly dissect there ingrown tissue aroudt the subcutaneous cuff. After releasing the cuff the pleurx catheter was removed without difficulty. A  gauze dressing was applied. there were no complications.       Doris Fontaine MD.

## 2017-09-12 NOTE — IP AVS SNAPSHOT
Dahlia Benoit 
 
 
 [address] 
[phone] Patient: Ashely Raymond MRN: [id number] You are allergic to the following No active allergies Recent Documentation Height Weight Breastfeeding? BMI OB Status Smoking Status 1.575 m 54.4 kg No 21.95 kg/m2 Postmenopausal Former Smoker Emergency Contacts Name Discharge Info Relation Home Work Mobile Juancarlos Leggett  Child [2] [phone] [address] [place] [address]  Spouse [3] [phone] About your hospitalization You were admitted on:  September 12, 2017 You last received care in the:  [facility] You were discharged on:  September 12, 2017 Unit phone number:  [phone] Why you were hospitalized Your primary diagnosis was:  Not on File Providers Seen During Your Hospitalizations Provider Role Specialty Primary office phone Gloria Dalton MD Attending Provider Pulmonary Disease [phone] Your Primary Care Physician (PCP) Primary Care Physician Office Phone Office Fax  
 [place], [place] D ** None ** ** None ** Follow-up Information None Your Appointments Wednesday September 13, 2017  8:00 AM EDT ANTICOAG NURSE with [facility] ANTI-COAG(INR) 3 [facility] Hematology and Oncology [facility]) C/ Juancarlos Carney [address] [place] [address]  
[phone] Monday September 18, 2017  9:15 AM EDT  
LAB with [address]  
[address] [place] [address] Sus[address]  
[phone] Monday September 18, 2017  9:45 AM EDT PreChemo Follow Up with Mary Sam MD  
[address] [place] [address] Hematology and Oncology [facility]) C/ Juancarlos Carney [address] [place] [address]  
[phone] Monday September 18, 2017 11:15 AM EDT Injection with Yara's [address] ([address]) [address] Goodmanuart Koyanagi [phone] [facility] [address]  
[phone] [address] Current Discharge Medication List  
  
ASK your doctor about these medications Dose & Instructions Dispensing Information Comments Morning Noon Evening Bedtime  
 albuterol 90 mcg/actuation inhaler Commonly known as:  PROVENTIL HFA Your last dose was: Your next dose is:    
   
   
 Dose:  2 Puff Take 2 Puffs by inhalation every four (4) hours as needed for Wheezing. Quantity:  1 Inhaler Refills:  11  
     
   
   
   
  
 albuterol-ipratropium 2.5 mg-0.5 mg/3 ml Nebu Commonly known as:  Allyn Berrios Your last dose was: Your next dose is:    
   
   
 Dose:  3 mL  
3 mL by Nebulization route every six (6) hours as needed. Quantity:  360 Nebule Refills:  11 ALPRAZolam 0.25 mg tablet Commonly known as:  Cat Virk Your last dose was: Your next dose is:    
   
   
 Dose:  0.25 mg Take 1 Tab by mouth three (3) times daily as needed for Anxiety. Max Daily Amount: 0.75 mg. Quantity:  90 Tab Refills:  2  
     
   
   
   
  
 amLODIPine 10 mg tablet Commonly known as:  Teresita Grevandana Your last dose was: Your next dose is:    
   
   
 Dose:  10 mg Take 1 Tab by mouth daily. Quantity:  30 Tab Refills:  11  
     
   
   
   
  
 budesonide-formoterol 160-4.5 mcg/actuation HFA inhaler Commonly known as:  SYMBICORT Your last dose was: Your next dose is:    
   
   
 Dose:  2 Puff Take 2 Puffs by inhalation two (2) times a day. Quantity:  1 Inhaler Refills:  11  
     
   
   
   
  
 carvedilol 3.125 mg tablet Commonly known as:  Riccardo Markham Your last dose was:     
   
Your next dose is:    
   
   
 Dose:  3.125 mg  
 Take 1 Tab by mouth two (2) times daily (with meals). Quantity:  60 Tab Refills:  11 DULoxetine 20 mg capsule Commonly known as:  CYMBALTA Your last dose was: Your next dose is:    
   
   
 Dose:  20 mg Take 1 Cap by mouth daily. Indications: NEUROPATHIC PAIN Quantity:  30 Cap Refills:  0  
     
   
   
   
  
 fentaNYL 50 mcg/hr PATCH Commonly known as:  Lenoard Reddish Your last dose was: Your next dose is:    
   
   
 Dose:  1 Patch 1 Patch by TransDERmal route every seventy-two (72) hours. Max Daily Amount: 1 Patch. Indications: Chronic Pain with Opioid Tolerance Quantity:  10 Patch Refills:  0  
     
   
   
   
  
 furosemide 40 mg tablet Commonly known as:  LASIX Your last dose was: Your next dose is:    
   
   
 Dose:  40 mg Take 1 Tab by mouth daily. Quantity:  30 Tab Refills:  11  
     
   
   
   
  
 hydrALAZINE 50 mg tablet Commonly known as:  APRESOLINE Your last dose was: Your next dose is:    
   
   
 Dose:  50 mg Take 1 Tab by mouth three (3) times daily. Quantity:  90 Tab Refills:  1 HYDROcodone-acetaminophen  mg tablet Commonly known as:  Rl Boot Your last dose was: Your next dose is:    
   
   
 Dose:  1 Tab Take 1 Tab by mouth every four (4) hours as needed. Max Daily Amount: 6 Tabs. Quantity:  90 Tab Refills:  0 LORazepam 1 mg tablet Commonly known as:  ATIVAN Your last dose was: Your next dose is:    
   
   
 Dose:  1 mg Take 1 Tab by mouth every six (6) hours as needed for Anxiety. Max Daily Amount: 4 mg. Quantity:  60 Tab Refills:  0  
     
   
   
   
  
 megestrol 400 mg/10 mL (10 mL) suspension Commonly known as:  MEGACE Your last dose was: Your next dose is:    
   
   
 Dose:  200 mg Take 5 mL by mouth daily. Quantity:  240 mL Refills:  2 mirtazapine 30 mg tablet Commonly known as:  Jovani Font Your last dose was: Your next dose is:    
   
   
 Dose:  30 mg Take 1 Tab by mouth nightly. Quantity:  30 Tab Refills:  1  
     
   
   
   
  
 montelukast 10 mg tablet Commonly known as:  SINGULAIR Your last dose was: Your next dose is:    
   
   
 Dose:  10 mg Take 1 Tab by mouth nightly. Quantity:  30 Tab Refills:  11 OXYGEN-AIR DELIVERY SYSTEMS Your last dose was: Your next dose is:    
   
   
 Dose:  2 L  
2 L by IntraNASal route as needed (shortness of breath). Refills:  0  
     
   
   
   
  
 palbociclib 125 mg Cap Your last dose was: Your next dose is:    
   
   
 Dose:  125 mg Take 1 Cap by mouth daily. Take one pill once a day for 3 weeks and then off for one week Quantity:  21 Cap Refills:  5  
     
   
   
   
  
 raNITIdine 150 mg tablet Commonly known as:  ZANTAC Your last dose was: Your next dose is:    
   
   
 Dose:  150 mg Take 1 Tab by mouth daily. Quantity:  30 Tab Refills:  11  
     
   
   
   
  
 * warfarin 3 mg tablet Commonly known as:  COUMADIN Your last dose was: Your next dose is:    
   
   
 Dose:  3 mg Take 1 Tab by mouth every evening. Quantity:  30 Tab Refills:  0  
     
   
   
   
  
 * warfarin 1 mg tablet Commonly known as:  COUMADIN Your last dose was: Your next dose is:    
   
   
 Dose:  1.5 mg Take 1.5 Tabs by mouth every evening. Quantity:  45 Tab Refills:  0  
     
   
   
   
  
 zolpidem 5 mg tablet Commonly known as:  AMBIEN Your last dose was: Your next dose is:    
   
   
 Dose:  5 mg Take 1 Tab by mouth nightly as needed for Sleep. Max Daily Amount: 5 mg. Quantity:  30 Tab Refills:  0  
     
   
   
   
  
 * Notice:   This list has 2 medication(s) that are the same as other medications prescribed for you. Read the directions carefully, and ask your doctor or other care provider to review them with you. Discharge Instructions Pleurodesis: Farhat Golden en el hogar - [ Pleurodesis: What to Expect at [facility] ] Asher recuperación La pleurodesis es el tratamiento para evitar la acumulación de líquido alrededor hetras. Después de [employer], usted debería poder respirar mejor y sentirse más cómodo. Es posible que tenga dolor y se sienta cansado. Asher médico podría recomendarle un medicamento de venta lilibeth o recetarle jose para ayudar a aliviar el dolor. Usted puede tener puntos de sutura que será necesario quitar más adelante. Esta hoja de [employer] idea general de cuánto tiempo tardará en recuperarse. Sin embargo, cada persona se recupera a un ritmo diferente. Siga los pasos siguientes para sentirse mejor flannery pronto caterina sea posible. Cómo puede cuidarse en el [facility]? Actividad · Descanse cuando se sienta fatigado. · Permita que asher cuerpo sane. No olivia movimientos bruscos ni levante nada pesado hasta que se sienta mejor. · Puede hacer jesus actividades normales cuando se sienta jaison para hacerlas. · Pregúntele a asher médico cuándo es seguro que usted comience a conducir y cuándo puede volver al Gil Vlad. Alimentación · Puede seguir asher dieta normal. Si tiene [employer], pruebe alimentos suaves y bajos en grasa, caterina arroz sin condimentar, haroldo asado, pan aracelis y yogur. Medicamentos · Sea randy con los medicamentos. Manisha y siga todas las instrucciones de la [place]. ¨ Si el médico le recetó analgésicos (medicamentos para el dolor), tómelos según las indicaciones. ¨ Si no está tomando un analgésico recetado, pregúntele a asher médico si puede julian jose de [employer].  
· Si sharmila aspirina o algún otro medicamento que previene la formación de coágulos de [place], asegúrese de hablar con asher médico. Él o saeid le dirá si puede comenzar a julian sadi medicamento de nuevo y cuándo puede hacerlo. Asegúrese de entender exactamente lo que merrill médico quiere que olivia. · Merrill médico le dirá si puede comenzar a julian jesus medicamentos de nuevo y cuándo puede hacerlo. Él o saeid también le dará instrucciones acerca de julian cualquier medicamento nuevo. Cuidado de la incisión · Usted tendrá un vendaje sobre el honey (incisión). El vendaje ayuda a que la incisión sane y la protege. Merrill médico le indicará cómo cuidarlo. · Si tiene tiras de cinta adhesiva sobre el honey que le hizo el [place], déjeselas puestas kim semana o hasta que se caigan por sí solas. · Si tiene puntos de sutura, merrill médico le indicará cuándo debe regresar para que se los quiten. · Puede ducharse 48 horas después de que le hayan extraído el tubo del pecho. Seque la incisión con toques suaves de toalla. No nade ni se dé un baño de inmersión fidelina las primeras 2 semanas, o hasta que merrill médico lo apruebe. La atención de seguimiento es kim parte clave de merrill tratamiento y seguridad. Asegúrese de hacer y acudir a todas las citas, y llame a merrill médico si está teniendo problemas. También es kim buena idea saber los resultados de jesus exámenes y mantener kim lista de los medicamentos que sharmila. Cuándo debe pedir ayuda? Llame al 911 en cualquier momento que considere que necesita atención de [place]. Por ejemplo, llame si: 
· Se desmayó (perdió el conocimiento). · Tiene mucha dificultad para respirar. · Tiene dolor en el pecho que es distinto o peor de lo habitual. 
· Tose deb. Llame a merrill médico ahora mismo o busque atención médica inmediata si: 
· Siente un dolor nuevo o el dolor empeora. · Tiene puntos de sutura flojos o se le abre la incisión. · Tiene dificultad para respirar. · Tiene síntomas de infección, tales caterina: ¨ Aumento del dolor, la hinchazón, la temperatura o el enrojecimiento. ¨ Vetas rojizas que salen de la incisión. ¨ Pus que sale de la incisión. Luis E Ramirez. Preste especial atención a cualquier cambio en merrill sherrie y asegúrese de comunicarse con merrill médico si tiene algún problema. Dónde puede encontrar más información en inglés? Tammy Dawn a [email or web address] Kaur So I177 en la búsqueda para aprender más acerca de \"Pleurodesis: Qué esperar en el hogar - [ Pleurodesis: What to Expect at [facility] ]. \" 
Revisado: 25 marzo, 2017 Versión del contenido: 11.3 © 1907-9930 Healthwise, Incorporated. Las instrucciones de cuidado fueron adaptadas bajo licencia por [employer] (which disclaims liability or warranty for this information). Si usted tiene [place] afección médica o sobre estas instrucciones, siempre pregunte a merrill profesional de sherrie. Healthwise, Incorporated niega toda garantía o responsabilidad por merrill uso de esta información. Call [facility] at [phone] for any questions or problems that may occur. Resume all medication as before procedure. Keep your previously scheduled follow up appointment with Dr. Kristina Strong. Discharge Orders None Introducing \[facility]\"" SERVICES! [facility] introduce portal paciente Cloudnexahart . Ahora se puede acceder a partes de merrill expediente médico, enviar por correo electrónico la oficina de merrill médico y solicitar renovaciones de medicamentos en línea. En merrill navegador de Internet , [employer] a [email or web address] Olga Lidia clic en el usuario por Eusebio Sim? Aubrey Ligas clic aquí en la sesión Leann Woodard. Verá la página de registro Lamont. Ingrese merrill código de [employer] mitzy y caterina aparece a continuación. Usted no tendrá que [employer] código después de beatrice completado el proceso de registro . Si usted no se inscribe antes de la fecha de caducidad , debe solicitar un nuevo código. · MyChart Código de acceso : [id number] Expires: 10/6/2017  4:01 PM 
 
Ingresa los últimos cuatro dígitos de merrill Número de Seguro Social ( xxxx ) y fecha de nacimiento ( dd / mm / aaaa ) caterina se indica y olga lidia clic en Enviar. Usted será llevado a la siguiente página de registro . Crear un ID MyChart . Esta será merrill ID de inicio de sesión de MyChart y no puede ser [place] , por lo que pensar en kim que es [place] y fácil de recordar . Crear kim contraseña MyChart . Usted puede cambiar merrill contraseña en cualquier momento . Ingrese merrill Password Reset de preguntas y Mejía . [place] se puede utilizar en un momento posterior si usted olvida merrill contraseña. Introduzca merrill dirección de correo electrónico . Hillary Umanzor recibirá kim notificación por correo electrónico cuando la nueva información está disponible en MyChart . Rosannea Jason clic en Registrarse. Anatoly Downey angel y descargar porciones de merrill expediente médico. 
Olga Lidia clic en el enlace de descarga del menú Resumen para descargar kim copia portátil de merrill información médica . Si tiene [employer] , por favor visite la sección de preguntas frecuentes del sitio web MyChart . Recuerde, MyChart NO es que se utilizará para las necesidades urgentes. Para emergencias médicas , llame al 911 . Ahora disponible en merrill iPhone y Android ! General Information Please provide this summary of care documentation to your next provider. Patient Signature:  ____________________________________________________________ Date:  ____________________________________________________________  
  
Cherie Landsman Provider Signature:  ____________________________________________________________ Date:  ____________________________________________________________  
  
  
   
  
[facility] [address] [facility] 
[phone] Patient: Jen Capps MRN: [id number] Karen alergias a tammy woouiente No karen alergias Documentación recientes Height Weight Está amamantando? BMI [facility]) Estado obstétrico Estatus de tabaquísmo 1.575 m 54.4 kg No 21.95 kg/m2 Postmenopausal Former Smoker Emergency Contacts Name Discharge Info Relation Home Work Mobile Juancarlos Leggett  Child [2] [phone] [address] [place] [address]  Spouse [3] [phone] Sobre asher hospitalización Le admitieron el:  September 12, 2017 Asher tratamiento más reciente fue el:  [facility] Le dieron de thania el:  September 12, 2017 Número de teléfono de la unidad:  [phone] Por qué le ingresaron Asher diagnosis primaria es:  No está archivado/a Proveedores de verse fidelina courtney hospitalizaciones Personal Médico Rol Especialidad Teléfono principal de la oficina Bela Novoa MD Attending Provider Pulmonary Disease [phone] Asher médico de atención primaria (PCP ) Primary Care Physician Office Phone Office Fax  
 [place], [place] D ** None ** ** None ** Follow-up Information Bill.Forward Courtney citas Wednesday September 13, 2017  8:00 AM EDT ANTICOAG NURSE with [facility]AG[facility]) C/ Juancarlos Carney [address] [facility] [address]  
[phone] Monday September 18, 2017  9:15 AM EDT  
LAB with [address]  
[address] [facility] OUTREACH INSURANCE [facility]) [facility] [address] [place] [address]  
[phone] Monday September 18, 2017  9:45 AM EDT PreChemo Follow Up with Leonora Louis MD  
[facility]) C/ Juancarlos Carney [address] [facility] [address]  
[phone] Monday September 18, 2017 11:15 AM EDT Injection with NUR9  
ST. [address] ([facility]) [address] Manisha Mcmanus [phone] [facility] [address]  
[phone] [address] Aprobación de la gestión actual lista de medicamentos CONSULTE con merrill médico sobre [employer] Dosis e instrucciones Información de dispensación Comentarios Morning Noon Evening Bedtime  
 albuterol 90 mcg/actuation inhaler También conocido caterina:  PROVENTIL HFA Your last dose was: Your next dose is:    
   
   
 Dosis:  2 Puff Take 2 Puffs by inhalation every four (4) hours as needed for Wheezing. cantidad:  1 Inhaler  
recargas:  11  
     
   
   
   
  
 albuterol-ipratropium 2.5 mg-0.5 mg/3 ml Nebu También conocido caterina:  Jen Fajardo Your last dose was: Your next dose is:    
   
   
 Dosis:  3 mL  
3 mL by Nebulization route every six (6) hours as needed. cantidad:  360 Nebule  
recargas:  11 ALPRAZolam 0.25 mg tablet También conocido caterina:  Rolo Graff Your last dose was: Your next dose is:    
   
   
 Dosis:  0.25 mg Take 1 Tab by mouth three (3) times daily as needed for Anxiety. Max Daily Amount: 0.75 mg.  
 cantidad:  90 Tab  
recargas:  2  
     
   
   
   
  
 amLODIPine 10 mg tablet También conocido caterina:  Eddie Cordial Your last dose was: Your next dose is:    
   
   
 Dosis:  10 mg Take 1 Tab by mouth daily. cantidad:  30 Tab  
recargas:  11  
     
   
   
   
  
 budesonide-formoterol 160-4.5 mcg/actuation HFA inhaler También conocido caterina:  SYMBICORT Your last dose was: Your next dose is:    
   
   
 Dosis:  2 Puff Take 2 Puffs by inhalation two (2) times a day. cantidad:  1 Inhaler  
recargas:  11  
     
   
   
   
  
 carvedilol 3.125 mg tablet También conocido caterina:  COREG Your last dose was: Your next dose is:    
   
   
 Dosis:  3.125 mg Take 1 Tab by mouth two (2) times daily (with meals). cantidad:  60 Tab  
recargas:  11 DULoxetine 20 mg capsule También conocido caterina:  CYMBALTA Your last dose was:     
   
Your next dose is:    
   
   
 Dosis:  20 mg  
 Take 1 Cap by mouth daily. Indications: NEUROPATHIC PAIN  
 cantidad:  30 Cap  
recargas:  0  
     
   
   
   
  
 fentaNYL 50 mcg/hr PATCH También conocido caterina:  Jules Brennan Your last dose was: Your next dose is:    
   
   
 Dosis:  1 Patch 1 Patch by TransDERmal route every seventy-two (72) hours. Max Daily Amount: 1 Patch. Indications: Chronic Pain with Opioid Tolerance  
 cantidad:  10 Patch  
recargas:  0  
     
   
   
   
  
 furosemide 40 mg tablet También conocido caterina:  LASIX Your last dose was: Your next dose is:    
   
   
 Dosis:  40 mg Take 1 Tab by mouth daily. cantidad:  30 Tab  
recargas:  11  
     
   
   
   
  
 hydrALAZINE 50 mg tablet También conocido caterina:  APRESOLINE Your last dose was: Your next dose is:    
   
   
 Dosis:  50 mg Take 1 Tab by mouth three (3) times daily. cantidad:  90 Tab  
recargas:  1 HYDROcodone-acetaminophen  mg tablet También conocido caterina:  Cathy Weeks Your last dose was: Your next dose is:    
   
   
 Dosis:  1 Tab Take 1 Tab by mouth every four (4) hours as needed. Max Daily Amount: 6 Tabs. cantidad:  90 Tab  
recargas:  0 LORazepam 1 mg tablet También conocido caterina:  ATIVAN Your last dose was: Your next dose is:    
   
   
 Dosis:  1 mg Take 1 Tab by mouth every six (6) hours as needed for Anxiety. Max Daily Amount: 4 mg.  
 cantidad:  60 Tab  
recargas:  0  
     
   
   
   
  
 megestrol 400 mg/10 mL (10 mL) suspension También conocido caterina:  MEGACE Your last dose was: Your next dose is:    
   
   
 Dosis:  200 mg Take 5 mL by mouth daily. cantidad:  240 mL  
recargas:  2  
     
   
   
   
  
 mirtazapine 30 mg tablet También conocido caterina:  Judith Lugo Your last dose was: Your next dose is:    
   
   
 Dosis:  30 mg Take 1 Tab by mouth nightly. cantidad:  30 Tab recargas:  1  
     
   
   
   
  
 montelukast 10 mg tablet También conocido caterina:  SINGULAIR Your last dose was: Your next dose is:    
   
   
 Dosis:  10 mg Take 1 Tab by mouth nightly. cantidad:  30 Tab  
recargas:  11 OXYGEN-AIR DELIVERY SYSTEMS Your last dose was: Your next dose is:    
   
   
 Dosis:  2 L  
2 L by IntraNASal route as needed (shortness of breath). recargas:  0  
     
   
   
   
  
 palbociclib 125 mg Cap Your last dose was: Your next dose is:    
   
   
 Dosis:  125 mg Take 1 Cap by mouth daily. Take one pill once a day for 3 weeks and then off for one week  
 cantidad:  21 Cap  
recargas:  5  
     
   
   
   
  
 raNITIdine 150 mg tablet También conocido caterina:  ZANTAC Your last dose was: Your next dose is:    
   
   
 Dosis:  150 mg Take 1 Tab by mouth daily. cantidad:  30 Tab  
recargas:  11  
     
   
   
   
  
 * warfarin 3 mg tablet También conocido caterina:  COUMADIN Your last dose was: Your next dose is:    
   
   
 Dosis:  3 mg Take 1 Tab by mouth every evening. cantidad:  30 Tab  
recargas:  0  
     
   
   
   
  
 * warfarin 1 mg tablet También conocido caterina:  COUMADIN Your last dose was: Your next dose is:    
   
   
 Dosis:  1.5 mg Take 1.5 Tabs by mouth every evening. cantidad:  45 Tab  
recargas:  0  
     
   
   
   
  
 zolpidem 5 mg tablet También conocido caterina:  Mauri Heaton Your last dose was: Your next dose is:    
   
   
 Dosis:  5 mg Take 1 Tab by mouth nightly as needed for Sleep. Max Daily Amount: 5 mg.  
 cantidad:  30 Tab  
recargas:  0  
     
   
   
   
  
 * Aviso:  Esta lista contiene medicamentos que son iguales a otros medicamentos recetados para usted.  Manisha las instrucciones con cuidado y Thalia David a merrill personal médico que revise la lista de medicamentos y las instrucciones correspondientes con usted. Discharge Instructions Pleurodesis: Molly [place] en el hogar - [ Pleurodesis: What to Expect at [facility] ] Merrill recuperación La pleurodesis es el tratamiento para evitar la acumulación de líquido alrededor [place]. Después de [employer], usted debería poder respirar mejor y sentirse más cómodo. Es posible que tenga dolor y se sienta cansado. Merrill médico podría recomendarle un medicamento de venta lilibeth o recetarle jose para ayudar a aliviar el dolor. Usted puede tener puntos de sutura que será necesario quitar más adelante. Esta hoja de [employer] idea general de cuánto tiempo tardará en recuperarse. Sin embargo, cada persona se recupera a un ritmo diferente. Siga los pasos siguientes para sentirse mejor flannery pronto caterina sea posible. Cómo puede cuidarse en el hogar? Actividad · Descanse cuando se sienta fatigado. · Permita que merrill cuerpo sane. No olivia movimientos bruscos ni levante nada pesado hasta que se sienta mejor. · Puede hacer jesus actividades normales cuando se sienta jaison para hacerlas. · Pregúntele a merrill médico cuándo es seguro que usted comience a conducir y cuándo puede volver al Ferol Evelyn. Alimentación · Puede seguir merrill dieta normal. Si tiene [employer], pruebe alimentos suaves y bajos en grasa, caterina arroz sin condimentar, haroldo asado, pan aracelis y yogur. Medicamentos · Sea randy con los medicamentos. Manisha y siga todas las instrucciones de la [place]. ¨ Si el médico le recetó analgésicos (medicamentos para el dolor), tómelos según las indicaciones. ¨ Si no está tomando un analgésico recetado, pregúntele a merrill médico si puede julian jose de [employer]. · Si sharmila aspirina o algún otro medicamento que previene la formación de coágulos de Larry, asegúrese de hablar con merrill médico. Él o saeid le dirá si puede comenzar a julian sadi medicamento de nuevo y cuándo puede hacerlo. Asegúrese de entender exactamente lo que merrill médico quiere que olivia. · Merrill médico le dirá si puede comenzar a julian jesus medicamentos de nuevo y cuándo puede hacerlo. Él o saeid también le dará instrucciones acerca de julian cualquier medicamento nuevo. Cuidado de la incisión · Usted tendrá un vendaje sobre el honey (incisión). El vendaje ayuda a que la incisión sane y la protege. Merrill médico le indicará cómo cuidarlo. · Si tiene tiras de cinta adhesiva sobre el honey que le hizo el [place], déjeselas puestas kim semana o hasta que se caigan por sí solas. · Si tiene puntos de sutura, merrill médico le indicará cuándo debe regresar para que se los quiten. · Puede ducharse 48 horas después de que le hayan extraído el tubo del pecho. Seque la incisión con toques suaves de toalla. No nade ni se dé un baño de inmersión fidelina las primeras 2 semanas, o hasta que merrill médico lo apruebe. La atención de seguimiento es kim parte clave de merrill tratamiento y seguridad. Asegúrese de hacer y acudir a todas las citas, y llame a merrill médico si está teniendo problemas. También es kim buena idea saber los resultados de jesus exámenes y mantener kim lista de los medicamentos que sharmila. Cuándo debe pedir ayuda? Llame al 911 en cualquier momento que considere que necesita atención de [place]. Por ejemplo, llame si: 
· Se desmayó (perdió el conocimiento). · Tiene mucha dificultad para respirar. · Tiene dolor en el pecho que es distinto o peor de lo habitual. 
· Tose deb. Llame a merrill médico ahora mismo o busque atención médica inmediata si: 
· Siente un dolor nuevo o el dolor empeora. · Tiene puntos de sutura flojos o se le abre la incisión. · Tiene dificultad para respirar. · Tiene síntomas de infección, tales caterina: ¨ Aumento del dolor, la hinchazón, la temperatura o el enrojecimiento. ¨ Vetas rojizas que salen de la incisión. ¨ Pus que sale de la incisión. Sharlynn Riedel. Preste especial atención a cualquier cambio en merrill sherrie y asegúrese de comunicarse con merrill médico si tiene algún problema. Dónde puede encontrar más información en inglés? Ashley Gallo a [email or web address] Clinton Kennedy [id number] en la búsqueda para aprender más acerca de \"Pleurodesis: Qué esperar en el hogar - [ Pleurodesis: What to Expect at [facility] ]. \" 
Revisado: 25 marzo, 2017 Versión del contenido: 11.3 © 0924-2874 Healthwise, Incorporated. Las instrucciones de cuidado fueron adaptadas bajo licencia por [employer] (which disclaims liability or warranty for this information). Si usted tiene [place] afección médica o sobre estas instrucciones, siempre pregunte a merrill profesional de sherrie. Healthwise, Incorporated niega toda garantía o responsabilidad por merrill uso de esta información. Call [address] at [phone] for any questions or problems that may occur. Resume all medication as before procedure. Keep your previously scheduled follow up appointment with Dr. Clinton Allen. Discharge Orders Clipsure Introducing [facility] & [facility]! [facility] introduce portal paciente [place] . Ahora se puede acceder a partes de merrill expediente médico, enviar por correo electrónico la oficina de merrill médico y solicitar renovaciones de medicamentos en línea. En merrill navegador de Internet , Greg Sharpe a [email or web address] Olga Lidia clic en el usuario por Justo Willams? Jared shirley aquí en la sesión [employer]. Verá la página de registro [place]. Ingrese merrill código de [employer] mitzy y caterina aparece a continuación. Usted no tendrá que [employer] código después de beatrice completado el proceso de registro . Si usted no se inscribe antes de la fecha de caducidad , debe solicitar un nuevo código. · MyChart Código de acceso : [id number]- Expires: 10/6/2017  4:01 PM 
 
Ingresa los últimos cuatro dígitos de merrill Número de Seguro Social ( xxxx ) y fecha de nacimiento ( dd / mm / aaaa ) caterina se indica y olga lidia clic en Enviar. Usted será llevado a la siguiente página de registro . Crear un ID MyChart . Esta será merrill ID de inicio de sesión de MyChart y no puede ser [place] , por lo que pensar en kim que es Orie Kym y fácil de recordar . Crear kim contraseña MyChart . Usted puede cambiar merrill contraseña en cualquier momento . Ingrese merrill Password Reset de preguntas y [place] . Sells se puede utilizar en un momento posterior si usted olvida merrill contraseña. Introduzca merrill dirección de correo electrónico . Natasha Cruz recibirá kim notificación por correo electrónico cuando la nueva información está disponible en MyChart . Beto Samayoa clic en Registrarse. Launie Dun angel y descargar porciones de merrill expediente médico. 
Olga Lidia clic en el enlace de descarga del menú Resumen para descargar kim copia portátil de merrill información médica . Si tiene [employer] , por favor visite la sección de preguntas frecuentes del sitio web MyChart . Recuerde, MyChart NO es que se utilizará para las necesidades urgentes. Para emergencias médicas , llame al 911 . Ahora disponible en merrill iPhone y Android ! General Information Please provide this summary of care documentation to your next provider. Patient Signature:  ____________________________________________________________ Date:  ____________________________________________________________  
  
[facility] Signature:  ____________________________________________________________ Date:  ____________________________________________________________

## 2017-09-12 NOTE — DISCHARGE INSTRUCTIONS
Pleurodesis: Junior Silva en el hogar - [ Pleurodesis: What to Expect at [facility] ]  Merrill recuperación  La pleurodesis es el tratamiento para evitar la acumulación de líquido alrededor [employer]. Después de [employer], usted debería poder respirar mejor y sentirse más cómodo. Es posible que tenga dolor y se sienta cansado. Merrill médico podría recomendarle un medicamento de venta lilibeth o recetarle jose para ayudar a aliviar el dolor. Usted puede tener puntos de sutura que será necesario quitar más adelante. Esta hoja de [employer] idea general de cuánto tiempo tardará en recuperarse. Sin embargo, cada persona se recupera a un ritmo diferente. Siga los pasos siguientes para sentirse mejor flannery pronto caterina sea posible. ¿Cómo puede cuidarse en el [facility]? Actividad  · Descanse cuando se sienta fatigado. · Permita que merrill cuerpo sane. No olivia movimientos bruscos ni levante nada pesado hasta que se sienta mejor. · Puede hacer jesus actividades normales cuando se sienta jaison para hacerlas. · Pregúntele a merrill médico cuándo es seguro que usted comience a conducir y cuándo puede volver al Jaylen Seat. Alimentación  · Puede seguir merrill dieta normal. Si tiene [employer], pruebe alimentos suaves y bajos en grasa, caterina arroz sin condimentar, haroldo asado, pan aracelis y yogur. Medicamentos  · Sea randy con los medicamentos. Manisha y siga todas las instrucciones de la [place]. ¨ Si el médico le recetó analgésicos (medicamentos para el dolor), tómelos según las indicaciones. ¨ Si no está tomando un analgésico recetado, pregúntele a merrill médico si puede julian jose de [employer]. · Si sharmila aspirina o algún otro medicamento que previene la formación de coágulos de Larry, asegúrese de hablar con merrill médico. Él o saeid le dirá si puede comenzar a julian sadi medicamento de nuevo y cuándo puede hacerlo. Asegúrese de entender exactamente lo que merrill médico quiere que olivia.   · Merrill médico le dirá si puede comenzar a julian jesus medicamentos de nuevo y cuándo puede hacerlo. Él o saeid también le dará instrucciones acerca de julian cualquier medicamento nuevo. Cuidado de la incisión  · Usted tendrá un vendaje sobre el honey (incisión). El vendaje ayuda a que la incisión sane y la protege. Asher médico le indicará cómo cuidarlo. · Si tiene tiras de cinta adhesiva sobre el honey que le hizo el [place], déjeselas puestas kim semana o hasta que se caigan por sí solas. · Si tiene puntos de sutura, asher médico le indicará cuándo debe regresar para que se los quiten. · Puede ducharse 48 horas después de que le hayan extraído el tubo del pecho. Seque la incisión con toques suaves de toalla. No nade ni se dé un baño de inmersión fidelina las primeras 2 semanas, o hasta que asher médico lo apruebe. La atención de seguimiento es kim parte clave de asher tratamiento y seguridad. Asegúrese de hacer y acudir a todas las citas, y llame a asher médico si está teniendo problemas. También es kim buena idea saber los resultados de jesus exámenes y mantener kim lista de los medicamentos que sharmila. ¿Cuándo debe pedir ayuda? Llame al 911 en cualquier momento que considere que necesita atención de [place]. Por ejemplo, llame si:  · Se desmayó (perdió el conocimiento). · Tiene mucha dificultad para respirar. · Tiene dolor en el pecho que es distinto o peor de lo habitual.  · Tose deb. Llame a asher médico ahora mismo o busque atención médica inmediata si:  · Siente un dolor nuevo o el dolor empeora. · Tiene puntos de sutura flojos o se le abre la incisión. · Tiene dificultad para respirar. · Tiene síntomas de infección, tales caterina:  ¨ Aumento del dolor, la hinchazón, la temperatura o el enrojecimiento. ¨ Vetas rojizas que salen de la incisión. ¨ Pus que sale de la incisión. Lambert Mittal. Preste especial atención a cualquier cambio en asher sherrie y asegúrese de comunicarse con asher médico si tiene algún problema. ¿Dónde puede encontrar más información en inglés?   Jessie Tran a [email or web address].[email or web address] Lorenza Ramos [id number] en la búsqueda para aprender más acerca de \"Pleurodesis: Qué esperar en el hogar - [ Pleurodesis: What to Expect at [facility] ]. \"  Revisado: 25 marzo, 2017  Versión del contenido: 11.3  © 8688-4118 Healthwise, Incorporated. Las instrucciones de cuidado fueron adaptadas bajo licencia por [employer] (which disclaims liability or warranty for this information). Si usted tiene [place] afección médica o sobre estas instrucciones, siempre pregunte a merrill profesional de sherrie. Healthwise, Incorporated niega toda garantía o responsabilidad por merrill uso de esta información. Call [facility] at [phone] for any questions or problems that may occur. Resume all medication as before procedure. Keep your previously scheduled follow up appointment with Dr. Joon Sarkar.

## 2017-09-13 ENCOUNTER — HOSPITAL ENCOUNTER (OUTPATIENT)
Dept: LAB | Age: 67
Discharge: HOME | End: 2017-09-13
Payer: MEDICAID

## 2017-09-13 DIAGNOSIS — I82.409 DEEP VEIN THROMBOSIS (DVT) OF LOWER EXTREMITY, UNSPECIFIED CHRONICITY, UNSPECIFIED LATERALITY, UNSPECIFIED VEIN (HCC): Chronic | ICD-10-CM

## 2017-09-13 LAB
INR PPP: 1.7 (ref 0.9–1.2)
PROTHROMBIN TIME: 20.3 SEC (ref 9.6–12)

## 2017-09-13 PROCEDURE — 36416 COLLJ CAPILLARY BLOOD SPEC: CPT | Performed by: INTERNAL MEDICINE

## 2017-09-13 PROCEDURE — 85610 PROTHROMBIN TIME: CPT | Performed by: INTERNAL MEDICINE

## 2017-09-18 ENCOUNTER — HOSPITAL ENCOUNTER (OUTPATIENT)
Dept: LAB | Age: 67
Discharge: HOME | End: 2017-09-18
Payer: SUBSIDIZED

## 2017-09-18 ENCOUNTER — HOSPITAL ENCOUNTER (OUTPATIENT)
Dept: INFUSION THERAPY | Age: 67
Discharge: HOME | End: 2017-09-18
Payer: SUBSIDIZED

## 2017-09-18 VITALS
OXYGEN SATURATION: 92 % | HEART RATE: 66 BPM | TEMPERATURE: 98.2 F | RESPIRATION RATE: 18 BRPM | SYSTOLIC BLOOD PRESSURE: 136 MMHG | DIASTOLIC BLOOD PRESSURE: 57 MMHG

## 2017-09-18 DIAGNOSIS — T45.1X5A CHEMOTHERAPY-INDUCED NEUTROPENIA (HCC): ICD-10-CM

## 2017-09-18 DIAGNOSIS — C78.00 BREAST CANCER METASTASIZED TO LUNG, UNSPECIFIED LATERALITY (HCC): Chronic | ICD-10-CM

## 2017-09-18 DIAGNOSIS — C79.51 BONY METASTASIS (HCC): ICD-10-CM

## 2017-09-18 DIAGNOSIS — D70.1 CHEMOTHERAPY-INDUCED NEUTROPENIA (HCC): ICD-10-CM

## 2017-09-18 DIAGNOSIS — Z79.01 ANTICOAGULATED ON COUMADIN: Chronic | ICD-10-CM

## 2017-09-18 DIAGNOSIS — D64.9 ANEMIA, UNSPECIFIED: ICD-10-CM

## 2017-09-18 DIAGNOSIS — C50.919 BREAST CANCER METASTASIZED TO LUNG, UNSPECIFIED LATERALITY (HCC): Chronic | ICD-10-CM

## 2017-09-18 LAB
ALBUMIN SERPL-MCNC: 2.5 G/DL (ref 3.2–4.6)
ALBUMIN/GLOB SERPL: 0.5 {RATIO} (ref 1.2–3.5)
ALP SERPL-CCNC: 118 U/L (ref 50–136)
ALT SERPL-CCNC: 15 U/L (ref 12–65)
ANION GAP SERPL CALC-SCNC: 10 MMOL/L (ref 7–16)
AST SERPL-CCNC: 16 U/L (ref 15–37)
BASOPHILS # BLD: 0.1 K/UL (ref 0–0.2)
BASOPHILS NFR BLD: 1 % (ref 0–2)
BILIRUB SERPL-MCNC: 0.4 MG/DL (ref 0.2–1.1)
BUN SERPL-MCNC: 40 MG/DL (ref 8–23)
CALCIUM SERPL-MCNC: 8.1 MG/DL (ref 8.3–10.4)
CANCER AG15-3 SERPL-ACNC: 350 U/ML (ref 1–35)
CEA SERPL-MCNC: 49.6 NG/ML (ref 0–3)
CHLORIDE SERPL-SCNC: 108 MMOL/L (ref 98–107)
CO2 SERPL-SCNC: 21 MMOL/L (ref 21–32)
CREAT SERPL-MCNC: 2.52 MG/DL (ref 0.6–1)
DIFFERENTIAL METHOD BLD: ABNORMAL
EOSINOPHIL # BLD: 0 K/UL (ref 0–0.8)
EOSINOPHIL NFR BLD: 0 % (ref 0.5–7.8)
ERYTHROCYTE [DISTWIDTH] IN BLOOD BY AUTOMATED COUNT: 15.6 % (ref 11.9–14.6)
FERRITIN SERPL-MCNC: 1943 NG/ML (ref 8–388)
FOLATE SERPL-MCNC: 16.6 NG/ML (ref 3.1–17.5)
GLOBULIN SER CALC-MCNC: 5 G/DL (ref 2.3–3.5)
GLUCOSE SERPL-MCNC: 165 MG/DL (ref 65–100)
HCT VFR BLD AUTO: 19.6 % (ref 35.8–46.3)
HGB BLD-MCNC: 6.6 G/DL (ref 11.7–15.4)
HGB RETIC QN AUTO: 31 PG (ref 29–35)
IMM RETICS NFR: 29.9 % (ref 3–15.9)
INR PPP: 2.6 (ref 0.9–1.2)
IRON SATN MFR SERPL: 23 %
IRON SERPL-MCNC: 45 UG/DL (ref 35–150)
LYMPHOCYTES # BLD: 0.9 K/UL (ref 0.5–4.6)
LYMPHOCYTES NFR BLD: 14 % (ref 13–44)
MAGNESIUM SERPL-MCNC: 2.3 MG/DL (ref 1.8–2.4)
MCH RBC QN AUTO: 36.7 PG (ref 26.1–32.9)
MCHC RBC AUTO-ENTMCNC: 33.7 G/DL (ref 31.4–35)
MCV RBC AUTO: 108.9 FL (ref 79.6–97.8)
MONOCYTES # BLD: 0.7 K/UL (ref 0.1–1.3)
MONOCYTES NFR BLD: 11 % (ref 4–12)
NEUTS SEG # BLD: 4.6 K/UL (ref 1.7–8.2)
NEUTS SEG NFR BLD: 74 % (ref 43–78)
NRBC # BLD: 0.02 K/UL (ref 0–0.2)
PHOSPHATE SERPL-MCNC: 2.5 MG/DL (ref 2.3–3.7)
PLATELET # BLD AUTO: 292 K/UL (ref 150–450)
PMV BLD AUTO: 10.9 FL (ref 10.8–14.1)
POTASSIUM SERPL-SCNC: 4 MMOL/L (ref 3.5–5.1)
PROT SERPL-MCNC: 7.5 G/DL (ref 6.3–8.2)
PROTHROMBIN TIME: 30.8 SEC (ref 9.6–12)
RBC # BLD AUTO: 1.8 M/UL (ref 4.05–5.25)
RETICS # AUTO: 0.13 M/UL (ref 0.03–0.1)
RETICS/RBC NFR AUTO: 7.2 % (ref 0.3–2)
SODIUM SERPL-SCNC: 139 MMOL/L (ref 136–145)
T4 FREE SERPL-MCNC: 1.4 NG/DL (ref 0.78–1.46)
TIBC SERPL-MCNC: 199 UG/DL (ref 250–450)
TSH SERPL DL<=0.005 MIU/L-ACNC: 4.66 UIU/ML (ref 0.36–3.74)
VIT B12 SERPL-MCNC: 590 PG/ML (ref 193–986)
WBC # BLD AUTO: 6.3 K/UL (ref 4.3–11.1)

## 2017-09-18 PROCEDURE — 77030003560 HC NDL HUBR BARD -A

## 2017-09-18 PROCEDURE — 84100 ASSAY OF PHOSPHORUS: CPT | Performed by: INTERNAL MEDICINE

## 2017-09-18 PROCEDURE — 96402 CHEMO HORMON ANTINEOPL SQ/IM: CPT

## 2017-09-18 PROCEDURE — 85610 PROTHROMBIN TIME: CPT | Performed by: INTERNAL MEDICINE

## 2017-09-18 PROCEDURE — 83090 ASSAY OF HOMOCYSTEINE: CPT | Performed by: INTERNAL MEDICINE

## 2017-09-18 PROCEDURE — 83735 ASSAY OF MAGNESIUM: CPT | Performed by: INTERNAL MEDICINE

## 2017-09-18 PROCEDURE — 82728 ASSAY OF FERRITIN: CPT | Performed by: INTERNAL MEDICINE

## 2017-09-18 PROCEDURE — 86923 COMPATIBILITY TEST ELECTRIC: CPT | Performed by: INTERNAL MEDICINE

## 2017-09-18 PROCEDURE — 86900 BLOOD TYPING SEROLOGIC ABO: CPT | Performed by: INTERNAL MEDICINE

## 2017-09-18 PROCEDURE — 85025 COMPLETE CBC W/AUTO DIFF WBC: CPT | Performed by: INTERNAL MEDICINE

## 2017-09-18 PROCEDURE — 74011250637 HC RX REV CODE- 250/637: Performed by: INTERNAL MEDICINE

## 2017-09-18 PROCEDURE — 82607 VITAMIN B-12: CPT | Performed by: INTERNAL MEDICINE

## 2017-09-18 PROCEDURE — 84439 ASSAY OF FREE THYROXINE: CPT | Performed by: INTERNAL MEDICINE

## 2017-09-18 PROCEDURE — P9040 RBC LEUKOREDUCED IRRADIATED: HCPCS | Performed by: INTERNAL MEDICINE

## 2017-09-18 PROCEDURE — 83540 ASSAY OF IRON: CPT | Performed by: INTERNAL MEDICINE

## 2017-09-18 PROCEDURE — 77030018667 ADMN ST IV BLD FENW -A

## 2017-09-18 PROCEDURE — 86644 CMV ANTIBODY: CPT | Performed by: INTERNAL MEDICINE

## 2017-09-18 PROCEDURE — 36415 COLL VENOUS BLD VENIPUNCTURE: CPT | Performed by: INTERNAL MEDICINE

## 2017-09-18 PROCEDURE — 96374 THER/PROPH/DIAG INJ IV PUSH: CPT

## 2017-09-18 PROCEDURE — 86300 IMMUNOASSAY TUMOR CA 15-3: CPT | Performed by: INTERNAL MEDICINE

## 2017-09-18 PROCEDURE — 96372 THER/PROPH/DIAG INJ SC/IM: CPT

## 2017-09-18 PROCEDURE — 85046 RETICYTE/HGB CONCENTRATE: CPT | Performed by: INTERNAL MEDICINE

## 2017-09-18 PROCEDURE — 80053 COMPREHEN METABOLIC PANEL: CPT | Performed by: INTERNAL MEDICINE

## 2017-09-18 PROCEDURE — 83921 ORGANIC ACID SINGLE QUANT: CPT | Performed by: INTERNAL MEDICINE

## 2017-09-18 PROCEDURE — 84238 ASSAY NONENDOCRINE RECEPTOR: CPT | Performed by: INTERNAL MEDICINE

## 2017-09-18 PROCEDURE — 82746 ASSAY OF FOLIC ACID SERUM: CPT | Performed by: INTERNAL MEDICINE

## 2017-09-18 PROCEDURE — 36430 TRANSFUSION BLD/BLD COMPNT: CPT

## 2017-09-18 PROCEDURE — 82378 CARCINOEMBRYONIC ANTIGEN: CPT | Performed by: INTERNAL MEDICINE

## 2017-09-18 PROCEDURE — 74011250636 HC RX REV CODE- 250/636: Performed by: INTERNAL MEDICINE

## 2017-09-18 PROCEDURE — 84443 ASSAY THYROID STIM HORMONE: CPT | Performed by: INTERNAL MEDICINE

## 2017-09-18 RX ORDER — LAMOTRIGINE 25 MG/1
500 TABLET ORAL ONCE
Status: COMPLETED | OUTPATIENT
Start: 2017-09-18 | End: 2017-09-18

## 2017-09-18 RX ORDER — SODIUM CHLORIDE 9 MG/ML
250 INJECTION, SOLUTION INTRAVENOUS AS NEEDED
Status: DISCONTINUED | OUTPATIENT
Start: 2017-09-18 | End: 2017-09-22 | Stop reason: HOSPADM

## 2017-09-18 RX ORDER — SODIUM CHLORIDE 0.9 % (FLUSH) 0.9 %
5-10 SYRINGE (ML) INJECTION EVERY 8 HOURS
Status: DISCONTINUED | OUTPATIENT
Start: 2017-09-18 | End: 2017-09-22 | Stop reason: HOSPADM

## 2017-09-18 RX ORDER — DIPHENHYDRAMINE HYDROCHLORIDE 50 MG/ML
25 INJECTION, SOLUTION INTRAMUSCULAR; INTRAVENOUS ONCE
Status: COMPLETED | OUTPATIENT
Start: 2017-09-18 | End: 2017-09-18

## 2017-09-18 RX ORDER — SODIUM CHLORIDE 9 MG/ML
250 INJECTION, SOLUTION INTRAVENOUS AS NEEDED
Status: DISCONTINUED | OUTPATIENT
Start: 2017-09-18 | End: 2017-09-18

## 2017-09-18 RX ORDER — HEPARIN 100 UNIT/ML
500 SYRINGE INTRAVENOUS ONCE
Status: COMPLETED | OUTPATIENT
Start: 2017-09-18 | End: 2017-09-18

## 2017-09-18 RX ADMIN — DENOSUMAB 120 MG: 120 INJECTION SUBCUTANEOUS at 12:32

## 2017-09-18 RX ADMIN — FULVESTRANT 500 MG: 50 INJECTION INTRAMUSCULAR at 12:23

## 2017-09-18 RX ADMIN — SODIUM CHLORIDE, PRESERVATIVE FREE 500 UNITS: 5 INJECTION INTRAVENOUS at 16:28

## 2017-09-18 RX ADMIN — ACETAMINOPHEN 650 MG: 325 SOLUTION ORAL at 13:53

## 2017-09-18 RX ADMIN — SODIUM CHLORIDE 250 ML: 900 INJECTION, SOLUTION INTRAVENOUS at 14:00

## 2017-09-18 RX ADMIN — DIPHENHYDRAMINE HYDROCHLORIDE 25 MG: 50 INJECTION, SOLUTION INTRAMUSCULAR; INTRAVENOUS at 13:53

## 2017-09-18 RX ADMIN — Medication 10 ML: at 16:27

## 2017-09-18 NOTE — PROGRESS NOTES
Arrived to the [facility]. Chemo Fosladex, xgeva, and 1 unit of blood completed. Patient tolerated well. Any issues or concerns during appointment: none. Patient aware of next infusion appointment on 10/16/17 at 10:00. Discharged home.

## 2017-09-19 LAB
ABO + RH BLD: NORMAL
BLD PROD TYP BPU: NORMAL
BLOOD GROUP ANTIBODIES SERPL: NORMAL
BPU ID: NORMAL
CANCER AG27-29 SERPL-ACNC: 443.4 U/ML (ref 0–38.6)
CROSSMATCH RESULT,%XM: NORMAL
HCYS SERPL-SCNC: 22.8 UMOL/L (ref 0–15)
SPECIMEN EXP DATE BLD: NORMAL
STATUS OF UNIT,%ST: NORMAL
TRANSFERRIN SERPL-SCNC: 29.6 NMOL/L (ref 12.2–27.3)
UNIT DIVISION, %UDIV: 0

## 2017-09-20 LAB — METHYLMALONATE SERPL-SCNC: 397 NMOL/L (ref 0–378)

## 2017-09-25 ENCOUNTER — HOSPITAL ENCOUNTER (OUTPATIENT)
Dept: LAB | Age: 67
Discharge: HOME | End: 2017-09-25
Payer: MEDICAID

## 2017-09-25 DIAGNOSIS — I82.409 DEEP VEIN THROMBOSIS (DVT) OF LOWER EXTREMITY, UNSPECIFIED CHRONICITY, UNSPECIFIED LATERALITY, UNSPECIFIED VEIN (HCC): Chronic | ICD-10-CM

## 2017-09-25 LAB
INR PPP: 2.5 (ref 0.9–1.2)
PROTHROMBIN TIME: 30.2 SEC (ref 9.6–12)

## 2017-09-25 PROCEDURE — 36416 COLLJ CAPILLARY BLOOD SPEC: CPT | Performed by: INTERNAL MEDICINE

## 2017-09-25 PROCEDURE — 85610 PROTHROMBIN TIME: CPT | Performed by: INTERNAL MEDICINE

## 2017-10-16 NOTE — PROGRESS NOTES
Reviewed with son Charlotte discharge instructions. Directed to notify MD immediately for worsening dyspnea, rash, swelling, fever, inability to urinate, or any problems. Verbalizes understanding. Patient rested well after Benadryl given and does not c/o itching presently. No evidence of any rashes noted. Patient discharged via w/c accompanied by son. Instructed to notify physician of any problems, questions or concerns. Allowed opportunity for patient/family to ask questions. Verbalized understanding. Next appointment is 10/23/17 at 0800 with lab dept.

## 2017-10-16 NOTE — PROGRESS NOTES
Patient awoke from nap with c/o itching. No evidence of rash anywhere on body. Note skin is extremely dry and flaky and reddened from where patient scratched. Orders received for IV Benadryl via [facility] NP. O2 sat 89-90% after napping. O2 applied at 2 liter per nasal cannula with resulting O2 sats 93-94%. No dyspnea noted. Benadryl 25 mg IV given per order. Itching subsided within 10  -15 minutes with only minimal c/o of itching post Benadryl. Lotion applied to dry skin on arms and back.

## 2017-10-16 NOTE — PROGRESS NOTES
Problem: Anemia Care Plan (Adult and Pediatric)  Goal: *Labs within defined limits  Outcome: Progressing Towards Goal  Reviewed blood transfusion POC with patient. Verbalizes understanding.

## 2017-11-13 PROBLEM — E53.8 VITAMIN B12 DEFICIENCY: Status: ACTIVE | Noted: 2017-01-01

## 2017-11-13 PROBLEM — D63.8 ANEMIA OF CHRONIC DISEASE: Status: ACTIVE | Noted: 2017-01-01

## 2017-11-20 NOTE — PROGRESS NOTES
Arrived to infusion  Accompanied by family, available  Patient reports mouth sores,denies throat pain,complains of fatigue. Instructed to use saltwater/baking soda rinse  If no improvement to call office for possible prescription mouthwash. Verbalized understanding  Via .  Instructed to start Ibrance per Lisha Urbina RN,verbalized understanding   Faslodex,B12,procrit,and xgeva administered today per plan  Tolerated well

## 2017-12-04 NOTE — PROGRESS NOTES
Arrived to the [facility]. Hgb within parameters for procrit. Procrit completed. Patient tolerated well. Any issues or concerns during appointment: none. Patient aware of next infusion appointment on 12/18/17 @ 1000  Discharged home.

## 2017-12-18 NOTE — PROGRESS NOTES
Pt arrived via wheelchair, injections given per order, pt tolerated well, discharged home via wheelchair

## 2018-01-01 ENCOUNTER — HOSPITAL ENCOUNTER (OUTPATIENT)
Dept: INFUSION THERAPY | Age: 68
Discharge: HOME | End: 2018-03-24
Payer: SUBSIDIZED

## 2018-01-01 ENCOUNTER — HOSPITAL ENCOUNTER (OUTPATIENT)
Dept: LAB | Age: 68
Discharge: HOME | End: 2018-08-15
Payer: SELF-PAY

## 2018-01-01 ENCOUNTER — HOSPITAL ENCOUNTER (OUTPATIENT)
Dept: NON INVASIVE DIAGNOSTICS | Age: 68
Discharge: HOME | End: 2018-05-16
Attending: INTERNAL MEDICINE
Payer: SUBSIDIZED

## 2018-01-01 ENCOUNTER — HOSPITAL ENCOUNTER (OUTPATIENT)
Dept: INFUSION THERAPY | Age: 68
Discharge: HOME | End: 2018-05-08
Payer: SUBSIDIZED

## 2018-01-01 ENCOUNTER — HOSPITAL ENCOUNTER (OUTPATIENT)
Dept: INFUSION THERAPY | Age: 68
Discharge: HOME | End: 2018-03-23
Payer: SUBSIDIZED

## 2018-01-01 ENCOUNTER — HOSPITAL ENCOUNTER (OUTPATIENT)
Dept: LAB | Age: 68
Discharge: HOME | End: 2018-03-05
Payer: MEDICAID

## 2018-01-01 ENCOUNTER — HOME CARE VISIT (OUTPATIENT)
Dept: SCHEDULING | Facility: HOME HEALTH | Age: 68
End: 2018-01-01
Payer: SELF-PAY

## 2018-01-01 ENCOUNTER — HOSPITAL ENCOUNTER (OUTPATIENT)
Dept: PHYSICAL THERAPY | Age: 68
Discharge: HOME | End: 2018-05-30
Attending: INTERNAL MEDICINE
Payer: MEDICAID

## 2018-01-01 ENCOUNTER — HOSPITAL ENCOUNTER (OUTPATIENT)
Dept: PHYSICAL THERAPY | Age: 68
Discharge: HOME | End: 2018-07-16
Attending: INTERNAL MEDICINE
Payer: MEDICAID

## 2018-01-01 ENCOUNTER — HOSPITAL ENCOUNTER (OUTPATIENT)
Dept: LAB | Age: 68
Discharge: HOME | End: 2018-04-10
Payer: SUBSIDIZED

## 2018-01-01 ENCOUNTER — HOSPITAL ENCOUNTER (OUTPATIENT)
Dept: INFUSION THERAPY | Age: 68
Discharge: HOME | End: 2018-08-22
Payer: SELF-PAY

## 2018-01-01 ENCOUNTER — HOSPITAL ENCOUNTER (OUTPATIENT)
Dept: LAB | Age: 68
Discharge: HOME | End: 2018-02-27

## 2018-01-01 ENCOUNTER — APPOINTMENT (OUTPATIENT)
Dept: PHYSICAL THERAPY | Age: 68
End: 2018-01-01
Attending: INTERNAL MEDICINE
Payer: SUBSIDIZED

## 2018-01-01 ENCOUNTER — APPOINTMENT (OUTPATIENT)
Dept: INFUSION THERAPY | Age: 68
End: 2018-01-01
Payer: SUBSIDIZED

## 2018-01-01 ENCOUNTER — HOSPITAL ENCOUNTER (OUTPATIENT)
Dept: PHYSICAL THERAPY | Age: 68
Discharge: HOME | End: 2018-04-02
Attending: INTERNAL MEDICINE
Payer: SUBSIDIZED

## 2018-01-01 ENCOUNTER — HOSPITAL ENCOUNTER (OUTPATIENT)
Dept: INFUSION THERAPY | Age: 68
Discharge: HOME | End: 2018-02-16
Payer: SUBSIDIZED

## 2018-01-01 ENCOUNTER — HOSPITAL ENCOUNTER (OUTPATIENT)
Dept: INFUSION THERAPY | Age: 68
Discharge: HOME | End: 2018-03-20
Payer: SUBSIDIZED

## 2018-01-01 ENCOUNTER — HOSPITAL ENCOUNTER (OUTPATIENT)
Dept: ULTRASOUND IMAGING | Age: 68
Discharge: HOME | End: 2018-06-14
Attending: NURSE PRACTITIONER
Payer: MEDICAID

## 2018-01-01 ENCOUNTER — APPOINTMENT (OUTPATIENT)
Dept: INTERVENTIONAL RADIOLOGY/VASCULAR | Age: 68
DRG: 683 | End: 2018-01-01
Attending: RADIOLOGY
Payer: SELF-PAY

## 2018-01-01 ENCOUNTER — HOSPITAL ENCOUNTER (OUTPATIENT)
Dept: LAB | Age: 68
Discharge: HOME | End: 2018-07-31
Payer: MEDICAID

## 2018-01-01 ENCOUNTER — HOSPITAL ENCOUNTER (OUTPATIENT)
Dept: INFUSION THERAPY | Age: 68
End: 2018-01-01
Payer: SUBSIDIZED

## 2018-01-01 ENCOUNTER — HOSPITAL ENCOUNTER (OUTPATIENT)
Dept: LAB | Age: 68
Discharge: HOME | End: 2018-08-20
Payer: SELF-PAY

## 2018-01-01 ENCOUNTER — ANESTHESIA (OUTPATIENT)
Dept: ENDOSCOPY | Age: 68
End: 2018-01-01
Payer: MEDICAID

## 2018-01-01 ENCOUNTER — HOSPITAL ENCOUNTER (OUTPATIENT)
Dept: LAB | Age: 68
Discharge: HOME | End: 2018-02-26
Payer: MEDICAID

## 2018-01-01 ENCOUNTER — HOSPITAL ENCOUNTER (OUTPATIENT)
Dept: INFUSION THERAPY | Age: 68
Discharge: HOME | End: 2018-03-18
Payer: SUBSIDIZED

## 2018-01-01 ENCOUNTER — HOSPITAL ENCOUNTER (OUTPATIENT)
Dept: INFUSION THERAPY | Age: 68
Discharge: HOME | End: 2018-04-24
Payer: MEDICAID

## 2018-01-01 ENCOUNTER — HOSPITAL ENCOUNTER (OUTPATIENT)
Dept: LAB | Age: 68
Discharge: HOME | End: 2018-02-15
Payer: MEDICAID

## 2018-01-01 ENCOUNTER — HOSPITAL ENCOUNTER (OUTPATIENT)
Dept: LAB | Age: 68
Discharge: HOME | End: 2018-02-16
Payer: MEDICAID

## 2018-01-01 ENCOUNTER — HOSPITAL ENCOUNTER (OUTPATIENT)
Dept: INFUSION THERAPY | Age: 68
Discharge: HOME | End: 2018-07-17
Payer: SELF-PAY

## 2018-01-01 ENCOUNTER — HOSPITAL ENCOUNTER (OUTPATIENT)
Dept: INFUSION THERAPY | Age: 68
Discharge: HOME | End: 2018-07-10
Payer: SELF-PAY

## 2018-01-01 ENCOUNTER — HOSPITAL ENCOUNTER (OUTPATIENT)
Dept: ULTRASOUND IMAGING | Age: 68
Discharge: HOME | End: 2018-04-18
Attending: INTERNAL MEDICINE
Payer: SUBSIDIZED

## 2018-01-01 ENCOUNTER — HOSPITAL ENCOUNTER (OUTPATIENT)
Dept: LAB | Age: 68
Discharge: HOME | End: 2018-01-09
Payer: MEDICAID

## 2018-01-01 ENCOUNTER — HOSPITAL ENCOUNTER (OUTPATIENT)
Dept: SURGERY | Age: 68
Discharge: HOME | End: 2018-02-14

## 2018-01-01 ENCOUNTER — APPOINTMENT (OUTPATIENT)
Dept: PHYSICAL THERAPY | Age: 68
End: 2018-01-01
Attending: INTERNAL MEDICINE
Payer: MEDICAID

## 2018-01-01 ENCOUNTER — HOSPITAL ENCOUNTER (OUTPATIENT)
Dept: LAB | Age: 68
Discharge: HOME | End: 2018-08-27
Payer: SELF-PAY

## 2018-01-01 ENCOUNTER — HOSPITAL ENCOUNTER (OUTPATIENT)
Dept: PHYSICAL THERAPY | Age: 68
Discharge: HOME | End: 2018-06-11
Attending: INTERNAL MEDICINE
Payer: SUBSIDIZED

## 2018-01-01 ENCOUNTER — HOSPITAL ENCOUNTER (OUTPATIENT)
Dept: PET IMAGING | Age: 68
Discharge: HOME | End: 2018-07-12
Payer: MEDICAID

## 2018-01-01 ENCOUNTER — HOSPITAL ENCOUNTER (OUTPATIENT)
Dept: INFUSION THERAPY | Age: 68
Discharge: HOME | End: 2018-04-10
Payer: MEDICAID

## 2018-01-01 ENCOUNTER — ANESTHESIA (OUTPATIENT)
Dept: SURGERY | Age: 68
DRG: 683 | End: 2018-01-01
Payer: SELF-PAY

## 2018-01-01 ENCOUNTER — HOSPITAL ENCOUNTER (OUTPATIENT)
Dept: PHYSICAL THERAPY | Age: 68
Discharge: HOME | End: 2018-04-12
Attending: INTERNAL MEDICINE
Payer: SUBSIDIZED

## 2018-01-01 ENCOUNTER — HOSPITAL ENCOUNTER (OUTPATIENT)
Dept: INFUSION THERAPY | Age: 68
Discharge: HOME | End: 2018-03-21
Payer: SUBSIDIZED

## 2018-01-01 ENCOUNTER — HOSPITAL ENCOUNTER (OUTPATIENT)
Dept: LAB | Age: 68
Discharge: HOME | DRG: 683 | End: 2018-07-31
Payer: SELF-PAY

## 2018-01-01 ENCOUNTER — HOSPITAL ENCOUNTER (OUTPATIENT)
Dept: PHYSICAL THERAPY | Age: 68
Discharge: HOME | End: 2018-04-09
Attending: INTERNAL MEDICINE
Payer: SUBSIDIZED

## 2018-01-01 ENCOUNTER — HOSPITAL ENCOUNTER (OUTPATIENT)
Dept: LAB | Age: 68
Discharge: HOME | End: 2018-05-23
Payer: MEDICAID

## 2018-01-01 ENCOUNTER — HOSPITAL ENCOUNTER (OUTPATIENT)
Dept: LAB | Age: 68
Discharge: HOME | End: 2018-01-16
Payer: MEDICAID

## 2018-01-01 ENCOUNTER — HOSPITAL ENCOUNTER (OUTPATIENT)
Dept: INFUSION THERAPY | Age: 68
Discharge: HOME | End: 2018-06-20
Payer: SUBSIDIZED

## 2018-01-01 ENCOUNTER — HOSPITAL ENCOUNTER (OUTPATIENT)
Dept: INFUSION THERAPY | Age: 68
Discharge: HOME | End: 2018-03-16
Payer: SUBSIDIZED

## 2018-01-01 ENCOUNTER — HOSPITAL ENCOUNTER (OUTPATIENT)
Dept: INFUSION THERAPY | Age: 68
Discharge: HOME | End: 2018-01-30
Payer: MEDICAID

## 2018-01-01 ENCOUNTER — HOSPITAL ENCOUNTER (OUTPATIENT)
Dept: ULTRASOUND IMAGING | Age: 68
Discharge: HOME | End: 2018-01-18
Attending: INTERNAL MEDICINE
Payer: SUBSIDIZED

## 2018-01-01 ENCOUNTER — HOSPITAL ENCOUNTER (OUTPATIENT)
Dept: LAB | Age: 68
Discharge: HOME | End: 2018-05-02
Payer: MEDICAID

## 2018-01-01 ENCOUNTER — HOME CARE VISIT (OUTPATIENT)
Dept: HOME HEALTH SERVICES | Facility: HOME HEALTH | Age: 68
End: 2018-01-01
Payer: SELF-PAY

## 2018-01-01 ENCOUNTER — APPOINTMENT (OUTPATIENT)
Dept: GENERAL RADIOLOGY | Age: 68
DRG: 661 | End: 2018-01-01
Attending: STUDENT IN AN ORGANIZED HEALTH CARE EDUCATION/TRAINING PROGRAM
Payer: MEDICAID

## 2018-01-01 ENCOUNTER — HOSPITAL ENCOUNTER (OUTPATIENT)
Dept: LAB | Age: 68
Discharge: HOME | End: 2018-07-17
Payer: MEDICAID

## 2018-01-01 ENCOUNTER — HOSPITAL ENCOUNTER (OUTPATIENT)
Dept: LAB | Age: 68
Discharge: HOME | End: 2018-01-02
Payer: MEDICAID

## 2018-01-01 ENCOUNTER — HOSPITAL ENCOUNTER (OUTPATIENT)
Dept: PHYSICAL THERAPY | Age: 68
Discharge: HOME | End: 2018-07-11
Attending: INTERNAL MEDICINE
Payer: MEDICAID

## 2018-01-01 ENCOUNTER — APPOINTMENT (OUTPATIENT)
Dept: INFUSION THERAPY | Age: 68
End: 2018-01-01
Payer: MEDICAID

## 2018-01-01 ENCOUNTER — HOSPITAL ENCOUNTER (OUTPATIENT)
Dept: PHYSICAL THERAPY | Age: 68
Discharge: HOME | End: 2018-06-25
Attending: INTERNAL MEDICINE
Payer: SUBSIDIZED

## 2018-01-01 ENCOUNTER — ANESTHESIA (OUTPATIENT)
Dept: SURGERY | Age: 68
End: 2018-01-01
Payer: MEDICAID

## 2018-01-01 ENCOUNTER — HOSPICE ADMISSION (OUTPATIENT)
Dept: HOSPICE | Facility: HOSPICE | Age: 68
End: 2018-01-01
Payer: SELF-PAY

## 2018-01-01 ENCOUNTER — HOSPITAL ENCOUNTER (OUTPATIENT)
Dept: LAB | Age: 68
Discharge: HOME | End: 2018-06-12
Payer: MEDICAID

## 2018-01-01 ENCOUNTER — HOSPITAL ENCOUNTER (OUTPATIENT)
Dept: PHYSICAL THERAPY | Age: 68
Discharge: HOME | End: 2018-04-30
Attending: INTERNAL MEDICINE
Payer: SUBSIDIZED

## 2018-01-01 ENCOUNTER — APPOINTMENT (OUTPATIENT)
Dept: GENERAL RADIOLOGY | Age: 68
End: 2018-01-01
Attending: EMERGENCY MEDICINE
Payer: MEDICAID

## 2018-01-01 ENCOUNTER — ANESTHESIA EVENT (OUTPATIENT)
Dept: SURGERY | Age: 68
DRG: 683 | End: 2018-01-01
Payer: SELF-PAY

## 2018-01-01 ENCOUNTER — HOSPITAL ENCOUNTER (OUTPATIENT)
Dept: INTERVENTIONAL RADIOLOGY/VASCULAR | Age: 68
Discharge: HOME | DRG: 683 | End: 2018-08-08
Payer: SELF-PAY

## 2018-01-01 ENCOUNTER — HOSPITAL ENCOUNTER (OUTPATIENT)
Dept: INFUSION THERAPY | Age: 68
Discharge: HOME | End: 2018-02-27
Payer: SUBSIDIZED

## 2018-01-01 ENCOUNTER — HOSPITAL ENCOUNTER (OUTPATIENT)
Dept: INFUSION THERAPY | Age: 68
Discharge: HOME | End: 2018-01-16
Payer: MEDICAID

## 2018-01-01 ENCOUNTER — HOSPITAL ENCOUNTER (OUTPATIENT)
Dept: PHYSICAL THERAPY | Age: 68
Discharge: HOME | End: 2018-05-07
Attending: INTERNAL MEDICINE
Payer: MEDICAID

## 2018-01-01 ENCOUNTER — HOSPITAL ENCOUNTER (OUTPATIENT)
Dept: PHYSICAL THERAPY | Age: 68
Discharge: HOME | End: 2018-04-04
Attending: INTERNAL MEDICINE
Payer: SUBSIDIZED

## 2018-01-01 ENCOUNTER — APPOINTMENT (OUTPATIENT)
Dept: PHYSICAL THERAPY | Age: 68
End: 2018-01-01
Attending: INTERNAL MEDICINE

## 2018-01-01 ENCOUNTER — HOSPITAL ENCOUNTER (OUTPATIENT)
Dept: INFUSION THERAPY | Age: 68
Discharge: HOME | End: 2018-03-25
Payer: SUBSIDIZED

## 2018-01-01 ENCOUNTER — HOSPITAL ENCOUNTER (OUTPATIENT)
Dept: LAB | Age: 68
Discharge: HOME | End: 2018-07-17

## 2018-01-01 ENCOUNTER — HOSPITAL ENCOUNTER (OUTPATIENT)
Dept: ULTRASOUND IMAGING | Age: 68
Discharge: HOME | End: 2018-09-10
Attending: INTERNAL MEDICINE
Payer: SELF-PAY

## 2018-01-01 ENCOUNTER — HOSPITAL ENCOUNTER (OUTPATIENT)
Dept: INFUSION THERAPY | Age: 68
Discharge: HOME | End: 2018-02-25
Payer: SUBSIDIZED

## 2018-01-01 ENCOUNTER — HOSPITAL ENCOUNTER (OUTPATIENT)
Dept: LAB | Age: 68
Discharge: HOME | End: 2018-06-20
Payer: MEDICAID

## 2018-01-01 ENCOUNTER — HOSPITAL ENCOUNTER (OUTPATIENT)
Dept: INFUSION THERAPY | Age: 68
Discharge: HOME | End: 2018-02-21
Payer: SUBSIDIZED

## 2018-01-01 ENCOUNTER — HOSPITAL ENCOUNTER (OUTPATIENT)
Dept: INFUSION THERAPY | Age: 68
Discharge: HOME | End: 2018-02-13
Payer: SUBSIDIZED

## 2018-01-01 ENCOUNTER — HOSPITAL ENCOUNTER (OUTPATIENT)
Dept: PHYSICAL THERAPY | Age: 68
Discharge: HOME | End: 2018-05-17
Attending: INTERNAL MEDICINE
Payer: MEDICAID

## 2018-01-01 ENCOUNTER — HOSPITAL ENCOUNTER (OUTPATIENT)
Dept: PHYSICAL THERAPY | Age: 68
Discharge: HOME | End: 2018-05-09
Attending: INTERNAL MEDICINE
Payer: MEDICAID

## 2018-01-01 ENCOUNTER — HOSPITAL ENCOUNTER (OUTPATIENT)
Dept: PHYSICAL THERAPY | Age: 68
Discharge: HOME | End: 2018-05-02
Attending: INTERNAL MEDICINE
Payer: MEDICAID

## 2018-01-01 ENCOUNTER — HOSPITAL ENCOUNTER (OUTPATIENT)
Dept: INFUSION THERAPY | Age: 68
Discharge: HOME | End: 2018-02-24
Payer: SUBSIDIZED

## 2018-01-01 ENCOUNTER — HOSPITAL ENCOUNTER (OUTPATIENT)
Dept: ULTRASOUND IMAGING | Age: 68
Discharge: HOME | End: 2018-06-12
Attending: NURSE PRACTITIONER
Payer: MEDICAID

## 2018-01-01 ENCOUNTER — HOSPITAL ENCOUNTER (OUTPATIENT)
Dept: INFUSION THERAPY | Age: 68
End: 2018-01-01
Payer: SELF-PAY

## 2018-01-01 ENCOUNTER — HOSPITAL ENCOUNTER (OUTPATIENT)
Age: 68
Setting detail: OUTPATIENT SURGERY
Discharge: HOME | End: 2018-03-19
Attending: INTERNAL MEDICINE | Admitting: INTERNAL MEDICINE
Payer: MEDICAID

## 2018-01-01 ENCOUNTER — HOSPITAL ENCOUNTER (INPATIENT)
Age: 68
LOS: 8 days | End: 2018-09-29
Attending: INTERNAL MEDICINE | Admitting: INTERNAL MEDICINE

## 2018-01-01 ENCOUNTER — HOSPITAL ENCOUNTER (OUTPATIENT)
Dept: PHYSICAL THERAPY | Age: 68
Discharge: HOME | End: 2018-06-13
Attending: INTERNAL MEDICINE
Payer: SUBSIDIZED

## 2018-01-01 ENCOUNTER — HOSPITAL ENCOUNTER (OUTPATIENT)
Dept: INFUSION THERAPY | Age: 68
Discharge: HOME | End: 2018-06-12
Payer: SUBSIDIZED

## 2018-01-01 ENCOUNTER — HOSPITAL ENCOUNTER (OUTPATIENT)
Dept: LAB | Age: 68
Discharge: HOME | End: 2018-07-24
Payer: MEDICAID

## 2018-01-01 ENCOUNTER — HOSPITAL ENCOUNTER (OUTPATIENT)
Dept: LAB | Age: 68
Discharge: HOME | End: 2018-08-31
Payer: SELF-PAY

## 2018-01-01 ENCOUNTER — HOSPITAL ENCOUNTER (OUTPATIENT)
Dept: PHYSICAL THERAPY | Age: 68
Discharge: HOME | End: 2018-04-16
Attending: INTERNAL MEDICINE
Payer: SUBSIDIZED

## 2018-01-01 ENCOUNTER — HOSPITAL ENCOUNTER (EMERGENCY)
Age: 68
Discharge: HOME | End: 2018-02-17
Attending: EMERGENCY MEDICINE
Payer: MEDICAID

## 2018-01-01 ENCOUNTER — HOSPITAL ENCOUNTER (OUTPATIENT)
Dept: PHYSICAL THERAPY | Age: 68
Discharge: HOME | End: 2018-07-23
Attending: INTERNAL MEDICINE
Payer: MEDICAID

## 2018-01-01 ENCOUNTER — HOSPITAL ENCOUNTER (OUTPATIENT)
Dept: PHYSICAL THERAPY | Age: 68
Discharge: HOME | End: 2018-06-18
Attending: INTERNAL MEDICINE
Payer: SUBSIDIZED

## 2018-01-01 ENCOUNTER — HOSPITAL ENCOUNTER (OUTPATIENT)
Dept: PHYSICAL THERAPY | Age: 68
Discharge: HOME | End: 2018-04-18
Attending: INTERNAL MEDICINE
Payer: SUBSIDIZED

## 2018-01-01 ENCOUNTER — ANESTHESIA EVENT (OUTPATIENT)
Dept: SURGERY | Age: 68
End: 2018-01-01
Payer: MEDICAID

## 2018-01-01 ENCOUNTER — HOSPITAL ENCOUNTER (OUTPATIENT)
Dept: PHYSICAL THERAPY | Age: 68
Discharge: HOME | End: 2018-07-05
Attending: INTERNAL MEDICINE
Payer: MEDICAID

## 2018-01-01 ENCOUNTER — HOSPITAL ENCOUNTER (OUTPATIENT)
Dept: INFUSION THERAPY | Age: 68
Discharge: HOME | End: 2018-02-18
Payer: SUBSIDIZED

## 2018-01-01 ENCOUNTER — HOSPITAL ENCOUNTER (OUTPATIENT)
Dept: LAB | Age: 68
Discharge: HOME | End: 2018-08-22
Payer: SELF-PAY

## 2018-01-01 ENCOUNTER — HOSPITAL ENCOUNTER (OUTPATIENT)
Dept: INFUSION THERAPY | Age: 68
Discharge: HOME | End: 2018-02-17
Payer: SUBSIDIZED

## 2018-01-01 ENCOUNTER — HOSPITAL ENCOUNTER (OUTPATIENT)
Dept: INFUSION THERAPY | Age: 68
Discharge: HOME | End: 2018-09-10
Payer: SELF-PAY

## 2018-01-01 ENCOUNTER — HOSPITAL ENCOUNTER (OUTPATIENT)
Dept: INFUSION THERAPY | Age: 68
Discharge: HOME | End: 2018-05-22
Payer: SUBSIDIZED

## 2018-01-01 ENCOUNTER — APPOINTMENT (OUTPATIENT)
Dept: ULTRASOUND IMAGING | Age: 68
DRG: 683 | End: 2018-01-01
Attending: NURSE PRACTITIONER
Payer: SELF-PAY

## 2018-01-01 ENCOUNTER — HOSPITAL ENCOUNTER (OUTPATIENT)
Dept: GENERAL RADIOLOGY | Age: 68
Discharge: HOME | End: 2018-01-31
Payer: MEDICAID

## 2018-01-01 ENCOUNTER — HOSPITAL ENCOUNTER (OUTPATIENT)
Dept: LAB | Age: 68
Discharge: HOME | End: 2018-02-13
Payer: MEDICAID

## 2018-01-01 ENCOUNTER — HOSPITAL ENCOUNTER (OUTPATIENT)
Dept: INFUSION THERAPY | Age: 68
Discharge: HOME | End: 2018-01-02
Payer: MEDICAID

## 2018-01-01 ENCOUNTER — HOSPITAL ENCOUNTER (OUTPATIENT)
Dept: INFUSION THERAPY | Age: 68
Discharge: HOME | End: 2018-02-23
Payer: SUBSIDIZED

## 2018-01-01 ENCOUNTER — HOSPITAL ENCOUNTER (OUTPATIENT)
Dept: INFUSION THERAPY | Age: 68
Discharge: HOME | End: 2018-02-22
Payer: SUBSIDIZED

## 2018-01-01 ENCOUNTER — APPOINTMENT (OUTPATIENT)
Dept: INFUSION THERAPY | Age: 68
End: 2018-01-01
Payer: SELF-PAY

## 2018-01-01 ENCOUNTER — HOSPITAL ENCOUNTER (OUTPATIENT)
Dept: PHYSICAL THERAPY | Age: 68
Discharge: HOME | End: 2018-05-22
Attending: INTERNAL MEDICINE
Payer: MEDICAID

## 2018-01-01 ENCOUNTER — HOSPITAL ENCOUNTER (OUTPATIENT)
Dept: PET IMAGING | Age: 68
Discharge: HOME | End: 2018-04-30
Payer: SUBSIDIZED

## 2018-01-01 ENCOUNTER — HOSPITAL ENCOUNTER (OUTPATIENT)
Dept: PHYSICAL THERAPY | Age: 68
Discharge: HOME | End: 2018-06-01
Attending: INTERNAL MEDICINE
Payer: MEDICAID

## 2018-01-01 ENCOUNTER — HOSPITAL ENCOUNTER (OUTPATIENT)
Dept: LAB | Age: 68
Discharge: HOME | End: 2018-03-13
Payer: MEDICAID

## 2018-01-01 ENCOUNTER — ANESTHESIA EVENT (OUTPATIENT)
Dept: ENDOSCOPY | Age: 68
End: 2018-01-01
Payer: MEDICAID

## 2018-01-01 ENCOUNTER — HOSPITAL ENCOUNTER (OUTPATIENT)
Dept: LAB | Age: 68
Discharge: HOME | End: 2018-01-30
Payer: MEDICAID

## 2018-01-01 ENCOUNTER — HOSPITAL ENCOUNTER (OUTPATIENT)
Dept: INFUSION THERAPY | Age: 68
Discharge: HOME | End: 2018-08-31
Payer: SELF-PAY

## 2018-01-01 ENCOUNTER — HOSPITAL ENCOUNTER (OUTPATIENT)
Dept: PHYSICAL THERAPY | Age: 68
Discharge: HOME | End: 2018-06-04
Attending: INTERNAL MEDICINE
Payer: MEDICAID

## 2018-01-01 ENCOUNTER — HOSPITAL ENCOUNTER (OUTPATIENT)
Dept: PHYSICAL THERAPY | Age: 68
Discharge: HOME | End: 2018-04-23
Attending: INTERNAL MEDICINE
Payer: SUBSIDIZED

## 2018-01-01 ENCOUNTER — HOSPITAL ENCOUNTER (OUTPATIENT)
Dept: PHYSICAL THERAPY | Age: 68
Discharge: HOME | End: 2018-07-19
Attending: INTERNAL MEDICINE
Payer: MEDICAID

## 2018-01-01 ENCOUNTER — HOSPITAL ENCOUNTER (OUTPATIENT)
Dept: INFUSION THERAPY | Age: 68
Discharge: HOME | End: 2018-03-27
Payer: SUBSIDIZED

## 2018-01-01 ENCOUNTER — HOSPITAL ENCOUNTER (OUTPATIENT)
Dept: LAB | Age: 68
Discharge: HOME | End: 2018-04-18
Payer: MEDICAID

## 2018-01-01 ENCOUNTER — HOSPITAL ENCOUNTER (OUTPATIENT)
Dept: INFUSION THERAPY | Age: 68
Discharge: HOME | End: 2018-08-11
Payer: MEDICAID

## 2018-01-01 ENCOUNTER — HOSPITAL ENCOUNTER (OUTPATIENT)
Dept: INFUSION THERAPY | Age: 68
Discharge: HOME | End: 2018-07-06
Payer: SELF-PAY

## 2018-01-01 ENCOUNTER — HOSPITAL ENCOUNTER (OUTPATIENT)
Age: 68
Setting detail: OUTPATIENT SURGERY
Discharge: HOME | End: 2018-02-20
Attending: UROLOGY | Admitting: UROLOGY
Payer: MEDICAID

## 2018-01-01 ENCOUNTER — HOSPITAL ENCOUNTER (OUTPATIENT)
Dept: LAB | Age: 68
Discharge: HOME | End: 2018-08-13
Payer: MEDICAID

## 2018-01-01 ENCOUNTER — HOSPITAL ENCOUNTER (OUTPATIENT)
Dept: INFUSION THERAPY | Age: 68
Discharge: HOME | End: 2018-07-31
Payer: SELF-PAY

## 2018-01-01 ENCOUNTER — HOSPITAL ENCOUNTER (OUTPATIENT)
Dept: PHYSICAL THERAPY | Age: 68
Discharge: HOME | End: 2018-03-28
Attending: INTERNAL MEDICINE
Payer: MEDICAID

## 2018-01-01 ENCOUNTER — HOSPITAL ENCOUNTER (OUTPATIENT)
Dept: SURGERY | Age: 68
Discharge: HOME | End: 2018-03-13
Payer: MEDICAID

## 2018-01-01 ENCOUNTER — HOME HEALTH ADMISSION (OUTPATIENT)
Dept: HOME HEALTH SERVICES | Facility: HOME HEALTH | Age: 68
End: 2018-01-01
Payer: SELF-PAY

## 2018-01-01 ENCOUNTER — HOSPITAL ENCOUNTER (OUTPATIENT)
Dept: LAB | Age: 68
Discharge: HOME | End: 2018-05-08
Payer: MEDICAID

## 2018-01-01 ENCOUNTER — HOSPITAL ENCOUNTER (OUTPATIENT)
Dept: PHYSICAL THERAPY | Age: 68
Discharge: HOME | End: 2018-06-06
Attending: INTERNAL MEDICINE
Payer: MEDICAID

## 2018-01-01 ENCOUNTER — HOSPITAL ENCOUNTER (OUTPATIENT)
Dept: PHYSICAL THERAPY | Age: 68
Discharge: HOME | End: 2018-07-02
Attending: INTERNAL MEDICINE
Payer: MEDICAID

## 2018-01-01 ENCOUNTER — HOSPITAL ENCOUNTER (INPATIENT)
Age: 68
LOS: 10 days | Discharge: HOME | DRG: 683 | End: 2018-08-10
Attending: INTERNAL MEDICINE | Admitting: INTERNAL MEDICINE
Payer: SELF-PAY

## 2018-01-01 ENCOUNTER — HOSPITAL ENCOUNTER (OUTPATIENT)
Dept: PHYSICAL THERAPY | Age: 68
Discharge: HOME | End: 2018-05-16
Attending: INTERNAL MEDICINE
Payer: MEDICAID

## 2018-01-01 ENCOUNTER — APPOINTMENT (OUTPATIENT)
Dept: INTERVENTIONAL RADIOLOGY/VASCULAR | Age: 68
DRG: 683 | End: 2018-01-01
Attending: INTERNAL MEDICINE
Payer: SELF-PAY

## 2018-01-01 ENCOUNTER — HOSPITAL ENCOUNTER (INPATIENT)
Age: 68
LOS: 3 days | Discharge: HOSPICE | DRG: 661 | End: 2018-09-21
Attending: STUDENT IN AN ORGANIZED HEALTH CARE EDUCATION/TRAINING PROGRAM | Admitting: FAMILY MEDICINE
Payer: MEDICAID

## 2018-01-01 ENCOUNTER — HOSPITAL ENCOUNTER (OUTPATIENT)
Dept: LAB | Age: 68
Discharge: HOME | End: 2018-09-10
Payer: SELF-PAY

## 2018-01-01 ENCOUNTER — HOSPITAL ENCOUNTER (OUTPATIENT)
Dept: LAB | Age: 68
Discharge: HOME | End: 2018-09-04
Payer: SELF-PAY

## 2018-01-01 ENCOUNTER — HOSPITAL ENCOUNTER (OUTPATIENT)
Dept: PHYSICAL THERAPY | Age: 68
Discharge: HOME | End: 2018-06-27
Attending: INTERNAL MEDICINE
Payer: SUBSIDIZED

## 2018-01-01 ENCOUNTER — HOSPITAL ENCOUNTER (OUTPATIENT)
Dept: ULTRASOUND IMAGING | Age: 68
Discharge: HOME | End: 2018-07-09
Attending: NURSE PRACTITIONER
Payer: MEDICAID

## 2018-01-01 ENCOUNTER — HOSPITAL ENCOUNTER (OUTPATIENT)
Dept: INFUSION THERAPY | Age: 68
Discharge: HOME | End: 2018-07-08
Payer: MEDICAID

## 2018-01-01 ENCOUNTER — HOSPITAL ENCOUNTER (OUTPATIENT)
Dept: INFUSION THERAPY | Age: 68
Discharge: HOME | End: 2018-03-13
Payer: SUBSIDIZED

## 2018-01-01 ENCOUNTER — HOSPITAL ENCOUNTER (OUTPATIENT)
Dept: INFUSION THERAPY | Age: 68
Discharge: HOME | End: 2018-03-17
Payer: SUBSIDIZED

## 2018-01-01 ENCOUNTER — HOSPITAL ENCOUNTER (OUTPATIENT)
Dept: INFUSION THERAPY | Age: 68
Discharge: HOME | End: 2018-03-22
Payer: SUBSIDIZED

## 2018-01-01 ENCOUNTER — HOSPITAL ENCOUNTER (OUTPATIENT)
Dept: PHYSICAL THERAPY | Age: 68
Discharge: HOME | End: 2018-06-21
Attending: INTERNAL MEDICINE
Payer: SUBSIDIZED

## 2018-01-01 ENCOUNTER — HOSPITAL ENCOUNTER (OUTPATIENT)
Dept: INFUSION THERAPY | Age: 68
Discharge: HOME | End: 2018-07-11
Payer: SELF-PAY

## 2018-01-01 ENCOUNTER — HOSPITAL ENCOUNTER (OUTPATIENT)
Dept: INFUSION THERAPY | Age: 68
Discharge: HOME | End: 2018-07-07
Payer: MEDICAID

## 2018-01-01 ENCOUNTER — HOSPITAL ENCOUNTER (OUTPATIENT)
Dept: PHYSICAL THERAPY | Age: 68
Discharge: HOME | End: 2018-07-26
Attending: INTERNAL MEDICINE
Payer: MEDICAID

## 2018-01-01 VITALS
WEIGHT: 128.6 LBS | SYSTOLIC BLOOD PRESSURE: 121 MMHG | HEART RATE: 61 BPM | TEMPERATURE: 97.7 F | BODY MASS INDEX: 22.78 KG/M2 | SYSTOLIC BLOOD PRESSURE: 117 MMHG | RESPIRATION RATE: 18 BRPM | DIASTOLIC BLOOD PRESSURE: 59 MMHG | DIASTOLIC BLOOD PRESSURE: 50 MMHG | OXYGEN SATURATION: 94 % | OXYGEN SATURATION: 93 % | RESPIRATION RATE: 18 BRPM | TEMPERATURE: 98.1 F | HEART RATE: 64 BPM

## 2018-01-01 VITALS
TEMPERATURE: 97.1 F | SYSTOLIC BLOOD PRESSURE: 148 MMHG | HEART RATE: 64 BPM | RESPIRATION RATE: 18 BRPM | DIASTOLIC BLOOD PRESSURE: 81 MMHG | OXYGEN SATURATION: 97 %

## 2018-01-01 VITALS
RESPIRATION RATE: 19 BRPM | SYSTOLIC BLOOD PRESSURE: 145 MMHG | OXYGEN SATURATION: 95 % | TEMPERATURE: 98.2 F | HEART RATE: 70 BPM | DIASTOLIC BLOOD PRESSURE: 61 MMHG

## 2018-01-01 VITALS
OXYGEN SATURATION: 98 % | DIASTOLIC BLOOD PRESSURE: 55 MMHG | TEMPERATURE: 98 F | HEART RATE: 69 BPM | RESPIRATION RATE: 22 BRPM | SYSTOLIC BLOOD PRESSURE: 132 MMHG

## 2018-01-01 VITALS
HEART RATE: 66 BPM | HEIGHT: 62 IN | BODY MASS INDEX: 19.69 KG/M2 | DIASTOLIC BLOOD PRESSURE: 70 MMHG | WEIGHT: 107 LBS | OXYGEN SATURATION: 98 % | TEMPERATURE: 98.2 F | RESPIRATION RATE: 26 BRPM | SYSTOLIC BLOOD PRESSURE: 165 MMHG

## 2018-01-01 VITALS
BODY MASS INDEX: 19.69 KG/M2 | SYSTOLIC BLOOD PRESSURE: 128 MMHG | RESPIRATION RATE: 16 BRPM | HEART RATE: 60 BPM | WEIGHT: 107 LBS | TEMPERATURE: 98.6 F | DIASTOLIC BLOOD PRESSURE: 53 MMHG | HEIGHT: 62 IN | OXYGEN SATURATION: 92 %

## 2018-01-01 VITALS
RESPIRATION RATE: 20 BRPM | HEART RATE: 86 BPM | SYSTOLIC BLOOD PRESSURE: 122 MMHG | DIASTOLIC BLOOD PRESSURE: 68 MMHG | OXYGEN SATURATION: 96 % | TEMPERATURE: 99.2 F

## 2018-01-01 VITALS
DIASTOLIC BLOOD PRESSURE: 58 MMHG | OXYGEN SATURATION: 96 % | HEART RATE: 68 BPM | TEMPERATURE: 99.3 F | SYSTOLIC BLOOD PRESSURE: 136 MMHG | RESPIRATION RATE: 18 BRPM

## 2018-01-01 VITALS
RESPIRATION RATE: 18 BRPM | HEART RATE: 73 BPM | DIASTOLIC BLOOD PRESSURE: 62 MMHG | OXYGEN SATURATION: 98 % | SYSTOLIC BLOOD PRESSURE: 160 MMHG | TEMPERATURE: 98.2 F

## 2018-01-01 VITALS
RESPIRATION RATE: 18 BRPM | HEART RATE: 64 BPM | OXYGEN SATURATION: 98 % | SYSTOLIC BLOOD PRESSURE: 124 MMHG | DIASTOLIC BLOOD PRESSURE: 64 MMHG | TEMPERATURE: 98.3 F

## 2018-01-01 VITALS
TEMPERATURE: 97.9 F | DIASTOLIC BLOOD PRESSURE: 48 MMHG | HEART RATE: 63 BPM | RESPIRATION RATE: 18 BRPM | SYSTOLIC BLOOD PRESSURE: 122 MMHG | OXYGEN SATURATION: 98 %

## 2018-01-01 VITALS
RESPIRATION RATE: 16 BRPM | DIASTOLIC BLOOD PRESSURE: 71 MMHG | HEART RATE: 84 BPM | HEIGHT: 63 IN | TEMPERATURE: 98.2 F | WEIGHT: 113.44 LBS | SYSTOLIC BLOOD PRESSURE: 162 MMHG | BODY MASS INDEX: 20.1 KG/M2 | OXYGEN SATURATION: 97 %

## 2018-01-01 VITALS
SYSTOLIC BLOOD PRESSURE: 118 MMHG | DIASTOLIC BLOOD PRESSURE: 62 MMHG | RESPIRATION RATE: 18 BRPM | OXYGEN SATURATION: 95 % | HEART RATE: 78 BPM | TEMPERATURE: 97.8 F

## 2018-01-01 VITALS
OXYGEN SATURATION: 95 % | SYSTOLIC BLOOD PRESSURE: 132 MMHG | RESPIRATION RATE: 18 BRPM | HEART RATE: 60 BPM | DIASTOLIC BLOOD PRESSURE: 49 MMHG | TEMPERATURE: 98.2 F

## 2018-01-01 VITALS
OXYGEN SATURATION: 92 % | SYSTOLIC BLOOD PRESSURE: 107 MMHG | HEART RATE: 70 BPM | BODY MASS INDEX: 19.61 KG/M2 | DIASTOLIC BLOOD PRESSURE: 42 MMHG | WEIGHT: 107.2 LBS | RESPIRATION RATE: 18 BRPM | TEMPERATURE: 98.1 F

## 2018-01-01 VITALS
BODY MASS INDEX: 20.73 KG/M2 | HEART RATE: 70 BPM | WEIGHT: 117 LBS | SYSTOLIC BLOOD PRESSURE: 163 MMHG | HEIGHT: 63 IN | DIASTOLIC BLOOD PRESSURE: 72 MMHG | RESPIRATION RATE: 16 BRPM | TEMPERATURE: 98.2 F | OXYGEN SATURATION: 98 %

## 2018-01-01 VITALS
OXYGEN SATURATION: 94 % | DIASTOLIC BLOOD PRESSURE: 54 MMHG | RESPIRATION RATE: 18 BRPM | SYSTOLIC BLOOD PRESSURE: 136 MMHG | HEART RATE: 69 BPM | TEMPERATURE: 98.9 F

## 2018-01-01 VITALS
OXYGEN SATURATION: 95 % | DIASTOLIC BLOOD PRESSURE: 51 MMHG | WEIGHT: 127.5 LBS | BODY MASS INDEX: 22.59 KG/M2 | HEART RATE: 66 BPM | SYSTOLIC BLOOD PRESSURE: 116 MMHG | TEMPERATURE: 98.4 F | RESPIRATION RATE: 18 BRPM

## 2018-01-01 VITALS
RESPIRATION RATE: 22 BRPM | TEMPERATURE: 99 F | HEART RATE: 66 BPM | OXYGEN SATURATION: 98 % | SYSTOLIC BLOOD PRESSURE: 148 MMHG | DIASTOLIC BLOOD PRESSURE: 61 MMHG

## 2018-01-01 VITALS
HEART RATE: 67 BPM | RESPIRATION RATE: 16 BRPM | OXYGEN SATURATION: 98 % | SYSTOLIC BLOOD PRESSURE: 154 MMHG | TEMPERATURE: 96 F | DIASTOLIC BLOOD PRESSURE: 57 MMHG

## 2018-01-01 VITALS
TEMPERATURE: 98.2 F | SYSTOLIC BLOOD PRESSURE: 133 MMHG | BODY MASS INDEX: 22.06 KG/M2 | OXYGEN SATURATION: 99 % | HEART RATE: 57 BPM | DIASTOLIC BLOOD PRESSURE: 57 MMHG | WEIGHT: 120.6 LBS | RESPIRATION RATE: 18 BRPM

## 2018-01-01 VITALS
SYSTOLIC BLOOD PRESSURE: 151 MMHG | HEART RATE: 84 BPM | DIASTOLIC BLOOD PRESSURE: 58 MMHG | OXYGEN SATURATION: 97 % | TEMPERATURE: 99 F | RESPIRATION RATE: 18 BRPM

## 2018-01-01 VITALS
DIASTOLIC BLOOD PRESSURE: 56 MMHG | WEIGHT: 104.9 LBS | OXYGEN SATURATION: 96 % | BODY MASS INDEX: 19.19 KG/M2 | SYSTOLIC BLOOD PRESSURE: 133 MMHG | RESPIRATION RATE: 18 BRPM | TEMPERATURE: 98.1 F | HEART RATE: 68 BPM

## 2018-01-01 VITALS
TEMPERATURE: 98.7 F | DIASTOLIC BLOOD PRESSURE: 61 MMHG | SYSTOLIC BLOOD PRESSURE: 148 MMHG | OXYGEN SATURATION: 94 % | HEART RATE: 64 BPM | RESPIRATION RATE: 18 BRPM

## 2018-01-01 VITALS
OXYGEN SATURATION: 96 % | HEART RATE: 84 BPM | RESPIRATION RATE: 19 BRPM | DIASTOLIC BLOOD PRESSURE: 64 MMHG | SYSTOLIC BLOOD PRESSURE: 136 MMHG | TEMPERATURE: 97.3 F

## 2018-01-01 VITALS
SYSTOLIC BLOOD PRESSURE: 147 MMHG | DIASTOLIC BLOOD PRESSURE: 68 MMHG | TEMPERATURE: 96.3 F | HEART RATE: 111 BPM | RESPIRATION RATE: 28 BRPM

## 2018-01-01 VITALS
WEIGHT: 139.99 LBS | DIASTOLIC BLOOD PRESSURE: 45 MMHG | HEART RATE: 69 BPM | RESPIRATION RATE: 18 BRPM | OXYGEN SATURATION: 96 % | BODY MASS INDEX: 26.43 KG/M2 | TEMPERATURE: 98.2 F | HEIGHT: 61 IN | SYSTOLIC BLOOD PRESSURE: 117 MMHG

## 2018-01-01 VITALS
OXYGEN SATURATION: 93 % | WEIGHT: 122.6 LBS | TEMPERATURE: 98.3 F | RESPIRATION RATE: 16 BRPM | DIASTOLIC BLOOD PRESSURE: 71 MMHG | HEART RATE: 67 BPM | SYSTOLIC BLOOD PRESSURE: 149 MMHG | BODY MASS INDEX: 21.72 KG/M2

## 2018-01-01 VITALS
BODY MASS INDEX: 19.5 KG/M2 | OXYGEN SATURATION: 94 % | SYSTOLIC BLOOD PRESSURE: 116 MMHG | RESPIRATION RATE: 18 BRPM | DIASTOLIC BLOOD PRESSURE: 42 MMHG | HEART RATE: 71 BPM | TEMPERATURE: 98.2 F | WEIGHT: 106.6 LBS

## 2018-01-01 VITALS
DIASTOLIC BLOOD PRESSURE: 60 MMHG | RESPIRATION RATE: 20 BRPM | HEART RATE: 71 BPM | OXYGEN SATURATION: 94 % | SYSTOLIC BLOOD PRESSURE: 154 MMHG | TEMPERATURE: 97.4 F

## 2018-01-01 VITALS
OXYGEN SATURATION: 94 % | DIASTOLIC BLOOD PRESSURE: 66 MMHG | HEART RATE: 62 BPM | TEMPERATURE: 98.6 F | RESPIRATION RATE: 20 BRPM | SYSTOLIC BLOOD PRESSURE: 133 MMHG

## 2018-01-01 VITALS
HEART RATE: 68 BPM | OXYGEN SATURATION: 98 % | RESPIRATION RATE: 18 BRPM | SYSTOLIC BLOOD PRESSURE: 136 MMHG | TEMPERATURE: 97.7 F | DIASTOLIC BLOOD PRESSURE: 62 MMHG

## 2018-01-01 VITALS
BODY MASS INDEX: 19.02 KG/M2 | HEART RATE: 68 BPM | RESPIRATION RATE: 16 BRPM | WEIGHT: 104 LBS | DIASTOLIC BLOOD PRESSURE: 64 MMHG | TEMPERATURE: 97.4 F | SYSTOLIC BLOOD PRESSURE: 135 MMHG | OXYGEN SATURATION: 94 %

## 2018-01-01 VITALS
TEMPERATURE: 98.2 F | BODY MASS INDEX: 19.35 KG/M2 | DIASTOLIC BLOOD PRESSURE: 53 MMHG | RESPIRATION RATE: 18 BRPM | SYSTOLIC BLOOD PRESSURE: 138 MMHG | OXYGEN SATURATION: 95 % | HEART RATE: 77 BPM | WEIGHT: 105.8 LBS

## 2018-01-01 VITALS
HEART RATE: 63 BPM | TEMPERATURE: 98.3 F | DIASTOLIC BLOOD PRESSURE: 51 MMHG | SYSTOLIC BLOOD PRESSURE: 130 MMHG | RESPIRATION RATE: 18 BRPM

## 2018-01-01 VITALS
HEART RATE: 84 BPM | TEMPERATURE: 96.8 F | SYSTOLIC BLOOD PRESSURE: 122 MMHG | RESPIRATION RATE: 19 BRPM | DIASTOLIC BLOOD PRESSURE: 58 MMHG | OXYGEN SATURATION: 94 %

## 2018-01-01 VITALS
HEART RATE: 65 BPM | TEMPERATURE: 98.2 F | OXYGEN SATURATION: 93 % | RESPIRATION RATE: 16 BRPM | OXYGEN SATURATION: 95 % | HEART RATE: 65 BPM | SYSTOLIC BLOOD PRESSURE: 134 MMHG | DIASTOLIC BLOOD PRESSURE: 75 MMHG | RESPIRATION RATE: 14 BRPM | DIASTOLIC BLOOD PRESSURE: 65 MMHG | SYSTOLIC BLOOD PRESSURE: 144 MMHG

## 2018-01-01 VITALS
SYSTOLIC BLOOD PRESSURE: 158 MMHG | RESPIRATION RATE: 18 BRPM | OXYGEN SATURATION: 94 % | TEMPERATURE: 98.3 F | WEIGHT: 117.6 LBS | BODY MASS INDEX: 20.83 KG/M2 | HEART RATE: 65 BPM | DIASTOLIC BLOOD PRESSURE: 58 MMHG

## 2018-01-01 VITALS
HEART RATE: 86 BPM | OXYGEN SATURATION: 96 % | RESPIRATION RATE: 20 BRPM | SYSTOLIC BLOOD PRESSURE: 144 MMHG | DIASTOLIC BLOOD PRESSURE: 58 MMHG | TEMPERATURE: 99.6 F

## 2018-01-01 VITALS
TEMPERATURE: 98.1 F | SYSTOLIC BLOOD PRESSURE: 120 MMHG | DIASTOLIC BLOOD PRESSURE: 58 MMHG | RESPIRATION RATE: 18 BRPM | OXYGEN SATURATION: 96 % | HEART RATE: 78 BPM

## 2018-01-01 VITALS
OXYGEN SATURATION: 96 % | SYSTOLIC BLOOD PRESSURE: 162 MMHG | DIASTOLIC BLOOD PRESSURE: 60 MMHG | RESPIRATION RATE: 18 BRPM | TEMPERATURE: 97.9 F | HEART RATE: 72 BPM

## 2018-01-01 VITALS
HEART RATE: 71 BPM | OXYGEN SATURATION: 95 % | DIASTOLIC BLOOD PRESSURE: 46 MMHG | WEIGHT: 111.8 LBS | BODY MASS INDEX: 20.45 KG/M2 | SYSTOLIC BLOOD PRESSURE: 133 MMHG | TEMPERATURE: 99.2 F | RESPIRATION RATE: 18 BRPM

## 2018-01-01 VITALS — SYSTOLIC BLOOD PRESSURE: 162 MMHG | DIASTOLIC BLOOD PRESSURE: 67 MMHG | RESPIRATION RATE: 18 BRPM

## 2018-01-01 VITALS
DIASTOLIC BLOOD PRESSURE: 66 MMHG | RESPIRATION RATE: 18 BRPM | HEART RATE: 68 BPM | TEMPERATURE: 98.5 F | SYSTOLIC BLOOD PRESSURE: 131 MMHG | WEIGHT: 117 LBS | OXYGEN SATURATION: 95 % | BODY MASS INDEX: 20.73 KG/M2

## 2018-01-01 VITALS
SYSTOLIC BLOOD PRESSURE: 144 MMHG | HEART RATE: 67 BPM | TEMPERATURE: 98.7 F | RESPIRATION RATE: 18 BRPM | DIASTOLIC BLOOD PRESSURE: 62 MMHG | OXYGEN SATURATION: 98 %

## 2018-01-01 VITALS
OXYGEN SATURATION: 99 % | WEIGHT: 118.2 LBS | BODY MASS INDEX: 20.94 KG/M2 | TEMPERATURE: 98.4 F | HEART RATE: 64 BPM | RESPIRATION RATE: 18 BRPM | DIASTOLIC BLOOD PRESSURE: 65 MMHG | SYSTOLIC BLOOD PRESSURE: 133 MMHG

## 2018-01-01 VITALS
DIASTOLIC BLOOD PRESSURE: 62 MMHG | RESPIRATION RATE: 18 BRPM | OXYGEN SATURATION: 98 % | TEMPERATURE: 98.2 F | HEART RATE: 73 BPM | SYSTOLIC BLOOD PRESSURE: 160 MMHG

## 2018-01-01 VITALS
SYSTOLIC BLOOD PRESSURE: 145 MMHG | HEART RATE: 74 BPM | TEMPERATURE: 99.7 F | OXYGEN SATURATION: 93 % | DIASTOLIC BLOOD PRESSURE: 65 MMHG | RESPIRATION RATE: 18 BRPM

## 2018-01-01 VITALS — WEIGHT: 124 LBS | BODY MASS INDEX: 21.97 KG/M2

## 2018-01-01 VITALS
TEMPERATURE: 98.2 F | WEIGHT: 118.4 LBS | SYSTOLIC BLOOD PRESSURE: 132 MMHG | RESPIRATION RATE: 20 BRPM | BODY MASS INDEX: 20.97 KG/M2 | DIASTOLIC BLOOD PRESSURE: 58 MMHG | HEART RATE: 92 BPM

## 2018-01-01 VITALS
TEMPERATURE: 99.1 F | SYSTOLIC BLOOD PRESSURE: 142 MMHG | DIASTOLIC BLOOD PRESSURE: 53 MMHG | RESPIRATION RATE: 20 BRPM | HEART RATE: 66 BPM | OXYGEN SATURATION: 93 %

## 2018-01-01 VITALS
HEIGHT: 63 IN | WEIGHT: 117.8 LBS | RESPIRATION RATE: 18 BRPM | BODY MASS INDEX: 20.87 KG/M2 | OXYGEN SATURATION: 100 % | DIASTOLIC BLOOD PRESSURE: 54 MMHG | TEMPERATURE: 97.5 F | HEART RATE: 59 BPM | SYSTOLIC BLOOD PRESSURE: 142 MMHG

## 2018-01-01 VITALS
HEART RATE: 63 BPM | BODY MASS INDEX: 22.24 KG/M2 | SYSTOLIC BLOOD PRESSURE: 141 MMHG | WEIGHT: 121.6 LBS | OXYGEN SATURATION: 95 % | DIASTOLIC BLOOD PRESSURE: 43 MMHG | TEMPERATURE: 98.9 F | RESPIRATION RATE: 18 BRPM

## 2018-01-01 VITALS
DIASTOLIC BLOOD PRESSURE: 60 MMHG | TEMPERATURE: 97.8 F | HEART RATE: 84 BPM | RESPIRATION RATE: 18 BRPM | OXYGEN SATURATION: 93 % | SYSTOLIC BLOOD PRESSURE: 142 MMHG

## 2018-01-01 VITALS
OXYGEN SATURATION: 97 % | TEMPERATURE: 97 F | HEART RATE: 76 BPM | SYSTOLIC BLOOD PRESSURE: 120 MMHG | RESPIRATION RATE: 16 BRPM | DIASTOLIC BLOOD PRESSURE: 70 MMHG

## 2018-01-01 VITALS
SYSTOLIC BLOOD PRESSURE: 123 MMHG | OXYGEN SATURATION: 95 % | RESPIRATION RATE: 20 BRPM | TEMPERATURE: 98.4 F | DIASTOLIC BLOOD PRESSURE: 76 MMHG | HEART RATE: 67 BPM

## 2018-01-01 DIAGNOSIS — C79.51 BONY METASTASIS (HCC): ICD-10-CM

## 2018-01-01 DIAGNOSIS — Z86.718 HISTORY OF BLOOD CLOTS: ICD-10-CM

## 2018-01-01 DIAGNOSIS — D70.1 CHEMOTHERAPY-INDUCED NEUTROPENIA (HCC): ICD-10-CM

## 2018-01-01 DIAGNOSIS — E53.8 VITAMIN B12 DEFICIENCY: ICD-10-CM

## 2018-01-01 DIAGNOSIS — F41.9 ANXIETY: ICD-10-CM

## 2018-01-01 DIAGNOSIS — C50.919 MALIGNANT NEOPLASM OF FEMALE BREAST, UNSPECIFIED ESTROGEN RECEPTOR STATUS, UNSPECIFIED LATERALITY, UNSPECIFIED SITE OF BREAST (HCC): Chronic | ICD-10-CM

## 2018-01-01 DIAGNOSIS — T45.1X5A CHEMOTHERAPY-INDUCED NEUTROPENIA (HCC): ICD-10-CM

## 2018-01-01 DIAGNOSIS — I82.401 ACUTE DEEP VEIN THROMBOSIS (DVT) OF RIGHT LOWER EXTREMITY, UNSPECIFIED VEIN (HCC): Chronic | ICD-10-CM

## 2018-01-01 DIAGNOSIS — D61.818 PANCYTOPENIA (HCC): ICD-10-CM

## 2018-01-01 DIAGNOSIS — I10 ACCELERATED HYPERTENSION: ICD-10-CM

## 2018-01-01 DIAGNOSIS — F33.9 RECURRENT DEPRESSION (HCC): ICD-10-CM

## 2018-01-01 DIAGNOSIS — R18.8 OTHER ASCITES: ICD-10-CM

## 2018-01-01 DIAGNOSIS — C50.919 CARCINOMA OF BREAST METASTATIC TO BONE, UNSPECIFIED LATERALITY (HCC): ICD-10-CM

## 2018-01-01 DIAGNOSIS — D63.8 ANEMIA OF CHRONIC DISEASE: ICD-10-CM

## 2018-01-01 DIAGNOSIS — C78.00 CARCINOMA OF BREAST METASTATIC TO LUNG, UNSPECIFIED LATERALITY (HCC): Chronic | ICD-10-CM

## 2018-01-01 DIAGNOSIS — I82.501 CHRONIC DEEP VEIN THROMBOSIS (DVT) OF RIGHT LOWER EXTREMITY, UNSPECIFIED VEIN (HCC): Chronic | ICD-10-CM

## 2018-01-01 DIAGNOSIS — G89.3 CANCER ASSOCIATED PAIN: ICD-10-CM

## 2018-01-01 DIAGNOSIS — I82.409 DEEP VEIN THROMBOSIS (DVT) OF LOWER EXTREMITY, UNSPECIFIED CHRONICITY, UNSPECIFIED LATERALITY, UNSPECIFIED VEIN (HCC): Chronic | ICD-10-CM

## 2018-01-01 DIAGNOSIS — C79.51 BONY METASTASIS (HCC): Chronic | ICD-10-CM

## 2018-01-01 DIAGNOSIS — N13.30 HYDRONEPHROSIS, UNSPECIFIED HYDRONEPHROSIS TYPE: Chronic | ICD-10-CM

## 2018-01-01 DIAGNOSIS — C50.919 METASTATIC BREAST CANCER (HCC): ICD-10-CM

## 2018-01-01 DIAGNOSIS — R06.02 SHORTNESS OF BREATH: ICD-10-CM

## 2018-01-01 DIAGNOSIS — I35.0 AORTIC STENOSIS, MODERATE: Chronic | ICD-10-CM

## 2018-01-01 DIAGNOSIS — C50.011 MALIGNANT NEOPLASM INVOLVING BOTH NIPPLE AND AREOLA OF RIGHT BREAST IN FEMALE, UNSPECIFIED ESTROGEN RECEPTOR STATUS (HCC): ICD-10-CM

## 2018-01-01 DIAGNOSIS — C50.919 METASTATIC BREAST CANCER (HCC): Chronic | ICD-10-CM

## 2018-01-01 DIAGNOSIS — C50.919 BREAST CA (HCC): ICD-10-CM

## 2018-01-01 DIAGNOSIS — N18.9 ACUTE RENAL FAILURE SUPERIMPOSED ON CHRONIC KIDNEY DISEASE, UNSPECIFIED CKD STAGE, UNSPECIFIED ACUTE RENAL FAILURE TYPE (HCC): ICD-10-CM

## 2018-01-01 DIAGNOSIS — R14.0 ABDOMINAL DISTENTION: ICD-10-CM

## 2018-01-01 DIAGNOSIS — Z79.01 ANTICOAGULATED ON COUMADIN: Chronic | ICD-10-CM

## 2018-01-01 DIAGNOSIS — C50.919 BREAST CANCER METASTASIZED TO LUNG, UNSPECIFIED LATERALITY (HCC): ICD-10-CM

## 2018-01-01 DIAGNOSIS — I82.401 ACUTE DEEP VEIN THROMBOSIS (DVT) OF RIGHT LOWER EXTREMITY, UNSPECIFIED VEIN (HCC): ICD-10-CM

## 2018-01-01 DIAGNOSIS — I82.501 CHRONIC DEEP VEIN THROMBOSIS (DVT) OF RIGHT LOWER EXTREMITY, UNSPECIFIED VEIN (HCC): ICD-10-CM

## 2018-01-01 DIAGNOSIS — C50.919 MALIGNANT NEOPLASM OF FEMALE BREAST, UNSPECIFIED ESTROGEN RECEPTOR STATUS, UNSPECIFIED LATERALITY, UNSPECIFIED SITE OF BREAST (HCC): ICD-10-CM

## 2018-01-01 DIAGNOSIS — R53.83 FATIGUE, UNSPECIFIED TYPE: ICD-10-CM

## 2018-01-01 DIAGNOSIS — C50.919 CARCINOMA OF BREAST METASTATIC TO LUNG, UNSPECIFIED LATERALITY (HCC): Chronic | ICD-10-CM

## 2018-01-01 DIAGNOSIS — K74.69 OTHER CIRRHOSIS OF LIVER (HCC): ICD-10-CM

## 2018-01-01 DIAGNOSIS — M79.89 RIGHT LEG SWELLING: ICD-10-CM

## 2018-01-01 DIAGNOSIS — C78.7 CARCINOMA OF BREAST METASTATIC TO LIVER, UNSPECIFIED LATERALITY (HCC): ICD-10-CM

## 2018-01-01 DIAGNOSIS — D64.9 ANEMIA, UNSPECIFIED TYPE: ICD-10-CM

## 2018-01-01 DIAGNOSIS — N18.9 CHRONIC KIDNEY DISEASE, UNSPECIFIED CKD STAGE: Chronic | ICD-10-CM

## 2018-01-01 DIAGNOSIS — C78.00 BREAST CANCER METASTASIZED TO LUNG, UNSPECIFIED LATERALITY (HCC): ICD-10-CM

## 2018-01-01 DIAGNOSIS — M79.89 SWELLING OF THIGH: ICD-10-CM

## 2018-01-01 DIAGNOSIS — C50.919 PRIMARY MALIGNANT NEOPLASM OF BREAST WITH METASTASIS TO OTHER SITE, UNSPECIFIED LATERALITY (HCC): ICD-10-CM

## 2018-01-01 DIAGNOSIS — M79.604 RIGHT LEG PAIN: ICD-10-CM

## 2018-01-01 DIAGNOSIS — F41.8 ANXIETY ABOUT HEALTH: ICD-10-CM

## 2018-01-01 DIAGNOSIS — E11.22 TYPE 2 DIABETES MELLITUS WITH CHRONIC KIDNEY DISEASE, WITHOUT LONG-TERM CURRENT USE OF INSULIN, UNSPECIFIED CKD STAGE (HCC): Chronic | ICD-10-CM

## 2018-01-01 DIAGNOSIS — J01.90 ACUTE SINUSITIS, RECURRENCE NOT SPECIFIED, UNSPECIFIED LOCATION: Primary | ICD-10-CM

## 2018-01-01 DIAGNOSIS — C79.51 CARCINOMA OF BREAST METASTATIC TO BONE, UNSPECIFIED LATERALITY (HCC): ICD-10-CM

## 2018-01-01 DIAGNOSIS — K92.2 GASTROINTESTINAL HEMORRHAGE, UNSPECIFIED GASTROINTESTINAL HEMORRHAGE TYPE: Primary | ICD-10-CM

## 2018-01-01 DIAGNOSIS — Z79.01 ANTICOAGULATED ON COUMADIN: ICD-10-CM

## 2018-01-01 DIAGNOSIS — N17.9 ACUTE RENAL FAILURE SUPERIMPOSED ON CHRONIC KIDNEY DISEASE, UNSPECIFIED CKD STAGE, UNSPECIFIED ACUTE RENAL FAILURE TYPE (HCC): ICD-10-CM

## 2018-01-01 DIAGNOSIS — R79.1 ELEVATED INR: ICD-10-CM

## 2018-01-01 DIAGNOSIS — C50.919 CARCINOMA OF BREAST METASTATIC TO LIVER, UNSPECIFIED LATERALITY (HCC): ICD-10-CM

## 2018-01-01 DIAGNOSIS — R79.89 ELEVATED FERRITIN: ICD-10-CM

## 2018-01-01 DIAGNOSIS — R18.8 ASCITES: ICD-10-CM

## 2018-01-01 DIAGNOSIS — D62 ACUTE BLOOD LOSS ANEMIA: ICD-10-CM

## 2018-01-01 DIAGNOSIS — C50.119 MALIGNANT NEOPLASM OF CENTRAL PORTION OF FEMALE BREAST, UNSPECIFIED ESTROGEN RECEPTOR STATUS, UNSPECIFIED LATERALITY (HCC): ICD-10-CM

## 2018-01-01 DIAGNOSIS — N17.9 ACUTE RENAL FAILURE, UNSPECIFIED ACUTE RENAL FAILURE TYPE (HCC): ICD-10-CM

## 2018-01-01 LAB
ABO + RH BLD: NORMAL
ABO + RH BLD: NORMAL
ALBUMIN FLD-MCNC: 1.6 G/DL
ALBUMIN SERPL-MCNC: 2.1 G/DL (ref 3.2–4.6)
ALBUMIN SERPL-MCNC: 2.3 G/DL (ref 3.2–4.6)
ALBUMIN SERPL-MCNC: 2.4 G/DL (ref 3.2–4.6)
ALBUMIN SERPL-MCNC: 2.5 G/DL (ref 3.2–4.6)
ALBUMIN SERPL-MCNC: 2.6 G/DL (ref 3.2–4.6)
ALBUMIN SERPL-MCNC: 2.7 G/DL (ref 3.2–4.6)
ALBUMIN SERPL-MCNC: 2.8 G/DL (ref 3.2–4.6)
ALBUMIN SERPL-MCNC: 2.9 G/DL (ref 3.2–4.6)
ALBUMIN SERPL-MCNC: 3 G/DL (ref 3.2–4.6)
ALBUMIN SERPL-MCNC: 3 G/DL (ref 3.2–4.6)
ALBUMIN SERPL-MCNC: 3.3 G/DL (ref 3.2–4.6)
ALBUMIN/GLOB SERPL: 0.6 {RATIO} (ref 1.2–3.5)
ALBUMIN/GLOB SERPL: 0.7 {RATIO} (ref 1.2–3.5)
ALBUMIN/GLOB SERPL: 0.8 {RATIO} (ref 1.2–3.5)
ALP SERPL-CCNC: 121 U/L (ref 50–136)
ALP SERPL-CCNC: 132 U/L (ref 50–136)
ALP SERPL-CCNC: 150 U/L (ref 50–136)
ALP SERPL-CCNC: 153 U/L (ref 50–136)
ALP SERPL-CCNC: 155 U/L (ref 50–136)
ALP SERPL-CCNC: 179 U/L (ref 50–136)
ALP SERPL-CCNC: 188 U/L (ref 50–136)
ALP SERPL-CCNC: 189 U/L (ref 50–136)
ALP SERPL-CCNC: 190 U/L (ref 50–136)
ALP SERPL-CCNC: 199 U/L (ref 50–136)
ALP SERPL-CCNC: 210 U/L (ref 50–136)
ALP SERPL-CCNC: 227 U/L (ref 50–136)
ALP SERPL-CCNC: 236 U/L (ref 50–136)
ALP SERPL-CCNC: 240 U/L (ref 50–136)
ALP SERPL-CCNC: 290 U/L (ref 50–136)
ALP SERPL-CCNC: 322 U/L (ref 50–136)
ALP SERPL-CCNC: 330 U/L (ref 50–136)
ALT SERPL-CCNC: 16 U/L (ref 12–65)
ALT SERPL-CCNC: 18 U/L (ref 12–65)
ALT SERPL-CCNC: 19 U/L (ref 12–65)
ALT SERPL-CCNC: 19 U/L (ref 12–65)
ALT SERPL-CCNC: 20 U/L (ref 12–65)
ALT SERPL-CCNC: 20 U/L (ref 12–65)
ALT SERPL-CCNC: 21 U/L (ref 12–65)
ALT SERPL-CCNC: 22 U/L (ref 12–65)
ALT SERPL-CCNC: 22 U/L (ref 12–65)
ALT SERPL-CCNC: 24 U/L (ref 12–65)
ALT SERPL-CCNC: 24 U/L (ref 12–65)
ALT SERPL-CCNC: 25 U/L (ref 12–65)
ALT SERPL-CCNC: 26 U/L (ref 12–65)
ALT SERPL-CCNC: 27 U/L (ref 12–65)
ALT SERPL-CCNC: 28 U/L (ref 12–65)
ALT SERPL-CCNC: 28 U/L (ref 12–65)
ALT SERPL-CCNC: 68 U/L (ref 12–65)
AMMONIA PLAS-SCNC: 88 UMOL/L (ref 11–32)
AMORPH CRY URNS QL MICRO: ABNORMAL
ANION GAP SERPL CALC-SCNC: 10 MMOL/L
ANION GAP SERPL CALC-SCNC: 10 MMOL/L (ref 7–16)
ANION GAP SERPL CALC-SCNC: 11 MMOL/L (ref 7–16)
ANION GAP SERPL CALC-SCNC: 12 MMOL/L (ref 7–16)
ANION GAP SERPL CALC-SCNC: 13 MMOL/L (ref 7–16)
ANION GAP SERPL CALC-SCNC: 13 MMOL/L (ref 7–16)
ANION GAP SERPL CALC-SCNC: 17 MMOL/L (ref 7–16)
ANION GAP SERPL CALC-SCNC: 19 MMOL/L (ref 7–16)
ANION GAP SERPL CALC-SCNC: 21 MMOL/L (ref 7–16)
ANION GAP SERPL CALC-SCNC: 6 MMOL/L (ref 7–16)
ANION GAP SERPL CALC-SCNC: 7 MMOL/L (ref 7–16)
ANION GAP SERPL CALC-SCNC: 8 MMOL/L
ANION GAP SERPL CALC-SCNC: 9 MMOL/L (ref 7–16)
APPEARANCE FLD: CLEAR
APPEARANCE UR: CLEAR
APTT PPP: 28.7 SEC (ref 23.2–35.3)
APTT PPP: 85.7 SEC (ref 23.2–35.3)
APTT PPP: 88.2 SEC (ref 23.2–35.3)
AST SERPL-CCNC: 22 U/L (ref 15–37)
AST SERPL-CCNC: 224 U/L (ref 15–37)
AST SERPL-CCNC: 25 U/L (ref 15–37)
AST SERPL-CCNC: 26 U/L (ref 15–37)
AST SERPL-CCNC: 28 U/L (ref 15–37)
AST SERPL-CCNC: 30 U/L (ref 15–37)
AST SERPL-CCNC: 32 U/L (ref 15–37)
AST SERPL-CCNC: 33 U/L (ref 15–37)
AST SERPL-CCNC: 34 U/L (ref 15–37)
AST SERPL-CCNC: 34 U/L (ref 15–37)
AST SERPL-CCNC: 36 U/L (ref 15–37)
AST SERPL-CCNC: 42 U/L (ref 15–37)
AST SERPL-CCNC: 43 U/L (ref 15–37)
AST SERPL-CCNC: 45 U/L (ref 15–37)
AST SERPL-CCNC: 51 U/L (ref 15–37)
ATRIAL RATE: 62 BPM
ATRIAL RATE: 81 BPM
BACTERIA SPEC CULT: ABNORMAL
BACTERIA SPEC CULT: ABNORMAL
BACTERIA SPEC CULT: NORMAL
BACTERIA SPEC CULT: NORMAL
BACTERIA URNS QL MICRO: 0 /HPF
BACTERIA URNS QL MICRO: ABNORMAL /HPF
BASOPHILS # BLD: 0 K/UL
BASOPHILS # BLD: 0 K/UL
BASOPHILS # BLD: 0 K/UL (ref 0–0.2)
BASOPHILS # BLD: 0.1 K/UL (ref 0–0.2)
BASOPHILS # BLD: 0.3 K/UL (ref 0–0.2)
BASOPHILS NFR BLD MANUAL: 1 % (ref 0–2)
BASOPHILS NFR BLD MANUAL: 10 % (ref 0–2)
BASOPHILS NFR BLD MANUAL: 2 % (ref 0–2)
BASOPHILS NFR BLD MANUAL: 8 % (ref 0–2)
BASOPHILS NFR BLD: 1 % (ref 0–2)
BASOPHILS NFR BLD: 2 % (ref 0–2)
BILIRUB SERPL-MCNC: 0.4 MG/DL (ref 0.2–1.1)
BILIRUB SERPL-MCNC: 0.5 MG/DL (ref 0.2–1.1)
BILIRUB SERPL-MCNC: 0.6 MG/DL (ref 0.2–1.1)
BILIRUB SERPL-MCNC: 0.7 MG/DL (ref 0.2–1.1)
BILIRUB SERPL-MCNC: 0.8 MG/DL (ref 0.2–1.1)
BILIRUB SERPL-MCNC: 1 MG/DL (ref 0.2–1.1)
BILIRUB SERPL-MCNC: 1.1 MG/DL (ref 0.2–1.1)
BILIRUB UR QL: NEGATIVE
BLD PROD TYP BPU: NORMAL
BLOOD GROUP ANTIBODIES SERPL: NORMAL
BLOOD GROUP ANTIBODIES SERPL: NORMAL
BNP SERPL-MCNC: 481 PG/ML
BPU ID: NORMAL
BUN SERPL-MCNC: 102 MG/DL (ref 8–23)
BUN SERPL-MCNC: 106 MG/DL (ref 8–23)
BUN SERPL-MCNC: 108 MG/DL (ref 8–23)
BUN SERPL-MCNC: 110 MG/DL (ref 8–23)
BUN SERPL-MCNC: 113 MG/DL (ref 8–23)
BUN SERPL-MCNC: 113 MG/DL (ref 8–23)
BUN SERPL-MCNC: 26 MG/DL (ref 8–23)
BUN SERPL-MCNC: 28 MG/DL (ref 8–23)
BUN SERPL-MCNC: 29 MG/DL (ref 8–23)
BUN SERPL-MCNC: 29 MG/DL (ref 8–23)
BUN SERPL-MCNC: 33 MG/DL (ref 8–23)
BUN SERPL-MCNC: 40 MG/DL (ref 8–23)
BUN SERPL-MCNC: 42 MG/DL (ref 8–23)
BUN SERPL-MCNC: 45 MG/DL (ref 8–23)
BUN SERPL-MCNC: 47 MG/DL (ref 8–23)
BUN SERPL-MCNC: 48 MG/DL (ref 8–23)
BUN SERPL-MCNC: 51 MG/DL (ref 8–23)
BUN SERPL-MCNC: 58 MG/DL (ref 8–23)
BUN SERPL-MCNC: 59 MG/DL (ref 8–23)
BUN SERPL-MCNC: 59 MG/DL (ref 8–23)
BUN SERPL-MCNC: 60 MG/DL (ref 8–23)
BUN SERPL-MCNC: 63 MG/DL (ref 8–23)
BUN SERPL-MCNC: 63 MG/DL (ref 8–23)
BUN SERPL-MCNC: 65 MG/DL (ref 8–23)
BUN SERPL-MCNC: 66 MG/DL (ref 8–23)
BUN SERPL-MCNC: 70 MG/DL (ref 8–23)
BUN SERPL-MCNC: 73 MG/DL (ref 8–23)
BUN SERPL-MCNC: 75 MG/DL (ref 8–23)
BUN SERPL-MCNC: 79 MG/DL (ref 8–23)
BUN SERPL-MCNC: 82 MG/DL (ref 8–23)
BUN SERPL-MCNC: 93 MG/DL (ref 8–23)
CALCIUM SERPL-MCNC: 6 MG/DL (ref 8.3–10.4)
CALCIUM SERPL-MCNC: 6.5 MG/DL (ref 8.3–10.4)
CALCIUM SERPL-MCNC: 6.5 MG/DL (ref 8.3–10.4)
CALCIUM SERPL-MCNC: 6.6 MG/DL (ref 8.3–10.4)
CALCIUM SERPL-MCNC: 6.7 MG/DL (ref 8.3–10.4)
CALCIUM SERPL-MCNC: 6.8 MG/DL (ref 8.3–10.4)
CALCIUM SERPL-MCNC: 6.9 MG/DL (ref 8.3–10.4)
CALCIUM SERPL-MCNC: 6.9 MG/DL (ref 8.3–10.4)
CALCIUM SERPL-MCNC: 7 MG/DL (ref 8.3–10.4)
CALCIUM SERPL-MCNC: 7.2 MG/DL (ref 8.3–10.4)
CALCIUM SERPL-MCNC: 7.3 MG/DL (ref 8.3–10.4)
CALCIUM SERPL-MCNC: 7.3 MG/DL (ref 8.3–10.4)
CALCIUM SERPL-MCNC: 7.4 MG/DL (ref 8.3–10.4)
CALCIUM SERPL-MCNC: 7.4 MG/DL (ref 8.3–10.4)
CALCIUM SERPL-MCNC: 7.5 MG/DL (ref 8.3–10.4)
CALCIUM SERPL-MCNC: 7.5 MG/DL (ref 8.3–10.4)
CALCIUM SERPL-MCNC: 7.7 MG/DL (ref 8.3–10.4)
CALCIUM SERPL-MCNC: 7.8 MG/DL (ref 8.3–10.4)
CALCIUM SERPL-MCNC: 7.8 MG/DL (ref 8.3–10.4)
CALCIUM SERPL-MCNC: 8 MG/DL (ref 8.3–10.4)
CALCIUM SERPL-MCNC: 8 MG/DL (ref 8.3–10.4)
CALCIUM SERPL-MCNC: 8.1 MG/DL (ref 8.3–10.4)
CALCIUM SERPL-MCNC: 8.2 MG/DL (ref 8.3–10.4)
CALCIUM SERPL-MCNC: 8.4 MG/DL (ref 8.3–10.4)
CALCIUM SERPL-MCNC: 8.5 MG/DL (ref 8.3–10.4)
CALCIUM SERPL-MCNC: 8.5 MG/DL (ref 8.3–10.4)
CALCIUM SERPL-MCNC: 8.6 MG/DL (ref 8.3–10.4)
CALCULATED P AXIS, ECG09: 145 DEGREES
CALCULATED R AXIS, ECG10: 26 DEGREES
CALCULATED R AXIS, ECG10: 48 DEGREES
CALCULATED T AXIS, ECG11: -176 DEGREES
CALCULATED T AXIS, ECG11: 102 DEGREES
CANCER AG15-3 SERPL-ACNC: 393.5 U/ML (ref 1–35)
CANCER AG15-3 SERPL-ACNC: 421.8 U/ML (ref 1–35)
CANCER AG15-3 SERPL-ACNC: 434.5 U/ML (ref 1–35)
CANCER AG15-3 SERPL-ACNC: 500.5 U/ML (ref 1–35)
CANCER AG15-3 SERPL-ACNC: 567.2 U/ML (ref 1–35)
CANCER AG15-3 SERPL-ACNC: 601.5 U/ML (ref 1–35)
CANCER AG15-3 SERPL-ACNC: 608.1 U/ML (ref 1–35)
CANCER AG15-3 SERPL-ACNC: 644.1 U/ML (ref 1–35)
CANCER AG15-3 SERPL-ACNC: 808.2 U/ML (ref 1–35)
CANCER AG27-29 SERPL-ACNC: 352.5 U/ML (ref 0–38.6)
CANCER AG27-29 SERPL-ACNC: 434 U/ML (ref 0–38.6)
CANCER AG27-29 SERPL-ACNC: 434.5 U/ML (ref 0–38.6)
CANCER AG27-29 SERPL-ACNC: 530.1 U/ML (ref 0–38.6)
CANCER AG27-29 SERPL-ACNC: 553.7 U/ML (ref 0–38.6)
CANCER AG27-29 SERPL-ACNC: 615.8 U/ML (ref 0–38.6)
CANCER AG27-29 SERPL-ACNC: 683.7 U/ML (ref 0–38.6)
CANCER AG27-29 SERPL-ACNC: 749.1 U/ML (ref 0–38.6)
CANCER AG27-29 SERPL-ACNC: 764.7 U/ML (ref 0–38.6)
CANCER AG27-29 SERPL-ACNC: 828.1 U/ML (ref 0–38.6)
CANCER AG27-29 SERPL-ACNC: 970.8 U/ML (ref 0–38.6)
CASTS URNS QL MICRO: 0 /LPF
CASTS URNS QL MICRO: 0 /LPF
CEA SERPL-MCNC: 110.5 NG/ML (ref 0–3)
CEA SERPL-MCNC: 114.2 NG/ML (ref 0–3)
CEA SERPL-MCNC: 139.8 NG/ML (ref 0–3)
CEA SERPL-MCNC: 201.2 NG/ML (ref 0–3)
CEA SERPL-MCNC: 46 NG/ML (ref 0–3)
CEA SERPL-MCNC: 50.7 NG/ML (ref 0–3)
CEA SERPL-MCNC: 57.9 NG/ML (ref 0–3)
CEA SERPL-MCNC: 60 NG/ML (ref 0–3)
CEA SERPL-MCNC: 65.5 NG/ML (ref 0–3)
CEA SERPL-MCNC: 70.1 NG/ML (ref 0–3)
CEA SERPL-MCNC: 88.9 NG/ML (ref 0–3)
CHLORIDE SERPL-SCNC: 102 MMOL/L (ref 98–107)
CHLORIDE SERPL-SCNC: 104 MMOL/L (ref 98–107)
CHLORIDE SERPL-SCNC: 104 MMOL/L (ref 98–107)
CHLORIDE SERPL-SCNC: 105 MMOL/L (ref 98–107)
CHLORIDE SERPL-SCNC: 106 MMOL/L (ref 98–107)
CHLORIDE SERPL-SCNC: 106 MMOL/L (ref 98–107)
CHLORIDE SERPL-SCNC: 107 MMOL/L (ref 98–107)
CHLORIDE SERPL-SCNC: 109 MMOL/L (ref 98–107)
CHLORIDE SERPL-SCNC: 110 MMOL/L (ref 98–107)
CHLORIDE SERPL-SCNC: 110 MMOL/L (ref 98–107)
CHLORIDE SERPL-SCNC: 111 MMOL/L (ref 98–107)
CHLORIDE SERPL-SCNC: 111 MMOL/L (ref 98–107)
CHLORIDE SERPL-SCNC: 112 MMOL/L (ref 98–107)
CHLORIDE SERPL-SCNC: 113 MMOL/L (ref 98–107)
CHLORIDE SERPL-SCNC: 114 MMOL/L (ref 98–107)
CHLORIDE SERPL-SCNC: 115 MMOL/L (ref 98–107)
CHLORIDE SERPL-SCNC: 115 MMOL/L (ref 98–107)
CHLORIDE SERPL-SCNC: 96 MMOL/L (ref 98–107)
CO2 SERPL-SCNC: 12 MMOL/L (ref 21–32)
CO2 SERPL-SCNC: 15 MMOL/L (ref 21–32)
CO2 SERPL-SCNC: 17 MMOL/L (ref 21–32)
CO2 SERPL-SCNC: 18 MMOL/L (ref 21–32)
CO2 SERPL-SCNC: 19 MMOL/L (ref 21–32)
CO2 SERPL-SCNC: 19 MMOL/L (ref 21–32)
CO2 SERPL-SCNC: 20 MMOL/L (ref 21–32)
CO2 SERPL-SCNC: 21 MMOL/L (ref 21–32)
CO2 SERPL-SCNC: 23 MMOL/L (ref 21–32)
CO2 SERPL-SCNC: 24 MMOL/L (ref 21–32)
CO2 SERPL-SCNC: 25 MMOL/L (ref 21–32)
CO2 SERPL-SCNC: 25 MMOL/L (ref 21–32)
CO2 SERPL-SCNC: 26 MMOL/L (ref 21–32)
CO2 SERPL-SCNC: 27 MMOL/L (ref 21–32)
CO2 SERPL-SCNC: 27 MMOL/L (ref 21–32)
CO2 SERPL-SCNC: 28 MMOL/L (ref 21–32)
CO2 SERPL-SCNC: 30 MMOL/L (ref 21–32)
COLOR FLD: YELLOW
COLOR UR: YELLOW
CREAT SERPL-MCNC: 1.77 MG/DL (ref 0.6–1)
CREAT SERPL-MCNC: 1.9 MG/DL (ref 0.6–1)
CREAT SERPL-MCNC: 2.2 MG/DL (ref 0.6–1)
CREAT SERPL-MCNC: 2.36 MG/DL (ref 0.6–1)
CREAT SERPL-MCNC: 2.36 MG/DL (ref 0.6–1)
CREAT SERPL-MCNC: 2.4 MG/DL (ref 0.6–1)
CREAT SERPL-MCNC: 2.4 MG/DL (ref 0.6–1)
CREAT SERPL-MCNC: 2.73 MG/DL (ref 0.6–1)
CREAT SERPL-MCNC: 2.74 MG/DL (ref 0.6–1)
CREAT SERPL-MCNC: 2.77 MG/DL (ref 0.6–1)
CREAT SERPL-MCNC: 2.78 MG/DL (ref 0.6–1)
CREAT SERPL-MCNC: 3.25 MG/DL (ref 0.6–1)
CREAT SERPL-MCNC: 3.32 MG/DL (ref 0.6–1)
CREAT SERPL-MCNC: 3.33 MG/DL (ref 0.6–1)
CREAT SERPL-MCNC: 3.35 MG/DL (ref 0.6–1)
CREAT SERPL-MCNC: 3.38 MG/DL (ref 0.6–1)
CREAT SERPL-MCNC: 3.44 MG/DL (ref 0.6–1)
CREAT SERPL-MCNC: 3.53 MG/DL (ref 0.6–1)
CREAT SERPL-MCNC: 3.55 MG/DL (ref 0.6–1)
CREAT SERPL-MCNC: 3.8 MG/DL (ref 0.6–1)
CREAT SERPL-MCNC: 3.86 MG/DL (ref 0.6–1)
CREAT SERPL-MCNC: 3.88 MG/DL (ref 0.6–1)
CREAT SERPL-MCNC: 4.2 MG/DL (ref 0.6–1)
CREAT SERPL-MCNC: 4.28 MG/DL (ref 0.6–1)
CREAT SERPL-MCNC: 4.33 MG/DL (ref 0.6–1)
CREAT SERPL-MCNC: 4.47 MG/DL (ref 0.6–1)
CREAT SERPL-MCNC: 4.54 MG/DL (ref 0.6–1)
CREAT SERPL-MCNC: 4.55 MG/DL (ref 0.6–1)
CREAT SERPL-MCNC: 4.69 MG/DL (ref 0.6–1)
CREAT SERPL-MCNC: 4.74 MG/DL (ref 0.6–1)
CREAT SERPL-MCNC: 4.93 MG/DL (ref 0.6–1)
CROSSMATCH RESULT,%XM: NORMAL
CROSSMATCH RESULT,%XM: NORMAL
CRYSTALS URNS QL MICRO: 0 /LPF
D DIMER PPP FEU-MCNC: 1.09 UG/ML(FEU)
DIAGNOSIS, 93000: NORMAL
DIAGNOSIS, 93000: NORMAL
DIFFERENTIAL METHOD BLD: ABNORMAL
EOSINOPHIL # BLD: 0 K/UL
EOSINOPHIL # BLD: 0 K/UL
EOSINOPHIL # BLD: 0 K/UL (ref 0–0.8)
EOSINOPHIL # BLD: 0.1 K/UL (ref 0–0.8)
EOSINOPHIL # BLD: 0.2 K/UL (ref 0–0.8)
EOSINOPHIL NFR BLD MANUAL: 1 % (ref 1–8)
EOSINOPHIL NFR BLD MANUAL: 4 % (ref 1–8)
EOSINOPHIL NFR BLD MANUAL: 6 % (ref 1–8)
EOSINOPHIL NFR BLD: 0 % (ref 0.5–7.8)
EOSINOPHIL NFR BLD: 1 % (ref 0.5–7.8)
EOSINOPHIL NFR BLD: 2 % (ref 0.5–7.8)
EOSINOPHIL NFR BLD: 3 % (ref 0.5–7.8)
EPI CELLS #/AREA URNS HPF: 0 /HPF
EPI CELLS #/AREA URNS HPF: ABNORMAL /HPF
ERYTHROCYTE [DISTWIDTH] IN BLOOD BY AUTOMATED COUNT: 11.9 % (ref 11.9–14.6)
ERYTHROCYTE [DISTWIDTH] IN BLOOD BY AUTOMATED COUNT: 12 % (ref 11.9–14.6)
ERYTHROCYTE [DISTWIDTH] IN BLOOD BY AUTOMATED COUNT: 13.2 % (ref 11.9–14.6)
ERYTHROCYTE [DISTWIDTH] IN BLOOD BY AUTOMATED COUNT: 13.3 % (ref 11.9–14.6)
ERYTHROCYTE [DISTWIDTH] IN BLOOD BY AUTOMATED COUNT: 13.4 % (ref 11.9–14.6)
ERYTHROCYTE [DISTWIDTH] IN BLOOD BY AUTOMATED COUNT: 13.5 %
ERYTHROCYTE [DISTWIDTH] IN BLOOD BY AUTOMATED COUNT: 13.7 % (ref 11.9–14.6)
ERYTHROCYTE [DISTWIDTH] IN BLOOD BY AUTOMATED COUNT: 14 % (ref 11.9–14.6)
ERYTHROCYTE [DISTWIDTH] IN BLOOD BY AUTOMATED COUNT: 14.2 % (ref 11.9–14.6)
ERYTHROCYTE [DISTWIDTH] IN BLOOD BY AUTOMATED COUNT: 14.2 % (ref 11.9–14.6)
ERYTHROCYTE [DISTWIDTH] IN BLOOD BY AUTOMATED COUNT: 14.4 % (ref 11.9–14.6)
ERYTHROCYTE [DISTWIDTH] IN BLOOD BY AUTOMATED COUNT: 14.4 % (ref 11.9–14.6)
ERYTHROCYTE [DISTWIDTH] IN BLOOD BY AUTOMATED COUNT: 14.5 % (ref 11.9–14.6)
ERYTHROCYTE [DISTWIDTH] IN BLOOD BY AUTOMATED COUNT: 14.6 % (ref 11.9–14.6)
ERYTHROCYTE [DISTWIDTH] IN BLOOD BY AUTOMATED COUNT: 14.6 % (ref 11.9–14.6)
ERYTHROCYTE [DISTWIDTH] IN BLOOD BY AUTOMATED COUNT: 14.7 %
ERYTHROCYTE [DISTWIDTH] IN BLOOD BY AUTOMATED COUNT: 14.8 %
ERYTHROCYTE [DISTWIDTH] IN BLOOD BY AUTOMATED COUNT: 14.9 % (ref 11.9–14.6)
ERYTHROCYTE [DISTWIDTH] IN BLOOD BY AUTOMATED COUNT: 15 % (ref 11.9–14.6)
ERYTHROCYTE [DISTWIDTH] IN BLOOD BY AUTOMATED COUNT: 15.1 % (ref 11.9–14.6)
ERYTHROCYTE [DISTWIDTH] IN BLOOD BY AUTOMATED COUNT: 15.6 % (ref 11.9–14.6)
ERYTHROCYTE [DISTWIDTH] IN BLOOD BY AUTOMATED COUNT: 15.8 % (ref 11.9–14.6)
ERYTHROCYTE [DISTWIDTH] IN BLOOD BY AUTOMATED COUNT: 15.8 % (ref 11.9–14.6)
ERYTHROCYTE [DISTWIDTH] IN BLOOD BY AUTOMATED COUNT: 16.5 % (ref 11.9–14.6)
ERYTHROCYTE [DISTWIDTH] IN BLOOD BY AUTOMATED COUNT: 16.6 % (ref 11.9–14.6)
ERYTHROCYTE [DISTWIDTH] IN BLOOD BY AUTOMATED COUNT: 17 % (ref 11.9–14.6)
FERRITIN SERPL-MCNC: 1535 NG/ML (ref 8–388)
FERRITIN SERPL-MCNC: 1854 NG/ML (ref 8–388)
FERRITIN SERPL-MCNC: 1884 NG/ML (ref 8–388)
FERRITIN SERPL-MCNC: 2130 NG/ML (ref 8–388)
FERRITIN SERPL-MCNC: 2412 NG/ML (ref 8–388)
FLUAV AG NPH QL IA: NEGATIVE
FLUBV AG NPH QL IA: NEGATIVE
FOLATE SERPL-MCNC: 42.5 NG/ML (ref 3.1–17.5)
GLOBULIN SER CALC-MCNC: 3.3 G/DL (ref 2.3–3.5)
GLOBULIN SER CALC-MCNC: 3.4 G/DL (ref 2.3–3.5)
GLOBULIN SER CALC-MCNC: 3.7 G/DL (ref 2.3–3.5)
GLOBULIN SER CALC-MCNC: 3.8 G/DL (ref 2.3–3.5)
GLOBULIN SER CALC-MCNC: 3.9 G/DL (ref 2.3–3.5)
GLOBULIN SER CALC-MCNC: 3.9 G/DL (ref 2.3–3.5)
GLOBULIN SER CALC-MCNC: 4 G/DL (ref 2.3–3.5)
GLOBULIN SER CALC-MCNC: 4.1 G/DL (ref 2.3–3.5)
GLOBULIN SER CALC-MCNC: 4.1 G/DL (ref 2.3–3.5)
GLOBULIN SER CALC-MCNC: 4.2 G/DL (ref 2.3–3.5)
GLOBULIN SER CALC-MCNC: 4.2 G/DL (ref 2.3–3.5)
GLOBULIN SER CALC-MCNC: 4.3 G/DL (ref 2.3–3.5)
GLOBULIN SER CALC-MCNC: 4.3 G/DL (ref 2.3–3.5)
GLOBULIN SER CALC-MCNC: 4.4 G/DL (ref 2.3–3.5)
GLOBULIN SER CALC-MCNC: 4.5 G/DL (ref 2.3–3.5)
GLOBULIN SER CALC-MCNC: 4.7 G/DL (ref 2.3–3.5)
GLOBULIN SER CALC-MCNC: 4.7 G/DL (ref 2.3–3.5)
GLUCOSE BLD STRIP.AUTO-MCNC: 108 MG/DL (ref 65–100)
GLUCOSE BLD STRIP.AUTO-MCNC: 131 MG/DL (ref 65–100)
GLUCOSE BLD STRIP.AUTO-MCNC: 132 MG/DL (ref 65–100)
GLUCOSE BLD STRIP.AUTO-MCNC: 150 MG/DL (ref 65–100)
GLUCOSE BLD STRIP.AUTO-MCNC: 168 MG/DL (ref 65–100)
GLUCOSE BLD STRIP.AUTO-MCNC: 202 MG/DL (ref 65–100)
GLUCOSE BLD STRIP.AUTO-MCNC: 202 MG/DL (ref 65–100)
GLUCOSE BLD STRIP.AUTO-MCNC: 203 MG/DL (ref 65–100)
GLUCOSE BLD STRIP.AUTO-MCNC: 207 MG/DL (ref 65–100)
GLUCOSE BLD STRIP.AUTO-MCNC: 262 MG/DL (ref 65–100)
GLUCOSE BLD STRIP.AUTO-MCNC: 265 MG/DL (ref 65–100)
GLUCOSE BLD STRIP.AUTO-MCNC: 301 MG/DL (ref 65–100)
GLUCOSE BLD STRIP.AUTO-MCNC: 316 MG/DL (ref 65–100)
GLUCOSE BLD STRIP.AUTO-MCNC: 94 MG/DL (ref 65–100)
GLUCOSE SERPL-MCNC: 102 MG/DL (ref 65–100)
GLUCOSE SERPL-MCNC: 105 MG/DL (ref 65–100)
GLUCOSE SERPL-MCNC: 109 MG/DL (ref 65–100)
GLUCOSE SERPL-MCNC: 123 MG/DL (ref 65–100)
GLUCOSE SERPL-MCNC: 129 MG/DL (ref 65–100)
GLUCOSE SERPL-MCNC: 133 MG/DL (ref 65–100)
GLUCOSE SERPL-MCNC: 133 MG/DL (ref 65–100)
GLUCOSE SERPL-MCNC: 135 MG/DL (ref 65–100)
GLUCOSE SERPL-MCNC: 142 MG/DL (ref 65–100)
GLUCOSE SERPL-MCNC: 151 MG/DL (ref 65–100)
GLUCOSE SERPL-MCNC: 151 MG/DL (ref 65–100)
GLUCOSE SERPL-MCNC: 153 MG/DL (ref 65–100)
GLUCOSE SERPL-MCNC: 155 MG/DL (ref 65–100)
GLUCOSE SERPL-MCNC: 168 MG/DL (ref 65–100)
GLUCOSE SERPL-MCNC: 169 MG/DL (ref 65–100)
GLUCOSE SERPL-MCNC: 172 MG/DL (ref 65–100)
GLUCOSE SERPL-MCNC: 179 MG/DL (ref 65–100)
GLUCOSE SERPL-MCNC: 181 MG/DL (ref 65–100)
GLUCOSE SERPL-MCNC: 184 MG/DL (ref 65–100)
GLUCOSE SERPL-MCNC: 188 MG/DL (ref 65–100)
GLUCOSE SERPL-MCNC: 199 MG/DL (ref 65–100)
GLUCOSE SERPL-MCNC: 211 MG/DL (ref 65–100)
GLUCOSE SERPL-MCNC: 218 MG/DL (ref 65–100)
GLUCOSE SERPL-MCNC: 221 MG/DL (ref 65–100)
GLUCOSE SERPL-MCNC: 245 MG/DL (ref 65–100)
GLUCOSE SERPL-MCNC: 274 MG/DL (ref 65–100)
GLUCOSE SERPL-MCNC: 278 MG/DL (ref 65–100)
GLUCOSE SERPL-MCNC: 325 MG/DL (ref 65–100)
GLUCOSE SERPL-MCNC: 86 MG/DL (ref 65–100)
GLUCOSE SERPL-MCNC: 97 MG/DL (ref 65–100)
GLUCOSE SERPL-MCNC: 98 MG/DL (ref 65–100)
GLUCOSE UR STRIP.AUTO-MCNC: NEGATIVE MG/DL
GRAM STN SPEC: ABNORMAL
GRAM STN SPEC: NORMAL
GRAM STN SPEC: NORMAL
HAV IGM SERPL QL IA: NEGATIVE
HBV CORE IGM SERPL QL IA: NEGATIVE
HBV SURFACE AG SERPL QL IA: NEGATIVE
HCT VFR BLD AUTO: 21.3 % (ref 35.8–46.3)
HCT VFR BLD AUTO: 23.1 % (ref 35.8–46.3)
HCT VFR BLD AUTO: 24.3 % (ref 35.8–46.3)
HCT VFR BLD AUTO: 24.5 % (ref 35.8–46.3)
HCT VFR BLD AUTO: 25.3 % (ref 35.8–46.3)
HCT VFR BLD AUTO: 25.3 % (ref 35.8–46.3)
HCT VFR BLD AUTO: 25.4 % (ref 35.8–46.3)
HCT VFR BLD AUTO: 25.5 % (ref 35.8–46.3)
HCT VFR BLD AUTO: 26 % (ref 35.8–46.3)
HCT VFR BLD AUTO: 26.6 % (ref 35.8–46.3)
HCT VFR BLD AUTO: 26.7 % (ref 35.8–46.3)
HCT VFR BLD AUTO: 27.3 % (ref 35.8–46.3)
HCT VFR BLD AUTO: 27.4 % (ref 35.8–46.3)
HCT VFR BLD AUTO: 27.5 % (ref 35.8–46.3)
HCT VFR BLD AUTO: 27.6 % (ref 35.8–46.3)
HCT VFR BLD AUTO: 27.6 % (ref 35.8–46.3)
HCT VFR BLD AUTO: 28 % (ref 35.8–46.3)
HCT VFR BLD AUTO: 28 % (ref 35.8–46.3)
HCT VFR BLD AUTO: 28.5 % (ref 35.8–46.3)
HCT VFR BLD AUTO: 28.5 % (ref 35.8–46.3)
HCT VFR BLD AUTO: 28.6 % (ref 35.8–46.3)
HCT VFR BLD AUTO: 28.8 % (ref 35.8–46.3)
HCT VFR BLD AUTO: 29 % (ref 35.8–46.3)
HCT VFR BLD AUTO: 29.2 % (ref 35.8–46.3)
HCT VFR BLD AUTO: 29.4 % (ref 35.8–46.3)
HCT VFR BLD AUTO: 29.4 % (ref 35.8–46.3)
HCT VFR BLD AUTO: 29.9 % (ref 35.8–46.3)
HCT VFR BLD AUTO: 30.3 % (ref 35.8–46.3)
HCT VFR BLD AUTO: 30.4 % (ref 35.8–46.3)
HCT VFR BLD AUTO: 32.3 % (ref 35.8–46.3)
HCT VFR BLD AUTO: 32.6 % (ref 35.8–46.3)
HCV AB S/CO SERPL IA: <0.1 S/CO RATIO (ref 0–0.9)
HFE GENE MUT ANL BLD/T: NORMAL
HGB BLD-MCNC: 10 G/DL (ref 11.7–15.4)
HGB BLD-MCNC: 10.2 G/DL (ref 11.7–15.4)
HGB BLD-MCNC: 10.4 G/DL (ref 11.7–15.4)
HGB BLD-MCNC: 10.8 G/DL (ref 11.7–15.4)
HGB BLD-MCNC: 6.7 G/DL (ref 11.7–15.4)
HGB BLD-MCNC: 7.9 G/DL (ref 11.7–15.4)
HGB BLD-MCNC: 8.1 G/DL (ref 11.7–15.4)
HGB BLD-MCNC: 8.3 G/DL (ref 11.7–15.4)
HGB BLD-MCNC: 8.3 G/DL (ref 11.7–15.4)
HGB BLD-MCNC: 8.6 G/DL (ref 11.7–15.4)
HGB BLD-MCNC: 8.7 G/DL (ref 11.7–15.4)
HGB BLD-MCNC: 8.9 G/DL (ref 11.7–15.4)
HGB BLD-MCNC: 9 G/DL (ref 11.7–15.4)
HGB BLD-MCNC: 9 G/DL (ref 11.7–15.4)
HGB BLD-MCNC: 9.1 G/DL (ref 11.7–15.4)
HGB BLD-MCNC: 9.1 G/DL (ref 11.7–15.4)
HGB BLD-MCNC: 9.2 G/DL (ref 11.7–15.4)
HGB BLD-MCNC: 9.4 G/DL (ref 11.7–15.4)
HGB BLD-MCNC: 9.4 G/DL (ref 11.7–15.4)
HGB BLD-MCNC: 9.5 G/DL (ref 11.7–15.4)
HGB BLD-MCNC: 9.6 G/DL (ref 11.7–15.4)
HGB BLD-MCNC: 9.6 G/DL (ref 11.7–15.4)
HGB BLD-MCNC: 9.7 G/DL (ref 11.7–15.4)
HGB BLD-MCNC: 9.8 G/DL (ref 11.7–15.4)
HGB UR QL STRIP: NEGATIVE
IMM GRANULOCYTES # BLD: 0 K/UL
IMM GRANULOCYTES # BLD: 0 K/UL
IMM GRANULOCYTES # BLD: 0 K/UL (ref 0–0.5)
IMM GRANULOCYTES NFR BLD AUTO: 0 % (ref 0–5)
IMM GRANULOCYTES NFR BLD AUTO: 1 % (ref 0–5)
INR BLD: 1.3 (ref 0.9–1.2)
INR BLD: 1.8 (ref 0.9–1.2)
INR BLD: 1.9 (ref 0.9–1.2)
INR PPP: 1.2
INR PPP: 1.2
INR PPP: 1.3
INR PPP: 1.4
INR PPP: 1.4
INR PPP: 1.5
INR PPP: 1.6
INR PPP: 1.6
INR PPP: 1.7
INR PPP: 1.7
INR PPP: 1.8
INR PPP: 2
INR PPP: 2.1
INR PPP: 2.2
INR PPP: 2.3
INR PPP: 2.4
INR PPP: 2.5
INR PPP: 2.5
INR PPP: 2.6
INR PPP: 2.7
INR PPP: 2.8
INR PPP: 3
INR PPP: 3.1
INR PPP: 3.1
INR PPP: 3.4
INR PPP: 3.5
INR PPP: 3.9
INR PPP: 3.9
INR PPP: 4.6
INR PPP: 4.9
INR PPP: 6
IRON SATN MFR SERPL: 56 %
IRON SERPL-MCNC: 104 UG/DL (ref 35–150)
IRON SERPL-MCNC: 105 UG/DL (ref 35–150)
KETONES UR QL STRIP.AUTO: NEGATIVE MG/DL
LACTATE SERPL-SCNC: 1.4 MMOL/L (ref 0.4–2)
LACTATE SERPL-SCNC: 1.6 MMOL/L (ref 0.4–2)
LACTATE SERPL-SCNC: 1.9 MMOL/L (ref 0.4–2)
LACTATE SERPL-SCNC: 5 MMOL/L (ref 0.4–2)
LACTATE SERPL-SCNC: 5.5 MMOL/L (ref 0.4–2)
LACTATE SERPL-SCNC: 8.8 MMOL/L (ref 0.4–2)
LACTATE SERPL-SCNC: 9.9 MMOL/L (ref 0.4–2)
LEUKOCYTE ESTERASE UR QL STRIP.AUTO: ABNORMAL
LIPASE SERPL-CCNC: 615 U/L (ref 73–393)
LYMPHOCYTES # BLD: 0.3 K/UL (ref 0.5–4.6)
LYMPHOCYTES # BLD: 0.3 K/UL (ref 0.5–4.6)
LYMPHOCYTES # BLD: 0.4 K/UL (ref 0.5–4.6)
LYMPHOCYTES # BLD: 0.5 K/UL (ref 0.5–4.6)
LYMPHOCYTES # BLD: 0.6 K/UL (ref 0.5–4.6)
LYMPHOCYTES # BLD: 0.7 K/UL (ref 0.5–4.6)
LYMPHOCYTES # BLD: 0.7 K/UL (ref 0.5–4.6)
LYMPHOCYTES # BLD: 0.8 K/UL (ref 0.5–4.6)
LYMPHOCYTES # BLD: 0.9 K/UL
LYMPHOCYTES # BLD: 0.9 K/UL (ref 0.5–4.6)
LYMPHOCYTES # BLD: 1 K/UL
LYMPHOCYTES # BLD: 1.1 K/UL (ref 0.5–4.6)
LYMPHOCYTES NFR BLD MANUAL: 14 % (ref 16–44)
LYMPHOCYTES NFR BLD MANUAL: 15 % (ref 16–44)
LYMPHOCYTES NFR BLD MANUAL: 20 % (ref 16–44)
LYMPHOCYTES NFR BLD MANUAL: 36 % (ref 16–44)
LYMPHOCYTES NFR BLD MANUAL: 6 % (ref 16–44)
LYMPHOCYTES NFR BLD: 15 % (ref 13–44)
LYMPHOCYTES NFR BLD: 15 % (ref 13–44)
LYMPHOCYTES NFR BLD: 18 % (ref 13–44)
LYMPHOCYTES NFR BLD: 19 % (ref 13–44)
LYMPHOCYTES NFR BLD: 19 % (ref 13–44)
LYMPHOCYTES NFR BLD: 20 % (ref 13–44)
LYMPHOCYTES NFR BLD: 21 % (ref 13–44)
LYMPHOCYTES NFR BLD: 22 % (ref 13–44)
LYMPHOCYTES NFR BLD: 22 % (ref 13–44)
LYMPHOCYTES NFR BLD: 23 % (ref 13–44)
LYMPHOCYTES NFR BLD: 23 % (ref 13–44)
LYMPHOCYTES NFR BLD: 24 % (ref 13–44)
LYMPHOCYTES NFR BLD: 25 % (ref 13–44)
LYMPHOCYTES NFR BLD: 26 % (ref 13–44)
LYMPHOCYTES NFR BLD: 30 % (ref 13–44)
LYMPHOCYTES NFR BLD: 32 % (ref 13–44)
LYMPHOCYTES NFR BLD: 33 % (ref 13–44)
LYMPHOCYTES NFR BLD: 36 % (ref 13–44)
MAGNESIUM SERPL-MCNC: 2.2 MG/DL (ref 1.8–2.4)
MAGNESIUM SERPL-MCNC: 2.3 MG/DL (ref 1.8–2.4)
MAGNESIUM SERPL-MCNC: 2.4 MG/DL (ref 1.8–2.4)
MAGNESIUM SERPL-MCNC: 2.5 MG/DL (ref 1.8–2.4)
MAGNESIUM SERPL-MCNC: 2.6 MG/DL (ref 1.8–2.4)
MAGNESIUM SERPL-MCNC: 2.7 MG/DL (ref 1.8–2.4)
MCH RBC QN AUTO: 35.9 PG (ref 26.1–32.9)
MCH RBC QN AUTO: 36.1 PG (ref 26.1–32.9)
MCH RBC QN AUTO: 36.3 PG (ref 26.1–32.9)
MCH RBC QN AUTO: 36.5 PG (ref 26.1–32.9)
MCH RBC QN AUTO: 36.7 PG (ref 26.1–32.9)
MCH RBC QN AUTO: 36.8 PG (ref 26.1–32.9)
MCH RBC QN AUTO: 36.9 PG (ref 26.1–32.9)
MCH RBC QN AUTO: 37.1 PG (ref 26.1–32.9)
MCH RBC QN AUTO: 37.1 PG (ref 26.1–32.9)
MCH RBC QN AUTO: 37.3 PG (ref 26.1–32.9)
MCH RBC QN AUTO: 37.7 PG (ref 26.1–32.9)
MCH RBC QN AUTO: 37.8 PG (ref 26.1–32.9)
MCH RBC QN AUTO: 37.8 PG (ref 26.1–32.9)
MCH RBC QN AUTO: 37.9 PG (ref 26.1–32.9)
MCH RBC QN AUTO: 38.1 PG (ref 26.1–32.9)
MCH RBC QN AUTO: 38.1 PG (ref 26.1–32.9)
MCH RBC QN AUTO: 38.2 PG (ref 26.1–32.9)
MCH RBC QN AUTO: 38.2 PG (ref 26.1–32.9)
MCH RBC QN AUTO: 38.4 PG (ref 26.1–32.9)
MCH RBC QN AUTO: 38.5 PG (ref 26.1–32.9)
MCH RBC QN AUTO: 38.9 PG (ref 26.1–32.9)
MCH RBC QN AUTO: 38.9 PG (ref 26.1–32.9)
MCHC RBC AUTO-ENTMCNC: 31.3 G/DL (ref 31.4–35)
MCHC RBC AUTO-ENTMCNC: 32.2 G/DL (ref 31.4–35)
MCHC RBC AUTO-ENTMCNC: 32.3 G/DL (ref 31.4–35)
MCHC RBC AUTO-ENTMCNC: 32.5 G/DL (ref 31.4–35)
MCHC RBC AUTO-ENTMCNC: 32.6 G/DL (ref 31.4–35)
MCHC RBC AUTO-ENTMCNC: 32.7 G/DL (ref 31.4–35)
MCHC RBC AUTO-ENTMCNC: 32.8 G/DL (ref 31.4–35)
MCHC RBC AUTO-ENTMCNC: 32.9 G/DL (ref 31.4–35)
MCHC RBC AUTO-ENTMCNC: 33 G/DL (ref 31.4–35)
MCHC RBC AUTO-ENTMCNC: 33 G/DL (ref 31.4–35)
MCHC RBC AUTO-ENTMCNC: 33.1 G/DL (ref 31.4–35)
MCHC RBC AUTO-ENTMCNC: 33.3 G/DL (ref 31.4–35)
MCHC RBC AUTO-ENTMCNC: 33.6 G/DL (ref 31.4–35)
MCHC RBC AUTO-ENTMCNC: 33.9 G/DL (ref 31.4–35)
MCHC RBC AUTO-ENTMCNC: 34 G/DL (ref 31.4–35)
MCHC RBC AUTO-ENTMCNC: 34.1 G/DL (ref 31.4–35)
MCHC RBC AUTO-ENTMCNC: 34.2 G/DL (ref 31.4–35)
MCHC RBC AUTO-ENTMCNC: 34.4 G/DL (ref 31.4–35)
MCHC RBC AUTO-ENTMCNC: 35 G/DL (ref 31.4–35)
MCHC RBC AUTO-ENTMCNC: 35.3 G/DL (ref 31.4–35)
MCV RBC AUTO: 106.5 FL (ref 79.6–97.8)
MCV RBC AUTO: 107.2 FL (ref 79.6–97.8)
MCV RBC AUTO: 108.1 FL (ref 79.6–97.8)
MCV RBC AUTO: 108.3 FL (ref 79.6–97.8)
MCV RBC AUTO: 109.6 FL (ref 79.6–97.8)
MCV RBC AUTO: 110.1 FL (ref 79.6–97.8)
MCV RBC AUTO: 110.1 FL (ref 79.6–97.8)
MCV RBC AUTO: 110.3 FL (ref 79.6–97.8)
MCV RBC AUTO: 112.1 FL (ref 79.6–97.8)
MCV RBC AUTO: 112.4 FL (ref 79.6–97.8)
MCV RBC AUTO: 112.4 FL (ref 79.6–97.8)
MCV RBC AUTO: 112.7 FL (ref 79.6–97.8)
MCV RBC AUTO: 113.6 FL (ref 79.6–97.8)
MCV RBC AUTO: 113.9 FL (ref 79.6–97.8)
MCV RBC AUTO: 114.1 FL (ref 79.6–97.8)
MCV RBC AUTO: 114.5 FL (ref 79.6–97.8)
MCV RBC AUTO: 114.6 FL (ref 79.6–97.8)
MCV RBC AUTO: 115 FL (ref 79.6–97.8)
MCV RBC AUTO: 115.5 FL (ref 79.6–97.8)
MCV RBC AUTO: 116.2 FL (ref 79.6–97.8)
MCV RBC AUTO: 117.2 FL (ref 79.6–97.8)
MCV RBC AUTO: 117.5 FL (ref 79.6–97.8)
MCV RBC AUTO: 117.5 FL (ref 79.6–97.8)
MCV RBC AUTO: 118.1 FL (ref 79.6–97.8)
MONOCYTES # BLD: 0.1 K/UL (ref 0.1–1.3)
MONOCYTES # BLD: 0.2 K/UL (ref 0.1–1.3)
MONOCYTES # BLD: 0.3 K/UL (ref 0.1–1.3)
MONOCYTES # BLD: 0.4 K/UL
MONOCYTES # BLD: 0.4 K/UL (ref 0.1–1.3)
MONOCYTES # BLD: 0.5 K/UL (ref 0.1–1.3)
MONOCYTES # BLD: 0.6 K/UL
MONOCYTES # BLD: 0.7 K/UL (ref 0.1–1.3)
MONOCYTES # BLD: 0.8 K/UL (ref 0.1–1.3)
MONOCYTES # BLD: 1.4 K/UL (ref 0.1–1.3)
MONOCYTES NFR BLD MANUAL: 10 % (ref 3–9)
MONOCYTES NFR BLD MANUAL: 12 % (ref 3–9)
MONOCYTES NFR BLD MANUAL: 2 % (ref 3–9)
MONOCYTES NFR BLD MANUAL: 8 % (ref 3–9)
MONOCYTES NFR BLD: 10 % (ref 4–12)
MONOCYTES NFR BLD: 10 % (ref 4–12)
MONOCYTES NFR BLD: 11 % (ref 4–12)
MONOCYTES NFR BLD: 12 % (ref 4–12)
MONOCYTES NFR BLD: 13 % (ref 4–12)
MONOCYTES NFR BLD: 13 % (ref 4–12)
MONOCYTES NFR BLD: 15 % (ref 4–12)
MONOCYTES NFR BLD: 4 % (ref 4–12)
MONOCYTES NFR BLD: 4 % (ref 4–12)
MONOCYTES NFR BLD: 5 % (ref 4–12)
MONOCYTES NFR BLD: 6 % (ref 4–12)
MONOCYTES NFR BLD: 7 % (ref 4–12)
MONOCYTES NFR BLD: 8 % (ref 4–12)
MONOCYTES NFR BLD: 9 % (ref 4–12)
MUCOUS THREADS URNS QL MICRO: 0 /LPF
NEUTS BAND NFR BLD MANUAL: 2 % (ref 0–10)
NEUTS BAND NFR BLD MANUAL: 4 % (ref 0–6)
NEUTS SEG # BLD: 1.1 K/UL (ref 1.7–8.2)
NEUTS SEG # BLD: 1.2 K/UL (ref 1.7–8.2)
NEUTS SEG # BLD: 1.2 K/UL (ref 1.7–8.2)
NEUTS SEG # BLD: 1.3 K/UL (ref 1.7–8.2)
NEUTS SEG # BLD: 1.3 K/UL (ref 1.7–8.2)
NEUTS SEG # BLD: 1.4 K/UL (ref 1.7–8.2)
NEUTS SEG # BLD: 1.5 K/UL (ref 1.7–8.2)
NEUTS SEG # BLD: 1.6 K/UL (ref 1.7–8.2)
NEUTS SEG # BLD: 1.7 K/UL (ref 1.7–8.2)
NEUTS SEG # BLD: 1.8 K/UL (ref 1.7–8.2)
NEUTS SEG # BLD: 1.9 K/UL (ref 1.7–8.2)
NEUTS SEG # BLD: 11.1 K/UL (ref 1.7–8.2)
NEUTS SEG # BLD: 2 K/UL
NEUTS SEG # BLD: 2 K/UL (ref 1.7–8.2)
NEUTS SEG # BLD: 2.1 K/UL (ref 1.7–8.2)
NEUTS SEG # BLD: 2.4 K/UL (ref 1.7–8.2)
NEUTS SEG # BLD: 2.5 K/UL (ref 1.7–8.2)
NEUTS SEG # BLD: 2.6 K/UL (ref 1.7–8.2)
NEUTS SEG # BLD: 2.8 K/UL (ref 1.7–8.2)
NEUTS SEG # BLD: 2.9 K/UL
NEUTS SEG # BLD: 3.3 K/UL (ref 1.7–8.2)
NEUTS SEG # BLD: 3.4 K/UL (ref 1.7–8.2)
NEUTS SEG # BLD: 5.2 K/UL (ref 1.7–8.2)
NEUTS SEG NFR BLD MANUAL: 50 % (ref 47–75)
NEUTS SEG NFR BLD MANUAL: 68 % (ref 47–75)
NEUTS SEG NFR BLD MANUAL: 69 % (ref 47–75)
NEUTS SEG NFR BLD MANUAL: 70 % (ref 47–75)
NEUTS SEG NFR BLD MANUAL: 82 % (ref 47–75)
NEUTS SEG NFR BLD: 54 % (ref 43–78)
NEUTS SEG NFR BLD: 54 % (ref 43–78)
NEUTS SEG NFR BLD: 57 % (ref 43–78)
NEUTS SEG NFR BLD: 57 % (ref 43–78)
NEUTS SEG NFR BLD: 58 % (ref 43–78)
NEUTS SEG NFR BLD: 60 % (ref 43–78)
NEUTS SEG NFR BLD: 63 % (ref 43–78)
NEUTS SEG NFR BLD: 63 % (ref 43–78)
NEUTS SEG NFR BLD: 64 % (ref 43–78)
NEUTS SEG NFR BLD: 65 % (ref 43–78)
NEUTS SEG NFR BLD: 65 % (ref 43–78)
NEUTS SEG NFR BLD: 66 % (ref 43–78)
NEUTS SEG NFR BLD: 66 % (ref 43–78)
NEUTS SEG NFR BLD: 67 % (ref 43–78)
NEUTS SEG NFR BLD: 67 % (ref 43–78)
NEUTS SEG NFR BLD: 68 % (ref 43–78)
NEUTS SEG NFR BLD: 68 % (ref 43–78)
NEUTS SEG NFR BLD: 69 % (ref 43–78)
NEUTS SEG NFR BLD: 72 % (ref 43–78)
NEUTS SEG NFR BLD: 73 % (ref 43–78)
NEUTS SEG NFR BLD: 77 % (ref 43–78)
NITRITE UR QL STRIP.AUTO: NEGATIVE
NRBC # BLD: 0 K/UL (ref 0–0.2)
NRBC # BLD: 0.01 K/UL (ref 0–0.2)
NRBC # BLD: 0.12 K/UL (ref 0–0.2)
NRBC BLD-RTO: 0 PER 100 WBC (ref 0–2)
NRBC BLD-RTO: 0 PER 100 WBC (ref 0–2)
NRBC BLD-RTO: 0.3 PER 100 WBC (ref 0–2)
NUC CELL # FLD: <100 /CU MM
OTHER OBSERVATIONS,UCOM: ABNORMAL
P-R INTERVAL, ECG05: 176 MS
PH UR STRIP: 7.5 [PH] (ref 5–9)
PHOSPHATE SERPL-MCNC: 2.3 MG/DL (ref 2.3–3.7)
PHOSPHATE SERPL-MCNC: 3.6 MG/DL (ref 2.3–3.7)
PHOSPHATE SERPL-MCNC: 4.1 MG/DL (ref 2.3–3.7)
PLATELET # BLD AUTO: 107 K/UL (ref 150–450)
PLATELET # BLD AUTO: 111 K/UL (ref 150–450)
PLATELET # BLD AUTO: 114 K/UL (ref 150–450)
PLATELET # BLD AUTO: 121 K/UL (ref 150–450)
PLATELET # BLD AUTO: 134 K/UL (ref 150–450)
PLATELET # BLD AUTO: 136 K/UL (ref 150–450)
PLATELET # BLD AUTO: 142 K/UL (ref 150–450)
PLATELET # BLD AUTO: 149 K/UL (ref 150–450)
PLATELET # BLD AUTO: 151 K/UL (ref 150–450)
PLATELET # BLD AUTO: 161 K/UL (ref 150–450)
PLATELET # BLD AUTO: 162 K/UL (ref 150–450)
PLATELET # BLD AUTO: 165 K/UL (ref 150–450)
PLATELET # BLD AUTO: 168 K/UL (ref 150–450)
PLATELET # BLD AUTO: 178 K/UL (ref 150–450)
PLATELET # BLD AUTO: 193 K/UL (ref 150–450)
PLATELET # BLD AUTO: 197 K/UL (ref 150–450)
PLATELET # BLD AUTO: 199 K/UL (ref 150–450)
PLATELET # BLD AUTO: 201 K/UL (ref 150–450)
PLATELET # BLD AUTO: 208 K/UL (ref 150–450)
PLATELET # BLD AUTO: 216 K/UL (ref 150–450)
PLATELET # BLD AUTO: 219 K/UL (ref 150–450)
PLATELET # BLD AUTO: 37 K/UL (ref 150–450)
PLATELET # BLD AUTO: 406 K/UL (ref 150–450)
PLATELET # BLD AUTO: 48 K/UL (ref 150–450)
PLATELET # BLD AUTO: 56 K/UL (ref 150–450)
PLATELET # BLD AUTO: 98 K/UL (ref 150–450)
PLATELET COMMENTS,PCOM: ABNORMAL
PLATELET COMMENTS,PCOM: ADEQUATE
PMV BLD AUTO: 10.1 FL (ref 10.8–14.1)
PMV BLD AUTO: 10.3 FL (ref 9.4–12.3)
PMV BLD AUTO: 10.4 FL (ref 9.4–12.3)
PMV BLD AUTO: 10.5 FL (ref 10.8–14.1)
PMV BLD AUTO: 10.7 FL (ref 10.8–14.1)
PMV BLD AUTO: 10.7 FL (ref 10.8–14.1)
PMV BLD AUTO: 11 FL (ref 10.8–14.1)
PMV BLD AUTO: 11 FL (ref 9.4–12.3)
PMV BLD AUTO: 11 FL (ref 9.4–12.3)
PMV BLD AUTO: 11.1 FL (ref 10.8–14.1)
PMV BLD AUTO: 11.2 FL (ref 10.8–14.1)
PMV BLD AUTO: 11.3 FL (ref 10.8–14.1)
PMV BLD AUTO: 11.3 FL (ref 10.8–14.1)
PMV BLD AUTO: 11.4 FL (ref 9.4–12.3)
PMV BLD AUTO: 11.6 FL (ref 10.8–14.1)
PMV BLD AUTO: 11.6 FL (ref 10.8–14.1)
PMV BLD AUTO: 11.7 FL (ref 10.8–14.1)
PMV BLD AUTO: 11.8 FL (ref 10.8–14.1)
PMV BLD AUTO: 11.9 FL (ref 10.8–14.1)
PMV BLD AUTO: 12 FL (ref 10.8–14.1)
PMV BLD AUTO: 12.2 FL (ref 10.8–14.1)
PMV BLD AUTO: 9.4 FL (ref 9.4–12.3)
PMV BLD AUTO: ABNORMAL FL (ref 10.8–14.1)
POTASSIUM SERPL-SCNC: 2.9 MMOL/L (ref 3.5–5.1)
POTASSIUM SERPL-SCNC: 3.4 MMOL/L (ref 3.5–5.1)
POTASSIUM SERPL-SCNC: 3.5 MMOL/L (ref 3.5–5.1)
POTASSIUM SERPL-SCNC: 3.5 MMOL/L (ref 3.5–5.1)
POTASSIUM SERPL-SCNC: 3.6 MMOL/L (ref 3.5–5.1)
POTASSIUM SERPL-SCNC: 3.8 MMOL/L (ref 3.5–5.1)
POTASSIUM SERPL-SCNC: 3.8 MMOL/L (ref 3.5–5.1)
POTASSIUM SERPL-SCNC: 3.9 MMOL/L (ref 3.5–5.1)
POTASSIUM SERPL-SCNC: 4 MMOL/L (ref 3.5–5.1)
POTASSIUM SERPL-SCNC: 4 MMOL/L (ref 3.5–5.1)
POTASSIUM SERPL-SCNC: 4.4 MMOL/L (ref 3.5–5.1)
POTASSIUM SERPL-SCNC: 4.5 MMOL/L (ref 3.5–5.1)
POTASSIUM SERPL-SCNC: 4.6 MMOL/L (ref 3.5–5.1)
POTASSIUM SERPL-SCNC: 4.7 MMOL/L (ref 3.5–5.1)
POTASSIUM SERPL-SCNC: 4.8 MMOL/L (ref 3.5–5.1)
POTASSIUM SERPL-SCNC: 4.9 MMOL/L (ref 3.5–5.1)
POTASSIUM SERPL-SCNC: 5 MMOL/L (ref 3.5–5.1)
POTASSIUM SERPL-SCNC: 5.1 MMOL/L (ref 3.5–5.1)
POTASSIUM SERPL-SCNC: 5.2 MMOL/L (ref 3.5–5.1)
POTASSIUM SERPL-SCNC: 5.3 MMOL/L (ref 3.5–5.1)
POTASSIUM SERPL-SCNC: 5.4 MMOL/L (ref 3.5–5.1)
POTASSIUM SERPL-SCNC: 5.6 MMOL/L (ref 3.5–5.1)
POTASSIUM SERPL-SCNC: 5.8 MMOL/L (ref 3.5–5.1)
POTASSIUM SERPL-SCNC: 5.9 MMOL/L (ref 3.5–5.1)
POTASSIUM SERPL-SCNC: 6 MMOL/L (ref 3.5–5.1)
PROT FLD-MCNC: 2.7 G/DL
PROT SERPL-MCNC: 5.4 G/DL (ref 6.3–8.2)
PROT SERPL-MCNC: 5.9 G/DL (ref 6.3–8.2)
PROT SERPL-MCNC: 6.2 G/DL (ref 6.3–8.2)
PROT SERPL-MCNC: 6.3 G/DL (ref 6.3–8.2)
PROT SERPL-MCNC: 6.4 G/DL (ref 6.3–8.2)
PROT SERPL-MCNC: 6.6 G/DL (ref 6.3–8.2)
PROT SERPL-MCNC: 6.7 G/DL (ref 6.3–8.2)
PROT SERPL-MCNC: 6.8 G/DL (ref 6.3–8.2)
PROT SERPL-MCNC: 6.8 G/DL (ref 6.3–8.2)
PROT SERPL-MCNC: 6.9 G/DL (ref 6.3–8.2)
PROT SERPL-MCNC: 7 G/DL (ref 6.3–8.2)
PROT SERPL-MCNC: 7.1 G/DL (ref 6.3–8.2)
PROT SERPL-MCNC: 7.2 G/DL (ref 6.3–8.2)
PROT SERPL-MCNC: 7.3 G/DL (ref 6.3–8.2)
PROT SERPL-MCNC: 7.4 G/DL (ref 6.3–8.2)
PROT SERPL-MCNC: 7.5 G/DL (ref 6.3–8.2)
PROT SERPL-MCNC: 7.8 G/DL (ref 6.3–8.2)
PROT UR STRIP-MCNC: ABNORMAL MG/DL
PROTHROMBIN TIME: 14.4 SEC (ref 11.5–14.5)
PROTHROMBIN TIME: 14.9 SEC (ref 11.5–14.5)
PROTHROMBIN TIME: 15.4 SEC (ref 11.5–14.5)
PROTHROMBIN TIME: 15.6 SEC (ref 11.5–14.5)
PROTHROMBIN TIME: 15.7 SEC (ref 11.5–14.5)
PROTHROMBIN TIME: 16.2 SEC (ref 11.5–14.5)
PROTHROMBIN TIME: 16.7 SEC (ref 11.5–14.5)
PROTHROMBIN TIME: 17.2 SEC (ref 11.5–14.5)
PROTHROMBIN TIME: 17.3 SEC (ref 11.5–14.5)
PROTHROMBIN TIME: 17.4 SEC (ref 11.5–14.5)
PROTHROMBIN TIME: 17.4 SEC (ref 11.5–14.5)
PROTHROMBIN TIME: 17.9 SEC (ref 11.5–14.5)
PROTHROMBIN TIME: 18.7 SEC (ref 11.5–14.5)
PROTHROMBIN TIME: 19.3 SEC (ref 11.5–14.5)
PROTHROMBIN TIME: 19.3 SEC (ref 11.5–14.5)
PROTHROMBIN TIME: 19.6 SEC (ref 11.5–14.5)
PROTHROMBIN TIME: 20 SEC (ref 11.5–14.5)
PROTHROMBIN TIME: 21.2 SEC (ref 11.5–14.5)
PROTHROMBIN TIME: 22 SEC (ref 11.5–14.5)
PROTHROMBIN TIME: 22.1 SEC (ref 11.5–14.5)
PROTHROMBIN TIME: 22.2 SEC (ref 11.5–14.5)
PROTHROMBIN TIME: 22.3 SEC (ref 11.5–14.5)
PROTHROMBIN TIME: 23.3 SEC (ref 11.5–14.5)
PROTHROMBIN TIME: 23.4 SEC (ref 11.5–14.5)
PROTHROMBIN TIME: 23.7 SEC (ref 11.5–14.5)
PROTHROMBIN TIME: 23.8 SEC (ref 11.5–14.5)
PROTHROMBIN TIME: 24.1 SEC (ref 11.5–14.5)
PROTHROMBIN TIME: 26.2 SEC (ref 11.5–14.5)
PROTHROMBIN TIME: 27 SEC (ref 11.5–14.5)
PROTHROMBIN TIME: 27.2 SEC (ref 11.5–14.5)
PROTHROMBIN TIME: 28.7 SEC (ref 11.5–14.5)
PROTHROMBIN TIME: 30.2 SEC (ref 11.5–14.5)
PROTHROMBIN TIME: 30.3 SEC (ref 11.5–14.5)
PROTHROMBIN TIME: 30.7 SEC (ref 11.5–14.5)
PROTHROMBIN TIME: 30.8 SEC (ref 11.5–14.5)
PROTHROMBIN TIME: 30.9 SEC (ref 11.5–14.5)
PROTHROMBIN TIME: 31.8 SEC (ref 11.5–14.5)
PROTHROMBIN TIME: 32.6 SEC (ref 11.5–14.5)
PROTHROMBIN TIME: 32.9 SEC (ref 11.5–14.5)
PROTHROMBIN TIME: 33.5 SEC (ref 11.5–14.5)
PROTHROMBIN TIME: 33.7 SEC (ref 11.5–14.5)
PROTHROMBIN TIME: 34 SEC (ref 11.5–14.5)
PROTHROMBIN TIME: 36.5 SEC (ref 11.5–14.5)
PROTHROMBIN TIME: 37 SEC (ref 11.5–14.5)
PROTHROMBIN TIME: 42.6 SEC (ref 11.5–14.5)
PROTHROMBIN TIME: 43.6 SEC (ref 11.5–14.5)
PROTHROMBIN TIME: 46.8 SEC (ref 11.5–14.5)
PROTHROMBIN TIME: 47 SEC (ref 11.5–14.5)
PROTHROMBIN TIME: 51.1 SEC (ref 11.5–14.5)
PROTHROMBIN TIME: 54.6 SEC (ref 11.5–14.5)
PT BLD: 15 SECS (ref 9.6–11.6)
PT BLD: 21.1 SECS (ref 9.6–11.6)
PT BLD: 21.9 SECS (ref 9.6–11.6)
Q-T INTERVAL, ECG07: 372 MS
Q-T INTERVAL, ECG07: 406 MS
QRS DURATION, ECG06: 76 MS
QRS DURATION, ECG06: 90 MS
QTC CALCULATION (BEZET), ECG08: 412 MS
QTC CALCULATION (BEZET), ECG08: 432 MS
RBC # BLD AUTO: 2.05 M/UL (ref 4.05–5.25)
RBC # BLD AUTO: 2.14 M/UL (ref 4.05–5.25)
RBC # BLD AUTO: 2.18 M/UL (ref 4.05–5.25)
RBC # BLD AUTO: 2.21 M/UL (ref 4.05–5.25)
RBC # BLD AUTO: 2.26 M/UL (ref 4.05–5.25)
RBC # BLD AUTO: 2.27 M/UL (ref 4.05–5.2)
RBC # BLD AUTO: 2.32 M/UL (ref 4.05–5.2)
RBC # BLD AUTO: 2.32 M/UL (ref 4.05–5.25)
RBC # BLD AUTO: 2.34 M/UL (ref 4.05–5.2)
RBC # BLD AUTO: 2.34 M/UL (ref 4.05–5.25)
RBC # BLD AUTO: 2.36 M/UL (ref 4.05–5.25)
RBC # BLD AUTO: 2.36 M/UL (ref 4.05–5.25)
RBC # BLD AUTO: 2.41 M/UL (ref 4.05–5.25)
RBC # BLD AUTO: 2.45 M/UL (ref 4.05–5.25)
RBC # BLD AUTO: 2.45 M/UL (ref 4.05–5.25)
RBC # BLD AUTO: 2.51 M/UL (ref 4.05–5.25)
RBC # BLD AUTO: 2.53 M/UL (ref 4.05–5.25)
RBC # BLD AUTO: 2.6 M/UL (ref 4.05–5.25)
RBC # BLD AUTO: 2.6 M/UL (ref 4.05–5.25)
RBC # BLD AUTO: 2.63 M/UL (ref 4.05–5.25)
RBC # BLD AUTO: 2.63 M/UL (ref 4.05–5.25)
RBC # BLD AUTO: 2.67 M/UL (ref 4.05–5.25)
RBC # BLD AUTO: 2.75 M/UL (ref 4.05–5.25)
RBC # BLD AUTO: 3.01 M/UL (ref 4.05–5.25)
RBC # FLD: NORMAL /CU MM
RBC #/AREA URNS HPF: 0 /HPF
RBC #/AREA URNS HPF: ABNORMAL /HPF
RBC MORPH BLD: ABNORMAL
SERVICE CMNT-IMP: ABNORMAL
SERVICE CMNT-IMP: NORMAL
SERVICE CMNT-IMP: NORMAL
SODIUM SERPL-SCNC: 134 MMOL/L (ref 136–145)
SODIUM SERPL-SCNC: 138 MMOL/L (ref 136–145)
SODIUM SERPL-SCNC: 139 MMOL/L (ref 136–145)
SODIUM SERPL-SCNC: 139 MMOL/L (ref 136–145)
SODIUM SERPL-SCNC: 140 MMOL/L (ref 136–145)
SODIUM SERPL-SCNC: 141 MMOL/L (ref 136–145)
SODIUM SERPL-SCNC: 142 MMOL/L (ref 136–145)
SODIUM SERPL-SCNC: 143 MMOL/L (ref 136–145)
SODIUM SERPL-SCNC: 144 MMOL/L (ref 136–145)
SODIUM SERPL-SCNC: 145 MMOL/L (ref 136–145)
SODIUM SERPL-SCNC: 146 MMOL/L (ref 136–145)
SODIUM SERPL-SCNC: 146 MMOL/L (ref 136–145)
SP GR UR REFRACTOMETRY: 1.01 (ref 1–1.02)
SPECIMEN EXP DATE BLD: NORMAL
SPECIMEN EXP DATE BLD: NORMAL
SPECIMEN SOURCE FLD: NORMAL
SPECIMEN SOURCE: NORMAL
STATUS OF UNIT,%ST: NORMAL
TIBC SERPL-MCNC: 189 UG/DL (ref 250–450)
TOTAL CELLS COUNTED SPEC: 50
TRANSFERRIN SERPL-MCNC: 144 MG/DL (ref 202–364)
TROPONIN I SERPL-MCNC: 0.23 NG/ML (ref 0.02–0.05)
TROPONIN I SERPL-MCNC: 0.24 NG/ML (ref 0.02–0.05)
TROPONIN I SERPL-MCNC: 0.25 NG/ML (ref 0.02–0.05)
UNIT DIVISION, %UDIV: 0
UNIT DIVISION, %UDIV: NORMAL
UNIT DIVISION, %UDIV: NORMAL
UROBILINOGEN UR QL STRIP.AUTO: 0.2 EU/DL (ref 0.2–1)
VANCOMYCIN SERPL-MCNC: 17.6 UG/ML
VENTRICULAR RATE, ECG03: 62 BPM
VENTRICULAR RATE, ECG03: 81 BPM
VIT B12 SERPL-MCNC: 884 PG/ML (ref 193–986)
WBC # BLD AUTO: 1.7 K/UL (ref 4.3–11.1)
WBC # BLD AUTO: 1.9 K/UL (ref 4.3–11.1)
WBC # BLD AUTO: 1.9 K/UL (ref 4.3–11.1)
WBC # BLD AUTO: 13.5 K/UL (ref 4.3–11.1)
WBC # BLD AUTO: 2.1 K/UL (ref 4.3–11.1)
WBC # BLD AUTO: 2.2 K/UL (ref 4.3–11.1)
WBC # BLD AUTO: 2.2 K/UL (ref 4.3–11.1)
WBC # BLD AUTO: 2.3 K/UL (ref 4.3–11.1)
WBC # BLD AUTO: 2.5 K/UL (ref 4.3–11.1)
WBC # BLD AUTO: 2.5 K/UL (ref 4.3–11.1)
WBC # BLD AUTO: 2.6 K/UL (ref 4.3–11.1)
WBC # BLD AUTO: 2.7 K/UL (ref 4.3–11.1)
WBC # BLD AUTO: 2.8 K/UL (ref 4.3–11.1)
WBC # BLD AUTO: 3.2 K/UL (ref 4.3–11.1)
WBC # BLD AUTO: 3.2 K/UL (ref 4.3–11.1)
WBC # BLD AUTO: 3.5 K/UL (ref 4.3–11.1)
WBC # BLD AUTO: 3.7 K/UL (ref 4.3–11.1)
WBC # BLD AUTO: 4.2 K/UL (ref 4.3–11.1)
WBC # BLD AUTO: 4.4 K/UL (ref 4.3–11.1)
WBC # BLD AUTO: 4.7 K/UL (ref 4.3–11.1)
WBC # BLD AUTO: 4.7 K/UL (ref 4.3–11.1)
WBC # BLD AUTO: 7.2 K/UL (ref 4.3–11.1)
WBC MORPH BLD: ABNORMAL
WBC URNS QL MICRO: ABNORMAL /HPF
WBC URNS QL MICRO: ABNORMAL /HPF

## 2018-01-01 PROCEDURE — G0299 HHS/HOSPICE OF RN EA 15 MIN: HCPCS

## 2018-01-01 PROCEDURE — 85610 PROTHROMBIN TIME: CPT

## 2018-01-01 PROCEDURE — 96372 THER/PROPH/DIAG INJ SC/IM: CPT

## 2018-01-01 PROCEDURE — 97110 THERAPEUTIC EXERCISES: CPT

## 2018-01-01 PROCEDURE — 36416 COLLJ CAPILLARY BLOOD SPEC: CPT

## 2018-01-01 PROCEDURE — 74011250637 HC RX REV CODE- 250/637: Performed by: NURSE PRACTITIONER

## 2018-01-01 PROCEDURE — 80069 RENAL FUNCTION PANEL: CPT | Performed by: INTERNAL MEDICINE

## 2018-01-01 PROCEDURE — 94640 AIRWAY INHALATION TREATMENT: CPT

## 2018-01-01 PROCEDURE — C9113 INJ PANTOPRAZOLE SODIUM, VIA: HCPCS | Performed by: STUDENT IN AN ORGANIZED HEALTH CARE EDUCATION/TRAINING PROGRAM

## 2018-01-01 PROCEDURE — 82042 OTHER SOURCE ALBUMIN QUAN EA: CPT | Performed by: INTERNAL MEDICINE

## 2018-01-01 PROCEDURE — 93971 EXTREMITY STUDY: CPT

## 2018-01-01 PROCEDURE — 93005 ELECTROCARDIOGRAM TRACING: CPT | Performed by: EMERGENCY MEDICINE

## 2018-01-01 PROCEDURE — 82378 CARCINOEMBRYONIC ANTIGEN: CPT | Performed by: INTERNAL MEDICINE

## 2018-01-01 PROCEDURE — 80053 COMPREHEN METABOLIC PANEL: CPT | Performed by: INTERNAL MEDICINE

## 2018-01-01 PROCEDURE — 36416 COLLJ CAPILLARY BLOOD SPEC: CPT | Performed by: INTERNAL MEDICINE

## 2018-01-01 PROCEDURE — 99232 SBSQ HOSP IP/OBS MODERATE 35: CPT | Performed by: INTERNAL MEDICINE

## 2018-01-01 PROCEDURE — 74011250636 HC RX REV CODE- 250/636: Performed by: INTERNAL MEDICINE

## 2018-01-01 PROCEDURE — 82378 CARCINOEMBRYONIC ANTIGEN: CPT

## 2018-01-01 PROCEDURE — 99233 SBSQ HOSP IP/OBS HIGH 50: CPT | Performed by: INTERNAL MEDICINE

## 2018-01-01 PROCEDURE — 36415 COLL VENOUS BLD VENIPUNCTURE: CPT

## 2018-01-01 PROCEDURE — 85025 COMPLETE CBC W/AUTO DIFF WBC: CPT | Performed by: INTERNAL MEDICINE

## 2018-01-01 PROCEDURE — 76060000032 HC ANESTHESIA 0.5 TO 1 HR

## 2018-01-01 PROCEDURE — 74011636637 HC RX REV CODE- 636/637: Performed by: HOSPITALIST

## 2018-01-01 PROCEDURE — 74011000250 HC RX REV CODE- 250: Performed by: NURSE PRACTITIONER

## 2018-01-01 PROCEDURE — 89050 BODY FLUID CELL COUNT: CPT | Performed by: INTERNAL MEDICINE

## 2018-01-01 PROCEDURE — 36415 COLL VENOUS BLD VENIPUNCTURE: CPT | Performed by: INTERNAL MEDICINE

## 2018-01-01 PROCEDURE — 85730 THROMBOPLASTIN TIME PARTIAL: CPT

## 2018-01-01 PROCEDURE — 76210000006 HC OR PH I REC 0.5 TO 1 HR: Performed by: UROLOGY

## 2018-01-01 PROCEDURE — 86300 IMMUNOASSAY TUMOR CA 15-3: CPT

## 2018-01-01 PROCEDURE — 85610 PROTHROMBIN TIME: CPT | Performed by: INTERNAL MEDICINE

## 2018-01-01 PROCEDURE — 74011250636 HC RX REV CODE- 250/636: Performed by: NURSE PRACTITIONER

## 2018-01-01 PROCEDURE — 400013 HH SOC

## 2018-01-01 PROCEDURE — 76060000032 HC ANESTHESIA 0.5 TO 1 HR: Performed by: INTERNAL MEDICINE

## 2018-01-01 PROCEDURE — 83880 ASSAY OF NATRIURETIC PEPTIDE: CPT | Performed by: EMERGENCY MEDICINE

## 2018-01-01 PROCEDURE — 0656 HSPC GENERAL INPATIENT

## 2018-01-01 PROCEDURE — 86300 IMMUNOASSAY TUMOR CA 15-3: CPT | Performed by: INTERNAL MEDICINE

## 2018-01-01 PROCEDURE — 85379 FIBRIN DEGRADATION QUANT: CPT | Performed by: INTERNAL MEDICINE

## 2018-01-01 PROCEDURE — 85025 COMPLETE CBC W/AUTO DIFF WBC: CPT | Performed by: NURSE PRACTITIONER

## 2018-01-01 PROCEDURE — A7048 VACUUM DRAIN BOTTLE/TUBE KIT: HCPCS

## 2018-01-01 PROCEDURE — 78815 PET IMAGE W/CT SKULL-THIGH: CPT

## 2018-01-01 PROCEDURE — 77030037400 HC ADH TISS HI VISC EXOFIN CHMP -B

## 2018-01-01 PROCEDURE — A4300 CATH IMPL VASC ACCESS PORTAL: HCPCS

## 2018-01-01 PROCEDURE — C1729 CATH, DRAINAGE: HCPCS

## 2018-01-01 PROCEDURE — 76010000138 HC OR TIME 0.5 TO 1 HR: Performed by: UROLOGY

## 2018-01-01 PROCEDURE — C2617 STENT, NON-COR, TEM W/O DEL: HCPCS | Performed by: UROLOGY

## 2018-01-01 PROCEDURE — C9113 INJ PANTOPRAZOLE SODIUM, VIA: HCPCS | Performed by: INTERNAL MEDICINE

## 2018-01-01 PROCEDURE — 81001 URINALYSIS AUTO W/SCOPE: CPT | Performed by: NURSE PRACTITIONER

## 2018-01-01 PROCEDURE — G0157 HHC PT ASSISTANT EA 15: HCPCS

## 2018-01-01 PROCEDURE — 86901 BLOOD TYPING SEROLOGIC RH(D): CPT

## 2018-01-01 PROCEDURE — 77030020263 HC SOL INJ SOD CL0.9% LFCR 1000ML

## 2018-01-01 PROCEDURE — 65270000029 HC RM PRIVATE

## 2018-01-01 PROCEDURE — 80053 COMPREHEN METABOLIC PANEL: CPT

## 2018-01-01 PROCEDURE — 80074 ACUTE HEPATITIS PANEL: CPT | Performed by: INTERNAL MEDICINE

## 2018-01-01 PROCEDURE — 96367 TX/PROPH/DG ADDL SEQ IV INF: CPT | Performed by: STUDENT IN AN ORGANIZED HEALTH CARE EDUCATION/TRAINING PROGRAM

## 2018-01-01 PROCEDURE — 30233M1 TRANSFUSION OF NONAUTOLOGOUS PLASMA CRYOPRECIPITATE INTO PERIPHERAL VEIN, PERCUTANEOUS APPROACH: ICD-10-PCS | Performed by: FAMILY MEDICINE

## 2018-01-01 PROCEDURE — 80053 COMPREHEN METABOLIC PANEL: CPT | Performed by: NURSE PRACTITIONER

## 2018-01-01 PROCEDURE — 83735 ASSAY OF MAGNESIUM: CPT

## 2018-01-01 PROCEDURE — 74011250636 HC RX REV CODE- 250/636

## 2018-01-01 PROCEDURE — 76210000021 HC REC RM PH II 0.5 TO 1 HR: Performed by: UROLOGY

## 2018-01-01 PROCEDURE — 74011636320 HC RX REV CODE- 636/320: Performed by: INTERNAL MEDICINE

## 2018-01-01 PROCEDURE — 88305 TISSUE EXAM BY PATHOLOGIST: CPT | Performed by: INTERNAL MEDICINE

## 2018-01-01 PROCEDURE — 74011000250 HC RX REV CODE- 250: Performed by: PHYSICIAN ASSISTANT

## 2018-01-01 PROCEDURE — G0151 HHCP-SERV OF PT,EA 15 MIN: HCPCS

## 2018-01-01 PROCEDURE — 83735 ASSAY OF MAGNESIUM: CPT | Performed by: INTERNAL MEDICINE

## 2018-01-01 PROCEDURE — 76040000025: Performed by: INTERNAL MEDICINE

## 2018-01-01 PROCEDURE — 0W9G3ZZ DRAINAGE OF PERITONEAL CAVITY, PERCUTANEOUS APPROACH: ICD-10-PCS | Performed by: PHYSICIAN ASSISTANT

## 2018-01-01 PROCEDURE — 82728 ASSAY OF FERRITIN: CPT | Performed by: INTERNAL MEDICINE

## 2018-01-01 PROCEDURE — 80048 BASIC METABOLIC PNL TOTAL CA: CPT

## 2018-01-01 PROCEDURE — 96360 HYDRATION IV INFUSION INIT: CPT

## 2018-01-01 PROCEDURE — 77030009426 HC FCPS BIOP ENDOSC BSC -B: Performed by: INTERNAL MEDICINE

## 2018-01-01 PROCEDURE — P9059 PLASMA, FRZ BETWEEN 8-24HOUR: HCPCS

## 2018-01-01 PROCEDURE — 86923 COMPATIBILITY TEST ELECTRIC: CPT

## 2018-01-01 PROCEDURE — 93005 ELECTROCARDIOGRAM TRACING: CPT | Performed by: INTERNAL MEDICINE

## 2018-01-01 PROCEDURE — 99284 EMERGENCY DEPT VISIT MOD MDM: CPT | Performed by: STUDENT IN AN ORGANIZED HEALTH CARE EDUCATION/TRAINING PROGRAM

## 2018-01-01 PROCEDURE — 87205 SMEAR GRAM STAIN: CPT | Performed by: INTERNAL MEDICINE

## 2018-01-01 PROCEDURE — 74011250637 HC RX REV CODE- 250/637: Performed by: INTERNAL MEDICINE

## 2018-01-01 PROCEDURE — 99211 OFF/OP EST MAY X REQ PHY/QHP: CPT

## 2018-01-01 PROCEDURE — 77030031131 HC SUT MXN P COVD -B

## 2018-01-01 PROCEDURE — 76705 ECHO EXAM OF ABDOMEN: CPT

## 2018-01-01 PROCEDURE — 74011000250 HC RX REV CODE- 250: Performed by: INTERNAL MEDICINE

## 2018-01-01 PROCEDURE — 74011000258 HC RX REV CODE- 258: Performed by: INTERNAL MEDICINE

## 2018-01-01 PROCEDURE — 74011250636 HC RX REV CODE- 250/636: Performed by: FAMILY MEDICINE

## 2018-01-01 PROCEDURE — 86300 IMMUNOASSAY TUMOR CA 15-3: CPT | Performed by: NURSE PRACTITIONER

## 2018-01-01 PROCEDURE — 77030020143 HC AIRWY LARYN INTUB CGAS -A: Performed by: ANESTHESIOLOGY

## 2018-01-01 PROCEDURE — 49083 ABD PARACENTESIS W/IMAGING: CPT

## 2018-01-01 PROCEDURE — 85018 HEMOGLOBIN: CPT

## 2018-01-01 PROCEDURE — 65660000000 HC RM CCU STEPDOWN

## 2018-01-01 PROCEDURE — 74011250636 HC RX REV CODE- 250/636: Performed by: ANESTHESIOLOGY

## 2018-01-01 PROCEDURE — 74011250636 HC RX REV CODE- 250/636: Performed by: PHYSICIAN ASSISTANT

## 2018-01-01 PROCEDURE — 77030008477 HC STYL SATN SLP COVD -A: Performed by: ANESTHESIOLOGY

## 2018-01-01 PROCEDURE — 84100 ASSAY OF PHOSPHORUS: CPT | Performed by: INTERNAL MEDICINE

## 2018-01-01 PROCEDURE — 96402 CHEMO HORMON ANTINEOPL SQ/IM: CPT

## 2018-01-01 PROCEDURE — 74011000250 HC RX REV CODE- 250

## 2018-01-01 PROCEDURE — 36415 COLL VENOUS BLD VENIPUNCTURE: CPT | Performed by: NURSE PRACTITIONER

## 2018-01-01 PROCEDURE — 74011250636 HC RX REV CODE- 250/636: Performed by: STUDENT IN AN ORGANIZED HEALTH CARE EDUCATION/TRAINING PROGRAM

## 2018-01-01 PROCEDURE — 85025 COMPLETE CBC W/AUTO DIFF WBC: CPT

## 2018-01-01 PROCEDURE — 77030003560 HC NDL HUBR BARD -A

## 2018-01-01 PROCEDURE — 82378 CARCINOEMBRYONIC ANTIGEN: CPT | Performed by: NURSE PRACTITIONER

## 2018-01-01 PROCEDURE — 84100 ASSAY OF PHOSPHORUS: CPT

## 2018-01-01 PROCEDURE — 86644 CMV ANTIBODY: CPT

## 2018-01-01 PROCEDURE — 99231 SBSQ HOSP IP/OBS SF/LOW 25: CPT | Performed by: INTERNAL MEDICINE

## 2018-01-01 PROCEDURE — 74011000250 HC RX REV CODE- 250: Performed by: STUDENT IN AN ORGANIZED HEALTH CARE EDUCATION/TRAINING PROGRAM

## 2018-01-01 PROCEDURE — 84132 ASSAY OF SERUM POTASSIUM: CPT

## 2018-01-01 PROCEDURE — 0W9G30Z DRAINAGE OF PERITONEAL CAVITY WITH DRAINAGE DEVICE, PERCUTANEOUS APPROACH: ICD-10-PCS | Performed by: PHYSICIAN ASSISTANT

## 2018-01-01 PROCEDURE — 85014 HEMATOCRIT: CPT

## 2018-01-01 PROCEDURE — 96366 THER/PROPH/DIAG IV INF ADDON: CPT | Performed by: STUDENT IN AN ORGANIZED HEALTH CARE EDUCATION/TRAINING PROGRAM

## 2018-01-01 PROCEDURE — 74011000250 HC RX REV CODE- 250: Performed by: FAMILY MEDICINE

## 2018-01-01 PROCEDURE — 77030002916 HC SUT ETHLN J&J -A

## 2018-01-01 PROCEDURE — 77030019927 HC TBNG IRR CYSTO BAXT -A: Performed by: UROLOGY

## 2018-01-01 PROCEDURE — 74011250636 HC RX REV CODE- 250/636: Performed by: EMERGENCY MEDICINE

## 2018-01-01 PROCEDURE — 83690 ASSAY OF LIPASE: CPT

## 2018-01-01 PROCEDURE — 84157 ASSAY OF PROTEIN OTHER: CPT | Performed by: INTERNAL MEDICINE

## 2018-01-01 PROCEDURE — 83605 ASSAY OF LACTIC ACID: CPT

## 2018-01-01 PROCEDURE — 83540 ASSAY OF IRON: CPT | Performed by: INTERNAL MEDICINE

## 2018-01-01 PROCEDURE — 96365 THER/PROPH/DIAG IV INF INIT: CPT | Performed by: STUDENT IN AN ORGANIZED HEALTH CARE EDUCATION/TRAINING PROGRAM

## 2018-01-01 PROCEDURE — 80048 BASIC METABOLIC PNL TOTAL CA: CPT | Performed by: INTERNAL MEDICINE

## 2018-01-01 PROCEDURE — 99223 1ST HOSP IP/OBS HIGH 75: CPT | Performed by: INTERNAL MEDICINE

## 2018-01-01 PROCEDURE — 96361 HYDRATE IV INFUSION ADD-ON: CPT | Performed by: STUDENT IN AN ORGANIZED HEALTH CARE EDUCATION/TRAINING PROGRAM

## 2018-01-01 PROCEDURE — 85610 PROTHROMBIN TIME: CPT | Performed by: NURSE PRACTITIONER

## 2018-01-01 PROCEDURE — P9059 PLASMA, FRZ BETWEEN 8-24HOUR: HCPCS | Performed by: NURSE PRACTITIONER

## 2018-01-01 PROCEDURE — 82962 GLUCOSE BLOOD TEST: CPT

## 2018-01-01 PROCEDURE — 74011000258 HC RX REV CODE- 258: Performed by: FAMILY MEDICINE

## 2018-01-01 PROCEDURE — 71046 X-RAY EXAM CHEST 2 VIEWS: CPT

## 2018-01-01 PROCEDURE — 36430 TRANSFUSION BLD/BLD COMPNT: CPT

## 2018-01-01 PROCEDURE — 83735 ASSAY OF MAGNESIUM: CPT | Performed by: NURSE PRACTITIONER

## 2018-01-01 PROCEDURE — 87804 INFLUENZA ASSAY W/OPTIC: CPT

## 2018-01-01 PROCEDURE — 99239 HOSP IP/OBS DSCHRG MGMT >30: CPT | Performed by: INTERNAL MEDICINE

## 2018-01-01 PROCEDURE — 88305 TISSUE EXAM BY PATHOLOGIST: CPT

## 2018-01-01 PROCEDURE — 74011000258 HC RX REV CODE- 258: Performed by: STUDENT IN AN ORGANIZED HEALTH CARE EDUCATION/TRAINING PROGRAM

## 2018-01-01 PROCEDURE — 88112 CYTOPATH CELL ENHANCE TECH: CPT

## 2018-01-01 PROCEDURE — 74022 RADEX COMPL AQT ABD SERIES: CPT

## 2018-01-01 PROCEDURE — 99285 EMERGENCY DEPT VISIT HI MDM: CPT | Performed by: EMERGENCY MEDICINE

## 2018-01-01 PROCEDURE — 93306 TTE W/DOPPLER COMPLETE: CPT

## 2018-01-01 PROCEDURE — 99222 1ST HOSP IP/OBS MODERATE 55: CPT | Performed by: INTERNAL MEDICINE

## 2018-01-01 PROCEDURE — 80053 COMPREHEN METABOLIC PANEL: CPT | Performed by: EMERGENCY MEDICINE

## 2018-01-01 PROCEDURE — 74011636320 HC RX REV CODE- 636/320: Performed by: UROLOGY

## 2018-01-01 PROCEDURE — 77030032490 HC SLV COMPR SCD KNE COVD -B: Performed by: UROLOGY

## 2018-01-01 PROCEDURE — 49406 IMAGE CATH FLUID PERI/RETRO: CPT

## 2018-01-01 PROCEDURE — 77030039270 HC TU BLD FLTR CARD -A

## 2018-01-01 PROCEDURE — P9040 RBC LEUKOREDUCED IRRADIATED: HCPCS

## 2018-01-01 PROCEDURE — 84484 ASSAY OF TROPONIN QUANT: CPT | Performed by: EMERGENCY MEDICINE

## 2018-01-01 PROCEDURE — 30233N1 TRANSFUSION OF NONAUTOLOGOUS RED BLOOD CELLS INTO PERIPHERAL VEIN, PERCUTANEOUS APPROACH: ICD-10-PCS | Performed by: FAMILY MEDICINE

## 2018-01-01 PROCEDURE — 81256 HFE GENE: CPT | Performed by: INTERNAL MEDICINE

## 2018-01-01 PROCEDURE — 30233L1 TRANSFUSION OF NONAUTOLOGOUS FRESH PLASMA INTO PERIPHERAL VEIN, PERCUTANEOUS APPROACH: ICD-10-PCS | Performed by: NURSE PRACTITIONER

## 2018-01-01 PROCEDURE — 74011250636 HC RX REV CODE- 250/636: Performed by: UROLOGY

## 2018-01-01 PROCEDURE — 82728 ASSAY OF FERRITIN: CPT

## 2018-01-01 PROCEDURE — 82607 VITAMIN B-12: CPT

## 2018-01-01 PROCEDURE — 81015 MICROSCOPIC EXAM OF URINE: CPT

## 2018-01-01 PROCEDURE — 77030008703 HC TU ET UNCUF COVD -A: Performed by: ANESTHESIOLOGY

## 2018-01-01 PROCEDURE — 87040 BLOOD CULTURE FOR BACTERIA: CPT

## 2018-01-01 PROCEDURE — 96374 THER/PROPH/DIAG INJ IV PUSH: CPT | Performed by: STUDENT IN AN ORGANIZED HEALTH CARE EDUCATION/TRAINING PROGRAM

## 2018-01-01 PROCEDURE — 76770 US EXAM ABDO BACK WALL COMP: CPT

## 2018-01-01 PROCEDURE — 65270000015 HC RM PRIVATE ONCOLOGY

## 2018-01-01 PROCEDURE — 80202 ASSAY OF VANCOMYCIN: CPT

## 2018-01-01 PROCEDURE — 74011636637 HC RX REV CODE- 636/637: Performed by: FAMILY MEDICINE

## 2018-01-01 PROCEDURE — 77030018832 HC SOL IRR H20 ICUM -A: Performed by: UROLOGY

## 2018-01-01 PROCEDURE — 96375 TX/PRO/DX INJ NEW DRUG ADDON: CPT | Performed by: STUDENT IN AN ORGANIZED HEALTH CARE EDUCATION/TRAINING PROGRAM

## 2018-01-01 PROCEDURE — 96372 THER/PROPH/DIAG INJ SC/IM: CPT | Performed by: STUDENT IN AN ORGANIZED HEALTH CARE EDUCATION/TRAINING PROGRAM

## 2018-01-01 PROCEDURE — 94760 N-INVAS EAR/PLS OXIMETRY 1: CPT

## 2018-01-01 PROCEDURE — 74011250637 HC RX REV CODE- 250/637: Performed by: ANESTHESIOLOGY

## 2018-01-01 PROCEDURE — 77030028127 HC DRN KT PLEURX SYS BD -D

## 2018-01-01 PROCEDURE — 74011636637 HC RX REV CODE- 636/637: Performed by: INTERNAL MEDICINE

## 2018-01-01 PROCEDURE — 97161 PT EVAL LOW COMPLEX 20 MIN: CPT

## 2018-01-01 PROCEDURE — C1769 GUIDE WIRE: HCPCS | Performed by: UROLOGY

## 2018-01-01 PROCEDURE — 84466 ASSAY OF TRANSFERRIN: CPT | Performed by: INTERNAL MEDICINE

## 2018-01-01 PROCEDURE — 82140 ASSAY OF AMMONIA: CPT

## 2018-01-01 PROCEDURE — 87205 SMEAR GRAM STAIN: CPT

## 2018-01-01 PROCEDURE — P9047 ALBUMIN (HUMAN), 25%, 50ML: HCPCS | Performed by: PHYSICIAN ASSISTANT

## 2018-01-01 PROCEDURE — 76060000032 HC ANESTHESIA 0.5 TO 1 HR: Performed by: UROLOGY

## 2018-01-01 PROCEDURE — 76210000016 HC OR PH I REC 1 TO 1.5 HR

## 2018-01-01 PROCEDURE — 3336500001 HSPC ELECTION

## 2018-01-01 PROCEDURE — 82746 ASSAY OF FOLIC ACID SERUM: CPT

## 2018-01-01 DEVICE — RESONANCE, METALLIC URETERAL STENT AND INTRODUCER
Type: IMPLANTABLE DEVICE | Site: URETER | Status: FUNCTIONAL
Brand: RESONANCE

## 2018-01-01 RX ORDER — LIDOCAINE HYDROCHLORIDE 10 MG/ML
20-200 INJECTION INFILTRATION; PERINEURAL ONCE
Status: COMPLETED | OUTPATIENT
Start: 2018-01-01 | End: 2018-01-01

## 2018-01-01 RX ORDER — ONDANSETRON 2 MG/ML
4 INJECTION INTRAMUSCULAR; INTRAVENOUS
Status: DISCONTINUED | OUTPATIENT
Start: 2018-01-01 | End: 2018-01-01 | Stop reason: HOSPADM

## 2018-01-01 RX ORDER — PROPOFOL 10 MG/ML
INJECTION, EMULSION INTRAVENOUS
Status: DISCONTINUED | OUTPATIENT
Start: 2018-01-01 | End: 2018-01-01 | Stop reason: HOSPADM

## 2018-01-01 RX ORDER — AMLODIPINE BESYLATE 5 MG/1
10 TABLET ORAL DAILY
Status: DISCONTINUED | OUTPATIENT
Start: 2018-01-01 | End: 2018-01-01 | Stop reason: HOSPADM

## 2018-01-01 RX ORDER — SODIUM CHLORIDE 0.9 % (FLUSH) 0.9 %
5-10 SYRINGE (ML) INJECTION
Status: COMPLETED | OUTPATIENT
Start: 2018-01-01 | End: 2018-01-01

## 2018-01-01 RX ORDER — CYANOCOBALAMIN 1000 UG/ML
1000 INJECTION, SOLUTION INTRAMUSCULAR; SUBCUTANEOUS ONCE
Status: COMPLETED | OUTPATIENT
Start: 2018-01-01 | End: 2018-01-01

## 2018-01-01 RX ORDER — LIDOCAINE HYDROCHLORIDE 20 MG/ML
INJECTION, SOLUTION EPIDURAL; INFILTRATION; INTRACAUDAL; PERINEURAL AS NEEDED
Status: DISCONTINUED | OUTPATIENT
Start: 2018-01-01 | End: 2018-01-01 | Stop reason: HOSPADM

## 2018-01-01 RX ORDER — ALBUTEROL SULFATE 0.83 MG/ML
2.5 SOLUTION RESPIRATORY (INHALATION)
Status: COMPLETED | OUTPATIENT
Start: 2018-01-01 | End: 2018-01-01

## 2018-01-01 RX ORDER — CEFAZOLIN SODIUM/WATER 2 G/20 ML
2 SYRINGE (ML) INTRAVENOUS ONCE
Status: COMPLETED | OUTPATIENT
Start: 2018-01-01 | End: 2018-01-01

## 2018-01-01 RX ORDER — MIDAZOLAM HYDROCHLORIDE 1 MG/ML
5 INJECTION, SOLUTION INTRAMUSCULAR; INTRAVENOUS ONCE
Status: CANCELLED | OUTPATIENT
Start: 2018-01-01 | End: 2018-01-01

## 2018-01-01 RX ORDER — ONDANSETRON 2 MG/ML
INJECTION INTRAMUSCULAR; INTRAVENOUS AS NEEDED
Status: DISCONTINUED | OUTPATIENT
Start: 2018-01-01 | End: 2018-01-01 | Stop reason: HOSPADM

## 2018-01-01 RX ORDER — SODIUM CHLORIDE, SODIUM LACTATE, POTASSIUM CHLORIDE, CALCIUM CHLORIDE 600; 310; 30; 20 MG/100ML; MG/100ML; MG/100ML; MG/100ML
75 INJECTION, SOLUTION INTRAVENOUS CONTINUOUS
Status: DISCONTINUED | OUTPATIENT
Start: 2018-01-01 | End: 2018-01-01 | Stop reason: HOSPADM

## 2018-01-01 RX ORDER — ENOXAPARIN SODIUM 100 MG/ML
60 INJECTION SUBCUTANEOUS ONCE
Status: COMPLETED | OUTPATIENT
Start: 2018-01-01 | End: 2018-01-01

## 2018-01-01 RX ORDER — ACETAMINOPHEN 325 MG/1
650 TABLET ORAL
Status: DISCONTINUED | OUTPATIENT
Start: 2018-01-01 | End: 2018-01-01 | Stop reason: HOSPADM

## 2018-01-01 RX ORDER — ENOXAPARIN SODIUM 100 MG/ML
60 INJECTION SUBCUTANEOUS AS NEEDED
Status: COMPLETED | OUTPATIENT
Start: 2018-01-01 | End: 2018-01-01

## 2018-01-01 RX ORDER — HALOPERIDOL 5 MG/ML
2 INJECTION INTRAMUSCULAR
Status: DISCONTINUED | OUTPATIENT
Start: 2018-01-01 | End: 2018-09-30 | Stop reason: HOSPADM

## 2018-01-01 RX ORDER — ACETAMINOPHEN 650 MG/1
650 SUPPOSITORY RECTAL
Status: DISCONTINUED | OUTPATIENT
Start: 2018-01-01 | End: 2018-09-30 | Stop reason: HOSPADM

## 2018-01-01 RX ORDER — FENTANYL 50 UG/1
1 PATCH TRANSDERMAL
Status: DISCONTINUED | OUTPATIENT
Start: 2018-01-01 | End: 2018-01-01 | Stop reason: HOSPADM

## 2018-01-01 RX ORDER — SODIUM CHLORIDE 9 MG/ML
75 INJECTION, SOLUTION INTRAVENOUS CONTINUOUS
Status: DISCONTINUED | OUTPATIENT
Start: 2018-01-01 | End: 2018-01-01

## 2018-01-01 RX ORDER — PHENAZOPYRIDINE HYDROCHLORIDE 200 MG/1
200 TABLET, FILM COATED ORAL
Qty: 10 TAB | Refills: 2 | Status: SHIPPED | OUTPATIENT
Start: 2018-01-01 | End: 2018-01-01

## 2018-01-01 RX ORDER — ENOXAPARIN SODIUM 100 MG/ML
80 INJECTION SUBCUTANEOUS DAILY
Qty: 5 SYRINGE | Refills: 0 | Status: SHIPPED | OUTPATIENT
Start: 2018-01-01 | End: 2018-01-01

## 2018-01-01 RX ORDER — DEXTROSE 50 % IN WATER (D50W) INTRAVENOUS SYRINGE
25 ONCE
Status: COMPLETED | OUTPATIENT
Start: 2018-01-01 | End: 2018-01-01

## 2018-01-01 RX ORDER — HEPARIN 100 UNIT/ML
300 SYRINGE INTRAVENOUS EVERY 12 HOURS
Status: DISCONTINUED | OUTPATIENT
Start: 2018-01-01 | End: 2018-09-30 | Stop reason: HOSPADM

## 2018-01-01 RX ORDER — LAMOTRIGINE 25 MG/1
500 TABLET ORAL ONCE
Status: COMPLETED | OUTPATIENT
Start: 2018-01-01 | End: 2018-01-01

## 2018-01-01 RX ORDER — HYDRALAZINE HYDROCHLORIDE 50 MG/1
50 TABLET, FILM COATED ORAL 2 TIMES DAILY
Qty: 60 TAB | Refills: 0 | Status: SHIPPED | OUTPATIENT
Start: 2018-01-01 | End: 2018-01-01

## 2018-01-01 RX ORDER — ENOXAPARIN SODIUM 100 MG/ML
1.5 INJECTION SUBCUTANEOUS DAILY
Qty: 3 SYRINGE | Refills: 0 | Status: SHIPPED | OUTPATIENT
Start: 2018-01-01 | End: 2018-01-01

## 2018-01-01 RX ORDER — GLYCOPYRROLATE 0.2 MG/ML
0.2 INJECTION INTRAMUSCULAR; INTRAVENOUS
Status: DISCONTINUED | OUTPATIENT
Start: 2018-01-01 | End: 2018-09-30 | Stop reason: HOSPADM

## 2018-01-01 RX ORDER — MONTELUKAST SODIUM 10 MG/1
10 TABLET ORAL
Status: DISCONTINUED | OUTPATIENT
Start: 2018-01-01 | End: 2018-01-01 | Stop reason: HOSPADM

## 2018-01-01 RX ORDER — SODIUM CHLORIDE 9 MG/ML
INJECTION, SOLUTION INTRAVENOUS
Status: DISCONTINUED | OUTPATIENT
Start: 2018-01-01 | End: 2018-01-01 | Stop reason: HOSPADM

## 2018-01-01 RX ORDER — LIDOCAINE HYDROCHLORIDE 10 MG/ML
0.1 INJECTION INFILTRATION; PERINEURAL AS NEEDED
Status: CANCELLED | OUTPATIENT
Start: 2018-01-01

## 2018-01-01 RX ORDER — HEPARIN 100 UNIT/ML
500 SYRINGE INTRAVENOUS AS NEEDED
Status: DISPENSED | OUTPATIENT
Start: 2018-01-01 | End: 2018-01-01

## 2018-01-01 RX ORDER — MORPHINE SULFATE 2 MG/ML
2 INJECTION, SOLUTION INTRAMUSCULAR; INTRAVENOUS
Status: DISCONTINUED | OUTPATIENT
Start: 2018-01-01 | End: 2018-01-01

## 2018-01-01 RX ORDER — HEPARIN 100 UNIT/ML
300 SYRINGE INTRAVENOUS AS NEEDED
Status: DISCONTINUED | OUTPATIENT
Start: 2018-01-01 | End: 2018-01-01 | Stop reason: HOSPADM

## 2018-01-01 RX ORDER — WARFARIN 1 MG/1
1.5 TABLET ORAL SEE ADMIN INSTRUCTIONS
Status: DISCONTINUED | OUTPATIENT
Start: 2018-01-01 | End: 2018-01-01 | Stop reason: DRUGHIGH

## 2018-01-01 RX ORDER — HYDROCODONE BITARTRATE AND ACETAMINOPHEN 10; 325 MG/1; MG/1
1 TABLET ORAL
Status: DISCONTINUED | OUTPATIENT
Start: 2018-01-01 | End: 2018-01-01 | Stop reason: HOSPADM

## 2018-01-01 RX ORDER — ENOXAPARIN SODIUM 100 MG/ML
60 INJECTION SUBCUTANEOUS EVERY 24 HOURS
Status: COMPLETED | OUTPATIENT
Start: 2018-01-01 | End: 2018-01-01

## 2018-01-01 RX ORDER — HALOPERIDOL 5 MG/ML
2 INJECTION INTRAMUSCULAR ONCE
Status: COMPLETED | OUTPATIENT
Start: 2018-01-01 | End: 2018-01-01

## 2018-01-01 RX ORDER — ENOXAPARIN SODIUM 100 MG/ML
INJECTION SUBCUTANEOUS DAILY
Status: ON HOLD | COMMUNITY
End: 2018-01-01

## 2018-01-01 RX ORDER — HEPARIN 100 UNIT/ML
300 SYRINGE INTRAVENOUS AS NEEDED
Status: DISCONTINUED | OUTPATIENT
Start: 2018-01-01 | End: 2018-01-01

## 2018-01-01 RX ORDER — WARFARIN 1 MG/1
1.5 TABLET ORAL EVERY 24 HOURS
Status: DISCONTINUED | OUTPATIENT
Start: 2018-01-01 | End: 2018-01-01 | Stop reason: HOSPADM

## 2018-01-01 RX ORDER — FENTANYL CITRATE 50 UG/ML
100 INJECTION, SOLUTION INTRAMUSCULAR; INTRAVENOUS ONCE
Status: CANCELLED | OUTPATIENT
Start: 2018-01-01 | End: 2018-01-01

## 2018-01-01 RX ORDER — SODIUM CHLORIDE 0.9 % (FLUSH) 0.9 %
5-10 SYRINGE (ML) INJECTION EVERY 8 HOURS
Status: DISCONTINUED | OUTPATIENT
Start: 2018-01-01 | End: 2018-01-01 | Stop reason: HOSPADM

## 2018-01-01 RX ORDER — HEPARIN 100 UNIT/ML
500 SYRINGE INTRAVENOUS AS NEEDED
Status: DISCONTINUED | OUTPATIENT
Start: 2018-01-01 | End: 2018-01-01 | Stop reason: HOSPADM

## 2018-01-01 RX ORDER — WARFARIN 2 MG/1
2 TABLET ORAL ONCE
Status: COMPLETED | OUTPATIENT
Start: 2018-01-01 | End: 2018-01-01

## 2018-01-01 RX ORDER — WARFARIN 1 MG/1
1.5 TABLET ORAL EVERY EVENING
Status: DISCONTINUED | OUTPATIENT
Start: 2018-01-01 | End: 2018-01-01

## 2018-01-01 RX ORDER — SODIUM CHLORIDE 9 MG/ML
250 INJECTION, SOLUTION INTRAVENOUS AS NEEDED
Status: DISCONTINUED | OUTPATIENT
Start: 2018-01-01 | End: 2018-01-01 | Stop reason: SDUPTHER

## 2018-01-01 RX ORDER — LORAZEPAM 2 MG/ML
1 INJECTION INTRAMUSCULAR
Status: DISCONTINUED | OUTPATIENT
Start: 2018-01-01 | End: 2018-09-30 | Stop reason: HOSPADM

## 2018-01-01 RX ORDER — ENOXAPARIN SODIUM 100 MG/ML
50 INJECTION SUBCUTANEOUS EVERY 24 HOURS
Status: DISCONTINUED | OUTPATIENT
Start: 2018-01-01 | End: 2018-01-01 | Stop reason: HOSPADM

## 2018-01-01 RX ORDER — HYDROMORPHONE HYDROCHLORIDE 1 MG/ML
1 INJECTION, SOLUTION INTRAMUSCULAR; INTRAVENOUS; SUBCUTANEOUS
Status: DISCONTINUED | OUTPATIENT
Start: 2018-01-01 | End: 2018-01-01 | Stop reason: HOSPADM

## 2018-01-01 RX ORDER — DEXAMETHASONE SODIUM PHOSPHATE 100 MG/10ML
INJECTION INTRAMUSCULAR; INTRAVENOUS AS NEEDED
Status: DISCONTINUED | OUTPATIENT
Start: 2018-01-01 | End: 2018-01-01 | Stop reason: HOSPADM

## 2018-01-01 RX ORDER — ENOXAPARIN SODIUM 100 MG/ML
50 INJECTION SUBCUTANEOUS EVERY 24 HOURS
Qty: 5 SYRINGE | Refills: 0 | Status: SHIPPED | OUTPATIENT
Start: 2018-01-01 | End: 2018-01-01

## 2018-01-01 RX ORDER — VANCOMYCIN HYDROCHLORIDE
1250 ONCE
Status: COMPLETED | OUTPATIENT
Start: 2018-01-01 | End: 2018-01-01

## 2018-01-01 RX ORDER — CARVEDILOL 3.12 MG/1
3.12 TABLET ORAL 2 TIMES DAILY WITH MEALS
Status: DISCONTINUED | OUTPATIENT
Start: 2018-01-01 | End: 2018-01-01 | Stop reason: HOSPADM

## 2018-01-01 RX ORDER — FENTANYL 50 UG/1
1 PATCH TRANSDERMAL
Status: DISCONTINUED | OUTPATIENT
Start: 2018-01-01 | End: 2018-01-01

## 2018-01-01 RX ORDER — LORAZEPAM 1 MG/1
1 TABLET ORAL
Status: DISCONTINUED | OUTPATIENT
Start: 2018-01-01 | End: 2018-01-01 | Stop reason: HOSPADM

## 2018-01-01 RX ORDER — HYDROMORPHONE HYDROCHLORIDE 2 MG/ML
0.5 INJECTION, SOLUTION INTRAMUSCULAR; INTRAVENOUS; SUBCUTANEOUS
Status: DISCONTINUED | OUTPATIENT
Start: 2018-01-01 | End: 2018-01-01 | Stop reason: HOSPADM

## 2018-01-01 RX ORDER — INSULIN LISPRO 100 [IU]/ML
INJECTION, SOLUTION INTRAVENOUS; SUBCUTANEOUS
Status: DISCONTINUED | OUTPATIENT
Start: 2018-01-01 | End: 2018-01-01 | Stop reason: HOSPADM

## 2018-01-01 RX ORDER — ENOXAPARIN SODIUM 100 MG/ML
50 INJECTION SUBCUTANEOUS EVERY 24 HOURS
Qty: 5 SYRINGE | Refills: 0 | Status: SHIPPED | OUTPATIENT
Start: 2018-01-01 | End: 2018-01-01 | Stop reason: ALTCHOICE

## 2018-01-01 RX ORDER — SODIUM CHLORIDE 0.9 % (FLUSH) 0.9 %
10 SYRINGE (ML) INJECTION EVERY 12 HOURS
Status: DISCONTINUED | OUTPATIENT
Start: 2018-01-01 | End: 2018-09-30 | Stop reason: HOSPADM

## 2018-01-01 RX ORDER — SODIUM CHLORIDE 0.9 % (FLUSH) 0.9 %
5-10 SYRINGE (ML) INJECTION AS NEEDED
Status: DISCONTINUED | OUTPATIENT
Start: 2018-01-01 | End: 2018-01-01 | Stop reason: HOSPADM

## 2018-01-01 RX ORDER — HEPARIN SODIUM (PORCINE) LOCK FLUSH IV SOLN 100 UNIT/ML 100 UNIT/ML
500 SOLUTION INTRAVENOUS ONCE
Status: DISCONTINUED | OUTPATIENT
Start: 2018-01-01 | End: 2018-01-01

## 2018-01-01 RX ORDER — ENOXAPARIN SODIUM 100 MG/ML
60 INJECTION SUBCUTANEOUS AS NEEDED
Status: DISCONTINUED | OUTPATIENT
Start: 2018-01-01 | End: 2018-01-01 | Stop reason: HOSPADM

## 2018-01-01 RX ORDER — SODIUM CHLORIDE 9 MG/ML
100 INJECTION, SOLUTION INTRAVENOUS CONTINUOUS
Status: DISCONTINUED | OUTPATIENT
Start: 2018-01-01 | End: 2018-01-01

## 2018-01-01 RX ORDER — FENTANYL 75 UG/H
1 PATCH TRANSDERMAL
Status: DISCONTINUED | OUTPATIENT
Start: 2018-01-01 | End: 2018-09-30 | Stop reason: HOSPADM

## 2018-01-01 RX ORDER — AMOXICILLIN AND CLAVULANATE POTASSIUM 875; 125 MG/1; MG/1
1 TABLET, FILM COATED ORAL 2 TIMES DAILY
Qty: 20 TAB | Refills: 0 | Status: SHIPPED | OUTPATIENT
Start: 2018-01-01 | End: 2018-01-01

## 2018-01-01 RX ORDER — PHYTONADIONE 10 MG/ML
10 INJECTION, EMULSION INTRAMUSCULAR; INTRAVENOUS; SUBCUTANEOUS
Status: COMPLETED | OUTPATIENT
Start: 2018-01-01 | End: 2018-01-01

## 2018-01-01 RX ORDER — OXYCODONE HYDROCHLORIDE 5 MG/1
5 TABLET ORAL
Status: DISCONTINUED | OUTPATIENT
Start: 2018-01-01 | End: 2018-01-01 | Stop reason: HOSPADM

## 2018-01-01 RX ORDER — LIDOCAINE HYDROCHLORIDE 20 MG/ML
1-10 INJECTION, SOLUTION INFILTRATION; PERINEURAL ONCE
Status: COMPLETED | OUTPATIENT
Start: 2018-01-01 | End: 2018-01-01

## 2018-01-01 RX ORDER — SODIUM CHLORIDE, SODIUM LACTATE, POTASSIUM CHLORIDE, CALCIUM CHLORIDE 600; 310; 30; 20 MG/100ML; MG/100ML; MG/100ML; MG/100ML
75 INJECTION, SOLUTION INTRAVENOUS CONTINUOUS
Status: CANCELLED | OUTPATIENT
Start: 2018-01-01 | End: 2018-01-01

## 2018-01-01 RX ORDER — WARFARIN 1 MG/1
1.5 TABLET ORAL EVERY 24 HOURS
Qty: 45 TAB | Refills: 0 | Status: SHIPPED | OUTPATIENT
Start: 2018-01-01 | End: 2018-01-01

## 2018-01-01 RX ORDER — SODIUM CHLORIDE 0.9 % (FLUSH) 0.9 %
10 SYRINGE (ML) INJECTION AS NEEDED
Status: DISCONTINUED | OUTPATIENT
Start: 2018-01-01 | End: 2018-09-30 | Stop reason: HOSPADM

## 2018-01-01 RX ORDER — INSULIN LISPRO 100 [IU]/ML
8 INJECTION, SOLUTION INTRAVENOUS; SUBCUTANEOUS ONCE
Status: COMPLETED | OUTPATIENT
Start: 2018-01-01 | End: 2018-01-01

## 2018-01-01 RX ORDER — HYDRALAZINE HYDROCHLORIDE 25 MG/1
50 TABLET, FILM COATED ORAL 2 TIMES DAILY
Status: DISCONTINUED | OUTPATIENT
Start: 2018-01-01 | End: 2018-01-01 | Stop reason: HOSPADM

## 2018-01-01 RX ORDER — HYDROCODONE BITARTRATE AND ACETAMINOPHEN 5; 325 MG/1; MG/1
2 TABLET ORAL AS NEEDED
Status: DISCONTINUED | OUTPATIENT
Start: 2018-01-01 | End: 2018-01-01 | Stop reason: HOSPADM

## 2018-01-01 RX ORDER — HYDROCODONE BITARTRATE AND ACETAMINOPHEN 10; 325 MG/1; MG/1
1 TABLET ORAL
Qty: 90 TAB | Refills: 0 | Status: SHIPPED | OUTPATIENT
Start: 2018-01-01 | End: 2018-01-01 | Stop reason: SDUPTHER

## 2018-01-01 RX ORDER — INSULIN LISPRO 100 [IU]/ML
INJECTION, SOLUTION INTRAVENOUS; SUBCUTANEOUS
Status: DISCONTINUED | OUTPATIENT
Start: 2018-01-01 | End: 2018-01-01

## 2018-01-01 RX ORDER — ENOXAPARIN SODIUM 100 MG/ML
60 INJECTION SUBCUTANEOUS ONCE
Status: DISCONTINUED | OUTPATIENT
Start: 2018-01-01 | End: 2018-01-01

## 2018-01-01 RX ORDER — SODIUM CHLORIDE 9 MG/ML
250 INJECTION, SOLUTION INTRAVENOUS AS NEEDED
Status: DISCONTINUED | OUTPATIENT
Start: 2018-01-01 | End: 2018-01-01 | Stop reason: HOSPADM

## 2018-01-01 RX ORDER — POLYETHYLENE GLYCOL 3350 17 G/17G
17 POWDER, FOR SOLUTION ORAL DAILY
Status: DISCONTINUED | OUTPATIENT
Start: 2018-01-01 | End: 2018-01-01 | Stop reason: HOSPADM

## 2018-01-01 RX ORDER — SODIUM CHLORIDE, SODIUM LACTATE, POTASSIUM CHLORIDE, CALCIUM CHLORIDE 600; 310; 30; 20 MG/100ML; MG/100ML; MG/100ML; MG/100ML
100 INJECTION, SOLUTION INTRAVENOUS CONTINUOUS
Status: CANCELLED | OUTPATIENT
Start: 2018-01-01

## 2018-01-01 RX ORDER — SODIUM CHLORIDE 0.9 % (FLUSH) 0.9 %
5-10 SYRINGE (ML) INJECTION AS NEEDED
Status: CANCELLED | OUTPATIENT
Start: 2018-01-01

## 2018-01-01 RX ORDER — PANTOPRAZOLE SODIUM 40 MG/1
40 TABLET, DELAYED RELEASE ORAL
Status: DISCONTINUED | OUTPATIENT
Start: 2018-01-01 | End: 2018-01-01 | Stop reason: HOSPADM

## 2018-01-01 RX ORDER — SODIUM BICARBONATE 42 MG/ML
50 INJECTION, SOLUTION INTRAVENOUS ONCE
Status: DISCONTINUED | OUTPATIENT
Start: 2018-01-01 | End: 2018-01-01 | Stop reason: SDUPTHER

## 2018-01-01 RX ORDER — MIRTAZAPINE 15 MG/1
30 TABLET, FILM COATED ORAL
Status: DISCONTINUED | OUTPATIENT
Start: 2018-01-01 | End: 2018-01-01 | Stop reason: HOSPADM

## 2018-01-01 RX ORDER — CALCIUM CARBONATE 200(500)MG
200 TABLET,CHEWABLE ORAL
Qty: 30 TAB | Refills: 0 | Status: SHIPPED | OUTPATIENT
Start: 2018-01-01 | End: 2018-01-01

## 2018-01-01 RX ORDER — CALCIUM CARBONATE 200(500)MG
500 TABLET,CHEWABLE ORAL
Status: DISCONTINUED | OUTPATIENT
Start: 2018-01-01 | End: 2018-01-01 | Stop reason: CLARIF

## 2018-01-01 RX ORDER — IPRATROPIUM BROMIDE AND ALBUTEROL SULFATE 2.5; .5 MG/3ML; MG/3ML
3 SOLUTION RESPIRATORY (INHALATION)
Status: DISCONTINUED | OUTPATIENT
Start: 2018-01-01 | End: 2018-01-01 | Stop reason: HOSPADM

## 2018-01-01 RX ORDER — AMOXICILLIN 250 MG
1 CAPSULE ORAL DAILY
Status: DISCONTINUED | OUTPATIENT
Start: 2018-01-01 | End: 2018-01-01

## 2018-01-01 RX ORDER — DEXAMETHASONE SODIUM PHOSPHATE 4 MG/ML
INJECTION, SOLUTION INTRA-ARTICULAR; INTRALESIONAL; INTRAMUSCULAR; INTRAVENOUS; SOFT TISSUE AS NEEDED
Status: DISCONTINUED | OUTPATIENT
Start: 2018-01-01 | End: 2018-01-01 | Stop reason: HOSPADM

## 2018-01-01 RX ORDER — CALCIUM CARBONATE 200(500)MG
200 TABLET,CHEWABLE ORAL
Status: DISCONTINUED | OUTPATIENT
Start: 2018-01-01 | End: 2018-01-01 | Stop reason: HOSPADM

## 2018-01-01 RX ORDER — ALBUMIN HUMAN 250 G/1000ML
12.5 SOLUTION INTRAVENOUS ONCE
Status: COMPLETED | OUTPATIENT
Start: 2018-01-01 | End: 2018-01-01

## 2018-01-01 RX ORDER — FENTANYL CITRATE 50 UG/ML
INJECTION, SOLUTION INTRAMUSCULAR; INTRAVENOUS AS NEEDED
Status: DISCONTINUED | OUTPATIENT
Start: 2018-01-01 | End: 2018-01-01 | Stop reason: HOSPADM

## 2018-01-01 RX ORDER — ENOXAPARIN SODIUM 100 MG/ML
60 INJECTION SUBCUTANEOUS EVERY 24 HOURS
Status: DISCONTINUED | OUTPATIENT
Start: 2018-01-01 | End: 2018-01-01 | Stop reason: HOSPADM

## 2018-01-01 RX ORDER — PROPOFOL 10 MG/ML
INJECTION, EMULSION INTRAVENOUS AS NEEDED
Status: DISCONTINUED | OUTPATIENT
Start: 2018-01-01 | End: 2018-01-01 | Stop reason: HOSPADM

## 2018-01-01 RX ORDER — MIDAZOLAM HYDROCHLORIDE 1 MG/ML
2 INJECTION, SOLUTION INTRAMUSCULAR; INTRAVENOUS
Status: DISCONTINUED | OUTPATIENT
Start: 2018-01-01 | End: 2018-01-01 | Stop reason: HOSPADM

## 2018-01-01 RX ORDER — FENTANYL CITRATE 50 UG/ML
100 INJECTION, SOLUTION INTRAMUSCULAR; INTRAVENOUS ONCE
Status: DISCONTINUED | OUTPATIENT
Start: 2018-01-01 | End: 2018-01-01 | Stop reason: HOSPADM

## 2018-01-01 RX ORDER — OXYCODONE HYDROCHLORIDE 5 MG/1
5 TABLET ORAL
Status: COMPLETED | OUTPATIENT
Start: 2018-01-01 | End: 2018-01-01

## 2018-01-01 RX ORDER — MIDAZOLAM HYDROCHLORIDE 1 MG/ML
2 INJECTION, SOLUTION INTRAMUSCULAR; INTRAVENOUS
Status: CANCELLED | OUTPATIENT
Start: 2018-01-01 | End: 2018-01-01

## 2018-01-01 RX ORDER — ONDANSETRON 2 MG/ML
8 INJECTION INTRAMUSCULAR; INTRAVENOUS
Status: COMPLETED | OUTPATIENT
Start: 2018-01-01 | End: 2018-01-01

## 2018-01-01 RX ORDER — SODIUM BICARBONATE 1 MEQ/ML
50 SYRINGE (ML) INTRAVENOUS ONCE
Status: COMPLETED | OUTPATIENT
Start: 2018-01-01 | End: 2018-01-01

## 2018-01-01 RX ORDER — ALBUTEROL SULFATE 0.83 MG/ML
2.5 SOLUTION RESPIRATORY (INHALATION) AS NEEDED
Status: DISCONTINUED | OUTPATIENT
Start: 2018-01-01 | End: 2018-01-01 | Stop reason: HOSPADM

## 2018-01-01 RX ORDER — SODIUM POLYSTYRENE SULFONATE 15 G/60ML
15 SUSPENSION ORAL; RECTAL
Status: COMPLETED | OUTPATIENT
Start: 2018-01-01 | End: 2018-01-01

## 2018-01-01 RX ORDER — SUCCINYLCHOLINE CHLORIDE 20 MG/ML
INJECTION INTRAMUSCULAR; INTRAVENOUS AS NEEDED
Status: DISCONTINUED | OUTPATIENT
Start: 2018-01-01 | End: 2018-01-01 | Stop reason: HOSPADM

## 2018-01-01 RX ORDER — LIDOCAINE HYDROCHLORIDE 20 MG/ML
20-200 INJECTION, SOLUTION INFILTRATION; PERINEURAL ONCE
Status: COMPLETED | OUTPATIENT
Start: 2018-01-01 | End: 2018-01-01

## 2018-01-01 RX ORDER — ALBUTEROL SULFATE 0.83 MG/ML
2.5 SOLUTION RESPIRATORY (INHALATION)
Status: DISCONTINUED | OUTPATIENT
Start: 2018-01-01 | End: 2018-01-01 | Stop reason: HOSPADM

## 2018-01-01 RX ORDER — HEPARIN SODIUM 5000 [USP'U]/100ML
18-36 INJECTION, SOLUTION INTRAVENOUS
Status: DISCONTINUED | OUTPATIENT
Start: 2018-01-01 | End: 2018-01-01 | Stop reason: DRUGHIGH

## 2018-01-01 RX ORDER — FAMOTIDINE 20 MG/1
20 TABLET, FILM COATED ORAL ONCE
Status: CANCELLED | OUTPATIENT
Start: 2018-01-01 | End: 2018-01-01

## 2018-01-01 RX ORDER — SODIUM CHLORIDE 0.9 % (FLUSH) 0.9 %
10-40 SYRINGE (ML) INJECTION AS NEEDED
Status: DISCONTINUED | OUTPATIENT
Start: 2018-01-01 | End: 2018-01-01 | Stop reason: HOSPADM

## 2018-01-01 RX ORDER — LIDOCAINE HYDROCHLORIDE 10 MG/ML
0.1 INJECTION INFILTRATION; PERINEURAL AS NEEDED
Status: DISCONTINUED | OUTPATIENT
Start: 2018-01-01 | End: 2018-01-01 | Stop reason: HOSPADM

## 2018-01-01 RX ORDER — ENOXAPARIN SODIUM 100 MG/ML
60 INJECTION SUBCUTANEOUS AS NEEDED
Status: CANCELLED | OUTPATIENT
Start: 2018-01-01

## 2018-01-01 RX ORDER — MIDAZOLAM HYDROCHLORIDE 1 MG/ML
2 INJECTION, SOLUTION INTRAMUSCULAR; INTRAVENOUS ONCE
Status: DISCONTINUED | OUTPATIENT
Start: 2018-01-01 | End: 2018-01-01 | Stop reason: HOSPADM

## 2018-01-01 RX ORDER — MEGESTROL ACETATE 40 MG/ML
800 SUSPENSION ORAL DAILY
Status: DISCONTINUED | OUTPATIENT
Start: 2018-01-01 | End: 2018-01-01 | Stop reason: HOSPADM

## 2018-01-01 RX ORDER — POTASSIUM CHLORIDE 20 MEQ/1
20 TABLET, EXTENDED RELEASE ORAL 2 TIMES DAILY
Status: DISCONTINUED | OUTPATIENT
Start: 2018-01-01 | End: 2018-01-01

## 2018-01-01 RX ORDER — SODIUM CHLORIDE, SODIUM LACTATE, POTASSIUM CHLORIDE, CALCIUM CHLORIDE 600; 310; 30; 20 MG/100ML; MG/100ML; MG/100ML; MG/100ML
50 INJECTION, SOLUTION INTRAVENOUS CONTINUOUS
Status: DISCONTINUED | OUTPATIENT
Start: 2018-01-01 | End: 2018-01-01 | Stop reason: HOSPADM

## 2018-01-01 RX ORDER — ROCURONIUM BROMIDE 10 MG/ML
INJECTION, SOLUTION INTRAVENOUS AS NEEDED
Status: DISCONTINUED | OUTPATIENT
Start: 2018-01-01 | End: 2018-01-01 | Stop reason: HOSPADM

## 2018-01-01 RX ORDER — ENOXAPARIN SODIUM 100 MG/ML
60 INJECTION SUBCUTANEOUS EVERY 24 HOURS
Status: DISPENSED | OUTPATIENT
Start: 2018-01-01 | End: 2018-01-01

## 2018-01-01 RX ORDER — AMLODIPINE BESYLATE 10 MG/1
10 TABLET ORAL DAILY
Status: DISCONTINUED | OUTPATIENT
Start: 2018-01-01 | End: 2018-01-01 | Stop reason: HOSPADM

## 2018-01-01 RX ORDER — GLYCOPYRROLATE 0.2 MG/ML
INJECTION INTRAMUSCULAR; INTRAVENOUS AS NEEDED
Status: DISCONTINUED | OUTPATIENT
Start: 2018-01-01 | End: 2018-01-01 | Stop reason: HOSPADM

## 2018-01-01 RX ORDER — SODIUM CHLORIDE, SODIUM LACTATE, POTASSIUM CHLORIDE, CALCIUM CHLORIDE 600; 310; 30; 20 MG/100ML; MG/100ML; MG/100ML; MG/100ML
100 INJECTION, SOLUTION INTRAVENOUS CONTINUOUS
Status: DISCONTINUED | OUTPATIENT
Start: 2018-01-01 | End: 2018-01-01 | Stop reason: HOSPADM

## 2018-01-01 RX ORDER — HYDROMORPHONE HYDROCHLORIDE 1 MG/ML
0.5 INJECTION, SOLUTION INTRAMUSCULAR; INTRAVENOUS; SUBCUTANEOUS
Status: DISCONTINUED | OUTPATIENT
Start: 2018-01-01 | End: 2018-09-30 | Stop reason: HOSPADM

## 2018-01-01 RX ORDER — TEMAZEPAM 7.5 MG/1
15 CAPSULE ORAL
Status: DISCONTINUED | OUTPATIENT
Start: 2018-01-01 | End: 2018-01-01 | Stop reason: HOSPADM

## 2018-01-01 RX ORDER — HALOPERIDOL 5 MG/ML
INJECTION INTRAMUSCULAR
Status: COMPLETED
Start: 2018-01-01 | End: 2018-01-01

## 2018-01-01 RX ORDER — LIDOCAINE HYDROCHLORIDE AND EPINEPHRINE 20; 10 MG/ML; UG/ML
2-20 INJECTION, SOLUTION INFILTRATION; PERINEURAL ONCE
Status: COMPLETED | OUTPATIENT
Start: 2018-01-01 | End: 2018-01-01

## 2018-01-01 RX ADMIN — HALOPERIDOL LACTATE 2 MG: 5 INJECTION, SOLUTION INTRAMUSCULAR at 03:33

## 2018-01-01 RX ADMIN — HYDRALAZINE HYDROCHLORIDE 50 MG: 25 TABLET, FILM COATED ORAL at 20:03

## 2018-01-01 RX ADMIN — Medication 10 ML: at 21:24

## 2018-01-01 RX ADMIN — HYDRALAZINE HYDROCHLORIDE 50 MG: 25 TABLET, FILM COATED ORAL at 20:47

## 2018-01-01 RX ADMIN — AMLODIPINE BESYLATE 10 MG: 5 TABLET ORAL at 08:56

## 2018-01-01 RX ADMIN — OXYCODONE HYDROCHLORIDE 5 MG: 5 TABLET ORAL at 09:22

## 2018-01-01 RX ADMIN — POLYETHYLENE GLYCOL 3350 17 G: 17 POWDER, FOR SOLUTION ORAL at 20:03

## 2018-01-01 RX ADMIN — DENOSUMAB 120 MG: 120 INJECTION SUBCUTANEOUS at 15:49

## 2018-01-01 RX ADMIN — SODIUM CHLORIDE, SODIUM LACTATE, POTASSIUM CHLORIDE, AND CALCIUM CHLORIDE 100 ML/HR: 600; 310; 30; 20 INJECTION, SOLUTION INTRAVENOUS at 09:44

## 2018-01-01 RX ADMIN — HYDROMORPHONE HYDROCHLORIDE 0.5 MG: 1 INJECTION, SOLUTION INTRAMUSCULAR; INTRAVENOUS; SUBCUTANEOUS at 08:44

## 2018-01-01 RX ADMIN — LIDOCAINE HYDROCHLORIDE 80 MG: 20 INJECTION, SOLUTION EPIDURAL; INFILTRATION; INTRACAUDAL; PERINEURAL at 10:33

## 2018-01-01 RX ADMIN — SODIUM CHLORIDE 1000 ML: 900 INJECTION, SOLUTION INTRAVENOUS at 01:16

## 2018-01-01 RX ADMIN — HALOPERIDOL LACTATE 2 MG: 5 INJECTION, SOLUTION INTRAMUSCULAR at 21:23

## 2018-01-01 RX ADMIN — HALOPERIDOL LACTATE 2 MG: 5 INJECTION, SOLUTION INTRAMUSCULAR at 19:00

## 2018-01-01 RX ADMIN — HYDROMORPHONE HYDROCHLORIDE 0.5 MG: 1 INJECTION, SOLUTION INTRAMUSCULAR; INTRAVENOUS; SUBCUTANEOUS at 02:43

## 2018-01-01 RX ADMIN — MIRTAZAPINE 30 MG: 15 TABLET, FILM COATED ORAL at 20:04

## 2018-01-01 RX ADMIN — AMLODIPINE BESYLATE 10 MG: 10 TABLET ORAL at 07:52

## 2018-01-01 RX ADMIN — CARVEDILOL 3.12 MG: 3.12 TABLET, FILM COATED ORAL at 19:27

## 2018-01-01 RX ADMIN — HALOPERIDOL LACTATE 2 MG: 5 INJECTION, SOLUTION INTRAMUSCULAR at 08:45

## 2018-01-01 RX ADMIN — SODIUM CHLORIDE, PRESERVATIVE FREE 500 UNITS: 5 INJECTION INTRAVENOUS at 11:22

## 2018-01-01 RX ADMIN — Medication 300 UNITS: at 20:35

## 2018-01-01 RX ADMIN — CALCIUM GLUCONATE 1 G: 98 INJECTION, SOLUTION INTRAVENOUS at 18:04

## 2018-01-01 RX ADMIN — AMLODIPINE BESYLATE 10 MG: 10 TABLET ORAL at 07:37

## 2018-01-01 RX ADMIN — MONTELUKAST SODIUM 10 MG: 10 TABLET, FILM COATED ORAL at 21:20

## 2018-01-01 RX ADMIN — HALOPERIDOL LACTATE 2 MG: 5 INJECTION, SOLUTION INTRAMUSCULAR at 23:28

## 2018-01-01 RX ADMIN — HEPARIN SODIUM AND DEXTROSE 18 UNITS/KG/HR: 5000; 5 INJECTION INTRAVENOUS at 17:23

## 2018-01-01 RX ADMIN — HALOPERIDOL LACTATE 2 MG: 5 INJECTION, SOLUTION INTRAMUSCULAR at 18:39

## 2018-01-01 RX ADMIN — Medication 10 ML: at 21:36

## 2018-01-01 RX ADMIN — ONDANSETRON 4 MG: 2 INJECTION INTRAMUSCULAR; INTRAVENOUS at 06:30

## 2018-01-01 RX ADMIN — HYDRALAZINE HYDROCHLORIDE 50 MG: 25 TABLET, FILM COATED ORAL at 07:36

## 2018-01-01 RX ADMIN — AMLODIPINE BESYLATE 10 MG: 5 TABLET ORAL at 17:24

## 2018-01-01 RX ADMIN — Medication 300 UNITS: at 10:49

## 2018-01-01 RX ADMIN — POLYETHYLENE GLYCOL 3350 17 G: 17 POWDER, FOR SOLUTION ORAL at 21:16

## 2018-01-01 RX ADMIN — SODIUM CHLORIDE 500 ML: 900 INJECTION, SOLUTION INTRAVENOUS at 09:00

## 2018-01-01 RX ADMIN — Medication 10 ML: at 08:21

## 2018-01-01 RX ADMIN — DENOSUMAB 120 MG: 120 INJECTION SUBCUTANEOUS at 14:55

## 2018-01-01 RX ADMIN — ALBUTEROL SULFATE 2.5 MG: 2.5 SOLUTION RESPIRATORY (INHALATION) at 12:15

## 2018-01-01 RX ADMIN — SODIUM CHLORIDE 500 ML: 900 INJECTION, SOLUTION INTRAVENOUS at 12:17

## 2018-01-01 RX ADMIN — ENOXAPARIN SODIUM 60 MG: 100 INJECTION SUBCUTANEOUS at 16:08

## 2018-01-01 RX ADMIN — HYDRALAZINE HYDROCHLORIDE 50 MG: 25 TABLET, FILM COATED ORAL at 21:20

## 2018-01-01 RX ADMIN — HALOPERIDOL LACTATE 2 MG: 5 INJECTION, SOLUTION INTRAMUSCULAR at 17:33

## 2018-01-01 RX ADMIN — FULVESTRANT 500 MG: 50 INJECTION INTRAMUSCULAR at 10:37

## 2018-01-01 RX ADMIN — CARVEDILOL 3.12 MG: 3.12 TABLET, FILM COATED ORAL at 08:56

## 2018-01-01 RX ADMIN — SODIUM CHLORIDE, PRESERVATIVE FREE 10 ML: 5 INJECTION INTRAVENOUS at 17:29

## 2018-01-01 RX ADMIN — Medication 10 ML: at 23:29

## 2018-01-01 RX ADMIN — DENOSUMAB 120 MG: 120 INJECTION SUBCUTANEOUS at 10:05

## 2018-01-01 RX ADMIN — HYDRALAZINE HYDROCHLORIDE 50 MG: 25 TABLET, FILM COATED ORAL at 07:37

## 2018-01-01 RX ADMIN — CARVEDILOL 3.12 MG: 3.12 TABLET, FILM COATED ORAL at 16:24

## 2018-01-01 RX ADMIN — DENOSUMAB 120 MG: 120 INJECTION SUBCUTANEOUS at 11:30

## 2018-01-01 RX ADMIN — ENOXAPARIN SODIUM 60 MG: 60 INJECTION SUBCUTANEOUS at 16:29

## 2018-01-01 RX ADMIN — CARVEDILOL 3.12 MG: 3.12 TABLET, FILM COATED ORAL at 17:00

## 2018-01-01 RX ADMIN — Medication 10 ML: at 20:43

## 2018-01-01 RX ADMIN — STANDARDIZED SENNA CONCENTRATE AND DOCUSATE SODIUM 1 TABLET: 8.6; 5 TABLET, FILM COATED ORAL at 10:22

## 2018-01-01 RX ADMIN — PROPOFOL 50 MG: 10 INJECTION, EMULSION INTRAVENOUS at 10:10

## 2018-01-01 RX ADMIN — SODIUM BICARBONATE 2 ML: 0.2 INJECTION, SOLUTION INTRAVENOUS at 11:35

## 2018-01-01 RX ADMIN — HALOPERIDOL LACTATE 2 MG: 5 INJECTION, SOLUTION INTRAMUSCULAR at 23:25

## 2018-01-01 RX ADMIN — HYDROCODONE BITARTRATE AND ACETAMINOPHEN 1 TABLET: 10; 325 TABLET ORAL at 12:00

## 2018-01-01 RX ADMIN — LIDOCAINE HYDROCHLORIDE 100 MG: 20 INJECTION, SOLUTION EPIDURAL; INFILTRATION; INTRACAUDAL; PERINEURAL at 10:10

## 2018-01-01 RX ADMIN — TBO-FILGRASTIM 300 MCG: 300 INJECTION, SOLUTION SUBCUTANEOUS at 10:26

## 2018-01-01 RX ADMIN — MIRTAZAPINE 30 MG: 15 TABLET, FILM COATED ORAL at 21:25

## 2018-01-01 RX ADMIN — HYDROCODONE BITARTRATE AND ACETAMINOPHEN 1 TABLET: 10; 325 TABLET ORAL at 18:34

## 2018-01-01 RX ADMIN — ERYTHROPOIETIN 40000 UNITS: 40000 INJECTION, SOLUTION INTRAVENOUS; SUBCUTANEOUS at 10:05

## 2018-01-01 RX ADMIN — CYANOCOBALAMIN 1000 MCG: 1000 INJECTION, SOLUTION INTRAMUSCULAR at 15:47

## 2018-01-01 RX ADMIN — MONTELUKAST SODIUM 10 MG: 10 TABLET, FILM COATED ORAL at 20:47

## 2018-01-01 RX ADMIN — HALOPERIDOL LACTATE 2 MG: 5 INJECTION, SOLUTION INTRAMUSCULAR at 11:22

## 2018-01-01 RX ADMIN — Medication 300 UNITS: at 09:00

## 2018-01-01 RX ADMIN — Medication 10 ML: at 09:44

## 2018-01-01 RX ADMIN — CARVEDILOL 3.12 MG: 3.12 TABLET, FILM COATED ORAL at 17:06

## 2018-01-01 RX ADMIN — FULVESTRANT 500 MG: 50 INJECTION INTRAMUSCULAR at 08:13

## 2018-01-01 RX ADMIN — AMLODIPINE BESYLATE 10 MG: 10 TABLET ORAL at 10:22

## 2018-01-01 RX ADMIN — LIDOCAINE HYDROCHLORIDE 60 MG: 20 INJECTION, SOLUTION INFILTRATION; PERINEURAL at 11:35

## 2018-01-01 RX ADMIN — CALCIUM CARBONATE (ANTACID) CHEW TAB 500 MG 200 MG: 500 CHEW TAB at 07:37

## 2018-01-01 RX ADMIN — HYDROMORPHONE HYDROCHLORIDE 0.5 MG: 1 INJECTION, SOLUTION INTRAMUSCULAR; INTRAVENOUS; SUBCUTANEOUS at 23:25

## 2018-01-01 RX ADMIN — SODIUM CHLORIDE 80 MG: 9 INJECTION, SOLUTION INTRAVENOUS at 12:42

## 2018-01-01 RX ADMIN — ONDANSETRON 4 MG: 2 INJECTION INTRAMUSCULAR; INTRAVENOUS at 12:47

## 2018-01-01 RX ADMIN — FULVESTRANT 500 MG: 50 INJECTION INTRAMUSCULAR at 15:55

## 2018-01-01 RX ADMIN — HYDROCODONE BITARTRATE AND ACETAMINOPHEN 1 TABLET: 10; 325 TABLET ORAL at 21:26

## 2018-01-01 RX ADMIN — CARVEDILOL 3.12 MG: 3.12 TABLET, FILM COATED ORAL at 17:24

## 2018-01-01 RX ADMIN — MEGESTROL ACETATE 800 MG: 40 SUSPENSION ORAL at 08:05

## 2018-01-01 RX ADMIN — CARVEDILOL 3.12 MG: 3.12 TABLET, FILM COATED ORAL at 16:08

## 2018-01-01 RX ADMIN — LORAZEPAM 1 MG: 2 INJECTION INTRAMUSCULAR; INTRAVENOUS at 04:06

## 2018-01-01 RX ADMIN — MONTELUKAST SODIUM 10 MG: 10 TABLET, FILM COATED ORAL at 21:19

## 2018-01-01 RX ADMIN — HYDROMORPHONE HYDROCHLORIDE 0.5 MG: 1 INJECTION, SOLUTION INTRAMUSCULAR; INTRAVENOUS; SUBCUTANEOUS at 00:56

## 2018-01-01 RX ADMIN — CYANOCOBALAMIN 1000 MCG: 1000 INJECTION, SOLUTION INTRAMUSCULAR at 08:27

## 2018-01-01 RX ADMIN — HYDRALAZINE HYDROCHLORIDE 50 MG: 25 TABLET, FILM COATED ORAL at 08:46

## 2018-01-01 RX ADMIN — ERYTHROPOIETIN 40000 UNITS: 40000 INJECTION, SOLUTION INTRAVENOUS; SUBCUTANEOUS at 10:10

## 2018-01-01 RX ADMIN — HYDROCODONE BITARTRATE AND ACETAMINOPHEN 1 TABLET: 10; 325 TABLET ORAL at 18:33

## 2018-01-01 RX ADMIN — SODIUM POLYSTYRENE SULFONATE 15 G: 15 SUSPENSION ORAL; RECTAL at 11:56

## 2018-01-01 RX ADMIN — HYDROCODONE BITARTRATE AND ACETAMINOPHEN 1 TABLET: 10; 325 TABLET ORAL at 14:24

## 2018-01-01 RX ADMIN — HYDROMORPHONE HYDROCHLORIDE 1 MG: 1 INJECTION, SOLUTION INTRAMUSCULAR; INTRAVENOUS; SUBCUTANEOUS at 20:10

## 2018-01-01 RX ADMIN — POLYETHYLENE GLYCOL 3350 17 G: 17 POWDER, FOR SOLUTION ORAL at 20:48

## 2018-01-01 RX ADMIN — AMLODIPINE BESYLATE 10 MG: 10 TABLET ORAL at 08:46

## 2018-01-01 RX ADMIN — CARVEDILOL 3.12 MG: 3.12 TABLET, FILM COATED ORAL at 08:05

## 2018-01-01 RX ADMIN — Medication 300 UNITS: at 08:21

## 2018-01-01 RX ADMIN — LIDOCAINE HYDROCHLORIDE 70 MG: 20 INJECTION, SOLUTION EPIDURAL; INFILTRATION; INTRACAUDAL; PERINEURAL at 12:30

## 2018-01-01 RX ADMIN — Medication 10 ML: at 09:00

## 2018-01-01 RX ADMIN — HYDROMORPHONE HYDROCHLORIDE 0.5 MG: 1 INJECTION, SOLUTION INTRAMUSCULAR; INTRAVENOUS; SUBCUTANEOUS at 19:27

## 2018-01-01 RX ADMIN — PHYTONADIONE 10 MG: 10 INJECTION, EMULSION INTRAMUSCULAR; INTRAVENOUS; SUBCUTANEOUS at 04:35

## 2018-01-01 RX ADMIN — DEXAMETHASONE SODIUM PHOSPHATE 10 MG: 4 INJECTION, SOLUTION INTRA-ARTICULAR; INTRALESIONAL; INTRAMUSCULAR; INTRAVENOUS; SOFT TISSUE at 10:40

## 2018-01-01 RX ADMIN — CEFTRIAXONE 1 G: 1 INJECTION, POWDER, FOR SOLUTION INTRAMUSCULAR; INTRAVENOUS at 04:27

## 2018-01-01 RX ADMIN — Medication 10 ML: at 20:35

## 2018-01-01 RX ADMIN — ROCURONIUM BROMIDE 5 MG: 10 INJECTION, SOLUTION INTRAVENOUS at 12:30

## 2018-01-01 RX ADMIN — Medication 10 ML: at 08:39

## 2018-01-01 RX ADMIN — ONDANSETRON 4 MG: 2 INJECTION INTRAMUSCULAR; INTRAVENOUS at 10:40

## 2018-01-01 RX ADMIN — FULVESTRANT 500 MG: 50 INJECTION INTRAMUSCULAR at 14:55

## 2018-01-01 RX ADMIN — MONTELUKAST SODIUM 10 MG: 10 TABLET, FILM COATED ORAL at 22:15

## 2018-01-01 RX ADMIN — HYDROMORPHONE HYDROCHLORIDE 0.5 MG: 1 INJECTION, SOLUTION INTRAMUSCULAR; INTRAVENOUS; SUBCUTANEOUS at 03:33

## 2018-01-01 RX ADMIN — DENOSUMAB 120 MG: 120 INJECTION SUBCUTANEOUS at 08:22

## 2018-01-01 RX ADMIN — LIDOCAINE HYDROCHLORIDE 120 MG: 20 INJECTION, SOLUTION INFILTRATION; PERINEURAL at 12:38

## 2018-01-01 RX ADMIN — HYDRALAZINE HYDROCHLORIDE 50 MG: 25 TABLET, FILM COATED ORAL at 07:51

## 2018-01-01 RX ADMIN — PROPOFOL 140 MG: 10 INJECTION, EMULSION INTRAVENOUS at 12:30

## 2018-01-01 RX ADMIN — MIRTAZAPINE 30 MG: 15 TABLET, FILM COATED ORAL at 21:18

## 2018-01-01 RX ADMIN — CALCIUM CARBONATE (ANTACID) CHEW TAB 500 MG 200 MG: 500 CHEW TAB at 07:36

## 2018-01-01 RX ADMIN — Medication 10 ML: at 09:20

## 2018-01-01 RX ADMIN — HYDROMORPHONE HYDROCHLORIDE 0.5 MG: 1 INJECTION, SOLUTION INTRAMUSCULAR; INTRAVENOUS; SUBCUTANEOUS at 23:28

## 2018-01-01 RX ADMIN — HYDROMORPHONE HYDROCHLORIDE 0.5 MG: 1 INJECTION, SOLUTION INTRAMUSCULAR; INTRAVENOUS; SUBCUTANEOUS at 14:46

## 2018-01-01 RX ADMIN — Medication 300 UNITS: at 20:57

## 2018-01-01 RX ADMIN — CARVEDILOL 3.12 MG: 3.12 TABLET, FILM COATED ORAL at 07:55

## 2018-01-01 RX ADMIN — ERYTHROPOIETIN 40000 UNITS: 40000 INJECTION, SOLUTION INTRAVENOUS; SUBCUTANEOUS at 09:25

## 2018-01-01 RX ADMIN — ENOXAPARIN SODIUM 60 MG: 100 INJECTION SUBCUTANEOUS at 08:58

## 2018-01-01 RX ADMIN — ERYTHROPOIETIN 40000 UNITS: 40000 INJECTION, SOLUTION INTRAVENOUS; SUBCUTANEOUS at 10:07

## 2018-01-01 RX ADMIN — HALOPERIDOL LACTATE 2 MG: 5 INJECTION, SOLUTION INTRAMUSCULAR at 07:58

## 2018-01-01 RX ADMIN — ENOXAPARIN SODIUM 60 MG: 100 INJECTION SUBCUTANEOUS at 08:19

## 2018-01-01 RX ADMIN — CARVEDILOL 3.12 MG: 3.12 TABLET, FILM COATED ORAL at 07:36

## 2018-01-01 RX ADMIN — Medication 300 UNITS: at 08:00

## 2018-01-01 RX ADMIN — HYDROMORPHONE HYDROCHLORIDE 0.5 MG: 1 INJECTION, SOLUTION INTRAMUSCULAR; INTRAVENOUS; SUBCUTANEOUS at 10:35

## 2018-01-01 RX ADMIN — HYDRALAZINE HYDROCHLORIDE 50 MG: 25 TABLET, FILM COATED ORAL at 08:05

## 2018-01-01 RX ADMIN — FULVESTRANT 500 MG: 50 INJECTION INTRAMUSCULAR at 08:36

## 2018-01-01 RX ADMIN — SODIUM CHLORIDE, PRESERVATIVE FREE 10 ML: 5 INJECTION INTRAVENOUS at 23:25

## 2018-01-01 RX ADMIN — ENOXAPARIN SODIUM 60 MG: 60 INJECTION SUBCUTANEOUS at 09:55

## 2018-01-01 RX ADMIN — ERYTHROPOIETIN 40000 UNITS: 40000 INJECTION, SOLUTION INTRAVENOUS; SUBCUTANEOUS at 11:00

## 2018-01-01 RX ADMIN — CARVEDILOL 3.12 MG: 3.12 TABLET, FILM COATED ORAL at 08:46

## 2018-01-01 RX ADMIN — SODIUM CHLORIDE, PRESERVATIVE FREE 10 ML: 5 INJECTION INTRAVENOUS at 16:36

## 2018-01-01 RX ADMIN — HALOPERIDOL LACTATE 2 MG: 5 INJECTION, SOLUTION INTRAMUSCULAR at 16:36

## 2018-01-01 RX ADMIN — SODIUM BICARBONATE 2 ML: 0.2 INJECTION, SOLUTION INTRAVENOUS at 16:25

## 2018-01-01 RX ADMIN — LORAZEPAM 1 MG: 2 INJECTION INTRAMUSCULAR; INTRAVENOUS at 19:13

## 2018-01-01 RX ADMIN — SODIUM CHLORIDE, PRESERVATIVE FREE 10 ML: 5 INJECTION INTRAVENOUS at 15:25

## 2018-01-01 RX ADMIN — ERYTHROPOIETIN 40000 UNITS: 40000 INJECTION, SOLUTION INTRAVENOUS; SUBCUTANEOUS at 14:50

## 2018-01-01 RX ADMIN — MIRTAZAPINE 30 MG: 15 TABLET, FILM COATED ORAL at 20:47

## 2018-01-01 RX ADMIN — ACETAMINOPHEN 650 MG: 325 TABLET, FILM COATED ORAL at 05:19

## 2018-01-01 RX ADMIN — INSULIN LISPRO 8 UNITS: 100 INJECTION, SOLUTION INTRAVENOUS; SUBCUTANEOUS at 08:00

## 2018-01-01 RX ADMIN — MEGESTROL ACETATE 800 MG: 40 SUSPENSION ORAL at 07:51

## 2018-01-01 RX ADMIN — ALBUTEROL SULFATE 2.5 MG: 2.5 SOLUTION RESPIRATORY (INHALATION) at 06:17

## 2018-01-01 RX ADMIN — Medication 300 UNITS: at 19:33

## 2018-01-01 RX ADMIN — SODIUM BICARBONATE 50 MEQ: 84 INJECTION, SOLUTION INTRAVENOUS at 18:04

## 2018-01-01 RX ADMIN — HEPARIN SODIUM AND DEXTROSE 18 UNITS/KG/HR: 5000; 5 INJECTION INTRAVENOUS at 00:38

## 2018-01-01 RX ADMIN — MEGESTROL ACETATE 800 MG: 40 SUSPENSION ORAL at 08:48

## 2018-01-01 RX ADMIN — INSULIN LISPRO 6 UNITS: 100 INJECTION, SOLUTION INTRAVENOUS; SUBCUTANEOUS at 22:41

## 2018-01-01 RX ADMIN — HYDROMORPHONE HYDROCHLORIDE 0.5 MG: 1 INJECTION, SOLUTION INTRAMUSCULAR; INTRAVENOUS; SUBCUTANEOUS at 18:10

## 2018-01-01 RX ADMIN — AMLODIPINE BESYLATE 10 MG: 10 TABLET ORAL at 08:47

## 2018-01-01 RX ADMIN — CYANOCOBALAMIN 1000 MCG: 1000 INJECTION, SOLUTION INTRAMUSCULAR at 07:51

## 2018-01-01 RX ADMIN — MEGESTROL ACETATE 800 MG: 40 SUSPENSION ORAL at 10:19

## 2018-01-01 RX ADMIN — MONTELUKAST SODIUM 10 MG: 10 TABLET, FILM COATED ORAL at 21:25

## 2018-01-01 RX ADMIN — LIDOCAINE HYDROCHLORIDE AND EPINEPHRINE 10 ML: 20; 10 INJECTION, SOLUTION INFILTRATION; PERINEURAL at 12:48

## 2018-01-01 RX ADMIN — Medication 10 ML: at 10:49

## 2018-01-01 RX ADMIN — WARFARIN SODIUM 1.5 MG: 1 TABLET ORAL at 18:53

## 2018-01-01 RX ADMIN — Medication 10 ML: at 08:01

## 2018-01-01 RX ADMIN — HYDROMORPHONE HYDROCHLORIDE 0.5 MG: 1 INJECTION, SOLUTION INTRAMUSCULAR; INTRAVENOUS; SUBCUTANEOUS at 11:21

## 2018-01-01 RX ADMIN — PIPERACILLIN SODIUM,TAZOBACTAM SODIUM 3.38 G: 3; .375 INJECTION, POWDER, FOR SOLUTION INTRAVENOUS at 04:33

## 2018-01-01 RX ADMIN — SODIUM CHLORIDE, PRESERVATIVE FREE 10 ML: 5 INJECTION INTRAVENOUS at 19:33

## 2018-01-01 RX ADMIN — SODIUM CHLORIDE, PRESERVATIVE FREE 10 ML: 5 INJECTION INTRAVENOUS at 18:40

## 2018-01-01 RX ADMIN — FULVESTRANT 500 MG: 50 INJECTION INTRAMUSCULAR at 14:34

## 2018-01-01 RX ADMIN — ENOXAPARIN SODIUM 50 MG: 60 INJECTION SUBCUTANEOUS at 22:37

## 2018-01-01 RX ADMIN — MONTELUKAST SODIUM 10 MG: 10 TABLET, FILM COATED ORAL at 21:18

## 2018-01-01 RX ADMIN — SODIUM CHLORIDE, PRESERVATIVE FREE 300 UNITS: 5 INJECTION INTRAVENOUS at 14:24

## 2018-01-01 RX ADMIN — SODIUM CHLORIDE 80 MG: 9 INJECTION, SOLUTION INTRAMUSCULAR; INTRAVENOUS; SUBCUTANEOUS at 04:24

## 2018-01-01 RX ADMIN — WARFARIN SODIUM 2 MG: 2 TABLET ORAL at 19:27

## 2018-01-01 RX ADMIN — POLYETHYLENE GLYCOL 3350 17 G: 17 POWDER, FOR SOLUTION ORAL at 21:17

## 2018-01-01 RX ADMIN — ENOXAPARIN SODIUM 60 MG: 100 INJECTION SUBCUTANEOUS at 07:50

## 2018-01-01 RX ADMIN — SODIUM BICARBONATE 2 ML: 0.2 INJECTION, SOLUTION INTRAVENOUS at 12:49

## 2018-01-01 RX ADMIN — Medication 10 ML: at 21:47

## 2018-01-01 RX ADMIN — HYDROMORPHONE HYDROCHLORIDE 0.5 MG: 1 INJECTION, SOLUTION INTRAMUSCULAR; INTRAVENOUS; SUBCUTANEOUS at 17:33

## 2018-01-01 RX ADMIN — ENOXAPARIN SODIUM 60 MG: 100 INJECTION SUBCUTANEOUS at 13:05

## 2018-01-01 RX ADMIN — CARVEDILOL 3.12 MG: 3.12 TABLET, FILM COATED ORAL at 09:05

## 2018-01-01 RX ADMIN — FENTANYL CITRATE 100 MCG: 50 INJECTION, SOLUTION INTRAMUSCULAR; INTRAVENOUS at 12:21

## 2018-01-01 RX ADMIN — ERYTHROPOIETIN 40000 UNITS: 40000 INJECTION, SOLUTION INTRAVENOUS; SUBCUTANEOUS at 11:05

## 2018-01-01 RX ADMIN — SODIUM BICARBONATE 2 ML: 0.2 INJECTION, SOLUTION INTRAVENOUS at 12:38

## 2018-01-01 RX ADMIN — ENOXAPARIN SODIUM 60 MG: 60 INJECTION SUBCUTANEOUS at 10:31

## 2018-01-01 RX ADMIN — SODIUM CHLORIDE, PRESERVATIVE FREE 10 ML: 5 INJECTION INTRAVENOUS at 17:34

## 2018-01-01 RX ADMIN — HYDROMORPHONE HYDROCHLORIDE 0.5 MG: 1 INJECTION, SOLUTION INTRAMUSCULAR; INTRAVENOUS; SUBCUTANEOUS at 21:51

## 2018-01-01 RX ADMIN — TBO-FILGRASTIM 300 MCG: 300 INJECTION, SOLUTION SUBCUTANEOUS at 14:50

## 2018-01-01 RX ADMIN — CALCIUM CARBONATE (ANTACID) CHEW TAB 500 MG 200 MG: 500 CHEW TAB at 18:53

## 2018-01-01 RX ADMIN — SODIUM CHLORIDE 75 ML/HR: 900 INJECTION, SOLUTION INTRAVENOUS at 04:59

## 2018-01-01 RX ADMIN — SUCCINYLCHOLINE CHLORIDE 150 MG: 20 INJECTION INTRAMUSCULAR; INTRAVENOUS at 12:30

## 2018-01-01 RX ADMIN — Medication 300 UNITS: at 10:05

## 2018-01-01 RX ADMIN — VANCOMYCIN HYDROCHLORIDE 1250 MG: 10 INJECTION, POWDER, LYOPHILIZED, FOR SOLUTION INTRAVENOUS at 08:03

## 2018-01-01 RX ADMIN — CARVEDILOL 3.12 MG: 3.12 TABLET, FILM COATED ORAL at 17:41

## 2018-01-01 RX ADMIN — HYDROCODONE BITARTRATE AND ACETAMINOPHEN 1 TABLET: 10; 325 TABLET ORAL at 08:47

## 2018-01-01 RX ADMIN — HALOPERIDOL LACTATE 2 MG: 5 INJECTION, SOLUTION INTRAMUSCULAR at 14:55

## 2018-01-01 RX ADMIN — LIDOCAINE HYDROCHLORIDE 3 ML: 10 INJECTION, SOLUTION INFILTRATION; PERINEURAL at 16:25

## 2018-01-01 RX ADMIN — POLYETHYLENE GLYCOL 3350 17 G: 17 POWDER, FOR SOLUTION ORAL at 21:58

## 2018-01-01 RX ADMIN — INSULIN LISPRO 8 UNITS: 100 INJECTION, SOLUTION INTRAVENOUS; SUBCUTANEOUS at 20:45

## 2018-01-01 RX ADMIN — FULVESTRANT 500 MG: 50 INJECTION INTRAMUSCULAR at 10:06

## 2018-01-01 RX ADMIN — HYDROCODONE BITARTRATE AND ACETAMINOPHEN 1 TABLET: 10; 325 TABLET ORAL at 10:43

## 2018-01-01 RX ADMIN — Medication 2 G: at 12:28

## 2018-01-01 RX ADMIN — LORAZEPAM 1 MG: 2 INJECTION INTRAMUSCULAR; INTRAVENOUS at 19:26

## 2018-01-01 RX ADMIN — INSULIN LISPRO 4 UNITS: 100 INJECTION, SOLUTION INTRAVENOUS; SUBCUTANEOUS at 12:19

## 2018-01-01 RX ADMIN — MEGESTROL ACETATE 800 MG: 40 SUSPENSION ORAL at 07:35

## 2018-01-01 RX ADMIN — ENOXAPARIN SODIUM 60 MG: 100 INJECTION SUBCUTANEOUS at 16:05

## 2018-01-01 RX ADMIN — SODIUM CHLORIDE 80 MG: 9 INJECTION, SOLUTION INTRAVENOUS at 03:06

## 2018-01-01 RX ADMIN — HYDROMORPHONE HYDROCHLORIDE 0.5 MG: 1 INJECTION, SOLUTION INTRAMUSCULAR; INTRAVENOUS; SUBCUTANEOUS at 18:55

## 2018-01-01 RX ADMIN — HYDRALAZINE HYDROCHLORIDE 50 MG: 25 TABLET, FILM COATED ORAL at 22:15

## 2018-01-01 RX ADMIN — MIRTAZAPINE 30 MG: 15 TABLET, FILM COATED ORAL at 21:16

## 2018-01-01 RX ADMIN — HALOPERIDOL LACTATE 2 MG: 5 INJECTION, SOLUTION INTRAMUSCULAR at 14:45

## 2018-01-01 RX ADMIN — HYDROMORPHONE HYDROCHLORIDE 0.5 MG: 1 INJECTION, SOLUTION INTRAMUSCULAR; INTRAVENOUS; SUBCUTANEOUS at 18:59

## 2018-01-01 RX ADMIN — ENOXAPARIN SODIUM 60 MG: 60 INJECTION SUBCUTANEOUS at 16:35

## 2018-01-01 RX ADMIN — HYDROMORPHONE HYDROCHLORIDE 0.5 MG: 1 INJECTION, SOLUTION INTRAMUSCULAR; INTRAVENOUS; SUBCUTANEOUS at 16:35

## 2018-01-01 RX ADMIN — ALBUMIN (HUMAN) 12.5 G: 0.25 INJECTION, SOLUTION INTRAVENOUS at 16:34

## 2018-01-01 RX ADMIN — CARVEDILOL 3.12 MG: 3.12 TABLET, FILM COATED ORAL at 08:13

## 2018-01-01 RX ADMIN — Medication 10 ML: at 10:09

## 2018-01-01 RX ADMIN — INSULIN LISPRO 4 UNITS: 100 INJECTION, SOLUTION INTRAVENOUS; SUBCUTANEOUS at 17:25

## 2018-01-01 RX ADMIN — ONDANSETRON 4 MG: 2 INJECTION INTRAMUSCULAR; INTRAVENOUS at 13:26

## 2018-01-01 RX ADMIN — HALOPERIDOL LACTATE 2 MG: 5 INJECTION, SOLUTION INTRAMUSCULAR at 17:28

## 2018-01-01 RX ADMIN — DEXTROSE MONOHYDRATE 25 G: 25 INJECTION, SOLUTION INTRAVENOUS at 18:07

## 2018-01-01 RX ADMIN — ERYTHROPOIETIN 40000 UNITS: 40000 INJECTION, SOLUTION INTRAVENOUS; SUBCUTANEOUS at 14:29

## 2018-01-01 RX ADMIN — HYDROMORPHONE HYDROCHLORIDE 0.5 MG: 1 INJECTION, SOLUTION INTRAMUSCULAR; INTRAVENOUS; SUBCUTANEOUS at 05:16

## 2018-01-01 RX ADMIN — ENOXAPARIN SODIUM 50 MG: 60 INJECTION SUBCUTANEOUS at 20:04

## 2018-01-01 RX ADMIN — Medication 300 UNITS: at 21:47

## 2018-01-01 RX ADMIN — HYDROMORPHONE HYDROCHLORIDE 0.5 MG: 1 INJECTION, SOLUTION INTRAMUSCULAR; INTRAVENOUS; SUBCUTANEOUS at 20:35

## 2018-01-01 RX ADMIN — HYDROMORPHONE HYDROCHLORIDE 0.5 MG: 1 INJECTION, SOLUTION INTRAMUSCULAR; INTRAVENOUS; SUBCUTANEOUS at 17:36

## 2018-01-01 RX ADMIN — SODIUM CHLORIDE 100 ML/HR: 900 INJECTION, SOLUTION INTRAVENOUS at 00:41

## 2018-01-01 RX ADMIN — MONTELUKAST SODIUM 10 MG: 10 TABLET, FILM COATED ORAL at 22:10

## 2018-01-01 RX ADMIN — HYDROMORPHONE HYDROCHLORIDE 0.5 MG: 1 INJECTION, SOLUTION INTRAMUSCULAR; INTRAVENOUS; SUBCUTANEOUS at 17:06

## 2018-01-01 RX ADMIN — SODIUM CHLORIDE, PRESERVATIVE FREE 10 ML: 5 INJECTION INTRAVENOUS at 05:16

## 2018-01-01 RX ADMIN — CARVEDILOL 3.12 MG: 3.12 TABLET, FILM COATED ORAL at 17:35

## 2018-01-01 RX ADMIN — HYDROCODONE BITARTRATE AND ACETAMINOPHEN 1 TABLET: 10; 325 TABLET ORAL at 08:10

## 2018-01-01 RX ADMIN — HYDROMORPHONE HYDROCHLORIDE 0.5 MG: 1 INJECTION, SOLUTION INTRAMUSCULAR; INTRAVENOUS; SUBCUTANEOUS at 23:58

## 2018-01-01 RX ADMIN — SODIUM CHLORIDE, PRESERVATIVE FREE 10 ML: 5 INJECTION INTRAVENOUS at 17:08

## 2018-01-01 RX ADMIN — CARVEDILOL 3.12 MG: 3.12 TABLET, FILM COATED ORAL at 18:23

## 2018-01-01 RX ADMIN — ENOXAPARIN SODIUM 60 MG: 100 INJECTION SUBCUTANEOUS at 07:41

## 2018-01-01 RX ADMIN — SODIUM CHLORIDE 75 ML/HR: 900 INJECTION, SOLUTION INTRAVENOUS at 12:42

## 2018-01-01 RX ADMIN — ONDANSETRON 8 MG: 2 INJECTION INTRAMUSCULAR; INTRAVENOUS at 04:23

## 2018-01-01 RX ADMIN — CYANOCOBALAMIN 1000 MCG: 1000 INJECTION, SOLUTION INTRAMUSCULAR at 14:35

## 2018-01-01 RX ADMIN — AMLODIPINE BESYLATE 10 MG: 10 TABLET ORAL at 07:36

## 2018-01-01 RX ADMIN — HYDROCODONE BITARTRATE AND ACETAMINOPHEN 1 TABLET: 10; 325 TABLET ORAL at 19:19

## 2018-01-01 RX ADMIN — CYANOCOBALAMIN 1000 MCG: 1000 INJECTION, SOLUTION INTRAMUSCULAR at 10:25

## 2018-01-01 RX ADMIN — CALCIUM CARBONATE (ANTACID) CHEW TAB 500 MG 200 MG: 500 CHEW TAB at 11:56

## 2018-01-01 RX ADMIN — AMLODIPINE BESYLATE 10 MG: 5 TABLET ORAL at 09:05

## 2018-01-01 RX ADMIN — HYDROCODONE BITARTRATE AND ACETAMINOPHEN 1 TABLET: 10; 325 TABLET ORAL at 17:41

## 2018-01-01 RX ADMIN — HYDROMORPHONE HYDROCHLORIDE 0.5 MG: 1 INJECTION, SOLUTION INTRAMUSCULAR; INTRAVENOUS; SUBCUTANEOUS at 18:39

## 2018-01-01 RX ADMIN — Medication 10 ML: at 20:57

## 2018-01-01 RX ADMIN — Medication 300 UNITS: at 10:09

## 2018-01-01 RX ADMIN — CYANOCOBALAMIN 1000 MCG: 1000 INJECTION, SOLUTION INTRAMUSCULAR; SUBCUTANEOUS at 10:06

## 2018-01-01 RX ADMIN — Medication 300 UNITS: at 09:19

## 2018-01-01 RX ADMIN — CARVEDILOL 3.12 MG: 3.12 TABLET, FILM COATED ORAL at 07:37

## 2018-01-01 RX ADMIN — CALCIUM CARBONATE (ANTACID) CHEW TAB 500 MG 200 MG: 500 CHEW TAB at 19:27

## 2018-01-01 RX ADMIN — FULVESTRANT 500 MG: 50 INJECTION INTRAMUSCULAR at 11:37

## 2018-01-01 RX ADMIN — LORAZEPAM 1 MG: 2 INJECTION INTRAMUSCULAR; INTRAVENOUS at 17:45

## 2018-01-01 RX ADMIN — VANCOMYCIN HYDROCHLORIDE 750 MG: 10 INJECTION, POWDER, LYOPHILIZED, FOR SOLUTION INTRAVENOUS at 12:23

## 2018-01-01 RX ADMIN — PANTOPRAZOLE SODIUM 40 MG: 40 TABLET, DELAYED RELEASE ORAL at 08:56

## 2018-01-01 RX ADMIN — MIRTAZAPINE 30 MG: 15 TABLET, FILM COATED ORAL at 21:20

## 2018-01-01 RX ADMIN — GLYCOPYRROLATE 0.2 MG: 0.2 INJECTION INTRAMUSCULAR; INTRAVENOUS at 10:55

## 2018-01-01 RX ADMIN — ENOXAPARIN SODIUM 60 MG: 60 INJECTION SUBCUTANEOUS at 16:36

## 2018-01-01 RX ADMIN — HYDRALAZINE HYDROCHLORIDE 50 MG: 25 TABLET, FILM COATED ORAL at 10:22

## 2018-01-01 RX ADMIN — HALOPERIDOL LACTATE 2 MG: 5 INJECTION, SOLUTION INTRAMUSCULAR at 01:16

## 2018-01-01 RX ADMIN — HALOPERIDOL LACTATE 2 MG: 5 INJECTION, SOLUTION INTRAMUSCULAR at 23:59

## 2018-01-01 RX ADMIN — HALOPERIDOL LACTATE 2 MG: 5 INJECTION, SOLUTION INTRAMUSCULAR at 10:35

## 2018-01-01 RX ADMIN — ERYTHROPOIETIN 40000 UNITS: 40000 INJECTION, SOLUTION INTRAVENOUS; SUBCUTANEOUS at 14:15

## 2018-01-01 RX ADMIN — HYDROMORPHONE HYDROCHLORIDE 0.5 MG: 1 INJECTION, SOLUTION INTRAMUSCULAR; INTRAVENOUS; SUBCUTANEOUS at 07:58

## 2018-01-01 RX ADMIN — STANDARDIZED SENNA CONCENTRATE AND DOCUSATE SODIUM 1 TABLET: 8.6; 5 TABLET, FILM COATED ORAL at 07:51

## 2018-01-01 RX ADMIN — SODIUM CHLORIDE, PRESERVATIVE FREE 10 ML: 5 INJECTION INTRAVENOUS at 11:57

## 2018-01-01 RX ADMIN — MIRTAZAPINE 30 MG: 15 TABLET, FILM COATED ORAL at 22:10

## 2018-01-01 RX ADMIN — CARVEDILOL 3.12 MG: 3.12 TABLET, FILM COATED ORAL at 07:52

## 2018-01-01 RX ADMIN — HYDROCODONE BITARTRATE AND ACETAMINOPHEN 1 TABLET: 10; 325 TABLET ORAL at 19:34

## 2018-01-01 RX ADMIN — SODIUM CHLORIDE, PRESERVATIVE FREE 500 UNITS: 5 INJECTION INTRAVENOUS at 09:45

## 2018-01-01 RX ADMIN — PROMETHAZINE HYDROCHLORIDE 12.5 MG: 25 INJECTION INTRAMUSCULAR; INTRAVENOUS at 06:56

## 2018-01-01 RX ADMIN — INSULIN LISPRO 6 UNITS: 100 INJECTION, SOLUTION INTRAVENOUS; SUBCUTANEOUS at 12:42

## 2018-01-01 RX ADMIN — HALOPERIDOL LACTATE 2 MG: 5 INJECTION, SOLUTION INTRAMUSCULAR at 11:55

## 2018-01-01 RX ADMIN — DEXTROSE MONOHYDRATE: 5 INJECTION, SOLUTION INTRAVENOUS at 22:00

## 2018-01-01 RX ADMIN — ERYTHROPOIETIN 40000 UNITS: 40000 INJECTION, SOLUTION INTRAVENOUS; SUBCUTANEOUS at 11:32

## 2018-01-01 RX ADMIN — LORAZEPAM 1 MG: 2 INJECTION INTRAMUSCULAR; INTRAVENOUS at 13:21

## 2018-01-01 RX ADMIN — SODIUM CHLORIDE 80 MG: 9 INJECTION, SOLUTION INTRAVENOUS at 01:30

## 2018-01-01 RX ADMIN — SODIUM CHLORIDE 80 MG: 9 INJECTION, SOLUTION INTRAVENOUS at 23:50

## 2018-01-01 RX ADMIN — DEXAMETHASONE SODIUM PHOSPHATE 4 MG: 100 INJECTION INTRAMUSCULAR; INTRAVENOUS at 12:47

## 2018-01-01 RX ADMIN — Medication 300 UNITS: at 21:36

## 2018-01-01 RX ADMIN — PROPOFOL 300 MCG/KG/MIN: 10 INJECTION, EMULSION INTRAVENOUS at 10:10

## 2018-01-01 RX ADMIN — Medication 2 G: at 10:40

## 2018-01-01 RX ADMIN — ERYTHROPOIETIN 40000 UNITS: 40000 INJECTION, SOLUTION INTRAVENOUS; SUBCUTANEOUS at 13:18

## 2018-01-01 RX ADMIN — HYDRALAZINE HYDROCHLORIDE 50 MG: 25 TABLET, FILM COATED ORAL at 21:58

## 2018-01-01 RX ADMIN — HUMAN INSULIN 10 UNITS: 100 INJECTION, SOLUTION SUBCUTANEOUS at 17:00

## 2018-01-01 RX ADMIN — SODIUM CHLORIDE 40 MG: 9 INJECTION INTRAMUSCULAR; INTRAVENOUS; SUBCUTANEOUS at 14:42

## 2018-01-01 RX ADMIN — Medication 300 UNITS: at 21:23

## 2018-01-01 RX ADMIN — HYDRALAZINE HYDROCHLORIDE 50 MG: 25 TABLET, FILM COATED ORAL at 08:13

## 2018-01-01 RX ADMIN — HYDROCODONE BITARTRATE AND ACETAMINOPHEN 1 TABLET: 10; 325 TABLET ORAL at 17:35

## 2018-01-01 RX ADMIN — MONTELUKAST SODIUM 10 MG: 10 TABLET, FILM COATED ORAL at 21:15

## 2018-01-01 RX ADMIN — AMLODIPINE BESYLATE 10 MG: 10 TABLET ORAL at 08:13

## 2018-01-01 RX ADMIN — Medication 10 ML: at 07:19

## 2018-01-01 RX ADMIN — MEGESTROL ACETATE 800 MG: 40 SUSPENSION ORAL at 08:46

## 2018-01-01 RX ADMIN — MONTELUKAST SODIUM 10 MG: 10 TABLET, FILM COATED ORAL at 21:58

## 2018-01-01 RX ADMIN — Medication 300 UNITS: at 23:29

## 2018-01-01 RX ADMIN — AMLODIPINE BESYLATE 10 MG: 10 TABLET ORAL at 07:55

## 2018-01-01 RX ADMIN — Medication 300 UNITS: at 08:40

## 2018-01-01 RX ADMIN — SODIUM CHLORIDE, PRESERVATIVE FREE 10 ML: 5 INJECTION INTRAVENOUS at 19:13

## 2018-01-01 RX ADMIN — ERYTHROPOIETIN 40000 UNITS: 40000 INJECTION, SOLUTION INTRAVENOUS; SUBCUTANEOUS at 17:09

## 2018-01-01 RX ADMIN — CARVEDILOL 3.12 MG: 3.12 TABLET, FILM COATED ORAL at 10:22

## 2018-01-01 RX ADMIN — HALOPERIDOL LACTATE 2 MG: 5 INJECTION, SOLUTION INTRAMUSCULAR at 15:24

## 2018-01-01 RX ADMIN — SODIUM CHLORIDE, SODIUM LACTATE, POTASSIUM CHLORIDE, AND CALCIUM CHLORIDE 75 ML/HR: 600; 310; 30; 20 INJECTION, SOLUTION INTRAVENOUS at 10:00

## 2018-01-01 RX ADMIN — Medication 10 ML: at 10:05

## 2018-01-01 RX ADMIN — HYDROMORPHONE HYDROCHLORIDE 0.5 MG: 1 INJECTION, SOLUTION INTRAMUSCULAR; INTRAVENOUS; SUBCUTANEOUS at 10:16

## 2018-01-01 RX ADMIN — HYDROMORPHONE HYDROCHLORIDE 0.5 MG: 1 INJECTION, SOLUTION INTRAMUSCULAR; INTRAVENOUS; SUBCUTANEOUS at 04:06

## 2018-01-01 RX ADMIN — Medication 300 UNITS: at 20:43

## 2018-01-01 RX ADMIN — CARVEDILOL 3.12 MG: 3.12 TABLET, FILM COATED ORAL at 18:53

## 2018-01-01 RX ADMIN — ENOXAPARIN SODIUM 60 MG: 100 INJECTION SUBCUTANEOUS at 16:36

## 2018-01-01 RX ADMIN — CYANOCOBALAMIN 1000 MCG: 1000 INJECTION, SOLUTION INTRAMUSCULAR at 14:51

## 2018-01-01 RX ADMIN — HYDRALAZINE HYDROCHLORIDE 50 MG: 25 TABLET, FILM COATED ORAL at 07:55

## 2018-01-01 RX ADMIN — HYDROCODONE BITARTRATE AND ACETAMINOPHEN 1 TABLET: 10; 325 TABLET ORAL at 19:05

## 2018-01-01 RX ADMIN — INSULIN LISPRO 4 UNITS: 100 INJECTION, SOLUTION INTRAVENOUS; SUBCUTANEOUS at 17:52

## 2018-01-01 RX ADMIN — ENOXAPARIN SODIUM 60 MG: 60 INJECTION SUBCUTANEOUS at 09:25

## 2018-01-01 RX ADMIN — HYDROMORPHONE HYDROCHLORIDE 1 MG: 1 INJECTION, SOLUTION INTRAMUSCULAR; INTRAVENOUS; SUBCUTANEOUS at 01:23

## 2018-01-01 RX ADMIN — SODIUM CHLORIDE, PRESERVATIVE FREE 10 ML: 5 INJECTION INTRAVENOUS at 13:22

## 2018-01-01 RX ADMIN — HALOPERIDOL LACTATE 2 MG: 5 INJECTION, SOLUTION INTRAMUSCULAR at 18:55

## 2018-01-01 RX ADMIN — HYDROMORPHONE HYDROCHLORIDE 1 MG: 1 INJECTION, SOLUTION INTRAMUSCULAR; INTRAVENOUS; SUBCUTANEOUS at 18:16

## 2018-01-01 RX ADMIN — SODIUM CHLORIDE: 9 INJECTION, SOLUTION INTRAVENOUS at 12:16

## 2018-01-01 RX ADMIN — HYDROMORPHONE HYDROCHLORIDE 0.5 MG: 1 INJECTION, SOLUTION INTRAMUSCULAR; INTRAVENOUS; SUBCUTANEOUS at 21:23

## 2018-01-01 RX ADMIN — SODIUM CHLORIDE, PRESERVATIVE FREE 10 ML: 5 INJECTION INTRAVENOUS at 18:12

## 2018-01-01 RX ADMIN — SODIUM CHLORIDE 100 ML/HR: 900 INJECTION, SOLUTION INTRAVENOUS at 13:00

## 2018-01-01 RX ADMIN — INSULIN LISPRO 4 UNITS: 100 INJECTION, SOLUTION INTRAVENOUS; SUBCUTANEOUS at 12:04

## 2018-01-01 RX ADMIN — ERYTHROPOIETIN 40000 UNITS: 40000 INJECTION, SOLUTION INTRAVENOUS; SUBCUTANEOUS at 10:24

## 2018-01-01 RX ADMIN — ERYTHROPOIETIN 40000 UNITS: 40000 INJECTION, SOLUTION INTRAVENOUS; SUBCUTANEOUS at 07:35

## 2018-01-01 RX ADMIN — ENOXAPARIN SODIUM 60 MG: 100 INJECTION SUBCUTANEOUS at 07:36

## 2018-01-01 RX ADMIN — HYDRALAZINE HYDROCHLORIDE 50 MG: 25 TABLET, FILM COATED ORAL at 22:10

## 2018-01-01 RX ADMIN — HYDROMORPHONE HYDROCHLORIDE 1 MG: 1 INJECTION, SOLUTION INTRAMUSCULAR; INTRAVENOUS; SUBCUTANEOUS at 11:25

## 2018-01-01 RX ADMIN — HYDRALAZINE HYDROCHLORIDE 50 MG: 25 TABLET, FILM COATED ORAL at 21:25

## 2018-01-01 RX ADMIN — HALOPERIDOL 2 MG: 5 INJECTION INTRAMUSCULAR at 14:55

## 2018-01-01 RX ADMIN — HYDROCODONE BITARTRATE AND ACETAMINOPHEN 1 TABLET: 10; 325 TABLET ORAL at 20:13

## 2018-01-01 RX ADMIN — MIRTAZAPINE 30 MG: 15 TABLET, FILM COATED ORAL at 22:15

## 2018-01-01 RX ADMIN — SODIUM CHLORIDE, PRESERVATIVE FREE 300 UNITS: 5 INJECTION INTRAVENOUS at 21:08

## 2018-01-01 RX ADMIN — CYANOCOBALAMIN 1000 MCG: 1000 INJECTION, SOLUTION INTRAMUSCULAR at 11:27

## 2018-01-01 RX ADMIN — HYDROMORPHONE HYDROCHLORIDE 0.5 MG: 1 INJECTION, SOLUTION INTRAMUSCULAR; INTRAVENOUS; SUBCUTANEOUS at 01:16

## 2018-01-01 RX ADMIN — HYDRALAZINE HYDROCHLORIDE 50 MG: 25 TABLET, FILM COATED ORAL at 21:18

## 2018-01-01 RX ADMIN — POLYETHYLENE GLYCOL 3350 17 G: 17 POWDER, FOR SOLUTION ORAL at 22:13

## 2018-01-01 RX ADMIN — DENOSUMAB 120 MG: 120 INJECTION SUBCUTANEOUS at 10:37

## 2018-01-01 RX ADMIN — HYDRALAZINE HYDROCHLORIDE 50 MG: 25 TABLET, FILM COATED ORAL at 21:15

## 2018-01-01 RX ADMIN — MIRTAZAPINE 30 MG: 15 TABLET, FILM COATED ORAL at 21:19

## 2018-01-01 RX ADMIN — LIDOCAINE HYDROCHLORIDE 8 ML: 10 INJECTION, SOLUTION INFILTRATION; PERINEURAL at 12:49

## 2018-01-01 RX ADMIN — INSULIN LISPRO 4 UNITS: 100 INJECTION, SOLUTION INTRAVENOUS; SUBCUTANEOUS at 09:06

## 2018-01-01 RX ADMIN — CARVEDILOL 3.12 MG: 3.12 TABLET, FILM COATED ORAL at 17:26

## 2018-01-01 RX ADMIN — CEFTRIAXONE 1 G: 1 INJECTION, POWDER, FOR SOLUTION INTRAMUSCULAR; INTRAVENOUS at 04:55

## 2018-01-01 RX ADMIN — AMLODIPINE BESYLATE 10 MG: 10 TABLET ORAL at 08:05

## 2018-01-01 RX ADMIN — ENOXAPARIN SODIUM 60 MG: 100 INJECTION SUBCUTANEOUS at 09:01

## 2018-01-01 RX ADMIN — MEGESTROL ACETATE 800 MG: 40 SUSPENSION ORAL at 08:24

## 2018-01-01 RX ADMIN — ENOXAPARIN SODIUM 60 MG: 60 INJECTION SUBCUTANEOUS at 07:35

## 2018-01-01 RX ADMIN — HYDROCODONE BITARTRATE AND ACETAMINOPHEN 1 TABLET: 10; 325 TABLET ORAL at 16:44

## 2018-01-01 RX ADMIN — ENOXAPARIN SODIUM 60 MG: 100 INJECTION SUBCUTANEOUS at 07:14

## 2018-01-01 RX ADMIN — SODIUM CHLORIDE, PRESERVATIVE FREE 10 ML: 5 INJECTION INTRAVENOUS at 19:00

## 2018-01-01 RX ADMIN — ERYTHROPOIETIN 40000 UNITS: 40000 INJECTION, SOLUTION INTRAVENOUS; SUBCUTANEOUS at 08:13

## 2018-01-01 RX ADMIN — PROPOFOL 150 MG: 10 INJECTION, EMULSION INTRAVENOUS at 10:33

## 2018-01-01 RX ADMIN — MIRTAZAPINE 30 MG: 15 TABLET, FILM COATED ORAL at 21:58

## 2018-01-01 RX ADMIN — DIATRIZOATE MEGLUMINE AND DIATRIZOATE SODIUM 10 ML: 660; 100 LIQUID ORAL; RECTAL at 07:19

## 2018-01-01 RX ADMIN — DIATRIZOATE MEGLUMINE AND DIATRIZOATE SODIUM 10 ML: 660; 100 LIQUID ORAL; RECTAL at 09:04

## 2018-01-01 RX ADMIN — Medication 10 ML: at 09:04

## 2018-01-01 RX ADMIN — LORAZEPAM 1 MG: 2 INJECTION INTRAMUSCULAR; INTRAVENOUS at 21:47

## 2018-01-02 NOTE — PROGRESS NOTES
Arrived to the [facility]. Procrit completed. Patient tolerated well. Any issues or concerns during appointment: no.  Patient aware of next infusion appointment on 1/16 (date) at 1 (time). Discharged home.

## 2018-01-16 PROBLEM — F33.9 RECURRENT DEPRESSION (HCC): Status: ACTIVE | Noted: 2018-01-01

## 2018-01-16 PROBLEM — E11.21 TYPE 2 DIABETES MELLITUS WITH NEPHROPATHY (HCC): Status: ACTIVE | Noted: 2018-01-01

## 2018-01-16 NOTE — PROGRESS NOTES
Arrived to the [facility]. Procrit, B12, Xgeva, and Faslodex completed. Patient tolerated well. Any issues or concerns during appointment: none. Patient aware of next infusion appointment on 1/30/18 at 0800. Discharged via wheelchair.     Karina Ramos RN

## 2018-01-30 NOTE — PROGRESS NOTES
Arrived to the [facility]. Assessment completed. Patient tolerated well. Any issues or concerns during appointment: Hgb 10.2 Procrit held. Interpretor phone for Wallisian used. Patient aware of next infusion appointment on 02/13  at 0800 . Discharged via wheelchair.

## 2018-02-13 NOTE — PROGRESS NOTES
Arrived to infusion, No  today,seen in office today also  Iva Gtz RN states patient was instructed on lovenox bridge and  Potassium supplement use via  phone in office  Shadi Spann NP aware of abnormal labs  Procrit given per plan

## 2018-02-14 NOTE — PERIOP NOTES
Patient verified name, , and surgery as listed in [facility]. Patient provided medical/health information and PTA medications to the best of their ability. TYPE  CASE:1b  Orders per surgeon: Received and dated 18. Labs per surgeon:none. Labs per anesthesia protocol: see [facility] results from 18. EKG  :  ekg 17on pts chart. pts chart reviewed by anesthesia dr. Diego Campos. No orders given    Patient provided with and instructed on education handouts including Guide to Surgery, blood transfusions, pain management, and hand hygiene for the family and community, and [facility] brochure.     hibiclens and instructions given per hospital policy. Instructed patient to continue previous medications as prescribed prior to surgery unless otherwise directed and to take the following medications the day of surgery according to anesthesia guidelines : amlodipine,carvedilol,hydrazaline . Instructed patient to hold  the following medications: coumadin( per MD) all vitamins supplements and nsaids. Original medication prescription bottles not visualized during patient appointment. Patient teach back successful and patient demonstrates knowledge of instruction.   Valentin Jiang used at [facility] visit.  number [id number]. Requested  on the day of surgery.

## 2018-02-16 PROBLEM — Z86.718 HISTORY OF BLOOD CLOTS: Status: ACTIVE | Noted: 2018-01-01

## 2018-02-17 NOTE — ED TRIAGE NOTES
Pt arrives from 5th floor outpatient infusion center, pt started to have chest pain during treatment. Pt is currently being treated for a PE, pt is an oncology pt and Irish speaking. Pt c/o SOB, and has recently had a cold.

## 2018-02-17 NOTE — ED PROVIDER NOTES
HPI Comments: yin is a 69-year-old female with history of breast cancer currently undergoing chemotherapy, DVT currently being bridged from Coumadin to Lovenox and history of pericardial effusion he went to the infusion to Center today for Lovenox and reported chest pain and was brought to the emergency department for further evaluation. She states she's had intermittent chest pain for about one week. She describes a substernal nonradiating. She also states she's been coughing a great deal and has large amount of phlegm. She describes shortness of breath which has been going on for about 2 weeks. She wears 2 L of oxygen at night but none during the day. Denies any significant fevers. Patient is scheduled to have removal of my believe is a ureteral stent in the near future which is why she is being bridged from Coumadin to Lovenox. The time of my evaluation she is denying any significant chest pain and requesting something for her phlegm. Patient is a 75 y.o. female presenting with chest pain. The history is provided by the patient. No  was used. Chest Pain (Angina)    Associated symptoms include cough and shortness of breath. Pertinent negatives include no abdominal pain, no back pain, no fever, no headaches, no nausea and no vomiting. Past Medical History:   Diagnosis Date    Acute on chronic diastolic congestive heart failure ([address] 1/5/2016    Anemia of chronic disease 11/13/2017    Asthma     till age 31    Cancer ([address]     roverto. breast ca mets bone/lung    Chemotherapy-induced neutropenia ([address] 12/20/2016    Depression     managed with meds    Diabetes ([address]     recently dx'ed; ype 2;  does not take at home    HCAP (healthcare-associated pneumonia) 7/17/2013    History of blood clots 2/16/2018    Hypertension     managed with meds    Sepsis ([address] 7/19/2016    Thromboembolus ([address]     Left leg DVT.  on coumadin    Unspecified adverse effect of anesthesia In [place] 28 yrs ago after second c -section-she was told her heart stopped d/t anesthesia-hospitalized for 5 days afterwards and followed by cardiologist    Vitamin B12 deficiency 11/13/2017       Past Surgical History:   Procedure Laterality Date    HX GYN      2 c sections, ovarian cyst-roverto.  HX VASCULAR ACCESS  1/26/12         Family History:   Problem Relation Age of Onset    Heart Attack Mother     Cancer Brother      doen not know details       Social History     Social History    Marital status:      Spouse name: N/A    Number of children: N/A    Years of education: N/A     Occupational History    Not on file. Social History Main Topics    Smoking status: Former Smoker     Packs/day: 1.00     Years: 3.00     Types: Cigarettes     Quit date: 1/1/1978    Smokeless tobacco: Never Used      Comment: quit 30 or more years ago    Alcohol use No    Drug use: No    Sexual activity: Not on file     Other Topics Concern    Not on file     Social History Narrative         ALLERGIES: Review of patient's allergies indicates no known allergies. Review of Systems   Constitutional: Negative for fatigue and fever. HENT: Positive for congestion. Negative for sore throat. Respiratory: Positive for cough and shortness of breath. Negative for chest tightness. Cardiovascular: Positive for chest pain. Negative for leg swelling. Gastrointestinal: Negative for abdominal pain, nausea and vomiting. Genitourinary: Negative for dysuria. Musculoskeletal: Negative for back pain. Neurological: Negative for syncope and headaches. Psychiatric/Behavioral: Negative for confusion. Vitals:    02/17/18 1536   BP: 120/44   Pulse: 67   Resp: 24   Temp: 98.2 °F (36.8 °C)   SpO2: 98%   Weight: 48.5 kg (107 lb)   Height: 5' 2\" (1.575 m)            Physical Exam   Constitutional: She is oriented to person, place, and time. She appears well-developed and well-nourished. No distress.    HENT: Head: Normocephalic and atraumatic. Eyes: Conjunctivae and EOM are normal. Pupils are equal, round, and reactive to light. Neck: Normal range of motion. Neck supple. Cardiovascular: Normal rate, regular rhythm and normal heart sounds. Pulmonary/Chest: Effort normal and breath sounds normal. No respiratory distress. She has no wheezes. She has no rales. Occasional wet sounding cough. Abdominal: Soft. She exhibits no distension. There is no tenderness. There is no rebound. Musculoskeletal: Normal range of motion. She exhibits no edema or tenderness. Neurological: She is alert and oriented to person, place, and time. Skin: Skin is warm and dry. No rash noted. She is not diaphoretic. Psychiatric: She has a normal mood and affect. Her behavior is normal.   Vitals reviewed. MDM  Number of Diagnoses or Management Options  Acute sinusitis, recurrence not specified, unspecified location: new and does not require workup  Diagnosis management comments: Patient has multiple complaints in regards to her phlegm and congestion. This seems to be the main issue for her now. She is currently denying any chest pain in the emergency department. She states she cannot breathe through her nose and describes this as shortness of breath. She reports she's been taking Mucinex and using some intranasal sprays without relief. She's had symptoms for a reported 3 weeks. I feel course of Augmentin may be helpful to treat possible sinusitis. Discussed elevated troponin and pericardial effusion with cardiology who believes troponin elevation likely secondary to renal disease and does not need to be addressed at this time. She had a recent echo that was stable. no significant concern for pulmonary embolism given that she is hardly anticoagulated and does not have any chest pain or severe shortness of breath at this time. We'll have her follow up with PCP and cardiologist in the near future.   Discharged home in stable condition with prescription for Augmentin. Return precautions discussed.        Amount and/or Complexity of Data Reviewed  Clinical lab tests: reviewed and ordered (Results for orders placed or performed during the hospital encounter of 06/54/29  -METABOLIC PANEL, COMPREHENSIVE       Result                                            Value                         Ref Range                       Sodium                                            142                           136 - 145 mmol/L                Potassium                                         4.4                           3.5 - 5.1 mmol/L                Chloride                                          105                           98 - 107 mmol/L                 CO2                                               28                            21 - 32 mmol/L                  Anion gap                                         9                             7 - 16 mmol/L                   Glucose                                           211 (H)                       65 - 100 mg/dL                  BUN                                               48 (H)                        8 - 23 MG/DL                    Creatinine                                        2.77 (H)                      0.6 - 1.0 MG/DL                 GFR est AA                                        22 (L)                        >60 ml/min/1.73m2               GFR est non-AA                                    18 (L)                        >60 ml/min/1.73m2               Calcium                                           8.4                           8.3 - 10.4 MG/DL                Bilirubin, total                                  0.8                           0.2 - 1.1 MG/DL                 ALT (SGPT)                                        19                            12 - 65 U/L                     AST (SGOT)                                        30                            15 - 37 U/L                     Alk. phosphatase                                  150 (H)                       50 - 136 U/L                    Protein, total                                    7.5                           6.3 - 8.2 g/dL                  Albumin                                           2.8 (L)                       3.2 - 4.6 g/dL                  Globulin                                          4.7 (H)                       2.3 - 3.5 g/dL                  A-G Ratio                                         0.6 (L)                       1.2 - 3.5                  -TROPONIN I       Result                                            Value                         Ref Range                       Troponin-I, Qt.                                   0.25 (HH)                     0.02 - 0.05 NG/ML          -TROPONIN I       Result                                            Value                         Ref Range                       Troponin-I, Qt.                                   0.24 (HH)                     0.02 - 0.05 NG/ML          -METABOLIC PANEL, COMPREHENSIVE       Result                                            Value                         Ref Range                       Sodium                                            142                           136 - 145 mmol/L                Potassium                                         4.5                           3.5 - 5.1 mmol/L                Chloride                                          105                           98 - 107 mmol/L                 CO2                                               27                            21 - 32 mmol/L                  Anion gap                                         10                            7 - 16 mmol/L                   Glucose                                           218 (H)                       65 - 100 mg/dL                  BUN                                               47 (H) 8 - 23 MG/DL                    Creatinine                                        2.73 (H)                      0.6 - 1.0 MG/DL                 GFR est AA                                        22 (L)                        >60 ml/min/1.73m2               GFR est non-AA                                    18 (L)                        >60 ml/min/1.73m2               Calcium                                           8.6                           8.3 - 10.4 MG/DL                Bilirubin, total                                  0.8                           0.2 - 1.1 MG/DL                 ALT (SGPT)                                        19                            12 - 65 U/L                     AST (SGOT)                                        30                            15 - 37 U/L                     Alk. phosphatase                                  153 (H)                       50 - 136 U/L                    Protein, total                                    6.9                           6.3 - 8.2 g/dL                  Albumin                                           2.8 (L)                       3.2 - 4.6 g/dL                  Globulin                                          4.1 (H)                       2.3 - 3.5 g/dL                  A-G Ratio                                         0.7 (L)                       1.2 - 3.5                  -BNP       Result                                            Value                         Ref Range                       BNP                                               481                           pg/mL                      -PROTHROMBIN TIME + INR       Result                                            Value                         Ref Range                       Prothrombin time                                  33.7 (H)                      11.5 - 14.5 sec                 INR                                               3.4 -TROPONIN I       Result                                            Value                         Ref Range                       Troponin-I, Qt.                                   0.23 ()                     0.02 - 0.05 NG/ML          -EKG, 12 LEAD, INITIAL       Result                                            Value                         Ref Range                       Ventricular Rate                                  62                            BPM                             Atrial Rate                                       300                           BPM                             QRS Duration                                      76                            ms                              Q-T Interval                                      406                           ms                              QTC Calculation (Bezet)                           412                           ms                              Calculated R Axis                                 48                            degrees                         Calculated T Axis                                 102                           degrees                         Diagnosis                                                                                                   !! AGE AND GENDER SPECIFIC ECG ANALYSIS !!   Junctional rhythm   Anterior infarct (cited on or before 13-JUL-2013)   ST & T wave abnormality, consider lateral ischemia   Abnormal ECG   When compared with ECG of 08-JUL-2017 16:16,   Junctional rhythm has replaced Sinus rhythm   Non-specific change in ST segment in Inferior leads   T wave inversion no longer evident in Inferior leads       *Note: Due to a large number of results and/or encounters for the requested time period, some results have not been displayed.  A complete set of results can be found in Results Review.  )  Tests in the radiology section of CPT®: ordered and reviewed (Xr Chest Pa Lat    Result Date: 2/17/2018  Two view chest:  02/17/2018 History: Chest pain during infusion at [facility]. Currently being treated for pulmonary embolus. Comparison: 01/31/2018 Findings: A left subclavian Mediport catheter is unchanged, terminating at the level of the distal left innominate vein. The cardiac silhouette is enlarged, likely in part due to a pericardial effusion noted on previous PET imaging. Small right pleural effusion and right middle and lower lobe atelectasis/infiltrates persist. There is a trace left pleural effusion, unchanged. The pulmonary vasculature is within normal limits. Multilevel thoracic spondylosis is present. IMPRESSION: Stable 2 view chest including findings concerning for pericardial effusion.     )  Review and summarize past medical records: yes  Independent visualization of images, tracings, or specimens: yes    Risk of Complications, Morbidity, and/or Mortality  Presenting problems: moderate  Diagnostic procedures: moderate  Management options: moderate    Patient Progress  Patient progress: stable        ED Course   Comment By Time   I discussed patient with Dr. Qi Menard of cardiology here reviewed patient's labs and history. He feels there is no cardiology intervention that needs to be undertaken at this time. Mary Jo Agudelo MD 02/17 2025   In speaking more the patient and with the  much of her complaints are her nasal congestion and her phlegm. Given that her symptoms have been going on for about 3 weeks  I will send her home on Augmentin and have her follow-up with her PCP.  Mary Jo Agudelo MD 02/17 2025       Procedures

## 2018-02-17 NOTE — PROGRESS NOTES
Patient arrived with shortness of breath and chest pain level of 8/10. Labs drawn peripherally for coags, type/cross, and rainbow also drawn. Port accessed with no blood return. TRANSFER - OUT REPORT:    Verbal report given to JERONIMO Calabrese (name) on Mariano Rodney  being transferred to ED(unit) for urgent transfer       Report consisted of patients Situation, Background, Assessment and   Recommendations(SBAR). Information from the following report(s) SBAR was reviewed with the receiving nurse. Lines:   Venous Access Device Port Upper chest (subclavicular area), left (Active)        Opportunity for questions and clarification was provided.       Patient transported with:   Registered Nurse

## 2018-02-17 NOTE — PROGRESS NOTES
Reviewed notes for spiritual concerns. Patient is [place] - speaks very little [place].  - Barbara Norris. Son- [place]. Complicated medical issues with [facility]. HCPOA on file. Chaplains have followed since 2016.     Padma Paula, staff [address], [address] [facility]  /   [email or web address]

## 2018-02-17 NOTE — PROGRESS NOTES
present to cover any requests in the Emergency Room. Thank you for this referral,      Rowena Nelson, [facility]  /Patient [address] [facility].  [address] [place] [address]    [phone]

## 2018-02-18 NOTE — PROGRESS NOTES
present with patient and unut nurse infussion department.     [address] [facility]  [phone]

## 2018-02-18 NOTE — DISCHARGE INSTRUCTIONS
Lavados nasales salinos: Instrucciones de cuidado - [ Saline Nasal Washes: Care Instructions ]  Instrucciones de cuidado  Los lavados nasales salinos ayudan a mantener las fosas nasales abiertas al eliminar la mucosidad espesa o seca. Neda sencillo remedio puede ayudar a [employer] síntomas de [place], sinusitis y resfriados. También puede hacer que la nariz se sienta más cómoda al [employer] las membranas mucosas húmedas. Es posible que note kim ligera sensación de ardor en la nariz las primeras veces que use la solución, lawanda esto por lo general mejora en unos cuantos días. La atención de seguimiento es kim parte clave de merrill tratamiento y seguridad. Asegúrese de hacer y acudir a todas las citas, y llame a merrill médico si está teniendo problemas. También es kim buena idea saber los resultados de los exámenes y mantener kim lista de los medicamentos que sharmila. ¿Cómo puede cuidarse en el hogar? · Puede comprar en la farmacia kim solución salina lista para usar en kim botella de apretar u otros productos de lavado nasal salino. Manisha y siga las instrucciones de la etiqueta. · También puede preparar merrill propia solución salina agregándole 1 cucharadita de sal y 1 cucharadita de bicarbonato de sodio a 2 tazas de agua destilada. · Si [place] kim solución hecha en casa, eche un poco en un tazón limpio. Utilizando kim dara de goma, comprima la dara e introduzca la boquilla en el agua salada. Recoja kim pequeña cantidad de agua salada en la dara aflojando la mano. · Siéntese con la rosemarie inclinada un poco hacia atrás. No se recueste del todo. Introduzca un poco la boquilla de la dara de goma o de la botella de apretar en kim de las fosas nasales. Eche suavemente unas cuantas gotas o un pequeño chorro en kim de las fosas nasales. Repita la operación en la otra fosa nasal. Es normal tener unos cuantos estornudos y arcadas al principio. · Suénese la nariz con cuidado.   · Limpie la dara de goma o la boquilla de la botella después de cada uso. · Olga Lidia esto 2 o 3 veces al día. · Hágase el lavado nasal con cuidado si le sangra la nariz con frecuencia. ¿Cuándo debe pedir ayuda? Preste especial atención a los cambios en merrill sherrie y asegúrese de comunicarse con merrill médico si:  ? · Tiene hemorragias nasales frecuentes. ? · Tiene problemas para hacerse los lavados nasales. ¿Dónde puede encontrar más información en inglés? Kyleigh Mcdonald a http://rosie-abena.info/. Escriba [id number] en la búsqueda para aprender más acerca de \"Lavados nasales salinos: Instrucciones de cuidado - [ Saline Nasal Washes: Care Instructions ]. \"  Revisado: 12 mayo, 2017  Versión del contenido: 11.4  © 5510-3706 Healthwise, Incorporated. Las instrucciones de cuidado fueron adaptadas bajo licencia por [employer] (which disclaims liability or warranty for this information). Si usted tiene [place] afección médica o sobre estas instrucciones, siempre pregunte a merrill profesional de sherrie. Healthwise, Incorporated niega toda garantía o responsabilidad por merrill uso de esta información. Sinusitis: Instrucciones de cuidado - [ Sinusitis: Care Instructions ]  Instrucciones de cuidado    La sinusitis es kim infección del recubrimiento de las cavidades de los senos paranasales de la rosemarie. La sinusitis con frecuencia se presenta después de un resfriado. Causa dolor y presión en la rosemarie y la modesto. En la [employer], la sinusitis mejora jefry en 1 o 2 semanas. Good algunos síntomas leves pueden durar varias semanas. A veces se necesitan antibióticos. La atención de seguimiento es kim parte clave de merrill tratamiento y seguridad. Asegúrese de hacer y acudir a todas las citas, y llame a merrill médico si está teniendo problemas. También es kim buena idea saber los resultados de los exámenes y mantener kim lista de los medicamentos que sharmila. ¿Cómo puede cuidarse en el hogar?   · [place] un analgésico (medicamento para el dolor) de [employer], caterina acetaminofén (Tylenol), ibuprofeno (Advil, Motrin) o naproxeno (Aleve). Manisha y siga todas las instrucciones de la [place]. · Si el médico le recetó antibióticos, tómelos caterina se los indicó. No deje de tomarlos por el hecho de sentirse mejor. Debe julian todos los antibióticos hasta terminarlos. · Tenga cuidado cuando tome medicamentos de venta lilibeth para el resfriado común o la gripe y Tylenol al [employer]. Muchos de estos medicamentos contienen acetaminofén, o sea, Tylenol. Manisha las etiquetas para asegurarse de que no está tomando kim dosis mayor que la recomendada. El exceso de acetaminofén (Tylenol) puede ser dañino. · Respire aire caliente y húmedo de [place] con vapor, un baño caliente o un lavamanos lleno de [place]. Evite el aire frío y seco. Usar un humidificador en merrill casa podría ayudar. Siga las indicaciones para limpiar el aparato. · Use lavados nasales con solución salina (agua salada) para ayudar a mantener las fosas nasales despejadas y eliminar la mucosidad y las bacterias. Puede comprar gotas nasales de solución salina en un supermercado o kim farmacia. O puede prepararlas usted mismo en casa agregándole 1 cucharadita de sal y 1 cucharadita de bicarbonato de sodio a 2 tazas de agua destilada. Si las prepara usted mismo, llene kim dara de goma con la solución, introduzca la punta en la fosa nasal y apriete con suavidad. Suénese la nariz. · [place] kim toalla caliente y húmeda o kim almohadilla tibia de gel en la modesto 3 o 4 veces al día por entre 5 y 8 minutos [id number] So. [place]. · Pruebe con un descongestionante nasal en aerosol, caterina oximetazolina (Afrin). No lo use por más de 3 días seguidos. Usarlo por más de 3 días puede empeorar la congestión. ¿Cuándo debe pedir ayuda? Llame a merrill médico ahora mismo o busque atención médica inmediata si:  ? · Tiene hinchazón o enrojecimiento nuevos o peores en la modesto o alrededor de los ojos. ? · Tiene fiebre nueva o más thania.    ?Preste especial atención a los [employer] sherrie y asegúrese de comunicarse con merrill médico si:  ? · Tiene dolor facial nuevo o que empeora. ? · El moco de la [place] se vuelve más espeso (caterina pus) o contiene deb nueva. ? · No mejora caterina se esperaba. ¿Dónde puede encontrar más información en inglés? Christa Reza a http://rosie-abena.info/. Raj Brower [id number] en la búsqueda para aprender más acerca de \"Sinusitis: Instrucciones de cuidado - [ Sinusitis: Care Instructions ]. \"  Revisado: 12 enamorado, 2017  Versión del contenido: 11.4  © 6508-5659 Healthwise, Incorporated. Las instrucciones de cuidado fueron adaptadas bajo licencia por [employer] (which disclaims liability or warranty for this information). Si usted tiene [place] afección médica o sobre estas instrucciones, siempre pregunte a merrill profesional de sherrie. Healthwise, Incorporated niega toda garantía o responsabilidad por merrill uso de esta información.

## 2018-02-18 NOTE — ED NOTES
I have reviewed discharge instructions with the patient. The patient verbalized understanding. Interpretor at bedside. Patient left ED via Discharge Method: wheelchair to Home with family. Opportunity for questions and clarification provided. Patient given 0 scripts. To continue your aftercare when you leave the hospital, you may receive an automated call from our care team to check in on how you are doing. This is a free service and part of our promise to provide the best care and service to meet your aftercare needs.  If you have questions, or wish to unsubscribe from this service please call [phone]. Thank you for Choosing our [facility] Emergency Department.

## 2018-02-18 NOTE — PROGRESS NOTES
Arrived to the [facility]. Labs drawn for PT/INR, results reviewed and Lovenox completed. Patient tolerated well. Interpretor present. Any issues or concerns during appointment: None. Patient aware of next infusion appointment on 2/21 (date) at 1600 (time). Discharged via wheelchair in stable condition accompanied by  and interpretor. Diana Smith

## 2018-02-20 NOTE — ANESTHESIA PREPROCEDURE EVALUATION
Anesthetic History   No history of anesthetic complications            Review of Systems / Medical History  Patient summary reviewed and pertinent labs reviewed    Pulmonary      Recent URI    Shortness of breath  Asthma : well controlled       Neuro/Psych         Psychiatric history (Depression)     Cardiovascular    Hypertension  Valvular problems/murmurs (mod AS)            Exercise tolerance: >4 METS  Comments: 7/2017 ECHO: EF 55-60%; Mod AS MCKAYLA 1.08 cm2   GI/Hepatic/Renal         Renal disease (Crt 3.32): CRI, ARF and stones       Endo/Other    Diabetes: well controlled, type 2    Cancer (breast) and anemia     Other Findings   Comments: Breast ca with bony metastasis    DVT 2015    Pancytopenia           Physical Exam    Airway  Mallampati: II  TM Distance: 4 - 6 cm  Neck ROM: normal range of motion   Mouth opening: Normal     Cardiovascular    Rhythm: regular  Rate: normal    Murmur: Grade 3, Aortic area     Dental    Dentition: Caps/crowns and Poor dentition  Comments: Missing numerous teeth   Pulmonary                 Abdominal         Other Findings            Anesthetic Plan    ASA: 3  Anesthesia type: general          Induction: Intravenous  Anesthetic plan and risks discussed with: Patient      Spoke with pt via .

## 2018-02-20 NOTE — H&P
History and Physical    Patient: Chacho Reyes  MRN: [id number]  SSN: [id number]   YOB: 1950  Age: 75 y.o. 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 Back on 29 July 2016 a placed a right ureteral stent which remains indwelling. This is a metal stent. She is tolerating this remarkably well. She has minimal voiding symptoms. She has had some mild flank pain on the left however. Recent creatinine was 2.78 which is increased from 1.68 in December 2017. INR is 1.8 today. Past Medical History:   Diagnosis Date    Acute on chronic diastolic congestive heart failure ([address] 1/5/2016    Anemia of chronic disease 11/13/2017    Asthma     till age 31    Cancer ([address]     roverto. breast ca mets bone/lung    Chemotherapy-induced neutropenia ([address] 12/20/2016    Depression     managed with meds    Diabetes [address] [address]     recently dx'ed; ype 2;  does not take at home    HCAP (healthcare-associated pneumonia) 7/17/2013    History of blood clots 2/16/2018    Hypertension     managed with meds    Sepsis ([address] 7/19/2016    Thromboembolus ([address]     Left leg DVT. on coumadin    Unspecified adverse effect of anesthesia     In [place] 28 yrs ago after second c -section-she was told her heart stopped d/t anesthesia-hospitalized for 5 days afterwards and followed by cardiologist    Vitamin B12 deficiency 11/13/2017     Past Surgical History:   Procedure Laterality Date    HX GYN      2 c sections, ovarian cyst-roverto.     HX VASCULAR ACCESS  1/26/12     No Known Allergies  Current Facility-Administered Medications   Medication Dose Route Frequency Provider Last Rate Last Dose    ceFAZolin (ANCEF) 2 g/20 mL in sterile water IV syringe  2 g IntraVENous ONCE Qasim Gage MD   Stopped at 02/20/18 1000    lidocaine (XYLOCAINE) 10 mg/mL (1 %) injection 0.1 mL  0.1 mL SubCUTAneous PRN [place] Gaby Ballard MD        lactated Ringers infusion  75 mL/hr IntraVENous CONTINUOUS Gretchen Chen MD 75 mL/hr at 02/20/18 1000 75 mL/hr at 02/20/18 1000    sodium chloride (NS) flush 5-10 mL  5-10 mL IntraVENous Q8H Gretchen Chen MD        sodium chloride (NS) flush 5-10 mL  5-10 mL IntraVENous PRN Gretchen Chen MD        fentaNYL citrate (PF) injection 100 mcg  100 mcg IntraVENous ONCE Gretchen Chen MD        midazolam (VERSED) injection 2 mg  2 mg IntraVENous ONCE PRN Gretchen Chen MD        midazolam (VERSED) injection 2 mg  2 mg IntraVENous ONCE Gretchen Chen MD         Facility-Administered Medications Ordered in Other Encounters   Medication Dose Route Frequency Provider Last Rate Last Dose    enoxaparin (LOVENOX) injection 60 mg  60 mg SubCUTAneous Q24H Emily Dietz MD   60 mg at 02/18/18 1635         Physical Examination    Visit Vitals    /63 (BP 1 Location: Right arm, BP Patient Position: Sitting)    Pulse 71    Temp 98.2 °F (36.8 °C)    Resp 18    Wt 104 lb (47.2 kg)    LMP 06/21/1998    SpO2 96%    BMI 19.02 kg/m2     Gen: Well developed, well nourished 75 y.o. female in no acute distress  Head: normocephalic, atraumatic  Mouth: Clear, no overt lesions, oral mucosa pink and moist  Neck: supple, no masses, no adenopathy or carotid bruits, trachea midline  Resp: clear to auscultation bilaterally, no wheeze, rhonchi or rales, excursions normal and symmetrical  Cardio: Regular rate and rhythm, no murmurs, clicks, gallops or rubs, no edema or varicosities  Abdomen: soft, nontender, nondistended, normoactive bowel sounds, no hernias, no hepatosplenomegaly   Extremeties: warm, well-perfused, no tenderness or swelling, normal gait/station  Neuro: sensation and strength grossly intact and symmetrical  Psych: alert and oriented to person, place and time      Impression: Breast cancer with chronic right ureteral obstruction. Plan: Right ureteral stent exchange.

## 2018-02-20 NOTE — BRIEF OP NOTE
BRIEF OPERATIVE NOTE    Date of Procedure: 2/20/2018   Preoperative Diagnosis: Chronic kidney disease, unspecified CKD stage [N18.9]  Postoperative Diagnosis: RIGHT URETERAL OBSTRUCTION    Procedure(s):  CYSTOSCOPY / RIGHT URETERAL STENT EXCHANGE WITH RENASSIANCE METAL STENT/  LEFT RETROGRADE PYELOGRAM  Surgeon(s) and Role: Liliya Womack MD - Primary         Assistant Staff: None      Surgical Staff:  Circ-1: Irvin Juarez RN  Event Time In   Incision Start 1047   Incision Close 1101     Anesthesia: General   Estimated Blood Loss: Minimal  Specimens: None   Findings: See dictated note   Complications: None  Implants:   Implant Name Type Inv.  Item Serial No.  Lot No. LRB No. Used Action   Julio April PGTL 6FR 24CM -- [id number] - [id number]   Audrey Lack DBL PGTL 6FR 24CM -- [id number]   [employer] RFIMTTU [id number] Right 1 Implanted

## 2018-02-20 NOTE — PERIOP NOTES
[employer] spoke with patient . She is not in pain. He stated he knows son who speaks [place] . Discharge instructions given to son Carla Sloan who verbalized understanding . Son interpreted instructions to father and patient . Son states instructions are the same they have heard before and they didn't have any questions.

## 2018-02-20 NOTE — IP AVS SNAPSHOT
303 [facility] 
 
 
 2329 [facility] 322 [address] [facility] 
[phone] Patient: Sophia Jade MRN: [id number] About your hospitalization You were admitted on:  2018 You last received care in the:  [facility] OP PACU You were discharged on:  2018 Why you were hospitalized Your primary diagnosis was:  Not on File Follow-up Information Follow up With Details Comments Contact Info MD Clinton Sandhu MD  The office will call you with your follow up appointment 7777 [facility] 187 [facility] [address] 
[phone] Your Scheduled Appointments 2018  4:00 PM EST Injection with [facility] [address] Hanny Mohamud [phone] [facility] [address]  
[phone] [address] E   4:00 PM EST Injection with NUR5  
ST. 3979 [facility] ([facility]) [address] Hanny Mohamud [phone] [facility] [address]  
[phone] [address] 14  4:00 PM EST Injection with 325 [facility]) [address] Hanny Mohamud [phone] [facility] [address]  
[phone] [address] 14  4:00 PM EST Injection with Guera Gamboa [facility] ([address] [place] [address]) 5th Floor Infusion [address] [facility] [address] Hanny Mohamud [phone]  [facility] [address]  
[phone] For [facility] AND SURGICAL \[facility] [place] [facility]\"" Floor [phone] ext. 3201 [facility] 2018  4:00 PM EST Injection with Guera Gamboa [facility] ([address] [place] [address]) 5th Floor Infusion [address] Pablito Vega [phone]  [facility] [address]  
[phone] For [facility] AND SURGICAL [facility] Floor [phone] ext. 3201 [facility] Monday February 26, 2018  8:00 AM EST ANTICOAG NURSE with [facility] ANTI-COAG(INR) [facility] Hematology and Oncology [facility]) C/ Juancarlos Carney 33 [facility] [address]  
[phone] Tuesday February 27, 2018  8:00 AM EST ANTICOAG NURSE with [facility] ANTI-COAG(INR) [facility]) C/ Juancarlos Carney 33 [facility] [address]  
[phone] Tuesday February 27, 2018  2:00 PM EST Injection with [facility]) [address] Pablito Vega [phone] [facility] [address]  
[phone] [address] Tuesday March 13, 2018  1:00 PM EDT  
PAT BASIC with SFD ASSESSMENT  01 614 [facility] Pre Assessment [facility] OR PRE ASSESSMENT) [address]  
[phone] Tuesday March 13, 2018  3:00 PM EDT  
LAB with [address]  
1808 [facility] OUTREACH INSURANCE [facility]) Susana Maciason [address] [place] [address]  
[phone] Tuesday March 13, 2018  3:30 PM EDT Follow Up with MD Y[facility] Hematology and Oncology [facility]) C/ Juancarlos Carney 33 [facility] [address]  
[phone] Tuesday March 13, 2018  4:00 PM EDT Injection with [facility]  
ST. 3979 [facility] ([facility]) [address] Pablito Vega [phone] [facility] [address]  
[phone] [address] Monday March 19, 2018 ESOPHAGOGASTRODUODENOSCOPY (EGD), COLONOSCOPY with Radha Wilkinson MD  
[facility] END[facility] ([address] [place] [address]) [address] [place] [address] [phone] Friday March 23, 2018  8:00 AM EDT DEEDEE NURSE with [facility] ANTI-COAG(INR) [facility] Hematology and Oncology [facility] M[facility]) JJ/ Juancarlos Carney 33 [facility] [address]  
[phone] Discharge Orders None A check heather indicates which time of day the medication should be taken. My Medications START taking these medications Instructions Each Dose to Equal  
 Morning Noon Evening Bedtime  
 phenazopyridine 200 mg tablet Commonly known as:  PYRIDIUM Your last dose was: Your next dose is: Take 1 Tab by mouth three (3) times daily as needed for Pain (use for burning on urination. This medicine will turn your urine orange.) for up to 3 days. 200 mg CHANGE how you take these medications Instructions Each Dose to Equal  
 Morning Noon Evening Bedtime  
 raNITIdine 150 mg tablet Commonly known as:  ZANTAC What changed:  when to take this Your last dose was: Your next dose is: Take 1 Tab by mouth daily. 150 mg CONTINUE taking these medications Instructions Each Dose to Equal  
 Morning Noon Evening Bedtime  
 albuterol-ipratropium 2.5 mg-0.5 mg/3 ml Nebu Commonly known as:  Ry Flores Your last dose was: Your next dose is:    
   
   
 3 mL by Nebulization route every six (6) hours as needed. 3 mL ALPRAZolam 0.25 mg tablet Commonly known as:  Rannie Bernardo Your last dose was: Your next dose is: Take 1 Tab by mouth three (3) times daily as needed for Anxiety. Max Daily Amount: 0.75 mg.  
 0.25 mg  
    
   
   
   
  
 amLODIPine 10 mg tablet Commonly known as:  Ahmet Marin Your last dose was: Your next dose is: Take 1 Tab by mouth daily. 10 mg  
    
   
   
   
  
 amoxicillin-clavulanate 875-125 mg per tablet Commonly known as:  AUGMENTIN Your last dose was: Your next dose is: Take 1 Tab by mouth two (2) times a day. 1 Tab  
    
   
   
   
  
 budesonide-formoterol 160-4.5 mcg/actuation Hfaa Commonly known as:  SYMBICORT Your last dose was: Your next dose is: Take 2 Puffs by inhalation two (2) times a day. 2 Puff  
    
   
   
   
  
 carvedilol 3.125 mg tablet Commonly known as:  Avelinarobles Quanith Your last dose was: Your next dose is: Take 1 Tab by mouth two (2) times daily (with meals). 3.125 mg DULoxetine 20 mg capsule Commonly known as:  CYMBALTA Your last dose was: Your next dose is: Take 1 Cap by mouth daily. Indications: NEUROPATHIC PAIN  
 20 mg  
    
   
   
   
  
 enoxaparin 60 mg/0.6 mL injection Commonly known as:  LOVENOX Your last dose was: Your next dose is:    
   
   
 60 mg by SubCUTAneous route daily. 60 mg  
    
   
   
   
  
 fentaNYL 50 mcg/hr PATCH Commonly known as:  Mariann Cisneros Your last dose was: Your next dose is:    
   
   
 1 Patch by TransDERmal route every seventy-two (72) hours. Max Daily Amount: 1 Patch. Indications: Chronic Pain with Opioid Tolerance 1 Patch  
    
   
   
   
  
 furosemide 40 mg tablet Commonly known as:  LASIX Your last dose was: Your next dose is: Take 1 Tab by mouth daily. 40 mg  
    
   
   
   
  
 hydrALAZINE 50 mg tablet Commonly known as:  APRESOLINE Your last dose was: Your next dose is: Take 1 Tab by mouth three (3) times daily. 50 mg HYDROcodone-acetaminophen  mg tablet Commonly known as:  Fabian Lama Your last dose was: Your next dose is: Take 1 Tab by mouth every six (6) hours as needed for Pain. Max Daily Amount: 4 Tabs. 1 Tab LORazepam 1 mg tablet Commonly known as:  ATIVAN Your last dose was: Your next dose is: Take 1 Tab by mouth every six (6) hours as needed for Anxiety. Max Daily Amount: 4 mg.  
 1 mg  
    
   
   
   
  
 mirtazapine 30 mg tablet Commonly known as:  Brittnee Peto Your last dose was: Your next dose is: Take 1 Tab by mouth nightly. 30 mg  
    
   
   
   
  
 montelukast 10 mg tablet Commonly known as:  SINGULAIR Your last dose was: Your next dose is: Take 1 Tab by mouth nightly. 10 mg OXYGEN-AIR DELIVERY SYSTEMS Your last dose was: Your next dose is:    
   
   
 1 L by IntraNASal route as needed (shortness of breath). 1 L  
    
   
   
   
  
 palbociclib 125 mg Cap Your last dose was: Your next dose is: Take 1 Cap by mouth daily. Take one pill once a day for 3 weeks and then off for one week 125 mg  
    
   
   
   
  
 potassium chloride 20 mEq tablet Commonly known as:  K-DUR, KLOR-CON Your last dose was: Your next dose is: Take 1 Tab by mouth two (2) times a day. 20 mEq  
    
   
   
   
  
 warfarin 1 mg tablet Commonly known as:  COUMADIN Your last dose was: Your next dose is: Take 1.5 Tabs by mouth every evening. 1.5 mg  
    
   
   
   
  
 zolpidem 5 mg tablet Commonly known as:  AMBIEN Your last dose was: Your next dose is: Take 1 Tab by mouth nightly as needed for Sleep. Max Daily Amount: 5 mg.  
 5 mg Where to Get Your Medications Information on where to get these meds will be given to you by the nurse or doctor. ! Ask your nurse or doctor about these medications  
  phenazopyridine 200 mg tablet Discharge Instructions CYSTOSCOPY 
 
ACTIVITY · As tolerated and as directed by your doctor. · Bathe or shower as directed by your doctor. DIET · Clear liquids until no nausea or vomiting; then light diet for the first day. · Drink plenty of liquids. At least 8 glasses of water to help flush out bladder. Limit amount of caffeine. · Advance to regular diet on second day, unless your doctor orders otherwise. · If nausea and vomiting continues, call your doctor. PAIN 
· Take pain medication as directed by your doctor. · Call your doctor if pain is NOT relieved by medication. · DO NOT take aspirin or blood thinners until directed by your doctor. CALL YOUR DOCTOR IF 
· Expect blood-tinged urine. Call your doctor if it lasts more than 72 hours or if you cannot see through the urine. · Temperature of 101 degrees or above. · Unable to empty bladder. AFTER ANESTHESIA · For the next 24 hours: DO NOT Drive, drink alcoholic beverages, or Make important decisions. · Be aware of dizziness following anesthesia and while taking pain medications APPOINTMENT DATE/ TIME  Office will call DISCHARGE SUMMARY from Nurse PATIENT INSTRUCTIONS: 
 
After general anesthesia or intravenous sedation, for 24 hours or while taking prescription Narcotics: · Limit your activities · Do not drive and operate hazardous machinery · Do not make important personal or business decisions · Do  not drink alcoholic beverages · If you have not urinated within 8 hours after discharge, please contact your surgeon on call. *  Please give a list of your current medications to your Primary Care Provider. *  Please update this list whenever your medications are discontinued, doses are 
    changed, or new medications (including over-the-counter products) are added. *  Please carry medication information at all times in case of emergency situations. These are general instructions for a healthy lifestyle: No smoking/ No tobacco products/ Avoid exposure to second hand smoke Surgeon General's Warning:  Quitting smoking now greatly reduces serious risk to your health. Obesity, smoking, and sedentary lifestyle greatly increases your risk for illness A healthy diet, regular physical exercise & weight monitoring are important for maintaining a healthy lifestyle You may be retaining fluid if you have a history of heart failure or if you experience any of the following symptoms:  Weight gain of 3 pounds or more overnight or 5 pounds in a week, increased swelling in our hands or feet or shortness of breath while lying flat in bed. Please call your doctor as soon as you notice any of these symptoms; do not wait until your next office visit. Recognize signs and symptoms of STROKE: 
 
F-face looks uneven A-arms unable to move or move unevenly S-speech slurred or non-existent T-time-call 911 as soon as signs and symptoms begin-DO NOT go Back to bed or wait to see if you get better-TIME IS BRAIN. Introducing [facility]! [facility] introduce portal paciente [employer] . Ahora se puede acceder a partes de merrill expediente médico, enviar por correo electrónico la oficina de merrill médico y solicitar renovaciones de medicamentos en línea. En merrill navegador de Internet , Marvene Risser a [email or web address]"[email or web address]hart. Prevedere com/"[employer]hart Olivia clic en el usuario por Johan Mejia? Flores Vidal clic aquí en la sesión Myesha Mejía. Verá la página de registro Poneto. Ingrese merrill código de [facility] Mila mitzy y caterina aparece a continuación. Usted no tendrá que [employer] código después de beatrice completado el proceso de registro . Si usted no se inscribe antes de la fecha de caducidad , debe solicitar un nuevo código. · MyChart Código de acceso : [id number] Expires: 4/9/2018  8:08 AM 
 
Ingresa los últimos cuatro dígitos de merrill Número de Seguro Social ( xxxx ) y fecha de nacimiento ( dd / mm / aaaa ) caterina se indica y olivia clic en Enviar. Usted será llevado a la siguiente página de registro . Crear un ID MyChart . Esta será merrill ID de inicio de sesión de MyChart y no puede ser [place] , por lo que pensar en kim que es Terie Tino y fácil de recordar . Crear kim contraseña MyChart . Usted puede cambiar merrill contraseña en cualquier momento . Ingrese merrill Password Reset de preguntas y Mejía . [place] se puede utilizar en un momento posterior si usted olvida merrill contraseña. Introduzca merrill dirección de correo electrónico . Winifred Doing recibirá kim notificación por correo electrónico cuando la nueva información está disponible en MyChart . Yuniel Rule clic en Registrarse. Carvel Reyes angel y descargar porciones de merrill expediente médico. 
Olga Lidia clic en el enlace de descarga del menú Resumen para descargar kim copia portátil de merrill información médica . Si tiene [employer] , por favor visite la sección de preguntas frecuentes del sitio web MyChart . Recuerde, MyChart NO es que se utilizará para las necesidades urgentes. Para emergencias médicas , llame al 911 . Ahora disponible en merrill iPhone y Android ! Providers Seen During Your Hospitalization Provider Specialty Primary office phone Jacquelyn Corrales MD Urology [phone] Your Primary Care Physician (PCP) Primary Care Physician Office Phone Office Fax  
 [place], [place] D ** None ** ** None ** You are allergic to the following No active allergies Recent Documentation Weight BMI OB Status Smoking Status 47.2 kg 19.02 kg/m2 Postmenopausal Former Smoker Emergency Contacts Name Discharge Info Relation Home Work Mobile Juancarlos Leggett  Child [2] [phone] 0 [address] [place] [address]  Spouse [3] [phone] Patient Belongings  The following personal items are in your possession at time of discharge: 
  Dental Appliances: None  Visual Aid: Glasses      Home Medications: None Jewelry: None  Clothing: Undergarments, Footwear, Pants, Shirt    Other Valuables: None Please provide this summary of care documentation to your next provider. Signatures-by signing, you are acknowledging that this After Visit Summary has been reviewed with you and you have received a copy. Patient Signature:  ____________________________________________________________ Date:  ____________________________________________________________  
  
Devota Dandy Provider Signature:  ____________________________________________________________ Date:  ____________________________________________________________  
  
  
   
  
Cleo Nithin Damon [address] [facility] 
[phone] Patient: Roger Bhat MRN: [id number] ORT:7/24/[id number] Sobre asher hospitalización Le admitieron el:  February 20, 2018 Asher tratamiento más reciente [facility]  [facility] Le dieron de thania el:  February 20, 2018 Por qué le ingresaron Asher diagnosis primaria es:  No está archivado/a Follow-up Information Follow up With Details Comments Contact Info MD Robin Jara MD  The office will call you with your follow up appointment [address] [place] [address] 
[phone] Your Scheduled Appointments Wednesday February 21, 2018  4:00 PM EST Injection with [id number] [place] [address]) [address] Guillermo Costa [phone] [facility] [address]  
[phone] [address] Thursday February 22, 2018  4:00 PM EST Injection with NUR5  
ST. 3979 [facility] ([facility] [address] Guillermo Costa [phone] [facility] [address]  
[phone] [address] Friday February 23, 2018  4:00 PM EST Injection with [address] [place] [address] Healthcare ) [address] Pablito Vega [phone] [facility] [address]  
[phone] [address] Saturday February 24, 2018  4:00 PM EST Injection with Evy Cargo [facility] (300 [facility]) 5th Floor Infusion [address] [facility] [address] Pablito Vega [phone]  [facility] [address] [phone] For [facility] Floor [phone] ext. 3201 [facility] Sunday February 25, 2018  4:00 PM EST Injection with Evy Cargo [facility] (300 [facility]) 5th Floor Infusion [address] [facility] [address] Pablito Vega [phone]  [facility] [address]  
[phone] For [facility] Floor [phone] ext. 3201 [facility] Monday February 26, 2018  8:00 AM EST ANTICOAG NURSE with [facility] ANTI-COAG(INR) Yandel [facility] Hematology and Oncology [facility]) C/ Juancarlos Carney 33 [facility] [address]  
[phone] Tuesday February 27, 2018  8:00 AM EST ANTICOAG NURSE with [facility] ANTI-COAG(INR) [facility] Hematology and Oncology [facility]) C/ Juancarlos Carney [address] [place] [address]  
[phone] Tuesday February 27, 2018  2:00 PM EST Injection with [address] [facility] 1 Healthcare ) [address] Pablito Vega [phone] [facility] [address]  
[phone] [address] Tuesday March 13, 2018  1:00 PM EDT  
PAT BASIC with SFD ASSESSMENT  01 [facility] Pre Assessment [facility] OR PRE ASSESSMENT) 809 [facility]  [address] 973 [address] [facility]  
[phone] Tuesday March 13, 2018  3:00 PM EDT  
LAB with [address]  
2176 [facility] OUTREACH INSURANCE 1 Healthcare Corbin Mendes [address] [place] [phone]  Tuesday March 13, 2018  3:30 PM EDT  
 Follow Up with MD Denis Victoria Hematology and Oncology [facility]) C/ Juancarlos Carney 33 [facility] [address]  
[phone] Tuesday March 13, 2018  4:00 PM EDT Injection with NUR8  
ST. [address]) [address] Kinsey Pablo [phone] [facility] [address]  
[phone] [address] Monday March 19, 2018 ESOPHAGOGASTRODUODENOSCOPY (EGD), COLONOSCOPY with Natanael Freeman MD  
[facility] ENDOSCOPY ([address] [place] [address]) [address] [place] [address]  
[phone] Friday March 23, 2018  8:00 AM EDT ANTICOAG NURSE with [facility] ANTI-COAG(INR) Denis Grant Hematology and Oncology [facility]) C/ Juancarlos Carney 33 [facility] [address]  
[phone] Discharge Orders AI Patents A check heather indicates which time of day the medication should be taken. My Medications EMPIECE a Krush Instructions Each Dose to Equal  
 Morning Noon Evening Bedtime  
 phenazopyridine 200 mg tablet También conocido caterina:  PYRIDIUM Your last dose was: Your next dose is: Take 1 Tab by mouth three (3) times daily as needed for Pain (use for burning on urination. This medicine will turn your urine orange.) for up to 3 days. 200 mg  
    
   
   
   
  
  
CAMBIE la forma de Krush Instructions Each Dose to Equal  
 Morning Noon Evening Bedtime  
 raNITIdine 150 mg tablet También conocido caterina:  ZANTAC Lo que cambió:  cuándo lo debe julian Your last dose was: Your next dose is: Take 1 Tab by mouth daily. 150 mg  
    
   
   
   
  
  
SIGA Perpetu Instructions Each Dose to Equal  
 Morning Noon Evening Bedtime  
 albuterol-ipratropium 2.5 mg-0.5 mg/3 ml Nebu También conocido caterina:  Faiza Rainey Your last dose was: Your next dose is:    
   
   
 3 mL by Nebulization route every six (6) hours as needed. 3 mL ALPRAZolam 0.25 mg tablet También conocido caterina:  Jai Rivera Your last dose was: Your next dose is: Take 1 Tab by mouth three (3) times daily as needed for Anxiety. Max Daily Amount: 0.75 mg.  
 0.25 mg  
    
   
   
   
  
 amLODIPine 10 mg tablet También conocido caterina:  Anabelle Vance Your last dose was: Your next dose is: Take 1 Tab by mouth daily. 10 mg  
    
   
   
   
  
 amoxicillin-clavulanate 875-125 mg per tablet También conocido caterina:  AUGMENTIN Your last dose was: Your next dose is: Take 1 Tab by mouth two (2) times a day. 1 Tab  
    
   
   
   
  
 budesonide-formoterol 160-4.5 mcg/actuation Hfaa También conocido caterina:  SYMBICORT Your last dose was: Your next dose is: Take 2 Puffs by inhalation two (2) times a day. 2 Puff  
    
   
   
   
  
 carvedilol 3.125 mg tablet También conocido caterina:  COREG Your last dose was: Your next dose is: Take 1 Tab by mouth two (2) times daily (with meals). 3.125 mg DULoxetine 20 mg capsule También conocido caterina:  CYMBALTA Your last dose was: Your next dose is: Take 1 Cap by mouth daily. Indications: NEUROPATHIC PAIN  
 20 mg  
    
   
   
   
  
 enoxaparin 60 mg/0.6 mL injection También conocido caterina:  LOVENOX Your last dose was: Your next dose is:    
   
   
 60 mg by SubCUTAneous route daily. 60 mg  
    
   
   
   
  
 fentaNYL 50 mcg/hr PATCH También conocido caterina:  Kalina Scott Your last dose was: Your next dose is:    
   
   
 1 Patch by TransDERmal route every seventy-two (72) hours.  Max Daily Amount: 1 Patch. Indications: Chronic Pain with Opioid Tolerance 1 Patch  
    
   
   
   
  
 furosemide 40 mg tablet También conocido caterina:  LASIX Your last dose was: Your next dose is: Take 1 Tab by mouth daily. 40 mg  
    
   
   
   
  
 hydrALAZINE 50 mg tablet También conocido caterina:  APRESOLINE Your last dose was: Your next dose is: Take 1 Tab by mouth three (3) times daily. 50 mg HYDROcodone-acetaminophen  mg tablet También conocido caterina:  Saqib Elbridge Your last dose was: Your next dose is: Take 1 Tab by mouth every six (6) hours as needed for Pain. Max Daily Amount: 4 Tabs. 1 Tab LORazepam 1 mg tablet También conocido caterina:  ATIVAN Your last dose was: Your next dose is: Take 1 Tab by mouth every six (6) hours as needed for Anxiety. Max Daily Amount: 4 mg.  
 1 mg  
    
   
   
   
  
 mirtazapine 30 mg tablet También conocido caterina:  Meli Anderson Your last dose was: Your next dose is: Take 1 Tab by mouth nightly. 30 mg  
    
   
   
   
  
 montelukast 10 mg tablet También conocido caterina:  SINGULAIR Your last dose was: Your next dose is: Take 1 Tab by mouth nightly. 10 mg OXYGEN-AIR DELIVERY SYSTEMS Your last dose was: Your next dose is:    
   
   
 1 L by IntraNASal route as needed (shortness of breath). 1 L  
    
   
   
   
  
 palbociclib 125 mg Cap Your last dose was: Your next dose is: Take 1 Cap by mouth daily. Take one pill once a day for 3 weeks and then off for one week 125 mg  
    
   
   
   
  
 potassium chloride 20 mEq tablet También conocido caterina:  K-DUR, [employer] Your last dose was: Your next dose is: Take 1 Tab by mouth two (2) times a day. 20 mEq warfarin 1 mg tablet También conocido caterina:  COUMADIN Your last dose was: Your next dose is: Take 1.5 Tabs by mouth every evening. 1.5 mg  
    
   
   
   
  
 zolpidem 5 mg tablet También conocido caterina:  Malorie Gutierrez Your last dose was: Your next dose is: Take 1 Tab by mouth nightly as needed for Sleep. Max Daily Amount: 5 mg.  
 5 mg  
    
   
   
   
  
  
  
Dónde puede recoger jesus medicamentos Information on where to get these meds will be given to you by the nurse or doctor. ! Pregunte a merrill médico o enfermero/a sobre estos medicamentos  
  phenazopyridine 200 mg tablet Instrucciones a teresa de thania CYSTOSCOPY 
 
ACTIVITY · As tolerated and as directed by your doctor. · Bathe or shower as directed by your doctor. DIET · Clear liquids until no nausea or vomiting; then light diet for the first day. · Drink plenty of liquids. At least 8 glasses of water to help flush out bladder. Limit amount of caffeine. · Advance to regular diet on second day, unless your doctor orders otherwise. · If nausea and vomiting continues, call your doctor. PAIN 
· Take pain medication as directed by your doctor. · Call your doctor if pain is NOT relieved by medication. · DO NOT take aspirin or blood thinners until directed by your doctor. CALL YOUR DOCTOR IF 
· Expect blood-tinged urine. Call your doctor if it lasts more than 72 hours or if you cannot see through the urine. · Temperature of 101 degrees or above. · Unable to empty bladder. AFTER ANESTHESIA · For the next 24 hours: DO NOT Drive, drink alcoholic beverages, or Make important decisions. · Be aware of dizziness following anesthesia and while taking pain medications APPOINTMENT DATE/ TIME  Office will call DISCHARGE SUMMARY from Nurse PATIENT INSTRUCTIONS: 
 
After general anesthesia or intravenous sedation, for 24 hours or while taking prescription Narcotics: · Limit your activities · Do not drive and operate hazardous machinery · Do not make important personal or business decisions · Do  not drink alcoholic beverages · If you have not urinated within 8 hours after discharge, please contact your surgeon on call. *  Please give a list of your current medications to your Primary Care Provider. *  Please update this list whenever your medications are discontinued, doses are 
    changed, or new medications (including over-the-counter products) are added. *  Please carry medication information at all times in case of emergency situations. These are general instructions for a healthy lifestyle: No smoking/ No tobacco products/ Avoid exposure to second hand smoke Surgeon General's Warning:  Quitting smoking now greatly reduces serious risk to your health. Obesity, smoking, and sedentary lifestyle greatly increases your risk for illness A healthy diet, regular physical exercise & weight monitoring are important for maintaining a healthy lifestyle You may be retaining fluid if you have a history of heart failure or if you experience any of the following symptoms:  Weight gain of 3 pounds or more overnight or 5 pounds in a week, increased swelling in our hands or feet or shortness of breath while lying flat in bed. Please call your doctor as soon as you notice any of these symptoms; do not wait until your next office visit. Recognize signs and symptoms of STROKE: 
 
F-face looks uneven A-arms unable to move or move unevenly S-speech slurred or non-existent T-time-call 911 as soon as signs and symptoms begin-DO NOT go Back to bed or wait to see if you get better-TIME IS BRAIN. Introducing [facility]! [facility] introduce portal paciente MyChart .  Ahora se puede acceder a partes de merrill expediente médico, enviar por correo electrónico la oficina de merrill médico y solicitar renovaciones de medicamentos en línea. En merrill navegador de Internet , Phylicia Drilling a [email or web address] Olivia clic en el usuario por Sharon Harris? Saintclair Hoe clic aquí en la sesión Adrián Honey. Verá la página de registro [place]. Ingrese merrill código de [employer] mitzy y caterina aparece a continuación. Usted no tendrá que [employer] código después de beatrice completado el proceso de registro . Si usted no se inscribe antes de la fecha de caducidad , debe solicitar un nuevo código. · MyChart Código de acceso : [id number] Expires: 4/9/2018  8:08 AM 
 
Ingresa los últimos cuatro dígitos de merrill Número de Seguro Social ( xxxx ) y fecha de nacimiento ( dd / mm / aaaa ) caterina se indica y olivia clic en Enviar. Usted será llevado a la siguiente página de registro . Crear un ID MyChart . Esta será merrill ID de inicio de sesión de MyChart y no puede ser [place] , por lo que pensar en kim que es Marisue Daubs y fácil de recordar . Crear kim contraseña MyChart . Usted puede cambiar merrill contraseña en cualquier momento . Ingrese merrill Password Reset de preguntas y [place] . [place] se puede utilizar en un momento posterior si usted olvida merrill contraseña. Introduzca merrill dirección de correo electrónico . Mila Red recibirá kim notificación por correo electrónico cuando la nueva información está disponible en MyChart . Sunny Brilliant clic en Registrarse. Meryle Hart angel y descargar porciones de merrill expediente médico. 
Olivia clic en el enlace de descarga del menú Resumen para descargar kim copia portátil de merrill información médica . Si tiene [employer] , por favor visite la sección de preguntas frecuentes del sitio web MyChart . Recuerde, MyChart NO es que se utilizará para las necesidades urgentes. Para emergencias médicas , llame al 911 . Ahora disponible en merrill iPhone y Android ! Providers Seen During Your Hospitalization Personal Médico [facility]special[facility] Teléfono principal de la oficina Arcelia Harper MD Urology [phone] Asher médico de atención primaria (PCP ) Primary Care Physician Office Phone Office Fax  
 [place], [place] D ** None ** ** None ** Tiene alergias a lo siguiente No tiene alergias Documentación recientes Weight BMI (IMC) Estado obstétrico Estatus de tabaquísmo 47.2 kg 19.02 kg/m2 Postmenopausal Former Smoker Emergency Contacts Name Discharge Info Relation Home Work Mobile Juancarlos Leggett  Child [2] [phone] [address] [place] [address]  Spouse [3] [phone] Patient Belongings The following personal items are in your possession at time of discharge: 
  Dental Appliances: None  Visual Aid: Glasses      Home Medications: None   Jewelry: None  Clothing: Undergarments, Footwear, Pants, Shirt    Other Valuables: None Please provide this summary of care documentation to your next provider. Signatures-by signing, you are acknowledging that this After Visit Summary has been reviewed with you and you have received a copy. Patient Signature:  ____________________________________________________________ Date:  ____________________________________________________________  
  
Carolynn Constantino Provider Signature:  ____________________________________________________________ Date:  ____________________________________________________________

## 2018-02-20 NOTE — DISCHARGE INSTRUCTIONS
CYSTOSCOPY    ACTIVITY   · As tolerated and as directed by your doctor. · Bathe or shower as directed by your doctor. DIET  · Clear liquids until no nausea or vomiting; then light diet for the first day. · Drink plenty of liquids. At least 8 glasses of water to help flush out bladder. Limit amount of caffeine. · Advance to regular diet on second day, unless your doctor orders otherwise. · If nausea and vomiting continues, call your doctor. PAIN  · Take pain medication as directed by your doctor. · Call your doctor if pain is NOT relieved by medication. · DO NOT take aspirin or blood thinners until directed by your doctor. CALL YOUR DOCTOR IF  · Expect blood-tinged urine. Call your doctor if it lasts more than 72 hours or if you cannot see through the urine. · Temperature of 101 degrees or above. · Unable to empty bladder. AFTER ANESTHESIA  · For the next 24 hours: DO NOT Drive, drink alcoholic beverages, or Make important decisions. · Be aware of dizziness following anesthesia and while taking pain medications    APPOINTMENT DATE/ TIME  Office will call    DISCHARGE SUMMARY from Nurse    PATIENT INSTRUCTIONS:    After general anesthesia or intravenous sedation, for 24 hours or while taking prescription Narcotics:  · Limit your activities  · Do not drive and operate hazardous machinery  · Do not make important personal or business decisions  · Do  not drink alcoholic beverages  · If you have not urinated within 8 hours after discharge, please contact your surgeon on call. *  Please give a list of your current medications to your Primary Care Provider. *  Please update this list whenever your medications are discontinued, doses are      changed, or new medications (including over-the-counter products) are added. *  Please carry medication information at all times in case of emergency situations.       These are general instructions for a healthy lifestyle:    No smoking/ No tobacco products/ Avoid exposure to second hand smoke    Surgeon General's Warning:  Quitting smoking now greatly reduces serious risk to your health. Obesity, smoking, and sedentary lifestyle greatly increases your risk for illness    A healthy diet, regular physical exercise & weight monitoring are important for maintaining a healthy lifestyle    You may be retaining fluid if you have a history of heart failure or if you experience any of the following symptoms:  Weight gain of 3 pounds or more overnight or 5 pounds in a week, increased swelling in our hands or feet or shortness of breath while lying flat in bed. Please call your doctor as soon as you notice any of these symptoms; do not wait until your next office visit. Recognize signs and symptoms of STROKE:    F-face looks uneven    A-arms unable to move or move unevenly    S-speech slurred or non-existent    T-time-call 911 as soon as signs and symptoms begin-DO NOT go       Back to bed or wait to see if you get better-TIME IS BRAIN.

## 2018-02-20 NOTE — PERIOP NOTES
Per Dr. Farhan Schaefer, no pain prescription given because patient wears fentanyl patch and takes norco on a daily basis. Per Dr. Anne Morrison, pt room air O2 sat in 80s after moving to OR table. Pt was diagnosed with an upper respiratory infection this past weekend and is currently taking Augmentin. OK for patient to be in low 90s on RA for discharge. Pt, also, is non-ambulatory due to metastatic cancer.

## 2018-02-20 NOTE — ANESTHESIA POSTPROCEDURE EVALUATION
Post-Anesthesia Evaluation and Assessment    Patient: Domo Maciel MRN: [id number]  SSN: [id number]    YOB: 1950  Age: 75 y.o. Sex: female       Cardiovascular Function/Vital Signs  Visit Vitals    /63    Pulse 73    Temp 36.3 °C (97.4 °F)    Resp 16    Wt 47.2 kg (104 lb)    SpO2 94%    BMI 19.02 kg/m2       Patient is status post general anesthesia for Procedure(s):  CYSTOSCOPY / RIGHT URETERAL STENT EXCHANGE WITH RENASSIANCE METAL STENT/  LEFT RETROGRADE PYELOGRAM.    Nausea/Vomiting: None    Postoperative hydration reviewed and adequate. Pain:  Pain Scale 1: Visual (02/20/18 1118)  Pain Intensity 1: 0 (02/20/18 1118)   Managed    Neurological Status:   Neuro (WDL): Exceptions to WDL (02/20/18 1118)  Neuro  Neurologic State: Drowsy (02/20/18 1118)   At baseline    Mental Status and Level of Consciousness: Alert and oriented     Pulmonary Status:   O2 Device: Room air (02/20/18 1134)   Adequate oxygenation and airway patent    Complications related to anesthesia: None    Post-anesthesia assessment completed.  No concerns    Signed By: Latrice Gordillo MD     February 20, 2018

## 2018-02-20 NOTE — PROGRESS NOTES
present at bedside in pre-op during assessment with unit nurse, signature of consents and assessment  before procedure with Dr. Mikaela Yun and Dr. Yemi Duran.     [address] [place] [address]  [phone]

## 2018-02-21 NOTE — PROGRESS NOTES
Arrived to the [facility]. Lovenox completed. Patient tolerated well. Any issues or concerns during appointment: none. Patient aware of next infusion appointment on 2/22 (date) at 4:00 PM (time). Discharged via w/c accompanied by spouse and .

## 2018-02-22 NOTE — PROGRESS NOTES
Arrived to the [facility]. Lovenox completed. Patient tolerated well. Any issues or concerns during appointment: none. Patient aware of next infusion appointment on 2/23 (date) at 4:00 PM (time). Discharged via w/c accompanied by spouse and .

## 2018-02-22 NOTE — OP NOTES
[facility] REPORT    Chrissy Kendrick  MR#: [id number]  : 1950  ACCOUNT #: [de-identified]   DATE OF SERVICE: 2018    PREOPERATIVE DIAGNOSIS:  Right ureteral obstruction secondary to metastatic breast cancer. POSTOPERATIVE DIAGNOSIS:  Right ureteral obstruction secondary to metastatic breast cancer. PROCEDURES PERFORMED:  1. Right ureteral stent exchange. 2.  Left retrograde ureterogram.      SURGEON:  Nhan Neves MD      ANESTHESIA:  General.    DRAINS:  6 x 24 Resonance metal stent within the right ureter. ESTIMATED BLOOD LOSS:  Minimal.    COMPLICATIONS:  None. SPECIMENS REMOVED:None      NARRATIVE:  The patient was taken to the OR and after adequate general anesthesia was achieved, she was placed in the dorsal lithotomy position and prepped and draped in the usual sterile fashion for a cystoscopy case. Preliminary cystourethroscopy was performed with a 22 Tajik cystoscope. I immediately encountered the stent extruding from the right ureteral orifice. This was engaged with flexible graspers and the distal end was brought out through the meatus. I used this to feed a guidewire up the right ureter and into the renal pelvis where it was seen to coil. Once this was accomplished, the stent was removed, leaving the wire behind. The Resonance stent kit was then brought in. The introducer catheter was passed over the wire and the radiopaque heather was moved to the expected location of the UPJ. The wire and the obturator were removed, leaving only the outer introducer sheath. The new 6 x 24 metal stent was then passed through the introducer sheath and as the stent was fed up through the sheath, I used the obturator as a pusher. When it was in appropriate position, I slowly pulled back the sheath until the stent was appropriately placed between the renal pelvis and the bladder.   I then repeated cystoscopy and confirmed that the distal end of the stent was in appropriate position. A Cone Tip catheter was introduced and the left ureteral orifice was cannulated. A retrograde ureterogram was performed on the left. With dilute Conray, this revealed a normal ureter and renal pelvis. There was no evidence of hydronephrosis. With removal of the Cone Tip catheter, there was prompt drainage of contrast from the left renal pelvis. The bladder was then thoroughly emptied. All instruments were removed. The patient was taken down out of dorsal lithotomy position, awakened, extubated, and taken to the OR without further incident or complaint.       MD Rigoberto Tavares / [email or web address]  D: 02/21/2018 18:56     T: 02/21/2018 19:26  JOB #: [id number]

## 2018-02-23 NOTE — PROGRESS NOTES
Arrived to the [facility]. Jayashree completed.  Patient tolerated well   Any issues or concerns during appointment: No  Patient aware of next infusion appointment on Saturday,February 25th @ 1600  Discharged home via wheelchair 50.5

## 2018-02-24 NOTE — PROGRESS NOTES
Arrived to the [facility].  Lovenox completed. Patient tolerated well.  present. Any issues or concerns during appointment: none. Patient aware of next infusion appointment on 2/25 (date) at 4:00 PM (time). Discharged via w/c accompanied by family member.

## 2018-02-25 NOTE — PROGRESS NOTES
Patient at clinic today for Lovenox injection. Reports having consistent cough since being sent to ER recently. Patient on oral antibiotics. Family member via  reports \"fever\" at home but has not checked temperature with thermometer. Family reports that they do have a thermometer available for use at home but has not done so. Directed to check temperature twice daily and prn if patient displays symptoms of chills or if she feels hot to touch. Temperature 99.6 at present time. No chills. Reports chest tightness has improved since she was seen in ER and is less than it has been since using antibiotics. Encouraged to push oral fluids and notify MD office for fever/chills or any problems or go to ER if urgent. Instructions relayed per  and understanding verbalized. Patient discharged via w/c accompanied by family. Instructed to notify physician of any problems, questions or concerns. Allowed opportunity for patient/family to ask questions. Verbalized understanding. Next appointment is 02/26/18 at 0800 with INR staff for lab draws. Sussy Georges

## 2018-02-27 NOTE — PROGRESS NOTES
Arrived to the [facility]. injections completed. Patient tolerated well. Any issues or concerns during appointment: no.  Patient aware of next infusion appointment on 3/13 (date) at 3 (time). Discharged home.

## 2018-03-13 NOTE — PERIOP NOTES
Type 1a  Labs: POC Glucose and PT/INR- DOS    [facility]  services used during PAT assessment. Instructed pt that they will be notified via office/GI LAB for time of arrival to GI lab. Limited assessment complete per protocol. Follow diet and prep instructions per office. NPO after midnight. Bath or shower the night before and the am of surgery with antibacterial soap. No lotions, oils, powders, cologne on skin. No make up, eye make up or jewelry. Wear loose fitting comfortable, clean clothing . Must have adult present in building the entire time . Medications for the day of procedure : Norvasc,Coreg,Apresoline    Medications to hold prior to procedure : All vitamins,supplements, NSAIDs. Instruct pt Dr Tequila Boudreaux office will contact pt with instructions on Coumadin. Pt and family voice understanding. Patient verbalized understanding of all instructions and provided all medical/health information to the best of their ability.

## 2018-03-13 NOTE — PROGRESS NOTES
Arrived to the [facility]. Procrit completed.  Patient tolerated without problems  Any issues or concerns during appointment: no  Patient aware of next infusion appointment on 3/16/18 at [facility]  Discharged ambulatory

## 2018-03-13 NOTE — PERIOP NOTES
Received return call from [place] [id number] for Dr Henrry Turner. Pt Coumadin instructions and medications provide to pt by Dr Roselyn Wilson. Pt. to start lovenox injection and PT/INR to be monitored by Dr Warren Santana office.

## 2018-03-13 NOTE — PERIOP NOTES
Calls placed to Dr Kahtie Harris office, to verify Coumadin clearance. Call placed to office, to speak with nurse. Nurse not available, not able to leave a message for nurse. Office to have nurse return call to PAT. Inform office, nurse will have to notify pt with Coumadin instructions.

## 2018-03-16 NOTE — PROGRESS NOTES
Problem: Chemotherapy Treatment  Goal: *Hemodynamically stable  Outcome: Progressing Towards Goal  Verbalizes/demonstrates understanding of purpose/procedure/potential side effects of lovenox, via son as , [facility], by phone.

## 2018-03-16 NOTE — PROGRESS NOTES
Pt arrived via wheelchair today at [address] [place] [address], to receive lovenox. Pt tolerated without difficulty. Patient discharged via wheelchair accompanied by spouse. Instructed to notify physician of any problems, questions or concerns. Allowed opportunity for patient/family to ask questions. Verbalized understanding. Next appointment is March 17 at 0900 with Hailey Sebastian.

## 2018-03-17 NOTE — PROGRESS NOTES
Arrived to the [facility]. Nasra completed. Patient tolerated well. Any issues or concerns during appointment: none. Patient aware of next infusion appointment on 3/18/18 at [facility].  Discharged via wheelchair.

## 2018-03-19 NOTE — PROCEDURES
Colonoscopy Procedure Note    Indications: Anemia, abnormal PET scan, weight loss and abdominal pain    Anesthesia/Sedation: TIVA     Pre-Procedure Physical:  Current Facility-Administered Medications   Medication Dose Route Frequency    lactated Ringers infusion  100 mL/hr IntraVENous CONTINUOUS      Review of patient's allergies indicates no known allergies. Patient Vitals for the past 8 hrs:   BP Temp Pulse Resp SpO2 Height Weight   03/19/18 1048 90/44 98.2 °F (36.8 °C) 65 13 98 % - -   03/19/18 1044 90/44 - 68 12 98 % - -   03/19/18 0942 163/70 - 70 16 94 % - -   03/19/18 0853 - 98.3 °F (36.8 °C) - - - 5' 3\" (1.6 m) 53.1 kg (117 lb)       Exam:    Airway: clear   Heart: normal S1and S2    Lungs: clear bilateral  Abdomen: soft, nontender, bowel sounds present and normal in all quads   Mental Status: awake, alert and oriented to person, place and time        Procedure Details      Informed consent was obtained for the procedure, including sedation. Risks of perforation, hemorrhage, adverse drug reaction and aspiration were discussed. The patient was placed in the left lateral decubitus position. Based on the pre-procedure assessment, including review of the patient's medical history, medications, allergies, and review of systems, she had been deemed to be an appropriate candidate for conscious sedation; she was therefore sedated with the medications listed below. The patient was monitored continuously with ECG tracing, pulse oximetry, blood pressure monitoring, and direct observations. A rectal examination was performed. The colonoscope was inserted into the rectum and advanced under direct vision to the cecum. The quality of the colonic preparation was good. A careful inspection was made as the colonoscope was withdrawn, including a retroflexed view of the rectum; findings and interventions are described below. Appropriate photodocumentation was obtained. Findings: No source of anemia.   Hepatic flexure benign polyp removed,  Sigmoid diverticulosis. Anal tags. Specimens: Hepatic flexure polyp. Estimated Blood Loss: Minimal           Complications: None; patient tolerated the procedure well. Attending Attestation: I performed the procedure. Impression:  No source for anemia or sx identified. Small hepatic flexure polyp. Recommendations: F/U polyp pathology. Consider SB studies and non gi sources for her anemia and sx.

## 2018-03-19 NOTE — DISCHARGE INSTRUCTIONS
Endoscopia del tracto gastrointestinal superior: [facility] - [ Upper [facility] Endoscopy: What to Expect at [facility] ]  Merrill recuperación  Después de la endoscopia, usted permanecerá en el hospital o la clínica fidelina 1 a 2 horas. Greer permitirá que pase el efecto de los medicamentos. Podrá regresar a merrill hogar después de que merrill médico o enfermero(a) compruebe que no está teniendo ningún problema. Si tuvo un tratamiento fidelina la prueba, es posible que tenga que pasar la noche en el hospital. Podría tener dolor de garganta fidelina un día o dos después de la prueba. Esta hoja de [employer] idea general sobre qué esperar después de la prueba. ¿Cómo puede cuidarse en el [facility] [place]? Actividad  · Descanse lo que necesite después de regresar a merrill casa. · Usted debería poder retomar courtney actividades habituales el día después de la prueba. Dieta  · Siga las indicaciones de merrill médico para comer después de la prueba. · Britni abundante líquido (a menos que el médico le haya indicado lo contrario). Medicamentos  · Si le duele la garganta al día siguiente de la prueba, use un aerosol (\"spray\") de venta lilibeth para adormecerla. La atención de seguimiento es kim parte clave de merrill tratamiento y seguridad. Asegúrese de hacer y acudir a todas las citas, y llame a merrill médico si está teniendo problemas. También es kim buena idea saber los resultados de los exámenes y mantener kim lista de los medicamentos que sharmila. ¿Cuándo debe pedir ayuda? Llame al 911 en cualquier momento que considere que necesita atención de emergencia. Por ejemplo, llame si:  ? · Se desmayó (perdió el conocimiento). ? · Tose deb. ? · Vomita deb o algo parecido a granos de café molido. ? · Las heces son de color rojizo o muy sanguinolentas (con deb). ?Llame a merrill médico ahora mismo o busque atención médica inmediata si:  ? · Tiene dificultades para tragar.    ? · Tiene dolor abdominal.   ? · Courtney heces son negruzcas y parecidas al alquitrán o tienen rastros de Larry. ? · Siente el estómago revuelto o no puede retener líquidos en el estómago. ?Preste especial atención a los cambios en asher sherrie y asegúrese de comunicarse con asher médico si:  ? · Después de DON Villanueva shakira, aún le duele la garganta. ? · No mejora caterina se esperaba. ¿Dónde puede encontrar más información en inglés? Bernice Galeazzi a http://rosie-abena.info/. Rubye Matter F212 en la búsqueda para aprender más acerca de \"Endoscopia del tracto gastrointestinal superior: Shirley Wynne en el \[facility]\"" - [ Upper GI Endoscopy: What to Expect at [facility] ]. \"  Revisado: 12 enamorado, 2017  Versión del contenido: 11.4  © 7592-4297 Healthwise, Incorporated. Las instrucciones de cuidado fueron adaptadas bajo licencia por [employer] (which disclaims liability or warranty for this information). Si usted tiene [place] afección médica o sobre estas instrucciones, siempre pregunte a asher profesional de sherrie. Healthwise, Incorporated niega toda garantía o responsabilidad por asher uso de esta información. Gastrointestinal Esophagogastroduodenoscopy (EGD) - Upper Exam Discharge Instructions    1. Call Dr. Jazzmine Farris at [phone] for any problems or questions. 2. Contact the doctor's office for follow up appointment as directed. 3. Medication may cause drowsiness for several hours, therefore, do not drive or operate machinery for remainder of the day. 4. No alcohol today. 5. Ordinarily, you may resume regular diet and activity after exam unless otherwise specified by your physician. 6. For mild soreness in your throat you may use Cepacol throat lozenges or warm salt-water gargles as needed. Any additional instructions:  MD will call with pathology results. Instructions given to Carlosnicholas Saldaña and other family members.   Instructions given by:  Katherine Singleton RN       Colonoscopia: [place] - [ Colonoscopy: What to Expect at [facility] ]  Asher recuperación  Después de beatrice tenido kim colonoscopia, permanecerá en la clínica fidelina 1 o 2 horas hasta que el efecto del medicamento haya desaparecido. [employer]irk [employer] a merrill hogar. Good tendrá que hacer arreglos para que alguien lo lleve. Merrill médico le indicará cuándo podrá comer y hacer jesus otras actividades habituales. Merrill médico hablará con usted acerca de cuándo deberá hacerse la siguiente colonoscopia. El médico puede ayudarle a decidir con qué frecuencia necesita que le revisen. [place]ons dependerá de [employer] de la prueba y de merrill riesgo de cáncer colorrectal.  Después de la prueba, es posible que esté abotagado (inflado) o tenga nancy causados por gases. Es posible que necesite expulsar gases. Si le hicieron kim biopsia o le extirparon un pólipo, es posible que tenga restos de Larry en las heces fidelina algunos días. Esta hoja de [employer] idea general del tiempo que le llevará recuperarse. Sin embargo, cada persona se recupera a un ritmo diferente. Siga los pasos que se mencionan a continuación para recuperarse lo más rápido posible. ¿Cómo puede cuidarse en el hogar? Actividad  ? · Descanse cuando esté cansado. ? · Puede hacer jesus actividades habituales cuando usted crea que es adecuado. Alimentación  ? · Siga las indicaciones de merrill médico para comer. ? · Eric abundantes líquidos, a menos que el médico le haya indicado lo contrario. [place] ayuda a reponer los líquidos que perdió fidelina la preparación del colon. ? · No eric alcohol. Medicamentos  ? · Merrill médico le dirá si puede volver a julian jesus medicamentos y cuándo puede volver a hacerlo. También le dará indicaciones sobre cualquier medicamento nuevo que deba julian usted. ? · Si sharmila medicamentos que previenen la formación de coágulos de Larry, caterina warfarina (Coumadin), clopidogrel (Plavix) o aspirina, asegúrese de hablar con merrill médico. Él o saeid le dirá si debe volver a julian estos medicamentos y en qué momento.  Asegúrese de que entiende exactamente lo que el médico quiere que olivia. ? · Si le extirparon pólipos o le hicieron kim biopsia fidelina la prueba, es posible que merrill médico le diga que no tome aspirina ni otros antiinflamatorios fidelina algunos días. Estos incluyen ibuprofeno (Advil, Motrin) y naproxeno (Aleve). Otras instrucciones  ? · Por merrill seguridad, no debe conducir ni operar maquinaria hasta que hayan desaparecido los efectos del medicamento y pueda pensar con claridad. Merrill médico podría indicarle que no conduzca ni opere maquinaria hasta el día posterior a la prueba. ? · No firme documentos legales ni tome decisiones importantes hasta que los efectos del medicamento hayan desaparecido y pueda pensar con claridad. La anestesia puede hacer que le sea difícil comprender plenamente lo que usted está acordando. La atención de seguimiento es kim parte clave de merrill tratamiento y seguridad. Asegúrese de hacer y acudir a todas las citas, y llame a merrill médico si está teniendo problemas. También es kim buena idea saber los resultados de los exámenes y mantener kim lista de los medicamentos que sharmila. ¿Cuándo debe pedir ayuda? Llame al 911 en cualquier momento que considere que necesita atención de [place]. Por ejemplo, llame si:  ? · Se desmayó (perdió el conocimiento). ? · Evacua heces rojizas o sanguinolentas (con deb). ? · Tiene dolor abdominal intenso. ? Llame a merrill médico ahora mismo o busque atención médica inmediata si:  ? · Courtney heces son negruzcas y parecidas al alquitrán. ? · Courtney heces tienen rastros de Larry, [place] no le hicieron kim biopsia ni le extirparon pólipos. ? · Tiene dolor abdominal, o merrill abdomen está hinchado y edgardo. ? · Vomita. ? · Tiene fiebre. ? · Está muy mareado. ?Preste especial atención a los cambios en merrill sherrie y asegúrese de comunicarse con merrill médico si tiene algún problema. ¿Dónde puede encontrar más información en inglés?   Zapien Filler a [email or web address]-abena.[email or web address] Erinlive Simeon A728 en la búsqueda para aprender más acerca de \"Colonoscopia: William Agustin en el hogar - [ Colonoscopy: What to Expect at [facility] ]. \"  Revisado: 12 enamorado, 2017  Versión del contenido: 11.4  © 7413-4445 Healthwise, Incorporated. Las instrucciones de cuidado fueron adaptadas bajo licencia por [employer] (which disclaims liability or warranty for this information). Si usted tiene [place] afección médica o sobre estas instrucciones, siempre pregunte a merrill profesional de sherrie. Healthwise, Incorporated niega toda garantía o responsabilidad por merrill uso de esta información. Gastrointestinal Colonoscopy/Flexible Sigmoidoscopy - Lower Exam Discharge Instructions  1. Call Dr. Henrry Turner at [phone] for any problems or questions. 2. Contact the doctors office for follow up appointment as directed  3. Medication may cause drowsiness for several hours, therefore, do not drive or operate machinery for remainder of the day. 4. No alcohol today. 5. Ordinarily, you may resume regular diet and activity after exam unless otherwise specified by your physician. 6. Because of air put into your colon during exam, you may experience some abdominal distension, relieved by the passage of gas, for several hours. 7. Contact your physician if you have any of the following:  a. Excessive amount of bleeding - large amount when having a bowel movement. Occasional specks of blood with bowel movement would not be unusual.  b. Severe abdominal pain  c. Fever or Chills  8. Polyp Removal - follow these additional instructions  a. Clear liquid diet for the next meal (jell-o, broth, clear drinks)  b. Soft diet for 24 hours, then resume regular diet   c. Take Metamucil - 1 tablespoon in juice every morning for 3 days  d. No Aspirin, Advil, Aleve, Nuprin, Ibuprofen, or medications that contain these drugs for 2 weeks.   Any additional instructions:  None      Instructions given to Horton Kit and other family members.   Instructions given by:  Javad Anne RN

## 2018-03-19 NOTE — ANESTHESIA PREPROCEDURE EVALUATION
Anesthetic History   No history of anesthetic complications            Review of Systems / Medical History  Patient summary reviewed and pertinent labs reviewed    Pulmonary      Recent URI    Shortness of breath  Asthma : well controlled       Neuro/Psych         Psychiatric history (Depression)     Cardiovascular    Hypertension  Valvular problems/murmurs (mod AS): aortic stenosis            Exercise tolerance: >4 METS  Comments: Recent ECHO EF 60-65%; AV mean 17 mmHg; MCKAYLA 1.46   GI/Hepatic/Renal         Renal disease (Crt 3.32): CRI, ARF and stones       Endo/Other    Diabetes: poorly controlled, type 2    Arthritis, cancer (breast) and anemia     Other Findings   Comments: Breast ca with bony metastasis    DVT 2015    Pancytopenia           Physical Exam    Airway  Mallampati: II  TM Distance: 4 - 6 cm  Neck ROM: normal range of motion   Mouth opening: Normal     Cardiovascular    Rhythm: regular  Rate: normal    Murmur: Grade 3, Aortic area     Dental    Dentition: Caps/crowns, Upper partial plate and Lower partial plate  Comments: Missing numerous teeth   Pulmonary  Breath sounds clear to auscultation               Abdominal  GI exam deferred       Other Findings            Anesthetic Plan    ASA: 3  Anesthesia type: total IV anesthesia          Induction: Intravenous  Anesthetic plan and risks discussed with: Patient and Spouse      Spoke with pt via .

## 2018-03-19 NOTE — PROGRESS NOTES
Left chest port de-accessed at this time after flushing with NS, then Heparin. Tolerated well. Needle intact. Dressing applied and secured with tape. No s/sx of bleeding noted.

## 2018-03-19 NOTE — PROCEDURES
ESOPHAGOGASTRODUODENOSCOPY    ESOPHAGOGASTRODUODENOSCOPY    DATE of PROCEDURE: 3/19/2018    PT NAME: Jesusita Saldaña     [id number]    MEDICATION:  TIVA    Indication[de-identified] Anemia, weight loss, abdominal pain in the setting of Breast CA    INSTRUMENT: GIF  H190    SPECIAL PROCEDURE: None  BLOOD LOSS- min. SPEC- Esophageal ring    PROCEDURE:  Standard EGD      ASSESSMENT:  1. No source for anemia or sx  2. Mild gastritis  3. Moderate sized hiatal hernia  4. Non obstructive esophageal ring at the EG junction--biopsied    PLAN:   1. F/U biopsy of esophageal ring  2. Continue Ranitidine  3.  Proceed with colonoscopy    Maulik Noriega MD

## 2018-03-19 NOTE — H&P
Gastroenterology Outpatient History and Physical    Patient: Cherrie Seayrocíosandy    Physician: Reuben Darling MD    Vital Signs: Blood pressure 163/70, pulse 70, temperature 98.3 °F (36.8 °C), resp. rate 16, height 5' 3\" (1.6 m), weight 53.1 kg (117 lb), last menstrual period 1998, SpO2 94 %. Allergies: No Known Allergies    Chief Complaint: CRC screening, weight loss, abdominal pain, anemia    History of Present Illness: As Above    Justification for Procedure: As Above    History:  Past Medical History:   Diagnosis Date    Acute on chronic diastolic congestive heart failure ([address] 2016    Anemia of chronic disease 2017    Aortic valve stenosis     Arthritis     roverto hands    Asthma     till age 31    Cancer ([address]     roverto. breast ca mets bone/lung    Chemotherapy-induced neutropenia (HCC) 2016    CKD (chronic kidney disease) stage 4, GFR 15-29 ml/min (HCC)     Depression     managed with meds    Diastolic CHF ([address]     Followed by Donte Kerns    Former cigarette smoker     HCAP (healthcare-associated pneumonia) 2013    History of DVT (deep vein thrombosis)     Left leg DVT.  on coumadin    Hypertension     managed with meds    Long term current use of anticoagulant     Coumadin    Oxygen dependent     at bedtime 2L-3L NC    Port catheter in place     Left chest port    Sepsis ([address] 2016    SOB (shortness of breath) on exertion     Type 2 diabetes mellitus (HCC)     PRN oral agent/AVG BS: 170-200/ s.s of hypoglycemia at 79    Unspecified adverse effect of anesthesia     In [facility] yrs ago after second c -section-she was told her heart stopped d/t anesthesia-hospitalized for 5 days afterwards and followed by cardiologist    Vitamin B12 deficiency 2017      Past Surgical History:   Procedure Laterality Date    HX  SECTION      X2    HX CYST REMOVAL      HX UROLOGICAL      stent placed    HX VASCULAR ACCESS  2012    Left chest port Social History     Social History    Marital status:      Spouse name: N/A    Number of children: N/A    Years of education: N/A     Social History Main Topics    Smoking status: Former Smoker     Packs/day: 1.00     Years: 8.00     Types: Cigarettes     Quit date: 1/1/1978    Smokeless tobacco: Never Used      Comment: quit 30 or more years ago    Alcohol use No    Drug use: No    Sexual activity: Not Asked     Other Topics Concern    None     Social History Narrative      Family History   Problem Relation Age of Onset    Heart Attack Mother     Seizures Mother     Alzheimer Father     Cancer Brother      doen not know details    No Known Problems Sister     No Known Problems Sister     No Known Problems Brother        Medications:   Prior to Admission medications    Medication Sig Start Date End Date Taking? Authorizing Provider   GLIPIZIDE PO Take  by mouth as needed. Pt only takes if BS > 200   Yes Historical Provider   LORazepam (ATIVAN) 1 mg tablet Take 1 Tab by mouth every six (6) hours as needed for Anxiety. Max Daily Amount: 4 mg. 3/13/18  Yes Mahsa Barba MD   fentaNYL (DURAGESIC) 50 mcg/hr PATCH 1 Patch by TransDERmal route every seventy-two (72) hours. Max Daily Amount: 1 Patch. Indications: Chronic Pain with Opioid Tolerance 3/13/18  Yes Mahsa Barba MD   palbociclib 125 mg cap Take 1 Cap by mouth daily. Take one pill once a day for 3 weeks and then off for one week 3/13/18  Yes Mahsa Barba MD   potassium chloride (K-DUR, KLOR-CON) 20 mEq tablet Take 1 Tab by mouth two (2) times a day. 2/13/18  Yes Marlene Ewing NP   HYDROcodone-acetaminophen [facility])  mg tablet Take 1 Tab by mouth every six (6) hours as needed for Pain. Max Daily Amount: 4 Tabs. 1/16/18  Yes Mahsa Barba MD   hydrALAZINE (APRESOLINE) 50 mg tablet Take 1 Tab by mouth three (3) times daily. Patient taking differently: Take 50 mg by mouth two (2) times a day.  10/16/17  Yes Jose D Ayala NP   montelukast (SINGULAIR) 10 mg tablet Take 1 Tab by mouth nightly. 7/20/17  Yes Lewis Thorpe MD   carvedilol (COREG) 3.125 mg tablet Take 1 Tab by mouth two (2) times daily (with meals). 7/14/17  Yes SHAHAB Salinas   mirtazapine (REMERON) 30 mg tablet Take 1 Tab by mouth nightly. 6/2/17  Yes SHAHAB Salinas   amLODIPine (NORVASC) 10 mg tablet Take 1 Tab by mouth daily. 5/16/17  Yes Bharath Saunders MD   furosemide (LASIX) 40 mg tablet Take 1 Tab by mouth daily. Patient taking differently: Take 40 mg by mouth every other day. Monday,Wednesday,Friday,Sunday 7/20/17   Lewis Thorpe MD   albuterol-ipratropium (DUO-NEB) 2.5 mg-0.5 mg/3 ml nebu 3 mL by Nebulization route every six (6) hours as needed. 7/14/17   SHAHAB Salinas   warfarin (COUMADIN) 1 mg tablet Take 1.5 Tabs by mouth every evening.  7/15/17   SHAHAB Salinas       Physical Exam:         Heart: regular rate and rhythm, S1, S2 normal, no murmur, click, rub or gallop   Lungs: chest clear, no wheezing, rales, normal symmetric air entry, Heart exam - S1, S2 normal, no murmur, no gallop, rate regular   Abdominal: Bowel sounds are normal, liver is not enlarged, spleen is not enlarged           Findings/Diagnosis: CRC screening, weight loss, abdominal pain, anemia        Signed:  Elsa Stallworth MD 3/19/2018

## 2018-03-19 NOTE — ANESTHESIA POSTPROCEDURE EVALUATION
Post-Anesthesia Evaluation and Assessment    Patient: Ese Cabrera MRN: [id number]  SSN: [id number]    YOB: 1950  Age: 75 y.o. Sex: female       Cardiovascular Function/Vital Signs  Visit Vitals    BP 90/44    Pulse 65    Temp 36.8 °C (98.2 °F)    Resp 13    Ht 5' 3\" (1.6 m)    Wt 53.1 kg (117 lb)    SpO2 98%    BMI 20.73 kg/m2       Patient is status post total IV anesthesia anesthesia for Procedure(s):  ESOPHAGOGASTRODUODENOSCOPY (EGD)  COLONOSCOPY  BMI 23    ESOPHAGOGASTRODUODENAL (EGD) BIOPSY  ENDOSCOPIC POLYPECTOMY. Nausea/Vomiting: None    Postoperative hydration reviewed and adequate. Pain:  Pain Scale 1: Visual (03/19/18 1044)  Pain Intensity 1: 0 (03/19/18 1044)   Managed    Neurological Status: At baseline    Mental Status and Level of Consciousness: Arousable    Pulmonary Status:   O2 Device: Nasal cannula (03/19/18 1048)   Adequate oxygenation and airway patent    Complications related to anesthesia: None    Post-anesthesia assessment completed.  No concerns    Signed By: Claudeen Bellows, MD     March 19, 2018

## 2018-03-19 NOTE — PROGRESS NOTES
present to cover any requests in the Endoscopy. Thank you for this referral,      Thalia Mcclellan, [facility]  /Patient [facility].  [address] [place] [address] [facility]    [phone]

## 2018-03-20 NOTE — PROGRESS NOTES
[facility] INR 1.3, lovenox completed. Patient tolerated well. Any issues or concerns during appointment: no.  Patient aware of next infusion appointment on 3/21 (date) at [address] [place] [address] (time). Discharged via wheelchair.

## 2018-03-20 NOTE — PROGRESS NOTES
Arrived to the [facility]. Lovenox injection completed. Patient tolerated well. Any issues or concerns during appointment: NO.  Patient aware of next infusion appointment on 03/20/18 (date) at 21 [phone] (time). Discharged via wheelchair.

## 2018-03-21 NOTE — PROGRESS NOTES
Arrived to the [facility]. Assessment completed. Patient tolerated Lovenox injection well. Any issues or concerns during appointment: talked with Kevin Trotter NP, about patient today. Clarified with JEAN CARLOS Box NP, about patient needing a lab check today or not, according to the orders. She looked at the orders and states that she does not need another PT/INR until tomorrow, 3/22. Patient aware of next infusion appointment on 3/22/18 (date) at [address] [place] [address] (time) with IV infusion center. Discharged via [address], with spouse. Patient instructed to call Dr. Flynn Bernal' office immediately for any problems or concerns. She verbalizes understanding.

## 2018-03-22 NOTE — PROGRESS NOTES
Arrived to the [facility] via FLORIDALMA Lemus with her . Labs reviewed lovenox inj completed. Patient tolerated well. Any issues or concerns during appointment: no.Pt will return tomorrow for PT/INR and possible lovenox per JEAN CARLOS Box NP  Patient aware of next infusion appointment on  3/23 at [address] [place] [address]. Discharged to home via FLORIDALMA Lemus.

## 2018-03-23 NOTE — PROGRESS NOTES
Arrived to the [facility]. INR 1.2 Lovenox completed. Patient tolerated well. Any issues or concerns during appointment: None. Patient aware of next infusion appointment on 3/24 (date) at 0800 (time). Discharged via wheelchair in stable condition accompanied by .

## 2018-03-24 NOTE — PROGRESS NOTES
Arrived to the [facility]. INR 1.3 today. Lovenox completed. Patient tolerated well. Any issues or concerns during appointment: none. Patient aware of next infusion appointment on 3/25/18 at 10am  Discharged via wheelchair.

## 2018-03-27 NOTE — PROGRESS NOTES
Pt arrived via w/c to [facility]. Procrit, B12 and faslodex given injection as ordered. Pt aware of next appt on 4/10/18 at 1500.  Pt discharged via w/c

## 2018-03-28 NOTE — PROGRESS NOTES
Ambulatory/Rehab Services H2 Model Falls Risk Assessment    Risk Factor Pts. ·   Confusion/Disorientation/Impulsivity  []    4 ·   Symptomatic Depression  []   2 ·   Altered Elimination  []   1 ·   Dizziness/Vertigo  []   1 ·   Gender (Male)  []   1 ·   Any administered antiepileptics (anticonvulsants):  []   2 ·   Any administered benzodiazepines:  []   1 ·   Visual Impairment (specify):  []   1 ·   Portable Oxygen Use  [x]   1 ·   Orthostatic ? BP  []   1 ·   History of Recent Falls (within 3 mos.)  [x]   5     Ability to Rise from Chair (choose one) Pts. ·   Ability to rise in a single movement  []   0 ·   Pushes up, successful in one attempt  []   1 ·   Multiple attempts, but successful  [x]   3 ·   Unable to rise without assistance  []   4   Total: (5 or greater = High Risk) 9     Falls Prevention Plan:   []                Physical Limitations to Exercise (specify):   []                Mobility Assistance Device (type):   []                Exercise/Equipment Adaptation (specify):    ©2010 [facility] of [address] Patent #[id number].  Federal Law prohibits the replication, distribution or use without written permission from [facility] [employer]

## 2018-03-28 NOTE — THERAPY EVALUATION
Dinah Jameson  : 1950  Primary: Jessica [employer]%  Secondary:  [address] at [address]  [address] , [address], [place]  [phone]   [phone]          OUTPATIENT PHYSICAL THERAPY:Initial Assessment 3/28/2018    ICD-10: Treatment Diagnosis: muscle weakness generalized M 62.81  Precautions/Allergies:   Review of patient's allergies indicates no known allergies. Fall Risk Score: 9 (? 5 = High Risk)  MD Orders: oncology rehab MEDICAL/REFERRING DIAGNOSIS:  Fatigue, unspecified type [R53.83]    DATE OF ONSET:   REFERRING PHYSICIAN: Anisa Stafford MD  RETURN PHYSICIAN APPOINTMENT: 4/10/18     INITIAL ASSESSMENT:  Ms. Paulo Knox presents following treatment for metastatic breast cancer since . She has developed significant fatigue and weakness. She spends most of the day in bed. She has had a fall in the recent past.  She uses a rolling walker. She could possibly benefit from home health, but is willing to attend therapy as outpatient. She will benefit from therapeutic exercises in order to increase strength and overall conditioning. PROBLEM LIST (Impacting functional limitations):  1. Decreased Strength  2. Decreased ADL/Functional Activities  3. Decreased Transfer Abilities  4. Decreased Balance  5. Increased Pain  6. Decreased Activity Tolerance  7. Increased Fatigue  8. Decreased Dukes with Home Exercise Program INTERVENTIONS PLANNED:  1. Home Exercise Program (HEP)  2. Therapeutic Exercise/Strengthening   TREATMENT PLAN:  Effective Dates: 3/28/2018 TO 2018 (90 days). Frequency/Duration: 2 time a week for 90 Days  GOALS: (Goals have been discussed and agreed upon with patient.)  Short-Term Functional Goals: Time Frame: 4 weeks  1. The patient will be independent with HEP for strength within 4 weeks. 2. The patient will report a fatigue of 7 or less within 4 weeks. 3. The patient will gain 1/2 grade strength within 4 weeks.   4. The patient will improve her TUG score by 2 seconds within 4 weeks. 5. The patient will improve her Tinetti score by 5 within 4 weeks. Discharge Goals: Time Frame: 8 weeks  1. The patient will report a fatigue level of 5 or less within 8 weeks. 2. The patient will improve her TUG score by 5 seconds within 8 weeks. 3. The patient will participate in a 6 minute walk within 8 weeks. Rehabilitation Potential For Stated Goals: [address] therapy, I certify that the treatment plan above will be carried out by a therapist or under their direction. Thank you for this referral,  Ashlyn Ludwig PT     Referring Physician Signature: Mary Whitmore MD              Date                    The information in this section was collected on 3/28/18 (except where otherwise noted). HISTORY:   History of Present Injury/Illness (Reason for Referral):  Metastatic breast cancer, chemo, fatigue, weakness  Past Medical History/Comorbidities:   Ms. Lynn Hobson  has a past medical history of Acute on chronic diastolic congestive heart failure ([address] (2016); Anemia of chronic disease (2017); Aortic valve stenosis; Arthritis; Asthma; Cancer ([address] Chemotherapy-induced neutropenia (HCC) (2016); CKD (chronic kidney disease) stage 4, GFR 15-29 ml/min ([address] Depression; Diastolic CHF ([address] Former cigarette smoker; HCAP (healthcare-associated pneumonia) (2013); History of DVT (deep vein thrombosis); Hypertension; Long term current use of anticoagulant; Oxygen dependent; Port catheter in place; Sepsis ([address] (2016); SOB (shortness of breath) on exertion; Type 2 diabetes mellitus ([address] Unspecified adverse effect of anesthesia; and Vitamin B12 deficiency (2017). Ms. Lynn Hobson  has a past surgical history that includes hx  section; hx cyst removal; hx urological; hx vascular access (2012); and colonoscopy (N/A, 3/19/2018).    Past Medical History:   Diagnosis Date    Acute on chronic diastolic congestive heart failure ([facility] 1/5/2016    Anemia of chronic disease 11/13/2017    Aortic valve stenosis     Arthritis     roverto hands    Asthma     till age 31    Cancer ([facility]     roverto. breast ca mets bone/lung    Chemotherapy-induced neutropenia (HCC) 12/20/2016    CKD (chronic kidney disease) stage 4, GFR 15-29 ml/min ([facility])     Depression     managed with meds    Diastolic CHF ([facility]     Followed by Donte Kerns    Former cigarette smoker     HCAP (healthcare-associated pneumonia) 7/17/2013    History of DVT (deep vein thrombosis)     Left leg DVT. on coumadin    Hypertension     managed with meds    Long term current use of anticoagulant     Coumadin    Oxygen dependent     at bedtime 2L-3L NC    Port catheter in place     Left chest port    Sepsis ([facility] 7/19/2016    SOB (shortness of breath) on exertion     Type 2 diabetes mellitus (HCC)     PRN oral agent/AVG BS: 170-200/ s.s of hypoglycemia at 79    Unspecified adverse effect of anesthesia     In [place] 28 yrs ago after second c -section-she was told her heart stopped d/t anesthesia-hospitalized for 5 days afterwards and followed by cardiologist    Vitamin B12 deficiency 11/13/2017     Past Surgical History:   Procedure Laterality Date    COLONOSCOPY N/A 3/19/2018    COLONOSCOPY  BMI 21   performed [facility] Yin [facility] at [facility] ENDOSCOPY     [facility]      X2    HX CYST REMOVAL      HX UROLOGICAL      stent placed    HX VASCULAR ACCESS  01/26/2012    Left chest port       Social History/Living Environment:     lives with family  Prior Level of Function/Work/Activity:  independent  Dominant Side:         RIGHT  Current Medications:       Current Outpatient Prescriptions:     GLIPIZIDE PO, Take  by mouth as needed. Pt only takes if BS > 200, Disp: , Rfl:     LORazepam (ATIVAN) 1 mg tablet, Take 1 Tab by mouth every six (6) hours as needed for Anxiety.  Max Daily Amount: 4 mg., Disp: 60 Tab, Rfl: 0    fentaNYL (DURAGESIC) 50 mcg/hr PATCH, 1 Patch by TransDERmal route every seventy-two (72) hours. Max Daily Amount: 1 Patch. Indications: Chronic Pain with Opioid Tolerance, Disp: 10 Patch, Rfl: 0    palbociclib 125 mg cap, Take 1 Cap by mouth daily. Take one pill once a day for 3 weeks and then off for one week, Disp: 21 Cap, Rfl: 5    potassium chloride (K-DUR, KLOR-CON) 20 mEq tablet, Take 1 Tab by mouth two (2) times a day., Disp: 60 Tab, Rfl: 0    HYDROcodone-acetaminophen (NORCO)  mg tablet, Take 1 Tab by mouth every six (6) hours as needed for Pain. Max Daily Amount: 4 Tabs., Disp: 90 Tab, Rfl: 0    hydrALAZINE (APRESOLINE) 50 mg tablet, Take 1 Tab by mouth three (3) times daily. (Patient taking differently: Take 50 mg by mouth two (2) times a day.), Disp: 90 Tab, Rfl: 1    montelukast (SINGULAIR) 10 mg tablet, Take 1 Tab by mouth nightly., Disp: 30 Tab, Rfl: 11    furosemide (LASIX) 40 mg tablet, Take 1 Tab by mouth daily. (Patient taking differently: Take 40 mg by mouth every other day. Monday,Wednesday,Friday,Sunday), Disp: 30 Tab, Rfl: 11    albuterol-ipratropium (DUO-NEB) 2.5 mg-0.5 mg/3 ml nebu, 3 mL by Nebulization route every six (6) hours as needed. , Disp: 360 Nebule, Rfl: 11    carvedilol (COREG) 3.125 mg tablet, Take 1 Tab by mouth two (2) times daily (with meals). , Disp: 60 Tab, Rfl: 11    warfarin (COUMADIN) 1 mg tablet, Take 1.5 Tabs by mouth every evening., Disp: 45 Tab, Rfl: 0    mirtazapine (REMERON) 30 mg tablet, Take 1 Tab by mouth nightly., Disp: 30 Tab, Rfl: 1    amLODIPine (NORVASC) 10 mg tablet, Take 1 Tab by mouth daily. , Disp: 30 Tab, Rfl: 11  No current facility-administered medications for this encounter.     Date Last Reviewed:  3/28/18   Number of Personal Factors/Comorbidities that affect the Plan of Care: 3+: HIGH COMPLEXITY   EXAMINATION:   ROM:          Within functional limits  Strength:          Grossly 4-/5 x 4 extremities  Functional Mobility: Gait/Ambulation:  Uses walker, unsteady        Transfers: Moderate dificulty  Balance:          Decreased in standing and gait   Body Structures Involved:  1. Muscles Body Functions Affected:  1. Sensory/Pain  2. Neuromusculoskeletal  3. Movement Related Activities and Participation Affected:  1. General Tasks and Demands  2. Communication  3. Mobility   Number of elements (examined above) that affect the Plan of Care: 4+: HIGH COMPLEXITY   CLINICAL PRESENTATION:   Presentation: Stable and uncomplicated: LOW COMPLEXITY   CLINICAL DECISION MAKING:   Outcome Measure: Tool Used: 6-MINUTE WALK TEST  Unable to test  Score:  Initial:   feet Most Recent: X feet (Date: -- )   Interpretation of Score: Normal range varies but is approximately 2252-4109 Feet      Distance walked:   feet               Baseline End of Test   Heart Rate      Dyspnea (Mehul Scale)     Fatigue (Mehul Scale)     SpO2     BP       Score [phone] 15-0   Modifier CH CI CJ CK CL CM CN         Tool Used: Disabilities of the Arm, Shoulder and Hand (DASH) Questionnaire - Quick Version  Score:  Initial: 41/55  Most Recent: X/55 (Date: -- )   Interpretation of Score: The DASH is designed to measure the activities of daily living in person's with upper extremity dysfunction or pain. Each section is scored on a 1-5 scale, 5 representing the greatest disability. The scores of each section are added together for a total score of 55. Score 11 12-19 20-28 29-37 38-45 46-54 55   Modifier CH CI CJ CK CL CM CN         Tool Used: TINETTI  Score:  Initial:   Gait: 6/12  Balance: 10/16  TOTAL: 16/28 Most Recent:  Gait: /12  Balance: /16  TOTAL: /28   Interpretation of Score: The maximum score for the gait component is 12 points. The maximum score for the balance component is 16 points. The maximum total score is 28 points. In general, patients who score below 19 are at a high risk for falls.  Patients who score in the range of 19-24 indicate that the patient has a risk for falls. Score 28 27-23 22-18 17-12 11-6 5-1 0   Modifier CH CI CJ CK CL CM CN         Tool Used: Timed Up and Go (TUG)  Score:  Initial: 25 seconds Most Recent: X seconds (Date: -- )   Interpretation of Score: The test measures, in seconds, the time taken by an individual to stand up from a standard arm chair (seat height 46 cm [18 in], arm height 65 cm [25.6 in]), walk a distance of 3 meters (118 in, approx 10 ft), turn, walk back to the chair and sit down. If the individual takes longer than 14 seconds to complete TUG, this indicates risk for falls. Score 7 7.5-10.5 11-14 14.5-17.5 18-21 21.5-24.5 25+   Modifier CH CI CJ CK CL CM CN        Tool Used: ECOG Performance Survey Score  Score:  Initial: 3 Most Recent:      Interpretation of Score:   0 Fully active, able to carry on all pre-disease performance without restriction   1 Restricted in physically strenuous activity but ambulatory and able to carry out work of a light or sedentary nature, e.g., light house work, office work   2 Ambulatory and capable of all selfcare but unable to carry out any work activities. Up and about more than 50% of waking hours   3 Capable of only limited selfcare, confined to bed or chair more than 50% of waking hours   4 Completely disabled. Cannot carry on any selfcare. Totally confined to bed or chair   5 Dead        Medical Necessity:   · Patient is expected to demonstrate progress in strength, balance and gait to increase independence with household activity. Reason for Services/Other Comments:  · Patient continues to demonstrate capacity to improve strength, balance and gait which will increase independence and decrease amount of assistance required from caregiver.    Use of outcome tool(s) and clinical judgement create a POC that gives a: Questionable prediction of patient's progress: MODERATE COMPLEXITY            TREATMENT:   (In addition to Assessment/Re-Assessment sessions the following treatments were rendered)  Pre-treatment Symptoms/Complaints:  Fatigue 10/10, weakness, decreased balance, altered gait  Pain: Initial:     8/10 in the whole body with legs being worse Post Session:  8/10     Assessment only today, no treatment provided. Given HEP:  Walk 1-2 minutes each waking hour when supervised, sit to stand from bedside, sit in chair instead of the bed each day. [place]  Treatment/Session Assessment:    · Response to Treatment:  Tolerated the assessment fair. · Compliance with Program/Exercises: Will assess as treatment progresses. · Recommendations/Intent for next treatment session: \"Next visit will focus on advancements to more challenging activities\".   Total Treatment Duration:       David Blackman, PT

## 2018-04-02 NOTE — THERAPY EVALUATION
Austin Diaz  : 1950  Primary: Panch[address] Cecelia Fap 100%  Secondary:  [address]  at [facility]  [address], [address], [place]  [phone]   [phone]          OUTPATIENT PHYSICAL THERAPY:Daily Note 2018    ICD-10: Treatment Diagnosis: muscle weakness generalized M 62.81  Precautions/Allergies:   Review of patient's allergies indicates no known allergies. Fall Risk Score: 9 (? 5 = High Risk)  MD Orders: oncology rehab MEDICAL/REFERRING DIAGNOSIS:  Other fatigue [R53.83]    DATE OF ONSET:   REFERRING PHYSICIAN: Cherie Galvan MD  RETURN PHYSICIAN APPOINTMENT: 4/10/18     INITIAL ASSESSMENT:  Ms. Mary Alba presents following treatment for metastatic breast cancer since . She has developed significant fatigue and weakness. She spends most of the day in bed. She has had a fall in the recent past.  She uses a rolling walker. She could possibly benefit from home health, but is willing to attend therapy as outpatient. She will benefit from therapeutic exercises in order to increase strength and overall conditioning. PROBLEM LIST (Impacting functional limitations):  1. Decreased Strength  2. Decreased ADL/Functional Activities  3. Decreased Transfer Abilities  4. Decreased Balance  5. Increased Pain  6. Decreased Activity Tolerance  7. Increased Fatigue  8. Decreased Clarence with Home Exercise Program INTERVENTIONS PLANNED:  1. Home Exercise Program (HEP)  2. Therapeutic Exercise/Strengthening   TREATMENT PLAN:  Effective Dates: 3/28/2018 TO 2018 (90 days). Frequency/Duration: 2 time a week for 90 Days  GOALS: (Goals have been discussed and agreed upon with patient.)  Short-Term Functional Goals: Time Frame: 4 weeks  1. The patient will be independent with HEP for strength within 4 weeks. 2. The patient will report a fatigue of 7 or less within 4 weeks. 3. The patient will gain 1/2 grade strength within 4 weeks.   4. The patient will improve her TUG score by 2 seconds within 4 weeks. 5. The patient will improve her Tinetti score by 5 within 4 weeks. Discharge Goals: Time Frame: 8 weeks  1. The patient will report a fatigue level of 5 or less within 8 weeks. 2. The patient will improve her TUG score by 5 seconds within 8 weeks. 3. The patient will participate in a 6 minute walk within 8 weeks. Rehabilitation Potential For Stated Goals: [address] therapy, I certify that the treatment plan above will be carried out by a therapist or under their direction. Thank you for this referral,  Lanny Pat PT     Referring Physician Signature: Emmett Leon MD              Date                    The information in this section was collected on 3/28/18 (except where otherwise noted). HISTORY:   History of Present Injury/Illness (Reason for Referral):  Metastatic breast cancer, chemo, fatigue, weakness  Past Medical History/Comorbidities:   Ms. Roselyn Steel  has a past medical history of Acute on chronic diastolic congestive heart failure ([address] (2016); Anemia of chronic disease (2017); Aortic valve stenosis; Arthritis; Asthma; Cancer ([address] Chemotherapy-induced neutropenia (HCC) (2016); CKD (chronic kidney disease) stage 4, GFR 15-29 ml/min ([address] Depression; Diastolic CHF ([address] Former cigarette smoker; HCAP (healthcare-associated pneumonia) (2013); History of DVT (deep vein thrombosis); Hypertension; Long term current use of anticoagulant; Oxygen dependent; Port catheter in place; Sepsis ([address] (2016); SOB (shortness of breath) on exertion; Type 2 diabetes mellitus ([address] Unspecified adverse effect of anesthesia; and Vitamin B12 deficiency (2017). Ms. Roselyn Steel  has a past surgical history that includes hx  section; hx cyst removal; hx urological; hx vascular access (2012); and colonoscopy (N/A, 3/19/2018).    Past Medical History:   Diagnosis Date    Acute on chronic diastolic congestive heart failure ([facility] 1/5/2016    Anemia of chronic disease 11/13/2017    Aortic valve stenosis     Arthritis     roverto hands    Asthma     till age 31    Cancer ([facility]     roverto. breast ca mets bone/lung    Chemotherapy-induced neutropenia (HCC) 12/20/2016    CKD (chronic kidney disease) stage 4, GFR 15-29 ml/min (HCC)     Depression     managed with meds    Diastolic CHF ([facility]     Followed by Donte Kerns    Former cigarette smoker     HCAP (healthcare-associated pneumonia) 7/17/2013    History of DVT (deep vein thrombosis)     Left leg DVT. on coumadin    Hypertension     managed with meds    Long term current use of anticoagulant     Coumadin    Oxygen dependent     at bedtime 2L-3L NC    Port catheter in place     Left chest port    Sepsis ([facility] 7/19/2016    SOB (shortness of breath) on exertion     Type 2 diabetes mellitus (HCC)     PRN oral agent/AVG BS: 170-200/ s.s of hypoglycemia at 79    Unspecified adverse effect of anesthesia     In [place] 28 yrs ago after second c -section-she was told her heart stopped d/t anesthesia-hospitalized for 5 days afterwards and followed by cardiologist    Vitamin B12 deficiency 11/13/2017     Past Surgical History:   Procedure Laterality Date    COLONOSCOPY N/A 3/19/2018    COLONOSCOPY  BMI 21   performed by Nar[facility] at [facility] ENDOSCOPY     [facility]      X2    HX CYST REMOVAL      HX UROLOGICAL      stent placed    HX VASCULAR ACCESS  01/26/2012    Left chest port       Social History/Living Environment:     lives with family  Prior Level of Function/Work/Activity:  independent  Dominant Side:         RIGHT  Current Medications:       Current Outpatient Prescriptions:     GLIPIZIDE PO, Take  by mouth as needed. Pt only takes if BS > 200, Disp: , Rfl:     LORazepam (ATIVAN) 1 mg tablet, Take 1 Tab by mouth every six (6) hours as needed for Anxiety.  Max Daily Amount: 4 mg., Disp: 60 Tab, Rfl: 0    fentaNYL (DURAGESIC) 50 mcg/hr PATCH, 1 Patch by TransDERmal route every seventy-two (72) hours. Max Daily Amount: 1 Patch. Indications: Chronic Pain with Opioid Tolerance, Disp: 10 Patch, Rfl: 0    palbociclib 125 mg cap, Take 1 Cap by mouth daily. Take one pill once a day for 3 weeks and then off for one week, Disp: 21 Cap, Rfl: 5    potassium chloride (K-DUR, KLOR-CON) 20 mEq tablet, Take 1 Tab by mouth two (2) times a day., Disp: 60 Tab, Rfl: 0    HYDROcodone-acetaminophen (NORCO)  mg tablet, Take 1 Tab by mouth every six (6) hours as needed for Pain. Max Daily Amount: 4 Tabs., Disp: 90 Tab, Rfl: 0    hydrALAZINE (APRESOLINE) 50 mg tablet, Take 1 Tab by mouth three (3) times daily. (Patient taking differently: Take 50 mg by mouth two (2) times a day.), Disp: 90 Tab, Rfl: 1    montelukast (SINGULAIR) 10 mg tablet, Take 1 Tab by mouth nightly., Disp: 30 Tab, Rfl: 11    furosemide (LASIX) 40 mg tablet, Take 1 Tab by mouth daily. (Patient taking differently: Take 40 mg by mouth every other day. Monday,Wednesday,Friday,Sunday), Disp: 30 Tab, Rfl: 11    albuterol-ipratropium (DUO-NEB) 2.5 mg-0.5 mg/3 ml nebu, 3 mL by Nebulization route every six (6) hours as needed. , Disp: 360 Nebule, Rfl: 11    carvedilol (COREG) 3.125 mg tablet, Take 1 Tab by mouth two (2) times daily (with meals). , Disp: 60 Tab, Rfl: 11    warfarin (COUMADIN) 1 mg tablet, Take 1.5 Tabs by mouth every evening., Disp: 45 Tab, Rfl: 0    mirtazapine (REMERON) 30 mg tablet, Take 1 Tab by mouth nightly., Disp: 30 Tab, Rfl: 1    amLODIPine (NORVASC) 10 mg tablet, Take 1 Tab by mouth daily. , Disp: 30 Tab, Rfl: 11   Date Last Reviewed:  3/28/18   Number of Personal Factors/Comorbidities that affect the Plan of Care: 3+: HIGH COMPLEXITY   EXAMINATION:   ROM:          Within functional limits  Strength:          Grossly 4-/5 x 4 extremities  Functional Mobility:         Gait/Ambulation:  Uses walker, unsteady        Transfers:   Moderate dificulty  Balance: Decreased in standing and gait   Body Structures Involved:  1. Muscles Body Functions Affected:  1. Sensory/Pain  2. Neuromusculoskeletal  3. Movement Related Activities and Participation Affected:  1. General Tasks and Demands  2. Communication  3. Mobility   Number of elements (examined above) that affect the Plan of Care: 4+: HIGH COMPLEXITY   CLINICAL PRESENTATION:   Presentation: Stable and uncomplicated: LOW COMPLEXITY   CLINICAL DECISION MAKING:   Outcome Measure: Tool Used: 6-MINUTE WALK TEST  Unable to test  Score:  Initial:   feet Most Recent: X feet (Date: -- )   Interpretation of Score: Normal range varies but is approximately 1354-9708 Feet      Distance walked:   feet               Baseline End of Test   Heart Rate      Dyspnea (Mehul Scale)     Fatigue (Mehul Scale)     SpO2     BP       Score 2133 [id number] [phone] 15-0   Modifier CH CI CJ CK CL CM CN         Tool Used: Disabilities of the Arm, Shoulder and Hand (DASH) Questionnaire - Quick Version  Score:  Initial: 41/55  Most Recent: X/55 (Date: -- )   Interpretation of Score: The DASH is designed to measure the activities of daily living in person's with upper extremity dysfunction or pain. Each section is scored on a 1-5 scale, 5 representing the greatest disability. The scores of each section are added together for a total score of 55. Score 11 12-19 20-28 29-37 38-45 46-54 55   Modifier CH CI CJ CK CL CM CN         Tool Used: TINETTI  Score:  Initial:   Gait: 6/12  Balance: 10/16  TOTAL: 16/28 Most Recent:  Gait: /12  Balance: /16  TOTAL: /28   Interpretation of Score: The maximum score for the gait component is 12 points. The maximum score for the balance component is 16 points. The maximum total score is 28 points. In general, patients who score below 19 are at a high risk for falls. Patients who score in the range of 19-24 indicate that the patient has a risk for falls.   Score 28 27-23 22-18 17-12 11-6 5-1 0 Modifier CH CI CJ CK CL CM CN         Tool Used: Timed Up and Go (TUG)  Score:  Initial: 25 seconds Most Recent: X seconds (Date: -- )   Interpretation of Score: The test measures, in seconds, the time taken by an individual to stand up from a standard arm chair (seat height 46 cm [18 in], arm height 65 cm [25.6 in]), walk a distance of 3 meters (118 in, approx 10 ft), turn, walk back to the chair and sit down. If the individual takes longer than 14 seconds to complete TUG, this indicates risk for falls. Score 7 7.5-10.5 11-14 14.5-17.5 18-21 21.5-24.5 25+   Modifier CH CI CJ CK CL CM CN        Tool Used: ECOG Performance Survey Score  Score:  Initial: 3 Most Recent:      Interpretation of Score:   0 Fully active, able to carry on all pre-disease performance without restriction   1 Restricted in physically strenuous activity but ambulatory and able to carry out work of a light or sedentary nature, e.g., light house work, office work   2 Ambulatory and capable of all selfcare but unable to carry out any work activities. Up and about more than 50% of waking hours   3 Capable of only limited selfcare, confined to bed or chair more than 50% of waking hours   4 Completely disabled. Cannot carry on any selfcare. Totally confined to bed or chair   5 Dead        Medical Necessity:   · Patient is expected to demonstrate progress in strength, balance and gait to increase independence with household activity. Reason for Services/Other Comments:  · Patient continues to demonstrate capacity to improve strength, balance and gait which will increase independence and decrease amount of assistance required from caregiver.    Use of outcome tool(s) and clinical judgement create a POC that gives a: Questionable prediction of patient's progress: MODERATE COMPLEXITY            TREATMENT:   (In addition to Assessment/Re-Assessment sessions the following treatments were rendered)  Pre-treatment Symptoms/Complaints:  Fatigue 9/10, weakness, decreased balance, altered gait  Pain: Initial:     0/10 in the whole body with legs being worse Post Session:  0/10   Long arc quads x 5 reps with 5 count hold, hip flexion x 5 reps  Bicep curls with 2# x 10 reps  Long arc quads x 5 reps with 5 count hold, hip flexion x 5 reps  Bilateral upper extremity with 2# x 10 reps  Sitting hamstring curls with red theraband x 10 reps  Sitting hip abduction with red theraband  Sitting hip adduction with inflated ball x 10 reps with 5 count hold  Sit to stand from elevated surface x 5 reps  Scapular retraction wit red theraband  Bilateral upper extremity abductoin with 1# x 10 reps  Standing up on toes, hip abduction, hip extension, hip flexion, hamstring curls x 10 reps   Bilateral upper extremity with 2# overhead press x 10 reps  Bilateral upper extremity with 1# x 10 reps abduction in standing. Supine bridging x 10 reps  Supine SLR x 5 reps  as above. Given HEP:  Walk 1-2 minutes each waking hour when supervised, sit to stand from bedside, sit in chair instead of the bed each day. [employer] Portal  Treatment/Session Assessment:    · Response to Treatment:  Tolerated the treatment fair. · Compliance with Program/Exercises: Will assess as treatment progresses. · Recommendations/Intent for next treatment session: \"Next visit will focus on advancements to more challenging activities\".   Total Treatment Duration: 42 min  PT Patient Time In/Time Out  Time In: 0178  Time Out: 0929    Janes Shepherd, PT

## 2018-04-04 NOTE — PROGRESS NOTES
Ashely Raymond  : 1950  Primary: Dulce Destinee Fap 100%  Secondary:  [address]  at [facility]  [address], [address], [place]  [phone]   [phone]          OUTPATIENT PHYSICAL THERAPY:Daily Note 2018    ICD-10: Treatment Diagnosis: muscle weakness generalized M 62.81  Precautions/Allergies:   Review of patient's allergies indicates no known allergies. Fall Risk Score: 9 (? 5 = High Risk)  MD Orders: oncology rehab MEDICAL/REFERRING DIAGNOSIS:  Other fatigue [R53.83]    DATE OF ONSET:   REFERRING PHYSICIAN: Boogie Jim MD  RETURN PHYSICIAN APPOINTMENT: 4/10/18     INITIAL ASSESSMENT:  Ms. Torrey Esteves presents following treatment for metastatic breast cancer since . She has developed significant fatigue and weakness. She spends most of the day in bed. She has had a fall in the recent past.  She uses a rolling walker. She could possibly benefit from home health, but is willing to attend therapy as outpatient. She will benefit from therapeutic exercises in order to increase strength and overall conditioning. PROBLEM LIST (Impacting functional limitations):  1. Decreased Strength  2. Decreased ADL/Functional Activities  3. Decreased Transfer Abilities  4. Decreased Balance  5. Increased Pain  6. Decreased Activity Tolerance  7. Increased Fatigue  8. Decreased [place] with Home Exercise Program INTERVENTIONS PLANNED:  1. Home Exercise Program (HEP)  2. Therapeutic Exercise/Strengthening   TREATMENT PLAN:  Effective Dates: 3/28/2018 TO 2018 (90 days). Frequency/Duration: 2 time a week for 90 Days  GOALS: (Goals have been discussed and agreed upon with patient.)  Short-Term Functional Goals: Time Frame: 4 weeks  1. The patient will be independent with HEP for strength within 4 weeks. 2. The patient will report a fatigue of 7 or less within 4 weeks. 3. The patient will gain 1/2 grade strength within 4 weeks.   4. The patient will improve her TUG score by 2 seconds within 4 weeks. 5. The patient will improve her Tinetti score by 5 within 4 weeks. Discharge Goals: Time Frame: 8 weeks  1. The patient will report a fatigue level of 5 or less within 8 weeks. 2. The patient will improve her TUG score by 5 seconds within 8 weeks. 3. The patient will participate in a 6 minute walk within 8 weeks. Rehabilitation Potential For Stated Goals: [address] therapy, I certify that the treatment plan above will be carried out by a therapist or under their direction. Thank you for this referral,  Phyllis Talavera PT     Referring Physician Signature: Irene Jerez MD              Date                    The information in this section was collected on 3/28/18 (except where otherwise noted). HISTORY:   History of Present Injury/Illness (Reason for Referral):  Metastatic breast cancer, chemo, fatigue, weakness  Past Medical History/Comorbidities:   Ms. Emma Dominguez  has a past medical history of Acute on chronic diastolic congestive heart failure ([address] (2016); Anemia of chronic disease (2017); Aortic valve stenosis; Arthritis; Asthma; Cancer ([address] Chemotherapy-induced neutropenia (HCC) (2016); CKD (chronic kidney disease) stage 4, GFR 15-29 ml/min ([address] Depression; Diastolic CHF ([address] Former cigarette smoker; HCAP (healthcare-associated pneumonia) (2013); History of DVT (deep vein thrombosis); Hypertension; Long term current use of anticoagulant; Oxygen dependent; Port catheter in place; Sepsis ([address] (2016); SOB (shortness of breath) on exertion; Type 2 diabetes mellitus ([address] Unspecified adverse effect of anesthesia; and Vitamin B12 deficiency (2017). Ms. Emma Dominguez  has a past surgical history that includes hx  section; hx cyst removal; hx urological; hx vascular access (2012); and colonoscopy (N/A, 3/19/2018).    Past Medical History:   Diagnosis Date    Acute on chronic diastolic congestive heart failure ([facility] 1/5/2016    Anemia of chronic disease 11/13/2017    Aortic valve stenosis     Arthritis     roverto hands    Asthma     till age 31    Cancer ([facility]     roverto. breast ca mets bone/lung    Chemotherapy-induced neutropenia (HCC) 12/20/2016    CKD (chronic kidney disease) stage 4, GFR 15-29 ml/min (HCC)     Depression     managed with meds    Diastolic CHF ([facility]     Followed by Donte Kerns    Former cigarette smoker     HCAP (healthcare-associated pneumonia) 7/17/2013    History of DVT (deep vein thrombosis)     Left leg DVT. on coumadin    Hypertension     managed with meds    Long term current use of anticoagulant     Coumadin    Oxygen dependent     at bedtime 2L-3L NC    Port catheter in place     Left chest port    Sepsis ([facility] 7/19/2016    SOB (shortness of breath) on exertion     Type 2 diabetes mellitus (HCC)     PRN oral agent/AVG BS: 170-200/ s.s of hypoglycemia at 79    Unspecified adverse effect of anesthesia     In [place] 28 yrs ago after second c -section-she was told her heart stopped d/t anesthesia-hospitalized for 5 days afterwards and followed by cardiologist    Vitamin B12 deficiency 11/13/2017     Past Surgical History:   Procedure Laterality Date    COLONOSCOPY N/A 3/19/2018    COLONOSCOPY  BMI 21   performed by Ej [facility]itt [facility] at [facility] ENDOSCOPY     [facility]      X2    HX CYST REMOVAL      HX UROLOGICAL      stent placed    HX VASCULAR ACCESS  01/26/2012    Left chest port       Social History/Living Environment:     lives with family  Prior Level of Function/Work/Activity:  independent  Dominant Side:         RIGHT  Current Medications:       Current Outpatient Prescriptions:     GLIPIZIDE PO, Take  by mouth as needed. Pt only takes if BS > 200, Disp: , Rfl:     LORazepam (ATIVAN) 1 mg tablet, Take 1 Tab by mouth every six (6) hours as needed for Anxiety.  Max Daily Amount: 4 mg., Disp: 60 Tab, Rfl: 0    fentaNYL (DURAGESIC) 50 mcg/hr PATCH, 1 Patch by TransDERmal route every seventy-two (72) hours. Max Daily Amount: 1 Patch. Indications: Chronic Pain with Opioid Tolerance, Disp: 10 Patch, Rfl: 0    palbociclib 125 mg cap, Take 1 Cap by mouth daily. Take one pill once a day for 3 weeks and then off for one week, Disp: 21 Cap, Rfl: 5    potassium chloride (K-DUR, KLOR-CON) 20 mEq tablet, Take 1 Tab by mouth two (2) times a day., Disp: 60 Tab, Rfl: 0    HYDROcodone-acetaminophen (NORCO)  mg tablet, Take 1 Tab by mouth every six (6) hours as needed for Pain. Max Daily Amount: 4 Tabs., Disp: 90 Tab, Rfl: 0    hydrALAZINE (APRESOLINE) 50 mg tablet, Take 1 Tab by mouth three (3) times daily. (Patient taking differently: Take 50 mg by mouth two (2) times a day.), Disp: 90 Tab, Rfl: 1    montelukast (SINGULAIR) 10 mg tablet, Take 1 Tab by mouth nightly., Disp: 30 Tab, Rfl: 11    furosemide (LASIX) 40 mg tablet, Take 1 Tab by mouth daily. (Patient taking differently: Take 40 mg by mouth every other day. Monday,Wednesday,Friday,Sunday), Disp: 30 Tab, Rfl: 11    albuterol-ipratropium (DUO-NEB) 2.5 mg-0.5 mg/3 ml nebu, 3 mL by Nebulization route every six (6) hours as needed. , Disp: 360 Nebule, Rfl: 11    carvedilol (COREG) 3.125 mg tablet, Take 1 Tab by mouth two (2) times daily (with meals). , Disp: 60 Tab, Rfl: 11    warfarin (COUMADIN) 1 mg tablet, Take 1.5 Tabs by mouth every evening., Disp: 45 Tab, Rfl: 0    mirtazapine (REMERON) 30 mg tablet, Take 1 Tab by mouth nightly., Disp: 30 Tab, Rfl: 1    amLODIPine (NORVASC) 10 mg tablet, Take 1 Tab by mouth daily. , Disp: 30 Tab, Rfl: 11   Date Last Reviewed:  3/28/18   Number of Personal Factors/Comorbidities that affect the Plan of Care: 3+: HIGH COMPLEXITY   EXAMINATION:   ROM:          Within functional limits  Strength:          Grossly 4-/5 x 4 extremities  Functional Mobility:         Gait/Ambulation:  Uses walker, unsteady        Transfers:   Moderate dificulty  Balance: Decreased in standing and gait   Body Structures Involved:  1. Muscles Body Functions Affected:  1. Sensory/Pain  2. Neuromusculoskeletal  3. Movement Related Activities and Participation Affected:  1. General Tasks and Demands  2. Communication  3. Mobility   Number of elements (examined above) that affect the Plan of Care: 4+: HIGH COMPLEXITY   CLINICAL PRESENTATION:   Presentation: Stable and uncomplicated: LOW COMPLEXITY   CLINICAL DECISION MAKING:   Outcome Measure: Tool Used: 6-MINUTE WALK TEST  Unable to test  Score:  Initial:   feet Most Recent: X feet (Date: -- )   Interpretation of Score: Normal range varies but is approximately 3430-3957 Feet      Distance walked:   feet               Baseline End of Test   Heart Rate      Dyspnea (Mehul Scale)     Fatigue (Mehul Scale)     SpO2     BP       Score [phone] [phone] 15-0   Modifier CH CI CJ CK CL CM CN         Tool Used: Disabilities of the Arm, Shoulder and Hand (DASH) Questionnaire - Quick Version  Score:  Initial: 41/55  Most Recent: X/55 (Date: -- )   Interpretation of Score: The DASH is designed to measure the activities of daily living in person's with upper extremity dysfunction or pain. Each section is scored on a 1-5 scale, 5 representing the greatest disability. The scores of each section are added together for a total score of 55. Score 11 12-19 20-28 29-37 38-45 46-54 55   Modifier CH CI CJ CK CL CM CN         Tool Used: TINETTI  Score:  Initial:   Gait: 6/12  Balance: 10/16  TOTAL: 16/28 Most Recent:  Gait: /12  Balance: /16  TOTAL: /28   Interpretation of Score: The maximum score for the gait component is 12 points. The maximum score for the balance component is 16 points. The maximum total score is 28 points. In general, patients who score below 19 are at a high risk for falls. Patients who score in the range of 19-24 indicate that the patient has a risk for falls.   Score 28 27-23 22-18 17-12 11-6 5-1 0 Modifier CH CI CJ CK CL CM CN         Tool Used: Timed Up and Go (TUG)  Score:  Initial: 25 seconds Most Recent: X seconds (Date: -- )   Interpretation of Score: The test measures, in seconds, the time taken by an individual to stand up from a standard arm chair (seat height 46 cm [18 in], arm height 65 cm [25.6 in]), walk a distance of 3 meters (118 in, approx 10 ft), turn, walk back to the chair and sit down. If the individual takes longer than 14 seconds to complete TUG, this indicates risk for falls. Score 7 7.5-10.5 11-14 14.5-17.5 18-21 21.5-24.5 25+   Modifier CH CI CJ CK CL CM CN        Tool Used: ECOG Performance Survey Score  Score:  Initial: 3 Most Recent:      Interpretation of Score:   0 Fully active, able to carry on all pre-disease performance without restriction   1 Restricted in physically strenuous activity but ambulatory and able to carry out work of a light or sedentary nature, e.g., light house work, office work   2 Ambulatory and capable of all selfcare but unable to carry out any work activities. Up and about more than 50% of waking hours   3 Capable of only limited selfcare, confined to bed or chair more than 50% of waking hours   4 Completely disabled. Cannot carry on any selfcare. Totally confined to bed or chair   5 Dead        Medical Necessity:   · Patient is expected to demonstrate progress in strength, balance and gait to increase independence with household activity. Reason for Services/Other Comments:  · Patient continues to demonstrate capacity to improve strength, balance and gait which will increase independence and decrease amount of assistance required from caregiver.    Use of outcome tool(s) and clinical judgement create a POC that gives a: Questionable prediction of patient's progress: MODERATE COMPLEXITY            TREATMENT:   (In addition to Assessment/Re-Assessment sessions the following treatments were rendered)  Pre-treatment Symptoms/Complaints:  Fatigue 9/10, fatigue 11/10 after. weakness, decreased balance, altered gait. The patient reports she has nausea all the time since her endoscopy. Pain: Initial:     0/10 in the whole body with legs being worse Post Session:  0/10   O2 96 HR 57  /59  Advised to report nausea to physician  Long arc quads x 5 reps with 5 count hold, hip flexion x 5 reps  Bicep curls with 2# x 10 reps  Long arc quads x 5 reps with 5 count hold, hip flexion x 5 reps  Bilateral upper extremity with 2# x 10 reps  Scapular retraction with red theraband/ push forward x 10 reps  Sitting hamstring curls with red theraband x 10 reps  Sitting hip abduction with red theraband  Sitting hip adduction with inflated ball x 10 reps with 5 count hold  Sit to stand from elevated surface x 10 reps  Standing up on toes, hip abduction, hip extension, hip flexion, hamstring curls x 10 reps   Bilateral upper extremity with 2# overhead press x 10 reps  Supine bridging x 10 reps  Supine SLR x 10 reps  Short arc quads x 10 reps with 5 count hold  as above. Given HEP:  Walk 1-2 minutes each waking hour when supervised, sit to stand from bedside, sit in chair instead of the bed each day. Added sit to stand, long arc quads and standing exercises at the sink daily. Bilateral upper extremity with light weight every other day. Given written instructions. Given red theraband for home use. [facility] [facility]  Treatment/Session Assessment:    · Response to Treatment:  Tolerated the treatment fair. · Compliance with Program/Exercises: Will assess as treatment progresses. · Recommendations/Intent for next treatment session: \"Next visit will focus on advancements to more challenging activities\".   Total Treatment Duration: 54 min  PT Patient Time In/Time Out  Time In: 0858  Time Out: [address] [facility] [place], PT

## 2018-04-09 NOTE — PROGRESS NOTES
Bharati   : 1950  Primary: Yelitza Summers Fap 100%  Secondary:  [address]  at [facility]  [address], [address], [place]  [phone]   [phone]          OUTPATIENT PHYSICAL THERAPY:Daily Note 2018    ICD-10: Treatment Diagnosis: muscle weakness generalized M 62.81  Precautions/Allergies:   Review of patient's allergies indicates no known allergies. Fall Risk Score: 9 (? 5 = High Risk)  MD Orders: oncology rehab MEDICAL/REFERRING DIAGNOSIS:  Other fatigue [R53.83]    DATE OF ONSET:   REFERRING PHYSICIAN: Stefano Martinez MD  RETURN PHYSICIAN APPOINTMENT: 4/10/18     INITIAL ASSESSMENT:  Ms. Juana Loya presents following treatment for metastatic breast cancer since . She has developed significant fatigue and weakness. She spends most of the day in bed. She has had a fall in the recent past.  She uses a rolling walker. She could possibly benefit from home health, but is willing to attend therapy as outpatient. She will benefit from therapeutic exercises in order to increase strength and overall conditioning. PROBLEM LIST (Impacting functional limitations):  1. Decreased Strength  2. Decreased ADL/Functional Activities  3. Decreased Transfer Abilities  4. Decreased Balance  5. Increased Pain  6. Decreased Activity Tolerance  7. Increased Fatigue  8. Decreased [place] with Home Exercise Program INTERVENTIONS PLANNED:  1. Home Exercise Program (HEP)  2. Therapeutic Exercise/Strengthening   TREATMENT PLAN:  Effective Dates: 3/28/2018 TO 2018 (90 days). Frequency/Duration: 2 time a week for 90 Days  GOALS: (Goals have been discussed and agreed upon with patient.)  Short-Term Functional Goals: Time Frame: 4 weeks  1. The patient will be independent with HEP for strength within 4 weeks. 2. The patient will report a fatigue of 7 or less within 4 weeks. 3. The patient will gain 1/2 grade strength within 4 weeks.   4. The patient will improve her TUG score by 2 seconds within 4 weeks. 5. The patient will improve her Tinetti score by 5 within 4 weeks. Discharge Goals: Time Frame: 8 weeks  1. The patient will report a fatigue level of 5 or less within 8 weeks. 2. The patient will improve her TUG score by 5 seconds within 8 weeks. 3. The patient will participate in a 6 minute walk within 8 weeks. Rehabilitation Potential For Stated Goals: [address] therapy, I certify that the treatment plan above will be carried out by a therapist or under their direction. Thank you for this referral,  Mattie Brady PT     Referring Physician Signature: Anisa Stafford MD              Date                    The information in this section was collected on 3/28/18 (except where otherwise noted). HISTORY:   History of Present Injury/Illness (Reason for Referral):  Metastatic breast cancer, chemo, fatigue, weakness  Past Medical History/Comorbidities:   Ms. Paulo Knox  has a past medical history of Acute on chronic diastolic congestive heart failure ([address] (2016); Anemia of chronic disease (2017); Aortic valve stenosis; Arthritis; Asthma; Cancer ([address] Chemotherapy-induced neutropenia (HCC) (2016); CKD (chronic kidney disease) stage 4, GFR 15-29 ml/min ([address] Depression; Diastolic CHF ([address] Former cigarette smoker; HCAP (healthcare-associated pneumonia) (2013); History of DVT (deep vein thrombosis); Hypertension; Long term current use of anticoagulant; Oxygen dependent; Port catheter in place; Sepsis ([address] (2016); SOB (shortness of breath) on exertion; Type 2 diabetes mellitus ([address] Unspecified adverse effect of anesthesia; and Vitamin B12 deficiency (2017). Ms. Paulo Knox  has a past surgical history that includes hx  section; hx cyst removal; hx urological; hx vascular access (2012); and colonoscopy (N/A, 3/19/2018).    Past Medical History:   Diagnosis Date    Acute on chronic diastolic congestive heart failure ([facility] 1/5/2016    Anemia of chronic disease 11/13/2017    Aortic valve stenosis     Arthritis     roverto hands    Asthma     till age 31    Cancer ([facility]     roverto. breast ca mets bone/lung    Chemotherapy-induced neutropenia (HCC) 12/20/2016    CKD (chronic kidney disease) stage 4, GFR 15-29 ml/min (HCC)     Depression     managed with meds    Diastolic CHF ([facility]     Followed by Donte Kerns    Former cigarette smoker     HCAP (healthcare-associated pneumonia) 7/17/2013    History of DVT (deep vein thrombosis)     Left leg DVT. on coumadin    Hypertension     managed with meds    Long term current use of anticoagulant     Coumadin    Oxygen dependent     at bedtime 2L-3L NC    Port catheter in place     Left chest port    Sepsis ([facility] 7/19/2016    SOB (shortness of breath) on exertion     Type 2 diabetes mellitus (HCC)     PRN oral agent/AVG BS: 170-200/ s.s of hypoglycemia at 79    Unspecified adverse effect of anesthesia     In [place] 28 yrs ago after second c -section-she was told her heart stopped d/t anesthesia-hospitalized for 5 days afterwards and followed by cardiologist    Vitamin B12 deficiency 11/13/2017     Past Surgical History:   Procedure Laterality Date    COLONOSCOPY N/A 3/19/2018    COLONOSCOPY  BMI 21   performed by [facility] MD at [facility] ENDOSCOPY     [facility]      X2    HX CYST REMOVAL      HX UROLOGICAL      stent placed    HX VASCULAR ACCESS  01/26/2012    Left chest port       Social History/Living Environment:     lives with family  Prior Level of Function/Work/Activity:  independent  Dominant Side:         RIGHT  Current Medications:       Current Outpatient Prescriptions:     GLIPIZIDE PO, Take  by mouth as needed. Pt only takes if BS > 200, Disp: , Rfl:     LORazepam (ATIVAN) 1 mg tablet, Take 1 Tab by mouth every six (6) hours as needed for Anxiety.  Max Daily Amount: 4 mg., Disp: 60 Tab, Rfl: 0    fentaNYL (DURAGESIC) 50 mcg/hr PATCH, 1 Patch by TransDERmal route every seventy-two (72) hours. Max Daily Amount: 1 Patch. Indications: Chronic Pain with Opioid Tolerance, Disp: 10 Patch, Rfl: 0    palbociclib 125 mg cap, Take 1 Cap by mouth daily. Take one pill once a day for 3 weeks and then off for one week, Disp: 21 Cap, Rfl: 5    potassium chloride (K-DUR, KLOR-CON) 20 mEq tablet, Take 1 Tab by mouth two (2) times a day., Disp: 60 Tab, Rfl: 0    HYDROcodone-acetaminophen (NORCO)  mg tablet, Take 1 Tab by mouth every six (6) hours as needed for Pain. Max Daily Amount: 4 Tabs., Disp: 90 Tab, Rfl: 0    hydrALAZINE (APRESOLINE) 50 mg tablet, Take 1 Tab by mouth three (3) times daily. (Patient taking differently: Take 50 mg by mouth two (2) times a day.), Disp: 90 Tab, Rfl: 1    montelukast (SINGULAIR) 10 mg tablet, Take 1 Tab by mouth nightly., Disp: 30 Tab, Rfl: 11    furosemide (LASIX) 40 mg tablet, Take 1 Tab by mouth daily. (Patient taking differently: Take 40 mg by mouth every other day. Monday,Wednesday,Friday,Sunday), Disp: 30 Tab, Rfl: 11    albuterol-ipratropium (DUO-NEB) 2.5 mg-0.5 mg/3 ml nebu, 3 mL by Nebulization route every six (6) hours as needed. , Disp: 360 Nebule, Rfl: 11    carvedilol (COREG) 3.125 mg tablet, Take 1 Tab by mouth two (2) times daily (with meals). , Disp: 60 Tab, Rfl: 11    warfarin (COUMADIN) 1 mg tablet, Take 1.5 Tabs by mouth every evening., Disp: 45 Tab, Rfl: 0    mirtazapine (REMERON) 30 mg tablet, Take 1 Tab by mouth nightly., Disp: 30 Tab, Rfl: 1    amLODIPine (NORVASC) 10 mg tablet, Take 1 Tab by mouth daily. , Disp: 30 Tab, Rfl: 11   Date Last Reviewed:  3/28/18   Number of Personal Factors/Comorbidities that affect the Plan of Care: 3+: HIGH COMPLEXITY   EXAMINATION:   ROM:          Within functional limits  Strength:          Grossly 4-/5 x 4 extremities  Functional Mobility:         Gait/Ambulation:  Uses walker, unsteady        Transfers:   Moderate dificulty  Balance: Decreased in standing and gait   Body Structures Involved:  1. Muscles Body Functions Affected:  1. Sensory/Pain  2. Neuromusculoskeletal  3. Movement Related Activities and Participation Affected:  1. General Tasks and Demands  2. Communication  3. Mobility   Number of elements (examined above) that affect the Plan of Care: 4+: HIGH COMPLEXITY   CLINICAL PRESENTATION:   Presentation: Stable and uncomplicated: LOW COMPLEXITY   CLINICAL DECISION MAKING:   Outcome Measure: Tool Used: 6-MINUTE WALK TEST  Unable to test  Score:  Initial:   feet Most Recent: X feet (Date: -- )   Interpretation of Score: Normal range varies but is approximately 9305-9595 Feet      Distance walked:   feet               Baseline End of Test   Heart Rate      Dyspnea (Mehul Scale)     Fatigue (Mehul Scale)     SpO2     BP       Score [phone] 15-0   Modifier CH CI CJ CK CL CM CN         Tool Used: Disabilities of the Arm, Shoulder and Hand (DASH) Questionnaire - Quick Version  Score:  Initial: 41/55  Most Recent: X/55 (Date: -- )   Interpretation of Score: The DASH is designed to measure the activities of daily living in person's with upper extremity dysfunction or pain. Each section is scored on a 1-5 scale, 5 representing the greatest disability. The scores of each section are added together for a total score of 55. Score 11 12-19 20-28 29-37 38-45 46-54 55   Modifier CH CI CJ CK CL CM CN         Tool Used: TINETTI  Score:  Initial:   Gait: 6/12  Balance: 10/16  TOTAL: 16/28 Most Recent:  Gait: /12  Balance: /16  TOTAL: /28   Interpretation of Score: The maximum score for the gait component is 12 points. The maximum score for the balance component is 16 points. The maximum total score is 28 points. In general, patients who score below 19 are at a high risk for falls. Patients who score in the range of 19-24 indicate that the patient has a risk for falls.   Score 28 27-23 22-18 17-12 11-6 5-1 0 Modifier CH CI CJ CK CL CM CN         Tool Used: Timed Up and Go (TUG)  Score:  Initial: 25 seconds Most Recent: X seconds (Date: -- )   Interpretation of Score: The test measures, in seconds, the time taken by an individual to stand up from a standard arm chair (seat height 46 cm [18 in], arm height 65 cm [25.6 in]), walk a distance of 3 meters (118 in, approx 10 ft), turn, walk back to the chair and sit down. If the individual takes longer than 14 seconds to complete TUG, this indicates risk for falls. Score 7 7.5-10.5 11-14 14.5-17.5 18-21 21.5-24.5 25+   Modifier CH CI CJ CK CL CM CN        Tool Used: ECOG Performance Survey Score  Score:  Initial: 3 Most Recent:      Interpretation of Score:   0 Fully active, able to carry on all pre-disease performance without restriction   1 Restricted in physically strenuous activity but ambulatory and able to carry out work of a light or sedentary nature, e.g., light house work, office work   2 Ambulatory and capable of all selfcare but unable to carry out any work activities. Up and about more than 50% of waking hours   3 Capable of only limited selfcare, confined to bed or chair more than 50% of waking hours   4 Completely disabled. Cannot carry on any selfcare. Totally confined to bed or chair   5 Dead        Medical Necessity:   · Patient is expected to demonstrate progress in strength, balance and gait to increase independence with household activity. Reason for Services/Other Comments:  · Patient continues to demonstrate capacity to improve strength, balance and gait which will increase independence and decrease amount of assistance required from caregiver.    Use of outcome tool(s) and clinical judgement create a POC that gives a: Questionable prediction of patient's progress: MODERATE COMPLEXITY            TREATMENT:   (In addition to Assessment/Re-Assessment sessions the following treatments were rendered)  Pre-treatment Symptoms/Complaints:  Fatigue 8/10, fatigue 5-6/10 after. weakness, decreased balance, altered gait. The patient reports her nausea has improved. Pain: Initial:     0/10 in the whole body with legs being worse Post Session:  0/10   O2 95 HR 61  /54    Long arc quads x 10 reps with 5 count hold with 1#, hip flexion x 10 reps with 1#  Bicep curls with 3# x 10 reps  Bilateral upper extremity with 2# x 10 reps  Scapular retraction with green theraband/ push forward x 10 reps  Sitting hamstring curls with green theraband x 10 reps  Sitting hip abduction with green theraband  Sitting hip adduction with inflated ball x 10 reps with 5 count hold  Sit to stand from elevated surface x 10 reps  Standing up on toes, hip abduction, hip extension, hip flexion, hamstring curls x 10 reps  With 1#  Bilateral upper extremity with 2# overhead press x 10 reps  walking  Supine bridging x 10 reps  Supine SLR x 10 reps  Short arc quads x 10 reps with 5 count hold  Added crunches in supine  walkingO2 96 HR 64  as above. Given HEP:  Walk 1-2 minutes each waking hour when supervised, sit to stand from bedside, sit in chair instead of the bed each day. Added sit to stand, long arc quads and standing exercises at the sink daily. Bilateral upper extremity with light weight every other day. Given written instructions. Given red theraband for home use. [employer]  Treatment/Session Assessment:    · Response to Treatment:  Tolerated the treatment well. Able to increase activity and resistance. Less fatigue after treatment. · Compliance with Program/Exercises: Will assess as treatment progresses. · Recommendations/Intent for next treatment session: \"Next visit will focus on advancements to more challenging activities\".   Total Treatment Duration: 50 min  PT Patient Time In/Time Out  Time In: 0929  Time Out: [address] 14, PT

## 2018-04-10 NOTE — PROGRESS NOTES
Arrived to the [facility]. Procrit completed. Patient tolerated well. Any issues or concerns during appointment: none. Patient aware of next infusion appointment on 4-24-18 (date) at 12 (time). Discharged via w/c.

## 2018-04-16 NOTE — PROGRESS NOTES
Carol Sic  : 1950  Primary: Shae Renee Fap 100%  Secondary:  [address]  at [facility]  [address], [address], [place]  [phone]   [phone]          OUTPATIENT PHYSICAL THERAPY:Daily Note 2018    ICD-10: Treatment Diagnosis: muscle weakness generalized M 62.81  Precautions/Allergies:   Review of patient's allergies indicates no known allergies. Fall Risk Score: 9 (? 5 = High Risk)  MD Orders: oncology rehab MEDICAL/REFERRING DIAGNOSIS:  Other fatigue [R53.83]    DATE OF ONSET:   REFERRING PHYSICIAN: Renee Garcia MD  RETURN PHYSICIAN APPOINTMENT: 4/10/18     INITIAL ASSESSMENT:  Ms. Ricardo Stallworth presents following treatment for metastatic breast cancer since . She has developed significant fatigue and weakness. She spends most of the day in bed. She has had a fall in the recent past.  She uses a rolling walker. She could possibly benefit from home health, but is willing to attend therapy as outpatient. She will benefit from therapeutic exercises in order to increase strength and overall conditioning. PROBLEM LIST (Impacting functional limitations):  1. Decreased Strength  2. Decreased ADL/Functional Activities  3. Decreased Transfer Abilities  4. Decreased Balance  5. Increased Pain  6. Decreased Activity Tolerance  7. Increased Fatigue  8. Decreased [place] with Home Exercise Program INTERVENTIONS PLANNED:  1. Home Exercise Program (HEP)  2. Therapeutic Exercise/Strengthening   TREATMENT PLAN:  Effective Dates: 3/28/2018 TO 2018 (90 days). Frequency/Duration: 2 time a week for 90 Days  GOALS: (Goals have been discussed and agreed upon with patient.)  Short-Term Functional Goals: Time Frame: 4 weeks  1. The patient will be independent with HEP for strength within 4 weeks. 2. The patient will report a fatigue of 7 or less within 4 weeks. 3. The patient will gain 1/2 grade strength within 4 weeks.   4. The patient will improve her TUG score by 2 seconds within 4 weeks. 5. The patient will improve her Tinetti score by 5 within 4 weeks. Discharge Goals: Time Frame: 8 weeks  1. The patient will report a fatigue level of 5 or less within 8 weeks. 2. The patient will improve her TUG score by 5 seconds within 8 weeks. 3. The patient will participate in a 6 minute walk within 8 weeks. Rehabilitation Potential For Stated Goals: [address] therapy, I certify that the treatment plan above will be carried out by a therapist or under their direction. Thank you for this referral,  Doroteo Nathan PT     Referring Physician Signature: Courtney Garza MD              Date                    The information in this section was collected on 3/28/18 (except where otherwise noted). HISTORY:   History of Present Injury/Illness (Reason for Referral):  Metastatic breast cancer, chemo, fatigue, weakness  Past Medical History/Comorbidities:   Ms. Liz Duane  has a past medical history of Acute on chronic diastolic congestive heart failure ([address] (2016); Anemia of chronic disease (2017); Aortic valve stenosis; Arthritis; Asthma; Cancer ([address] Chemotherapy-induced neutropenia (HCC) (2016); CKD (chronic kidney disease) stage 4, GFR 15-29 ml/min ([address] Depression; Diastolic CHF ([address] Former cigarette smoker; HCAP (healthcare-associated pneumonia) (2013); History of DVT (deep vein thrombosis); Hypertension; Long term current use of anticoagulant; Oxygen dependent; Port catheter in place; Sepsis ([address] (2016); SOB (shortness of breath) on exertion; Type 2 diabetes mellitus ([address] Unspecified adverse effect of anesthesia; and Vitamin B12 deficiency (2017). Ms. Liz Duane  has a past surgical history that includes hx  section; hx cyst removal; hx urological; hx vascular access (2012); and colonoscopy (N/A, 3/19/2018).    Past Medical History:   Diagnosis Date    Acute on chronic diastolic congestive heart failure ([facility] 1/5/2016    Anemia of chronic disease 11/13/2017    Aortic valve stenosis     Arthritis     roverto hands    Asthma     till age 31    Cancer ([facility]     roverto. breast ca mets bone/lung    Chemotherapy-induced neutropenia (HCC) 12/20/2016    CKD (chronic kidney disease) stage 4, GFR 15-29 ml/min (HCC)     Depression     managed with meds    Diastolic CHF ([facility]     Followed by Donte Kerns    Former cigarette smoker     HCAP (healthcare-associated pneumonia) 7/17/2013    History of DVT (deep vein thrombosis)     Left leg DVT. on coumadin    Hypertension     managed with meds    Long term current use of anticoagulant     Coumadin    Oxygen dependent     at bedtime 2L-3L NC    Port catheter in place     Left chest port    Sepsis ([facility] 7/19/2016    SOB (shortness of breath) on exertion     Type 2 diabetes mellitus (HCC)     PRN oral agent/AVG BS: 170-200/ s.s of hypoglycemia at 79    Unspecified adverse effect of anesthesia     In [place] 28 yrs ago after second c -section-she was told her heart stopped d/t anesthesia-hospitalized for 5 days afterwards and followed by cardiologist    Vitamin B12 deficiency 11/13/2017     Past Surgical History:   Procedure Laterality Date    COLONOSCOPY N/A 3/19/2018    COLONOSCOPY  BMI 21   performed [facility]ters MD at [facility] ENDOSCOPY    HX [address]    HX CYST REMOVAL      HX UROLOGICAL      stent placed    HX VASCULAR ACCESS  01/26/2012    Left chest port       Social History/Living Environment:     lives with family  Prior Level of Function/Work/Activity:  independent  Dominant Side:         RIGHT  Current Medications:       Current Outpatient Prescriptions:     fentaNYL (DURAGESIC) 50 mcg/hr PATCH, 1 Patch by TransDERmal route every seventy-two (72) hours. Max Daily Amount: 1 Patch.  Indications: Chronic Pain with Opioid Tolerance, Disp: 10 Patch, Rfl: 0    HYDROcodone-acetaminophen (NORCO)  mg tablet, Take 1 Tab by mouth every six (6) hours as needed for Pain. Max Daily Amount: 4 Tabs., Disp: 90 Tab, Rfl: 0    GLIPIZIDE PO, Take  by mouth as needed. Pt only takes if BS > 200, Disp: , Rfl:     LORazepam (ATIVAN) 1 mg tablet, Take 1 Tab by mouth every six (6) hours as needed for Anxiety. Max Daily Amount: 4 mg., Disp: 60 Tab, Rfl: 0    palbociclib 125 mg cap, Take 1 Cap by mouth daily. Take one pill once a day for 3 weeks and then off for one week, Disp: 21 Cap, Rfl: 5    potassium chloride (K-DUR, KLOR-CON) 20 mEq tablet, Take 1 Tab by mouth two (2) times a day., Disp: 60 Tab, Rfl: 0    hydrALAZINE (APRESOLINE) 50 mg tablet, Take 1 Tab by mouth three (3) times daily. (Patient taking differently: Take 50 mg by mouth two (2) times a day.), Disp: 90 Tab, Rfl: 1    montelukast (SINGULAIR) 10 mg tablet, Take 1 Tab by mouth nightly., Disp: 30 Tab, Rfl: 11    furosemide (LASIX) 40 mg tablet, Take 1 Tab by mouth daily. (Patient taking differently: Take 40 mg by mouth every other day. Monday,Wednesday,Friday,Sunday), Disp: 30 Tab, Rfl: 11    albuterol-ipratropium (DUO-NEB) 2.5 mg-0.5 mg/3 ml nebu, 3 mL by Nebulization route every six (6) hours as needed. , Disp: 360 Nebule, Rfl: 11    carvedilol (COREG) 3.125 mg tablet, Take 1 Tab by mouth two (2) times daily (with meals). , Disp: 60 Tab, Rfl: 11    warfarin (COUMADIN) 1 mg tablet, Take 1.5 Tabs by mouth every evening., Disp: 45 Tab, Rfl: 0    mirtazapine (REMERON) 30 mg tablet, Take 1 Tab by mouth nightly., Disp: 30 Tab, Rfl: 1    amLODIPine (NORVASC) 10 mg tablet, Take 1 Tab by mouth daily. , Disp: 30 Tab, Rfl: 11   Date Last Reviewed:  3/28/18   Number of Personal Factors/Comorbidities that affect the Plan of Care: 3+: HIGH COMPLEXITY   EXAMINATION:   ROM:          Within functional limits  Strength:          Grossly 4-/5 x 4 extremities  Functional Mobility:         Gait/Ambulation:  Uses walker, unsteady        Transfers:   Moderate dificulty  Balance: Decreased in standing and gait   Body Structures Involved:  1. Muscles Body Functions Affected:  1. Sensory/Pain  2. Neuromusculoskeletal  3. Movement Related Activities and Participation Affected:  1. General Tasks and Demands  2. Communication  3. Mobility   Number of elements (examined above) that affect the Plan of Care: 4+: HIGH COMPLEXITY   CLINICAL PRESENTATION:   Presentation: Stable and uncomplicated: LOW COMPLEXITY   CLINICAL DECISION MAKING:   Outcome Measure: Tool Used: 6-MINUTE WALK TEST  Unable to test  Score:  Initial:   feet Most Recent: X feet (Date: -- )   Interpretation of Score: Normal range varies but is approximately 3503-6718 Feet      Distance walked:   feet               Baseline End of Test   Heart Rate      Dyspnea (Mehul Scale)     Fatigue (Mehul Scale)     SpO2     BP       Score 2133 [id number] 6949-831 [phone] 15-0   Modifier CH CI CJ CK CL CM CN         Tool Used: Disabilities of the Arm, Shoulder and Hand (DASH) Questionnaire - Quick Version  Score:  Initial: 41/55  Most Recent: X/55 (Date: -- )   Interpretation of Score: The DASH is designed to measure the activities of daily living in person's with upper extremity dysfunction or pain. Each section is scored on a 1-5 scale, 5 representing the greatest disability. The scores of each section are added together for a total score of 55. Score 11 12-19 20-28 29-37 38-45 46-54 55   Modifier CH CI CJ CK CL CM CN         Tool Used: TINETTI  Score:  Initial:   Gait: 6/12  Balance: 10/16  TOTAL: 16/28 Most Recent:  Gait: /12  Balance: /16  TOTAL: /28   Interpretation of Score: The maximum score for the gait component is 12 points. The maximum score for the balance component is 16 points. The maximum total score is 28 points. In general, patients who score below 19 are at a high risk for falls. Patients who score in the range of 19-24 indicate that the patient has a risk for falls.   Score 28 27-23 22-18 17-12 11-6 5-1 0 Modifier CH CI CJ CK CL CM CN         Tool Used: Timed Up and Go (TUG)  Score:  Initial: 25 seconds Most Recent: X seconds (Date: -- )   Interpretation of Score: The test measures, in seconds, the time taken by an individual to stand up from a standard arm chair (seat height 46 cm [18 in], arm height 65 cm [25.6 in]), walk a distance of 3 meters (118 in, approx 10 ft), turn, walk back to the chair and sit down. If the individual takes longer than 14 seconds to complete TUG, this indicates risk for falls. Score 7 7.5-10.5 11-14 14.5-17.5 18-21 21.5-24.5 25+   Modifier CH CI CJ CK CL CM CN        Tool Used: ECOG Performance Survey Score  Score:  Initial: 3 Most Recent:      Interpretation of Score:   0 Fully active, able to carry on all pre-disease performance without restriction   1 Restricted in physically strenuous activity but ambulatory and able to carry out work of a light or sedentary nature, e.g., light house work, office work   2 Ambulatory and capable of all selfcare but unable to carry out any work activities. Up and about more than 50% of waking hours   3 Capable of only limited selfcare, confined to bed or chair more than 50% of waking hours   4 Completely disabled. Cannot carry on any selfcare. Totally confined to bed or chair   5 Dead        Medical Necessity:   · Patient is expected to demonstrate progress in strength, balance and gait to increase independence with household activity. Reason for Services/Other Comments:  · Patient continues to demonstrate capacity to improve strength, balance and gait which will increase independence and decrease amount of assistance required from caregiver.    Use of outcome tool(s) and clinical judgement create a POC that gives a: Questionable prediction of patient's progress: MODERATE COMPLEXITY            TREATMENT:   (In addition to Assessment/Re-Assessment sessions the following treatments were rendered)  Pre-treatment Symptoms/Complaints:  Fatigue 8/10, fatigue 9-10/10 after. weakness, decreased balance, altered gait. The patient reports she has a broken blood vessel in her right eye. Pain: Initial:     8/10 in the whole body with legs being worse Post Session:  8/10   O2 96 HR 64  /63    Long arc quads x 10 reps with 5 count hold with 1#, hip flexion x 10 reps with 1#  Standing up on toes, hip flexion, hip abduction, hip extension, hamstring curls x 10 reps with 1#  Bicep curls with 3# x 10 reps  Bilateral upper extremity with 2# x 10 reps  Scapular retraction with green theraband/ push forward x 10 reps  Sitting hamstring curls with green theraband x 10 reps  Sitting hip abduction with green theraband  Sitting hip adduction with inflated ball x 10 reps with 5 count hold  Sit to stand from elevated surface x 10 reps  Bilateral upper extremity with 2# overhead press x 10 reps  Walk O2 94 HR 62  Supine bridging x 10 reps  Supine SLR x 10 reps  Short arc quads x 10 reps with 5 count hold  crunches in supine and wall push ups x 10 reps  as above. Given HEP:  Walk 1-2 minutes each waking hour when supervised, sit to stand from bedside, sit in chair instead of the bed each day. Added sit to stand, long arc quads and standing exercises at the sink daily. Bilateral upper extremity with light weight every other day. Given written instructions. Given red theraband for home use. [employer] Portal  Treatment/Session Assessment:    · Response to Treatment:  Tolerated the treatment fair. Significant fatigue continues. Strength slowly improving. · Compliance with Program/Exercises: Will assess as treatment progresses. · Recommendations/Intent for next treatment session: \"Next visit will focus on advancements to more challenging activities\".   Total Treatment Duration: 47 min  PT Patient Time In/Time Out  Time In: 0842  Time Out: 0929    Jodie Neal PT

## 2018-04-18 NOTE — PROGRESS NOTES
Austin Emily  : 1950  Primary: [address] [employer] [employer]%  Secondary:  [address]  at [address]  [address], [address], [place]  [phone]   [phone]          OUTPATIENT PHYSICAL THERAPY:Daily Note 2018    ICD-10: Treatment Diagnosis: muscle weakness generalized M 62.81  Precautions/Allergies:   Review of patient's allergies indicates no known allergies. Fall Risk Score: 9 (? 5 = High Risk)  MD Orders: oncology rehab MEDICAL/REFERRING DIAGNOSIS:  Other fatigue [R53.83]    DATE OF ONSET:   REFERRING PHYSICIAN: Cherie Galvan MD  RETURN PHYSICIAN APPOINTMENT: 4/10/18     INITIAL ASSESSMENT:  Ms. Mary Alba presents following treatment for metastatic breast cancer since . She has developed significant fatigue and weakness. She spends most of the day in bed. She has had a fall in the recent past.  She uses a rolling walker. She could possibly benefit from home health, but is willing to attend therapy as outpatient. She will benefit from therapeutic exercises in order to increase strength and overall conditioning. PROBLEM LIST (Impacting functional limitations):  1. Decreased Strength  2. Decreased ADL/Functional Activities  3. Decreased Transfer Abilities  4. Decreased Balance  5. Increased Pain  6. Decreased Activity Tolerance  7. Increased Fatigue  8. Decreased [place] with Home Exercise Program INTERVENTIONS PLANNED:  1. Home Exercise Program (HEP)  2. Therapeutic Exercise/Strengthening   TREATMENT PLAN:  Effective Dates: 3/28/2018 TO 2018 (90 days). Frequency/Duration: 2 time a week for 90 Days  GOALS: (Goals have been discussed and agreed upon with patient.)  Short-Term Functional Goals: Time Frame: 4 weeks  1. The patient will be independent with HEP for strength within 4 weeks. 2. The patient will report a fatigue of 7 or less within 4 weeks. 3. The patient will gain 1/2 grade strength within 4 weeks.   4. The patient will improve her TUG score by 2 seconds within 4 weeks. 5. The patient will improve her Tinetti score by 5 within 4 weeks. Discharge Goals: Time Frame: 8 weeks  1. The patient will report a fatigue level of 5 or less within 8 weeks. 2. The patient will improve her TUG score by 5 seconds within 8 weeks. 3. The patient will participate in a 6 minute walk within 8 weeks. Rehabilitation Potential For Stated Goals: [address] therapy, I certify that the treatment plan above will be carried out by a therapist or under their direction. Thank you for this referral,  June Ayers, PT     Referring Physician Signature: Sita Gonzales MD              Date                    The information in this section was collected on 3/28/18 (except where otherwise noted). HISTORY:   History of Present Injury/Illness (Reason for Referral):  Metastatic breast cancer, chemo, fatigue, weakness  Past Medical History/Comorbidities:   Ms. Abner Blue  has a past medical history of Acute on chronic diastolic congestive heart failure ([address] (2016); Anemia of chronic disease (2017); Aortic valve stenosis; Arthritis; Asthma; Cancer ([address] Chemotherapy-induced neutropenia (HCC) (2016); CKD (chronic kidney disease) stage 4, GFR 15-29 ml/min ([address] Depression; Diastolic CHF ([address] Former cigarette smoker; HCAP (healthcare-associated pneumonia) (2013); History of DVT (deep vein thrombosis); Hypertension; Long term current use of anticoagulant; Oxygen dependent; Port catheter in place; Sepsis ([address] (2016); SOB (shortness of breath) on exertion; Type 2 diabetes mellitus ([address] Unspecified adverse effect of anesthesia; and Vitamin B12 deficiency (2017). Ms. Abner Blue  has a past surgical history that includes hx  section; hx cyst removal; hx urological; hx vascular access (2012); and colonoscopy (N/A, 3/19/2018).    Past Medical History:   Diagnosis Date    Acute on chronic diastolic congestive heart failure ([facility] 1/5/2016    Anemia of chronic disease 11/13/2017    Aortic valve stenosis     Arthritis     roverto hands    Asthma     till age 31    Cancer ([facility]     roverto. breast ca mets bone/lung    Chemotherapy-induced neutropenia (HCC) 12/20/2016    CKD (chronic kidney disease) stage 4, GFR 15-29 ml/min (HCC)     Depression     managed with meds    Diastolic CHF ([facility]     Followed by Donte Kerns    Former cigarette smoker     HCAP (healthcare-associated pneumonia) 7/17/2013    History of DVT (deep vein thrombosis)     Left leg DVT. on coumadin    Hypertension     managed with meds    Long term current use of anticoagulant     Coumadin    Oxygen dependent     at bedtime 2L-3L NC    Port catheter in place     Left chest port    Sepsis ([facility] 7/19/2016    SOB (shortness of breath) on exertion     Type 2 diabetes mellitus (HCC)     PRN oral agent/AVG BS: 170-200/ s.s of hypoglycemia at 79    Unspecified adverse effect of anesthesia     In [place] 28 yrs ago after second c -section-she was told her heart stopped d/t anesthesia-hospitalized for 5 days afterwards and followed by cardiologist    Vitamin B12 deficiency 11/13/2017     Past Surgical History:   Procedure Laterality Date    COLONOSCOPY N/A 3/19/2018    COLONOSCOPY  BMI 21   performed by Masood Wu MD at [facility] ENDOSCOPY    HX [address]    HX CYST REMOVAL      HX UROLOGICAL      stent placed    HX VASCULAR ACCESS  01/26/2012    Left chest port       Social History/Living Environment:     lives with family  Prior Level of Function/Work/Activity:  independent  Dominant Side:         RIGHT  Current Medications:       Current Outpatient Prescriptions:     fentaNYL (DURAGESIC) 50 mcg/hr PATCH, 1 Patch by TransDERmal route every seventy-two (72) hours. Max Daily Amount: 1 Patch.  Indications: Chronic Pain with Opioid Tolerance, Disp: 10 Patch, Rfl: 0    HYDROcodone-acetaminophen (NORCO)  mg tablet, Take 1 Tab by mouth every six (6) hours as needed for Pain. Max Daily Amount: 4 Tabs., Disp: 90 Tab, Rfl: 0    GLIPIZIDE PO, Take  by mouth as needed. Pt only takes if BS > 200, Disp: , Rfl:     LORazepam (ATIVAN) 1 mg tablet, Take 1 Tab by mouth every six (6) hours as needed for Anxiety. Max Daily Amount: 4 mg., Disp: 60 Tab, Rfl: 0    palbociclib 125 mg cap, Take 1 Cap by mouth daily. Take one pill once a day for 3 weeks and then off for one week, Disp: 21 Cap, Rfl: 5    potassium chloride (K-DUR, KLOR-CON) 20 mEq tablet, Take 1 Tab by mouth two (2) times a day., Disp: 60 Tab, Rfl: 0    hydrALAZINE (APRESOLINE) 50 mg tablet, Take 1 Tab by mouth three (3) times daily. (Patient taking differently: Take 50 mg by mouth two (2) times a day.), Disp: 90 Tab, Rfl: 1    montelukast (SINGULAIR) 10 mg tablet, Take 1 Tab by mouth nightly., Disp: 30 Tab, Rfl: 11    furosemide (LASIX) 40 mg tablet, Take 1 Tab by mouth daily. (Patient taking differently: Take 40 mg by mouth every other day. Monday,Wednesday,Friday,Sunday), Disp: 30 Tab, Rfl: 11    albuterol-ipratropium (DUO-NEB) 2.5 mg-0.5 mg/3 ml nebu, 3 mL by Nebulization route every six (6) hours as needed. , Disp: 360 Nebule, Rfl: 11    carvedilol (COREG) 3.125 mg tablet, Take 1 Tab by mouth two (2) times daily (with meals). , Disp: 60 Tab, Rfl: 11    warfarin (COUMADIN) 1 mg tablet, Take 1.5 Tabs by mouth every evening., Disp: 45 Tab, Rfl: 0    mirtazapine (REMERON) 30 mg tablet, Take 1 Tab by mouth nightly., Disp: 30 Tab, Rfl: 1    amLODIPine (NORVASC) 10 mg tablet, Take 1 Tab by mouth daily. , Disp: 30 Tab, Rfl: 11   Date Last Reviewed:  3/28/18   Number of Personal Factors/Comorbidities that affect the Plan of Care: 3+: HIGH COMPLEXITY   EXAMINATION:   ROM:          Within functional limits  Strength:          Grossly 4-/5 x 4 extremities  Functional Mobility:         Gait/Ambulation:  Uses walker, unsteady        Transfers:   Moderate dificulty  Balance: Decreased in standing and gait   Body Structures Involved:  1. Muscles Body Functions Affected:  1. Sensory/Pain  2. Neuromusculoskeletal  3. Movement Related Activities and Participation Affected:  1. General Tasks and Demands  2. Communication  3. Mobility   Number of elements (examined above) that affect the Plan of Care: 4+: HIGH COMPLEXITY   CLINICAL PRESENTATION:   Presentation: Stable and uncomplicated: LOW COMPLEXITY   CLINICAL DECISION MAKING:   Outcome Measure: Tool Used: 6-MINUTE WALK TEST  Unable to test  Score:  Initial:   feet Most Recent: X feet (Date: -- )   Interpretation of Score: Normal range varies but is approximately 8591-7337 Feet      Distance walked:   feet               Baseline End of Test   Heart Rate      Dyspnea (Mehul Scale)     Fatigue (Mehul Scale)     SpO2     BP       Score 2133 [id number] 0516-021 [phone] 15-0   Modifier CH CI CJ CK CL CM CN         Tool Used: Disabilities of the Arm, Shoulder and Hand (DASH) Questionnaire - Quick Version  Score:  Initial: 41/55  Most Recent: X/55 (Date: -- )   Interpretation of Score: The DASH is designed to measure the activities of daily living in person's with upper extremity dysfunction or pain. Each section is scored on a 1-5 scale, 5 representing the greatest disability. The scores of each section are added together for a total score of 55. Score 11 12-19 20-28 29-37 38-45 46-54 55   Modifier CH CI CJ CK CL CM CN         Tool Used: TINETTI  Score:  Initial:   Gait: 6/12  Balance: 10/16  TOTAL: 16/28 Most Recent:  Gait: /12  Balance: /16  TOTAL: /28   Interpretation of Score: The maximum score for the gait component is 12 points. The maximum score for the balance component is 16 points. The maximum total score is 28 points. In general, patients who score below 19 are at a high risk for falls. Patients who score in the range of 19-24 indicate that the patient has a risk for falls.   Score 28 27-23 22-18 17-12 11-6 5-1 0 Modifier CH CI CJ CK CL CM CN         Tool Used: Timed Up and Go (TUG)  Score:  Initial: 25 seconds Most Recent: X seconds (Date: -- )   Interpretation of Score: The test measures, in seconds, the time taken by an individual to stand up from a standard arm chair (seat height 46 cm [18 in], arm height 65 cm [25.6 in]), walk a distance of 3 meters (118 in, approx 10 ft), turn, walk back to the chair and sit down. If the individual takes longer than 14 seconds to complete TUG, this indicates risk for falls. Score 7 7.5-10.5 11-14 14.5-17.5 18-21 21.5-24.5 25+   Modifier CH CI CJ CK CL CM CN        Tool Used: ECOG Performance Survey Score  Score:  Initial: 3 Most Recent:      Interpretation of Score:   0 Fully active, able to carry on all pre-disease performance without restriction   1 Restricted in physically strenuous activity but ambulatory and able to carry out work of a light or sedentary nature, e.g., light house work, office work   2 Ambulatory and capable of all selfcare but unable to carry out any work activities. Up and about more than 50% of waking hours   3 Capable of only limited selfcare, confined to bed or chair more than 50% of waking hours   4 Completely disabled. Cannot carry on any selfcare. Totally confined to bed or chair   5 Dead        Medical Necessity:   · Patient is expected to demonstrate progress in strength, balance and gait to increase independence with household activity. Reason for Services/Other Comments:  · Patient continues to demonstrate capacity to improve strength, balance and gait which will increase independence and decrease amount of assistance required from caregiver.    Use of outcome tool(s) and clinical judgement create a POC that gives a: Questionable prediction of patient's progress: MODERATE COMPLEXITY            TREATMENT:   (In addition to Assessment/Re-Assessment sessions the following treatments were rendered)  Pre-treatment Symptoms/Complaints:  Fatigue 9/10, fatigue 9-10/10 after. weakness, decreased balance, altered gait. The patient reports she is having to focus to make the right leg work today. Pain: Initial:     0/10 Post Session:  0/10   O2 95 HR 60  /52  During the visit, the therapist noted upper right thigh edema, pitting in the lower calf , ankle region noted. No pain per the patient. Triage nurse was notified of findings. The nurse arranged for the patient to have an ultrasound today. Long arc quads x 10 reps with 5 count hold with 1#, hip flexion x 10 reps with 1#  Standing up on toes, hip flexion, hip abduction, hip extension, hamstring curls x 10 reps with 1#  Bicep curls with 3# x 10 reps  Bilateral upper extremity with 2# x 10 reps  Scapular retraction with green theraband/ push forward x 10 reps  Sitting hamstring curls with green theraband x 10 reps  Sitting hip abduction with green theraband    Sitting hip adduction with inflated ball x 10 reps with 5 count hold not performed today. Sit to stand from elevated surface x 10 reps  Bilateral upper extremity with 3# overhead press x 10 reps  Below not performed:  Supine bridging x 10 reps  Supine SLR x 10 reps  Short arc quads x 10 reps with 5 count hold  crunches in supine and wall push ups x 10 reps  as above. Given HEP:  Walk 1-2 minutes each waking hour when supervised, sit to stand from bedside, sit in chair instead of the bed each day. Added sit to stand, long arc quads and standing exercises at the sink daily. Bilateral upper extremity with light weight every other day. Given written instructions. Given red theraband for home use. [employer]  Treatment/Session Assessment:    · Response to Treatment:  Tolerated the treatment fair. Edema of the right thigh noted. Function impaired in the right lower extremity. · Compliance with Program/Exercises: Will assess as treatment progresses. · Recommendations/Intent for next treatment session:  \"Next visit will focus on advancements to more challenging activities\". Hold therapy until results of testing.   Total Treatment Duration: 54 min  PT Patient Time In/Time Out  Time In: 0849  Time Out: [address], PT

## 2018-04-23 NOTE — PROGRESS NOTES
Farhad March  : 1950  Primary: Natalie Rosen Fap 100%  Secondary:  [address]  at [facility]  [address], [address], [place]  [phone]   [phone]          OUTPATIENT PHYSICAL THERAPY:Daily Note 2018    ICD-10: Treatment Diagnosis: muscle weakness generalized M 62.81  Precautions/Allergies:   Review of patient's allergies indicates no known allergies. Fall Risk Score: 9 (? 5 = High Risk)  MD Orders: oncology rehab MEDICAL/REFERRING DIAGNOSIS:  Other fatigue [R53.83]    DATE OF ONSET:   REFERRING PHYSICIAN: Miryam Russo MD  RETURN PHYSICIAN APPOINTMENT: 4/10/18     INITIAL ASSESSMENT:  Ms. Tere Blanc presents following treatment for metastatic breast cancer since . She has developed significant fatigue and weakness. She spends most of the day in bed. She has had a fall in the recent past.  She uses a rolling walker. She could possibly benefit from home health, but is willing to attend therapy as outpatient. She will benefit from therapeutic exercises in order to increase strength and overall conditioning. PROBLEM LIST (Impacting functional limitations):  1. Decreased Strength  2. Decreased ADL/Functional Activities  3. Decreased Transfer Abilities  4. Decreased Balance  5. Increased Pain  6. Decreased Activity Tolerance  7. Increased Fatigue  8. Decreased [place] with Home Exercise Program INTERVENTIONS PLANNED:  1. Home Exercise Program (HEP)  2. Therapeutic Exercise/Strengthening   TREATMENT PLAN:  Effective Dates: 3/28/2018 TO 2018 (90 days). Frequency/Duration: 2 time a week for 90 Days  GOALS: (Goals have been discussed and agreed upon with patient.)  Short-Term Functional Goals: Time Frame: 4 weeks  1. The patient will be independent with HEP for strength within 4 weeks. 2. The patient will report a fatigue of 7 or less within 4 weeks. 3. The patient will gain 1/2 grade strength within 4 weeks.   4. The patient will improve her TUG score by 2 seconds within 4 weeks. 5. The patient will improve her Tinetti score by 5 within 4 weeks. Discharge Goals: Time Frame: 8 weeks  1. The patient will report a fatigue level of 5 or less within 8 weeks. 2. The patient will improve her TUG score by 5 seconds within 8 weeks. 3. The patient will participate in a 6 minute walk within 8 weeks. Rehabilitation Potential For Stated Goals: [address] therapy, I certify that the treatment plan above will be carried out by a therapist or under their direction. Thank you for this referral,  John Jones PT     Referring Physician Signature: Jude Forman MD              Date                    The information in this section was collected on 3/28/18 (except where otherwise noted). HISTORY:   History of Present Injury/Illness (Reason for Referral):  Metastatic breast cancer, chemo, fatigue, weakness  Past Medical History/Comorbidities:   Ms. Alec Glez  has a past medical history of Acute on chronic diastolic congestive heart failure ([address] (2016); Anemia of chronic disease (2017); Aortic valve stenosis; Arthritis; Asthma; Cancer ([address] Chemotherapy-induced neutropenia (HCC) (2016); CKD (chronic kidney disease) stage 4, GFR 15-29 ml/min ([address] Depression; Diastolic CHF ([address] Former cigarette smoker; HCAP (healthcare-associated pneumonia) (2013); History of DVT (deep vein thrombosis); Hypertension; Long term current use of anticoagulant; Oxygen dependent; Port catheter in place; Sepsis ([address] (2016); SOB (shortness of breath) on exertion; Type 2 diabetes mellitus ([address] Unspecified adverse effect of anesthesia; and Vitamin B12 deficiency (2017). Ms. Alec Glez  has a past surgical history that includes hx  section; hx cyst removal; hx urological; hx vascular access (2012); and colonoscopy (N/A, 3/19/2018).    Past Medical History:   Diagnosis Date    Acute on chronic diastolic congestive heart failure ([facility] 1/5/2016    Anemia of chronic disease 11/13/2017    Aortic valve stenosis     Arthritis     roverto hands    Asthma     till age 31    Cancer ([facility]     roverto. breast ca mets bone/lung    Chemotherapy-induced neutropenia (HCC) 12/20/2016    CKD (chronic kidney disease) stage 4, GFR 15-29 ml/min (HCC)     Depression     managed with meds    Diastolic CHF ([facility]     Followed by Donte Kerns    Former cigarette smoker     HCAP (healthcare-associated pneumonia) 7/17/2013    History of DVT (deep vein thrombosis)     Left leg DVT. on coumadin    Hypertension     managed with meds    Long term current use of anticoagulant     Coumadin    Oxygen dependent     at bedtime 2L-3L NC    Port catheter in place     Left chest port    Sepsis ([facility] 7/19/2016    SOB (shortness of breath) on exertion     Type 2 diabetes mellitus (HCC)     PRN oral agent/AVG BS: 170-200/ s.s of hypoglycemia at 79    Unspecified adverse effect of anesthesia     In [place] 28 yrs ago after second c -section-she was told her heart stopped d/t anesthesia-hospitalized for 5 days afterwards and followed by cardiologist    Vitamin B12 deficiency 11/13/2017     Past Surgical History:   Procedure Laterality Date    COLONOSCOPY N/A 3/19/2018    COLONOSCOPY  BMI 21   performed by [facility]antar [facility] at [facility] ENDOSCOPY    HX [address]    HX CYST REMOVAL      HX UROLOGICAL      stent placed    HX VASCULAR ACCESS  01/26/2012    Left chest port       Social History/Living Environment:     lives with family  Prior Level of Function/Work/Activity:  independent  Dominant Side:         RIGHT  Current Medications:       Current Outpatient Prescriptions:     fentaNYL (DURAGESIC) 50 mcg/hr PATCH, 1 Patch by TransDERmal route every seventy-two (72) hours. Max Daily Amount: 1 Patch.  Indications: Chronic Pain with Opioid Tolerance, Disp: 10 Patch, Rfl: 0    HYDROcodone-acetaminophen (NORCO)  mg tablet, Take 1 Tab by mouth every six (6) hours as needed for Pain. Max Daily Amount: 4 Tabs., Disp: 90 Tab, Rfl: 0    GLIPIZIDE PO, Take  by mouth as needed. Pt only takes if BS > 200, Disp: , Rfl:     LORazepam (ATIVAN) 1 mg tablet, Take 1 Tab by mouth every six (6) hours as needed for Anxiety. Max Daily Amount: 4 mg., Disp: 60 Tab, Rfl: 0    palbociclib 125 mg cap, Take 1 Cap by mouth daily. Take one pill once a day for 3 weeks and then off for one week, Disp: 21 Cap, Rfl: 5    potassium chloride (K-DUR, KLOR-CON) 20 mEq tablet, Take 1 Tab by mouth two (2) times a day., Disp: 60 Tab, Rfl: 0    hydrALAZINE (APRESOLINE) 50 mg tablet, Take 1 Tab by mouth three (3) times daily. (Patient taking differently: Take 50 mg by mouth two (2) times a day.), Disp: 90 Tab, Rfl: 1    montelukast (SINGULAIR) 10 mg tablet, Take 1 Tab by mouth nightly., Disp: 30 Tab, Rfl: 11    furosemide (LASIX) 40 mg tablet, Take 1 Tab by mouth daily. (Patient taking differently: Take 40 mg by mouth every other day. Monday,Wednesday,Friday,Sunday), Disp: 30 Tab, Rfl: 11    albuterol-ipratropium (DUO-NEB) 2.5 mg-0.5 mg/3 ml nebu, 3 mL by Nebulization route every six (6) hours as needed. , Disp: 360 Nebule, Rfl: 11    carvedilol (COREG) 3.125 mg tablet, Take 1 Tab by mouth two (2) times daily (with meals). , Disp: 60 Tab, Rfl: 11    warfarin (COUMADIN) 1 mg tablet, Take 1.5 Tabs by mouth every evening., Disp: 45 Tab, Rfl: 0    mirtazapine (REMERON) 30 mg tablet, Take 1 Tab by mouth nightly., Disp: 30 Tab, Rfl: 1    amLODIPine (NORVASC) 10 mg tablet, Take 1 Tab by mouth daily. , Disp: 30 Tab, Rfl: 11   Date Last Reviewed:  3/28/18   Number of Personal Factors/Comorbidities that affect the Plan of Care: 3+: HIGH COMPLEXITY   EXAMINATION:   ROM:          Within functional limits  Strength:          Grossly 4-/5 x 4 extremities  Functional Mobility:         Gait/Ambulation:  Uses walker, unsteady        Transfers:   Moderate dificulty  Balance: Decreased in standing and gait   Body Structures Involved:  1. Muscles Body Functions Affected:  1. Sensory/Pain  2. Neuromusculoskeletal  3. Movement Related Activities and Participation Affected:  1. General Tasks and Demands  2. Communication  3. Mobility   Number of elements (examined above) that affect the Plan of Care: 4+: HIGH COMPLEXITY   CLINICAL PRESENTATION:   Presentation: Stable and uncomplicated: LOW COMPLEXITY   CLINICAL DECISION MAKING:   Outcome Measure: Tool Used: 6-MINUTE WALK TEST  Unable to test  Score:  Initial:   feet Most Recent: X feet (Date: -- )   Interpretation of Score: Normal range varies but is approximately 5820-3467 Feet      Distance walked:   feet               Baseline End of Test   Heart Rate      Dyspnea (Mehul Scale)     Fatigue (Mehul Scale)     SpO2     BP       Score [phone] 15-0   Modifier CH CI CJ CK CL CM CN         Tool Used: Disabilities of the Arm, Shoulder and Hand (DASH) Questionnaire - Quick Version  Score:  Initial: 41/55  Most Recent: X/55 (Date: -- )   Interpretation of Score: The DASH is designed to measure the activities of daily living in person's with upper extremity dysfunction or pain. Each section is scored on a 1-5 scale, 5 representing the greatest disability. The scores of each section are added together for a total score of 55. Score 11 12-19 20-28 29-37 38-45 46-54 55   Modifier CH CI CJ CK CL CM CN         Tool Used: TINETTI  Score:  Initial:   Gait: 6/12  Balance: 10/16  TOTAL: 16/28 Most Recent:  Gait: /12  Balance: /16  TOTAL: /28   Interpretation of Score: The maximum score for the gait component is 12 points. The maximum score for the balance component is 16 points. The maximum total score is 28 points. In general, patients who score below 19 are at a high risk for falls. Patients who score in the range of 19-24 indicate that the patient has a risk for falls.   Score 28 27-23 22-18 17-12 11-6 5-1 0 Modifier CH CI CJ CK CL CM CN         Tool Used: Timed Up and Go (TUG)  Score:  Initial: 25 seconds Most Recent: X seconds (Date: -- )   Interpretation of Score: The test measures, in seconds, the time taken by an individual to stand up from a standard arm chair (seat height 46 cm [18 in], arm height 65 cm [25.6 in]), walk a distance of 3 meters (118 in, approx 10 ft), turn, walk back to the chair and sit down. If the individual takes longer than 14 seconds to complete TUG, this indicates risk for falls. Score 7 7.5-10.5 11-14 14.5-17.5 18-21 21.5-24.5 25+   Modifier CH CI CJ CK CL CM CN        Tool Used: ECOG Performance Survey Score  Score:  Initial: 3 Most Recent:      Interpretation of Score:   0 Fully active, able to carry on all pre-disease performance without restriction   1 Restricted in physically strenuous activity but ambulatory and able to carry out work of a light or sedentary nature, e.g., light house work, office work   2 Ambulatory and capable of all selfcare but unable to carry out any work activities. Up and about more than 50% of waking hours   3 Capable of only limited selfcare, confined to bed or chair more than 50% of waking hours   4 Completely disabled. Cannot carry on any selfcare. Totally confined to bed or chair   5 Dead        Medical Necessity:   · Patient is expected to demonstrate progress in strength, balance and gait to increase independence with household activity. Reason for Services/Other Comments:  · Patient continues to demonstrate capacity to improve strength, balance and gait which will increase independence and decrease amount of assistance required from caregiver.    Use of outcome tool(s) and clinical judgement create a POC that gives a: Questionable prediction of patient's progress: MODERATE COMPLEXITY            TREATMENT:   (In addition to Assessment/Re-Assessment sessions the following treatments were rendered)  Pre-treatment Symptoms/Complaints:  Fatigue 9/10, fatigue 10/10 after. weakness, decreased balance, altered gait. The patient reports she is tired today. Pain: Initial:     0/10 Post Session:  0/10   O2 93 HR 64  /57    Add increase weights next visit. .  Try Nustep and UBE next visit  Long arc quads x 10 reps with 5 count hold with 1#, hip flexion x 10 reps with 1#  Standing up on toes, hip flexion, hip abduction, hip extension, hamstring curls x 10 reps with 1#  Bicep curls with 3# x 10 reps  Bilateral upper extremity with 2# x 10 reps  Scapular retraction with green theraband/ push forward x 10 reps  Sitting hamstring curls with green theraband x 10 reps  Sitting hip abduction with green theraband    Sitting hip adduction with inflated ball x 10 reps with 5 count hold   Sit to stand from elevated surface x 10 reps  Bilateral upper extremity with 3# overhead press x 10 reps  Supine bridging x 10 reps  Supine SLR x 10 reps  Short arc quads x 10 reps with 5 count hold  crunches in supine and wall push ups x 10 reps  Added wall slides x 5 reps  as above. Given HEP:  Walk 1-2 minutes each waking hour when supervised, sit to stand from bedside, sit in chair instead of the bed each day. Added sit to stand, long arc quads and standing exercises at the sink daily. Bilateral upper extremity with light weight every other day. Given written instructions. Given red theraband for home use. [employer] Portal  Treatment/Session Assessment:    · Response to Treatment:  Tolerated the treatment fair. · Compliance with Program/Exercises: Will assess as treatment progresses. · Recommendations/Intent for next treatment session: \"Next visit will focus on advancements to more challenging activities\".     Total Treatment Duration: 45 min  PT Patient Time In/Time Out  Time In: 0823  Time Out: 0908    Jovanna Porter, PT

## 2018-04-24 NOTE — PROGRESS NOTES
Arrived to the [facility]. Faslodex, Procrit, and Vitamin B12 completed. Patient tolerated well. Any issues or concerns during appointment: none. Patient aware of next infusion appointment on 5/8 (date) at 8:15 AM (time). Discharged via w/c accompanied by spouse.

## 2018-04-30 NOTE — PROGRESS NOTES
Austin So  : 1950  Primary: [address] [employer] 100%  Secondary:  [address]  at [facility]  [address], [address], [place]  [phone]   [phone]          OUTPATIENT PHYSICAL THERAPY:Daily Note 2018    ICD-10: Treatment Diagnosis: muscle weakness generalized M 62.81  Precautions/Allergies:   Review of patient's allergies indicates no known allergies. Fall Risk Score: 9 (? 5 = High Risk)  MD Orders: oncology rehab MEDICAL/REFERRING DIAGNOSIS:  Other fatigue [R53.83]    DATE OF ONSET:   REFERRING PHYSICIAN: Cherie Galvan MD  RETURN PHYSICIAN APPOINTMENT: 4/10/18     INITIAL ASSESSMENT:  Ms. Mary Alba presents following treatment for metastatic breast cancer since . She has developed significant fatigue and weakness. She spends most of the day in bed. She has had a fall in the recent past.  She uses a rolling walker. She could possibly benefit from home health, but is willing to attend therapy as outpatient. She will benefit from therapeutic exercises in order to increase strength and overall conditioning. PROBLEM LIST (Impacting functional limitations):  1. Decreased Strength  2. Decreased ADL/Functional Activities  3. Decreased Transfer Abilities  4. Decreased Balance  5. Increased Pain  6. Decreased Activity Tolerance  7. Increased Fatigue  8. Decreased [place] with Home Exercise Program INTERVENTIONS PLANNED:  1. Home Exercise Program (HEP)  2. Therapeutic Exercise/Strengthening   TREATMENT PLAN:  Effective Dates: 3/28/2018 TO 2018 (90 days). Frequency/Duration: 2 time a week for 90 Days  GOALS: (Goals have been discussed and agreed upon with patient.)  Short-Term Functional Goals: Time Frame: 4 weeks  1. The patient will be independent with HEP for strength within 4 weeks. 2. The patient will report a fatigue of 7 or less within 4 weeks. 3. The patient will gain 1/2 grade strength within 4 weeks.   4. The patient will improve her TUG score by 2 seconds within 4 weeks. 5. The patient will improve her Tinetti score by 5 within 4 weeks. Discharge Goals: Time Frame: 8 weeks  1. The patient will report a fatigue level of 5 or less within 8 weeks. 2. The patient will improve her TUG score by 5 seconds within 8 weeks. 3. The patient will participate in a 6 minute walk within 8 weeks. Rehabilitation Potential For Stated Goals: [address] therapy, I certify that the treatment plan above will be carried out by a therapist or under their direction. Thank you for this referral,  Phyllis Talavera PT     Referring Physician Signature: Irene Jerez MD              Date                    The information in this section was collected on 3/28/18 (except where otherwise noted). HISTORY:   History of Present Injury/Illness (Reason for Referral):  Metastatic breast cancer, chemo, fatigue, weakness  Past Medical History/Comorbidities:   Ms. Emma Dominguez  has a past medical history of Acute on chronic diastolic congestive heart failure ([address] (2016); Anemia of chronic disease (2017); Aortic valve stenosis; Arthritis; Asthma; Cancer ([address] Chemotherapy-induced neutropenia (HCC) (2016); CKD (chronic kidney disease) stage 4, GFR 15-29 ml/min ([address] Depression; Diastolic CHF ([address] Former cigarette smoker; HCAP (healthcare-associated pneumonia) (2013); History of DVT (deep vein thrombosis); Hypertension; Long term current use of anticoagulant; Oxygen dependent; Port catheter in place; Sepsis ([address] (2016); SOB (shortness of breath) on exertion; Type 2 diabetes mellitus ([address] Unspecified adverse effect of anesthesia; and Vitamin B12 deficiency (2017). Ms. Emma Dominguez  has a past surgical history that includes hx  section; hx cyst removal; hx urological; hx vascular access (2012); and colonoscopy (N/A, 3/19/2018).    Past Medical History:   Diagnosis Date    Acute on chronic diastolic congestive heart failure ([facility] 1/5/2016    Anemia of chronic disease 11/13/2017    Aortic valve stenosis     Arthritis     roverto hands    Asthma     till age 31    Cancer ([facility]     roverto. breast ca mets bone/lung    Chemotherapy-induced neutropenia (HCC) 12/20/2016    CKD (chronic kidney disease) stage 4, GFR 15-29 ml/min ([facility])     Depression     managed with meds    Diastolic CHF ([facility]     Followed by Donte Kerns    Former cigarette smoker     HCAP (healthcare-associated pneumonia) 7/17/2013    History of DVT (deep vein thrombosis)     Left leg DVT. on coumadin    Hypertension     managed with meds    Long term current use of anticoagulant     Coumadin    Oxygen dependent     at bedtime 2L-3L NC    Port catheter in place     Left chest port    Sepsis ([facility] 7/19/2016    SOB (shortness of breath) on exertion     Type 2 diabetes mellitus (HCC)     PRN oral agent/AVG BS: 170-200/ s.s of hypoglycemia at 79    Unspecified adverse effect of anesthesia     In [place] 28 yrs ago after second c -section-she was told her heart stopped d/t anesthesia-hospitalized for 5 days afterwards and followed by cardiologist    Vitamin B12 deficiency 11/13/2017     Past Surgical History:   Procedure Laterality Date    COLONOSCOPY N/A 3/19/2018    COLONOSCOPY  BMI 21   performed [facility]illas [facility] at [facility] ENDOSCOPY     [facility]      X2    HX CYST REMOVAL      HX UROLOGICAL      stent placed    HX VASCULAR ACCESS  01/26/2012    Left chest port       Social History/Living Environment:     lives with family  Prior Level of Function/Work/Activity:  independent  Dominant Side:         RIGHT  Current Medications:       Current Outpatient Prescriptions:     albuterol-ipratropium (DUO-NEB) 2.5 mg-0.5 mg/3 ml nebu, 3 mL by Nebulization route every six (6) hours as needed. , Disp: 360 Nebule, Rfl: 1    fentaNYL (DURAGESIC) 50 mcg/hr PATCH, 1 Patch by TransDERmal route every seventy-two (72) hours.  Max Daily Amount: 1 Patch. Indications: Chronic Pain with Opioid Tolerance, Disp: 10 Patch, Rfl: 0    HYDROcodone-acetaminophen (NORCO)  mg tablet, Take 1 Tab by mouth every six (6) hours as needed for Pain. Max Daily Amount: 4 Tabs., Disp: 90 Tab, Rfl: 0    GLIPIZIDE PO, Take  by mouth as needed. Pt only takes if BS > 200, Disp: , Rfl:     LORazepam (ATIVAN) 1 mg tablet, Take 1 Tab by mouth every six (6) hours as needed for Anxiety. Max Daily Amount: 4 mg., Disp: 60 Tab, Rfl: 0    palbociclib 125 mg cap, Take 1 Cap by mouth daily. Take one pill once a day for 3 weeks and then off for one week, Disp: 21 Cap, Rfl: 5    potassium chloride (K-DUR, KLOR-CON) 20 mEq tablet, Take 1 Tab by mouth two (2) times a day., Disp: 60 Tab, Rfl: 0    hydrALAZINE (APRESOLINE) 50 mg tablet, Take 1 Tab by mouth three (3) times daily. (Patient taking differently: Take 50 mg by mouth two (2) times a day.), Disp: 90 Tab, Rfl: 1    montelukast (SINGULAIR) 10 mg tablet, Take 1 Tab by mouth nightly., Disp: 30 Tab, Rfl: 11    furosemide (LASIX) 40 mg tablet, Take 1 Tab by mouth daily. (Patient taking differently: Take 40 mg by mouth every other day. Monday,Wednesday,Friday,Sunday), Disp: 30 Tab, Rfl: 11    carvedilol (COREG) 3.125 mg tablet, Take 1 Tab by mouth two (2) times daily (with meals). , Disp: 60 Tab, Rfl: 11    warfarin (COUMADIN) 1 mg tablet, Take 1.5 Tabs by mouth every evening., Disp: 45 Tab, Rfl: 0    mirtazapine (REMERON) 30 mg tablet, Take 1 Tab by mouth nightly., Disp: 30 Tab, Rfl: 1    amLODIPine (NORVASC) 10 mg tablet, Take 1 Tab by mouth daily. , Disp: 30 Tab, Rfl: 11  No current facility-administered medications for this encounter.     Date Last Reviewed:  3/28/18   Number of Personal Factors/Comorbidities that affect the Plan of Care: 3+: HIGH COMPLEXITY   EXAMINATION:   ROM:          Within functional limits  Strength:          Grossly 4-/5 x 4 extremities  Functional Mobility:         Gait/Ambulation:  Uses walker, unsteady        Transfers: Moderate dificulty  Balance:          Decreased in standing and gait   Body Structures Involved:  1. Muscles Body Functions Affected:  1. Sensory/Pain  2. Neuromusculoskeletal  3. Movement Related Activities and Participation Affected:  1. General Tasks and Demands  2. Communication  3. Mobility   Number of elements (examined above) that affect the Plan of Care: 4+: HIGH COMPLEXITY   CLINICAL PRESENTATION:   Presentation: Stable and uncomplicated: LOW COMPLEXITY   CLINICAL DECISION MAKING:   Outcome Measure: Tool Used: 6-MINUTE WALK TEST  Unable to test  Score:  Initial:   feet Most Recent: X feet (Date: -- )   Interpretation of Score: Normal range varies but is approximately [phone] Feet      Distance walked:   feet               Baseline End of Test   Heart Rate      Dyspnea (Mehul Scale)     Fatigue (Mehul Scale)     SpO2     BP       Score 2133 [phone] 15-0   Modifier CH CI CJ CK CL CM CN         Tool Used: Disabilities of the Arm, Shoulder and Hand (DASH) Questionnaire - Quick Version  Score:  Initial: 41/55  Most Recent: X/55 (Date: -- )   Interpretation of Score: The DASH is designed to measure the activities of daily living in person's with upper extremity dysfunction or pain. Each section is scored on a 1-5 scale, 5 representing the greatest disability. The scores of each section are added together for a total score of 55. Score 11 12-19 20-28 29-37 38-45 46-54 55   Modifier CH CI CJ CK CL CM CN         Tool Used: TINETTI  Score:  Initial:   Gait: 6/12  Balance: 10/16  TOTAL: 16/28 Most Recent:  Gait: /12  Balance: /16  TOTAL: /28   Interpretation of Score: The maximum score for the gait component is 12 points. The maximum score for the balance component is 16 points. The maximum total score is 28 points. In general, patients who score below 19 are at a high risk for falls.  Patients who score in the range of 19-24 indicate that the patient has a risk for falls. Score 28 27-23 22-18 17-12 11-6 5-1 0   Modifier CH CI CJ CK CL CM CN         Tool Used: Timed Up and Go (TUG)  Score:  Initial: 25 seconds Most Recent: X seconds (Date: -- )   Interpretation of Score: The test measures, in seconds, the time taken by an individual to stand up from a standard arm chair (seat height 46 cm [18 in], arm height 65 cm [25.6 in]), walk a distance of 3 meters (118 in, approx 10 ft), turn, walk back to the chair and sit down. If the individual takes longer than 14 seconds to complete TUG, this indicates risk for falls. Score 7 7.5-10.5 11-14 14.5-17.5 18-21 21.5-24.5 25+   Modifier CH CI CJ CK CL CM CN        Tool Used: ECOG Performance Survey Score  Score:  Initial: 3 Most Recent:      Interpretation of Score:   0 Fully active, able to carry on all pre-disease performance without restriction   1 Restricted in physically strenuous activity but ambulatory and able to carry out work of a light or sedentary nature, e.g., light house work, office work   2 Ambulatory and capable of all selfcare but unable to carry out any work activities. Up and about more than 50% of waking hours   3 Capable of only limited selfcare, confined to bed or chair more than 50% of waking hours   4 Completely disabled. Cannot carry on any selfcare. Totally confined to bed or chair   5 Dead        Medical Necessity:   · Patient is expected to demonstrate progress in strength, balance and gait to increase independence with household activity. Reason for Services/Other Comments:  · Patient continues to demonstrate capacity to improve strength, balance and gait which will increase independence and decrease amount of assistance required from caregiver.    Use of outcome tool(s) and clinical judgement create a POC that gives a: Questionable prediction of patient's progress: MODERATE COMPLEXITY            TREATMENT:   (In addition to Assessment/Re-Assessment sessions the following treatments were rendered)  Pre-treatment Symptoms/Complaints:  Fatigue 9/10, fatigue 10/10 after. weakness, decreased balance, altered gait. The patient reports she is tired today. Pain: Initial:     0/10 Post Session:  0/10   O2 95 HR 58  /59      Long arc quads x 10 reps with 5 count hold with 1 1/2#, hip flexion x 10 reps with 1 1/2#  Standing up on toes, hip flexion, hip abduction, hip extension, hamstring curls x 10 reps with 1 1/2#  Bicep curls with 3# x 10 reps  Bilateral upper extremity with 3# x 10 reps  Scapular retraction with blue theraband/ push forward x 10 reps  Sitting hamstring curls with blue theraband x 10 reps  Sitting hip abduction with blue theraband    Sitting hip adduction with inflated ball x 10 reps with 5 count hold   Sit to stand from elevated surface x 10 reps  Bilateral upper extremity with 3# overhead press x 10 reps  Below not performed today:    Supine bridging x 10 reps  Supine SLR x 10 reps  Short arc quads x 10 reps with 5 count hold  crunches in supine and wall push ups x 10 reps  Added wall slides x 5 reps  as above. Given HEP:  Walk 1-2 minutes each waking hour when supervised, sit to stand from bedside, sit in chair instead of the bed each day. Added sit to stand, long arc quads and standing exercises at the sink daily. Bilateral upper extremity with light weight every other day. Given written instructions. Given red theraband for home use. [employer] Portal  Treatment/Session Assessment:    · Response to Treatment:  Tolerated the treatment fair. · Compliance with Program/Exercises: Will assess as treatment progresses. · Recommendations/Intent for next treatment session: \"Next visit will focus on advancements to more challenging activities\".     Total Treatment Duration: 28 min  Patient late  PT Patient Time In/Time Out  Time In: 0904  Time Out: 0932    Jason Hernandez PT

## 2018-05-02 NOTE — PROGRESS NOTES
Jay Jay Cooley  : 1950  Primary: Presley Lucero Fap 100%  Secondary:  [address]  at [facility]  [facility] [address], [place] [address]  [phone]   [phone]          OUTPATIENT PHYSICAL THERAPY:Daily Note and Progress Report 2018    ICD-10: Treatment Diagnosis: muscle weakness generalized M 62.81  Precautions/Allergies:   Review of patient's allergies indicates no known allergies. Fall Risk Score: 9 (? 5 = High Risk)  MD Orders: oncology rehab MEDICAL/REFERRING DIAGNOSIS:  Other fatigue [R53.83]    DATE OF ONSET:   REFERRING PHYSICIAN: Jan Carroll MD  RETURN PHYSICIAN APPOINTMENT: 18     INITIAL ASSESSMENT:  Ms. Leann Morales presents following treatment for metastatic breast cancer since . She has developed significant fatigue and weakness. She spends most of the day in bed. She has had a fall in the recent past.  She uses a rolling walker. She could possibly benefit from home health, but is willing to attend therapy as outpatient. She will benefit from therapeutic exercises in order to increase strength and overall conditioning. 18: The patient has attended a total of 10 visits. She has been compliant with her HEP. She is making slow, but steady progress. She will benefit from continued therapy in order to reach her goals. Her spouse reports she is able to do more functional activities at home and to be out of the bed more. She is awaiting the results of her most recent PET scan. PROBLEM LIST (Impacting functional limitations):  1. Decreased Strength  2. Decreased ADL/Functional Activities  3. Decreased Transfer Abilities  4. Decreased Balance  5. Increased Pain  6. Decreased Activity Tolerance  7. Increased Fatigue  8. Decreased [place] with Home Exercise Program INTERVENTIONS PLANNED:  1. Home Exercise Program (HEP)  2. Therapeutic Exercise/Strengthening   TREATMENT PLAN:  Effective Dates: 3/28/2018 TO 2018 (90 days). Frequency/Duration: 2 time a week for 90 Days  GOALS: (Goals have been discussed and agreed upon with patient.)  Short-Term Functional Goals: Time Frame: 4 weeks  1. The patient will be independent with HEP for strength within 4 weeks. Met   2. The patient will report a fatigue of 7 or less within 4 weeks. 3. The patient will gain 1/2 grade strength within 4 weeks. Met   4. The patient will improve her TUG score by 2 seconds within 4 weeks. 5. The patient will improve her Tinetti score by 5 within 4 weeks. Met   Discharge Goals: Time Frame: 8 weeks  1. The patient will report a fatigue level of 5 or less within 8 weeks. 2. The patient will improve her TUG score by 5 seconds within 8 weeks. 3. The patient will participate in a 6 minute walk within 8 weeks. Rehabilitation Potential For Stated Goals: [address] therapy, I certify that the treatment plan above will be carried out by a therapist or under their direction. Thank you for this referral,  Jodie Neal PT     Referring Physician Signature: Leyda Be MD              Date                    The information in this section was collected on 3/28/18 (except where otherwise noted). HISTORY:   History of Present Injury/Illness (Reason for Referral):  Metastatic breast cancer, chemo, fatigue, weakness  Past Medical History/Comorbidities:   Ms. Alphonso Dorado  has a past medical history of Acute on chronic diastolic congestive heart failure ([address] (1/5/2016); Anemia of chronic disease (11/13/2017); Aortic valve stenosis; Arthritis; Asthma; Cancer ([address] Chemotherapy-induced neutropenia (HCC) (12/20/2016); CKD (chronic kidney disease) stage 4, GFR 15-29 ml/min ([address] Depression; Diastolic CHF ([address] Former cigarette smoker; HCAP (healthcare-associated pneumonia) (7/17/2013); History of DVT (deep vein thrombosis); Hypertension; Long term current use of anticoagulant; Oxygen dependent; Port catheter in place;  Sepsis ([address] (7/19/2016); SOB (shortness of breath) on exertion; Type 2 diabetes mellitus ([address] Unspecified adverse effect of anesthesia; and Vitamin B12 deficiency (2017). Ms. Sae Porter  has a past surgical history that includes hx  section; hx cyst removal; hx urological; hx vascular access (2012); and colonoscopy (N/A, 3/19/2018). Past Medical History:   Diagnosis Date    Acute on chronic diastolic congestive heart failure ([address] 2016    Anemia of chronic disease 2017    Aortic valve stenosis     Arthritis     roverto hands    Asthma     till age 31    Cancer ([address]     roverto. breast ca mets bone/lung    Chemotherapy-induced neutropenia ([address] 2016    CKD (chronic kidney disease) stage 4, GFR 15-29 ml/min ([facility])     Depression     managed with meds    Diastolic CHF ([address]     Followed by Donte Kerns    Former cigarette smoker     HCAP (healthcare-associated pneumonia) 2013    History of DVT (deep vein thrombosis)     Left leg DVT.  on coumadin    Hypertension     managed with meds    Long term current use of anticoagulant     Coumadin    Oxygen dependent     at bedtime 2L-3L NC    Port catheter in place     Left chest port    Sepsis ([address] 2016    SOB (shortness of breath) on exertion     Type 2 diabetes mellitus (HCC)     PRN oral agent/AVG BS: 170-200/ s.s of hypoglycemia at 79    Unspecified adverse effect of anesthesia     In [place] 28 yrs ago after second c -section-she was told her heart stopped d/t anesthesia-hospitalized for 5 days afterwards and followed by cardiologist    Vitamin B12 deficiency 2017     Past Surgical History:   Procedure Laterality Date    COLONOSCOPY N/A 3/19/2018    COLONOSCOPY  BMI 21   performed by [facility] at [facility] ENDOSCOPY    HX  SECTION      X2    HX CYST REMOVAL      HX UROLOGICAL      stent placed    HX VASCULAR ACCESS  2012    Left chest port       Social History/Living Environment:     lives with family  Prior Level of Function/Work/Activity:  independent  Dominant Side:         RIGHT  Current Medications:       Current Outpatient Prescriptions:     albuterol-ipratropium (DUO-NEB) 2.5 mg-0.5 mg/3 ml nebu, 3 mL by Nebulization route every six (6) hours as needed. , Disp: 360 Nebule, Rfl: 1    fentaNYL (DURAGESIC) 50 mcg/hr PATCH, 1 Patch by TransDERmal route every seventy-two (72) hours. Max Daily Amount: 1 Patch. Indications: Chronic Pain with Opioid Tolerance, Disp: 10 Patch, Rfl: 0    HYDROcodone-acetaminophen (NORCO)  mg tablet, Take 1 Tab by mouth every six (6) hours as needed for Pain. Max Daily Amount: 4 Tabs., Disp: 90 Tab, Rfl: 0    GLIPIZIDE PO, Take  by mouth as needed. Pt only takes if BS > 200, Disp: , Rfl:     LORazepam (ATIVAN) 1 mg tablet, Take 1 Tab by mouth every six (6) hours as needed for Anxiety. Max Daily Amount: 4 mg., Disp: 60 Tab, Rfl: 0    palbociclib 125 mg cap, Take 1 Cap by mouth daily. Take one pill once a day for 3 weeks and then off for one week, Disp: 21 Cap, Rfl: 5    potassium chloride (K-DUR, KLOR-CON) 20 mEq tablet, Take 1 Tab by mouth two (2) times a day., Disp: 60 Tab, Rfl: 0    hydrALAZINE (APRESOLINE) 50 mg tablet, Take 1 Tab by mouth three (3) times daily. (Patient taking differently: Take 50 mg by mouth two (2) times a day.), Disp: 90 Tab, Rfl: 1    montelukast (SINGULAIR) 10 mg tablet, Take 1 Tab by mouth nightly., Disp: 30 Tab, Rfl: 11    furosemide (LASIX) 40 mg tablet, Take 1 Tab by mouth daily. (Patient taking differently: Take 40 mg by mouth every other day. Monday,Wednesday,Friday,Sunday), Disp: 30 Tab, Rfl: 11    carvedilol (COREG) 3.125 mg tablet, Take 1 Tab by mouth two (2) times daily (with meals). , Disp: 60 Tab, Rfl: 11    warfarin (COUMADIN) 1 mg tablet, Take 1.5 Tabs by mouth every evening., Disp: 45 Tab, Rfl: 0    mirtazapine (REMERON) 30 mg tablet, Take 1 Tab by mouth nightly., Disp: 30 Tab, Rfl: 1    amLODIPine (NORVASC) 10 mg tablet, Take 1 Tab by mouth daily. , Disp: 30 Tab, Rfl: 11   Date Last Reviewed:  3/28/18   Number of Personal Factors/Comorbidities that affect the Plan of Care: 3+: HIGH COMPLEXITY   EXAMINATION:   ROM:          Within functional limits  Strength:          Grossly 4/5 x 4 extremities  Functional Mobility:         Gait/Ambulation:  Uses walker        Transfers:  independent  Balance:          Decreased in standing and gait   Body Structures Involved:  1. Muscles Body Functions Affected:  1. Sensory/Pain  2. Neuromusculoskeletal  3. Movement Related Activities and Participation Affected:  1. General Tasks and Demands  2. Communication  3. Mobility   Number of elements (examined above) that affect the Plan of Care: 4+: HIGH COMPLEXITY   CLINICAL PRESENTATION:   Presentation: Stable and uncomplicated: LOW COMPLEXITY   CLINICAL DECISION MAKING:   Outcome Measure: Tool Used: 6-MINUTE WALK TEST  Unable to test  Score:  Initial:   feet Most Recent: X feet (Date: -- )   Interpretation of Score: Normal range varies but is approximately [id number] Feet      Distance walked:   feet               Baseline End of Test   Heart Rate      Dyspnea (Mehul Scale)     Fatigue (Mehul Scale)     SpO2     BP       Score [phone] 426-16 15-0   Modifier CH CI CJ CK CL CM CN         Tool Used: Disabilities of the Arm, Shoulder and Hand (DASH) Questionnaire - Quick Version  Score:  Initial: 41/55  Most Recent: 41/55 (Date: 5/2/18 )   Interpretation of Score: The DASH is designed to measure the activities of daily living in person's with upper extremity dysfunction or pain. Each section is scored on a 1-5 scale, 5 representing the greatest disability. The scores of each section are added together for a total score of 55.     Score 11 12-19 20-28 29-37 38-45 46-54 55   Modifier CH CI CJ CK CL CM CN         Tool Used: TINETTI  Score:  Initial:   Gait: 6/12  Balance: 10/16  TOTAL: 16/28 Most Recent:  Gait: 9/12  Balance:13 /16  TOTAL:22 /28   Interpretation of Score: The maximum score for the gait component is 12 points. The maximum score for the balance component is 16 points. The maximum total score is 28 points. In general, patients who score below 19 are at a high risk for falls. Patients who score in the range of 19-24 indicate that the patient has a risk for falls. Score 28 27-23 22-18 17-12 11-6 5-1 0   Modifier CH CI CJ CK CL CM CN         Tool Used: Timed Up and Go (TUG)  Score:  Initial: 25 seconds Most Recent: 25 seconds (Date: 5/2/18 )   Interpretation of Score: The test measures, in seconds, the time taken by an individual to stand up from a standard arm chair (seat height 46 cm [18 in], arm height 65 cm [25.6 in]), walk a distance of 3 meters (118 in, approx 10 ft), turn, walk back to the chair and sit down. If the individual takes longer than 14 seconds to complete TUG, this indicates risk for falls. Score 7 7.5-10.5 11-14 14.5-17.5 18-21 21.5-24.5 25+   Modifier CH CI CJ CK CL CM CN        Tool Used: ECOG Performance Survey Score  Score:  Initial: 3 Most Recent:  2    Interpretation of Score:   0 Fully active, able to carry on all pre-disease performance without restriction   1 Restricted in physically strenuous activity but ambulatory and able to carry out work of a light or sedentary nature, e.g., light house work, office work   2 Ambulatory and capable of all selfcare but unable to carry out any work activities. Up and about more than 50% of waking hours   3 Capable of only limited selfcare, confined to bed or chair more than 50% of waking hours   4 Completely disabled. Cannot carry on any selfcare. Totally confined to bed or chair   5 Dead        Medical Necessity:   · Patient is expected to demonstrate progress in strength, balance and gait to increase independence with household activity.   Reason for Services/Other Comments:  · Patient continues to demonstrate capacity to improve strength, balance and gait which will increase independence and decrease amount of assistance required from caregiver. Use of outcome tool(s) and clinical judgement create a POC that gives a: Questionable prediction of patient's progress: MODERATE COMPLEXITY            TREATMENT:   (In addition to Assessment/Re-Assessment sessions the following treatments were rendered)  Pre-treatment Symptoms/Complaints:  Fatigue 8/10, fatigue 8/10 after. weakness, decreased balance, altered gait. The patient reports she is tired today. Pain: Initial:     8/10 left RM and left leg Post Session:  8/10   O2 97 HR 60  /58  DASH, TUG, Tinetti,     Long arc quads x 10 reps with 5 count hold with 1 1/2#, hip flexion in sitting x 10 reps with 1 1/2#  Standing up on toes, hip flexion, hip abduction, hip extension, hamstring curls x 10 reps with 1 1/2#  Bicep curls with 3# x 10 reps  Bilateral upper extremity with 3# x 10 reps forward flexion and abduction  Bilateral upper extremity overhead press with 3# x 10 reps. Scapular retraction with blue theraband/ push forward x 10 reps  Sitting hamstring curls with blue theraband x 10 reps  Sitting hip abduction with blue theraband  Sitting hip adduction with inflated ball x 10 reps with 5 count hold   Sit to stand from elevated surface x 10 reps    Below not performed today:    Supine bridging x 10 reps  Supine SLR x 10 reps  Short arc quads x 10 reps with 5 count hold  crunches in supine and wall push ups x 10 reps   wall slides x 5 reps  as above. Given HEP:  Walk 1-2 minutes each waking hour when supervised, sit to stand from bedside, sit in chair instead of the bed each day. Added sit to stand, long arc quads and standing exercises at the sink daily. Bilateral upper extremity with light weight every other day. Given written instructions. Given red theraband for home use. [employer]  Treatment/Session Assessment:    · Response to Treatment:  Tolerated the treatment fair.  Needs frequent rest periods. Seems to enjoy exercises. · Compliance with Program/Exercises: Will assess as treatment progresses. · Recommendations/Intent for next treatment session: \"Next visit will focus on advancements to more challenging activities\".     Total Treatment Duration: 46 min  PT Patient Time In/Time Out  Time In: 0845  Time Out: [address] [place] [address],

## 2018-05-07 NOTE — PROGRESS NOTES
Katelyn Camera  : 1950  Primary: [facility] [employer] [facility]  Secondary:  [address]  at [facility]  [address] , [address], [place] [address]  [phone]   [phone]          OUTPATIENT PHYSICAL THERAPY:Daily Note 2018    ICD-10: Treatment Diagnosis: muscle weakness generalized M 62.81  Precautions/Allergies:   Review of patient's allergies indicates no known allergies. Fall Risk Score: 9 (? 5 = High Risk)  MD Orders: oncology rehab MEDICAL/REFERRING DIAGNOSIS:  Other fatigue [R53.83]    DATE OF ONSET:   REFERRING PHYSICIAN: Angelia Zhang MD  RETURN PHYSICIAN APPOINTMENT: 18     INITIAL ASSESSMENT:  Ms. Dena Lowe presents following treatment for metastatic breast cancer since . She has developed significant fatigue and weakness. She spends most of the day in bed. She has had a fall in the recent past.  She uses a rolling walker. She could possibly benefit from home health, but is willing to attend therapy as outpatient. She will benefit from therapeutic exercises in order to increase strength and overall conditioning. 18: The patient has attended a total of 10 visits. She has been compliant with her HEP. She is making slow, but steady progress. She will benefit from continued therapy in order to reach her goals. Her spouse reports she is able to do more functional activities at home and to be out of the bed more. She is awaiting the results of her most recent PET scan. PROBLEM LIST (Impacting functional limitations):  1. Decreased Strength  2. Decreased ADL/Functional Activities  3. Decreased Transfer Abilities  4. Decreased Balance  5. Increased Pain  6. Decreased Activity Tolerance  7. Increased Fatigue  8. Decreased [place] with Home Exercise Program INTERVENTIONS PLANNED:  1. Home Exercise Program (HEP)  2. Therapeutic Exercise/Strengthening   TREATMENT PLAN:  Effective Dates: 3/28/2018 TO 2018 (90 days).   Frequency/Duration: 2 time a week for 90 Days  GOALS: (Goals have been discussed and agreed upon with patient.)  Short-Term Functional Goals: Time Frame: 4 weeks  1. The patient will be independent with HEP for strength within 4 weeks. Met   2. The patient will report a fatigue of 7 or less within 4 weeks. 3. The patient will gain 1/2 grade strength within 4 weeks. Met   4. The patient will improve her TUG score by 2 seconds within 4 weeks. 5. The patient will improve her Tinetti score by 5 within 4 weeks. Met   Discharge Goals: Time Frame: 8 weeks  1. The patient will report a fatigue level of 5 or less within 8 weeks. 2. The patient will improve her TUG score by 5 seconds within 8 weeks. 3. The patient will participate in a 6 minute walk within 8 weeks. Rehabilitation Potential For Stated Goals: [address] therapy, I certify that the treatment plan above will be carried out by a therapist or under their direction. Thank you for this referral,  Anjali Unger PT     Referring Physician Signature: Granville Simmonds, MD              Date                    The information in this section was collected on 3/28/18 (except where otherwise noted). HISTORY:   History of Present Injury/Illness (Reason for Referral):  Metastatic breast cancer, chemo, fatigue, weakness  Past Medical History/Comorbidities:   Ms. Khalif Moser  has a past medical history of Acute on chronic diastolic congestive heart failure ([address] (1/5/2016); Anemia of chronic disease (11/13/2017); Aortic valve stenosis; Arthritis; Asthma; Cancer ([address] Chemotherapy-induced neutropenia (HCC) (12/20/2016); CKD (chronic kidney disease) stage 4, GFR 15-29 ml/min ([address] Depression; Diastolic CHF ([address] Former cigarette smoker; HCAP (healthcare-associated pneumonia) (7/17/2013); History of DVT (deep vein thrombosis); Hypertension; Long term current use of anticoagulant; Oxygen dependent; Port catheter in place;  Sepsis ([address] (7/19/2016); SOB (shortness of breath) on exertion; Type 2 diabetes mellitus ([address] Unspecified adverse effect of anesthesia; and Vitamin B12 deficiency (2017). Ms. Leann Morales  has a past surgical history that includes hx  section; hx cyst removal; hx urological; hx vascular access (2012); and colonoscopy (N/A, 3/19/2018). Past Medical History:   Diagnosis Date    Acute on chronic diastolic congestive heart failure ([address] 2016    Anemia of chronic disease 2017    Aortic valve stenosis     Arthritis     roverto hands    Asthma     till age 31    Cancer ([address]     roverto. breast ca mets bone/lung    Chemotherapy-induced neutropenia ([address] 2016    CKD (chronic kidney disease) stage 4, GFR 15-29 ml/min ([facility])     Depression     managed with meds    Diastolic CHF ([address]     Followed by Donte Kerns    Former cigarette smoker     HCAP (healthcare-associated pneumonia) 2013    History of DVT (deep vein thrombosis)     Left leg DVT.  on coumadin    Hypertension     managed with meds    Long term current use of anticoagulant     Coumadin    Oxygen dependent     at bedtime 2L-3L NC    Port catheter in place     Left chest port    Sepsis ([address] 2016    SOB (shortness of breath) on exertion     Type 2 diabetes mellitus (HCC)     PRN oral agent/AVG BS: 170-200/ s.s of hypoglycemia at 79    Unspecified adverse effect of anesthesia     In [place] 28 yrs ago after second c -section-she was told her heart stopped d/t anesthesia-hospitalized for 5 days afterwards and followed by cardiologist    Vitamin B12 deficiency 2017     Past Surgical History:   Procedure Laterality Date    COLONOSCOPY N/A 3/19/2018    COLONOSCOPY  BMI 21   per[facility] at [facility] ENDOSCOPY    HX [address]    HX CYST REMOVAL      HX UROLOGICAL      stent placed    HX VASCULAR ACCESS  2012    Left chest port       Social History/Living Environment:     lives with family  Prior Level of Function/Work/Activity:  independent  Dominant Side:         RIGHT  Current Medications:       Current Outpatient Prescriptions:     albuterol-ipratropium (DUO-NEB) 2.5 mg-0.5 mg/3 ml nebu, 3 mL by Nebulization route every six (6) hours as needed. , Disp: 360 Nebule, Rfl: 1    fentaNYL (DURAGESIC) 50 mcg/hr PATCH, 1 Patch by TransDERmal route every seventy-two (72) hours. Max Daily Amount: 1 Patch. Indications: Chronic Pain with Opioid Tolerance, Disp: 10 Patch, Rfl: 0    HYDROcodone-acetaminophen (NORCO)  mg tablet, Take 1 Tab by mouth every six (6) hours as needed for Pain. Max Daily Amount: 4 Tabs., Disp: 90 Tab, Rfl: 0    GLIPIZIDE PO, Take  by mouth as needed. Pt only takes if BS > 200, Disp: , Rfl:     LORazepam (ATIVAN) 1 mg tablet, Take 1 Tab by mouth every six (6) hours as needed for Anxiety. Max Daily Amount: 4 mg., Disp: 60 Tab, Rfl: 0    palbociclib 125 mg cap, Take 1 Cap by mouth daily. Take one pill once a day for 3 weeks and then off for one week, Disp: 21 Cap, Rfl: 5    potassium chloride (K-DUR, KLOR-CON) 20 mEq tablet, Take 1 Tab by mouth two (2) times a day., Disp: 60 Tab, Rfl: 0    hydrALAZINE (APRESOLINE) 50 mg tablet, Take 1 Tab by mouth three (3) times daily. (Patient taking differently: Take 50 mg by mouth two (2) times a day.), Disp: 90 Tab, Rfl: 1    montelukast (SINGULAIR) 10 mg tablet, Take 1 Tab by mouth nightly., Disp: 30 Tab, Rfl: 11    furosemide (LASIX) 40 mg tablet, Take 1 Tab by mouth daily. (Patient taking differently: Take 40 mg by mouth every other day. Monday,Wednesday,Friday,Sunday), Disp: 30 Tab, Rfl: 11    carvedilol (COREG) 3.125 mg tablet, Take 1 Tab by mouth two (2) times daily (with meals). , Disp: 60 Tab, Rfl: 11    warfarin (COUMADIN) 1 mg tablet, Take 1.5 Tabs by mouth every evening., Disp: 45 Tab, Rfl: 0    mirtazapine (REMERON) 30 mg tablet, Take 1 Tab by mouth nightly., Disp: 30 Tab, Rfl: 1    amLODIPine (NORVASC) 10 mg tablet, Take 1 Tab by mouth daily. , Disp: 30 Tab, Rfl: 11   Date Last Reviewed:  3/28/18   Number of Personal Factors/Comorbidities that affect the Plan of Care: 3+: HIGH COMPLEXITY   EXAMINATION:   ROM:          Within functional limits  Strength:          Grossly 4/5 x 4 extremities  Functional Mobility:         Gait/Ambulation:  Uses walker        Transfers:  independent  Balance:          Decreased in standing and gait   Body Structures Involved:  1. Muscles Body Functions Affected:  1. Sensory/Pain  2. Neuromusculoskeletal  3. Movement Related Activities and Participation Affected:  1. General Tasks and Demands  2. Communication  3. Mobility   Number of elements (examined above) that affect the Plan of Care: 4+: HIGH COMPLEXITY   CLINICAL PRESENTATION:   Presentation: Stable and uncomplicated: LOW COMPLEXITY   CLINICAL DECISION MAKING:   Outcome Measure: Tool Used: 6-MINUTE WALK TEST  Unable to test  Score:  Initial:   feet Most Recent: X feet (Date: -- )   Interpretation of Score: Normal range varies but is approximately 7794-1038 Feet      Distance walked:   feet               Baseline End of Test   Heart Rate      Dyspnea (Mehul Scale)     Fatigue (Mehul Scale)     SpO2     BP       Score 2133 [phone] 15-0   Modifier CH CI CJ CK CL CM CN         Tool Used: Disabilities of the Arm, Shoulder and Hand (DASH) Questionnaire - Quick Version  Score:  Initial: 41/55  Most Recent: 41/55 (Date: 5/2/18 )   Interpretation of Score: The DASH is designed to measure the activities of daily living in person's with upper extremity dysfunction or pain. Each section is scored on a 1-5 scale, 5 representing the greatest disability. The scores of each section are added together for a total score of 55.     Score 11 12-19 20-28 29-37 38-45 46-54 55   Modifier CH CI CJ CK CL CM CN         Tool Used: TINETTI  Score:  Initial:   Gait: 6/12  Balance: 10/16  TOTAL: 16/28 Most Recent:  Gait: 9/12  Balance:13 /16  TOTAL:22 /28   Interpretation of Score: The maximum score for the gait component is 12 points. The maximum score for the balance component is 16 points. The maximum total score is 28 points. In general, patients who score below 19 are at a high risk for falls. Patients who score in the range of 19-24 indicate that the patient has a risk for falls. Score 28 27-23 22-18 17-12 11-6 5-1 0   Modifier CH CI CJ CK CL CM CN         Tool Used: Timed Up and Go (TUG)  Score:  Initial: 25 seconds Most Recent: 25 seconds (Date: 5/2/18 )   Interpretation of Score: The test measures, in seconds, the time taken by an individual to stand up from a standard arm chair (seat height 46 cm [18 in], arm height 65 cm [25.6 in]), walk a distance of 3 meters (118 in, approx 10 ft), turn, walk back to the chair and sit down. If the individual takes longer than 14 seconds to complete TUG, this indicates risk for falls. Score 7 7.5-10.5 11-14 14.5-17.5 18-21 21.5-24.5 25+   Modifier CH CI CJ CK CL CM CN        Tool Used: ECOG Performance Survey Score  Score:  Initial: 3 Most Recent:  2    Interpretation of Score:   0 Fully active, able to carry on all pre-disease performance without restriction   1 Restricted in physically strenuous activity but ambulatory and able to carry out work of a light or sedentary nature, e.g., light house work, office work   2 Ambulatory and capable of all selfcare but unable to carry out any work activities. Up and about more than 50% of waking hours   3 Capable of only limited selfcare, confined to bed or chair more than 50% of waking hours   4 Completely disabled. Cannot carry on any selfcare. Totally confined to bed or chair   5 Dead        Medical Necessity:   · Patient is expected to demonstrate progress in strength, balance and gait to increase independence with household activity.   Reason for Services/Other Comments:  · Patient continues to demonstrate capacity to improve strength, balance and gait which will increase independence and decrease amount of assistance required from caregiver. Use of outcome tool(s) and clinical judgement create a POC that gives a: Questionable prediction of patient's progress: MODERATE COMPLEXITY            TREATMENT:   (In addition to Assessment/Re-Assessment sessions the following treatments were rendered)  Pre-treatment Symptoms/Complaints:  Fatigue 8/10, fatigue 8/10 after. weakness, decreased balance, altered gait. The patient reports she is tired today. Pain: Initial:     8/10 bilateral LE's and low back Post Session:  8/10   O2 94% HR 63 bpm  /72 mm/Hg  DASH, TUG, Tinetti,   Therapeutic Exercise: ( 40 minutes):  Exercises per below to improve mobility and strength. Required minimal verbal cues to promote proper body alignment and promote proper body posture. Progressed resistance and repetitions as indicated. Long arc quads x 10 reps with 5 count hold with 2#, hip flexion in sitting x 10 reps with 2#  Standing up on toes, hip flexion, hip abduction, hip extension, hamstring curls x 10 reps with 2#  Bicep curls with 3# x 10 reps  Bilateral upper extremity with 3# x 10 reps forward flexion and abduction  Bilateral upper extremity overhead press with 3# x 10 reps. Scapular retraction with blue theraband/ push forward x 10 reps  Sitting hamstring curls with blue theraband x 10 reps  Sitting hip abduction with blue theraband  Sitting hip adduction with inflated ball x 10 reps with 5 count hold   Sit to stand from elevated surface x 10 reps  Wall Push-Ups x10 reps    Below not performed today:    Supine bridging x 10 reps  Supine SLR x 10 reps  Short arc quads x 10 reps with 5 count hold  Crunches in supine x 10 reps   wall slides x 5 reps  as above. Given HEP:  Walk 1-2 minutes each waking hour when supervised, sit to stand from bedside, sit in chair instead of the bed each day. Added sit to stand, long arc quads and standing exercises at the sink daily. Bilateral upper extremity with light weight every other day. Given written instructions. Given red theraband for home use. [employer]  Treatment/Session Assessment:    · Response to Treatment:  Pt tolerated all treatments well. She required several rest breaks due to fatigue. · Compliance with Program/Exercises: Will assess as treatment progresses. · Recommendations/Intent for next treatment session: \"Next visit will focus on advancements to more challenging activities\".     Total Treatment Duration: 46 min  PT Patient Time In/Time Out  Time In: 0900  Time Out: [address] Donna Altamirano, PT

## 2018-05-08 NOTE — PROGRESS NOTES
Arrived to the [facility]. Procrit completed. Patient tolerated well. Any issues or concerns during appointment: No.  Patient aware of next infusion appointment on Tuesday,May 22nd @ 0715. Discharged ambulatory using walker accompanied by .

## 2018-05-09 NOTE — PROGRESS NOTES
Jen Capps  : 1950  Primary: Omari Ellison Fap 100%  Secondary:  [address] at [facility]  [place] , [address], [place]  [phone]   [phone]          OUTPATIENT PHYSICAL THERAPY:Daily Note 2018    ICD-10: Treatment Diagnosis: muscle weakness generalized M 62.81  Precautions/Allergies:   Review of patient's allergies indicates no known allergies. Fall Risk Score: 9 (? 5 = High Risk)  MD Orders: oncology rehab MEDICAL/REFERRING DIAGNOSIS:  Other fatigue [R53.83]    DATE OF ONSET:   REFERRING PHYSICIAN: Sita Gonzales MD  RETURN PHYSICIAN APPOINTMENT: 18     INITIAL ASSESSMENT:  Ms. Abner Blue presents following treatment for metastatic breast cancer since . She has developed significant fatigue and weakness. She spends most of the day in bed. She has had a fall in the recent past.  She uses a rolling walker. She could possibly benefit from home health, but is willing to attend therapy as outpatient. She will benefit from therapeutic exercises in order to increase strength and overall conditioning. 18: The patient has attended a total of 10 visits. She has been compliant with her HEP. She is making slow, but steady progress. She will benefit from continued therapy in order to reach her goals. Her spouse reports she is able to do more functional activities at home and to be out of the bed more. She is awaiting the results of her most recent PET scan. PROBLEM LIST (Impacting functional limitations):  1. Decreased Strength  2. Decreased ADL/Functional Activities  3. Decreased Transfer Abilities  4. Decreased Balance  5. Increased Pain  6. Decreased Activity Tolerance  7. Increased Fatigue  8. Decreased [place] with Home Exercise Program INTERVENTIONS PLANNED:  1. Home Exercise Program (HEP)  2. Therapeutic Exercise/Strengthening   TREATMENT PLAN:  Effective Dates: 3/28/2018 TO 2018 (90 days).   Frequency/Duration: 2 time a week for 90 Days  GOALS: (Goals have been discussed and agreed upon with patient.)  Short-Term Functional Goals: Time Frame: 4 weeks  1. The patient will be independent with HEP for strength within 4 weeks. Met   2. The patient will report a fatigue of 7 or less within 4 weeks. 3. The patient will gain 1/2 grade strength within 4 weeks. Met   4. The patient will improve her TUG score by 2 seconds within 4 weeks. 5. The patient will improve her Tinetti score by 5 within 4 weeks. Met   Discharge Goals: Time Frame: 8 weeks  1. The patient will report a fatigue level of 5 or less within 8 weeks. 2. The patient will improve her TUG score by 5 seconds within 8 weeks. 3. The patient will participate in a 6 minute walk within 8 weeks. Rehabilitation Potential For Stated Goals: [address] therapy, I certify that the treatment plan above will be carried out by a therapist or under their direction. Thank you for this referral,  Lakesha Ring, PT     Referring Physician Signature: Aurelia Lopez MD              Date                    The information in this section was collected on 3/28/18 (except where otherwise noted). HISTORY:   History of Present Injury/Illness (Reason for Referral):  Metastatic breast cancer, chemo, fatigue, weakness  Past Medical History/Comorbidities:   Ms. Cade Menchaca  has a past medical history of Acute on chronic diastolic congestive heart failure ([address] (1/5/2016); Anemia of chronic disease (11/13/2017); Aortic valve stenosis; Arthritis; Asthma; Cancer ([address] Chemotherapy-induced neutropenia (HCC) (12/20/2016); CKD (chronic kidney disease) stage 4, GFR 15-29 ml/min ([address] Depression; Diastolic CHF ([address] Former cigarette smoker; HCAP (healthcare-associated pneumonia) (7/17/2013); History of DVT (deep vein thrombosis); Hypertension; Long term current use of anticoagulant; Oxygen dependent; Port catheter in place;  Sepsis ([address] (7/19/2016); SOB (shortness of breath) on exertion; Type 2 diabetes mellitus ([address] Unspecified adverse effect of anesthesia; and Vitamin B12 deficiency (2017). Ms. Juana Loya  has a past surgical history that includes hx  section; hx cyst removal; hx urological; hx vascular access (2012); and colonoscopy (N/A, 3/19/2018). Past Medical History:   Diagnosis Date    Acute on chronic diastolic congestive heart failure ([address] 2016    Anemia of chronic disease 2017    Aortic valve stenosis     Arthritis     roverto hands    Asthma     till age 31    Cancer ([address]     roverto. breast ca mets bone/lung    Chemotherapy-induced neutropenia ([address] 2016    CKD (chronic kidney disease) stage 4, GFR 15-29 ml/min ([facility])     Depression     managed with meds    Diastolic CHF ([address]     Followed by [facility]    Former cigarette smoker     HCAP (healthcare-associated pneumonia) 2013    History of DVT (deep vein thrombosis)     Left leg DVT.  on coumadin    Hypertension     managed with meds    Long term current use of anticoagulant     Coumadin    Oxygen dependent     at bedtime 2L-3L NC    Port catheter in place     Left chest port    Sepsis ([address] 2016    SOB (shortness of breath) on exertion     Type 2 diabetes mellitus (HCC)     PRN oral agent/AVG BS: 170-200/ s.s of hypoglycemia at 79    Unspecified adverse effect of anesthesia     In [place] 28 yrs ago after second c -section-she was told her heart stopped d/t anesthesia-hospitalized for 5 days afterwards and followed by cardiologist    Vitamin B12 deficiency 2017     Past Surgical History:   Procedure Laterality Date    COLONOSCOPY N/A 3/19/2018    COLONOSCOPY  BMI 21   per[facility] at [facility] ENDOSCOPY    HX [address]    HX CYST REMOVAL      HX UROLOGICAL      stent placed    HX VASCULAR ACCESS  2012    Left chest port       Social History/Living Environment:     lives with family  Prior Level of Function/Work/Activity:  independent  Dominant Side:         RIGHT  Current Medications:       Current Outpatient Prescriptions:     fentaNYL (DURAGESIC) 50 mcg/hr PATCH, 1 Patch by TransDERmal route every seventy-two (72) hours. Max Daily Amount: 1 Patch. Indications: Chronic Pain with Opioid Tolerance, Disp: 10 Patch, Rfl: 0    LORazepam (ATIVAN) 1 mg tablet, Take 1 Tab by mouth every six (6) hours as needed for Anxiety. Max Daily Amount: 4 mg., Disp: 60 Tab, Rfl: 0    HYDROcodone-acetaminophen (NORCO)  mg tablet, Take 1 Tab by mouth every six (6) hours as needed for Pain. Max Daily Amount: 4 Tabs., Disp: 90 Tab, Rfl: 0    albuterol-ipratropium (DUO-NEB) 2.5 mg-0.5 mg/3 ml nebu, 3 mL by Nebulization route every six (6) hours as needed. , Disp: 360 Nebule, Rfl: 1    GLIPIZIDE PO, Take  by mouth as needed. Pt only takes if BS > 200, Disp: , Rfl:     palbociclib 125 mg cap, Take 1 Cap by mouth daily. Take one pill once a day for 3 weeks and then off for one week, Disp: 21 Cap, Rfl: 5    potassium chloride (K-DUR, KLOR-CON) 20 mEq tablet, Take 1 Tab by mouth two (2) times a day., Disp: 60 Tab, Rfl: 0    hydrALAZINE (APRESOLINE) 50 mg tablet, Take 1 Tab by mouth three (3) times daily. (Patient taking differently: Take 50 mg by mouth two (2) times a day.), Disp: 90 Tab, Rfl: 1    montelukast (SINGULAIR) 10 mg tablet, Take 1 Tab by mouth nightly., Disp: 30 Tab, Rfl: 11    furosemide (LASIX) 40 mg tablet, Take 1 Tab by mouth daily. (Patient taking differently: Take 40 mg by mouth every other day. Monday,Wednesday,Friday,Sunday), Disp: 30 Tab, Rfl: 11    carvedilol (COREG) 3.125 mg tablet, Take 1 Tab by mouth two (2) times daily (with meals). , Disp: 60 Tab, Rfl: 11    warfarin (COUMADIN) 1 mg tablet, Take 1.5 Tabs by mouth every evening., Disp: 45 Tab, Rfl: 0    mirtazapine (REMERON) 30 mg tablet, Take 1 Tab by mouth nightly., Disp: 30 Tab, Rfl: 1    amLODIPine (NORVASC) 10 mg tablet, Take 1 Tab by mouth daily. , Disp: 30 Tab, Rfl: 11  No current facility-administered medications for this encounter. Facility-Administered Medications Ordered in Other Encounters:     epoetin lisset (EPOGEN;PROCRIT) injection 40,000 Units, 40,000 Units, SubCUTAneous, Q 14 DAYS, Anibal Ellis MD, 40,000 Units at 05/08/18 1105   Date Last Reviewed:  3/28/18   Number of Personal Factors/Comorbidities that affect the Plan of Care: 3+: HIGH COMPLEXITY   EXAMINATION:   ROM:          Within functional limits  Strength:          Grossly 4/5 x 4 extremities  Functional Mobility:         Gait/Ambulation:  Uses walker        Transfers:  independent  Balance:          Decreased in standing and gait   Body Structures Involved:  1. Muscles Body Functions Affected:  1. Sensory/Pain  2. Neuromusculoskeletal  3. Movement Related Activities and Participation Affected:  1. General Tasks and Demands  2. Communication  3. Mobility   Number of elements (examined above) that affect the Plan of Care: 4+: HIGH COMPLEXITY   CLINICAL PRESENTATION:   Presentation: Stable and uncomplicated: LOW COMPLEXITY   CLINICAL DECISION MAKING:   Outcome Measure: Tool Used: 6-MINUTE WALK TEST  Unable to test  Score:  Initial:   feet Most Recent: X feet (Date: -- )   Interpretation of Score: Normal range varies but is approximately 0938-7538 Feet      Distance walked:   feet               Baseline End of Test   Heart Rate      Dyspnea (Mehul Scale)     Fatigue (Mehul Scale)     SpO2     BP       Score [phone] 1439-629 [phone] 15-0   Modifier CH CI CJ CK CL CM CN         Tool Used: Disabilities of the Arm, Shoulder and Hand (DASH) Questionnaire - Quick Version  Score:  Initial: 41/55  Most Recent: 41/55 (Date: 5/2/18 )   Interpretation of Score: The DASH is designed to measure the activities of daily living in person's with upper extremity dysfunction or pain. Each section is scored on a 1-5 scale, 5 representing the greatest disability.   The scores of each section are added together for a total score of 55. Score 11 12-19 20-28 29-37 38-45 46-54 55   Modifier CH CI CJ CK CL CM CN         Tool Used: TINETTI  Score:  Initial:   Gait: 6/12  Balance: 10/16  TOTAL: 16/28 Most Recent:  Gait: 9/12  Balance:13 /16  TOTAL:22 /28   Interpretation of Score: The maximum score for the gait component is 12 points. The maximum score for the balance component is 16 points. The maximum total score is 28 points. In general, patients who score below 19 are at a high risk for falls. Patients who score in the range of 19-24 indicate that the patient has a risk for falls. Score 28 27-23 22-18 17-12 11-6 5-1 0   Modifier CH CI CJ CK CL CM CN         Tool Used: Timed Up and Go (TUG)  Score:  Initial: 25 seconds Most Recent: 25 seconds (Date: 5/2/18 )   Interpretation of Score: The test measures, in seconds, the time taken by an individual to stand up from a standard arm chair (seat height 46 cm [18 in], arm height 65 cm [25.6 in]), walk a distance of 3 meters (118 in, approx 10 ft), turn, walk back to the chair and sit down. If the individual takes longer than 14 seconds to complete TUG, this indicates risk for falls. Score 7 7.5-10.5 11-14 14.5-17.5 18-21 21.5-24.5 25+   Modifier CH CI CJ CK CL CM CN        Tool Used: ECOG Performance Survey Score  Score:  Initial: 3 Most Recent:  2    Interpretation of Score:   0 Fully active, able to carry on all pre-disease performance without restriction   1 Restricted in physically strenuous activity but ambulatory and able to carry out work of a light or sedentary nature, e.g., light house work, office work   2 Ambulatory and capable of all selfcare but unable to carry out any work activities. Up and about more than 50% of waking hours   3 Capable of only limited selfcare, confined to bed or chair more than 50% of waking hours   4 Completely disabled. Cannot carry on any selfcare.  Totally confined to bed or chair   5 Dead Medical Necessity:   · Patient is expected to demonstrate progress in strength, balance and gait to increase independence with household activity. Reason for Services/Other Comments:  · Patient continues to demonstrate capacity to improve strength, balance and gait which will increase independence and decrease amount of assistance required from caregiver. Use of outcome tool(s) and clinical judgement create a POC that gives a: Questionable prediction of patient's progress: MODERATE COMPLEXITY            TREATMENT:   (In addition to Assessment/Re-Assessment sessions the following treatments were rendered)  Pre-treatment Symptoms/Complaints:  Fatigue 9/10, fatigue 8/10 after. weakness, decreased balance, altered gait. Pt states she has swelling in R LE. She states she has had it on and off for several years. She states she has a cardiologist and they said it is not a cardiac issue. Her oncologist wants to know if therapy can do something to assist with the swelling. Pain: Initial:     8/10 bilateral LE's and low back Post Session:  8/10   O2 94% HR 63 bpm  /57 mm/Hg  DASH, TUG, Tinetti,   Therapeutic Exercise: ( 40 minutes):  Exercises per below to improve mobility and strength. Required minimal verbal cues to promote proper body alignment and promote proper body posture. Progressed resistance and repetitions as indicated. Long arc quads x 10 reps with 5 count hold with 2#, hip flexion in sitting x 10 reps with 2#  Standing up on toes, hip flexion, hip abduction, hip extension, hamstring curls x 10 reps with 2#  Bicep curls with 3# x 10 reps  Bilateral upper extremity with 3# x 10 reps forward flexion and abduction  Bilateral upper extremity overhead press with 3# x 10 reps.   Scapular retraction with blue theraband/ push forward x 10 reps  Sitting hamstring curls with blue theraband x 10 reps  Sitting hip abduction with blue theraband  Sitting hip adduction with inflated ball x 10 reps with 5 count hold   Sit to stand from elevated surface x 10 reps  Wall Push-Ups x10 reps    Below not performed today:    Supine bridging x 10 reps  Supine SLR x 10 reps  Short arc quads x 10 reps with 5 count hold  Crunches in supine x 10 reps   wall slides x 5 reps  as above. Given HEP:  Walk 1-2 minutes each waking hour when supervised, sit to stand from bedside, sit in chair instead of the bed each day. Added sit to stand, long arc quads and standing exercises at the sink daily. Bilateral upper extremity with light weight every other day. Given written instructions. Given red theraband for home use. [employer] [facility]  Treatment/Session Assessment:    · Response to Treatment:  Pt tolerated all treatments well. She required several rest breaks due to fatigue. Told patient I would speak with OT who does lymphedema treatment and try to have her take a look at patients swelling next session to see if she has any recommendations. · Compliance with Program/Exercises: Will assess as treatment progresses. · Recommendations/Intent for next treatment session: \"Next visit will focus on advancements to more challenging activities\".     Total Treatment Duration: 40 min  PT Patient Time In/Time Out  Time In: 1630  Time Out: [address] [facility] Latanya Pemberton, PT

## 2018-05-16 NOTE — PROGRESS NOTES
Gregorio Rogers  : 1950  Primary: Dorothy Vasques Fap 100%  Secondary:  [address]  at [facility]  [address], [address], [place]  [phone]   [phone]          OUTPATIENT PHYSICAL THERAPY:Daily Note 2018    ICD-10: Treatment Diagnosis: muscle weakness generalized M 62.81  Precautions/Allergies:   Review of patient's allergies indicates no known allergies. Fall Risk Score: 9 (? 5 = High Risk)  MD Orders: oncology rehab MEDICAL/REFERRING DIAGNOSIS:  Other fatigue [R53.83]    DATE OF ONSET:   REFERRING PHYSICIAN: Bonnie Freed MD  RETURN PHYSICIAN APPOINTMENT: 18     INITIAL ASSESSMENT:  Ms. Lj Magallanes presents following treatment for metastatic breast cancer since . She has developed significant fatigue and weakness. She spends most of the day in bed. She has had a fall in the recent past.  She uses a rolling walker. She could possibly benefit from home health, but is willing to attend therapy as outpatient. She will benefit from therapeutic exercises in order to increase strength and overall conditioning. 18: The patient has attended a total of 10 visits. She has been compliant with her HEP. She is making slow, but steady progress. She will benefit from continued therapy in order to reach her goals. Her spouse reports she is able to do more functional activities at home and to be out of the bed more. She is awaiting the results of her most recent PET scan. PROBLEM LIST (Impacting functional limitations):  1. Decreased Strength  2. Decreased ADL/Functional Activities  3. Decreased Transfer Abilities  4. Decreased Balance  5. Increased Pain  6. Decreased Activity Tolerance  7. Increased Fatigue  8. Decreased Blount with Home Exercise Program INTERVENTIONS PLANNED:  1. Home Exercise Program (HEP)  2. Therapeutic Exercise/Strengthening   TREATMENT PLAN:  Effective Dates: 3/28/2018 TO 2018 (90 days).   Frequency/Duration: 2 time a week for 90 Days  GOALS: (Goals have been discussed and agreed upon with patient.)  Short-Term Functional Goals: Time Frame: 4 weeks  1. The patient will be independent with HEP for strength within 4 weeks. Met   2. The patient will report a fatigue of 7 or less within 4 weeks. 3. The patient will gain 1/2 grade strength within 4 weeks. Met   4. The patient will improve her TUG score by 2 seconds within 4 weeks. 5. The patient will improve her Tinetti score by 5 within 4 weeks. Met   Discharge Goals: Time Frame: 8 weeks  1. The patient will report a fatigue level of 5 or less within 8 weeks. 2. The patient will improve her TUG score by 5 seconds within 8 weeks. 3. The patient will participate in a 6 minute walk within 8 weeks. Rehabilitation Potential For Stated Goals: [address] therapy, I certify that the treatment plan above will be carried out by a therapist or under their direction. Thank you for this referral,  Zayra Menjivar, PT     Referring Physician Signature: Deandra Garcia MD              Date                    The information in this section was collected on 3/28/18 (except where otherwise noted). HISTORY:   History of Present Injury/Illness (Reason for Referral):  Metastatic breast cancer, chemo, fatigue, weakness  Past Medical History/Comorbidities:   Ms. Brian Valencia  has a past medical history of Acute on chronic diastolic congestive heart failure ([address] (1/5/2016); Anemia of chronic disease (11/13/2017); Aortic valve stenosis; Arthritis; Asthma; Cancer ([address] Chemotherapy-induced neutropenia (HCC) (12/20/2016); CKD (chronic kidney disease) stage 4, GFR 15-29 ml/min ([address] Depression; Diastolic CHF ([address] Former cigarette smoker; HCAP (healthcare-associated pneumonia) (7/17/2013); History of DVT (deep vein thrombosis); Hypertension; Long term current use of anticoagulant; Oxygen dependent; Port catheter in place;  Sepsis ([address] (7/19/2016); SOB (shortness of breath) on exertion; Type 2 diabetes mellitus ([address] Unspecified adverse effect of anesthesia; and Vitamin B12 deficiency (2017). Ms. Torrey Esteves  has a past surgical history that includes hx  section; hx cyst removal; hx urological; hx vascular access (2012); and colonoscopy (N/A, 3/19/2018). Past Medical History:   Diagnosis Date    Acute on chronic diastolic congestive heart failure ([address] 2016    Anemia of chronic disease 2017    Aortic valve stenosis     Arthritis     roverto hands    Asthma     till age 31    Cancer ([address]     roverto. breast ca mets bone/lung    Chemotherapy-induced neutropenia ([address] 2016    CKD (chronic kidney disease) stage 4, GFR 15-29 ml/min (formerly [facility])     Depression     managed with meds    Diastolic CHF ([address]     Followed by Donte Kerns    Former cigarette smoker     HCAP (healthcare-associated pneumonia) 2013    History of DVT (deep vein thrombosis)     Left leg DVT.  on coumadin    Hypertension     managed with meds    Long term current use of anticoagulant     Coumadin    Oxygen dependent     at bedtime 2L-3L NC    Port catheter in place     Left chest port    Sepsis ([address] 2016    SOB (shortness of breath) on exertion     Type 2 diabetes mellitus (HCC)     PRN oral agent/AVG BS: 170-200/ s.s of hypoglycemia at 79    Unspecified adverse effect of anesthesia     In [place] 28 yrs ago after second c -section-she was told her heart stopped d/t anesthesia-hospitalized for 5 days afterwards and followed by cardiologist    Vitamin B12 deficiency 2017     Past Surgical History:   Procedure Laterality Date    COLONOSCOPY N/A 3/19/2018    COLONOSCOPY  BMI 21   performed [facility] at [facility] ENDOSCOPY    [address]    HX CYST REMOVAL      HX UROLOGICAL      stent placed    HX VASCULAR ACCESS  2012    Left chest port       Social History/Living Environment:     lives with family  Prior Level of Function/Work/Activity:  independent  Dominant Side:         RIGHT  Current Medications:       Current Outpatient Prescriptions:     fentaNYL (DURAGESIC) 50 mcg/hr PATCH, 1 Patch by TransDERmal route every seventy-two (72) hours. Max Daily Amount: 1 Patch. Indications: Chronic Pain with Opioid Tolerance, Disp: 10 Patch, Rfl: 0    LORazepam (ATIVAN) 1 mg tablet, Take 1 Tab by mouth every six (6) hours as needed for Anxiety. Max Daily Amount: 4 mg., Disp: 60 Tab, Rfl: 0    HYDROcodone-acetaminophen (NORCO)  mg tablet, Take 1 Tab by mouth every six (6) hours as needed for Pain. Max Daily Amount: 4 Tabs., Disp: 90 Tab, Rfl: 0    albuterol-ipratropium (DUO-NEB) 2.5 mg-0.5 mg/3 ml nebu, 3 mL by Nebulization route every six (6) hours as needed. , Disp: 360 Nebule, Rfl: 1    GLIPIZIDE PO, Take  by mouth as needed. Pt only takes if BS > 200, Disp: , Rfl:     palbociclib 125 mg cap, Take 1 Cap by mouth daily. Take one pill once a day for 3 weeks and then off for one week, Disp: 21 Cap, Rfl: 5    potassium chloride (K-DUR, KLOR-CON) 20 mEq tablet, Take 1 Tab by mouth two (2) times a day., Disp: 60 Tab, Rfl: 0    hydrALAZINE (APRESOLINE) 50 mg tablet, Take 1 Tab by mouth three (3) times daily. (Patient taking differently: Take 50 mg by mouth two (2) times a day.), Disp: 90 Tab, Rfl: 1    montelukast (SINGULAIR) 10 mg tablet, Take 1 Tab by mouth nightly., Disp: 30 Tab, Rfl: 11    furosemide (LASIX) 40 mg tablet, Take 1 Tab by mouth daily. (Patient taking differently: Take 40 mg by mouth every other day. Monday,Wednesday,Friday,Sunday), Disp: 30 Tab, Rfl: 11    carvedilol (COREG) 3.125 mg tablet, Take 1 Tab by mouth two (2) times daily (with meals). , Disp: 60 Tab, Rfl: 11    warfarin (COUMADIN) 1 mg tablet, Take 1.5 Tabs by mouth every evening., Disp: 45 Tab, Rfl: 0    mirtazapine (REMERON) 30 mg tablet, Take 1 Tab by mouth nightly., Disp: 30 Tab, Rfl: 1    amLODIPine (NORVASC) 10 mg tablet, Take 1 Tab by mouth daily. , Disp: 30 Tab, Rfl: 11   Date Last Reviewed:  3/28/18   Number of Personal Factors/Comorbidities that affect the Plan of Care: 3+: HIGH COMPLEXITY   EXAMINATION:   ROM:          Within functional limits  Strength:          Grossly 4/5 x 4 extremities  Functional Mobility:         Gait/Ambulation:  Uses walker        Transfers:  independent  Balance:          Decreased in standing and gait   Body Structures Involved:  1. Muscles Body Functions Affected:  1. Sensory/Pain  2. Neuromusculoskeletal  3. Movement Related Activities and Participation Affected:  1. General Tasks and Demands  2. Communication  3. Mobility   Number of elements (examined above) that affect the Plan of Care: 4+: HIGH COMPLEXITY   CLINICAL PRESENTATION:   Presentation: Stable and uncomplicated: LOW COMPLEXITY   CLINICAL DECISION MAKING:   Outcome Measure: Tool Used: 6-MINUTE WALK TEST  Unable to test  Score:  Initial:   feet Most Recent: X feet (Date: -- )   Interpretation of Score: Normal range varies but is approximately 6616-5291 Feet      Distance walked:   feet               Baseline End of Test   Heart Rate      Dyspnea (Mehul Scale)     Fatigue (Mehul Scale)     SpO2     BP       Score [phone] 15-0   Modifier CH CI CJ CK CL CM CN         Tool Used: Disabilities of the Arm, Shoulder and Hand (DASH) Questionnaire - Quick Version  Score:  Initial: 41/55  Most Recent: 41/55 (Date: 5/2/18 )   Interpretation of Score: The DASH is designed to measure the activities of daily living in person's with upper extremity dysfunction or pain. Each section is scored on a 1-5 scale, 5 representing the greatest disability. The scores of each section are added together for a total score of 55.     Score 11 12-19 20-28 29-37 38-45 46-54 55   Modifier CH CI CJ CK CL CM CN         Tool Used: TINETTI  Score:  Initial:   Gait: 6/12  Balance: 10/16  TOTAL: 16/28 Most Recent:  Gait: 9/12  Balance:13 /16  TOTAL:22 /28   Interpretation of Score: The maximum score for the gait component is 12 points. The maximum score for the balance component is 16 points. The maximum total score is 28 points. In general, patients who score below 19 are at a high risk for falls. Patients who score in the range of 19-24 indicate that the patient has a risk for falls. Score 28 27-23 22-18 17-12 11-6 5-1 0   Modifier CH CI CJ CK CL CM CN         Tool Used: Timed Up and Go (TUG)  Score:  Initial: 25 seconds Most Recent: 25 seconds (Date: 5/2/18 )   Interpretation of Score: The test measures, in seconds, the time taken by an individual to stand up from a standard arm chair (seat height 46 cm [18 in], arm height 65 cm [25.6 in]), walk a distance of 3 meters (118 in, approx 10 ft), turn, walk back to the chair and sit down. If the individual takes longer than 14 seconds to complete TUG, this indicates risk for falls. Score 7 7.5-10.5 11-14 14.5-17.5 18-21 21.5-24.5 25+   Modifier CH CI CJ CK CL CM CN        Tool Used: ECOG Performance Survey Score  Score:  Initial: 3 Most Recent:  2    Interpretation of Score:   0 Fully active, able to carry on all pre-disease performance without restriction   1 Restricted in physically strenuous activity but ambulatory and able to carry out work of a light or sedentary nature, e.g., light house work, office work   2 Ambulatory and capable of all selfcare but unable to carry out any work activities. Up and about more than 50% of waking hours   3 Capable of only limited selfcare, confined to bed or chair more than 50% of waking hours   4 Completely disabled. Cannot carry on any selfcare. Totally confined to bed or chair   5 Dead        Medical Necessity:   · Patient is expected to demonstrate progress in strength, balance and gait to increase independence with household activity.   Reason for Services/Other Comments:  · Patient continues to demonstrate capacity to improve strength, balance and gait which will increase independence and decrease amount of assistance required from caregiver. Use of outcome tool(s) and clinical judgement create a POC that gives a: Questionable prediction of patient's progress: MODERATE COMPLEXITY            TREATMENT:   (In addition to Assessment/Re-Assessment sessions the following treatments were rendered)  Pre-treatment Symptoms/Complaints:  Fatigue 9/10, fatigue 10 /10. Pt states therapy helps with her pain and fatigue. Pain: Initial:     9/10 bilateral LE's and low back Post Session:  8/10   O2 94% HR 81 bpm  /70 mm/Hg  DASH, TUG, Tinetti,   Therapeutic Exercise: ( 40 minutes):  Exercises per below to improve mobility and strength. Required minimal verbal cues to promote proper body alignment and promote proper body posture. Progressed resistance and repetitions as indicated. Long arc quads x 10 reps with 5 count hold with 2#, hip flexion in sitting x 10 reps with 2#  Standing up on toes, hip flexion, hip abduction, hip extension, hamstring curls x 10 reps with 2#  Bicep curls with 3# x 15 reps  Bilateral upper extremity with 3# x 10 reps forward flexion and abduction  Bilateral upper extremity overhead press with 3# x 10 reps. Scapular retraction with blue theraband/ push forward x 10 reps  Sitting hamstring curls with blue theraband x 10 reps  Sitting hip abduction with blue theraband  Sitting hip adduction with inflated ball x 10 reps with 5 count hold   Sit to stand from elevated surface x 10 reps  Wall Push-Ups x10 reps    Below not performed today:    Supine bridging x 10 reps  Supine SLR x 10 reps  Short arc quads x 10 reps with 5 count hold  Crunches in supine x 10 reps   wall slides x 5 reps  as above. Given HEP:  Walk 1-2 minutes each waking hour when supervised, sit to stand from bedside, sit in chair instead of the bed each day. Added sit to stand, long arc quads and standing exercises at the sink daily.   Bilateral upper extremity with light weight every other day. Given written instructions. Given red theraband for home use. [employer] Portal  Treatment/Session Assessment:    · Response to Treatment:  Pt tolerated all treatments well. She required several rest breaks due to fatigue. OT to look at swelling in patient's R LE next visit. · Compliance with Program/Exercises: Will assess as treatment progresses. · Recommendations/Intent for next treatment session: \"Next visit will focus on advancements to more challenging activities\".     Total Treatment Duration: 40 min  PT Patient Time In/Time Out  Time In: 1645  Time Out: [address], PT

## 2018-05-17 NOTE — PROGRESS NOTES
Javier Royal  : 1950  Primary: Kiana Collier Fap 100%  Secondary:  [address]  at [facility]  [address], [address], [place]  [phone]   [phone]          OUTPATIENT PHYSICAL THERAPY:Daily Note 2018    ICD-10: Treatment Diagnosis: muscle weakness generalized M 62.81  Precautions/Allergies:   Review of patient's allergies indicates no known allergies. Fall Risk Score: 9 (? 5 = High Risk)  MD Orders: oncology rehab MEDICAL/REFERRING DIAGNOSIS:  Other fatigue [R53.83]    DATE OF ONSET:   REFERRING PHYSICIAN: Kristen Kate MD  RETURN PHYSICIAN APPOINTMENT: 18     INITIAL ASSESSMENT:  Ms. Angel Amato presents following treatment for metastatic breast cancer since . She has developed significant fatigue and weakness. She spends most of the day in bed. She has had a fall in the recent past.  She uses a rolling walker. She could possibly benefit from home health, but is willing to attend therapy as outpatient. She will benefit from therapeutic exercises in order to increase strength and overall conditioning. 18: The patient has attended a total of 10 visits. She has been compliant with her HEP. She is making slow, but steady progress. She will benefit from continued therapy in order to reach her goals. Her spouse reports she is able to do more functional activities at home and to be out of the bed more. She is awaiting the results of her most recent PET scan. PROBLEM LIST (Impacting functional limitations):  1. Decreased Strength  2. Decreased ADL/Functional Activities  3. Decreased Transfer Abilities  4. Decreased Balance  5. Increased Pain  6. Decreased Activity Tolerance  7. Increased Fatigue  8. Decreased [place] with Home Exercise Program INTERVENTIONS PLANNED:  1. Home Exercise Program (HEP)  2. Therapeutic Exercise/Strengthening   TREATMENT PLAN:  Effective Dates: 3/28/2018 TO 2018 (90 days).   Frequency/Duration: 2 time a week for 90 Days  GOALS: (Goals have been discussed and agreed upon with patient.)  Short-Term Functional Goals: Time Frame: 4 weeks  1. The patient will be independent with HEP for strength within 4 weeks. Met   2. The patient will report a fatigue of 7 or less within 4 weeks. 3. The patient will gain 1/2 grade strength within 4 weeks. Met   4. The patient will improve her TUG score by 2 seconds within 4 weeks. 5. The patient will improve her Tinetti score by 5 within 4 weeks. Met   Discharge Goals: Time Frame: 8 weeks  1. The patient will report a fatigue level of 5 or less within 8 weeks. 2. The patient will improve her TUG score by 5 seconds within 8 weeks. 3. The patient will participate in a 6 minute walk within 8 weeks. Rehabilitation Potential For Stated Goals: [address] therapy, I certify that the treatment plan above will be carried out by a therapist or under their direction. Thank you for this referral,  Lakesha Ring, PT     Referring Physician Signature: Aurelia Lopez MD              Date                    The information in this section was collected on 3/28/18 (except where otherwise noted). HISTORY:   History of Present Injury/Illness (Reason for Referral):  Metastatic breast cancer, chemo, fatigue, weakness  Past Medical History/Comorbidities:   Ms. Cade Menchaca  has a past medical history of Acute on chronic diastolic congestive heart failure ([address] (1/5/2016); Anemia of chronic disease (11/13/2017); Aortic valve stenosis; Arthritis; Asthma; Cancer ([address] Chemotherapy-induced neutropenia (HCC) (12/20/2016); CKD (chronic kidney disease) stage 4, GFR 15-29 ml/min ([address] Depression; Diastolic CHF ([address] Former cigarette smoker; HCAP (healthcare-associated pneumonia) (7/17/2013); History of DVT (deep vein thrombosis); Hypertension; Long term current use of anticoagulant; Oxygen dependent; Port catheter in place;  Sepsis ([address] (7/19/2016); SOB (shortness of breath) on exertion; Type 2 diabetes mellitus ([facility] Unspecified adverse effect of anesthesia; and Vitamin B12 deficiency (2017). Ms. Diana Talavera  has a past surgical history that includes hx  section; hx cyst removal; hx urological; hx vascular access (2012); and colonoscopy (N/A, 3/19/2018). Past Medical History:   Diagnosis Date    Acute on chronic diastolic congestive heart failure ([address] 2016    Anemia of chronic disease 2017    Aortic valve stenosis     Arthritis     roverto hands    Asthma     till age 31    Cancer ([address]     roverto. breast ca mets bone/lung    Chemotherapy-induced neutropenia ([facility] 2016    CKD (chronic kidney disease) stage 4, GFR 15-29 ml/min ([facility])     Depression     managed with meds    Diastolic CHF ([facility]     Followed by Donte Kerns    Former cigarette smoker     HCAP (healthcare-associated pneumonia) 2013    History of DVT (deep vein thrombosis)     Left leg DVT.  on coumadin    Hypertension     managed with meds    Long term current use of anticoagulant     Coumadin    Oxygen dependent     at bedtime 2L-3L NC    Port catheter in place     Left chest port    Sepsis ([facility] 2016    SOB (shortness of breath) on exertion     Type 2 diabetes mellitus (HCC)     PRN oral agent/AVG BS: 170-200/ s.s of hypoglycemia at 79    Unspecified adverse effect of anesthesia     In [place] 28 yrs ago after second c -section-she was told her heart stopped d/t anesthesia-hospitalized for 5 days afterwards and followed by cardiologist    Vitamin B12 deficiency 2017     Past Surgical History:   Procedure Laterality Date    COLONOSCOPY N/A 3/19/2018    COLONOSCOPY  BMI 21   performed by Pop Brewer MD at [facility] ENDOSCOPY    HX [address]    HX CYST REMOVAL      HX UROLOGICAL      stent placed    HX VASCULAR ACCESS  2012    Left chest port       Social History/Living Environment:     lives with family  Prior Level of Function/Work/Activity:  independent  Dominant Side:         RIGHT  Current Medications:       Current Outpatient Prescriptions:     fentaNYL (DURAGESIC) 50 mcg/hr PATCH, 1 Patch by TransDERmal route every seventy-two (72) hours. Max Daily Amount: 1 Patch. Indications: Chronic Pain with Opioid Tolerance, Disp: 10 Patch, Rfl: 0    LORazepam (ATIVAN) 1 mg tablet, Take 1 Tab by mouth every six (6) hours as needed for Anxiety. Max Daily Amount: 4 mg., Disp: 60 Tab, Rfl: 0    HYDROcodone-acetaminophen (NORCO)  mg tablet, Take 1 Tab by mouth every six (6) hours as needed for Pain. Max Daily Amount: 4 Tabs., Disp: 90 Tab, Rfl: 0    albuterol-ipratropium (DUO-NEB) 2.5 mg-0.5 mg/3 ml nebu, 3 mL by Nebulization route every six (6) hours as needed. , Disp: 360 Nebule, Rfl: 1    GLIPIZIDE PO, Take  by mouth as needed. Pt only takes if BS > 200, Disp: , Rfl:     palbociclib 125 mg cap, Take 1 Cap by mouth daily. Take one pill once a day for 3 weeks and then off for one week, Disp: 21 Cap, Rfl: 5    potassium chloride (K-DUR, KLOR-CON) 20 mEq tablet, Take 1 Tab by mouth two (2) times a day., Disp: 60 Tab, Rfl: 0    hydrALAZINE (APRESOLINE) 50 mg tablet, Take 1 Tab by mouth three (3) times daily. (Patient taking differently: Take 50 mg by mouth two (2) times a day.), Disp: 90 Tab, Rfl: 1    montelukast (SINGULAIR) 10 mg tablet, Take 1 Tab by mouth nightly., Disp: 30 Tab, Rfl: 11    furosemide (LASIX) 40 mg tablet, Take 1 Tab by mouth daily. (Patient taking differently: Take 40 mg by mouth every other day. Monday,Wednesday,Friday,Sunday), Disp: 30 Tab, Rfl: 11    carvedilol (COREG) 3.125 mg tablet, Take 1 Tab by mouth two (2) times daily (with meals). , Disp: 60 Tab, Rfl: 11    warfarin (COUMADIN) 1 mg tablet, Take 1.5 Tabs by mouth every evening., Disp: 45 Tab, Rfl: 0    mirtazapine (REMERON) 30 mg tablet, Take 1 Tab by mouth nightly., Disp: 30 Tab, Rfl: 1    amLODIPine (NORVASC) 10 mg tablet, Take 1 Tab by mouth daily. , Disp: 30 Tab, Rfl: 11   Date Last Reviewed:  3/28/18   Number of Personal Factors/Comorbidities that affect the Plan of Care: 3+: HIGH COMPLEXITY   EXAMINATION:   ROM:          Within functional limits  Strength:          Grossly 4/5 x 4 extremities  Functional Mobility:         Gait/Ambulation:  Uses walker        Transfers:  independent  Balance:          Decreased in standing and gait   Body Structures Involved:  1. Muscles Body Functions Affected:  1. Sensory/Pain  2. Neuromusculoskeletal  3. Movement Related Activities and Participation Affected:  1. General Tasks and Demands  2. Communication  3. Mobility   Number of elements (examined above) that affect the Plan of Care: 4+: HIGH COMPLEXITY   CLINICAL PRESENTATION:   Presentation: Stable and uncomplicated: LOW COMPLEXITY   CLINICAL DECISION MAKING:   Outcome Measure: Tool Used: 6-MINUTE WALK TEST  Unable to test  Score:  Initial:   feet Most Recent: X feet (Date: -- )   Interpretation of Score: Normal range varies but is approximately 9904-2797 Feet      Distance walked:   feet               Baseline End of Test   Heart Rate      Dyspnea (Mehul Scale)     Fatigue (Mehul Scale)     SpO2     BP       Score [id number] [phone] 15-0   Modifier CH CI CJ CK CL CM CN         Tool Used: Disabilities of the Arm, Shoulder and Hand (DASH) Questionnaire - Quick Version  Score:  Initial: 41/55  Most Recent: 41/55 (Date: 5/2/18 )   Interpretation of Score: The DASH is designed to measure the activities of daily living in person's with upper extremity dysfunction or pain. Each section is scored on a 1-5 scale, 5 representing the greatest disability. The scores of each section are added together for a total score of 55.     Score 11 12-19 20-28 29-37 38-45 46-54 55   Modifier CH CI CJ CK CL CM CN         Tool Used: TINETTI  Score:  Initial:   Gait: 6/12  Balance: 10/16  TOTAL: 16/28 Most Recent:  Gait: 9/12  Balance:13 /16  TOTAL:22 /28   Interpretation of Score: The maximum score for the gait component is 12 points. The maximum score for the balance component is 16 points. The maximum total score is 28 points. In general, patients who score below 19 are at a high risk for falls. Patients who score in the range of 19-24 indicate that the patient has a risk for falls. Score 28 27-23 22-18 17-12 11-6 5-1 0   Modifier CH CI CJ CK CL CM CN         Tool Used: Timed Up and Go (TUG)  Score:  Initial: 25 seconds Most Recent: 25 seconds (Date: 5/2/18 )   Interpretation of Score: The test measures, in seconds, the time taken by an individual to stand up from a standard arm chair (seat height 46 cm [18 in], arm height 65 cm [25.6 in]), walk a distance of 3 meters (118 in, approx 10 ft), turn, walk back to the chair and sit down. If the individual takes longer than 14 seconds to complete TUG, this indicates risk for falls. Score 7 7.5-10.5 11-14 14.5-17.5 18-21 21.5-24.5 25+   Modifier CH CI CJ CK CL CM CN        Tool Used: ECOG Performance Survey Score  Score:  Initial: 3 Most Recent:  2    Interpretation of Score:   0 Fully active, able to carry on all pre-disease performance without restriction   1 Restricted in physically strenuous activity but ambulatory and able to carry out work of a light or sedentary nature, e.g., light house work, office work   2 Ambulatory and capable of all selfcare but unable to carry out any work activities. Up and about more than 50% of waking hours   3 Capable of only limited selfcare, confined to bed or chair more than 50% of waking hours   4 Completely disabled. Cannot carry on any selfcare. Totally confined to bed or chair   5 Dead        Medical Necessity:   · Patient is expected to demonstrate progress in strength, balance and gait to increase independence with household activity.   Reason for Services/Other Comments:  · Patient continues to demonstrate capacity to improve strength, balance and gait which will increase independence and decrease amount of assistance required from caregiver. Use of outcome tool(s) and clinical judgement create a POC that gives a: Questionable prediction of patient's progress: MODERATE COMPLEXITY            TREATMENT:   (In addition to Assessment/Re-Assessment sessions the following treatments were rendered)  Pre-treatment Symptoms/Complaints:  Fatigue 9/10, fatigue 10 /10. Pt states she is able to transition sit to stand better now and feels that she is slowly getting increase in her strength. Pain: Initial:     9/10 bilateral LE's and low back Post Session:  8/10   O2 97% HR 83 bpm  /60 mm/Hg  DASH, TUG, Tinetti,   Therapeutic Exercise: ( 40 minutes):  Exercises per below to improve mobility and strength. Required minimal verbal cues to promote proper body alignment and promote proper body posture. Progressed resistance and repetitions as indicated. Long arc quads x 15 reps with 5 count hold with 2#, hip flexion in sitting x 15 reps with 2#  Standing up on toes, hip flexion, hip abduction, hip extension, hamstring curls x 15 reps with 2#  Bicep curls with 3# x 15 reps  Bilateral upper extremity with 3# x 12 reps forward flexion and abduction  Bilateral upper extremity overhead press with 3# x 12 reps. Scapular retraction with blue theraband/ push forward x 15 reps  Sitting hamstring curls with blue theraband x 15 reps  Sitting hip abduction with blue theraband x15 reps  Sitting hip adduction with inflated ball x 15 reps with 5 count hold   Sit to stand from elevated surface x 15 reps  Wall Push-Ups x15 reps    Below not performed today:    Supine bridging x 10 reps  Supine SLR x 10 reps  Short arc quads x 10 reps with 5 count hold  Crunches in supine x 10 reps   wall slides x 5 reps  as above. Given HEP:  Walk 1-2 minutes each waking hour when supervised, sit to stand from bedside, sit in chair instead of the bed each day.   Added sit to stand, long arc quads and standing exercises at the sink daily. Bilateral upper extremity with light weight every other day. Given written instructions. Given red theraband for home use. [employer]  Treatment/Session Assessment:    · Response to Treatment:  Pt tolerated all treatments well. She required several rest breaks due to fatigue. Galilea Butler looked at patients R foot swelling and recommended her try some compression stockings. Pt going to [facility] getting some of [facility] at [facility]. Increased reps on all exercises today and pt did well. · Compliance with Program/Exercises: Will assess as treatment progresses. · Recommendations/Intent for next treatment session: \"Next visit will focus on advancements to more challenging activities\".     Total Treatment Duration: 40 min  PT Patient Time In/Time Out  Time In: 8951  Time Out: [facility] [address] Nicole Conklin, PT

## 2018-05-22 NOTE — PROGRESS NOTES
Arrived to the [facility]. Faslodex, Xgeva, B12 given. Procrit held for Hgb 10.8. Patient tolerated well. Any issues or concerns during appointment: NO.  Patient will be called with next appointment due on 06/05/18. Discharged ambulatory.

## 2018-05-22 NOTE — PROGRESS NOTES
Pt cancelled her physical therapy appt for this morning due to having appt at the [facility] and they were running behind.     Shreya Hollis, PT

## 2018-05-30 NOTE — PROGRESS NOTES
Michele Pelletier  : 1950  Primary: Lauren Cisse Fap 100%  Secondary:  [address]  at [facility]  [address], [address], [place]  [phone]   [phone]          OUTPATIENT PHYSICAL THERAPY:Daily Note 2018    ICD-10: Treatment Diagnosis: muscle weakness generalized M 62.81  Precautions/Allergies:   Review of patient's allergies indicates no known allergies. Fall Risk Score: 9 (? 5 = High Risk)  MD Orders: oncology rehab MEDICAL/REFERRING DIAGNOSIS:  Other fatigue [R53.83]    DATE OF ONSET:   REFERRING PHYSICIAN: Leyda Be MD  RETURN PHYSICIAN APPOINTMENT: 18     INITIAL ASSESSMENT:  Ms. Alphonso Dorado presents following treatment for metastatic breast cancer since . She has developed significant fatigue and weakness. She spends most of the day in bed. She has had a fall in the recent past.  She uses a rolling walker. She could possibly benefit from home health, but is willing to attend therapy as outpatient. She will benefit from therapeutic exercises in order to increase strength and overall conditioning. 18: The patient has attended a total of 10 visits. She has been compliant with her HEP. She is making slow, but steady progress. She will benefit from continued therapy in order to reach her goals. Her spouse reports she is able to do more functional activities at home and to be out of the bed more. She is awaiting the results of her most recent PET scan. PROBLEM LIST (Impacting functional limitations):  1. Decreased Strength  2. Decreased ADL/Functional Activities  3. Decreased Transfer Abilities  4. Decreased Balance  5. Increased Pain  6. Decreased Activity Tolerance  7. Increased Fatigue  8. Decreased [place] with Home Exercise Program INTERVENTIONS PLANNED:  1. Home Exercise Program (HEP)  2. Therapeutic Exercise/Strengthening   TREATMENT PLAN:  Effective Dates: 3/28/2018 TO 2018 (90 days).   Frequency/Duration: 2 time a week for 90 Days  GOALS: (Goals have been discussed and agreed upon with patient.)  Short-Term Functional Goals: Time Frame: 4 weeks  1. The patient will be independent with HEP for strength within 4 weeks. Met   2. The patient will report a fatigue of 7 or less within 4 weeks. 3. The patient will gain 1/2 grade strength within 4 weeks. Met   4. The patient will improve her TUG score by 2 seconds within 4 weeks. 5. The patient will improve her Tinetti score by 5 within 4 weeks. Met   Discharge Goals: Time Frame: 8 weeks  1. The patient will report a fatigue level of 5 or less within 8 weeks. 2. The patient will improve her TUG score by 5 seconds within 8 weeks. 3. The patient will participate in a 6 minute walk within 8 weeks. Rehabilitation Potential For Stated Goals: [address] therapy, I certify that the treatment plan above will be carried out by a therapist or under their direction. Thank you for this referral,  Olivia Nguyễn PT     Referring Physician Signature: Jared Ponce MD              Date                    The information in this section was collected on 3/28/18 (except where otherwise noted). HISTORY:   History of Present Injury/Illness (Reason for Referral):  Metastatic breast cancer, chemo, fatigue, weakness  Past Medical History/Comorbidities:   Ms. Angela Izaguirre  has a past medical history of Acute on chronic diastolic congestive heart failure ([address] (1/5/2016); Anemia of chronic disease (11/13/2017); Aortic valve stenosis; Arthritis; Asthma; Cancer ([address] Chemotherapy-induced neutropenia (HCC) (12/20/2016); CKD (chronic kidney disease) stage 4, GFR 15-29 ml/min ([address] Depression; Diastolic CHF ([address] Former cigarette smoker; HCAP (healthcare-associated pneumonia) (7/17/2013); History of DVT (deep vein thrombosis); Hypertension; Long term current use of anticoagulant; Oxygen dependent; Port catheter in place;  Sepsis ([address] (7/19/2016); SOB (shortness of breath) on exertion; Type 2 diabetes mellitus ([address] Unspecified adverse effect of anesthesia; and Vitamin B12 deficiency (2017). Ms. Leann Morales  has a past surgical history that includes hx  section; hx cyst removal; hx urological; hx vascular access (2012); and colonoscopy (N/A, 3/19/2018). Past Medical History:   Diagnosis Date    Acute on chronic diastolic congestive heart failure ([address] 2016    Anemia of chronic disease 2017    Aortic valve stenosis     Arthritis     roverto hands    Asthma     till age 31    Cancer ([address]     roverto. breast ca mets bone/lung    Chemotherapy-induced neutropenia ([address] 2016    CKD (chronic kidney disease) stage 4, GFR 15-29 ml/min ([facility])     Depression     managed with meds    Diastolic CHF ([address]     Followed by Donte Kerns    Former cigarette smoker     HCAP (healthcare-associated pneumonia) 2013    History of DVT (deep vein thrombosis)     Left leg DVT.  on coumadin    Hypertension     managed with meds    Long term current use of anticoagulant     Coumadin    Oxygen dependent     at bedtime 2L-3L NC    Port catheter in place     Left chest port    Sepsis ([address] 2016    SOB (shortness of breath) on exertion     Type 2 diabetes mellitus (HCC)     PRN oral agent/AVG BS: 170-200/ s.s of hypoglycemia at 79    Unspecified adverse effect of anesthesia     In [place] 28 yrs ago after second c -section-she was told her heart stopped d/t anesthesia-hospitalized for 5 days afterwards and followed by cardiologist    Vitamin B12 deficiency 2017     Past Surgical History:   Procedure Laterality Date    COLONOSCOPY N/A 3/19/2018    COLONOSCOPY  BMI 21   performed by [facility] at [facility] ENDOSCOPY    HX [address]    HX CYST REMOVAL      HX UROLOGICAL      stent placed    HX VASCULAR ACCESS  2012    Left chest port       Social History/Living Environment:     lives with family  Prior Level of Function/Work/Activity:  independent  Dominant Side:         RIGHT  Current Medications:       Current Outpatient Prescriptions:     warfarin (COUMADIN) 3 mg tablet, Take 1 Tab by mouth daily. , Disp: 30 Tab, Rfl: 2    fentaNYL (DURAGESIC) 50 mcg/hr PATCH, 1 Patch by TransDERmal route every seventy-two (72) hours. Max Daily Amount: 1 Patch. Indications: Chronic Pain with Opioid Tolerance, Disp: 10 Patch, Rfl: 0    LORazepam (ATIVAN) 1 mg tablet, Take 1 Tab by mouth every six (6) hours as needed for Anxiety. Max Daily Amount: 4 mg., Disp: 60 Tab, Rfl: 0    HYDROcodone-acetaminophen (NORCO)  mg tablet, Take 1 Tab by mouth every six (6) hours as needed for Pain. Max Daily Amount: 4 Tabs., Disp: 90 Tab, Rfl: 0    albuterol-ipratropium (DUO-NEB) 2.5 mg-0.5 mg/3 ml nebu, 3 mL by Nebulization route every six (6) hours as needed. , Disp: 360 Nebule, Rfl: 1    GLIPIZIDE PO, Take  by mouth as needed. Pt only takes if BS > 200, Disp: , Rfl:     palbociclib 125 mg cap, Take 1 Cap by mouth daily. Take one pill once a day for 3 weeks and then off for one week, Disp: 21 Cap, Rfl: 5    potassium chloride (K-DUR, KLOR-CON) 20 mEq tablet, Take 1 Tab by mouth two (2) times a day., Disp: 60 Tab, Rfl: 0    hydrALAZINE (APRESOLINE) 50 mg tablet, Take 1 Tab by mouth three (3) times daily. (Patient taking differently: Take 50 mg by mouth two (2) times a day.), Disp: 90 Tab, Rfl: 1    montelukast (SINGULAIR) 10 mg tablet, Take 1 Tab by mouth nightly., Disp: 30 Tab, Rfl: 11    furosemide (LASIX) 40 mg tablet, Take 1 Tab by mouth daily. (Patient taking differently: Take 40 mg by mouth every other day. Monday,Wednesday,Friday,Sunday), Disp: 30 Tab, Rfl: 11    carvedilol (COREG) 3.125 mg tablet, Take 1 Tab by mouth two (2) times daily (with meals). , Disp: 60 Tab, Rfl: 11    warfarin (COUMADIN) 1 mg tablet, Take 1.5 Tabs by mouth every evening., Disp: 45 Tab, Rfl: 0    mirtazapine (REMERON) 30 mg tablet, Take 1 Tab by mouth nightly., Disp: 30 Tab, Rfl: 1    amLODIPine (NORVASC) 10 mg tablet, Take 1 Tab by mouth daily. , Disp: 30 Tab, Rfl: 11   Date Last Reviewed:  3/28/18   Number of Personal Factors/Comorbidities that affect the Plan of Care: 3+: HIGH COMPLEXITY   EXAMINATION:   ROM:          Within functional limits  Strength:          Grossly 4/5 x 4 extremities  Functional Mobility:         Gait/Ambulation:  Uses walker        Transfers:  independent  Balance:          Decreased in standing and gait   Body Structures Involved:  1. Muscles Body Functions Affected:  1. Sensory/Pain  2. Neuromusculoskeletal  3. Movement Related Activities and Participation Affected:  1. General Tasks and Demands  2. Communication  3. Mobility   Number of elements (examined above) that affect the Plan of Care: 4+: HIGH COMPLEXITY   CLINICAL PRESENTATION:   Presentation: Stable and uncomplicated: LOW COMPLEXITY   CLINICAL DECISION MAKING:   Outcome Measure: Tool Used: 6-MINUTE WALK TEST  Unable to test  Score:  Initial:   feet Most Recent: X feet (Date: -- )   Interpretation of Score: Normal range varies but is approximately 0800-7660 Feet      Distance walked:   feet               Baseline End of Test   Heart Rate      Dyspnea (Mehul Scale)     Fatigue (Mehul Scale)     SpO2     BP       Score 2133 [id number] [phone] 15-0   Modifier CH CI CJ CK CL CM CN         Tool Used: Disabilities of the Arm, Shoulder and Hand (DASH) Questionnaire - Quick Version  Score:  Initial: 41/55  Most Recent: 41/55 (Date: 5/2/18 )   Interpretation of Score: The DASH is designed to measure the activities of daily living in person's with upper extremity dysfunction or pain. Each section is scored on a 1-5 scale, 5 representing the greatest disability. The scores of each section are added together for a total score of 55.     Score 11 12-19 20-28 29-37 38-45 46-54 55   Modifier CH CI CJ CK CL CM CN         Tool Used: TINETTI  Score:  Initial:   Gait: 6/12  Balance: 10/16  TOTAL: 16/28 Most Recent:  Gait: 9/12  Balance:13 /16  TOTAL:22 /28   Interpretation of Score: The maximum score for the gait component is 12 points. The maximum score for the balance component is 16 points. The maximum total score is 28 points. In general, patients who score below 19 are at a high risk for falls. Patients who score in the range of 19-24 indicate that the patient has a risk for falls. Score 28 27-23 22-18 17-12 11-6 5-1 0   Modifier CH CI CJ CK CL CM CN         Tool Used: Timed Up and Go (TUG)  Score:  Initial: 25 seconds Most Recent: 25 seconds (Date: 5/2/18 )   Interpretation of Score: The test measures, in seconds, the time taken by an individual to stand up from a standard arm chair (seat height 46 cm [18 in], arm height 65 cm [25.6 in]), walk a distance of 3 meters (118 in, approx 10 ft), turn, walk back to the chair and sit down. If the individual takes longer than 14 seconds to complete TUG, this indicates risk for falls. Score 7 7.5-10.5 11-14 14.5-17.5 18-21 21.5-24.5 25+   Modifier CH CI CJ CK CL CM CN        Tool Used: ECOG Performance Survey Score  Score:  Initial: 3 Most Recent:  2    Interpretation of Score:   0 Fully active, able to carry on all pre-disease performance without restriction   1 Restricted in physically strenuous activity but ambulatory and able to carry out work of a light or sedentary nature, e.g., light house work, office work   2 Ambulatory and capable of all selfcare but unable to carry out any work activities. Up and about more than 50% of waking hours   3 Capable of only limited selfcare, confined to bed or chair more than 50% of waking hours   4 Completely disabled. Cannot carry on any selfcare. Totally confined to bed or chair   5 Dead        Medical Necessity:   · Patient is expected to demonstrate progress in strength, balance and gait to increase independence with household activity.   Reason for Services/Other Comments:  · Patient continues to demonstrate capacity to improve strength, balance and gait which will increase independence and decrease amount of assistance required from caregiver. Use of outcome tool(s) and clinical judgement create a POC that gives a: Questionable prediction of patient's progress: MODERATE COMPLEXITY            TREATMENT:   (In addition to Assessment/Re-Assessment sessions the following treatments were rendered)  Pre-treatment Symptoms/Complaints:  Pain 10/10, fatigue 10 /10. Pt got some compression stockings which are helping her R LE swelling a lot. Pain: Initial:     10/10 bilateral LE's and low back Post Session:  8/10   O2 95% HR 62 bpm  /57 mm/Hg  DASH, TUG, Tinetti,   Therapeutic Exercise: ( 40 minutes):  Exercises per below to improve mobility and strength. Required minimal verbal cues to promote proper body alignment and promote proper body posture. Progressed resistance and repetitions as indicated. Long arc quads x 15 reps with 5 count hold with 2#, hip flexion in sitting x 15 reps with 2#  Standing up on toes, hip flexion, hip abduction, hip extension, hamstring curls x 15 reps with 2#  Bicep curls with 3# x 15 reps  Bilateral upper extremity with 3# x 12 reps forward flexion and abduction  Bilateral upper extremity overhead press with 3# x 12 reps. Scapular retraction with blue theraband/ push forward x 20 reps  Sitting hamstring curls with blue theraband x 15 reps  Sitting hip abduction with blue theraband x15 reps  Sitting hip adduction with inflated ball x 15 reps with 5 count hold   Sit to stand from elevated surface x 15 reps  Wall Push-Ups x15 reps    Below not performed today:    Supine bridging x 10 reps  Supine SLR x 10 reps  Short arc quads x 10 reps with 5 count hold  Crunches in supine x 10 reps   wall slides x 5 reps  as above. Given HEP:  Walk 1-2 minutes each waking hour when supervised, sit to stand from bedside, sit in chair instead of the bed each day.   Added sit to stand, long arc quads and standing exercises at the sink daily. Bilateral upper extremity with light weight every other day. Given written instructions. Given red theraband for home use. [address] [employer]  Treatment/Session Assessment:    · Response to Treatment:  Pt tolerated all treatments well. She is tolerating increased reps on exercises well and did require a few rest breaks throughout session due to fatigue. · Compliance with Program/Exercises: Will assess as treatment progresses. · Recommendations/Intent for next treatment session: \"Next visit will focus on advancements to more challenging activities\".     Total Treatment Duration: 40 min  PT Patient Time In/Time Out  Time In: 0815  Time Out: Jennifer Coleman 77, PT

## 2018-06-01 NOTE — PROGRESS NOTES
Freddie   : 1950  Primary: Kacey Vazquez Fap 100%  Secondary:  [address]  at [facility]  [address] , [address], [place] [address]  [phone]   [phone]          OUTPATIENT PHYSICAL THERAPY:Daily Note 2018    ICD-10: Treatment Diagnosis: muscle weakness generalized M 62.81  Precautions/Allergies:   Review of patient's allergies indicates no known allergies. Fall Risk Score: 9 (? 5 = High Risk)  MD Orders: oncology rehab MEDICAL/REFERRING DIAGNOSIS:  Other fatigue [R53.83]    DATE OF ONSET:   REFERRING PHYSICIAN: Guy Castillo MD  RETURN PHYSICIAN APPOINTMENT: 18     INITIAL ASSESSMENT:  Ms. Alejo Thibodeaux presents following treatment for metastatic breast cancer since . She has developed significant fatigue and weakness. She spends most of the day in bed. She has had a fall in the recent past.  She uses a rolling walker. She could possibly benefit from home health, but is willing to attend therapy as outpatient. She will benefit from therapeutic exercises in order to increase strength and overall conditioning. 18: The patient has attended a total of 10 visits. She has been compliant with her HEP. She is making slow, but steady progress. She will benefit from continued therapy in order to reach her goals. Her spouse reports she is able to do more functional activities at home and to be out of the bed more. She is awaiting the results of her most recent PET scan. PROBLEM LIST (Impacting functional limitations):  1. Decreased Strength  2. Decreased ADL/Functional Activities  3. Decreased Transfer Abilities  4. Decreased Balance  5. Increased Pain  6. Decreased Activity Tolerance  7. Increased Fatigue  8. Decreased [place] with Home Exercise Program INTERVENTIONS PLANNED:  1. Home Exercise Program (HEP)  2. Therapeutic Exercise/Strengthening   TREATMENT PLAN:  Effective Dates: 3/28/2018 TO 2018 (90 days).   Frequency/Duration: 2 time a week for 90 Days  GOALS: (Goals have been discussed and agreed upon with patient.)  Short-Term Functional Goals: Time Frame: 4 weeks  1. The patient will be independent with HEP for strength within 4 weeks. Met   2. The patient will report a fatigue of 7 or less within 4 weeks. 3. The patient will gain 1/2 grade strength within 4 weeks. Met   4. The patient will improve her TUG score by 2 seconds within 4 weeks. 5. The patient will improve her Tinetti score by 5 within 4 weeks. Met   Discharge Goals: Time Frame: 8 weeks  1. The patient will report a fatigue level of 5 or less within 8 weeks. 2. The patient will improve her TUG score by 5 seconds within 8 weeks. 3. The patient will participate in a 6 minute walk within 8 weeks. Rehabilitation Potential For Stated Goals: [address] therapy, I certify that the treatment plan above will be carried out by a therapist or under their direction. Thank you for this referral,  Katie Tucker PT     Referring Physician Signature: Sita Gonzales MD              Date                    The information in this section was collected on 3/28/18 (except where otherwise noted). HISTORY:   History of Present Injury/Illness (Reason for Referral):  Metastatic breast cancer, chemo, fatigue, weakness  Past Medical History/Comorbidities:   Ms. Abner Blue  has a past medical history of Acute on chronic diastolic congestive heart failure ([address] (1/5/2016); Anemia of chronic disease (11/13/2017); Aortic valve stenosis; Arthritis; Asthma; Cancer ([address] Chemotherapy-induced neutropenia (HCC) (12/20/2016); CKD (chronic kidney disease) stage 4, GFR 15-29 ml/min ([address] Depression; Diastolic CHF ([address] Former cigarette smoker; HCAP (healthcare-associated pneumonia) (7/17/2013); History of DVT (deep vein thrombosis); Hypertension; Long term current use of anticoagulant; Oxygen dependent; Port catheter in place;  Sepsis ([address] (7/19/2016); SOB (shortness of breath) on exertion; Type 2 diabetes mellitus ([address] Unspecified adverse effect of anesthesia; and Vitamin B12 deficiency (2017). Ms. Angel Amato  has a past surgical history that includes hx  section; hx cyst removal; hx urological; hx vascular access (2012); and colonoscopy (N/A, 3/19/2018). Past Medical History:   Diagnosis Date    Acute on chronic diastolic congestive heart failure ([address] 2016    Anemia of chronic disease 2017    Aortic valve stenosis     Arthritis     roverto hands    Asthma     till age 31    Cancer ([address]     roverto. breast ca mets bone/lung    Chemotherapy-induced neutropenia ([address] 2016    CKD (chronic kidney disease) stage 4, GFR 15-29 ml/min ([facility]     Depression     managed with meds    Diastolic CHF ([address]     Followed by Donte Kerns    Former cigarette smoker     HCAP (healthcare-associated pneumonia) 2013    History of DVT (deep vein thrombosis)     Left leg DVT.  on coumadin    Hypertension     managed with meds    Long term current use of anticoagulant     Coumadin    Oxygen dependent     at bedtime 2L-3L NC    Port catheter in place     Left chest port    Sepsis ([address] 2016    SOB (shortness of breath) on exertion     Type 2 diabetes mellitus (HCC)     PRN oral agent/AVG BS: 170-200/ s.s of hypoglycemia at 79    Unspecified adverse effect of anesthesia     In [place] 28 yrs ago after second c -section-she was told her heart stopped d/t anesthesia-hospitalized for 5 days afterwards and followed by cardiologist    Vitamin B12 deficiency 2017     Past Surgical History:   Procedure Laterality Date    COLONOSCOPY N/A 3/19/2018    COLONOSCOPY  BMI 21   performed [facility] at [facility] ENDOSCOPY    HX [address]    HX CYST REMOVAL      HX UROLOGICAL      stent placed    HX VASCULAR ACCESS  2012    Left chest port       Social History/Living Environment:     lives with family  Prior Level of Function/Work/Activity:  independent  Dominant Side:         RIGHT  Current Medications:       Current Outpatient Prescriptions:     warfarin (COUMADIN) 3 mg tablet, Take 1 Tab by mouth daily. , Disp: 30 Tab, Rfl: 2    fentaNYL (DURAGESIC) 50 mcg/hr PATCH, 1 Patch by TransDERmal route every seventy-two (72) hours. Max Daily Amount: 1 Patch. Indications: Chronic Pain with Opioid Tolerance, Disp: 10 Patch, Rfl: 0    LORazepam (ATIVAN) 1 mg tablet, Take 1 Tab by mouth every six (6) hours as needed for Anxiety. Max Daily Amount: 4 mg., Disp: 60 Tab, Rfl: 0    HYDROcodone-acetaminophen (NORCO)  mg tablet, Take 1 Tab by mouth every six (6) hours as needed for Pain. Max Daily Amount: 4 Tabs., Disp: 90 Tab, Rfl: 0    albuterol-ipratropium (DUO-NEB) 2.5 mg-0.5 mg/3 ml nebu, 3 mL by Nebulization route every six (6) hours as needed. , Disp: 360 Nebule, Rfl: 1    GLIPIZIDE PO, Take  by mouth as needed. Pt only takes if BS > 200, Disp: , Rfl:     palbociclib 125 mg cap, Take 1 Cap by mouth daily. Take one pill once a day for 3 weeks and then off for one week, Disp: 21 Cap, Rfl: 5    potassium chloride (K-DUR, KLOR-CON) 20 mEq tablet, Take 1 Tab by mouth two (2) times a day., Disp: 60 Tab, Rfl: 0    hydrALAZINE (APRESOLINE) 50 mg tablet, Take 1 Tab by mouth three (3) times daily. (Patient taking differently: Take 50 mg by mouth two (2) times a day.), Disp: 90 Tab, Rfl: 1    montelukast (SINGULAIR) 10 mg tablet, Take 1 Tab by mouth nightly., Disp: 30 Tab, Rfl: 11    furosemide (LASIX) 40 mg tablet, Take 1 Tab by mouth daily. (Patient taking differently: Take 40 mg by mouth every other day. Monday,Wednesday,Friday,Sunday), Disp: 30 Tab, Rfl: 11    carvedilol (COREG) 3.125 mg tablet, Take 1 Tab by mouth two (2) times daily (with meals). , Disp: 60 Tab, Rfl: 11    warfarin (COUMADIN) 1 mg tablet, Take 1.5 Tabs by mouth every evening., Disp: 45 Tab, Rfl: 0    mirtazapine (REMERON) 30 mg tablet, Take 1 Tab by mouth nightly., Disp: 30 Tab, Rfl: 1    amLODIPine (NORVASC) 10 mg tablet, Take 1 Tab by mouth daily. , Disp: 30 Tab, Rfl: 11   Date Last Reviewed:  3/28/18   Number of Personal Factors/Comorbidities that affect the Plan of Care: 3+: HIGH COMPLEXITY   EXAMINATION:   ROM:          Within functional limits  Strength:          Grossly 4/5 x 4 extremities  Functional Mobility:         Gait/Ambulation:  Uses walker        Transfers:  independent  Balance:          Decreased in standing and gait   Body Structures Involved:  1. Muscles Body Functions Affected:  1. Sensory/Pain  2. Neuromusculoskeletal  3. Movement Related Activities and Participation Affected:  1. General Tasks and Demands  2. Communication  3. Mobility   Number of elements (examined above) that affect the Plan of Care: 4+: HIGH COMPLEXITY   CLINICAL PRESENTATION:   Presentation: Stable and uncomplicated: LOW COMPLEXITY   CLINICAL DECISION MAKING:   Outcome Measure: Tool Used: 6-MINUTE WALK TEST  Unable to test  Score:  Initial:   feet Most Recent: X feet (Date: -- )   Interpretation of Score: Normal range varies but is approximately [id number] Feet      Distance walked:   feet               Baseline End of Test   Heart Rate      Dyspnea (Mehul Scale)     Fatigue (Mehul Scale)     SpO2     BP       Score [phone] 15-0   Modifier CH CI CJ CK CL CM CN         Tool Used: Disabilities of the Arm, Shoulder and Hand (DASH) Questionnaire - Quick Version  Score:  Initial: 41/55  Most Recent: 41/55 (Date: 5/2/18 )   Interpretation of Score: The DASH is designed to measure the activities of daily living in person's with upper extremity dysfunction or pain. Each section is scored on a 1-5 scale, 5 representing the greatest disability. The scores of each section are added together for a total score of 55.     Score 11 12-19 20-28 29-37 38-45 46-54 55   Modifier CH CI CJ CK CL CM CN         Tool Used: TINETTI  Score:  Initial:   Gait: 6/12  Balance: 10/16  TOTAL: 16/28 Most Recent:  Gait: 9/12  Balance:13 /16  TOTAL:22 /28   Interpretation of Score: The maximum score for the gait component is 12 points. The maximum score for the balance component is 16 points. The maximum total score is 28 points. In general, patients who score below 19 are at a high risk for falls. Patients who score in the range of 19-24 indicate that the patient has a risk for falls. Score 28 27-23 22-18 17-12 11-6 5-1 0   Modifier CH CI CJ CK CL CM CN         Tool Used: Timed Up and Go (TUG)  Score:  Initial: 25 seconds Most Recent: 25 seconds (Date: 5/2/18 )   Interpretation of Score: The test measures, in seconds, the time taken by an individual to stand up from a standard arm chair (seat height 46 cm [18 in], arm height 65 cm [25.6 in]), walk a distance of 3 meters (118 in, approx 10 ft), turn, walk back to the chair and sit down. If the individual takes longer than 14 seconds to complete TUG, this indicates risk for falls. Score 7 7.5-10.5 11-14 14.5-17.5 18-21 21.5-24.5 25+   Modifier CH CI CJ CK CL CM CN        Tool Used: ECOG Performance Survey Score  Score:  Initial: 3 Most Recent:  2    Interpretation of Score:   0 Fully active, able to carry on all pre-disease performance without restriction   1 Restricted in physically strenuous activity but ambulatory and able to carry out work of a light or sedentary nature, e.g., light house work, office work   2 Ambulatory and capable of all selfcare but unable to carry out any work activities. Up and about more than 50% of waking hours   3 Capable of only limited selfcare, confined to bed or chair more than 50% of waking hours   4 Completely disabled. Cannot carry on any selfcare. Totally confined to bed or chair   5 Dead        Medical Necessity:   · Patient is expected to demonstrate progress in strength, balance and gait to increase independence with household activity.   Reason for Services/Other Comments:  · Patient continues to demonstrate capacity to improve strength, balance and gait which will increase independence and decrease amount of assistance required from caregiver. Use of outcome tool(s) and clinical judgement create a POC that gives a: Questionable prediction of patient's progress: MODERATE COMPLEXITY            TREATMENT:   (In addition to Assessment/Re-Assessment sessions the following treatments were rendered)  Pre-treatment Symptoms/Complaints:  Pain 10/10 in legs, fatigue 6-7 /10. Pt states she is feeling better today. Pain: Initial:     10/10 bilateral LE's Post Session:  8/10   O2 98%  bpm  /83 mm/Hg  DASH, TUG, Tinetti,   Therapeutic Exercise: ( 40 minutes):  Exercises per below to improve mobility and strength. Required minimal verbal cues to promote proper body alignment and promote proper body posture. Progressed resistance and repetitions as indicated. Long arc quads x 20 reps with 5 count hold with 2#, hip flexion in sitting x 20 reps with 2#  Standing up on toes, hip flexion, hip abduction, hip extension, hamstring curls x 20 reps with 2#  Bicep curls with 2# x 20 reps  Bilateral upper extremity with 2# x 20 reps forward flexion and abduction  Bilateral upper extremity overhead press with 2# x 20 reps. Sitting hamstring curls with blue theraband x 20 reps  Sitting hip abduction with blue theraband x20 reps  Sitting hip adduction with inflated ball x 20 reps with 5 count hold   Sit to stand from elevated surface x 15 reps  Step-Ups 6 inch step 10 reps each LE  NuStep 3 minutes Level 2    Below not performed today:    Supine bridging x 10 reps  Supine SLR x 10 reps  Short arc quads x 10 reps with 5 count hold  Crunches in supine x 10 reps   wall slides x 5 reps  as above. Given HEP:  Walk 1-2 minutes each waking hour when supervised, sit to stand from bedside, sit in chair instead of the bed each day. Added sit to stand, long arc quads and standing exercises at the sink daily. Bilateral upper extremity with light weight every other day. Given written instructions. Given red theraband for home use. [employer]  Treatment/Session Assessment:    · Response to Treatment:  Pt tolerated all treatments well. Performed 20 reps on all exercises today and added step-ups and NuStep. Pt required less rest breaks today. · Compliance with Program/Exercises: Will assess as treatment progresses. · Recommendations/Intent for next treatment session: \"Next visit will focus on advancements to more challenging activities\".     Total Treatment Duration: 40 min  PT Patient Time In/Time Out  Time In: 0815  Time Out: Jennifer Coleman 77, PT

## 2018-06-04 NOTE — PROGRESS NOTES
Ishmael Funk  : 1950  Primary: [employer]  Secondary:  [address] at [facility]  [place] , [address], [place]  [phone]   [phone]          OUTPATIENT PHYSICAL THERAPY:Daily Note and Progress Report 2018    ICD-10: Treatment Diagnosis: muscle weakness generalized M 62.81  Precautions/Allergies:   Review of patient's allergies indicates no known allergies. Fall Risk Score: 9 (? 5 = High Risk)  MD Orders: oncology rehab MEDICAL/REFERRING DIAGNOSIS:  Other fatigue [R53.83]    DATE OF ONSET:   REFERRING PHYSICIAN: Irene Jerez MD  RETURN PHYSICIAN APPOINTMENT: 18     INITIAL ASSESSMENT:  Ms. Emma Dominguez presents following treatment for metastatic breast cancer since . She has developed significant fatigue and weakness. She spends most of the day in bed. She has had a fall in the recent past.  She uses a rolling walker. She could possibly benefit from home health, but is willing to attend therapy as outpatient. She will benefit from therapeutic exercises in order to increase strength and overall conditioning. 18: The patient has attended a total of 10 visits. She has been compliant with her HEP. She is making slow, but steady progress. She will benefit from continued therapy in order to reach her goals. Her spouse reports she is able to do more functional activities at home and to be out of the bed more. She is awaiting the results of her most recent PET scan. PROBLEM LIST (Impacting functional limitations):  1. Decreased Strength  2. Decreased ADL/Functional Activities  3. Decreased Transfer Abilities  4. Decreased Balance  5. Increased Pain  6. Decreased Activity Tolerance  7. Increased Fatigue  8. Decreased [place] with Home Exercise Program INTERVENTIONS PLANNED:  1. Home Exercise Program (HEP)  2. Therapeutic Exercise/Strengthening   TREATMENT PLAN:  Effective Dates: 3/28/2018 TO 2018 (90 days). Frequency/Duration: 2 time a week for 90 Days  GOALS: (Goals have been discussed and agreed upon with patient.)  Short-Term Functional Goals: Time Frame: 4 weeks  1. The patient will be independent with HEP for strength within 4 weeks. Met   2. The patient will report a fatigue of 7 or less within 4 weeks. 3. The patient will gain 1/2 grade strength within 4 weeks. Met   4. The patient will improve her TUG score by 2 seconds within 4 weeks. 5. The patient will improve her Tinetti score by 5 within 4 weeks. Met   Discharge Goals: Time Frame: 8 weeks  1. The patient will report a fatigue level of 5 or less within 8 weeks. 2. The patient will improve her TUG score by 5 seconds within 8 weeks. 3. The patient will participate in a 6 minute walk within 8 weeks. Rehabilitation Potential For Stated Goals: [address] therapy, I certify that the treatment plan above will be carried out by a therapist or under their direction. Thank you for this referral,  Jimmy Alanis PT     Referring Physician Signature: Angelia Zhang MD              Date                    The information in this section was collected on 3/28/18 (except where otherwise noted). HISTORY:   History of Present Injury/Illness (Reason for Referral):  Metastatic breast cancer, chemo, fatigue, weakness  Past Medical History/Comorbidities:   Ms. Dena Lowe  has a past medical history of Acute on chronic diastolic congestive heart failure ([address] (1/5/2016); Anemia of chronic disease (11/13/2017); Aortic valve stenosis; Arthritis; Asthma; Cancer ([address] Chemotherapy-induced neutropenia (HCC) (12/20/2016); CKD (chronic kidney disease) stage 4, GFR 15-29 ml/min ([address] Depression; Diastolic CHF ([address] Former cigarette smoker; HCAP (healthcare-associated pneumonia) (7/17/2013); History of DVT (deep vein thrombosis); Hypertension; Long term current use of anticoagulant; Oxygen dependent; Port catheter in place;  Sepsis ([address] (7/19/2016); SOB (shortness of breath) on exertion; Type 2 diabetes mellitus ([address] Unspecified adverse effect of anesthesia; and Vitamin B12 deficiency (2017). Ms. Lj Magallanes  has a past surgical history that includes hx  section; hx cyst removal; hx urological; hx vascular access (2012); and colonoscopy (N/A, 3/19/2018). Past Medical History:   Diagnosis Date    Acute on chronic diastolic congestive heart failure ([address] 2016    Anemia of chronic disease 2017    Aortic valve stenosis     Arthritis     roverto hands    Asthma     till age 31    Cancer ([address]     roverto. breast ca mets bone/lung    Chemotherapy-induced neutropenia ([address] 2016    CKD (chronic kidney disease) stage 4, GFR 15-29 ml/min ([facility])     Depression     managed with meds    Diastolic CHF ([address]     Followed by Donte Kerns    Former cigarette smoker     HCAP (healthcare-associated pneumonia) 2013    History of DVT (deep vein thrombosis)     Left leg DVT.  on coumadin    Hypertension     managed with meds    Long term current use of anticoagulant     Coumadin    Oxygen dependent     at bedtime 2L-3L NC    Port catheter in place     Left chest port    Sepsis ([address] 2016    SOB (shortness of breath) on exertion     Type 2 diabetes mellitus (HCC)     PRN oral agent/AVG BS: 170-200/ s.s of hypoglycemia at 79    Unspecified adverse effect of anesthesia     In [place] 28 yrs ago after second c -section-she was told her heart stopped d/t anesthesia-hospitalized for 5 days afterwards and followed by cardiologist    Vitamin B12 deficiency 2017     Past Surgical History:   Procedure Laterality Date    COLONOSCOPY N/A 3/19/2018    COLONOSCOPY  BMI 21   performed [facility] ENDOSCOPY    HX  SECTION      X2    HX CYST REMOVAL      HX UROLOGICAL      stent placed    HX VASCULAR ACCESS  2012    Left chest port       Social History/Living Environment:     lives with family  Prior Level of Function/Work/Activity:  independent  Dominant Side:         RIGHT  Current Medications:       Current Outpatient Prescriptions:     warfarin (COUMADIN) 3 mg tablet, Take 1 Tab by mouth daily. , Disp: 30 Tab, Rfl: 2    fentaNYL (DURAGESIC) 50 mcg/hr PATCH, 1 Patch by TransDERmal route every seventy-two (72) hours. Max Daily Amount: 1 Patch. Indications: Chronic Pain with Opioid Tolerance, Disp: 10 Patch, Rfl: 0    LORazepam (ATIVAN) 1 mg tablet, Take 1 Tab by mouth every six (6) hours as needed for Anxiety. Max Daily Amount: 4 mg., Disp: 60 Tab, Rfl: 0    HYDROcodone-acetaminophen (NORCO)  mg tablet, Take 1 Tab by mouth every six (6) hours as needed for Pain. Max Daily Amount: 4 Tabs., Disp: 90 Tab, Rfl: 0    albuterol-ipratropium (DUO-NEB) 2.5 mg-0.5 mg/3 ml nebu, 3 mL by Nebulization route every six (6) hours as needed. , Disp: 360 Nebule, Rfl: 1    GLIPIZIDE PO, Take  by mouth as needed. Pt only takes if BS > 200, Disp: , Rfl:     palbociclib 125 mg cap, Take 1 Cap by mouth daily. Take one pill once a day for 3 weeks and then off for one week, Disp: 21 Cap, Rfl: 5    potassium chloride (K-DUR, KLOR-CON) 20 mEq tablet, Take 1 Tab by mouth two (2) times a day., Disp: 60 Tab, Rfl: 0    hydrALAZINE (APRESOLINE) 50 mg tablet, Take 1 Tab by mouth three (3) times daily. (Patient taking differently: Take 50 mg by mouth two (2) times a day.), Disp: 90 Tab, Rfl: 1    montelukast (SINGULAIR) 10 mg tablet, Take 1 Tab by mouth nightly., Disp: 30 Tab, Rfl: 11    furosemide (LASIX) 40 mg tablet, Take 1 Tab by mouth daily. (Patient taking differently: Take 40 mg by mouth every other day. Monday,Wednesday,Friday,Sunday), Disp: 30 Tab, Rfl: 11    carvedilol (COREG) 3.125 mg tablet, Take 1 Tab by mouth two (2) times daily (with meals). , Disp: 60 Tab, Rfl: 11    warfarin (COUMADIN) 1 mg tablet, Take 1.5 Tabs by mouth every evening., Disp: 45 Tab, Rfl: 0    mirtazapine (REMERON) 30 mg tablet, Take 1 Tab by mouth nightly., Disp: 30 Tab, Rfl: 1    amLODIPine (NORVASC) 10 mg tablet, Take 1 Tab by mouth daily. , Disp: 30 Tab, Rfl: 11   Date Last Reviewed:  3/28/18   Number of Personal Factors/Comorbidities that affect the Plan of Care: 3+: HIGH COMPLEXITY   EXAMINATION:   ROM:          Within functional limits  Strength:          Grossly 4/5 x 4 extremities  Functional Mobility:         Gait/Ambulation:  Uses walker        Transfers:  independent  Balance:          Decreased in standing and gait   Body Structures Involved:  1. Muscles Body Functions Affected:  1. Sensory/Pain  2. Neuromusculoskeletal  3. Movement Related Activities and Participation Affected:  1. General Tasks and Demands  2. Communication  3. Mobility   Number of elements (examined above) that affect the Plan of Care: 4+: HIGH COMPLEXITY   CLINICAL PRESENTATION:   Presentation: Stable and uncomplicated: LOW COMPLEXITY   CLINICAL DECISION MAKING:   Outcome Measure: Tool Used: 6-MINUTE WALK TEST  Unable to test  Score:  Initial:   feet Most Recent: X feet (Date: -- )   Interpretation of Score: Normal range varies but is approximately 8428-7939 Feet      Distance walked:   feet               Baseline End of Test   Heart Rate      Dyspnea (Mehul Scale)     Fatigue (Mehul Scale)     SpO2     BP       Score 2133 [phone] 15-0   Modifier CH CI CJ CK CL CM CN         Tool Used: Disabilities of the Arm, Shoulder and Hand (DASH) Questionnaire - Quick Version  Score:  Initial: 41/55  Most Recent: 41/55 (Date: 5/2/18 )   Interpretation of Score: The DASH is designed to measure the activities of daily living in person's with upper extremity dysfunction or pain. Each section is scored on a 1-5 scale, 5 representing the greatest disability. The scores of each section are added together for a total score of 55.     Score 11 12-19 20-28 29-37 38-45 46-54 55   Modifier CH CI CJ CK CL CM CN         Tool Used: TINETTI  Score:  Initial:   Gait: 6/12  Balance: 10/16  TOTAL: 16/28 Most Recent:  Gait: 9/12  Balance:13 /16  TOTAL:22 /28   Interpretation of Score: The maximum score for the gait component is 12 points. The maximum score for the balance component is 16 points. The maximum total score is 28 points. In general, patients who score below 19 are at a high risk for falls. Patients who score in the range of 19-24 indicate that the patient has a risk for falls. Score 28 27-23 22-18 17-12 11-6 5-1 0   Modifier CH CI CJ CK CL CM CN         Tool Used: Timed Up and Go (TUG)  Score:  Initial: 25 seconds Most Recent: 25 seconds (Date: 5/2/18 )   Interpretation of Score: The test measures, in seconds, the time taken by an individual to stand up from a standard arm chair (seat height 46 cm [18 in], arm height 65 cm [25.6 in]), walk a distance of 3 meters (118 in, approx 10 ft), turn, walk back to the chair and sit down. If the individual takes longer than 14 seconds to complete TUG, this indicates risk for falls. Score 7 7.5-10.5 11-14 14.5-17.5 18-21 21.5-24.5 25+   Modifier CH CI CJ CK CL CM CN        Tool Used: ECOG Performance Survey Score  Score:  Initial: 3 Most Recent:  2    Interpretation of Score:   0 Fully active, able to carry on all pre-disease performance without restriction   1 Restricted in physically strenuous activity but ambulatory and able to carry out work of a light or sedentary nature, e.g., light house work, office work   2 Ambulatory and capable of all selfcare but unable to carry out any work activities. Up and about more than 50% of waking hours   3 Capable of only limited selfcare, confined to bed or chair more than 50% of waking hours   4 Completely disabled. Cannot carry on any selfcare. Totally confined to bed or chair   5 Dead        Medical Necessity:   · Patient is expected to demonstrate progress in strength, balance and gait to increase independence with household activity.   Reason for Services/Other Comments:  · Patient continues to demonstrate capacity to improve strength, balance and gait which will increase independence and decrease amount of assistance required from caregiver. Use of outcome tool(s) and clinical judgement create a POC that gives a: Questionable prediction of patient's progress: MODERATE COMPLEXITY            TREATMENT:   (In addition to Assessment/Re-Assessment sessions the following treatments were rendered)  Pre-treatment Symptoms/Complaints:  Pain 8/10 in legs, fatigue 8 /10. Pt states she is feeling pretty good this morning. Pain: Initial:     8/10 bilateral LE's Post Session:  8/10   O2 91% HR 61 bpm  /61 mm/Hg  DASH, TUG, Tinetti,   Therapeutic Exercise: ( 40 minutes):  Exercises per below to improve mobility and strength. Required minimal verbal cues to promote proper body alignment and promote proper body posture. Progressed resistance and repetitions as indicated. Long arc quads x 20 reps with 5 count hold with 2#, hip flexion in sitting x 20 reps with 2#  Standing up on toes, hip flexion, hip abduction, hip extension, hamstring curls x 20 reps with 2#  Bicep curls with 2# x 20 reps  Bilateral upper extremity with 2# x 20 reps forward flexion and abduction  Bilateral upper extremity overhead press with 2# x 20 reps. Sitting hamstring curls with blue theraband x 20 reps  Sitting hip abduction with blue theraband x20 reps  Sitting hip adduction with inflated ball x 20 reps with 5 count hold   Sit to stand from elevated surface x 15 reps  Step-Ups 6 inch step 10 reps each LE  NuStep 4 minutes Level 2 (HR = 73 bpm, O2 = 93%)    Below not performed today:    Supine bridging x 10 reps  Supine SLR x 10 reps  Short arc quads x 10 reps with 5 count hold  Crunches in supine x 10 reps   wall slides x 5 reps  as above. Given HEP:  Walk 1-2 minutes each waking hour when supervised, sit to stand from bedside, sit in chair instead of the bed each day.   Added sit to stand, long arc quads and standing exercises at the sink daily. Bilateral upper extremity with light weight every other day. Given written instructions. Given red theraband for home use. [employer]  Treatment/Session Assessment:    · Response to Treatment:  Pt tolerated all treatments well. Increased time on NuStep this morning. · Compliance with Program/Exercises: Will assess as treatment progresses. · Recommendations/Intent for next treatment session: \"Next visit will focus on advancements to more challenging activities\".     Total Treatment Duration: 40 min  PT Patient Time In/Time Out  Time In: 0815  Time Out: [facility],

## 2018-06-06 NOTE — PROGRESS NOTES
Ling Anthony  : 1950  Primary: Tyrese Tate Fap 100%  Secondary:  [address]  at [address] , [address], [place]  [phone]   [phone]          OUTPATIENT PHYSICAL THERAPY:Daily Note 2018    ICD-10: Treatment Diagnosis: muscle weakness generalized M 62.81  Precautions/Allergies:   Review of patient's allergies indicates no known allergies. Fall Risk Score: 9 (? 5 = High Risk)  MD Orders: oncology rehab MEDICAL/REFERRING DIAGNOSIS:  Other fatigue [R53.83]    DATE OF ONSET:   REFERRING PHYSICIAN: Nguyễn Lazcano MD  RETURN PHYSICIAN APPOINTMENT: 18     INITIAL ASSESSMENT:  Ms. Mana Rader presents following treatment for metastatic breast cancer since . She has developed significant fatigue and weakness. She spends most of the day in bed. She has had a fall in the recent past.  She uses a rolling walker. She could possibly benefit from home health, but is willing to attend therapy as outpatient. She will benefit from therapeutic exercises in order to increase strength and overall conditioning. 18: The patient has attended a total of 10 visits. She has been compliant with her HEP. She is making slow, but steady progress. She will benefit from continued therapy in order to reach her goals. Her spouse reports she is able to do more functional activities at home and to be out of the bed more. She is awaiting the results of her most recent PET scan. PROBLEM LIST (Impacting functional limitations):  1. Decreased Strength  2. Decreased ADL/Functional Activities  3. Decreased Transfer Abilities  4. Decreased Balance  5. Increased Pain  6. Decreased Activity Tolerance  7. Increased Fatigue  8. Decreased Moran with Home Exercise Program INTERVENTIONS PLANNED:  1. Home Exercise Program (HEP)  2. Therapeutic Exercise/Strengthening   TREATMENT PLAN:  Effective Dates: 3/28/2018 TO 2018 (90 days).   Frequency/Duration: 2 time a week for 90 Days  GOALS: (Goals have been discussed and agreed upon with patient.)  Short-Term Functional Goals: Time Frame: 4 weeks  1. The patient will be independent with HEP for strength within 4 weeks. Met   2. The patient will report a fatigue of 7 or less within 4 weeks. 3. The patient will gain 1/2 grade strength within 4 weeks. Met   4. The patient will improve her TUG score by 2 seconds within 4 weeks. 5. The patient will improve her Tinetti score by 5 within 4 weeks. Met   Discharge Goals: Time Frame: 8 weeks  1. The patient will report a fatigue level of 5 or less within 8 weeks. 2. The patient will improve her TUG score by 5 seconds within 8 weeks. 3. The patient will participate in a 6 minute walk within 8 weeks. Rehabilitation Potential For Stated Goals: [address] therapy, I certify that the treatment plan above will be carried out by a therapist or under their direction. Thank you for this referral,  Emma Schwartz PT     Referring Physician Signature: Cherie Galvan MD              Date                    The information in this section was collected on 3/28/18 (except where otherwise noted). HISTORY:   History of Present Injury/Illness (Reason for Referral):  Metastatic breast cancer, chemo, fatigue, weakness  Past Medical History/Comorbidities:   Ms. Mary Alba  has a past medical history of Acute on chronic diastolic congestive heart failure ([address] (1/5/2016); Anemia of chronic disease (11/13/2017); Aortic valve stenosis; Arthritis; Asthma; Cancer ([address] Chemotherapy-induced neutropenia (HCC) (12/20/2016); CKD (chronic kidney disease) stage 4, GFR 15-29 ml/min ([address] Depression; Diastolic CHF ([address] Former cigarette smoker; HCAP (healthcare-associated pneumonia) (7/17/2013); History of DVT (deep vein thrombosis); Hypertension; Long term current use of anticoagulant; Oxygen dependent; Port catheter in place;  Sepsis ([address] (7/19/2016); SOB (shortness of breath) on exertion; Type 2 diabetes mellitus ([address] Unspecified adverse effect of anesthesia; and Vitamin B12 deficiency (2017). Ms. María Medrano  has a past surgical history that includes hx  section; hx cyst removal; hx urological; hx vascular access (2012); and colonoscopy (N/A, 3/19/2018). Past Medical History:   Diagnosis Date    Acute on chronic diastolic congestive heart failure ([address] 2016    Anemia of chronic disease 2017    Aortic valve stenosis     Arthritis     roverto hands    Asthma     till age 31    Cancer ([address]     roverto. breast ca mets bone/lung    Chemotherapy-induced neutropenia ([address] 2016    CKD (chronic kidney disease) stage 4, GFR 15-29 ml/min ([facility])     Depression     managed with meds    Diastolic CHF ([address]     Followed by Donte Kerns    Former cigarette smoker     HCAP (healthcare-associated pneumonia) 2013    History of DVT (deep vein thrombosis)     Left leg DVT.  on coumadin    Hypertension     managed with meds    Long term current use of anticoagulant     Coumadin    Oxygen dependent     at bedtime 2L-3L NC    Port catheter in place     Left chest port    Sepsis ([address] 2016    SOB (shortness of breath) on exertion     Type 2 diabetes mellitus (HCC)     PRN oral agent/AVG BS: 170-200/ s.s of hypoglycemia at 79    Unspecified adverse effect of anesthesia     In [place] 28 yrs ago after second c -section-she was told her heart stopped d/t anesthesia-hospitalized for 5 days afterwards and followed by cardiologist    Vitamin B12 deficiency 2017     Past Surgical History:   Procedure Laterality Date    COLONOSCOPY N/A 3/19/2018    COLONOSCOPY  BMI 21   performed by Avery Camilo MD at [facility] ENDOSCOPY    HX [address]    HX CYST REMOVAL      HX UROLOGICAL      stent placed    HX VASCULAR ACCESS  2012    Left chest port       Social History/Living Environment:     lives with family  Prior Level of Function/Work/Activity:  independent  Dominant Side:         RIGHT  Current Medications:       Current Outpatient Prescriptions:     warfarin (COUMADIN) 3 mg tablet, Take 1 Tab by mouth daily. , Disp: 30 Tab, Rfl: 2    fentaNYL (DURAGESIC) 50 mcg/hr PATCH, 1 Patch by TransDERmal route every seventy-two (72) hours. Max Daily Amount: 1 Patch. Indications: Chronic Pain with Opioid Tolerance, Disp: 10 Patch, Rfl: 0    LORazepam (ATIVAN) 1 mg tablet, Take 1 Tab by mouth every six (6) hours as needed for Anxiety. Max Daily Amount: 4 mg., Disp: 60 Tab, Rfl: 0    HYDROcodone-acetaminophen (NORCO)  mg tablet, Take 1 Tab by mouth every six (6) hours as needed for Pain. Max Daily Amount: 4 Tabs., Disp: 90 Tab, Rfl: 0    albuterol-ipratropium (DUO-NEB) 2.5 mg-0.5 mg/3 ml nebu, 3 mL by Nebulization route every six (6) hours as needed. , Disp: 360 Nebule, Rfl: 1    GLIPIZIDE PO, Take  by mouth as needed. Pt only takes if BS > 200, Disp: , Rfl:     palbociclib 125 mg cap, Take 1 Cap by mouth daily. Take one pill once a day for 3 weeks and then off for one week, Disp: 21 Cap, Rfl: 5    potassium chloride (K-DUR, KLOR-CON) 20 mEq tablet, Take 1 Tab by mouth two (2) times a day., Disp: 60 Tab, Rfl: 0    hydrALAZINE (APRESOLINE) 50 mg tablet, Take 1 Tab by mouth three (3) times daily. (Patient taking differently: Take 50 mg by mouth two (2) times a day.), Disp: 90 Tab, Rfl: 1    montelukast (SINGULAIR) 10 mg tablet, Take 1 Tab by mouth nightly., Disp: 30 Tab, Rfl: 11    furosemide (LASIX) 40 mg tablet, Take 1 Tab by mouth daily. (Patient taking differently: Take 40 mg by mouth every other day. Monday,Wednesday,Friday,Sunday), Disp: 30 Tab, Rfl: 11    carvedilol (COREG) 3.125 mg tablet, Take 1 Tab by mouth two (2) times daily (with meals). , Disp: 60 Tab, Rfl: 11    warfarin (COUMADIN) 1 mg tablet, Take 1.5 Tabs by mouth every evening., Disp: 45 Tab, Rfl: 0    mirtazapine (REMERON) 30 mg tablet, Take 1 Tab by mouth nightly., Disp: 30 Tab, Rfl: 1    amLODIPine (NORVASC) 10 mg tablet, Take 1 Tab by mouth daily. , Disp: 30 Tab, Rfl: 11   Date Last Reviewed:  3/28/18   Number of Personal Factors/Comorbidities that affect the Plan of Care: 3+: HIGH COMPLEXITY   EXAMINATION:   ROM:          Within functional limits  Strength:          Grossly 4/5 x 4 extremities  Functional Mobility:         Gait/Ambulation:  Uses walker        Transfers:  independent  Balance:          Decreased in standing and gait   Body Structures Involved:  1. Muscles Body Functions Affected:  1. Sensory/Pain  2. Neuromusculoskeletal  3. Movement Related Activities and Participation Affected:  1. General Tasks and Demands  2. Communication  3. Mobility   Number of elements (examined above) that affect the Plan of Care: 4+: HIGH COMPLEXITY   CLINICAL PRESENTATION:   Presentation: Stable and uncomplicated: LOW COMPLEXITY   CLINICAL DECISION MAKING:   Outcome Measure: Tool Used: 6-MINUTE WALK TEST  Unable to test  Score:  Initial:   feet Most Recent: X feet (Date: -- )   Interpretation of Score: Normal range varies but is approximately 7907-6724 Feet      Distance walked:   feet               Baseline End of Test   Heart Rate      Dyspnea (Mehul Scale)     Fatigue (Mehul Scale)     SpO2     BP       Score [phone] [id number] 2023-010 [phone] 15-0   Modifier CH CI CJ CK CL CM CN         Tool Used: Disabilities of the Arm, Shoulder and Hand (DASH) Questionnaire - Quick Version  Score:  Initial: 41/55  Most Recent: 41/55 (Date: 5/2/18 )   Interpretation of Score: The DASH is designed to measure the activities of daily living in person's with upper extremity dysfunction or pain. Each section is scored on a 1-5 scale, 5 representing the greatest disability. The scores of each section are added together for a total score of 55.     Score 11 12-19 20-28 29-37 38-45 46-54 55   Modifier CH CI CJ CK CL CM CN         Tool Used: TINETTI  Score:  Initial:   Gait: 6/12  Balance: 10/16  TOTAL: 16/28 Most Recent:  Gait: 9/12  Balance:13 /16  TOTAL:22 /28   Interpretation of Score: The maximum score for the gait component is 12 points. The maximum score for the balance component is 16 points. The maximum total score is 28 points. In general, patients who score below 19 are at a high risk for falls. Patients who score in the range of 19-24 indicate that the patient has a risk for falls. Score 28 27-23 22-18 17-12 11-6 5-1 0   Modifier CH CI CJ CK CL CM CN         Tool Used: Timed Up and Go (TUG)  Score:  Initial: 25 seconds Most Recent: 25 seconds (Date: 5/2/18 )   Interpretation of Score: The test measures, in seconds, the time taken by an individual to stand up from a standard arm chair (seat height 46 cm [18 in], arm height 65 cm [25.6 in]), walk a distance of 3 meters (118 in, approx 10 ft), turn, walk back to the chair and sit down. If the individual takes longer than 14 seconds to complete TUG, this indicates risk for falls. Score 7 7.5-10.5 11-14 14.5-17.5 18-21 21.5-24.5 25+   Modifier CH CI CJ CK CL CM CN        Tool Used: ECOG Performance Survey Score  Score:  Initial: 3 Most Recent:  2    Interpretation of Score:   0 Fully active, able to carry on all pre-disease performance without restriction   1 Restricted in physically strenuous activity but ambulatory and able to carry out work of a light or sedentary nature, e.g., light house work, office work   2 Ambulatory and capable of all selfcare but unable to carry out any work activities. Up and about more than 50% of waking hours   3 Capable of only limited selfcare, confined to bed or chair more than 50% of waking hours   4 Completely disabled. Cannot carry on any selfcare. Totally confined to bed or chair   5 Dead        Medical Necessity:   · Patient is expected to demonstrate progress in strength, balance and gait to increase independence with household activity.   Reason for Services/Other Comments:  · Patient continues to demonstrate capacity to improve strength, balance and gait which will increase independence and decrease amount of assistance required from caregiver. Use of outcome tool(s) and clinical judgement create a POC that gives a: Questionable prediction of patient's progress: MODERATE COMPLEXITY            TREATMENT:   (In addition to Assessment/Re-Assessment sessions the following treatments were rendered)  Pre-treatment Symptoms/Complaints:  Pain 8/10 in legs, fatigue 8 /10. Pt states she is ok this morning. Pain: Initial:     8/10 bilateral LE's Post Session:  8/10   O2 95% HR 61 bpm  /52 mm/Hg  DASH, TUG, Tinetti,   Therapeutic Exercise: ( 40 minutes):  Exercises per below to improve mobility and strength. Required minimal verbal cues to promote proper body alignment and promote proper body posture. Progressed resistance and repetitions as indicated. Long arc quads x 20 reps with 5 count hold with 2#, hip flexion in sitting x 20 reps with 2#  Standing up on toes, hip flexion, hip abduction, hip extension, hamstring curls x 20 reps with 2#  Bicep curls with 2# x 20 reps  Bilateral upper extremity with 2# x 20 reps forward flexion and abduction  Bilateral upper extremity overhead press with 2# x 20 reps. Sitting hamstring curls with blue theraband x 20 reps  Sitting hip abduction with blue theraband x20 reps  Sitting hip adduction with inflated ball x 20 reps with 5 count hold   Sit to stand from elevated surface x 15 reps  Step-Ups 6 inch step 10 reps each LE  NuStep 4 minutes Level 2 (HR = 73 bpm, O2 = 93%)  UBE 2 minutes Manual L1    Below not performed today:    Supine bridging x 10 reps  Supine SLR x 10 reps  Short arc quads x 10 reps with 5 count hold  Crunches in supine x 10 reps   wall slides x 5 reps  as above. Given HEP:  Walk 1-2 minutes each waking hour when supervised, sit to stand from bedside, sit in chair instead of the bed each day.   Added sit to stand, long arc quads and standing exercises at the sink daily. Bilateral upper extremity with light weight every other day. Given written instructions. Given red theraband for home use. [employer]  Treatment/Session Assessment:    · Response to Treatment:  Pt tolerated all treatments well. Added UBE today and pt tolerated well. Pt [facility]. · Compliance with Program/Exercises: Will assess as treatment progresses. · Recommendations/Intent for next treatment session: \"Next visit will focus on advancements to more challenging activities\".     Total Treatment Duration: 40 min  PT Patient Time In/Time Out  Time In: 0815  Time Out: Jennifer Coleman 77, PT

## 2018-06-11 NOTE — PROGRESS NOTES
Jen Capps  : 1950  Primary: Omari Ellison Fap 100%  Secondary:  [address] at [facility]  [facility] [address], [place] [address]  [phone]   [phone]          OUTPATIENT PHYSICAL THERAPY:Daily Note 2018    ICD-10: Treatment Diagnosis: muscle weakness generalized M 62.81  Precautions/Allergies:   Review of patient's allergies indicates no known allergies. Fall Risk Score: 9 (? 5 = High Risk)  MD Orders: oncology rehab MEDICAL/REFERRING DIAGNOSIS:  Other fatigue [R53.83]    DATE OF ONSET:   REFERRING PHYSICIAN: Sita Gonzales MD  RETURN PHYSICIAN APPOINTMENT: 18     INITIAL ASSESSMENT:  Ms. Abner Blue presents following treatment for metastatic breast cancer since . She has developed significant fatigue and weakness. She spends most of the day in bed. She has had a fall in the recent past.  She uses a rolling walker. She could possibly benefit from home health, but is willing to attend therapy as outpatient. She will benefit from therapeutic exercises in order to increase strength and overall conditioning. 18: The patient has attended a total of 10 visits. She has been compliant with her HEP. She is making slow, but steady progress. She will benefit from continued therapy in order to reach her goals. Her spouse reports she is able to do more functional activities at home and to be out of the bed more. She is awaiting the results of her most recent PET scan. PROBLEM LIST (Impacting functional limitations):  1. Decreased Strength  2. Decreased ADL/Functional Activities  3. Decreased Transfer Abilities  4. Decreased Balance  5. Increased Pain  6. Decreased Activity Tolerance  7. Increased Fatigue  8. Decreased [place] with Home Exercise Program INTERVENTIONS PLANNED:  1. Home Exercise Program (HEP)  2. Therapeutic Exercise/Strengthening   TREATMENT PLAN:  Effective Dates: 3/28/2018 TO 2018 (90 days).   Frequency/Duration: 2 time a week for 90 Days  GOALS: (Goals have been discussed and agreed upon with patient.)  Short-Term Functional Goals: Time Frame: 4 weeks  1. The patient will be independent with HEP for strength within 4 weeks. Met   2. The patient will report a fatigue of 7 or less within 4 weeks. 3. The patient will gain 1/2 grade strength within 4 weeks. Met   4. The patient will improve her TUG score by 2 seconds within 4 weeks. 5. The patient will improve her Tinetti score by 5 within 4 weeks. Met   Discharge Goals: Time Frame: 8 weeks  1. The patient will report a fatigue level of 5 or less within 8 weeks. 2. The patient will improve her TUG score by 5 seconds within 8 weeks. 3. The patient will participate in a 6 minute walk within 8 weeks. Rehabilitation Potential For Stated Goals: [address] therapy, I certify that the treatment plan above will be carried out by a therapist or under their direction. Thank you for this referral,  Arin Meraz PT     Referring Physician Signature: Cherie Galvan MD              Date                    The information in this section was collected on 3/28/18 (except where otherwise noted). HISTORY:   History of Present Injury/Illness (Reason for Referral):  Metastatic breast cancer, chemo, fatigue, weakness  Past Medical History/Comorbidities:   Ms. Mary Alba  has a past medical history of Acute on chronic diastolic congestive heart failure ([address] (1/5/2016); Anemia of chronic disease (11/13/2017); Aortic valve stenosis; Arthritis; Asthma; Cancer ([address] Chemotherapy-induced neutropenia (HCC) (12/20/2016); CKD (chronic kidney disease) stage 4, GFR 15-29 ml/min ([address] Depression; Diastolic CHF ([address] Former cigarette smoker; HCAP (healthcare-associated pneumonia) (7/17/2013); History of DVT (deep vein thrombosis); Hypertension; Long term current use of anticoagulant; Oxygen dependent; Port catheter in place;  Sepsis ([address] (7/19/2016); SOB (shortness of breath) on exertion; Type 2 diabetes mellitus ([address] Unspecified adverse effect of anesthesia; and Vitamin B12 deficiency (2017). Ms. Mariel Dewitt  has a past surgical history that includes hx  section; hx cyst removal; hx urological; hx vascular access (2012); and colonoscopy (N/A, 3/19/2018). Past Medical History:   Diagnosis Date    Acute on chronic diastolic congestive heart failure ([address] 2016    Anemia of chronic disease 2017    Aortic valve stenosis     Arthritis     roverto hands    Asthma     till age 31    Cancer ([address]     roverto. breast ca mets bone/lung    Chemotherapy-induced neutropenia ([address] 2016    CKD (chronic kidney disease) stage 4, GFR 15-29 ml/min ([facility])     Depression     managed with meds    Diastolic CHF ([address]     Followed by Donte Kerns    Former cigarette smoker     HCAP (healthcare-associated pneumonia) 2013    History of DVT (deep vein thrombosis)     Left leg DVT.  on coumadin    Hypertension     managed with meds    Long term current use of anticoagulant     Coumadin    Oxygen dependent     at bedtime 2L-3L NC    Port catheter in place     Left chest port    Sepsis ([address] 2016    SOB (shortness of breath) on exertion     Type 2 diabetes mellitus (HCC)     PRN oral agent/AVG BS: 170-200/ s.s of hypoglycemia at 79    Unspecified adverse effect of anesthesia     In [place] 28 yrs ago after second c -section-she was told her heart stopped d/t anesthesia-hospitalized for 5 days afterwards and followed by cardiologist    Vitamin B12 deficiency 2017     Past Surgical History:   Procedure Laterality Date    COLONOSCOPY N/A 3/19/2018    COLONOSCOPY  BMI 21   [facility] ENDOSCOPY    HX [address]    HX CYST REMOVAL      HX UROLOGICAL      stent placed    HX VASCULAR ACCESS  2012    Left chest port       Social History/Living Environment:     lives with family  Prior Level of Function/Work/Activity:  independent  Dominant Side:         RIGHT  Current Medications:       Current Outpatient Prescriptions:     warfarin (COUMADIN) 3 mg tablet, Take 1 Tab by mouth daily. , Disp: 30 Tab, Rfl: 2    fentaNYL (DURAGESIC) 50 mcg/hr PATCH, 1 Patch by TransDERmal route every seventy-two (72) hours. Max Daily Amount: 1 Patch. Indications: Chronic Pain with Opioid Tolerance, Disp: 10 Patch, Rfl: 0    LORazepam (ATIVAN) 1 mg tablet, Take 1 Tab by mouth every six (6) hours as needed for Anxiety. Max Daily Amount: 4 mg., Disp: 60 Tab, Rfl: 0    HYDROcodone-acetaminophen (NORCO)  mg tablet, Take 1 Tab by mouth every six (6) hours as needed for Pain. Max Daily Amount: 4 Tabs., Disp: 90 Tab, Rfl: 0    albuterol-ipratropium (DUO-NEB) 2.5 mg-0.5 mg/3 ml nebu, 3 mL by Nebulization route every six (6) hours as needed. , Disp: 360 Nebule, Rfl: 1    GLIPIZIDE PO, Take  by mouth as needed. Pt only takes if BS > 200, Disp: , Rfl:     palbociclib 125 mg cap, Take 1 Cap by mouth daily. Take one pill once a day for 3 weeks and then off for one week, Disp: 21 Cap, Rfl: 5    potassium chloride (K-DUR, KLOR-CON) 20 mEq tablet, Take 1 Tab by mouth two (2) times a day., Disp: 60 Tab, Rfl: 0    hydrALAZINE (APRESOLINE) 50 mg tablet, Take 1 Tab by mouth three (3) times daily. (Patient taking differently: Take 50 mg by mouth two (2) times a day.), Disp: 90 Tab, Rfl: 1    montelukast (SINGULAIR) 10 mg tablet, Take 1 Tab by mouth nightly., Disp: 30 Tab, Rfl: 11    furosemide (LASIX) 40 mg tablet, Take 1 Tab by mouth daily. (Patient taking differently: Take 40 mg by mouth every other day. Monday,Wednesday,Friday,Sunday), Disp: 30 Tab, Rfl: 11    carvedilol (COREG) 3.125 mg tablet, Take 1 Tab by mouth two (2) times daily (with meals). , Disp: 60 Tab, Rfl: 11    warfarin (COUMADIN) 1 mg tablet, Take 1.5 Tabs by mouth every evening., Disp: 45 Tab, Rfl: 0    mirtazapine (REMERON) 30 mg tablet, Take 1 Tab by mouth nightly., Disp: 30 Tab, Rfl: 1    amLODIPine (NORVASC) 10 mg tablet, Take 1 Tab by mouth daily. , Disp: 30 Tab, Rfl: 11   Date Last Reviewed:  3/28/18   Number of Personal Factors/Comorbidities that affect the Plan of Care: 3+: HIGH COMPLEXITY   EXAMINATION:   ROM:          Within functional limits  Strength:          Grossly 4/5 x 4 extremities  Functional Mobility:         Gait/Ambulation:  Uses walker        Transfers:  independent  Balance:          Decreased in standing and gait   Body Structures Involved:  1. Muscles Body Functions Affected:  1. Sensory/Pain  2. Neuromusculoskeletal  3. Movement Related Activities and Participation Affected:  1. General Tasks and Demands  2. Communication  3. Mobility   Number of elements (examined above) that affect the Plan of Care: 4+: HIGH COMPLEXITY   CLINICAL PRESENTATION:   Presentation: Stable and uncomplicated: LOW COMPLEXITY   CLINICAL DECISION MAKING:   Outcome Measure: Tool Used: 6-MINUTE WALK TEST  Unable to test  Score:  Initial:   feet Most Recent: X feet (Date: -- )   Interpretation of Score: Normal range varies but is approximately 6950-5032 Feet      Distance walked:   feet               Baseline End of Test   Heart Rate      Dyspnea (Mehul Scale)     Fatigue (Mehul Scale)     SpO2     BP       Score [phone] 15-0   Modifier CH CI CJ CK CL CM CN         Tool Used: Disabilities of the Arm, Shoulder and Hand (DASH) Questionnaire - Quick Version  Score:  Initial: 41/55  Most Recent: 41/55 (Date: 5/2/18 )   Interpretation of Score: The DASH is designed to measure the activities of daily living in person's with upper extremity dysfunction or pain. Each section is scored on a 1-5 scale, 5 representing the greatest disability. The scores of each section are added together for a total score of 55.     Score 11 12-19 20-28 29-37 38-45 46-54 55   Modifier CH CI CJ CK CL CM CN         Tool Used: TINETTI  Score:  Initial:   Gait: 6/12  Balance: 10/16  TOTAL: 16/28 Most Recent:  Gait: 9/12  Balance:13 /16  TOTAL:22 /28   Interpretation of Score: The maximum score for the gait component is 12 points. The maximum score for the balance component is 16 points. The maximum total score is 28 points. In general, patients who score below 19 are at a high risk for falls. Patients who score in the range of 19-24 indicate that the patient has a risk for falls. Score 28 27-23 22-18 17-12 11-6 5-1 0   Modifier CH CI CJ CK CL CM CN         Tool Used: Timed Up and Go (TUG)  Score:  Initial: 25 seconds Most Recent: 25 seconds (Date: 5/2/18 )   Interpretation of Score: The test measures, in seconds, the time taken by an individual to stand up from a standard arm chair (seat height 46 cm [18 in], arm height 65 cm [25.6 in]), walk a distance of 3 meters (118 in, approx 10 ft), turn, walk back to the chair and sit down. If the individual takes longer than 14 seconds to complete TUG, this indicates risk for falls. Score 7 7.5-10.5 11-14 14.5-17.5 18-21 21.5-24.5 25+   Modifier CH CI CJ CK CL CM CN        Tool Used: ECOG Performance Survey Score  Score:  Initial: 3 Most Recent:  2    Interpretation of Score:   0 Fully active, able to carry on all pre-disease performance without restriction   1 Restricted in physically strenuous activity but ambulatory and able to carry out work of a light or sedentary nature, e.g., light house work, office work   2 Ambulatory and capable of all selfcare but unable to carry out any work activities. Up and about more than 50% of waking hours   3 Capable of only limited selfcare, confined to bed or chair more than 50% of waking hours   4 Completely disabled. Cannot carry on any selfcare. Totally confined to bed or chair   5 Dead        Medical Necessity:   · Patient is expected to demonstrate progress in strength, balance and gait to increase independence with household activity.   Reason for Services/Other Comments:  · Patient continues to demonstrate capacity to improve strength, balance and gait which will increase independence and decrease amount of assistance required from caregiver. Use of outcome tool(s) and clinical judgement create a POC that gives a: Questionable prediction of patient's progress: MODERATE COMPLEXITY            TREATMENT:   (In addition to Assessment/Re-Assessment sessions the following treatments were rendered)  Pre-treatment Symptoms/Complaints:  Pain 8/10 in left arm, fatigue 7 /10. Pt states she has abdominal swelling and left arm pain. She is to see the doctor tomorrow. Pain: Initial:     8/10  Left arm Post Session:  8/10   O2 94% HR 60 bpm  /52 mm/Hg    Therapeutic Exercise: ( 40 minutes):  Exercises per below to improve mobility and strength. Required minimal verbal cues to promote proper body alignment and promote proper body posture. Progressed resistance and repetitions as indicated. Long arc quads x 10 reps with 5 count hold with 2 1/2#, hip flexion in sitting x 10 reps with 2 1/2#  Standing up on toes, hip flexion, hip abduction, hip extension, hamstring curls x 10 reps with 2 1/2#  Bicep curls with 2# x 20 reps  Bilateral upper extremity with 2# x 20 reps forward flexion and abduction  Bilateral upper extremity overhead press with 2# x 10 reps. Sitting hamstring curls with blue theraband x 20 reps  Sitting hip abduction with blue theraband x20 reps  Sitting hip adduction with inflated ball x 20 reps with 5 count hold   Sit to stand from elevated surface x 10 reps  Step-Ups 6 inch step 10 reps each LE O2 92% HR 71  NuStep 4 minutes Level 2 (HR = 73 bpm, O2 = 93%)  UBE 2 minutes Manual L1  Fatigue 8/10  Below not performed today:    Supine bridging x 10 reps  Supine SLR x 10 reps  Short arc quads x 10 reps with 5 count hold  Crunches in supine x 10 reps   wall slides x 5 reps  as above.   Given HEP:  Walk 1-2 minutes each waking hour when supervised, sit to stand from bedside, sit in chair instead of the bed each day. Added sit to stand, long arc quads and standing exercises at the sink daily. Bilateral upper extremity with light weight every other day. Given written instructions. Given red theraband for home use. [employer]  Treatment/Session Assessment:    · Response to Treatment:  Pt tolerated all treatments well. Needed frequent rest periods. · Compliance with Program/Exercises: Will assess as treatment progresses. · Recommendations/Intent for next treatment session: \"Next visit will focus on advancements to more challenging activities\".     Total Treatment Duration: 46 min  PT Patient Time In/Time Out  Time In: 0802  Time Out: 0848    Shahid Hong, PT

## 2018-06-12 NOTE — PROGRESS NOTES
Pt arrived via wheelchair today at 0950, to receive Procrit. Pt tolerated without difficulty. Patient discharged via wheelchair accompanied by spouse. Instructed to notify physician of any problems, questions or concerns. Allowed opportunity for patient/family to ask questions. Verbalized understanding. Next appointment is July 12 at 1000 with Hailey Sebastian.

## 2018-06-12 NOTE — PROGRESS NOTES
Problem: Chemotherapy Treatment  Goal: *Hemodynamically stable  Outcome: Progressing Towards Goal  Verbalizes/demonstrates understanding of purpose/procedure/potential side effects of Procrit, via An [place], as .

## 2018-06-13 NOTE — PROGRESS NOTES
Danielle Janel  : 1950  Primary: P[facility] [employer] 100%  Secondary:  [address]  at [facility]  [address] , [address], [place] [address]  [phone]   [phone]          OUTPATIENT PHYSICAL THERAPY:Daily Note 2018    ICD-10: Treatment Diagnosis: muscle weakness generalized M 62.81  Precautions/Allergies:   Review of patient's allergies indicates no known allergies. Fall Risk Score: 9 (? 5 = High Risk)  MD Orders: oncology rehab MEDICAL/REFERRING DIAGNOSIS:  Other fatigue [R53.83]    DATE OF ONSET:   REFERRING PHYSICIAN: Cherie Galvan MD  RETURN PHYSICIAN APPOINTMENT: 18     INITIAL ASSESSMENT:  Ms. Mary Alba presents following treatment for metastatic breast cancer since . She has developed significant fatigue and weakness. She spends most of the day in bed. She has had a fall in the recent past.  She uses a rolling walker. She could possibly benefit from home health, but is willing to attend therapy as outpatient. She will benefit from therapeutic exercises in order to increase strength and overall conditioning. 18: The patient has attended a total of 10 visits. She has been compliant with her HEP. She is making slow, but steady progress. She will benefit from continued therapy in order to reach her goals. Her spouse reports she is able to do more functional activities at home and to be out of the bed more. She is awaiting the results of her most recent PET scan. PROBLEM LIST (Impacting functional limitations):  1. Decreased Strength  2. Decreased ADL/Functional Activities  3. Decreased Transfer Abilities  4. Decreased Balance  5. Increased Pain  6. Decreased Activity Tolerance  7. Increased Fatigue  8. Decreased [place] with Home Exercise Program INTERVENTIONS PLANNED:  1. Home Exercise Program (HEP)  2. Therapeutic Exercise/Strengthening   TREATMENT PLAN:  Effective Dates: 3/28/2018 TO 2018 (90 days). Frequency/Duration: 2 time a week for 90 Days  GOALS: (Goals have been discussed and agreed upon with patient.)  Short-Term Functional Goals: Time Frame: 4 weeks  1. The patient will be independent with HEP for strength within 4 weeks. Met   2. The patient will report a fatigue of 7 or less within 4 weeks. 3. The patient will gain 1/2 grade strength within 4 weeks. Met   4. The patient will improve her TUG score by 2 seconds within 4 weeks. 5. The patient will improve her Tinetti score by 5 within 4 weeks. Met   Discharge Goals: Time Frame: 8 weeks  1. The patient will report a fatigue level of 5 or less within 8 weeks. 2. The patient will improve her TUG score by 5 seconds within 8 weeks. 3. The patient will participate in a 6 minute walk within 8 weeks. Rehabilitation Potential For Stated Goals: [address] [facility] therapy, I certify that the treatment plan above will be carried out by a therapist or under their direction. Thank you for this referral,  Doroteo Nathan PT     Referring Physician Signature: Courtney Garza MD              Date                    The information in this section was collected on 3/28/18 (except where otherwise noted). HISTORY:   History of Present Injury/Illness (Reason for Referral):  Metastatic breast cancer, chemo, fatigue, weakness  Past Medical History/Comorbidities:   Ms. Liz Duane  has a past medical history of Acute on chronic diastolic congestive heart failure ([address] (1/5/2016); Anemia of chronic disease (11/13/2017); Aortic valve stenosis; Arthritis; Asthma; Cancer ([address] Chemotherapy-induced neutropenia (HCC) (12/20/2016); CKD (chronic kidney disease) stage 4, GFR 15-29 ml/min ([address] Depression; Diastolic CHF ([address] Former cigarette smoker; HCAP (healthcare-associated pneumonia) (7/17/2013); History of DVT (deep vein thrombosis); Hypertension; Long term current use of anticoagulant; Oxygen dependent; Port catheter in place;  Sepsis ([address] (7/19/2016); SOB (shortness of breath) on exertion; Type 2 diabetes mellitus ([address] Unspecified adverse effect of anesthesia; and Vitamin B12 deficiency (2017). Ms. Diana Talavera  has a past surgical history that includes hx  section; hx cyst removal; hx urological; hx vascular access (2012); and colonoscopy (N/A, 3/19/2018). Past Medical History:   Diagnosis Date    Acute on chronic diastolic congestive heart failure ([address] 2016    Anemia of chronic disease 2017    Aortic valve stenosis     Arthritis     roverto hands    Asthma     till age 31    Cancer ([address]     roverto. breast ca mets bone/lung    Chemotherapy-induced neutropenia ([address] 2016    CKD (chronic kidney disease) stage 4, GFR 15-29 ml/min ([facility])     Depression     managed with meds    Diastolic CHF ([address]     Followed by Donte Kerns    Former cigarette smoker     HCAP (healthcare-associated pneumonia) 2013    History of DVT (deep vein thrombosis)     Left leg DVT.  on coumadin    Hypertension     managed with meds    Long term current use of anticoagulant     Coumadin    Oxygen dependent     at bedtime 2L-3L NC    Port catheter in place     Left chest port    Sepsis ([address] 2016    SOB (shortness of breath) on exertion     Type 2 diabetes mellitus (HCC)     PRN oral agent/AVG BS: 170-200/ s.s of hypoglycemia at 79    Unspecified adverse effect of anesthesia     In [place] 28 yrs ago after second c -section-she was told her heart stopped d/t anesthesia-hospitalized for 5 days afterwards and followed by cardiologist    Vitamin B12 deficiency 2017     Past Surgical History:   Procedure Laterality Date    COLONOSCOPY N/A 3/19/2018    COLONOSCOPY  BMI 23   performed [facility]    HX CYST REMOVAL      HX UROLOGICAL      stent placed    HX VASCULAR ACCESS  2012    Left chest port       Social History/Living Environment: lives with family  Prior Level of Function/Work/Activity:  independent  Dominant Side:         RIGHT  Current Medications:       Current Outpatient Prescriptions:     montelukast (SINGULAIR) 10 mg tablet, Take 1 Tab by mouth nightly., Disp: 30 Tab, Rfl: 11    fentaNYL (DURAGESIC) 50 mcg/hr PATCH, 1 Patch by TransDERmal route every seventy-two (72) hours. Max Daily Amount: 1 Patch. Indications: Chronic Pain with Opioid Tolerance, Disp: 10 Patch, Rfl: 0    mirtazapine (REMERON) 30 mg tablet, Take 1 Tab by mouth nightly., Disp: 30 Tab, Rfl: 1    warfarin (COUMADIN) 3 mg tablet, Take 1 Tab by mouth daily. , Disp: 30 Tab, Rfl: 2    LORazepam (ATIVAN) 1 mg tablet, Take 1 Tab by mouth every six (6) hours as needed for Anxiety. Max Daily Amount: 4 mg., Disp: 60 Tab, Rfl: 0    HYDROcodone-acetaminophen (NORCO)  mg tablet, Take 1 Tab by mouth every six (6) hours as needed for Pain. Max Daily Amount: 4 Tabs., Disp: 90 Tab, Rfl: 0    albuterol-ipratropium (DUO-NEB) 2.5 mg-0.5 mg/3 ml nebu, 3 mL by Nebulization route every six (6) hours as needed. , Disp: 360 Nebule, Rfl: 1    GLIPIZIDE PO, Take  by mouth as needed. Pt only takes if BS > 200, Disp: , Rfl:     palbociclib 125 mg cap, Take 1 Cap by mouth daily. Take one pill once a day for 3 weeks and then off for one week, Disp: 21 Cap, Rfl: 5    potassium chloride (K-DUR, KLOR-CON) 20 mEq tablet, Take 1 Tab by mouth two (2) times a day., Disp: 60 Tab, Rfl: 0    hydrALAZINE (APRESOLINE) 50 mg tablet, Take 1 Tab by mouth three (3) times daily. (Patient taking differently: Take 50 mg by mouth two (2) times a day.), Disp: 90 Tab, Rfl: 1    furosemide (LASIX) 40 mg tablet, Take 1 Tab by mouth daily. (Patient taking differently: Take 40 mg by mouth every other day. Monday,Wednesday,Friday,Sunday), Disp: 30 Tab, Rfl: 11    carvedilol (COREG) 3.125 mg tablet, Take 1 Tab by mouth two (2) times daily (with meals). , Disp: 60 Tab, Rfl: 11    warfarin (COUMADIN) 1 mg tablet, Take 1.5 Tabs by mouth every evening., Disp: 45 Tab, Rfl: 0    amLODIPine (NORVASC) 10 mg tablet, Take 1 Tab by mouth daily. , Disp: 30 Tab, Rfl: 11  No current facility-administered medications for this encounter. Facility-Administered Medications Ordered in Other Encounters:     epoetin lisset (EPOGEN;PROCRIT) injection 40,000 Units, 40,000 Units, SubCUTAneous, Q 14 DAYS, Gini Thompson NP, 40,000 Units at 06/12/18 1010   Date Last Reviewed:  3/28/18   Number of Personal Factors/Comorbidities that affect the Plan of Care: 3+: HIGH COMPLEXITY   EXAMINATION:   ROM:          Within functional limits  Strength:          Grossly 4/5 x 4 extremities  Functional Mobility:         Gait/Ambulation:  Uses walker        Transfers:  independent  Balance:          Decreased in standing and gait   Body Structures Involved:  1. Muscles Body Functions Affected:  1. Sensory/Pain  2. Neuromusculoskeletal  3. Movement Related Activities and Participation Affected:  1. General Tasks and Demands  2. Communication  3. Mobility   Number of elements (examined above) that affect the Plan of Care: 4+: HIGH COMPLEXITY   CLINICAL PRESENTATION:   Presentation: Stable and uncomplicated: LOW COMPLEXITY   CLINICAL DECISION MAKING:   Outcome Measure: Tool Used: 6-MINUTE WALK TEST  Unable to test  Score:  Initial:   feet Most Recent: X feet (Date: -- )   Interpretation of Score: Normal range varies but is approximately 8872-0853 Feet      Distance walked:   feet               Baseline End of Test   Heart Rate      Dyspnea (Mehul Scale)     Fatigue (Mehul Scale)     SpO2     BP       Score 2133 [phone] 15-0   Modifier CH CI CJ CK CL CM CN         Tool Used: Disabilities of the Arm, Shoulder and Hand (DASH) Questionnaire - Quick Version  Score:  Initial: 41/55  Most Recent: 41/55 (Date: 5/2/18 )   Interpretation of Score:  The DASH is designed to measure the activities of daily living in person's with upper extremity dysfunction or pain. Each section is scored on a 1-5 scale, 5 representing the greatest disability. The scores of each section are added together for a total score of 55. Score 11 12-19 20-28 29-37 38-45 46-54 55   Modifier  CI CJ CK CL CM CN         Tool Used: TINETTI  Score:  Initial:   Gait: 6/12  Balance: 10/16  TOTAL: 16/28 Most Recent:  Gait: 9/12  Balance:13 /16  TOTAL:22 /28   Interpretation of Score: The maximum score for the gait component is 12 points. The maximum score for the balance component is 16 points. The maximum total score is 28 points. In general, patients who score below 19 are at a high risk for falls. Patients who score in the range of 19-24 indicate that the patient has a risk for falls. Score 28 27-23 22-18 17-12 11-6 5-1 0   Modifier CH CI CJ CK CL CM CN         Tool Used: Timed Up and Go (TUG)  Score:  Initial: 25 seconds Most Recent: 25 seconds (Date: 5/2/18 )   Interpretation of Score: The test measures, in seconds, the time taken by an individual to stand up from a standard arm chair (seat height 46 cm [18 in], arm height 65 cm [25.6 in]), walk a distance of 3 meters (118 in, approx 10 ft), turn, walk back to the chair and sit down. If the individual takes longer than 14 seconds to complete TUG, this indicates risk for falls. Score 7 7.5-10.5 11-14 14.5-17.5 18-21 21.5-24.5 25+   Modifier  CI CJ CK CL CM CN        Tool Used: ECOG Performance Survey Score  Score:  Initial: 3 Most Recent:  2    Interpretation of Score:   0 Fully active, able to carry on all pre-disease performance without restriction   1 Restricted in physically strenuous activity but ambulatory and able to carry out work of a light or sedentary nature, e.g., light house work, office work   2 Ambulatory and capable of all selfcare but unable to carry out any work activities.  Up and about more than 50% of waking hours   3 Capable of only limited selfcare, confined to bed or chair more than 50% of waking hours   4 Completely disabled. Cannot carry on any selfcare. Totally confined to bed or chair   5 Dead        Medical Necessity:   · Patient is expected to demonstrate progress in strength, balance and gait to increase independence with household activity. Reason for Services/Other Comments:  · Patient continues to demonstrate capacity to improve strength, balance and gait which will increase independence and decrease amount of assistance required from caregiver. Use of outcome tool(s) and clinical judgement create a POC that gives a: Questionable prediction of patient's progress: MODERATE COMPLEXITY            TREATMENT:   (In addition to Assessment/Re-Assessment sessions the following treatments were rendered)  Pre-treatment Symptoms/Complaints:  Pain 9-10/10 in left arm, legs and abdomen, fatigue 8 /10. Pt states she has abdominal swelling and left arm pain. She reports she will have a paracentesis tomorrow. Pain: Initial:     8/10  Left arm, bilateral lower extremities and abdomen Post Session:  8/10   O2 95% HR 68 bpm  /70 mm/Hg    Therapeutic Exercise: ( 28 minutes):  Exercises per below to improve mobility and strength. Required minimal verbal cues to promote proper body alignment and promote proper body posture. Progressed resistance and repetitions as indicated. Treatment limited due to abdominal ascites, pain and fatigue. She will have a paracentesis tomorrow. Long arc quads x 10 reps with 5 count hold with 1 1/2#, hip flexion in sitting x 10 reps with 1 1/2#  Standing up on toes, hip flexion, hip abduction, hip extension, hamstring curls x 10 reps with 1 1/2#  Bicep curls with 2# x 10 reps  Bilateral upper extremity with 2# x 10 reps forward flexion and abduction  Bilateral upper extremity overhead press with 2# x 10 reps.   Sitting hamstring curls with green theraband x 10 reps  Sitting hip abduction with green theraband x10 reps  Sitting hip adduction with inflated ball x 20 reps with 5 count hold (not performed)  Sit to stand from elevated surface x 10 reps  Below not performed today:    Step-Ups 6 inch step 10 reps each LE  NuStep 4 minutes Level 2  UBE 2 minutes Manual L1  Supine bridging x 10 reps  Supine SLR x 10 reps  Short arc quads x 10 reps with 5 count hold  Crunches in supine x 10 reps   wall slides x 5 reps  as above. Given HEP:  Walk 1-2 minutes each waking hour when supervised, sit to stand from bedside, sit in chair instead of the bed each day. Added sit to stand, long arc quads and standing exercises at the sink daily. Bilateral upper extremity with light weight every other day. Given written instructions. Given red theraband for home use. [facility]  Treatment/Session Assessment:    · Response to Treatment:  Pt tolerated all treatment fair. Needed frequent rest periods. · Compliance with Program/Exercises: Will assess as treatment progresses. · Recommendations/Intent for next treatment session: \"Next visit will focus on advancements to more challenging activities\".     Total Treatment Duration: 28 min  PT Patient Time In/Time Out  Time In: 0848  Time Out: SORAIDA Rincon

## 2018-06-14 NOTE — DISCHARGE INSTRUCTIONS
[address] [facility]  Department of Interventional Radiology  [facility]  [phone] Office  [phone] Fax    [address] general:  Fidelina radha procedimiento, el médico insertará kim aguja en el abdomen para drenar el líquido. Después del procedimiento, podrá respirar profundamente mucho más fácilmente. El sitio de la punción puede supurar el primer día. Suri Redhead y finalmente se detendrá. La paracentesis (drenaje del líquido del abdomen) algunas veces hace que los pacientes brian hipotensos (presión arterial baja). Merrill médico puede ordenarle que reciba líquidos o albúmina (un refuerzo de volumen) fidelina el procedimiento a través de un sitio IV. Instrucciones de cuidado en el hogar:  Mantenga el sitio de punción limpio y seco. No hay ariel de aaliyah o nadando hasta que el sitio de punción sane. Ducharse es aceptable. Reanude merrill dieta normal y reanude merrill actividad normal lentamente y según tolere. Si le falta el [place], descanse. Si la dificultad para respirar no se levy, llame a merrill médico que lo ordene. El líquido puede volver a acumularse en el tórax y / o en el abdomen. Si esto ocurre, merrill médico debe saber si es posible que necesite volver a realizar el procedimiento. Llame si:     Contreras Kaushik a merrill médico y / oa la enfermera de radiología si nota signos de infección, caterina drenaje de pus, o si está hinchado o enrojecido. También llame si tiene fiebre o si está sangrando por el sitio de la punción más que kim pequeña cantidad en el vendaje. Llame si el sitio de punción sigue drenando líquido. Es de esperar algo de supuración, lawanda debe disminuir y [employer]. Llame si siente que tiene presión en merrill abdomen. BUSQUE ATENCIÓN INMEDIATA O LLAME  SI DE REPENTE TIENE PROBLEMAS DE RESPIRACIÓN, O SI BENEDICT LABIOS SE VUELVEN AZULES, O SI USTED AVISONA LORENZO EN MERRILL ESPUTO.     Instrucciones para el seguimiento: Consulte a merrill médico que lo solicitó, mitzy caterina lo solicitó. Para llegar a nosotros: Si tiene Preguntas acerca del procedimiento, por favor llame al departamento de Radiología Intervencional al [phone]. Después de horas de oficina (5 pm) y los fines de [place], llamar al Tani Edis de llivetdas al [phone] y pregunte por el Radiologo de [facility].          Fecha: 14/06/2018

## 2018-06-14 NOTE — IP AVS SNAPSHOT
[address] 
[phone] Patient: Teagan Edwards MRN: [id number] [facility]:7/57/1434 About your hospitalization You were admitted on:  June 14, 2018 You last received care in the:  [facility] You were discharged on:  June 14, 2018 Why you were hospitalized Your primary diagnosis was:  Not on File Follow-up Information None Your Scheduled Appointments Monday June 18, 2018  8:00 AM EDT  
SC PT RECUR VISIT ONCOLOGY with Hilda Gray, PT  
[facility] ([address]) [address]  
[address] [place] [address] [place]  
[phone] Wednesday June 20, 2018  8:00 AM EDT ANTICOAG NURSE with [facility] ANTI-COAG(INR) Ishmael Dominguez Hematology and Oncology [facility]) JJ/ Juancarlos Carney 33 [facility] [address]  
[phone] Thursday June 21, 2018  8:45 AM EDT  
SC PT RECUR VISIT ONCOLOGY with Hilda Gray, PT  
[facility] ([address]) [address]  
[address] [place] [address] [place]  
[phone] Monday June 25, 2018  8:00 AM EDT  
SC PT RECUR VISIT ONCOLOGY with Hilda Gray, PT  
[facility] ([address]) [address]  
[address] [place] [facility] [place]  
[phone] Wednesday June 27, 2018  9:00 AM EDT  
SC PT RECUR VISIT ONCOLOGY with Hilda Gray, PT  
[facility] ([address]) [address]  
[address] [place] [address] [place]  
[phone] Monday July 02, 2018  8:00 AM EDT  
SC PT RECUR VISIT ONCOLOGY with Hilda Gray, PT  
[facility] ([address]) [address]  
[address] [facility] [place]  
[phone] Thursday July 05, 2018  8:00 AM EDT  
SC PT RECUR VISIT ONCOLOGY with Hilda Gray, PT  
[facility] ([address]) [address]  
[address] [place]  
[phone] Monday July 09, 2018  8:00 AM EDT  
SC PT RECUR VISIT ONCOLOGY with Hilda Gray, PT  
[facility] ([address]) [address] 
[address] [place]  
[phone] Wednesday July 11, 2018  9:00 AM EDT  
SC PT RECUR VISIT ONCOLOGY with Hilda Williamsonrenny, PT  
[facility] ([address]) [address] 
[address] [place]  
[phone] Thursday July 12, 2018  9:15 AM EDT  
NM PET/CT DOSE with 1808 [facility] NM PET/CT INJ RM  
ST. Abdirashid Fogo PET [facility]) C/ Juancarlos Carney 33 [facility] [address]  
[phone] * FOR ALL APPOINTMENTS BEFORE NOON: Nothing to eat or drink after midnight except for water ONLY. * AFTERNOON APPOINTMENTS: May eat a light breakfast, but without carbohydrates or sugars (We have a list of suggested foods). They must be NPO except for water for at least 4 hours before their appointment time. Patient should be well hydrated (at least 24 oz of water). Do not participate in strenuous activity the day before or the morning before afternoon appointments. Diabetic patients should refrain from insulin 4 hours prior to their appointment. No pregnant patients may have a PET/CT exam, breast feeding mothers should pump enough breast milk for 24 hours as they will not be able to breast feed for 1 day after the scan. Contact Nuclear Medicine with any questions. Procedure takes anywhere from 2-3 hours. If the patient requires pain or anxiety medications, arrangements must be made prior to patient's arrival with their ordering physician. The patient should wait 8 weeks after radiation therapy and 2 weeks after chemotherapy has been completed.   * PATIENT ARRIVAL Please report 30 minutes early to check in, except for 7 am patient 7am arrival.  
  
    
 Tuesday July 17, 2018  8:15 AM EDT  
LAB with [address]  
 180 [facility] OUTREACH INSURANCE [address]) Susana Mendes [address] 187 [facility]  
[phone] Tuesday July 17, 2018  8:45 AM EDT Follow Up with MD Kwabena Morales Hematology and Oncology [facility]) JJ/ Juancarlos Carney 33 [facility] [address]  
[phone] Tuesday July 17, 2018  9:15 AM EDT Injection with NUS10  
ST. [address] ([address]) [address] Leeroy Castleman [phone] [facility] [address]  
[phone] [address] Discharge Orders None A check heather indicates which time of day the medication should be taken. My Medications ASK your doctor about these medications Instructions Each Dose to Equal  
 Morning Noon Evening Bedtime  
 albuterol-ipratropium 2.5 mg-0.5 mg/3 ml Nebu Commonly known as:  Adam Pagan Your last dose was: Your next dose is:    
   
   
 3 mL by Nebulization route every six (6) hours as needed. 3 mL  
    
   
   
   
  
 amLODIPine 10 mg tablet Commonly known as:  Nidia France Your last dose was: Your next dose is: Take 1 Tab by mouth daily. 10 mg  
    
   
   
   
  
 carvedilol 3.125 mg tablet Commonly known as:  Maybelle Pallas Your last dose was: Your next dose is: Take 1 Tab by mouth two (2) times daily (with meals). 3.125 mg  
    
   
   
   
  
 fentaNYL 50 mcg/hr PATCH Commonly known as:  Tamara March Your last dose was: Your next dose is:    
   
   
 1 Patch by TransDERmal route every seventy-two (72) hours. Max Daily Amount: 1 Patch. Indications: Chronic Pain with Opioid Tolerance 1 Patch  
    
   
   
   
  
 furosemide 40 mg tablet Commonly known as:  LASIX Your last dose was: Your next dose is: Take 1 Tab by mouth daily.   
 40 mg  
    
   
   
   
  
 GLIPIZIDE PO Your last dose was: Your next dose is: Take  by mouth as needed. Pt only takes if BS > 200  
     
   
   
   
  
 hydrALAZINE 50 mg tablet Commonly known as:  APRESOLINE Your last dose was: Your next dose is: Take 1 Tab by mouth three (3) times daily. 50 mg HYDROcodone-acetaminophen  mg tablet Commonly known as:  [address] [place] Your last dose was: Your next dose is: Take 1 Tab by mouth every six (6) hours as needed for Pain. Max Daily Amount: 4 Tabs. 1 Tab LORazepam 1 mg tablet Commonly known as:  ATIVAN Your last dose was: Your next dose is: Take 1 Tab by mouth every six (6) hours as needed for Anxiety. Max Daily Amount: 4 mg.  
 1 mg  
    
   
   
   
  
 mirtazapine 30 mg tablet Commonly known as:  Marzette Fallen Your last dose was: Your next dose is: Take 1 Tab by mouth nightly. 30 mg  
    
   
   
   
  
 montelukast 10 mg tablet Commonly known as:  SINGULAIR Your last dose was: Your next dose is: Take 1 Tab by mouth nightly. 10 mg  
    
   
   
   
  
 palbociclib 125 mg Cap Your last dose was: Your next dose is: Take 1 Cap by mouth daily. Take one pill once a day for 3 weeks and then off for one week 125 mg  
    
   
   
   
  
 potassium chloride 20 mEq tablet Commonly known as:  K-DUR, KLOR-CON Your last dose was: Your next dose is: Take 1 Tab by mouth two (2) times a day. 20 mEq * warfarin 1 mg tablet Commonly known as:  COUMADIN Your last dose was: Your next dose is: Take 1.5 Tabs by mouth every evening. 1.5 mg  
    
   
   
   
  
 * warfarin 3 mg tablet Commonly known as:  COUMADIN Your last dose was: Your next dose is: Take 1 Tab by mouth daily. 3 mg * Notice: This list has 2 medication(s) that are the same as other medications prescribed for you. Read the directions carefully, and ask your doctor or other care provider to review them with you. Opioid Education Prescription Opioids: What You Need to Know: 
 
Prescription opioids can be used to help relieve moderate-to-severe pain and are often prescribed following a surgery or injury, or for certain health conditions. These medications can be an important part of treatment but also come with serious risks. Opioids are strong pain medicines. Examples include hydrocodone, oxycodone, fentanyl, and morphine. Heroin is an example of an illegal opioid. It is important to work with your health care provider to make sure you are getting the safest, most effective care. WHAT ARE THE RISKS AND SIDE EFFECTS OF OPIOID USE? Prescription opioids carry serious risks of addiction and overdose, especially with prolonged use. An opioid overdose, often marked by slow breathing, can cause sudden death. The use of prescription opioids can have a number of side effects as well, even when taken as directed. · Tolerance-meaning you might need to take more of a medication for the same pain relief · Physical dependence-meaning you have symptoms of withdrawal when the medication is stopped. Withdrawal symptoms can include nausea, sweating, chills, diarrhea, stomach cramps, and muscle aches. Withdrawal can last up to several weeks, depending on which drug you took and how long you took it. · Increased sensitivity to pain · Constipation · Nausea, vomiting, and dry mouth · Sleepiness and dizziness · Confusion · Depression · Low levels of testosterone that can result in lower sex drive, energy, and strength · Itching and sweating RISKS ARE GREATER WITH:      
· History of drug misuse, substance use disorder, or overdose · Mental health conditions (such as depression or anxiety) · Sleep apnea · Older age (72 years or older) · Pregnancy Avoid alcohol while taking prescription opioids. Also, unless specifically advised by your health care provider, medications to avoid include: · Benzodiazepines (such as Xanax or Valium) · Muscle relaxants (such as Soma or Flexeril) · Hypnotics (such as Ambien or Lunesta) · Other prescription opioids KNOW YOUR OPTIONS Talk to your health care provider about ways to manage your pain that don't involve prescription opioids. Some of these options may actually work better and have fewer risks and side effects. Options may include: 
· Pain relievers such as acetaminophen, ibuprofen, and naproxen · Some medications that are also used for depression or seizures · Physical therapy and exercise · Counseling to help patients learn how to cope better with triggers of pain and stress. · Application of heat or cold compress · Massage therapy · Relaxation techniques Be Informed Make sure you know the name of your medication, how much and how often to take it, and its potential risks & side effects. IF YOU ARE PRESCRIBED OPIOIDS FOR PAIN: 
· Never take opioids in greater amounts or more often than prescribed. Remember the goal is not to be pain-free but to manage your pain at a tolerable level. · Follow up with your primary care provider to: · Work together to create a plan on how to manage your pain. · Talk about ways to help manage your pain that don't involve prescription opioids. · Talk about any and all concerns and side effects. · Help prevent misuse and abuse. · Never sell or share prescription opioids · Help prevent misuse and abuse. · Store prescription opioids in a secure place and out of reach of others (this may include visitors, children, friends, and family).  
· Safely dispose of unused/unwanted prescription opioids: Find your community drug take-back program or your pharmacy mail-back program, or flush them down the toilet, following guidance from the Food and Drug Administration (www.fda.gov/Drugs/ResourcesForYou). · Visit www.cdc.gov/drugoverdose to learn about the risks of opioid abuse and overdose. · If you believe you may be struggling with addiction, tell your health care provider and ask for guidance or call Zang at 7-424-863-ZSEK. Discharge Instructions Shonda [address] Department of Interventional Radiology [address] [facility] 
[phone] Office 
[phone] Fax INSTRUCCIONES DE [address] Información general: Fidelina radha procedimiento, el médico insertará kim aguja en el abdomen para drenar el líquido. Después del procedimiento, podrá respirar profundamente mucho más fácilmente. El sitio de la punción puede supurar el primer día. Kaylin Nitza y finalmente se detendrá. La paracentesis (drenaje del líquido del abdomen) algunas veces hace que los pacientes brian hipotensos (presión arterial baja). Merrill médico puede ordenarle que reciba líquidos o albúmina (un refuerzo de volumen) fidelina el procedimiento a través de un sitio IV. Instrucciones de cuidado en el hogar: 
Mantenga el sitio de punción limpio y seco. No hay ariel de aaliyah o nadando hasta que el sitio de punción sane. Ducharse es aceptable. Reanude merrill dieta normal y reanude merrill actividad normal lentamente y según tolere. Si le falta el [place] mir[place], descanse. Si la dificultad para respirar no se levy, llame a merrill médico que lo ordene. El líquido puede volver a acumularse en el tórax y / o en el abdomen. Si esto ocurre, merrill médico debe saber si es posible que necesite volver a realizar el procedimiento.  
 
Llame si: 
   Efe Altura a merrill médico y / Cheral Val de radiología si nota signos de infección, caterina drenaje de pus, o si está hinchado o enrojecido. También llame si tiene fiebre o si está sangrando por el sitio de la punción más que kim pequeña cantidad en el vendaje. Llame si el sitio de punción sigue drenando líquido. Es de esperar algo de supuración, lawanda debe disminuir y [employer]. Llame si siente que tiene presión en merrill abdomen. BUSQUE ATENCIÓN INMEDIATA O LLAME  SI DE REPENTE TIENE PROBLEMAS DE RESPIRACIÓN, O SI BENEDICT LABIOS SE VUELVEN AZULES, O SI USTED AVISONA LORENZO EN MERRILL ESPUTO. Instrucciones para el seguimiento: Consulte a merrill médico que lo solicitó, mitzy caterina lo solicitó. Para llegar a nosotros: Si tiene Preguntas acerca del procedimiento, por favor llame al departamento de Radiología Intervencional al [phone]. Después de horas de oficina (5 pm) y los fines de [place], llamar al Wyatt Justin marleydas al [phone] y pregunte por el Radiologo de [facility]. Fecha: 14/06/2018 Introducing [facility]! [facility] introduce portal paciente Emily . Ahora se puede acceder a partes de merrill expediente médico, enviar por correo electrónico la oficina de merrill médico y solicitar renovaciones de medicamentos en línea. En merrill navegador de Internet , Carol Chaz a [email or web address] Olivia clic en el usuario por Llana Freeze? Bevely Miriam clic aquí en la sesión Adriana Fleming. Verá la página de registro [place]. Ingrese merrill código de [facility] mitzy y caterina aparece a continuación. Usted no tendrá que [employer] código después de beatrice completado el proceso de registro . Si usted no se inscribe antes de la fecha de caducidad , debe solicitar un nuevo código. · MyChart Código de acceso : [id number] Expires: 7/8/2018  9:20 AM 
 
Ingresa los últimos cuatro dígitos de merrill Número de Seguro Social ( xxxx ) y fecha de nacimiento ( dd / mm / aaaa ) caterina se indica y olivia clic en Enviar. Usted será llevado a la siguiente página de registro . Crear un ID MyChart . Esta será merrill ID de inicio de sesión de MyChart y no puede ser [place] , por lo que pensar en kim que es Charma Numbers y fácil de recordar . Crear kim contraseña MyChart . Usted puede cambiar merrill contraseña en cualquier momento . Ingrese merrill Password Reset de preguntas y [place] . [place] se puede utilizar en un momento posterior si usted olvida merrill contraseña. Introduzca merrill dirección de correo electrónico . Rupert Harry recibirá kim notificación por correo electrónico cuando la nueva información está disponible en MyChart . Dali shirley en Registrarse. Kalen Moy angel y descargar porciones de merrill expediente médico. 
Olga Lidia casper en el enlace de descarga del menú Resumen para descargar kim copia portátil de merrill información médica . Si tiene [employer] , por favor visite la sección de preguntas frecuentes del sitio web MyChart . Recuerde, MyChart NO es que se utilizará para las necesidades urgentes. Para emergencias médicas , llame al 911 . Ahora disponible en merrill iPhone y Android ! Introducing Ladarius Hoyt As a Annarosendo Melchor patient, I wanted to make you aware of our electronic visit tool called Ladarius Hoyt. Anna Melchor 24/7 allows you to connect within minutes with a medical provider 24 hours a day, seven days a week via a mobile device or tablet or logging into a secure website from your computer. You can access Ladarius Hoyt from anywhere in the [place]. A virtual visit might be right for you when you have a simple condition and feel like you just dont want to get out of bed, or cant get away from work for an appointment, when your regular Annarosendo Melchor provider is not available (evenings, weekends or holidays), or when youre out of town and need minor care.   Electronic visits cost only $49 and if the Ladarius Hoyt provider determines a prescription is needed to treat your condition, one can be electronically transmitted to a nearby pharmacy*. Please take a moment to enroll today if you have not already done so. The enrollment process is free and takes just a few minutes. To enroll, please download the Kuddle 24/7 alesha to your tablet or phone, or visit [email or web address] to enroll on your computer. And, as an [address] [place] [address] patient with a [employer] account, the results of your visits will be scanned into your electronic medical record and your primary care provider will be able to view the scanned results. We urge you to continue to see your regular Kuddle provider for your ongoing medical care. And while your primary care provider may not be the one available when you seek a Meldiumnefin virtual visit, the peace of mind you get from getting a real diagnosis real time can be priceless. For more information on [employer], view our Frequently Asked Questions (FAQs) at [email or web address]. Sincerely, 
 
Donna Jones MD 
Chief Medical Officer [address] [place] [address] *:  certain medications cannot be prescribed via [employer] Unresulted tests-please follow up with your PCP on these results Procedure/Test Authorizing Provider [phone] [facility], NP Providers Seen During Your Hospitalization Provider Specialty Primary office phone Eliu Veronica NP Nurse Practitioner [phone] Your Primary Care Physician (PCP) Primary Care Physician Office Phone Office Fax  
 [place], [place] D ** None ** ** None ** You are allergic to the following No active allergies Recent Documentation OB Status Smoking Status Postmenopausal Former Smoker Emergency Contacts Name Discharge Info Relation Home Work Mobile Juancarlos Leggett  Son [22] [phone] [address] [place] [address]  Spouse [3] [phone] Patient Belongings The following personal items are in your possession at time of discharge: 
                             
 
  
  
 Please provide this summary of care documentation to your next provider. Signatures-by signing, you are acknowledging that this After Visit Summary has been reviewed with you and you have received a copy. Patient Signature:  ____________________________________________________________ Date:  ____________________________________________________________  
  
Pooja Yin Provider Signature:  ____________________________________________________________ Date:  ____________________________________________________________  
  
  
   
  
Ryanne Kyrie Damon 322 [facility] 
[phone] Patient: Jeffrey Gibson MRN: [id number] VSM:[birth year] Sobre asher hospitalización Le admitieron el:  June 14, 2018 Asher tratamiento más reciente fue el:  [facility] Le dieron de thania el:  June 14, 2018 Por qué le ingresaron Asher diagnosis primaria es:  No está archivado/a Follow-up Information [employer] Your Scheduled Appointments Monday June 18, 2018  8:00 AM EDT  
SC PT RECUR VISIT ONCOLOGY with Hilda Gray, PT  
[facility] [address] Cm Damon [address] [place] [address]  
[phone] Wednesday June 20, 2018  8:00 AM EDT ANTICOAG NURSE with [facility] ANTI-COAG(INR) Monica Shrestha Hematology and Oncology [facility] MILLENNIUM) JJ/ Juancarlos Carney 33 [facility] [address]  
[phone] Thursday June 21, 2018  8:45 AM EDT  
SC PT RECUR VISIT ONCOLOGY with Hilda Gray, PT  
[facility] ([address]) [address] 
[address]  
[phone] Monday June 25, 2018  8:00 AM EDT  
SC PT RECUR VISIT ONCOLOGY with iHlda Gray, PT  
[facility] ([address]) [address] [place]  
[address] [place] [address] [place]  
[phone] Wednesday June 27, 2018  9:00 AM EDT  
SC PT RECUR VISIT ONCOLOGY with Hilda D Modzel, PT  
[facility] ([address]) [address] [place]  
[address] [place] [address] [place]  
[phone] Monday July 02, 2018  8:00 AM EDT  
SC PT RECUR VISIT ONCOLOGY with Hilda D Modzel, PT  
[facility] ([address]) [address] [place]  
[address] [place] [address] [place]  
[phone] Thursday July 05, 2018  8:00 AM EDT  
SC PT RECUR VISIT ONCOLOGY with Hilda D Modzel, PT  
[facility] ([address]) [address] [place]  
[address] [place] Mendoza [place]  
[phone] Monday July 09, 2018  8:00 AM EDT  
SC PT RECUR VISIT ONCOLOGY with Hilda D Modzel, PT  
[facility] ([address]) [address] [place]  
[address] [place] Mendoza [place]  
[phone] Wednesday July 11, 2018  9:00 AM EDT  
SC PT RECUR VISIT ONCOLOGY with Hilda D Modzel, PT  
[facility] ([address]) [address] [place]  
[address] [place] Mendoza [place]  
[phone] Thursday July 12, 2018  9:15 AM EDT  
NM PET/CT DOSE with 1808 [facility] NM PET/CT INJ RM  
ST. Abdirashid Fogo PET [facility]) JJ/ Juancarlos Carney [address] [place] [id number]  
[phone] * FOR ALL APPOINTMENTS BEFORE NOON: Nothing to eat or drink after midnight except for water ONLY. * AFTERNOON APPOINTMENTS: May eat a light breakfast, but without carbohydrates or sugars (We have a list of suggested foods). They must be NPO except for water for at least 4 hours before their appointment time. Patient should be well hydrated (at least 24 oz of water). Do not participate in strenuous activity the day before or the morning before afternoon appointments. Diabetic patients should refrain from insulin 4 hours prior to their appointment.   No pregnant patients may have a PET/CT exam, breast feeding mothers should pump enough breast milk for 24 hours as they will not be able to breast feed for 1 day after the scan. Contact Nuclear Medicine with any questions. Procedure takes anywhere from 2-3 hours. If the patient requires pain or anxiety medications, arrangements must be made prior to patient's arrival with their ordering physician. The patient should wait 8 weeks after radiation therapy and 2 weeks after chemotherapy has been completed. * PATIENT ARRIVAL Please report 30 minutes early to check in, except for 7 am patient 7am arrival.  
  
    
 Tuesday July 17, 2018  8:15 AM EDT  
LAB with [address]  
1808 [facility] OUTREACH INSURANCE ([facility]) Susana Mendes [address] [place] [address]  
[phone] Tuesday July 17, 2018  8:45 AM EDT Follow Up with Sierra Fitch MD  
[facility] Hematology and Oncology [facility] JJ/ Juancarlos Carney 33 [address] [facility] [address]  
[phone] Tuesday July 17, 2018  9:15 AM EDT Injection with NUS10  
ST. [address] ([facility]) [address] Volodymyr House [phone] [place] [facility] [address]  
[phone] [address] Discharge Orders [employer] A check heather indicates which time of day the medication should be taken. My Medications CONSULTE con merrill médico sobre Enconcert Instructions Each Dose to Equal  
 Morning Noon Evening Bedtime  
 albuterol-ipratropium 2.5 mg-0.5 mg/3 ml Nebu También conocido caterina:  Hesham Arts Your last dose was: Your next dose is:    
   
   
 3 mL by Nebulization route every six (6) hours as needed. 3 mL  
    
   
   
   
  
 amLODIPine 10 mg tablet También conocido caterina:  Chilo Harry Your last dose was: Your next dose is: Take 1 Tab by mouth daily.   
 10 mg  
    
   
 carvedilol 3.125 mg tablet También conocido caterina:  COREG Your last dose was: Your next dose is: Take 1 Tab by mouth two (2) times daily (with meals). 3.125 mg  
    
   
   
   
  
 fentaNYL 50 mcg/hr PATCH También conocido caterina:  Shefali Bell Your last dose was: Your next dose is:    
   
   
 1 Patch by TransDERmal route every seventy-two (72) hours. Max Daily Amount: 1 Patch. Indications: Chronic Pain with Opioid Tolerance 1 Patch  
    
   
   
   
  
 furosemide 40 mg tablet También conocido caterina:  LASIX Your last dose was: Your next dose is: Take 1 Tab by mouth daily. 40 mg  
    
   
   
   
  
 GLIPIZIDE PO Your last dose was: Your next dose is: Take  by mouth as needed. Pt only takes if BS > 200  
     
   
   
   
  
 hydrALAZINE 50 mg tablet También conocido caterina:  APRESOLINE Your last dose was: Your next dose is: Take 1 Tab by mouth three (3) times daily. 50 mg HYDROcodone-acetaminophen  mg tablet También conocido caterina:  Gi Mijares Your last dose was: Your next dose is: Take 1 Tab by mouth every six (6) hours as needed for Pain. Max Daily Amount: 4 Tabs. 1 Tab LORazepam 1 mg tablet También conocido caterina:  ATIVAN Your last dose was: Your next dose is: Take 1 Tab by mouth every six (6) hours as needed for Anxiety. Max Daily Amount: 4 mg.  
 1 mg  
    
   
   
   
  
 mirtazapine 30 mg tablet También conocido caterina:  Nurys Chong Your last dose was: Your next dose is: Take 1 Tab by mouth nightly. 30 mg  
    
   
   
   
  
 montelukast 10 mg tablet También conocido caterina:  MARIA L Your last dose was: Your next dose is: Take 1 Tab by mouth nightly.   
 10 mg  
    
   
   
   
  
 palbociclib 125 mg Cap Your last dose was: Your next dose is: Take 1 Cap by mouth daily. Take one pill once a day for 3 weeks and then off for one week 125 mg  
    
   
   
   
  
 potassium chloride 20 mEq tablet También conocido caterina:  K-DUR, [employer] Your last dose was: Your next dose is: Take 1 Tab by mouth two (2) times a day. 20 mEq * warfarin 1 mg tablet También conocido caterina:  COUMADIN Your last dose was: Your next dose is: Take 1.5 Tabs by mouth every evening. 1.5 mg  
    
   
   
   
  
 * warfarin 3 mg tablet También conocido caterina:  COUMADIN Your last dose was: Your next dose is: Take 1 Tab by mouth daily. 3 mg * Aviso:  Esta lista contiene medicamentos que son iguales a otros medicamentos recetados para usted. Manisha las instrucciones con cuidado y pida a merrill personal médico que revise la lista de medicamentos y las instrucciones correspondientes con usted. Opioid Education Prescription Opioids: What You Need to Know: 
 
Prescription opioids can be used to help relieve moderate-to-severe pain and are often prescribed following a surgery or injury, or for certain health conditions. These medications can be an important part of treatment but also come with serious risks. Opioids are strong pain medicines. Examples include hydrocodone, oxycodone, fentanyl, and morphine. Heroin is an example of an illegal opioid. It is important to work with your health care provider to make sure you are getting the safest, most effective care. WHAT ARE THE RISKS AND SIDE EFFECTS OF OPIOID USE? Prescription opioids carry serious risks of addiction and overdose, especially with prolonged use. An opioid overdose, often marked by slow breathing, can cause sudden death.   The use of prescription opioids can have a number of side effects as well, even when taken as directed. · Tolerance-meaning you might need to take more of a medication for the same pain relief · Physical dependence-meaning you have symptoms of withdrawal when the medication is stopped. Withdrawal symptoms can include nausea, sweating, chills, diarrhea, stomach cramps, and muscle aches. Withdrawal can last up to several weeks, depending on which drug you took and how long you took it. · Increased sensitivity to pain · Constipation · Nausea, vomiting, and dry mouth · Sleepiness and dizziness · Confusion · Depression · Low levels of testosterone that can result in lower sex drive, energy, and strength · Itching and sweating RISKS ARE GREATER WITH:      
· History of drug misuse, substance use disorder, or overdose · Mental health conditions (such as depression or anxiety) · Sleep apnea · Older age (72 years or older) · Pregnancy Avoid alcohol while taking prescription opioids. Also, unless specifically advised by your health care provider, medications to avoid include: · Benzodiazepines (such as Xanax or Valium) · Muscle relaxants (such as Soma or Flexeril) · Hypnotics (such as Ambien or Lunesta) · Other prescription opioids KNOW YOUR OPTIONS Talk to your health care provider about ways to manage your pain that don't involve prescription opioids. Some of these options may actually work better and have fewer risks and side effects. Options may include: 
· Pain relievers such as acetaminophen, ibuprofen, and naproxen · Some medications that are also used for depression or seizures · Physical therapy and exercise · Counseling to help patients learn how to cope better with triggers of pain and stress. · Application of heat or cold compress · Massage therapy · Relaxation techniques Be Informed Make sure you know the name of your medication, how much and how often to take it, and its potential risks & side effects. IF YOU ARE PRESCRIBED OPIOIDS FOR PAIN: 
· Never take opioids in greater amounts or more often than prescribed. Remember the goal is not to be pain-free but to manage your pain at a tolerable level. · Follow up with your primary care provider to: · Work together to create a plan on how to manage your pain. · Talk about ways to help manage your pain that don't involve prescription opioids. · Talk about any and all concerns and side effects. · Help prevent misuse and abuse. · Never sell or share prescription opioids · Help prevent misuse and abuse. · Store prescription opioids in a secure place and out of reach of others (this may include visitors, children, friends, and family). · Safely dispose of unused/unwanted prescription opioids: Find your community drug take-back program or your pharmacy mail-back program, or flush them down the toilet, following guidance from the Food and Drug Administration (www.fda.gov/Drugs/ResourcesForYou). · Visit www.cdc.gov/drugoverdose to learn about the risks of opioid abuse and overdose. · If you believe you may be struggling with addiction, tell your health care provider and ask for guidance or call [address] [place] [address] at [phone]. Instrucciones a teresa de thania [address] Department of Interventional Radiology [facility] 
[phone] Office 
[phone] Fax INSTRUCCIONES DE [address] Información general: Eduin radha procedimiento, el médico insertará kim aguja en el abdomen para drenar el líquido. Después del procedimiento, podrá respirar profundamente mucho más fácilmente. El sitio de la punción puede supurar el primer día. Robert Ogden y finalmente se detendrá.  La paracentesis (drenaje del líquido del abdomen) algunas veces hace que los pacientes brian hipotensos (presión arterial baja). Merrill médico puede ordenarle que reciba líquidos o albúmina (un refuerzo de volumen) fidelina el procedimiento a través de un sitio IV. Instrucciones de cuidado en el hogar: 
Mantenga el sitio de punción limpio y seco. No hay ariel de aaliyah o nadando hasta que el sitio de punción sane. Ducharse es aceptable. Reanude merrill dieta normal y reanude merrill actividad normal lentamente y según tolere. Si le falta el [place], descanse. Si la dificultad para respirar no se levy, llame a merrill médico que lo ordene. El líquido puede volver a acumularse en el tórax y / o en el abdomen. Si esto ocurre, merrill médico debe saber si es posible que necesite volver a realizar el procedimiento. Llame si: 
   [place] a merrill médico y / oa la enfermera de radiología si nota signos de infección, caterina drenaje de pus, o si está hinchado o enrojecido. También llame si tiene fiebre o si está sangrando por el sitio de la punción más que kim pequeña cantidad en el vendaje. Llame si el sitio de punción sigue drenando líquido. Es de esperar algo de supuración, lawanda debe disminuir y [employer]. Llame si siente que tiene presión en merrill abdomen. BUSQUE ATENCIÓN INMEDIATA O LLAME  SI DE REPENTE TIENE PROBLEMAS DE RESPIRACIÓN, O SI BENEDICT LABIOS SE VUELVEN AZULES, O SI USTED AVISONA LORENZO EN MERRILL ESPUTO. Instrucciones para el seguimiento: Consulte a merrill médico que lo solicitó, mitzy caterina lo solicitó. Para llegar a nosotros: Si tiene Preguntas acerca del procedimiento, por favor llame al departamento de Radiología Intervencional al [phone]. Después de horas de oficina (5 pm) y los fines de [place], llamar al Lennette Fernanda de llamadas al [phone] y pregunte por el Radiologo de [facility]. Fecha: 14/06/2018 Introducing [facility]! [facility] introduce portal paciente MyChart .  Ahora se puede acceder a partes de merrill expediente médico, enviar por correo electrónico la oficina de merrill médico y solicitar renovaciones de medicamentos en línea. En merrill navegador de Internet , [place] a [email or web address] Olivia clic en el usuario por Chuy Nissen? Ale Arthur clic aquí en la sesión Gretta Rodgers. Verá la página de registro [place]. Ingrese merrill código de [employer] mitzy y caterina aparece a continuación. Usted no tendrá que [employer] código después de beatrice completado el proceso de registro . Si usted no se inscribe antes de la fecha de caducidad , debe solicitar un nuevo código. · MyChart Código de acceso : [id number] Expires: 7/8/2018  9:20 AM 
 
Ingresa los últimos cuatro dígitos de merrill Número de Seguro Social ( xxxx ) y fecha de nacimiento ( dd / mm / aaaa ) caterina se indica y olivia clic en Enviar. Usted será llevado a la siguiente página de registro . Crear un ID MyChart . Esta será merrill ID de inicio de sesión de MyChart y no puede ser [place] , por lo que pensar en kim que es Rheta Pointer y fácil de recordar . Crear kim contraseña MyChart . Usted puede cambiar merrill contraseña en cualquier momento . Ingrese merrill Password Reset de preguntas y [place] . [place] se puede utilizar en un momento posterior si usted olvida merrill contraseña. Introduzca merrill dirección de correo electrónico . Mirna Joe recibirá kim notificación por correo electrónico cuando la nueva información está disponible en MyChart . Sulaiman Bue clic en Registrarse. Bethany Corralught angel y descargar porciones de merrill expediente médico. 
Olivia clic en el enlace de descarga del menú Resumen para descargar kim copia portátil de merrill información médica . Si tiene [employer] , por favor visite la sección de preguntas frecuentes del sitio web MyChart . Recuerde, MyChart NO es que se utilizará para las necesidades urgentes. Para emergencias médicas , llame al 911 . Ahora disponible en merrill iPhone y Android ! Introducing Ladarius Hoyt As a Marylin Mays patient, I wanted to make you aware of our electronic visit tool called Ladarius Embedded [employer]. [employer] 24/7 allows you to connect within minutes with a medical provider 24 hours a day, seven days a week via a mobile device or tablet or logging into a secure website from your computer. You can access [place] from anywhere in the [place]. A virtual visit might be right for you when you have a simple condition and feel like you just dont want to get out of bed, or cant get away from work for an appointment, when your regular [facility] provider is not available (evenings, weekends or holidays), or when youre out of town and need minor care. Electronic visits cost only $49 and if the [employer] 24/7 provider determines a prescription is needed to treat your condition, one can be electronically transmitted to a nearby pharmacy*. Please take a moment to enroll today if you have not already done so. The enrollment process is free and takes just a few minutes. To enroll, please download the [employer] 24/7 alesha to your tablet or phone, or visit [email or web address] to enroll on your computer. And, as an [address] [place] [address] patient with a [employer] account, the results of your visits will be scanned into your electronic medical record and your primary care provider will be able to view the scanned results. We urge you to continue to see your regular [facility] provider for your ongoing medical care. And while your primary care provider may not be the one available when you seek a Ladarius Brewerfin virtual visit, the peace of mind you get from getting a real diagnosis real time can be priceless. For more information on Ladarius Embedded Chatnefin, view our Frequently Asked Questions (FAQs) at [email or web address]. Sincerely, 
 
Landy Powell MD 
Chief Medical Officer Damian Ceballos *:  certain medications cannot be prescribed via Ladarius Hoyt Unresulted tests-please follow up with your PCP on these results Procedimiento/Prueba Autorizada por [phone] [facility], NP Providers Seen During Your Hospitalization Personal Médico Especialidad Teléfono principal de sekou Giles NP Nurse Practitioner [phone] Asher médico de atención primaria (PCP ) Primary Care Physician Office Phone Office Fax  
 [place], [place] D ** None ** ** None ** Tiene alergias a lo siguiente No tiene alergias Documentación recientes Estado obstétrico Estatus de tabaquísmo Postmenopausal Former Smoker Emergency Contacts Name Discharge Info Relation Home Work Mobile Juancarlos Leggett  Son [22] [phone] [address] [place] [address]  Spouse [3] [phone] Patient Belongings The following personal items are in your possession at time of discharge: 
                             
 
  
  
 Please provide this summary of care documentation to your next provider. Signatures-by signing, you are acknowledging that this After Visit Summary has been reviewed with you and you have received a copy. Patient Signature:  ____________________________________________________________ Date:  ____________________________________________________________  
  
[facility] Provider Signature:  ____________________________________________________________ Date:  ____________________________________________________________

## 2018-06-14 NOTE — IP AVS SNAPSHOT
[address] 
[phone] Patient: Suraj Vazquez MRN: [id number] FNF:[birth year] A check heather indicates which time of day the medication should be taken. My Medications ASK your doctor about these medications Instructions Each Dose to Equal  
 Morning Noon Evening Bedtime  
 albuterol-ipratropium 2.5 mg-0.5 mg/3 ml Nebu Commonly known as:  Marguerite Morse Your last dose was: Your next dose is:    
   
   
 3 mL by Nebulization route every six (6) hours as needed. 3 mL  
    
   
   
   
  
 amLODIPine 10 mg tablet Commonly known as:  Ashley Diaz Your last dose was: Your next dose is: Take 1 Tab by mouth daily. 10 mg  
    
   
   
   
  
 carvedilol 3.125 mg tablet Commonly known as:  Luisa Matta Your last dose was: Your next dose is: Take 1 Tab by mouth two (2) times daily (with meals). 3.125 mg  
    
   
   
   
  
 fentaNYL 50 mcg/hr PATCH Commonly known as:  Alvin Pereira Your last dose was: Your next dose is:    
   
   
 1 Patch by TransDERmal route every seventy-two (72) hours. Max Daily Amount: 1 Patch. Indications: Chronic Pain with Opioid Tolerance 1 Patch  
    
   
   
   
  
 furosemide 40 mg tablet Commonly known as:  LASIX Your last dose was: Your next dose is: Take 1 Tab by mouth daily. 40 mg  
    
   
   
   
  
 GLIPIZIDE PO Your last dose was: Your next dose is: Take  by mouth as needed. Pt only takes if BS > 200  
     
   
   
   
  
 hydrALAZINE 50 mg tablet Commonly known as:  APRESOLINE Your last dose was: Your next dose is: Take 1 Tab by mouth three (3) times daily. 50 mg HYDROcodone-acetaminophen  mg tablet Commonly known as:  Vishnu Post Your last dose was: Your next dose is: Take 1 Tab by mouth every six (6) hours as needed for Pain. Max Daily Amount: 4 Tabs. 1 Tab LORazepam 1 mg tablet Commonly known as:  ATIVAN Your last dose was: Your next dose is: Take 1 Tab by mouth every six (6) hours as needed for Anxiety. Max Daily Amount: 4 mg.  
 1 mg  
    
   
   
   
  
 mirtazapine 30 mg tablet Commonly known as:  Yeny Torres Your last dose was: Your next dose is: Take 1 Tab by mouth nightly. 30 mg  
    
   
   
   
  
 montelukast 10 mg tablet Commonly known as:  SINGULAIR Your last dose was: Your next dose is: Take 1 Tab by mouth nightly. 10 mg  
    
   
   
   
  
 palbociclib 125 mg Cap Your last dose was: Your next dose is: Take 1 Cap by mouth daily. Take one pill once a day for 3 weeks and then off for one week 125 mg  
    
   
   
   
  
 potassium chloride 20 mEq tablet Commonly known as:  K-DUR, KLOR-CON Your last dose was: Your next dose is: Take 1 Tab by mouth two (2) times a day. 20 mEq * warfarin 1 mg tablet Commonly known as:  COUMADIN Your last dose was: Your next dose is: Take 1.5 Tabs by mouth every evening. 1.5 mg  
    
   
   
   
  
 * warfarin 3 mg tablet Commonly known as:  COUMADIN Your last dose was: Your next dose is: Take 1 Tab by mouth daily. 3 mg * Notice: This list has 2 medication(s) that are the same as other medications prescribed for you. Read the directions carefully, and ask your doctor or other care provider to review them with you.

## 2018-06-18 NOTE — PROGRESS NOTES
Rosemary Larkin  : 1950  Primary: Almeena Mir Fap 100%  Secondary:  [address]  at [facility]  [address] , [address], [place] [address]  [phone]   [phone]          OUTPATIENT PHYSICAL THERAPY:Daily Note 2018    ICD-10: Treatment Diagnosis: muscle weakness generalized M 62.81  Precautions/Allergies:   Review of patient's allergies indicates no known allergies. Fall Risk Score: 9 (? 5 = High Risk)  MD Orders: oncology rehab MEDICAL/REFERRING DIAGNOSIS:  Other fatigue [R53.83]    DATE OF ONSET:   REFERRING PHYSICIAN: Celso Domingeuz MD  RETURN PHYSICIAN APPOINTMENT: 18     INITIAL ASSESSMENT:  Ms. Nate Vasquez presents following treatment for metastatic breast cancer since . She has developed significant fatigue and weakness. She spends most of the day in bed. She has had a fall in the recent past.  She uses a rolling walker. She could possibly benefit from home health, but is willing to attend therapy as outpatient. She will benefit from therapeutic exercises in order to increase strength and overall conditioning. 18: The patient has attended a total of 10 visits. She has been compliant with her HEP. She is making slow, but steady progress. She will benefit from continued therapy in order to reach her goals. Her spouse reports she is able to do more functional activities at home and to be out of the bed more. She is awaiting the results of her most recent PET scan. PROBLEM LIST (Impacting functional limitations):  1. Decreased Strength  2. Decreased ADL/Functional Activities  3. Decreased Transfer Abilities  4. Decreased Balance  5. Increased Pain  6. Decreased Activity Tolerance  7. Increased Fatigue  8. Decreased Hermann with Home Exercise Program INTERVENTIONS PLANNED:  1. Home Exercise Program (HEP)  2. Therapeutic Exercise/Strengthening   TREATMENT PLAN:  Effective Dates: 3/28/2018 TO 2018 (90 days). Frequency/Duration: 2 time a week for 90 Days  GOALS: (Goals have been discussed and agreed upon with patient.)  Short-Term Functional Goals: Time Frame: 4 weeks  1. The patient will be independent with HEP for strength within 4 weeks. Met   2. The patient will report a fatigue of 7 or less within 4 weeks. 3. The patient will gain 1/2 grade strength within 4 weeks. Met   4. The patient will improve her TUG score by 2 seconds within 4 weeks. 5. The patient will improve her Tinetti score by 5 within 4 weeks. Met   Discharge Goals: Time Frame: 8 weeks  1. The patient will report a fatigue level of 5 or less within 8 weeks. 2. The patient will improve her TUG score by 5 seconds within 8 weeks. 3. The patient will participate in a 6 minute walk within 8 weeks. Rehabilitation Potential For Stated Goals: [facility] therapy, I certify that the treatment plan above will be carried out by a therapist or under their direction. Thank you for this referral,  Ivis Hartley PT     Referring Physician Signature: Stefano Martinez MD              Date                    The information in this section was collected on 3/28/18 (except where otherwise noted). HISTORY:   History of Present Injury/Illness (Reason for Referral):  Metastatic breast cancer, chemo, fatigue, weakness  Past Medical History/Comorbidities:   Ms. Juana Loya  has a past medical history of Acute on chronic diastolic congestive heart failure ([address] (1/5/2016); Anemia of chronic disease (11/13/2017); Aortic valve stenosis; Arthritis; Asthma; Cancer ([address] Chemotherapy-induced neutropenia (HCC) (12/20/2016); CKD (chronic kidney disease) stage 4, GFR 15-29 ml/min ([address] Depression; Diastolic CHF ([address] Former cigarette smoker; HCAP (healthcare-associated pneumonia) (7/17/2013); History of DVT (deep vein thrombosis); Hypertension; Long term current use of anticoagulant; Oxygen dependent; Port catheter in place;  Sepsis ([address] (7/19/2016); SOB (shortness of breath) on exertion; Type 2 diabetes mellitus ([address] Unspecified adverse effect of anesthesia; and Vitamin B12 deficiency (2017). Ms. Torrey Esteves  has a past surgical history that includes hx  section; hx cyst removal; hx urological; hx vascular access (2012); and colonoscopy (N/A, 3/19/2018). Past Medical History:   Diagnosis Date    Acute on chronic diastolic congestive heart failure ([address] 2016    Anemia of chronic disease 2017    Aortic valve stenosis     Arthritis     roverto hands    Asthma     till age 31    Cancer ([address]     roverto. breast ca mets bone/lung    Chemotherapy-induced neutropenia ([address] 2016    CKD (chronic kidney disease) stage 4, GFR 15-29 ml/min ([facility])     Depression     managed with meds    Diastolic CHF ([address]     Followed by Donte Kerns    Former cigarette smoker     HCAP (healthcare-associated pneumonia) 2013    History of DVT (deep vein thrombosis)     Left leg DVT.  on coumadin    Hypertension     managed with meds    Long term current use of anticoagulant     Coumadin    Oxygen dependent     at bedtime 2L-3L NC    Port catheter in place     Left chest port    Sepsis ([address] 2016    SOB (shortness of breath) on exertion     Type 2 diabetes mellitus (HCC)     PRN oral agent/AVG BS: 170-200/ s.s of hypoglycemia at 79    Unspecified adverse effect of anesthesia     In [place] 28 yrs ago after second c -section-she was told her heart stopped d/t anesthesia-hospitalized for 5 days afterwards and followed by cardiologist    Vitamin B12 deficiency 2017     Past Surgical History:   Procedure Laterality Date    COLONOSCOPY N/A 3/19/2018    COLONOSCOPY  BMI 23   performed [facility] at [facility]    HX CYST REMOVAL      HX UROLOGICAL      stent placed    HX VASCULAR ACCESS  2012    Left chest port       Social History/Living Environment: lives with family  Prior Level of Function/Work/Activity:  independent  Dominant Side:         RIGHT  Current Medications:       Current Outpatient Prescriptions:     montelukast (SINGULAIR) 10 mg tablet, Take 1 Tab by mouth nightly., Disp: 30 Tab, Rfl: 11    fentaNYL (DURAGESIC) 50 mcg/hr PATCH, 1 Patch by TransDERmal route every seventy-two (72) hours. Max Daily Amount: 1 Patch. Indications: Chronic Pain with Opioid Tolerance, Disp: 10 Patch, Rfl: 0    mirtazapine (REMERON) 30 mg tablet, Take 1 Tab by mouth nightly., Disp: 30 Tab, Rfl: 1    warfarin (COUMADIN) 3 mg tablet, Take 1 Tab by mouth daily. , Disp: 30 Tab, Rfl: 2    LORazepam (ATIVAN) 1 mg tablet, Take 1 Tab by mouth every six (6) hours as needed for Anxiety. Max Daily Amount: 4 mg., Disp: 60 Tab, Rfl: 0    HYDROcodone-acetaminophen (NORCO)  mg tablet, Take 1 Tab by mouth every six (6) hours as needed for Pain. Max Daily Amount: 4 Tabs., Disp: 90 Tab, Rfl: 0    albuterol-ipratropium (DUO-NEB) 2.5 mg-0.5 mg/3 ml nebu, 3 mL by Nebulization route every six (6) hours as needed. , Disp: 360 Nebule, Rfl: 1    GLIPIZIDE PO, Take  by mouth as needed. Pt only takes if BS > 200, Disp: , Rfl:     palbociclib 125 mg cap, Take 1 Cap by mouth daily. Take one pill once a day for 3 weeks and then off for one week, Disp: 21 Cap, Rfl: 5    potassium chloride (K-DUR, KLOR-CON) 20 mEq tablet, Take 1 Tab by mouth two (2) times a day., Disp: 60 Tab, Rfl: 0    hydrALAZINE (APRESOLINE) 50 mg tablet, Take 1 Tab by mouth three (3) times daily. (Patient taking differently: Take 50 mg by mouth two (2) times a day.), Disp: 90 Tab, Rfl: 1    furosemide (LASIX) 40 mg tablet, Take 1 Tab by mouth daily. (Patient taking differently: Take 40 mg by mouth every other day. Monday,Wednesday,Friday,Sunday), Disp: 30 Tab, Rfl: 11    carvedilol (COREG) 3.125 mg tablet, Take 1 Tab by mouth two (2) times daily (with meals). , Disp: 60 Tab, Rfl: 11    warfarin (COUMADIN) 1 mg tablet, Take 1.5 Tabs by mouth every evening., Disp: 45 Tab, Rfl: 0    amLODIPine (NORVASC) 10 mg tablet, Take 1 Tab by mouth daily. , Disp: 30 Tab, Rfl: 11   Date Last Reviewed:  3/28/18   Number of Personal Factors/Comorbidities that affect the Plan of Care: 3+: HIGH COMPLEXITY   EXAMINATION:   ROM:          Within functional limits  Strength:          Grossly 4/5 x 4 extremities  Functional Mobility:         Gait/Ambulation:  Uses walker        Transfers:  independent  Balance:          Decreased in standing and gait   Body Structures Involved:  1. Muscles Body Functions Affected:  1. Sensory/Pain  2. Neuromusculoskeletal  3. Movement Related Activities and Participation Affected:  1. General Tasks and Demands  2. Communication  3. Mobility   Number of elements (examined above) that affect the Plan of Care: 4+: HIGH COMPLEXITY   CLINICAL PRESENTATION:   Presentation: Stable and uncomplicated: LOW COMPLEXITY   CLINICAL DECISION MAKING:   Outcome Measure: Tool Used: 6-MINUTE WALK TEST  Unable to test  Score:  Initial:   feet Most Recent: X feet (Date: -- )   Interpretation of Score: Normal range varies but is approximately 8252-3114 Feet      Distance walked:   feet               Baseline End of Test   Heart Rate      Dyspnea (Mehul Scale)     Fatigue (Mehul Scale)     SpO2     BP       Score 2133 [id number] [phone] 15-0   Modifier CH CI CJ CK CL CM CN         Tool Used: Disabilities of the Arm, Shoulder and Hand (DASH) Questionnaire - Quick Version  Score:  Initial: 41/55  Most Recent: 41/55 (Date: 5/2/18 )   Interpretation of Score: The DASH is designed to measure the activities of daily living in person's with upper extremity dysfunction or pain. Each section is scored on a 1-5 scale, 5 representing the greatest disability. The scores of each section are added together for a total score of 55.     Score 11 12-19 20-28 29-37 38-45 46-54 55   Modifier CH CI CJ CK CL CM CN         Tool Used: TINETTI  Score:  Initial:   Gait: 6/12  Balance: 10/16  TOTAL: 16/28 Most Recent:  Gait: 9/12  Balance:13 /16  TOTAL:22 /28   Interpretation of Score: The maximum score for the gait component is 12 points. The maximum score for the balance component is 16 points. The maximum total score is 28 points. In general, patients who score below 19 are at a high risk for falls. Patients who score in the range of 19-24 indicate that the patient has a risk for falls. Score 28 27-23 22-18 17-12 11-6 5-1 0   Modifier CH CI CJ CK CL CM CN         Tool Used: Timed Up and Go (TUG)  Score:  Initial: 25 seconds Most Recent: 25 seconds (Date: 5/2/18 )   Interpretation of Score: The test measures, in seconds, the time taken by an individual to stand up from a standard arm chair (seat height 46 cm [18 in], arm height 65 cm [25.6 in]), walk a distance of 3 meters (118 in, approx 10 ft), turn, walk back to the chair and sit down. If the individual takes longer than 14 seconds to complete TUG, this indicates risk for falls. Score 7 7.5-10.5 11-14 14.5-17.5 18-21 21.5-24.5 25+   Modifier CH CI CJ CK CL CM CN        Tool Used: ECOG Performance Survey Score  Score:  Initial: 3 Most Recent:  2    Interpretation of Score:   0 Fully active, able to carry on all pre-disease performance without restriction   1 Restricted in physically strenuous activity but ambulatory and able to carry out work of a light or sedentary nature, e.g., light house work, office work   2 Ambulatory and capable of all selfcare but unable to carry out any work activities. Up and about more than 50% of waking hours   3 Capable of only limited selfcare, confined to bed or chair more than 50% of waking hours   4 Completely disabled. Cannot carry on any selfcare. Totally confined to bed or chair   5 Dead        Medical Necessity:   · Patient is expected to demonstrate progress in strength, balance and gait to increase independence with household activity.   Reason for Services/Other Comments:  · Patient continues to demonstrate capacity to improve strength, balance and gait which will increase independence and decrease amount of assistance required from caregiver. Use of outcome tool(s) and clinical judgement create a POC that gives a: Questionable prediction of patient's progress: MODERATE COMPLEXITY            TREATMENT:   (In addition to Assessment/Re-Assessment sessions the following treatments were rendered)  Pre-treatment Symptoms/Complaints:  Pain 9-10/10 in left arm, legs, fatigue 8 /10. Pt states she had abdominal ascites removed. She reports she feels better in the abdomen. .  Pain: Initial:     9-10/10  Left arm, bilateral lower extremities  Post Session:  10/10   O2 93% HR 64 bpm  /46 mm/Hg    Therapeutic Exercise: ( 41 minutes):  Exercises per below to improve mobility and strength. Required minimal verbal cues to promote proper body alignment and promote proper body posture. Progressed resistance and repetitions as indicated. Long arc quads x 15 reps with 5 count hold with 1 1/2#, hip flexion in sitting x 10 reps with 1 1/2#  Standing up on toes, hip flexion, hip abduction, hip extension, hamstring curls x 15 reps with 1 1/2#  Bicep curls with 3# x 15 reps  Bilateral upper extremity with 2# x 15 reps forward flexion and abduction  Bilateral upper extremity overhead press with 2# x 15 reps.   Sitting hamstring curls with blue theraband x 15 reps  Sitting hip abduction with blue theraband x15 reps  Sitting hip adduction with inflated ball x 15 reps   Sit to stand from elevated surface x 10 reps  Step-Ups 6 inch step 10 reps each LE  One leg stance with minimal support 30 seconds x 2 each leg  Side stepping with minimal support  Below not performed:  NuStep 4 minutes Level 2  UBE 2 minutes Manual L1  Supine bridging x 10 reps  Supine SLR x 10 reps  Short arc quads x 10 reps with 5 count hold  Crunches in supine x 10 reps   wall slides x 5 reps  as above.  Given HEP:  Walk 1-2 minutes each waking hour when supervised, sit to stand from bedside, sit in chair instead of the bed each day. Added sit to stand, long arc quads and standing exercises at the sink daily. Bilateral upper extremity with light weight every other day. Given written instructions. Given red theraband for home use. [employer]  Treatment/Session Assessment:    · Response to Treatment:  Pt tolerated all treatment fair. Needed frequent rest periods. · Compliance with Program/Exercises: Will assess as treatment progresses. · Recommendations/Intent for next treatment session: \"Next visit will focus on advancements to more challenging activities\".     Total Treatment Duration: 41 min  PT Patient Time In/Time Out  Time In: 0803  Time Out: 4024    Josue Flores PT

## 2018-06-20 NOTE — PROGRESS NOTES
Arrived to the [facility]. Assesment complete, labs reviewed. Procrit, Xgeva, Vitamin B12, and Faslodex adminsitered completed. Patient tolerated without problems. Any issues or concerns during appointment: None. Patient aware of next infusion appointment on 7/17/2018 (date) at [phone] (time). Discharged via walker with son and spouse.

## 2018-06-21 NOTE — PROGRESS NOTES
Viktor Torres  : 1950  Primary: Darek Medina Fap 100%  Secondary:  [address]  at [address] , [address], [place]  [phone]   [phone]          OUTPATIENT PHYSICAL THERAPY:Daily Note 2018    ICD-10: Treatment Diagnosis: muscle weakness generalized M 62.81  Precautions/Allergies:   Review of patient's allergies indicates no known allergies. Fall Risk Score: 9 (? 5 = High Risk)  MD Orders: oncology rehab MEDICAL/REFERRING DIAGNOSIS:  Other fatigue [R53.83]    DATE OF ONSET:   REFERRING PHYSICIAN: Jose Cannon MD  RETURN PHYSICIAN APPOINTMENT: 18     INITIAL ASSESSMENT:  Ms. Renetta Faulkner presents following treatment for metastatic breast cancer since . She has developed significant fatigue and weakness. She spends most of the day in bed. She has had a fall in the recent past.  She uses a rolling walker. She could possibly benefit from home health, but is willing to attend therapy as outpatient. She will benefit from therapeutic exercises in order to increase strength and overall conditioning. 18: The patient has attended a total of 10 visits. She has been compliant with her HEP. She is making slow, but steady progress. She will benefit from continued therapy in order to reach her goals. Her spouse reports she is able to do more functional activities at home and to be out of the bed more. She is awaiting the results of her most recent PET scan. PROBLEM LIST (Impacting functional limitations):  1. Decreased Strength  2. Decreased ADL/Functional Activities  3. Decreased Transfer Abilities  4. Decreased Balance  5. Increased Pain  6. Decreased Activity Tolerance  7. Increased Fatigue  8. Decreased [place] with Home Exercise Program INTERVENTIONS PLANNED:  1. Home Exercise Program (HEP)  2. Therapeutic Exercise/Strengthening   TREATMENT PLAN:  Effective Dates: 3/28/2018 TO 2018 (90 days). Frequency/Duration: 2 time a week for 90 Days  GOALS: (Goals have been discussed and agreed upon with patient.)  Short-Term Functional Goals: Time Frame: 4 weeks  1. The patient will be independent with HEP for strength within 4 weeks. Met   2. The patient will report a fatigue of 7 or less within 4 weeks. 3. The patient will gain 1/2 grade strength within 4 weeks. Met   4. The patient will improve her TUG score by 2 seconds within 4 weeks. 5. The patient will improve her Tinetti score by 5 within 4 weeks. Met   Discharge Goals: Time Frame: 8 weeks  1. The patient will report a fatigue level of 5 or less within 8 weeks. 2. The patient will improve her TUG score by 5 seconds within 8 weeks. 3. The patient will participate in a 6 minute walk within 8 weeks. Rehabilitation Potential For Stated Goals: [address] [facility] therapy, I certify that the treatment plan above will be carried out by a therapist or under their direction. Thank you for this referral,  Lj Ventura PT     Referring Physician Signature: Renee Garcia MD              Date                    The information in this section was collected on 3/28/18 (except where otherwise noted). HISTORY:   History of Present Injury/Illness (Reason for Referral):  Metastatic breast cancer, chemo, fatigue, weakness  Past Medical History/Comorbidities:   Ms. Ricardo Stallworth  has a past medical history of Acute on chronic diastolic congestive heart failure ([address] (1/5/2016); Anemia of chronic disease (11/13/2017); Aortic valve stenosis; Arthritis; Asthma; Cancer ([address] Chemotherapy-induced neutropenia (HCC) (12/20/2016); CKD (chronic kidney disease) stage 4, GFR 15-29 ml/min ([address] Depression; Diastolic CHF ([address] Former cigarette smoker; HCAP (healthcare-associated pneumonia) (7/17/2013); History of DVT (deep vein thrombosis); Hypertension; Long term current use of anticoagulant; Oxygen dependent; Port catheter in place;  Sepsis ([address] (7/19/2016); SOB (shortness of breath) on exertion; Type 2 diabetes mellitus ([address] Unspecified adverse effect of anesthesia; and Vitamin B12 deficiency (2017). Ms. María Medrano  has a past surgical history that includes hx  section; hx cyst removal; hx urological; hx vascular access (2012); and colonoscopy (N/A, 3/19/2018). Past Medical History:   Diagnosis Date    Acute on chronic diastolic congestive heart failure ([address] 2016    Anemia of chronic disease 2017    Aortic valve stenosis     Arthritis     roverto hands    Asthma     till age 31    Cancer ([address]     roverto. breast ca mets bone/lung    Chemotherapy-induced neutropenia ([address] 2016    CKD (chronic kidney disease) stage 4, GFR 15-29 ml/min (HCC)     Depression     managed with meds    Diastolic CHF ([address]     Followed by Donte Luis F    Former cigarette smoker     HCAP (healthcare-associated pneumonia) 2013    History of DVT (deep vein thrombosis)     Left leg DVT.  on coumadin    Hypertension     managed with meds    Long term current use of anticoagulant     Coumadin    Oxygen dependent     at bedtime 2L-3L NC    Port catheter in place     Left chest port    Sepsis ([address] 2016    SOB (shortness of breath) on exertion     Type 2 diabetes mellitus (HCC)     PRN oral agent/AVG BS: 170-200/ s.s of hypoglycemia at 79    Unspecified adverse effect of anesthesia     In [place] 28 yrs ago after second c -section-she was told her heart stopped d/t anesthesia-hospitalized for 5 days afterwards and followed by cardiologist    Vitamin B12 deficiency 2017     Past Surgical History:   Procedure Laterality Date    COLONOSCOPY N/A 3/19/2018    COLONOSCOPY  BMI 23   performed by Avery Camilo MD at [facility]    HX CYST REMOVAL      HX UROLOGICAL      stent placed    HX VASCULAR ACCESS  2012    Left chest port       Social History/Living Environment: lives with family  Prior Level of Function/Work/Activity:  independent  Dominant Side:         RIGHT  Current Medications:       Current Outpatient Prescriptions:     montelukast (SINGULAIR) 10 mg tablet, Take 1 Tab by mouth nightly., Disp: 30 Tab, Rfl: 11    fentaNYL (DURAGESIC) 50 mcg/hr PATCH, 1 Patch by TransDERmal route every seventy-two (72) hours. Max Daily Amount: 1 Patch. Indications: Chronic Pain with Opioid Tolerance, Disp: 10 Patch, Rfl: 0    mirtazapine (REMERON) 30 mg tablet, Take 1 Tab by mouth nightly., Disp: 30 Tab, Rfl: 1    warfarin (COUMADIN) 3 mg tablet, Take 1 Tab by mouth daily. , Disp: 30 Tab, Rfl: 2    LORazepam (ATIVAN) 1 mg tablet, Take 1 Tab by mouth every six (6) hours as needed for Anxiety. Max Daily Amount: 4 mg., Disp: 60 Tab, Rfl: 0    HYDROcodone-acetaminophen (NORCO)  mg tablet, Take 1 Tab by mouth every six (6) hours as needed for Pain. Max Daily Amount: 4 Tabs., Disp: 90 Tab, Rfl: 0    albuterol-ipratropium (DUO-NEB) 2.5 mg-0.5 mg/3 ml nebu, 3 mL by Nebulization route every six (6) hours as needed. , Disp: 360 Nebule, Rfl: 1    GLIPIZIDE PO, Take  by mouth as needed. Pt only takes if BS > 200, Disp: , Rfl:     palbociclib 125 mg cap, Take 1 Cap by mouth daily. Take one pill once a day for 3 weeks and then off for one week, Disp: 21 Cap, Rfl: 5    potassium chloride (K-DUR, KLOR-CON) 20 mEq tablet, Take 1 Tab by mouth two (2) times a day., Disp: 60 Tab, Rfl: 0    hydrALAZINE (APRESOLINE) 50 mg tablet, Take 1 Tab by mouth three (3) times daily. (Patient taking differently: Take 50 mg by mouth two (2) times a day.), Disp: 90 Tab, Rfl: 1    furosemide (LASIX) 40 mg tablet, Take 1 Tab by mouth daily. (Patient taking differently: Take 40 mg by mouth every other day. Monday,Wednesday,Friday,Sunday), Disp: 30 Tab, Rfl: 11    carvedilol (COREG) 3.125 mg tablet, Take 1 Tab by mouth two (2) times daily (with meals). , Disp: 60 Tab, Rfl: 11    warfarin (COUMADIN) 1 mg tablet, Take 1.5 Tabs by mouth every evening., Disp: 45 Tab, Rfl: 0    amLODIPine (NORVASC) 10 mg tablet, Take 1 Tab by mouth daily. , Disp: 30 Tab, Rfl: 11  No current facility-administered medications for this encounter. Date Last Reviewed:  3/28/18   Number of Personal Factors/Comorbidities that affect the Plan of Care: 3+: HIGH COMPLEXITY   EXAMINATION:   ROM:          Within functional limits  Strength:          Grossly 4/5 x 4 extremities  Functional Mobility:         Gait/Ambulation:  Uses walker        Transfers:  independent  Balance:          Decreased in standing and gait   Body Structures Involved:  1. Muscles Body Functions Affected:  1. Sensory/Pain  2. Neuromusculoskeletal  3. Movement Related Activities and Participation Affected:  1. General Tasks and Demands  2. Communication  3. Mobility   Number of elements (examined above) that affect the Plan of Care: 4+: HIGH COMPLEXITY   CLINICAL PRESENTATION:   Presentation: Stable and uncomplicated: LOW COMPLEXITY   CLINICAL DECISION MAKING:   Outcome Measure: Tool Used: 6-MINUTE WALK TEST  Unable to test  Score:  Initial:   feet Most Recent: X feet (Date: -- )   Interpretation of Score: Normal range varies but is approximately 4305-5819 Feet      Distance walked:   feet               Baseline End of Test   Heart Rate      Dyspnea (Mehul Scale)     Fatigue (Mehul Scale)     SpO2     BP       Score 2133 [phone] 15-0   Modifier CH CI CJ CK CL CM CN         Tool Used: Disabilities of the Arm, Shoulder and Hand (DASH) Questionnaire - Quick Version  Score:  Initial: 41/55  Most Recent: 41/55 (Date: 5/2/18 )   Interpretation of Score: The DASH is designed to measure the activities of daily living in person's with upper extremity dysfunction or pain. Each section is scored on a 1-5 scale, 5 representing the greatest disability. The scores of each section are added together for a total score of 55.     Score 11 12-19 20-28 29-37 38-45 46-54 55   Modifier CH CI CJ CK CL CM CN         Tool Used: TINETTI  Score:  Initial:   Gait: 6/12  Balance: 10/16  TOTAL: 16/28 Most Recent:  Gait: 9/12  Balance:13 /16  TOTAL:22 /28   Interpretation of Score: The maximum score for the gait component is 12 points. The maximum score for the balance component is 16 points. The maximum total score is 28 points. In general, patients who score below 19 are at a high risk for falls. Patients who score in the range of 19-24 indicate that the patient has a risk for falls. Score 28 27-23 22-18 17-12 11-6 5-1 0   Modifier CH CI CJ CK CL CM CN         Tool Used: Timed Up and Go (TUG)  Score:  Initial: 25 seconds Most Recent: 25 seconds (Date: 5/2/18 )   Interpretation of Score: The test measures, in seconds, the time taken by an individual to stand up from a standard arm chair (seat height 46 cm [18 in], arm height 65 cm [25.6 in]), walk a distance of 3 meters (118 in, approx 10 ft), turn, walk back to the chair and sit down. If the individual takes longer than 14 seconds to complete TUG, this indicates risk for falls. Score 7 7.5-10.5 11-14 14.5-17.5 18-21 21.5-24.5 25+   Modifier CH CI CJ CK CL CM CN        Tool Used: ECOG Performance Survey Score  Score:  Initial: 3 Most Recent:  2    Interpretation of Score:   0 Fully active, able to carry on all pre-disease performance without restriction   1 Restricted in physically strenuous activity but ambulatory and able to carry out work of a light or sedentary nature, e.g., light house work, office work   2 Ambulatory and capable of all selfcare but unable to carry out any work activities. Up and about more than 50% of waking hours   3 Capable of only limited selfcare, confined to bed or chair more than 50% of waking hours   4 Completely disabled. Cannot carry on any selfcare.  Totally confined to bed or chair   5 Dead        Medical Necessity:   · Patient is expected to demonstrate progress in strength, balance and gait to increase independence with household activity. Reason for Services/Other Comments:  · Patient continues to demonstrate capacity to improve strength, balance and gait which will increase independence and decrease amount of assistance required from caregiver. Use of outcome tool(s) and clinical judgement create a POC that gives a: Questionable prediction of patient's progress: MODERATE COMPLEXITY            TREATMENT:   (In addition to Assessment/Re-Assessment sessions the following treatments were rendered)  Pre-treatment Symptoms/Complaints:  Pain 8-9/10 in left arm, legs, fatigue 7 /10. Pt states she had an injection yesterday and is sore. She reports she feels the fluid in her abdomen is returning. Pain: Initial:     8-9/10  Post Session:  10/10   O2 97% HR 61 bpm  /57 mm/Hg    Therapeutic Exercise: ( 41 minutes):  Exercises per below to improve mobility and strength. Required minimal verbal cues to promote proper body alignment and promote proper body posture. Progressed resistance and repetitions as indicated. Long arc quads x 10 reps with 5 count hold with 1 1/2#, hip flexion in sitting x 10 reps with 1 1/2#  Standing up on toes, hip flexion, hip abduction, hip extension, hamstring curls x 15 reps with 1 1/2#  Bicep curls with 3# x 15 reps  Bilateral upper extremity with 2# x 15 reps forward flexion and abduction  Bilateral upper extremity overhead press with 2# x 15 reps.   Sitting hamstring curls with blue theraband x 20 reps  Sitting hip abduction with blue theraband x20 reps  Sitting hip adduction with inflated ball x 20 reps   Sit to stand from elevated surface x 10 reps  Step-Ups 8 inch step 10 reps each LE  One leg stance with minimal support 30 seconds x 2 each leg  Side stepping with minimal support  Below not performed:  NuStep 4 minutes Level 2  UBE 2 minutes Manual L1  Supine bridging x 10 reps  Supine SLR x 10 reps  Short arc quads x 10 reps with 5 count hold  Crunches in supine x 10 reps   wall slides x 5 reps  as above. Given HEP:  Walk 1-2 minutes each waking hour when supervised, sit to stand from bedside, sit in chair instead of the bed each day. Added sit to stand, long arc quads and standing exercises at the sink daily. Bilateral upper extremity with light weight every other day. Given written instructions. Given red theraband for home use. [employer]  Treatment/Session Assessment:    · Response to Treatment:  Pt tolerated all treatment fair. Needed frequent rest periods. · Compliance with Program/Exercises: Will assess as treatment progresses. · Recommendations/Intent for next treatment session: \"Next visit will focus on advancements to more challenging activities\".     Total Treatment Duration: 43 min  PT Patient Time In/Time Out  Time In: 0846  Time Out: 0929    Dmaaris Connell PT

## 2018-06-25 NOTE — THERAPY RECERTIFICATION
Mary Kraft  : 1950  Primary: Kiana Collier Fap 100%  Secondary:  [address]  at [facility]  [address] , [address], [place] [address]  [phone]   [phone]          OUTPATIENT PHYSICAL THERAPY:Recertification     ICD-10: Treatment Diagnosis: muscle weakness generalized M 62.81  Precautions/Allergies:   Review of patient's allergies indicates no known allergies. Fall Risk Score: 9 (? 5 = High Risk)  MD Orders: oncology rehab MEDICAL/REFERRING DIAGNOSIS:  Other fatigue [R53.83]    DATE OF ONSET:   REFERRING PHYSICIAN: Kristen Kate MD  RETURN PHYSICIAN APPOINTMENT: 18     INITIAL ASSESSMENT:  Ms. Angel Amato presents following treatment for metastatic breast cancer since . She has developed significant fatigue and weakness. She spends most of the day in bed. She has had a fall in the recent past.  She uses a rolling walker. She could possibly benefit from home health, but is willing to attend therapy as outpatient. She will benefit from therapeutic exercises in order to increase strength and overall conditioning. 18: The patient has attended a total of 10 visits. She has been compliant with her HEP. She is making slow, but steady progress. She will benefit from continued therapy in order to reach her goals. Her spouse reports she is able to do more functional activities at home and to be out of the bed more. She is awaiting the results of her most recent PET scan.  18: The patient has attended therapy consistently. She is making steady progress toward her goals. She is able to do light housework at home. She has waxed and waned in progress due to complications an treatment. She will benefit from continued therapy to improve strength and mobility. PROBLEM LIST (Impacting functional limitations):  1. Decreased Strength  2. Decreased ADL/Functional Activities  3. Decreased Transfer Abilities  4.  Decreased Balance  5. Increased Pain  6. Decreased Activity Tolerance  7. Increased Fatigue  8. Decreased [place] with Home Exercise Program INTERVENTIONS PLANNED:  1. Home Exercise Program (HEP)  2. Therapeutic Exercise/Strengthening   TREATMENT PLAN:  Effective Dates: 6/25/2018 TO 9/22/2018 (90 days). Frequency/Duration: 2 time a week for 90 Days  GOALS: (Goals have been discussed and agreed upon with patient.)  Short-Term Functional Goals: Time Frame: 4 weeks  1. The patient will be independent with HEP for strength within 4 weeks. Met   2. The patient will report a fatigue of 7 or less within 4 weeks. 3. The patient will gain 1/2 grade strength within 4 weeks. Met   4. The patient will improve her TUG score by 2 seconds within 4 weeks. Met   5. The patient will improve her Tinetti score by 5 within 4 weeks. Met   Discharge Goals: Time Frame: 8 weeks  1. The patient will report a fatigue level of 5 or less within 8 weeks. 2. The patient will improve her TUG score by 5 seconds within 8 weeks. Met   3. The patient will participate in a 6 minute walk within 8 weeks. Rehabilitation Potential For Stated Goals: [facility] therapy, I certify that the treatment plan above will be carried out by a therapist or under their direction. Thank you for this referral,  Lanny Pat PT     Referring Physician Signature: Emmett Leon MD              Date                    The information in this section was collected on 3/28/18 (except where otherwise noted). HISTORY:   History of Present Injury/Illness (Reason for Referral):  Metastatic breast cancer, chemo, fatigue, weakness  Past Medical History/Comorbidities:   Ms. Roselyn Steel  has a past medical history of Acute on chronic diastolic congestive heart failure ([facility] (1/5/2016); Anemia of chronic disease (11/13/2017); Aortic valve stenosis; Arthritis; Asthma; Cancer ([facility] Chemotherapy-induced neutropenia (HCC) (12/20/2016);  CKD (chronic kidney disease) stage 4, GFR 15-29 ml/min ([address] Depression; Diastolic CHF ([address] Former cigarette smoker; HCAP (healthcare-associated pneumonia) (2013); History of DVT (deep vein thrombosis); Hypertension; Long term current use of anticoagulant; Oxygen dependent; Port catheter in place; Sepsis ([address] (2016); SOB (shortness of breath) on exertion; Type 2 diabetes mellitus ([address] Unspecified adverse effect of anesthesia; and Vitamin B12 deficiency (2017). Ms. Alejo Thibodeaux  has a past surgical history that includes hx  section; hx cyst removal; hx urological; hx vascular access (2012); and colonoscopy (N/A, 3/19/2018). Past Medical History:   Diagnosis Date    Acute on chronic diastolic congestive heart failure ([address] 2016    Anemia of chronic disease 2017    Aortic valve stenosis     Arthritis     roverto hands    Asthma     till age 31    Cancer ([address]     roverto. breast ca mets bone/lung    Chemotherapy-induced neutropenia ([address] 2016    CKD (chronic kidney disease) stage 4, GFR 15-29 ml/min ([facility])     Depression     managed with meds    Diastolic CHF ([address]     Followed by Donte Kerns    Former cigarette smoker     HCAP (healthcare-associated pneumonia) 2013    History of DVT (deep vein thrombosis)     Left leg DVT.  on coumadin    Hypertension     managed with meds    Long term current use of anticoagulant     Coumadin    Oxygen dependent     at bedtime 2L-3L NC    Port catheter in place     Left chest port    Sepsis ([address] 2016    SOB (shortness of breath) on exertion     Type 2 diabetes mellitus (HCC)     PRN oral agent/AVG BS: 170-200/ s.s of hypoglycemia at 79    Unspecified adverse effect of anesthesia     In [place] 28 yrs ago after second c -section-she was told her heart stopped d/t anesthesia-hospitalized for 5 days afterwards and followed by cardiologist    Vitamin B12 deficiency 2017     Past Surgical History:   Procedure Laterality Date  COLONOSCOPY N/A 3/19/2018    COLONOSCOPY  BMI 21   performed by Aquiles Cannon MD at [facility] ENDOSCOPY     [address]      X2    HX CYST REMOVAL      HX UROLOGICAL      stent placed    HX VASCULAR ACCESS  01/26/2012    Left chest port       Social History/Living Environment:     lives with family  Prior Level of Function/Work/Activity:  independent  Dominant Side:         RIGHT  Current Medications:       Current Outpatient Prescriptions:     montelukast (SINGULAIR) 10 mg tablet, Take 1 Tab by mouth nightly., Disp: 30 Tab, Rfl: 11    fentaNYL (DURAGESIC) 50 mcg/hr PATCH, 1 Patch by TransDERmal route every seventy-two (72) hours. Max Daily Amount: 1 Patch. Indications: Chronic Pain with Opioid Tolerance, Disp: 10 Patch, Rfl: 0    mirtazapine (REMERON) 30 mg tablet, Take 1 Tab by mouth nightly., Disp: 30 Tab, Rfl: 1    warfarin (COUMADIN) 3 mg tablet, Take 1 Tab by mouth daily. , Disp: 30 Tab, Rfl: 2    LORazepam (ATIVAN) 1 mg tablet, Take 1 Tab by mouth every six (6) hours as needed for Anxiety. Max Daily Amount: 4 mg., Disp: 60 Tab, Rfl: 0    HYDROcodone-acetaminophen (NORCO)  mg tablet, Take 1 Tab by mouth every six (6) hours as needed for Pain. Max Daily Amount: 4 Tabs., Disp: 90 Tab, Rfl: 0    albuterol-ipratropium (DUO-NEB) 2.5 mg-0.5 mg/3 ml nebu, 3 mL by Nebulization route every six (6) hours as needed. , Disp: 360 Nebule, Rfl: 1    GLIPIZIDE PO, Take  by mouth as needed. Pt only takes if BS > 200, Disp: , Rfl:     palbociclib 125 mg cap, Take 1 Cap by mouth daily. Take one pill once a day for 3 weeks and then off for one week, Disp: 21 Cap, Rfl: 5    potassium chloride (K-DUR, KLOR-CON) 20 mEq tablet, Take 1 Tab by mouth two (2) times a day., Disp: 60 Tab, Rfl: 0    hydrALAZINE (APRESOLINE) 50 mg tablet, Take 1 Tab by mouth three (3) times daily.  (Patient taking differently: Take 50 mg by mouth two (2) times a day.), Disp: 90 Tab, Rfl: 1    furosemide (LASIX) 40 mg tablet, Take 1 Tab by mouth daily. (Patient taking differently: Take 40 mg by mouth every other day. Monday,Wednesday,Friday,Sunday), Disp: 30 Tab, Rfl: 11    carvedilol (COREG) 3.125 mg tablet, Take 1 Tab by mouth two (2) times daily (with meals). , Disp: 60 Tab, Rfl: 11    warfarin (COUMADIN) 1 mg tablet, Take 1.5 Tabs by mouth every evening., Disp: 45 Tab, Rfl: 0    amLODIPine (NORVASC) 10 mg tablet, Take 1 Tab by mouth daily. , Disp: 30 Tab, Rfl: 11   Date Last Reviewed:  3/28/18   Number of Personal Factors/Comorbidities that affect the Plan of Care: 3+: HIGH COMPLEXITY   EXAMINATION:   ROM:          Within functional limits  Strength:          Grossly 4/5 x 4 extremities  Functional Mobility:         Gait/Ambulation:  Uses walker        Transfers:  independent  Balance:          Decreased in standing and gait   Body Structures Involved:  1. Muscles Body Functions Affected:  1. Sensory/Pain  2. Neuromusculoskeletal  3. Movement Related Activities and Participation Affected:  1. General Tasks and Demands  2. Communication  3. Mobility   Number of elements (examined above) that affect the Plan of Care: 4+: HIGH COMPLEXITY   CLINICAL PRESENTATION:   Presentation: Stable and uncomplicated: LOW COMPLEXITY   CLINICAL DECISION MAKING:   Outcome Measure: Tool Used: 6-MINUTE WALK TEST  Unable to test  Score:  Initial:   feet Most Recent: X feet (Date: -- )   Interpretation of Score: Normal range varies but is approximately 2800-4379 Feet      Distance walked:   feet               Baseline End of Test   Heart Rate      Dyspnea (Mehul Scale)     Fatigue (Mehul Scale)     SpO2     BP       Score 2133 [phone] 15-0   Modifier CH CI CJ CK CL CM CN         Tool Used: Disabilities of the Arm, Shoulder and Hand (DASH) Questionnaire - Quick Version  Score:  Initial: 41/55  Most Recent: 41/55 (Date: 6/25/18 )   Interpretation of Score:  The DASH is designed to measure the activities of daily living in person's with upper extremity dysfunction or pain. Each section is scored on a 1-5 scale, 5 representing the greatest disability. The scores of each section are added together for a total score of 55. Score 11 12-19 20-28 29-37 38-45 46-54 55   Modifier  CI CJ CK CL CM CN         Tool Used: TINETTI  Score:  Initial:   Gait: 6/12  Balance: 10/16  TOTAL: 16/28 Most Recent:  Gait: 9/12  Balance:13 /16  TOTAL:22 /28   Interpretation of Score: The maximum score for the gait component is 12 points. The maximum score for the balance component is 16 points. The maximum total score is 28 points. In general, patients who score below 19 are at a high risk for falls. Patients who score in the range of 19-24 indicate that the patient has a risk for falls. Score 28 27-23 22-18 17-12 11-6 5-1 0   Modifier  CI CJ CK CL CM CN         Tool Used: Timed Up and Go (TUG)  Score:  Initial: 25 seconds Most Recent: 18 seconds (Date: 6/25/18 )   Interpretation of Score: The test measures, in seconds, the time taken by an individual to stand up from a standard arm chair (seat height 46 cm [18 in], arm height 65 cm [25.6 in]), walk a distance of 3 meters (118 in, approx 10 ft), turn, walk back to the chair and sit down. If the individual takes longer than 14 seconds to complete TUG, this indicates risk for falls. Score 7 7.5-10.5 11-14 14.5-17.5 18-21 21.5-24.5 25+   Modifier  CI CJ CK CL CM CN        Tool Used: ECOG Performance Survey Score  Score:  Initial: 3 Most Recent:  1    Interpretation of Score:   0 Fully active, able to carry on all pre-disease performance without restriction   1 Restricted in physically strenuous activity but ambulatory and able to carry out work of a light or sedentary nature, e.g., light house work, office work   2 Ambulatory and capable of all selfcare but unable to carry out any work activities.  Up and about more than 50% of waking hours   3 Capable of only limited selfcare, confined to bed or chair more than 50% of waking hours   4 Completely disabled. Cannot carry on any selfcare. Totally confined to bed or chair   5 Dead        Medical Necessity:   · Patient is expected to demonstrate progress in strength, balance and gait to increase independence with household activity. Reason for Services/Other Comments:  · Patient continues to demonstrate capacity to improve strength, balance and gait which will increase independence and decrease amount of assistance required from caregiver. Use of outcome tool(s) and clinical judgement create a POC that gives a: Questionable prediction of patient's progress: MODERATE COMPLEXITY            TREATMENT:   (In addition to Assessment/Re-Assessment sessions the following treatments were rendered)  Pre-treatment Symptoms/Complaints:  Pain 9/10 in left arm, legs, fatigue 8-9 /10. Pt reports she is tired and having pain. She reports the abdomen fluid improves and worsens at times. Pain: Initial:     9/10  Post Session:  9/10   O2 97% HR 66 bpm  /69 mm/Hg    Therapeutic Exercise: ( 41 minutes):  Exercises per below to improve mobility and strength. Required minimal verbal cues to promote proper body alignment and promote proper body posture. Progressed resistance and repetitions as indicated. DASH, TUG, ECOG, Tinetti      Standing up on toes, hip flexion, hip abduction, hip extension, hamstring curls x 10  Bicep curls with 3# x 10 reps  Bilateral upper extremity with 2# x 10 reps forward flexion and abduction  Bilateral upper extremity overhead press with 3# x 10 reps.   Sitting hamstring curls with blue theraband x 10 reps  Sitting hip abduction with blue theraband x10 reps  Sitting hip adduction with inflated ball x 10 reps   Sit to stand from elevated surface x 10 reps  One leg stance with minimal support 30 seconds x 2 each leg    Below not performed:  NuStep 4 minutes Level 2  UBE 2 minutes Manual L1  Supine bridging x 10 reps  Supine SLR x 10 reps  Short arc quads x 10 reps with 5 count hold  Crunches in supine x 10 reps   wall slides x 5 reps  as above. Given HEP:  Walk 1-2 minutes each waking hour when supervised, sit to stand from bedside, sit in chair instead of the bed each day. Added sit to stand, long arc quads and standing exercises at the sink daily. Bilateral upper extremity with light weight every other day. Given written instructions. Given red theraband for home use. [employer]  Treatment/Session Assessment:    · Response to Treatment:  Pt tolerated all treatment fair. Needed frequent rest periods. · Compliance with Program/Exercises: Will assess as treatment progresses. · Recommendations/Intent for next treatment session: \"Next visit will focus on advancements to more challenging activities\".     Total Treatment Duration: 47 min  PT Patient Time In/Time Out  Time In: 2796  Time Out: 0078    Eudarrius Later, PT

## 2018-06-27 NOTE — PROGRESS NOTES
Stephan New  : 1950  Primary: [address]%  Secondary:  [address]  at [facility]  [address] , [address], [place] [address]  [phone]   [phone]          OUTPATIENT PHYSICAL THERAPY:Daily Note 2018    ICD-10: Treatment Diagnosis: muscle weakness generalized M 62.81  Precautions/Allergies:   Review of patient's allergies indicates no known allergies. Fall Risk Score: 9 (? 5 = High Risk)  MD Orders: oncology rehab MEDICAL/REFERRING DIAGNOSIS:  Other fatigue [R53.83]    DATE OF ONSET:   REFERRING PHYSICIAN: Cornell Rinne, MD  RETURN PHYSICIAN APPOINTMENT: 18     INITIAL ASSESSMENT:  Ms. Angelica Whitney presents following treatment for metastatic breast cancer since . She has developed significant fatigue and weakness. She spends most of the day in bed. She has had a fall in the recent past.  She uses a rolling walker. She could possibly benefit from home health, but is willing to attend therapy as outpatient. She will benefit from therapeutic exercises in order to increase strength and overall conditioning. 18: The patient has attended a total of 10 visits. She has been compliant with her HEP. She is making slow, but steady progress. She will benefit from continued therapy in order to reach her goals. Her spouse reports she is able to do more functional activities at home and to be out of the bed more. She is awaiting the results of her most recent PET scan.  18: The patient has attended therapy consistently. She is making steady progress toward her goals. She is able to do light housework at home. She has waxed and waned in progress due to complications an treatment. She will benefit from continued therapy to improve strength and mobility. PROBLEM LIST (Impacting functional limitations):  1. Decreased Strength  2. Decreased ADL/Functional Activities  3. Decreased Transfer Abilities  4.  Decreased Balance  5. Increased Pain  6. Decreased Activity Tolerance  7. Increased Fatigue  8. Decreased [place] with Home Exercise Program INTERVENTIONS PLANNED:  1. Home Exercise Program (HEP)  2. Therapeutic Exercise/Strengthening   TREATMENT PLAN:  Effective Dates: 6/25/2018 TO 9/22/2018 (90 days). Frequency/Duration: 2 time a week for 90 Days  GOALS: (Goals have been discussed and agreed upon with patient.)  Short-Term Functional Goals: Time Frame: 4 weeks  1. The patient will be independent with HEP for strength within 4 weeks. Met   2. The patient will report a fatigue of 7 or less within 4 weeks. 3. The patient will gain 1/2 grade strength within 4 weeks. Met   4. The patient will improve her TUG score by 2 seconds within 4 weeks. Met   5. The patient will improve her Tinetti score by 5 within 4 weeks. Met   Discharge Goals: Time Frame: 8 weeks  1. The patient will report a fatigue level of 5 or less within 8 weeks. 2. The patient will improve her TUG score by 5 seconds within 8 weeks. Met   3. The patient will participate in a 6 minute walk within 8 weeks. Rehabilitation Potential For Stated Goals: [facility], I certify that the treatment plan above will be carried out by a therapist or under their direction. Thank you for this referral,  Cristy Ramirez PT     Referring Physician Signature: Jackeline Ardon MD              Date                    The information in this section was collected on 3/28/18 (except where otherwise noted). HISTORY:   History of Present Injury/Illness (Reason for Referral):  Metastatic breast cancer, chemo, fatigue, weakness  Past Medical History/Comorbidities:   Ms. Abby Giles  has a past medical history of Acute on chronic diastolic congestive heart failure ([facility] (1/5/2016); Anemia of chronic disease (11/13/2017); Aortic valve stenosis; Arthritis; Asthma; Cancer ([facility] [facility] Chemotherapy-induced neutropenia (HCC) (12/20/2016);  CKD (chronic kidney disease) stage 4, GFR 15-29 ml/min ([address] Depression; Diastolic CHF ([address] Former cigarette smoker; HCAP (healthcare-associated pneumonia) (2013); History of DVT (deep vein thrombosis); Hypertension; Long term current use of anticoagulant; Oxygen dependent; Port catheter in place; Sepsis ([address] (2016); SOB (shortness of breath) on exertion; Type 2 diabetes mellitus ([address] Unspecified adverse effect of anesthesia; and Vitamin B12 deficiency (2017). Ms. Lesly Castaneda  has a past surgical history that includes hx  section; hx cyst removal; hx urological; hx vascular access (2012); and colonoscopy (N/A, 3/19/2018). Past Medical History:   Diagnosis Date    Acute on chronic diastolic congestive heart failure ([address] 2016    Anemia of chronic disease 2017    Aortic valve stenosis     Arthritis     roverto hands    Asthma     till age 31    Cancer ([address]     roverto. breast ca mets bone/lung    Chemotherapy-induced neutropenia ([address] 2016    CKD (chronic kidney disease) stage 4, GFR 15-29 ml/min ([facility])     Depression     managed with meds    Diastolic CHF ([address]     Followed by Donte Kerns    Former cigarette smoker     HCAP (healthcare-associated pneumonia) 2013    History of DVT (deep vein thrombosis)     Left leg DVT.  on coumadin    Hypertension     managed with meds    Long term current use of anticoagulant     Coumadin    Oxygen dependent     at bedtime 2L-3L NC    Port catheter in place     Left chest port    Sepsis ([address] 2016    SOB (shortness of breath) on exertion     Type 2 diabetes mellitus (HCC)     PRN oral agent/AVG BS: 170-200/ s.s of hypoglycemia at 79    Unspecified adverse effect of anesthesia     In [place] 28 yrs ago after second c -section-she was told her heart stopped d/t anesthesia-hospitalized for 5 days afterwards and followed by cardiologist    Vitamin B12 deficiency 2017     Past Surgical History:   Procedure Laterality Date  COLONOSCOPY N/A 3/19/2018    COLONOSCOPY  BMI 21   performed [facility]cut[facility] at [facility]    HX CYST REMOVAL      HX UROLOGICAL      stent placed    HX VASCULAR ACCESS  01/26/2012    Left chest port       Social History/Living Environment:     lives with family  Prior Level of Function/Work/Activity:  independent  Dominant Side:         RIGHT  Current Medications:       Current Outpatient Prescriptions:     HYDROcodone-acetaminophen (NORCO)  mg tablet, Take 1 Tab by mouth every six (6) hours as needed for Pain. Max Daily Amount: 4 Tabs., Disp: 90 Tab, Rfl: 0    montelukast (SINGULAIR) 10 mg tablet, Take 1 Tab by mouth nightly., Disp: 30 Tab, Rfl: 11    fentaNYL (DURAGESIC) 50 mcg/hr PATCH, 1 Patch by TransDERmal route every seventy-two (72) hours. Max Daily Amount: 1 Patch. Indications: Chronic Pain with Opioid Tolerance, Disp: 10 Patch, Rfl: 0    mirtazapine (REMERON) 30 mg tablet, Take 1 Tab by mouth nightly., Disp: 30 Tab, Rfl: 1    warfarin (COUMADIN) 3 mg tablet, Take 1 Tab by mouth daily. , Disp: 30 Tab, Rfl: 2    LORazepam (ATIVAN) 1 mg tablet, Take 1 Tab by mouth every six (6) hours as needed for Anxiety. Max Daily Amount: 4 mg., Disp: 60 Tab, Rfl: 0    albuterol-ipratropium (DUO-NEB) 2.5 mg-0.5 mg/3 ml nebu, 3 mL by Nebulization route every six (6) hours as needed. , Disp: 360 Nebule, Rfl: 1    GLIPIZIDE PO, Take  by mouth as needed. Pt only takes if BS > 200, Disp: , Rfl:     palbociclib 125 mg cap, Take 1 Cap by mouth daily. Take one pill once a day for 3 weeks and then off for one week, Disp: 21 Cap, Rfl: 5    potassium chloride (K-DUR, KLOR-CON) 20 mEq tablet, Take 1 Tab by mouth two (2) times a day., Disp: 60 Tab, Rfl: 0    hydrALAZINE (APRESOLINE) 50 mg tablet, Take 1 Tab by mouth three (3) times daily.  (Patient taking differently: Take 50 mg by mouth two (2) times a day.), Disp: 90 Tab, Rfl: 1    furosemide (LASIX) 40 mg tablet, Take 1 Tab by mouth daily. (Patient taking differently: Take 40 mg by mouth every other day. Monday,Wednesday,Friday,Sunday), Disp: 30 Tab, Rfl: 11    carvedilol (COREG) 3.125 mg tablet, Take 1 Tab by mouth two (2) times daily (with meals). , Disp: 60 Tab, Rfl: 11    warfarin (COUMADIN) 1 mg tablet, Take 1.5 Tabs by mouth every evening., Disp: 45 Tab, Rfl: 0    amLODIPine (NORVASC) 10 mg tablet, Take 1 Tab by mouth daily. , Disp: 30 Tab, Rfl: 11   Date Last Reviewed:  3/28/18   Number of Personal Factors/Comorbidities that affect the Plan of Care: 3+: HIGH COMPLEXITY   EXAMINATION:   ROM:          Within functional limits  Strength:          Grossly 4/5 x 4 extremities  Functional Mobility:         Gait/Ambulation:  Uses walker        Transfers:  independent  Balance:          Decreased in standing and gait   Body Structures Involved:  1. Muscles Body Functions Affected:  1. Sensory/Pain  2. Neuromusculoskeletal  3. Movement Related Activities and Participation Affected:  1. General Tasks and Demands  2. Communication  3. Mobility   Number of elements (examined above) that affect the Plan of Care: 4+: HIGH COMPLEXITY   CLINICAL PRESENTATION:   Presentation: Stable and uncomplicated: LOW COMPLEXITY   CLINICAL DECISION MAKING:   Outcome Measure: Tool Used: 6-MINUTE WALK TEST  Unable to test  Score:  Initial:   feet Most Recent: X feet (Date: -- )   Interpretation of Score: Normal range varies but is approximately 2725-7438 Feet      Distance walked:   feet               Baseline End of Test   Heart Rate      Dyspnea (Mehul Scale)     Fatigue (Mehul Scale)     SpO2     BP       Score 2133 [phone] 15-0   Modifier CH CI CJ CK CL CM CN         Tool Used: Disabilities of the Arm, Shoulder and Hand (DASH) Questionnaire - Quick Version  Score:  Initial: 41/55  Most Recent: 41/55 (Date: 6/25/18 )   Interpretation of Score:  The DASH is designed to measure the activities of daily living in person's with upper extremity dysfunction or pain. Each section is scored on a 1-5 scale, 5 representing the greatest disability. The scores of each section are added together for a total score of 55. Score 11 12-19 20-28 29-37 38-45 46-54 55   Modifier  CI CJ CK CL CM CN         Tool Used: TINETTI  Score:  Initial:   Gait: 6/12  Balance: 10/16  TOTAL: 16/28 Most Recent:  Gait: 9/12  Balance:13 /16  TOTAL:22 /28   Interpretation of Score: The maximum score for the gait component is 12 points. The maximum score for the balance component is 16 points. The maximum total score is 28 points. In general, patients who score below 19 are at a high risk for falls. Patients who score in the range of 19-24 indicate that the patient has a risk for falls. Score 28 27-23 22-18 17-12 11-6 5-1 0   Modifier  CI CJ CK CL CM CN         Tool Used: Timed Up and Go (TUG)  Score:  Initial: 25 seconds Most Recent: 18 seconds (Date: 6/25/18 )   Interpretation of Score: The test measures, in seconds, the time taken by an individual to stand up from a standard arm chair (seat height 46 cm [18 in], arm height 65 cm [25.6 in]), walk a distance of 3 meters (118 in, approx 10 ft), turn, walk back to the chair and sit down. If the individual takes longer than 14 seconds to complete TUG, this indicates risk for falls. Score 7 7.5-10.5 11-14 14.5-17.5 18-21 21.5-24.5 25+   Modifier  CI CJ CK CL CM CN        Tool Used: ECOG Performance Survey Score  Score:  Initial: 3 Most Recent:  1    Interpretation of Score:   0 Fully active, able to carry on all pre-disease performance without restriction   1 Restricted in physically strenuous activity but ambulatory and able to carry out work of a light or sedentary nature, e.g., light house work, office work   2 Ambulatory and capable of all selfcare but unable to carry out any work activities.  Up and about more than 50% of waking hours   3 Capable of only limited selfcare, confined to bed or chair more than 50% of waking hours   4 Completely disabled. Cannot carry on any selfcare. Totally confined to bed or chair   5 Dead        Medical Necessity:   · Patient is expected to demonstrate progress in strength, balance and gait to increase independence with household activity. Reason for Services/Other Comments:  · Patient continues to demonstrate capacity to improve strength, balance and gait which will increase independence and decrease amount of assistance required from caregiver. Use of outcome tool(s) and clinical judgement create a POC that gives a: Questionable prediction of patient's progress: MODERATE COMPLEXITY            TREATMENT:   (In addition to Assessment/Re-Assessment sessions the following treatments were rendered)  Pre-treatment Symptoms/Complaints:  Pain 0/10 in left arm, legs, fatigue 0 /10. Pt reports she feels better today. She feels stronger. .  She reports the abdomen fluid remains. Pain: Initial:    0 /10  Post Session:  0/10       Therapeutic Exercise: ( 39 minutes):  Exercises per below to improve mobility and strength. Required minimal verbal cues to promote proper body alignment and promote proper body posture. Progressed resistance and repetitions as indicated. O2 95 HR 71  Walk 175' x 1 with rolling walker and stand by assist on level and carpeted surfaces. Standing forward flexion, abduction and hip extension x 10 reps with red theraband with bilateral lower extremities. Walk 175' 'x 1 as above  Bicep curls with 3# x 15 reps  Bilateral upper extremity with 2# x 15 reps forward flexion(10 reps) and abduction  Bilateral upper extremity overhead press with 3# x 15 reps.   Sitting hamstring curls with blue theraband x 15 reps  Sitting hip abduction with blue theraband x15 reps  Sitting hip adduction with inflated ball x 10 reps   Sit to stand from elevated surface x 10 reps  One leg stance with minimal support 30 seconds x 2 each leg  Wall push ups x 10 reps  Walk 175; x 1 as above  Step ups x 10 reps with 6 inch step  as above. Given HEP:  Walk 1-2 minutes each waking hour when supervised, sit to stand from bedside, sit in chair instead of the bed each day. Added sit to stand, long arc quads and standing exercises at the sink daily. Bilateral upper extremity with light weight every other day. Given written instructions. Given red theraband for home use. [employer]  Treatment/Session Assessment:    · Response to Treatment:  Pt tolerated all treatment fair. Needed frequent rest periods. Able to tolerate advancing exercises. Fatigue and pain improved. · Compliance with Program/Exercises: Will assess as treatment progresses. · Recommendations/Intent for next treatment session: \"Next visit will focus on advancements to more challenging activities\".     Total Treatment Duration: 39 min  PT Patient Time In/Time Out  Time In: 0901  Time Out: 0940    Torrey Nair PT

## 2018-07-02 NOTE — PROGRESS NOTES
Domonique Ward Close  : 1950  Primary:   Secondary:  [facility], [address], [place] [address]  [phone]   [phone]          OUTPATIENT PHYSICAL THERAPY:Daily Note 2018    ICD-10: Treatment Diagnosis: muscle weakness generalized M 62.81  Precautions/Allergies:   Review of patient's allergies indicates no known allergies. Fall Risk Score: 9 (? 5 = High Risk)  MD Orders: oncology rehab MEDICAL/REFERRING DIAGNOSIS:  Other fatigue [R53.83]    DATE OF ONSET:   REFERRING PHYSICIAN: Misael Regalado MD  RETURN PHYSICIAN APPOINTMENT: 18     INITIAL ASSESSMENT:  Ms. Leslie Arriola presents following treatment for metastatic breast cancer since . She has developed significant fatigue and weakness. She spends most of the day in bed. She has had a fall in the recent past.  She uses a rolling walker. She could possibly benefit from home health, but is willing to attend therapy as outpatient. She will benefit from therapeutic exercises in order to increase strength and overall conditioning. 18: The patient has attended a total of 10 visits. She has been compliant with her HEP. She is making slow, but steady progress. She will benefit from continued therapy in order to reach her goals. Her spouse reports she is able to do more functional activities at home and to be out of the bed more. She is awaiting the results of her most recent PET scan.  18: The patient has attended therapy consistently. She is making steady progress toward her goals. She is able to do light housework at home. She has waxed and waned in progress due to complications an treatment. She will benefit from continued therapy to improve strength and mobility. PROBLEM LIST (Impacting functional limitations):  1. Decreased Strength  2. Decreased ADL/Functional Activities  3. Decreased Transfer Abilities  4. Decreased Balance  5.  Increased Pain  6. Decreased Activity Tolerance  7. Increased Fatigue  8. Decreased [place] with Home Exercise Program INTERVENTIONS PLANNED:  1. Home Exercise Program (HEP)  2. Therapeutic Exercise/Strengthening   TREATMENT PLAN:  Effective Dates: 6/25/2018 TO 9/22/2018 (90 days). Frequency/Duration: 2 time a week for 90 Days  GOALS: (Goals have been discussed and agreed upon with patient.)  Short-Term Functional Goals: Time Frame: 4 weeks  1. The patient will be independent with HEP for strength within 4 weeks. Met   2. The patient will report a fatigue of 7 or less within 4 weeks. 3. The patient will gain 1/2 grade strength within 4 weeks. Met   4. The patient will improve her TUG score by 2 seconds within 4 weeks. Met   5. The patient will improve her Tinetti score by 5 within 4 weeks. Met   Discharge Goals: Time Frame: 8 weeks  1. The patient will report a fatigue level of 5 or less within 8 weeks. 2. The patient will improve her TUG score by 5 seconds within 8 weeks. Met   3. The patient will participate in a 6 minute walk within 8 weeks. Rehabilitation Potential For Stated Goals: [facility], I certify that the treatment plan above will be carried out by a therapist or under their direction. Thank you for this referral,  Juana Thomas, PT     Referring Physician Signature: Sheri Benítez MD              Date                    The information in this section was collected on 3/28/18 (except where otherwise noted). HISTORY:   History of Present Injury/Illness (Reason for Referral):  Metastatic breast cancer, chemo, fatigue, weakness  Past Medical History/Comorbidities:   Ms. Kayla Turner  has a past medical history of Acute on chronic diastolic congestive heart failure ([facility] (1/5/2016); Anemia of chronic disease (11/13/2017); Aortic valve stenosis; Arthritis; Asthma; Cancer ([address] Chemotherapy-induced neutropenia (HCC) (12/20/2016);  CKD (chronic kidney disease) stage 4, GFR 15-29 ml/min ([address] Depression; Diastolic CHF ([address] Former cigarette smoker; HCAP (healthcare-associated pneumonia) (2013); History of DVT (deep vein thrombosis); Hypertension; Long term current use of anticoagulant; Oxygen dependent; Port catheter in place; Sepsis ([address] (2016); SOB (shortness of breath) on exertion; Type 2 diabetes mellitus ([address] Unspecified adverse effect of anesthesia; and Vitamin B12 deficiency (2017). Ms. Angelina Lay  has a past surgical history that includes hx  section; hx cyst removal; hx urological; hx vascular access (2012); and colonoscopy (N/A, 3/19/2018). Past Medical History:   Diagnosis Date    Acute on chronic diastolic congestive heart failure ([address] 2016    Anemia of chronic disease 2017    Aortic valve stenosis     Arthritis     roverto hands    Asthma     till age 31    Cancer ([address]     roverto. breast ca mets bone/lung    Chemotherapy-induced neutropenia ([address] 2016    CKD (chronic kidney disease) stage 4, GFR 15-29 ml/min (HCC)     Depression     managed with meds    Diastolic CHF ([address]     Followed by Donte Kerns    Former cigarette smoker     HCAP (healthcare-associated pneumonia) 2013    History of DVT (deep vein thrombosis)     Left leg DVT.  on coumadin    Hypertension     managed with meds    Long term current use of anticoagulant     Coumadin    Oxygen dependent     at bedtime 2L-3L NC    Port catheter in place     Left chest port    Sepsis ([address] 2016    SOB (shortness of breath) on exertion     Type 2 diabetes mellitus (HCC)     PRN oral agent/AVG BS: 170-200/ s.s of hypoglycemia at 79    Unspecified adverse effect of anesthesia     [facility] -[facility]-she was told her heart stopped d/t anesthesia-hospitalized for 5 days afterwards and followed by cardiologist    Vitamin B12 deficiency 2017     Past Surgical History:   Procedure Laterality Date    COLONOSCOPY N/A 3/19/2018    COLONOSCOPY  BMI 21   [facility]    HX CYST REMOVAL      HX UROLOGICAL      stent placed    HX VASCULAR ACCESS  01/26/2012    Left chest port       Social History/Living Environment:     lives with family  Prior Level of Function/Work/Activity:  independent  Dominant Side:         RIGHT  Current Medications:       Current Outpatient Prescriptions:     HYDROcodone-acetaminophen (NORCO)  mg tablet, Take 1 Tab by mouth every six (6) hours as needed for Pain. Max Daily Amount: 4 Tabs., Disp: 90 Tab, Rfl: 0    montelukast (SINGULAIR) 10 mg tablet, Take 1 Tab by mouth nightly., Disp: 30 Tab, Rfl: 11    fentaNYL (DURAGESIC) 50 mcg/hr PATCH, 1 Patch by TransDERmal route every seventy-two (72) hours. Max Daily Amount: 1 Patch. Indications: Chronic Pain with Opioid Tolerance, Disp: 10 Patch, Rfl: 0    mirtazapine (REMERON) 30 mg tablet, Take 1 Tab by mouth nightly., Disp: 30 Tab, Rfl: 1    warfarin (COUMADIN) 3 mg tablet, Take 1 Tab by mouth daily. , Disp: 30 Tab, Rfl: 2    LORazepam (ATIVAN) 1 mg tablet, Take 1 Tab by mouth every six (6) hours as needed for Anxiety. Max Daily Amount: 4 mg., Disp: 60 Tab, Rfl: 0    albuterol-ipratropium (DUO-NEB) 2.5 mg-0.5 mg/3 ml nebu, 3 mL by Nebulization route every six (6) hours as needed. , Disp: 360 Nebule, Rfl: 1    GLIPIZIDE PO, Take  by mouth as needed. Pt only takes if BS > 200, Disp: , Rfl:     palbociclib 125 mg cap, Take 1 Cap by mouth daily. Take one pill once a day for 3 weeks and then off for one week, Disp: 21 Cap, Rfl: 5    potassium chloride (K-DUR, KLOR-CON) 20 mEq tablet, Take 1 Tab by mouth two (2) times a day., Disp: 60 Tab, Rfl: 0    hydrALAZINE (APRESOLINE) 50 mg tablet, Take 1 Tab by mouth three (3) times daily.  (Patient taking differently: Take 50 mg by mouth two (2) times a day.), Disp: 90 Tab, Rfl: 1    furosemide (LASIX) 40 mg tablet, Take 1 Tab by mouth daily. (Patient taking differently: Take 40 mg by mouth every other day. Monday,Wednesday,Friday,Sunday), Disp: 30 Tab, Rfl: 11    carvedilol (COREG) 3.125 mg tablet, Take 1 Tab by mouth two (2) times daily (with meals). , Disp: 60 Tab, Rfl: 11    warfarin (COUMADIN) 1 mg tablet, Take 1.5 Tabs by mouth every evening., Disp: 45 Tab, Rfl: 0    amLODIPine (NORVASC) 10 mg tablet, Take 1 Tab by mouth daily. , Disp: 30 Tab, Rfl: 11   Date Last Reviewed:  3/28/18   Number of Personal Factors/Comorbidities that affect the Plan of Care: 3+: HIGH COMPLEXITY   EXAMINATION:   ROM:          Within functional limits  Strength:          Grossly 4/5 x 4 extremities  Functional Mobility:         Gait/Ambulation:  Uses walker        Transfers:  independent  Balance:          Decreased in standing and gait   Body Structures Involved:  1. Muscles Body Functions Affected:  1. Sensory/Pain  2. Neuromusculoskeletal  3. Movement Related Activities and Participation Affected:  1. General Tasks and Demands  2. Communication  3. Mobility   Number of elements (examined above) that affect the Plan of Care: 4+: HIGH COMPLEXITY   CLINICAL PRESENTATION:   Presentation: Stable and uncomplicated: LOW COMPLEXITY   CLINICAL DECISION MAKING:   Outcome Measure: Tool Used: 6-MINUTE WALK TEST  Unable to test  Score:  Initial:   feet Most Recent: X feet (Date: -- )   Interpretation of Score: Normal range varies but is approximately [phone] Feet      Distance walked:   feet               Baseline End of Test   Heart Rate      Dyspnea (Mehul Scale)     Fatigue (Mehul Scale)     SpO2     BP       Score [phone] 15-0   Modifier CH CI CJ CK CL CM CN         Tool Used: Disabilities of the Arm, Shoulder and Hand (DASH) Questionnaire - Quick Version  Score:  Initial: 41/55  Most Recent: 41/55 (Date: 6/25/18 )   Interpretation of Score:  The DASH is designed to measure the activities of daily living in person's with upper extremity dysfunction or pain. Each section is scored on a 1-5 scale, 5 representing the greatest disability. The scores of each section are added together for a total score of 55. Score 11 12-19 20-28 29-37 38-45 46-54 55   Modifier CH CI CJ CK CL CM CN         Tool Used: TINETTI  Score:  Initial:   Gait: 6/12  Balance: 10/16  TOTAL: 16/28 Most Recent:  Gait: 9/12  Balance:13 /16  TOTAL:22 /28   Interpretation of Score: The maximum score for the gait component is 12 points. The maximum score for the balance component is 16 points. The maximum total score is 28 points. In general, patients who score below 19 are at a high risk for falls. Patients who score in the range of 19-24 indicate that the patient has a risk for falls. Score 28 27-23 22-18 17-12 11-6 5-1 0   Modifier CH CI CJ CK CL CM CN         Tool Used: Timed Up and Go (TUG)  Score:  Initial: 25 seconds Most Recent: 18 seconds (Date: 6/25/18 )   Interpretation of Score: The test measures, in seconds, the time taken by an individual to stand up from a standard arm chair (seat height 46 cm [18 in], arm height 65 cm [25.6 in]), walk a distance of 3 meters (118 in, approx 10 ft), turn, walk back to the chair and sit down. If the individual takes longer than 14 seconds to complete TUG, this indicates risk for falls. Score 7 7.5-10.5 11-14 14.5-17.5 18-21 21.5-24.5 25+   Modifier CH CI CJ CK CL CM CN        Tool Used: ECOG Performance Survey Score  Score:  Initial: 3 Most Recent:  1    Interpretation of Score:   0 Fully active, able to carry on all pre-disease performance without restriction   1 Restricted in physically strenuous activity but ambulatory and able to carry out work of a light or sedentary nature, e.g., light house work, office work   2 Ambulatory and capable of all selfcare but unable to carry out any work activities.  Up and about more than 50% of waking hours   3 Capable of only limited selfcare, confined to bed or chair more than 50% of waking hours   4 Completely disabled. Cannot carry on any selfcare. Totally confined to bed or chair   5 Dead        Medical Necessity:   · Patient is expected to demonstrate progress in strength, balance and gait to increase independence with household activity. Reason for Services/Other Comments:  · Patient continues to demonstrate capacity to improve strength, balance and gait which will increase independence and decrease amount of assistance required from caregiver. Use of outcome tool(s) and clinical judgement create a POC that gives a: Questionable prediction of patient's progress: MODERATE COMPLEXITY            TREATMENT:   (In addition to Assessment/Re-Assessment sessions the following treatments were rendered)  Pre-treatment Symptoms/Complaints:  Pain 9/10 in abdomen, legs, fatigue 7 /10. Pt reports she has more abdominal fluid today. She is awaiting a phone call from the physician. Pain: Initial:    0 /10  Post Session:  0/10       Therapeutic Exercise: (  32 minutes):  Exercises per below to improve mobility and strength. Required minimal verbal cues to promote proper body alignment and promote proper body posture. Progressed resistance and repetitions as indicated. Significant abdominal ascites noted, edema of ankles. O2 95 HR 64  Walk 175' x 1 with rolling walker and stand by assist on level and carpeted surfaces. O2 93 HR 71  Standing forward flexion, abduction and hip extension x 10 reps with red theraband with bilateral lower extremities. Bicep curls with 3# x 15 reps  Bilateral upper extremity with 2# x 15 reps forward flexion(10 reps) and abduction  Bilateral upper extremity overhead press with 3# x 15 reps.   Bilateral scapular retraction with blue theraband x 15 reps  Sitting hamstring curls with blue theraband x 15 reps  Sitting hip abduction with blue theraband x15 reps  Sitting hip adduction with inflated ball x 10 reps   Sit to stand from elevated surface x 10 reps  One leg stance with minimal support 30 seconds x 2 each leg  Wall push ups x 10 reps not performed  Walk 175; x 1 as above not performed  Step ups x 10 reps with 6 inch step not performed  as above. Given HEP:  Walk 1-2 minutes each waking hour when supervised, sit to stand from bedside, sit in chair instead of the bed each day. Added sit to stand, long arc quads and standing exercises at the sink daily. Bilateral upper extremity with light weight every other day. Given written instructions. Given red theraband for home use. [employer] [facility]  Treatment/Session Assessment:    · Response to Treatment:  Pt tolerated all treatment fair. Needed frequent rest periods. Ascites limits participation. · Compliance with Program/Exercises: Will assess as treatment progresses. · Recommendations/Intent for next treatment session: \"Next visit will focus on advancements to more challenging activities\".     Total Treatment Duration: 32 min  PT Patient Time In/Time Out  Time In: 0800  Time Out: 0832    Leatha Breen, PT

## 2018-07-05 NOTE — PROGRESS NOTES
The patient called to cancel her oncology rehab appointment today and 7/9/18 due to needing a paracentesis for abdominal fluid.

## 2018-07-06 NOTE — PROGRESS NOTES
Arrived to the [facility]. Labs reviewed and Lovenox completed. Patient tolerated well. Any issues or concerns during appointment: None. Patient aware of next infusion appointment on 7/7 (date) at 0700 (time). Discharged via wheelchair accompanied by .

## 2018-07-07 NOTE — PROGRESS NOTES
Arrived to the [facility]. Lovenox given subcu in abdomen. Patient tolerated well   Any issues or concerns during appointment: No  Patient aware of next infusion appointment on Sunday,July 8th @ 1300  Discharged home via wheelchair accompanied by

## 2018-07-08 NOTE — PROGRESS NOTES
Arrived to infusion center ambulatory with walker, lovenox given per order, pt tolerated well, discharged ambulatory with walker  Returns 7-10 at [facility]

## 2018-07-09 NOTE — PROGRESS NOTES
Interventional Radiology Post Paracentesis/Thoracentesis Note    7/9/2018    Procedure(s): Ultrasound guided Therapeutic Paracentesis Performed with 8 Divehi catheter total volume 3,500 ml. Preliminary Findings: large clear and yellow. Complications: None    Plan:  Observation, check labs if drawn.           Chest X-Ray:  no    Full dictated report to follow    Signed By: Chari Holloway

## 2018-07-10 NOTE — PROGRESS NOTES
Pt. Arrived to [facility] for: labs, procrit and lovenox  Any issues or concerns during this appointment: Hgb 9.1 with procrit and lovenox administered per POC. Patient aware of next appointment on: 7-11-18 @ 4057  Pt.  Discharged: ambulatory home with walker and

## 2018-07-11 NOTE — PROGRESS NOTES
present for PT session Cesar Banegas [facility]/ Patient Relations [employer]preting [employer] 
c: [email or web address] [facility] [address] / [place], [address] [facility] 
[email or web address]

## 2018-07-11 NOTE — PROGRESS NOTES
Arrived to the [facility]. Lovenox completed. Patient tolerated well. Any issues or concerns during appointment: none. Patient aware of next infusion appointment on 7/17/18 at 1500. Discharged ambulatory.     Veneda Blizzard, RN

## 2018-07-11 NOTE — PROGRESS NOTES
Sneha Groves : 1950 Primary:  
Secondary:  [facility], [address], [place] [address] [phone]   [phone] OUTPATIENT PHYSICAL THERAPY:Daily Note 2018 ICD-10: Treatment Diagnosis: muscle weakness generalized M 62.81 Precautions/Allergies:  
Review of patient's allergies indicates no known allergies. Fall Risk Score: 9 (? 5 = High Risk) MD Orders: oncology rehab MEDICAL/REFERRING DIAGNOSIS: 
Other fatigue [R53.83] DATE OF ONSET:  REFERRING PHYSICIAN: Carlota Singleton MD 
RETURN PHYSICIAN APPOINTMENT: 18 INITIAL ASSESSMENT:  Ms. Angelina Lay presents following treatment for metastatic breast cancer since . She has developed significant fatigue and weakness. She spends most of the day in bed. She has had a fall in the recent past.  She uses a rolling walker. She could possibly benefit from home health, but is willing to attend therapy as outpatient. She will benefit from therapeutic exercises in order to increase strength and overall conditioning. 18: The patient has attended a total of 10 visits. She has been compliant with her HEP. She is making slow, but steady progress. She will benefit from continued therapy in order to reach her goals. Her spouse reports she is able to do more functional activities at home and to be out of the bed more. She is awaiting the results of her most recent PET scan. 
18: The patient has attended therapy consistently. She is making steady progress toward her goals. She is able to do light housework at home. She has waxed and waned in progress due to complications an treatment. She will benefit from continued therapy to improve strength and mobility. PROBLEM LIST (Impacting functional limitations): 1. Decreased Strength 2. Decreased ADL/Functional Activities 3. Decreased Transfer Abilities 4. Decreased Balance 5.  Increased Pain 
6. Decreased Activity Tolerance 7. Increased Fatigue 8. Decreased [place] with Home Exercise Program INTERVENTIONS PLANNED: 
1. Home Exercise Program (HEP) 2. Therapeutic Exercise/Strengthening TREATMENT PLAN: 
Effective Dates: 6/25/2018 TO 9/22/2018 (90 days). Frequency/Duration: 2 time a week for 90 Days GOALS: (Goals have been discussed and agreed upon with patient.) Short-Term Functional Goals: Time Frame: 4 weeks 1. The patient will be independent with HEP for strength within 4 weeks. Met  
2. The patient will report a fatigue of 7 or less within 4 weeks. 3. The patient will gain 1/2 grade strength within 4 weeks. Met  
4. The patient will improve her TUG score by 2 seconds within 4 weeks. Met  
5. The patient will improve her Tinetti score by 5 within 4 weeks. Met Discharge Goals: Time Frame: 8 weeks 1. The patient will report a fatigue level of 5 or less within 8 weeks. 2. The patient will improve her TUG score by 5 seconds within 8 weeks. Met  
3. The patient will participate in a 6 minute walk within 8 weeks. Rehabilitation Potential For Stated Goals: Fair Regarding Rianna Franciscoyra's therapy, I certify that the treatment plan above will be carried out by a therapist or under their direction. Thank you for this referral, Mis Harris PT Referring Physician Signature: Rehan Cerda MD              Date The information in this section was collected on 3/28/18 (except where otherwise noted). HISTORY:  
History of Present Injury/Illness (Reason for Referral): Metastatic breast cancer, chemo, fatigue, weakness Past Medical History/Comorbidities: Ms. Casi Costa  has a past medical history of Acute on chronic diastolic congestive heart failure ([facility] (1/5/2016); Anemia of chronic disease (11/13/2017); Aortic valve stenosis; Arthritis; Asthma; Cancer ([address] Chemotherapy-induced neutropenia (HCC) (12/20/2016);  CKD (chronic kidney disease) stage 4, GFR 15-29 ml/min ([address] Depression; Diastolic CHF ([address] Former cigarette smoker; HCAP (healthcare-associated pneumonia) (2013); History of DVT (deep vein thrombosis); Hypertension; Long term current use of anticoagulant; Oxygen dependent; Port catheter in place; Sepsis ([address] (2016); SOB (shortness of breath) on exertion; Type 2 diabetes mellitus ([address] Unspecified adverse effect of anesthesia; and Vitamin B12 deficiency (2017). Ms. Angelina Lay  has a past surgical history that includes hx  section; hx cyst removal; hx urological; hx vascular access (2012); and colonoscopy (N/A, 3/19/2018). Past Medical History:  
Diagnosis Date  Acute on chronic diastolic congestive heart failure ([address] 2016  Anemia of chronic disease 2017  Aortic valve stenosis  Arthritis   
 roverto hands  Asthma   
 till age 31  
 Cancer ([address]   
 roverto. breast ca mets bone/lung  Chemotherapy-induced neutropenia (HCC) 2016  CKD (chronic kidney disease) stage 4, GFR 15-29 ml/min (HCC)  Depression   
 managed with meds  Diastolic CHF ([address] Followed by Donte Kerns  Former cigarette smoker  HCAP (healthcare-associated pneumonia) 2013  History of DVT (deep vein thrombosis) Left leg DVT. on coumadin  Hypertension   
 managed with meds  Long term current use of anticoagulant Coumadin  Oxygen dependent   
 at bedtime 2L-3L NC  
 Port catheter in place Left chest port  Sepsis ([address] 2016  
 SOB (shortness of breath) on exertion  Type 2 diabetes mellitus ([address] PRN oral agent/AVG BS: 170-200/ s.s of hypoglycemia at 79  Unspecified adverse effect of anesthesia In [place] 28 yrs ago after second c -section-she was told her heart stopped d/t anesthesia-hospitalized for 5 days afterwards and followed by cardiologist  
 Vitamin B12 deficiency 2017 Past Surgical History:  
Procedure Laterality Date  COLONOSCOPY N/A 3/19/2018 COLONOSCOPY  BMI 23   performed by Nimco Guerrero MD at [address] [facility] X2  
 HX CYST REMOVAL    
 HX UROLOGICAL    
 stent placed  HX VASCULAR ACCESS  01/26/2012 Left chest port Social History/Living Environment:  
  lives with family Prior Level of Function/Work/Activity: 
independent Dominant Side:  
      RIGHT Current Medications:   
  
Current Outpatient Prescriptions:  
  enoxaparin (LOVENOX) 100 mg/mL, by SubCUTAneous route daily. , Disp: , Rfl:  
  HYDROcodone-acetaminophen (NORCO)  mg tablet, Take 1 Tab by mouth every six (6) hours as needed for Pain. Max Daily Amount: 4 Tabs., Disp: 90 Tab, Rfl: 0 
  montelukast (SINGULAIR) 10 mg tablet, Take 1 Tab by mouth nightly., Disp: 30 Tab, Rfl: 11 
  fentaNYL (DURAGESIC) 50 mcg/hr PATCH, 1 Patch by TransDERmal route every seventy-two (72) hours. Max Daily Amount: 1 Patch. Indications: Chronic Pain with Opioid Tolerance, Disp: 10 Patch, Rfl: 0 
  mirtazapine (REMERON) 30 mg tablet, Take 1 Tab by mouth nightly., Disp: 30 Tab, Rfl: 1 
  warfarin (COUMADIN) 3 mg tablet, Take 1 Tab by mouth daily. , Disp: 30 Tab, Rfl: 2 
  LORazepam (ATIVAN) 1 mg tablet, Take 1 Tab by mouth every six (6) hours as needed for Anxiety. Max Daily Amount: 4 mg., Disp: 60 Tab, Rfl: 0 
  albuterol-ipratropium (DUO-NEB) 2.5 mg-0.5 mg/3 ml nebu, 3 mL by Nebulization route every six (6) hours as needed. , Disp: 360 Nebule, Rfl: 1 
  GLIPIZIDE PO, Take  by mouth as needed. Pt only takes if BS > 200, Disp: , Rfl:  
  palbociclib 125 mg cap, Take 1 Cap by mouth daily. Take one pill once a day for 3 weeks and then off for one week, Disp: 21 Cap, Rfl: 5 
  potassium chloride (K-DUR, KLOR-CON) 20 mEq tablet, Take 1 Tab by mouth two (2) times a day., Disp: 60 Tab, Rfl: 0 
  hydrALAZINE (APRESOLINE) 50 mg tablet, Take 1 Tab by mouth three (3) times daily.  (Patient taking differently: Take 50 mg by mouth two (2) times a day.), Disp: 90 Tab, Rfl: 1 
  furosemide (LASIX) 40 mg tablet, Take 1 Tab by mouth daily. (Patient taking differently: Take 40 mg by mouth every other day. Monday,Wednesday,Friday,Sunday), Disp: 30 Tab, Rfl: 11 
  carvedilol (COREG) 3.125 mg tablet, Take 1 Tab by mouth two (2) times daily (with meals). , Disp: 60 Tab, Rfl: 11 
  warfarin (COUMADIN) 1 mg tablet, Take 1.5 Tabs by mouth every evening., Disp: 45 Tab, Rfl: 0 
  amLODIPine (NORVASC) 10 mg tablet, Take 1 Tab by mouth daily. , Disp: 30 Tab, Rfl: 11 No current facility-administered medications for this encounter. Date Last Reviewed:  3/28/18 Number of Personal Factors/Comorbidities that affect the Plan of Care: 3+: HIGH COMPLEXITY EXAMINATION:  
ROM:   
      Within functional limits Strength:   
      Grossly 4/5 x 4 extremities Functional Mobility:  
      Gait/Ambulation:  Uses walker Transfers:  independent Balance:   
      Decreased in standing and gait Body Structures Involved: 1. Muscles Body Functions Affected: 1. Sensory/Pain 2. Neuromusculoskeletal 
3. Movement Related Activities and Participation Affected: 1. General Tasks and Demands 2. Communication 3. Mobility Number of elements (examined above) that affect the Plan of Care: 4+: HIGH COMPLEXITY CLINICAL PRESENTATION:  
Presentation: Stable and uncomplicated: LOW COMPLEXITY CLINICAL DECISION MAKING:  
Outcome Measure: Tool Used: 6-MINUTE WALK TEST  Unable to test 
Score:  Initial:   feet Most Recent: X feet (Date: -- ) Interpretation of Score: Normal range varies but is approximately 3568-7938 Feet Distance walked:   feet Baseline End of Test  
Heart Rate Dyspnea (Mehul Scale) Fatigue (Mehul Scale) SpO2 BP Score 2133 [id number] 2325-1952 [phone] 426-16 15-0 Modifier CH CI CJ CK CL CM CN Tool Used: Disabilities of the Arm, Shoulder and Hand (DASH) Questionnaire - Quick Version Score: Initial: 41/55  Most Recent: 41/55 (Date: 6/25/18 ) Interpretation of Score: The DASH is designed to measure the activities of daily living in person's with upper extremity dysfunction or pain. Each section is scored on a 1-5 scale, 5 representing the greatest disability. The scores of each section are added together for a total score of 55. Score 11 12-19 20-28 29-37 38-45 46-54 55 Modifier CH CI CJ CK CL CM CN Tool Used: Ronnie Lozada Score:  Initial:  
Gait: 6/12 Balance: 10/16 TOTAL: 16/28 Most Recent: 
Gait: 9/12 Balance:13 /16 TOTAL:22 /28 Interpretation of Score: The maximum score for the gait component is 12 points. The maximum score for the balance component is 16 points. The maximum total score is 28 points. In general, patients who score below 19 are at a high risk for falls. Patients who score in the range of 19-24 indicate that the patient has a risk for falls. Score 28 27-23 22-18 17-12 11-6 5-1 0 Modifier CH CI CJ CK CL CM CN Tool Used: Timed Up and Go (TUG) Score:  Initial: 25 seconds Most Recent: 18 seconds (Date: 6/25/18 ) Interpretation of Score: The test measures, in seconds, the time taken by an individual to stand up from a standard arm chair (seat height 46 cm [18 in], arm height 65 cm [25.6 in]), walk a distance of 3 meters (118 in, approx 10 ft), turn, walk back to the chair and sit down. If the individual takes longer than 14 seconds to complete TUG, this indicates risk for falls. Score 7 7.5-10.5 11-14 14.5-17.5 18-21 21.5-24.5 25+ Modifier CH CI CJ CK CL CM CN Tool Used: ECOG Performance Survey Score Score:  Initial: 3 Most Recent:  1 Interpretation of Score:  
0 Fully active, able to carry on all pre-disease performance without restriction 1 Restricted in physically strenuous activity but ambulatory and able to carry out work of a light or sedentary nature, e.g., light house work, office work 2 Ambulatory and capable of all selfcare but unable to carry out any work activities. Up and about more than 50% of waking hours 3 Capable of only limited selfcare, confined to bed or chair more than 50% of waking hours 4 Completely disabled. Cannot carry on any selfcare. Totally confined to bed or chair 5 Dead Medical Necessity:  
· Patient is expected to demonstrate progress in strength, balance and gait to increase independence with household activity. Reason for Services/Other Comments: 
· Patient continues to demonstrate capacity to improve strength, balance and gait which will increase independence and decrease amount of assistance required from caregiver. Use of outcome tool(s) and clinical judgement create a POC that gives a: Questionable prediction of patient's progress: MODERATE COMPLEXITY  
  
 
 
 
TREATMENT:  
(In addition to Assessment/Re-Assessment sessions the following treatments were rendered) Pre-treatment Symptoms/Complaints:  Pain 9/10 in abdomen, legs, fatigue 7 /10. Pt reports she had the fluid removed from her abdomen. There was 3.5 liters. To have PET scan tomorrow. Pain: Initial:  
 9-10 /10  Post Session:  9-10/10 Therapeutic Exercise: (  32 minutes):  Exercises per below to improve mobility and strength. Required minimal verbal cues to promote proper body alignment and promote proper body posture. Progressed resistance and repetitions as indicated. /54 O2 96 HR 63 Standing forward flexion, abduction and hip extension x 10 reps with red theraband with bilateral lower extremities. Bicep curls with 3# x 15 reps Bilateral upper extremity with 2# x 15 reps forward flexion(10 reps) and abduction Bilateral upper extremity overhead press with 3# x 15 reps. Bilateral scapular retraction with blue theraband x 15 reps Sitting hamstring curls with blue theraband x 15 reps Sitting hip abduction with blue theraband x15 reps Sitting hip adduction with inflated ball x 10 reps Sit to stand from elevated surface x 10 reps One leg stance with minimal support 30 seconds x 2 each leg not performed Wall push ups x 10 reps not performed Walk 175; x 1 as above Step ups x 10 reps with 6 inch step  
as above. Given HEP:  Walk 1-2 minutes each waking hour when supervised, sit to stand from bedside, sit in chair instead of the bed each day. Added sit to stand, long arc quads and standing exercises at the sink daily. Bilateral upper extremity with light weight every other day. Given written instructions. Given red theraband for home use. [employer] [facility] 
Treatment/Session Assessment:   
· Response to Treatment:  Pt tolerated all treatment fair. Needed frequent rest periods. Fatigue increased today. · Compliance with Program/Exercises: Will assess as treatment progresses. · Recommendations/Intent for next treatment session: \"Next visit will focus on advancements to more challenging activities\". Total Treatment Duration: 45 min PT Patient Time In/Time Out Time In: 6096 Time Out: 2227 [place] See PT

## 2018-07-16 NOTE — PROGRESS NOTES
Stephan New : 1950 Primary:  
Secondary:  [facility] at [facility] [address], [address], [place] [address] [phone]   [phone] OUTPATIENT PHYSICAL THERAPY:Daily Note 2018 ICD-10: Treatment Diagnosis: muscle weakness generalized M 62.81 Precautions/Allergies:  
Review of patient's allergies indicates no known allergies. Fall Risk Score: 9 (? 5 = High Risk) MD Orders: oncology rehab MEDICAL/REFERRING DIAGNOSIS: 
Other fatigue [R53.83] DATE OF ONSET:  REFERRING PHYSICIAN: Cornell Rinne, MD 
RETURN PHYSICIAN APPOINTMENT: 18 INITIAL ASSESSMENT:  Ms. Angelica Whitney presents following treatment for metastatic breast cancer since . She has developed significant fatigue and weakness. She spends most of the day in bed. She has had a fall in the recent past.  She uses a rolling walker. She could possibly benefit from home health, but is willing to attend therapy as outpatient. She will benefit from therapeutic exercises in order to increase strength and overall conditioning. 18: The patient has attended a total of 10 visits. She has been compliant with her HEP. She is making slow, but steady progress. She will benefit from continued therapy in order to reach her goals. Her spouse reports she is able to do more functional activities at home and to be out of the bed more. She is awaiting the results of her most recent PET scan. 
18: The patient has attended therapy consistently. She is making steady progress toward her goals. She is able to do light housework at home. She has waxed and waned in progress due to complications an treatment. She will benefit from continued therapy to improve strength and mobility. PROBLEM LIST (Impacting functional limitations): 1. Decreased Strength 2. Decreased ADL/Functional Activities 3. Decreased Transfer Abilities 4. Decreased Balance 5.  Increased Pain 
6. Decreased Activity Tolerance 7. Increased Fatigue 8. Decreased [place] with Home Exercise Program INTERVENTIONS PLANNED: 
1. Home Exercise Program (HEP) 2. Therapeutic Exercise/Strengthening TREATMENT PLAN: 
Effective Dates: 6/25/2018 TO 9/22/2018 (90 days). Frequency/Duration: 2 time a week for 90 Days GOALS: (Goals have been discussed and agreed upon with patient.) Short-Term Functional Goals: Time Frame: 4 weeks 1. The patient will be independent with HEP for strength within 4 weeks. Met  
2. The patient will report a fatigue of 7 or less within 4 weeks. 3. The patient will gain 1/2 grade strength within 4 weeks. Met  
4. The patient will improve her TUG score by 2 seconds within 4 weeks. Met  
5. The patient will improve her Tinetti score by 5 within 4 weeks. Met Discharge Goals: Time Frame: 8 weeks 1. The patient will report a fatigue level of 5 or less within 8 weeks. 2. The patient will improve her TUG score by 5 seconds within 8 weeks. Met  
3. The patient will participate in a 6 minute walk within 8 weeks. Rehabilitation Potential For Stated Goals: Fair Regarding Mahnaz Castilloa's therapy, I certify that the treatment plan above will be carried out by a therapist or under their direction. Thank you for this referral, Meeta Wheeler PT Referring Physician Signature: Lior Becerra MD              Date The information in this section was collected on 3/28/18 (except where otherwise noted). HISTORY:  
History of Present Injury/Illness (Reason for Referral): Metastatic breast cancer, chemo, fatigue, weakness Past Medical History/Comorbidities: Ms. Mary Bradley  has a past medical history of Acute on chronic diastolic congestive heart failure ([facility] [address] (1/5/2016); Anemia of chronic disease (11/13/2017); Aortic valve stenosis; Arthritis; Asthma; Cancer ([address] Chemotherapy-induced neutropenia (HCC) (12/20/2016);  CKD (chronic kidney disease) stage 4, GFR 15-29 ml/min ([address] Depression; Diastolic CHF ([address] Former cigarette smoker; HCAP (healthcare-associated pneumonia) (2013); History of DVT (deep vein thrombosis); Hypertension; Long term current use of anticoagulant; Oxygen dependent; Port catheter in place; Sepsis ([address] (2016); SOB (shortness of breath) on exertion; Type 2 diabetes mellitus ([address] Unspecified adverse effect of anesthesia; and Vitamin B12 deficiency (2017). Ms. Perry Mortensen  has a past surgical history that includes hx  section; hx cyst removal; hx urological; hx vascular access (2012); and colonoscopy (N/A, 3/19/2018). Past Medical History:  
Diagnosis Date  Acute on chronic diastolic congestive heart failure ([address] 2016  Anemia of chronic disease 2017  Aortic valve stenosis  Arthritis   
 roverto hands  Asthma   
 till age 31  
 Cancer ([address]   
 roverto. breast ca mets bone/lung  Chemotherapy-induced neutropenia (HCC) 2016  CKD (chronic kidney disease) stage 4, GFR 15-29 ml/min (HCC)  Depression   
 managed with meds  Diastolic CHF ([address] Followed by Donte Kerns  Former cigarette smoker  HCAP (healthcare-associated pneumonia) 2013  History of DVT (deep vein thrombosis) Left leg DVT. on coumadin  Hypertension   
 managed with meds  Long term current use of anticoagulant Coumadin  Oxygen dependent   
 at bedtime 2L-3L NC  
 Port catheter in place Left chest port  Sepsis ([address] 2016  
 SOB (shortness of breath) on exertion  Type 2 diabetes mellitus ([address] PRN oral agent/AVG BS: 170-200/ s.s of hypoglycemia at 80  Unspecified adverse effect of anesthesia [place] 28 yrs ago after second c -section-she was told her heart stopped d/t anesthesia-hospitalized for 5 days afterwards and followed by cardiologist  
 Vitamin B12 deficiency 2017 Past Surgical History:  
Procedure Laterality Date  COLONOSCOPY N/A 3/19/2018 COLONOSCOPY  BMI 23   performed by Kumar Monreal MD at [address] [facility] X2  
 HX CYST REMOVAL    
 HX UROLOGICAL    
 stent placed  HX VASCULAR ACCESS  01/26/2012 Left chest port Social History/Living Environment:  
  lives with family Prior Level of Function/Work/Activity: 
independent Dominant Side:  
      RIGHT Current Medications:   
  
Current Outpatient Prescriptions:  
  enoxaparin (LOVENOX) 100 mg/mL, by SubCUTAneous route daily. , Disp: , Rfl:  
  HYDROcodone-acetaminophen (NORCO)  mg tablet, Take 1 Tab by mouth every six (6) hours as needed for Pain. Max Daily Amount: 4 Tabs., Disp: 90 Tab, Rfl: 0 
  montelukast (SINGULAIR) 10 mg tablet, Take 1 Tab by mouth nightly., Disp: 30 Tab, Rfl: 11 
  fentaNYL (DURAGESIC) 50 mcg/hr PATCH, 1 Patch by TransDERmal route every seventy-two (72) hours. Max Daily Amount: 1 Patch. Indications: Chronic Pain with Opioid Tolerance, Disp: 10 Patch, Rfl: 0 
  mirtazapine (REMERON) 30 mg tablet, Take 1 Tab by mouth nightly., Disp: 30 Tab, Rfl: 1 
  warfarin (COUMADIN) 3 mg tablet, Take 1 Tab by mouth daily. , Disp: 30 Tab, Rfl: 2 
  LORazepam (ATIVAN) 1 mg tablet, Take 1 Tab by mouth every six (6) hours as needed for Anxiety. Max Daily Amount: 4 mg., Disp: 60 Tab, Rfl: 0 
  albuterol-ipratropium (DUO-NEB) 2.5 mg-0.5 mg/3 ml nebu, 3 mL by Nebulization route every six (6) hours as needed. , Disp: 360 Nebule, Rfl: 1 
  GLIPIZIDE PO, Take  by mouth as needed. Pt only takes if BS > 200, Disp: , Rfl:  
  palbociclib 125 mg cap, Take 1 Cap by mouth daily. Take one pill once a day for 3 weeks and then off for one week, Disp: 21 Cap, Rfl: 5 
  potassium chloride (K-DUR, KLOR-CON) 20 mEq tablet, Take 1 Tab by mouth two (2) times a day., Disp: 60 Tab, Rfl: 0 
  hydrALAZINE (APRESOLINE) 50 mg tablet, Take 1 Tab by mouth three (3) times daily.  (Patient taking differently: Take 50 mg by mouth two (2) times a day.), Disp: 90 Tab, Rfl: 1 
  furosemide (LASIX) 40 mg tablet, Take 1 Tab by mouth daily. (Patient taking differently: Take 40 mg by mouth every other day. Monday,Wednesday,Friday,Sunday), Disp: 30 Tab, Rfl: 11 
  carvedilol (COREG) 3.125 mg tablet, Take 1 Tab by mouth two (2) times daily (with meals). , Disp: 60 Tab, Rfl: 11 
  warfarin (COUMADIN) 1 mg tablet, Take 1.5 Tabs by mouth every evening., Disp: 45 Tab, Rfl: 0 
  amLODIPine (NORVASC) 10 mg tablet, Take 1 Tab by mouth daily. , Disp: 30 Tab, Rfl: 11 Date Last Reviewed:  3/28/18 Number of Personal Factors/Comorbidities that affect the Plan of Care: 3+: HIGH COMPLEXITY EXAMINATION:  
ROM:   
      Within functional limits Strength:   
      Grossly 4/5 x 4 extremities Functional Mobility:  
      Gait/Ambulation:  Uses walker Transfers:  independent Balance:   
      Decreased in standing and gait Body Structures Involved: 1. Muscles Body Functions Affected: 1. Sensory/Pain 2. Neuromusculoskeletal 
3. Movement Related Activities and Participation Affected: 1. General Tasks and Demands 2. Communication 3. Mobility Number of elements (examined above) that affect the Plan of Care: 4+: HIGH COMPLEXITY CLINICAL PRESENTATION:  
Presentation: Stable and uncomplicated: LOW COMPLEXITY CLINICAL DECISION MAKING:  
Outcome Measure: Tool Used: 6-MINUTE WALK TEST  Unable to test 
Score:  Initial:   feet Most Recent: X feet (Date: -- ) Interpretation of Score: Normal range varies but is approximately [phone] Feet Distance walked:   feet Baseline End of Test  
Heart Rate Dyspnea (Mehul Scale) Fatigue (Mehul Scale) SpO2 BP Score [phone] 426-16 15-0 Modifier CH CI CJ CK CL CM CN Tool Used: Disabilities of the Arm, Shoulder and Hand (DASH) Questionnaire - Quick Version Score:  Initial: 41/55  Most Recent: 41/55 (Date: 6/25/18 ) Interpretation of Score: The DASH is designed to measure the activities of daily living in person's with upper extremity dysfunction or pain. Each section is scored on a 1-5 scale, 5 representing the greatest disability. The scores of each section are added together for a total score of 55. Score 11 12-19 20-28 29-37 38-45 46-54 55 Modifier  CI CJ CK CL CM CN Tool Used: Nahun Yin Score:  Initial:  
Gait: 6/12 Balance: 10/16 TOTAL: 16/28 Most Recent: 
Gait: 9/12 Balance:13 /16 TOTAL:22 /28 Interpretation of Score: The maximum score for the gait component is 12 points. The maximum score for the balance component is 16 points. The maximum total score is 28 points. In general, patients who score below 19 are at a high risk for falls. Patients who score in the range of 19-24 indicate that the patient has a risk for falls. Score 28 27-23 22-18 17-12 11-6 5-1 0 Modifier  CI CJ CK CL CM CN Tool Used: Timed Up and Go (TUG) Score:  Initial: 25 seconds Most Recent: 18 seconds (Date: 6/25/18 ) Interpretation of Score: The test measures, in seconds, the time taken by an individual to stand up from a standard arm chair (seat height 46 cm [18 in], arm height 65 cm [25.6 in]), walk a distance of 3 meters (118 in, approx 10 ft), turn, walk back to the chair and sit down. If the individual takes longer than 14 seconds to complete TUG, this indicates risk for falls. Score 7 7.5-10.5 11-14 14.5-17.5 18-21 21.5-24.5 25+ Modifier  CI CJ CK CL CM CN Tool Used: ECOG Performance Survey Score Score:  Initial: 3 Most Recent:  1 Interpretation of Score:  
0 Fully active, able to carry on all pre-disease performance without restriction 1 Restricted in physically strenuous activity but ambulatory and able to carry out work of a light or sedentary nature, e.g., light house work, office work 2 Ambulatory and capable of all selfcare but unable to carry out any work activities.  Up and about more than 50% of waking hours 3 Capable of only limited selfcare, confined to bed or chair more than 50% of waking hours 4 Completely disabled. Cannot carry on any selfcare. Totally confined to bed or chair 5 Dead Medical Necessity:  
· Patient is expected to demonstrate progress in strength, balance and gait to increase independence with household activity. Reason for Services/Other Comments: 
· Patient continues to demonstrate capacity to improve strength, balance and gait which will increase independence and decrease amount of assistance required from caregiver. Use of outcome tool(s) and clinical judgement create a POC that gives a: Questionable prediction of patient's progress: MODERATE COMPLEXITY  
  
 
 
 
TREATMENT:  
(In addition to Assessment/Re-Assessment sessions the following treatments were rendered) Pre-treatment Symptoms/Complaints:  Pain 9/10 in abdomen, legs, fatigue 8 /10. Pt reports she took a pain pill. She reports the fluid is coming back. She will see the physician tomorrow. Pain: Initial:  
 0 /10  Post Session:  0/10 Therapeutic Exercise: (   minutes):  Exercises per below to improve mobility and strength. Required minimal verbal cues to promote proper body alignment and promote proper body posture. Progressed resistance and repetitions as indicated. /58 O2 97 HR 67 Standing forward flexion, abduction and hip extension x 10 reps with red theraband with bilateral lower extremities. Bicep curls with 3# x 15 reps Bilateral upper extremity with 2# x 15 reps forward flexion(10 reps) and abduction Bilateral upper extremity overhead press with 3# x 15 reps. Bilateral scapular retraction with blue theraband x 15 reps Sitting hamstring curls with blue theraband x 15 reps Sitting hip abduction with blue theraband x15 reps Sitting hip adduction with inflated ball x 10 reps Sit to stand from elevated surface x 10 reps One leg stance with minimal support 30 seconds x 2 each leg not performed Wall push ups x 10 reps not performed Walk 175; x 1 as above Step ups x 10 reps with 6 inch step Fatigue 10/10. as above. Given HEP:  Walk 1-2 minutes each waking hour when supervised, sit to stand from bedside, sit in chair instead of the bed each day. Added sit to stand, long arc quads and standing exercises at the sink daily. Bilateral upper extremity with light weight every other day. Given written instructions. Given red theraband for home use. Quanlight 
Treatment/Session Assessment:   
· Response to Treatment:  Pt tolerated all treatment fair. Needed frequent rest periods. Fatigue increased today. · Compliance with Program/Exercises: Will assess as treatment progresses. · Recommendations/Intent for next treatment session: \"Next visit will focus on advancements to more challenging activities\". Total Treatment Duration: 42 min PT Patient Time In/Time Out Time In: 1433 Time Out: 2074 Randy Blount PT

## 2018-07-17 NOTE — PROGRESS NOTES
Arrived to the [facility] ambulatory. inj's completed. Patient tolerated well. Any issues or concerns during appointment: no.  Patient aware of next infusion appointment on 7/31 at 1000. Discharged to home ambulatory.

## 2018-07-19 NOTE — PROGRESS NOTES
Jonas Perkins : 1950 Primary:  
Secondary:  [facility] [address], [place] [address] [phone]   [phone] OUTPATIENT PHYSICAL THERAPY:Daily Note and Progress Report 2018 ICD-10: Treatment Diagnosis: muscle weakness generalized M 62.81 Precautions/Allergies:  
Review of patient's allergies indicates no known allergies. Fall Risk Score: 9 (? 5 = High Risk) MD Orders: oncology rehab MEDICAL/REFERRING DIAGNOSIS: 
Other fatigue [R53.83] DATE OF ONSET:  REFERRING PHYSICIAN: Senthil De La Cruz MD 
RETURN PHYSICIAN APPOINTMENT: 18 INITIAL ASSESSMENT:  Ms. Lisa Tamayo presents following treatment for metastatic breast cancer since . She has developed significant fatigue and weakness. She spends most of the day in bed. She has had a fall in the recent past.  She uses a rolling walker. She could possibly benefit from home health, but is willing to attend therapy as outpatient. She will benefit from therapeutic exercises in order to increase strength and overall conditioning. 18: The patient has attended a total of 10 visits. She has been compliant with her HEP. She is making slow, but steady progress. She will benefit from continued therapy in order to reach her goals. Her spouse reports she is able to do more functional activities at home and to be out of the bed more. She is awaiting the results of her most recent PET scan. 
18: The patient has attended therapy consistently. She is making steady progress toward her goals. She is able to do light housework at home. She has waxed and waned in progress due to complications an treatment. She will benefit from continued therapy to improve strength and mobility. 18: The patient continues to progress slowly. She has a new diagnosis of cirrhosis with accumulation of ascites.   She is to see a gastroenterologist for this since it is unrelated to cancer. She will continue to benefit from overall strength and conditioning. Mobility has improved. Strength continues to improve slowly. PROBLEM LIST (Impacting functional limitations): 1. Decreased Strength 2. Decreased ADL/Functional Activities 3. Decreased Transfer Abilities 4. Decreased Balance 5. Increased Pain 6. Decreased Activity Tolerance 7. Increased Fatigue 8. Decreased [place] with Home Exercise Program INTERVENTIONS PLANNED: 
1. Home Exercise Program (HEP) 2. Therapeutic Exercise/Strengthening TREATMENT PLAN: 
Effective Dates: 6/25/2018 TO 9/22/2018 (90 days). Frequency/Duration: 2 time a week for 90 Days GOALS: (Goals have been discussed and agreed upon with patient.) Short-Term Functional Goals: Time Frame: 4 weeks 1. The patient will be independent with HEP for strength within 4 weeks. Met  
2. The patient will report a fatigue of 7 or less within 4 weeks. 3. The patient will gain 1/2 grade strength within 4 weeks. Met  
4. The patient will improve her TUG score by 2 seconds within 4 weeks. Met  
5. The patient will improve her Tinetti score by 5 within 4 weeks. Met Discharge Goals: Time Frame: 8 weeks 1. The patient will report a fatigue level of 5 or less within 8 weeks. 2. The patient will improve her TUG score by 5 seconds within 8 weeks. Met  
3. The patient will participate in a 6 minute walk within 8 weeks. Rehabilitation Potential For Stated Goals: Fair Regarding Padma Geiger Betsey's therapy, I certify that the treatment plan above will be carried out by a therapist or under their direction. Thank you for this referral, Curtis Gomez PT Referring Physician Signature: Tirso Pruitt MD              Date The information in this section was collected on 3/28/18 (except where otherwise noted). HISTORY:  
History of Present Injury/Illness (Reason for Referral): Metastatic breast cancer, chemo, fatigue, weakness Past Medical History/Comorbidities: Ms. Williams Gomez  has a past medical history of Acute on chronic diastolic congestive heart failure ([address] (2016); Anemia of chronic disease (2017); Aortic valve stenosis; Arthritis; Asthma; Cancer ([address] Chemotherapy-induced neutropenia (HCC) (2016); CKD (chronic kidney disease) stage 4, GFR 15-29 ml/min ([address] Depression; Diastolic CHF ([address] Former cigarette smoker; HCAP (healthcare-associated pneumonia) (2013); History of DVT (deep vein thrombosis); Hypertension; Long term current use of anticoagulant; Oxygen dependent; Port catheter in place; Sepsis ([address] (2016); SOB (shortness of breath) on exertion; Type 2 diabetes mellitus ([address] Unspecified adverse effect of anesthesia; and Vitamin B12 deficiency (2017). Ms. Williams Gomez  has a past surgical history that includes hx  section; hx cyst removal; hx urological; hx vascular access (2012); and colonoscopy (N/A, 3/19/2018). Past Medical History:  
Diagnosis Date  Acute on chronic diastolic congestive heart failure ([address] 2016  Anemia of chronic disease 2017  Aortic valve stenosis  Arthritis   
 roverto hands  Asthma   
 till age 31  
 Cancer ([address]   
 roverto. breast ca mets bone/lung  Chemotherapy-induced neutropenia (HCC) 2016  CKD (chronic kidney disease) stage 4, GFR 15-29 ml/min (HCC)  Depression   
 managed with meds  Diastolic CHF ([address] Followed by Donte Kerns  Former cigarette smoker  HCAP (healthcare-associated pneumonia) 2013  History of DVT (deep vein thrombosis) Left leg DVT. on coumadin  Hypertension   
 managed with meds  Long term current use of anticoagulant Coumadin  Oxygen dependent   
 at bedtime 2L-3L NC  
 Port catheter in place Left chest port  Sepsis ([address] 2016  
 SOB (shortness of breath) on exertion  Type 2 diabetes mellitus ([address]  PRN oral agent/AVG BS: 170-200/ s.s of hypoglycemia at 79  Unspecified adverse effect of anesthesia [place] yrs ago after second c -section-she was told her heart stopped d/t anesthesia-hospitalized for 5 days afterwards and followed by cardiologist  
 Vitamin B12 deficiency 11/13/2017 Past Surgical History:  
Procedure Laterality Date  COLONOSCOPY N/A 3/19/2018 COLONOSCOPY  BMI 23   performed by Mickey Cummins MD at [facility] X2  
 HX CYST REMOVAL    
 HX UROLOGICAL    
 stent placed  HX VASCULAR ACCESS  01/26/2012 Left chest port Social History/Living Environment:  
  lives with family Prior Level of Function/Work/Activity: 
independent Dominant Side:  
      RIGHT Current Medications:   
  
Current Outpatient Prescriptions:  
  fulvestrant (FASLODEX) 250 mg/5 mL injection, 10 mL by IntraMUSCular route every twenty-eight (28) days. , Disp: 1 Syringe, Rfl: 11 
  fentaNYL (DURAGESIC) 50 mcg/hr PATCH, 1 Patch by TransDERmal route every seventy-two (72) hours. Max Daily Amount: 1 Patch. Indications: Chronic Pain with Opioid Tolerance, Disp: 10 Patch, Rfl: 0 
  temazepam (RESTORIL) 7.5 mg capsule, Take 1 Cap by mouth nightly as needed for Sleep. Max Daily Amount: 7.5 mg., Disp: 30 Cap, Rfl: 1 
  montelukast (SINGULAIR) 10 mg tablet, Take 1 Tab by mouth nightly., Disp: 30 Tab, Rfl: 11 
  spironolactone (ALDACTONE) 50 mg tablet, Take 1 Tab by mouth daily. , Disp: 30 Tab, Rfl: 11 
  HYDROcodone-acetaminophen (NORCO)  mg tablet, Take 1 Tab by mouth every six (6) hours as needed for Pain. Max Daily Amount: 4 Tabs., Disp: 90 Tab, Rfl: 0 
  mirtazapine (REMERON) 30 mg tablet, Take 1 Tab by mouth nightly., Disp: 30 Tab, Rfl: 11 
  DISABLED PLACARD (DISABLED PLACARD) DMV, 1 permanent handicapped placard, Disp: 1 Each, Rfl: 0 
  enoxaparin (LOVENOX) 100 mg/mL, by SubCUTAneous route daily. , Disp: , Rfl:  
  warfarin (COUMADIN) 3 mg tablet, Take 1 Tab by mouth daily. , Disp: 30 Tab, Rfl: 2 
  LORazepam (ATIVAN) 1 mg tablet, Take 1 Tab by mouth every six (6) hours as needed for Anxiety. Max Daily Amount: 4 mg., Disp: 60 Tab, Rfl: 0 
  albuterol-ipratropium (DUO-NEB) 2.5 mg-0.5 mg/3 ml nebu, 3 mL by Nebulization route every six (6) hours as needed. , Disp: 360 Nebule, Rfl: 1 
  GLIPIZIDE PO, Take  by mouth as needed. Pt only takes if BS > 200, Disp: , Rfl:  
  palbociclib 125 mg cap, Take 1 Cap by mouth daily. Take one pill once a day for 3 weeks and then off for one week, Disp: 21 Cap, Rfl: 5 
  potassium chloride (K-DUR, KLOR-CON) 20 mEq tablet, Take 1 Tab by mouth two (2) times a day., Disp: 60 Tab, Rfl: 0 
  hydrALAZINE (APRESOLINE) 50 mg tablet, Take 1 Tab by mouth three (3) times daily. (Patient taking differently: Take 50 mg by mouth two (2) times a day.), Disp: 90 Tab, Rfl: 1 
  furosemide (LASIX) 40 mg tablet, Take 1 Tab by mouth daily. (Patient taking differently: Take 40 mg by mouth every other day. Monday,Wednesday,Friday,Sunday), Disp: 30 Tab, Rfl: 11 
  carvedilol (COREG) 3.125 mg tablet, Take 1 Tab by mouth two (2) times daily (with meals). , Disp: 60 Tab, Rfl: 11 
  warfarin (COUMADIN) 1 mg tablet, Take 1.5 Tabs by mouth every evening., Disp: 45 Tab, Rfl: 0 
  amLODIPine (NORVASC) 10 mg tablet, Take 1 Tab by mouth daily. , Disp: 30 Tab, Rfl: 11 Date Last Reviewed:  3/28/18 Number of Personal Factors/Comorbidities that affect the Plan of Care: 3+: HIGH COMPLEXITY EXAMINATION:  
ROM:   
      Within functional limits Strength:   
      Grossly 4/5 x 4 extremities Functional Mobility:  
      Gait/Ambulation:  Uses walker Transfers:  independent Balance:   
      Decreased in standing and gait Body Structures Involved: 1. Muscles Body Functions Affected: 1. Sensory/Pain 2. Neuromusculoskeletal 
3. Movement Related Activities and Participation Affected: 1. General Tasks and Demands 2. Communication 3.  Mobility Number of elements (examined above) that affect the Plan of Care: 4+: HIGH COMPLEXITY CLINICAL PRESENTATION:  
Presentation: Stable and uncomplicated: LOW COMPLEXITY CLINICAL DECISION MAKING:  
Outcome Measure: Tool Used: 6-MINUTE WALK TEST  Unable to test 
Score:  Initial:   feet Most Recent: X feet (Date: -- ) Interpretation of Score: Normal range varies but is approximately 6269-0789 Feet Distance walked:   feet Baseline End of Test  
Heart Rate Dyspnea (Mehul Scale) Fatigue (Mehul Scale) SpO2 BP Score 2133 [id number] [id number] [phone] 426-16 15-0 Modifier CH CI CJ CK CL CM CN Tool Used: Disabilities of the Arm, Shoulder and Hand (DASH) Questionnaire - Quick Version Score:  Initial: 41/55  Most Recent: 41/55 (Date: 6/25/18 ) Interpretation of Score: The DASH is designed to measure the activities of daily living in person's with upper extremity dysfunction or pain. Each section is scored on a 1-5 scale, 5 representing the greatest disability. The scores of each section are added together for a total score of 55. Score 11 12-19 20-28 29-37 38-45 46-54 55 Modifier CH CI CJ CK CL CM CN Tool Used: Tigre Vega Score:  Initial:  
Gait: 6/12 Balance: 10/16 TOTAL: 16/28 Most Recent: 
Gait: 9/12 Balance:13 /16 TOTAL:22 /28 Interpretation of Score: The maximum score for the gait component is 12 points. The maximum score for the balance component is 16 points. The maximum total score is 28 points. In general, patients who score below 19 are at a high risk for falls. Patients who score in the range of 19-24 indicate that the patient has a risk for falls. Score 28 27-23 22-18 17-12 11-6 5-1 0 Modifier CH CI CJ CK CL CM CN Tool Used: Timed Up and Go (TUG) Score:  Initial: 25 seconds Most Recent: 18 seconds (Date: 6/25/18 ) Interpretation of Score:  The test measures, in seconds, the time taken by an individual to stand up from a standard arm chair (seat height 46 cm [18 in], arm height 65 cm [25.6 in]), walk a distance of 3 meters (118 in, approx 10 ft), turn, walk back to the chair and sit down. If the individual takes longer than 14 seconds to complete TUG, this indicates risk for falls. Score 7 7.5-10.5 11-14 14.5-17.5 18-21 21.5-24.5 25+ Modifier CH CI CJ CK CL CM CN Tool Used: ECOG Performance Survey Score Score:  Initial: 3 Most Recent:  1 Interpretation of Score:  
0 Fully active, able to carry on all pre-disease performance without restriction 1 Restricted in physically strenuous activity but ambulatory and able to carry out work of a light or sedentary nature, e.g., light house work, office work 2 Ambulatory and capable of all selfcare but unable to carry out any work activities. Up and about more than 50% of waking hours 3 Capable of only limited selfcare, confined to bed or chair more than 50% of waking hours 4 Completely disabled. Cannot carry on any selfcare. Totally confined to bed or chair 5 Dead Medical Necessity:  
· Patient is expected to demonstrate progress in strength, balance and gait to increase independence with household activity. Reason for Services/Other Comments: 
· Patient continues to demonstrate capacity to improve strength, balance and gait which will increase independence and decrease amount of assistance required from caregiver. Use of outcome tool(s) and clinical judgement create a POC that gives a: Questionable prediction of patient's progress: MODERATE COMPLEXITY  
  
 
 
 
TREATMENT:  
(In addition to Assessment/Re-Assessment sessions the following treatments were rendered) Pre-treatment Symptoms/Complaints:  Pain o/10 in abdomen, legs, fatigue 7 /10. Pt reports she will need to see a gastroenterologist for the ascites caused by the cirrhosis. The cancer is stable. She reports this is a new diagnosis. She reports she is short of breath due to the fluid.  
Pain: Initial:  
 0 /10  Took pill. Post Session:  0/10 Therapeutic Exercise: (   minutes):  Exercises per below to improve mobility and strength. Required minimal verbal cues to promote proper body alignment and promote proper body posture. Progressed resistance and repetitions as indicated. /53 O2 96 HR 65 Standing forward flexion, abduction and hip extension x 10 reps with red theraband with bilateral lower extremities. Bicep curls with 3# x 15 reps Bilateral upper extremity with 3# x 15 reps forward flexion(10 reps) and abduction Bilateral upper extremity overhead press with 3# x 15 reps. Bilateral scapular retraction with blue theraband x 15 reps Sitting hamstring curls with blue theraband x 15 reps Sitting hip abduction with blue theraband x15 reps Sitting hip adduction with inflated ball x 10 reps Sit to stand from elevated surface x 10 reps One leg stance with minimal support 30 seconds x 2 each leg Wall push ups x 15 reps Step ups x 10 reps with 6 inch step not performed. Fatigue 10/10. as above. Given HEP:  Walk 1-2 minutes each waking hour when supervised, sit to stand from bedside, sit in chair instead of the bed each day. Added sit to stand, long arc quads and standing exercises at the sink daily. Bilateral upper extremity with light weight every other day. Given written instructions. Given red theraband for home use. [employer] 
Treatment/Session Assessment:   
· Response to Treatment:  Pt tolerated all treatment fair. Needed frequent rest periods. Fatigue increased today. Ascites continues. Injection yesterday. Unable to complete full treatment. · Compliance with Program/Exercises: Will assess as treatment progresses. · Recommendations/Intent for next treatment session: \"Next visit will focus on advancements to more challenging activities\". Total Treatment Duration: 35 min PT Patient Time In/Time Out Time In: 0901 Time Out: [id number] [facility], PT

## 2018-07-23 NOTE — PROGRESS NOTES
Scott Patel : 1950 Primary:  
Secondary:  [facility] [address] [facility] [address], [place] [address] [phone]   [phone] OUTPATIENT PHYSICAL THERAPY:Daily Note 2018 ICD-10: Treatment Diagnosis: muscle weakness generalized M 62.81 Precautions/Allergies:  
Review of patient's allergies indicates no known allergies. Fall Risk Score: 9 (? 5 = High Risk) MD Orders: oncology rehab MEDICAL/REFERRING DIAGNOSIS: 
Other fatigue [R53.83] DATE OF ONSET:  REFERRING PHYSICIAN: Tana Kim MD 
RETURN PHYSICIAN APPOINTMENT: 18 INITIAL ASSESSMENT:  Ms. Tammy Schumacher presents following treatment for metastatic breast cancer since . She has developed significant fatigue and weakness. She spends most of the day in bed. She has had a fall in the recent past.  She uses a rolling walker. She could possibly benefit from home health, but is willing to attend therapy as outpatient. She will benefit from therapeutic exercises in order to increase strength and overall conditioning. 18: The patient has attended a total of 10 visits. She has been compliant with her HEP. She is making slow, but steady progress. She will benefit from continued therapy in order to reach her goals. Her spouse reports she is able to do more functional activities at home and to be out of the bed more. She is awaiting the results of her most recent PET scan. 
18: The patient has attended therapy consistently. She is making steady progress toward her goals. She is able to do light housework at home. She has waxed and waned in progress due to complications an treatment. She will benefit from continued therapy to improve strength and mobility. 18: The patient continues to progress slowly. She has a new diagnosis of cirrhosis with accumulation of ascites.   She is to see a gastroenterologist for this since it is unrelated to cancer. She will continue to benefit from overall strength and conditioning. Mobility has improved. Strength continues to improve slowly. PROBLEM LIST (Impacting functional limitations): 1. Decreased Strength 2. Decreased ADL/Functional Activities 3. Decreased Transfer Abilities 4. Decreased Balance 5. Increased Pain 6. Decreased Activity Tolerance 7. Increased Fatigue 8. Decreased [place] with Home Exercise Program INTERVENTIONS PLANNED: 
1. Home Exercise Program (HEP) 2. Therapeutic Exercise/Strengthening TREATMENT PLAN: 
Effective Dates: 6/25/2018 TO 9/22/2018 (90 days). Frequency/Duration: 2 time a week for 90 Days GOALS: (Goals have been discussed and agreed upon with patient.) Short-Term Functional Goals: Time Frame: 4 weeks 1. The patient will be independent with HEP for strength within 4 weeks. Met  
2. The patient will report a fatigue of 7 or less within 4 weeks. 3. The patient will gain 1/2 grade strength within 4 weeks. Met  
4. The patient will improve her TUG score by 2 seconds within 4 weeks. Met  
5. The patient will improve her Tinetti score by 5 within 4 weeks. Met Discharge Goals: Time Frame: 8 weeks 1. The patient will report a fatigue level of 5 or less within 8 weeks. 2. The patient will improve her TUG score by 5 seconds within 8 weeks. Met  
3. The patient will participate in a 6 minute walk within 8 weeks. Rehabilitation Potential For Stated Goals: Fair Regarding Kahlil Leggett's therapy, I certify that the treatment plan above will be carried out by a therapist or under their direction. Thank you for this referral, Cornell Mack PT Referring Physician Signature: Cheryle Dutton, MD              Date The information in this section was collected on 3/28/18 (except where otherwise noted). HISTORY:  
History of Present Injury/Illness (Reason for Referral): Metastatic breast cancer, chemo, fatigue, weakness Past Medical History/Comorbidities: Ms. Casi Costa  has a past medical history of Acute on chronic diastolic congestive heart failure ([address] (2016); Anemia of chronic disease (2017); Aortic valve stenosis; Arthritis; Asthma; Cancer ([address] Chemotherapy-induced neutropenia (HCC) (2016); CKD (chronic kidney disease) stage 4, GFR 15-29 ml/min ([address] Depression; Diastolic CHF ([address] Former cigarette smoker; HCAP (healthcare-associated pneumonia) (2013); History of DVT (deep vein thrombosis); Hypertension; Long term current use of anticoagulant; Oxygen dependent; Port catheter in place; Sepsis ([address] (2016); SOB (shortness of breath) on exertion; Type 2 diabetes mellitus ([address] Unspecified adverse effect of anesthesia; and Vitamin B12 deficiency (2017). Ms. Casi Costa  has a past surgical history that includes hx  section; hx cyst removal; hx urological; hx vascular access (2012); and colonoscopy (N/A, 3/19/2018). Past Medical History:  
Diagnosis Date  Acute on chronic diastolic congestive heart failure ([address] 2016  Anemia of chronic disease 2017  Aortic valve stenosis  Arthritis   
 roverto hands  Asthma   
 till age 31  
 Cancer ([address]   
 roverto. breast ca mets bone/lung  Chemotherapy-induced neutropenia (HCC) 2016  CKD (chronic kidney disease) stage 4, GFR 15-29 ml/min (HCC)  Depression   
 managed with meds  Diastolic CHF ([address] Followed by Donte Kerns  Former cigarette smoker  HCAP (healthcare-associated pneumonia) 2013  History of DVT (deep vein thrombosis) Left leg DVT. on coumadin  Hypertension   
 managed with meds  Long term current use of anticoagulant Coumadin  Oxygen dependent   
 at bedtime 2L-3L NC  
 Port catheter in place Left chest port  Sepsis ([address] 2016  
 SOB (shortness of breath) on exertion  Type 2 diabetes mellitus ([address]  PRN oral agent/AVG BS: 170-200/ s.s of hypoglycemia at [de-identified]  Unspecified adverse effect of anesthesia [place] 27 yrs ago after second c -section-she was told her heart stopped d/t anesthesia-hospitalized for 5 days afterwards and followed by cardiologist  
 Vitamin B12 deficiency 11/13/2017 Past Surgical History:  
Procedure Laterality Date  COLONOSCOPY N/A 3/19/2018 COLONOSCOPY  BMI 23   performed by Danita Fuentes MD at [address]  
 HX CYST REMOVAL    
 HX UROLOGICAL    
 stent placed  HX VASCULAR ACCESS  01/26/2012 Left chest port Social History/Living Environment:  
  lives with family Prior Level of Function/Work/Activity: 
independent Dominant Side:  
      RIGHT Current Medications:   
  
Current Outpatient Prescriptions:  
  fulvestrant (FASLODEX) 250 mg/5 mL injection, 10 mL by IntraMUSCular route every twenty-eight (28) days. , Disp: 1 Syringe, Rfl: 11 
  fentaNYL (DURAGESIC) 50 mcg/hr PATCH, 1 Patch by TransDERmal route every seventy-two (72) hours. Max Daily Amount: 1 Patch. Indications: Chronic Pain with Opioid Tolerance, Disp: 10 Patch, Rfl: 0 
  temazepam (RESTORIL) 7.5 mg capsule, Take 1 Cap by mouth nightly as needed for Sleep. Max Daily Amount: 7.5 mg., Disp: 30 Cap, Rfl: 1 
  montelukast (SINGULAIR) 10 mg tablet, Take 1 Tab by mouth nightly., Disp: 30 Tab, Rfl: 11 
  spironolactone (ALDACTONE) 50 mg tablet, Take 1 Tab by mouth daily. , Disp: 30 Tab, Rfl: 11 
  HYDROcodone-acetaminophen (NORCO)  mg tablet, Take 1 Tab by mouth every six (6) hours as needed for Pain. Max Daily Amount: 4 Tabs., Disp: 90 Tab, Rfl: 0 
  mirtazapine (REMERON) 30 mg tablet, Take 1 Tab by mouth nightly., Disp: 30 Tab, Rfl: 11 
  DISABLED PLACARD (DISABLED PLACARD) DMV, 1 permanent handicapped placard, Disp: 1 Each, Rfl: 0 
  enoxaparin (LOVENOX) 100 mg/mL, by SubCUTAneous route daily. , Disp: , Rfl:  
  warfarin (COUMADIN) 3 mg tablet, Take 1 Tab by mouth daily. , Disp: 30 Tab, Rfl: 2 
  LORazepam (ATIVAN) 1 mg tablet, Take 1 Tab by mouth every six (6) hours as needed for Anxiety. Max Daily Amount: 4 mg., Disp: 60 Tab, Rfl: 0 
  albuterol-ipratropium (DUO-NEB) 2.5 mg-0.5 mg/3 ml nebu, 3 mL by Nebulization route every six (6) hours as needed. , Disp: 360 Nebule, Rfl: 1 
  GLIPIZIDE PO, Take  by mouth as needed. Pt only takes if BS > 200, Disp: , Rfl:  
  palbociclib 125 mg cap, Take 1 Cap by mouth daily. Take one pill once a day for 3 weeks and then off for one week, Disp: 21 Cap, Rfl: 5 
  potassium chloride (K-DUR, KLOR-CON) 20 mEq tablet, Take 1 Tab by mouth two (2) times a day., Disp: 60 Tab, Rfl: 0 
  hydrALAZINE (APRESOLINE) 50 mg tablet, Take 1 Tab by mouth three (3) times daily. (Patient taking differently: Take 50 mg by mouth two (2) times a day.), Disp: 90 Tab, Rfl: 1 
  furosemide (LASIX) 40 mg tablet, Take 1 Tab by mouth daily. (Patient taking differently: Take 40 mg by mouth every other day. Monday,Wednesday,Friday,Sunday), Disp: 30 Tab, Rfl: 11 
  carvedilol (COREG) 3.125 mg tablet, Take 1 Tab by mouth two (2) times daily (with meals). , Disp: 60 Tab, Rfl: 11 
  warfarin (COUMADIN) 1 mg tablet, Take 1.5 Tabs by mouth every evening., Disp: 45 Tab, Rfl: 0 
  amLODIPine (NORVASC) 10 mg tablet, Take 1 Tab by mouth daily. , Disp: 30 Tab, Rfl: 11 Date Last Reviewed:  3/28/18 Number of Personal Factors/Comorbidities that affect the Plan of Care: 3+: HIGH COMPLEXITY EXAMINATION:  
ROM:   
      Within functional limits Strength:   
      Grossly 4/5 x 4 extremities Functional Mobility:  
      Gait/Ambulation:  Uses walker Transfers:  independent Balance:   
      Decreased in standing and gait Body Structures Involved: 1. Muscles Body Functions Affected: 1. Sensory/Pain 2. Neuromusculoskeletal 
3. Movement Related Activities and Participation Affected: 1. General Tasks and Demands 2. Communication 3. Mobility Number of elements (examined above) that affect the Plan of Care: 4+: HIGH COMPLEXITY CLINICAL PRESENTATION:  
Presentation: Stable and uncomplicated: LOW COMPLEXITY CLINICAL DECISION MAKING:  
Outcome Measure: Tool Used: 6-MINUTE WALK TEST  Unable to test 
Score:  Initial:   feet Most Recent: X feet (Date: -- ) Interpretation of Score: Normal range varies but is approximately 4565-1678 Feet Distance walked:   feet Baseline End of Test  
Heart Rate Dyspnea (Mehul Scale) Fatigue (Mehul Scale) SpO2 BP Score 2133 [id number] 9960-0097 [phone] 426-16 15-0 Modifier CH CI CJ CK CL CM CN Tool Used: Disabilities of the Arm, Shoulder and Hand (DASH) Questionnaire - Quick Version Score:  Initial: 41/55  Most Recent: 41/55 (Date: 6/25/18 ) Interpretation of Score: The DASH is designed to measure the activities of daily living in person's with upper extremity dysfunction or pain. Each section is scored on a 1-5 scale, 5 representing the greatest disability. The scores of each section are added together for a total score of 55. Score 11 12-19 20-28 29-37 38-45 46-54 55 Modifier CH CI CJ CK CL CM CN Tool Used: Judene Lundborg Score:  Initial:  
Gait: 6/12 Balance: 10/16 TOTAL: 16/28 Most Recent: 
Gait: 9/12 Balance:13 /16 TOTAL:22 /28 Interpretation of Score: The maximum score for the gait component is 12 points. The maximum score for the balance component is 16 points. The maximum total score is 28 points. In general, patients who score below 19 are at a high risk for falls. Patients who score in the range of 19-24 indicate that the patient has a risk for falls. Score 28 27-23 22-18 17-12 11-6 5-1 0 Modifier CH CI CJ CK CL CM CN Tool Used: Timed Up and Go (TUG) Score:  Initial: 25 seconds Most Recent: 18 seconds (Date: 6/25/18 ) Interpretation of Score:  The test measures, in seconds, the time taken by an individual to stand up from a standard arm chair (seat height 46 cm [18 in], arm height 65 cm [25.6 in]), walk a distance of 3 meters (118 in, approx 10 ft), turn, walk back to the chair and sit down. If the individual takes longer than 14 seconds to complete TUG, this indicates risk for falls. Score 7 7.5-10.5 11-14 14.5-17.5 18-21 21.5-24.5 25+ Modifier CH CI CJ CK CL CM CN Tool Used: ECOG Performance Survey Score Score:  Initial: 3 Most Recent:  1 Interpretation of Score:  
0 Fully active, able to carry on all pre-disease performance without restriction 1 Restricted in physically strenuous activity but ambulatory and able to carry out work of a light or sedentary nature, e.g., light house work, office work 2 Ambulatory and capable of all selfcare but unable to carry out any work activities. Up and about more than 50% of waking hours 3 Capable of only limited selfcare, confined to bed or chair more than 50% of waking hours 4 Completely disabled. Cannot carry on any selfcare. Totally confined to bed or chair 5 Dead Medical Necessity:  
· Patient is expected to demonstrate progress in strength, balance and gait to increase independence with household activity. Reason for Services/Other Comments: 
· Patient continues to demonstrate capacity to improve strength, balance and gait which will increase independence and decrease amount of assistance required from caregiver. Use of outcome tool(s) and clinical judgement create a POC that gives a: Questionable prediction of patient's progress: MODERATE COMPLEXITY  
  
 
 
 
TREATMENT:  
(In addition to Assessment/Re-Assessment sessions the following treatments were rendered) Pre-treatment Symptoms/Complaints:  Pain 8/10 in abdomen, legs, fatigue 0 /10. Pt reports she was unable to eat or take meds yesterday due to the nausea. Pain: Initial:  
 8 /10  Post Session:  8/10 Therapeutic Exercise: (   minutes):  Exercises per below to improve mobility and strength.   Required minimal verbal cues to promote proper body alignment and promote proper body posture. Progressed resistance and repetitions as indicated. /60 O2 96 HR 68 Standing forward flexion, abduction and hip extension x 10 reps with red theraband with bilateral lower extremities. Bicep curls with 3# x 15 reps Bilateral upper extremity with 3# x 15 reps forward flexion(10 reps) and abduction Bilateral upper extremity overhead press with 3# x 15 reps. Bilateral scapular retraction with blue theraband x 15 reps Sitting hamstring curls with blue theraband x 15 reps Sitting hip abduction with blue theraband x15 reps Sitting hip adduction with inflated ball x 10 reps Sit to stand from elevated surface x 10 reps One leg stance with minimal support 30 seconds x 2 each leg Wall push ups x 15 reps Step ups x 10 reps with 6 inch step not performed. Fatigue 7/10. as above. Given HEP:  Walk 1-2 minutes each waking hour when supervised, sit to stand from bedside, sit in chair instead of the bed each day. Added sit to stand, long arc quads and standing exercises at the sink daily. Bilateral upper extremity with light weight every other day. Given written instructions. Given red theraband for home use. [employer] Portal 
Treatment/Session Assessment:   
· Response to Treatment:  Pt tolerated all treatment fair. Needed frequent rest periods. Ascites continues. Unable to complete full treatment. · Compliance with Program/Exercises: Will assess as treatment progresses. · Recommendations/Intent for next treatment session: \"Next visit will focus on advancements to more challenging activities\". Total Treatment Duration: 33 min PT Patient Time In/Time Out Time In: 0862 Time Out: 6443 Hieu Marin, PT

## 2018-07-26 NOTE — PROGRESS NOTES
Ren Ira : 1950 Primary:  
Secondary:  Therapy Center at [facility], [facility], [address], [place] [address] [phone]   [phone] OUTPATIENT PHYSICAL THERAPY:Daily Note 2018 ICD-10: Treatment Diagnosis: muscle weakness generalized M 62.81 Precautions/Allergies:  
Review of patient's allergies indicates no known allergies. Fall Risk Score: 9 (? 5 = High Risk) MD Orders: oncology rehab MEDICAL/REFERRING DIAGNOSIS: 
Other fatigue [R53.83] DATE OF ONSET:  REFERRING PHYSICIAN: Sandford Cheadle, MD 
RETURN PHYSICIAN APPOINTMENT: 18 INITIAL ASSESSMENT:  Ms. Chely Marcos presents following treatment for metastatic breast cancer since . She has developed significant fatigue and weakness. She spends most of the day in bed. She has had a fall in the recent past.  She uses a rolling walker. She could possibly benefit from home health, but is willing to attend therapy as outpatient. She will benefit from therapeutic exercises in order to increase strength and overall conditioning. 18: The patient has attended a total of 10 visits. She has been compliant with her HEP. She is making slow, but steady progress. She will benefit from continued therapy in order to reach her goals. Her spouse reports she is able to do more functional activities at home and to be out of the bed more. She is awaiting the results of her most recent PET scan. 
18: The patient has attended therapy consistently. She is making steady progress toward her goals. She is able to do light housework at home. She has waxed and waned in progress due to complications an treatment. She will benefit from continued therapy to improve strength and mobility. 18: The patient continues to progress slowly. She has a new diagnosis of cirrhosis with accumulation of ascites.   She is to see a gastroenterologist for this since it is unrelated to cancer. She will continue to benefit from overall strength and conditioning. Mobility has improved. Strength continues to improve slowly. PROBLEM LIST (Impacting functional limitations): 1. Decreased Strength 2. Decreased ADL/Functional Activities 3. Decreased Transfer Abilities 4. Decreased Balance 5. Increased Pain 6. Decreased Activity Tolerance 7. Increased Fatigue 8. Decreased [place] with Home Exercise Program INTERVENTIONS PLANNED: 
1. Home Exercise Program (HEP) 2. Therapeutic Exercise/Strengthening TREATMENT PLAN: 
Effective Dates: 6/25/2018 TO 9/22/2018 (90 days). Frequency/Duration: 2 time a week for 90 Days. The patient will return after she sees the gastroenterologist per her request. 
GOALS: (Goals have been discussed and agreed upon with patient.) Short-Term Functional Goals: Time Frame: 4 weeks 1. The patient will be independent with HEP for strength within 4 weeks. Met  
2. The patient will report a fatigue of 7 or less within 4 weeks. 3. The patient will gain 1/2 grade strength within 4 weeks. Met  
4. The patient will improve her TUG score by 2 seconds within 4 weeks. Met  
5. The patient will improve her Tinetti score by 5 within 4 weeks. Met Discharge Goals: Time Frame: 8 weeks 1. The patient will report a fatigue level of 5 or less within 8 weeks. 2. The patient will improve her TUG score by 5 seconds within 8 weeks. Met  
3. The patient will participate in a 6 minute walk within 8 weeks. Rehabilitation Potential For Stated Goals: Fair Regarding Eve Leggett's therapy, I certify that the treatment plan above will be carried out by a therapist or under their direction. Thank you for this referral, Scooby Burton PT Referring Physician Signature: Jyoti Menjivar MD              Date The information in this section was collected on 3/28/18 (except where otherwise noted).  
HISTORY:  
History of Present Injury/Illness (Reason for Referral): Metastatic breast cancer, chemo, fatigue, weakness Past Medical History/Comorbidities: Ms. Emmett Jenkins  has a past medical history of Acute on chronic diastolic congestive heart failure ([address] (2016); Anemia of chronic disease (2017); Aortic valve stenosis; Arthritis; Asthma; Cancer ([address] Chemotherapy-induced neutropenia (HCC) (2016); CKD (chronic kidney disease) stage 4, GFR 15-29 ml/min ([address] Depression; Diastolic CHF ([address] Former cigarette smoker; HCAP (healthcare-associated pneumonia) (2013); History of DVT (deep vein thrombosis); Hypertension; Long term current use of anticoagulant; Oxygen dependent; Port catheter in place; Sepsis ([address] (2016); SOB (shortness of breath) on exertion; Type 2 diabetes mellitus ([address] Unspecified adverse effect of anesthesia; and Vitamin B12 deficiency (2017). Ms. Emmett Jenkins  has a past surgical history that includes hx  section; hx cyst removal; hx urological; hx vascular access (2012); and colonoscopy (N/A, 3/19/2018). Past Medical History:  
Diagnosis Date  Acute on chronic diastolic congestive heart failure ([address] 2016  Anemia of chronic disease 2017  Aortic valve stenosis  Arthritis   
 roverto hands  Asthma   
 till age 31  
 Cancer ([address]   
 roverto. breast ca mets bone/lung  Chemotherapy-induced neutropenia (HCC) 2016  CKD (chronic kidney disease) stage 4, GFR 15-29 ml/min (HCC)  Depression   
 managed with meds  Diastolic CHF ([address] Followed by Donte Kerns  Former cigarette smoker  HCAP (healthcare-associated pneumonia) 2013  History of DVT (deep vein thrombosis) Left leg DVT. on coumadin  Hypertension   
 managed with meds  Long term current use of anticoagulant Coumadin  Oxygen dependent   
 at bedtime 2L-3L NC  
 Port catheter in place Left chest port  Sepsis ([address] 2016  
 SOB (shortness of breath) on exertion  Type 2 diabetes mellitus ([facility] PRN oral agent/AVG BS: 170-200/ s.s of hypoglycemia at 80  Unspecified adverse effect of anesthesia [place] 27 yrs ago after second c -section-she was told her heart stopped d/t anesthesia-hospitalized for 5 days afterwards and followed by cardiologist  
 Vitamin B12 deficiency 11/13/2017 Past Surgical History:  
Procedure Laterality Date  COLONOSCOPY N/A 3/19/2018 COLONOSCOPY  BMI 23   performed by Rosmery Hagen MD at [facility] X2  
 HX CYST REMOVAL    
 HX UROLOGICAL    
 stent placed  HX VASCULAR ACCESS  01/26/2012 Left chest port Social History/Living Environment:  
  lives with family Prior Level of Function/Work/Activity: 
independent Dominant Side:  
      RIGHT Current Medications:   
  
Current Outpatient Prescriptions:  
  megestrol (MEGACE) 400 mg/10 mL (10 mL) suspension, Take 20 mL by mouth daily. , Disp: 600 mL, Rfl: 2 
  temazepam (RESTORIL) 15 mg capsule, Take 1 Cap by mouth nightly as needed for Sleep. Max Daily Amount: 15 mg., Disp: 30 Cap, Rfl: 5 
  furosemide (LASIX) 40 mg tablet, Take 1 Tab by mouth daily. , Disp: 30 Tab, Rfl: 11 
  fulvestrant (FASLODEX) 250 mg/5 mL injection, 10 mL by IntraMUSCular route every twenty-eight (28) days. , Disp: 1 Syringe, Rfl: 11 
  fentaNYL (DURAGESIC) 50 mcg/hr PATCH, 1 Patch by TransDERmal route every seventy-two (72) hours. Max Daily Amount: 1 Patch. Indications: Chronic Pain with Opioid Tolerance, Disp: 10 Patch, Rfl: 0 
  montelukast (SINGULAIR) 10 mg tablet, Take 1 Tab by mouth nightly., Disp: 30 Tab, Rfl: 11 
  spironolactone (ALDACTONE) 50 mg tablet, Take 1 Tab by mouth daily. , Disp: 30 Tab, Rfl: 11 
  HYDROcodone-acetaminophen (NORCO)  mg tablet, Take 1 Tab by mouth every six (6) hours as needed for Pain. Max Daily Amount: 4 Tabs., Disp: 90 Tab, Rfl: 0 
  mirtazapine (REMERON) 30 mg tablet, Take 1 Tab by mouth nightly. , Disp: 30 Tab, Rfl: 11 
  DISABLED PLACARD (DISABLED PLACARD) DMV, 1 permanent handicapped placard, Disp: 1 Each, Rfl: 0 
  enoxaparin (LOVENOX) 100 mg/mL, by SubCUTAneous route daily. , Disp: , Rfl:  
  warfarin (COUMADIN) 3 mg tablet, Take 1 Tab by mouth daily. , Disp: 30 Tab, Rfl: 2 
  LORazepam (ATIVAN) 1 mg tablet, Take 1 Tab by mouth every six (6) hours as needed for Anxiety. Max Daily Amount: 4 mg., Disp: 60 Tab, Rfl: 0 
  albuterol-ipratropium (DUO-NEB) 2.5 mg-0.5 mg/3 ml nebu, 3 mL by Nebulization route every six (6) hours as needed. , Disp: 360 Nebule, Rfl: 1 
  GLIPIZIDE PO, Take  by mouth as needed. Pt only takes if BS > 200, Disp: , Rfl:  
  palbociclib 125 mg cap, Take 1 Cap by mouth daily. Take one pill once a day for 3 weeks and then off for one week, Disp: 21 Cap, Rfl: 5 
  potassium chloride (K-DUR, KLOR-CON) 20 mEq tablet, Take 1 Tab by mouth two (2) times a day., Disp: 60 Tab, Rfl: 0 
  hydrALAZINE (APRESOLINE) 50 mg tablet, Take 1 Tab by mouth three (3) times daily. (Patient taking differently: Take 50 mg by mouth two (2) times a day.), Disp: 90 Tab, Rfl: 1   carvedilol (COREG) 3.125 mg tablet, Take 1 Tab by mouth two (2) times daily (with meals). , Disp: 60 Tab, Rfl: 11 
  warfarin (COUMADIN) 1 mg tablet, Take 1.5 Tabs by mouth every evening., Disp: 45 Tab, Rfl: 0 
  amLODIPine (NORVASC) 10 mg tablet, Take 1 Tab by mouth daily. , Disp: 30 Tab, Rfl: 11 Date Last Reviewed:  3/28/18 Number of Personal Factors/Comorbidities that affect the Plan of Care: 3+: HIGH COMPLEXITY EXAMINATION:  
ROM:   
      Within functional limits Strength:   
      Grossly 4/5 x 4 extremities Functional Mobility:  
      Gait/Ambulation:  Uses walker Transfers:  independent Balance:   
      Decreased in standing and gait Body Structures Involved: 1. Muscles Body Functions Affected: 1. Sensory/Pain 2. Neuromusculoskeletal 
3.  Movement Related Activities and Participation Affected: 1. General Tasks and Demands 2. Communication 3. Mobility Number of elements (examined above) that affect the Plan of Care: 4+: HIGH COMPLEXITY CLINICAL PRESENTATION:  
Presentation: Stable and uncomplicated: LOW COMPLEXITY CLINICAL DECISION MAKING:  
Outcome Measure: Tool Used: 6-MINUTE WALK TEST  Unable to test 
Score:  Initial:   feet Most Recent: X feet (Date: -- ) Interpretation of Score: Normal range varies but is approximately [phone] Feet Distance walked:   feet Baseline End of Test  
Heart Rate Dyspnea (Mehul Scale) Fatigue (Mehul Scale) SpO2 BP Score 2133 [id number] 426-16 15-0 Modifier CH CI CJ CK CL CM CN Tool Used: Disabilities of the Arm, Shoulder and Hand (DASH) Questionnaire - Quick Version Score:  Initial: 41/55  Most Recent: 41/55 (Date: 6/25/18 ) Interpretation of Score: The DASH is designed to measure the activities of daily living in person's with upper extremity dysfunction or pain. Each section is scored on a 1-5 scale, 5 representing the greatest disability. The scores of each section are added together for a total score of 55. Score 11 12-19 20-28 29-37 38-45 46-54 55 Modifier CH CI CJ CK CL CM CN Tool Used: Ernie Cooper Score:  Initial:  
Gait: 6/12 Balance: 10/16 TOTAL: 16/28 Most Recent: 
Gait: 9/12 Balance:13 /16 TOTAL:22 /28 Interpretation of Score: The maximum score for the gait component is 12 points. The maximum score for the balance component is 16 points. The maximum total score is 28 points. In general, patients who score below 19 are at a high risk for falls. Patients who score in the range of 19-24 indicate that the patient has a risk for falls. Score 28 27-23 22-18 17-12 11-6 5-1 0 Modifier CH CI CJ CK CL CM CN Tool Used: Timed Up and Go (TUG) Score:  Initial: 25 seconds Most Recent: 18 seconds (Date: 6/25/18 ) Interpretation of Score:  The test measures, in seconds, the time taken by an individual to stand up from a standard arm chair (seat height 46 cm [18 in], arm height 65 cm [25.6 in]), walk a distance of 3 meters (118 in, approx 10 ft), turn, walk back to the chair and sit down. If the individual takes longer than 14 seconds to complete TUG, this indicates risk for falls. Score 7 7.5-10.5 11-14 14.5-17.5 18-21 21.5-24.5 25+ Modifier CH CI CJ CK CL CM CN Tool Used: ECOG Performance Survey Score Score:  Initial: 3 Most Recent:  1 Interpretation of Score:  
0 Fully active, able to carry on all pre-disease performance without restriction 1 Restricted in physically strenuous activity but ambulatory and able to carry out work of a light or sedentary nature, e.g., light house work, office work 2 Ambulatory and capable of all selfcare but unable to carry out any work activities. Up and about more than 50% of waking hours 3 Capable of only limited selfcare, confined to bed or chair more than 50% of waking hours 4 Completely disabled. Cannot carry on any selfcare. Totally confined to bed or chair 5 Dead Medical Necessity:  
· Patient is expected to demonstrate progress in strength, balance and gait to increase independence with household activity. Reason for Services/Other Comments: 
· Patient continues to demonstrate capacity to improve strength, balance and gait which will increase independence and decrease amount of assistance required from caregiver. Use of outcome tool(s) and clinical judgement create a POC that gives a: Questionable prediction of patient's progress: MODERATE COMPLEXITY  
  
 
 
 
TREATMENT:  
(In addition to Assessment/Re-Assessment sessions the following treatments were rendered) Pre-treatment Symptoms/Complaints:  Pain 8/10 in abdomen, legs, fatigue 10 /10.   The patient reports she would like to hold treatment until after her visit with the gastroenterologist. 
Pain: Initial:  
 8 /10  Post Session: 8/10  
 
Reviewed HEP. Given blue theraband for upper extremities and red theraband for lower extremities. The patient is independent with her HEP. She may walk to tolerance. She was advised to stay as busy as possible and limit being in the bed. Therapeutic Exercise: (   minutes):  Exercises per below to improve mobility and strength. Required minimal verbal cues to promote proper body alignment and promote proper body posture. Progressed resistance and repetitions as indicated. as above. Given HEP:  Walk 1-2 minutes each waking hour when supervised, sit to stand from bedside, sit in chair instead of the bed each day. Added sit to stand, long arc quads and standing exercises at the sink daily. Bilateral upper extremity with light weight every other day. Given written instructions. Given red theraband for home use. [employer] 
Treatment/Session Assessment:   
· Response to Treatment:  Pt tolerated all treatment fair. · Compliance with Program/Exercises: Will assess as treatment progresses. · Recommendations/Intent for next treatment session: \"Next visit will focus on advancements to more challenging activities\". Hold treatment until after the pa Total Treatment Duration: 20 min PT Patient Time In/Time Out Time In: 0900 Time Out: [address] See PT

## 2018-07-31 PROBLEM — R18.8 OTHER ASCITES: Status: ACTIVE | Noted: 2018-01-01

## 2018-07-31 PROBLEM — K74.69 OTHER CIRRHOSIS OF LIVER (HCC): Status: ACTIVE | Noted: 2018-01-01

## 2018-07-31 PROBLEM — N18.9 ACUTE-ON-CHRONIC KIDNEY INJURY (HCC): Status: ACTIVE | Noted: 2018-01-01

## 2018-07-31 PROBLEM — N17.9 ACUTE-ON-CHRONIC KIDNEY INJURY (HCC): Status: ACTIVE | Noted: 2018-01-01

## 2018-07-31 NOTE — PROGRESS NOTES
Rounded pt with Fito Exon. Interpreting Services offered when needed. 
  
  
  
Thank you for this referral,   
  
Verónica CabreraDayton Osteopathic Hospital  /[facility] [address]   
[phone]

## 2018-07-31 NOTE — PROGRESS NOTES
Warfarin dosing per pharmacist 
 
Cynthia Mckeon is a 75 y.o. female. Indication:  DVT/PE Goal INR:  2-3 Home dose:  1.5 mg daily Risk factors or significant drug interactions:  none Other anticoagulants:  none Daily Monitoring Date  INR     Warfarin dose HGB              Notes 7/31  3.0  1.5 mg  9.6 Pharmacy consulted to dose warfarin. INR 3.0 today coming from office. Will continue home dose currently. Following daily INR Thank you, Ban Cano, Pharm. D. Clinical Pharmacist 
[phone]

## 2018-07-31 NOTE — CONSULTS
[facility] Consult Note Primary GI Physician: Dr. Renetta Francisco Referring Physician:  Marilyn Leal NP Consult Date:  7/31/2018 Admit Date:  7/31/2018 Chief Complaint:  Ascites, cirrhosis Subjective:  
 
History of Present Illness:  Patient is a 75 y.o. female with PMH of Breast Cancer diagnosed in 2011 with mets to bone, DVT on Warfatin, CKD, ureteral stent, right sided pleural effusions with right thoracentesis, Ascites requiring paracentesis, pericardial effusion, who is seen in consultation at the request of Marilyn Leal NP for ascites, cirrhosis. Mrs. Maurisio Hill has been admitted for acute on chronic kidney injury with creatinine up to 4.4 after having diuretics adjusted to manage her ascites. Her last paracentesis was 2 weeks ago (7/9/2018) and the fluid has re-accumulated rapidly. She reports that she had 3L of fluid removed from her last paracentesis. She reports following a strict low sodium diet. Reports increased shortness of breath and pressure in the abdomen with the fluid. With this, she has had some nausea and vomiting. Reports constipation. Denies any melena/hematochezia. She denies any family history of liver disease. Denies any history of ETOH. She had a PET scan in July 2018 with findings of cirrhosis, splenomegaly. Looking back to 2016, her PET scans have showed findings concerning for cirrhosis. Recently, she has had increasing ascites and has had 2 paracentesis for this. Labs with HGB 9.6, WBC 3.5, , BUN 73, Creatinine 4.47, AST 34, ALT 26, , TB 0.6, ferritin 1854. Acute viral hep panel was negative in April 2018. INR is 3.0. In March 2018, she underwent EGD and Colonoscopy with Dr. Renetta Francisco for anemia. EGD revealed gastritis. Colonoscopy at the same time showed sigmoid diverticulosis. PMH: 
Past Medical History:  
Diagnosis Date  Acute on chronic diastolic congestive heart failure ([facility] 1/5/2016  Anemia of chronic disease 11/13/2017  Aortic valve stenosis  Arthritis   
 roverto hands  Asthma   
 till age 31  
 Cancer ([facility]   
 roverto. breast ca mets bone/lung  Chemotherapy-induced neutropenia (HCC) 12/20/2016  CKD (chronic kidney disease) stage 4, GFR 15-29 ml/min (HCC)  Depression   
 managed with meds  Diastolic CHF ([facility] Followed by Donte Kerns  Former cigarette smoker  HCAP (healthcare-associated pneumonia) 7/17/2013  History of DVT (deep vein thrombosis) Left leg DVT. on coumadin  Hypertension   
 managed with meds  Long term current use of anticoagulant Coumadin  Oxygen dependent   
 at bedtime 2L-3L NC  
 Port catheter in place Left chest port  Sepsis ([facility] 7/19/2016  
 SOB (shortness of breath) on exertion  Type 2 diabetes mellitus ([facility] PRN oral agent/AVG BS: 170-200/ s.s of hypoglycemia at 80  Unspecified adverse effect of anesthesia In [place] 28 yrs ago after second c -section-she was told her heart stopped d/t anesthesia-hospitalized for 5 days afterwards and followed by cardiologist  
 Vitamin B12 deficiency 11/13/2017 PSH: 
Past Surgical History:  
Procedure Laterality Date  COLONOSCOPY N/A 3/19/2018 COLONOSCOPY  BMI 23   performed by Salbador Pugh MD at [facility] X2  
 HX CYST REMOVAL    
 HX UROLOGICAL    
 stent placed  HX VASCULAR ACCESS  01/26/2012 Left chest port Allergies: 
No Known Allergies Home Medications: 
Prior to Admission medications Medication Sig Start Date End Date Taking? Authorizing Provider  
megestrol (MEGACE) 400 mg/10 mL (10 mL) suspension Take 20 mL by mouth daily. 7/24/18   Hali Flores MD  
temazepam (RESTORIL) 15 mg capsule Take 1 Cap by mouth nightly as needed for Sleep. Max Daily Amount: 15 mg. 7/24/18   Hali Flores MD  
furosemide (LASIX) 40 mg tablet Take 1 Tab by mouth daily.  7/24/18   Hali Flores MD  
fulvestrant Popeye Villarreal) 250 mg/5 mL injection 10 mL by IntraMUSCular route every twenty-eight (28) days. 7/17/18   Humberto Good MD  
montelukast (SINGULAIR) 10 mg tablet Take 1 Tab by mouth nightly. 7/17/18   Humberto Good MD  
spironolactone (ALDACTONE) 50 mg tablet Take 1 Tab by mouth daily. 7/17/18   Humberto Good MD  
HYDROcodone-acetaminophen [facility])  mg tablet Take 1 Tab by mouth every six (6) hours as needed for Pain. Max Daily Amount: 4 Tabs. 7/17/18   Humberto Good MD  
mirtazapine (REMERON) 30 mg tablet Take 1 Tab by mouth nightly. 7/17/18   Humberto Good MD  
DISABLED PLACARD (DISABLED PLACARD) DMV 1 permanent handicapped placard 7/17/18   Humberto Good MD  
warfarin (COUMADIN) 3 mg tablet Take 1 Tab by mouth daily. 5/23/18   Humberto Good MD  
LORazepam (ATIVAN) 1 mg tablet Take 1 Tab by mouth every six (6) hours as needed for Anxiety. Max Daily Amount: 4 mg. 5/8/18   Humberto Good MD  
albuterol-ipratropium (DUO-NEB) 2.5 mg-0.5 mg/3 ml nebu 3 mL by Nebulization route every six (6) hours as needed. 4/23/18   Adelita Cowden, NP  
GLIPIZIDE PO Take  by mouth as needed. Pt only takes if BS > 200    Historical Provider  
palbociclib 125 mg cap Take 1 Cap by mouth daily. Take one pill once a day for 3 weeks and then off for one week 3/13/18   Humberto Good MD  
potassium chloride (K-DUR, KLOR-CON) 20 mEq tablet Take 1 Tab by mouth two (2) times a day. 2/13/18   Valerie Troy NP  
hydrALAZINE (APRESOLINE) 50 mg tablet Take 1 Tab by mouth three (3) times daily. Patient taking differently: Take 50 mg by mouth two (2) times a day. 10/16/17   Lynda Garza NP  
carvedilol (COREG) 3.125 mg tablet Take 1 Tab by mouth two (2) times daily (with meals). 7/14/17   SHAHAB Venegas  
warfarin (COUMADIN) 1 mg tablet Take 1.5 Tabs by mouth every evening. 7/15/17   SHAHAB Venegas  
amLODIPine (NORVASC) 10 mg tablet Take 1 Tab by mouth daily.  5/16/17   Tad Johnson MD  
 
 
[facility] Medications: 
Current Facility-Administered Medications Medication Dose Route Frequency  albuterol-ipratropium (DUO-NEB) 2.5 MG-0.5 MG/3 ML  3 mL Nebulization Q6H PRN  
 [START ON 8/1/2018] amLODIPine (NORVASC) tablet 10 mg  10 mg Oral DAILY  carvedilol (COREG) tablet 3.125 mg  3.125 mg Oral BID WITH MEALS  
 hydrALAZINE (APRESOLINE) tablet 50 mg  50 mg Oral BID  
 HYDROcodone-acetaminophen (NORCO)  mg tablet 1 Tab  1 Tab Oral Q6H PRN  
 LORazepam (ATIVAN) tablet 1 mg  1 mg Oral Q6H PRN  
 [START ON 8/1/2018] megestrol (MEGACE) 400 mg/10 mL (10 mL) oral suspension 800 mg  800 mg Oral DAILY  mirtazapine (REMERON) tablet 30 mg  30 mg Oral QHS  montelukast (SINGULAIR) tablet 10 mg  10 mg Oral QHS  [START ON 8/1/2018] potassium chloride (K-DUR, KLOR-CON) SR tablet 20 mEq  20 mEq Oral BID  temazepam (RESTORIL) capsule 15 mg  15 mg Oral QHS PRN  
 warfarin (COUMADIN) tablet 1.5 mg  1.5 mg Oral QPM  
 0.9% sodium chloride infusion  100 mL/hr IntraVENous CONTINUOUS Social History: 
Social History Substance Use Topics  Smoking status: Former Smoker Packs/day: 1.00 Years: 8.00 Types: Cigarettes Quit date: 1/1/1978  Smokeless tobacco: Never Used Comment: quit 30 or more years ago  Alcohol use No  
 
 
Pt denies any history of drug use, blood transfusions, or tattoos. Family History: 
Family History Problem Relation Age of Onset  Heart Attack Mother  Seizures Mother  Alzheimer Father  Cancer Brother   
  doen not know details  No Known Problems Sister  No Known Problems Sister  No Known Problems Brother Review of Systems: A detailed 10 system ROS is obtained, with pertinent positives as listed above. All others are negative. Diet:   
 
Objective:  
 
Physical Exam: 
Vitals: 
Visit Vitals  /70 (BP 1 Location: Right arm, BP Patient Position: Sitting)  Pulse 72  Temp 98.4 °F (36.9 °C)  Resp 18  LMP 06/21/1998  SpO2 97% Gen:  Pt is alert, cooperative, no acute distress Skin:  Extremities and face reveal no rashes. HEENT: Sclerae anicteric. Extra-occular muscles are intact. No oral ulcers. No abnormal pigmentation of the lips. The neck is supple. Cardiovascular: Regular rate and rhythm. No murmurs, gallops, or rubs. Respiratory:  Comfortable breathing with no accessory muscle use. Clear breath sounds anteriorly with no wheezes, rales, or rhonchi. GI:  Abdomen nondistended, soft, Distended, mildly tender across the upper abdomen. Normal active bowel sounds. No enlargement of the liver or spleen. No masses palpable. Rectal:  Deferred Musculoskeletal:  No pitting edema of the lower legs. Neurological:  Gross memory appears intact. Patient is alert and oriented. Psychiatric:  Mood appears appropriate with judgement intact. Lymphatic:  No cervical or supraclavicular adenopathy. Laboratory:   
Recent Labs  
   07/31/18 
 0908  07/31/18 
 0901 WBC  3.5*   --   
HGB  9.6*   --   
HCT  29.0*   --   
PLT  151   --   
MCV  114.6*   --   
NA  138   --   
K  5.0   --   
CL  106   --   
CO2  20*   --   
BUN  73*   --   
CREA  4.47*   --   
CA  8.5   --   
GLU  179*   --   
AP  236*   --   
SGOT  34   --   
ALT  26   --   
TBILI  0.6   --   
ALB  3.3   --   
TP  7.8   --   
PTP   --   36.5* INR   --   3.0  
  
PET scan 7/12/2018 Findings: 
  
Head and Neck: No lymphadenopathy or abnormal FDG uptake. 
  
Chest:  The previously described focus of elevated FDG activity within the right 
middle lobe region not definitely changed since previous study. Bilateral 
effusions with round atelectasis at the posterior right lung base stable in 
appearance. Pericardial effusion. 
  
  
Abdomen/Pelvis: Posterior right ilium uptake stable. Cirrhosis and ascites. Splenomegaly. Right renal stent. Extensive diffuse arterial calcification 
similar to previous examination. Cholelithiasis. No new side of FDG activity. 
  
IMPRESSION IMPRESSION:  
1. Stable FDG avid lesions right middle lobe and right ilium. 2. No new sites of FDG avid disease. 3. Cirrhosis and ascites as well as effusions again noted. ------------------------------------------------------------------ Hepatitis A Ab, IgM NEGATIVE   NEGATIVE   Final   
  Hep B surface Ag screen NEGATIVE   NEGATIVE   Final  
  Hep B Core Ab, IgM NEGATIVE   NEGATIVE   Final  
  Hep C Virus Ab <0.1  0.0 - 0.9 s/co ratio Final  
  Comment:  
  (NOTE)                                  Negative:     < 0.8  
                            Indeterminate: 0.8 - 0.9  
                                 Positive:     > 0.9 The CDC recommends that a positive HCV antibody result  
be followed up with a HCV Nucleic Acid Amplification  
test ([id number]). Performed At: [address] [id number] Francisco Goldsmith MD YN:[id number]  
  
   
Lab and Collection   
 
 
  
Assessment:  
 
Principal Problem: 
  Acute on chronic kidney failure ([facility] (10/7/2016) Active Problems: Metastatic breast cancer ([facility] (2/17/2017) Other cirrhosis of liver ([facility] (7/31/2018) Other ascites (7/31/2018) Acute-on-chronic kidney injury ([facility] (7/31/2018) Patient is a 75 y.o. female with PMH of Breast Cancer diagnosed in 2011 with mets to bone, DVT on Warfatin, CKD, ureteral stent, right sided pleural effusions with right thoracentesis, Ascites requiring paracentesis, pericardial effusion, who is seen in consultation at the request of Joe Guzman NP for ascites, cirrhosis. Viral hepatitis panel was negative in April 2018. Ferritin is quite elevated. No family history of liver disease and no history of ETOH. Plan: 1. Recommend adding fluid studies to paracentesis to calculate SAAG and evaluate for possible SBP. Also, cytology. 2. Low Na diet 3. Diuretics will need to be held in the setting of EMERY. Nephrology following with recommendations to not resume Aldactone due to her CKD. 4. Paracentesis to control ascites for now. Pleurx catheter would increase risk of infection. 5. With elevated ferritin, will check HFE. Although, ferritin could also be nonspecific inflammation. 6. Will likely need warfarin to be held for U/S paracentesis. 7. May need additional work-up for chronic liver disease. Thank you for this kind consultation. We will follow along with you. Dr. Daylin Mesa to follow with further recommendations.   
 
Hema Hoskins, NP

## 2018-07-31 NOTE — PROGRESS NOTES
Rounded with nurse, Interpreting Services offered when needed. Anton Shrestha Patient Relations & Interpreting Services 
c: [phone] / [email or web address] [address] / [place], [facility] 
[email or web address] [email or web address]

## 2018-07-31 NOTE — PROGRESS NOTES
07/31/18 1300 Dual Skin Pressure Injury Assessment Dual Skin Pressure Injury Assessment [facility] Second Care Provider (Based on [address] [place] [address]) Lonny Card RN

## 2018-07-31 NOTE — PROGRESS NOTES
made initial visit. Pt was alert and verbal appearing comfortable with no pain level expressed or observed. Pt is Pakistani speaking. Pt's  and son were present for visit and son translated for .  welcomed them to [facility] and shared information about  services.  provided spiritual care through presence, pastoral conversation, and assurance of prayer.

## 2018-07-31 NOTE — PROGRESS NOTES
Problem: Falls - Risk of 
Goal: *Absence of Falls Document Sasha Carney Fall Risk and appropriate interventions in the flowsheet. Outcome: Progressing Towards Goal 
Fall Risk Interventions: 
  
 
  
 
Medication Interventions: Patient to call before getting OOB, Teach patient to arise slowly

## 2018-07-31 NOTE — PROGRESS NOTES
END OF SHIFT NOTE: 
 
Intake/Output Voiding: YES Catheter: NO 
Drain:   
 
 
 
 
 
Stool:  0 occurrences. Emesis:  0 occurrences. VITAL SIGNS Patient Vitals for the past 12 hrs: 
 Temp Pulse Resp BP SpO2  
07/31/18 1603 98.3 °F (36.8 °C) 74 17 152/58 95 % 07/31/18 1226 98.4 °F (36.9 °C) 72 18 157/70 97 % Pain Assessment Pain 1 Pain Scale 1: Visual (07/31/18 1720) Pain Intensity 1: 0 (07/31/18 1720) Patient Stated Pain Goal: 0 (07/31/18 1416) Pain Reassessment 1: Patient sleeping (07/31/18 1720) Pain Location 1: Leg (07/31/18 1644) Pain Orientation 1: Right;Left (07/31/18 1644) Pain Intervention(s) 1: Medication (see MAR) (07/31/18 1644) Ambulating Yes Additional Information: pt was DA from [address] [place] [address] for EMERY, Polish speaking- phone at bedside, pt c/o leg pain-relieved by norco, started fluids, US of ABD performed for renal eval, paracentesis to be scheduled tomorrow, GI consult for liver cirrhosis and nephro consult for EMERY Shift report given to oncoming nurse at the bedside. Idalia Hodgkin

## 2018-07-31 NOTE — CONSULTS
[facility] Subjective:A on CKD   Renal consult dictated #[id number] Review of Systems -  
General ROS: negative for - fever, chills Respiratory ROS: no SOB, cough, ANG Cardiovascular ROS: no CP, palpitations Gastrointestinal ROS: no N&V, abdominal pain, diarrhea Genito-Urinary ROS: no difficulty voiding, dysuria Neurological ROS: no seizures, focal weekness Objective: 
 
Vitals:  
 07/31/18 1226 BP: 157/70 Pulse: 72 Resp: 18 Temp: 98.4 °F (36.9 °C) SpO2: 97% PE Gen: in no acute distress CV:reg rate Chest:clear Abd: soft, distended with ascites Ext/Access: no edema [employer] [facility] Recent Labs  
   07/31/18 
 0908  07/31/18 
 0901 WBC  3.5*   --   
HGB  9.6*   --   
HCT  29.0*   --   
PLT  151   --   
INR   --   3.0 Recent Labs  
   07/31/18 
 9855 NA  138  
K  5.0  
CL  106 CO2  20* GLU  179* BUN  73* CREA  4.47* CA  8.5 ALB  3.3 TBILI  0.6 ALT  26 SGOT  34  
 
 
 
 
Radiology A/P:  
Patient Active Problem List  
Diagnosis Code  Breast cancer ([facility] C50.919  Bony metastasis (HCC) C79.51  
 HTN (hypertension) I10  
 DM (diabetes mellitus) ([facility] E11.9  CKD (chronic kidney disease) N18.9  Iron deficiency anemia D50.9  Acute respiratory failure (HCC) J96.00  
 Hypomagnesemia E83.42  
 Hypokalemia E87.6  Asthma J45.909  Anemia D64.9  DVT (deep venous thrombosis) ([facility]) I82.409  PND (paroxysmal nocturnal dyspnea) R06.00  Leg swelling M79.89  
 Heart failure with preserved ejection fraction (HCC) I50.30  Accelerated hypertension I10  Elevated troponin R74.8  Metastatic breast cancer ([facility] C50.919  
 Pulmonary edema J81.1  Acute on chronic kidney failure (HCC) N17.9, N18.9  Pleural effusion, right J90  
 Hydronephrosis N13.30  Pancytopenia ([facility] O77.144  Chemotherapy-induced neutropenia (HCC) D70.1, T45.1X5A  Bilateral edema of lower extremity R60.0  Hematuria R31.9  Acute on chronic respiratory failure (HCC) J96.20  Aortic stenosis, moderate I35.0  Anticoagulated on Coumadin Z51.81, Z79.01  
 Volume overload E87.70  Anxiety F41.9  Hypernatremia E87.0  Type 2 diabetes mellitus without complication (HCC) Z74.0  Folliculitis U49.8  Vitamin B12 deficiency E53.8  Anemia of chronic disease D63.8  Type 2 diabetes mellitus with nephropathy (HCC) E11.21  
 Recurrent depression ([facility] F33.9  History of blood clots Z86.718  
 Other cirrhosis of liver (HCC) K74.69  
 Other ascites R18.8  Acute-on-chronic kidney injury ([facility] N17.9, N18.9 A on CKD - probably related to efforts to control ascites with paracentesis and diuretics. Agree with carefull hydration Ascites - Hyperkalemia - hold aldactone, K replacement Metastatic Breast Ca - per oncology.  
 
 
 
Sal Renee MD

## 2018-07-31 NOTE — CONSULTS
Department of Interventional Radiology 
[phone] Consult Note Patient: Jhonatan Holt MRN: [id number]  SSN: [id number] YOB: 1950  Age: 75 y.o. Sex: female Referring Physician: Oncology Consult Date: 7/31/2018 Subjective: Chief Complaint: ascites History of Present Illness: Jhonatan Holt is a 75 y.o. female who is seen in consultation for paracentesis. Pt has had 2 large volume paracentesis in recent times. Hx significant for metastatic breast cancer diagnosed 2011, CKD, cirrhosis, DVT on Coumadin, CHF, pleural effusion-resolved post Pleurx catheter placement and removal.   
 
Son and  at bedside. Pt c/o uncomfortable tense ascites. Past Medical History:  
Diagnosis Date  Acute on chronic diastolic congestive heart failure ([address] 1/5/2016  Anemia of chronic disease 11/13/2017  Aortic valve stenosis  Arthritis   
 roverto hands  Asthma   
 till age 31  
 Cancer ([address]   
 roverto. breast ca mets bone/lung  Chemotherapy-induced neutropenia (HCC) 12/20/2016  CKD (chronic kidney disease) stage 4, GFR 15-29 ml/min (HCC)  Depression   
 managed with meds  Diastolic CHF ([address] Followed by Donte Brothdejan  Former cigarette smoker  HCAP (healthcare-associated pneumonia) 7/17/2013  History of DVT (deep vein thrombosis) Left leg DVT. on coumadin  Hypertension   
 managed with meds  Long term current use of anticoagulant Coumadin  Oxygen dependent   
 at bedtime 2L-3L NC  
 Port catheter in place Left chest port  Sepsis ([address] 7/19/2016  
 SOB (shortness of breath) on exertion  Type 2 diabetes mellitus ([address] PRN oral agent/AVG BS: 170-200/ s.s of hypoglycemia at 80  Unspecified adverse effect of anesthesia  In [place] yrs ago after second c -section-she was told her heart stopped d/t anesthesia-hospitalized for 5 days afterwards and followed by cardiologist  
 Vitamin B12 deficiency 11/13/2017 Past Surgical History:  
Procedure Laterality Date  COLONOSCOPY N/A 3/19/2018 COLONOSCOPY  BMI 23   performed by Kenneth Duong MD at [address] [facility] X2  
 HX CYST REMOVAL    
 HX UROLOGICAL    
 stent placed  HX VASCULAR ACCESS  01/26/2012 Left chest port Family History Problem Relation Age of Onset  Heart Attack Mother  Seizures Mother  Alzheimer Father  Cancer Brother   
  doen not know details  No Known Problems Sister  No Known Problems Sister  No Known Problems Brother Social History Substance Use Topics  Smoking status: Former Smoker Packs/day: 1.00 Years: 8.00 Types: Cigarettes Quit date: 1/1/1978  Smokeless tobacco: Never Used Comment: quit 30 or more years ago  Alcohol use No  
  
No Known Allergies Current Facility-Administered Medications Medication Dose Route Frequency  albuterol-ipratropium (DUO-NEB) 2.5 MG-0.5 MG/3 ML  3 mL Nebulization Q6H PRN  
 [START ON 8/1/2018] amLODIPine (NORVASC) tablet 10 mg  10 mg Oral DAILY  carvedilol (COREG) tablet 3.125 mg  3.125 mg Oral BID WITH MEALS  
 hydrALAZINE (APRESOLINE) tablet 50 mg  50 mg Oral BID  
 HYDROcodone-acetaminophen (NORCO)  mg tablet 1 Tab  1 Tab Oral Q6H PRN  
 LORazepam (ATIVAN) tablet 1 mg  1 mg Oral Q6H PRN  
 [START ON 8/1/2018] megestrol (MEGACE) 400 mg/10 mL (10 mL) oral suspension 800 mg  800 mg Oral DAILY  mirtazapine (REMERON) tablet 30 mg  30 mg Oral QHS  montelukast (SINGULAIR) tablet 10 mg  10 mg Oral QHS  temazepam (RESTORIL) capsule 15 mg  15 mg Oral QHS PRN  
 warfarin (COUMADIN) tablet 1.5 mg  1.5 mg Oral QPM  
 0.9% sodium chloride infusion  100 mL/hr IntraVENous CONTINUOUS Review of Systems: A detailed 10 organ review of systems is obtained with pertinent positives as listed in the History of Present Illness and Past Medical History. All others are negative. Objective:  
 
Physical Exam: 
Visit Vitals  /70 (BP 1 Location: Right arm, BP Patient Position: Sitting)  Pulse 72  Temp 98.4 °F (36.9 °C)  Resp 18  LMP 06/21/1998  SpO2 97%  Breastfeeding No  
  
Gen: NAD, alert. Abdomen tense and distended. Lab/Data Review: 
CMP: No results found for: NA, K, CL, CO2, AGAP, GLU, BUN, CREA, GFRAA, GFRNA, CA, MG, PHOS, ALB, TBIL, TP, ALB, GLOB, AGRAT, SGOT, ALT, GPT 
CBC: No results found for: WBC, HGB, HGBEXT, HCT, HCTEXT, PLT, PLTEXT, HGBEXT, HCTEXT, PLTEXT 
COAGS: No results found for: APTT, PTP, INR Assessment/Plan:  
Recurrent ascites, metastatic breast cancer, cirrhosis. Pt and her family are interested in Pleurx catheter placement if this is the best option to keep her comfortable. Ascites may be difficult to manage otherwise. GI has been consulted. Not sure of her long term survival, which would affect decision making. D/w oncology. Hold Coumadin for procedure either way (para vs Pleurx). Will likely need FFP, she is quite uncomfortable. Dimitrios Bosch PA-C Principal Problem: 
  Acute on chronic kidney failure ([facility] (10/7/2016) Active Problems: Metastatic breast cancer ([address] (2/17/2017) Other cirrhosis of liver ([address] (7/31/2018) Other ascites (7/31/2018) Acute-on-chronic kidney injury ([address] (7/31/2018)

## 2018-07-31 NOTE — PROGRESS NOTES
present at bedside during assessment with GI PA and admission with unit nurse Tika Rodriguez Patient Relations & Interpreting Services 
c: [phone] / [email or web address] [facility] [address] / [place], [address] [facility] 
[email or web address]

## 2018-07-31 NOTE — IP AVS SNAPSHOT
303 [facility] Ne 
 
 
 [address] 
[phone] Patient: Frank Zee MRN: [id number] QCR:[id number] About your hospitalization You were admitted on:  July 31, 2018 You last received care in the:  [address] You were discharged on:  August 10, 2018 Why you were hospitalized Your primary diagnosis was:  Acute On Chronic Kidney Failure (Hcc) Your diagnoses also included:  Metastatic Breast Cancer (Hcc), Other Cirrhosis Of Liver (Hcc), Other Ascites, Acute-On-Chronic Kidney Injury (Hcc) Follow-up Information Follow up With Details Comments Contact Info Farhat Calvo MD On 8/13/2018  [address] [facility] [address] [facility] [address] 
[phone] Donna Bustillo MD     
  
Your Scheduled Appointments Saturday August 11, 2018  1:00 PM EDT Labs with [facility] DIVISION INF4 [facility] ([address] [facility]) 5th Floor Infusion [address] Radha Leahy [phone]  [facility] [address]  
[phone] For [facility] SURGICAL [facility] Floor [phone] ext. 3201 [facility] Sunday August 12, 2018  9:00 AM EDT Labs with [address] [facility] ([facility] 5th Floor Infusion [address] Radha Leahy [phone]  [facility] [address]  
[phone] For [facility] Floor [phone] ext. 3201 [facility] Monday August 13, 2018  3:15 PM EDT  
LAB with [address]  
1808 [facility] OUTREACH INSURANCE [facility]) [place] [address] [place] [address]  
[phone] Monday August 13, 2018  3:45 PM EDT Follow Up with Farhat Calvo MD  
[facility] Hematology and Oncology [facility] JJ/ Juancarlos Carney 33 [facility] [address]  
[phone] Tuesday August 14, 2018 10:00 AM EDT Office Visit with Marina Fontaine MD  
[facility] (800 [facility]) [address] 
[address] Marisol Russell 81  
[phone] Tuesday August 14, 2018  1:15 PM EDT Injection with [facility] [place]  
ST. [address] ([address]) [address] Chani Mayberry [phone] [place] [address]  
[phone] [address] Thursday August 23, 2018  1:30 PM EDT Office Visit with Carmelina Silva MD  
[facility] Urology 52 ([address]) [address] 187 [facility] [address]  
[phone] Discharge Orders None A check heather indicates which time of day the medication should be taken. My Medications START taking these medications Instructions Each Dose to Equal  
 Morning Noon Evening Bedtime  
 calcium carbonate 200 mg calcium (500 mg) Chew Commonly known as:  TUMS Take 1 Tab by mouth three (3) times daily (with meals). 200 mg  
    
  
   
  
   
  
   
  
 enoxaparin 60 mg/0.6 mL injection Commonly known as:  LOVENOX  
   
 50 mg by SubCUTAneous route every twenty-four (24) hours. Indications: DEEP VEIN THROMBOSIS PREVENTION 50 mg  
    
   
   
   
8 pm  
  
  
CHANGE how you take these medications Instructions Each Dose to Equal  
 Morning Noon Evening Bedtime  
 hydrALAZINE 50 mg tablet Commonly known as:  APRESOLINE What changed:  when to take this Take 1 Tab by mouth two (2) times a day. 50 mg  
    
  
   
   
  
   
  
 * HYDROcodone-acetaminophen  mg tablet Commonly known as:  Sharif [place] What changed:  Another medication with the same name was added. Make sure you understand how and when to take each. Notes to Patient:  Take on as needed schedule Take 1 Tab by mouth every six (6) hours as needed for Pain. Max Daily Amount: 4 Tabs. 1 Tab  
    
   
   
   
  
 * HYDROcodone-acetaminophen  mg tablet Commonly known as:  Chante Braxton  
 What changed: You were already taking a medication with the same name, and this prescription was added. Make sure you understand how and when to take each. Take 1 Tab by mouth every six (6) hours as needed. Max Daily Amount: 4 Tabs. 1 Tab  
    
   
   
   
  
 warfarin 1 mg tablet Commonly known as:  COUMADIN What changed:   
- when to take this - Another medication with the same name was removed. Continue taking this medication, and follow the directions you see here. Take 1.5 Tabs by mouth every twenty-four (24) hours. Indications: DEEP VEIN THROMBOSIS PREVENTION  
 1.5 mg  
    
   
   
   
  
  
 * Notice: This list has 2 medication(s) that are the same as other medications prescribed for you. Read the directions carefully, and ask your doctor or other care provider to review them with you. CONTINUE taking these medications Instructions Each Dose to Equal  
 Morning Noon Evening Bedtime  
 albuterol-ipratropium 2.5 mg-0.5 mg/3 ml Nebu Commonly known as:  Charmaine Rubalcava Notes to Patient:  Take on as needed schedule 3 mL by Nebulization route every six (6) hours as needed. 3 mL  
    
   
   
   
  
 amLODIPine 10 mg tablet Commonly known as:  Sophia Paget Take 1 Tab by mouth daily. 10 mg  
    
  
   
   
   
  
 carvedilol 3.125 mg tablet Commonly known as:  Brooks Beaver Take 1 Tab by mouth two (2) times daily (with meals). 3.125 mg  
    
  
   
   
  
   
  
 DISABLED PLACARD DMV Commonly known as:  DISABLED PLACARD 1 permanent handicapped placard  
     
   
   
   
  
 fulvestrant 250 mg/5 mL injection Commonly known as:  FASLODEX 10 mL by IntraMUSCular route every twenty-eight (28) days. 500 mg  
    
   
   
   
  
 GLIPIZIDE PO Notes to Patient:  As needed Take  by mouth as needed. Pt only takes if BS > 200 LORazepam 1 mg tablet Commonly known as:  ATIVAN  
 Notes to Patient:  Take on as needed schedule Take 1 Tab by mouth every six (6) hours as needed for Anxiety. Max Daily Amount: 4 mg.  
 1 mg  
    
   
   
   
  
 megestrol 400 mg/10 mL (10 mL) suspension Commonly known as:  MEGACE Take 20 mL by mouth daily. 800 mg  
    
  
   
   
   
  
 mirtazapine 30 mg tablet Commonly known as:  Lupillo Cynthia Take 1 Tab by mouth nightly. 30 mg  
    
   
   
   
  
  
 montelukast 10 mg tablet Commonly known as:  SINGULAIR Take 1 Tab by mouth nightly. 10 mg  
    
   
   
   
  
  
 temazepam 15 mg capsule Commonly known as:  RESTORIL Notes to Patient:  As needed Take 1 Cap by mouth nightly as needed for Sleep. Max Daily Amount: 15 mg.  
 15 mg  
    
   
   
   
  
  
STOP taking these medications   
 furosemide 40 mg tablet Commonly known as:  LASIX  
   
  
 potassium chloride 20 mEq tablet Commonly known as:  K-DUR KLOR-CON  
   
  
 spironolactone 50 mg tablet Commonly known as:  ALDACTONE  
   
  
  
ASK your doctor about these medications Instructions Each Dose to Equal  
 Morning Noon Evening Bedtime  
 palbociclib 125 mg Cap Take 1 Cap by mouth daily. Take one pill once a day for 3 weeks and then off for one week 125 mg As directed Where to Get Your Medications Information on where to get these meds will be given to you by the nurse or doctor. ! Ask your nurse or doctor about these medications  
  calcium carbonate 200 mg calcium (500 mg) Chew  
 enoxaparin 60 mg/0.6 mL injection  
 hydrALAZINE 50 mg tablet HYDROcodone-acetaminophen  mg tablet  
 warfarin 1 mg tablet Opioid Education  Prescription Opioids: What You Need to Know: 
 
 
After general anesthesia or intravenous sedation, for 24 hours or while taking prescription Narcotics: · Limit your activities · Do not drive and operate hazardous machinery · Do not make important personal or business decisions · Do  not drink alcoholic beverages · If you have not urinated within 8 hours after discharge, please contact your surgeon on call. Report the following to your surgeon: 
· Excessive pain, swelling, redness or odor of or around the surgical area · Temperature over 100.5 · Nausea and vomiting lasting longer than 4 hours or if unable to take medications · Any signs of decreased circulation or nerve impairment to extremity: change in color, persistent  numbness, tingling, coldness or increase pain · Any questions What to do at Home: 
Recommended activity: Activity as tolerated, per MD instructions If you experience any of the following symptoms fever > 100.5, nausea, vomiting, pain, chest pain and/or shortness of breath to ER please follow up with MD. 
 
*  Please give a list of your current medications to your Primary Care Provider. *  Please update this list whenever your medications are discontinued, doses are 
    changed, or new medications (including over-the-counter products) are added. *  Please carry medication information at all times in case of emergency situations. These are general instructions for a healthy lifestyle: No smoking/ No tobacco products/ Avoid exposure to second hand smoke Surgeon General's Warning:  Quitting smoking now greatly reduces serious risk to your health. Obesity, smoking, and sedentary lifestyle greatly increases your risk for illness A healthy diet, regular physical exercise & weight monitoring are important for maintaining a healthy lifestyle You may be retaining fluid if you have a history of heart failure or if you experience any of the following symptoms:  Weight gain of 3 pounds or more overnight or 5 pounds in a week, increased swelling in our hands or feet or shortness of breath while lying flat in bed. Please call your doctor as soon as you notice any of these symptoms; do not wait until your next office visit. Recognize signs and symptoms of STROKE: 
 
F-face looks uneven A-arms unable to move or move unevenly S-speech slurred or non-existent T-time-call 911 as soon as signs and symptoms begin-DO NOT go Back to bed or wait to see if you get better-TIME IS BRAIN. Warning Signs of HEART ATTACK Call 911 if you have these symptoms: 
? Chest discomfort. Most heart attacks involve discomfort in the center of the chest that lasts more than a few minutes, or that goes away and comes back. It can feel like uncomfortable pressure, squeezing, fullness, or pain. ? Discomfort in other areas of the upper body. Symptoms can include pain or discomfort in one or both arms, the back, neck, jaw, or stomach. ? Shortness of breath with or without chest discomfort. ? Other signs may include breaking out in a cold sweat, nausea, or lightheadedness. Don't wait more than five minutes to call [address]! Fast action can save your life. Calling 911 is almost always the fastest way to get lifesaving treatment. Emergency Medical Services staff can begin treatment when they arrive  up to an hour sooner than if someone gets to the hospital by car. The discharge information has been reviewed with the patient. The patient verbalized understanding. Discharge medications reviewed with the patient and appropriate educational materials and side effects teaching were provided. ___________________________________________________________________________________________________________________________________ Chronic Kidney Disease: Care Instructions Your Care Instructions Chronic kidney disease happens when your kidneys don't work as well as they should. Your kidneys have a few important jobs. They remove waste from your blood. This waste leaves your body in your urine. They also balance your body's fluids and chemicals. When your kidneys don't work well, extra waste and fluid can build up. This can poison the body and sometimes cause death. The most common causes of this disease are diabetes and high blood pressure. In some cases, the disease develops in 2 to 3 months. But it usually develops over many years. If you take medicine and make healthy changes to your lifestyle, you may be able to prevent the disease from getting worse. But if your kidney damage gets worse, you may need dialysis or a kidney transplant. Dialysis uses a machine to filter waste from the blood. A transplant is surgery to give you a healthy kidney from another person. Follow-up care is a key part of your treatment and safety. Be sure to make and go to all appointments, and call your doctor if you are having problems. It's also a good idea to know your test results and keep a list of the medicines you take. How can you care for yourself at home? 
 Treatments and appointments 
  · Be safe with medicines. Take your medicines exactly as prescribed. Call your doctor if you have any problems with your medicine. You also may take medicine to control your blood pressure or to treat diabetes. Many people who have diabetes take blood pressure medicine.  
  · If you have diabetes, do your best to keep your blood sugar in your target range. You may do this by eating healthy food and exercising. You may also take medicines.  
  · Go to your dialysis appointments if you have this treatment.  
  · Do not take ibuprofen (Advil, Motrin), naproxen (Aleve), or similar medicines, unless your doctor tells you to. These may make the disease worse.   · Do not take any vitamins, over-the-counter medicines, or herbal products without talking to your doctor first.  
  · Do not smoke or use other tobacco products. Smoking can reduce blood flow to the kidneys. If you need help quitting, talk to your doctor about stop-smoking programs and medicines. These can increase your chances of quitting for good.  
  · Do not drink alcohol or use illegal drugs.  
  · Talk to your doctor about an exercise plan. Exercise helps lower your blood pressure. It also makes you feel better.  
  · If you have an advance directive, let your doctor know. It may include a living will and a durable power of  for health care. If you don't have one, you may want to prepare one. It lets your doctor and loved ones know your health care wishes if you become unable to speak for yourself. Diet 
  · Talk to a registered dietitian. He or she can help you make a meal plan that is right for you. Most people with kidney disease need to limit salt (sodium), fluids, and protein. Some also have to limit potassium and phosphorus.  
  · You may have to give up many foods you like. But try to focus on the fact that this will help you stay healthy for as long as possible.  
  · If you have a hard time eating enough, talk to your doctor or dietitian about ways to add calories to your diet.  
  · Your diet may change as your disease changes. See your doctor for regular testing. And work with a dietitian to change your diet as needed. When should you call for help? Call 911 anytime you think you may need emergency care.  For example, call if: 
  · You passed out (lost consciousness).  
 Call your doctor now or seek immediate medical care if: 
  · You have less urine than normal or no urine.  
  · You have trouble urinating or can urinate only very small amounts.  
  · You are confused or have trouble thinking clearly.  
  · You feel weaker or more tired than usual.  
   · You are very thirsty, lightheaded, or dizzy.  
  · You have nausea and vomiting.  
  · You have new swelling of your arms or feet, or your swelling is worse.  
  · You have blood in your urine.  
  · You have new or worse trouble breathing.  
 Watch closely for changes in your health, and be sure to contact your doctor if: 
  · You have any problems with your medicine or other treatment. Where can you learn more? Go to http://rosie-abena.info/. Enter N276 in the search box to learn more about \"Chronic Kidney Disease: Care Instructions. \" Current as of: May 12, 2017 Content Version: 11.7 © 8470-3599 [employer]. Care instructions adapted under license by [employer] (which disclaims liability or warranty for this information). If you have questions about a medical condition or this instruction, always ask your healthcare professional. [place] [address] any warranty or liability for your use of this information. [employer] Announcement We are excited to announce that we are making your provider's discharge notes available to you in Dealupa. You will see these notes when they are completed and signed by the physician that discharged you from your recent hospital stay. If you have any questions or concerns about any information you see in [employer] Informationt, please call the Health Information Department where you were seen or reach out to your Primary Care Provider for more information about your plan of care. Introducing [facility]! Estimado Nita : 
Alina por solicitar kim cuenta de [facility] usorin. Nuestros registros indican que usted ya tiene kim cuenta MyChart [place]. Usted puede acceder a merrill cuenta en cualquier momento en https://mychart. [employer]. com/mychart Sabía usted que usted puede acceder a merrill hospital y las instrucciones de thania ER en cualquier momento en MyChart ?  También puede revisar [employer] resultados de las pruebas de merrill hospitalización o visita a urgencias . Información Adicional 
 
Si tiene alguna pregunta , por favor visite la sección de preguntas frecuentes del sitio web MyChart en [email or web address] [employer]. AkaRx/mychart/ . Recuerde, MyChart NO es que se utilizará para las necesidades urgentes. Para emergencias médicas , llame al 911 . Ahora disponible en merrill iPhone y Android ! Introducing Ladarius Hoyt As a [employer] patient, I wanted to make you aware of our electronic visit tool called Ladarius Hoyt. [employer] 24/7 allows you to connect within minutes with a medical provider 24 hours a day, seven days a week via a mobile device or tablet or logging into a secure website from your computer. You can access Ladarius Hoyt from anywhere in the [place]. A virtual visit might be right for you when you have a simple condition and feel like you just dont want to get out of bed, or cant get away from work for an appointment, when your regular [employer] provider is not available (evenings, weekends or holidays), or when youre out of town and need minor care. Electronic visits cost only $49 and if the [employer] 24/7 provider determines a prescription is needed to treat your condition, one can be electronically transmitted to a nearby pharmacy*. Please take a moment to enroll today if you have not already done so. The enrollment process is free and takes just a few minutes. To enroll, please download the [employer] 24/7 alesha to your tablet or phone, or visit [email or web address] org to enroll on your computer. And, as an [address] [place] [address] patient with a [employer] account, the results of your visits will be scanned into your electronic medical record and your primary care provider will be able to view the scanned results.    
We urge you to continue to see your regular [employer] provider for your ongoing medical care. And while your primary care provider may not be the one available when you seek a Ladairus Hoyt virtual visit, the peace of mind you get from getting a real diagnosis real time can be priceless. For more information on Ladarius Hoyt, view our Frequently Asked Questions (FAQs) at [email or web address]. Sincerely, 
 
Misty Franco MD 
Chief Medical Officer Damian Ceballos *:  certain medications cannot be prescribed via Ladarius Hoyt Providers Seen During Your Hospitalization Provider Specialty Primary office phone Marialuisa Preston MD Hematology and Oncology [phone] Your Primary Care Physician (PCP) Primary Care Physician Office Phone Office Fax  
 [place], [place] D ** None ** ** None ** You are allergic to the following No active allergies Recent Documentation Height Weight Breastfeeding? BMI OB Status Smoking Status 1.6 m 53.4 kg No 20.87 kg/m2 Postmenopausal Former Smoker Emergency Contacts Name Discharge Info Relation Home Work Mobile Juancarlos Leggett  Son [22] [phone] [address] [place] [address]  Spouse [3] [phone] Patient Belongings The following personal items are in your possession at time of discharge: 
  Dental Appliances: None  Visual Aid: Glasses, With patient      Home Medications: None   Jewelry: Earrings  Clothing: At bedside    Other Valuables: None Please provide this summary of care documentation to your next provider. Signatures-by signing, you are acknowledging that this After Visit Summary has been reviewed with you and you have received a copy. Patient Signature:  ____________________________________________________________ Date:  ____________________________________________________________  
  
Rosanne Howard Provider Signature:  ____________________________________________________________ Date:  ____________________________________________________________  
  
  
   
  
Marialuisa Damon [address] [facility] 
[phone] Patient: Xavi Escobar MRN: [id number] TVM:[id number] Sobre asher hospitalización Le admitieron el:  July 31, 2018 Asher tratamiento más reciente fue el:   [address] Le dieron de thania el:  August 10, 2018 Por qué le ingresaron Asher diagnosis primaria es:  Acute On Chronic Kidney Failure (Hcc) Asher diagnosis también incluye:  Metastatic Breast Cancer (Hcc), Other Cirrhosis Of Liver (Hcc), Other Ascites, Acute-On-Chronic Kidney Injury (Hcc) Follow-up Information Follow up With Details Comments Contact Info Va Nieto MD On 8/13/2018  [address] [facility] [address] 
[phone] Maryse Jenkins MD     
  
Your Scheduled Appointments Saturday August 11, 2018  1:00 PM EDT Labs with [facility] DIVISION INF4 [facility] ([address] [place] [address]) 5th Floor Infusion [address] [facility] [address] Florentino Pickett [phone]  [facility] [address]  
[phone] For [facility] SURGICAL [facility] Floor [phone] ext. 3201 [facility] Sunday August 12, 2018  9:00 AM EDT Labs with [address] [facility] ([address] [place] [address]) 5th Floor Infusion [address] [facility] [address] Florentino Pickett [phone]  [facility] [address]  
[phone] For [facility] SURGICAL [facility] Floor [phone] ext. 3201 [facility] Monday August 13, 2018  3:15 PM EDT  
LAB with [address]  
[address] [facility] [place] [id number] [address] [place] [address]  
[phone] Monday August 13, 2018  3:45 PM EDT Follow Up with Va Nieto MD  
[facility] Hematology and Oncology [facility] JJ/ Juancarlos Carney [address] [place] [address]  
[phone] Tuesday August 14, 2018 10:00 AM EDT Office Visit with Radha Oneil MD  
 [facility] CARDIOLOGY [place] OFFICE (800 [facility]) [address]  
[address] Marisol Russell 81  
[phone] Tuesday August 14, 2018  1:15 PM EDT Injection with NUR4  
ST. [address] Radha Leahy [phone] [place] [address]  
[phone] [address] Thursday August 23, 2018  1:30 PM EDT Office Visit with Bonifacio Nascimento MD  
[facility] Urology 52 ([address]) [address] [facility] [address]  
[phone] Discharge Orders [place] A check heather indicates which time of day the medication should be taken. My Medications EMPIECE a [employer] Instructions Each Dose to Equal  
 Morning Noon Evening Bedtime  
 calcium carbonate 200 mg calcium (500 mg) Chew También conocido caterina:  TUMS Take 1 Tab by mouth three (3) times daily (with meals). 200 mg  
    
  
   
  
   
  
   
  
 enoxaparin 60 mg/0.6 mL injection También conocido caterina:  LOVENOX  
   
 50 mg by SubCUTAneous route every twenty-four (24) hours. Indications: DEEP VEIN THROMBOSIS PREVENTION 50 mg  
    
   
   
   
8 pm  
  
  
CAMBIE la forma de [employer] Instructions Each Dose to Equal  
 Morning Noon Evening Bedtime  
 hydrALAZINE 50 mg tablet También conocido caterina:  APRESOLINE Lo que cambió:  cuándo lo debe julian Take 1 Tab by mouth two (2) times a day. 50 mg  
    
  
   
   
  
   
  
 * HYDROcodone-acetaminophen  mg tablet También conocido caterina:  Sharlotte Eis Lo que cambió:  Se agregó otro medicamento con el mismo nombre. Asegúrese de que entiende cómo y cuándo julian cada jose. Notas para el paciente:  Take on as needed schedule Take 1 Tab by mouth every six (6) hours as needed for Pain. Max Daily Amount: 4 Tabs. 1 Tab * HYDROcodone-acetaminophen  mg tablet También conocido caterina:  Reida Candido Lo que cambió:  Usted ya tomaba otro medicamento con el mismo nombre y se agregó esta receta. Asegúrese de que entiende cómo y cuándo julian cada jose. Take 1 Tab by mouth every six (6) hours as needed. Max Daily Amount: 4 Tabs. 1 Tab  
    
   
   
   
  
 warfarin 1 mg tablet También conocido caterina:  COUMADIN Lo que cambió:   
- cuándo lo debe julian - Otro medicamento con el mismo nombre ha sido eliminado. Siga tomando [employer] instrucciones que se muestran aquí. Take 1.5 Tabs by mouth every twenty-four (24) hours. Indications: DEEP VEIN THROMBOSIS PREVENTION  
 1.5 mg  
    
   
   
   
  
  
 * Aviso:  Esta lista contiene medicamentos que son iguales a otros medicamentos recetados para usted. Manisha las instrucciones con cuidado y pida a merrill personal médico que revise la lista de medicamentos y las instrucciones correspondientes con usted. SIGA tomando estos medicamentos Instructions Each Dose to Equal  
 Morning Noon Evening Bedtime  
 albuterol-ipratropium 2.5 mg-0.5 mg/3 ml Nebu También conocido caterina:  Orpah Case Notas para el paciente:  Take on as needed schedule 3 mL by Nebulization route every six (6) hours as needed. 3 mL  
    
   
   
   
  
 amLODIPine 10 mg tablet También conocido caterina:  Airam Morales Take 1 Tab by mouth daily. 10 mg  
    
  
   
   
   
  
 carvedilol 3.125 mg tablet También conocido caterina:  COREG Take 1 Tab by mouth two (2) times daily (with meals). 3.125 mg  
    
  
   
   
  
   
  
 DISABLED PLACARD DMV También conocido caterina:  DISABLED PLACARD 1 permanent handicapped placard  
     
   
   
   
  
 fulvestrant 250 mg/5 mL injection También conocido caterina:  FASLODEX 10 mL by IntraMUSCular route every twenty-eight (28) days.   
 500 mg  
    
   
   
   
  
 GLIPIZIDE PO  
 Notas para el paciente:  As needed Take  by mouth as needed. Pt only takes if BS > 200 LORazepam 1 mg tablet También conocido caterina:  ATIVAN Notas para el paciente:  Take on as needed schedule Take 1 Tab by mouth every six (6) hours as needed for Anxiety. Max Daily Amount: 4 mg.  
 1 mg  
    
   
   
   
  
 megestrol 400 mg/10 mL (10 mL) suspension También conocido caterina:  MEGACE Take 20 mL by mouth daily. 800 mg  
    
  
   
   
   
  
 mirtazapine 30 mg tablet También conocido caterina:  Shanique Gilford Take 1 Tab by mouth nightly. 30 mg  
    
   
   
   
  
  
 montelukast 10 mg tablet También conocido caterina:  SINGULAIR Take 1 Tab by mouth nightly. 10 mg  
    
   
   
   
  
  
 temazepam 15 mg capsule También conocido caterina:  RESTORIL Notas para el paciente:  As needed Take 1 Cap by mouth nightly as needed for Sleep. Max Daily Amount: 15 mg.  
 15 mg DEJE de julian estos medicamentos   
 furosemide 40 mg tablet También conocido caterina:  LASIX  
   
  
 potassium chloride 20 mEq tablet También conocido caterina:  K-DUR, KLOR-CON  
   
  
 spironolactone 50 mg tablet También conocido caterina:  ALDACTONE  
   
  
  
CONSULTE con merrill médico sobre estos medicamentos Instructions Each Dose to Equal  
 Morning Noon Evening Bedtime  
 palbociclib 125 mg Cap Take 1 Cap by mouth daily. Take one pill once a day for 3 weeks and then off for one week 125 mg As directed Dónde puede recoger jesus medicamentos Information on where to get these meds will be given to you by the nurse or doctor. ! Pregunte a merrill médico o enfermero/a sobre estos medicamentos  
  calcium carbonate 200 mg calcium (500 mg) Chew  
 enoxaparin 60 mg/0.6 mL injection  
 hydrALAZINE 50 mg tablet HYDROcodone-acetaminophen  mg tablet  
 warfarin 1 mg tablet Opioid Education Prescription Opioids: What You Need to Know: 
 
 
After general anesthesia or intravenous sedation, for 24 hours or while taking prescription Narcotics: · Limit your activities · Do not drive and operate hazardous machinery · Do not make important personal or business decisions · Do  not drink alcoholic beverages · If you have not urinated within 8 hours after discharge, please contact your surgeon on call. Report the following to your surgeon: 
· Excessive pain, swelling, redness or odor of or around the surgical area · Temperature over 100.5 · Nausea and vomiting lasting longer than 4 hours or if unable to take medications · Any signs of decreased circulation or nerve impairment to extremity: change in color, persistent  numbness, tingling, coldness or increase pain · Any questions What to do at Home: 
Recommended activity: Activity as tolerated, per MD instructions If you experience any of the following symptoms fever > 100.5, nausea, vomiting, pain, chest pain and/or shortness of breath to ER please follow up with MD. 
 
*  Please give a list of your current medications to your Primary Care Provider. *  Please update this list whenever your medications are discontinued, doses are 
    changed, or new medications (including over-the-counter products) are added. *  Please carry medication information at all times in case of emergency situations. These are general instructions for a healthy lifestyle: No smoking/ No tobacco products/ Avoid exposure to second hand smoke Surgeon General's Warning:  Quitting smoking now greatly reduces serious risk to your health. Obesity, smoking, and sedentary lifestyle greatly increases your risk for illness A healthy diet, regular physical exercise & weight monitoring are important for maintaining a healthy lifestyle You may be retaining fluid if you have a history of heart failure or if you experience any of the following symptoms:  Weight gain of 3 pounds or more overnight or 5 pounds in a week, increased swelling in our hands or feet or shortness of breath while lying flat in bed. Please call your doctor as soon as you notice any of these symptoms; do not wait until your next office visit. Recognize signs and symptoms of STROKE: 
 
F-face looks uneven A-arms unable to move or move unevenly S-speech slurred or non-existent T-time-call 911 as soon as signs and symptoms begin-DO NOT go Back to bed or wait to see if you get better-TIME IS BRAIN. Warning Signs of HEART ATTACK Call 911 if you have these symptoms: 
? Chest discomfort. Most heart attacks involve discomfort in the center of the chest that lasts more than a few minutes, or that goes away and comes back. It can feel like uncomfortable pressure, squeezing, fullness, or pain. ? Discomfort in other areas of the upper body. Symptoms can include pain or discomfort in one or both arms, the back, neck, jaw, or stomach. ? Shortness of breath with or without chest discomfort. ? Other signs may include breaking out in a cold sweat, nausea, or lightheadedness. Don't wait more than five minutes to call [address]! Fast action can save your life. Calling 911 is almost always the fastest way to get lifesaving treatment. Emergency Medical Services staff can begin treatment when they arrive  up to an hour sooner than if someone gets to the hospital by car. The discharge information has been reviewed with the patient. The patient verbalized understanding.  
Discharge medications reviewed with the patient and appropriate educational materials and side effects teaching were provided. ___________________________________________________________________________________________________________________________________ Chronic Kidney Disease: Care Instructions Your Care Instructions Chronic kidney disease happens when your kidneys don't work as well as they should. Your kidneys have a few important jobs. They remove waste from your blood. This waste leaves your body in your urine. They also balance your body's fluids and chemicals. When your kidneys don't work well, extra waste and fluid can build up. This can poison the body and sometimes cause death. The most common causes of this disease are diabetes and high blood pressure. In some cases, the disease develops in 2 to 3 months. But it usually develops over many years. If you take medicine and make healthy changes to your lifestyle, you may be able to prevent the disease from getting worse. But if your kidney damage gets worse, you may need dialysis or a kidney transplant. Dialysis uses a machine to filter waste from the blood. A transplant is surgery to give you a healthy kidney from another person. Follow-up care is a key part of your treatment and safety. Be sure to make and go to all appointments, and call your doctor if you are having problems. It's also a good idea to know your test results and keep a list of the medicines you take. How can you care for yourself at home? 
 Treatments and appointments 
  · Be safe with medicines. Take your medicines exactly as prescribed. Call your doctor if you have any problems with your medicine. You also may take medicine to control your blood pressure or to treat diabetes. Many people who have diabetes take blood pressure medicine.  
  · If you have diabetes, do your best to keep your blood sugar in your target range. You may do this by eating healthy food and exercising. You may also take medicines.   · Go to your dialysis appointments if you have this treatment.  
  · Do not take ibuprofen (Advil, Motrin), naproxen (Aleve), or similar medicines, unless your doctor tells you to. These may make the disease worse.  
  · Do not take any vitamins, over-the-counter medicines, or herbal products without talking to your doctor first.  
  · Do not smoke or use other tobacco products. Smoking can reduce blood flow to the kidneys. If you need help quitting, talk to your doctor about stop-smoking programs and medicines. These can increase your chances of quitting for good.  
  · Do not drink alcohol or use illegal drugs.  
  · Talk to your doctor about an exercise plan. Exercise helps lower your blood pressure. It also makes you feel better.  
  · If you have an advance directive, let your doctor know. It may include a living will and a durable power of  for health care. If you don't have one, you may want to prepare one. It lets your doctor and loved ones know your health care wishes if you become unable to speak for yourself. Diet 
  · Talk to a registered dietitian. He or she can help you make a meal plan that is right for you. Most people with kidney disease need to limit salt (sodium), fluids, and protein. Some also have to limit potassium and phosphorus.  
  · You may have to give up many foods you like. But try to focus on the fact that this will help you stay healthy for as long as possible.  
  · If you have a hard time eating enough, talk to your doctor or dietitian about ways to add calories to your diet.  
  · Your diet may change as your disease changes. See your doctor for regular testing. And work with a dietitian to change your diet as needed. When should you call for help? Call 911 anytime you think you may need emergency care. For example, call if: 
  · You passed out (lost consciousness).  
 Call your doctor now or seek immediate medical care if:   · You have less urine than normal or no urine.  
  · You have trouble urinating or can urinate only very small amounts.  
  · You are confused or have trouble thinking clearly.  
  · You feel weaker or more tired than usual.  
  · You are very thirsty, lightheaded, or dizzy.  
  · You have nausea and vomiting.  
  · You have new swelling of your arms or feet, or your swelling is worse.  
  · You have blood in your urine.  
  · You have new or worse trouble breathing.  
 Watch closely for changes in your health, and be sure to contact your doctor if: 
  · You have any problems with your medicine or other treatment. Where can you learn more? Go to http://rosie-abena.info/. Enter N276 in the search box to learn more about \"Chronic Kidney Disease: Care Instructions. \" Current as of: May 12, 2017 Content Version: 11.7 © 1288-6603 [employer]. Care instructions adapted under license by [employer] (which disclaims liability or warranty for this information). If you have questions about a medical condition or this instruction, always ask your healthcare professional. [address] any warranty or liability for your use of this information. [employer] Announcement We are excited to announce that we are making your provider's discharge notes available to you in [facility]. You will see these notes when they are completed and signed by the physician that discharged you from your recent hospital stay. If you have any questions or concerns about any information you see in [employer], please call the Health Information Department where you were seen or reach out to your Primary Care Provider for more information about your plan of care. Introducing [facility]! Estimado Nita : 
Alina por solicitar kim cuenta de MyChart usted. Nuestros registros indican que usted ya tiene kim cuenta MyChart [place].  Usted puede acceder a merrill cuenta en cualquier momento en [email or web address] Sabía usted que usted puede acceder a merrill hospital y las instrucciones de thania ER en cualquier momento en MyChart ? También puede revisar [facility] de las pruebas de merrill hospitalización o visita a urgencias . Información Adicional 
 
Si tiene alguna pregunta , por favor visite la sección de preguntas frecuentes del sitio web MyChart en [email or web address] . Recuerde, [employer] NO es que se utilizará para las necesidades urgentes. Para emergencias médicas , llame al 911 . Ahora disponible en merrill iPhone y Android ! Introducing Ladarius Hoyt As a [facility] patient, I wanted to make you aware of our electronic visit tool called Ladarius Lai. [employer] allows you to connect within minutes with a medical provider 24 hours a day, seven days a week via a mobile device or tablet or logging into a secure website from your computer. You can access Ladarius Brewerfin from anywhere in the [place]. A virtual visit might be right for you when you have a simple condition and feel like you just dont want to get out of bed, or cant get away from work for an appointment, when your regular [facility] provider is not available (evenings, weekends or holidays), or when youre out of town and need minor care. Electronic visits cost only $49 and if the [employer] On Call/7 provider determines a prescription is needed to treat your condition, one can be electronically transmitted to a nearby pharmacy*. Please take a moment to enroll today if you have not already done so. The enrollment process is free and takes just a few minutes. To enroll, please download the [employer] alesha to your tablet or phone, or visit [email or web address] to enroll on your computer.    
And, as an [address] [place] [address] patient with a [employer] account, the results of your visits will be scanned into your electronic medical record and your primary care provider will be able to view the scanned results. We urge you to continue to see your regular [facility] provider for your ongoing medical care. And while your primary care provider may not be the one available when you seek a Ladarius Hoyt virtual visit, the peace of mind you get from getting a real diagnosis real time can be priceless. For more information on Ladarius Brewerfin, view our Frequently Asked Questions (FAQs) at [email or web address]. Sincerely, 
 
Roselyn Ibarra MD 
Chief Medical Officer 50Katelin Ceballos *:  certain medications cannot be prescribed via Ladarius Ferraronefin Providers Seen During Your Hospitalization Personal [facility] [employer] Teléfono principal de la oficina Amirah Ugarte MD Hematology and Oncology [phone] Asher médico de atención primaria (PCP ) Primary Care Physician Office Phone Office Fax  
 [place], [place] D ** None ** ** None ** Tiene alergias a lo siguiente No tiene alergias Documentación recientes Height Weight Está amamantando? BMI [facility]) Estado obstétrico Estatus de tabaquísmo 1.6 m 53.4 kg No 20.87 kg/m2 Postmenopausal Former Smoker Emergency Contacts Name Discharge Info Relation Home Work Mobile Juancarlos Leggett  Son [22] [phone] 0 [address] [place] [address]  Spouse [3] [phone] Patient Belongings The following personal items are in your possession at time of discharge: 
  Dental Appliances: None  Visual Aid: Glasses, With patient      Home Medications: None   Jewelry: Earrings  Clothing: At bedside    Other Valuables: None Please provide this summary of care documentation to your next provider. Signatures-by signing, you are acknowledging that this After Visit Summary has been reviewed with you and you have received a copy. Patient Signature:  ____________________________________________________________ Date:  ____________________________________________________________  
  
Gaylia Cumber Provider Signature:  ____________________________________________________________ Date:  ____________________________________________________________

## 2018-07-31 NOTE — CONSULTS
Ian SOMMER 
MR#: [id number] : 1950 ACCOUNT #: [de-identified] DATE OF SERVICE: 2018 HISTORY OF PRESENT ILLNESS:  We are seeing the patient at the request of Dr. Wilber Britt with regards to acute on chronic kidney disease. The patient is an unfortunate 60-year-old  female who was admitted today with worsening azotemia. She has had some recent difficulties with recurrent ascites and has required 2 paracentesis treatments over the past month or so. In addition to that, she has been started too on Aldactone in addition to her Lasix to try and keep the fluid accumulation down. With that, it has been noted that she has had worsening azotemia and has now been admitted to the hospital for further evaluation and treatment. PAST MEDICAL HISTORY:  Significant for chronic kidney disease dating back to around . She has a history of retroperitoneal metastases causing ureteral obstruction requiring a right ureteral stent placement back in . Other medical problems include metastatic breast cancer on chemotherapy, type 2 diabetes mellitus, remote history of DVT, history of recurrent pleural effusions, history of aortic stenosis. ALLERGIES:  SHE HAS NO KNOWN DRUG ALLERGIES. MEDICATIONS:  Prior to admission have included Megace 400 mg p.o. daily, Restoril 15 mg p.o. at bedtime p.r.n., Lasix 40 mg p.o. daily, Faslodex 500 mg IM every 28 days, Singulair 10 mg p.o. daily, Aldactone 50 mg p.o. daily, Norco 10/325 one p.o. q.6 hours p.r.n., Remeron 30 mg p.o. nightly, Ativan 1 mg p.o. q.6 hours p.r.n., DuoNeb nebulizer p.r.n., glipizide 1 tablet p.r.n. blood sugar greater than 200, palbociclib 125 mg p.o. daily, KCl 20 mEq twice a day, Apresoline 50 mg p.o. 3 times a day, Coreg 3.125 mg p.o. daily, warfarin 1.5 mg p.o. q.a.m., Norvasc 10 mg p.o. daily.  
 
SOCIAL HISTORY:  She is , does not smoke, does not drink any alcohol. She is a former smoker. FAMILY HISTORY:  Positive for seizures, heart disease and cancer. No history of kidney failure. REVIEW OF SYSTEMS:  She denies any fevers, chills. No headaches, dizzy spells, fainting spells. She has chronic dyspnea on exertion for over a year. No chest pain or palpitations. No nausea or vomiting. She does have discomfort from recurrent ascites and abdominal distention and chronic constipation. She has no dysuria or polyuria. PHYSICAL EXAMINATION: 
GENERAL:  Reveals a chronically ill-appearing  female in no acute distress. VITAL SIGNS:  She is afebrile. Blood pressure is 157/70, pulse is 72, respirations are 18, they are not labored. HEAD:  Normocephalic. EYES:  Pupils equal, react to light and accommodation. Extraocular muscles are intact. Fundi were not visualized. LUNGS:  Clear. No rales or wheezes. Her breath sounds equal bilaterally. HEART:  Regular rate and rhythm. ABDOMEN:  Soft. Bowel sounds are present. Abdomen is distended with ascites. GENITAL AND RECTAL:  Deferred. EXTREMITIES:  She has no peripheral edema. LABORATORY DATA:  Her sodium is 138, potassium 5.0, chloride 106, CO2 is 20, blood sugar 179, BUN 73, creatinine 4.47, calcium is 8.5. Total bilirubin is 0.6. Her CBC, white count is 3.5, hemoglobin 9.6, hematocrit 29, platelet count is 991. Renal ultrasound from 07/2016 showed severe right-sided hydronephrosis, right kidney measuring 12.3 cm and left kidney measuring 9.7 cm. ASSESSMENT: 
1. Acute on chronic kidney disease, probably due to volume depletion related to attempts to control her ascites. 2.  Hyperkalemia due to the combination of worsening azotemia and use of Aldactone and potassium replacement. 3.  Metastatic breast cancer. 4.  Remote history of right hydronephrosis. 5.  Type 2 diabetes mellitus. 6.  Anemia of chronic kidney disease. 7.  Chronic hypertension.  
 
PLAN:  Agree with careful hydration. I would like to hold her Lasix and potassium supplements. She probably should not go back on Aldactone given her chronic kidney disease. We will also check a renal ultrasound to see if there is any worsening of her hydronephrosis or not. Thank you very much for allowing us to see her and help in her care. We will follow closely with you. MD Randy Hebert / Layla Nava 
D: 07/31/2018 14:02    
T: 07/31/2018 14:25 
JOB #: [id number] CC: Kumar Lane MD 
[address] [place] [address] CC: Karina Rivas MD

## 2018-07-31 NOTE — PROGRESS NOTES
Pt arrived ambulatory to [facility]. Procrit given SQ to abdomen. Pt discharged ambulatory. Pt aware of next appt on 8/14/18 at 13`5

## 2018-08-01 NOTE — PROGRESS NOTES
Problem: Falls - Risk of 
Goal: *Absence of Falls Document April Vandana Fall Risk and appropriate interventions in the flowsheet. Outcome: Progressing Towards Goal 
Fall Risk Interventions: 
  
 
  
 
Medication Interventions: Teach patient to arise slowly, Patient to call before getting OOB

## 2018-08-01 NOTE — PROGRESS NOTES
END OF SHIFT NOTE: 
 
Intake/Output 08/01 0701 - 08/01 1900 In: 1156.7 [P.O.:240; I.V.:271.7] Out: 500 [Urine:500] Voiding: YES Catheter: NO 
Drain:   
 
 
 
 
 
Stool:  0 occurrences. Emesis:  0 occurrences. VITAL SIGNS Patient Vitals for the past 12 hrs: 
 Temp Pulse Resp BP SpO2  
08/01/18 1647 - - - 149/69 95 % 08/01/18 1632 - - - 160/71 94 % 08/01/18 1617 - - - 169/74 94 % 08/01/18 1439 98.8 °F (37.1 °C) 68 20 148/64 95 % 08/01/18 1408 98.9 °F (37.2 °C) 67 20 148/63 96 % 08/01/18 1358 98.9 °F (37.2 °C) 71 20 146/59 95 % 08/01/18 1343 - 68 20 144/58 96 % 08/01/18 1121 98.3 °F (36.8 °C) 68 20 141/59 92 % 08/01/18 0715 98.6 °F (37 °C) 65 16 149/65 94 % Pain Assessment Pain 1 Pain Scale 1: Numeric (0 - 10) (08/01/18 1816) Pain Intensity 1: 1 (08/01/18 1816) Patient Stated Pain Goal: 0 (07/31/18 2040) Pain Reassessment 1: Yes (08/01/18 1816) Pain Location 1: Face;Neck (08/01/18 1737) Pain Orientation 1: Left (08/01/18 1737) Pain Intervention(s) 1: Medication (see MAR) (08/01/18 1737) Ambulating Yes Additional Information: pt had paracentesis after 2 FFP- 4L drawn off, given albumin, coumadin on hold, c/o pain x 2- relieved by norco, fluids stopped Shift report given to oncoming nurse at the bedside. Gerda Shlutz

## 2018-08-01 NOTE — PROGRESS NOTES
TRANSFER - OUT REPORT: 
 
Verbal report given to Susan Singh RN (name) on Timothy Vasquez  being transferred to 5th floor (unit) for routine progression of care   After paracentesis. US not able to do renal US now. No heparin in port. Report consisted of patients Situation, Background, Assessment and  
Recommendations(SBAR). Information from the following report(s) Procedure Summary, Intake/Output, MAR and Recent Results was reviewed with the receiving nurse. Opportunity for questions and clarification was provided.

## 2018-08-01 NOTE — PHYSICIAN ADVISORY
Letter of Determination: Inpatient Status Appropriate This patient was originally hospitalized as Inpatient Status on 7/31/2018 for acute on chronic kidney injury. This patient is appropriate for Inpatient Admission based on medical necessity. The patient's stay was medically necessary based on creatinine of 4.47 mg/dl, and concurrent chemotherapy. It is our recommendation that this patient's hospitalization status should be INPATIENT status.  
  
The final decision regarding the patient's hospitalization status depends on the attending physician's judgement. Alf Peng MD, [place], Physician Advisor [address] [place] [address].

## 2018-08-01 NOTE — PROGRESS NOTES
present at bedside with unit nurse during signature of blood transfusion consent. Lisy Howe Luanne Patient Relations [facility] Interpreting Services 
c: [phone] / [email or web address] [address] / [place], [address] [facility] 
[email or web address]

## 2018-08-01 NOTE — PROGRESS NOTES
Warfarin dosing per pharmacist 
 
Guillermina Dey is a 75 y.o. female. Indication:  DVT/PE Goal INR:  2-3 Home dose:  1.5 mg daily Risk factors or significant drug interactions:  none Other anticoagulants:  none Daily Monitoring Date  INR     Warfarin dose HGB              Notes 7/31  3.0  Held  9.6 8/1  2.8  Hold  8.6 Pharmacy consulted to dose warfarin. INR to 2.8. Warfarin on hold for procedure currently. Will continue to follow for restart. Thank you, Sergio Malik, Pharm. D. Clinical Pharmacist 
[phone]

## 2018-08-01 NOTE — PROGRESS NOTES
Interventional Radiology Post Paracentesis/Thoracentesis Note 8/1/2018 Procedure(s): Ultrasound guided Diagnostic Paracentesis Performed with 8 Tamazight catheter total volume 4,000 ml. Samples sent to lab. Preliminary Findings: moderate clear and yellow. Complications: None Plan:  Observation, check labs if drawn. Chest X-Ray:  no 
 
Full dictated report to follow Signed By: Pepe Bernal

## 2018-08-01 NOTE — INTERDISCIPLINARY ROUNDS
Interdisciplinary rounds with charge nurse, provider, and . Presenting Problem: Acute on chronic kidney failure Daily Goal: Paracentesis Expected Discharge Date: TBD Expected Discharge Needs: Supplies and re-education if PleurX is pursued Patient/Family Concerns addressed.

## 2018-08-01 NOTE — PROGRESS NOTES
GI DAILY PROGRESS NOTE Admit Date:  7/31/2018 Today's Date:  8/1/2018 CC:  Ascites, cirrhosis Subjective:  
 
Patient laying in bed comfortably. [employer] phone utilized. She denies any nausea, vomiting, abdominal pain. Discomfort with abdominal distention which is worsening with IVF. Reports no BM overnight. Medications:  
Current Facility-Administered Medications Medication Dose Route Frequency  0.9% sodium chloride infusion 250 mL  250 mL IntraVENous PRN  
 warfarin (COUMADIN) tablet 1.5 mg - On Hold  1.5 mg Oral See Admin Instructions  albuterol-ipratropium (DUO-NEB) 2.5 MG-0.5 MG/3 ML  3 mL Nebulization Q6H PRN  
 amLODIPine (NORVASC) tablet 10 mg  10 mg Oral DAILY  carvedilol (COREG) tablet 3.125 mg  3.125 mg Oral BID WITH MEALS  
 hydrALAZINE (APRESOLINE) tablet 50 mg  50 mg Oral BID  
 HYDROcodone-acetaminophen (NORCO)  mg tablet 1 Tab  1 Tab Oral Q6H PRN  
 LORazepam (ATIVAN) tablet 1 mg  1 mg Oral Q6H PRN  
 megestrol (MEGACE) 400 mg/10 mL (10 mL) oral suspension 800 mg  800 mg Oral DAILY  mirtazapine (REMERON) tablet 30 mg  30 mg Oral QHS  montelukast (SINGULAIR) tablet 10 mg  10 mg Oral QHS  temazepam (RESTORIL) capsule 15 mg  15 mg Oral QHS PRN  
 senna-docusate (PERICOLACE) 8.6-50 mg per tablet 1 Tab  1 Tab Oral DAILY Review of Systems: ROS was obtained, with pertinent positives as listed above. No chest pain or SOB. Diet:  Diabetic diet Objective:  
Vitals: 
Visit Vitals  /65 (BP 1 Location: Left arm, BP Patient Position: At rest;Supine)  Pulse 65  Temp 98.6 °F (37 °C)  Resp 16  Wt 51.9 kg (114 lb 6.4 oz)  LMP 06/21/1998  SpO2 94%  Breastfeeding No  
 BMI 20.27 kg/m2 Intake/Output: 
08/01 0701 - 08/01 1900 In: 240 [P.O.:240] Out: 500 [Urine:500] 07/30 1901 - 08/01 0700 In: 1800 [P.O.:240; I.V.:1560] Out: 1150 [TRYAK:2902] Exam: 
General appearance: alert, cooperative, no distress Lungs: clear to auscultation bilaterally anteriorly Heart: regular rate and rhythm, + Murmur. Abdomen: soft, distended more than yesterday, non-tender. Bowel sounds normal. No masses, no organomegaly Extremities: extremities normal, atraumatic, no cyanosis or edema Neuro:  alert and oriented Data Review (Labs):   
Recent Labs 08/01/18 
 1856  07/31/18 
 0417  07/31/18 
 0901 WBC  2.8*  3.5*   --   
HGB  8.6*  9.6*   --   
HCT  26.0*  29.0*   --   
PLT  134*  151   --   
MCV  112.1*  114.6*   --   
NA  144  138   --   
K  4.9  5.0   --   
CL  113*  106   --   
CO2  20*  20*   --   
BUN  70*  73*   --   
CREA  4.33*  4.47*   --   
CA  7.5*  8.5   --   
GLU  98  179*   --   
AP   --   236*   --   
SGOT   --   34   --   
ALT   --   26   --   
TBILI   --   0.6   --   
ALB  2.6*  3.3   --   
TP   --   7.8   --   
PTP  28.7*   --   36.5* INR  2.8   --   3.0 Assessment:  
 
Principal Problem: 
  Acute on chronic kidney failure ([facility] (10/7/2016) Active Problems: Metastatic breast cancer ([facility] (2/17/2017) Other cirrhosis of liver ([facility] (7/31/2018) Other ascites (7/31/2018) Acute-on-chronic kidney injury ([facility] (7/31/2018) Patient is a 75 y.o. female with PMH of Breast Cancer diagnosed in 2011 with mets to bone, DVT on Warfatin, CKD, ureteral stent, right sided pleural effusions with right thoracentesis, Ascites requiring paracentesis, pericardial effusion, who is seen in consultation at the request of Max Lemus NP for ascites, cirrhosis. Viral hepatitis panel was negative in April 2018. Ferritin is quite elevated. No family history of liver disease and no history of ETOH. PET scan with findings concerning for cirrhosis, splenomegaly. Plan: 1. fluid studies with paracentesis to calculate SAAG and evaluate for possible SBP. Also, cytology. 2. Low Na diet 3. Diuretics will need to be held in the setting of EMERY.  Nephrology following with recommendations to not resume Aldactone due to her CKD. Creatinine 4.3. 
4. Paracentesis to control ascites for now. Pleurx catheter would increase risk of infection but may be appropriate depending on prognosis and aggressiveness desired. 5. With elevated ferritin, HFE was checked and is pending. Although, ferritin could also be nonspecific inflammation. 6. Warfarin on hold. INR 2.8. 7. May need additional work-up for chronic liver disease pending clinical course. Denver Diop NP Patient is seen and examined in collaboration with Dr. Tanya Duron. Assessment and plan as per Dr. Tanya Duron,.

## 2018-08-01 NOTE — PROGRESS NOTES
[facility] Inpatient Hematology / Oncology Progress Note Admission Date: 2018 12:12 PM 
Reason for Admission/Hospital Course: EMERY Acute-on-chronic kidney injury ([facility] 24 Hour Events: 
Afebrile, vitals stable INR 2.8 - coumadin held last night For paracentesis today after FFP No new complaints ROS: 
Constitutional: Positive for fatigue; negative for fever, chills, weakness CV:Negative for chest pain, palpitations, edema. Respiratory: Negative for dyspnea, cough, wheezing. GI: Positive for abdominal distention; negative for nausea, diarrhea. 10 point review of systems is otherwise negative with the exception of the elements mentioned above in the HPI. No Known Allergies OBJECTIVE: 
Patient Vitals for the past 8 hrs: 
 BP Temp Pulse Resp SpO2  
18 1121 141/59 98.3 °F (36.8 °C) 68 20 92 % 18 0715 149/65 98.6 °F (37 °C) 65 16 94 % Temp (24hrs), Av.5 °F (36.9 °C), Min:98.3 °F (36.8 °C), Max:98.8 °F (37.1 °C) 
 
 0701 -  1900 In: 511.7 [P.O.:240; I.V.:271.7] Out: 500 [Urine:500] Physical Exam: 
Constitutional: Well developed, well nourished female in no acute distress, sitting comfortably in the hospital bed. HEENT: Normocephalic and atraumatic. Oropharynx is clear, mucous membranes are moist. Neck supple Lymph node No palpable submandibular, cervical, supraclavicular, axillary or inguinal lymph nodes. Skin Warm and dry. No bruising and no rash noted. No erythema. No pallor. Respiratory Lungs are clear to auscultation bilaterally without wheezes, rales or rhonchi, normal air exchange without accessory muscle use. CVS Normal rate, regular rhythm and normal S1 and S2. No murmurs, gallops, or rubs. Abdomen + firm, distended. normoactive bowel sounds Neuro Grossly nonfocal with no obvious sensory or motor deficits. MSK Normal range of motion in general.  No edema and no tenderness.   
Psych Appropriate mood and affect. Labs: 
  Recent Labs 08/01/18 
 0491  07/31/18 
 8259 WBC  2.8*  3.5*  
RBC  2.32*  2.53* HGB  8.6*  9.6* HCT  26.0*  29.0*  
MCV  112.1*  114.6*  
MCH  37.1*  37.9*  
MCHC  33.1  33.1 RDW  14.4  14.2 PLT  134*  151 GRANS  57  68 LYMPH  33  23 MONOS  7  7 EOS  1  1  
BASOS  2  1 IG  0   --   
DF  AUTOMATED  AUTOMATED ANEU  1.6*  2.4 ABL  0.9  0.8 ABM  0.2  0.2 ANTONETTE  0.0  0.0 ABB  0.1  0.0 AIG  0.0   --   
 
 Recent Labs 08/01/18 
 8810  07/31/18 
 6026 NA  144  138  
K  4.9  5.0  
CL  113*  106 CO2  20*  20* AGAP  11  12 GLU  98  179* BUN  70*  73* CREA  4.33*  4.47* GFRAA  13*  13* GFRNA  11*  10* CA  7.5*  8.5 SGOT   --   34  
AP   --   236* TP   --   7.8 ALB  2.6*  3.3 GLOB   --   4.5* AGRAT   --   0.7* MG  2.4   --   
PHOS  4.1*   --   
 
Imaging: 
 
 
ASSESSMENT: 
 
Problem List  Date Reviewed: 7/31/2018 Codes Class Noted Other cirrhosis of liver ([facility] ICD-10-CM: J93.23 ICD-9-CM: 571.5  7/31/2018 Other ascites ICD-10-CM: R18.8 ICD-9-CM: 789.59  7/31/2018 Acute-on-chronic kidney injury ([facility] ICD-10-CM: N17.9, N18.9 ICD-9-CM: 584.9, 585.9  7/31/2018 History of blood clots ICD-10-CM: Z86.718 ICD-9-CM: V12.51  2/16/2018 Type 2 diabetes mellitus with nephropathy ([facility] ICD-10-CM: E11.21 
ICD-9-CM: 250.40, 583.81  1/16/2018 Recurrent depression ([address] ICD-10-CM: F33.9 ICD-9-CM: 296.30  1/16/2018 Vitamin B12 deficiency ICD-10-CM: E53.8 ICD-9-CM: 266.2  11/13/2017 Anemia of chronic disease ICD-10-CM: D63.8 ICD-9-CM: 285.29  11/13/2017 Iron deficiency anemia (Chronic) ICD-10-CM: D50.9 ICD-9-CM: 280.9  7/10/2017 Type 2 diabetes mellitus without complication (HCC) (Chronic) ICD-10-CM: E11.9 ICD-9-CM: 250.00  7/10/2017 Folliculitis XNJ-84-TD: L73.9 ICD-9-CM: 704.8  7/10/2017 Hypernatremia ICD-10-CM: E87.0 ICD-9-CM: 276.0 5/30/2017 Anxiety ICD-10-CM: F41.9 ICD-9-CM: 300.00  5/28/2017 Acute on chronic respiratory failure (HCC) ICD-10-CM: J96.20 ICD-9-CM: 518.84  5/24/2017 Aortic stenosis, moderate (Chronic) ICD-10-CM: I35.0 ICD-9-CM: 424.1  5/24/2017 Anticoagulated on Coumadin (Chronic) ICD-10-CM: Z51.81, Z79.01 
ICD-9-CM: V58.83, V58.61  5/24/2017 Volume overload ICD-10-CM: E87.70 ICD-9-CM: 276.69  5/24/2017 Breast cancer ([facility] [address] (Chronic) ICD-10-CM: C50.919 ICD-9-CM: 174.9  2/17/2017 Overview Addendum 2/27/2014 10:08 AM by Zoe Shea NP  
  Er+  pr + her-2 lawanda negative stage 4 Examination of the 3D tomographic PET images demonstrates marked hypermetabolism associated with both primary breast carcinomas. SUV max on the right is 20.5 and on the left 31.5. There is matted high right axillary  
lymphadenopathy as well as a more hypermetabolic low right axillary lymph node which measures 12 x 9 mm and has an SUV max of 15.4. Small bilateral pulmonary hilar metastases are also noted as well as extensive skeletal metastatic disease which includes both proximal femora, both scapulae, innumerable ribs, virtually every vertebra, and the bony pelvis. No displaced pathologic fracture is identified on the CT images. Ct scan 2-12 Innumerable diffuse osseous metastases. . Multiple diffuse tiny lung nodules, and right hilar lymphadenopathy, also suspicious for metastases. 3. Bilateral breast masses, compatible with known carcinoma. CANCER ANTIGEN 27.29 1076 Oncology Flowsheet Day, Cycle cyclophosphamide (CYTOXAN) IV  
1/27/2012 Day 1, Cycle 1 600 mg/m2 = 996 mg  
2/17/2012 Day 1, Cycle 2 600 mg/m2 = 996 mg  
3/9/2012 Day 1, Cycle 3 600 mg/m2 = 996 mg  
3/30/2012 Day 1, Cycle 4 500 mg/m2 = 830 mg  
4/20/2012 Day 1, Cycle 5 500 mg/m2 = 830 mg Oncology Flowsheet DOCEtaxel (TAXOTERE) IV  
1/27/2012 75 mg/m2 = 125 mg  
2/17/2012 75 mg/m2 = 125 mg  
3/9/2012 75 mg/m2 = 125 mg  
3/30/2012 60 mg/m2 = 100 mg  
4/20/2012 60 mg/m2 = 100 mg  
7-19-12 post 6 cycles of TC improved breast masses switch to [employer] Clear response on pet ct scan Mixed response to chemotherapy: There has been a significant interval response in the right breast and axilla with decrease in size of spiculated right breast mass and numerous right axillary lymph nodes. Left breast mass also appears to have responded chemotherapy though there may be persistent chest wall invasion. 2. Interval evolution of widespread osseous metastatic disease with many lytic lesions now appearing more sclerotic. These are associated with continued FDG uptake which is difficult to differentiate from marrow reactivity given recent chemotherapy. At least one new osseous metastatic deposits is present in the posterior spinous process at L1. Focally intense uptake is also present at approximately T5 
10-19-12 pain in arms legs back continued improvement on ct scan Decreased size of the largest lung nodules and near complete resolution . of many. No new masses in the chest, abdomen, or pelvis. 2. Decreased size of right axillary and right hilar lymph nodes. 3. Diffuse osseous lesions again seen. 4. Diverticulosis. 5. Atherosclerotic vascular disease ca 15-3 down to 50 On femara and aredia will see if we can get affinitor to start at next visit continue therapy 12-19-12 new headache and dizziness for MRI will start affinitor 1-14-13 improved pain breast mass right breast smaller width 7 cm x 5 
6-05-13 femara and aredia now on affinitor x 5 months 7-13 tumor markers falling 8-20-13 post admission for pneumonia medications to restart breast mass smaller reduce affinitor to 7.5 mg  
10-18-13 femara and affinitor pain right ilium intermittent markers smaller breast mass smaller on right 12-5-13 Reduce Afinitor to 5 mg daily. Continue coumadin for DVT. Continue Femara and Aredia. Repeat US and skeletal survey prior to next visit.   
1-6-14 Feeling better with reduced dose. Ultrasound of breasts show decrease in mass sizes. Skeletal survey stable. Follow up in 2 months. Bony metastasis ([facility] [address] (Chronic) ICD-10-CM: C79.51 
ICD-9-CM: 198.5  2/17/2017 Acute respiratory failure ([facility] [address] ICD-10-CM: J96.00 
ICD-9-CM: 518.81  2/17/2017 Metastatic breast cancer ([facility] [address] (Chronic) ICD-10-CM: R92.514 ICD-9-CM: 174.9  2/17/2017 CKD (chronic kidney disease) (Chronic) ICD-10-CM: N18.9 ICD-9-CM: 585.9  1/4/2017 Overview Signed 5/14/2013 11:35 AM by Arnaud Jimenez RN With acute rise- ? Dehydration and monitor DVT (deep venous thrombosis) (HCC) (Chronic) ICD-10-CM: Y75.627 ICD-9-CM: 453.40  1/4/2017 Overview Signed 11/19/2013  6:07 AM by Pily Mendez 11-18-13 There is nonocclusive thrombus within the left common femoral vein, superficial femoral vein, popliteal vein, and posterior tibial vein Accelerated hypertension ICD-10-CM: I10 
ICD-9-CM: 401.0  1/4/2017 Pleural effusion, right (Chronic) ICD-10-CM: J90 ICD-9-CM: 511.9  1/4/2017 Overview Addendum 5/26/2017 10:11 AM by Kayla Daugherty NP  
  10/6/16 R thoracentesis 800 ml: pleural fluid creatine 4.1 = likely urinothorax 5/25/2017: Thoracentesis on right- 950 cc of /serosanguinous/ fluid Hydronephrosis (Chronic) ICD-10-CM: N13.30 ICD-9-CM: 257  1/4/2017 Bilateral edema of lower extremity ICD-10-CM: R60.0 ICD-9-CM: 782.3  1/4/2017 Hematuria ICD-10-CM: R31.9 ICD-9-CM: 599.70  1/4/2017 Chemotherapy-induced neutropenia (HCC) ICD-10-CM: D70.1, T45.1X5A 
ICD-9-CM: 288.03, E933.1  12/20/2016 Pancytopenia ([facility] ICD-10-CM: D36.408 ICD-9-CM: 284.19  10/10/2016 HTN (hypertension) (Chronic) ICD-10-CM: I10 
ICD-9-CM: 401.9  10/7/2016 DM (diabetes mellitus) (HCC) (Chronic) ICD-10-CM: E11.9 ICD-9-CM: 250.00  10/7/2016  * (Principal)Acute on chronic kidney failure ([facility] ICD-10-CM: N17.9, N18.9 ICD-9-CM: 584.9, 585.9  10/7/2016 Pulmonary edema ICD-10-CM: J81.1 ICD-9-CM: 063  1/3/2016 Elevated troponin ICD-10-CM: R74.8 ICD-9-CM: 790.6  10/20/2015 PND (paroxysmal nocturnal dyspnea) ICD-10-CM: R06.00 
ICD-9-CM: 786.09  2/25/2014 Leg swelling ICD-10-CM: M79.89 ICD-9-CM: 729.81  2/25/2014 Overview Signed 2/25/2014  2:45 PM by Pk Nix NP Bilateral 
 
  
  
   
 Heart failure with preserved ejection fraction (HCC) ICD-10-CM: I50.30 ICD-9-CM: 428.9  2/25/2014 Anemia ICD-10-CM: D64.9 ICD-9-CM: 285.9  11/19/2013 Overview Signed 11/19/2013  6:06 AM by Yashira Li 11-18-13 admitted with anemia and transfused Asthma (Chronic) ICD-10-CM: J45.909 ICD-9-CM: 493.90  10/22/2013 Overview Addendum 10/22/2013 10:55 AM by Paramjit Bassett NP  
  8/2013: started on Symbicort 10/22/13: Added singulair Hypomagnesemia ICD-10-CM: G28.54 
ICD-9-CM: 275.2  8/4/2013 Hypokalemia ICD-10-CM: E87.6 ICD-9-CM: 276.8  8/4/2013 Ms. Umm Shah is a 75 y.o. female admitted on 7/31/2018 with a primary diagnosis of acute on chronic kidney injury.  
  
She has a history of metastatic breast cancer, asthma, chronic diastolic heart failure, HTN, DM type 2, DVT, and chronic kidney disease. She is currently being treated with Ibrance/Faslodex. Most recently she has developed recurrent ascites with paracentesis twice over the last five weeks or so. She was placed on aldactone 50 mg daily to be taken in addition to her lasix 40 mg daily to try and help with fluid accumulation. She returned today for follow up and her creatinine was elevated above baseline to 4.47. She will be admitted for management of acute on chronic kidney injury.  
  
 
PLAN: 
Metastatic Breast Cancer * Currently on treatment with Ibrance/fulvestrant. 8/1 From a prognosis standpoint regarding her metastatic breast cancer, she has done quite well on current therapy. Her last PET scan done 7/12 show stable disease and no new concerning areas. Cytology from fluid has not proven to be malignant. May continue to do well and other lines of therapy as options should she have progression in the future 
  
Acute on Chronic Kidney Injury * Gentle hydration. Hold Lasix and aldactone - watch closely for fluid overload. * Consult nephrology. * Renal ultrasound. 8/1 Slight improvement with hydration yesterday. Appreciate nephrology help. Holding diuretics. Off fluids to limit fluid reaccumulation. Per note today- rec removing fluid with pleurex rather than attempting management with diuretics  
  
Ascites * Consult IR for paracentesis. Send fluid for cytology. * Consult GI for cirrhosis. 8/1 GI following. Planning para today and sending fluid for work up. Defer pleurex drain to GI if no other medical management options reasonable. Obviously limited with renal issues. OK from our perspective to place pleurex if GI deems appropriate 
  
DVT prophylaxis - on Coumadin. Kamryn SOP's. Continue home medications.  
  
 
     
 
Fabio Luo NP [address] [place] [address] Hematology & Oncology [address] [place] [address] Office : [phone] Fax : [phone] Attending Addendum: 
I personally evaluated the patient with SHIRLEY Good, and agree with the assessment, findings and plan as documented. Appears relatively well, heart regular, lungs clear, abdomen distended and tympanic. For para today. We discussed the possibility of placing a catheter, however, she knows that there is an increased risk of infection. She stated that she thinks this could be a reasonable option for her as she'd like to limit the amount of visits to IR for paracentesis. GI will be evaluating the patient for cirrhosis/ Ascites. CKD with EMERY - nephrology following. On Ibrance/fulvestrant since 2016 and doing well - stable disease on most recent imaging.   C/w supportive care and appreciate subspecialty recommendations. Zohaib So MD 
[facility] Hematology and Oncology [id number] [address] Office : [phone] Fax : [phone]

## 2018-08-01 NOTE — PROGRESS NOTES
Renal Progress Note Admission Date: 7/31/2018 Subjective:  
  
Abdominal distention ROS:  No chest pain, increased shortness of breath or nausea/vomitting Objective:  
 
Physical Exam:   
Patient Vitals for the past 8 hrs: 
 BP Temp Pulse Resp SpO2 Weight 08/01/18 0715 149/65 98.6 °F (37 °C) 65 16 94 % -  
08/01/18 0351 150/51 98.4 °F (36.9 °C) 64 20 92 % -  
08/01/18 0132 - - - - - 51.9 kg (114 lb 6.4 oz) Gen: comfortable , NAD HEENT: moist membranes CV: S1, S2 
Lungs: Clear bilaterally Abdominal : increased ascitics. Extem: no edema Current Facility-Administered Medications Medication Dose Route Frequency  0.9% sodium chloride infusion 250 mL  250 mL IntraVENous PRN  
 warfarin (COUMADIN) tablet 1.5 mg - On Hold  1.5 mg Oral See Admin Instructions  albuterol-ipratropium (DUO-NEB) 2.5 MG-0.5 MG/3 ML  3 mL Nebulization Q6H PRN  
 amLODIPine (NORVASC) tablet 10 mg  10 mg Oral DAILY  carvedilol (COREG) tablet 3.125 mg  3.125 mg Oral BID WITH MEALS  
 hydrALAZINE (APRESOLINE) tablet 50 mg  50 mg Oral BID  
 HYDROcodone-acetaminophen (NORCO)  mg tablet 1 Tab  1 Tab Oral Q6H PRN  
 LORazepam (ATIVAN) tablet 1 mg  1 mg Oral Q6H PRN  
 megestrol (MEGACE) 400 mg/10 mL (10 mL) oral suspension 800 mg  800 mg Oral DAILY  mirtazapine (REMERON) tablet 30 mg  30 mg Oral QHS  montelukast (SINGULAIR) tablet 10 mg  10 mg Oral QHS  temazepam (RESTORIL) capsule 15 mg  15 mg Oral QHS PRN  
 0.9% sodium chloride infusion  100 mL/hr IntraVENous CONTINUOUS  
 senna-docusate (PERICOLACE) 8.6-50 mg per tablet 1 Tab  1 Tab Oral DAILY Data Review:  
 
LABS:  
Recent Results (from the past 12 hour(s)) CBC WITH AUTOMATED DIFF Collection Time: 08/01/18  6:28 AM  
Result Value Ref Range WBC 2.8 (L) 4.3 - 11.1 K/uL  
 RBC 2.32 (L) 4.05 - 5.25 M/uL HGB 8.6 (L) 11.7 - 15.4 g/dL HCT 26.0 (L) 35.8 - 46.3 %  .1 (H) 79.6 - 97.8 FL  
 MCH 37.1 (H) 26.1 - 32.9 PG  
 MCHC 33.1 31.4 - 35.0 g/dL  
 RDW 14.4 11.9 - 14.6 % PLATELET 636 (L) 281 - 450 K/uL MPV 11.7 10.8 - 14.1 FL  
 DF AUTOMATED NEUTROPHILS 57 43 - 78 % LYMPHOCYTES 33 13 - 44 % MONOCYTES 7 4.0 - 12.0 % EOSINOPHILS 1 0.5 - 7.8 % BASOPHILS 2 0.0 - 2.0 % IMMATURE GRANULOCYTES 0 0.0 - 5.0 %  
 ABS. NEUTROPHILS 1.6 (L) 1.7 - 8.2 K/UL  
 ABS. LYMPHOCYTES 0.9 0.5 - 4.6 K/UL  
 ABS. MONOCYTES 0.2 0.1 - 1.3 K/UL  
 ABS. EOSINOPHILS 0.0 0.0 - 0.8 K/UL  
 ABS. BASOPHILS 0.1 0.0 - 0.2 K/UL  
 ABS. IMM. GRANS. 0.0 0.0 - 0.5 K/UL RENAL FUNCTION PANEL Collection Time: 08/01/18  6:28 AM  
Result Value Ref Range Sodium 144 136 - 145 mmol/L Potassium 4.9 3.5 - 5.1 mmol/L Chloride 113 (H) 98 - 107 mmol/L  
 CO2 20 (L) 21 - 32 mmol/L Anion gap 11 7 - 16 mmol/L Glucose 98 65 - 100 mg/dL BUN 70 (H) 8 - 23 MG/DL Creatinine 4.33 (H) 0.6 - 1.0 MG/DL  
 GFR est AA 13 (L) >60 ml/min/1.73m2 GFR est non-AA 11 (L) >60 ml/min/1.73m2 Calcium 7.5 (L) 8.3 - 10.4 MG/DL Phosphorus 4.1 (H) 2.3 - 3.7 MG/DL Albumin 2.6 (L) 3.2 - 4.6 g/dL PROTHROMBIN TIME + INR Collection Time: 08/01/18  6:28 AM  
Result Value Ref Range Prothrombin time 28.7 (H) 11.5 - 14.5 sec INR 2.8 GLUCOSE, POC Collection Time: 08/01/18  7:39 AM  
Result Value Ref Range Glucose (POC) 94 65 - 100 mg/dL Plan:  
 
Principal Problem: 
  Acute on chronic kidney failure ([facility] (10/7/2016) Active Problems: Metastatic breast cancer ([facility] (2/17/2017) Other cirrhosis of liver ([facility] (7/31/2018) Other ascites (7/31/2018) Acute-on-chronic kidney injury ([facility] (7/31/2018) 1. Acute on chronic kidney disease, probably due to volume depletion related to attempts to control her ascites. 2.  Hyperkalemia due to the combination of worsening azotemia and use of Aldactone and potassium replacement. 3.  Metastatic breast cancer. EMERY likely from overdiureis. bettter with IVF.  Still stop so she won't rapidly reaccumulate Draining with pleurex drain. In small volume is better than diuretics.

## 2018-08-02 NOTE — PROGRESS NOTES
GI DAILY PROGRESS / SIGN-OFF NOTE Admit Date:  7/31/2018 Today's Date:  8/2/2018 CC:  Ascites, cirrhosis Subjective:  
 
Patient laying in bed comfortably. [employer] phone utilized. Discomfort with abdominal distention improved after paracentesis. No family at bedside. Medications:  
Current Facility-Administered Medications Medication Dose Route Frequency  0.9% sodium chloride infusion 250 mL  250 mL IntraVENous PRN  
 warfarin (COUMADIN) tablet 1.5 mg - On Hold  1.5 mg Oral See Admin Instructions  albuterol-ipratropium (DUO-NEB) 2.5 MG-0.5 MG/3 ML  3 mL Nebulization Q6H PRN  
 amLODIPine (NORVASC) tablet 10 mg  10 mg Oral DAILY  carvedilol (COREG) tablet 3.125 mg  3.125 mg Oral BID WITH MEALS  
 hydrALAZINE (APRESOLINE) tablet 50 mg  50 mg Oral BID  
 HYDROcodone-acetaminophen (NORCO)  mg tablet 1 Tab  1 Tab Oral Q6H PRN  
 LORazepam (ATIVAN) tablet 1 mg  1 mg Oral Q6H PRN  
 megestrol (MEGACE) 400 mg/10 mL (10 mL) oral suspension 800 mg  800 mg Oral DAILY  mirtazapine (REMERON) tablet 30 mg  30 mg Oral QHS  montelukast (SINGULAIR) tablet 10 mg  10 mg Oral QHS  temazepam (RESTORIL) capsule 15 mg  15 mg Oral QHS PRN  
 senna-docusate (PERICOLACE) 8.6-50 mg per tablet 1 Tab  1 Tab Oral DAILY Diet:  Diabetic diet Objective:  
Vitals: 
Visit Vitals  /50 (BP 1 Location: Left arm, BP Patient Position: At rest;Supine)  Pulse 60  Temp 98.6 °F (37 °C)  Resp 16  Wt 49.8 kg (109 lb 11.2 oz)  LMP 06/21/1998  SpO2 95%  Breastfeeding No  
 BMI 19.43 kg/m2 Intake/Output: 
08/02 0701 - 08/02 1900 In: 120 [P.O.:120] Out: 200 [Urine:200] 07/31 1901 - 08/02 0700 In: 2966.7 [P.O.:490; I.V.:1831.7] Out: 1900 [FYREO:8019] Exam: 
General appearance: alert, cooperative, no distress Lungs: clear to auscultation bilaterally anteriorly Heart: regular rate and rhythm, + Murmur. Abdomen: soft, distended, non-tender.  Bowel sounds normal.  
No masses, no organomegaly - exam limited by persistent ascites. Extremities:  no cyanosis or edema Neuro:  alert and oriented Data Review (Labs):   
Recent Labs 08/02/18 
 2563  08/01/18 
 5737  07/31/18 
 3084  07/31/18 
 0901 WBC   --   2.8*  3.5*   --   
HGB   --   8.6*  9.6*   --   
HCT   --   26.0*  29.0*   --   
PLT   --   134*  151   -- MCV   --   112.1*  114.6*   --   
NA  146*  144  138   --   
K  4.9  4.9  5.0   --   
CL  114*  113*  106   --   
CO2  20*  20*  20*   --   
BUN  60*  70*  73*   --   
CREA  3.86*  4.33*  4.47*   --   
CA  7.7*  7.5*  8.5   --   
MG  2.6*  2.4   --    --   
GLU  105*  98  179*   --   
AP  240*   --   236*   --   
SGOT  32   --   34   --   
ALT  25   --   26   --   
TBILI  0.4   --   0.6   --   
ALB  2.7*  2.6*  3.3   --   
TP  6.9   --   7.8   --   
PTP  22.2*  28.7*   --   36.5* INR  2.0  2.8   --   3.0  
 
8/1/18: Ultrasound guided paracentesis. History: 79-year-old female with metastatic breast cancer, ascites. :  Latisha Welsh PA-C Supervising Physician: Elliot Hu M.D. Consent:  Informed written and oral consent was obtained from the patient after 
the risks and benefits were discussed. All questions were answered and the 
patient requested that we proceed. Procedure:  Maximal sterile barrier technique was used. Following routine prep 
and drape of the abdomen, a local field block with 1% lidocaine was achieved. Ultrasound evaluation was performed. Fluid was identified in the peritoneal cavity. Using real-time ultrasound 
guidance, the peritoneal cavity was accessed with an 8 Cypriot centesis catheter. The catheter was connected to Vacutainer bottles. A total of 3500 cc of thin yellow fluid was removed. The catheter was removed 
and a bandage applied. Complications: None. Findings: Large-volume ascites. IMPRESSION Impression: Uncomplicated ultrasound guided paracentesis.  
PLAN: The patient is interested in having a Pleurx catheter placed in order to 
manage her ascites at home. She has had a pleural tunneled catheter in the past 
and tolerated well. She will discuss with her oncologist. 
 
Ascites Fluid: RBC < 10,000. WBC < 100. Albumin 1.6 (serum albumin 2.6). Total Protein 2.7. Cytology pending. Assessment:  
 
Principal Problem: 
  Acute on chronic kidney failure ([address] (10/7/2016) Active Problems: Metastatic breast cancer ([address] (2/17/2017) Other cirrhosis of liver ([address] (7/31/2018) Other ascites (7/31/2018) Acute-on-chronic kidney injury ([address] (7/31/2018) Patient is a 75 y.o. female with PMH of Breast Cancer diagnosed in 2011 with mets to bone, DVT on Warfatin, CKD, ureteral stent, right sided pleural effusions with right thoracentesis, Ascites requiring paracentesis, pericardial effusion, who is seen in consultation at the request of Elver Rodríguez NP for ascites, cirrhosis. Viral hepatitis panel was negative in April 2018. Ferritin is quite elevated. No family history of liver disease and no history of ETOH. PET scan with findings concerning for cirrhosis, splenomegaly. Plan:  
 
Fluid studies reviewed. No evidence of SBP based on cell count. SAAG 1.0 suggestive of exudative process - cannot exclude malignant ascites. She feels that her cancer is well controlled and this is likley from Cirrhosis. She is not a candidate for Liver transplant and additional work-up for CLD will likely not be beneficial as this is endstage with no current activity and normal transaminases. Options for placement of Pleur-X Catheter versus repeated large volume paracentesis discussed - latter complicated by the need for ongoing anticoagulant therapy. She understands the risks and limited life of these catheters but wishes to proceed - she is familiar.  
 
Discussed with Dr. Ale Dinh - limited options as she is not likely to tolerate adequate diuresis without worsening renal failure. Oncology notes reviewed. Discussed with IR. Seems all agree to proceed with Pleur-X to optimize her ascites management and maintain her Quality of Life. Will sign off. Prognosis guarded. Please call or re-consult as needed.  
 
Nayla Mayorga MD

## 2018-08-02 NOTE — PROGRESS NOTES
END OF SHIFT NOTE: 
 
Intake/Output 08/01 1901 - 08/02 0700 In: 0 Out: 500 [Urine:500] Voiding: YES Catheter: NO 
Drain:   
 
 
 
 
 
Stool:  0 occurrences. Emesis:  0 occurrences. VITAL SIGNS Patient Vitals for the past 12 hrs: 
 Temp Pulse Resp BP SpO2  
08/02/18 0401 99 °F (37.2 °C) 64 20 152/64 95 % 08/01/18 2344 98.7 °F (37.1 °C) 63 22 142/46 93 % 08/01/18 2210 - 65 - 138/46 -  
08/01/18 2007 99.1 °F (37.3 °C) 61 20 133/52 95 % Pain Assessment Pain 1 Pain Scale 1: Visual (08/02/18 0203) Pain Intensity 1: 0 (08/02/18 0203) Patient Stated Pain Goal: 0 (08/01/18 2005) Pain Reassessment 1: Patient sleeping (08/02/18 0203) Pain Location 1: Face;Neck (08/01/18 1737) Pain Orientation 1: Left (08/01/18 1737) Pain Intervention(s) 1: Medication (see MAR) (08/01/18 1737) Ambulating Yes Additional Information: Pt rested well through the night. Uneventful night. Shift report given to oncoming nurse at the bedside. Leena Gomez

## 2018-08-02 NOTE — PROGRESS NOTES
END OF SHIFT NOTE: 
 
Intake/Output 08/02 0701 - 08/02 1900 In: 560 [P.O.:560] Out: 200 [Urine:200] Voiding: YES Catheter: NO 
Drain:   
 
 
 
 
 
Stool:  0 occurrences. Emesis:  0 occurrences. VITAL SIGNS Patient Vitals for the past 12 hrs: 
 Temp Pulse Resp BP SpO2  
08/02/18 1526 97.7 °F (36.5 °C) 67 16 131/48 97 % 08/02/18 1118 98.3 °F (36.8 °C) 64 18 138/51 98 % 08/02/18 0724 98.6 °F (37 °C) 60 16 146/50 95 % Pain Assessment Pain 1 Pain Scale 1: Numeric (0 - 10) (08/02/18 1835) Pain Intensity 1: 7 (08/02/18 1835) Patient Stated Pain Goal: 0 (08/02/18 1835) Pain Reassessment 1: Patient sleeping (08/02/18 0203) Pain Location 1: Back;Leg (08/02/18 1835) Pain Orientation 1: Left (08/01/18 1737) Pain Intervention(s) 1: Medication (see MAR) (08/02/18 1835) Ambulating Yes Additional Information:  
 
Shift report given to oncoming nurse at the bedside. Aly Kumar

## 2018-08-02 NOTE — PROGRESS NOTES
Renal Progress Note Admission Date: 7/31/2018 Subjective:  
  
Abdominal distention better. Tapped yesterday ROS:  No chest pain, increased shortness of breath or nausea/vomitting Objective:  
 
Physical Exam:   
Patient Vitals for the past 8 hrs: 
 BP Temp Pulse Resp SpO2  
08/02/18 0724 146/50 98.6 °F (37 °C) 60 16 95 % 08/02/18 0401 152/64 99 °F (37.2 °C) 64 20 95 % Gen: comfortable , NAD HEENT: moist membranes CV: S1, S2 
Lungs: Clear bilaterally Abdominal : increased ascitics. Extem: no edema Current Facility-Administered Medications Medication Dose Route Frequency  0.9% sodium chloride infusion 250 mL  250 mL IntraVENous PRN  
 warfarin (COUMADIN) tablet 1.5 mg - On Hold  1.5 mg Oral See Admin Instructions  albuterol-ipratropium (DUO-NEB) 2.5 MG-0.5 MG/3 ML  3 mL Nebulization Q6H PRN  
 amLODIPine (NORVASC) tablet 10 mg  10 mg Oral DAILY  carvedilol (COREG) tablet 3.125 mg  3.125 mg Oral BID WITH MEALS  
 hydrALAZINE (APRESOLINE) tablet 50 mg  50 mg Oral BID  
 HYDROcodone-acetaminophen (NORCO)  mg tablet 1 Tab  1 Tab Oral Q6H PRN  
 LORazepam (ATIVAN) tablet 1 mg  1 mg Oral Q6H PRN  
 megestrol (MEGACE) 400 mg/10 mL (10 mL) oral suspension 800 mg  800 mg Oral DAILY  mirtazapine (REMERON) tablet 30 mg  30 mg Oral QHS  montelukast (SINGULAIR) tablet 10 mg  10 mg Oral QHS  temazepam (RESTORIL) capsule 15 mg  15 mg Oral QHS PRN  
 senna-docusate (PERICOLACE) 8.6-50 mg per tablet 1 Tab  1 Tab Oral DAILY Data Review:  
 
LABS:  
Recent Results (from the past 12 hour(s)) METABOLIC PANEL, COMPREHENSIVE Collection Time: 08/02/18  4:55 AM  
Result Value Ref Range Sodium 146 (H) 136 - 145 mmol/L Potassium 4.9 3.5 - 5.1 mmol/L Chloride 114 (H) 98 - 107 mmol/L  
 CO2 20 (L) 21 - 32 mmol/L Anion gap 12 7 - 16 mmol/L Glucose 105 (H) 65 - 100 mg/dL BUN 60 (H) 8 - 23 MG/DL  Creatinine 3.86 (H) 0.6 - 1.0 MG/DL  
 GFR est AA 15 (L) >60 ml/min/1.73m2 GFR est non-AA 12 (L) >60 ml/min/1.73m2 Calcium 7.7 (L) 8.3 - 10.4 MG/DL Bilirubin, total 0.4 0.2 - 1.1 MG/DL  
 ALT (SGPT) 25 12 - 65 U/L  
 AST (SGOT) 32 15 - 37 U/L Alk. phosphatase 240 (H) 50 - 136 U/L Protein, total 6.9 6.3 - 8.2 g/dL Albumin 2.7 (L) 3.2 - 4.6 g/dL Globulin 4.2 (H) 2.3 - 3.5 g/dL A-G Ratio 0.6 (L) 1.2 - 3.5 MAGNESIUM Collection Time: 08/02/18  4:55 AM  
Result Value Ref Range Magnesium 2.6 (H) 1.8 - 2.4 mg/dL PROTHROMBIN TIME + INR Collection Time: 08/02/18  4:55 AM  
Result Value Ref Range Prothrombin time 22.2 (H) 11.5 - 14.5 sec INR 2.0 Plan:  
 
Principal Problem: 
  Acute on chronic kidney failure ([facility] (10/7/2016) Active Problems: Metastatic breast cancer ([facility] (2/17/2017) Other cirrhosis of liver ([facility] (7/31/2018) Other ascites (7/31/2018) Acute-on-chronic kidney injury ([facility] (7/31/2018) 1. Acute on chronic kidney disease, probably due to volume depletion related to attempts to control her ascites. 2.  Hyperkalemia due to the combination of worsening azotemia and use of Aldactone and potassium replacement. 3.  Metastatic breast cancer. EMERY likely from overdiureis. Initially  bettter with IVF. Now stopped Draining with pleurex drain. In small volume is better than diuretics. Discussed with Dr. Jane Rush, we agree the drain provides best option for quality of life

## 2018-08-02 NOTE — PROGRESS NOTES
[facility] [employer] [facility] Inpatient Hematology / Oncology Progress Note Admission Date: 2018 12:12 PM 
Reason for Admission/Hospital Course: EMERY Acute-on-chronic kidney injury ([facility] [address] 24 Hour Events: 
Afebrile, vitals stable Paracentesis yesterday with 4 liters removed Planning for PleurX placement in IR Complains of abdominal cramping with kristy-colace ROS: 
Constitutional: Positive for fatigue; negative for fever, chills, weakness . CV:Negative for chest pain, palpitations, edema. Respiratory: Negative for dyspnea, cough, wheezing. GI: Positive for abdominal distention; negative for nausea, diarrhea. 10 point review of systems is otherwise negative with the exception of the elements mentioned above in the HPI. No Known Allergies OBJECTIVE: 
Patient Vitals for the past 8 hrs: 
 BP Temp Pulse Resp SpO2  
18 1118 138/51 98.3 °F (36.8 °C) 64 18 98 % 18 0724 146/50 98.6 °F (37 °C) 60 16 95 % Temp (24hrs), Av.8 °F (37.1 °C), Min:98.3 °F (36.8 °C), Max:99.1 °F (37.3 °C) 
 
 07 -  1900 In: 120 [P.O.:120] Out: 200 [Urine:200] Physical Exam: 
Constitutional: Well developed, well nourished female in no acute distress, sitting comfortably in the hospital bed. HEENT: Normocephalic and atraumatic. Oropharynx is clear, mucous membranes are moist. Neck supple. Skin Warm and dry. No bruising and no rash noted. No erythema. No pallor. Respiratory Lungs are clear to auscultation bilaterally without wheezes, rales or rhonchi, normal air exchange without accessory muscle use. CVS Normal rate, regular rhythm and normal S1 and S2. No murmurs, gallops, or rubs. Abdomen + soft, distended but improved after paracentesis. Normoactive bowel sounds. Neuro Grossly nonfocal with no obvious sensory or motor deficits. MSK Normal range of motion in general.  No edema and no tenderness. Psych Appropriate mood and affect. Labs: 
   
Recent Labs 08/01/18 
 0533  07/31/18 
 2401 WBC  2.8*  3.5*  
RBC  2.32*  2.53* HGB  8.6*  9.6* HCT  26.0*  29.0*  
MCV  112.1*  114.6*  
MCH  37.1*  37.9*  
MCHC  33.1  33.1 RDW  14.4  14.2 PLT  134*  151 GRANS  57  68 LYMPH  33  23 MONOS  7  7 EOS  1  1  
BASOS  2  1 IG  0   --   
DF  AUTOMATED  AUTOMATED ANEU  1.6*  2.4 ABL  0.9  0.8 ABM  0.2  0.2 ANTONETTE  0.0  0.0 ABB  0.1  0.0 AIG  0.0   --   
 
  
Recent Labs 08/02/18 
 2108  08/01/18 
 9942  07/31/18 
 1445 NA  146*  144  138  
K  4.9  4.9  5.0  
CL  114*  113*  106 CO2  20*  20*  20* AGAP  12  11  12 GLU  105*  98  179* BUN  60*  70*  73* CREA  3.86*  4.33*  4.47* GFRAA  15*  13*  13* GFRNA  12*  11*  10* CA  7.7*  7.5*  8.5 SGOT  32   --   34  
AP  240*   --   236* TP  6.9   --   7.8 ALB  2.7*  2.6*  3.3 GLOB  4.2*   --   4.5* AGRAT  0.6*   --   0.7* MG  2.6*  2.4   --   
PHOS   --   4.1*   --   
 
Imaging: IR PARACENTESIS ABD Pamela Dad IMAGE [[id number]] Collected: 08/02/18 5240   
  Order Status: Completed Updated: 08/02/18 1230  
  Narrative:    
  Title: Ultrasound guided paracentesis.   
 
History: 65-year-old female with metastatic breast cancer, cirrhosis, ascites, 
renal insufficiency. : Lazara Cline PA-C Supervising Physician: Shannon Chandler M.D. Consent:  Informed written and oral consent was obtained from the patient after 
the risks and benefits were discussed.  All questions were answered and the 
patient requested that we proceed. Procedure:  Maximal sterile barrier technique was used.  Following routine prep 
and drape of the abdomen, a local field block with 1% lidocaine was achieved. Ultrasound evaluation was performed. Fluid was identified in the peritoneal cavity.  Using real-time ultrasound 
guidance, the peritoneal cavity was accessed with an 8 Turkish centesis catheter. 
 The catheter was connected to LurnQ bottles. A total of 4000 cc of thin yellow fluid was removed and sent to the lab. The 
catheter was removed and a bandage applied. Complications: None. Findings: Large-volume ascites. 
  
  Impression:    
  Impression: Uncomplicated ultrasound guided paracentesis. PLAN: Consider Pleurx catheter placement. 
 
 
 
  
  US RETROPERITONEUM COMP [[id number]] Collected: 08/02/18 1297  
  Order Status: Completed Updated: 08/02/18 0927  
  Narrative:    
  Renal ultrasound, 8/2/2018 History: Acute kidney insufficiency. Comparison: Limited abdominal ultrasound 6/12/2018. Technique: Grayscale and doppler images of the kidneys and bladder were obtained 
using a 3-5 MHz linear transducer. Findings: The right kidney measures 10.4 cm, and the left kidney measures 9.1 cm 
in greatest longitudinal length.  No shadowing stones are seen. Persistent 
moderate hydronephrosis is seen of the right kidney which is not felt to be 
significantly changed from the prior study. Central artifact is seen within the 
dilated right renal collecting system which is felt to represent the proximal 
aspect of the stent.  Renal parenchymal echogenicity is increased consistent 
with chronic medical renal changes. The urinary bladder demonstrates the distal aspect of what appears to be a 
stent. The distal abdominal aorta is normal in caliber measuring 1.2 cm. The IVC is 
patent. Small to moderate ascites is seen. The liver is in nodular suggesting 
cirrhosis. A left pleural effusion is seen. 
  
  Impression:    
  IMPRESSION:    
1.  Stable moderate hydronephrosis of the right kidney when compared to a prior 
limited abdominal ultrasound dated 6/12/2018. No evolving hydronephrosis is seen 
of the left kidney. What appears to be a right renal collecting system stent is 
seen which is grossly not malpositioned. 2.  Left pleural effusion and small to moderate ascites. 3. Cirrhotic appearing liver. ASSESSMENT: 
 
Problem List  Date Reviewed: 7/31/2018 Codes Class Noted Other cirrhosis of liver ([place] [address].) ICD-10-CM: T61.50 ICD-9-CM: 571.5  7/31/2018 Other ascites ICD-10-CM: R18.8 ICD-9-CM: 789.59  7/31/2018 Acute-on-chronic kidney injury ([place] [address] ICD-10-CM: N17.9, N18.9 ICD-9-CM: 584.9, 585.9  7/31/2018 History of blood clots ICD-10-CM: Z86.718 ICD-9-CM: V12.51  2/16/2018 Type 2 diabetes mellitus with nephropathy ([place] [address] ICD-10-CM: E11.21 
ICD-9-CM: 250.40, 583.81  1/16/2018 Recurrent depression ([place] [address] ICD-10-CM: F33.9 ICD-9-CM: 296.30  1/16/2018 Vitamin B12 deficiency ICD-10-CM: E53.8 ICD-9-CM: 266.2  11/13/2017 Anemia of chronic disease ICD-10-CM: D63.8 ICD-9-CM: 285.29  11/13/2017 Iron deficiency anemia (Chronic) ICD-10-CM: D50.9 ICD-9-CM: 280.9  7/10/2017 Type 2 diabetes mellitus without complication (HCC) (Chronic) ICD-10-CM: E11.9 ICD-9-CM: 250.00  7/10/2017 Folliculitis ISY-56-OU: L73.9 ICD-9-CM: 704.8  7/10/2017 Hypernatremia ICD-10-CM: E87.0 ICD-9-CM: 276.0  5/30/2017 Anxiety ICD-10-CM: F41.9 ICD-9-CM: 300.00  5/28/2017 Acute on chronic respiratory failure (HCC) ICD-10-CM: J96.20 ICD-9-CM: 518.84  5/24/2017 Aortic stenosis, moderate (Chronic) ICD-10-CM: I35.0 ICD-9-CM: 424.1  5/24/2017 Anticoagulated on Coumadin (Chronic) ICD-10-CM: Z51.81, Z79.01 
ICD-9-CM: V58.83, V58.61  5/24/2017 Volume overload ICD-10-CM: E87.70 ICD-9-CM: 276.69  5/24/2017 Breast cancer ([facility] [address] (Chronic) ICD-10-CM: C50.919 ICD-9-CM: 174.9  2/17/2017 Overview Addendum 2/27/2014 10:08 AM by Cha Aiken NP  
  Er+  pr + her-2 lawanda negative stage 4 Examination of the 3D tomographic PET images demonstrates marked hypermetabolism associated with both primary breast carcinomas. SUV max on the right is 20.5 and on the left 31.5.  There is matted high right axillary  
lymphadenopathy as well as a more hypermetabolic low right axillary lymph node which measures 12 x 9 mm and has an SUV max of 15.4. Small bilateral pulmonary hilar metastases are also noted as well as extensive skeletal metastatic disease which includes both proximal femora, both scapulae, innumerable ribs, virtually every vertebra, and the bony pelvis. No displaced pathologic fracture is identified on the CT images. Ct scan 2-12 Innumerable diffuse osseous metastases. . Multiple diffuse tiny lung nodules, and right hilar lymphadenopathy, also suspicious for metastases. 3. Bilateral breast masses, compatible with known carcinoma. CANCER ANTIGEN 27.29 1076 Oncology Flowsheet Day, Cycle cyclophosphamide (CYTOXAN) IV  
1/27/2012 Day 1, Cycle 1 600 mg/m2 = 996 mg  
2/17/2012 Day 1, Cycle 2 600 mg/m2 = 996 mg  
3/9/2012 Day 1, Cycle 3 600 mg/m2 = 996 mg  
3/30/2012 Day 1, Cycle 4 500 mg/m2 = 830 mg  
4/20/2012 Day 1, Cycle 5 500 mg/m2 = 830 mg Oncology Flowsheet DOCEtaxel (TAXOTERE) IV  
1/27/2012 75 mg/m2 = 125 mg  
2/17/2012 75 mg/m2 = 125 mg  
3/9/2012 75 mg/m2 = 125 mg  
3/30/2012 60 mg/m2 = 100 mg  
4/20/2012 60 mg/m2 = 100 mg  
7-19-12 post 6 cycles of TC improved breast masses switch to [employer] Clear response on pet ct scan Mixed response to chemotherapy: There has been a significant interval response in the right breast and axilla with decrease in size of spiculated right breast mass and numerous right axillary lymph nodes. Left breast mass also appears to have responded chemotherapy though there may be persistent chest wall invasion. 2. Interval evolution of widespread osseous metastatic disease with many lytic lesions now appearing more sclerotic. These are associated with continued FDG uptake which is difficult to differentiate from marrow reactivity given recent chemotherapy. At least one new osseous metastatic deposits is present in the posterior spinous process at L1.  Focally intense uptake is also present at approximately T5 
10-19-12 pain in arms legs back continued improvement on ct scan Decreased size of the largest lung nodules and near complete resolution . of many. No new masses in the chest, abdomen, or pelvis. 2. Decreased size of right axillary and right hilar lymph nodes. 3. Diffuse osseous lesions again seen. 4. Diverticulosis. 5. Atherosclerotic vascular disease ca 15-3 down to 50 On femara and aredia will see if we can get affinitor to start at next visit continue therapy 12-19-12 new headache and dizziness for MRI will start affinitor 1-14-13 improved pain breast mass right breast smaller width 7 cm x 5 
6-05-13 femara and aredia now on affinitor x 5 months 7-13 tumor markers falling 8-20-13 post admission for pneumonia medications to restart breast mass smaller reduce affinitor to 7.5 mg  
10-18-13 femara and affinitor pain right ilium intermittent markers smaller breast mass smaller on right 12-5-13 Reduce Afinitor to 5 mg daily. Continue coumadin for DVT. Continue Femara and Aredia. Repeat US and skeletal survey prior to next visit. 1-6-14 Feeling better with reduced dose. Ultrasound of breasts show decrease in mass sizes. Skeletal survey stable. Follow up in 2 months. Bony metastasis ([facility] [address] (Chronic) ICD-10-CM: C79.51 
ICD-9-CM: 198.5  2/17/2017 Acute respiratory failure ([facility] [address] ICD-10-CM: J96.00 
ICD-9-CM: 518.81  2/17/2017 Metastatic breast cancer ([facility] [address] (Chronic) ICD-10-CM: K11.347 ICD-9-CM: 174.9  2/17/2017 CKD (chronic kidney disease) (Chronic) ICD-10-CM: N18.9 ICD-9-CM: 585.9  1/4/2017 Overview Signed 5/14/2013 11:35 AM by Virgen Olson RN With acute rise- ? Dehydration and monitor DVT (deep venous thrombosis) (HCC) (Chronic) ICD-10-CM: O58.427 ICD-9-CM: 453.40  1/4/2017 Overview Signed 11/19/2013  6:07 AM by Alejandra Contreras   11-18-13 There is nonocclusive thrombus within the left common femoral vein, superficial femoral vein, popliteal vein, and posterior tibial vein Accelerated hypertension ICD-10-CM: I10 
ICD-9-CM: 401.0  1/4/2017 Pleural effusion, right (Chronic) ICD-10-CM: J90 ICD-9-CM: 511.9  1/4/2017 Overview Addendum 5/26/2017 10:11 AM by Minna Amor NP  
  10/6/16 R thoracentesis 800 ml: pleural fluid creatine 4.1 = likely urinothorax 5/25/2017: Thoracentesis on right- 950 cc of /serosanguinous/ fluid Hydronephrosis (Chronic) ICD-10-CM: N13.30 ICD-9-CM: 226  1/4/2017 Bilateral edema of lower extremity ICD-10-CM: R60.0 ICD-9-CM: 782.3  1/4/2017 Hematuria ICD-10-CM: R31.9 ICD-9-CM: 599.70  1/4/2017 Chemotherapy-induced neutropenia (HCC) ICD-10-CM: D70.1, T45.1X5A 
ICD-9-CM: 288.03, E933.1  12/20/2016 Pancytopenia ([facility] [address] ICD-10-CM: Q18.117 ICD-9-CM: 284.19  10/10/2016 HTN (hypertension) (Chronic) ICD-10-CM: I10 
ICD-9-CM: 401.9  10/7/2016 DM (diabetes mellitus) (HCC) (Chronic) ICD-10-CM: E11.9 ICD-9-CM: 250.00  10/7/2016 * (Principal)Acute on chronic kidney failure (HCC) ICD-10-CM: N17.9, N18.9 ICD-9-CM: 584.9, 585.9  10/7/2016 Pulmonary edema ICD-10-CM: J81.1 ICD-9-CM: 195  1/3/2016 Elevated troponin ICD-10-CM: R74.8 ICD-9-CM: 790.6  10/20/2015 PND (paroxysmal nocturnal dyspnea) ICD-10-CM: R06.00 
ICD-9-CM: 786.09  2/25/2014 Leg swelling ICD-10-CM: M79.89 ICD-9-CM: 729.81  2/25/2014 Overview Signed 2/25/2014  2:45 PM by Halle Berman NP Bilateral 
 
  
  
   
 Heart failure with preserved ejection fraction (HCC) ICD-10-CM: I50.30 ICD-9-CM: 428.9  2/25/2014 Anemia ICD-10-CM: D64.9 ICD-9-CM: 285.9  11/19/2013 Overview Signed 11/19/2013  6:06 AM by Preeti Agudelo 11-18-13 admitted with anemia and transfused Asthma (Chronic) ICD-10-CM: J45.909 ICD-9-CM: 493.90  10/22/2013  Overview Addendum 10/22/2013 10:55 AM by Cici Burciaga NP  
  8/2013: started on Symbicort 10/22/13: Added singulair Hypomagnesemia ICD-10-CM: R26.77 
ICD-9-CM: 275.2  8/4/2013 Hypokalemia ICD-10-CM: E87.6 ICD-9-CM: 276.8  8/4/2013 Ms. Lesly Hercules is a 75 y.o. female admitted on 7/31/2018 with a primary diagnosis of acute on chronic kidney injury.  
  
She has a history of metastatic breast cancer, asthma, chronic diastolic heart failure, HTN, DM type 2, DVT, and chronic kidney disease. She is currently being treated with Ibrance/Faslodex. Most recently she has developed recurrent ascites with paracentesis twice over the last five weeks or so. She was placed on aldactone 50 mg daily to be taken in addition to her lasix 40 mg daily to try and help with fluid accumulation. She returned today for follow up and her creatinine was elevated above baseline to 4.47. She will be admitted for management of acute on chronic kidney injury.  
  
 
PLAN: 
Metastatic Breast Cancer * Currently on treatment with Ibrance/fulvestrant. 8/1 From a prognosis standpoint regarding her metastatic breast cancer, she has done quite well on current therapy. Her last PET scan done 7/12 show stable disease and no new concerning areas. Cytology from fluid has not proven to be malignant. May continue to do well and other lines of therapy as options should she have progression in the future 
  
Acute on Chronic Kidney Injury * Gentle hydration. Hold Lasix and aldactone - watch closely for fluid overload. * Consult nephrology. * Renal ultrasound. 8/1 Slight improvement with hydration yesterday. Appreciate nephrology help. Holding diuretics. Off fluids to limit fluid reaccumulation. Per note today- rec removing fluid with pleurex rather than attempting management with diuretics. 8/2 Mild improvement daily. Continue to hold diuretics.  
  
Ascites * Consult IR for paracentesis. Send fluid for cytology. * Consult GI for cirrhosis. 8/1 GI following.  Planning para today and sending fluid for work up. Defer pleurex drain to GI if no other medical management options reasonable. Obviously limited with renal issues. OK from our perspective to place pleurex if GI deems appropriate. 8/2 Per GI, she is not a candidate for liver transplant and additional work-up for CLD will likely not be beneficial as this is endstage with no current activity and normal transaminases. Suggest PleurX. She agrees, IR aware. History of DVT * Coumadin. Holding for procedures. Constipation 73290 Change erna-colace to Miralax. Erna-colace gives her abdominal discomfort.  
  
DVT prophylaxis - SCD's. Kamryn SOP's. Continue home medications.  
  
 
 
     
 
Chanell Short NP [facility] [place]ematology [facility] Oncology [address] [address] Office : [phone] Fax : [phone] Attending Addendum: 
I personally evaluated the patient with SHIRLEY Ontiveros, and agree with the assessment, findings and plan as documented. Appears much more comfortable, heart regular, lungs clear, abdomen soft and non-distended. S/p large volume paracentesis. Will be getting catheter (IR).  C/w supportive care. yKler Faust MD 
[facility] [place] Hematology [facility] Oncology [address] [address] Office : [phone] Fax : [phone]

## 2018-08-03 NOTE — PROGRESS NOTES
Rounded on patient with primary nurse, Patric Alpers, and explained about upcoming procedure scheduled for 8/8/18 [facility] [place] [employer] [employer] 
c: [email or web address] [address]ata [address] / [place], [address] [facility] 
[email or web address]

## 2018-08-03 NOTE — PROGRESS NOTES
END OF SHIFT NOTE: 
 
Intake/Output Voiding: YES Catheter: NO 
Drain:   
 
 
 
 
 
Stool:  0 occurrences. Emesis:  0 occurrences. VITAL SIGNS Patient Vitals for the past 12 hrs: 
 Temp Pulse Resp BP SpO2  
08/03/18 1437 98.8 °F (37.1 °C) 64 16 139/58 96 % 08/03/18 1125 99 °F (37.2 °C) 63 16 125/54 96 % 08/03/18 0742 99 °F (37.2 °C) 64 16 150/58 98 % Pain Assessment Pain 1 Pain Scale 1: Numeric (0 - 10) (08/03/18 1833) Pain Intensity 1: 7 (08/03/18 1833) Patient Stated Pain Goal: 0 (08/03/18 1833) Pain Reassessment 1: Yes (08/03/18 0900) Pain Location 1: Back;Leg (08/03/18 1833) Pain Orientation 1: Left (08/01/18 1737) Pain Intervention(s) 1: Medication (see MAR) (08/03/18 1833) Ambulating Yes Additional Information:  
 
Shift report given to oncoming nurse at the bedside. Delmy Fernandez

## 2018-08-03 NOTE — PROGRESS NOTES
Department of Interventional Radiology 
[phone] Progress Note Patient: Swapnil Ibarra MRN: [id number]  SSN: [id number] YOB: 1950  Age: 75 y.o. Sex: female Planning to schedule Pleurx catheter placement for next week. This will allow fluid to reaccumulate. US yesterday demonstrates very little residual ascites post paracentesis.    
 
Simba Campos PA-C

## 2018-08-03 NOTE — PROGRESS NOTES
END OF SHIFT NOTE: 
 
Intake/Output Voiding: YES Catheter: NO 
Drain:   
 
 
 
 
 
Stool:  0 occurrences. Emesis:  0 occurrences. VITAL SIGNS Patient Vitals for the past 12 hrs: 
 Temp Pulse Resp BP SpO2  
08/03/18 0416 98.9 °F (37.2 °C) 67 16 141/55 98 % 08/02/18 2342 99.2 °F (37.3 °C) 70 16 139/76 96 % 08/02/18 2012 99.6 °F (37.6 °C) 69 16 139/54 97 % Pain Assessment Pain 1 Pain Scale 1: Numeric (0 - 10) (08/02/18 1915) Pain Intensity 1: 0 (08/02/18 1915) Patient Stated Pain Goal: 0 (08/02/18 1915) Pain Reassessment 1: Patient sleeping (08/02/18 0203) Pain Location 1: Back;Leg (08/02/18 1835) Pain Orientation 1: Left (08/01/18 1737) Pain Intervention(s) 1: Medication (see MAR) (08/02/18 1835) Ambulating Yes Additional Information: Pt slept through the night, no pain medication required. No needs voiced at this time. Shift report given to oncoming nurse, Yolette Montiel RN,  at the bedside. Olamide Kinney

## 2018-08-03 NOTE — PROGRESS NOTES
Claudell Mercury Admission Date: 7/31/2018 Renal Daily Progress Note: 8/3/2018 The patient's chart is reviewed and the patient is discussed with the staff. Subjective:  
 
Patient seen and examined on oncology floor, she is alert and oriented, reports abdominal distention no tenmderness s/p paracenteses and denies SOB, CP, N/V/C/D, dysuria, hematuria, or LE edema ROS: 
Constitutional: + fatigue; no fever, chills, weakness . CV: no chest pain, palpitations, edema. Respiratory: no dyspnea, cough, wheezing. GI: + abdominal distention; no nausea, vomiting, diarrhea, constipation Current Facility-Administered Medications Medication Dose Route Frequency  sodium chloride 0.9 % bolus infusion 500 mL  500 mL IntraVENous ONCE  polyethylene glycol (MIRALAX) packet 17 g  17 g Oral DAILY  0.9% sodium chloride infusion 250 mL  250 mL IntraVENous PRN  
 warfarin (COUMADIN) tablet 1.5 mg - On Hold  1.5 mg Oral See Admin Instructions  albuterol-ipratropium (DUO-NEB) 2.5 MG-0.5 MG/3 ML  3 mL Nebulization Q6H PRN  
 amLODIPine (NORVASC) tablet 10 mg  10 mg Oral DAILY  carvedilol (COREG) tablet 3.125 mg  3.125 mg Oral BID WITH MEALS  
 hydrALAZINE (APRESOLINE) tablet 50 mg  50 mg Oral BID  
 HYDROcodone-acetaminophen (NORCO)  mg tablet 1 Tab  1 Tab Oral Q6H PRN  
 LORazepam (ATIVAN) tablet 1 mg  1 mg Oral Q6H PRN  
 megestrol (MEGACE) 400 mg/10 mL (10 mL) oral suspension 800 mg  800 mg Oral DAILY  mirtazapine (REMERON) tablet 30 mg  30 mg Oral QHS  montelukast (SINGULAIR) tablet 10 mg  10 mg Oral QHS  temazepam (RESTORIL) capsule 15 mg  15 mg Oral QHS PRN Objective:  
 
Vitals:  
 08/02/18 2342 08/03/18 9244 08/03/18 7742 08/03/18 1125 BP: 139/76 141/55 150/58 125/54 Pulse: 70 67 64 63 Resp: 16 16 16 16 Temp: 99.2 °F (37.3 °C) 98.9 °F (37.2 °C) 99 °F (37.2 °C) 99 °F (37.2 °C) SpO2: 96% 98% 98% 96% Weight:      
 
Intake and Output:  
08/01 1901 - 08/03 0700 In: 560 [P.O.:560] Out: 700 [Urine:700] 08/03 0701 - 08/03 1900 In: 360 [P.O.:360] Out: 250 [Urine:250] Physical Exam:  
Constitutional:  the patient is well developed and in no acute distress HEENT:  Sclera clear, pupils equal, oral mucosa moist 
Lungs: clear bilaterally, no wheezing Cardiovascular:  RRR without M,G,R 
Abd/GI: soft and non-tender; with positive bowel sounds, + distension. Ext: warm without cyanosis. There is no lower leg edema. Musculoskeletal: moves all four extremities with equal strength Skin:  no jaundice or rashes, no wounds Neuro: no gross neuro deficits Musculoskeletal: can ambulate. No deformity Psychiatric: Calm. LAB Recent Labs 08/03/18 
 1427  08/02/18 
 3391  08/01/18 
 0297 WBC  2.3*   --   2.8* HGB  9.0*   --   8.6* HCT  27.6*   --   26.0*  
PLT  149*   --   134* INR  1.7  2.0  2.8 Recent Labs 08/03/18 
 3999  08/02/18 
 6067  08/01/18 
 5577 NA  144  146*  144  
K  5.1  4.9  4.9  
CL  114*  114*  113* CO2  21  20*  20* GLU  109*  105*  98 BUN  59*  60*  70* CREA  3.80*  3.86*  4.33* MG  2.5*  2.6*  2.4 PHOS   --    --   4.1* ALB   --   2.7*  2.6* SGOT   --   32   -- No results for input(s): PH, PCO2, PO2, HCO3 in the last 72 hours. Assessment:  (Medical Decision Making) Hospital Problems  Date Reviewed: 7/31/2018 Codes Class Noted POA Other cirrhosis of liver ([facility] ICD-10-CM: Z92.54 ICD-9-CM: 571.5  7/31/2018 Yes Other ascites ICD-10-CM: R18.8 ICD-9-CM: 789.59  7/31/2018 Yes Acute-on-chronic kidney injury ([facility] ICD-10-CM: N17.9, N18.9 ICD-9-CM: 584.9, 585.9  7/31/2018 Unknown Metastatic breast cancer ([facility] [address] (Chronic) ICD-10-CM: U50.057 ICD-9-CM: 174.9  2/17/2017 Yes * (Principal)Acute on chronic kidney failure (HCC) ICD-10-CM: N17.9, N18.9 ICD-9-CM: 584.9, 585.9  10/7/2016 Yes Plan:  (Medical Decision Making) 1. EMERY on CKD 2/2 volume depletion in the setting of attempts to control ascites and diuresis 
-Creatinine on admission 4.47 and has been improving with IVF Cr today 3.80  
baseline Cr 2.5-3.0 2. Hyperkalemia- resolved 3. Metastatic breast cancer- Current treatment with Ibrance/fulvestrant per Onc. 
 
4. Ascites- s/p paracentesis GI following Farzad Guaman NP

## 2018-08-03 NOTE — PROGRESS NOTES
[facility] Hematology & Oncology Inpatient Hematology / Oncology Progress Note Admission Date: 2018 12:12 PM 
Reason for Admission/Hospital Course: Other ascites [R18.8] 24 Hour Events: 
Afebrile, vitals stable Planning for PleurX outpatient next week Give 0.5 liter ns bolus dt ongoing elevated Cr level ROS: 
Constitutional: Positive for fatigue; negative for fever, chills, weakness . CV:Negative for chest pain, palpitations, edema. Respiratory: Negative for dyspnea, cough, wheezing. GI: Positive for abdominal distention; negative for nausea, diarrhea. 10 point review of systems is otherwise negative with the exception of the elements mentioned above in the HPI. No Known Allergies OBJECTIVE: 
Patient Vitals for the past 8 hrs: 
 BP Temp Pulse Resp SpO2  
18 0742 150/58 99 °F (37.2 °C) 64 16 98 % 18 0416 141/55 98.9 °F (37.2 °C) 67 16 98 % Temp (24hrs), Av.9 °F (37.2 °C), Min:97.7 °F (36.5 °C), Max:99.6 °F (37.6 °C) 
 
 0701 -  1900 In: 360 [P.O.:360] Out: 250 [Urine:250] Physical Exam: 
Constitutional: Well developed, well nourished female in no acute distress, sitting comfortably in the hospital bed. HEENT: Normocephalic and atraumatic. Oropharynx is clear, mucous membranes are moist. Neck supple. Skin Warm and dry. No bruising and no rash noted. No erythema. No pallor. Respiratory Lungs are clear to auscultation bilaterally without wheezes, rales or rhonchi, normal air exchange without accessory muscle use. CVS Normal rate, regular rhythm and normal S1 and S2. No murmurs, gallops, or rubs. Abdomen + soft, distended but improved after paracentesis. Normoactive bowel sounds. Neuro Grossly nonfocal with no obvious sensory or motor deficits. MSK Normal range of motion in general.  No edema and no tenderness. Psych Appropriate mood and affect. Labs: 
   
Recent Labs    18 
 6412  18 
 1063 WBC  2.3*  2.8*  
RBC  2.45*  2.32* HGB  9.0*  8.6* HCT  27.6*  26.0*  
MCV  112.7*  112.1*  
MCH  36.7*  37.1*  
MCHC  32.6  33.1 RDW  14.6  14.4 PLT  149*  134* GRANS  54  57 LYMPH  36  33 MONOS  7  7 EOS  1  1  
BASOS  2  2 IG  0  0  
DF  AUTOMATED  AUTOMATED ANEU  1.2*  1.6* ABL  0.8  0.9 ABM  0.2  0.2 ANTONETTE  0.0  0.0 ABB  0.0  0.1 AIG  0.0  0.0 Recent Labs 08/03/18 
 1308  08/02/18 
 5891  08/01/18 
 3518 NA  144  146*  144  
K  5.1  4.9  4.9  
CL  114*  114*  113* CO2  21  20*  20* AGAP  9  12  11 GLU  109*  105*  98 BUN  59*  60*  70* CREA  3.80*  3.86*  4.33* GFRAA  15*  15*  13* GFRNA  13*  12*  11*  
CA  7.3*  7.7*  7.5* SGOT   --   32   --   
AP   --   240*   --   
TP   --   6.9   --   
ALB   --   2.7*  2.6*  
GLOB   --   4.2*   --   
AGRAT   --   0.6*   --   
MG  2.5*  2.6*  2.4 PHOS   --    --   4.1* Imaging: IR PARACENTESIS ABD Alveria Cirri IMAGE [[id number]] Collected: 08/02/18 8107   
  Order Status: Completed Updated: 08/02/18 1230  
  Narrative:    
  Title: Ultrasound guided paracentesis.   
 
History: 69-year-old female with metastatic breast cancer, cirrhosis, ascites, 
renal insufficiency. : Viviana Yun PA-C Supervising Physician: Winda Siemens, M.D. Consent:  Informed written and oral consent was obtained from the patient after 
the risks and benefits were discussed.  All questions were answered and the 
patient requested that we proceed. Procedure:  Maximal sterile barrier technique was used.  Following routine prep 
and drape of the abdomen, a local field block with 1% lidocaine was achieved. Ultrasound evaluation was performed. Fluid was identified in the peritoneal cavity. Using real-time ultrasound 
guidance, the peritoneal cavity was accessed with an 8 [place] centesis catheter. 
 The catheter was connected to Vacutainer bottles.  
 
A total of 4000 cc of thin yellow fluid was removed and sent to the lab. The 
catheter was removed and a bandage applied. Complications: None. Findings: Large-volume ascites. 
  
  Impression:    
  Impression: Uncomplicated ultrasound guided paracentesis. PLAN: Consider Pleurx catheter placement. 
 
 
 
  
  US RETROPERITONEUM COMP [[id number]] Collected: 08/02/18 7826  
  Order Status: Completed Updated: 08/02/18 0927  
  Narrative:    
  Renal ultrasound, 8/2/2018 History: Acute kidney insufficiency. Comparison: Limited abdominal ultrasound 6/12/2018. Technique: Grayscale and doppler images of the kidneys and bladder were obtained 
using a 3-5 MHz linear transducer. Findings: The right kidney measures 10.4 cm, and the left kidney measures 9.1 cm 
in greatest longitudinal length.  No shadowing stones are seen. Persistent 
moderate hydronephrosis is seen of the right kidney which is not felt to be 
significantly changed from the prior study. Central artifact is seen within the 
dilated right renal collecting system which is felt to represent the proximal 
aspect of the stent.  Renal parenchymal echogenicity is increased consistent 
with chronic medical renal changes. The urinary bladder demonstrates the distal aspect of what appears to be a 
stent. The distal abdominal aorta is normal in caliber measuring 1.2 cm. The IVC is 
patent. Small to moderate ascites is seen. The liver is in nodular suggesting 
cirrhosis. A left pleural effusion is seen. 
  
  Impression:    
  IMPRESSION:    
1.  Stable moderate hydronephrosis of the right kidney when compared to a prior 
limited abdominal ultrasound dated 6/12/2018. No evolving hydronephrosis is seen 
of the left kidney. What appears to be a right renal collecting system stent is 
seen which is grossly not malpositioned. 2.  Left pleural effusion and small to moderate ascites. 3. Cirrhotic appearing liver. ASSESSMENT: 
 
Problem List  Date Reviewed: 7/31/2018        Codes Class Noted Other cirrhosis of liver ([place] [address].) ICD-10-CM: P80.66 ICD-9-CM: 571.5  7/31/2018 Other ascites ICD-10-CM: R18.8 ICD-9-CM: 789.59  7/31/2018 Acute-on-chronic kidney injury ([place] [address].) ICD-10-CM: N17.9, N18.9 ICD-9-CM: 584.9, 585.9  7/31/2018 History of blood clots ICD-10-CM: Z86.718 ICD-9-CM: V12.51  2/16/2018 Type 2 diabetes mellitus with nephropathy ([place] [address].) ICD-10-CM: E11.21 
ICD-9-CM: 250.40, 583.81  1/16/2018 Recurrent depression ([place] [address].) ICD-10-CM: F33.9 ICD-9-CM: 296.30  1/16/2018 Vitamin B12 deficiency ICD-10-CM: E53.8 ICD-9-CM: 266.2  11/13/2017 Anemia of chronic disease ICD-10-CM: D63.8 ICD-9-CM: 285.29  11/13/2017 Iron deficiency anemia (Chronic) ICD-10-CM: D50.9 ICD-9-CM: 280.9  7/10/2017 Type 2 diabetes mellitus without complication (HCC) (Chronic) ICD-10-CM: E11.9 ICD-9-CM: 250.00  7/10/2017 Folliculitis CWO-12-KD: L73.9 ICD-9-CM: 704.8  7/10/2017 Hypernatremia ICD-10-CM: E87.0 ICD-9-CM: 276.0  5/30/2017 Anxiety ICD-10-CM: F41.9 ICD-9-CM: 300.00  5/28/2017 Acute on chronic respiratory failure (HCC) ICD-10-CM: J96.20 ICD-9-CM: 518.84  5/24/2017 Aortic stenosis, moderate (Chronic) ICD-10-CM: I35.0 ICD-9-CM: 424.1  5/24/2017 Anticoagulated on Coumadin (Chronic) ICD-10-CM: Z51.81, Z79.01 
ICD-9-CM: V58.83, V58.61  5/24/2017 Volume overload ICD-10-CM: E87.70 ICD-9-CM: 276.69  5/24/2017 Breast cancer ([facility] [address] (Chronic) ICD-10-CM: C50.919 ICD-9-CM: 174.9  2/17/2017 Overview Addendum 2/27/2014 10:08 AM by Arcelia Terrell NP  
  Er+  pr + her-2 lawanda negative stage 4 Examination of the 3D tomographic PET images demonstrates marked hypermetabolism associated with both primary breast carcinomas. SUV max on the right is 20.5 and on the left 31.5.  There is matted high right axillary  
lymphadenopathy as well as a more hypermetabolic low right axillary lymph node which measures 12 x 9 mm and has an SUV max of 15.4. Small bilateral pulmonary hilar metastases are also noted as well as extensive skeletal metastatic disease which includes both proximal femora, both scapulae, innumerable ribs, virtually every vertebra, and the bony pelvis. No displaced pathologic fracture is identified on the CT images. Ct scan 2-12 Innumerable diffuse osseous metastases. . Multiple diffuse tiny lung nodules, and right hilar lymphadenopathy, also suspicious for metastases. 3. Bilateral breast masses, compatible with known carcinoma. CANCER ANTIGEN 27.29 1076 Oncology Flowsheet Day, Cycle cyclophosphamide (CYTOXAN) IV  
1/27/2012 Day 1, Cycle 1 600 mg/m2 = 996 mg  
2/17/2012 Day 1, Cycle 2 600 mg/m2 = 996 mg  
3/9/2012 Day 1, Cycle 3 600 mg/m2 = 996 mg  
3/30/2012 Day 1, Cycle 4 500 mg/m2 = 830 mg  
4/20/2012 Day 1, Cycle 5 500 mg/m2 = 830 mg Oncology Flowsheet DOCEtaxel (TAXOTERE) IV  
1/27/2012 75 mg/m2 = 125 mg  
2/17/2012 75 mg/m2 = 125 mg  
3/9/2012 75 mg/m2 = 125 mg  
3/30/2012 60 mg/m2 = 100 mg  
4/20/2012 60 mg/m2 = 100 mg  
7-19-12 post 6 cycles of TC improved breast masses switch to [employer] Clear response on pet ct scan Mixed response to chemotherapy: There has been a significant interval response in the right breast and axilla with decrease in size of spiculated right breast mass and numerous right axillary lymph nodes. Left breast mass also appears to have responded chemotherapy though there may be persistent chest wall invasion. 2. Interval evolution of widespread osseous metastatic disease with many lytic lesions now appearing more sclerotic. These are associated with continued FDG uptake which is difficult to differentiate from marrow reactivity given recent chemotherapy. At least one new osseous metastatic deposits is present in the posterior spinous process at L1.  Focally intense uptake is also present at approximately T5 
10-19-12 pain in arms legs back continued improvement on ct scan Decreased size of the largest lung nodules and near complete resolution . of many. No new masses in the chest, abdomen, or pelvis. 2. Decreased size of right axillary and right hilar lymph nodes. 3. Diffuse osseous lesions again seen. 4. Diverticulosis. 5. Atherosclerotic vascular disease ca 15-3 down to 50 On femara and aredia will see if we can get affinitor to start at next visit continue therapy 12-19-12 new headache and dizziness for MRI will start affinitor 1-14-13 improved pain breast mass right breast smaller width 7 cm x 5 
6-05-13 femara and aredia now on affinitor x 5 months 7-13 tumor markers falling 8-20-13 post admission for pneumonia medications to restart breast mass smaller reduce affinitor to 7.5 mg  
10-18-13 femara and affinitor pain right ilium intermittent markers smaller breast mass smaller on right 12-5-13 Reduce Afinitor to 5 mg daily. Continue coumadin for DVT. Continue Femara and Aredia. Repeat US and skeletal survey prior to next visit. 1-6-14 Feeling better with reduced dose. Ultrasound of breasts show decrease in mass sizes. Skeletal survey stable. Follow up in 2 months. Bony metastasis ([address] (Chronic) ICD-10-CM: C79.51 
ICD-9-CM: 198.5  2/17/2017 Acute respiratory failure ([address] ICD-10-CM: J96.00 
ICD-9-CM: 518.81  2/17/2017 Metastatic breast cancer ([facility] [address] (Chronic) ICD-10-CM: T99.910 ICD-9-CM: 174.9  2/17/2017 CKD (chronic kidney disease) (Chronic) ICD-10-CM: N18.9 ICD-9-CM: 585.9  1/4/2017 Overview Signed 5/14/2013 11:35 AM by Tramaine Seth RN With acute rise- ? Dehydration and monitor DVT (deep venous thrombosis) (HCC) (Chronic) ICD-10-CM: Q40.522 ICD-9-CM: 453.40  1/4/2017 Overview Signed 11/19/2013  6:07 AM by Nathanael Browning 11-18-13 There is nonocclusive thrombus within the left common femoral vein, superficial femoral vein, popliteal vein, and posterior tibial vein  Accelerated hypertension ICD-10-CM: I10 
ICD-9-CM: 401.0  1/4/2017 Pleural effusion, right (Chronic) ICD-10-CM: J90 ICD-9-CM: 511.9  1/4/2017 Overview Addendum 5/26/2017 10:11 AM by Jose Hu NP  
  10/6/16 R thoracentesis 800 ml: pleural fluid creatine 4.1 = likely urinothorax 5/25/2017: Thoracentesis on right- 950 cc of /serosanguinous/ fluid Hydronephrosis (Chronic) ICD-10-CM: N13.30 ICD-9-CM: 725  1/4/2017 Bilateral edema of lower extremity ICD-10-CM: R60.0 ICD-9-CM: 782.3  1/4/2017 Hematuria ICD-10-CM: R31.9 ICD-9-CM: 599.70  1/4/2017 Chemotherapy-induced neutropenia (HCC) ICD-10-CM: D70.1, T45.1X5A 
ICD-9-CM: 288.03, E933.1  12/20/2016 Pancytopenia ([facility] [address] ICD-10-CM: V14.354 ICD-9-CM: 284.19  10/10/2016 HTN (hypertension) (Chronic) ICD-10-CM: I10 
ICD-9-CM: 401.9  10/7/2016 DM (diabetes mellitus) (HCC) (Chronic) ICD-10-CM: E11.9 ICD-9-CM: 250.00  10/7/2016 * (Principal)Acute on chronic kidney failure (HCC) ICD-10-CM: N17.9, N18.9 ICD-9-CM: 584.9, 585.9  10/7/2016 Pulmonary edema ICD-10-CM: J81.1 ICD-9-CM: 214  1/3/2016 Elevated troponin ICD-10-CM: R74.8 ICD-9-CM: 790.6  10/20/2015 PND (paroxysmal nocturnal dyspnea) ICD-10-CM: R06.00 
ICD-9-CM: 786.09  2/25/2014 Leg swelling ICD-10-CM: M79.89 ICD-9-CM: 729.81  2/25/2014 Overview Signed 2/25/2014  2:45 PM by Zoe Shea NP Bilateral 
 
  
  
   
 Heart failure with preserved ejection fraction (HCC) ICD-10-CM: I50.30 ICD-9-CM: 428.9  2/25/2014 Anemia ICD-10-CM: D64.9 ICD-9-CM: 285.9  11/19/2013 Overview Signed 11/19/2013  6:06 AM by Denver Broody 11-18-13 admitted with anemia and transfused Asthma (Chronic) ICD-10-CM: J45.909 ICD-9-CM: 493.90  10/22/2013 Overview Addendum 10/22/2013 10:55 AM by Joanne Bernal NP  
  8/2013: started on Symbicort 10/22/13: Added singulair  Hypomagnesemia ICD-10-CM: B26.48 
ICD-9-CM: 275.2 8/4/2013 Hypokalemia ICD-10-CM: E87.6 ICD-9-CM: 276.8  8/4/2013 Ms. Brinton Apley is a 75 y.o. female admitted on 7/31/2018 with a primary diagnosis of acute on chronic kidney injury.  
  
She has a history of metastatic breast cancer, asthma, chronic diastolic heart failure, HTN, DM type 2, DVT, and chronic kidney disease. She is currently being treated with Ibrance/Faslodex. Most recently she has developed recurrent ascites with paracentesis twice over the last five weeks or so. She was placed on aldactone 50 mg daily to be taken in addition to her lasix 40 mg daily to try and help with fluid accumulation. She returned today for follow up and her creatinine was elevated above baseline to 4.47. She will be admitted for management of acute on chronic kidney injury.  
  
 
PLAN: 
Metastatic Breast Cancer * Currently on treatment with Ibrance/fulvestrant. 8/1 From a prognosis standpoint regarding her metastatic breast cancer, she has done quite well on current therapy. Her last PET scan done 7/12 show stable disease and no new concerning areas. Cytology from fluid has not proven to be malignant. May continue to do well and other lines of therapy as options should she have progression in the future 
  
Acute on Chronic Kidney Injury * Gentle hydration. Hold Lasix and aldactone - watch closely for fluid overload. * Consult nephrology. * Renal ultrasound. 8/1 Slight improvement with hydration yesterday. Appreciate nephrology help. Holding diuretics. Off fluids to limit fluid reaccumulation. Per note today- rec removing fluid with pleurex rather than attempting management with diuretics. 8/2 Mild improvement daily. Continue to hold diuretics. 8/3 Cr 3.80 (3.86 yesterday). Giving 0.5 liter ns bolus. Recheck in am. 
  
Ascites * Consult IR for paracentesis. Send fluid for cytology. * Consult GI for cirrhosis. 8/1 GI following. Planning para today and sending fluid for work up.  Defer pleurex drain to GI if no other medical management options reasonable. Obviously limited with renal issues. OK from our perspective to place pleurex if GI deems appropriate. 8/2 Per GI, she is not a candidate for liver transplant and additional work-up for CLD will likely not be beneficial as this is endstage with no current activity and normal transaminases. Suggest PleurX. She agrees, IR aware. 8/3 plans are for outpatient pleurx placement next week. History of DVT * Coumadin. Holding for procedures. Constipation 62057 Change erna-colace to Miralax. Erna-colace gives her abdominal discomfort.  
  
DVT prophylaxis - SCD's. Kamryn SOP's. Continue home medications.  
  
Considering discharge tomorrow. We will follow labs closely. Dov Kuhn NP [facility] Hematology & Oncology [address] [place] [address] Office : [phone] Fax : [phone] Attending Addendum: 
I personally evaluated the patient with NP Wil Simon, and agree with the assessment, findings and plan as documented. Appears well, heart regular, lungs clear, abdomen benign but fluid clearly re accumulating. Scheduled for outpt lasix in am.  Would like to try bolus of IVF to see if her Cr will go down further, understanding that we may accelerate her ascites re-accumulation. Still with constipation - optimize bowel regimen. C/w supportive care. Zohaib So MD 
[facility] Hematology and Oncology [id number] [place] [address] Office : [phone] Fax : [phone]

## 2018-08-03 NOTE — PROGRESS NOTES
Received call from IR with the following information: Abdominal PleurX to be inserted in IR on 8/8/18 w/ anesthesia. Pt should be NPO @ [facility] and arrive at 1030. Primary RN, Elena Beauchamp informed.

## 2018-08-04 NOTE — PROGRESS NOTES
Warfarin dosing per pharmacist 
 
Claudeen Siren is a 75 y.o. female. Indication:  DVT/PE Goal INR:  2-3 Home dose:  1.5 mg daily Risk factors or significant drug interactions:  none Other anticoagulants:  none Daily Monitoring Date  INR     Warfarin dose HGB              Notes 7/31  3.0  Hold  9.6 8/1  2.8  Hold  8.6 
8/2  2.0  Hold  --- 
8/3  1.7  Hold  9.0 
8/4  1.6  Hold  --- Pharmacy consulted to dose warfarin. INR continues to trend down in response to holding warfarin, now subtherapeutic at 1.6. Per progress notes from heme/onc, plan to try to get PleurX cath placed on Monday in IR, so continue to hold warfarin. Would consider risk vs benefit of bridge therapy with heparin drip leading up to procedure on Monday while INR subtherapeutic. Thank you, 
Amaury Charles, PharmD Clinical Pharmacist 
[phone]

## 2018-08-04 NOTE — PROGRESS NOTES
Problem: Falls - Risk of 
Goal: *Absence of Falls Document [facility] Fall Risk and appropriate interventions in the flowsheet. Outcome: Progressing Towards Goal 
Fall Risk Interventions: 
  
 
  
 
Medication Interventions: Evaluate medications/consider consulting pharmacy, Teach patient to arise slowly

## 2018-08-04 NOTE — PROGRESS NOTES
[facility] Hematology & Oncology Inpatient Hematology / Oncology Progress Note Admission Date: 2018 12:12 PM 
Reason for Admission/Hospital Course: Other ascites [R18.8] 24 Hour Events: 
Afebrile, vitals stable Creatinine slightly better 3.5 Increased dyspnea and fatigue ROS: 
Constitutional: Positive for fatigue; negative for fever, chills, weakness . CV:Negative for chest pain, palpitations, edema. Respiratory: Negative for dyspnea, cough, wheezing. GI: Positive for abdominal distention; negative for nausea, diarrhea. 10 point review of systems is otherwise negative with the exception of the elements mentioned above in the HPI. No Known Allergies OBJECTIVE: 
Patient Vitals for the past 8 hrs: 
 BP Temp Pulse Resp SpO2  
18 0755 - - 66 - -  
18 0738 147/55 98.6 °F (37 °C) 63 17 96 % 18 0400 127/46 98.3 °F (36.8 °C) (!) 57 18 96 % Temp (24hrs), Av.6 °F (37 °C), Min:98.3 °F (36.8 °C), Max:98.8 °F (37.1 °C) 
 
 0701 -  1900 In: 320 [P.O.:320] Out: - Physical Exam: 
Constitutional: Well developed, well nourished female in no acute distress, sitting comfortably in the hospital bed. HEENT: Normocephalic and atraumatic. Oropharynx is clear, mucous membranes are moist. Neck supple. Skin Warm and dry. No bruising and no rash noted. No erythema. No pallor. Respiratory Lungs few wheezes,  normal air exchange without accessory muscle use. CVS Normal rate, regular rhythm and normal S1 and S2. No murmurs, gallops, or rubs. Abdomen + soft, distended. Normoactive bowel sounds. Neuro Grossly nonfocal with no obvious sensory or motor deficits. MSK Normal range of motion in general.  No edema and no tenderness. Psych Appropriate mood and affect. Labs: 
   
Recent Labs 18 
 0454 WBC  2.3*  
RBC  2.45* HGB  9.0*  
HCT  27.6*  
MCV  112.7*  
MCH  36.7*  
MCHC  32.6 RDW  14.6 PLT  149* GRANS  54 LYMPH  36 MONOS  7  
EOS  1  
BASOS  2 IG  0  
DF  AUTOMATED ANEU  1.2* ABL  0.8 ABM  0.2 ANTONETTE  0.0 ABB  0.0 AIG  0.0 Recent Labs 08/04/18 
 6174  08/03/18 
 6106  08/02/18 
 7237 NA  143  144  146*  
K  4.7  5.1  4.9 CL  113*  114*  114* CO2  18*  21  20* AGAP  12  9  12 GLU  142*  109*  105* BUN  58*  59*  60* CREA  3.53*  3.80*  3.86* GFRAA  17*  15*  15* GFRNA  14*  13*  12*  
CA  6.6*  7.3*  7.7* SGOT  22   --   32  
AP  190*   --   240* TP  5.9*   --   6.9 ALB  2.5*   --   2.7*  
GLOB  3.4   --   4.2* AGRAT  0.7*   --   0.6* MG  2.4  2.5*  2.6* PHOS  3.6   --    --   
 
Imaging: IR PARACENTESIS ABD [facility] [[id number]] Collected: 08/02/18 7300   
  Order Status: Completed Updated: 08/02/18 1230  
  Narrative:    
  Title: Ultrasound guided paracentesis.   
 
History: 78-year-old female with metastatic breast cancer, cirrhosis, ascites, 
renal insufficiency. : Marybeth Mcclelland PA-C Supervising Physician: Onesimo Yun M.D. Consent:  Informed written and oral consent was obtained from the patient after 
the risks and benefits were discussed.  All questions were answered and the 
patient requested that we proceed. Procedure:  Maximal sterile barrier technique was used.  Following routine prep 
and drape of the abdomen, a local field block with 1% lidocaine was achieved. Ultrasound evaluation was performed. Fluid was identified in the peritoneal cavity. Using real-time ultrasound 
guidance, the peritoneal cavity was accessed with an 8 [place] centesis catheter. 
 The catheter was connected to Vacutainer bottles. A total of 4000 cc of thin yellow fluid was removed and sent to the lab. The 
catheter was removed and a bandage applied. Complications: None. Findings: Large-volume ascites. 
  
  Impression:    
  Impression: Uncomplicated ultrasound guided paracentesis.  
 
PLAN: Consider Pleurx catheter placement. 
 
 
 
  
  US RETROPERITONEUM COMP [[id number]] Collected: 08/02/18 4270  
  Order Status: Completed Updated: 08/02/18 0927  
  Narrative:    
  Renal ultrasound, 8/2/2018 History: Acute kidney insufficiency. Comparison: Limited abdominal ultrasound 6/12/2018. Technique: Grayscale and doppler images of the kidneys and bladder were obtained 
using a 3-5 MHz linear transducer. Findings: The right kidney measures 10.4 cm, and the left kidney measures 9.1 cm 
in greatest longitudinal length.  No shadowing stones are seen. Persistent 
moderate hydronephrosis is seen of the right kidney which is not felt to be 
significantly changed from the prior study. Central artifact is seen within the 
dilated right renal collecting system which is felt to represent the proximal 
aspect of the stent.  Renal parenchymal echogenicity is increased consistent 
with chronic medical renal changes. The urinary bladder demonstrates the distal aspect of what appears to be a 
stent. The distal abdominal aorta is normal in caliber measuring 1.2 cm. The IVC is 
patent. Small to moderate ascites is seen. The liver is in nodular suggesting 
cirrhosis. A left pleural effusion is seen. 
  
  Impression:    
  IMPRESSION:    
1.  Stable moderate hydronephrosis of the right kidney when compared to a prior 
limited abdominal ultrasound dated 6/12/2018. No evolving hydronephrosis is seen 
of the left kidney. What appears to be a right renal collecting system stent is 
seen which is grossly not malpositioned. 2.  Left pleural effusion and small to moderate ascites. 3. Cirrhotic appearing liver. ASSESSMENT: 
 
Problem List  Date Reviewed: 7/31/2018 Codes Class Noted Other cirrhosis of liver ([facility] ICD-10-CM: Q96.45 ICD-9-CM: 571.5  7/31/2018 Other ascites ICD-10-CM: R18.8 ICD-9-CM: 789.59  7/31/2018 Acute-on-chronic kidney injury ([facility] ICD-10-CM: N17.9, N18.9 ICD-9-CM: 584.9, 585.9 7/31/2018 History of blood clots ICD-10-CM: Z86.718 ICD-9-CM: V12.51  2/16/2018 Type 2 diabetes mellitus with nephropathy ([facility] ICD-10-CM: E11.21 
ICD-9-CM: 250.40, 583.81  1/16/2018 Recurrent depression ([facility] ICD-10-CM: F33.9 ICD-9-CM: 296.30  1/16/2018 Vitamin B12 deficiency ICD-10-CM: E53.8 ICD-9-CM: 266.2  11/13/2017 Anemia of chronic disease ICD-10-CM: D63.8 ICD-9-CM: 285.29  11/13/2017 Iron deficiency anemia (Chronic) ICD-10-CM: D50.9 ICD-9-CM: 280.9  7/10/2017 Type 2 diabetes mellitus without complication (HCC) (Chronic) ICD-10-CM: E11.9 ICD-9-CM: 250.00  7/10/2017 Folliculitis [facility]-RD: L73.9 ICD-9-CM: 704.8  7/10/2017 Hypernatremia ICD-10-CM: E87.0 ICD-9-CM: 276.0  5/30/2017 Anxiety ICD-10-CM: F41.9 ICD-9-CM: 300.00  5/28/2017 Acute on chronic respiratory failure (HCC) ICD-10-CM: J96.20 ICD-9-CM: 518.84  5/24/2017 Aortic stenosis, moderate (Chronic) ICD-10-CM: I35.0 ICD-9-CM: 424.1  5/24/2017 Anticoagulated on Coumadin (Chronic) ICD-10-CM: Z51.81, Z79.01 
ICD-9-CM: V58.83, V58.61  5/24/2017 Volume overload ICD-10-CM: E87.70 ICD-9-CM: 276.69  5/24/2017 Breast cancer ([facility] (Chronic) ICD-10-CM: C50.919 ICD-9-CM: 174.9  2/17/2017 Overview Addendum 2/27/2014 10:08 AM by Tressia Go, NP  
  Er+  pr + her-2 lawanda negative stage 4 Examination of the 3D tomographic PET images demonstrates marked hypermetabolism associated with both primary breast carcinomas. SUV max on the right is 20.5 and on the left 31.5. There is matted high right axillary  
lymphadenopathy as well as a more hypermetabolic low right axillary lymph node which measures 12 x 9 mm and has an SUV max of 15.4.  Small bilateral pulmonary hilar metastases are also noted as well as extensive skeletal metastatic disease which includes both proximal femora, both scapulae, innumerable ribs, virtually every vertebra, and the bony pelvis. No displaced pathologic fracture is identified on the CT images. Ct scan 2-12 Innumerable diffuse osseous metastases. . Multiple diffuse tiny lung nodules, and right hilar lymphadenopathy, also suspicious for metastases. 3. Bilateral breast masses, compatible with known carcinoma. CANCER ANTIGEN 27.29 1076 Oncology Flowsheet Day, Cycle cyclophosphamide (CYTOXAN) IV  
1/27/2012 Day 1, Cycle 1 600 mg/m2 = 996 mg  
2/17/2012 Day 1, Cycle 2 600 mg/m2 = 996 mg  
3/9/2012 Day 1, Cycle 3 600 mg/m2 = 996 mg  
3/30/2012 Day 1, Cycle 4 500 mg/m2 = 830 mg  
4/20/2012 Day 1, Cycle 5 500 mg/m2 = 830 mg Oncology Flowsheet DOCEtaxel (TAXOTERE) IV  
1/27/2012 75 mg/m2 = 125 mg  
2/17/2012 75 mg/m2 = 125 mg  
3/9/2012 75 mg/m2 = 125 mg  
3/30/2012 60 mg/m2 = 100 mg  
4/20/2012 60 mg/m2 = 100 mg  
7-19-12 post 6 cycles of TC improved breast masses switch to [employer] Clear response on pet ct scan Mixed response to chemotherapy: There has been a significant interval response in the right breast and axilla with decrease in size of spiculated right breast mass and numerous right axillary lymph nodes. Left breast mass also appears to have responded chemotherapy though there may be persistent chest wall invasion. 2. Interval evolution of widespread osseous metastatic disease with many lytic lesions now appearing more sclerotic. These are associated with continued FDG uptake which is difficult to differentiate from marrow reactivity given recent chemotherapy. At least one new osseous metastatic deposits is present in the posterior spinous process at L1. Focally intense uptake is also present at approximately T5 
10-19-12 pain in arms legs back continued improvement on ct scan Decreased size of the largest lung nodules and near complete resolution . of many. No new masses in the chest, abdomen, or pelvis. 2. Decreased size of right axillary and right hilar lymph nodes. 3. Diffuse osseous lesions again seen. 4. Diverticulosis. 5. Atherosclerotic vascular disease ca 15-3 down to 50 On femara and aredia will see if we can get affinitor to start at next visit continue therapy 12-19-12 new headache and dizziness for MRI will start affinitor 1-14-13 improved pain breast mass right breast smaller width 7 cm x 5 
6-05-13 femara and aredia now on affinitor x 5 months 7-13 tumor markers falling 8-20-13 post admission for pneumonia medications to restart breast mass smaller reduce affinitor to 7.5 mg  
10-18-13 femara and affinitor pain right ilium intermittent markers smaller breast mass smaller on right 12-5-13 Reduce Afinitor to 5 mg daily. Continue coumadin for DVT. Continue Femara and Aredia. Repeat US and skeletal survey prior to next visit. 1-6-14 Feeling better with reduced dose. Ultrasound of breasts show decrease in mass sizes. Skeletal survey stable. Follow up in 2 months. Bony metastasis ([facility] (Chronic) ICD-10-CM: C79.51 
ICD-9-CM: 198.5  2/17/2017 Acute respiratory failure ([facility] ICD-10-CM: J96.00 
ICD-9-CM: 518.81  2/17/2017 Metastatic breast cancer ([facility] (Chronic) ICD-10-CM: S98.154 ICD-9-CM: 174.9  2/17/2017 CKD (chronic kidney disease) (Chronic) ICD-10-CM: N18.9 ICD-9-CM: 585.9  1/4/2017 Overview Signed 5/14/2013 11:35 AM by Alf Scott RN With acute rise- ? Dehydration and monitor DVT (deep venous thrombosis) (HCC) (Chronic) ICD-10-CM: T46.022 ICD-9-CM: 453.40  1/4/2017 Overview Signed 11/19/2013  6:07 AM by Tiara Jackson 11-18-13 There is nonocclusive thrombus within the left common femoral vein, superficial femoral vein, popliteal vein, and posterior tibial vein Accelerated hypertension ICD-10-CM: I10 
ICD-9-CM: 401.0  1/4/2017 Pleural effusion, right (Chronic) ICD-10-CM: J90 ICD-9-CM: 511.9  1/4/2017  Overview Addendum 5/26/2017 10:11 AM by Jewel Jalloh NP  
  10/6/16 R thoracentesis 800 ml: pleural fluid creatine 4.1 = likely urinothorax 5/25/2017: Thoracentesis on right- 950 cc of /serosanguinous/ fluid Hydronephrosis (Chronic) ICD-10-CM: N13.30 ICD-9-CM: 251  1/4/2017 Bilateral edema of lower extremity ICD-10-CM: R60.0 ICD-9-CM: 782.3  1/4/2017 Hematuria ICD-10-CM: R31.9 ICD-9-CM: 599.70  1/4/2017 Chemotherapy-induced neutropenia (HCC) ICD-10-CM: D70.1, T45.1X5A 
ICD-9-CM: 288.03, E933.1  12/20/2016 Pancytopenia ([facility] ICD-10-CM: S56.687 ICD-9-CM: 284.19  10/10/2016 HTN (hypertension) (Chronic) ICD-10-CM: I10 
ICD-9-CM: 401.9  10/7/2016 DM (diabetes mellitus) (HCC) (Chronic) ICD-10-CM: E11.9 ICD-9-CM: 250.00  10/7/2016 * (Principal)Acute on chronic kidney failure (HCC) ICD-10-CM: N17.9, N18.9 ICD-9-CM: 584.9, 585.9  10/7/2016 Pulmonary edema ICD-10-CM: J81.1 ICD-9-CM: 958  1/3/2016 Elevated troponin ICD-10-CM: R74.8 ICD-9-CM: 790.6  10/20/2015 PND (paroxysmal nocturnal dyspnea) ICD-10-CM: R06.00 
ICD-9-CM: 786.09  2/25/2014 Leg swelling ICD-10-CM: M79.89 ICD-9-CM: 729.81  2/25/2014 Overview Signed 2/25/2014  2:45 PM by Rolo Shetty NP Bilateral 
 
  
  
   
 Heart failure with preserved ejection fraction (HCC) ICD-10-CM: I50.30 ICD-9-CM: 428.9  2/25/2014 Anemia ICD-10-CM: D64.9 ICD-9-CM: 285.9  11/19/2013 Overview Signed 11/19/2013  6:06 AM by Esther Costa 11-18-13 admitted with anemia and transfused Asthma (Chronic) ICD-10-CM: J45.909 ICD-9-CM: 493.90  10/22/2013 Overview Addendum 10/22/2013 10:55 AM by Maximo Gallegos NP  
  8/2013: started on Symbicort 10/22/13: Added singulair Hypomagnesemia ICD-10-CM: S37.70 
ICD-9-CM: 275.2  8/4/2013 Hypokalemia ICD-10-CM: E87.6 ICD-9-CM: 276.8  8/4/2013 Ms. Yohan Naranjo is a 75 y.o. female admitted on 7/31/2018 with a primary diagnosis of acute on chronic kidney injury.  
  
She has a history of metastatic breast cancer, asthma, chronic diastolic heart failure, HTN, DM type 2, DVT, and chronic kidney disease. She is currently being treated with Ibrance/Faslodex. Most recently she has developed recurrent ascites with paracentesis twice over the last five weeks or so. She was placed on aldactone 50 mg daily to be taken in addition to her lasix 40 mg daily to try and help with fluid accumulation. She returned today for follow up and her creatinine was elevated above baseline to 4.47. She will be admitted for management of acute on chronic kidney injury.  
  
 
PLAN: 
Metastatic Breast Cancer * Currently on treatment with Ibrance/fulvestrant. 8/1 From a prognosis standpoint regarding her metastatic breast cancer, she has done quite well on current therapy. Her last PET scan done 7/12 show stable disease and no new concerning areas. Cytology from fluid has not proven to be malignant. May continue to do well and other lines of therapy as options should she have progression in the future 
  
Acute on Chronic Kidney Injury * Gentle hydration. Hold Lasix and aldactone - watch closely for fluid overload. * Consult nephrology. * Renal ultrasound. 8/1 Slight improvement with hydration yesterday. Appreciate nephrology help. Holding diuretics. Off fluids to limit fluid reaccumulation. Per note today- rec removing fluid with pleurex rather than attempting management with diuretics. 8/2 Mild improvement daily. Continue to hold diuretics. 8/3 Cr 3.80 (3.86 yesterday). Giving 0.5 liter ns bolus. Recheck in am. 
  
Ascites * Consult IR for paracentesis. Send fluid for cytology. * Consult GI for cirrhosis. 8/1 GI following. Planning para today and sending fluid for work up. Defer pleurex drain to GI if no other medical management options reasonable. Obviously limited with renal issues. OK from our perspective to place pleurex if GI deems appropriate.  
8/2 Per GI, she is not a candidate for liver transplant and additional work-up for CLD will likely not be beneficial as this is endstage with no current activity and normal transaminases. Suggest PleurX. She agrees, IR aware. 8/3 plans are for outpatient pleurx placement next week. History of DVT * Coumadin. Holding for procedures. Constipation  
0802 Change erna-colace to Miralax. Erna-colace gives her abdominal discomfort. Macrocytic anemia 8/4 Check Folate/B12 
  
DVT prophylaxis - SCD's. Kamryn SOP's. Continue home medications.  
  
Hold discharge. Patient requesting to IR to consider placing pleurex on Monday instead of Wednesday given increased distention and dyspnea. Will ask on Monday. Check folate/b12 given macrocytic anemia. Add nebs for wheezing Clau Garcia NP [employer] [place] [employer] [facility] Hematology & Oncology [address] [address] Office : [phone] Fax : [phone] Attending Addendum: 
I personally evaluated the patient with NP Tahmina Stapleton, and agree with the assessment, findings and plan as documented. Appears better, heart regular, lungs clear with mild crackles posterior at the bases and scattered wheezing on the left, ascites increasing daily. She is concerned that she doesn't have enough support at home. Cr at 3.5 (baseline ~2.5-3). K better. We did a trial of small bolus yesterday with small improvement in Cr but worsening ascites. Pt requested if she could have the Pluerex cath placed on Monday and not have to come back on wed. She feels she may be ready for another para sooner than Wed. Checking anemia labs. C/w supportive care. Bladimir Paiz MD 
[facility] [place] [facility] [facility] Hematology and Oncology [address] [address] Office : [phone] Fax : [phone]

## 2018-08-04 NOTE — PROGRESS NOTES
Interdisciplinary rounds with charge nurse, provider, and NP Presenting Problem: weakness and abd swelling Daily Goal address weakness and need for pleurex drain for ascites Expected Discharge Date: possible Tuesday or Wednesday after pleurex placed Expected Discharge Needs: pleurex drain supplies and teaching Patient/Family Concerns addressed:  Must get stronger  can not help right now with her weakness B12, and folate studies ordered

## 2018-08-04 NOTE — PROGRESS NOTES
END OF SHIFT NOTE: 
 
Intake/Output 08/03 1901 - 08/04 0700 In: -  
Out: 500 [Urine:500] Voiding: YES Catheter: NO 
Drain:   
 
 
 
 
 
Stool:  0 occurrences. Emesis:  0 occurrences. VITAL SIGNS Patient Vitals for the past 12 hrs: 
 Temp Pulse Resp BP SpO2  
08/04/18 0400 98.3 °F (36.8 °C) (!) 57 18 127/46 96 % 08/03/18 2253 98.7 °F (37.1 °C) 71 16 130/56 96 % 08/03/18 1925 98.5 °F (36.9 °C) 64 16 138/52 96 % Pain Assessment Pain 1 Pain Scale 1: Visual (08/04/18 0015) Pain Intensity 1: 0 (08/03/18 1922) Patient Stated Pain Goal: 0 (08/04/18 0015) Pain Reassessment 1: Patient sleeping (08/04/18 0015) Pain Location 1: Back;Leg (08/03/18 1833) Pain Orientation 1: Left (08/01/18 1737) Pain Intervention(s) 1: Medication (see MAR) (08/03/18 1833) Ambulating Yes Additional Information: Pt rested well throughout the night, no pain medication required. No needs voiced at this time. Shift report given to oncoming nurse, Maria Luisa Byrne RN at the bedside. Dorothy Arevalo

## 2018-08-04 NOTE — PROGRESS NOTES
1 PRN nebulizer needed for wheezing and Norco 1x for pain this shift. Pt really hoping to go Monday for drain placement. Warfarin still on hold. No c/o N/V/D. VSS. No needs at present. END OF SHIFT NOTE: 
 
Intake/Output 08/04 0701 - 08/04 1900 In: 18 [P.O.:990] Out: -   
Voiding: YES Catheter: NO 
Drain:   
 
 
 
 
 
Stool:  1 occurrences. Emesis:  0 occurrences. VITAL SIGNS Patient Vitals for the past 12 hrs: 
 Temp Pulse Resp BP SpO2  
08/04/18 1706 - 67 - - -  
08/04/18 1508 98.9 °F (37.2 °C) 62 18 130/48 96 % 08/04/18 1215 - - - - 96 % 08/04/18 1137 98.6 °F (37 °C) 65 18 146/55 96 % 08/04/18 0755 - 66 - - -  
08/04/18 0738 98.6 °F (37 °C) 63 17 147/55 96 % Pain Assessment Pain 1 Pain Scale 1: Numeric (0 - 10) (08/04/18 1415) Pain Intensity 1: 0 (08/04/18 1415) Patient Stated Pain Goal: 0 (08/04/18 0015) Pain Reassessment 1: Yes (08/04/18 1250) Pain Location 1: Back (08/04/18 1200) Pain Orientation 1: Lateral;Upper (08/04/18 1200) Pain Description 1: Aching (08/04/18 1200) Pain Intervention(s) 1: Medication (see MAR) (08/04/18 1200) Ambulating Yes Additional Information:  
 
Shift report will be given to JERONIMO Buenrostro at the bedside. Aly Santoyo

## 2018-08-05 NOTE — PROGRESS NOTES
[facility]        Inpatient Hematology / Oncology Progress Note      Admission Date: 2018 12:12 PM  Reason for Admission/Hospital Course: Other ascites [R18.8]      24 Hour Events:  Afebrile, vitals stable  Labs pending  IR consulted for tomorrow  Ongoing abdominal distention/discomfort       ROS:  Constitutional: Positive for fatigue; negative for fever, chills, weakness . CV:Negative for chest pain, palpitations, edema. Respiratory: Negative for dyspnea, cough, wheezing. GI: Positive for abdominal distention; negative for nausea, diarrhea. 10 point review of systems is otherwise negative with the exception of the elements mentioned above in the HPI. No Known Allergies    OBJECTIVE:  Patient Vitals for the past 8 hrs:   BP Temp Pulse Resp SpO2   18 0745 142/53 98.3 °F (36.8 °C) 60 17 95 %     Temp (24hrs), Av.4 °F (36.9 °C), Min:98.1 °F (36.7 °C), Max:98.9 °F (37.2 °C)     07 -  1900  In: 320 [P.O.:320]  Out: -     Physical Exam:  Constitutional: Well developed, well nourished female in no acute distress, sitting comfortably in the hospital bed. HEENT: Normocephalic and atraumatic. Oropharynx is clear, mucous membranes are moist. Neck supple. Skin Warm and dry. No bruising and no rash noted. No erythema. No pallor. Respiratory Lungs clear,  normal air exchange without accessory muscle use. CVS Normal rate, regular rhythm and normal S1 and S2. No murmurs, gallops, or rubs. Abdomen + semi-firm, distended, tympanic. Normoactive bowel sounds. Neuro Grossly nonfocal with no obvious sensory or motor deficits. MSK Normal range of motion in general.  No edema and no tenderness. Psych Appropriate mood and affect.         Labs:      Recent Labs      18   0454   WBC  2.3*   RBC  2.45*   HGB  9.0*   HCT  27.6*   MCV  112.7*   MCH  36.7*   MCHC  32.6   RDW  14.6   PLT  149*   GRANS  54   LYMPH  36   MONOS  7   EOS  1   BASOS  2   IG 0   DF  AUTOMATED   ANEU  1.2*   ABL  0.8   ABM  0.2   ANTONETTE  0.0   ABB  0.0   AIG  0.0        Recent Labs      08/04/18   0428  08/03/18   0454   NA  143  144   K  4.7  5.1   CL  113*  114*   CO2  18*  21   AGAP  12  9   GLU  142*  109*   BUN  58*  59*   CREA  3.53*  3.80*   GFRAA  17*  15*   GFRNA  14*  13*   CA  6.6*  7.3*   SGOT  22   --    AP  190*   --    TP  5.9*   --    ALB  2.5*   --    GLOB  3.4   --    AGRAT  0.7*   --    MG  2.4  2.5*   PHOS  3.6   --      Imaging:  IR PARACENTESIS ABD W IMAGE [[id number]] Collected: 08/02/18 1696      Order Status: Completed Updated: 08/02/18 4070     Narrative:       Title: Ultrasound guided paracentesis.      History: 57-year-old female with metastatic breast cancer, cirrhosis, ascites,  renal insufficiency. : Kenia Major PA-C    Supervising Physician: Govind Oneal M.D. Consent:  Informed written and oral consent was obtained from the patient after  the risks and benefits were discussed.  All questions were answered and the  patient requested that we proceed. Procedure:  Maximal sterile barrier technique was used.  Following routine prep  and drape of the abdomen, a local field block with 1% lidocaine was achieved. Ultrasound evaluation was performed. Fluid was identified in the peritoneal cavity. Using real-time ultrasound  guidance, the peritoneal cavity was accessed with an 8 Upper sorbian centesis catheter.   The catheter was connected to Vacutainer bottles. A total of 4000 cc of thin yellow fluid was removed and sent to the lab. The  catheter was removed and a bandage applied. Complications: None. Findings: Large-volume ascites.       Impression:       Impression: Uncomplicated ultrasound guided paracentesis.     PLAN: Consider Pleurx catheter placement.             US RETROPERITONEUM COMP [[id number]] Collected: 08/02/18 0375     Order Status: Completed Updated: 08/02/18 3227     Narrative:       Renal ultrasound, 8/2/2018    History: Acute kidney insufficiency. Comparison: Limited abdominal ultrasound 6/12/2018. Technique: Grayscale and doppler images of the kidneys and bladder were obtained  using a 3-5 MHz linear transducer. Findings: The right kidney measures 10.4 cm, and the left kidney measures 9.1 cm  in greatest longitudinal length.  No shadowing stones are seen. Persistent  moderate hydronephrosis is seen of the right kidney which is not felt to be  significantly changed from the prior study. Central artifact is seen within the  dilated right renal collecting system which is felt to represent the proximal  aspect of the stent.  Renal parenchymal echogenicity is increased consistent  with chronic medical renal changes. The urinary bladder demonstrates the distal aspect of what appears to be a  stent. The distal abdominal aorta is normal in caliber measuring 1.2 cm. The IVC is  patent. Small to moderate ascites is seen. The liver is in nodular suggesting  cirrhosis. A left pleural effusion is seen.       Impression:       IMPRESSION:     1.  Stable moderate hydronephrosis of the right kidney when compared to a prior  limited abdominal ultrasound dated 6/12/2018. No evolving hydronephrosis is seen  of the left kidney. What appears to be a right renal collecting system stent is  seen which is grossly not malpositioned. 2.  Left pleural effusion and small to moderate ascites. 3. Cirrhotic appearing liver.          ASSESSMENT:    Problem List  Date Reviewed: 7/31/2018          Codes Class Noted    Other cirrhosis of liver ([facility] ICD-10-CM: K74.69  ICD-9-CM: 571.5  7/31/2018        Other ascites ICD-10-CM: R18.8  ICD-9-CM: 789.59  7/31/2018        Acute-on-chronic kidney injury [facility]) ICD-10-CM: N17.9, N18.9  ICD-9-CM: 584.9, 585.9  7/31/2018        History of blood clots ICD-10-CM: Z86.718  ICD-9-CM: V12.51  2/16/2018        Type 2 diabetes mellitus with nephropathy ([facility] ICD-10-CM: E11.21  ICD-9-CM: 250.40, 583.81  1/16/2018        Recurrent depression ([facility] ICD-10-CM: F33.9  ICD-9-CM: 296.30  1/16/2018        Vitamin B12 deficiency ICD-10-CM: E53.8  ICD-9-CM: 266.2  11/13/2017        Anemia of chronic disease ICD-10-CM: D63.8  ICD-9-CM: 285.29  11/13/2017        Iron deficiency anemia (Chronic) ICD-10-CM: D50.9  ICD-9-CM: 280.9  7/10/2017        Type 2 diabetes mellitus without complication (HCC) (Chronic) ICD-10-CM: E11.9  ICD-9-CM: 250.00  8/96/6141        Folliculitis [facility]-87-KD: L73.9  ICD-9-CM: 704.8  7/10/2017        Hypernatremia ICD-10-CM: E87.0  ICD-9-CM: 276.0  5/30/2017        Anxiety ICD-10-CM: F41.9  ICD-9-CM: 300.00  5/28/2017        Acute on chronic respiratory failure ([facility] ICD-10-CM: J96.20  ICD-9-CM: 518.84  5/24/2017        Aortic stenosis, moderate (Chronic) ICD-10-CM: I35.0  ICD-9-CM: 424.1  5/24/2017        Anticoagulated on Coumadin (Chronic) ICD-10-CM: Z51.81, Z79.01  ICD-9-CM: V58.83, V58.61  5/24/2017        Volume overload ICD-10-CM: E87.70  ICD-9-CM: 276.69  5/24/2017        Breast cancer ([facility] (Chronic) ICD-10-CM: C50.919  ICD-9-CM: 174.9  2/17/2017    Overview Addendum 2/27/2014 10:08 AM by Heather Haro NP     Er+  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 SUV max on the right is 20.5 and on the left 31.5. There is matted high right axillary   lymphadenopathy as well as a more hypermetabolic low right axillary lymph node which measures 12 x 9 mm and has an SUV max of 15.4. Small bilateral pulmonary hilar metastases are also noted as well as extensive skeletal metastatic disease which includes both proximal femora, both scapulae, innumerable ribs, virtually every vertebra, and the bony pelvis. No displaced pathologic fracture is identified on the CT images. Ct scan 2-12 Innumerable diffuse osseous metastases. . Multiple diffuse tiny lung nodules, and right hilar lymphadenopathy, also suspicious for metastases. 3. Bilateral breast masses, compatible with known carcinoma. CANCER ANTIGEN 27.29 1076   Oncology Flowsheet Day, Cycle cyclophosphamide (CYTOXAN) IV   1/27/2012 Day 1, Cycle 1 600 mg/m2 = 996 mg   2/17/2012 Day 1, Cycle 2 600 mg/m2 = 996 mg   3/9/2012 Day 1, Cycle 3 600 mg/m2 = 996 mg   3/30/2012 Day 1, Cycle 4 500 mg/m2 = 830 mg   4/20/2012 Day 1, Cycle 5 500 mg/m2 = 830 mg     Oncology Flowsheet DOCEtaxel (TAXOTERE) IV   1/27/2012 75 mg/m2 = 125 mg   2/17/2012 75 mg/m2 = 125 mg   3/9/2012 75 mg/m2 = 125 mg   3/30/2012 60 mg/m2 = 100 mg   4/20/2012 60 mg/m2 = 100 mg   7-19-12 post 6 cycles of TC improved breast masses switch to femara maintenence  Clear response on pet ct scan   Mixed response to chemotherapy: There has been a significant interval response in the right breast and axilla with decrease in size of spiculated right breast mass and numerous right axillary lymph nodes. Left breast mass also appears to have responded chemotherapy though there may be persistent chest wall invasion. 2. Interval evolution of widespread osseous metastatic disease with many lytic lesions now appearing more sclerotic. These are associated with continued FDG uptake which is difficult to differentiate from marrow reactivity given recent chemotherapy. At least one new osseous metastatic deposits is present in the posterior spinous process at L1. Focally intense uptake is also present at approximately T5  10-19-12 pain in arms legs back continued improvement on ct scan Decreased size of the largest lung nodules and near complete resolution . of many. No new masses in the chest, abdomen, or pelvis. 2. Decreased size of right axillary and right hilar lymph nodes. 3. Diffuse osseous lesions again seen. 4. Diverticulosis. 5.  Atherosclerotic vascular disease ca 15-3 down to 50  On femara and aredia will see if we can get affinitor to start at next visit continue therapy  12-19-12 new headache and dizziness for MRI will start affinitor   1-14-13 improved pain breast mass right breast smaller width 7 cm x 5  6-05-13 femara and aredia now on affinitor x 5 months   7-13 tumor markers falling   8-20-13 post admission for pneumonia medications to restart breast mass smaller reduce affinitor to 7.5 mg   10-18-13 femara and affinitor pain right ilium intermittent markers smaller breast mass smaller on right   12-5-13 Reduce Afinitor to 5 mg daily. Continue coumadin for DVT. Continue Femara and Aredia. Repeat US and skeletal survey prior to next visit. 1-6-14 Feeling better with reduced dose. Ultrasound of breasts show decrease in mass sizes. Skeletal survey stable. Follow up in 2 months. Bony metastasis (HCC) (Chronic) ICD-10-CM: C79.51  ICD-9-CM: 198.5  2/17/2017        Acute respiratory failure ([address] ICD-10-CM: J96.00  ICD-9-CM: 518.81  2/17/2017        Metastatic breast cancer (HCC) (Chronic) ICD-10-CM: C50.919  ICD-9-CM: 174.9  2/17/2017        CKD (chronic kidney disease) (Chronic) ICD-10-CM: N18.9  ICD-9-CM: 585.9  1/4/2017    Overview Signed 5/14/2013 11:35 AM by Bladimir Chin RN     With acute rise- ?  Dehydration and monitor             DVT (deep venous thrombosis) (HCC) (Chronic) ICD-10-CM: I82.409  ICD-9-CM: 453.40  1/4/2017    Overview Signed 11/19/2013  6:07 AM by Minilogs     61-98-59 There is nonocclusive thrombus within the left common femoral vein, superficial femoral vein, popliteal vein, and posterior tibial vein               Accelerated hypertension ICD-10-CM: I10  ICD-9-CM: 401.0  1/4/2017        Pleural effusion, right (Chronic) ICD-10-CM: J90  ICD-9-CM: 511.9  1/4/2017    Overview Addendum 5/26/2017 10:11 AM by Yao Massey NP     10/6/16 R thoracentesis 800 ml: pleural fluid creatine 4.1 = likely urinothorax  5/25/2017: Thoracentesis on right- 950 cc of /serosanguinous/ fluid             Hydronephrosis (Chronic) ICD-10-CM: N13.30  ICD-9-CM: 591 1/4/2017        Bilateral edema of lower extremity ICD-10-CM: R60.0  ICD-9-CM: 782.3  1/4/2017        Hematuria ICD-10-CM: R31.9  ICD-9-CM: 599.70  1/4/2017        Chemotherapy-induced neutropenia (HCC) ICD-10-CM: D70.1, T45.1X5A  ICD-9-CM: 288.03, E933.1  12/20/2016        Pancytopenia ([facility] ICD-10-CM: Y75.965  ICD-9-CM: 284.19  10/10/2016        HTN (hypertension) (Chronic) ICD-10-CM: I10  ICD-9-CM: 401.9  10/7/2016        DM (diabetes mellitus) ([facility] (Chronic) ICD-10-CM: E11.9  ICD-9-CM: 250.00  10/7/2016        * (Principal)Acute on chronic kidney failure ([place] [id number].) ICD-10-CM: N17.9, N18.9  ICD-9-CM: 584.9, 585.9  10/7/2016        Pulmonary edema ICD-10-CM: J81.1  ICD-9-CM: 905  1/3/2016        Elevated troponin ICD-10-CM: R74.8  ICD-9-CM: 790.6  10/20/2015        PND (paroxysmal nocturnal dyspnea) ICD-10-CM: R06.00  ICD-9-CM: 786.09  2/25/2014        Leg swelling ICD-10-CM: M79.89  ICD-9-CM: 729.81  2/25/2014    Overview Signed 2/25/2014  2:45 PM by Charmaine Alfred NP     Bilateral               Heart failure with preserved ejection fraction ([facility] ICD-10-CM: I50.30  ICD-9-CM: 428.9  2/25/2014        Anemia ICD-10-CM: D64.9  ICD-9-CM: 285.9  11/19/2013    Overview Signed 11/19/2013  6:06 AM by Alisa Vanegas     53-29-38 admitted with anemia and transfused              Asthma (Chronic) ICD-10-CM: J45.909  ICD-9-CM: 493.90  10/22/2013    Overview Addendum 10/22/2013 10:55 AM by Kate Evans NP     8/2013: started on Symbicort  10/22/13: Added singulair             Hypomagnesemia ICD-10-CM: H95.96  ICD-9-CM: 275.2  8/4/2013        Hypokalemia ICD-10-CM: E87.6  ICD-9-CM: 276.8  8/4/2013            Ms. Naheed Maynard is a 75 y.o. female admitted on 7/31/2018 with a primary diagnosis of acute on chronic kidney injury.      She has a history of metastatic breast cancer, asthma, chronic diastolic heart failure, HTN, DM type 2, DVT, and chronic kidney disease. She is currently being treated with Ibrance/Faslodex.  Most recently she has developed recurrent ascites with paracentesis twice over the last five weeks or so. She was placed on aldactone 50 mg daily to be taken in addition to her lasix 40 mg daily to try and help with fluid accumulation. She returned today for follow up and her creatinine was elevated above baseline to 4.47. She will be admitted for management of acute on chronic kidney injury.        PLAN:  Metastatic Breast Cancer  * Currently on treatment with Ibrance/fulvestrant. 8/1 From a prognosis standpoint regarding her metastatic breast cancer, she has done quite well on current therapy. Her last PET scan done 7/12 show stable disease and no new concerning areas. Cytology from fluid has not proven to be malignant. May continue to do well and other lines of therapy as options should she have progression in the future     Acute on Chronic Kidney Injury  * Gentle hydration. Hold Lasix and aldactone - watch closely for fluid overload. * Consult nephrology. * Renal ultrasound. 8/1 Slight improvement with hydration yesterday. Appreciate nephrology help. Holding diuretics. Off fluids to limit fluid reaccumulation. Per note today- rec removing fluid with pleurex rather than attempting management with diuretics. 8/2 Mild improvement daily. Continue to hold diuretics. 8/3 Cr 3.80 (3.86 yesterday). Giving 0.5 liter ns bolus. Recheck in am.     Ascites  * Consult IR for paracentesis. Send fluid for cytology. * Consult GI for cirrhosis. 8/1 GI following. Planning para today and sending fluid for work up. Defer pleurex drain to GI if no other medical management options reasonable. Obviously limited with renal issues. OK from our perspective to place pleurex if GI deems appropriate. 8/2 Per GI, she is not a candidate for liver transplant and additional work-up for CLD will likely not be beneficial as this is endstage with no current activity and normal transaminases. Suggest PleurX. She agrees, IR aware.    8/5 Consult IR for possible pleurex tomorrow due to recurrent ascites     History of DVT   * Coumadin. Holding for procedures. Constipation   0802 Change erna-colace to Miralax. Erna-colace gives her abdominal discomfort. 8/5 BM x 1 yesterday    Macrocytic anemia  8/4 Check Folate/B12  8/5 B12/foalte normal     DVT prophylaxis - SCD's. Kamryn SOP's. Continue home medications.      Hold discharge. Patient requesting to IR to consider placing pleurex on Monday instead of Wednesday given increased distention and dyspnea. Will ask on Monday. Sofie Arias NP   [facility]  [address] [address]  Office : [phone]  Fax : [phone]       Attending Addendum:  I personally evaluated the patient with NP Paul Console, and agree with the assessment, findings and plan as documented. Appears well, heart regular, lungs clear, ascites re accumulating. Will attempt to get pleurex placed tomorrow. Hold anticoagulation until then. C/w supportive care. Likely d/c tomorrow.               Taran Feldman MD  [facility]  [address]  Office : [phone]  Fax : [phone]

## 2018-08-05 NOTE — PROGRESS NOTES
Marilyn Leggett  Admission Date: 7/31/2018             Renal Daily Progress Note: 8/5/2018        Subjective:     Patient seen and examined doing well. ROS:  Constitutional: + fatigue; no fever, chills, weakness . CV: no chest pain, palpitations, edema. Respiratory: no dyspnea, cough, wheezing.   GI: + abdominal distention; no nausea, vomiting, diarrhea, constipation     Current Facility-Administered Medications   Medication Dose Route Frequency    albuterol (PROVENTIL VENTOLIN) nebulizer solution 2.5 mg  2.5 mg Nebulization Q4H PRN    polyethylene glycol (MIRALAX) packet 17 g  17 g Oral DAILY    0.9% sodium chloride infusion 250 mL  250 mL IntraVENous PRN    warfarin (COUMADIN) tablet 1.5 mg - On Hold  1.5 mg Oral See Admin Instructions    albuterol-ipratropium (DUO-NEB) 2.5 MG-0.5 MG/3 ML  3 mL Nebulization Q6H PRN    amLODIPine (NORVASC) tablet 10 mg  10 mg Oral DAILY    carvedilol (COREG) tablet 3.125 mg  3.125 mg Oral BID WITH MEALS    hydrALAZINE (APRESOLINE) tablet 50 mg  50 mg Oral BID    HYDROcodone-acetaminophen (NORCO)  mg tablet 1 Tab  1 Tab Oral Q6H PRN    LORazepam (ATIVAN) tablet 1 mg  1 mg Oral Q6H PRN    megestrol (MEGACE) 400 mg/10 mL (10 mL) oral suspension 800 mg  800 mg Oral DAILY    mirtazapine (REMERON) tablet 30 mg  30 mg Oral QHS    montelukast (SINGULAIR) tablet 10 mg  10 mg Oral QHS    temazepam (RESTORIL) capsule 15 mg  15 mg Oral QHS PRN         Objective:     Vitals:    08/04/18 2315 08/05/18 0305 08/05/18 0745 08/05/18 1152   BP: 134/58 142/59 142/53 142/63   Pulse: 60 61 60 64   Resp: 18 18 17 17   Temp: 98.1 °F (36.7 °C) 98.3 °F (36.8 °C) 98.3 °F (36.8 °C) 98.6 °F (37 °C)   SpO2: 96% 97% 95% 97%   Weight:         Intake and Output:   08/03 1901 - 08/05 0700  In: 990 [P.O.:990]  Out: 500 [Urine:500]  08/05 0701 - 08/05 1900  In: 320 [P.O.:320]  Out: -     Physical Exam:   Constitutional:  the patient is well developed and in no acute distress  HEENT:  Sclera clear, pupils equal, oral mucosa moist  Lungs: clear bilaterally, no wheezing  Cardiovascular:  RRR without M,G,R  Abd/GI: soft and non-tender; with positive bowel sounds, + distension. Ext: warm without cyanosis. There is no lower leg edema. Musculoskeletal: moves all four extremities with equal strength  Skin:  no jaundice or rashes, no wounds   Neuro: no gross neuro deficits   Musculoskeletal: can ambulate. No deformity  Psychiatric: Calm. LAB  Recent Labs      08/04/18 0428  08/03/18   0454   WBC   --   2.3*   HGB   --   9.0*   HCT   --   27.6*   PLT   --   149*   INR  1.6  1.7     Recent Labs      08/04/18 0428  08/03/18   0454   NA  143  144   K  4.7  5.1   CL  113*  114*   CO2  18*  21   GLU  142*  109*   BUN  58*  59*   CREA  3.53*  3.80*   MG  2.4  2.5*   PHOS  3.6   --    ALB  2.5*   --    SGOT  22   --      No results for input(s): PH, PCO2, PO2, HCO3 in the last 72 hours. Assessment:  (Medical Decision Making)     Hospital Problems  Date Reviewed: 7/31/2018          Codes Class Noted POA    Other cirrhosis of liver ([facility] ICD-10-CM: K74.69  ICD-9-CM: 571.5  7/31/2018 Yes        Other ascites ICD-10-CM: R18.8  ICD-9-CM: 789.59  7/31/2018 Yes        Acute-on-chronic kidney injury ([facility] ICD-10-CM: N17.9, N18.9  ICD-9-CM: 584.9, 585.9  7/31/2018 Unknown        Metastatic breast cancer ([facility] (Chronic) ICD-10-CM: C50.919  ICD-9-CM: 174.9  2/17/2017 Yes        * (Principal)Acute on chronic kidney failure ([facility] ICD-10-CM: N17.9, N18.9  ICD-9-CM: 584.9, 585.9  10/7/2016 Yes              Plan:  (Medical Decision Making)     1. EMERY on CKD 2/2 volume depletion in the setting of attempts to control ascites and diuresis  -Creatinine on admission 4.47 and has been improving with IVF Cr no new labs   baseline Cr 2.5-3.0    2. Hyperkalemia- resolved    3.  Metastatic breast cancer- Current treatment with Ibrance/fulvestrant per Onc.    4. Ascites- s/p paracentesis GI following  Continue hydration    Ling Amaro MD

## 2018-08-05 NOTE — PROGRESS NOTES
Problem: Falls - Risk of 
Goal: *Absence of Falls Document Ashly Cornea Fall Risk and appropriate interventions in the flowsheet. Outcome: Progressing Towards Goal 
Fall Risk Interventions: 
  
 
  
 
Medication Interventions: Teach patient to arise slowly History of Falls Interventions: Bed/chair exit alarm

## 2018-08-05 NOTE — PROGRESS NOTES
Problem: Falls - Risk of  Goal: *Absence of Falls  Document Donato Fall Risk and appropriate interventions in the flowsheet.    Outcome: Progressing Towards Goal  Fall Risk Interventions:            Medication Interventions: Teach patient to arise slowly         History of Falls Interventions: Bed/chair exit alarm

## 2018-08-05 NOTE — PROGRESS NOTES
Warfarin dosing per pharmacist    Mary Alice Milan is a 75 y.o. female. Indication:  DVT/PE    Goal INR:  2-3    Home dose:  1.5 mg daily    Risk factors or significant drug interactions:  none    Other anticoagulants:  none    Daily Monitoring  Date  INR     Warfarin dose HGB              Notes  7/31  3.0  Hold  9.6  8/1  2.8  Hold  8.6  8/2  2.0  Hold  ---  8/3  1.7  Hold  9.0  8/4  1.6  Hold  ---  8/5  1.5  Hold  ---    Pharmacy consulted to dose warfarin. INR continues to trend down in response to holding warfarin, now subtherapeutic at 1.5    Per progress notes from heme/onc, plan to try to get PleurX cath placed on Monday in IR, so continue to hold warfarin. Would consider risk vs benefit of bridge therapy with heparin drip leading up to procedure on Monday while INR subtherapeutic.     Thank you,  Fawn Anderson, PharmD  Clinical Pharmacist  [phone]

## 2018-08-05 NOTE — PROGRESS NOTES
END OF SHIFT NOTE:    Intake/Output  08/05 0701 - 08/05 1900  In: 880 [P.O.:880]  Out: -    Voiding: YES  Catheter: NO  Drain:              Stool:  0 occurrences. Emesis:  0 occurrences. VITAL SIGNS  Patient Vitals for the past 12 hrs:   Temp Pulse Resp BP SpO2   08/05/18 1611 98.5 °F (36.9 °C) 63 16 140/56 96 %   08/05/18 1152 98.6 °F (37 °C) 64 17 142/63 97 %   08/05/18 0745 98.3 °F (36.8 °C) 60 17 142/53 95 %       Pain Assessment  Pain 1  Pain Scale 1: Numeric (0 - 10) (08/05/18 1458)  Pain Intensity 1: 0 (08/05/18 1458)  Patient Stated Pain Goal: 0 (08/04/18 0015)  Pain Reassessment 1: Yes (08/04/18 1250)  Pain Location 1: Leg (08/04/18 2126)  Pain Orientation 1: Right;Left;Upper (08/04/18 2126)  Pain Description 1: Aching (08/04/18 2126)  Pain Intervention(s) 1: Medication (see MAR) (08/04/18 2126)    Ambulating  Yes    Additional Information: no c/o, awaiting DC to see if IR will place pleurex in AM instead of OP Wed. Due to discomfort and increasing dypsnea    Shift report given to oncoming nurse at the bedside.     Jluis Mccoy

## 2018-08-06 NOTE — PROGRESS NOTES
SHIRLEY Arizmendi called for verification of heparin drip orders. Consult for pharmacy to dose heparin drip. [id number] in pharmacy stated to start the drip at 18units/kg/hr.

## 2018-08-06 NOTE — PROGRESS NOTES
Warfarin dosing per pharmacist    Toribio Kowalski is a 75 y.o. female. Indication:  DVT/PE    Goal INR:  2-3    Home dose:  1.5 mg daily    Risk factors or significant drug interactions:  none    Other anticoagulants:  none    Daily Monitoring  Date  INR     Warfarin dose HGB              Notes  7/31  3.0  Hold  9.6  8/1  2.8  Hold  8.6  8/2  2.0  Hold  ---  8/3  1.7  Hold  9.0  8/4  1.6  Hold  ---  8/5  1.5  Hold  ---  8/6  1.5  Hold  8.3    Pharmacy consulted to dose warfarin. INR 1.5. Continue to hold for procedure. Possible pleurex placement today. Consider risk benefit of anticoagulation coverage while INR subtherapeutic. Thank you,  Leighton Lake, Pharm. D.   Clinical Pharmacist  [phone]

## 2018-08-06 NOTE — PROGRESS NOTES
END OF SHIFT NOTE:    Intake/Output      Voiding: YES  Catheter: NO  Drain:              Stool:  0 occurrences. Emesis:  0 occurrences. VITAL SIGNS  Patient Vitals for the past 12 hrs:   Temp Pulse Resp BP SpO2   08/06/18 1707 - 60 - 155/62 -   08/06/18 1559 98.6 °F (37 °C) 64 18 149/63 96 %   08/06/18 1125 99 °F (37.2 °C) 62 18 125/61 99 %   08/06/18 0732 98.8 °F (37.1 °C) 64 18 144/56 98 %       Pain Assessment  Pain 1  Pain Scale 1: Numeric (0 - 10) (08/06/18 1905)  Pain Intensity 1: 5 (08/06/18 1905)  Patient Stated Pain Goal: 0 (08/04/18 0015)  Pain Reassessment 1: Yes (08/04/18 1250)  Pain Location 1: Leg (08/04/18 2126)  Pain Orientation 1: Right;Left;Upper (08/04/18 2126)  Pain Description 1: Aching (08/04/18 2126)  Pain Intervention(s) 1: Medication (see MAR) (08/04/18 2126)    Ambulating  Yes    Additional Information: Pt was not able to go to IR today. Pt will be NPO after midnight for *possible* placement of a pleurex drain tomorrow. Oncoming nurse notified that IR should be called and a schedule made as the patient has been started on a heparin gtt and it will need to be stopped several hours prior to procedure.  is now at bedside. Shift report given to oncoming nurse at the bedside.     Shakira Hill, RN

## 2018-08-06 NOTE — PROGRESS NOTES
CM is following for prescription needs. Patient is uninsured and will need a voucher for Lovenox and additional medications. CM will prepare vouchers prior to discharge.

## 2018-08-06 NOTE — PROGRESS NOTES
[facility]        Inpatient Hematology / Oncology Progress Note      Admission Date: 2018 12:12 PM  Reason for Admission/Hospital Course: Other ascites [R18.8]      24 Hour Events:  Afebrile, VSS  IR consult for paracentesis/drain pending  Ongoing abdominal distention/discomfort       ROS:  Constitutional: Positive for fatigue; negative for fever, chills, weakness . CV:Negative for chest pain, palpitations, edema. Respiratory: Negative for dyspnea, cough, wheezing. GI: Positive for abdominal distention; negative for nausea, diarrhea. 10 point review of systems is otherwise negative with the exception of the elements mentioned above in the HPI. No Known Allergies    OBJECTIVE:  Patient Vitals for the past 8 hrs:   BP Temp Pulse Resp SpO2   18 1125 125/61 99 °F (37.2 °C) 62 18 99 %   18 0732 144/56 98.8 °F (37.1 °C) 64 18 98 %     Temp (24hrs), Av.7 °F (37.1 °C), Min:98.4 °F (36.9 °C), Max:99 °F (37.2 °C)         Physical Exam:  Constitutional: Well developed, well nourished female in no acute distress, sitting comfortably in the hospital bed. HEENT: Normocephalic and atraumatic. Oropharynx is clear, mucous membranes are moist. Neck supple. Skin Warm and dry. No bruising and no rash noted. No erythema. No pallor. Respiratory Lungs clear,  normal air exchange without accessory muscle use. CVS Normal rate, regular rhythm and normal S1 and S2. No murmurs, gallops, or rubs. Abdomen + semi-firm, distended, tympanic. Normoactive bowel sounds. Neuro Grossly nonfocal with no obvious sensory or motor deficits. MSK Normal range of motion in general.  No edema and no tenderness. Psych Appropriate mood and affect.         Labs:      Recent Labs      18   0503   WBC  2.8*   RBC  2.26*   HGB  8.3*   HCT  25.4*   MCV  112.4*   MCH  36.7*   MCHC  32.7   RDW  14.4   PLT  111*   GRANS  64   LYMPH  25   MONOS  9   EOS  1   BASOS  1   IG  0   DF AUTOMATED   ANEU  1.8   ABL  0.7   ABM  0.3   ANTONETTE  0.0   ABB  0.0   AIG  0.0        Recent Labs      08/06/18   0503  08/05/18   1311  08/04/18   0428   NA  144  144  143   K  4.7  4.6  4.7   CL  112*  112*  113*   CO2  20*  20*  18*   AGAP  12  12  12   GLU  199*  184*  142*   BUN  59*  63*  58*   CREA  3.33*  3.38*  3.53*   GFRAA  18*  17*  17*   GFRNA  15*  14*  14*   CA  6.9*  6.7*  6.6*   SGOT  25   --   22   AP  179*   --   190*   TP  6.4   --   5.9*   ALB  2.4*   --   2.5*   GLOB  4.0*   --   3.4   AGRAT  0.6*   --   0.7*   MG   --    --   2.4   PHOS   --    --   3.6     Imaging:  IR PARACENTESIS ABD W IMAGE [[id number]] Collected: 08/02/18 7048      Order Status: Completed Updated: 08/02/18 1230     Narrative:       Title: Ultrasound guided paracentesis.      History: 60-year-old female with metastatic breast cancer, cirrhosis, ascites,  renal insufficiency. : Esperanza Clemente PA-C    Supervising Physician: Giovani Acharya M.D. Consent:  Informed written and oral consent was obtained from the patient after  the risks and benefits were discussed.  All questions were answered and the  patient requested that we proceed. Procedure:  Maximal sterile barrier technique was used.  Following routine prep  and drape of the abdomen, a local field block with 1% lidocaine was achieved. Ultrasound evaluation was performed. Fluid was identified in the peritoneal cavity. Using real-time ultrasound  guidance, the peritoneal cavity was accessed with an 8 [place] centesis catheter.   The catheter was connected to Vacutainer bottles. A total of 4000 cc of thin yellow fluid was removed and sent to the lab. The  catheter was removed and a bandage applied. Complications: None. Findings: Large-volume ascites.       Impression:       Impression: Uncomplicated ultrasound guided paracentesis.     PLAN: Consider Pleurx catheter placement.             US RETROPERITONEUM COMP [[id number]] Collected: 08/02/18 0921     Order Status: Completed Updated: 08/02/18 0927     Narrative:       Renal ultrasound, 8/2/2018    History: Acute kidney insufficiency. Comparison: Limited abdominal ultrasound 6/12/2018. Technique: Grayscale and doppler images of the kidneys and bladder were obtained  using a 3-5 MHz linear transducer. Findings: The right kidney measures 10.4 cm, and the left kidney measures 9.1 cm  in greatest longitudinal length.  No shadowing stones are seen. Persistent  moderate hydronephrosis is seen of the right kidney which is not felt to be  significantly changed from the prior study. Central artifact is seen within the  dilated right renal collecting system which is felt to represent the proximal  aspect of the stent.  Renal parenchymal echogenicity is increased consistent  with chronic medical renal changes. The urinary bladder demonstrates the distal aspect of what appears to be a  stent. The distal abdominal aorta is normal in caliber measuring 1.2 cm. The IVC is  patent. Small to moderate ascites is seen. The liver is in nodular suggesting  cirrhosis. A left pleural effusion is seen.       Impression:       IMPRESSION:     1.  Stable moderate hydronephrosis of the right kidney when compared to a prior  limited abdominal ultrasound dated 6/12/2018. No evolving hydronephrosis is seen  of the left kidney. What appears to be a right renal collecting system stent is  seen which is grossly not malpositioned. 2.  Left pleural effusion and small to moderate ascites. 3. Cirrhotic appearing liver.          ASSESSMENT:    Problem List  Date Reviewed: 7/31/2018          Codes Class Noted    Other cirrhosis of liver ([facility] ICD-10-CM: K74.69  ICD-9-CM: 571.5  7/31/2018        Other ascites ICD-10-CM: R18.8  ICD-9-CM: 789.59  7/31/2018        Acute-on-chronic kidney injury [facility]) ICD-10-CM: N17.9, N18.9  ICD-9-CM: 584.9, 585.9  7/31/2018        History of blood clots ICD-10-CM: N67.828  ICD-9-CM: V12.51  2/16/2018        Type 2 diabetes mellitus with nephropathy ([facility] ICD-10-CM: E11.21  ICD-9-CM: 250.40, 583.81  1/16/2018        Recurrent depression ([facility] ICD-10-CM: F33.9  ICD-9-CM: 296.30  1/16/2018        Vitamin B12 deficiency ICD-10-CM: E53.8  ICD-9-CM: 266.2  11/13/2017        Anemia of chronic disease ICD-10-CM: D63.8  ICD-9-CM: 285.29  11/13/2017        Iron deficiency anemia (Chronic) ICD-10-CM: D50.9  ICD-9-CM: 280.9  7/10/2017        Type 2 diabetes mellitus without complication (HCC) (Chronic) ICD-10-CM: E11.9  ICD-9-CM: 250.00  0/15/6196        Folliculitis DIF-36-CZ: L73.9  ICD-9-CM: 704.8  7/10/2017        Hypernatremia ICD-10-CM: E87.0  ICD-9-CM: 276.0  5/30/2017        Anxiety ICD-10-CM: F41.9  ICD-9-CM: 300.00  5/28/2017        Acute on chronic respiratory failure ([facility] ICD-10-CM: J96.20  ICD-9-CM: 518.84  5/24/2017        Aortic stenosis, moderate (Chronic) ICD-10-CM: I35.0  ICD-9-CM: 424.1  5/24/2017        Anticoagulated on Coumadin (Chronic) ICD-10-CM: Z51.81, Z79.01  ICD-9-CM: V58.83, V58.61  5/24/2017        Volume overload ICD-10-CM: E87.70  ICD-9-CM: 276.69  5/24/2017        Breast cancer ([facility] (Chronic) ICD-10-CM: C50.919  ICD-9-CM: 174.9  2/17/2017    Overview Addendum 2/27/2014 10:08 AM by Zoe Shea NP     Er+  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 SUV max on the right is 20.5 and on the left 31.5. There is matted high right axillary   lymphadenopathy as well as a more hypermetabolic low right axillary lymph node which measures 12 x 9 mm and has an SUV max of 15.4. Small bilateral pulmonary hilar metastases are also noted as well as extensive skeletal metastatic disease which includes both proximal femora, both scapulae, innumerable ribs, virtually every vertebra, and the bony pelvis.  No displaced pathologic fracture is identified on the CT images. Ct scan 2-12 Innumerable diffuse osseous metastases. . Multiple diffuse tiny lung nodules, and right hilar lymphadenopathy, also suspicious for metastases. 3. Bilateral breast masses, compatible with known carcinoma. CANCER ANTIGEN 27.29 1076   Oncology Flowsheet Day, Cycle cyclophosphamide (CYTOXAN) IV   1/27/2012 Day 1, Cycle 1 600 mg/m2 = 996 mg   2/17/2012 Day 1, Cycle 2 600 mg/m2 = 996 mg   3/9/2012 Day 1, Cycle 3 600 mg/m2 = 996 mg   3/30/2012 Day 1, Cycle 4 500 mg/m2 = 830 mg   4/20/2012 Day 1, Cycle 5 500 mg/m2 = 830 mg     Oncology Flowsheet DOCEtaxel (TAXOTERE) IV   1/27/2012 75 mg/m2 = 125 mg   2/17/2012 75 mg/m2 = 125 mg   3/9/2012 75 mg/m2 = 125 mg   3/30/2012 60 mg/m2 = 100 mg   4/20/2012 60 mg/m2 = 100 mg   7-19-12 post 6 cycles of TC improved breast masses switch to femara maintenence  Clear response on pet ct scan   Mixed response to chemotherapy: There has been a significant interval response in the right breast and axilla with decrease in size of spiculated right breast mass and numerous right axillary lymph nodes. Left breast mass also appears to have responded chemotherapy though there may be persistent chest wall invasion. 2. Interval evolution of widespread osseous metastatic disease with many lytic lesions now appearing more sclerotic. These are associated with continued FDG uptake which is difficult to differentiate from marrow reactivity given recent chemotherapy. At least one new osseous metastatic deposits is present in the posterior spinous process at L1. Focally intense uptake is also present at approximately T5  10-19-12 pain in arms legs back continued improvement on ct scan Decreased size of the largest lung nodules and near complete resolution . of many. No new masses in the chest, abdomen, or pelvis. 2. Decreased size of right axillary and right hilar lymph nodes. 3. Diffuse osseous lesions again seen. 4. Diverticulosis. 5.  Atherosclerotic vascular disease ca 15-3 down to 50  On femara and aredia will see if we can get affinitor to start at next visit continue therapy  12-19-12 new headache and dizziness for MRI will start affinitor   1-14-13 improved pain breast mass right breast smaller width 7 cm x 5  6-05-13 femara and aredia now on affinitor x 5 months   7-13 tumor markers falling   8-20-13 post admission for pneumonia medications to restart breast mass smaller reduce affinitor to 7.5 mg   10-18-13 femara and affinitor pain right ilium intermittent markers smaller breast mass smaller on right   12-5-13 Reduce Afinitor to 5 mg daily. Continue coumadin for DVT. Continue Femara and Aredia. Repeat US and skeletal survey prior to next visit. 1-6-14 Feeling better with reduced dose. Ultrasound of breasts show decrease in mass sizes. Skeletal survey stable. Follow up in 2 months. Bony metastasis (HCC) (Chronic) ICD-10-CM: C79.51  ICD-9-CM: 198.5  2/17/2017        Acute respiratory failure ([facility] ICD-10-CM: J96.00  ICD-9-CM: 518.81  2/17/2017        Metastatic breast cancer (HCC) (Chronic) ICD-10-CM: C50.919  ICD-9-CM: 174.9  2/17/2017        CKD (chronic kidney disease) (Chronic) ICD-10-CM: N18.9  ICD-9-CM: 585.9  1/4/2017    Overview Signed 5/14/2013 11:35 AM by Marilou Bravo RN     With acute rise- ?  Dehydration and monitor             DVT (deep venous thrombosis) (HCC) (Chronic) ICD-10-CM: I82.409  ICD-9-CM: 453.40  1/4/2017    Overview Signed 11/19/2013  6:07 AM by Erma May     12-56-20 There is nonocclusive thrombus within the left common femoral vein, superficial femoral vein, popliteal vein, and posterior tibial vein               Accelerated hypertension ICD-10-CM: I10  ICD-9-CM: 401.0  1/4/2017        Pleural effusion, right (Chronic) ICD-10-CM: J90  ICD-9-CM: 511.9  1/4/2017    Overview Addendum 5/26/2017 10:11 AM by Cliff Fountain NP     10/6/16 R thoracentesis 800 ml: pleural fluid creatine 4.1 = likely urinothorax  5/25/2017: Thoracentesis on right- 950 cc of /serosanguinous/ fluid             Hydronephrosis (Chronic) ICD-10-CM: N13.30  ICD-9-CM: 564  1/4/2017        Bilateral edema of lower extremity ICD-10-CM: R60.0  ICD-9-CM: 782.3  1/4/2017        Hematuria ICD-10-CM: R31.9  ICD-9-CM: 599.70  1/4/2017        Chemotherapy-induced neutropenia (HCC) ICD-10-CM: D70.1, T45.1X5A  ICD-9-CM: 288.03, E933.1  12/20/2016        Pancytopenia ([facility] ICD-10-CM: A38.018  ICD-9-CM: 284.19  10/10/2016        HTN (hypertension) (Chronic) ICD-10-CM: I10  ICD-9-CM: 401.9  10/7/2016        DM (diabetes mellitus) ([facility] (Chronic) ICD-10-CM: E11.9  ICD-9-CM: 250.00  10/7/2016        * (Principal)Acute on chronic kidney failure ([facility] ICD-10-CM: N17.9, N18.9  ICD-9-CM: 584.9, 585.9  10/7/2016        Pulmonary edema ICD-10-CM: J81.1  ICD-9-CM: 081  1/3/2016        Elevated troponin ICD-10-CM: R74.8  ICD-9-CM: 790.6  10/20/2015        PND (paroxysmal nocturnal dyspnea) ICD-10-CM: R06.00  ICD-9-CM: 786.09  2/25/2014        Leg swelling ICD-10-CM: M79.89  ICD-9-CM: 729.81  2/25/2014    Overview Signed 2/25/2014  2:45 PM by Rocio Duke NP     Bilateral               Heart failure with preserved ejection fraction ([facility] ICD-10-CM: I50.30  ICD-9-CM: 428.9  2/25/2014        Anemia ICD-10-CM: D64.9  ICD-9-CM: 285.9  11/19/2013    Overview Signed 11/19/2013  6:06 AM by Cielo Benites     14-73-11 admitted with anemia and transfused              Asthma (Chronic) ICD-10-CM: J45.909  ICD-9-CM: 493.90  10/22/2013    Overview Addendum 10/22/2013 10:55 AM by Candice Unger NP     8/2013: started on Symbicort  10/22/13: Added singulair             Hypomagnesemia ICD-10-CM: V09.54  ICD-9-CM: 275.2  8/4/2013        Hypokalemia ICD-10-CM: E87.6  ICD-9-CM: 276.8  8/4/2013            Ms. Carolee Lira is a 75 y.o. female admitted on 7/31/2018 with a primary diagnosis of acute on chronic kidney injury.      She has a history of metastatic breast cancer, asthma, chronic diastolic heart failure, HTN, DM type 2, DVT, and chronic kidney disease. She is currently being treated with Ibrance/Faslodex. Most recently she has developed recurrent ascites with paracentesis twice over the last five weeks or so. She was placed on aldactone 50 mg daily to be taken in addition to her lasix 40 mg daily to try and help with fluid accumulation. She returned today for follow up and her creatinine was elevated above baseline to 4.47. She will be admitted for management of acute on chronic kidney injury.        PLAN:  Metastatic Breast Cancer  * Currently on treatment with Ibrance/fulvestrant. 8/1 From a prognosis standpoint regarding her metastatic breast cancer, she has done quite well on current therapy. Her last PET scan done 7/12 show stable disease and no new concerning areas. Cytology from fluid has not proven to be malignant. May continue to do well and other lines of therapy as options should she have progression in the future     Acute on Chronic Kidney Injury  * Gentle hydration. Hold Lasix and aldactone - watch closely for fluid overload. * Consult nephrology. * Renal ultrasound. 8/1 Slight improvement with hydration yesterday. Appreciate nephrology help. Holding diuretics. Off fluids to limit fluid reaccumulation. Per note today- rec removing fluid with pleurex rather than attempting management with diuretics. 8/2 Mild improvement daily. Continue to hold diuretics. 8/3 Cr 3.80 (3.86 yesterday). Giving 0.5 liter ns bolus. Recheck in am.  8/6 Cr down to 3.33 (b/l around 2.5-3)     Ascites  * Consult IR for paracentesis. Send fluid for cytology. * Consult GI for cirrhosis. 8/1 GI following. Planning para today and sending fluid for work up. Defer pleurex drain to GI if no other medical management options reasonable. Obviously limited with renal issues. OK from our perspective to place pleurex if GI deems appropriate.   8/2 Per GI, she is not a candidate for liver transplant and additional work-up for CLD will likely not be beneficial as this is endstage with no current activity and normal transaminases. Suggest PleurX. She agrees, IR aware. 8/5 Consult IR for possible pleurex tomorrow due to recurrent ascites   8/6 IR unable to perform pleurx today. Order for NPO p MN placed in case it can be performed tomorrow. History of DVT   * Coumadin. Holding for procedures. Constipation   0802 Change erna-colace to Miralax. Erna-colace gives her abdominal discomfort. 8/5 BM x 1 yesterday    Macrocytic anemia  8/4 Check Folate/B12  8/5 B12/foalte normal     DVT prophylaxis - SCD's. Kamryn SOP's. Continue home medications.      Disposition:  Anticipate discharge after IR places pleurx drain for recurrent ascites, hopefully tomorrow. SHIRLEY Prado [facility]  [address] [address]  Office : [phone]  Fax : [phone]           Attending Addendum:  I personally evaluated the patient with Gerber Brito N.P.,  and agree with the assessment, findings and plan as documented. Appears stable, heart regular without murmur, lungs clear, abdomen benign. Renal function slightly improved. Plans for pleurex catheter placement.                Nohemy Marroquin MD  [facility]  [address] [address]  Office : [phone]  Fax : [phone]

## 2018-08-06 NOTE — PROGRESS NOTES
Rounded patient with Malena Wilcox, RN. Interpreting Services offered when needed.           Thank you for this referral,       Kellee Orellana, [facility]  /Patient [address].  [address] [place] [address]    [phone]

## 2018-08-06 NOTE — PROGRESS NOTES
After speaking with IR and , Lin Perkins, it has been confirmed that the patient will not be able to have the pleurex placed today. The tentative date is set for 8/8/18. The patient is aware and states she is hungry. Diet orders changed and call placed to nutrition for tray. Will notify MD during rounds.

## 2018-08-06 NOTE — PROGRESS NOTES
Problem: Falls - Risk of  Goal: *Absence of Falls  Outcome: Progressing Towards Goal  Fall Risk Interventions:            Medication Interventions: Patient to call before getting OOB         History of Falls Interventions: Bed/chair exit alarm

## 2018-08-06 NOTE — PROGRESS NOTES
Patient off the floor. Renal function stable to slightly better and near baseline. Will f/u tomorrow.

## 2018-08-06 NOTE — PROGRESS NOTES
END OF SHIFT NOTE:    Intake/Output      Voiding: YES  Catheter: NO  Drain:              Stool:  0 occurrences. Emesis:  0 occurrences. VITAL SIGNS  Patient Vitals for the past 12 hrs:   Temp Pulse Resp BP SpO2   08/05/18 2318 98.8 °F (37.1 °C) 66 18 140/55 95 %   08/05/18 1909 98.7 °F (37.1 °C) 66 16 154/61 96 %   08/05/18 1611 98.5 °F (36.9 °C) 63 16 140/56 96 %       Pain Assessment  Pain 1  Pain Scale 1: Visual (08/06/18 0020)  Pain Intensity 1: 0 (08/06/18 0020)  Patient Stated Pain Goal: 0 (08/04/18 0015)  Pain Reassessment 1: Yes (08/04/18 1250)  Pain Location 1: Leg (08/04/18 2126)  Pain Orientation 1: Right;Left;Upper (08/04/18 2126)  Pain Description 1: Aching (08/04/18 2126)  Pain Intervention(s) 1: Medication (see MAR) (08/04/18 2126)    Ambulating  Yes    Additional Information:     Shift report given to oncoming nurse at the bedside.     Erna Prader

## 2018-08-07 NOTE — INTERDISCIPLINARY ROUNDS
Interdisciplinary Rounds completed. Charge RN, Provider and  present. Plan to place PleurX tomorrow then can d/c home. SW working on vouchers for medications at discharge.

## 2018-08-07 NOTE — PROGRESS NOTES
END OF SHIFT NOTE:    Intake/Output      Voiding: yes  Catheter: NO  Drain:              Stool:  0 occurrences. Emesis:  0 occurrences. VITAL SIGNS  Patient Vitals for the past 12 hrs:   Temp Pulse Resp BP SpO2   08/07/18 1947 98.8 °F (37.1 °C) 67 16 120/48 96 %   08/07/18 1700 99.1 °F (37.3 °C) 69 18 142/64 95 %   08/07/18 1040 98.4 °F (36.9 °C) 65 16 138/53 97 %       Pain Assessment  Pain 1  Pain Scale 1: Numeric (0 - 10) (08/07/18 1835)  Pain Intensity 1: 0 (08/07/18 1835)  Patient Stated Pain Goal: 0 (08/07/18 0817)  Pain Reassessment 1: Yes (08/07/18 1835)  Pain Location 1: Leg (08/07/18 1741)  Pain Orientation 1: Left;Right (08/07/18 1741)  Pain Description 1: Aching (08/07/18 1741)  Pain Intervention(s) 1: Medication (see MAR) (08/07/18 1741)    Ambulating  Yes    Additional Information: Given Norco once for pain with good effect. Heparin drip running 19.7ml/hr. aPTT therapeutic. NPO midnight tonight for pleurx placement tomorrow. Shift report given to oncoming nurse at the bedside.      Olive Hughes

## 2018-08-07 NOTE — PROGRESS NOTES
Murali Leggett  Admission Date: 7/31/2018             Renal Daily Progress Note: 8/7/2018        Subjective:     Patient seen and examined doing well. ROS:  Constitutional: + fatigue; no fever, chills, weakness . CV: no chest pain, palpitations, edema. Respiratory: no dyspnea, cough, wheezing.   GI: + abdominal distention; no nausea, vomiting, diarrhea, constipation     Current Facility-Administered Medications   Medication Dose Route Frequency    heparin 25,000 units in dextrose 500 mL infusion  18-36 Units/kg/hr IntraVENous TITRATE    albuterol (PROVENTIL VENTOLIN) nebulizer solution 2.5 mg  2.5 mg Nebulization Q4H PRN    polyethylene glycol (MIRALAX) packet 17 g  17 g Oral DAILY    0.9% sodium chloride infusion 250 mL  250 mL IntraVENous PRN    warfarin (COUMADIN) tablet 1.5 mg - On Hold  1.5 mg Oral See Admin Instructions    albuterol-ipratropium (DUO-NEB) 2.5 MG-0.5 MG/3 ML  3 mL Nebulization Q6H PRN    amLODIPine (NORVASC) tablet 10 mg  10 mg Oral DAILY    carvedilol (COREG) tablet 3.125 mg  3.125 mg Oral BID WITH MEALS    hydrALAZINE (APRESOLINE) tablet 50 mg  50 mg Oral BID    HYDROcodone-acetaminophen (NORCO)  mg tablet 1 Tab  1 Tab Oral Q6H PRN    LORazepam (ATIVAN) tablet 1 mg  1 mg Oral Q6H PRN    megestrol (MEGACE) 400 mg/10 mL (10 mL) oral suspension 800 mg  800 mg Oral DAILY    mirtazapine (REMERON) tablet 30 mg  30 mg Oral QHS    montelukast (SINGULAIR) tablet 10 mg  10 mg Oral QHS    temazepam (RESTORIL) capsule 15 mg  15 mg Oral QHS PRN         Objective:     Vitals:    08/06/18 2329 08/07/18 0318 08/07/18 0721 08/07/18 1040   BP: 147/60 138/61 144/56 138/53   Pulse: 70 61 63 65   Resp: 16 16 16 16   Temp: 99.2 °F (37.3 °C) 98.9 °F (37.2 °C) 98.4 °F (36.9 °C) 98.4 °F (36.9 °C)   SpO2: 96% 97% 97% 97%   Weight:       Height:         Intake and Output:   08/05 1901 - 08/07 0700  In: 669.2 [P.O.:480; I.V.:189.2]  Out: -   08/07 0701 - 08/07 1900  In: 370 [P.O.:370]  Out: 200 [Urine:200]    Physical Exam:   Constitutional:  the patient is well developed and in no acute distress  HEENT:  Sclera clear, pupils equal, oral mucosa moist  Lungs: clear bilaterally, no wheezing  Cardiovascular:  RRR without M,G,R  Abd/GI: soft and non-tender; with positive bowel sounds, + distension. Ext: warm without cyanosis. There is no lower leg edema. Musculoskeletal: moves all four extremities with equal strength  Skin:  no jaundice or rashes, no wounds   Neuro: no gross neuro deficits   Musculoskeletal: can ambulate. No deformity  Psychiatric: Calm. LAB  Recent Labs      08/07/18   0343  08/06/18   0503  08/05/18   1311   WBC   --   2.8*   --    HGB   --   8.3*   --    HCT   --   25.4*   --    PLT   --   111*   --    INR  1.5  1.5  1.5     Recent Labs      08/07/18 0342 08/06/18   0503  08/05/18   1311   NA  145  144  144   K  4.7  4.7  4.6   CL  112*  112*  112*   CO2  20*  20*  20*   GLU  129*  199*  184*   BUN  63*  59*  63*   CREA  3.44*  3.33*  3.38*   ALB   --   2.4*   --    SGOT   --   25   --      No results for input(s): PH, PCO2, PO2, HCO3 in the last 72 hours. Assessment:  (Medical Decision Making)     Hospital Problems  Date Reviewed: 7/31/2018          Codes Class Noted POA    Other cirrhosis of liver ([facility] ICD-10-CM: K74.69  ICD-9-CM: 571.5  7/31/2018 Yes        Other ascites ICD-10-CM: R18.8  ICD-9-CM: 789.59  7/31/2018 Yes        Acute-on-chronic kidney injury ([facility] ICD-10-CM: N17.9, N18.9  ICD-9-CM: 584.9, 585.9  7/31/2018 Unknown        Metastatic breast cancer ([facility] (Chronic) ICD-10-CM: C50.919  ICD-9-CM: 174.9  2/17/2017 Yes        * (Principal)Acute on chronic kidney failure ([facility] ICD-10-CM: N17.9, N18.9  ICD-9-CM: 584.9, 585.9  10/7/2016 Yes              Plan:  (Medical Decision Making)     1.  EMERY on CKD 2/2 volume depletion in the setting of attempts to control ascites and diuresis  -Creatinine on admission 4.47 and has been improving with IVF Cr no new labs   baseline Cr 2.5-3.0    2. Hyperkalemia- resolved    3.  Metastatic breast cancer- Current treatment with Ibrance/fulvestrant per Onc.    4. Ascites- s/p paracentesis GI following  Continue hydration    Chino Hdz MD  Overall stable from renal stand point  Tolerating oral well no need for IVFs  I will sign off thanks

## 2018-08-07 NOTE — PROGRESS NOTES
Problem: Falls - Risk of  Goal: *Absence of Falls  Document Donato Fall Risk and appropriate interventions in the flowsheet.    Outcome: Progressing Towards Goal  Fall Risk Interventions:            Medication Interventions: Patient to call before getting OOB         History of Falls Interventions: Bed/chair exit alarm

## 2018-08-07 NOTE — ANESTHESIA PREPROCEDURE EVALUATION
Anesthetic History   No history of anesthetic complications            Review of Systems / Medical History  Patient summary reviewed and pertinent labs reviewed    Pulmonary          Shortness of breath  Asthma : well controlled       Neuro/Psych         Psychiatric history (Depression)     Cardiovascular    Hypertension  Valvular problems/murmurs (mod AS): aortic stenosis            Exercise tolerance: <4 METS: Uses walker    Comments: 5/18: ECHO EF 60-65%; AV mean 20 mmHg, small-mod pericardial effusion without HD compromise. GI/Hepatic/Renal         Renal disease (Crt 3.32): CRI, ARF and stones  Liver disease (cirrhosis with ascites)     Endo/Other    Diabetes: poorly controlled, type 2    Arthritis, cancer (breast) and anemia     Other Findings   Comments: Breast ca with bony metastasis    DVT 2015    Pancytopenia         Physical Exam    Airway  Mallampati: II  TM Distance: 4 - 6 cm  Neck ROM: normal range of motion   Mouth opening: Normal     Cardiovascular    Rhythm: regular  Rate: normal    Murmur: Grade 3, Aortic area     Dental    Dentition: Caps/crowns, Upper partial plate and Lower partial plate  Comments: Missing numerous teeth   Pulmonary  Breath sounds clear to auscultation               Abdominal    Ascites     Other Findings            Anesthetic Plan    ASA: 4  Anesthesia type: general          Induction: Intravenous and RSI  Anesthetic plan and risks discussed with: Patient and Spouse      Abdomen is distended and tense. I think she probably needs GETA.

## 2018-08-07 NOTE — PROGRESS NOTES
Problem: Nutrition Deficit  Goal: *Optimize nutritional status  Nutrition  Reason for assessment: LOS day 7  Assessment:   Diet order(s): regular  Food/Nutrition Patient History:  Pt with h/o metastatic breast cancer, CHF, DM, CKD, and recurrent ascites requiring multiple paracentesis PTA and throughout admission. Pt did have a PleurX drain in place (May/June of last year) but was removed. Plans for replacement in IR.  was used via  phone at bedside. Pt reports that she has continued to eat well despite distention. Per pt, diet recall includes bread and butter or fruit at breakfast, a meat (chicken) and a vegetable at lunch and dinner. She states that she eats a lot of fruit and vegetables. She denies eating many canned goods or adding salt to foods at the table. She is on daily Megace and Remeron. Abdomen noted as distended with ascites, hypoactive bowel sounds. Stool x 1 on 8/4. Anthropometrics:Height: 5' 3\" (160 cm),  Weight: 54.7 kg (120 lb 8 oz), Weight Source: Standing scale (comment), Body mass index is 21.35 kg/(m^2). BMI class of underweight for aeg >65 years. No edema noted. Macronutrient needs:  EER:  0682-6922 kcal /day (25-30 kcal/kg listed BW)  EPR:  42-63 grams protein/day (0.8-1.2 grams/kg IBW)(GFR 14)-EMERY/CJD  Intake/Comparative Standards: Average intake for past 7 day(s)/19 recorded meal(s): 79%. This potentially meets ~100% of kcal and ~100% of protein needs    Nutrition Diagnosis: Altered GI function r/t cirrhosis as evidenced by pt with ascites, distention, requiring paracentesis and PleurX drain placement. Intervention:  Meals and snacks: add 2 gm Na restriction to current diet  Nutrition Supplement Therapy: none neded   Discharge nutrition plan: Discussed sodium restriction needed at discharge.    Coordination of care:  on  phone    Zoila Rooney 87, [address], [address] [place] [address], [phone]

## 2018-08-07 NOTE — PROGRESS NOTES
END OF SHIFT NOTE:    Intake/Output      Voiding: YES  Catheter: NO  Drain:              Stool:  0 occurrences. Emesis:  0 occurrences. VITAL SIGNS  Patient Vitals for the past 12 hrs:   Temp Pulse Resp BP SpO2   08/07/18 0318 98.9 °F (37.2 °C) 61 16 138/61 97 %   08/06/18 2329 99.2 °F (37.3 °C) 70 16 147/60 96 %       Pain Assessment  Pain 1  Pain Scale 1: Numeric (0 - 10) (08/06/18 1905)  Pain Intensity 1: 5 (08/06/18 1905)  Patient Stated Pain Goal: 0 (08/04/18 0015)  Pain Reassessment 1: Yes (08/04/18 1250)  Pain Location 1: Leg (08/04/18 2126)  Pain Orientation 1: Right;Left;Upper (08/04/18 2126)  Pain Description 1: Aching (08/04/18 2126)  Pain Intervention(s) 1: Medication (see MAR) (08/04/18 2126)    Ambulating  Yes    Additional Information: Pt has been NPO after midnight pending possible placement of Pleurex drain. Heperin drip stopped 0300. Pt required pain medication 1x. No needs voiced at this time. Shift report given to oncoming nurse, Dk Ovalle RN,  at the bedside.     Nata Bustillo

## 2018-08-07 NOTE — PROGRESS NOTES
[facility]        Inpatient Hematology / Oncology Progress Note      Admission Date: 2018 12:12 PM  Reason for Admission/Hospital Course: Other ascites [R18.8]      24 Hour Events:  Afebrile, VSS  IR to place pleurx drain for ascites tomorrow  Ongoing abdominal distention/discomfort     ROS:  Constitutional: Positive for fatigue; negative for fever, chills, weakness . CV:Negative for chest pain, palpitations, edema. Respiratory: Negative for dyspnea, cough, wheezing. GI: Positive for abdominal distention; negative for nausea, diarrhea. 10 point review of systems is otherwise negative with the exception of the elements mentioned above in the HPI. No Known Allergies    OBJECTIVE:  Patient Vitals for the past 8 hrs:   BP Temp Pulse Resp SpO2   18 1040 138/53 98.4 °F (36.9 °C) 65 16 97 %   18 0721 144/56 98.4 °F (36.9 °C) 63 16 97 %     Temp (24hrs), Av.7 °F (37.1 °C), Min:98.4 °F (36.9 °C), Max:99.2 °F (37.3 °C)     0701 -  1900  In: 120 [P.O.:120]  Out: 200 [Urine:200]    Physical Exam:  Constitutional: Well developed, well nourished female in no acute distress, sitting comfortably in the hospital bed. HEENT: Normocephalic and atraumatic. Oropharynx is clear, mucous membranes are moist. Neck supple. Skin Warm and dry. No bruising and no rash noted. No erythema. No pallor. Respiratory Lungs clear,  normal air exchange without accessory muscle use. CVS Normal rate, regular rhythm and normal S1 and S2. No murmurs, gallops, or rubs. Abdomen + semi-firm, distended, tympanic. Normoactive bowel sounds. Neuro Grossly nonfocal with no obvious sensory or motor deficits. MSK Normal range of motion in general.  No edema and no tenderness. Psych Appropriate mood and affect.         Labs:      Recent Labs      18   0503   WBC  2.8*   RBC  2.26*   HGB  8.3*   HCT  25.4*   MCV  112.4*   MCH  36.7*   MCHC  32.7   RDW  14.4   PLT  111* GRANS  64   LYMPH  25   MONOS  9   EOS  1   BASOS  1   IG  0   DF  AUTOMATED   ANEU  1.8   ABL  0.7   ABM  0.3   ANTONETTE  0.0   ABB  0.0   AIG  0.0        Recent Labs      08/07/18   0342  08/06/18   0503  08/05/18   1311   NA  145  144  144   K  4.7  4.7  4.6   CL  112*  112*  112*   CO2  20*  20*  20*   AGAP  13  12  12   GLU  129*  199*  184*   BUN  63*  59*  63*   CREA  3.44*  3.33*  3.38*   GFRAA  17*  18*  17*   GFRNA  14*  15*  14*   CA  6.6*  6.9*  6.7*   SGOT   --   25   --    AP   --   179*   --    TP   --   6.4   --    ALB   --   2.4*   --    GLOB   --   4.0*   --    AGRAT   --   0.6*   --      Imaging:  IR PARACENTESIS ABD W IMAGE [[id number]] Collected: 08/02/18 5840      Order Status: Completed Updated: 08/02/18 1230     Narrative:       Title: Ultrasound guided paracentesis.      History: 77-year-old female with metastatic breast cancer, cirrhosis, ascites,  renal insufficiency. : Maria Isabel Treviño PA-C    Supervising Physician: Pepe Zurita M.D. Consent:  Informed written and oral consent was obtained from the patient after  the risks and benefits were discussed.  All questions were answered and the  patient requested that we proceed. Procedure:  Maximal sterile barrier technique was used.  Following routine prep  and drape of the abdomen, a local field block with 1% lidocaine was achieved. Ultrasound evaluation was performed. Fluid was identified in the peritoneal cavity. Using real-time ultrasound  guidance, the peritoneal cavity was accessed with an 8 [place] citizen centesis catheter.   The catheter was connected to Vacutainer bottles. A total of 4000 cc of thin yellow fluid was removed and sent to the lab. The  catheter was removed and a bandage applied. Complications: None. Findings: Large-volume ascites.       Impression:       Impression: Uncomplicated ultrasound guided paracentesis.     PLAN: Consider Pleurx catheter placement.             US RETROPERITONEUM COMP [[id number]] Collected: 08/02/18 8505     Order Status: Completed Updated: 08/02/18 0927     Narrative:       Renal ultrasound, 8/2/2018    History: Acute kidney insufficiency. Comparison: Limited abdominal ultrasound 6/12/2018. Technique: Grayscale and doppler images of the kidneys and bladder were obtained  using a 3-5 MHz linear transducer. Findings: The right kidney measures 10.4 cm, and the left kidney measures 9.1 cm  in greatest longitudinal length.  No shadowing stones are seen. Persistent  moderate hydronephrosis is seen of the right kidney which is not felt to be  significantly changed from the prior study. Central artifact is seen within the  dilated right renal collecting system which is felt to represent the proximal  aspect of the stent.  Renal parenchymal echogenicity is increased consistent  with chronic medical renal changes. The urinary bladder demonstrates the distal aspect of what appears to be a  stent. The distal abdominal aorta is normal in caliber measuring 1.2 cm. The IVC is  patent. Small to moderate ascites is seen. The liver is in nodular suggesting  cirrhosis. A left pleural effusion is seen.       Impression:       IMPRESSION:     1.  Stable moderate hydronephrosis of the right kidney when compared to a prior  limited abdominal ultrasound dated 6/12/2018. No evolving hydronephrosis is seen  of the left kidney. What appears to be a right renal collecting system stent is  seen which is grossly not malpositioned. 2.  Left pleural effusion and small to moderate ascites. 3. Cirrhotic appearing liver.          ASSESSMENT:    Problem List  Date Reviewed: 7/31/2018          Codes Class Noted    Other [facility] liver ([facility] ICD-10-CM: K74.69  ICD-9-CM: 571.5  7/31/2018        Other ascites ICD-10-CM: R18.8  ICD-9-CM: 789.59  7/31/2018        Acute-on-chronic kidney injury [facility]) ICD-10-CM: N17.9, N18.9  ICD-9-CM: 584.9, 585.9  7/31/2018        History of blood clots ICD-10-CM: Z86.718  ICD-9-CM: V12.51  2/16/2018        Type 2 diabetes mellitus with nephropathy ([facility] ICD-10-CM: E11.21  ICD-9-CM: 250.40, 583.81  1/16/2018        Recurrent depression ([facility] ICD-10-CM: F33.9  ICD-9-CM: 296.30  1/16/2018        Vitamin B12 deficiency ICD-10-CM: E53.8  ICD-9-CM: 266.2  11/13/2017        Anemia of chronic disease ICD-10-CM: D63.8  ICD-9-CM: 285.29  11/13/2017        Iron deficiency anemia (Chronic) ICD-10-CM: D50.9  ICD-9-CM: 280.9  7/10/2017        Type 2 diabetes mellitus without complication (HCC) (Chronic) ICD-10-CM: E11.9  ICD-9-CM: 250.00  8/97/4586        Folliculitis QVA-68-AU: L73.9  ICD-9-CM: 704.8  7/10/2017        Hypernatremia ICD-10-CM: E87.0  ICD-9-CM: 276.0  5/30/2017        Anxiety ICD-10-CM: F41.9  ICD-9-CM: 300.00  5/28/2017        Acute on chronic respiratory failure ([facility] ICD-10-CM: J96.20  ICD-9-CM: 518.84  5/24/2017        Aortic stenosis, moderate (Chronic) ICD-10-CM: I35.0  ICD-9-CM: 424.1  5/24/2017        Anticoagulated on Coumadin (Chronic) ICD-10-CM: Z51.81, Z79.01  ICD-9-CM: V58.83, V58.61  5/24/2017        Volume overload ICD-10-CM: E87.70  ICD-9-CM: 276.69  5/24/2017        Breast cancer ([facility] (Chronic) ICD-10-CM: C50.919  ICD-9-CM: 174.9  2/17/2017    Overview Addendum 2/27/2014 10:08 AM by Irina Nagy NP     Er+  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 SUV max on the right is 20.5 and on the left 31.5. There is matted high right axillary   lymphadenopathy as well as a more hypermetabolic low right axillary lymph node which measures 12 x 9 mm and has an SUV max of 15.4. Small bilateral pulmonary hilar metastases are also noted as well as extensive skeletal metastatic disease which includes both proximal femora, both scapulae, innumerable ribs, virtually every vertebra, and the bony pelvis.  No displaced pathologic fracture is identified on the CT images. Ct scan 2-12 Innumerable diffuse osseous metastases. . Multiple diffuse tiny lung nodules, and right hilar lymphadenopathy, also suspicious for metastases. 3. Bilateral breast masses, compatible with known carcinoma. CANCER ANTIGEN 27.29 1076   Oncology Flowsheet Day, Cycle cyclophosphamide (CYTOXAN) IV   1/27/2012 Day 1, Cycle 1 600 mg/m2 = 996 mg   2/17/2012 Day 1, Cycle 2 600 mg/m2 = 996 mg   3/9/2012 Day 1, Cycle 3 600 mg/m2 = 996 mg   3/30/2012 Day 1, Cycle 4 500 mg/m2 = 830 mg   4/20/2012 Day 1, Cycle 5 500 mg/m2 = 830 mg     Oncology Flowsheet DOCEtaxel (TAXOTERE) IV   1/27/2012 75 mg/m2 = 125 mg   2/17/2012 75 mg/m2 = 125 mg   3/9/2012 75 mg/m2 = 125 mg   3/30/2012 60 mg/m2 = 100 mg   4/20/2012 60 mg/m2 = 100 mg   7-19-12 post 6 cycles of TC improved breast masses switch to femara maintenence  Clear response on pet ct scan   Mixed response to chemotherapy: There has been a significant interval response in the right breast and axilla with decrease in size of spiculated right breast mass and numerous right axillary lymph nodes. Left breast mass also appears to have responded chemotherapy though there may be persistent chest wall invasion. 2. Interval evolution of widespread osseous metastatic disease with many lytic lesions now appearing more sclerotic. These are associated with continued FDG uptake which is difficult to differentiate from marrow reactivity given recent chemotherapy. At least one new osseous metastatic deposits is present in the posterior spinous process at L1. Focally intense uptake is also present at approximately T5  10-19-12 pain in arms legs back continued improvement on ct scan Decreased size of the largest lung nodules and near complete resolution . of many. No new masses in the chest, abdomen, or pelvis. 2. Decreased size of right axillary and right hilar lymph nodes. 3. Diffuse osseous lesions again seen. 4. Diverticulosis. 5.  Atherosclerotic vascular disease ca 15-3 down to 50  On femara and aredia will see if we can get affinitor to start at next visit continue therapy  12-19-12 new headache and dizziness for MRI will start affinitor   1-14-13 improved pain breast mass right breast smaller width 7 cm x 5  6-05-13 femara and aredia now on affinitor x 5 months   7-13 tumor markers falling   8-20-13 post admission for pneumonia medications to restart breast mass smaller reduce affinitor to 7.5 mg   10-18-13 femara and affinitor pain right ilium intermittent markers smaller breast mass smaller on right   12-5-13 Reduce Afinitor to 5 mg daily. Continue coumadin for DVT. Continue Femara and Aredia. Repeat US and skeletal survey prior to next visit. 1-6-14 Feeling better with reduced dose. Ultrasound of breasts show decrease in mass sizes. Skeletal survey stable. Follow up in 2 months. Bony metastasis (HCC) (Chronic) ICD-10-CM: C79.51  ICD-9-CM: 198.5  2/17/2017        Acute respiratory failure ([facility] ICD-10-CM: J96.00  ICD-9-CM: 518.81  2/17/2017        Metastatic breast cancer (HCC) (Chronic) ICD-10-CM: C50.919  ICD-9-CM: 174.9  2/17/2017        CKD (chronic kidney disease) (Chronic) ICD-10-CM: N18.9  ICD-9-CM: 585.9  1/4/2017    Overview Signed 5/14/2013 11:35 AM by Kelvin Murray RN     With acute rise- ?  Dehydration and monitor             DVT (deep venous thrombosis) (HCC) (Chronic) ICD-10-CM: I82.409  ICD-9-CM: 453.40  1/4/2017    Overview Signed 11/19/2013  6:07 AM by Stacy Monreal     96-54-53 There is nonocclusive thrombus within the left common femoral vein, superficial femoral vein, popliteal vein, and posterior tibial vein               Accelerated hypertension ICD-10-CM: I10  ICD-9-CM: 401.0  1/4/2017        Pleural effusion, right (Chronic) ICD-10-CM: J90  ICD-9-CM: 511.9  1/4/2017    Overview Addendum 5/26/2017 10:11 AM by Sharon Guillory NP     10/6/16 R thoracentesis 800 ml: pleural fluid creatine 4.1 = likely urinothorax  5/25/2017: Thoracentesis on right- 950 cc of /serosanguinous/ fluid             Hydronephrosis (Chronic) ICD-10-CM: N13.30  ICD-9-CM: 591  1/4/2017        Bilateral edema of lower extremity ICD-10-CM: R60.0  ICD-9-CM: 782.3  1/4/2017        Hematuria ICD-10-CM: R31.9  ICD-9-CM: 599.70  1/4/2017        Chemotherapy-induced neutropenia (HCC) ICD-10-CM: D70.1, T45.1X5A  ICD-9-CM: 288.03, E933.1  12/20/2016        Pancytopenia ([facility] ICD-10-CM: T34.248  ICD-9-CM: 284.19  10/10/2016        HTN (hypertension) (Chronic) ICD-10-CM: I10  ICD-9-CM: 401.9  10/7/2016        DM (diabetes mellitus) ([facility] (Chronic) ICD-10-CM: E11.9  ICD-9-CM: 250.00  10/7/2016        * (Principal)Acute on chronic kidney failure ([facility] ICD-10-CM: N17.9, N18.9  ICD-9-CM: 584.9, 585.9  10/7/2016        Pulmonary edema ICD-10-CM: J81.1  ICD-9-CM: 980  1/3/2016        Elevated troponin ICD-10-CM: R74.8  ICD-9-CM: 790.6  10/20/2015        PND (paroxysmal nocturnal dyspnea) ICD-10-CM: R06.00  ICD-9-CM: 786.09  2/25/2014        Leg swelling ICD-10-CM: M79.89  ICD-9-CM: 729.81  2/25/2014    Overview Signed 2/25/2014  2:45 PM by Abdulaziz Pacheco NP     Bilateral               Heart failure with preserved ejection fraction ([facility] ICD-10-CM: I50.30  ICD-9-CM: 428.9  2/25/2014        Anemia ICD-10-CM: D64.9  ICD-9-CM: 285.9  11/19/2013    Overview Signed 11/19/2013  6:06 AM by Nathanael Browning     33-54-15 admitted with anemia and transfused              Asthma (Chronic) ICD-10-CM: J45.909  ICD-9-CM: 493.90  10/22/2013    Overview Addendum 10/22/2013 10:55 AM by Dahiana Garnica NP     8/2013: started on Symbicort  10/22/13: Added singulair             Hypomagnesemia ICD-10-CM: Q39.95  ICD-9-CM: 275.2  8/4/2013        Hypokalemia ICD-10-CM: E87.6  ICD-9-CM: 276.8  8/4/2013            Ms. Vnekat Flynn is a 75 y.o. female admitted on 7/31/2018 with a primary diagnosis of acute on chronic kidney injury.      She has a history of metastatic breast cancer, asthma, chronic diastolic heart failure, HTN, DM type 2, DVT, and chronic kidney disease. She is currently being treated with Ibrance/Faslodex. Most recently she has developed recurrent ascites with paracentesis twice over the last five weeks or so. She was placed on aldactone 50 mg daily to be taken in addition to her lasix 40 mg daily to try and help with fluid accumulation. She returned today for follow up and her creatinine was elevated above baseline to 4.47. She will be admitted for management of acute on chronic kidney injury.        PLAN:  Metastatic Breast Cancer  * Currently on treatment with Ibrance/fulvestrant. 8/1 From a prognosis standpoint regarding her metastatic breast cancer, she has done quite well on current therapy. Her last PET scan done 7/12 show stable disease and no new concerning areas. Cytology from fluid has not proven to be malignant. May continue to do well and other lines of therapy as options should she have progression in the future     Acute on Chronic Kidney Injury  * Gentle hydration. Hold Lasix and aldactone - watch closely for fluid overload. * Consult nephrology. * Renal ultrasound. 8/1 Slight improvement with hydration yesterday. Appreciate nephrology help. Holding diuretics. Off fluids to limit fluid reaccumulation. Per note today- rec removing fluid with pleurex rather than attempting management with diuretics. 8/2 Mild improvement daily. Continue to hold diuretics. 8/3 Cr 3.80 (3.86 yesterday). Giving 0.5 liter ns bolus. Recheck in am.  8/6 Cr down to 3.33 (b/l around 2.5-3)  8/7 Cr 3.44     Ascites  * Consult IR for paracentesis. Send fluid for cytology. * Consult GI for cirrhosis. 8/1 GI following. Planning para today and sending fluid for work up. Defer pleurex drain to GI if no other medical management options reasonable. Obviously limited with renal issues. OK from our perspective to place pleurex if GI deems appropriate.   8/2 Per GI, she is not a candidate for liver transplant and additional work-up for CLD will likely not be beneficial as this is endstage with no current activity and normal transaminases. Suggest PleurX. She agrees, IR aware. 8/5 Consult IR for possible pleurex tomorrow due to recurrent ascites   8/6 IR unable to perform pleurx today. Order for NPO p MN placed in case it can be performed tomorrow. 8/7 IR to place pleurx tomorrow. NPO p MN. Heparin gtt will need to be held. History of DVT   * Coumadin. Holding for procedures. - On heparin gtt until pleurx placement    Constipation   0802 Change erna-colace to Miralax. Erna-colace gives her abdominal discomfort. 8/5 BM x 1 yesterday    Macrocytic anemia  8/4 Check Folate/B12  8/5 B12/folate normal     DVT prophylaxis - SCD's. Kamryn SOP's. Continue home medications.      Disposition:  Anticipate discharge home tomorrow after IR places pleurx drain for recurrent ascites. Taryn Collier NP   [facility]  [address] [place] [address]  Office : [phone]  Fax : [phone]       Attending Addendum:  I personally evaluated the patient with Taryn Collier NSERGIO,  and agree with the assessment, findings and plan as documented. Appears stable, heart regular without murmur, lungs clear, abdomen benign. Renal function stable. On lasix. Awaiting pleurex catheter placement.                Macho Edouard MD  [address]  Office : [phone]  Fax : [phone]

## 2018-08-07 NOTE — PROGRESS NOTES
Warfarin dosing per pharmacist    Santos Donato is a 75 y.o. female. Indication:  DVT/PE    Goal INR:  2-3    Home dose:  1.5 mg daily    Risk factors or significant drug interactions:  none    Other anticoagulants: heparin gtt    Daily Monitoring  Date  INR     Warfarin dose HGB              Notes  7/31  3.0  Hold  9.6  8/1  2.8  Hold  8.6  8/2  2.0  Hold  ---  8/3  1.7  Hold  9.0  8/4  1.6  Hold  ---  8/5  1.5  Hold  ---  8/6  1.5  Hold  8.3  8/7  1.5  Hold  ---    Pharmacy consulted to dose warfarin. INR 1.5. Continue to hold warfarin for procedure. Patient started on heparin gtt on 8/6  to provide anticoagulation pending pleurex procedure. Will plan to restart warfarin following the completion of this procedure. Procedure currently planned for 8/8. Thank you,  Kinza Dhillon, Pharm. D.   Clinical Pharmacist

## 2018-08-08 NOTE — PROGRESS NOTES
present to cover any requests in IR. Thank you for this referral,      Merlene Morgan, [facility]  /Patient [address]  [address] [address] [address] [facility]    [phone]

## 2018-08-08 NOTE — ANESTHESIA POSTPROCEDURE EVALUATION
Post-Anesthesia Evaluation and Assessment    Patient: Choco Abdalla MRN: [id number]  SSN: [id number]    YOB: 1950  Age: 75 y.o. Sex: female       Cardiovascular Function/Vital Signs  Visit Vitals    /61    Pulse (!) 57    Temp 36.9 °C (98.4 °F)    Resp 26    Ht 5' 3\" (1.6 m)    Wt 55.7 kg (122 lb 12.8 oz)    SpO2 94%    Breastfeeding No    BMI 21.75 kg/m2       Patient is status post general anesthesia for Procedure(s):  IR ANESTHESIA- ABDOMINAL PLEUREX CATHETER INSERT/ DOMI TO DO/ 517. Nausea/Vomiting: None    Postoperative hydration reviewed and adequate. Pain:  Pain Scale 1: Numeric (0 - 10) (08/08/18 1312)  Pain Intensity 1: 0 (08/08/18 1312)   Managed    Neurological Status:   Neuro  Neurologic State: Alert (08/08/18 1312)  Orientation Level: Oriented X4 (08/08/18 1312)  Cognition: Follows commands (08/08/18 1312)  Speech: Clear (08/08/18 1312)  LUE Motor Response: Purposeful (08/08/18 1312)  LLE Motor Response: Purposeful;Weak (08/08/18 1312)  RUE Motor Response: Purposeful (08/08/18 1312)  RLE Motor Response: Purposeful;Weak (08/08/18 1312)   At baseline    Mental Status and Level of Consciousness: Arousable    Pulmonary Status:   O2 Device: Room air (08/08/18 1332)   Adequate oxygenation and airway patent    Complications related to anesthesia: None    Post-anesthesia assessment completed.  No concerns    Signed By: Leonid Traylor MD     August 8, 2018

## 2018-08-08 NOTE — PROGRESS NOTES
Please under care of anesthia for procedure. Please refer to anesthesia records for intraprocedure vital signs and medication administration.

## 2018-08-08 NOTE — PERIOP NOTES
TRANSFER - OUT REPORT:    Verbal report given to [address], RN on Gabrielle Jo  being transferred to [facility] for routine post - op       Report consisted of patients Situation, Background, Assessment and   Recommendations(SBAR). Information from the following report(s) Procedure Summary, Intake/Output, MAR and Recent Results was reviewed with the receiving nurse. Lines:   Venous Access Device Port Upper chest (subclavicular area), left (Active)   Central Line Being Utilized Yes 8/8/2018  1:42 PM   Criteria for Appropriate Use Limited/no vessel suitable for conventional peripheral access 8/8/2018  1:42 PM   Site Assessment Clean, dry, & intact 8/8/2018  1:42 PM   Date of Last Dressing Change 08/07/18 8/8/2018  4:50 AM   Dressing Status Clean, dry, & intact 8/8/2018  4:50 AM   Dressing Type Disk with Chlorhexadine gluconate (CHG); Transparent 8/8/2018  4:50 AM   Date Accessed (Medial Site) 08/07/18 8/8/2018  4:50 AM   Access Time (Medial Site) 1225 8/7/2018 12:53 PM   Access Needle Size (Site #1) 20 G 8/7/2018  4:46 PM   Access Needle Length (Medial Site) 0.75 inches 8/7/2018  4:46 PM   Positive Blood Return (Medial Site) No 8/8/2018  4:50 AM   Action Taken (Medial Site) Flushed 8/8/2018  4:50 AM   Date Needle Changed (Medial Site) 08/07/18 8/7/2018  7:35 PM   Alcohol Cap Used No 8/8/2018  4:50 AM        Opportunity for questions and clarification was provided. Patient transported with:   Tech    VTE prophylaxis orders have been written for Gabrielle Jo. Patient and family given floor number and nurses name. Family updated re: pt status after security code verified.

## 2018-08-08 NOTE — PROGRESS NOTES
Warfarin dosing per pharmacist    Judson Galan is a 75 y.o. female. Indication:  DVT/PE    Goal INR:  2-3    Home dose:  1.5 mg daily    Risk factors or significant drug interactions:  none    Other anticoagulants: heparin gtt     Daily Monitoring  Date  INR     Warfarin dose HGB              Notes  7/31  3.0  Hold  9.6  8/1  2.8  Hold  8.6  8/2  2.0  Hold  ---  8/3  1.7  Hold  9.0  8/4  1.6  Hold  ---  8/5  1.5  Hold  ---  8/6  1.5  Hold  8.3  8/7  1.5  Hold  ---  8/8  1.4  2 mg  8.6    Pharmacy consulted to dose warfarin. INR 1.4. Pleurex procedure performed today. Patient to be switched from a heparin gtt to lovenox to facilitate discharge. Will place a one time dose of warfarin 2 mg for this evening followed by 1.5 mg daily thereafter. Will continue following. Thank you,  Last Mccoy, Pharm. D.   Clinical Pharmacist

## 2018-08-08 NOTE — PROCEDURES
Department of Interventional Radiology  [phone]        Interventional Radiology Brief Procedure Note    Patient: Jayson Lyn MRN: [id number]  SSN: [id number]    YOB: 1950  Age: 75 y.o. Sex: female      Date of Procedure: 8/8/2018    Pre-Procedure Diagnosis: ascites, cirrhosis, metastatic breast cancer    Post-Procedure Diagnosis: SAME    Procedure(s): Placement of abdominal Pleurx catheter    Brief Description of Procedure: as above.   2700 ml thin yellow fluid evacutaed    Performed By: Tammy Vilchis PA-C     Assistants: None    Anesthesia:General    Estimated Blood Loss: Less than 10ml    Specimens: None    Implants: Pleurx    Findings: large volume ascites    Complications: None    Recommendations: drain abdomen q 2-3 days x 2 weeks, then prn for comfort     Follow Up: prn    Signed By: Tammy Vilchis PA-C     August 8, 2018

## 2018-08-08 NOTE — PROGRESS NOTES
END OF SHIFT NOTE:    Intake/Output  08/07 1901 - 08/08 0700  In: 020 [P.O.:120; I.V.:355]  Out: -    Voiding: YES  Catheter: NO  Drain:              Stool:  0 occurrences. Emesis:  0 occurrences. VITAL SIGNS  Patient Vitals for the past 12 hrs:   Temp Pulse Resp BP SpO2   08/08/18 0403 98.8 °F (37.1 °C) 64 16 164/64 96 %   08/08/18 0006 99.2 °F (37.3 °C) 65 16 134/57 96 %   08/07/18 1947 98.8 °F (37.1 °C) 67 16 120/48 96 %       Pain Assessment  Pain 1  Pain Scale 1: Numeric (0 - 10) (08/07/18 1835)  Pain Intensity 1: 0 (08/07/18 1835)  Patient Stated Pain Goal: 0 (08/07/18 0817)  Pain Reassessment 1: Yes (08/07/18 1835)  Pain Location 1: Leg (08/07/18 1741)  Pain Orientation 1: Left;Right (08/07/18 1741)  Pain Description 1: Aching (08/07/18 1741)  Pain Intervention(s) 1: Medication (see MAR) (08/07/18 1741)    Ambulating  Yes    Additional Information: Pt was SOB 1x during shift d/t pressure in abdomen, was relieved when sitting up. Heparin drip stopped at 0430 in preperation for pleurx. Shift report given to oncoming nurse, Oracio Geiger RN,  at the bedside.     Olamide Cohen Kras

## 2018-08-08 NOTE — PROGRESS NOTES
TRANSFER - IN REPORT:    Verbal report received from Melody Valverde RN(name) on Swapnil Ibarra  being received from Recovery (unit) for routine progression of care      Report consisted of patients Situation, Background, Assessment and   Recommendations(SBAR). Information from the following report(s) Procedure Summary, Intake/Output, MAR and Recent Results was reviewed with the receiving nurse. Opportunity for questions and clarification was provided. Assessment will be completed by primary nurse upon patients arrival to unit and care assumed.

## 2018-08-09 NOTE — PROGRESS NOTES
present at bedside during visit with Care Management nurse. Maryana Curry Anne Sample  Patient [email or web address]  c: [phone] / Luis Velasquez / Marisol, [facility]  [email or web address]. [facility]

## 2018-08-09 NOTE — PROGRESS NOTES
END OF SHIFT NOTE:    Intake/Output  08/08 1901 - 08/09 0700  In: -   Out: 600 [Urine:600]   Voiding: YES  Catheter: NO  Drain:   Drain Pleurx Abdominal Catheter Drain 08/08/18 Right; Anterior Abdomen (Active)   Site Assessment Clean, dry, & intact 8/8/2018  1:04 PM   Dressing Status Clean, dry, & intact 8/8/2018  1:42 PM   Status Draining 8/8/2018  1:04 PM   Drainage Description Yellow 8/8/2018  1:04 PM               Stool:  0 occurrences. Emesis:  0 occurrences. VITAL SIGNS  Patient Vitals for the past 12 hrs:   Temp Pulse Resp BP SpO2   08/09/18 0338 98.4 °F (36.9 °C) 61 20 134/72 95 %   08/08/18 2247 98.2 °F (36.8 °C) 60 20 142/58 95 %   08/08/18 1940 98.1 °F (36.7 °C) 60 24 126/59 95 %       Pain Assessment  Pain 1  Pain Scale 1: Visual (08/08/18 2020)  Pain Intensity 1: 0 (08/08/18 2020)  Patient Stated Pain Goal: 0 (08/08/18 2020)  Pain Reassessment 1: Yes (08/08/18 2020)  Pain Location 1: Abdomen (08/08/18 1935)  Pain Orientation 1: Left;Right (08/07/18 1741)  Pain Description 1: Aching (08/08/18 1935)  Pain Intervention(s) 1: Medication (see MAR) (08/08/18 1935)    Ambulating  Yes    Additional Information: Pain medication given 1x at beginning of shift for abdominal pain with good result, pt rested well. No needs voiced at this time. Shift report given to oncoming nurse, Kenneth Gardner RN,  at the bedside.     Betty Naidu

## 2018-08-09 NOTE — INTERDISCIPLINARY ROUNDS
1441-Interdisciplinary rounds with charge nurse, provider, and .     Presenting Problem: Kidney failure  Daily Goal DC  Expected Discharge Date: 08/09/2018  Expected Discharge Needs: Home health  Patient/Family Concerns addressed

## 2018-08-09 NOTE — PROGRESS NOTES
END OF SHIFT NOTE:    Intake/Output  08/09 0701 - 08/09 1900  In: 360 [P.O.:360]  Out: -    Voiding: YES  Catheter: NO  Drain:   Drain Pleurx Abdominal Catheter Drain 08/08/18 Right; Anterior Abdomen (Active)   Site Assessment Clean, dry, & intact 8/8/2018  1:04 PM   Dressing Status Clean, dry, & intact 8/8/2018  1:42 PM   Status Draining 8/8/2018  1:04 PM   Drainage Description Yellow 8/8/2018  1:04 PM               Stool:  0 occurrences. Emesis:  0 occurrences. VITAL SIGNS  Patient Vitals for the past 12 hrs:   Temp Pulse Resp BP SpO2   08/09/18 1111 98.5 °F (36.9 °C) 60 18 127/56 97 %   08/09/18 0730 98.3 °F (36.8 °C) 65 18 156/70 96 %       Pain Assessment  Pain 1  Pain Scale 1: Numeric (0 - 10) (08/09/18 0920)  Pain Intensity 1: 6 (08/09/18 0920)  Patient Stated Pain Goal: 0 (08/09/18 0920)  Pain Reassessment 1: Yes (08/08/18 2020)  Pain Location 1: Flank (08/09/18 0920)  Pain Orientation 1: Left;Right (08/07/18 1741)  Pain Description 1: Aching (08/08/18 1935)  Pain Intervention(s) 1: Medication (see MAR) (08/09/18 0920)    Ambulating  Yes    Additional Information:     Shift report given to oncoming nurse at the bedside.     Kanchan Mills

## 2018-08-09 NOTE — DISCHARGE SUMMARY
[facility]: Inpatient Hematology / Oncology Discharge Summary Note    Patient ID:  Roni Ly  [id number]  81 y.o.  1950    Admit Date: 7/31/2018    Discharge Date: 8/9/2018    Admission Diagnoses: Other ascites [R18.8]    Discharge Diagnoses:  Principal Diagnosis: Acute on chronic kidney failure ([facility]  Principal Problem:    Acute on chronic kidney failure ([address] (10/7/2016)    Active Problems:    Metastatic breast cancer ([address] (2/17/2017)      Other cirrhosis of liver ([facility] (7/31/2018)      Other ascites (7/31/2018)      Acute-on-chronic kidney injury ([facility] (7/31/2018)        Hospital Course:  Ms. Fito Fong is a 75 y.o. female admitted on 7/31/2018 with a primary diagnosis of acute on chronic kidney injury. She has a history of metastatic breast cancer, asthma, chronic diastolic heart failure, HTN, DM type 2, DVT, and chronic kidney disease. She is currently being treated with Ibrance/Faslodex. Most recently she has developed recurrent ascites with paracentesis twice over the last five weeks or so. She was placed on aldactone 50 mg daily to be taken in addition to her lasix 40 mg daily to try and help with fluid accumulation. She returned the day of admission for follow up and her creatinine was elevated above baseline to 4.47. She was admitted for management of acute on chronic kidney injury. Cr stable at 3.25 today. Con't to hold diuretics per nephrology. K+ noted to be 5.6 this AM.  She was given a dose of kayexalate and K+ down to 5.0. Pleurx drain placed for ascites fluid on 8/8, 2700mL drained. She was off warfarin for this procedure and was placed on heparin gtt. She will continue lovenox/warfarin bridge at home. Lovenox renally dosed at 50mg daily. She will continue warfarin 1.5mg daily at home. Appts scheduled to f/u on INR levels daily until therapeutic. She is scheduled to f/u with Dr. Monalisa Gifford next week.   Advised to call with fever, chills, uncontrollable symptoms, or with any other concerns. Pt verbalizes understanding. Scripts given for narcotics. I have reviewed the patient's controlled substance prescription history, as maintained in the [place] prescription monitoring program, so that the prescriptions(s) for a controlled substance can be given. Metastatic Breast Cancer  * Currently on treatment with Ibrance/fulvestrant. 8/1 From a prognosis standpoint regarding her metastatic breast cancer, she has done quite well on current therapy. Her last PET scan done 7/12 show stable disease and no new concerning areas. Cytology from fluid has not proven to be malignant. May continue to do well and other lines of therapy as options should she have progression in the future      Acute on Chronic Kidney Injury  * Gentle hydration. Hold Lasix and aldactone - watch closely for fluid overload. * Consult nephrology. * Renal ultrasound. 8/1 Slight improvement with hydration yesterday. Appreciate nephrology help. Holding diuretics. Off fluids to limit fluid reaccumulation. Per note today- rec removing fluid with pleurex rather than attempting management with diuretics. 8/2 Mild improvement daily. Continue to hold diuretics. 8/3 Cr 3.80 (3.86 yesterday). Giving 0.5 liter ns bolus. Recheck in am.  8/6 Cr down to 3.33 (b/l around 2.5-3)  8/7 Cr 3.44  8/8 Cr 3.32      Ascites  * Consult IR for paracentesis. Send fluid for cytology. * Consult GI for cirrhosis. 8/1 GI following. Planning para today and sending fluid for work up. Defer pleurex drain to GI if no other medical management options reasonable. Obviously limited with renal issues. OK from our perspective to place pleurex if GI deems appropriate. 8/2 Per GI, she is not a candidate for liver transplant and additional work-up for CLD will likely not be beneficial as this is endstage with no current activity and normal transaminases. Suggest PleurX. She agrees, IR aware.    8/5 Consult IR for possible pleurex tomorrow due to recurrent ascites   8/6 IR unable to perform pleurx today. Order for NPO p MN placed in case it can be performed tomorrow. 8/7 IR to place pleurx tomorrow. NPO p MN. Heparin gtt will need to be held. 8/8 IR performing paracentesis/pleurx drain placement today     History of DVT   * Coumadin. Holding for procedures. - On heparin gtt until pleurx placement     Constipation   0802 Change kristy-colace to Miralax. Kristy-colace gives her abdominal discomfort. 8/5 BM x 1 yesterday     Macrocytic anemia  8/4 Check Folate/B12  8/5 B12/folate normal    Hyperkalemia  8/9 K+ 5.6, repeat 5.4. Give kayexalate and recheck level. Repeat after kayexalate 5.2    Consults:  IP CONSULT TO GASTROENTEROLOGY  IP CONSULT TO NEPHROLOGY  IP CONSULT TO INTERVENTIONAL RADIOLOGY  IP CONSULT TO INTERVENTIONAL RADIOLOGY    Pertinent Diagnostic Studies:   Labs:    Recent Labs      08/08/18   0609   WBC  3.5*   HGB  8.6*   PLT  114*   ANEU  2.0    Recent Labs      08/09/18   1041  08/09/18   0521  08/08/18   0609  08/07/18   0342   NA   --   145  144  145   K  5.4*  5.6*  4.9  4.7   CL   --   113*  114*  112*   CO2   --   20*  19*  20*   GLU   --   151*  102*  129*   BUN   --   66*  65*  63*   CREA   --   3.35*  3.32*  3.44*   CA   --   6.9*  6.6*  6.6*   SGOT   --   30   --    --    AP   --   188*   --    --    TP   --   6.3   --    --    ALB   --   2.5*   --    --        Imaging:  IR INSERT CATH PLEURAL INDWELL [[id number]] Collected: 08/08/18 1555      Order Status: Completed Updated: 08/08/18 1620     Narrative:       Title: Abdominal PleurX drain placement. Indication: Metastatic breast cancer, acute kidney injury, cirrhosis. Recurrent  malignant abdominal ascites status post multiple paracentesis procedures. Tunneled abdominal PleurX drain catheter requested. : Maryfrances Siemens, PA-C    Supervising Physician: Issa Dorado M.D.     Consent:  Informed written and oral consent was obtained from the patient after  explanation of benefits and risks (including, but not limited to: Infection,  hemorrhage, visceral injury and pneumothorax).  The patient's questions were  answered to their satisfaction. The patient stated understanding and requested  that we proceed. Procedure:  With the patient supine, the abdomen was prepped and draped in the  standard fashion.  Lidocaine was administered for local field block.  Ultrasound  evaluation was performed. Using real-time ultrasound guidance, with appropriate  image recording, a Yueh needle was advanced into the ascites in the abdomen. Using fluoroscopy, the needle was exchanged over an Amplatz wire for a peel-away  sheath. The PleurX drain was brought through a subcutaneous tunnel and passed down the  peel-away sheath positioning the drain across the midline, lower on the pelvis. The skin incision was closed with absorbable suture.  The catheter was secured  with nonabsorbable suture. A total of 3700 cc of thin yellow fluid was removed.  Bandages were applied. Complications: Large spine ascites. Medications: General endotracheal intubation per anesthesia team.  Continuous  cardiorespiratory monitoring was employed throughout. Donalda Apple was infused prior  to the procedure. Contrast:  0.    Patient does:  920 cGy.cm2. Fluoroscopy time: 12 seconds. 0 permanent images  were obtained.      Findings:  Large volume ascites. Plan: Bedrest for 1 hour.      Recommend performing a drainage procedure daily or every-other-day for the next  two weeks in order to promote healing of the catheter site.           IR INSERT CATH PLEURAL INDWELL [[id number]]      Order Status: Canceled      IR PARACENTESIS GEORGE Forrester [[id number]] Collected: 08/02/18 0906     Order Status: Completed Updated: 08/02/18 1230     Narrative:       Title: Ultrasound guided paracentesis.      History: 78-year-old female with metastatic breast cancer, cirrhosis, ascites,  renal insufficiency. : Saleem Mcmillan PA-C    Supervising Physician: Franklin Case M.D. Consent:  Informed written and oral consent was obtained from the patient after  the risks and benefits were discussed.  All questions were answered and the  patient requested that we proceed. Procedure:  Maximal sterile barrier technique was used.  Following routine prep  and drape of the abdomen, a local field block with 1% lidocaine was achieved. Ultrasound evaluation was performed. Fluid was identified in the peritoneal cavity. Using real-time ultrasound  guidance, the peritoneal cavity was accessed with an 8 Greenlandic centesis catheter.   The catheter was connected to Vacutainer bottles. A total of 4000 cc of thin yellow fluid was removed and sent to the lab. The  catheter was removed and a bandage applied. Complications: None. Findings: Large-volume ascites.       Impression:       Impression: Uncomplicated ultrasound guided paracentesis. PLAN: Consider Pleurx catheter placement.             US RETROPERITONEUM COMP [[id number]] Collected: 08/02/18 6606     Order Status: Completed Updated: 08/02/18 0927     Narrative:       Renal ultrasound, 8/2/2018    History: Acute kidney insufficiency. Comparison: Limited abdominal ultrasound 6/12/2018. Technique: Grayscale and doppler images of the kidneys and bladder were obtained  using a 3-5 MHz linear transducer. Findings: The right kidney measures 10.4 cm, and the left kidney measures 9.1 cm  in greatest longitudinal length.  No shadowing stones are seen. Persistent  moderate hydronephrosis is seen of the right kidney which is not felt to be  significantly changed from the prior study.  Central artifact is seen within the  dilated right renal collecting system which is felt to represent the proximal  aspect of the stent.  Renal parenchymal echogenicity is increased consistent  with chronic medical renal changes. The urinary bladder demonstrates the distal aspect of what appears to be a  stent. The distal abdominal aorta is normal in caliber measuring 1.2 cm. The IVC is  patent. Small to moderate ascites is seen. The liver is in nodular suggesting  cirrhosis. A left pleural effusion is seen.       Impression:       IMPRESSION:     1.  Stable moderate hydronephrosis of the right kidney when compared to a prior  limited abdominal ultrasound dated 6/12/2018. No evolving hydronephrosis is seen  of the left kidney. What appears to be a right renal collecting system stent is  seen which is grossly not malpositioned. 2.  Left pleural effusion and small to moderate ascites. 3. Cirrhotic appearing liver. Current Discharge Medication List      START taking these medications    Details   calcium carbonate (TUMS) 200 mg calcium (500 mg) chew Take 1 Tab by mouth three (3) times daily (with meals). Qty: 30 Tab, Refills: 0      enoxaparin (LOVENOX) 60 mg/0.6 mL injection 50 mg by SubCUTAneous route every twenty-four (24) hours. Indications: DEEP VEIN THROMBOSIS PREVENTION  Qty: 5 Syringe, Refills: 0         CONTINUE these medications which have CHANGED    Details   !! HYDROcodone-acetaminophen (NORCO)  mg tablet Take 1 Tab by mouth every six (6) hours as needed. Max Daily Amount: 4 Tabs. Qty: 90 Tab, Refills: 0    Associated Diagnoses: Malignant neoplasm of female breast, unspecified estrogen receptor status, unspecified laterality, unspecified site of breast ([facility] Bony metastasis (HCC)      hydrALAZINE (APRESOLINE) 50 mg tablet Take 1 Tab by mouth two (2) times a day. Qty: 60 Tab, Refills: 0      warfarin (COUMADIN) 1 mg tablet Take 1.5 Tabs by mouth every twenty-four (24) hours. Indications: DEEP VEIN THROMBOSIS PREVENTION  Qty: 45 Tab, Refills: 0       !! - Potential duplicate medications found. Please discuss with provider.       CONTINUE these medications which have NOT CHANGED    Details megestrol (MEGACE) 400 mg/10 mL (10 mL) suspension Take 20 mL by mouth daily. Qty: 600 mL, Refills: 2    Associated Diagnoses: Anorexia      temazepam (RESTORIL) 15 mg capsule Take 1 Cap by mouth nightly as needed for Sleep. Max Daily Amount: 15 mg.  Qty: 30 Cap, Refills: 5    Associated Diagnoses: Primary insomnia      fulvestrant (FASLODEX) 250 mg/5 mL injection 10 mL by IntraMUSCular route every twenty-eight (28) days. Qty: 1 Syringe, Refills: 11      montelukast (SINGULAIR) 10 mg tablet Take 1 Tab by mouth nightly. Qty: 30 Tab, Refills: 11    Associated Diagnoses: Breast cancer metastasized to lung, unspecified laterality ([address] Cancer associated pain; Anxiety about health; Bony metastasis ([address] Metastatic breast cancer ([address] Other ascites      !! HYDROcodone-acetaminophen (NORCO)  mg tablet Take 1 Tab by mouth every six (6) hours as needed for Pain. Max Daily Amount: 4 Tabs. Qty: 90 Tab, Refills: 0    Associated Diagnoses: Cancer associated pain; Anxiety about health; Bony metastasis ([address] Metastatic breast cancer (HCC)      mirtazapine (REMERON) 30 mg tablet Take 1 Tab by mouth nightly. Qty: 30 Tab, Refills: 11      DISABLED PLACARD (DISABLED PLACARD) DMV 1 permanent handicapped placard  Qty: 1 Each, Refills: 0    Associated Diagnoses: Neoplastic malignant related fatigue      LORazepam (ATIVAN) 1 mg tablet Take 1 Tab by mouth every six (6) hours as needed for Anxiety. Max Daily Amount: 4 mg. Qty: 60 Tab, Refills: 0    Associated Diagnoses: Anxiety about health; Cancer associated pain; Bony metastasis ([address] Metastatic breast cancer (HCC)      albuterol-ipratropium (DUO-NEB) 2.5 mg-0.5 mg/3 ml nebu 3 mL by Nebulization route every six (6) hours as needed. Qty: 360 Nebule, Refills: 1    Comments: Patient needs to make follow up appointment with our office in order to get any further refills. GLIPIZIDE PO Take  by mouth as needed.  Pt only takes if BS > 200      carvedilol (COREG) 3.125 mg tablet Take 1 Tab by mouth two (2) times daily (with meals). Qty: 60 Tab, Refills: 11      amLODIPine (NORVASC) 10 mg tablet Take 1 Tab by mouth daily. Qty: 30 Tab, Refills: 11       !! - Potential duplicate medications found. Please discuss with provider. STOP taking these medications       furosemide (LASIX) 40 mg tablet Comments:   Reason for Stopping:         spironolactone (ALDACTONE) 50 mg tablet Comments:   Reason for Stopping:         potassium chloride (K-DUR, KLOR-CON) 20 mEq tablet Comments:   Reason for Stopping:                   OBJECTIVE:  Patient Vitals for the past 8 hrs:   BP Temp Pulse Resp SpO2   18 1111 127/56 98.5 °F (36.9 °C) 60 18 97 %   18 0730 156/70 98.3 °F (36.8 °C) 65 18 96 %     Temp (24hrs), Av.3 °F (36.8 °C), Min:98.1 °F (36.7 °C), Max:98.5 °F (36.9 °C)    701 -  1900  In: 360 [P.O.:360]  Out: -     Physical Exam:  Constitutional: Well developed, well nourished female in no acute distress, sitting in the hospital bed. HEENT: Normocephalic and atraumatic. Oropharynx is clear, mucous membranes are moist. Neck supple. Skin Warm and dry. No bruising and no rash noted. No erythema. No pallor. Respiratory Lungs clear,  normal air exchange without accessory muscle use. CVS Normal rate, regular rhythm and normal S1 and S2. No murmurs, gallops, or rubs. Abdomen +Soft, distended. Normoactive bowel sounds. Pleurx in place, dressing C/D/I. Neuro Grossly nonfocal with no obvious sensory or motor deficits. MSK Normal range of motion in general.  No edema and no tenderness.    Psych Appropriate mood and affect.           ASSESSMENT:    Principal Problem:    Acute on chronic kidney failure ([facility] (10/7/2016)    Active Problems:    Metastatic breast cancer ([facility] (2017)      Other cirrhosis of liver ([facility] (2018)      Other ascites (2018)      Acute-on-chronic kidney injury ([facility] (2018)        DISPOSITION:  Follow-up Appointments   Procedures    FOLLOW UP VISIT Appointment in: Other (Specify) Please schedule daily lab for INR for 8/10, 8/11, 8/12 Please schedule f/u appt with labs with Dr. Chai Eastman on 8/13     Please schedule daily lab for INR for 8/10, 8/11, 8/12    Please schedule f/u appt with labs with Dr. Chai Eastman on 8/13     Standing Status:   Standing     Number of Occurrences:   1     Order Specific Question:   Appointment in     Answer: Other (Specify)           Over 30 minutes was spent in discharge planning and coordination of care. Jessika Smith NP  [facility]  [address] [address]  Office : [phone]  Fax : [phone]             Attending Addendum:  I personally evaluated the patient with Jessika Smith, N.P.,  and agree with the assessment, findings and plan as documented. Appears well, heart regular without murmur, lungs clear, abdomen benign. S/p abdominal pleurex catheter. Renal function stable. Being bridged back to coumadin. F/u in oncology within week. I spent 32 minutes on evaluation, management, counseling and discharge planning on patient.               Carolynn Romo MD  [employer] [facility]  [address] [address]  Office : [phone]  Fax : [phone]

## 2018-08-09 NOTE — PROGRESS NOTES
Per Ondina Amin (financial counselor), patient family did  another hospital sponsorship form and [facility] following patient for emergency medicaid.

## 2018-08-09 NOTE — PROGRESS NOTES
Met with patient / patient's  to discuss needs / concerns with assist from [address] (). Patient is aware that we have completed referral for [facility] [place]  (PT / Nursing). Patient and  familiar with Pleurx drain from . Patient states no problem with transportation to appts. Patient aware that CM will look at discharge medications to see what we can assist with (vouchers). Patient states no problems getting to [address] Patient confirms they did get another application for sponsorship. No other needs or concerns expressed. CM will follow for discharge.

## 2018-08-09 NOTE — PROGRESS NOTES
[facility]  Face to Face Encounter    Patients Name: Carmelo Sol    YOB: 1950    Ordering Physician: Dr. Glen Pires    Primary Diagnosis: Other ascites [R18.8] / weak / pleurx drain    Date of Face to Face:   8/9/2018                                  Face to Face Encounter findings are related to primary reason for home care:   yes. 1. I certify that the patient needs intermittent care as follows: skilled nursing care:  skilled observation/assessment, patient education  physical therapy: strengthening, stretching/ROM, transfer training and gait/stair training  occupational therapy:  ADL safety (ie. cooking, bathing, dressing), ROM and pt/caregiver education    2. I certify that this patient is homebound, that is: 1) patient requires the use of a none device, special transportation, or assistance of another to leave the home; or 2) patient's condition makes leaving the home medically contraindicated; and 3) patient has a normal inability to leave the home and leaving the home requires considerable and taxing effort. Patient may leave the home for infrequent and short duration for medical reasons, and occasional absences for non-medical reasons. Homebound status is due to the following functional limitations: Patient with strength deficits limiting the performance of all ADL's without caregiver assistance or the use of an assistive device. Patient with poor safety awareness and is at risk for falls without assistance of another person and the use of an assistive device. Patient with poor ambulation endurance limiting their safe ability to ascend/descend the required number of steps to leave the home. 3. I certify that this patient is under my care and that I, or a nurse practitioner or [phone], or clinical nurse specialist, or certified nurse midwife, working with me, had a Face-to-Face Encounter that meets the physician Face-to-Face Encounter requirements. The following are the clinical findings from the [facility] encounter that support the need for skilled services and is a summary of the encounter:     See attached progess note      Shashank Richardson RN  8/9/2018      THE FOLLOWING TO BE COMPLETED BY THE COMMUNITY PHYSICIAN:    I concur with the findings described above from the F2F encounter that this patient is homebound and in need of a skilled service.     Certifying Physician: _____________________________________      Printed Certifying Physician Name: _____________________________________    Date: _________________

## 2018-08-09 NOTE — PROGRESS NOTES
[facility]        Inpatient Hematology / Oncology Progress Note      Admission Date: 2018 12:12 PM  Reason for Admission/Hospital Course: Other ascites [R18.8]      24 Hour Events:  Afebrile, VSS  S/p pleurx drain placement yesterday, 2700mL drained  Cr stable at 3.35  K+ 5.6 today     ROS:  Constitutional: Positive for fatigue; negative for fever, chills, weakness . CV:Negative for chest pain, palpitations, edema. Respiratory: Negative for dyspnea, cough, wheezing. GI: Positive for abdominal distention; negative for nausea, diarrhea. 10 point review of systems is otherwise negative with the exception of the elements mentioned above in the HPI. No Known Allergies    OBJECTIVE:  Patient Vitals for the past 8 hrs:   BP Temp Pulse Resp SpO2   18 1111 127/56 98.5 °F (36.9 °C) 60 18 97 %   18 0730 156/70 98.3 °F (36.8 °C) 65 18 96 %     Temp (24hrs), Av.3 °F (36.8 °C), Min:98.1 °F (36.7 °C), Max:98.5 °F (36.9 °C)     07 -  1900  In: 360 [P.O.:360]  Out: -     Physical Exam:  Constitutional: Well developed, well nourished female in no acute distress, sitting in the hospital bed. HEENT: Normocephalic and atraumatic. Oropharynx is clear, mucous membranes are moist. Neck supple. Skin Warm and dry. No bruising and no rash noted. No erythema. No pallor. Respiratory Lungs clear,  normal air exchange without accessory muscle use. CVS Normal rate, regular rhythm and normal S1 and S2. No murmurs, gallops, or rubs. Abdomen +Soft, distended. Normoactive bowel sounds. Pleurx in place, dressing C/D/I. Neuro Grossly nonfocal with no obvious sensory or motor deficits. MSK Normal range of motion in general.  No edema and no tenderness. Psych Appropriate mood and affect.         Labs:      Recent Labs      18   0609   WBC  3.5*   RBC  2.27*   HGB  8.6*   HCT  26.6*   MCV  117.2*   MCH  37.9*   MCHC  32.3   RDW  14.7   PLT  114*   GRANS  57 LYMPH  30   MONOS  11   EOS  1   BASOS  1   IG  0   DF  AUTOMATED   ANEU  2.0   ABL  1.0   ABM  0.4   ANTONETTE  0.0   ABB  0.0   AIG  0.0        Recent Labs      08/09/18   1041  08/09/18   0521  08/08/18   0609  08/07/18   0342   NA   --   145  144  145   K  5.4*  5.6*  4.9  4.7   CL   --   113*  114*  112*   CO2   --   20*  19*  20*   AGAP   --   12  11  13   GLU   --   151*  102*  129*   BUN   --   66*  65*  63*   CREA   --   3.35*  3.32*  3.44*   GFRAA   --   18*  18*  17*   GFRNA   --   15*  15*  14*   CA   --   6.9*  6.6*  6.6*   SGOT   --   30   --    --    AP   --   188*   --    --    TP   --   6.3   --    --    ALB   --   2.5*   --    --    GLOB   --   3.8*   --    --    AGRAT   --   0.7*   --    --      Imaging:  IR PARACENTESIS ABD W IMAGE [[id number]] Collected: 08/02/18 0906      Order Status: Completed Updated: 08/02/18 1230     Narrative:       Title: Ultrasound guided paracentesis.      History: 65-year-old female with metastatic breast cancer, cirrhosis, ascites,  renal insufficiency. : Brittnee Ayers PA-C    Supervising Physician: Sarah Barrientos M.D. Consent:  Informed written and oral consent was obtained from the patient after  the risks and benefits were discussed.  All questions were answered and the  patient requested that we proceed. Procedure:  Maximal sterile barrier technique was used.  Following routine prep  and drape of the abdomen, a local field block with 1% lidocaine was achieved. Ultrasound evaluation was performed. Fluid was identified in the peritoneal cavity. Using real-time ultrasound  guidance, the peritoneal cavity was accessed with an 8 Greenlandic centesis catheter.   The catheter was connected to Vacutainer bottles. A total of 4000 cc of thin yellow fluid was removed and sent to the lab. The  catheter was removed and a bandage applied. Complications: None.     Findings: Large-volume ascites.       Impression:       Impression: Uncomplicated ultrasound guided paracentesis. PLAN: Consider Pleurx catheter placement.             US RETROPERITONEUM COMP [[id number]] Collected: 08/02/18 2820     Order Status: Completed Updated: 08/02/18 0927     Narrative:       Renal ultrasound, 8/2/2018    History: Acute kidney insufficiency. Comparison: Limited abdominal ultrasound 6/12/2018. Technique: Grayscale and doppler images of the kidneys and bladder were obtained  using a 3-5 MHz linear transducer. Findings: The right kidney measures 10.4 cm, and the left kidney measures 9.1 cm  in greatest longitudinal length.  No shadowing stones are seen. Persistent  moderate hydronephrosis is seen of the right kidney which is not felt to be  significantly changed from the prior study. Central artifact is seen within the  dilated right renal collecting system which is felt to represent the proximal  aspect of the stent.  Renal parenchymal echogenicity is increased consistent  with chronic medical renal changes. The urinary bladder demonstrates the distal aspect of what appears to be a  stent. The distal abdominal aorta is normal in caliber measuring 1.2 cm. The IVC is  patent. Small to moderate ascites is seen. The liver is in nodular suggesting  cirrhosis. A left pleural effusion is seen.       Impression:       IMPRESSION:     1.  Stable moderate hydronephrosis of the right kidney when compared to a prior  limited abdominal ultrasound dated 6/12/2018. No evolving hydronephrosis is seen  of the left kidney. What appears to be a right renal collecting system stent is  seen which is grossly not malpositioned. 2.  Left pleural effusion and small to moderate ascites. 3. Cirrhotic appearing liver.          ASSESSMENT:    Problem List  Date Reviewed: 7/31/2018          Codes Class Noted    Other cirrhosis of liver ([facility] ICD-10-CM: K74.69  ICD-9-CM: 571.5  7/31/2018        Other ascites ICD-10-CM: R18.8  ICD-9-CM: 789.59  7/31/2018 Acute-on-chronic kidney injury [facility]) ICD-10-CM: N17.9, N18.9  ICD-9-CM: 584.9, 585.9  7/31/2018        History of blood clots ICD-10-CM: Z86.718  ICD-9-CM: V12.51  2/16/2018        Type 2 diabetes mellitus with nephropathy ([facility] ICD-10-CM: E11.21  ICD-9-CM: 250.40, 583.81  1/16/2018        Recurrent depression ([facility].) ICD-10-CM: F33.9  ICD-9-CM: 296.30  1/16/2018        Vitamin B12 deficiency ICD-10-CM: E53.8  ICD-9-CM: 266.2  11/13/2017        Anemia of chronic disease ICD-10-CM: D63.8  ICD-9-CM: 285.29  11/13/2017        Iron deficiency anemia (Chronic) ICD-10-CM: D50.9  ICD-9-CM: 280.9  7/10/2017        Type 2 diabetes mellitus without complication (HCC) (Chronic) ICD-10-CM: E11.9  ICD-9-CM: 250.00  8/57/5349        Folliculitis GZV-87-CI: L73.9  ICD-9-CM: 704.8  7/10/2017        Hypernatremia ICD-10-CM: E87.0  ICD-9-CM: 276.0  5/30/2017        Anxiety ICD-10-CM: F41.9  ICD-9-CM: 300.00  5/28/2017        Acute on chronic respiratory failure ([facility] ICD-10-CM: J96.20  ICD-9-CM: 518.84  5/24/2017        Aortic stenosis, moderate (Chronic) ICD-10-CM: I35.0  ICD-9-CM: 424.1  5/24/2017        Anticoagulated on Coumadin (Chronic) ICD-10-CM: Z51.81, Z79.01  ICD-9-CM: V58.83, V58.61  5/24/2017        Volume overload ICD-10-CM: E87.70  ICD-9-CM: 276.69  5/24/2017        Breast cancer ([facility] (Chronic) ICD-10-CM: C50.919  ICD-9-CM: 174.9  2/17/2017    Overview Addendum 2/27/2014 10:08 AM by Angelito Menchaca NP     Er+  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 SUV max on the right is 20.5 and on the left 31.5. There is matted high right axillary   lymphadenopathy as well as a more hypermetabolic low right axillary lymph node which measures 12 x 9 mm and has an SUV max of 15.4.  Small bilateral pulmonary hilar metastases are also noted as well as extensive skeletal metastatic disease which includes both proximal femora, both scapulae, innumerable ribs, virtually every vertebra, and the bony pelvis. No displaced pathologic fracture is identified on the CT images. Ct scan 2-12 Innumerable diffuse osseous metastases. . Multiple diffuse tiny lung nodules, and right hilar lymphadenopathy, also suspicious for metastases. 3. Bilateral breast masses, compatible with known carcinoma. CANCER ANTIGEN 27.29 1076   Oncology Flowsheet Day, Cycle cyclophosphamide (CYTOXAN) IV   1/27/2012 Day 1, Cycle 1 600 mg/m2 = 996 mg   2/17/2012 Day 1, Cycle 2 600 mg/m2 = 996 mg   3/9/2012 Day 1, Cycle 3 600 mg/m2 = 996 mg   3/30/2012 Day 1, Cycle 4 500 mg/m2 = 830 mg   4/20/2012 Day 1, Cycle 5 500 mg/m2 = 830 mg     Oncology Flowsheet DOCEtaxel (TAXOTERE) IV   1/27/2012 75 mg/m2 = 125 mg   2/17/2012 75 mg/m2 = 125 mg   3/9/2012 75 mg/m2 = 125 mg   3/30/2012 60 mg/m2 = 100 mg   4/20/2012 60 mg/m2 = 100 mg   7-19-12 post 6 cycles of TC improved breast masses switch to femara maintenence  Clear response on pet ct scan   Mixed response to chemotherapy: There has been a significant interval response in the right breast and axilla with decrease in size of spiculated right breast mass and numerous right axillary lymph nodes. Left breast mass also appears to have responded chemotherapy though there may be persistent chest wall invasion. 2. Interval evolution of widespread osseous metastatic disease with many lytic lesions now appearing more sclerotic. These are associated with continued FDG uptake which is difficult to differentiate from marrow reactivity given recent chemotherapy. At least one new osseous metastatic deposits is present in the posterior spinous process at L1. Focally intense uptake is also present at approximately T5  10-19-12 pain in arms legs back continued improvement on ct scan Decreased size of the largest lung nodules and near complete resolution . of many. No new masses in the chest, abdomen, or pelvis. 2.  Decreased size of right axillary and right hilar lymph nodes. 3. Diffuse osseous lesions again seen. 4. Diverticulosis. 5. Atherosclerotic vascular disease ca 15-3 down to 50  On femara and aredia will see if we can get affinitor to start at next visit continue therapy  12-19-12 new headache and dizziness for MRI will start affinitor   1-14-13 improved pain breast mass right breast smaller width 7 cm x 5  6-05-13 femara and aredia now on affinitor x 5 months   7-13 tumor markers falling   8-20-13 post admission for pneumonia medications to restart breast mass smaller reduce affinitor to 7.5 mg   10-18-13 femara and affinitor pain right ilium intermittent markers smaller breast mass smaller on right   12-5-13 Reduce Afinitor to 5 mg daily. Continue coumadin for DVT. Continue Femara and Aredia. Repeat US and skeletal survey prior to next visit. 1-6-14 Feeling better with reduced dose. Ultrasound of breasts show decrease in mass sizes. Skeletal survey stable. Follow up in 2 months. Bony metastasis (HCC) (Chronic) ICD-10-CM: C79.51  ICD-9-CM: 198.5  2/17/2017        Acute respiratory failure ([facility] ICD-10-CM: J96.00  ICD-9-CM: 518.81  2/17/2017        Metastatic breast cancer (HCC) (Chronic) ICD-10-CM: C50.919  ICD-9-CM: 174.9  2/17/2017        CKD (chronic kidney disease) (Chronic) ICD-10-CM: N18.9  ICD-9-CM: 585.9  1/4/2017    Overview Signed 5/14/2013 11:35 AM by Jorge L Varghese RN     With acute rise- ?  Dehydration and monitor             DVT (deep venous thrombosis) (HCC) (Chronic) ICD-10-CM: I82.409  ICD-9-CM: 453.40  1/4/2017    Overview Signed 11/19/2013  6:07 AM by Lily Lynch     79-68-30 There is nonocclusive thrombus within the left common femoral vein, superficial femoral vein, popliteal vein, and posterior tibial vein               Accelerated hypertension ICD-10-CM: I10  ICD-9-CM: 401.0  1/4/2017        Pleural effusion, right (Chronic) ICD-10-CM: J90  ICD-9-CM: 511.9  1/4/2017    Overview Addendum 5/26/2017 10:11 AM by Cliff Fountain NP     10/6/16 R thoracentesis 800 ml: pleural fluid creatine 4.1 = likely urinothorax  5/25/2017: Thoracentesis on right- 950 cc of /serosanguinous/ fluid             Hydronephrosis (Chronic) ICD-10-CM: N13.30  ICD-9-CM: 591  1/4/2017        Bilateral edema of lower extremity ICD-10-CM: R60.0  ICD-9-CM: 782.3  1/4/2017        Hematuria ICD-10-CM: R31.9  ICD-9-CM: 599.70  1/4/2017        Chemotherapy-induced neutropenia ([facility] ICD-10-CM: D70.1, T45.1X5A  ICD-9-CM: 288.03, E933.1  12/20/2016        Pancytopenia ([facility] ICD-10-CM: B07.016  ICD-9-CM: 284.19  10/10/2016        HTN (hypertension) (Chronic) ICD-10-CM: I10  ICD-9-CM: 401.9  10/7/2016        DM (diabetes mellitus) ([facility].) (Chronic) ICD-10-CM: E11.9  ICD-9-CM: 250.00  10/7/2016        * (Principal)Acute on chronic kidney failure ([facility].) ICD-10-CM: N17.9, N18.9  ICD-9-CM: 584.9, 585.9  10/7/2016        Pulmonary edema ICD-10-CM: J81.1  ICD-9-CM: 379  1/3/2016        Elevated troponin ICD-10-CM: R74.8  ICD-9-CM: 790.6  10/20/2015        PND (paroxysmal nocturnal dyspnea) ICD-10-CM: R06.00  ICD-9-CM: 786.09  2/25/2014        Leg swelling ICD-10-CM: M79.89  ICD-9-CM: 729.81  2/25/2014    Overview Signed 2/25/2014  2:45 PM by Cha Aiken NP     Bilateral               Heart failure with preserved ejection fraction ([facility] ICD-10-CM: I50.30  ICD-9-CM: 428.9  2/25/2014        Anemia ICD-10-CM: D64.9  ICD-9-CM: 285.9  11/19/2013    Overview Signed 11/19/2013  6:06 AM by Erma May     50-40-24 admitted with anemia and transfused              Asthma (Chronic) ICD-10-CM: J45.909  ICD-9-CM: 493.90  10/22/2013    Overview Addendum 10/22/2013 10:55 AM by Rachel Pittman NP     8/2013: started on Symbicort  10/22/13: Added singulair             Hypomagnesemia ICD-10-CM: L82.06  ICD-9-CM: 275.2  8/4/2013        Hypokalemia ICD-10-CM: E87.6  ICD-9-CM: 276.8  8/4/2013            Ms. Jourdan Pantoja is a 75 y.o. female admitted on 7/31/2018 with a primary diagnosis of acute on chronic kidney injury.      She has a history of metastatic breast cancer, asthma, chronic diastolic heart failure, HTN, DM type 2, DVT, and chronic kidney disease. She is currently being treated with Ibrance/Faslodex. Most recently she has developed recurrent ascites with paracentesis twice over the last five weeks or so. She was placed on aldactone 50 mg daily to be taken in addition to her lasix 40 mg daily to try and help with fluid accumulation. She returned today for follow up and her creatinine was elevated above baseline to 4.47. She will be admitted for management of acute on chronic kidney injury.        PLAN:  Metastatic Breast Cancer  * Currently on treatment with Ibrance/fulvestrant. 8/1 From a prognosis standpoint regarding her metastatic breast cancer, she has done quite well on current therapy. Her last PET scan done 7/12 show stable disease and no new concerning areas. Cytology from fluid has not proven to be malignant. May continue to do well and other lines of therapy as options should she have progression in the future     Acute on Chronic Kidney Injury  * Gentle hydration. Hold Lasix and aldactone - watch closely for fluid overload. * Consult nephrology. * Renal ultrasound. 8/1 Slight improvement with hydration yesterday. Appreciate nephrology help. Holding diuretics. Off fluids to limit fluid reaccumulation. Per note today- rec removing fluid with pleurex rather than attempting management with diuretics. 8/2 Mild improvement daily. Continue to hold diuretics. 8/3 Cr 3.80 (3.86 yesterday). Giving 0.5 liter ns bolus. Recheck in am.  8/6 Cr down to 3.33 (b/l around 2.5-3)  8/7 Cr 3.44  8/9 Cr stable at 3.35     Ascites  * Consult IR for paracentesis. Send fluid for cytology. * Consult GI for cirrhosis. 8/1 GI following. Planning para today and sending fluid for work up. Defer pleurex drain to GI if no other medical management options reasonable.  Obviously limited with renal issues. OK from our perspective to place pleurex if GI deems appropriate. 8/2 Per GI, she is not a candidate for liver transplant and additional work-up for CLD will likely not be beneficial as this is endstage with no current activity and normal transaminases. Suggest PleurX. She agrees, IR aware. 8/5 Consult IR for possible pleurex tomorrow due to recurrent ascites   8/6 IR unable to perform pleurx today. Order for NPO p MN placed in case it can be performed tomorrow. 8/7 IR to place pleurx tomorrow. NPO p MN. Heparin gtt will need to be held. 8/8 IR performing paracentesis/pleurx drain placement today  8/9 s/p pleurx drain placement yesterday, 2700mL drained    History of DVT   * Coumadin. Holding for procedures. - On heparin gtt until pleurx placement    Constipation   0802 Change erna-colace to Miralax. Erna-colace gives her abdominal discomfort. 8/5 BM x 1 yesterday    Macrocytic anemia  8/4 Check Folate/B12  8/5 B12/folate normal    Hyperkalemia  8/9 K+ 5.6, repeat 5.4. Give kayexalate and recheck level. Repeat after kayexalate 5.2     DVT prophylaxis - SCD's. Kamryn SOP's. Continue home medications.      Disposition:  Anticipate discharge home tomorrow if hyperkalemia resolved. Eric Garza NP   [facility]  [address] [address]  Office : [phone]  Fax : [phone]           Attending Addendum:  I personally evaluated the patient with Eric Garza N.PClayton,  and agree with the assessment, findings and plan as documented. Appears stable, heart regular without murmur, lungs clear, abdomen benign. Hyperkalemia to be corrected. Supportive care as above. Likely discharge tomorrow.                MD Aydee Ott [facility]  [address] [address]  Office : [phone]  Fax : [phone]

## 2018-08-09 NOTE — PROGRESS NOTES
Warfarin dosing per pharmacist    Xavi Escobar is a 75 y.o. female. Indication:  DVT/PE    Goal INR:  2-3    Home dose:  1.5 mg daily    Risk factors or significant drug interactions:  none    Other anticoagulants: enoxaparin 1 mg/kg q24h (renally adjusted)    Daily Monitoring  Date  INR     Warfarin dose HGB              Notes  7/31  3.0  Hold  9.6  8/1  2.8  Hold  8.6  8/2  2.0  Hold  ---  8/3  1.7  Hold  9.0  8/4  1.6  Hold  ---  8/5  1.5  Hold  ---  8/6  1.5  Hold  8.3  8/7  1.5  Hold  ---  8/8  1.4  2 mg  8.6  8/9  1.4  1.5 mg  ---    Pharmacy consulted to dose warfarin. INR 1.4. Continue warfarin 1.5 mg qhs. On enoxaparin bridge until INR is therapeutic. Daily INR. Pharmacy will continue to follow. Please call with any questions.     Thank you,  Herve Paredes, PharmD  Clinical Pharmacist  [phone]

## 2018-08-09 NOTE — INTERDISCIPLINARY ROUNDS
Interdisciplinary Rounds completed. Charge RN, Provider and  present. Lovenox to Coumadin bridge planned. PleurX placed today in IR. Discharge 8/9. HEMANTH setting up med vouchers for pt at discharge.

## 2018-08-10 NOTE — PROGRESS NOTES
Problem: Falls - Risk of  Goal: *Absence of Falls  Document Donato Fall Risk and appropriate interventions in the flowsheet.    Outcome: Progressing Towards Goal  Fall Risk Interventions:            Medication Interventions: Teach patient to arise slowly         History of Falls Interventions: Consult care management for discharge planning

## 2018-08-10 NOTE — PROGRESS NOTES
END OF SHIFT NOTE:    Intake/Output  08/09 1901 - 08/10 0700  In: -   Out: 350 [Urine:350]   Voiding: YES  Catheter: NO  Drain:   Drain Pleurx Abdominal Catheter Drain 08/08/18 Right; Anterior Abdomen (Active)   Site Assessment Clean, dry, & intact 8/10/2018  2:05 AM   Dressing Status Clean, dry, & intact 8/10/2018  2:05 AM   Status Draining 8/10/2018  2:05 AM   Drainage Description Yellow 8/10/2018  2:05 AM               Stool:  0 occurrences. Emesis:  0 occurrences. VITAL SIGNS  Patient Vitals for the past 12 hrs:   Temp Pulse Resp BP SpO2   08/10/18 0325 98.3 °F (36.8 °C) 61 18 154/60 98 %   08/09/18 2340 98.2 °F (36.8 °C) 64 22 151/65 97 %   08/09/18 1905 98.5 °F (36.9 °C) 60 18 142/61 96 %       Pain Assessment  Pain 1  Pain Scale 1: Numeric (0 - 10) (08/10/18 0205)  Pain Intensity 1: 0 (08/10/18 0205)  Patient Stated Pain Goal: 0 (08/10/18 0205)  Pain Reassessment 1: Yes (08/09/18 2100)  Pain Location 1: Leg (08/09/18 2015)  Pain Orientation 1: Left;Right (08/09/18 2015)  Pain Description 1: Aching (08/09/18 2015)  Pain Intervention(s) 1: Medication (see MAR) (08/09/18 2015)    Ambulating  Yes    Additional Information: Patient rested well. Uneventful night. Shift report given to oncoming nurse at the bedside.     Reggie Gordon

## 2018-08-10 NOTE — PROGRESS NOTES
Discharge instructions and prescriptions given and reviewed with pt, verbalizes understanding, pt discharged home with family. Interperter present in room with nurse during discharge instructions.

## 2018-08-10 NOTE — PROGRESS NOTES
Warfarin dosing per pharmacist    Oneal Pierre is a 75 y.o. female. Indication:  DVT/PE    Goal INR:  2-3    Home dose:  1.5 mg daily    Risk factors or significant drug interactions:  none    Other anticoagulants: enoxaparin 1 mg/kg q24h (renally adjusted)    Daily Monitoring  Date  INR     Warfarin dose HGB              Notes  7/31  3.0  Hold  9.6  8/1  2.8  Hold  8.6  8/2  2.0  Hold  ---  8/3  1.7  Hold  9.0  8/4  1.6  Hold  ---  8/5  1.5  Hold  ---  8/6  1.5  Hold  8.3  8/7  1.5  Hold  ---  8/8  1.4  2 mg  8.6  8/9  1.4  1.5 mg  ---  8/10  1.5  1.5 mg  8.9    Pharmacy consulted to dose warfarin. INR 1.5. Continue warfarin 1.5 mg qhs. On enoxaparin bridge until INR is therapeutic. Following daily INR. Pharmacy will continue to follow. Please call with any questions. Thank you,  David Alford, Pharm. D.   Clinical Pharmacist  [phone]

## 2018-08-10 NOTE — DISCHARGE INSTRUCTIONS
DISCHARGE SUMMARY from Nurse    PATIENT INSTRUCTIONS:    After general anesthesia or intravenous sedation, for 24 hours or while taking prescription Narcotics:  · Limit your activities  · Do not drive and operate hazardous machinery  · Do not make important personal or business decisions  · Do  not drink alcoholic beverages  · If you have not urinated within 8 hours after discharge, please contact your surgeon on call. Report the following to your surgeon:  · Excessive pain, swelling, redness or odor of or around the surgical area  · Temperature over 100.5  · Nausea and vomiting lasting longer than 4 hours or if unable to take medications  · Any signs of decreased circulation or nerve impairment to extremity: change in color, persistent  numbness, tingling, coldness or increase pain  · Any questions    What to do at Home:  Recommended activity: Activity as tolerated, per MD instructions    If you experience any of the following symptoms fever > 100.5, nausea, vomiting, pain, chest pain and/or shortness of breath to ER please follow up with MD.    *  Please give a list of your current medications to your Primary Care Provider. *  Please update this list whenever your medications are discontinued, doses are      changed, or new medications (including over-the-counter products) are added. *  Please carry medication information at all times in case of emergency situations. These are general instructions for a healthy lifestyle:    No smoking/ No tobacco products/ Avoid exposure to second hand smoke  Surgeon General's Warning:  Quitting smoking now greatly reduces serious risk to your health.     Obesity, smoking, and sedentary lifestyle greatly increases your risk for illness    A healthy diet, regular physical exercise & weight monitoring are important for maintaining a healthy lifestyle    You may be retaining fluid if you have a history of heart failure or if you experience any of the following symptoms: Weight gain of 3 pounds or more overnight or 5 pounds in a week, increased swelling in our hands or feet or shortness of breath while lying flat in bed. Please call your doctor as soon as you notice any of these symptoms; do not wait until your next office visit. Recognize signs and symptoms of STROKE:    F-face looks uneven    A-arms unable to move or move unevenly    S-speech slurred or non-existent    T-time-call 911 as soon as signs and symptoms begin-DO NOT go       Back to bed or wait to see if you get better-TIME IS BRAIN. Warning Signs of HEART ATTACK     Call 911 if you have these symptoms:   Chest discomfort. Most heart attacks involve discomfort in the center of the chest that lasts more than a few minutes, or that goes away and comes back. It can feel like uncomfortable pressure, squeezing, fullness, or pain.  Discomfort in other areas of the upper body. Symptoms can include pain or discomfort in one or both arms, the back, neck, jaw, or stomach.  Shortness of breath with or without chest discomfort.  Other signs may include breaking out in a cold sweat, nausea, or lightheadedness. Don't wait more than five minutes to call 911 - MINUTES MATTER! Fast action can save your life. Calling 911 is almost always the fastest way to get lifesaving treatment. Emergency Medical Services staff can begin treatment when they arrive -- up to an hour sooner than if someone gets to the hospital by car. The discharge information has been reviewed with the patient. The patient verbalized understanding. Discharge medications reviewed with the patient and appropriate educational materials and side effects teaching were provided.   ___________________________________________________________________________________________________________________________________       Chronic Kidney Disease: Care Instructions  Your Care Instructions    Chronic kidney disease happens when your kidneys don't work as well as they should. Your kidneys have a few important jobs. They remove waste from your blood. This waste leaves your body in your urine. They also balance your body's fluids and chemicals. When your kidneys don't work well, extra waste and fluid can build up. This can poison the body and sometimes cause death. The most common causes of this disease are diabetes and high blood pressure. In some cases, the disease develops in 2 to 3 months. But it usually develops over many years. If you take medicine and make healthy changes to your lifestyle, you may be able to prevent the disease from getting worse. But if your kidney damage gets worse, you may need dialysis or a kidney transplant. Dialysis uses a machine to filter waste from the blood. A transplant is surgery to give you a healthy kidney from another person. Follow-up care is a key part of your treatment and safety. Be sure to make and go to all appointments, and call your doctor if you are having problems. It's also a good idea to know your test results and keep a list of the medicines you take. How can you care for yourself at home?   Treatments and appointments    · Be safe with medicines. Take your medicines exactly as prescribed. Call your doctor if you have any problems with your medicine. You also may take medicine to control your blood pressure or to treat diabetes. Many people who have diabetes take blood pressure medicine.     · If you have diabetes, do your best to keep your blood sugar in your target range. You may do this by eating healthy food and exercising. You may also take medicines.     · Go to your dialysis appointments if you have this treatment.     · Do not take ibuprofen (Advil, Motrin), naproxen (Aleve), or similar medicines, unless your doctor tells you to.  These may make the disease worse.     · Do not take any vitamins, over-the-counter medicines, or herbal products without talking to your doctor first.     · Do not smoke or use other tobacco products. Smoking can reduce blood flow to the kidneys. If you need help quitting, talk to your doctor about stop-smoking programs and medicines. These can increase your chances of quitting for good.     · Do not drink alcohol or use illegal drugs.     · Talk to your doctor about an exercise plan. Exercise helps lower your blood pressure. It also makes you feel better.     · If you have an advance directive, let your doctor know. It may include a living will and a durable power of  for health care. If you don't have one, you may want to prepare one. It lets your doctor and loved ones know your health care wishes if you become unable to speak for yourself. Diet    · Talk to a registered dietitian. He or she can help you make a meal plan that is right for you. Most people with kidney disease need to limit salt (sodium), fluids, and protein. Some also have to limit potassium and phosphorus.     · You may have to give up many foods you like. But try to focus on the fact that this will help you stay healthy for as long as possible.     · If you have a hard time eating enough, talk to your doctor or dietitian about ways to add calories to your diet.     · Your diet may change as your disease changes. See your doctor for regular testing. And work with a dietitian to change your diet as needed. When should you call for help? Call 911 anytime you think you may need emergency care.  For example, call if:    · You passed out (lost consciousness).    Call your doctor now or seek immediate medical care if:    · You have less urine than normal or no urine.     · You have trouble urinating or can urinate only very small amounts.     · You are confused or have trouble thinking clearly.     · You feel weaker or more tired than usual.     · You are very thirsty, lightheaded, or dizzy.     · You have nausea and vomiting.     · You have new swelling of your arms or feet, or your swelling is worse.     · You have blood in your urine.     · You have new or worse trouble breathing.    Watch closely for changes in your health, and be sure to contact your doctor if:    · You have any problems with your medicine or other treatment. Where can you learn more? Go to http://rosie-abena.info/. Enter N276 in the search box to learn more about \"Chronic Kidney Disease: Care Instructions. \"  Current as of: May 12, 2017  Content Version: 11.7  © 6479-4422 [employer]. Care instructions adapted under license by [employer] (which disclaims liability or warranty for this information). If you have questions about a medical condition or this instruction, always ask your healthcare professional. [address] any warranty or liability for your use of this information.

## 2018-08-11 NOTE — PROGRESS NOTES
Arrived to the [facility] ambulatory using walker. Lab draw via butterfly completed. Patient tolerated well. Any issues or concerns during appointment: INR=1.3, per Dr Minh Navarro Pt to get lovenox inj today by her  and she can skip tommrows apt 8/12 for labs,  to give lovenox inj on 8/12 and then have labs and see Dr Minh Navarro on 8/13. Jaxon Adame Patient aware of next infusion appointment on  8/13 with Dr Minh Navarro. Discharged to home ambulatory.

## 2018-08-22 NOTE — PROGRESS NOTES
Arrived to the [facility]. Injections completed.  Patient tolerated without problems  Any issues or concerns during appointment: no  Patient aware of next infusion appointment on 9/10/18 at 200  Discharged ambulatory with family

## 2018-08-31 NOTE — PROGRESS NOTES
Pt. Discharged ambulatory. Tolerated infusion well. No distress noted. To return to infusions on 9/10/18.

## 2018-09-10 NOTE — PROGRESS NOTES
Arrived to the [facility]. Assessment and procrit injection completed. Patient tolerated well. Any issues or concerns during appointment: [employer] , Marino Powers, present during entire visit. Patient aware of next infusion appointment on 09/24/2018 (date) at [address] [place] [address] (time) with infusion center. Discharged via wheelchair, with family. Patient advised to call Dr. Rachana Barnett office if she has any problems or concerns. Patient verbalized understanding.

## 2018-09-18 PROBLEM — C78.7 BREAST CANCER METASTASIZED TO LIVER (HCC): Status: ACTIVE | Noted: 2018-01-01

## 2018-09-18 PROBLEM — R18.8 ASCITES: Status: ACTIVE | Noted: 2018-01-01

## 2018-09-18 PROBLEM — C50.919 BREAST CANCER METASTASIZED TO LIVER (HCC): Status: ACTIVE | Noted: 2018-01-01

## 2018-09-18 PROBLEM — R79.1 SUPRATHERAPEUTIC INR: Status: ACTIVE | Noted: 2018-01-01

## 2018-09-18 PROBLEM — K92.2 UPPER GI BLEEDING: Status: ACTIVE | Noted: 2018-01-01

## 2018-09-18 PROBLEM — R79.89 ELEVATED LACTIC ACID LEVEL: Status: ACTIVE | Noted: 2018-01-01

## 2018-09-18 PROBLEM — K85.90 PANCREATITIS: Status: ACTIVE | Noted: 2018-01-01

## 2018-09-18 PROBLEM — R11.2 NAUSEA & VOMITING: Status: ACTIVE | Noted: 2018-01-01

## 2018-09-18 NOTE — CDMP QUERY
Please clarify if this patient is being treated/managed for: 
 
Acute Blood Loss Anemia, in setting of GIB, requiring one unit PRBC transfusion. =>Other Explanation of clinical findings =>Unable to Determine (no explanation of clinical findings) The medical record reflects the following: 
 
Risk Factors: GIB, pt takes coumadin for hx of DVT Clinical Indicators: Initial Hgb 8.6 with drop to 6.7,  report of coffee ground emesis and dark-tarry stools, per H&P \"positive FOBT\" Treatment: serial H&H, PRBC transfusion one unit Please clarify and document your clinical opinion in the progress notes and discharge summary including the definitive and/or presumptive diagnosis, (suspected or probable), related to the above clinical findings. Please include clinical findings supporting your diagnosis. Thanks, Ayush Christopher RN CDS Compliant Documentation Management Program 
[phone]

## 2018-09-18 NOTE — PROGRESS NOTES
Critical Lactic acid received from lab of 9.9,  contacted will come to eval pt and chart. Orders to follow.

## 2018-09-18 NOTE — Clinical Note
Status[de-identified] Inpatient [101] Type of Bed: Telemetry [19] Inpatient Hospitalization Certified Necessary for the Following Reasons: 3. Patient receiving treatment that can only be provided in an inpatient setting (further clarification in H&P documentation) Admitting Diagnosis: Upper GI bleeding [827091] Admitting Diagnosis: Supratherapeutic INR [[id number]] Admitting Diagnosis: Breast cancer metastasized to liver [facility]) [[id number]] Admitting Diagnosis: Ascites [[id number]] Admitting Diagnosis: Elevated lactic acid level [[id number]] Admitting Physician: Milo Flores Attending Physician: Milo Flores Estimated Length of Stay: 3-4 Midnights Discharge Plan[de-identified] Home with Office Follow-up Comments: Per Dr Monica Lugo

## 2018-09-18 NOTE — PROGRESS NOTES
TRANSFER - IN REPORT: 
 
Verbal report received from [address] [place] [address] on Alfredo Klein  being received from ER(unit) for routine progression of care Report consisted of patients Situation, Background, Assessment and  
Recommendations(SBAR). Information from the following report(s) SBAR and Kardex was reviewed with the receiving nurse. Opportunity for questions and clarification was provided. Assessment completed upon patients arrival to unit and care assumed.

## 2018-09-18 NOTE — PROGRESS NOTES
made initial visit. Pt was alert and verbal appearing comfortable with no pain level expressed or observed. Pt is Syrian speaking.  blessed her in [address] and helped her to understand that she was being prayed for by the chaplains.  welcomed her to [facility].  provided spiritual care through presence, pastoral conversation, and assurance of prayer.

## 2018-09-18 NOTE — PROGRESS NOTES
09/18/18 1234 Dual Skin Pressure Injury Assessment Dual Skin Pressure Injury Assessment X Second Care Provider (Based on [address] [place] [address]) Wayne Trevizo RN Sacrum  Mid

## 2018-09-18 NOTE — ED PROVIDER NOTES
HPI Comments: Elderly female patient presents with reports of darkened emesis and black stool. Patient states her symptoms started partially 4 hours prior to arrival.  She's had a episodes of her condition emesis as well as at least 4 separate episodes of dark, tarry-appearing stool. She reports abdominal pain as well. No reports of fever, chills, chest pain or shortness of breath. Abdominal discomfort is generalized in nature and mild at this time. Patient has a history of metastatic breast cancer with liver involvement. She receives regular paracentesis secondary to this issue and currently has a peritoneal catheter in place. Patient does take warfarin for DVT prevention. Taking this medication at present. No history of esophageal varices per report, emesis has been dark appearing without evidence of bright red blood. Patient is  speaking, family member at bedside able to translate for patient at this time.  in route The history is provided by the patient, the spouse and a relative. The history is limited by a language barrier. A  was used. Past Medical History:  
Diagnosis Date  Acute on chronic diastolic congestive heart failure ([address] 1/5/2016  Anemia of chronic disease 11/13/2017  Aortic valve stenosis  Arthritis   
 roverto hands  Asthma   
 till age 31  
 Cancer ([address]   
 roverto. breast ca mets bone/lung  Chemotherapy-induced neutropenia (HCC) 12/20/2016  CKD (chronic kidney disease) stage 4, GFR 15-29 ml/min (HCC)  Depression   
 managed with meds  Diastolic CHF ([address] Followed by Donte Kerns  Former cigarette smoker  HCAP (healthcare-associated pneumonia) 7/17/2013  History of DVT (deep vein thrombosis) Left leg DVT. on coumadin  Hypertension   
 managed with meds  Long term current use of anticoagulant Coumadin  Oxygen dependent   
 at bedtime 2L-3L NC  
 Port catheter in place Left chest port  Sepsis ([facility] 7/19/2016  
 SOB (shortness of breath) on exertion  Type 2 diabetes mellitus ([facility] PRN oral agent/AVG BS: 170-200/ s.s of hypoglycemia at 80  Unspecified adverse effect of anesthesia [place] 27 yrs ago after second c -section-she was told her heart stopped d/t anesthesia-hospitalized for 5 days afterwards and followed by cardiologist  
 Vitamin B12 deficiency 11/13/2017 Past Surgical History:  
Procedure Laterality Date  COLONOSCOPY N/A 3/19/2018 COLONOSCOPY  BMI 23   performed by Mickey Cummins MD at [facility] X2  
 HX CYST REMOVAL    
 HX UROLOGICAL    
 stent placed  HX VASCULAR ACCESS  01/26/2012 Left chest port Family History:  
Problem Relation Age of Onset  Heart Attack Mother  Seizures Mother  Alzheimer Father  Cancer Brother   
  doen not know details  No Known Problems Sister  No Known Problems Sister  No Known Problems Brother Social History Social History  Marital status:  Spouse name: N/A  
 Number of children: N/A  
 Years of education: N/A Occupational History  Not on file. Social History Main Topics  Smoking status: Former Smoker Packs/day: 1.00 Years: 8.00 Types: Cigarettes Quit date: 1/1/1978  Smokeless tobacco: Never Used Comment: quit 30 or more years ago  Alcohol use No  
 Drug use: No  
 Sexual activity: Not on file Other Topics Concern  Not on file Social History Narrative ALLERGIES: Review of patient's allergies indicates no known allergies. Review of Systems Constitutional: Negative for chills, diaphoresis and fever. HENT: Negative for congestion, sneezing and sore throat. Eyes: Negative for visual disturbance. Respiratory: Negative for cough, chest tightness, shortness of breath and wheezing. Cardiovascular: Negative for chest pain and leg swelling. Gastrointestinal: Negative for abdominal pain, blood in stool, diarrhea, nausea and vomiting. Endocrine: Negative for polyuria. Genitourinary: Negative for difficulty urinating, dysuria, flank pain, hematuria and urgency. Musculoskeletal: Negative for back pain, myalgias, neck pain and neck stiffness. Skin: Negative for color change and rash. Neurological: Negative for dizziness, syncope, speech difficulty, weakness, light-headedness, numbness and headaches. Psychiatric/Behavioral: Negative for behavioral problems. All other systems reviewed and are negative. Vitals:  
 09/18/18 0048 BP: 126/58 Pulse: 97 Resp: 16 Temp: 97.6 °F (36.4 °C) SpO2: 98% Weight: 49.9 kg (110 lb) Height: 5' 1\" (1.549 m) Physical Exam  
Constitutional: She is oriented to person, place, and time. No distress. Ashen appearing elderly female patient, appears ill, Alert and oriented to person, place and time. mild distress. Speaks in clear, fluent sentences. HENT:  
Head: Normocephalic and atraumatic. Nose: Nose normal.  
Dry mucous membranes Eyes: Conjunctivae and EOM are normal. Pupils are equal, round, and reactive to light. Neck: Normal range of motion. Neck supple. No JVD present. No tracheal deviation present. Cardiovascular: Normal rate, regular rhythm, S1 normal, S2 normal, normal heart sounds and intact distal pulses. Exam reveals no gallop, no distant heart sounds and no friction rub. No murmur heard. Pulmonary/Chest: Effort normal and breath sounds normal. No accessory muscle usage or stridor. No tachypnea and no bradypnea. No respiratory distress. She has no decreased breath sounds. She has no wheezes. She has no rhonchi. She has no rales. She exhibits no tenderness. Abdominal: Soft. Normal appearance. She exhibits distension, fluid wave and ascites. She exhibits no mass. Bowel sounds are decreased.  There is no hepatosplenomegaly, splenomegaly or hepatomegaly. There is no tenderness. There is no rigidity, no rebound, no guarding, no CVA tenderness, no tenderness at McBurney's point and negative Wilhelm's sign. Perineal catheter in place to the right abdomen. There is significant distention however patient abdomen is soft to palpation. Ascites present. Minimal tenderness with palpation. Bowel sounds present but diminished. Musculoskeletal: Normal range of motion. She exhibits no edema, tenderness or deformity. Neurological: She is alert and oriented to person, place, and time. No cranial nerve deficit. Skin: Skin is warm and dry. No rash noted. She is not diaphoretic. There is pallor. Psychiatric: She has a normal mood and affect. Her behavior is normal.  
  
 
MDM Number of Diagnoses or Management Options Acute renal failure, unspecified acute renal failure type [facility]): new and requires workup Elevated INR: new and requires workup Gastrointestinal hemorrhage, unspecified gastrointestinal hemorrhage type: new and requires workup Primary malignant neoplasm of breast with metastasis to other site, unspecified laterality [facility]): new and requires workup Diagnosis management comments: EKG obtained on arrival shows a normal sinus rhythm with a rate of 81 beats a minute. Underlying artifact present. No acute ischemic changes. Laboratory evaluation reveals a leukocytosis as well as anemia at 8.6. Patient's INR is 4.9. Creatinine is elevated as well from patient's baseline. Her enzymes are elevated consistent with patient's known liver metastasis. Acute abdominal series shows no evidence of bowel obstruction. Findings consistent with ascites. Bedside rectal exam reveals Hemoccult positive stool, black in color. Patient has not vomited since arrival. 
Spoke with on-call GI specialist who agrees to evaluate patient after admission. ppatient's lactic acid is significantly elevated however she suffers from liver disease secondary to metastatic breast cancer. Suspect this is the cause of patient's lactic acid elevation. However, patient does also have a small leukocytosis present today. We will obtain blood cultures and provide one dose of antibiotics as well as fluid hydration. I will hydrate cautiously as. Patient all ready has a history of ascites with pronounced abdominal distention at this time. In addition she may require blood shortly. Spoke with the hospitalist cconcerning admission, agreeable with vitamin K at this time given patient's relative stability and hemoglobin of 8 Amount and/or Complexity of Data Reviewed Clinical lab tests: ordered and reviewed Tests in the radiology section of CPT®: ordered and reviewed Tests in the medicine section of CPT®: ordered and reviewed Discuss the patient with other providers: yes Independent visualization of images, tracings, or specimens: yes ED Course Procedures

## 2018-09-18 NOTE — CONSULTS
[facility] Consult Note Primary GI Physician: Dr Danielito Delvalle Referring Physician:  Dr Toña Madrigal Consult Date:  9/18/2018 Admit Date:  9/18/2018 Chief Complaint:  Upper GIB, HGB 6.7 Subjective:  
 
History of Present Illness:  Patient is a 70 y.o. female with PMH of CHF, metastatic breast cancer with liver involvement and ascites, CKD stage 5, DVT on coumadin, O2 dependent, DM2, who is seen in consultation at the request of Dr. Toña Madrigal for upper GIB and anemia. She is Turkish speaking.  used. Spouse at bedside. Pt presented to ED 9/18/18 with complaints of coffee ground emesis x 8 and melena x 4 which started late last night. She also has generalized abdominal pain. She has peritoneal catheter in place for regular paracentesis every other day. This is ordered and managed by Dr Flynn Deshpande. She reports that she is due for paracentesis today. She did have distention and fluid wave on abdominal exam in ED. Bowel sounds decreased. She had black heme positive stool in ED. Labs in ED: HGB 8.6 (was 9.8 on 9/10/18), WBC 13, K+ 5.8, lipase 615, creat 4.5, lactic acid 5.5, ammonia 88, INR 4.9. Vit K given for coumadin reversal. Acute abdominal series with ascites, no evidence of bowel obstruction. Blood cultures pending. She was started on pantoprazole gtt. She has been made NPO. Rocephin started for UGIB prophylaxis. Coumadin held since admission. HGB this morning 6.7. INR 6.0/PT 51.1. Lactic acid 8.8. 4 units PRBCs and 1 unit FFP ordered and pending transfusion. She has not had any further episodes of GIB since admission. Last EGD 3/2018 with Dr Danielito Delvalle with esophageal ring, hiatal hernia, and gastritis. Colonoscopy at that time with diverticulosis. PMH: 
Past Medical History:  
Diagnosis Date  Acute on chronic diastolic congestive heart failure ([facility] [address] 1/5/2016  Anemia of chronic disease 11/13/2017  Aortic valve stenosis  Arthritis   
 roverto hands  Asthma till age 31  
 Cancer ([facility] [address]   
 roverto. breast ca mets bone/lung  Chemotherapy-induced neutropenia (HCC) 12/20/2016  CKD (chronic kidney disease) stage 4, GFR 15-29 ml/min (HCC)  Depression   
 managed with meds  Diastolic CHF ([facility] [address] Followed by Donte Kerns  Former cigarette smoker  HCAP (healthcare-associated pneumonia) 7/17/2013  History of DVT (deep vein thrombosis) Left leg DVT. on coumadin  Hypertension   
 managed with meds  Long term current use of anticoagulant Coumadin  Oxygen dependent   
 at bedtime 2L-3L NC  
 Port catheter in place Left chest port  Sepsis ([facility] [address] 7/19/2016  
 SOB (shortness of breath) on exertion  Type 2 diabetes mellitus ([facility] [address] PRN oral agent/AVG BS: 170-200/ s.s of hypoglycemia at 80  Unspecified adverse effect of anesthesia In [place] 28 yrs ago after second c -section-she was told her heart stopped d/t anesthesia-hospitalized for 5 days afterwards and followed by cardiologist  
 Vitamin B12 deficiency 11/13/2017 PSH: 
Past Surgical History:  
Procedure Laterality Date  COLONOSCOPY N/A 3/19/2018 COLONOSCOPY  BMI 23   performed by Papo Price MD at [facility] X2  
 HX CYST REMOVAL    
 HX UROLOGICAL    
 stent placed  HX VASCULAR ACCESS  01/26/2012 Left chest port Allergies: 
No Known Allergies Home Medications: 
Prior to Admission medications Medication Sig Start Date End Date Taking? Authorizing Provider  
mirabegron ER (MYRBETRIQ) 25 mg ER tablet Take 1 Tab by mouth daily. 8/23/18   Ron Vann MD  
raNITIdine (ZANTAC) 150 mg tablet Take 1 Tab by mouth two (2) times a day. Indications: gastroesophageal reflux disease 8/22/18   Noble Marroquin, NP  
amLODIPine (NORVASC) 10 mg tablet Take 1 Tab by mouth daily.  8/22/18   Noble Marroquin NP  
fentaNYL (DURAGESIC) 50 mcg/hr PATCH 1 Patch by TransDERmal route every seventy-two (72) hours. Max Daily Amount: 1 Patch. Indications: Chronic Pain with Opioid Tolerance 8/15/18   Mackenzie De La Vega MD  
carvedilol (COREG) 3.125 mg tablet Take 1 Tab by mouth two (2) times daily (with meals). 8/14/18   Sherran Bence, MD  
hydrALAZINE (APRESOLINE) 50 mg tablet Take 1 Tab by mouth two (2) times a day. 8/9/18   Gaviota Miller NP  
calcium carbonate (TUMS) 200 mg calcium (500 mg) chew Take 1 Tab by mouth three (3) times daily (with meals). 8/9/18   Gaviota Miller NP  
warfarin (COUMADIN) 1 mg tablet Take 1.5 Tabs by mouth every twenty-four (24) hours. Indications: DEEP VEIN THROMBOSIS PREVENTION 8/9/18   Gaviota Miller NP  
megestrol (MEGACE) 400 mg/10 mL (10 mL) suspension Take 20 mL by mouth daily. 7/24/18   Mackenzie De La Vega MD  
temazepam (RESTORIL) 15 mg capsule Take 1 Cap by mouth nightly as needed for Sleep. Max Daily Amount: 15 mg. 7/24/18   Mackenzie De La Vega MD  
fulvestrant (FASLODEX) 250 mg/5 mL injection 10 mL by IntraMUSCular route every twenty-eight (28) days. 7/17/18   Mackenzie De La Vega MD  
montelukast (SINGULAIR) 10 mg tablet Take 1 Tab by mouth nightly. 7/17/18   Mackenzie De La Vega MD  
HYDROcodone-acetaminophen [facility])  mg tablet Take 1 Tab by mouth every six (6) hours as needed for Pain. Max Daily Amount: 4 Tabs. 7/17/18   Mackenzie De La Vega MD  
DISABLED PLACARD (DISABLED PLACARD) DMV 1 permanent handicapped placard 7/17/18   Mackenzie De La Vega MD  
LORazepam (ATIVAN) 1 mg tablet Take 1 Tab by mouth every six (6) hours as needed for Anxiety. Max Daily Amount: 4 mg. 5/8/18   Mackenzie De La Vega MD  
albuterol-ipratropium (DUO-NEB) 2.5 mg-0.5 mg/3 ml nebu 3 mL by Nebulization route every six (6) hours as needed. 4/23/18   Brian Found, NP  
palbociclib 125 mg cap Take 1 Cap by mouth daily. Take one pill once a day for 3 weeks and then off for one week Patient taking differently: Take 125 mg by mouth daily. Take one pill once a day for 3 weeks and then off for one week Off week starts 8/1/18 3/13/18   Christian Noe MD  
 
 
[facility] Medications: 
Current Facility-Administered Medications Medication Dose Route Frequency  pantoprazole (PROTONIX) 80 mg in 0.9% sodium chloride 250 mL infusion  80 mg IntraVENous CONTINUOUS  
 cefTRIAXone (ROCEPHIN) 1 g in 0.9% sodium chloride (MBP/ADV) 50 mL  1 g IntraVENous Q24H  
 albuterol-ipratropium (DUO-NEB) 2.5 MG-0.5 MG/3 ML  3 mL Nebulization Q6H PRN  
 fentaNYL (DURAGESIC) 50 mcg/hr patch 1 Patch  1 Patch TransDERmal Q72H  
 0.9% sodium chloride infusion  75 mL/hr IntraVENous CONTINUOUS  
 insulin lispro (HUMALOG) injection   SubCUTAneous TIDAC  ondansetron (ZOFRAN) injection 4 mg  4 mg IntraVENous Q6H PRN  
 acetaminophen (TYLENOL) tablet 650 mg  650 mg Oral Q6H PRN  promethazine (PHENERGAN) with saline injection 12.5 mg  12.5 mg IntraVENous Q4H PRN  
 0.9% sodium chloride infusion 250 mL  250 mL IntraVENous PRN  phytonadione (vitamin K1) (AQUA-MEPHYTON) 10 mg in 0.9% sodium chloride 50 mL IVPB  10 mg IntraVENous ONCE  
 0.9% sodium chloride infusion 250 mL  250 mL IntraVENous PRN Social History: 
Social History Substance Use Topics  Smoking status: Former Smoker Packs/day: 1.00 Years: 8.00 Types: Cigarettes Quit date: 1/1/1978  Smokeless tobacco: Never Used Comment: quit 30 or more years ago  Alcohol use No  
 
 
Pt denies any history of drug use, blood transfusions, or tattoos. Family History: 
Family History Problem Relation Age of Onset  Heart Attack Mother  Seizures Mother  Alzheimer Father  Cancer Brother   
  doen not know details  No Known Problems Sister  No Known Problems Sister  No Known Problems Brother Review of Systems: A detailed 10 system ROS is obtained, with pertinent positives as listed above. All others are negative. Diet:   
 
Objective:  
 
Physical Exam: 
Vitals: 
Visit Vitals  /48 (BP 1 Location: Right arm)  Pulse 83  Temp 97.8 °F (36.6 °C)  Resp 20  
 Ht 5' 1\" (1.549 m)  Wt 52.5 kg (115 lb 11.2 oz)  LMP 06/21/1998  SpO2 97%  BMI 21.86 kg/m2 Gen:  Pt is alert, cooperative, no acute distress. Chronically ill appearing. Skin:  Extremities and face reveal no rashes. HEENT: Sclerae anicteric. Extra-occular muscles are intact. No oral ulcers. No abnormal pigmentation of the lips. The neck is supple. Cardiovascular: Regular rate and rhythm. No murmurs, gallops, or rubs. Respiratory:  Comfortable breathing with no accessory muscle use. Clear breath sounds anteriorly with no wheezes, rales, or rhonchi. GI:  Abdomen distended, soft, and nontender, +fluid wave, right pleurex in place. Normal active bowel sounds. No enlargement of the liver or spleen. No masses palpable. Rectal:  Deferred Musculoskeletal:  No pitting edema of the lower legs. Neurological:  Gross memory appears intact. Patient is alert and oriented. Psychiatric:  Mood appears appropriate with judgement intact. Lymphatic:  No cervical or supraclavicular adenopathy. Laboratory:   
Recent Labs  
   09/18/18 
 6154  09/18/18 
 2295  09/18/18 
 0121 WBC   --    --   13.5* HGB   --   6.7*  8.6* HCT   --   21.3*  27.5* PLT   --    --   406 MCV   --    --   117.5* NA   --    --   141 K   --    --   5.8*  
CL   --    --   105 CO2   --    --   17* BUN   --    --   102* CREA   --    --   4.54* CA   --    --   7.5*  
GLU   --    --   274* AP   --    --   330* SGOT   --    --   51* ALT   --    --   28  
TBILI   --    --   1.0 ALB   --    --   2.3* TP   --    --   6.2*  
LPSE   --    --   615* PTP  51.1*   --   43.6* INR  6.0*   --   4.9* Acute abdominal series 9/17/18  
Comparison: February 17, 2018  Indication: Abdominal pain  
Findings:  
Chest:   
There is stable left-sided chest port. There is right lung volume loss. Chronic right pleural effusion and associated atelectasis. No focal consolidation in the 
left lung. No pneumothorax or pulmonary edema. Cardiac silhouette is enlarged, 
similar to prior exam. Mediastinal contour is within normal limits.  
Abdomen:   
There is paucity of bowel gas. There is suggestion of ascites. No evidence of 
free air.  
Right nephroureteral stent is seen. Surrounding bones are stable.  IMPRESSION IMPRESSION:  
1. Suggestion of ascites. Nonobstructive bowel gas pattern.  
2. Chronic changes in the right lung. No evidence of acute pulmonary disease. Assessment:  
 
Principal Problem: 
  Upper GI bleeding (9/18/2018) Active Problems: 
  Bony metastasis ([address] (2/17/2017) DM (diabetes mellitus) ([address] (10/7/2016) Anemia (11/19/2013) Overview: 11-18-13 admitted with anemia and transfused DVT (deep venous thrombosis) ([facility] [address] (1/4/2017) Overview: 11-18-13 There is nonocclusive thrombus within the left common femoral  
    vein, superficial femoral vein, popliteal vein, and posterior tibial vein Accelerated hypertension (1/4/2017) Acute on chronic kidney failure ([address] (10/7/2016) Anticoagulated on Coumadin (5/24/2017) Ascites (9/18/2018) Supratherapeutic INR (9/18/2018) Elevated lactic acid level (9/18/2018) Breast cancer metastasized to liver ([address] (9/18/2018) Pancreatitis (9/18/2018) Nausea & vomiting (9/18/2018) Patient is a 70 y.o. female with PMH of CHF, metastatic breast cancer with liver involvement and ascites, CKD stage 5, DVT on coumadin, O2 dependent, DM2, who is seen in consultation at the request of Dr. Schuyler Bence for upper GIB and anemia. She had multiple episodes of coffee ground emesis and melena last night. HGB at admission was 8.6 and has dropped to 6.7 today. Coumadin has been held. INR at admission was 4.9. She was given IV Vit K, but unfortunately INR up to 6.0 today.  She is currently receiving 4 units PRBC and 1 unit FFP. She is on Rocephin and pantoprazole gtt. She does have frequent paracentesis via peritoneal catheter for metastatic breast cancer with liver involvement. She reports that this is managed by oncology and is due today. She has generalized abdominal pain. No additional episodes of coffee ground emesis or melena since admission. Last EGD with findings of gastritis/HH/esophageal ring and colonoscopy with diverticulosis. Discussed EGD when INR <1.5 for further evaluation of GI bleeding. Pt is agreeable. Plan:  
 
-Monitor HGB and transfuse as needed 
-Monitor for signs of GIB 
-PRBC and FFP being transfused at this time 
-Monitor INR-will need to be <1.5 for EGD 
-Will allow clears today-NPO at midnight for possible EGD tomorrow pending INR 
-Continue PPI gtt 
-Rocephin as per hospitalist 
-Pt with peritoneal catheter for repeat paracentesis. This is managed by oncology. Pt states that she is due for this today. 
-Continue to follow TRAVIS Fernandez Patient is seen and examined in collaboration with Dr. José Brandt. Assessment and plan as per Dr. José Brandt.

## 2018-09-18 NOTE — PROGRESS NOTES
END OF SHIFT NOTE: 
 
Intake/Output 
09/18 0701 - 09/18 1900 In: 310 Out: 250 [Urine:250] Voiding: YES Catheter: NO 
Drain:  
Pleural Catheter/Drain 08/12/18 Right; Anterior (Active) Stool:  0 occurrences. Stool Assessment Stool Appearance: Watery (dark with blood per pt) (09/18/18 1234) Emesis:  0 occurrences. VITAL SIGNS Patient Vitals for the past 12 hrs: 
 Temp Pulse Resp BP SpO2  
09/18/18 1530 97.7 °F (36.5 °C) 86 18 160/61 98 % 09/18/18 1402 97.7 °F (36.5 °C) 80 20 156/58 98 % 09/18/18 1330 97.6 °F (36.4 °C) 79 19 150/55 97 % 09/18/18 1250 97.8 °F (36.6 °C) 83 20 158/48 97 % 09/18/18 1212 97.4 °F (36.3 °C) 88 20 138/66 99 % 09/18/18 1150 97.4 °F (36.3 °C) - 20 138/64 99 % 09/18/18 1041 97.4 °F (36.3 °C) 93 22 117/56 99 % 09/18/18 1029 97.5 °F (36.4 °C) 100 20 111/56 99 % 09/18/18 1013 97.5 °F (36.4 °C) (!) 102 22 104/57 99 % 09/18/18 0806 - (!) 108 (!) 32 98/53 99 % Pain Assessment Pain 1 Pain Scale 1: Numeric (0 - 10) (09/18/18 1822) Pain Intensity 1: 8 (09/18/18 1822) Patient Stated Pain Goal: 0 (09/18/18 1822) Pain Reassessment 1: Yes (09/18/18 1550) Pain Onset 1:  (\"today\") (09/18/18 0048) Pain Location 1: Abdomen (09/18/18 1410) Pain Orientation 1: Anterior (09/18/18 1410) Pain Description 1: Aching (09/18/18 1410) Pain Intervention(s) 1: Refused (09/18/18 1410) Ambulating Yes Additional Information: Pt using bedside comode with assist. Pt complaining of abdominal pain, IV dilaudid administered and tolerated well. Ascites present oncology to eval in the am. Pt and family also requested hospice consult that will take place in the morning. Multiple labs elevated (Cr, Potassium,Lactic) MD aware. See MAR for interventions. Pt received 2 units of PRBC and 1 unit of FFP during shift, tolerated well. No other needs at this time. Shift report given to oncoming nurse at the bedside. Tere Terrazas

## 2018-09-18 NOTE — PROGRESS NOTES
Hospitalist Progress Note 2018 Admit Date: 2018 12:45 AM  
NAME: Calixto Burris :  1950 MRN:  [id number] Attending: Kaden Pop MD 
PCP:  Karly Fernandes MD 
 
SUBJECTIVE:  
 
Calixto Burris is a  \"77 y.o. female who has a PMH of metastatic breast cancer to liver and bones + chemotherapy, liver cirrhosis with ascites on pleurex cath with palliative paracentesis twice/week, HTN, prior left leg DVT on chronic coumadin since , CKD stage V, who came after she experienced 8 episodes of coffee ground emesis and 2 episodes of dark-tarry stools yesterday. She stated this is the first time of the event. In addition, she has had dizziness, weakness and abdominal pain, 6/10 in intensity, dull like, diffuse that resolved already. On 18 she had 2 liters of ascites removed from her pleurex. Denies fever, chills, chest pain, sob. Upon ER arrival VS /58  HR 97  T97  02: 98%. She was noted in no distress, able to speak in full sentences, she seemed pale.  
  
ED physician started her on 80 mg ivp of protonix and then gtt. Labs: positive FOBT; CBC: hb 8.6 ( dropped 1 gr from 1 week ago ); wbc 13k, chemistry: K 5.8, glucose 274; lipase 615; cr 4.5 ( baseline ); lactic acid 5.5; INR 4.9; KUB: ascites; CXR: negative. Typed and crossed. 10 mg ivp vit K given for coumadin reversal.  
ED physician contacted GI on call and they will see patient in am. \" 
 
 
Today: patient seen and examined with . She reports abd pain and nausea. No active hematemesis or bloody BM since admission. She complains of thirst and was counseled on reason for NPO status.  at bedside and updated on clinical status.  states they were scheduled to meet with Hospice outpatient  Oct 1st and are agreeable to meet while inpatient. Review of Systems negative with exception of pertinent positives noted above PHYSICAL EXAM  
 
 
Visit Vitals  /61 (BP 1 Location: Left arm, BP Patient Position: Supine)  Pulse 86  Temp 97.7 °F (36.5 °C)  Resp 18  Ht 5' 1\" (1.549 m)  Wt 52.5 kg (115 lb 11.2 oz)  LMP 1998  SpO2 98%  Breastfeeding No  
 BMI 21.86 kg/m2 Temp (24hrs), Av.6 °F (36.4 °C), Min:97.4 °F (36.3 °C), Max:97.8 °F (36.6 °C) Oxygen Therapy O2 Sat (%): 98 % (18 1530) Pulse via Oximetry: 110 beats per minute (18 0806) O2 Device: Room air (18 0617) Intake/Output Summary (Last 24 hours) at 18 1559 Last data filed at 18 1530 Gross per 24 hour Intake              310 ml Output                0 ml Net              310 ml General: No acute distress. Head:  Atraumatic Normocephalic. Eyes:  PERRLA, EOMI, Anicteric. ENT:  No discharges/lesions. Lungs:  CTA Bilaterally. CVS:  Regular rate and rhythm,  No murmur, rub, or gallop, No JVD, Abdomen: Distended, Soft, diffuse TTP. Catheter in place, Positive bowel sounds. MSK:  No deformities, lesions, Spontaneously moves extremities. Neurologic:  AOx3. No focal deficits Psychiatry:      No anxiety/Depression Skin:   No rash/lesions. Good skin turgor Heme/Lymph/Immune:  No petechiae, ecchymoses Recent Results (from the past 24 hour(s)) CBC WITH AUTOMATED DIFF Collection Time: 18  1:21 AM  
Result Value Ref Range WBC 13.5 (H) 4.3 - 11.1 K/uL  
 RBC 2.34 (L) 4.05 - 5.2 M/uL HGB 8.6 (L) 11.7 - 15.4 g/dL HCT 27.5 (L) 35.8 - 46.3 % .5 (H) 79.6 - 97.8 FL  
 MCH 36.8 (H) 26.1 - 32.9 PG  
 MCHC 31.3 (L) 31.4 - 35.0 g/dL  
 RDW 13.5 % PLATELET 478 238 - 604 K/uL MPV 10.4 9.4 - 12.3 FL ABSOLUTE NRBC 0.12 0.0 - 0.2 K/uL NEUTROPHILS 82 (H) 47 - 75 % LYMPHOCYTES 6 (L) 16 - 44 % MONOCYTES 10 (H) 3 - 9 % EOSINOPHILS 1 1 - 8 % BASOPHILS 1 0 - 2 %  
 ABS. NEUTROPHILS 11.1 (H) 1.7 - 8.2 K/UL  
 ABS. LYMPHOCYTES 0.8 0.5 - 4.6 K/UL ABS. MONOCYTES 1.4 (H) 0.1 - 1.3 K/UL  
 ABS. EOSINOPHILS 0.1 0.0 - 0.8 K/UL  
 ABS. BASOPHILS 0.1 0.0 - 0.2 K/UL  
 RBC COMMENTS ANISOCYTOSIS + POIKILOCYTOSIS    
 RBC COMMENTS SLIGHT 
POLYCHROMASIA PLATELET COMMENTS ADEQUATE    
 DF AUTOMATED METABOLIC PANEL, COMPREHENSIVE Collection Time: 09/18/18  1:21 AM  
Result Value Ref Range Sodium 141 136 - 145 mmol/L Potassium 5.8 (H) 3.5 - 5.1 mmol/L Chloride 105 98 - 107 mmol/L  
 CO2 17 (L) 21 - 32 mmol/L Anion gap 19 (H) 7 - 16 mmol/L Glucose 274 (H) 65 - 100 mg/dL  (H) 8 - 23 MG/DL Creatinine 4.54 (H) 0.6 - 1.0 MG/DL  
 GFR est AA 12 (L) >60 ml/min/1.73m2 GFR est non-AA 10 (L) >60 ml/min/1.73m2 Calcium 7.5 (L) 8.3 - 10.4 MG/DL Bilirubin, total 1.0 0.2 - 1.1 MG/DL  
 ALT (SGPT) 28 12 - 65 U/L  
 AST (SGOT) 51 (H) 15 - 37 U/L Alk. phosphatase 330 (H) 50 - 136 U/L Protein, total 6.2 (L) 6.3 - 8.2 g/dL Albumin 2.3 (L) 3.2 - 4.6 g/dL Globulin 3.9 (H) 2.3 - 3.5 g/dL A-G Ratio 0.6 (L) 1.2 - 3.5 TYPE & SCREEN Collection Time: 09/18/18  1:21 AM  
Result Value Ref Range Crossmatch Expiration 09/21/2018 ABO/Rh(D) A POSITIVE Antibody screen NEG Unit number [id number] Blood component type [facility] Unit division 00 Status of unit ISSUED Crossmatch result Compatible Unit number [id number] Blood component type [facility] Unit division 00 Status of unit ISSUED Crossmatch result Compatible PROTHROMBIN TIME + INR Collection Time: 09/18/18  1:21 AM  
Result Value Ref Range Prothrombin time 43.6 (H) 11.5 - 14.5 sec INR 4.9 (HH)    
LIPASE Collection Time: 09/18/18  1:21 AM  
Result Value Ref Range Lipase 615 (H) 73 - 393 U/L  
AMMONIA Collection Time: 09/18/18  1:21 AM  
Result Value Ref Range Ammonia 88 (H) 11 - 32 UMOL/L  
LACTIC ACID Collection Time: 09/18/18  1:24 AM  
Result Value Ref Range Lactic acid 5.5 (HH) 0.4 - 2.0 MMOL/L  
EKG, 12 LEAD, INITIAL Collection Time: 09/18/18  2:30 AM  
Result Value Ref Range Ventricular Rate 81 BPM  
 Atrial Rate 81 BPM  
 P-R Interval 176 ms QRS Duration 90 ms Q-T Interval 372 ms QTC Calculation (Bezet) 432 ms Calculated P Axis 145 degrees Calculated R Axis 26 degrees Calculated T Axis -176 degrees Diagnosis    
  !! AGE AND GENDER SPECIFIC ECG ANALYSIS !! 
Unusual P axis, possible ectopic atrial rhythm with Premature  
supraventricular complexes ST & T wave abnormality, consider inferolateral ischemia Abnormal ECG When compared with ECG of 17-FEB-2018 15:53, 
Ectopic atrial rhythm has replaced Sinus rhythm Criteria for Anterior infarct are no longer Present ST now depressed in Inferior leads Non-specific change in ST segment in Lateral leads Confirmed by PATRICK CABRALES (), Linda Rosenberg ([id number]) on 9/18/2018 1:10:16 PM 
  
URINE MICROSCOPIC Collection Time: 09/18/18  2:34 AM  
Result Value Ref Range WBC 3-5 0 /hpf  
 RBC 0 0 /hpf Epithelial cells 10-20 0 /hpf Bacteria TRACE 0 /hpf Casts 0 0 /lpf Crystals, urine 0 0 /LPF Amorphous Crystals 4+ (H) 0 Mucus 0 0 /lpf Other observations RESULTS VERIFIED MANUALLY    
HGB & HCT Collection Time: 09/18/18  8:14 AM  
Result Value Ref Range HGB 6.7 (LL) 11.7 - 15.4 g/dL HCT 21.3 (L) 35.8 - 46.3 % LACTIC ACID Collection Time: 09/18/18  8:21 AM  
Result Value Ref Range Lactic acid 8.8 (HH) 0.4 - 2.0 MMOL/L  
PROTHROMBIN TIME + INR Collection Time: 09/18/18  8:21 AM  
Result Value Ref Range Prothrombin time 51.1 (H) 11.5 - 14.5 sec INR 6.0 (HH)    
FFP, ALLOCATE Collection Time: 09/18/18  9:45 AM  
Result Value Ref Range Unit number [id number] Blood component type FP 24h,Thaw Unit division A0 Status of unit ISSUED INFLUENZA A & B AG (RAPID TEST) Collection Time: 09/18/18 11:09 AM  
Result Value Ref Range Influenza A Ag NEGATIVE  NEG Influenza B Ag NEGATIVE  NEG Source NASOPHARYNGEAL    
HGB & HCT Collection Time: 09/18/18  1:18 PM  
Result Value Ref Range HGB 9.2 (L) 11.7 - 15.4 g/dL HCT 29.2 (L) 35.8 - 46.3 % METABOLIC PANEL, COMPREHENSIVE Collection Time: 09/18/18  1:18 PM  
Result Value Ref Range Sodium 140 136 - 145 mmol/L Potassium 5.9 (H) 3.5 - 5.1 mmol/L Chloride 107 98 - 107 mmol/L  
 CO2 PENDING mmol/L Anion gap PENDING mmol/L Glucose PENDING mg/dL BUN PENDING MG/DL Creatinine PENDING MG/DL  
 GFR est AA PENDING ml/min/1.73m2 GFR est non-AA PENDING ml/min/1.73m2 Calcium PENDING MG/DL Bilirubin, total PENDING MG/DL  
 ALT (SGPT) PENDING U/L  
 AST (SGOT) PENDING U/L Alk. phosphatase PENDING U/L Protein, total PENDING g/dL Albumin PENDING g/dL Globulin PENDING g/dL A-G Ratio PENDING    
LACTIC ACID Collection Time: 09/18/18  1:18 PM  
Result Value Ref Range Lactic acid 9.9 (HH) 0.4 - 2.0 MMOL/L Imaging Luigi Zhang Allyne Busing Xr Abd Acute W 1 V Chest 
 
Result Date: 9/18/2018 IMPRESSION: 1. Suggestion of ascites. Nonobstructive bowel gas pattern. 2. Chronic changes in the right lung. No evidence of acute pulmonary disease. ASSESSMENT Hospital Problems as of 9/18/2018  Date Reviewed: 9/18/2018 Codes Class Noted - Resolved POA * (Principal)Upper GI bleeding ICD-10-CM: K92.2 ICD-9-CM: 578.9  9/18/2018 - Present Unknown Ascites ICD-10-CM: R18.8 ICD-9-CM: 789.59  9/18/2018 - Present Unknown Supratherapeutic INR ICD-10-CM: R79.1 ICD-9-CM: 790.92  9/18/2018 - Present Unknown Elevated lactic acid level ICD-10-CM: R79.89 ICD-9-CM: 276.2  9/18/2018 - Present Unknown Breast cancer metastasized to liver [facility]) ICD-10-CM: C50.919, C78.7 ICD-9-CM: 174.9, 197.7  9/18/2018 - Present Unknown Pancreatitis ICD-10-CM: K85.90 ICD-9-CM: 630.3  9/18/2018 - Present Unknown Nausea & vomiting ICD-10-CM: R11.2 ICD-9-CM: 787.01  9/18/2018 - Present Unknown Anticoagulated on Coumadin (Chronic) ICD-10-CM: Z51.81, Z79.01 
ICD-9-CM: V58.83, V58.61  5/24/2017 - Present Yes Bony metastasis ([address] (Chronic) ICD-10-CM: C79.51 
ICD-9-CM: 198.5  2/17/2017 - Present Yes DVT (deep venous thrombosis) (HCC) (Chronic) ICD-10-CM: C98.095 ICD-9-CM: 453.40  1/4/2017 - Present Yes Overview Signed 11/19/2013  6:07 AM by Mikel Cloud 11-18-13 There is nonocclusive thrombus within the left common femoral vein, superficial femoral vein, popliteal vein, and posterior tibial vein Accelerated hypertension ICD-10-CM: I10 
ICD-9-CM: 401.0  1/4/2017 - Present Yes DM (diabetes mellitus) (HCC) (Chronic) ICD-10-CM: E11.9 ICD-9-CM: 250.00  10/7/2016 - Present Yes Acute on chronic kidney failure (HCC) ICD-10-CM: N17.9, N18.9 ICD-9-CM: 584.9, 585.9  10/7/2016 - Present Yes Anemia ICD-10-CM: D64.9 ICD-9-CM: 285.9  11/19/2013 - Present Yes Overview Signed 11/19/2013  6:06 AM by Mikel Cloud 11-18-13 admitted with anemia and transfused Plan: - Heme/Onc consulted. Due for ascites drainage 
- Continue Rocephin for UGIB ppx 
- Concern for rising lactic acid. Will increase IVF and consult Renal to manage fluid status. Repeat LA pending - Hyperkalemia 2/2 CKD and blood transfusions: Will give 1 amp Bicarb,calcium gluconate, insulin and dextrose - GI consulted and plan endoscopy after INR <1.5 
- s/p total of 2units irradiated PRBCs. (verified w/ blood bank) Hgb 6.7--> 9.2 
- s/p 1 unit FFP and IV Vit K 10mg - Repeat INR pending. Hold Coumadin 
- Pain control w/ Fentanyl patch and Dilaudid prn 
- Hospice consulted for tomorrow - Continue remote Telemetry 
- NPO 
- PT/OT 
 
 
 
DVT Prophylaxis: Flavio Lebron MD

## 2018-09-18 NOTE — PROGRESS NOTES
present at bedside during assessment with Enrico Marinelli NP and Erinn Goss, RN primary nurse. Thank you for this referral,   
 
Enoch Garcia [facility]  /Patient [facility]. [address] [facility], [address] [place] [address]   
[phone]

## 2018-09-18 NOTE — ED TRIAGE NOTES
EMS states \"Patient is being treated for cancer and liver failure, and end stage renal disease. Patient is complaining of abdominal pain and vomiting and diarrhea that started about an hour ago. Vomit and diarrhea are dark in color\"

## 2018-09-18 NOTE — ROUTINE PROCESS
TRANSFER - OUT REPORT: 
 
Verbal report given to [facility] CADE RN on Stephan New  being transferred to 5th floor for routine progression of care Report consisted of patients Situation, Background, Assessment and  
Recommendations(SBAR). Information from the following report(s) ED Summary was reviewed with the receiving nurse. Lines:  
Venous Access Device Port Upper chest (subclavicular area), left (Active) Opportunity for questions and clarification was provided.    
 
Patient transported with:

## 2018-09-19 PROBLEM — D62 ACUTE BLOOD LOSS ANEMIA: Status: ACTIVE | Noted: 2018-01-01

## 2018-09-19 NOTE — PROGRESS NOTES
END OF SHIFT NOTE: 
 
Intake/Output Voiding: YES Catheter: NO 
Drain:  
Pleural Catheter/Drain 08/12/18 Right; Anterior (Active) Stool:  0 occurrences. Stool Assessment Stool Color: Black (09/19/18 1400) Stool Appearance: Loose (09/19/18 1400) Stool Amount: Large (09/19/18 1400) Stool Source/Status: Rectum (09/19/18 1400) Emesis:  0 occurrences. VITAL SIGNS Patient Vitals for the past 12 hrs: 
 Temp Pulse Resp BP SpO2  
09/19/18 1505 97.9 °F (36.6 °C) 97 17 180/67 97 % 09/19/18 1130 98.2 °F (36.8 °C) 94 18 157/70 96 % 09/19/18 0751 98.9 °F (37.2 °C) 93 18 157/60 97 % Pain Assessment Pain 1 Pain Scale 1: Visual (09/19/18 1502) Pain Intensity 1: 0 (09/19/18 1502) Patient Stated Pain Goal: 0 (09/19/18 1502) Pain Reassessment 1: Patient sleeping (09/19/18 1502) Pain Onset 1:  (\"today\") (09/18/18 0048) Pain Location 1: Abdomen (09/18/18 1410) Pain Orientation 1: Anterior (09/18/18 1410) Pain Description 1: Aching (09/18/18 1410) Pain Intervention(s) 1: Refused (09/18/18 1410) Ambulating Yes to [facility] Additional Information:  
+Norvasc and Coreg back today due to HTN. -d/c'd IVF 
-Pleurex drain to be drained tomorrow.  
-advanced to clear liquids 
-hospice meeting tomorrow Shift report given to oncoming nurse at the bedside.  
 
Declan Vasquez RN

## 2018-09-19 NOTE — PROGRESS NOTES
Problem: Falls - Risk of 
Goal: *Absence of Falls Document Tia Manus Fall Risk and appropriate interventions in the flowsheet. Fall Risk Interventions: 
Mobility Interventions: Communicate number of staff needed for ambulation/transfer, Strengthening exercises (ROM-active/passive) Medication Interventions: Patient to call before getting OOB, Teach patient to arise slowly Elimination Interventions: Call light in reach, Toileting schedule/hourly rounds

## 2018-09-19 NOTE — PROGRESS NOTES
[facility] PROGRESS NOTE Follow up for: CKD stage V Subjective:  
Patient seen and examined. Chart, notes, labs, imaging, results all reviewed. No sob, cp, n/v. C/o abdominal pain. Hospice to see today. ROS: 
Gen - no fever, no chills, poor appetite CV - no chest pain, no orthopnea Lung - no shortness of breath, no cough Abd - + abdominal tenderness, no nausea, no vomiting Ext - no edema Objective:  
Exam: 
Vitals:  
 09/18/18 2014 09/18/18 2320 09/19/18 0450 09/19/18 2249 BP: 145/59 138/61 160/62 157/60 Pulse: 93 92 97 93 Resp: 18 18 18 18 Temp: 98.1 °F (36.7 °C) 98.3 °F (36.8 °C) 98.5 °F (36.9 °C) 98.9 °F (37.2 °C) SpO2: 96% 96% 93% 97% Weight: 53.6 kg (118 lb 1.6 oz) Height:      
 
 
 
Intake/Output Summary (Last 24 hours) at 09/19/18 1103 Last data filed at 09/19/18 7163 Gross per 24 hour Intake             1941 ml Output              750 ml Net             1191 ml  
 
 
Current Facility-Administered Medications Medication Dose Route Frequency  vancomycin (VANCOCIN) 750 mg in  ml infusion  750 mg IntraVENous See Admin Instructions  [START ON 9/20/2018] VANCOMYCIN INFORMATION NOTE   Other ONCE  pantoprazole (PROTONIX) 80 mg in 0.9% sodium chloride 250 mL infusion  80 mg IntraVENous CONTINUOUS  
 cefTRIAXone (ROCEPHIN) 1 g in 0.9% sodium chloride (MBP/ADV) 50 mL  1 g IntraVENous Q24H  
 albuterol-ipratropium (DUO-NEB) 2.5 MG-0.5 MG/3 ML  3 mL Nebulization Q6H PRN  
 fentaNYL (DURAGESIC) 50 mcg/hr patch 1 Patch  1 Patch TransDERmal Q72H  
 insulin lispro (HUMALOG) injection   SubCUTAneous TIDAC  ondansetron (ZOFRAN) injection 4 mg  4 mg IntraVENous Q6H PRN  
 acetaminophen (TYLENOL) tablet 650 mg  650 mg Oral Q6H PRN  promethazine (PHENERGAN) with saline injection 12.5 mg  12.5 mg IntraVENous Q4H PRN  
 0.9% sodium chloride infusion 250 mL  250 mL IntraVENous PRN  
 0.9% sodium chloride infusion 250 mL  250 mL IntraVENous PRN  
  influenza vaccine 2018-19 (6 mos+)(PF) (FLUARIX QUAD/FLULAVAL QUAD) injection 0.5 mL  0.5 mL IntraMUSCular PRIOR TO DISCHARGE  
 HYDROmorphone (PF) (DILAUDID) injection 1 mg  1 mg IntraVENous Q3H PRN  
 sodium bicarbonate (8.4%) 150 mEq in dextrose 5% 1,000 mL infusion   IntraVENous CONTINUOUS  
 
 
EXAM 
GEN - Alert, oriented, in no distress CV - S1, S2, RRR, no rub, murmur, or gallop Lung - clear to auscultation bilaterally Abd - soft, distended, peritoneal catheter in place Ext - no edema Recent Labs  
   09/19/18 
 0900  09/19/18 
 0347  09/18/18 
 1913   09/18/18 
 0121 WBC   --    --    --    --   13.5* HGB  10.0*  9.2*  9.4*   < >  8.6* HCT  30.3*  27.3*  28.6*   < >  27.5* PLT   --    --    --    --   406  
 < > = values in this interval not displayed. Recent Labs  
   09/19/18 
 0347  09/18/18 
 1639  09/18/18 
 1318 NA  139  139  140  
K  4.8  6.0*  5.9*  
CL  104  107  107 CO2  23  15*  12* BUN  113*  106*  108* CREA  4.55*  4.74*  4.93* CA  6.5*  6.5*  6.8*  
GLU  325*  278*  221* Assessment and Plan: EMERY on CKD stage V in setting of metastatic breast cancer, GI bleed 
- long discussion with patient yesterday - dialysis would not change her prognosis and would diminish any remaining quality of life - she does not want dialysis and understands the implications of not doing dialysis including death - hospice consulted to see today 
- renal function slightly improved and hyperkalemia better today, nonoliguric Hyperkalemia 
- in response to blood transfusion 
- improved with IVF with bicarb, insulin, D50, calcium gluconate GIB/anemia 
- s/p prbc and FFP Lactic acidosis - multifactorial. On bicarb gtt - resolved. Change fluids to NS Metastatic breast cancer to liver and bone 
- oncology following - peritoneal drain with drainage of ascites for comfort 
- moving toward comfort measures and hospice She does not want dialysis and plan is for hospice on discharge. Nephrology will sign off. Please call if needed ELEAZAR SharifP

## 2018-09-19 NOTE — PROGRESS NOTES
SW noted order for hospice, however, hospice company unclear. SW will research. Family can't remember with whom they spoke to and it has not been documented by previous writers. SW will continue to monitor discharge plan and follow up. Care Management Interventions PCP Verified by CM: Yes Mode of Transport at Discharge: Self Transition of Care Consult (CM Consult): Discharge Planning Discharge Durable Medical Equipment: No 
Physical Therapy Consult: No 
Occupational Therapy Consult: No 
Speech Therapy Consult: No 
Discharge Location Discharge Placement: Home with hospice

## 2018-09-19 NOTE — PROGRESS NOTES
Problem: Falls - Risk of 
Goal: *Absence of Falls Document Ra Be[place]ugustin Fall Risk and appropriate interventions in the flowsheet. Outcome: Progressing Towards Goal 
Fall Risk Interventions: 
Mobility Interventions: Communicate number of staff needed for ambulation/transfer, Strengthening exercises (ROM-active/passive) Medication Interventions: Patient to call before getting OOB, Teach patient to arise slowly, Assess postural VS orthostatic hypotension Elimination Interventions: Call light in reach, Patient to call for help with toileting needs, Toileting schedule/hourly rounds

## 2018-09-19 NOTE — PROGRESS NOTES
Hospitalist Progress Note 2018 Admit Date: 2018 12:45 AM  
NAME: Arnaud Stanley :  1950 MRN:  [id number] Attending: Feliz Lebron MD 
PCP:  Kimberly Car MD 
 
SUBJECTIVE:  
Mrs. Kimberly Branch is a 71 yo F with metastatic breast cancer to the liver with ascites and pleur-x catheter in place and stage 5 CKD, and HTN, who was admitted  for hematemesis and supratherapeutic INR. She had vitamin K, FFP, and 2 units pRBC. No further vomiting and hemoglobin has remained stable. She and her family have decided to pursue hospice and hospice meeting is set for tomorrow. She complains of back pain that is controlled with pain meds and abdominal discomfort from the swelling. Blood pressure is noted to be elevating. Per her RN, she is due for ascites drainage tomorrow. GPC in blood stream (likely contaminant). Seen with assistance of Onesimo Odom, . Review of Systems negative with exception of pertinent positives noted above PHYSICAL EXAM  
 
Visit Vitals  /67 (BP 1 Location: Right arm, BP Patient Position: At rest)  Pulse 97  Temp 97.9 °F (36.6 °C)  Resp 17  Ht 5' 1\" (1.549 m)  Wt 53.6 kg (118 lb 1.6 oz)  LMP 1998  SpO2 97%  Breastfeeding No  
 BMI 22.31 kg/m2 Temp (24hrs), Av.3 °F (36.8 °C), Min:97.9 °F (36.6 °C), Max:98.9 °F (37.2 °C) Patient Vitals for the past 24 hrs: 
 Temp Pulse Resp BP SpO2  
18 1505 97.9 °F (36.6 °C) 97 17 180/67 97 % 18 1130 98.2 °F (36.8 °C) 94 18 157/70 96 % 18 0751 98.9 °F (37.2 °C) 93 18 157/60 97 % 18 0450 98.5 °F (36.9 °C) 97 18 160/62 93 % 18 2320 98.3 °F (36.8 °C) 92 18 138/61 96 % 18 98.1 °F (36.7 °C) 93 18 145/59 96 % Oxygen Therapy O2 Sat (%): 97 % (18 1505) Pulse via Oximetry: 110 beats per minute (18 0806) O2 Device: Room air (18 0048) Intake/Output Summary (Last 24 hours) at 18 0691 Last data filed at 09/19/18 1354 Gross per 24 hour Intake             2923 ml Output              750 ml Net             2173 ml General: No acute distress, chronically ill appearing  
Lungs:  CTA Bilaterally. Heart:  Regular rate and rhythm,  No murmur, rub, or gallop Abdomen: + ascites, tense Extremities: No cyanosis, clubbing or edema Neurologic:  No focal deficits Recent Results (from the past 24 hour(s)) HGB & HCT Collection Time: 09/18/18  7:13 PM  
Result Value Ref Range HGB 9.4 (L) 11.7 - 15.4 g/dL HCT 28.6 (L) 35.8 - 46.3 % LACTIC ACID Collection Time: 09/18/18  7:13 PM  
Result Value Ref Range Lactic acid 5.0 (HH) 0.4 - 2.0 MMOL/L  
GLUCOSE, POC Collection Time: 09/18/18  8:45 PM  
Result Value Ref Range Glucose (POC) 316 (H) 65 - 100 mg/dL HGB & HCT Collection Time: 09/19/18  3:47 AM  
Result Value Ref Range HGB 9.2 (L) 11.7 - 15.4 g/dL HCT 27.3 (L) 35.8 - 46.3 % LACTIC ACID Collection Time: 09/19/18  3:47 AM  
Result Value Ref Range Lactic acid 1.6 0.4 - 2.0 MMOL/L  
PROTHROMBIN TIME + INR Collection Time: 09/19/18  3:47 AM  
Result Value Ref Range Prothrombin time 26.2 (H) 11.5 - 14.5 sec INR 2.5 METABOLIC PANEL, BASIC Collection Time: 09/19/18  3:47 AM  
Result Value Ref Range Sodium 139 136 - 145 mmol/L Potassium 4.8 3.5 - 5.1 mmol/L Chloride 104 98 - 107 mmol/L  
 CO2 23 21 - 32 mmol/L Anion gap 12 7 - 16 mmol/L Glucose 325 (H) 65 - 100 mg/dL  (H) 8 - 23 MG/DL Creatinine 4.55 (H) 0.6 - 1.0 MG/DL  
 GFR est AA 12 (L) >60 ml/min/1.73m2 GFR est non-AA 10 (L) >60 ml/min/1.73m2 Calcium 6.5 (L) 8.3 - 10.4 MG/DL  
GLUCOSE, POC Collection Time: 09/19/18  7:52 AM  
Result Value Ref Range Glucose (POC) 301 (H) 65 - 100 mg/dL HGB & HCT Collection Time: 09/19/18  9:00 AM  
Result Value Ref Range HGB 10.0 (L) 11.7 - 15.4 g/dL HCT 30.3 (L) 35.8 - 46.3 % LACTIC ACID  
 Collection Time: 09/19/18  9:00 AM  
Result Value Ref Range Lactic acid 1.9 0.4 - 2.0 MMOL/L  
PROTHROMBIN TIME + INR Collection Time: 09/19/18  9:00 AM  
Result Value Ref Range Prothrombin time 23.4 (H) 11.5 - 14.5 sec INR 2.2 GLUCOSE, POC Collection Time: 09/19/18 11:31 AM  
Result Value Ref Range Glucose (POC) 265 (H) 65 - 100 mg/dL HGB & HCT Collection Time: 09/19/18 12:59 PM  
Result Value Ref Range HGB 9.6 (L) 11.7 - 15.4 g/dL HCT 28.8 (L) 35.8 - 46.3 % LACTIC ACID Collection Time: 09/19/18 12:59 PM  
Result Value Ref Range Lactic acid 1.4 0.4 - 2.0 MMOL/L  
GLUCOSE, POC Collection Time: 09/19/18  4:18 PM  
Result Value Ref Range Glucose (POC) 207 (H) 65 - 100 mg/dL Imaging: XR ABD ACUTE W 1 V CHEST Final Result IMPRESSION:  
  
1. Suggestion of ascites. Nonobstructive bowel gas pattern. 2. Chronic changes in the right lung. No evidence of acute pulmonary disease. ASSESSMENT Hospital Problems as of 9/19/2018  Date Reviewed: 9/18/2018 Codes Class Noted - Resolved POA Acute blood loss anemia ICD-10-CM: D62 
ICD-9-CM: 285.1  9/19/2018 - Present Yes * (Principal)Upper GI bleeding ICD-10-CM: K92.2 ICD-9-CM: 578.9  9/18/2018 - Present Unknown Ascites ICD-10-CM: R18.8 ICD-9-CM: 789.59  9/18/2018 - Present Unknown Supratherapeutic INR ICD-10-CM: R79.1 ICD-9-CM: 790.92  9/18/2018 - Present Unknown Elevated lactic acid level ICD-10-CM: R79.89 ICD-9-CM: 276.2  9/18/2018 - Present Unknown Breast cancer metastasized to liver [facility]) ICD-10-CM: C50.919, C78.7 ICD-9-CM: 174.9, 197.7  9/18/2018 - Present Unknown Pancreatitis ICD-10-CM: K85.90 ICD-9-CM: 895.1  9/18/2018 - Present Unknown Nausea & vomiting ICD-10-CM: R11.2 ICD-9-CM: 787.01  9/18/2018 - Present Unknown  Anticoagulated on Coumadin (Chronic) ICD-10-CM: Z51.81, Z79.01 
 ICD-9-CM: V58.83, V58.61  5/24/2017 - Present Yes Bony metastasis ([address] (Chronic) ICD-10-CM: C79.51 
ICD-9-CM: 198.5  2/17/2017 - Present Yes DVT (deep venous thrombosis) (HCC) (Chronic) ICD-10-CM: I85.230 ICD-9-CM: 453.40  1/4/2017 - Present Yes Overview Signed 11/19/2013  6:07 AM by Genoveva Toro 11-18-13 There is nonocclusive thrombus within the left common femoral vein, superficial femoral vein, popliteal vein, and posterior tibial vein Accelerated hypertension ICD-10-CM: I10 
ICD-9-CM: 401.0  1/4/2017 - Present Yes DM (diabetes mellitus) (HCC) (Chronic) ICD-10-CM: E11.9 ICD-9-CM: 250.00  10/7/2016 - Present Yes Acute on chronic kidney failure (HCC) ICD-10-CM: N17.9, N18.9 ICD-9-CM: 584.9, 585.9  10/7/2016 - Present Yes Anemia ICD-10-CM: D64.9 ICD-9-CM: 285.9  11/19/2013 - Present Yes Overview Signed 11/19/2013  6:06 AM by Genoveva Toro 11-18-13 admitted with anemia and transfused Plan: · Upper GI bleed/hematemesis- resolved. · Continue IV protonix until tomorrow. · Stop IVF. · No further anticoagulation. · Will stop rocephin. · Change hgb to daily. · Stop lactic acid checks. · HTN- restart amlodipine and carvedilol. · Stage 4 breast cancer with malignant ascites- will drain ascites tomorrow. · GPC in blood culture- continue vanc for now. Likely a contaminant. Dispo- pending hospice evaluation DVT Prophylaxis: Compression socks Signed By: Yumiko Fournier MD   
 September 19, 2018

## 2018-09-19 NOTE — PROGRESS NOTES
- Tena Engel - will be available from 3pm to 11:30pm 
 and also on call until 6:00am, 
 today, Thursday and Friday.

## 2018-09-19 NOTE — CONSULTS
[facility] Inpatient Hematology / Oncology History and Physical 
 
Reason for Asmission:  Upper GI bleeding Supratherapeutic INR Breast cancer metastasized to liver ([address] Ascites Elevated lactic acid level Upper GI bleeding Supratherapeutic INR Breast cancer metastasized to liver ([address] Ascites Elevated lactic acid level Nausea & vomiting History of Present Illness: 
 WAS STILL PRESENT IN THE ROOM FOR CLARIFICATION OF INFORMATION.   
Mrs. Lesly Castaneda is a 70 y.o. female with PMH of metastatic breast cancer to liver and bones, liver cirrhosis with ascites on pleurex cath with palliative paracentesis twice/week, HTN, prior left leg DVT on coumadin, CKD stage V, who came to ER after she experienced 8 episodes of coffee ground emesis and 2 episodes of dark-tarry stools yesterday. She denied fever, chills, chest pain, sob. She was started on protonix drip in ER. Labs in ER revealed INR elevation 4.9 repeated and was 6.0. She was given vitamin K and FFP with resolution of INR today 2.5. Hgb dropped to 6.7. She received 2 unit prbc and hgb today is 8.6. Ammonia level 88, LA initially was 5.5 and when checked again it was 9.9. After aggressive hydration her LA today is 1.6. BC were collected and today one set of cultures is GPC+. She is on rocephin and vanc. Nephrology was consulted and discussed with patient with assistance of  as well as her son regarding starting her on dialysis given her poor prognosis. They were both in agreement, that patient would not want dialysis and understand the implications of not pursuing it to include death. Hospice has been consulted. We are consulted for recommendations.  
  
 
 
 
Review of Systems: 
Constitutional  Positive weight loss, appetite changes, fatigue HEENT Denies trauma, blurry vision, hearing loss, ear pain, nosebleeds, sore throat, neck pain Skin Denies lesions or rashes. Lungs Denies dyspnea, cough, sputum production or hemoptysis. Cardiovascular Denies chest pain, palpitations, or lower extremity edema. Gastrointestinal Nausea, vomiting, dark stools, abdominal pain. MSK Denies back pain, arthralgias, myalgias or frequent falls. Psychiatric/Behavioral The patient is not nervous/anxious. No Known Allergies Past Medical History:  
Diagnosis Date  Acute on chronic diastolic congestive heart failure ([address] 1/5/2016  Anemia of chronic disease 11/13/2017  Aortic valve stenosis  Arthritis   
 roverto hands  Asthma   
 till age 31  
 Cancer ([address]   
 roverto. breast ca mets bone/lung  Chemotherapy-induced neutropenia (HCC) 12/20/2016  CKD (chronic kidney disease) stage 4, GFR 15-29 ml/min (HCC)  Depression   
 managed with meds  Diastolic CHF ([address] Followed by Donte Kerns  Former cigarette smoker  HCAP (healthcare-associated pneumonia) 7/17/2013  History of DVT (deep vein thrombosis) Left leg DVT. on coumadin  Hypertension   
 managed with meds  Long term current use of anticoagulant Coumadin  Oxygen dependent   
 at bedtime 2L-3L NC  
 Port catheter in place Left chest port  Sepsis ([address] 7/19/2016  
 SOB (shortness of breath) on exertion  Type 2 diabetes mellitus ([address] PRN oral agent/AVG BS: 170-200/ s.s of hypoglycemia at 80  Unspecified adverse effect of anesthesia [place] 28 yrs ago after second c -section-she was told her heart stopped d/t anesthesia-hospitalized for 5 days afterwards and followed by cardiologist  
 Vitamin B12 deficiency 11/13/2017 Past Surgical History:  
Procedure Laterality Date  COLONOSCOPY N/A 3/19/2018 COLONOSCOPY  BMI 23   performed by Minerva Ambrocio MD at [facility] X2  
 HX CYST REMOVAL    
 HX UROLOGICAL    
 stent placed  HX VASCULAR ACCESS  01/26/2012 Left chest port Family History Problem Relation Age of Onset  Heart Attack Mother  Seizures Mother  Alzheimer Father  Cancer Brother   
  doen not know details  No Known Problems Sister  No Known Problems Sister  No Known Problems Brother Social History Social History  Marital status:  Spouse name: N/A  
 Number of children: N/A  
 Years of education: N/A Occupational History  Not on file. Social History Main Topics  Smoking status: Former Smoker Packs/day: 1.00 Years: 8.00 Types: Cigarettes Quit date: 1/1/1978  Smokeless tobacco: Never Used Comment: quit 30 or more years ago  Alcohol use No  
 Drug use: No  
 Sexual activity: Not on file Other Topics Concern  Not on file Social History Narrative Current Facility-Administered Medications Medication Dose Route Frequency Provider Last Rate Last Dose  vancomycin (VANCOCIN) 1250 mg in  ml infusion  1,250 mg IntraVENous ONCE Veatrice Goodpasture,  mL/hr at 09/19/18 0803 1,250 mg at 09/19/18 8144  pantoprazole (PROTONIX) 80 mg in 0.9% sodium chloride 250 mL infusion  80 mg IntraVENous CONTINUOUS Sandy Mata DO 25 mL/hr at 09/19/18 0130 80 mg at 09/19/18 0130  cefTRIAXone (ROCEPHIN) 1 g in 0.9% sodium chloride (MBP/ADV) 50 mL  1 g IntraVENous Q24H Kalina Power  mL/hr at 09/19/18 0455 1 g at 09/19/18 0455  albuterol-ipratropium (DUO-NEB) 2.5 MG-0.5 MG/3 ML  3 mL Nebulization Q6H PRN Kalina Power MD      
 fentaNYL (DURAGESIC) 50 mcg/hr patch 1 Patch  1 Patch TransDERmal Q72H Kalina Power MD   1 Patch at 09/18/18 2246  
 insulin lispro (HUMALOG) injection   SubCUTAneous Baltazar Edmond MD   Stopped at 09/18/18 1821  ondansetron (ZOFRAN) injection 4 mg  4 mg IntraVENous Q6H PRN Kalina Power MD   4 mg at 09/18/18 0630  acetaminophen (TYLENOL) tablet 650 mg  650 mg Oral Q6H PRN Kalina Power MD   650 mg at 09/18/18 7909  promethazine (PHENERGAN) with saline injection 12.5 mg  12.5 mg IntraVENous Q4H PRN Amirah Liu MD   12.5 mg at 18 0810  
 0.9% sodium chloride infusion 250 mL  250 mL IntraVENous PRN Lázaro Marcelo MD      
 0.9% sodium chloride infusion 250 mL  250 mL IntraVENous PRN Lázaro Marcelo MD      
 influenza vaccine 2018- (6 mos+)(PF) (FLUARIX QUAD/FLULAVAL QUAD) injection 0.5 mL  0.5 mL IntraMUSCular PRIOR TO DISCHARGE Lázaro Marcelo MD      
 HYDROmorphone (PF) (DILAUDID) injection 1 mg  1 mg IntraVENous Q3H PRN Lázaro Marcelo MD   1 mg at 18 1816  
 sodium bicarbonate (8.4%) 150 mEq in dextrose 5% 1,000 mL infusion   IntraVENous CONTINUOUS Zelphikristine Ernst  mL/hr at 18 2200 OBJECTIVE: 
Patient Vitals for the past 8 hrs: 
 BP Temp Pulse Resp SpO2  
18 0751 157/60 98.9 °F (37.2 °C) 93 18 97 % 18 0450 160/62 98.5 °F (36.9 °C) 97 18 93 % Temp (24hrs), Av.8 °F (36.6 °C), Min:97.4 °F (36.3 °C), Max:98.9 °F (37.2 °C) 
 
 0701 -  1900 In: -  
Out: 200 [Urine:200] Physical Exam: 
Constitutional: Well developed, well nourished female in no acute distress, sitting comfortably in the hospital bed. HEENT: Normocephalic and atraumatic. Oropharynx is clear, mucous membranes are moist. Extraocular muscles are intact. Sclerae anicteric. Skin Warm and dry. No bruising and no rash noted. No erythema. No pallor. Respiratory Lungs are clear to auscultation bilaterally without wheezes, rales or rhonchi, normal air exchange without accessory muscle use. CVS Normal rate, regular rhythm and normal S1 and S2. No murmurs, gallops, or rubs. Abdomen Soft, nontender and distended, normoactive bowel sounds. Neuro Grossly nonfocal with no obvious sensory or motor deficits. MSK Normal range of motion in general.   
Psych Appropriate mood and affect. Labs:   
Recent Results (from the past 24 hour(s)) FFP, ALLOCATE  
 Collection Time: 09/18/18  9:45 AM  
Result Value Ref Range Unit number [id number] Blood component type FP 24h,Thaw Unit division A0 Status of unit TRANSFUSED INFLUENZA A & B AG (RAPID TEST) Collection Time: 09/18/18 11:09 AM  
Result Value Ref Range Influenza A Ag NEGATIVE  NEG Influenza B Ag NEGATIVE  NEG Source NASOPHARYNGEAL    
HGB & HCT Collection Time: 09/18/18  1:18 PM  
Result Value Ref Range HGB 9.2 (L) 11.7 - 15.4 g/dL HCT 29.2 (L) 35.8 - 46.3 % METABOLIC PANEL, COMPREHENSIVE Collection Time: 09/18/18  1:18 PM  
Result Value Ref Range Sodium 140 136 - 145 mmol/L Potassium 5.9 (H) 3.5 - 5.1 mmol/L Chloride 107 98 - 107 mmol/L  
 CO2 12 (L) 21 - 32 mmol/L Anion gap 21 (H) 7 - 16 mmol/L Glucose 221 (H) 65 - 100 mg/dL  (H) 8 - 23 MG/DL Creatinine 4.93 (H) 0.6 - 1.0 MG/DL  
 GFR est AA 11 (L) >60 ml/min/1.73m2 GFR est non-AA 9 (L) >60 ml/min/1.73m2 Calcium 6.8 (L) 8.3 - 10.4 MG/DL Bilirubin, total 1.1 0.2 - 1.1 MG/DL  
 ALT (SGPT) 68 (H) 12 - 65 U/L  
 AST (SGOT) 224 (H) 15 - 37 U/L Alk. phosphatase 240 (H) 50 - 136 U/L Protein, total 5.4 (L) 6.3 - 8.2 g/dL Albumin 2.1 (L) 3.2 - 4.6 g/dL Globulin 3.3 2.3 - 3.5 g/dL A-G Ratio 0.6 (L) 1.2 - 3.5 LACTIC ACID Collection Time: 09/18/18  1:18 PM  
Result Value Ref Range Lactic acid 9.9 (HH) 0.4 - 2.0 MMOL/L  
METABOLIC PANEL, BASIC Collection Time: 09/18/18  4:39 PM  
Result Value Ref Range Sodium 139 136 - 145 mmol/L Potassium 6.0 (H) 3.5 - 5.1 mmol/L Chloride 107 98 - 107 mmol/L  
 CO2 15 (L) 21 - 32 mmol/L Anion gap 17 (H) 7 - 16 mmol/L Glucose 278 (H) 65 - 100 mg/dL  (H) 8 - 23 MG/DL Creatinine 4.74 (H) 0.6 - 1.0 MG/DL  
 GFR est AA 12 (L) >60 ml/min/1.73m2 GFR est non-AA 10 (L) >60 ml/min/1.73m2 Calcium 6.5 (L) 8.3 - 10.4 MG/DL PROTHROMBIN TIME + INR  Collection Time: 09/18/18  4:40 PM  
 Result Value Ref Range Prothrombin time 30.2 (H) 11.5 - 14.5 sec INR 3.1 HGB & HCT Collection Time: 09/18/18  7:13 PM  
Result Value Ref Range HGB 9.4 (L) 11.7 - 15.4 g/dL HCT 28.6 (L) 35.8 - 46.3 % LACTIC ACID Collection Time: 09/18/18  7:13 PM  
Result Value Ref Range Lactic acid 5.0 (HH) 0.4 - 2.0 MMOL/L  
GLUCOSE, POC Collection Time: 09/18/18  8:45 PM  
Result Value Ref Range Glucose (POC) 316 (H) 65 - 100 mg/dL HGB & HCT Collection Time: 09/19/18  3:47 AM  
Result Value Ref Range HGB 9.2 (L) 11.7 - 15.4 g/dL HCT 27.3 (L) 35.8 - 46.3 % LACTIC ACID Collection Time: 09/19/18  3:47 AM  
Result Value Ref Range Lactic acid 1.6 0.4 - 2.0 MMOL/L  
PROTHROMBIN TIME + INR Collection Time: 09/19/18  3:47 AM  
Result Value Ref Range Prothrombin time 26.2 (H) 11.5 - 14.5 sec INR 2.5 METABOLIC PANEL, BASIC Collection Time: 09/19/18  3:47 AM  
Result Value Ref Range Sodium 139 136 - 145 mmol/L Potassium 4.8 3.5 - 5.1 mmol/L Chloride 104 98 - 107 mmol/L  
 CO2 23 21 - 32 mmol/L Anion gap 12 7 - 16 mmol/L Glucose 325 (H) 65 - 100 mg/dL  (H) 8 - 23 MG/DL Creatinine 4.55 (H) 0.6 - 1.0 MG/DL  
 GFR est AA 12 (L) >60 ml/min/1.73m2 GFR est non-AA 10 (L) >60 ml/min/1.73m2 Calcium 6.5 (L) 8.3 - 10.4 MG/DL  
GLUCOSE, POC Collection Time: 09/19/18  7:52 AM  
Result Value Ref Range Glucose (POC) 301 (H) 65 - 100 mg/dL HGB & HCT Collection Time: 09/19/18  9:00 AM  
Result Value Ref Range HGB 10.0 (L) 11.7 - 15.4 g/dL HCT 30.3 (L) 35.8 - 46.3 % LACTIC ACID Collection Time: 09/19/18  9:00 AM  
Result Value Ref Range Lactic acid 1.9 0.4 - 2.0 MMOL/L Imaging: 
[unfilled] ASSESSMENT: 
Problem List  Date Reviewed: 9/18/2018 Codes Class Noted * (Principal)Upper GI bleeding ICD-10-CM: K92.2 ICD-9-CM: 578.9  9/18/2018 Ascites ICD-10-CM: R18.8 ICD-9-CM: 789.59  9/18/2018 Supratherapeutic INR ICD-10-CM: R79.1 ICD-9-CM: 790.92  9/18/2018 Elevated lactic acid level ICD-10-CM: R79.89 ICD-9-CM: 276.2  9/18/2018 Breast cancer metastasized to liver [facility]) ICD-10-CM: C50.919, C78.7 ICD-9-CM: 174.9, 197.7  9/18/2018 Pancreatitis ICD-10-CM: K85.90 ICD-9-CM: 574.8  9/18/2018 Nausea & vomiting ICD-10-CM: R11.2 ICD-9-CM: 787.01  9/18/2018 Other cirrhosis of liver ([facility] ICD-10-CM: J71.44 ICD-9-CM: 571.5  7/31/2018 Other ascites ICD-10-CM: R18.8 ICD-9-CM: 789.59  7/31/2018 Acute-on-chronic kidney injury ([facility] ICD-10-CM: N17.9, N18.9 ICD-9-CM: 584.9, 585.9  7/31/2018 History of blood clots ICD-10-CM: Z86.718 ICD-9-CM: V12.51  2/16/2018 Type 2 diabetes mellitus with nephropathy ([facility] ICD-10-CM: E11.21 
ICD-9-CM: 250.40, 583.81  1/16/2018 Recurrent depression ([facility] ICD-10-CM: F33.9 ICD-9-CM: 296.30  1/16/2018 Vitamin B12 deficiency ICD-10-CM: E53.8 ICD-9-CM: 266.2  11/13/2017 Anemia of chronic disease ICD-10-CM: D63.8 ICD-9-CM: 285.29  11/13/2017 Iron deficiency anemia (Chronic) ICD-10-CM: D50.9 ICD-9-CM: 280.9  7/10/2017 Type 2 diabetes mellitus without complication (HCC) (Chronic) ICD-10-CM: E11.9 ICD-9-CM: 250.00  7/10/2017 Folliculitis KGL-69-KL: L73.9 ICD-9-CM: 704.8  7/10/2017 Hypernatremia ICD-10-CM: E87.0 ICD-9-CM: 276.0  5/30/2017 Anxiety ICD-10-CM: F41.9 ICD-9-CM: 300.00  5/28/2017 Acute on chronic respiratory failure (HCC) ICD-10-CM: J96.20 ICD-9-CM: 518.84  5/24/2017 Aortic stenosis, moderate (Chronic) ICD-10-CM: I35.0 ICD-9-CM: 424.1  5/24/2017 Anticoagulated on Coumadin (Chronic) ICD-10-CM: Z51.81, Z79.01 
ICD-9-CM: V58.83, V58.61  5/24/2017 Volume overload ICD-10-CM: E87.70 ICD-9-CM: 276.69  5/24/2017 Breast cancer ([facility] (Chronic) ICD-10-CM: C50.919 ICD-9-CM: 174.9  2/17/2017 Overview Addendum 2/27/2014 10:08 AM by Ambreen Shaw, SHIRLEY  
  Er+  pr + her-2 lawanda negative stage 4 Examination of the 3D tomographic PET images demonstrates marked hypermetabolism associated with both primary breast carcinomas. SUV max on the right is 20.5 and on the left 31.5. There is matted high right axillary  
lymphadenopathy as well as a more hypermetabolic low right axillary lymph node which measures 12 x 9 mm and has an SUV max of 15.4. Small bilateral pulmonary hilar metastases are also noted as well as extensive skeletal metastatic disease which includes both proximal femora, both scapulae, innumerable ribs, virtually every vertebra, and the bony pelvis. No displaced pathologic fracture is identified on the CT images. Ct scan 2-12 Innumerable diffuse osseous metastases. . Multiple diffuse tiny lung nodules, and right hilar lymphadenopathy, also suspicious for metastases. 3. Bilateral breast masses, compatible with known carcinoma. CANCER ANTIGEN 27.29 1076 Oncology Flowsheet Day, Cycle cyclophosphamide (CYTOXAN) IV  
1/27/2012 Day 1, Cycle 1 600 mg/m2 = 996 mg  
2/17/2012 Day 1, Cycle 2 600 mg/m2 = 996 mg  
3/9/2012 Day 1, Cycle 3 600 mg/m2 = 996 mg  
3/30/2012 Day 1, Cycle 4 500 mg/m2 = 830 mg  
4/20/2012 Day 1, Cycle 5 500 mg/m2 = 830 mg Oncology Flowsheet DOCEtaxel (TAXOTERE) IV  
1/27/2012 75 mg/m2 = 125 mg  
2/17/2012 75 mg/m2 = 125 mg  
3/9/2012 75 mg/m2 = 125 mg  
3/30/2012 60 mg/m2 = 100 mg  
4/20/2012 60 mg/m2 = 100 mg  
7-19-12 post 6 cycles of TC improved breast masses switch to [employer] Clear response on pet ct scan Mixed response to chemotherapy: There has been a significant interval response in the right breast and axilla with decrease in size of spiculated right breast mass and numerous right axillary lymph nodes. Left breast mass also appears to have responded chemotherapy though there may be persistent chest wall invasion. 2.  Interval evolution of widespread osseous metastatic disease with many lytic lesions now appearing more sclerotic. These are associated with continued FDG uptake which is difficult to differentiate from marrow reactivity given recent chemotherapy. At least one new osseous metastatic deposits is present in the posterior spinous process at L1. Focally intense uptake is also present at approximately T5 
10-19-12 pain in arms legs back continued improvement on ct scan Decreased size of the largest lung nodules and near complete resolution . of many. No new masses in the chest, abdomen, or pelvis. 2. Decreased size of right axillary and right hilar lymph nodes. 3. Diffuse osseous lesions again seen. 4. Diverticulosis. 5. Atherosclerotic vascular disease ca 15-3 down to 50 On femara and aredia will see if we can get affinitor to start at next visit continue therapy 12-19-12 new headache and dizziness for MRI will start affinitor 1-14-13 improved pain breast mass right breast smaller width 7 cm x 5 
6-05-13 femara and aredia now on affinitor x 5 months 7-13 tumor markers falling 8-20-13 post admission for pneumonia medications to restart breast mass smaller reduce affinitor to 7.5 mg  
10-18-13 femara and affinitor pain right ilium intermittent markers smaller breast mass smaller on right 12-5-13 Reduce Afinitor to 5 mg daily. Continue coumadin for DVT. Continue Femara and Aredia. Repeat US and skeletal survey prior to next visit. 1-6-14 Feeling better with reduced dose. Ultrasound of breasts show decrease in mass sizes. Skeletal survey stable. Follow up in 2 months. Bony metastasis ([address] (Chronic) ICD-10-CM: C79.51 
ICD-9-CM: 198.5  2/17/2017 Acute respiratory failure ([address] ICD-10-CM: J96.00 
ICD-9-CM: 518.81  2/17/2017 Metastatic breast cancer ([address] (Chronic) ICD-10-CM: D82.662 ICD-9-CM: 174.9  2/17/2017 CKD (chronic kidney disease) (Chronic) ICD-10-CM: N18.9 ICD-9-CM: 585.9  1/4/2017 Overview Signed 5/14/2013 11:35 AM by Nurys Gonzales RN With acute rise- ? Dehydration and monitor DVT (deep venous thrombosis) (HCC) (Chronic) ICD-10-CM: A64.430 ICD-9-CM: 453.40  1/4/2017 Overview Signed 11/19/2013  6:07 AM by Jonnathan Rivera 11-18-13 There is nonocclusive thrombus within the left common femoral vein, superficial femoral vein, popliteal vein, and posterior tibial vein Accelerated hypertension ICD-10-CM: I10 
ICD-9-CM: 401.0  1/4/2017 Pleural effusion, right (Chronic) ICD-10-CM: J90 ICD-9-CM: 511.9  1/4/2017 Overview Addendum 5/26/2017 10:11 AM by Randal Coley NP  
  10/6/16 R thoracentesis 800 ml: pleural fluid creatine 4.1 = likely urinothorax 5/25/2017: Thoracentesis on right- 950 cc of /serosanguinous/ fluid Hydronephrosis (Chronic) ICD-10-CM: N13.30 ICD-9-CM: 299  1/4/2017 Bilateral edema of lower extremity ICD-10-CM: R60.0 ICD-9-CM: 782.3  1/4/2017 Hematuria ICD-10-CM: R31.9 ICD-9-CM: 599.70  1/4/2017 Chemotherapy-induced neutropenia (HCC) ICD-10-CM: D70.1, T45.1X5A 
ICD-9-CM: 288.03, E933.1  12/20/2016 Pancytopenia ([facility] ICD-10-CM: H33.772 ICD-9-CM: 284.19  10/10/2016 HTN (hypertension) (Chronic) ICD-10-CM: I10 
ICD-9-CM: 401.9  10/7/2016 DM (diabetes mellitus) (HCC) (Chronic) ICD-10-CM: E11.9 ICD-9-CM: 250.00  10/7/2016 Acute on chronic kidney failure (HCC) ICD-10-CM: N17.9, N18.9 ICD-9-CM: 584.9, 585.9  10/7/2016 Pulmonary edema ICD-10-CM: J81.1 ICD-9-CM: 987  1/3/2016 Elevated troponin ICD-10-CM: R74.8 ICD-9-CM: 790.6  10/20/2015 PND (paroxysmal nocturnal dyspnea) ICD-10-CM: R06.00 
ICD-9-CM: 786.09  2/25/2014 Leg swelling ICD-10-CM: M79.89 ICD-9-CM: 729.81  2/25/2014 Overview Signed 2/25/2014  2:45 PM by Eyvonne Gottron, NP Bilateral 
 
  
  
   
 Heart failure with preserved ejection fraction (HCC) ICD-10-CM: I50.30 ICD-9-CM: 428.9  2/25/2014 Anemia ICD-10-CM: D64.9 ICD-9-CM: 285.9  11/19/2013 Overview Signed 11/19/2013  6:06 AM by Shi Garza 11-18-13 admitted with anemia and transfused Asthma (Chronic) ICD-10-CM: J45.909 ICD-9-CM: 493.90  10/22/2013 Overview Addendum 10/22/2013 10:55 AM by Nemesio Hector NP  
  8/2013: started on Symbicort 10/22/13: Added singulair Hypomagnesemia ICD-10-CM: N87.25 
ICD-9-CM: 275.2  8/4/2013 Hypokalemia ICD-10-CM: E87.6 ICD-9-CM: 276.8  8/4/2013 PLAN: 
Metastatic breast cancer to liver and bones 9/19 Most recently on fulvestrant and ibrance. We discussed that current therapies were failing and that she is not a candidate for chemotherapy due to her liver failure and kidney failure as well as performance status. After long discussion, patient is in agreement to comfort measures and hospice once discharged. All question were answered to best of our ability. Bacteremia 9/19 GPC-started on vanc. Rocephin per hospitalist. 
 
ESRD stage V 
9/19 nephrology consulted. Patient does not want dialysis. Liver cirrhosis with ascites + pleurex 9/19 nurse to drain prn History of DVT 
9/19 coumadin on hold DNR Hospice consulted Home once stable Supportive care Lab studies and imaging studies were personally reviewed. Pertinent old records were reviewed. Thank you for allowing us to participate in the care of Ms. Leggett. Anahi Musa NP [facility] Hematology & Oncology [id number] [address] Office : [phone] Fax : [phone] Attending Addendum: 
I have personally performed a face to face diagnostic evaluation on this patient.  I have reviewed and agree with the care plan as documented by  Anahi Musa N.MARCELLA.  My findings are as follows: Patient has metastatic breast cancer, CKD, as well as cirrhosis, appears fatigued and cachectic, heart rate regular without murmurs, abdomen is non-tender, bowel sounds are positive. Elderly lady well known to me from office for her metastatic breast cancer (on fulvestrant and palbociclib), h/o cirrhosis, CKD, ascites, DVT on coumadin, who is now admitted w UGIB in setting of supra therapeutic INR. Now on protonix drip, and INR being reversed. She also has GPC bacteremia for which she is on iv abx. CKD has clearly worsened and she does not want dialysis. Prolonged conversation w patient in presence of : given recent clinical decline, poor performance status as well as co morbidities, discussed idea of hospice care once recovered from acute processes. She appears agreeable to this. Will get pall care involved. We will follow along. Thank you for allowing us to participate in care of this pleasant patient. Please call w any questions. Jaylen Baker MD 
[facility] Hematology/Oncology [address] [address] Office : [phone] Fax : [phone]

## 2018-09-19 NOTE — PROGRESS NOTES
END OF SHIFT NOTE: 
 
Intake/Output 
09/18 1901 - 09/19 0700 In: 7984 [P.O.:500; I.V.:1131] Out: 300 [Urine:300] Voiding: YES Catheter: NO 
Drain:  
Pleural Catheter/Drain 08/12/18 Right; Anterior (Active) Stool:  0 occurrences. Stool Assessment Stool Appearance: Watery (dark with blood per pt) (09/18/18 1234) Emesis:  0 occurrences. VITAL SIGNS Patient Vitals for the past 12 hrs: 
 Temp Pulse Resp BP SpO2  
09/19/18 0450 98.5 °F (36.9 °C) 97 18 160/62 93 % 09/18/18 2320 98.3 °F (36.8 °C) 92 18 138/61 96 % 09/18/18 2014 98.1 °F (36.7 °C) 93 18 145/59 96 % Pain Assessment Pain 1 Pain Scale 1: Numeric (0 - 10) (09/19/18 0048) Pain Intensity 1: 0 (09/19/18 0048) Patient Stated Pain Goal: 0 (09/18/18 1851) Pain Reassessment 1: Yes (09/18/18 1851) Pain Onset 1:  (\"today\") (09/18/18 0048) Pain Location 1: Abdomen (09/18/18 1410) Pain Orientation 1: Anterior (09/18/18 1410) Pain Description 1: Aching (09/18/18 1410) Pain Intervention(s) 1: Refused (09/18/18 1410) Ambulating Yes Additional Information: Pt rested well this shift. No needs stated at this time. Shift report given to oncoming nurse at the bedside. Emma Lou

## 2018-09-19 NOTE — PROGRESS NOTES
IV Vit K received by pharmacy. After reviewing the [facility], there was a 0935 dose of Vit K due. Dr. Celina Carbajal notified of recent INR results. Per Md, do not administer Vit K at this time. No new orders.

## 2018-09-19 NOTE — PROGRESS NOTES
Pharmacokinetic Consult to Pharmacist 
 
Madaichocopipo Michelle is a 70 y.o. female with vancomycin added to rocephin for Bcx+ for GPC. Height: 5' 1\" (154.9 cm)  Weight: 53.6 kg (118 lb 1.6 oz) Lab Results Component Value Date/Time  (H) 09/19/2018 03:47 AM  
 Creatinine 4.55 (H) 09/19/2018 03:47 AM  
 WBC 13.5 (H) 09/18/2018 01:21 AM  
 Procalcitonin 0.6 07/08/2017 04:20 PM  
 Lactic acid 1.9 09/19/2018 09:00 AM  
 Lactic acid 0.8 01/03/2016 09:33 AM  
 Lactic Acid (POC) 3.0 (H) 07/08/2017 04:21 PM  
  
Estimated Creatinine Clearance: 8.8 mL/min (based on Cr of 4.55). Day 1 of vancomycin. 1.25g x 1 today. Pharmacy will dose around random levels currently 2/2 renal function. Will continue to follow patient. Thank you, Cheri Louis, Pharm. D. Clinical Pharmacist 
[phone]

## 2018-09-20 NOTE — PROGRESS NOTES
PLEASE NOTE; 
 
 
- Gerald Gaston - will be available from 1pm to 9:30pm 
 and also on call until 6:00am,  Thursday and Friday.

## 2018-09-20 NOTE — PROGRESS NOTES
Bedside shift report received from Liza Altman RN. Pt awake and resting quietly in bed, NAD noted, no needs expressed at present.

## 2018-09-20 NOTE — PROGRESS NOTES
END OF SHIFT NOTE: 
 
Intake/Output 
09/19 1901 - 09/20 0700 In: 552 [I.V.:552] Out: -   
Voiding: YES Catheter: NO 
Drain:  
Pleural Catheter/Drain 08/12/18 Right; Anterior (Active) Dressing Status Clean, dry, & intact 9/20/2018  1:22 AM  
Dressing Type Gauze;Transparent 9/20/2018  1:22 AM  
 
 
 
 
 
 
Stool:  1 occurrences. Stool Assessment Stool Color: Black (09/20/18 0500) Stool Appearance: Loose (09/19/18 1400) Stool Amount: Smear (09/20/18 0500) Stool Source/Status: Rectum (09/20/18 0500) Emesis:  0 occurrences. VITAL SIGNS Patient Vitals for the past 12 hrs: 
 Temp Pulse Resp BP SpO2  
09/20/18 0303 98.8 °F (37.1 °C) 88 16 137/61 95 % 09/20/18 0030 - - - - 95 % 09/19/18 2236 98 °F (36.7 °C) 89 17 141/66 99 % 09/19/18 2045 - 88 - - -  
09/19/18 1951 98.1 °F (36.7 °C) (!) 107 16 149/64 97 % Pain Assessment Pain 1 Pain Scale 1: Visual (09/20/18 0144) Pain Intensity 1: 0 (09/20/18 0144) Patient Stated Pain Goal: 0 (09/20/18 0122) Pain Reassessment 1: Patient sleeping (09/20/18 0144) Pain Onset 1: now (09/20/18 0122) Pain Location 1: Leg (09/20/18 0122) Pain Orientation 1: Right (09/20/18 0122) Pain Description 1: Aching (09/20/18 0122) Pain Intervention(s) 1: Medication (see MAR) (09/20/18 0122) Ambulating Yes Additional Information: PRN IV dilaudid given 1x during this shift for RLE pain. O2 removed per pt request - O2 sats remained in mid 90's. Pt rested well through the night. Uneventful night. Hospice to meet with pt and family today. Shift report given to oncoming nurse at the bedside. Syd Husbands

## 2018-09-20 NOTE — HOSPICE
Met with patient and family in room with , Nii Lowe. Discussed hospice philosophy, services, levels of care, management of symptoms. Family and patient on board with hospice philosophy. Case discussed with Dr. Samia Nieves and patient approved for [facility]. Will follow up with  for transport time and room number shortly. Thank you for this referral. 
Uriah Rios, RN Community Nurse Liaison [facility] [phone]

## 2018-09-20 NOTE — PROGRESS NOTES
Hospitalist Progress Note 2018 Admit Date: 2018 12:45 AM  
NAME: Williams Martinez :  1950 MRN:  [id number] Attending: Jorge Alberto Jaime MD 
PCP:  Reynaldo Bowie MD 
 
SUBJECTIVE:  
Mrs. Mateo Beckford is a 69 yo F with metastatic breast cancer to the liver with ascites and pleur-x catheter in place and stage 5 CKD, and HTN, who was admitted  for hematemesis and supratherapeutic INR. She had vitamin K, FFP, and 2 units pRBC. No further vomiting and hemoglobin has remained stable. She and her family have decided to pursue hospice and hospice meeting is set for tomorrow. She complains of back pain that is controlled with pain meds and abdominal discomfort from the swelling. Blood pressure is noted to be elevating. Per her RN, she is due for ascites drainage tomorrow. GPC in blood stream (likely contaminant). Seen with assistance of Adrienne Kaye, . - seen with assistance of Adrienne Kaye, . Abdomen feels much better after being drained. Denies further vomiting. Complaining of needing to urinate. Hospice evaluation done today and she has been accepted to [facility]. Review of Systems negative with exception of pertinent positives noted above PHYSICAL EXAM  
 
Visit Vitals  /56  Pulse 81  Temp 98.6 °F (37 °C)  Resp 18  Ht 5' 1\" (1.549 m)  Wt 57.1 kg (125 lb 12.8 oz) Comment: 125.8lbs  LMP 1998  SpO2 97%  Breastfeeding No  
 BMI 23.77 kg/m2 Temp (24hrs), Av.3 °F (36.8 °C), Min:97.9 °F (36.6 °C), Max:98.8 °F (37.1 °C) Patient Vitals for the past 24 hrs: 
 Temp Pulse Resp BP SpO2  
18 1030 98.6 °F (37 °C) 81 18 137/56 97 % 18 0807 98.2 °F (36.8 °C) 92 16 156/64 96 %  
18 0303 98.8 °F (37.1 °C) 88 16 137/61 95 % 18 0030 - - - - 95 % 186 98 °F (36.7 °C) 89 17 141/66 99 % 18 -  - - -  
18 98.1 °F (36.7 °C) (!) 107 16 149/64 97 % 09/19/18 1505 97.9 °F (36.6 °C) 97 17 180/67 97 % Oxygen Therapy O2 Sat (%): 97 % (09/20/18 1030) Pulse via Oximetry: 110 beats per minute (09/18/18 0806) O2 Device: Room air (09/20/18 7027) Intake/Output Summary (Last 24 hours) at 09/20/18 1502 Last data filed at 09/20/18 1330 Gross per 24 hour Intake             1792 ml Output                0 ml Net             1792 ml General: No acute distress, chronically ill appearing  
Lungs:  CTA Bilaterally. Heart:  Regular rate and rhythm,  No murmur, rub, or gallop Abdomen: Abdomen less tense Extremities: No cyanosis, clubbing or edema Neurologic:  No focal deficits Recent Results (from the past 24 hour(s)) GLUCOSE, POC Collection Time: 09/19/18  4:18 PM  
Result Value Ref Range Glucose (POC) 207 (H) 65 - 100 mg/dL PROTHROMBIN TIME + INR Collection Time: 09/19/18  5:31 PM  
Result Value Ref Range Prothrombin time 22.1 (H) 11.5 - 14.5 sec INR 2.0 GLUCOSE, POC Collection Time: 09/19/18  9:05 PM  
Result Value Ref Range Glucose (POC) 262 (H) 65 - 100 mg/dL PROTHROMBIN TIME + INR Collection Time: 09/20/18  4:32 AM  
Result Value Ref Range Prothrombin time 19.6 (H) 11.5 - 14.5 sec INR 1.8 METABOLIC PANEL, BASIC Collection Time: 09/20/18  4:32 AM  
Result Value Ref Range Sodium 138 136 - 145 mmol/L Potassium 4.7 3.5 - 5.1 mmol/L Chloride 104 98 - 107 mmol/L  
 CO2 23 21 - 32 mmol/L Anion gap 11 7 - 16 mmol/L Glucose 133 (H) 65 - 100 mg/dL  (H) 8 - 23 MG/DL Creatinine 4.69 (H) 0.6 - 1.0 MG/DL  
 GFR est AA 12 (L) >60 ml/min/1.73m2 GFR est non-AA 10 (L) >60 ml/min/1.73m2 Calcium 6.0 (L) 8.3 - 10.4 MG/DL Carletha Bjornstad Collection Time: 09/20/18  4:32 AM  
Result Value Ref Range Vancomycin, random 17.6 UG/ML  
HGB & HCT Collection Time: 09/20/18  4:32 AM  
Result Value Ref Range HGB 9.4 (L) 11.7 - 15.4 g/dL HCT 28.5 (L) 35.8 - 46.3 % GLUCOSE, POC Collection Time: 09/20/18  8:12 AM  
Result Value Ref Range Glucose (POC) 202 (H) 65 - 100 mg/dL PROTHROMBIN TIME + INR Collection Time: 09/20/18  8:55 AM  
Result Value Ref Range Prothrombin time 19.3 (H) 11.5 - 14.5 sec INR 1.7 GLUCOSE, POC Collection Time: 09/20/18 12:07 PM  
Result Value Ref Range Glucose (POC) 203 (H) 65 - 100 mg/dL Imaging: XR ABD ACUTE W 1 V CHEST Final Result IMPRESSION:  
  
1. Suggestion of ascites. Nonobstructive bowel gas pattern. 2. Chronic changes in the right lung. No evidence of acute pulmonary disease. ASSESSMENT Hospital Problems as of 9/20/2018  Date Reviewed: 9/18/2018 Codes Class Noted - Resolved POA Acute blood loss anemia ICD-10-CM: D62 
ICD-9-CM: 285.1  9/19/2018 - Present Yes * (Principal)Upper GI bleeding ICD-10-CM: K92.2 ICD-9-CM: 578.9  9/18/2018 - Present Unknown Ascites ICD-10-CM: R18.8 ICD-9-CM: 789.59  9/18/2018 - Present Unknown Supratherapeutic INR ICD-10-CM: R79.1 ICD-9-CM: 790.92  9/18/2018 - Present Unknown Elevated lactic acid level ICD-10-CM: R79.89 ICD-9-CM: 276.2  9/18/2018 - Present Unknown Breast cancer metastasized to liver [facility]) ICD-10-CM: C50.919, C78.7 ICD-9-CM: 174.9, 197.7  9/18/2018 - Present Unknown Pancreatitis ICD-10-CM: K85.90 ICD-9-CM: 280.8  9/18/2018 - Present Unknown Nausea & vomiting ICD-10-CM: R11.2 ICD-9-CM: 787.01  9/18/2018 - Present Unknown Anticoagulated on Coumadin (Chronic) ICD-10-CM: Z51.81, Z79.01 
ICD-9-CM: V58.83, V58.61  5/24/2017 - Present Yes Bony metastasis ([address] (Chronic) ICD-10-CM: C79.51 
ICD-9-CM: 198.5  2/17/2017 - Present Yes DVT (deep venous thrombosis) (HCC) (Chronic) ICD-10-CM: I75.450 ICD-9-CM: 453.40  1/4/2017 - Present Yes Overview Signed 11/19/2013  6:07 AM by Berkley Wills 11-18-13 There is nonocclusive thrombus within the left common femoral vein, superficial femoral vein, popliteal vein, and posterior tibial vein Accelerated hypertension ICD-10-CM: I10 
ICD-9-CM: 401.0  1/4/2017 - Present Yes DM (diabetes mellitus) (HCC) (Chronic) ICD-10-CM: E11.9 ICD-9-CM: 250.00  10/7/2016 - Present Yes Acute on chronic kidney failure (HCC) ICD-10-CM: N17.9, N18.9 ICD-9-CM: 584.9, 585.9  10/7/2016 - Present Yes Anemia ICD-10-CM: D64.9 ICD-9-CM: 285.9  11/19/2013 - Present Yes Overview Signed 11/19/2013  6:06 AM by Pj Pena 11-18-13 admitted with anemia and transfused Plan: · Upper GI bleed/hematemesis- resolved. · Change protonix to PO. 
· No further anticoagulation. · HTN- controlled with amlodipine and carvedilol. · Stage 4 breast cancer with malignant ascites- will drain ascites tomorrow. · Continue pain control. · Coag neg Staph in blood culture- stop vanc. Likely a contaminant. Dispo- hospice house tomorrow. DVT Prophylaxis: Compression socks Signed By: Celso Garcia MD   
 September 20, 2018

## 2018-09-20 NOTE — PROGRESS NOTES
Bedside shift change report given to  (oncoming nurse) by Munir Alford RN (offgoing nurse). Report included the following information SBAR, Kardex and MAR. OAH met with pt and family today;  present. Pt to go to hospice tomorrow. 2 L ascites drained from pleurex catheter. Resting quietly, no needs at present.

## 2018-09-20 NOTE — PROGRESS NOTES
HEMANTH spoke with Freddie Glover from [id number] [place], [place] [address]nichol Hernandez. She stated that another rep Patricio will be calling the  and setting up a meeting with the family. HEMANTH will continue to monitor and follow up with discharge planning.

## 2018-09-21 NOTE — PROGRESS NOTES
Presents via EMS from [facility] for [facility] admission to Room 112. Dx: Metastatic breast CA. She is awake, alert and able to answer questions if [place] interprettation is available. She denies pain on arrival but we are aware she was treated with dilaudid just prior to transport, as provided in report from Janis Bradshaw. She has a son who arrives approximately 30 minutes after her and he provides PMH and interpretting services. Assessment, ESAS, xi and fall risk completed at this time. Positioned for comfort. All safety/fall precautions continued as per previous and assessment completed. Peripheral pulses +  No mottling or discoloration of extremities. She anticipates and assists in these activities. Port patent. 1445- actively vomiting clear, greenish fluid, < 30 ml. Medicated as per verbal order Mahesh Oh NP.  NP present for assessment, evaluation. Pt positioned supine at her request.     1510- Denies nausea. Son is aware we have fluids, popsicles, etc available if she requests some. She declines at this time. She denies pain, SOB. She rests, eye closed, unless stimulated. Family exits. 1630- declines PO intake. She is awake, nods yes/no to most questions. She smiles appropriately. She denies pain. 1730- anxious, yelling out. She denies pain. She is assisted to commode per CNA and tolerates well. She denies pain at this time. 1810- Taking soup with vigor. She has no difficulty swallowing. She is encouraged to slow and take small amounts but she continues to eat with considerable enthusiasm. I verify that a minimum of hourly rounds have been provided for this patient during this shift.   Venous access is flushed after each use as per orders

## 2018-09-21 NOTE — PROGRESS NOTES
Notified pt's son, Skyler Gonzales, of anticipated d/c today to [facility] at [phone]. Juancarlos to call pt's spouse. Pt also notified of anticipated d/c to [facility] via [employer] phone. Pt verbalized understanding to .

## 2018-09-21 NOTE — PROGRESS NOTES
[employer] [facility] Inpatient Hematology / Oncology Progress Note Admission Date: 2018 12:45 AM 
Reason for Admission/Hospital Course: Upper GI bleeding Supratherapeutic INR Breast cancer metastasized to liver ([facility] Ascites Elevated lactic acid level Upper GI bleeding Supratherapeutic INR Breast cancer metastasized to liver ([facility] Ascites Elevated lactic acid level Nausea & vomiting 24 Hour Events: [address] today No pain 10 point review of systems is otherwise negative with the exception of the elements mentioned above in the HPI. No Known Allergies OBJECTIVE: 
Patient Vitals for the past 8 hrs: 
 BP Temp Pulse Resp SpO2  
18 1123 117/45 98.2 °F (36.8 °C) 69 18 96 %  
18 0743 126/54 98.6 °F (37 °C) - 18 96 % Temp (24hrs), Av.4 °F (36.9 °C), Min:98.1 °F (36.7 °C), Max:98.8 °F (37.1 °C) 
 
 0701 -  1900 In: 440 [P.O.:440] Out: 100 [Urine:100] Physical Exam: 
Constitutional: Well developed, well nourished female in no acute distress, sitting comfortably in the hospital bed. HEENT: Normocephalic and atraumatic. Oropharynx is clear, mucous membranes are moist.  
Skin Warm and dry. No bruising and no rash noted. No erythema. No pallor. Respiratory Lungs are clear to auscultation bilaterally without wheezes, rales or rhonchi, normal air exchange without accessory muscle use. CVS Normal rate, regular rhythm and normal S1 and S2. No murmurs, gallops, or rubs. Abdomen Soft, nontender and nondistended, normoactive bowel sounds. Neuro Grossly nonfocal with no obvious sensory or motor deficits. MSK Normal range of motion in general.  No edema and no tenderness. Psych Appropriate mood and affect. Labs: 
  Recent Labs  
   18 
 0432  18 
 1259  18 
 0900 HGB  9.4*  9.6*  10.0* HCT  28.5*  28.8*  30.3* Recent Labs  
   18 
 0432  18 
 0347  18 
 1630 NA  138  139  139  
K  4.7  4.8  6.0*  
CL  104  104  107 CO2  23  23  15* AGAP  11  12  17* GLU  133*  325*  278* BUN  113*  113*  106* CREA  4.69*  4.55*  4.74* GFRAA  12*  12*  12* GFRNA  10*  10*  10* CA  6.0*  6.5*  6.5* Imaging: 
 
 
ASSESSMENT: 
 
Problem List  Date Reviewed: 9/21/2018 Codes Class Noted Acute blood loss anemia ICD-10-CM: D62 
ICD-9-CM: 285.1  9/19/2018 * (Principal)Upper GI bleeding ICD-10-CM: K92.2 ICD-9-CM: 578.9  9/18/2018 Ascites ICD-10-CM: R18.8 ICD-9-CM: 789.59  9/18/2018 Supratherapeutic INR ICD-10-CM: R79.1 ICD-9-CM: 790.92  9/18/2018 Elevated lactic acid level ICD-10-CM: R79.89 ICD-9-CM: 276.2  9/18/2018 Breast cancer metastasized to liver [facility]) ICD-10-CM: C50.919, C78.7 ICD-9-CM: 174.9, 197.7  9/18/2018 Pancreatitis ICD-10-CM: K85.90 ICD-9-CM: 091.6  9/18/2018 Nausea & vomiting ICD-10-CM: R11.2 ICD-9-CM: 787.01  9/18/2018 Other cirrhosis of liver ([facility] ICD-10-CM: Y40.93 ICD-9-CM: 571.5  7/31/2018 Other ascites ICD-10-CM: R18.8 ICD-9-CM: 789.59  7/31/2018 Acute-on-chronic kidney injury ([facility] ICD-10-CM: N17.9, N18.9 ICD-9-CM: 584.9, 585.9  7/31/2018 History of blood clots ICD-10-CM: Z86.718 ICD-9-CM: V12.51  2/16/2018 Type 2 diabetes mellitus with nephropathy ([place] [address].) ICD-10-CM: E11.21 
ICD-9-CM: 250.40, 583.81  1/16/2018 Recurrent depression ([place] [address].) ICD-10-CM: F33.9 ICD-9-CM: 296.30  1/16/2018 Vitamin B12 deficiency ICD-10-CM: E53.8 ICD-9-CM: 266.2  11/13/2017 Anemia of chronic disease ICD-10-CM: D63.8 ICD-9-CM: 285.29  11/13/2017 Iron deficiency anemia (Chronic) ICD-10-CM: D50.9 ICD-9-CM: 280.9  7/10/2017 Type 2 diabetes mellitus without complication (HCC) (Chronic) ICD-10-CM: E11.9 ICD-9-CM: 250.00  7/10/2017 Folliculitis MPU-35-QH: L73.9 ICD-9-CM: 704.8  7/10/2017 Hypernatremia ICD-10-CM: E87.0 ICD-9-CM: 276.0  5/30/2017 Anxiety ICD-10-CM: F41.9 ICD-9-CM: 300.00  5/28/2017 Acute on chronic respiratory failure (HCC) ICD-10-CM: J96.20 ICD-9-CM: 518.84  5/24/2017 Aortic stenosis, moderate (Chronic) ICD-10-CM: I35.0 ICD-9-CM: 424.1  5/24/2017 Anticoagulated on Coumadin (Chronic) ICD-10-CM: Z51.81, Z79.01 
ICD-9-CM: V58.83, V58.61  5/24/2017 Volume overload ICD-10-CM: E87.70 ICD-9-CM: 276.69  5/24/2017 Breast cancer ([facility] (Chronic) ICD-10-CM: C50.919 ICD-9-CM: 174.9  2/17/2017 Overview Addendum 2/27/2014 10:08 AM by Larrie Bosworth, NP  
  Er+  pr + her-2 lawanda negative stage 4 Examination of the 3D tomographic PET images demonstrates marked hypermetabolism associated with both primary breast carcinomas. SUV max on the right is 20.5 and on the left 31.5. There is matted high right axillary  
lymphadenopathy as well as a more hypermetabolic low right axillary lymph node which measures 12 x 9 mm and has an SUV max of 15.4. Small bilateral pulmonary hilar metastases are also noted as well as extensive skeletal metastatic disease which includes both proximal femora, both scapulae, innumerable ribs, virtually every vertebra, and the bony pelvis. No displaced pathologic fracture is identified on the CT images. Ct scan 2-12 Innumerable diffuse osseous metastases. . Multiple diffuse tiny lung nodules, and right hilar lymphadenopathy, also suspicious for metastases. 3. Bilateral breast masses, compatible with known carcinoma. CANCER ANTIGEN 27.29 1076 Oncology Flowsheet Day, Cycle cyclophosphamide (CYTOXAN) IV  
1/27/2012 Day 1, Cycle 1 600 mg/m2 = 996 mg  
2/17/2012 Day 1, Cycle 2 600 mg/m2 = 996 mg  
3/9/2012 Day 1, Cycle 3 600 mg/m2 = 996 mg  
3/30/2012 Day 1, Cycle 4 500 mg/m2 = 830 mg  
4/20/2012 Day 1, Cycle 5 500 mg/m2 = 830 mg Oncology Flowsheet DOCEtaxel (TAXOTERE) IV  
1/27/2012 75 mg/m2 = 125 mg  
2/17/2012 75 mg/m2 = 125 mg  
 3/9/2012 75 mg/m2 = 125 mg  
3/30/2012 60 mg/m2 = 100 mg  
4/20/2012 60 mg/m2 = 100 mg  
7-19-12 post 6 cycles of TC improved breast masses switch to [employer] Clear response on pet ct scan Mixed response to chemotherapy: There has been a significant interval response in the right breast and axilla with decrease in size of spiculated right breast mass and numerous right axillary lymph nodes. Left breast mass also appears to have responded chemotherapy though there may be persistent chest wall invasion. 2. Interval evolution of widespread osseous metastatic disease with many lytic lesions now appearing more sclerotic. These are associated with continued FDG uptake which is difficult to differentiate from marrow reactivity given recent chemotherapy. At least one new osseous metastatic deposits is present in the posterior spinous process at L1. Focally intense uptake is also present at approximately T5 
10-19-12 pain in arms legs back continued improvement on ct scan Decreased size of the largest lung nodules and near complete resolution . of many. No new masses in the chest, abdomen, or pelvis. 2. Decreased size of right axillary and right hilar lymph nodes. 3. Diffuse osseous lesions again seen. 4. Diverticulosis. 5. Atherosclerotic vascular disease ca 15-3 down to 50 On femara and aredia will see if we can get affinitor to start at next visit continue therapy 12-19-12 new headache and dizziness for MRI will start affinitor 1-14-13 improved pain breast mass right breast smaller width 7 cm x 5 
6-05-13 femara and aredia now on affinitor x 5 months 7-13 tumor markers falling 8-20-13 post admission for pneumonia medications to restart breast mass smaller reduce affinitor to 7.5 mg  
10-18-13 femara and affinitor pain right ilium intermittent markers smaller breast mass smaller on right 12-5-13 Reduce Afinitor to 5 mg daily. Continue coumadin for DVT.  Continue Femara and Aredia. Repeat US and skeletal survey prior to next visit. 1-6-14 Feeling better with reduced dose. Ultrasound of breasts show decrease in mass sizes. Skeletal survey stable. Follow up in 2 months. Bony metastasis ([facility] (Chronic) ICD-10-CM: C79.51 
ICD-9-CM: 198.5  2/17/2017 Acute respiratory failure ([facility] ICD-10-CM: J96.00 
ICD-9-CM: 518.81  2/17/2017 Metastatic breast cancer ([facility] (Chronic) ICD-10-CM: K43.693 ICD-9-CM: 174.9  2/17/2017 CKD (chronic kidney disease) (Chronic) ICD-10-CM: N18.9 ICD-9-CM: 585.9  1/4/2017 Overview Signed 5/14/2013 11:35 AM by Yojana Andre RN With acute rise- ? Dehydration and monitor DVT (deep venous thrombosis) (HCC) (Chronic) ICD-10-CM: E66.378 ICD-9-CM: 453.40  1/4/2017 Overview Signed 11/19/2013  6:07 AM by Shi Garza 11-18-13 There is nonocclusive thrombus within the left common femoral vein, superficial femoral vein, popliteal vein, and posterior tibial vein Accelerated hypertension ICD-10-CM: I10 
ICD-9-CM: 401.0  1/4/2017 Pleural effusion, right (Chronic) ICD-10-CM: J90 ICD-9-CM: 511.9  1/4/2017 Overview Addendum 5/26/2017 10:11 AM by Tian Adler NP  
  10/6/16 R thoracentesis 800 ml: pleural fluid creatine 4.1 = likely urinothorax 5/25/2017: Thoracentesis on right- 950 cc of /serosanguinous/ fluid Hydronephrosis (Chronic) ICD-10-CM: N13.30 ICD-9-CM: 911  1/4/2017 Bilateral edema of lower extremity ICD-10-CM: R60.0 ICD-9-CM: 782.3  1/4/2017 Hematuria ICD-10-CM: R31.9 ICD-9-CM: 599.70  1/4/2017 Chemotherapy-induced neutropenia (HCC) ICD-10-CM: D70.1, T45.1X5A 
ICD-9-CM: 288.03, E933.1  12/20/2016 Pancytopenia ([facility] ICD-10-CM: L38.968 ICD-9-CM: 284.19  10/10/2016 HTN (hypertension) (Chronic) ICD-10-CM: I10 
ICD-9-CM: 401.9  10/7/2016 DM (diabetes mellitus) (HCC) (Chronic) ICD-10-CM: E11.9 ICD-9-CM: 250.00  10/7/2016 Acute on chronic kidney failure (HCC) ICD-10-CM: N17.9, N18.9 ICD-9-CM: 584.9, 585.9  10/7/2016 Pulmonary edema ICD-10-CM: J81.1 ICD-9-CM: 278  1/3/2016 Elevated troponin ICD-10-CM: R74.8 ICD-9-CM: 790.6  10/20/2015 PND (paroxysmal nocturnal dyspnea) ICD-10-CM: R06.00 
ICD-9-CM: 786.09  2/25/2014 Leg swelling ICD-10-CM: M79.89 ICD-9-CM: 729.81  2/25/2014 Overview Signed 2/25/2014  2:45 PM by Beckie Bo NP Bilateral 
 
  
  
   
 Heart failure with preserved ejection fraction (HCC) ICD-10-CM: I50.30 ICD-9-CM: 428.9  2/25/2014 Anemia ICD-10-CM: D64.9 ICD-9-CM: 285.9  11/19/2013 Overview Signed 11/19/2013  6:06 AM by Crystal Yost 11-18-13 admitted with anemia and transfused Asthma (Chronic) ICD-10-CM: J45.909 ICD-9-CM: 493.90  10/22/2013 Overview Addendum 10/22/2013 10:55 AM by Milla Bearden NP  
  8/2013: started on Symbicort 10/22/13: Added singulair Hypomagnesemia ICD-10-CM: O20.69 
ICD-9-CM: 275.2  8/4/2013 Hypokalemia ICD-10-CM: E87.6 ICD-9-CM: 276.8  8/4/2013 PLAN: 
 
Metastatic breast cancer to liver and bones 9/19 Most recently on fulvestrant and ibrance. We discussed that current therapies were failing and that she is not a candidate for chemotherapy due to her liver failure and kidney failure as well as performance status. After long discussion, patient is in agreement to comfort measures and hospice once discharged. All question were answered to best of our ability.  
  
Bacteremia 9/19 GPC-started on vanc. Rocephin per hospitalist. 
  
ESRD stage V 
9/19 nephrology consulted. Patient does not want dialysis.  
  
Liver cirrhosis with ascites + pleurex 9/19 nurse to drain prn 
  
History of DVT 
9/19 coumadin on hold 
  
DNR Hospice consulted Home once stable Supportive care 9/21 Patient being discharged to [facility] today.  We will remain available to patient and family for any needs. Naomie Schulte, [facility] [address] [address] Office : [phone] Fax : [phone] Attending Addendum: 
I have personally performed a face to face diagnostic evaluation on this patient. I have reviewed and agree with the care plan as documented by  Naomie Schulte, N.P.  My findings are as follows: Patient has met breast cancer, appears comfortable, heart rate regular without murmurs, abdomen is non-tender, bowel sounds are positive, she has decided to pursue hospice care. Pall care following. This is very reasonable. MD Elayne Go Hematology/Oncology [id number] [place] [id number] [facility] [address] Office : [phone] Fax : [phone]

## 2018-09-21 NOTE — DISCHARGE SUMMARY
Hospitalist Discharge Summary Patient ID: 
Anant Suarez [id number] 
70 y.o. 
1950 Admit date: 9/18/2018 12:45 AM 
Discharge date and time: 9/21/2018 Attending: Georges Peng MD 
PCP:  Ambika Garvin MD 
Treatment Team: Attending Provider: Georges Peng MD; Consulting Provider: Chary Grajeda MD; Utilization Review: Woo Roth RN 
 
Principal Diagnosis Upper GI bleeding Hospital Problems as of 9/21/2018  Date Reviewed: 9/18/2018 Codes Class Noted - Resolved POA Acute blood loss anemia ICD-10-CM: D62 
ICD-9-CM: 285.1  9/19/2018 - Present Yes * (Principal)Upper GI bleeding ICD-10-CM: K92.2 ICD-9-CM: 578.9  9/18/2018 - Present Unknown Ascites ICD-10-CM: R18.8 ICD-9-CM: 789.59  9/18/2018 - Present Unknown Supratherapeutic INR ICD-10-CM: R79.1 ICD-9-CM: 790.92  9/18/2018 - Present Unknown Elevated lactic acid level ICD-10-CM: R79.89 ICD-9-CM: 276.2  9/18/2018 - Present Unknown Breast cancer metastasized to liver [facility]) ICD-10-CM: C50.919, C78.7 ICD-9-CM: 174.9, 197.7  9/18/2018 - Present Unknown Pancreatitis ICD-10-CM: K85.90 ICD-9-CM: 841.7  9/18/2018 - Present Unknown Nausea & vomiting ICD-10-CM: R11.2 ICD-9-CM: 787.01  9/18/2018 - Present Unknown Anticoagulated on Coumadin (Chronic) ICD-10-CM: Z51.81, Z79.01 
ICD-9-CM: V58.83, V58.61  5/24/2017 - Present Yes Bony metastasis ([address] (Chronic) ICD-10-CM: C79.51 
ICD-9-CM: 198.5  2/17/2017 - Present Yes DVT (deep venous thrombosis) (HCC) (Chronic) ICD-10-CM: V60.557 ICD-9-CM: 453.40  1/4/2017 - Present Yes Overview Signed 11/19/2013  6:07 AM by Esdras Hou 11-18-13 There is nonocclusive thrombus within the left common femoral vein, superficial femoral vein, popliteal vein, and posterior tibial vein Accelerated hypertension ICD-10-CM: I10 
ICD-9-CM: 401.0  1/4/2017 - Present Yes DM (diabetes mellitus) (HCC) (Chronic) ICD-10-CM: E11.9 ICD-9-CM: 250.00  10/7/2016 - Present Yes Acute on chronic kidney failure (HCC) ICD-10-CM: N17.9, N18.9 ICD-9-CM: 584.9, 585.9  10/7/2016 - Present Yes Anemia ICD-10-CM: D64.9 ICD-9-CM: 285.9  11/19/2013 - Present Yes Overview Signed 11/19/2013  6:06 AM by Shaun Todd 11-18-13 admitted with anemia and transfused HPI:  'Tory Lesch is a 70 y.o. female who has a PMH of metastatic breast cancer to liver and bones + chemotherapy, liver cirrhosis with ascites on pleurex cath with palliative paracentesis twice/week, HTN, prior left leg DVT on chronic coumadin since 2013, CKD stage V, who came after she experienced 8 episodes of coffee ground emesis and 2 episodes of dark-tarry stools yesterday. She stated this is the first time of the event. In addition, she has had dizziness, weakness and abdominal pain, 6/10 in intensity, dull like, diffuse that resolved already. On 9/17/18 she had 2 liters of ascites removed from her pleurex. Denies fever, chills, chest pain, sob. Upon ER arrival VS /58  HR 97  T97  02: 98%. She was noted in no distress, able to speak in full sentences, she seemed pale.  
  
ED physician started her on 80 mg ivp of protonix and then gtt. Labs: positive FOBT; CBC: hb 8.6 ( dropped 1 gr from 1 week ago ); wbc 13k, chemistry: K 5.8, glucose 274; lipase 615; cr 4.5 ( baseline ); lactic acid 5.5; INR 4.9; KUB: ascites; CXR: negative. 10 mg ivp danielle K given for coumadin reversal.  
ED physician contacted GI on call and they will see patient in am.' Hospital Course: 
Hematemesis/melena- she was admitted and given 2 units pRBC, FFP, and vitamin K. Her hematemesis stopped. GI consulted and observed. Due to her metastatic breast cancer and ascites, stage 5 CKD, along with health decline, hospice was consulted and accepted her to [facility]. Significant Diagnostic Studies:  
 
 
Labs: Results:  
   
Chemistry Recent Labs  
   09/20/18 
 8845  09/19/18 
 0347  09/18/18 
 1639 GLU  133*  325*  278* NA  138  139  139  
K  4.7  4.8  6.0*  
CL  104  104  107 CO2  23  23  15* BUN  113*  113*  106* CREA  4.69*  4.55*  4.74* CA  6.0*  6.5*  6.5* AGAP  11  12  17* CBC w/Diff Recent Labs  
   09/20/18 
 0432  09/19/18 
 1259  09/19/18 
 0900 HGB  9.4*  9.6*  10.0* HCT  28.5*  28.8*  30.3* Cardiac Enzymes No results for input(s): CPK, CKND1, NASREEN in the last 72 hours. No lab exists for component: Riley Grooms Coagulation Recent Labs  
   09/20/18 
 0855  09/20/18 
 0432 PTP  19.3*  19.6* INR  1.7  1.8 Lipid Panel Lab Results Component Value Date/Time Cholesterol, total 386 (H) 05/04/2015 10:01 AM  
 HDL Cholesterol 41 05/04/2015 10:01 AM  
 LDL,Direct 255 (H) 05/04/2015 10:01 AM  
 LDL, calculated Not calculated due to elevated triglyceride level 05/04/2015 10:01 AM  
 VLDL, calculated 100 (H) 05/04/2015 10:01 AM  
 Triglyceride 500 (H) 05/04/2015 10:01 AM  
 CHOL/HDL Ratio 9.4 05/04/2015 10:01 AM  
  
BNP No results for input(s): BNPP in the last 72 hours. Liver Enzymes No results for input(s): TP, ALB, TBIL, AP, SGOT, GPT in the last 72 hours. No lab exists for component: DBIL Thyroid Studies Lab Results Component Value Date/Time TSH 4.660 (H) 09/18/2017 11:00 AM  
    
 
Imaging: XR ABD ACUTE W 1 V CHEST Final Result IMPRESSION:  
  
1. Suggestion of ascites. Nonobstructive bowel gas pattern. 2. Chronic changes in the right lung. No evidence of acute pulmonary disease. Discharge Exam: 
Visit Vitals  /45  Pulse 69  Temp 98.2 °F (36.8 °C)  Resp 18  Ht 5' 1\" (1.549 m)  Wt 63.5 kg (139 lb 15.9 oz)  LMP 06/21/1998  SpO2 96%  Breastfeeding No  
 BMI 26.45 kg/m2 General appearance: alert, cooperative, chronically ill appearing Lungs: clear to auscultation bilaterally Heart: regular rate and rhythm, S1, S2 normal, no murmur, click, rub or gallop Abdomen: + ascites Extremities: no cyanosis or edema Neurologic: Grossly normal 
 
Disposition: [facility] Discharge Condition: guarded Patient Instructions:  
Current Discharge Medication List  
  
CONTINUE these medications which have NOT CHANGED Details  
raNITIdine (ZANTAC) 150 mg tablet Take 1 Tab by mouth two (2) times a day. Indications: gastroesophageal reflux disease Qty: 60 Tab, Refills: 2 Associated Diagnoses: Gastroesophageal reflux disease, esophagitis presence not specified  
  
amLODIPine (NORVASC) 10 mg tablet Take 1 Tab by mouth daily. Qty: 30 Tab, Refills: 11  
 Associated Diagnoses: Hypertension, unspecified type  
  
fentaNYL (DURAGESIC) 50 mcg/hr PATCH 1 Patch by TransDERmal route every seventy-two (72) hours. Max Daily Amount: 1 Patch. Indications: Chronic Pain with Opioid Tolerance 
Qty: 10 Patch, Refills: 0 Associated Diagnoses: Cancer associated pain; Anxiety about health; Bony metastasis ([facility] Metastatic breast cancer ([facility] Breast cancer metastasized to lung, unspecified laterality ([facility] Other ascites  
  
carvedilol (COREG) 3.125 mg tablet Take 1 Tab by mouth two (2) times daily (with meals). Qty: 60 Tab, Refills: 11  
  
temazepam (RESTORIL) 15 mg capsule Take 1 Cap by mouth nightly as needed for Sleep. Max Daily Amount: 15 mg. 
Qty: 30 Cap, Refills: 5 Associated Diagnoses: Primary insomnia  
  
albuterol-ipratropium (DUO-NEB) 2.5 mg-0.5 mg/3 ml nebu 3 mL by Nebulization route every six (6) hours as needed. Qty: 360 Nebule, Refills: 1 Comments: Patient needs to make follow up appointment with our office in order to get any further refills. STOP taking these medications  
  
 calcium carbonate (TUMS) 200 mg calcium (500 mg) chew Comments:  
Reason for Stopping:   
   
 warfarin (COUMADIN) 1 mg tablet Comments: Reason for Stopping:   
   
 megestrol (MEGACE) 400 mg/10 mL (10 mL) suspension Comments:  
Reason for Stopping:   
   
 fulvestrant (FASLODEX) 250 mg/5 mL injection Comments:  
Reason for Stopping:   
   
 montelukast (SINGULAIR) 10 mg tablet Comments:  
Reason for Stopping:   
   
 HYDROcodone-acetaminophen (NORCO)  mg tablet Comments:  
Reason for Stopping:   
   
 DISABLED PLACARD (DISABLED PLACARD) DMV Comments:  
Reason for Stopping: LORazepam (ATIVAN) 1 mg tablet Comments:  
Reason for Stopping:   
   
 mirabegron ER (MYRBETRIQ) 25 mg ER tablet Comments:  
Reason for Stopping:   
   
 hydrALAZINE (APRESOLINE) 50 mg tablet Comments:  
Reason for Stopping:   
   
 palbociclib 125 mg cap Comments:  
Reason for Stopping:   
   
  
 
 
Activity: Bedrest 
Diet: Comfort feeding Discharge plan discussed with Mrs. Leggett and her , Gutierrez Mijares, at bedside with the assistance of Wesley Seay, . Follow-up ·   With team at [facility] Time spent to discharge patient 20 minutes Signed: 
Juliane Cotto.  Odalys Mccartney MD 
9/21/2018 
1:18 PM

## 2018-09-21 NOTE — PROGRESS NOTES
Telephone report called to [address] at [facility]. Pt picked 5 mins ago by Jonathan Douglas. Spouse at bedside at time of transport.

## 2018-09-21 NOTE — PROGRESS NOTES
END OF SHIFT NOTE:  Pt had pain meds once during shift. Pt then rested well during the night. Intake/Output 
09/20 1901 - 09/21 0700 In: -  
Out: 500 [Urine:500] Voiding: YES Catheter: NO 
Drain:  
Pleural Catheter/Drain 08/12/18 Right; Anterior (Active) Dressing Status Clean, dry, & intact 9/21/2018  3:15 AM  
Dressing Type Gauze;Transparent 9/21/2018  3:15 AM  
 
 
 
 
 
 
Stool:  1 occurrences. Stool Assessment Stool Color: Black (09/20/18 2101) Stool Appearance: Formed; Soft (09/20/18 2101) Stool Amount: Small (09/20/18 2101) Stool Source/Status: Rectum (09/20/18 2101) Emesis:  0 occurrences. VITAL SIGNS Patient Vitals for the past 12 hrs: 
 Temp Pulse Resp BP SpO2  
09/21/18 0437 98.3 °F (36.8 °C) 84 18 124/65 95 % 09/21/18 0030 98.8 °F (37.1 °C) 79 18 122/54 90 % 09/20/18 2034 98.1 °F (36.7 °C) 89 18 112/51 93 % Pain Assessment Pain 1 Pain Scale 1: Visual (09/21/18 0315) Pain Intensity 1: 0 (09/21/18 0315) Patient Stated Pain Goal: 0 (09/21/18 0315) Pain Reassessment 1: Patient sleeping (09/21/18 0315) Pain Onset 1: now (09/20/18 0122) Pain Location 1: Abdomen (09/20/18 2012) Pain Orientation 1: Right (09/20/18 2012) Pain Description 1: Aching (09/20/18 2012) Pain Intervention(s) 1: Medication (see MAR) (09/20/18 2012) Ambulating Yes  - with assistance Additional Information: 
 
Shift report given to be given to oncoming nurse at the bedside.  
 
Kevin Mcguire RN

## 2018-09-21 NOTE — H&P
History and Physical    Patient: Sb Diaz MRN: [id number]  SSN: [id number]    YOB: 1950  Age: 70 y.o. Sex: female      Subjective:      Sb Diaz is a 70 y.o. female who has a known hx of breast cancer x 6 years that has now developed metasteses to liver and bone. Despite multi-line therapy she has continued to decline and most recently on 9/18 presented to [facility] ER with upper GI bleed neccistating transfusional support due to shock. This resulted in acute on chronic renal failure which led to mixed hepatic/metabolic encephalopathy also in part due to liver involvement and malignant ascites. Coagulopathies have been corrected and warfarin stopped but altered mental state and her melena have persisted and her renal function has not improved plus a pleurX has been placed for symptom management of her ascites. Thus given the complexities of her ongoing disease progression and multiple complications, her family acing on her behalf with her approval has elected comfort care measures to be provided at [facility]. She will be admitted GIP for dx of metastatic breast Ca with involvement to bone and liver plus GI bleed with hepatic and metabolic encephalopathy for management of pain, nausea/vomiting, and family/pt support. Past Medical History:   Diagnosis Date    Acute on chronic diastolic congestive heart failure ([address] 1/5/2016    Anemia of chronic disease 11/13/2017    Aortic valve stenosis     Arthritis     roverto hands    Asthma     till age 31    Cancer ([address]     roverto. breast ca mets bone/lung    Chemotherapy-induced neutropenia ([address] 12/20/2016    CKD (chronic kidney disease) stage 4, GFR 15-29 ml/min (HCC)     Depression     managed with meds    Diastolic CHF ([address]     Followed by Donte Kerns    Former cigarette smoker     HCAP (healthcare-associated pneumonia) 7/17/2013    History of DVT (deep vein thrombosis)     Left leg DVT.  on coumadin    Hypertension managed with meds    Long term current use of anticoagulant     Coumadin    Oxygen dependent     at bedtime 2L-3L NC    Port catheter in place     Left chest port    Sepsis ([address] 7/19/2016    SOB (shortness of breath) on exertion     Type 2 diabetes mellitus (HCC)     PRN oral agent/AVG BS: 170-200/ s.s of hypoglycemia at [de-identified]    Unspecified adverse effect of anesthesia     In [facility] yrs ago after second c -section-she was told her heart stopped d/t anesthesia-hospitalized for 5 days afterwards and followed by cardiologist    Vitamin B12 deficiency 11/13/2017     Past Surgical History:   Procedure Laterality Date    COLONOSCOPY N/A 3/19/2018    COLONOSCOPY  BMI 21   performed by Kenneth Duong MD at [facility] ENDOSCOPY     [address]      X2    HX CYST REMOVAL      HX UROLOGICAL      stent placed    HX VASCULAR ACCESS  01/26/2012    Left chest port      Family History   Problem Relation Age of Onset    Heart Attack Mother     Seizures Mother     Alzheimer Father     Cancer Brother      doen not know details    No Known Problems Sister     No Known Problems Sister     No Known Problems Brother      Social History   Substance Use Topics    Smoking status: Former Smoker     Packs/day: 1.00     Years: 8.00     Types: Cigarettes     Quit date: 1/1/1978    Smokeless tobacco: Never Used      Comment: quit 30 or more years ago    Alcohol use No      Prior to Admission medications    Medication Sig Start Date End Date Taking? Authorizing Provider   raNITIdine (ZANTAC) 150 mg tablet Take 1 Tab by mouth two (2) times a day. Indications: gastroesophageal reflux disease 8/22/18   Cristi Chaudhari NP   amLODIPine (NORVASC) 10 mg tablet Take 1 Tab by mouth daily. 8/22/18   Cristi Chaudhari NP   fentaNYL (DURAGESIC) 50 mcg/hr PATCH 1 Patch by TransDERmal route every seventy-two (72) hours. Max Daily Amount: 1 Patch.  Indications: Chronic Pain with Opioid Tolerance 8/15/18   [facility] Yuliet   carvedilol (COREG) 3.125 mg tablet Take 1 Tab by mouth two (2) times daily (with meals). 8/14/18   Agustín Borrego MD   temazepam (RESTORIL) 15 mg capsule Take 1 Cap by mouth nightly as needed for Sleep. Max Daily Amount: 15 mg. 7/24/18   Pierce Ortega MD   albuterol-ipratropium (DUO-NEB) 2.5 mg-0.5 mg/3 ml nebu 3 mL by Nebulization route every six (6) hours as needed. 4/23/18   Maxime Marcelino NP        No Known Allergies    Review of Systems:  Pertinent items are noted in the History of Present Illness. Objective:     Vitals:    09/21/18 1530   BP: 128/61   Pulse: 69   Resp: 17   Temp: 97.1 °F (36.2 °C)        Physical Exam:  GENERAL: alert, fatigued, cooperative, mild distress. Medicated at hospital prior to transport but nauseated and then vomiting shortly after arrival.   LUNG: clear to auscultation bilaterally. Respirations regular and unlabored. Oxygen in place 2 L/min PRN. HEART: regular rate and rhythm, S1, S2 normal, no murmur, click, rub or gallop  ABDOMEN: soft, non-tender to light palpation but tender to deep. Abdomen distended with pleurX drain in place. Bowel sounds high pitched and hyperactive. No masses. + hepatomegaly. Pleurx drained 9/20 for 2L. Last BM 9/20. : Ambulates with assistance to BR to void x3 since admission for small amount of dark lizzy urine. EXTREMITIES:  extremities normal, atraumatic, no cyanosis or edema  SKIN: Normal and no rash or abnormalities. Left chest port for med admin. NEUROLOGIC: AOx2 as best as can be determined. Impaired gait and generally weak. Needs 1 person assistance. Reflexes normal and symmetric. Cranial nerves 2-12 and sensation grossly intact. PSYCHIATRIC: anxious, agitated at times. Calms to verbal soothing.     Assessment:     Hospital Problems  Date Reviewed: 9/21/2018          Codes Class Noted POA    Acute blood loss anemia ICD-10-CM: D62  ICD-9-CM: 285.1  9/19/2018 Yes        Upper GI bleeding ICD-10-CM: K92.2  ICD-9-CM: 578.9 9/18/2018 Yes        Ascites ICD-10-CM: R18.8  ICD-9-CM: 789.59  9/18/2018 Yes        Nausea & vomiting ICD-10-CM: R11.2  ICD-9-CM: 787.01  9/18/2018 Yes        Other ascites ICD-10-CM: R18.8  ICD-9-CM: 789.59  7/31/2018 Yes        Anticoagulated on Coumadin (Chronic) ICD-10-CM: Z51.81, Z79.01  ICD-9-CM: V58.83, V58.61  5/24/2017 Yes        Breast cancer (HCC) (Chronic) ICD-10-CM: C50.919  ICD-9-CM: 174.9  2/17/2017 Yes    Overview Addendum 2/27/2014 10:08 AM by Mateo Rodriguez NP     Er+  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 SUV max on the right is 20.5 and on the left 31.5. There is matted high right axillary   lymphadenopathy as well as a more hypermetabolic low right axillary lymph node which measures 12 x 9 mm and has an SUV max of 15.4. Small bilateral pulmonary hilar metastases are also noted as well as extensive skeletal metastatic disease which includes both proximal femora, both scapulae, innumerable ribs, virtually every vertebra, and the bony pelvis. No displaced pathologic fracture is identified on the CT images. Ct scan 2-12 Innumerable diffuse osseous metastases. . Multiple diffuse tiny lung nodules, and right hilar lymphadenopathy, also suspicious for metastases. 3. Bilateral breast masses, compatible with known carcinoma.   CANCER ANTIGEN 27.29 1076   Oncology Flowsheet Day, Cycle cyclophosphamide (CYTOXAN) IV   1/27/2012 Day 1, Cycle 1 600 mg/m2 = 996 mg   2/17/2012 Day 1, Cycle 2 600 mg/m2 = 996 mg   3/9/2012 Day 1, Cycle 3 600 mg/m2 = 996 mg   3/30/2012 Day 1, Cycle 4 500 mg/m2 = 830 mg   4/20/2012 Day 1, Cycle 5 500 mg/m2 = 830 mg     Oncology Flowsheet DOCEtaxel (TAXOTERE) IV   1/27/2012 75 mg/m2 = 125 mg   2/17/2012 75 mg/m2 = 125 mg   3/9/2012 75 mg/m2 = 125 mg   3/30/2012 60 mg/m2 = 100 mg   4/20/2012 60 mg/m2 = 100 mg   7-19-12 post 6 cycles of TC improved breast masses switch to femara maintenence  Clear response on pet ct scan   Mixed response to chemotherapy: There has been a significant interval response in the right breast and axilla with decrease in size of spiculated right breast mass and numerous right axillary lymph nodes. Left breast mass also appears to have responded chemotherapy though there may be persistent chest wall invasion. 2. Interval evolution of widespread osseous metastatic disease with many lytic lesions now appearing more sclerotic. These are associated with continued FDG uptake which is difficult to differentiate from marrow reactivity given recent chemotherapy. At least one new osseous metastatic deposits is present in the posterior spinous process at L1. Focally intense uptake is also present at approximately T5  10-19-12 pain in arms legs back continued improvement on ct scan Decreased size of the largest lung nodules and near complete resolution . of many. No new masses in the chest, abdomen, or pelvis. 2. Decreased size of right axillary and right hilar lymph nodes. 3. Diffuse osseous lesions again seen. 4. Diverticulosis. 5. Atherosclerotic vascular disease ca 15-3 down to 50  On femara and aredia will see if we can get affinitor to start at next visit continue therapy  12-19-12 new headache and dizziness for MRI will start affinitor   1-14-13 improved pain breast mass right breast smaller width 7 cm x 5  6-05-13 femara and aredia now on affinitor x 5 months   7-13 tumor markers falling   8-20-13 post admission for pneumonia medications to restart breast mass smaller reduce affinitor to 7.5 mg   10-18-13 femara and affinitor pain right ilium intermittent markers smaller breast mass smaller on right   12-5-13 Reduce Afinitor to 5 mg daily. Continue coumadin for DVT. Continue Femara and Aredia. Repeat US and skeletal survey prior to next visit. 1-6-14 Feeling better with reduced dose. Ultrasound of breasts show decrease in mass sizes.  Skeletal survey stable. Follow up in 2 months. Bony metastasis (HCC) (Chronic) ICD-10-CM: C79.51  ICD-9-CM: 198.5  2/17/2017 Yes        * (Principal)Metastatic breast cancer ([facility] (Chronic) ICD-10-CM: C50.919  ICD-9-CM: 174.9  2/17/2017 Yes              Plan:     Current Facility-Administered Medications   Medication Dose Route Frequency    sodium chloride (NS) flush 10 mL  10 mL InterCATHeter Q12H    heparin (porcine) pf 300 Units  300 Units InterCATHeter Q12H    sodium chloride (NS) flush 10 mL  10 mL InterCATHeter PRN    heparin (porcine) pf 300 Units  300 Units InterCATHeter PRN    HYDROmorphone (DILAUDID) syringe 0.5 mg  0.5 mg SubCUTAneous Q30MIN PRN    Or    HYDROmorphone (DILAUDID) syringe 0.5 mg  0.5 mg IntraVENous Q30MIN PRN    haloperidol lactate (HALDOL) injection 2 mg  2 mg SubCUTAneous Q1H PRN    Or    haloperidol lactate (HALDOL) injection 2 mg  2 mg IntraVENous Q1H PRN    acetaminophen (TYLENOL) suppository 650 mg  650 mg Rectal Q3H PRN    glycopyrrolate (ROBINUL) injection 0.2 mg  0.2 mg IntraVENous Q4H PRN    fentaNYL (DURAGESIC) 50 mcg/hr patch 1 Patch  1 Patch TransDERmal Q72H     1. Admitted GIP for dx of metastatic breast Ca with involvement to bone and liver plus GI bleed with hepatic and metabolic encephalopathy for management of pain, nausea/vomiting, and family/pt support. We will manage her pain and N/V but as explained to family little can be done about GI bleed and hep/met encephalopathy except to maintain her safety and provide supportive care as these are bi-products of her progressing disease. 2. Pain: Continue Fentanyl at 50 as started in hospital this AM. PRN dilaudid. Consider PRN draining abdominal PleurX for pain or SOB. 3. Nausea/vomiting: PRN haldol. Pleasure diet PRN as per patient family, patient \"hungry\" and wants to eat/drink a little something. May need scheduled haldol.      4. Family/pt support: Spoke with patient (understands some English) at bedside with my limited [place] skills and then with  (understands some [place]) and with son (fluent in [place] and [place]) about plan of care including meds. All report that they understand the plan of care and the medications to be used. They express concern about \"too much medicine\" and I advise them that I will reduce the dose in 1/2 from hospital and monitor closely for increased need. We discuss her nausea and vomiting and that eating/drinking may make this worse and they understand and accept risks as she desires eating/drinking in small quantities. We review the facility rules of no smoking, visitors welcome anytime, and privacy code. They report understanding of same and deny additional questions or concerns. Continue to monitor and palliate symptoms as they arise. PPS 20%.      Signed By: Ashley Garcia NP     September 21, 2018

## 2018-09-21 NOTE — PROGRESS NOTES
Situation: Patient just arrived. As she was coming down the [facility]erson on a stretcher she waved at . After the nurse got her settled in  went in the room for a brief visit, however patient was sound asleep. After speaking to the nurse  learned patient required medication that more than likely made her sleepy. No family present. Plan:  to follow up.

## 2018-09-21 NOTE — PROGRESS NOTES
HEMANTH spoke with liasioin Patricio. She stated pt will transition to Hospice house today. SW set transport up at 130. HEMANTH will continue to monitor and follow up on discharge plans. Care Management Interventions PCP Verified by CM: Yes Mode of Transport at Discharge: Self Transition of Care Consult (CM Consult): Discharge Planning Discharge Durable Medical Equipment: No 
Physical Therapy Consult: No 
Occupational Therapy Consult: No 
Speech Therapy Consult: No 
Discharge Location Discharge Placement: Home with hospice

## 2018-09-22 NOTE — PROGRESS NOTES
- Report taken from the off going RN. The patient was identified by name and . She is stating she is having nausea and is in pain in stomach. She states no SOB and no anxiety. Medicated via IV with Haldol for nausea and Hydromorphone for pain. Flushed line as ordered. The bed is low and locked and the call light is within the reach of the patient. The tab alert is in place and the door to the room is open. - Patient is now resting quietly with her eyes closed. No signs of nausea/vomit or pain. - Patient complaining of pain in abdomen. Will medicate with prn for pain and prn for nausea. - The patient is now resting quietly with her eyes closed. Respirations are even and unlabored. No signs of pain or nausea / vomit observed. - The patient continues ot rest with no signs of distress. Respirations remain shallow but unlabored.    Reported off to Irineo Glaser RN

## 2018-09-22 NOTE — PROGRESS NOTES
Family in to visit. Sitting up speaking with family. Tells son Rodrigo Montoya she is not having pain or nausea at this time. Tolerating apple juice for her son.

## 2018-09-22 NOTE — PROGRESS NOTES
Pt identified by name/. Pt slid down in bed. Able to follow directions when this writer pulls her up in bed. Grasping at the front of her brief. Pt is primarily Slovenian speaking but able to speak some English and is able at this time to let her needs be known. States \"Got to [employer]. \" This writer assists her to [facility]. Pt able to stand well,bear weight and pivot onto BSC to void. Clean,dry brief placed on. Pt pats this writers hand. Helps adjust herself in bed. Call bell at her side. Bed in low and locked position with side rails up x2. Door left open as pt is unaccompanied.

## 2018-09-22 NOTE — PROGRESS NOTES
Problem: Falls - Risk of  Goal: *Absence of Falls  Document Donato Fall Risk and appropriate interventions in the flowsheet.    Outcome: Progressing Towards Goal  Fall Risk Interventions:  Mobility Interventions: Bed/chair exit alarm         Medication Interventions: Bed/chair exit alarm    Elimination Interventions: Bed/chair exit alarm, Call light in reach

## 2018-09-22 NOTE — PROGRESS NOTES
Medicated for pain and nausea. Pt able to clearly convey her needs to this writer. Nods yes when asked if she is having pain. Able to state her abdomen pain is an \"8\" out of 10. Cool cloth to forehead. Has been voiding well in BSC,had medium formed black stool at this time. Incontinent care provided. Pt with call light in reach and uses it appropriately to call for staff.

## 2018-09-23 NOTE — PROGRESS NOTES
ID, bedside report rec'd. She is awake, denies pain. Room air. RR 22, she declines oxygen. Sips of fluids taken easily. 745- Assisted to bedside commode to void 100ml brown urine. She is dyspneic with this activity and refuses oxygen. Returns to bed with [facility] elevated for comfort. Assessment, ESAS, xi and fall risk completed at this time. Positioned for comfort. All safety/fall precautions continued as per previous and assessment completed. Continue CNA plan of care without changes at this time. Peripheral pulses +  No mottling or discoloration of extremities. 900- Assisted to sit in bedside chair at her request.  She is eating strawberries easily. Unaccompanied. She denies pain, nausea, SOB.      920- Returned to bed with assistance. She declines pain medicine, denies nausea. 1100- Smiles easily. She denies pain. Unaccompanied. Eating strawberries. 1230- Visitor at bedside. Pt denies c/o at this time. Visitor speaks Eloisa Morales and we discuss her diet, pain medication, her new fentanyl patch and nausea medicine after patient gives permission. Pt denies questions at this time. 1400- Visitor remains. Pt denies c/o. She has an emesis bag in her hand, and when I ask her why she is holding it, if she has no nausea, she responds \"just in case\" through . 1445- Void < 100 ml on commode. Assisted. She is dyspneic as previous. 1550- Up to commode to void < 100 ml. Assisted. Oxygen applied after activity. She resists application of oxygen and only leaves it on 5 minutes after activity. Visitor at the bedside. 1635- Up to void with assistance approx 30 ml brownish urine. Dyspneic. Oxygen applied when she is returned to bed. Abd firm, distended. BS + x 4 quads. She is eating snacks with her visitor. Her appetite is actually quite enthusiastic and she denies nausea, although she lays with an emesis bag immediately in reach at all times.       1830- Son Berry Scott has discussion with patient and myself regarding placing a denson catheter today. She is agreeable, in fact \"she likes that\". At this time she c/o SOB and pain. Oxygen is initiated, position is changed to upright to assist breathing and she is medicated. Family in the room. 1850- Restful. Son reports her restful and \"easier breathing\". At this time she does not open her eyes. FLACC 0.  RR 20, oxygen remains on     I verify that a minimum of hourly rounds have been provided for this patient during this shift. Venous access is flushed after each use as per orders     Summary/general care: Medications required-     Parenteral pain/nausea and today dyspnea meds. Too much nausea for PO when nausea is present. At times she can take PO food, fluids. Family/education-  Family updated, aware her situation/condition will decline. They are appropriate, appreciative and supportive to her. Oral intake- Fair. She eats a fair amount of ethnic food today and takes fluids easily when nausea is not present. New problems/issues-    Increasing abd girth and dyspnea.   Urinary frequency and inability to scan her bladder due to ascites                Care required- 2 person assist.

## 2018-09-23 NOTE — PROGRESS NOTES
Walking rounds completed with off going RN. Pt identified by name/. Resting peacefully. Fentanyl patch visualized to abdomen. Flacc 0/10. All comfort/safety measures continue. Bed in low and locked position with side rails up x2 and call light in reach.

## 2018-09-23 NOTE — PROGRESS NOTES
Progress Note    Patient: Mae Faria MRN: [id number]  SSN: [id number]    YOB: 1950  Age: 70 y.o. Sex: female      Admit Date: 9/21/2018    LOS: 1 day     Subjective:     Resting quietly with eyes closed. Appears restful. Per nursing, has required PRN meds for symptom management including dilaudid for abdominal pain and haldol for agitation. Per nursing, she has tolerated small amounts of PO intake throughout the day especially what the family has brought for her. No family at bedside during exam but have visited on and off throughout the day. Review of Systems:  Pertinent items are noted in the History of Present Illness. Objective:     Vitals:    09/21/18 1530 09/22/18 0507 09/22/18 1929   BP: 128/61 124/71 110/68   Pulse: 69 67 88   Resp: 17 18 18   Temp: 97.1 °F (36.2 °C) 98.1 °F (36.7 °C) 98.4 °F (36.9 °C)        Intake and Output:  Current Shift:    Last three shifts:      Physical Exam:  GENERAL: fatigued, mild respiratory distress. Percell Brakeman LUNG: clear to auscultation bilaterally. Respirations regular but slightly labored. Oxygen in place 2 L/min PRN. HEART: regular rate and rhythm, S1, S2 normal, no murmur, click, rub or gallop  ABDOMEN: soft, non-tender to light palpation but tender to deep. Abdomen distended with pleurX drain in place. Bowel sounds high pitched and hyperactive. No masses. + hepatomegaly. Pleurx drained 9/20 for 2L. Last BM 9/22. : Ambulates with assistance to BR to void for small amounts of dark lizzy urine. EXTREMITIES:  extremities normal, atraumatic, no cyanosis or edema  SKIN: Normal and no rash or abnormalities. Left chest port for med admin. NEUROLOGIC: AOx2 as best as can be determined. Impaired gait and generally weak. Needs 1 person assistance. Reflexes normal and symmetric. Cranial nerves 2-12 and sensation grossly intact. PSYCHIATRIC: anxious, agitated at times.      Lab/Data Review:  No new labs resulted in the last 24 hours.      Assessment:     Principal Problem:    Metastatic breast cancer ([facility] (2/17/2017)    Active Problems:    Breast cancer ([facility] (2/17/2017)      Overview: Er+  pr + her-2 lawanda negative stage 4             Examination of the 3D tomographic PET images demonstrates marked       hypermetabolism associated with both primary breast carcinomas. SUV max on       the right is 20.5 and on the left 31.5. There is matted high right       axillary       lymphadenopathy as well as a more hypermetabolic low right axillary lymph       node which measures 12 x 9 mm and has an SUV max of 15.4. Small bilateral       pulmonary hilar metastases are also noted as well as extensive skeletal       metastatic disease which includes both proximal femora, both scapulae,       innumerable ribs, virtually every vertebra, and the bony pelvis. No       displaced pathologic fracture is identified on the CT images. Ct scan 2-12 Innumerable diffuse osseous metastases. . Multiple diffuse       tiny lung nodules, and right hilar lymphadenopathy, also suspicious for       metastases. 3. Bilateral breast masses, compatible with known carcinoma. CANCER ANTIGEN 27.29 1076       Oncology Flowsheet Day, Cycle cyclophosphamide (CYTOXAN) IV       1/27/2012 Day 1, Cycle 1 600 mg/m2 = 996 mg       2/17/2012 Day 1, Cycle 2 600 mg/m2 = 996 mg       3/9/2012 Day 1, Cycle 3 600 mg/m2 = 996 mg       3/30/2012 Day 1, Cycle 4 500 mg/m2 = 830 mg       4/20/2012 Day 1, Cycle 5 500 mg/m2 = 830 mg             Oncology Flowsheet DOCEtaxel (TAXOTERE) IV       1/27/2012 75 mg/m2 = 125 mg       2/17/2012 75 mg/m2 = 125 mg       3/9/2012 75 mg/m2 = 125 mg       3/30/2012 60 mg/m2 = 100 mg       4/20/2012 60 mg/m2 = 100 mg       7-19-12 post 6 cycles of TC improved breast masses switch to femara       maintenence      Clear response on pet ct scan       Mixed response to chemotherapy:  There has been a significant interval       response in the right breast and axilla with decrease in size of       spiculated right breast mass and numerous right axillary lymph nodes. Left       breast mass also appears to have responded chemotherapy though there may       be persistent chest wall invasion. 2. Interval evolution of widespread       osseous metastatic disease with many lytic lesions now appearing more       sclerotic. These are associated with continued FDG uptake which is       difficult to differentiate from marrow reactivity given recent       chemotherapy. At least one new osseous metastatic deposits is present in       the posterior spinous process at L1. Focally intense uptake is also       present at approximately T5      10-19-12 pain in arms legs back continued improvement on ct scan Decreased       size of the largest lung nodules and near complete resolution . of many. No       new masses in the chest, abdomen, or pelvis. 2. Decreased size of right       axillary and right hilar lymph nodes. 3. Diffuse osseous lesions again       seen. 4. Diverticulosis. 5. Atherosclerotic vascular disease ca 15-3 down to       50      On femara and aredia will see if we can get affinitor to start at next       visit continue therapy      12-19-12 new headache and dizziness for MRI will start affinitor       1-14-13 improved pain breast mass right breast smaller width 7 cm x 5      6-05-13 femara and aredia now on affinitor x 5 months       7-13 tumor markers falling       8-20-13 post admission for pneumonia medications to restart breast mass       smaller reduce affinitor to 7.5 mg       10-18-13 femara and affinitor pain right ilium intermittent markers       smaller breast mass smaller on right       12-5-13 Reduce Afinitor to 5 mg daily. Continue coumadin for DVT. Continue       Femara and Aredia. Repeat US and skeletal survey prior to next visit. 1-6-14 Feeling better with reduced dose. Ultrasound of breasts show       decrease in mass sizes. Skeletal survey stable.  Follow up in 2 months. Bony metastasis ([facility] (2/17/2017)      Anticoagulated on Coumadin (5/24/2017)      Other ascites (7/31/2018)      Upper GI bleeding (9/18/2018)      Ascites (9/18/2018)      Nausea & vomiting (9/18/2018)      Acute blood loss anemia (9/19/2018)        Plan:     Current Facility-Administered Medications   Medication Dose Route Frequency    sodium chloride (NS) flush 10 mL  10 mL InterCATHeter Q12H    heparin (porcine) pf 300 Units  300 Units InterCATHeter Q12H    sodium chloride (NS) flush 10 mL  10 mL InterCATHeter PRN    HYDROmorphone (DILAUDID) syringe 0.5 mg  0.5 mg SubCUTAneous Q30MIN PRN    Or    HYDROmorphone (DILAUDID) syringe 0.5 mg  0.5 mg IntraVENous Q30MIN PRN    haloperidol lactate (HALDOL) injection 2 mg  2 mg SubCUTAneous Q1H PRN    Or    haloperidol lactate (HALDOL) injection 2 mg  2 mg IntraVENous Q1H PRN    acetaminophen (TYLENOL) suppository 650 mg  650 mg Rectal Q3H PRN    glycopyrrolate (ROBINUL) injection 0.2 mg  0.2 mg IntraVENous Q4H PRN    fentaNYL (DURAGESIC) 50 mcg/hr patch 1 Patch  1 Patch TransDERmal Q72H     1. Admitted [facility] for dx of metastatic breast Ca with involvement to bone and liver plus GI bleed with hepatic and metabolic encephalopathy for management of pain, nausea/vomiting, and family/pt support. We will manage her pain and N/V but as explained to family little can be done about GI bleed and hep/met encephalopathy except to maintain her safety and provide supportive care as these are bi-products of her progressing disease.      2. Pain: Continue Fentanyl at 50 mcg/hr via TD patch and PRN dilaudid. Consider PRN draining abdominal PleurX for pain or SOB.      3. Nausea/vomiting: PRN haldol. Pleasure diet PRN. Monitor need for scheduled haldol.      4. Family/pt support: No family at bedside during exam but they have been and out throughout the day. Discussed plan of care with primary RN. No medication changes made today.  Continue to monitor and palliate symptoms as they arise. PPS < than 30%.     Signed By: Shannon Parker, NP     September 22, 2018

## 2018-09-24 NOTE — PROGRESS NOTES
Patient is a Sikh and member of [employer].  provided support through [employer] of presence and prayer.

## 2018-09-24 NOTE — [facility] IDG CHAPLAIN NOTES
Patient: Han Colón    Date: 09/24/18  Time: 12:48 PM    [facility] [place]  Notes  Intervention:  offered ministry of presence and prayer during an initial visit. Plan: Contact family to gain a better understanding of patient's spiritual background and needs.      Signed by: Chio Ramires

## 2018-09-24 NOTE — PROGRESS NOTES
Report received from off-going nurse, visual identification made, assumed care of pt. Pt resting quietly with eyes opened, watching tv, no agitation or restlessness, no grimacing or groaning. Pt respirations unlabored. Tab alert in place, rails up x 2, bed in lowest position, safety maintained. FLACC 0. Fentanyl patch visualized on left upper arm. Pt son, Tangela Terry and  at bedside as well as friend, discussed with family about place catheter. Tangela Terry states pt is ready for catheter  2035 explained would be placing catheter. Pt nods her head okay. Placed #16 denson,with one assist.  had 50 cc clear yellow urine output, pt tolerated well. Physical assessment completed. Call light and tv remote in reach, water in reach.

## 2018-09-24 NOTE — PROGRESS NOTES
Assumed care of pt at this time. Walking rounds completed. Pt identified by name/. All comfort/safety measures continued.

## 2018-09-24 NOTE — [facility] IDG VOLUNTEER NOTES
[address] Review     Status Codes I = Initiated C=Continued R=Revised RS = Resolved     I.  Volunteer     Goal: Hospice house volunteer (s) enhances the quality of remaining life while patient is at the hospice house. Interventions: Martínez Green [employer] Volunteer (s) will provide companionship to the patient and/or family by visiting at the hospice house       . Martínez Cano [employer] Volunteer (s) will provide respite as needed when requested by patient and/or family. Martínez Murray  Volunteer will provide activities such as music, reading, pet therapy, etc. as requested. Martínez Ayon Terri  Comfort bag delivered. Any other special requests or information regarding volunteer services:     No further needs identified at this time. These notes have been discussed in [facility] meeting.         Signed by: Quita Menjivar

## 2018-09-24 NOTE — [facility] IDG NURSE NOTES
Patient: Swapnil Ibarra    Date: 09/24/18  Time: 80  [address] [place] [address] Nurse Notes  1st IDG: Pt is a 70year old female with breast cancer who developed a GI bleed and is here for management of pain, n/v. Fentanyl patch was increased to 75 mcg/hr yesterday. Dilaudid still available for breakthrough pain management, but no PRNs needed since then. Pt has had several small dark stools since admission. Diet has advanced to pleasure from clear liquids eating bites and sips. Some nausea and vomiting since admission as well. Ruby catheter inserted 9/23 since pt was becoming dyspneic when up to [facility] with 375 cc of output since then. Abdominal plurex drain in place, but has not needed draining since admission. Plan: Comprehensive plan of care reviewed. IDG and pt./family in agreement with plan of care. The IDG identifies through on-going assessment when a change is needed to the POC; the pt/family will receive care and services necessitated by changes in POC. Medications reviewed by the pharmacist and Medical Director.         Signed by: Gonzalo Tavares RN

## 2018-09-24 NOTE — PROGRESS NOTES
Report received from off-going nurse, visual identification made, assumed care of pt. Pt resting quietly with eyes closed, no agitation or restlessness, no grimacing or groaning. Pt respirations unlabored. Tab alert in place, rails up x 2, bed in lowest position, safety maintained. FLACC 0. Fentanyl patch visualized on left arm   1004 physical assessment completed. Pt denies discomfort  1800 pt rested off and on throughout the day. Pt received one dose of haldol for anxiety, pt sons and  came and visited pt a couple of times throughout the day. Pt son Jaquan Lozano, translated into nurses questions to the pt into Tajik. Informed Np Radha about pt's anxiety. Orders received.

## 2018-09-24 NOTE — [facility] IDG SOCIAL WORKER NOTES
Patient: Gabrielle Jo    Date: 09/24/18  Time: 10:40 AM    [facility]  Notes    LMSW will meet with patient and family to complete the SW assessment. Pt is Swazi speaking, but she can speak some [place]. Her son is fluent in [place] and [place]          Signed by: Johan Beauchamp

## 2018-09-24 NOTE — PROGRESS NOTES
Progress Note    Patient: Kristin Basilio MRN: [id number]  SSN: [id number]    YOB: 1950  Age: 70 y.o. Sex: female      Admit Date: 9/21/2018    LOS: 3 days     Subjective:     Resting quietly with eyes open. Appears worried and has furrowed brow. Per nursing, has required PRN meds for symptom management including dilaudid for abdominal pain and haldol for agitation but since increase in Fentanyl and insertion of denson catheter, she is much more restful. Per nursing, she has continued to tolerate small amounts of PO but not as much as yesterday. No family at bedside during exam but have visited on and off throughout the day. Review of Systems:  Pertinent items are noted in the History of Present Illness. When asked about feeling nervous she says yes to feeling worried and to wanting medication to help her rest. She also appears SOB but she denies so. Objective:     Vitals:    09/22/18 1929 09/23/18 0418 09/23/18 1607 09/24/18 0416   BP: 110/68 116/71 164/71 131/70   Pulse: 88 79 86 77   Resp: 18 18 18 18   Temp: 98.4 °F (36.9 °C) 98.1 °F (36.7 °C) 98.5 °F (36.9 °C) 97.8 °F (36.6 °C)        Intake and Output:  Current Shift:    Last three shifts: 09/22 1901 - 09/24 0700  In: -   Out: 375 [Urine:375]    Physical Exam:  GENERAL: fatigued, mild respiratory distress. LUNG: clear to auscultation bilaterally. Respirations regular but slightly labored. Oxygen in place 2 L/min PRN. HEART: regular rate and rhythm, S1, S2 normal, no murmur, click, rub or gallop  ABDOMEN: soft, non-tender to light palpation but tender to deep. Abdomen more distended but remains soft with pleurX drain in place. Bowel sounds active. No masses. + hepatomegaly. Pleurx drained 9/20 for 2L. Last BM 9/22. : Denson catheter insitu and patent for small amount of lizzy urine. Inserted 9/23/18. EXTREMITIES:  extremities normal, atraumatic, no cyanosis or edema  SKIN: Normal and no rash or abnormalities.  Left chest port for med admin. NEUROLOGIC: AOx2 as best as can be determined. Increased general weakness. Reflexes normal and symmetric. Cranial nerves 2-12 and sensation grossly intact. PSYCHIATRIC: anxious, agitated at times. Lab/Data Review:  No new labs resulted in the last 24 hours. Assessment:     Principal Problem:    Metastatic breast cancer ([facility] (2/17/2017)    Active Problems:    Breast cancer ([facility] (2/17/2017)      Overview: Er+  pr + her-2 lawanda negative stage 4             Examination of the 3D tomographic PET images demonstrates marked       hypermetabolism associated with both primary breast carcinomas. SUV max on       the right is 20.5 and on the left 31.5. There is matted high right       axillary       lymphadenopathy as well as a more hypermetabolic low right axillary lymph       node which measures 12 x 9 mm and has an SUV max of 15.4. Small bilateral       pulmonary hilar metastases are also noted as well as extensive skeletal       metastatic disease which includes both proximal femora, both scapulae,       innumerable ribs, virtually every vertebra, and the bony pelvis. No       displaced pathologic fracture is identified on the CT images. Ct scan 2-12 Innumerable diffuse osseous metastases. . Multiple diffuse       tiny lung nodules, and right hilar lymphadenopathy, also suspicious for       metastases. 3. Bilateral breast masses, compatible with known carcinoma.       CANCER ANTIGEN 27.29 1076       Oncology Flowsheet Day, Cycle cyclophosphamide (CYTOXAN) IV       1/27/2012 Day 1, Cycle 1 600 mg/m2 = 996 mg       2/17/2012 Day 1, Cycle 2 600 mg/m2 = 996 mg       3/9/2012 Day 1, Cycle 3 600 mg/m2 = 996 mg       3/30/2012 Day 1, Cycle 4 500 mg/m2 = 830 mg       4/20/2012 Day 1, Cycle 5 500 mg/m2 = 830 mg             Oncology Flowsheet DOCEtaxel (TAXOTERE) IV       1/27/2012 75 mg/m2 = 125 mg       2/17/2012 75 mg/m2 = 125 mg       3/9/2012 75 mg/m2 = 125 mg       3/30/2012 60 mg/m2 = 100 mg       4/20/2012 60 mg/m2 = 100 mg       7-19-12 post 6 cycles of TC improved breast masses switch to femara       maintenence      Clear response on pet ct scan       Mixed response to chemotherapy: There has been a significant interval       response in the right breast and axilla with decrease in size of       spiculated right breast mass and numerous right axillary lymph nodes. Left       breast mass also appears to have responded chemotherapy though there may       be persistent chest wall invasion. 2. Interval evolution of widespread       osseous metastatic disease with many lytic lesions now appearing more       sclerotic. These are associated with continued FDG uptake which is       difficult to differentiate from marrow reactivity given recent       chemotherapy. At least one new osseous metastatic deposits is present in       the posterior spinous process at L1. Focally intense uptake is also       present at approximately T5      10-19-12 pain in arms legs back continued improvement on ct scan Decreased       size of the largest lung nodules and near complete resolution . of many. No       new masses in the chest, abdomen, or pelvis. 2. Decreased size of right       axillary and right hilar lymph nodes. 3. Diffuse osseous lesions again       seen. 4. Diverticulosis. 5.  Atherosclerotic vascular disease ca 15-3 down to       50      On femara and aredia will see if we can get affinitor to start at next       visit continue therapy      12-19-12 new headache and dizziness for MRI will start affinitor       1-14-13 improved pain breast mass right breast smaller width 7 cm x 5      6-05-13 femara and aredia now on affinitor x 5 months       7-13 tumor markers falling       8-20-13 post admission for pneumonia medications to restart breast mass       smaller reduce affinitor to 7.5 mg       10-18-13 femara and affinitor pain right ilium intermittent markers       smaller breast mass smaller on right 12-5-13 Reduce Afinitor to 5 mg daily. Continue coumadin for DVT. Continue       Femara and Aredia. Repeat US and skeletal survey prior to next visit. 1-6-14 Feeling better with reduced dose. Ultrasound of breasts show       decrease in mass sizes. Skeletal survey stable. Follow up in 2 months. Bony metastasis ([address] (2/17/2017)      Anticoagulated on Coumadin (5/24/2017)      Other ascites (7/31/2018)      Upper GI bleeding (9/18/2018)      Ascites (9/18/2018)      Nausea & vomiting (9/18/2018)      Acute blood loss anemia (9/19/2018)        Plan:     Current Facility-Administered Medications   Medication Dose Route Frequency    fentaNYL (DURAGESIC) 75 mcg/hr patch 1 Patch  1 Patch TransDERmal Q72H    sodium chloride (NS) flush 10 mL  10 mL InterCATHeter Q12H    heparin (porcine) pf 300 Units  300 Units InterCATHeter Q12H    sodium chloride (NS) flush 10 mL  10 mL InterCATHeter PRN    HYDROmorphone (DILAUDID) syringe 0.5 mg  0.5 mg SubCUTAneous Q30MIN PRN    Or    HYDROmorphone (DILAUDID) syringe 0.5 mg  0.5 mg IntraVENous Q30MIN PRN    haloperidol lactate (HALDOL) injection 2 mg  2 mg SubCUTAneous Q1H PRN    Or    haloperidol lactate (HALDOL) injection 2 mg  2 mg IntraVENous Q1H PRN    acetaminophen (TYLENOL) suppository 650 mg  650 mg Rectal Q3H PRN    glycopyrrolate (ROBINUL) injection 0.2 mg  0.2 mg IntraVENous Q4H PRN     1. Admitted [facility] for dx of metastatic breast Ca with involvement to bone and liver plus GI bleed with hepatic and metabolic encephalopathy for management of pain, nausea/vomiting, and family/pt support. We will manage her pain and N/V but as explained to family little can be done about GI bleed and hep/met encephalopathy except to maintain her safety and provide supportive care as these are bi-products of her progressing disease.      2. Pain: Continue Fentanyl at 75 mcg/hr via TD patch and PRN dilaudid. Consider PRN draining abdominal PleurX for pain or SOB.      3. Nausea/vomiting: PRN haldol. Pleasure diet PRN. Monitor need for scheduled haldol.      4. Family/pt support: No family at bedside during exam but they have been and out throughout the day. Discussed plan of care with primary RN and Team during [address] She has benefited greatly from having the denson inserted. Oral intake remains poor. No medication changes made today. Continue to monitor and palliate symptoms as they arise. PPS 20%.     Signed By: Joelle Cuevas NP     September 24, 2018

## 2018-09-24 NOTE — [facility] IDG MASTER NOTE
[facility]osp[facility] Interdisciplinary Group Collaborative  Date: 09/24/18  Time: 4:35 PM    Diagnoses: There were no encounter diagnoses. Current Medications:    Current Facility-Administered Medications:     fentaNYL (DURAGESIC) 75 mcg/hr patch 1 Patch, 1 Patch, TransDERmal, Q72H, Fredo Moncada, NP, 1 Patch at 09/23/18 1156    sodium chloride (NS) flush 10 mL, 10 mL, InterCATHeter, Q12H, Lourena Osier, NP, 10 mL at 09/24/18 1005    heparin (porcine) pf 300 Units, 300 Units, InterCATHeter, Q12H, Lourena Osier, NP, 300 Units at 09/24/18 1005    sodium chloride (NS) flush 10 mL, 10 mL, InterCATHeter, PRN, Omara Osier, NP, 10 mL at 09/24/18 1525    HYDROmorphone (DILAUDID) syringe 0.5 mg, 0.5 mg, SubCUTAneous, Q30MIN PRN **OR** HYDROmorphone (DILAUDID) syringe 0.5 mg, 0.5 mg, IntraVENous, Q30MIN PRN, Lourena Osier, NP, 0.5 mg at 09/23/18 2325    haloperidol lactate (HALDOL) injection 2 mg, 2 mg, SubCUTAneous, Q1H PRN **OR** haloperidol lactate (HALDOL) injection 2 mg, 2 mg, IntraVENous, Q1H PRN, Lourena Osier, NP, 2 mg at 09/24/18 1524    acetaminophen (TYLENOL) suppository 650 mg, 650 mg, Rectal, Q3H PRN, Lourena Osier, NP    glycopyrrolate (ROBINUL) injection 0.2 mg, 0.2 mg, IntraVENous, Q4H PRN, Lourena Osier, NP    Orders:  Orders Placed This Encounter    IP CONSULT TO SPIRITUAL CARE     Standing Status:   Standing     Number of Occurrences:   1     Order Specific Question:   Reason for Consult: Answer: Once on week one, then PRN. For [facility] Only. For contracted patients, their primary hospice will continue to manage spiritual care needs.     DIET PLEASURE     Standing Status:   Standing     Number of Occurrences:   1    VITAL SIGNS     Standing Status:   Standing     Number of Occurrences:   1    VITAL SIGNS     Standing Status:   Standing     Number of Occurrences:   1    NURSING-MISCELLANEOUS: Comfort Care Measures CONTINUOUS     Standing Status: Standing     Number of Occurrences:   1     Order Specific Question:   Description of Order:     Answer:   Comfort Care Measures    CEE CATHETER, CARE     1. Cee Catheter care every shift and PRN  2. Notify Physician of Cee Catheter leakage, occlusion, gross adherent sediment or accidental removal  3. Change Cee 30 days after insertion. Standing Status:   Standing     Number of Occurrences:   1    NURSING ASSESSMENT:  SPECIFY Assess for GIP, routine, or respite level of care. Q SHIFT Routine     Standing Status:   Standing     Number of Occurrences:   1     Order Specific Question:   Please describe the test or procedure you would like to order. Answer:   Assess for GIP, routine, or respite level of care.  PAIN ASSESSMENT Pain and Symptoms: Assess ever 4 hours and PRN, for GIP level of care. PRN Routine     Standing Status:   Standing     Number of Occurrences:   1     Order Specific Question:   Please describe the test or procedure you would like to order. Answer:   Pain and Symptoms: Assess ever 4 hours and PRN, for GIP level of care.  DRESSING CHANGE - PICC, MLC, SUBCUTANEOUS AND ACCESSED PORT DRESSING CHANGE     PICC, MLC, SUBCUTANEOUS AND ACCESSED PORT DRESSING CHANGE:  Change catheter site dressing every 5 days and prn if site dressing becomes damp loosened or visibly soiled       Standing Status:   Standing     Number of Occurrences:   1    BEDREST, COMPLETE     Standing Status:   Standing     Number of Occurrences:   1    NURSING-MISCELLANEOUS: Omit Bowel Regime Omit Bowel Regime: OMIT BOWEL REGIME: GI Bleed. CONTINUOUS     Omit Bowel Regime: OMIT BOWEL REGIME: GI Bleed. Standing Status:   Standing     Number of Occurrences:   1     Order Specific Question:   Description of Order:     Answer:   Omit Bowel Regime    NURSING-MISCELLANEOUS: VERBAL NARRATIVE Admit GIP for dx of metastatic breast Ca for management of pain, nausea/vomiting, and family/pt support.  This is an order to admit to inpatient hospice care, for a first 90 day benefit interval, a 70 year ol. .. Admit GIP for dx of metastatic breast Ca for management of pain, nausea/vomiting, and family/pt support. This is an order to admit to inpatient hospice care, for a first 90 day benefit interval, a 70year old woman with a 6 year history of metastatic breast carcinoma who has progressed to end stage disease through several lines of hormonal chemotherapy and immunotherapy under the direction of Dr. Claudia Cintron. She returned to hospital 9/18/18 with brisk upper GI hemorrhage requiring transfusional support and hemorrhagic shock (LA 9.6g/dl). She has cancer related coagulopathy with lower extremity DVT, and her INR was found to be 4.9 while on Warfarin therapy. Her INR has been corrected and hemoglobin stabilized post transfusion over 48 hours. She continues to have copious melena and her sensorium was compromised by hepatic encephalopathy and an ammonia level of 88mmol. The patient and her family have elected at this point to stop life extending measures including further transfusion, IV antibiotics and artificial hydration. With the GI bleed and associated hypertension there has been acute on chronic exacerbation of renal failure with  and Creatinine 4.69g/dl. There is oliguria and her GFR is calculated at less than 10ml/min. Hepatic metastases and idiopathic cirrhosis has resulted in portal hypertension and malignant ascites necessitating pleurX drain for symptom management. The patent has very extensive bony metastases and bone marrow invasion due to the primary breast cancer. She is requiring aggressive doses of parenteral opioids and parenteral emetic therapy for comfort. She is being kept NPO due to the upper GI bleed. With care limited to comfort measures only her life expectancy is less than one week. Severe hyperammonemia contributes to her pleural effusion and ascites.  Acute on chronic diastolic dysfunction associated CHF as well as diabetes, hypertension and   chronic renal failure with hydronephrosis and indwelling ureteral stent are all diagnoses apart from her breast cancer contributing to her adverse prognosis. Standing Status:   Standing     Number of Occurrences:   1     Order Specific Question:   Description of Order:     Answer:   VERBAL NARRATIVE    NURSING-MISCELLANEOUS: Abdominal PleurX Please maintain current dressing until soiled or loose and then may change PRN. Provider to assess need for draining PRN. CONTINUOUS     Please maintain current dressing until soiled or loose and then may change PRN. Provider to assess need for draining PRN. Standing Status:   Standing     Number of Occurrences:   1     Order Specific Question:   Description of Order:     Answer:   Abdominal PleurX    DO NOT RESUSCITATE     Standing Status:   Standing     Number of Occurrences:   1     Order Specific Question:   Comfort Measures Only? Answer: Yes    OXYGEN CANNULA Liters per minute: 2; Indications for O2 therapy: RESPIRATORY DISTRESS PRN Routine     Standing Status:   Standing     Number of Occurrences:   1     Order Specific Question:   Liters per minute:      Answer:   2     Order Specific Question:   Indications for O2 therapy     Answer:   RESPIRATORY DISTRESS    haloperidol lactate (HALDOL) injection 2 mg    haloperidol lactate (HALDOL) 5 mg/mL injection     RADHAMES HATFIELD   : cabinet override    sodium chloride (NS) flush 10 mL    heparin (porcine) pf 300 Units    sodium chloride (NS) flush 10 mL    DISCONTD: heparin (porcine) pf 300 Units    OR Linked Order Group     HYDROmorphone (DILAUDID) syringe 0.5 mg     HYDROmorphone (DILAUDID) syringe 0.5 mg    OR Linked Order Group     haloperidol lactate (HALDOL) injection 2 mg     haloperidol lactate (HALDOL) injection 2 mg    acetaminophen (TYLENOL) suppository 650 mg    glycopyrrolate (ROBINUL) injection 0.2 mg    DISCONTD: fentaNYL (DURAGESIC) 50 mcg/hr patch 1 Patch    fentaNYL (DURAGESIC) 75 mcg/hr patch 1 Patch    INITIAL PHYSICIAN ORDER: HOSPICE Level Of Care: General; Reason for Admission: Admit GIP for dx of metastatic breast Ca for management of pain, nausea/vomiting, and family/pt support. Standing Status:   Standing     Number of Occurrences:   1     Order Specific Question:   Status     Answer:   Hospice     Order Specific Question:   Level Of Care     Answer:   General     Order Specific Question:   Reason for Admission     Answer:   Admit GIP for dx of metastatic breast Ca for management of pain, nausea/vomiting, and family/pt support. Order Specific Question:   Inpatient Hospitalization Certified Necessary for the Following Reasons     Answer:   3. Patient receiving treatment that can only be provided in an inpatient setting (further clarification in H&P documentation)     Order Specific Question:   Admitting Diagnosis     Answer:   Metastatic breast cancer [facility]) [[id number]]     Order Specific Question:   Terminal Prognosis Diagnosis(es)     Answer:   Nausea & vomiting [[id number]]     Order Specific Question:   Terminal Prognosis Diagnosis(es)     Answer:   Pain, cancer [[id number]]     Order Specific Question:   Terminal Prognosis Diagnosis(es)     Answer:   GI bleed [[id number]]     Order Specific Question:   Terminal Prognosis Diagnosis(es)     Answer:   Ascites [[id number]]     Order Specific Question:   Admitting Physician     Answer:   Rhett Coates     Order Specific Question:   Attending Physician     Answer:   Rhett Coates     Order Specific Question:   Discharge Plan:     Answer: Other (Specify)     Comments:   Anticipate demise at [facility].  IP CONSULT TO SOCIAL WORK     Standing Status:   Standing     Number of Occurrences:   1     Order Specific Question:   Reason for Consult: Answer: For [facility] Only.  For contracted patients, their primary hospice will continue to manage social work needs. Allergies:  No Known Allergies    Care Plan:       Multidisciplinary Problems (Active)           Problem: Aide Supervisory Visit     Dates: Start: 09/21/18       Disciplines: Interdisciplinary    Goal: Supervision of Home Health Aide     Dates: Start: 09/21/18   Expected End: 09/30/18      Disciplines: Interdisciplinary   Intervention: Aide supervisory visit    Dates: Start: 09/21/18            Problem: Comfort Deficit     Dates: Start: 09/21/18       Disciplines: Interdisciplinary    Goal: Reduce/control pain     Dates: Start: 09/21/18   Expected End: 09/30/18      Description: Patient will report that pain has been reduced or controlled through verbal and nonverbal means and that measures to promote comfort are effective. Disciplines: Interdisciplinary   Intervention: Alternative comfort measures    Dates: Start: 09/21/18       Description: Identify alternative comfort measures with patient/caregiver. Intervention: Instruct on sleeping positions for breathing comforts    Dates: Start: 09/21/18       Description: Instruct patient/caregiver on positions to aide in sleep breathing comfort. Intervention: Monitor for symptoms of discomfort    Dates: Start: 09/21/18            Problem: Communication Deficit     Dates: Start: 09/21/18       Disciplines: Interdisciplinary    Goal: Effectively communicate symptoms, needs, and concerns     Dates: Start: 09/21/18   Expected End: 09/30/18      Description: Patient/family/caregiver will effectively communicate symptoms, needs and concerns. Disciplines: Interdisciplinary   Intervention: Assess for deficit in communication    Dates: Start: 09/21/18      Intervention: Brandyn Marinelli on strategies to effectively comminicate    Dates: Start: 09/21/18       Description: Instruct patient/caregiver on strategies to effectively communicate.           Problem: Falls - Risk of     Dates: Start: 09/21/18       Disciplines: Interdisciplinary    Goal: *Absence of Falls     Dates: Start: 09/21/18   Expected End: 09/30/18      Description: Document Lanny Mcmillan Fall Risk and appropriate interventions in the flowsheet. Disciplines: Interdisciplinary         Problem: Hospice Orientation     Dates: Start: 09/21/18       Disciplines: Interdisciplinary    Goal: Demonstrate understanding of hospice philosophy, plan of care, and home hospice program     Dates: Start: 09/21/18   Expected End: 09/30/18      Description: The patient/family/caregiver will demonstrate understanding of hospice philosophy, plan of care and the home hospice program as evidenced by participation in meeting the patient's psychosocial, spiritual, medical, and physical needs inclusive of medical supplies/equipment focusing on symptoms. Disciplines: Interdisciplinary   Intervention: Instruct on hospice philosophy    Dates: Start: 09/21/18      Intervention: Instruct: hospice orientation    Dates: Start: 09/21/18      Intervention: Instruct: medical equipment    Dates: Start: 09/21/18       Description: Instruct patient/caregiver on medical equipment and supplies.     Intervention: Instruct: medical power of  and will    Dates: Start: 09/21/18      Intervention: Instruct:terminal illness    Dates: Start: 09/21/18            Problem: Pain     Dates: Start: 09/21/18       Disciplines: Interdisciplinary    Goal: Verbalize satisfaction of level of comfort and symptom control     Dates: Start: 09/21/18   Expected End: 09/30/18      Disciplines: Interdisciplinary   Intervention: Assess effectiveness of pain management    Dates: Start: 09/21/18      Intervention: Assess for signs/symptoms of acute pain    Dates: Start: 09/21/18      Intervention: Assess for signs/symptoms of chronic pain    Dates: Start: 09/21/18      Intervention: Instruct on non-pharmacological pain management    Dates: Start: 09/21/18      Intervention: Instruct on pain scales    Dates: Start: 09/21/18      Intervention: Instruct on pharmacological pain management    Dates: Start: 09/21/18            Problem: Patient Education: Go to Patient Education Activity     Dates: Start: 09/21/18       Disciplines: Interdisciplinary    Goal: Patient/Family Education     Dates: Start: 09/21/18   Expected End: 09/30/18      Disciplines: Interdisciplinary         Problem: Patient Education: Go to Patient Education Activity     Dates: Start: 09/22/18       Disciplines: Interdisciplinary    Goal: Patient/Family Education     Dates: Start: 09/22/18   Expected End: 09/30/18      Disciplines: Interdisciplinary         Problem: Potential for Skin Breakdown     Dates: Start: 09/21/18       Disciplines: Interdisciplinary    Goal: Demonstrate ability to care for skin, monitor areas of breakdown and demonstrate methods to prevent breakdown     Dates: Start: 09/21/18   Expected End: 09/30/18      Description: Patient/family/caregiver will demonstrate ability to care for patient's skin, monitor for areas of breakdown, and demonstrate methods to prevent breakdown during hospice care. Disciplines: Interdisciplinary   Intervention: Assess for skin breakdown    Dates: Start: 09/21/18      Intervention: Jackquline Player on strategies to reduce risk of skin breakdown    Dates: Start: 09/21/18            Problem: Pressure Injury - Risk of     Dates: Start: 09/22/18       Disciplines: Interdisciplinary    Goal: *Prevention of pressure injury     Dates: Start: 09/22/18   Expected End: 09/30/18      Description: Document Marques Scale and appropriate interventions in the flowsheet. Disciplines: Interdisciplinary         Problem: Risk for Falls     Dates: Start: 09/21/18       Disciplines: Interdisciplinary    Goal: Free of falls during episode of care     Dates: Start: 09/21/18   Expected End: 09/30/18      Description: Patient will be free of falls during episode of care.     Disciplines: Interdisciplinary   Intervention: Assess fall risk    Dates: Start: 09/21/18 Description: Complete fall risk assessment on admission, recertification, and as indicated on fall. Intervention: Instruct mobility    Dates: Start: 09/21/18       Description: Teach patient/caregiver/family techniques to increase patient's mobility: range of motion exercises, assisting with ambulation, proper usage of mobility assistive devices, use of side rails to define bed perimeter and to assist with turning and positioning. Intervention: Instruct on fall prevention    Dates: Start: 09/21/18       Description: Instruct patient/family in fall prevention strategies:  lighted hallways, remove throw rugs, monitor medication that may alter mental status. ___________________    Care Team Notes          POC/IDG Notes      [facility] IDG Nurse Notes by Radha Gomes RN at 09/24/18 1100  Version 1 of 1    Author:  Radha Gomes RN Service:  Damon Mendoza Author Type:  Registered Nurse    Filed:  09/24/18 1429 Date of Service:  09/24/18 1100 Status:  Signed    :  Radha Gomes RN (Registered Nurse)           Patient: Jose Leal    Date: 09/24/18  Time: 80  [address] [place] [address] Nurse Notes  1st IDG: Pt is a 70year old female with breast cancer who developed a GI bleed and is here for management of pain, n/v. Fentanyl patch was increased to 75 mcg/hr yesterday. Dilaudid still available for breakthrough pain management, but no PRNs needed since then. Pt has had several small dark stools since admission. Diet has advanced to pleasure from clear liquids eating bites and sips. Some nausea and vomiting since admission as well. Ruby catheter inserted 9/23 since pt was becoming dyspneic when up to [facility] with 375 cc of output since then. Abdominal plurex drain in place, but has not needed draining since admission. Plan: Comprehensive plan of care reviewed. IDG and pt./family in agreement with plan of care.  The IDG identifies through on-going assessment when a change is needed to the POC; the pt/family will receive care and services necessitated by changes in POC. Medications reviewed by the pharmacist and Medical Director. Signed by: Janette Jo RN       [address] [place] [address]  Notes by Jag Del Valle at 09/24/18 1248  Version 1 of 1    Author:  Jag Del Valle Service:  Spiritual Care Author Type:  Pastoral Care    Filed:  09/24/18 1250 Date of Service:  09/24/18 1248 Status:  Signed    :  Jag Del Valle (Pastoral Care)           Patient: Chen Shore    Date: 09/24/18  Time: 12:48 PM    [facility]  Notes  Intervention:  offered ministry of presence and prayer during an initial visit. Plan: Contact family to gain a better understanding of patient's spiritual background and needs. Signed by: Jag Del aVlle       [address] [place] [address]  Notes by Jayden Sinha at 09/24/18 1040  Version 1 of 1    Author:  Jayden Sinha Service:  Licensed Clinical  Author Type:      Filed:  09/24/18 1250 Date of Service:  09/24/18 1040 Status:  Signed    :  Jayden Sinha ()           Patient: Chen Shore    Date: 09/24/18  Time: 10:40 AM    [facility]  Notes    LMSW will meet with patient and family to complete the SW assessment. Pt is [place] speaking, but she can speak some [place]. Her son is fluent in [place] and [place]          Signed by: Jayden Sinha       [facility] IDG Volunteer Notes by Shilo Blue at 09/24/18 1149  Version 1 of 1    Author:  Shilo Blue Service:  Estefanía Valentine Author Type:  Hospice Volunteer/    Filed:  09/24/18 1149 Date of Service:  09/24/18 1149 Status:  Signed    :  Shilo Blue (Hospice Volunteer/)               [facility] Interdisciplinary Plan of Care Review     Status Codes I = Initiated C=Continued R=Revised RS = Resolved     I.  Volunteer     Goal: Hospice house volunteer (s) enhances the quality of remaining life while patient is at the hospice house. Interventions: Kurtis Logan Volunteer (s) will provide companionship to the patient and/or family by visiting at the hospice house       . Kurtis Logan Volunteer (s) will provide respite as needed when requested by patient and/or family. Kurtis Rocha  Volunteer will provide activities such as music, reading, pet therapy, etc. as requested. Kurtis Rocha  Comfort bag delivered. Any other special requests or information regarding volunteer services:     No further needs identified at this time. These notes have been discussed in [facility] meeting.         Signed by: Alecia Marley

## 2018-09-25 NOTE — PROGRESS NOTES
PRN dilaudid given for s/s of abdominal pain. Medication effective. No other s/s of distress noted. RN will continue to monitor.

## 2018-09-25 NOTE — PROGRESS NOTES
ID, bedside report rec'd.      810- Assessment, ESAS, xi and fall risk completed at this time. Positioned for comfort. All safety/fall precautions continued as per previous and assessment completed. Continue CNA plan of care without changes at this time. Peripheral pulses +  No mottling or discoloration of extremities. 36-  service requests for general update. Pt asleep. RR 14-16, easy. Room air. 1130- She remains asleep. She does not wake to light touch, quiet voice. RR 16, easy. Unaccompanied. 1315- I wake her with voice, touch stimulation. She denies pain, nausea. She is assisted to turn on her left side somewhat, and declines ice, fluids, food. Unaccompanied at this time. 1450- Restful. Eyes closed. RR 16, easy. FLACC 0.     1620- She is awakened with verbal, tactile stimulation. She denies pain, nausea. She is somewhat repositioned for comfort with assistance. She accepts juice, but refuses any other oral intake. Unaccompanied. She declines medication for nervousness, pain, nausea. RR 20, oxygen is on at this time. 1730- she vomits tomato soup she is trying to eat. She c/o abd pain. Anxious, RR 36. She is reassured, repositioned, medicated. Oxygen reapplied with some reassurance.  at the bedside. 1755- Restful. FLACC 0.  RR 20, easier. Some gurgling with exhalation. 1830- Restful. Eyes closed. Skin cool, dry. She does not open her eyes to conversation in the room. Her  can relay she has been quiet but really no other information. He repeats \"ok\" when I ask him what he needs. FLACC 0.  RR 16, easy. I verify that a minimum of hourly rounds have been provided for this patient during this shift.   Venous access is flushed after each use as per orders

## 2018-09-25 NOTE — PROGRESS NOTES
LMSW went to visit the pt to complete the initial sw assessment. Pt is unaccompanied and is sleeping.    LMSW did not disturb her rest.

## 2018-09-25 NOTE — PROGRESS NOTES
Progress Note    Patient: Toribio Kowalski MRN: [id number]  SSN: [id number]    YOB: 1950  Age: 70 y.o. Sex: female      Admit Date: 9/21/2018    LOS: 4 days     Subjective:     Resting quietly with eyes closed. Appears peaceful at present. Per nursing, has required PRN med for symptom management including dilaudid for abdominal pain but since increase in Fentanyl and insertion of denson catheter, she is much more restful. Per nursing, she has continued to tolerate small amounts of PO but not as much as 2 days ago and seems to prefer food brought by family. No family at bedside during exam but have visited on and off throughout the day. Review of Systems:  Pertinent items are noted in the History of Present Illness. Objective:     Vitals:    09/23/18 1607 09/24/18 0416 09/24/18 1700 09/25/18 0417   BP: 164/71 131/70 148/70 146/68   Pulse: 86 77 93 93   Resp: 18 18 19 20   Temp: 98.5 °F (36.9 °C) 97.8 °F (36.6 °C) 96.6 °F (35.9 °C) 96.1 °F (35.6 °C)        Intake and Output:  Current Shift: 09/25 0701 - 09/25 1900  In: -   Out: 200 [Urine:200]  Last three shifts: 09/23 1901 - 09/25 0700  In: -   Out: 171 [Urine:775]    Physical Exam:  GENERAL: fatigued, no respiratory distress but patient is laying perfectly still. LUNG: clear to auscultation bilaterally. Respirations regular and unla bored. Oxygen in place 2 L/min PRN. HEART: regular rate and rhythm, S1, S2 normal, no murmur, click, rub or gallop  ABDOMEN: soft, non-tender to light palpation but tender to deep. Abdomen more distended but remains soft with pleurX drain in place. Bowel sounds active. No masses. + hepatomegaly. Pleurx drained 9/20 for 2L. Last BM 9/25 x 2 watery with some melena. : Denson catheter insitu and patent for small amount of lizzy urine. Inserted 9/23/18. EXTREMITIES:  extremities normal, atraumatic, no cyanosis or edema  SKIN: Normal and no rash or abnormalities. Left chest port for med admin. NEUROLOGIC: AOx2 as best as can be determined. Generalized weakness. Follows some directions but is weak and hard to determine due to medication, cognitive changes, or language barrier. PSYCHIATRIC: anxious, agitated at times. Lab/Data Review:  No new labs resulted in the last 24 hours. Assessment:     Principal Problem:    Metastatic breast cancer ([facility] (2/17/2017)    Active Problems:    Breast cancer ([facility] (2/17/2017)      Overview: Er+  pr + her-2 lawanda negative stage 4             Examination of the 3D tomographic PET images demonstrates marked       hypermetabolism associated with both primary breast carcinomas. SUV max on       the right is 20.5 and on the left 31.5. There is matted high right       axillary       lymphadenopathy as well as a more hypermetabolic low right axillary lymph       node which measures 12 x 9 mm and has an SUV max of 15.4. Small bilateral       pulmonary hilar metastases are also noted as well as extensive skeletal       metastatic disease which includes both proximal femora, both scapulae,       innumerable ribs, virtually every vertebra, and the bony pelvis. No       displaced pathologic fracture is identified on the CT images. Ct scan 2-12 Innumerable diffuse osseous metastases. . Multiple diffuse       tiny lung nodules, and right hilar lymphadenopathy, also suspicious for       metastases. 3. Bilateral breast masses, compatible with known carcinoma.       CANCER ANTIGEN 27.29 1076       Oncology Flowsheet Day, Cycle cyclophosphamide (CYTOXAN) IV       1/27/2012 Day 1, Cycle 1 600 mg/m2 = 996 mg       2/17/2012 Day 1, Cycle 2 600 mg/m2 = 996 mg       3/9/2012 Day 1, Cycle 3 600 mg/m2 = 996 mg       3/30/2012 Day 1, Cycle 4 500 mg/m2 = 830 mg       4/20/2012 Day 1, Cycle 5 500 mg/m2 = 830 mg             Oncology Flowsheet DOCEtaxel (TAXOTERE) IV       1/27/2012 75 mg/m2 = 125 mg       2/17/2012 75 mg/m2 = 125 mg       3/9/2012 75 mg/m2 = 125 mg       3/30/2012 60 mg/m2 = 100 mg       4/20/2012 60 mg/m2 = 100 mg       7-19-12 post 6 cycles of TC improved breast masses switch to femara       maintenence      Clear response on pet ct scan       Mixed response to chemotherapy: There has been a significant interval       response in the right breast and axilla with decrease in size of       spiculated right breast mass and numerous right axillary lymph nodes. Left       breast mass also appears to have responded chemotherapy though there may       be persistent chest wall invasion. 2. Interval evolution of widespread       osseous metastatic disease with many lytic lesions now appearing more       sclerotic. These are associated with continued FDG uptake which is       difficult to differentiate from marrow reactivity given recent       chemotherapy. At least one new osseous metastatic deposits is present in       the posterior spinous process at L1. Focally intense uptake is also       present at approximately T5      10-19-12 pain in arms legs back continued improvement on ct scan Decreased       size of the largest lung nodules and near complete resolution . of many. No       new masses in the chest, abdomen, or pelvis. 2. Decreased size of right       axillary and right hilar lymph nodes. 3. Diffuse osseous lesions again       seen. 4. Diverticulosis. 5.  Atherosclerotic vascular disease ca 15-3 down to       50      On femara and aredia will see if we can get affinitor to start at next       visit continue therapy      12-19-12 new headache and dizziness for MRI will start affinitor       1-14-13 improved pain breast mass right breast smaller width 7 cm x 5      6-05-13 femara and aredia now on affinitor x 5 months       7-13 tumor markers falling       8-20-13 post admission for pneumonia medications to restart breast mass       smaller reduce affinitor to 7.5 mg       10-18-13 femara and affinitor pain right ilium intermittent markers       smaller breast mass smaller on right 12-5-13 Reduce Afinitor to 5 mg daily. Continue coumadin for DVT. Continue       Femara and Aredia. Repeat US and skeletal survey prior to next visit. 1-6-14 Feeling better with reduced dose. Ultrasound of breasts show       decrease in mass sizes. Skeletal survey stable. Follow up in 2 months. Bony metastasis ([address] (2/17/2017)      Anticoagulated on Coumadin (5/24/2017)      Other ascites (7/31/2018)      Upper GI bleeding (9/18/2018)      Ascites (9/18/2018)      Nausea & vomiting (9/18/2018)      Acute blood loss anemia (9/19/2018)        Plan:     Current Facility-Administered Medications   Medication Dose Route Frequency    LORazepam (ATIVAN) injection 1 mg  1 mg IntraVENous Q4H PRN    fentaNYL (DURAGESIC) 75 mcg/hr patch 1 Patch  1 Patch TransDERmal Q72H    sodium chloride (NS) flush 10 mL  10 mL InterCATHeter Q12H    heparin (porcine) pf 300 Units  300 Units InterCATHeter Q12H    sodium chloride (NS) flush 10 mL  10 mL InterCATHeter PRN    HYDROmorphone (DILAUDID) syringe 0.5 mg  0.5 mg SubCUTAneous Q30MIN PRN    Or    HYDROmorphone (DILAUDID) syringe 0.5 mg  0.5 mg IntraVENous Q30MIN PRN    haloperidol lactate (HALDOL) injection 2 mg  2 mg SubCUTAneous Q1H PRN    Or    haloperidol lactate (HALDOL) injection 2 mg  2 mg IntraVENous Q1H PRN    acetaminophen (TYLENOL) suppository 650 mg  650 mg Rectal Q3H PRN    glycopyrrolate (ROBINUL) injection 0.2 mg  0.2 mg IntraVENous Q4H PRN     1. Admitted GIP for dx of metastatic breast Ca with involvement to bone and liver plus GI bleed with hepatic and metabolic encephalopathy for management of pain, nausea/vomiting, and family/pt support. We will manage her pain and N/V but as explained to family little can be done about GI bleed and hep/met encephalopathy except to maintain her safety and provide supportive care as these are bi-products of her progressing disease.      2. Pain: Continue Fentanyl at 75 mcg/hr via TD patch and PRN dilaudid. Consider PRN draining abdominal PleurX for pain or SOB.      3. Nausea/vomiting: PRN haldol. Pleasure diet PRN. Monitor need for scheduled haldol.      4. Family/pt support: No family at bedside during exam but they have been and out throughout the day. Discussed plan of care with primary RN and Team during [address] Primary RN reports that she has reached out to interpretor services and we are awaiting response. SW made aware as she needs assistance in completing assessment also. Oral intake remains poor. No medication changes made today. Continue to monitor and palliate symptoms as they arise. PPS 20%.     Signed By: Fredo Moncada NP     September 25, 2018

## 2018-09-26 NOTE — PROGRESS NOTES
Tammi Chandler : 1950 Primary:  
Secondary:  [facility] at [address], [address], [place] [address] [phone]   [phone] OUTPATIENT PHYSICAL THERAPY:Discontinuation Summary 2018 ICD-10: Treatment Diagnosis: muscle weakness generalized M 62.81 Precautions/Allergies:  
Review of patient's allergies indicates no known allergies. Fall Risk Score: 9 (? 5 = High Risk) MD Orders: oncology rehab MEDICAL/REFERRING DIAGNOSIS: 
Other fatigue [R53.83] DATE OF ONSET:  REFERRING PHYSICIAN: Popeye James MD 
RETURN PHYSICIAN APPOINTMENT: 18 INITIAL ASSESSMENT:  Ms. Debra Mckeon presents following treatment for metastatic breast cancer since . She has developed significant fatigue and weakness. She spends most of the day in bed. She has had a fall in the recent past.  She uses a rolling walker. She could possibly benefit from home health, but is willing to attend therapy as outpatient. She will benefit from therapeutic exercises in order to increase strength and overall conditioning. 18: The patient has attended a total of 10 visits. She has been compliant with her HEP. She is making slow, but steady progress. She will benefit from continued therapy in order to reach her goals. Her spouse reports she is able to do more functional activities at home and to be out of the bed more. She is awaiting the results of her most recent PET scan. 
18: The patient has attended therapy consistently. She is making steady progress toward her goals. She is able to do light housework at home. She has waxed and waned in progress due to complications an treatment. She will benefit from continued therapy to improve strength and mobility. 18: The patient continues to progress slowly. She has a new diagnosis of cirrhosis with accumulation of ascites.   She is to see a gastroenterologist for this since it is unrelated to cancer. She will continue to benefit from overall strength and conditioning. Mobility has improved. Strength continues to improve slowly. PROBLEM LIST (Impacting functional limitations): 1. Decreased Strength 2. Decreased ADL/Functional Activities 3. Decreased Transfer Abilities 4. Decreased Balance 5. Increased Pain 6. Decreased Activity Tolerance 7. Increased Fatigue 8. Decreased [place] with Home Exercise Program INTERVENTIONS PLANNED: 
1. Home Exercise Program (HEP) 2. Therapeutic Exercise/Strengthening TREATMENT PLAN: 
Effective Dates: 6/25/2018 TO 9/22/2018 (90 days). Frequency/Duration: the patient did not return to oncology rehab. We will discontinue at this time. GOALS: (Goals have been discussed and agreed upon with patient.) Short-Term Functional Goals: Time Frame: 4 weeks 1. The patient will be independent with HEP for strength within 4 weeks. Met  
2. The patient will report a fatigue of 7 or less within 4 weeks. 3. The patient will gain 1/2 grade strength within 4 weeks. Met  
4. The patient will improve her TUG score by 2 seconds within 4 weeks. Met  
5. The patient will improve her Tinetti score by 5 within 4 weeks. Met Discharge Goals: Time Frame: 8 weeks 1. The patient will report a fatigue level of 5 or less within 8 weeks. 2. The patient will improve her TUG score by 5 seconds within 8 weeks. Met  
3. The patient will participate in a 6 minute walk within 8 weeks. Rehabilitation Potential For Stated Goals: Fair Regarding Julio C Leggett's therapy, I certify that the treatment plan above will be carried out by a therapist or under their direction. Thank you for this referral, Vernon See PT Referring Physician Signature: Sandford Cheadle, MD              Date The information in this section was collected on 3/28/18 (except where otherwise noted).  
HISTORY:  
 History of Present Injury/Illness (Reason for Referral): Metastatic breast cancer, chemo, fatigue, weakness Past Medical History/Comorbidities: Ms. Angelina Lay  has a past medical history of Acute on chronic diastolic congestive heart failure ([address] (2016); Anemia of chronic disease (2017); Aortic valve stenosis; Arthritis; Asthma; Cancer ([address] Chemotherapy-induced neutropenia (HCC) (2016); CKD (chronic kidney disease) stage 4, GFR 15-29 ml/min ([address] Depression; Diastolic CHF ([address] Former cigarette smoker; HCAP (healthcare-associated pneumonia) (2013); History of DVT (deep vein thrombosis); Hypertension; Long term current use of anticoagulant; Oxygen dependent; Port catheter in place; Sepsis ([address] (2016); SOB (shortness of breath) on exertion; Type 2 diabetes mellitus ([address] Unspecified adverse effect of anesthesia; and Vitamin B12 deficiency (2017). Ms. Angelina Lay  has a past surgical history that includes hx  section; hx cyst removal; hx urological; hx vascular access (2012); and colonoscopy (N/A, 3/19/2018). Past Medical History:  
Diagnosis Date  Acute on chronic diastolic congestive heart failure ([address] 2016  Anemia of chronic disease 2017  Aortic valve stenosis  Arthritis   
 roverto hands  Asthma   
 till age 31  
 Cancer ([address]   
 roverto. breast ca mets bone/lung  Chemotherapy-induced neutropenia (HCC) 2016  CKD (chronic kidney disease) stage 4, GFR 15-29 ml/min (HCC)  Depression   
 managed with meds  Diastolic CHF ([address] Followed by Donte Kerns  Former cigarette smoker  HCAP (healthcare-associated pneumonia) 2013  History of DVT (deep vein thrombosis) Left leg DVT. on coumadin  Hypertension   
 managed with meds  Long term current use of anticoagulant Coumadin  Oxygen dependent   
 at bedtime 2L-3L NC  
 Port catheter in place Left chest port  Sepsis ([address] 2016  SOB (shortness of breath) on exertion  Type 2 diabetes mellitus ([facility] PRN oral agent/AVG BS: 170-200/ s.s of hypoglycemia at 80  Unspecified adverse effect of anesthesia [place] 27 yrs ago after second c -section-she was told her heart stopped d/t anesthesia-hospitalized for 5 days afterwards and followed by cardiologist  
 Vitamin B12 deficiency 11/13/2017 Past Surgical History:  
Procedure Laterality Date  COLONOSCOPY N/A 3/19/2018 COLONOSCOPY  BMI 23   performed by Jeana Jara MD at [facility] X2  
 HX CYST REMOVAL    
 HX UROLOGICAL    
 stent placed  HX VASCULAR ACCESS  01/26/2012 Left chest port Social History/Living Environment:  
  lives with family Prior Level of Function/Work/Activity: 
independent Dominant Side:  
      RIGHT Current Medications: No current facility-administered medications for this encounter. No current outpatient prescriptions on file. Facility-Administered Medications Ordered in Other Encounters:  
  LORazepam (ATIVAN) injection 1 mg, 1 mg, IntraVENous, Q4H PRN, Bartolo Kill, NP, 1 mg at 09/26/18 1321 
  fentaNYL (DURAGESIC) 75 mcg/hr patch 1 Patch, 1 Patch, TransDERmal, Q72H, Bartolo Kill, NP, 1 Patch at 09/26/18 1115   sodium chloride (NS) flush 10 mL, 10 mL, InterCATHeter, Q12H, Bartolo Kill, NP, 10 mL at 09/26/18 1009   heparin (porcine) pf 300 Units, 300 Units, InterCATHeter, Q12H, Bartolo Kill, NP, 300 Units at 09/26/18 1009   sodium chloride (NS) flush 10 mL, 10 mL, InterCATHeter, PRN, Bartolo Kill, NP, 10 mL at 09/26/18 1322 
  HYDROmorphone (DILAUDID) syringe 0.5 mg, 0.5 mg, SubCUTAneous, Q30MIN PRN **OR** HYDROmorphone (DILAUDID) syringe 0.5 mg, 0.5 mg, IntraVENous, Q30MIN PRN, Bartolo Kill, NP, 0.5 mg at 09/26/18 0406   haloperidol lactate (HALDOL) injection 2 mg, 2 mg, SubCUTAneous, Q1H PRN **OR** haloperidol lactate (HALDOL) injection 2 mg, 2 mg, IntraVENous, Q1H PRN, Agnes Lozano NP, 2 mg at 09/25/18 2328   acetaminophen (TYLENOL) suppository 650 mg, 650 mg, Rectal, Q3H PRN, Agnes Lozano NP 
  glycopyrrolate (ROBINUL) injection 0.2 mg, 0.2 mg, IntraVENous, Q4H PRN, Agnes Lozano NP Date Last Reviewed:  3/28/18 Number of Personal Factors/Comorbidities that affect the Plan of Care: 3+: HIGH COMPLEXITY EXAMINATION:  
ROM:   
      Within functional limits Strength:   
      Grossly 4/5 x 4 extremities Functional Mobility:  
      Gait/Ambulation:  Uses walker Transfers:  independent Balance:   
      Decreased in standing and gait Body Structures Involved: 1. Muscles Body Functions Affected: 1. Sensory/Pain 2. Neuromusculoskeletal 
3. Movement Related Activities and Participation Affected: 1. General Tasks and Demands 2. Communication 3. Mobility Number of elements (examined above) that affect the Plan of Care: 4+: HIGH COMPLEXITY CLINICAL PRESENTATION:  
Presentation: Stable and uncomplicated: LOW COMPLEXITY CLINICAL DECISION MAKING:  
Outcome Measure: Tool Used: 6-MINUTE WALK TEST  Unable to test 
Score:  Initial:   feet Most Recent: X feet (Date: -- ) Interpretation of Score: Normal range varies but is approximately 3632-1642 Feet Distance walked:   feet Baseline End of Test  
Heart Rate Dyspnea (Mehul Scale) Fatigue (Mehul Scale) SpO2 BP Score 2133 [phone] [id number] [phone] 426-16 15-0 Modifier CH CI CJ CK CL CM CN Tool Used: Disabilities of the Arm, Shoulder and Hand (DASH) Questionnaire - Quick Version Score:  Initial: 41/55  Most Recent: 41/55 (Date: 6/25/18 ) Interpretation of Score: The DASH is designed to measure the activities of daily living in person's with upper extremity dysfunction or pain.   Each section is scored on a 1-5 scale, 5 representing the greatest disability. The scores of each section are added together for a total score of 55. Score 11 12-19 20-28 29-37 38-45 46-54 55 Modifier CH CI CJ CK CL CM CN Tool Used: Gilberto Sanders Score:  Initial:  
Gait: 6/12 Balance: 10/16 TOTAL: 16/28 Most Recent: 
Gait: 9/12 Balance:13 /16 TOTAL:22 /28 Interpretation of Score: The maximum score for the gait component is 12 points. The maximum score for the balance component is 16 points. The maximum total score is 28 points. In general, patients who score below 19 are at a high risk for falls. Patients who score in the range of 19-24 indicate that the patient has a risk for falls. Score 28 27-23 22-18 17-12 11-6 5-1 0 Modifier CH CI CJ CK CL CM CN Tool Used: Timed Up and Go (TUG) Score:  Initial: 25 seconds Most Recent: 18 seconds (Date: 6/25/18 ) Interpretation of Score: The test measures, in seconds, the time taken by an individual to stand up from a standard arm chair (seat height 46 cm [18 in], arm height 65 cm [25.6 in]), walk a distance of 3 meters (118 in, approx 10 ft), turn, walk back to the chair and sit down. If the individual takes longer than 14 seconds to complete TUG, this indicates risk for falls. Score 7 7.5-10.5 11-14 14.5-17.5 18-21 21.5-24.5 25+ Modifier CH CI CJ CK CL CM CN Tool Used: ECOG Performance Survey Score Score:  Initial: 3 Most Recent:  1 Interpretation of Score:  
0 Fully active, able to carry on all pre-disease performance without restriction 1 Restricted in physically strenuous activity but ambulatory and able to carry out work of a light or sedentary nature, e.g., light house work, office work 2 Ambulatory and capable of all selfcare but unable to carry out any work activities. Up and about more than 50% of waking hours 3 Capable of only limited selfcare, confined to bed or chair more than 50% of waking hours 4 Completely disabled. Cannot carry on any selfcare. Totally confined to bed or chair 5 Dead Medical Necessity:  
· Patient is expected to demonstrate progress in strength, balance and gait to increase independence with household activity. Reason for Services/Other Comments: 
· Patient continues to demonstrate capacity to improve strength, balance and gait which will increase independence and decrease amount of assistance required from caregiver. Use of outcome tool(s) and clinical judgement create a POC that gives a: Questionable prediction of patient's progress: MODERATE COMPLEXITY  
  
 
 
 
TREATMENT:  
(In addition to Assessment/Re-Assessment sessions the following treatments were rendered) Pre-treatment Symptoms/Complaints:  Pain 8/10 in abdomen, legs, fatigue 10 /10. The patient reports she would like to hold treatment until after her visit with the gastroenterologist. 
Pain: Initial:  
 8 /10  Post Session:  8/10 Reviewed HEP. Given blue theraband for upper extremities and red theraband for lower extremities. The patient is independent with her HEP. She may walk to tolerance. She was advised to stay as busy as possible and limit being in the bed. Therapeutic Exercise: (   minutes):  Exercises per below to improve mobility and strength. Required minimal verbal cues to promote proper body alignment and promote proper body posture. Progressed resistance and repetitions as indicated. as above. Given HEP:  Walk 1-2 minutes each waking hour when supervised, sit to stand from bedside, sit in chair instead of the bed each day. Added sit to stand, long arc quads and standing exercises at the sink daily. Bilateral upper extremity with light weight every other day. Given written instructions. Given red theraband for home use. [employer] 
Treatment/Session Assessment:   
· Response to Treatment:  Pt tolerated all treatment fair. · Compliance with Program/Exercises: Will assess as treatment progresses. · Recommendations/Intent for next treatment session: \"the patient did not return. Discontinue oncology rehab. Total Treatment Duration: 20 min PT Patient Time In/Time Out Time In: 0900 Time Out: [id number] Vernon See PT

## 2018-09-26 NOTE — PROGRESS NOTES
Report received. Patient with eyes closed, resting and in no apparent distress. No family at bedside at this time. Safety measures in place, door opened for continuous monitoring.

## 2018-09-26 NOTE — PROGRESS NOTES
Limited conversation with patient due to language barrier, however  was able to assess that she is a [place], she is affiliated with a [employer] and believes in prayer as indicated by initial assessment.  has not spoken to family as of yet. Bereavement assessment is pending for family members.

## 2018-09-26 NOTE — PROGRESS NOTES
RADHA called the  phone number and spoke to [facility]. I inquired when the [place]ese speaking  would be here. They do not show her on the schedule to need one. Swetha Purdy Rn requested one a few days ago using the form they provided us weeks ago. They are going to work on scheduling one for 1:00pm today.   Amaris Richardson will call if there are any problems in providing one today at 1pm.

## 2018-09-26 NOTE — PROGRESS NOTES
09/26/18 1342   Pyschosocial Assessment 1   Concerns from previous vist? No   Significant changes in relationships or household members? No   Signs of abuse or neglect? No   Financial Need (none)   Pain signs needing to be reported to  Yes  (RN present)   Psychosocial Components/Teaching/Interventions Literature and/or review of nutrition/hydration in the terminal patient; Instructed on how to contact the agency with concerns; Emotional support/supportive listening;Reviewed common signs/symptoms of dying process in the Pt/Family orientation handbook; Other (comment)  (Intrepeter present )   The patient has designated their health care surrogate Patient has not made wishes known  (Pt does not have a HCPOA on file )   Visit Environment: LMSW  Met with the RN patient and the  Eloise Camarena in the room. Circumstances for Admission:  Pt was admitted to the hospice house from [facility]. She was transported to us on 09-21-18. She has a diagnosis of Breast Cancer with mets to the liver and bone. She was transported to us via ambulance to room 112. See physicians narrative note below:    DX: Metastatic breast cancer     ASSOCIATED: bone metastasis, hepatic metastasis, coagulopathy, DVT, systemic anticoagulation, upper GI hemorrhage, hepatic encephalopathy, hyperammonemia, blood loss anemia, hypoalbuminemia, lung metastasis, mediastinal malignant lymphadenopathy, abdominal and skeletal pain, dyspnea    NON-ASSOCIATED: idiopathic liver cirrhosis, acute on chronic LV dysfunction associated CHF diastolic pleural effusion, DM II, hypertension, end stage renal disease, hydronephrosis with ureteral stents    [place]:  This is an order to admit to inpatient hospice care, for a first 90 day benefit interval, a 70year old woman with a 6 year history of metastatic breast carcinoma who has progressed to end stage disease through several lines of hormonal chemotherapy and immunotherapy under the direction of Dr. Kira Ybarra. She returned to hospital 9/18/18 with brisk upper GI hemorrhage requiring transfusional support and hemorrhagic shock (LA 9.6g/dl). She has cancer related coagulopathy with lower extremity DVT, and her INR was found to be 4.9 while on Warfarin therapy. Her INR has been corrected and hemoglobin stabilized post transfusion over 48 hours. She continues to have copious melena and her sensorium was compromised by hepatic encephalopathy and an ammonia level of 88mmol. The patient and her family have elected at this point to stop life extending measures including further transfusion, IV antibiotics and artificial hydration. With the GI bleed and associated hypertension there has been acute on chronic exacerbation of renal failure with  and Creatinine 4.69g/dl. There is oliguria and her GFR is calculated at less than 10ml/min. Hepatic metastases and idiopathic cirrhosis has resulted in portal hypertension and malignant ascites necessitating pleurX drain for symptom management. The patent has very extensive bony metastases and bone marrow invasion due to the primary breast cancer. She is requiring aggressive doses of parenteral opioids and parenteral emetic therapy for comfort. She is being kept NPO due to the upper GI bleed. With care limited to comfort measures only her life expectancy is less than one week. Severe hyperammonemia contributes to her pleural effusion and ascites. Acute on chronic diastolic dysfunction associated CHF as well as diabetes, hypertension and   chronic renal failure with hydronephrosis and indwelling ureteral stent are all diagnoses apart from her breast cancer contributing to her adverse prognosis. Family/Social History: Pt is a 70year old  female. She has been  to [facility] for 40 years. They have 2 sons, Abdirizak Mera and Ronak James. Ronak James lives in [place] and Abdirizak Mera lives here in [place].   The children speak English neither the pt nor Spouse speak English. Pt worked at her own business. 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 Per the  pt was up walking 1 month ago. Spritiual Assessment: With t e assistance of the  the pt states she is a [place]. [facility]       Living Arrangements: pt lived at home with her spouse. Patient's Emotional and Cognitive Status: Pt states she is worried and anxious. Discussion from [facility] and the pt to determine if there was anything we can assist her with. She states she just wants to spend more time with her family. Primary Caregiver / People Involved: Abran Candelario and 2 sons Rema Duke and Pili Aguilar       Advance Directive/HCPOA / DNR Status: Pt has no HCPOA on file, Pt is a DNR      Final Arrangements: Pt does not know the name of the  home, but they as a family have discussed it.         Harm to Self or Others: Pt is not a harm to herself of others       Bereavement Risk: LOW risk Family seems to be coping appropriately      Plan for the Patient: Pt is nearing her demise       Referrals Made:  services

## 2018-09-26 NOTE — PROGRESS NOTES
LMSW called and scheduled an  for 9:00am and 4:00pm for 9-27-18 and 9-28-18.   The morning is for Nursing and provider needs and 4Pm for family needs

## 2018-09-26 NOTE — PROGRESS NOTES
Progress Note    Patient: Santos Donato MRN: [id number]  SSN: [id number]    YOB: 1950  Age: 70 y.o. Sex: female      Admit Date: 9/21/2018    LOS: 5 days     Subjective:     Resting quietly with eyes open. Seen this AM and then again later in the day when interpretor present. When asked patient denies pain at present but later in the visit acknowledges \"feeling worried\". She is asked multiple questions about how we can assist her and the  and primary RN are there to assist with that conversation. No family at bedside during exam but have visited on and off throughout the day. Review of Systems:  Pertinent items are noted in the History of Present Illness. Objective:     Vitals:    09/25/18 0417 09/25/18 1642 09/26/18 0510 09/26/18 1605   BP: 146/68 156/78 153/74 139/70   Pulse: 93 94 93 99   Resp: 20 19 20 16   Temp: 96.1 °F (35.6 °C) 98 °F (36.7 °C) 97.5 °F (36.4 °C) 96.2 °F (35.7 °C)        Intake and Output:  Current Shift: 09/26 0701 - 09/26 1900  In: -   Out: 225 [Urine:225]  Last three shifts: 09/24 1901 - 09/26 0700  In: -   Out: 600 [Urine:600]    Physical Exam:  GENERAL: fatigued, mild respiratory distress even when patient is laying perfectly still. LUNG: clear to auscultation but diminished bilaterally. Respirations slightly labored, shallow, and some tachypnea. Oxygen now in place 2 L/min continuously. HEART: regular rate and rhythm, S1, S2 normal, no murmur, click, rub or gallop  ABDOMEN: soft, non-tender to light palpation but tender to deep. Abdomen more distended but remains fairly soft with pleurX drain in place. Bowel sounds active. No masses. + hepatomegaly. Pleurx drained 9/20 for 2L. Last BM 9/25 x 2 watery with some melena. : Ruby catheter insitu and patent for moderate amount of lizzy urine. Inserted 9/23/18. EXTREMITIES:  extremities normal, atraumatic, no cyanosis or edema  SKIN: Normal and no rash or abnormalities.  Left chest port for med admin. NEUROLOGIC: AOx2-3 as best as can be determined through  but there is trailing off during a sentence but she is aware of her surroundings and her situation and place in general. Generalized weakness. PSYCHIATRIC: anxious, agitated at times. Lab/Data Review:  No new labs resulted in the last 24 hours. Assessment:     Principal Problem:    Metastatic breast cancer ([facility] (2/17/2017)    Active Problems:    Breast cancer ([facility] (2/17/2017)      Overview: Er+  pr + her-2 lawanda negative stage 4             Examination of the 3D tomographic PET images demonstrates marked       hypermetabolism associated with both primary breast carcinomas. SUV max on       the right is 20.5 and on the left 31.5. There is matted high right       axillary       lymphadenopathy as well as a more hypermetabolic low right axillary lymph       node which measures 12 x 9 mm and has an SUV max of 15.4. Small bilateral       pulmonary hilar metastases are also noted as well as extensive skeletal       metastatic disease which includes both proximal femora, both scapulae,       innumerable ribs, virtually every vertebra, and the bony pelvis. No       displaced pathologic fracture is identified on the CT images. Ct scan 2-12 Innumerable diffuse osseous metastases. . Multiple diffuse       tiny lung nodules, and right hilar lymphadenopathy, also suspicious for       metastases. 3. Bilateral breast masses, compatible with known carcinoma.       CANCER ANTIGEN 27.29 1076       Oncology Flowsheet Day, Cycle cyclophosphamide (CYTOXAN) IV       1/27/2012 Day 1, Cycle 1 600 mg/m2 = 996 mg       2/17/2012 Day 1, Cycle 2 600 mg/m2 = 996 mg       3/9/2012 Day 1, Cycle 3 600 mg/m2 = 996 mg       3/30/2012 Day 1, Cycle 4 500 mg/m2 = 830 mg       4/20/2012 Day 1, Cycle 5 500 mg/m2 = 830 mg             Oncology Flowsheet DOCEtaxel (TAXOTERE) IV       1/27/2012 75 mg/m2 = 125 mg       2/17/2012 75 mg/m2 = 125 mg       3/9/2012 75 mg/m2 = 125 mg       3/30/2012 60 mg/m2 = 100 mg       4/20/2012 60 mg/m2 = 100 mg       7-19-12 post 6 cycles of TC improved breast masses switch to femara       maintenence      Clear response on pet ct scan       Mixed response to chemotherapy: There has been a significant interval       response in the right breast and axilla with decrease in size of       spiculated right breast mass and numerous right axillary lymph nodes. Left       breast mass also appears to have responded chemotherapy though there may       be persistent chest wall invasion. 2. Interval evolution of widespread       osseous metastatic disease with many lytic lesions now appearing more       sclerotic. These are associated with continued FDG uptake which is       difficult to differentiate from marrow reactivity given recent       chemotherapy. At least one new osseous metastatic deposits is present in       the posterior spinous process at L1. Focally intense uptake is also       present at approximately T5      10-19-12 pain in arms legs back continued improvement on ct scan Decreased       size of the largest lung nodules and near complete resolution . of many. No       new masses in the chest, abdomen, or pelvis. 2. Decreased size of right       axillary and right hilar lymph nodes. 3. Diffuse osseous lesions again       seen. 4. Diverticulosis. 5.  Atherosclerotic vascular disease ca 15-3 down to       50      On femara and aredia will see if we can get affinitor to start at next       visit continue therapy      12-19-12 new headache and dizziness for MRI will start affinitor       1-14-13 improved pain breast mass right breast smaller width 7 cm x 5      6-05-13 femara and aredia now on affinitor x 5 months       7-13 tumor markers falling       8-20-13 post admission for pneumonia medications to restart breast mass       smaller reduce affinitor to 7.5 mg       10-18-13 femara and affinitor pain right ilium intermittent markers smaller breast mass smaller on right       12-5-13 Reduce Afinitor to 5 mg daily. Continue coumadin for DVT. Continue       Femara and Aredia. Repeat US and skeletal survey prior to next visit. 1-6-14 Feeling better with reduced dose. Ultrasound of breasts show       decrease in mass sizes. Skeletal survey stable. Follow up in 2 months. Bony metastasis ([address] (2/17/2017)      Anticoagulated on Coumadin (5/24/2017)      Other ascites (7/31/2018)      Upper GI bleeding (9/18/2018)      Ascites (9/18/2018)      Nausea & vomiting (9/18/2018)      Acute blood loss anemia (9/19/2018)        Plan:     Current Facility-Administered Medications   Medication Dose Route Frequency    LORazepam (ATIVAN) injection 1 mg  1 mg IntraVENous Q4H PRN    fentaNYL (DURAGESIC) 75 mcg/hr patch 1 Patch  1 Patch TransDERmal Q72H    sodium chloride (NS) flush 10 mL  10 mL InterCATHeter Q12H    heparin (porcine) pf 300 Units  300 Units InterCATHeter Q12H    sodium chloride (NS) flush 10 mL  10 mL InterCATHeter PRN    HYDROmorphone (DILAUDID) syringe 0.5 mg  0.5 mg SubCUTAneous Q30MIN PRN    Or    HYDROmorphone (DILAUDID) syringe 0.5 mg  0.5 mg IntraVENous Q30MIN PRN    haloperidol lactate (HALDOL) injection 2 mg  2 mg SubCUTAneous Q1H PRN    Or    haloperidol lactate (HALDOL) injection 2 mg  2 mg IntraVENous Q1H PRN    acetaminophen (TYLENOL) suppository 650 mg  650 mg Rectal Q3H PRN    glycopyrrolate (ROBINUL) injection 0.2 mg  0.2 mg IntraVENous Q4H PRN     1. Admitted GIP for dx of metastatic breast Ca with involvement to bone and liver plus GI bleed with hepatic and metabolic encephalopathy for management of pain, nausea/vomiting, and family/pt support.  We will manage her pain and N/V but as explained to family little can be done about GI bleed and hep/met encephalopathy except to maintain her safety and provide supportive care as these are bi-products of her progressing disease.      2. Pain: Continue Fentanyl at 75 mcg/hr via TD patch and PRN dilaudid. Consider PRN draining abdominal PleurX for pain or SOB.      3. Nausea/vomiting: PRN haldol. Pleasure diet PRN. Monitor need for scheduled haldol.      4. Family/pt support: No family at bedside during exam but they have been and out throughout the day. Discussed plan of care with primary RN and to  during visit. Oral intake remains poor to the food provided by us. Efforts made to identify what her food choices are and per  she likes fruit and pastries to which we will notify kitchen. No medication changes made today. Continue to monitor and palliate symptoms as they arise. PPS 20%.     Signed By: Anton Lazcano NP     September 26, 2018

## 2018-09-26 NOTE — PROGRESS NOTES
ID, bedside report rec'd. She is restful, eyes open. She denies pain, nausea. FLACC 0.  RR 20, oxygen on.     810- Assessment, ESAS, xi and fall risk completed at this time. Positioned for comfort. All safety/fall precautions continued as per previous and assessment completed. Continue CNA plan of care without changes at this time. Peripheral pulses +  No mottling or discoloration of extremities. She nods yes/no to most questions but requires repeat frequently with hand gestures and facial expressions to assist.  She moves slowly and requires support to her ABD for comfort. Abd is firm, very distended. 930- Eyes closed. FLACC 0.  RR 20-24. Oxygen remains on. Unaccompanied. 1030-  at the bedside. It is also difficult to communicate with him. He nods yes to each question I ask him: does she look comfortable? Does her breathing look hard? Did he get rest last night? Is it cold outside? He does not offer any words or questions. He denies discomfort and questions. Myles Moscoso- Pending  to arrive for communication assistance. Pt is unaccompanied. She is restful. RR remains 20-24 and she denies breathing distress. Her respirations are strained, + use accessory muscles and her oxygen is on. She is somewhat slow to look at me, to respond to my presence, but does engage some interaction with persistent encouragement. 1310-  here. The patient, NP, MSW and I discuss with the  and patient her medications, her symptoms, her Buddhist needs, her family worries, her anticipated lifespan of days and finishing her work before she leaves this world . She denies spiritual distress and states her worries are about her family, and leaving them soon. She denies pain. We discuss in depth her medicines, frequent small doses for pain and that her breathing also benefits from this same medication. We discuss her breathing now, and her worried expression.   She does agree that her breathing is difficult and is agreeable to medications. We also discuss that these will make her sleepy and she welcomes sleep. She states she would like to sleep at night and we discuss her nervous medication, and that if she asks for it, it will help her sleep. She states she wants to be awake when her family comes, but does not know when her family might come. We discuss that we cannot predict the effects or longevity of medications but that we will strive to keep her comfortable and resting. She is also reassured we will not give her any more medication than she requires to keep her resting and comfortable. We also discuss the plans her family have for taking care of each other. She remains quiet, calm and interactive during conversation. -1350    1400- FLACC 0.  RR 24.  remains with patient, who is resting well. 1445- Pt requests medication for her breathing thru . She is medicated and repositioned to maximize comfort. She verbalizes comfort at this time. 1600- Repositioned. She remains restful.  remains at the bedside. Pt RR 20-24. She denies pain or dyspnea at this time. 1645-  leaves, stating \"she's resting, she doesn't need more medication\". Pt is quiet, eyes closed. RR 20, even with some laboring. However, she is more peaceful at this time than she has been the past 24 hours. She nods her head yes, when I ask her if she is okay. Unaccompanied as  leaves. 1730- FLACC 0. Eyes closed. Rr 20-22, oxygen on. HOB remains elevated for comfort. Unaccompanied. 1835- FLACC 0.  RR 20, even with minimal laboring. Skin cool, dry. She does not respond to voice, touch. Unaccompanied. I verify that a minimum of hourly rounds have been provided for this patient during this shift.   Venous access is flushed after each use as per orders

## 2018-09-26 NOTE — PROGRESS NOTES
Report received. Patient rounding made. Family recently left room. Patient with eyes closed, slight dyspnea noted, appears to be sleeping comfortably. Safety measures in place. Door opened for continuous monitoring. 2151 patient yelling out anxiously. assessment completed and patient able to express needs at this time with some english. States that she is nauseated, meds given for nausea, anxiety and dyspnea. Door left open, call bell placed by patient's side. Expressed understanding of call bell.

## 2018-09-26 NOTE — PROGRESS NOTES
Situation: Traci Zamudio is a 70 Year old lady who comes to us from [address] [place] [address] with a diagnosis of Breast Cancer. She has been under treatment with [address] [place] [address]. She is GIP level of care for management of symptoms. Her symptoms are pain, nausea and vomiting. Background: Patient and her  have been  36 years. They have two sons and at least two grandchildren. She is a Yazid[employer] and affiliated with [employer] [facility] [employer]. Assessment:  has visited patient before without an , however today Rd Paula from our language services has been available for support. Patient was rather sleepy when  was in the room today. She did open her eyes briefly. Mrs. Honey Myles was able to provide some family and patient history. 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 She identifies her as a person who loves her family and adores the grandchildren, a true [de-identified]. Ms. Honey Myles says the disease process has been long and drawn out with its ups and downs. Patient told her she struggled with the weakness and lack of strength resulting from the treatment protocol. Being an active person made it especially challenging to adjust to the need to rest.  has prayed with patient and been able to communicate enough with her to identify that she is a [place] and appreciates prayer. During the visit Mrs. Honey Myles took a message to transfer via text to the family expressing assurance of spiritual support and informing them a  has been going to patient's room and offering prayer. Plan:  remains available to provide spiritual and emotional support. Mrs. Honey Myles provided her contact number and expressed a desire to help in the future as needed. Her number is [phone]. She said there are at least 4 interpreters who are familiar with patient.

## 2018-09-27 NOTE — PROGRESS NOTES
Report received. Patient rounding made. Patient with eyes closed resting in bed, some dyspnea noted. Asked patient If she needed any medication and she expressed that she was having pain and nausea. This RN will medicated. Safety measures in place, call light within reach, door opened for continuous monitoring.  No family present at this time

## 2018-09-27 NOTE — PROGRESS NOTES
Report received. Pt round. Pt identified by name. In bed with eyes closed. Fentanyl patch present on left arm. No s/s of pain, agitation or dyspnea. Bed low and SR up with tab alerts on.  1016 Dilaudid 0.5 MG IV for dyspnea and pain in chest. Pt ate 1/2 of sandwich and some grapes for breakfast.   1155 Haldol 2 mg IV for agitation. Pt is restless and states she \"needs to do something\", but unable to verbalize what it is she needs.    1706 Dilaudid 0.5 mg IV for dypsnea

## 2018-09-27 NOTE — PROGRESS NOTES
Progress Note    Patient: Kristin Basilio MRN: [id number]  SSN: [id number]    YOB: 1950  Age: 70 y.o. Sex: female      Admit Date: 9/21/2018    LOS: 6 days     Clinical Summary:     [facility] had a  in the room today which gave us the opportunity to review her clinical course. She is not having pain although she is uncomfortable when she is sitting in the bed. She indicates that she is short of breath and she is somewhat tachypnea with diminished air exchange but her lungs are otherwise clear. She has a loud ejection systolic murmur but her carotid pulse is not pathologic and I do not think she has heart failure. She does not have much appetite and her manuscript been reviewed with the sitter. Rest of her examination is unremarkable present time. Vitals:    09/25/18 1642 09/26/18 0510 09/26/18 1605 09/27/18 0410   BP: 156/78 153/74 139/70 163/76   Pulse: 94 93 99 (!) 103   Resp: 19 20 16 30   Temp: 98 °F (36.7 °C) 97.5 °F (36.4 °C) 96.2 °F (35.7 °C) 96 °F (35.6 °C)            Clinical Assessment:     Principal Problem:    Metastatic breast cancer ([facility] (2/17/2017)    Active Problems:    Breast cancer ([facility] (2/17/2017)      Overview: Er+  pr + her-2 lawanda negative stage 4             Examination of the 3D tomographic PET images demonstrates marked       hypermetabolism associated with both primary breast carcinomas. SUV max on       the right is 20.5 and on the left 31.5. There is matted high right       axillary       lymphadenopathy as well as a more hypermetabolic low right axillary lymph       node which measures 12 x 9 mm and has an SUV max of 15.4. Small bilateral       pulmonary hilar metastases are also noted as well as extensive skeletal       metastatic disease which includes both proximal femora, both scapulae,       innumerable ribs, virtually every vertebra, and the bony pelvis. No       displaced pathologic fracture is identified on the CT images.       Ct scan 2-12 Innumerable diffuse osseous metastases. . Multiple diffuse       tiny lung nodules, and right hilar lymphadenopathy, also suspicious for       metastases. 3. Bilateral breast masses, compatible with known carcinoma. CANCER ANTIGEN 27.29 1076       Oncology Flowsheet Day, Cycle cyclophosphamide (CYTOXAN) IV       1/27/2012 Day 1, Cycle 1 600 mg/m2 = 996 mg       2/17/2012 Day 1, Cycle 2 600 mg/m2 = 996 mg       3/9/2012 Day 1, Cycle 3 600 mg/m2 = 996 mg       3/30/2012 Day 1, Cycle 4 500 mg/m2 = 830 mg       4/20/2012 Day 1, Cycle 5 500 mg/m2 = 830 mg             Oncology Flowsheet DOCEtaxel (TAXOTERE) IV       1/27/2012 75 mg/m2 = 125 mg       2/17/2012 75 mg/m2 = 125 mg       3/9/2012 75 mg/m2 = 125 mg       3/30/2012 60 mg/m2 = 100 mg       4/20/2012 60 mg/m2 = 100 mg       7-19-12 post 6 cycles of TC improved breast masses switch to femara       maintenence      Clear response on pet ct scan       Mixed response to chemotherapy: There has been a significant interval       response in the right breast and axilla with decrease in size of       spiculated right breast mass and numerous right axillary lymph nodes. Left       breast mass also appears to have responded chemotherapy though there may       be persistent chest wall invasion. 2. Interval evolution of widespread       osseous metastatic disease with many lytic lesions now appearing more       sclerotic. These are associated with continued FDG uptake which is       difficult to differentiate from marrow reactivity given recent       chemotherapy. At least one new osseous metastatic deposits is present in       the posterior spinous process at L1. Focally intense uptake is also       present at approximately T5      10-19-12 pain in arms legs back continued improvement on ct scan Decreased       size of the largest lung nodules and near complete resolution . of many. No       new masses in the chest, abdomen, or pelvis. 2.  Decreased size of right axillary and right hilar lymph nodes. 3. Diffuse osseous lesions again       seen. 4. Diverticulosis. 5. Atherosclerotic vascular disease ca 15-3 down to       50      On femara and aredia will see if we can get affinitor to start at next       visit continue therapy      12-19-12 new headache and dizziness for MRI will start affinitor       1-14-13 improved pain breast mass right breast smaller width 7 cm x 5      6-05-13 femara and aredia now on affinitor x 5 months       7-13 tumor markers falling       8-20-13 post admission for pneumonia medications to restart breast mass       smaller reduce affinitor to 7.5 mg       10-18-13 femara and affinitor pain right ilium intermittent markers       smaller breast mass smaller on right       12-5-13 Reduce Afinitor to 5 mg daily. Continue coumadin for DVT. Continue       Femara and Aredia. Repeat US and skeletal survey prior to next visit. 1-6-14 Feeling better with reduced dose. Ultrasound of breasts show       decrease in mass sizes. Skeletal survey stable. Follow up in 2 months. Bony metastasis ([address] (2/17/2017)      Anticoagulated on Coumadin (5/24/2017)      Other ascites (7/31/2018)      Upper GI bleeding (9/18/2018)      Ascites (9/18/2018)      Nausea & vomiting (9/18/2018)      Acute blood loss anemia (9/19/2018)        Treatment Plan:      I have reviewed the patient's Plan of Care with the nursing staff. she has received forward doses of HYDROMORPHONE in the past 24 hours and this appears to be adequate and effective and I will not make further changes present time. From her parents death could be as much as several weeks off at this time.           Current Facility-Administered Medications   Medication Dose Route Frequency    LORazepam (ATIVAN) injection 1 mg  1 mg IntraVENous Q4H PRN    fentaNYL (DURAGESIC) 75 mcg/hr patch 1 Patch  1 Patch TransDERmal Q72H    sodium chloride (NS) flush 10 mL  10 mL InterCATHeter Q12H    heparin (porcine) pf 300 Units  300 Units InterCATHeter Q12H    sodium chloride (NS) flush 10 mL  10 mL InterCATHeter PRN    HYDROmorphone (DILAUDID) syringe 0.5 mg  0.5 mg SubCUTAneous Q30MIN PRN    Or    HYDROmorphone (DILAUDID) syringe 0.5 mg  0.5 mg IntraVENous Q30MIN PRN    haloperidol lactate (HALDOL) injection 2 mg  2 mg SubCUTAneous Q1H PRN    Or    haloperidol lactate (HALDOL) injection 2 mg  2 mg IntraVENous Q1H PRN    acetaminophen (TYLENOL) suppository 650 mg  650 mg Rectal Q3H PRN    glycopyrrolate (ROBINUL) injection 0.2 mg  0.2 mg IntraVENous Q4H PRN           Signed By: Sari Rodgers MD     September 27, 2018

## 2018-09-28 NOTE — ROUTINE PROCESS
Pt I'd by name and . Pt c/o generalized pain 10/10. Restless. Anxious. Dilaudid 0.5mg and haldol 2mg ivp given. Fentanyl 75mcg/hr patch intact on left arm. Resp non labored on 2L n/c. Lungs diminished. BS diminished. HR irreg. No edema noted at this time. Abd distended. Right chest port patent. Drsg clean/dry intact. Ruby cath draining clear lizzy urine. SR up x2. Bed low/locked. Call light with in reach. Door opened. Tab alerts on.

## 2018-09-28 NOTE — [facility] IDG SOCIAL WORKER NOTES
Patient: Meghan Anguiano    Date: 09/28/18  Time: 10:51 AM    [facility]  Notes  LMSW will continue to provide emotional support to the pt and family. Pt is Turkish speaking, an  has been coming twice a day at 9 am and 4 pm.  LMSW will call for an  over the weekend for the house staff. LMSW will be culturally sensitive to the families [place] herita.           Signed by: Makenzie Brennan

## 2018-09-28 NOTE — [facility] IDG NURSE NOTES
Patient: Marisol Weller    Date: 09/28/18  Time: 80  [facility]  F/U IDG:  Pt is a 60-year-old female with breast cancer for management of pain, n/v. Haldol x 2 for agitation and x 1 for nausea. Ativan x 1 for anxiety and nausea. Dilaudid x 5 for dyspnea. Output 300 cc. Plan: Comprehensive plan of care reviewed. IDG and pt./family in agreement with plan of care. The IDG identifies through on-going assessment when a change is needed to the POC; the pt/family will receive care and services necessitated by changes in POC. Medications reviewed by the pharmacist and Medical Director.         Signed by: Yannick Coleman RN

## 2018-09-28 NOTE — PROGRESS NOTES
Problem: Communication Deficit  Goal: Effectively communicate symptoms, needs, and concerns  Patient/family/caregiver will effectively communicate symptoms, needs and concerns. Outcome: Progressing Towards Goal  Pt has an  coming in at 9 am and 4 pm daily to assist in effectively communicating     Problem: Pressure Injury - Risk of  Goal: *Prevention of pressure injury  Document Marques Scale and appropriate interventions in the flowsheet.    Pressure Injury Interventions:  Sensory Interventions: Assess changes in LOC, Check visual cues for pain, Float heels, Keep linens dry and wrinkle-free, Maintain/enhance activity level, Minimize linen layers, Pad between skin to skin, Pressure redistribution bed/mattress (bed type)    Moisture Interventions: Absorbent underpads, Apply protective barrier, creams and emollients, Internal/External urinary devices, Limit adult briefs, Maintain skin hydration (lotion/cream), Minimize layers, Moisture barrier    Activity Interventions: Pressure redistribution bed/mattress(bed type)    Mobility Interventions: Float heels, HOB 30 degrees or less, Pressure redistribution bed/mattress (bed type)    Nutrition Interventions: Document food/fluid/supplement intake, Offer support with meals,snacks and hydration    Friction and Shear Interventions: HOB 30 degrees or less, Lift sheet, Minimize layers

## 2018-09-28 NOTE — [facility] IDG CHAPLAIN NOTES
Patient: Oneal Pierre    Date: 09/28/18  Time: 12:20 PM    [facility]  Notes  Intervention: Ministry of presence, prayer, working with  to insure good communication. Outcome: Patient's background discussed with  who is familiar with patient. Communication with family through . Plan: Continue to provide spiritual and emotional care throughout patient's time with Winnie Flores.          Signed by: Merlene Ugarte

## 2018-09-28 NOTE — PROGRESS NOTES
LMSW met with the pt and the  this morning. Pt states she is feeling ok. She does not want or need anything. Pt is having some rapid breathing. Encouraged the pt through the  to rest and catch her breath. LMSW asked if her family had been in to visit her today. She said she had some visitors in to see her.

## 2018-09-28 NOTE — PROGRESS NOTES
Problem: Coping and Emotional Distress  Goal: Demonstrate Acceptance of Terminal Illness and Disease Progression  Patient/family/caregiver will demonstrate acceptance of terminal disease and understanding of disease progression while employing appropriate mechanisms. Outcome: Progressing Towards Goal  Pt is not quite at the acceptance stage of her illness. She said she wants more time with her family and for that reason she is worried. Problem: Anticipatory Grief  Goal: Explore Reactions To and Verbalize Acceptance of Impending Loss  Patient/family/caregiver will explore reactions to and verbalize acceptance of impending loss.    Outcome: Not Met  LMSW has not seen any of her family since her arrival.

## 2018-09-28 NOTE — [facility] IDG VOLUNTEER NOTES
[address] Review     Status Codes I = Initiated C=Continued R=Revised RS = Resolved     C. Volunteer     Goal: Hospice house volunteer (s) enhances the quality of remaining life while patient is at the hospice house. Interventions: Yovanimartine Katz Yovanievakristina Liao Emmaer Volunteer (s) will provide companionship to the patient and/or family by visiting at the hospice house       . Yovanievakristina Liao Emmairaida Volunteer (s) will provide respite as needed when requested by patient and/or family. Joe Lenz  Volunteer will provide activities such as music, reading, pet therapy, etc. as requested. Joe Lenz  Comfort bag delivered. Any other special requests or information regarding volunteer services: Three visits for pet therapy, companionship, and volunteer nursing assistance are recorded. Per staff request, Joanne Dominguez will ask Ukrainian speaking volunteers to come and visit with the patient and family. No further needs identified at this time. These notes have been discussed in [facility] meeting.           Signed by: Thalia Porras

## 2018-09-28 NOTE — [facility] IDG MASTER NOTE
Hospice Interdisciplinary Group Collaborative  Date: 09/28/18  Time: 2:35 PM    _Diagnoses:  Diagnoses of Acute blood loss anemia, Anticoagulated on Coumadin, and Malignant neoplasm involving both nipple and areola of right breast in female, unspecified estrogen receptor status ([facility] [address] were pertinent to this visit. Current Medications:    Current Facility-Administered Medications:     LORazepam (ATIVAN) injection 1 mg, 1 mg, IntraVENous, Q4H PRN, Angely Scales NP, 1 mg at 09/27/18 1926    fentaNYL (DURAGESIC) 75 mcg/hr patch 1 Patch, 1 Patch, TransDERmal, Q72H, Angely Scales NP, 1 Patch at 09/26/18 1115    sodium chloride (NS) flush 10 mL, 10 mL, InterCATHeter, Q12H, Angely Scales NP, 10 mL at 09/28/18 0900    heparin (porcine) pf 300 Units, 300 Units, InterCATHeter, Q12H, Angely Scales NP, 300 Units at 09/28/18 0900    sodium chloride (NS) flush 10 mL, 10 mL, InterCATHeter, PRN, Angely Scales NP, 10 mL at 09/27/18 1933    HYDROmorphone (DILAUDID) syringe 0.5 mg, 0.5 mg, SubCUTAneous, Q30MIN PRN **OR** HYDROmorphone (DILAUDID) syringe 0.5 mg, 0.5 mg, IntraVENous, Q30MIN PRN, Angely Scales NP, 0.5 mg at 09/28/18 0844    haloperidol lactate (HALDOL) injection 2 mg, 2 mg, SubCUTAneous, Q1H PRN **OR** haloperidol lactate (HALDOL) injection 2 mg, 2 mg, IntraVENous, Q1H PRN, Angely Scales NP, 2 mg at 09/28/18 0845    acetaminophen (TYLENOL) suppository 650 mg, 650 mg, Rectal, Q3H PRN, Angely Scales NP    glycopyrrolate (ROBINUL) injection 0.2 mg, 0.2 mg, IntraVENous, Q4H PRN, Angely Scales NP    Orders:  Orders Placed This Encounter    IP CONSULT TO SPIRITUAL CARE     Standing Status:   Standing     Number of Occurrences:   1     Order Specific Question:   Reason for Consult: Answer: Once on week one, then PRN. For [facility] Only. For contracted patients, their primary hospice will continue to manage spiritual care needs.     DIET PLEASURE Standing Status:   Standing     Number of Occurrences:   1     Order Specific Question:   Likes/Dislikes/Preferences     Answer:   No grits or yogurt. She likes fresh cut up fruit, soups, sweets - pastry particularly. sweet tea. Bananas are ok. cookies and sweets might work.  VITAL SIGNS     Standing Status:   Standing     Number of Occurrences:   1    VITAL SIGNS     Standing Status:   Standing     Number of Occurrences:   1    NURSING-MISCELLANEOUS: Comfort Care Measures CONTINUOUS     Standing Status:   Standing     Number of Occurrences:   1     Order Specific Question:   Description of Order:     Answer:   Comfort Care Measures    CEE CATHETER, CARE     1. Cee Catheter care every shift and PRN  2. Notify Physician of Cee Catheter leakage, occlusion, gross adherent sediment or accidental removal  3. Change Cee 30 days after insertion. Standing Status:   Standing     Number of Occurrences:   1    NURSING ASSESSMENT:  SPECIFY Assess for GIP, routine, or respite level of care. Q SHIFT Routine     Standing Status:   Standing     Number of Occurrences:   1     Order Specific Question:   Please describe the test or procedure you would like to order. Answer:   Assess for GIP, routine, or respite level of care.  PAIN ASSESSMENT Pain and Symptoms: Assess ever 4 hours and PRN, for GIP level of care. PRN Routine     Standing Status:   Standing     Number of Occurrences:   1     Order Specific Question:   Please describe the test or procedure you would like to order. Answer:   Pain and Symptoms: Assess ever 4 hours and PRN, for GIP level of care.     DRESSING CHANGE - PICC, MLC, SUBCUTANEOUS AND ACCESSED PORT DRESSING CHANGE     PICC, MLC, SUBCUTANEOUS AND ACCESSED PORT DRESSING CHANGE:  Change catheter site dressing every 5 days and prn if site dressing becomes damp loosened or visibly soiled       Standing Status:   Standing     Number of Occurrences:   1    BEDREST, COMPLETE     Standing Status:   Standing     Number of Occurrences:   1    NURSING-MISCELLANEOUS: Omit Bowel Regime Omit Bowel Regime: OMIT BOWEL REGIME: GI Bleed. CONTINUOUS     Omit Bowel Regime: OMIT BOWEL REGIME: GI Bleed. Standing Status:   Standing     Number of Occurrences:   1     Order Specific Question:   Description of Order:     Answer:   Omit Bowel Regime    NURSING-MISCELLANEOUS: VERBAL NARRATIVE Admit GIP for dx of metastatic breast Ca for management of pain, nausea/vomiting, and family/pt support. This is an order to admit to inpatient hospice care, for a first 90 day benefit interval, a 70 year ol. .. Admit GIP for dx of metastatic breast Ca for management of pain, nausea/vomiting, and family/pt support. This is an order to admit to inpatient hospice care, for a first 90 day benefit interval, a 70year old woman with a 6 year history of metastatic breast carcinoma who has progressed to end stage disease through several lines of hormonal chemotherapy and immunotherapy under the direction of Dr. Kira Ybarra. She returned to hospital 9/18/18 with brisk upper GI hemorrhage requiring transfusional support and hemorrhagic shock (LA 9.6g/dl). She has cancer related coagulopathy with lower extremity DVT, and her INR was found to be 4.9 while on Warfarin therapy. Her INR has been corrected and hemoglobin stabilized post transfusion over 48 hours. She continues to have copious melena and her sensorium was compromised by hepatic encephalopathy and an ammonia level of 88mmol. The patient and her family have elected at this point to stop life extending measures including further transfusion, IV antibiotics and artificial hydration. With the GI bleed and associated hypertension there has been acute on chronic exacerbation of renal failure with  and Creatinine 4.69g/dl. There is oliguria and her GFR is calculated at less than 10ml/min.  Hepatic metastases and idiopathic cirrhosis has resulted in portal hypertension and malignant ascites necessitating pleurX drain for symptom management. The patent has very extensive bony metastases and bone marrow invasion due to the primary breast cancer. She is requiring aggressive doses of parenteral opioids and parenteral emetic therapy for comfort. She is being kept NPO due to the upper GI bleed. With care limited to comfort measures only her life expectancy is less than one week. Severe hyperammonemia contributes to her pleural effusion and ascites. Acute on chronic diastolic dysfunction associated CHF as well as diabetes, hypertension and   chronic renal failure with hydronephrosis and indwelling ureteral stent are all diagnoses apart from her breast cancer contributing to her adverse prognosis. Standing Status:   Standing     Number of Occurrences:   1     Order Specific Question:   Description of Order:     Answer:   VERBAL NARRATIVE    NURSING-MISCELLANEOUS: Abdominal PleurX Please maintain current dressing until soiled or loose and then may change PRN. Provider to assess need for draining PRN. CONTINUOUS     Please maintain current dressing until soiled or loose and then may change PRN. Provider to assess need for draining PRN. Standing Status:   Standing     Number of Occurrences:   1     Order Specific Question:   Description of Order:     Answer:   Abdominal PleurX    WOUND CARE, DRESSING CHANGE     Wound Care:  Location: Sacrum  Decubitus Wounds Stage II ([place])- Cleanse wound location with wound cleanser, pat dry and apply Cavilon Durable Barrier Cream every 12 hours and PRN if soiled. Turn every 2 hours. Assess with each nursing assessment visit. Standing Status:   Standing     Number of Occurrences:   29    DO NOT RESUSCITATE     Standing Status:   Standing     Number of Occurrences:   1     Order Specific Question:   Comfort Measures Only? Answer:    Yes    OXYGEN CANNULA Liters per minute: 2; Indications for O2 therapy: RESPIRATORY DISTRESS PRN Routine Standing Status:   Standing     Number of Occurrences:   1     Order Specific Question:   Liters per minute: Answer:   2     Order Specific Question:   Indications for O2 therapy     Answer:   RESPIRATORY DISTRESS    haloperidol lactate (HALDOL) injection 2 mg    haloperidol lactate (HALDOL) 5 mg/mL injection     RADHAMES HATFIELD   : cabinet override    sodium chloride (NS) flush 10 mL    heparin (porcine) pf 300 Units    sodium chloride (NS) flush 10 mL    DISCONTD: heparin (porcine) pf 300 Units    OR Linked Order Group     HYDROmorphone (DILAUDID) syringe 0.5 mg     HYDROmorphone (DILAUDID) syringe 0.5 mg    OR Linked Order Group     haloperidol lactate (HALDOL) injection 2 mg     haloperidol lactate (HALDOL) injection 2 mg    acetaminophen (TYLENOL) suppository 650 mg    glycopyrrolate (ROBINUL) injection 0.2 mg    DISCONTD: fentaNYL (DURAGESIC) 50 mcg/hr patch 1 Patch    fentaNYL (DURAGESIC) 75 mcg/hr patch 1 Patch    LORazepam (ATIVAN) injection 1 mg    INITIAL PHYSICIAN ORDER: HOSPICE Level Of Care: General; Reason for Admission: Admit GIP for dx of metastatic breast Ca for management of pain, nausea/vomiting, and family/pt support. Standing Status:   Standing     Number of Occurrences:   1     Order Specific Question:   Status     Answer:   Hospice     Order Specific Question:   Level Of Care     Answer:   General     Order Specific Question:   Reason for Admission     Answer:   Admit GIP for dx of metastatic breast Ca for management of pain, nausea/vomiting, and family/pt support. Order Specific Question:   Inpatient Hospitalization Certified Necessary for the Following Reasons     Answer:   3.  Patient receiving treatment that can only be provided in an inpatient setting (further clarification in H&P documentation)     Order Specific Question:   Admitting Diagnosis     Answer:   Metastatic breast cancer [facility]) [[id number]]     Order Specific Question:   Terminal Prognosis Diagnosis(es)     Answer:   Nausea & vomiting [[id number]]     Order Specific Question:   Terminal Prognosis Diagnosis(es)     Answer:   Pain, cancer [[id number]]     Order Specific Question:   Terminal Prognosis Diagnosis(es)     Answer:   GI bleed [[id number]]     Order Specific Question:   Terminal Prognosis Diagnosis(es)     Answer:   Ascites [[id number]]     Order Specific Question:   Admitting Physician     Answer:   Malachi Ormond     Order Specific Question:   Attending Physician     Answer:   Malachi Ormond     Order Specific Question:   Discharge Plan:     Answer: Other (Specify)     Comments:   Anticipate demise at [facility].  IP CONSULT TO SOCIAL WORK     Standing Status:   Standing     Number of Occurrences:   1     Order Specific Question:   Reason for Consult: Answer: For [facility] Only. For contracted patients, their primary hospice will continue to manage social work needs. Allergies:  No Known Allergies    Care Plan:       Multidisciplinary Problems (Active)           Problem: Aide Supervisory Visit     Dates: Start: 09/21/18       Disciplines: Interdisciplinary    Goal: Supervision of Home Health Aide     Dates: Start: 09/21/18   Expected End: 09/30/18      Disciplines: Interdisciplinary   Intervention: Aide supervisory visit    Dates: Start: 09/21/18            Problem: Anticipatory Grief     Dates: Start: 09/26/18       Disciplines: Interdisciplinary    Goal: Grief heard and acknowledged, anxiety reduced, patient coping identified, patient/family expressed gratitude     Dates: Start: 09/26/18   Expected End: 10/11/18      Disciplines: Interdisciplinary   Intervention: Assess grief responses    Dates: Start: 09/26/18       Description: Assess for feelings of grief    Intervention: Support grieving process    Dates: Start: 09/26/18       Description: Address feelings of loss, anticipatory grief, expression of concern.           Problem: Anticipatory Grief     Dates: Start: 09/26/18       Disciplines: Interdisciplinary    Goal: Explore Reactions To and Verbalize Acceptance of Impending Loss     Dates: Start: 09/26/18   Expected End: 10/11/18      Description: Patient/family/caregiver will explore reactions to and verbalize acceptance of impending loss. Disciplines: Interdisciplinary   Intervention: Assess grief responses    Dates: Start: 09/26/18       Description: Assess for feelings of grief    Intervention: Instruct on grief process and coping strategies    Dates: Start: 09/26/18       Description: Instruct patient/caregiver on the grief process and effective coping strategies         Intervention: Provide grief counseling/education    Dates: Start: 09/26/18       Description: Provide patient/caregiver grief counseling and educate on community resources    Intervention: Refer to grief support in community    Dates: Start: 09/26/18       Description: Refer patient/caregiver to community resources for grief support          Problem: Comfort Deficit     Dates: Start: 09/21/18       Disciplines: Interdisciplinary    Goal: Reduce/control pain     Dates: Start: 09/21/18   Expected End: 09/30/18      Description: Patient will report that pain has been reduced or controlled through verbal and nonverbal means and that measures to promote comfort are effective. Disciplines: Interdisciplinary   Intervention: Alternative comfort measures    Dates: Start: 09/21/18       Description: Identify alternative comfort measures with patient/caregiver. Intervention: Instruct on sleeping positions for breathing comforts    Dates: Start: 09/21/18       Description: Instruct patient/caregiver on positions to aide in sleep breathing comfort.     Intervention: Monitor for symptoms of discomfort    Dates: Start: 09/21/18            Problem: Communication Deficit     Dates: Start: 09/21/18       Disciplines: Interdisciplinary    Goal: Effectively communicate symptoms, needs, and concerns Dates: Start: 09/21/18   Expected End: 09/30/18      Description: Patient/family/caregiver will effectively communicate symptoms, needs and concerns. Disciplines: Interdisciplinary   Intervention: Assess for deficit in communication    Dates: Start: 09/21/18      Intervention: Edilson Stevenson on strategies to effectively comminicate    Dates: Start: 09/21/18       Description: Instruct patient/caregiver on strategies to effectively communicate. Problem: Coping and Emotional Distress     Dates: Start: 09/26/18       Disciplines: Interdisciplinary    Goal: Demonstrate Acceptance of Terminal Illness and Disease Progression     Dates: Start: 09/26/18   Expected End: 10/11/18      Description: Patient/family/caregiver will demonstrate acceptance of terminal disease and understanding of disease progression while employing appropriate mechanisms. Disciplines: Interdisciplinary   Intervention: Assess emotional status and coping mechanisms    Dates: Start: 09/26/18       Description: Assess patient/caregiver emotional status and coping mechanisms         Intervention: Assess family's level of coping    Dates: Start: 09/26/18      Intervention: Instruct on effective coping strategies    Dates: Start: 09/26/18       Description: Patient/family/caregiver will demonstrate acceptance of terminal disease and understanding of disease progression while employing coping mechanisms          Problem: Falls - Risk of     Dates: Start: 09/21/18       Disciplines: Interdisciplinary    Goal: *Absence of Falls     Dates: Start: 09/21/18   Expected End: 09/30/18      Description: Document Benito Chopra Fall Risk and appropriate interventions in the flowsheet.     Disciplines: Interdisciplinary         Problem: Grieving     Dates: Start: 09/26/18       Disciplines: Interdisciplinary    Goal: *Able to identify stages of grieving process     Dates: Start: 09/26/18   Expected End: 10/11/18      Disciplines: Interdisciplinary   Intervention: Assist patient and family to decide about autopsy,  plans, completing important life tasks, coping with impending death, engaging in meaningful rituals, providing care of the body after death    Dates: Start: 18            Problem: Hospice Orientation     Dates: Start: 18       Disciplines: Interdisciplinary    Goal: Demonstrate understanding of hospice philosophy, plan of care, and home hospice program     Dates: Start: 18   Expected End: 18      Description: The patient/family/caregiver will demonstrate understanding of hospice philosophy, plan of care and the home hospice program as evidenced by participation in meeting the patient's psychosocial, spiritual, medical, and physical needs inclusive of medical supplies/equipment focusing on symptoms. Disciplines: Interdisciplinary   Intervention: Instruct on hospice philosophy    Dates: Start: 18      Intervention: Instruct: hospice orientation    Dates: Start: 18      Intervention: Instruct: medical equipment    Dates: Start: 18       Description: Instruct patient/caregiver on medical equipment and supplies.     Intervention: Instruct: medical power of  and will    Dates: Start: 18      Intervention: Instruct:terminal illness    Dates: Start: 18            Problem: Pain     Dates: Start: 18       Disciplines: Interdisciplinary    Goal: Verbalize satisfaction of level of comfort and symptom control     Dates: Start: 18   Expected End: 18      Disciplines: Interdisciplinary   Intervention: Assess effectiveness of pain management    Dates: Start: 18      Intervention: Assess for signs/symptoms of acute pain    Dates: Start: 18      Intervention: Assess for signs/symptoms of chronic pain    Dates: Start: 18      Intervention: Instruct on non-pharmacological pain management    Dates: Start: 18      Intervention: Instruct on pain scales    Dates: Start: 09/21/18      Intervention: Instruct on pharmacological pain management    Dates: Start: 09/21/18            Problem: Patient Education: Go to Patient Education Activity     Dates: Start: 09/21/18       Disciplines: Interdisciplinary    Goal: Patient/Family Education     Dates: Start: 09/21/18   Expected End: 09/30/18      Disciplines: Interdisciplinary         Problem: Patient Education: Go to Patient Education Activity     Dates: Start: 09/22/18       Disciplines: Interdisciplinary    Goal: Patient/Family Education     Dates: Start: 09/22/18   Expected End: 09/30/18      Disciplines: Interdisciplinary         Problem: Potential for Skin Breakdown     Dates: Start: 09/21/18       Disciplines: Interdisciplinary    Goal: Demonstrate ability to care for skin, monitor areas of breakdown and demonstrate methods to prevent breakdown     Dates: Start: 09/21/18   Expected End: 09/30/18      Description: Patient/family/caregiver will demonstrate ability to care for patient's skin, monitor for areas of breakdown, and demonstrate methods to prevent breakdown during hospice care. Disciplines: Interdisciplinary   Intervention: Assess for skin breakdown    Dates: Start: 09/21/18      Intervention: Edilson Stevenson on strategies to reduce risk of skin breakdown    Dates: Start: 09/21/18            Problem: Pressure Injury - Risk of     Dates: Start: 09/22/18       Disciplines: Interdisciplinary    Goal: *Prevention of pressure injury     Dates: Start: 09/22/18   Expected End: 09/30/18      Description: Document Marques Scale and appropriate interventions in the flowsheet. Disciplines: Interdisciplinary         Problem: Risk for Falls     Dates: Start: 09/21/18       Disciplines: Interdisciplinary    Goal: Free of falls during episode of care     Dates: Start: 09/21/18   Expected End: 09/30/18      Description: Patient will be free of falls during episode of care.     Disciplines: Interdisciplinary   Intervention: Assess fall risk Dates: Start: 09/21/18       Description: Complete fall risk assessment on admission, recertification, and as indicated on fall. Intervention: Instruct mobility    Dates: Start: 09/21/18       Description: Teach patient/caregiver/family techniques to increase patient's mobility: range of motion exercises, assisting with ambulation, proper usage of mobility assistive devices, use of side rails to define bed perimeter and to assist with turning and positioning. Intervention: Instruct on fall prevention    Dates: Start: 09/21/18       Description: Instruct patient/family in fall prevention strategies:  lighted hallways, remove throw rugs, monitor medication that may alter mental status. Problem: Spiritual Evaluation     Dates: Start: 09/26/18       Disciplines: Interdisciplinary    Goal: Identify beliefs/practices that support hospice experience     Dates: Start: 09/26/18   Expected End: 10/11/18      Description: Patient/family identify their beliefs/practices that impair Hospice experience. Patient/family identify their beliefs/practices that support Hospice experience. Patient coping identified. Spiritual distress identified and decreased with visit. Disciplines: Interdisciplinary   Intervention: Assess spiritual needs    Dates: Start: 09/26/18       Description: Assist with spiritual questions    Intervention: Assist with clergy contact    Dates: Start: 09/26/18      Intervention: Assist with spiritual resources    Dates: Start: 09/26/18      Intervention: Offer ongoing support/referrals to community resources    Dates: Start: 09/26/18      Intervention: Provide spiritual support    Dates: Start: 09/26/18       Description: Assist with coordination of spiritual needs.   Needs may include communion, anointing, / visits, and fear.             ___________________    Care Team Notes          POC/IDG Notes      [facility] IDG Nurse Notes by No Wagoner RN at 09/28/18 1100  Version 1 of 1 Author:  Erik Koch RN Service:  Loyd Watts Author Type:  Registered Nurse    Filed:  09/28/18 1331 Date of Service:  09/28/18 1100 Status:  Signed    :  Erik Koch RN (Registered Nurse)           Patient: Oneal Pierre    Date: 09/28/18  Time: 80  [address] [place] [address] Nurse Notes  F/U IDG:  Pt is a 76-year-old female with breast cancer for management of pain, n/v. Haldol x 2 for agitation and x 1 for nausea. Ativan x 1 for anxiety and nausea. Dilaudid x 5 for dyspnea. Output 300 cc. Plan: Comprehensive plan of care reviewed. IDG and pt./family in agreement with plan of care. The IDG identifies through on-going assessment when a change is needed to the POC; the pt/family will receive care and services necessitated by changes in POC. Medications reviewed by the pharmacist and Medical Director. Signed by: Erik oKch RN       [address] [place] [address] IDG Volunteer Notes by Quita Menjivar at 09/28/18 1257  Version 1 of 1    Author:  Quita Menjivar Service:  Loyd Watts Author Type:  Hospice Volunteer/    Filed:  09/28/18 1258 Date of Service:  09/28/18 1257 Status:  Signed    :  Quita Menjivar (Hospice Volunteer/)               [facility] Interdisciplinary Plan of Care Review     Status Codes I = Initiated C=Continued R=Revised RS = Resolved     C. Volunteer     Goal: Hospice house volunteer (s) enhances the quality of remaining life while patient is at the hospice house. Interventions: Martínez Cano Gene Presume Volunteer (s) will provide companionship to the patient and/or family by visiting at the hospice house       . Martínez [employer] Gene Presume Volunteer (s) will provide respite as needed when requested by patient and/or family. Martínez Ayon Terri  Volunteer will provide activities such as music, reading, pet therapy, etc. as requested. Martínez Vargheseas  Comfort bag delivered. Any other special requests or information regarding volunteer services:     Three visits for pet therapy, companionship, and volunteer nursing assistance are recorded. Per staff request, Ricki Boucher will ask [place] speaking volunteers to come and visit with the patient and family. No further needs identified at this time. These notes have been discussed in [facility] meeting. Signed by: Nini Fuentes       [address] [place] [address]  Notes by William Valentine at 09/28/18 1051  Version 1 of 1    Author:  William Valentine Service:  Licensed Clinical  Author Type:      Filed:  09/28/18 1229 Date of Service:  09/28/18 1051 Status:  Signed    :  William Valentine ()           Patient: Guillermina Dey    Date: 09/28/18  Time: 10:51 AM    [facility]  Notes  LMSW will continue to provide emotional support to the pt and family. Pt is Bhutanese speaking, an  has been coming twice a day at 9 am and 4 pm.  LMSW will call for an  over the weekend for the house staff. LMSW will be culturally sensitive to the [facility]. Signed by: William Valentine       [facility]  Notes by Malcolm Box at 09/28/18 1220  Version 1 of 1    Author:  Malcolm Box Service:  Spiritual Care Author Type:  Pastoral Care    Filed:  09/28/18 1224 Date of Service:  09/28/18 1220 Status:  Signed    :  Malcolm Box ([address])           Patient: Guillermina Dey    Date: 09/28/18  Time: 12:20 PM    [facility]  Notes  Intervention: Ministry of presence, prayer, working with  to insure good communication. Outcome: Patient's background discussed with  who is familiar with patient. Communication with family through . Plan: Continue to provide spiritual and emotional care throughout patient's time with [facility].          Signed by: Malcolm Box       [address] [place] [address] Nurse Notes by Mayra Pop RN at 09/24/18 1100  Version 1 of 1    Author:  Mayra Pop RN Service:  Nelson Banuelos Author Type:  Registered Nurse    Filed:  09/24/18 1429 Date of Service:  09/24/18 1100 Status:  Signed    :  Usama Markham RN (Registered Nurse)           Patient: Han Colón    Date: 09/24/18  Time: 80  [address] [place] [address] Nurse Notes  1st IDG: Pt is a 70year old female with breast cancer who developed a GI bleed and is here for management of pain, n/v. Fentanyl patch was increased to 75 mcg/hr yesterday. Dilaudid still available for breakthrough pain management, but no PRNs needed since then. Pt has had several small dark stools since admission. Diet has advanced to pleasure from clear liquids eating bites and sips. Some nausea and vomiting since admission as well. Ruby catheter inserted 9/23 since pt was becoming dyspneic when up to [facility] with 375 cc of output since then. Abdominal plurex drain in place, but has not needed draining since admission. Plan: Comprehensive plan of care reviewed. IDG and pt./family in agreement with plan of care. The IDG identifies through on-going assessment when a change is needed to the POC; the pt/family will receive care and services necessitated by changes in POC. Medications reviewed by the pharmacist and Medical Director. Signed by: Usama Markham RN       [address] [place] [address]  Notes by Chio Ramires at 09/24/18 1248  Version 1 of 1    Author:  Chio Ramires Service:  Spiritual Care Author Type:  Pastoral Care    Filed:  09/24/18 1250 Date of Service:  09/24/18 1248 Status:  Signed    :  Chio Ramires (Pastoral Care)           Patient: Han Colón    Date: 09/24/18  Time: 12:48 PM    [facility] [place]  Notes  Intervention:  offered ministry of presence and prayer during an initial visit. Plan: Contact family to gain a better understanding of patient's spiritual background and needs.      Signed by: Chio Ramires       [address] [place] [address]  Notes by Myesha Pandya at 09/24/18 1040  Version 1 of 1    Author:  Myesha Pandya Service:  Licensed Clinical  Author Type:      Filed:  09/24/18 1250 Date of Service:  09/24/18 1040 Status:  Signed    :  Mendoza Pwoell ()           Patient: Cristina Schulte    Date: 09/24/18  Time: 10:40 AM    [facility] [place]  Notes    LMSW will meet with patient and family to complete the SW assessment. Pt is Turkish speaking, but she can speak some [place]. Her son is fluent in [place] and [place] Signed by: Mendoza Powell       [facility] [place] IDG Volunteer Notes by Molly Hernandez at 09/24/18 1149  Version 1 of 1    Author:  Molly Hernandez Service:  Nilo Arreguin Author Type:  Hospice Volunteer/    Filed:  09/24/18 1149 Date of Service:  09/24/18 1149 Status:  Signed    :  Molly Hernandez (Hospice Volunteer/)               [facility] Interdisciplinary Plan of Care Review     Status Codes I = Initiated C=Continued R=Revised RS = Resolved     I.  Volunteer     Goal: Hospice house volunteer (s) enhances the quality of remaining life while patient is at the hospice house. Interventions: Thechelita Cannon Theadore Kassandra [place] [facility] Volunteer (s) will provide companionship to the patient and/or family by visiting at the hospice house       . [facility] K[facility] [place] [facility] Volunteer (s) will provide respite as needed when requested by patient and/or family. [facility] Huerta  Volunteer will provide activities such as music, reading, pet therapy, etc. as requested. Thechelita Huerta  Comfort bag delivered. Any other special requests or information regarding volunteer services:     No further needs identified at this time. These notes have been discussed in [facility] meeting.         Signed by: Molly Hernandez

## 2018-09-28 NOTE — PROGRESS NOTES
Report received. Pt round. Pt identified by name. In bed with eyes closed. No s/s of pain, agitation or dyspnea. Bed low and SR up with tab alerts on.  0845 Haldol 2 mg IV for agitation and Dilaudid 0.5 mg IV for dyspnea. Pt denies pain at this time. 1448 Haldol 2 mg IV for agitation and Dilaudid 0.5 mg IV for dyspnea  1630  here and able to help pt speak with MD.   7916 Report given to oncoming RN.

## 2018-09-29 NOTE — ROUTINE PROCESS
TOMALVIN . Appropriate staff notified. Spouse,  Ghislaine, NP and  at the bedside. Spouse tearful, accepting. Notifying son by phone. Son is on way from [facility]. Pt has no jewelry or dentures present. Fentanyl 75mcg/hr patch removed and disposed of per facility protocol. Witnessed by Oddis Goldberg, RN. Post mortem care completed. Awaiting arrival of son. SonJayson arrived at [phone]. [facility] notified at [phone]. Family waiting for arrival of [facility].  Home arrived at [facility]. Body removed by Jean Bautista of [facility] at [address] [facility].

## 2018-09-29 NOTE — PROGRESS NOTES
Report received. Pt round. Pt identified by name. In bed with eyes closed. No s/s of pain, agitation or dyspnea. Bed low and SR up with tab alerts on.   1050 Dressing to port changed. Fentanyl patch placed on right chest.   1753 Pt vomiting up large amount of dark brown/black liquid. Given Haldol, Dilaudid and Ativan IV for nausea/vomiting and dyspnea. RR 40 and labored. Explained to  via son on phone what is happening with pt.  called at [address] [facility].  Will send someone ASAP.   1812 Dilaudid 0.5 mg IV for dyspnea  RR 28 and labored  1851 Report given to oncoming RN

## 2018-09-30 PROCEDURE — 0656 HSPC GENERAL INPATIENT

## 2018-09-30 NOTE — PROGRESS NOTES
Progress Note    Patient: Claudeen Siren MRN: [id number]  SSN: [id number]    YOB: 1950  Age: 70 y.o. Sex: female      Admit Date: 9/21/2018    LOS: 8 days     Subjective:     Review of Systems:  Review of systems not obtained due to patient factors. Objective:     Vitals:    09/27/18 0410 09/28/18 1735 09/29/18 0508 09/29/18 1733   BP: 163/76 144/72 176/78 147/68   Pulse: (!) 103 (!) 111 (!) 102 (!) 111   Resp: 30 23 18 28   Temp: 96 °F (35.6 °C) 98 °F (36.7 °C) 97.1 °F (36.2 °C) 96.3 °F (35.7 °C)        Intake and Output:  Current Shift:    Last three shifts: 09/28 0701 - 09/29 1900  In: -   Out: 600 [Urine:600]    Physical Exam:   Deferred due to patient status    Lab/Data Review:  No new labs resulted in the last 24 hours.     Assessment:     Principal Problem:    Metastatic breast cancer ([facility] (2/17/2017)    Active Problems:    [facility] (2/17/2017)      Overview: Er+  pr + her-2 lawanda negative stage 4       Bony metastasis (HCC) (2/17/2017)      Anticoagulated on Coumadin (5/24/2017)      Other ascites (7/31/2018)      Upper GI bleeding (9/18/2018)      Ascites (9/18/2018)      Nausea & vomiting (9/18/2018)      Acute blood loss anemia (9/19/2018)        Plan:     Current Facility-Administered Medications   Medication Dose Route Frequency    LORazepam (ATIVAN) injection 1 mg  1 mg IntraVENous Q4H PRN    fentaNYL (DURAGESIC) 75 mcg/hr patch 1 Patch  1 Patch TransDERmal Q72H    sodium chloride (NS) flush 10 mL  10 mL InterCATHeter Q12H    heparin (porcine) pf 300 Units  300 Units InterCATHeter Q12H    sodium chloride (NS) flush 10 mL  10 mL InterCATHeter PRN    HYDROmorphone (DILAUDID) syringe 0.5 mg  0.5 mg SubCUTAneous Q30MIN PRN    Or    HYDROmorphone (DILAUDID) syringe 0.5 mg  0.5 mg IntraVENous Q30MIN PRN    haloperidol lactate (HALDOL) injection 2 mg  2 mg SubCUTAneous Q1H PRN    Or    haloperidol lactate (HALDOL) injection 2 mg  2 mg IntraVENous Q1H PRN    acetaminophen (TYLENOL) suppository 650 mg  650 mg Rectal Q3H PRN    glycopyrrolate (ROBINUL) injection 0.2 mg  0.2 mg IntraVENous Q4H PRN     Visit made with  at bedside. Patient had episode of hematemesis this afternoon and has been unresponsive since that time. Her color is ashen and dusky. Pulses are weak. Respirations are labored and agonal. While I was examing the patient, she passed away at [place] with her  at her side. Bita Jeffries, the  was present and assisted with interpretation with the patient's . I spoke with the patient's son Abdirizak Mera via phone and he was on his way to be with his father. Abdirizak Mera had already contacted [facility] for arrangements. Family friend Riri Kelsey arrived to offer support.      Signed By: Cyrus Hilario NP     September 29, 2018

## 2018-10-08 ENCOUNTER — APPOINTMENT (OUTPATIENT)
Dept: INFUSION THERAPY | Age: 68
End: 2018-10-08

## 2018-12-09 NOTE — PROGRESS NOTES
Warfarin dosing per pharmacist    Gregorio Rogers is a 78 y.o. female. Height: 5' 3\" (160 cm)    Weight: 58.6 kg (129 lb 3 oz)    Indication:  History of DVT    Goal INR:  2-3    Home dose:  Per last anticoagulation visit 1.5 mg (1 mg x 1.5) every day    Risk factors or significant drug interactions:  --    Other anticoagulants:  heparin    Daily Monitoring  Date  INR     Warfarin dose HGB              Notes  7/8  1.4  3 mg  10.1  7/9  1.5  1.5 mg  --    Pt seen at anticoagulation visit on 7/5/17 with INR of 1.9 and no adjustments made. Pt received 3 mg last night for sub therapeutic INR. Will give 1.5 mg tonight as patient has history of extreme sensitivity to warfarin. Pt is being bridged with heparin.       Thank you,  Chau Feldman, PharmD  Clinical Pharmacist  [phone] Sander Pereira)

## 2019-01-10 NOTE — CONSULTS
Consult Patient: Yahaira Olivera MRN: [id number]  SSN: [id number] YOB: 1950  Age: 70 y.o. Sex: female Subjective:  
  
Yahaira Olivera is a 70 y.o. female with PMH of CHF, metastatic breast cancer with liver involvement and ascites, CKD stage 5, DVT on coumadin, O2 dependent, DM2, who is seen in consultation at the request of Dr. Rashmi Contreras for upper GIB and anemia. She presented with Hgb of 6.7 and was transfused 2 units of PRBC. Also noted to have worsening renal function with Cr up to 4.9 with acidosis and hyperkalemia. Has made 250cc of urine today. History obtained from chart and with assistance of an . Patient states that she scheduled to meet with hospice care in the morning. Past Medical History:  
Diagnosis Date  Acute on chronic diastolic congestive heart failure ([address] 1/5/2016  Anemia of chronic disease 11/13/2017  Aortic valve stenosis  Arthritis   
 roverto hands  Asthma   
 till age 31  
 Cancer ([address]   
 roverto. breast ca mets bone/lung  Chemotherapy-induced neutropenia (HCC) 12/20/2016  CKD (chronic kidney disease) stage 4, GFR 15-29 ml/min (HCC)  Depression   
 managed with meds  Diastolic CHF ([address] Followed by [facility]  Former cigarette smoker  HCAP (healthcare-associated pneumonia) 7/17/2013  History of DVT (deep vein thrombosis) Left leg DVT. on coumadin  Hypertension   
 managed with meds  Long term current use of anticoagulant Coumadin  Oxygen dependent   
 at bedtime 2L-3L NC  
 Port catheter in place Left chest port  Sepsis ([address] 7/19/2016  
 SOB (shortness of breath) on exertion  Type 2 diabetes mellitus ([address] PRN oral agent/AVG BS: 170-200/ s.s of hypoglycemia at 80  Unspecified adverse effect of anesthesia  In [place] 28 yrs ago after second c -section-she was told her heart stopped d/t anesthesia-hospitalized for 5 days afterwards and followed by Telephone Encounter by Ana Flores NCMA at 03/15/17 09:17 AM     Author:  Ana Flores NCMA Service:  (none) Author Type:  Certified Medical Assistant     Filed:  03/15/17 09:17 AM Encounter Date:  3/9/2017 Status:  Signed     :  Ana Flores NCMA (Certified Medical Assistant)            Left message on machine at preferred number to have patient call the office back.[SB1.1T]        Revision History        User Key Date/Time User Provider Type Action    > SB1.1 03/15/17 09:17 AM Ana Flores NCMA Certified Medical Assistant Sign    T - Template             cardiologist  
 Vitamin B12 deficiency 11/13/2017 Past Surgical History:  
Procedure Laterality Date  COLONOSCOPY N/A 3/19/2018 COLONOSCOPY  BMI 23   performed by Aure Cárdenas MD at [address] [facility] X2  
 HX CYST REMOVAL    
 HX UROLOGICAL    
 stent placed  HX VASCULAR ACCESS  01/26/2012 Left chest port Family History Problem Relation Age of Onset  Heart Attack Mother  Seizures Mother  Alzheimer Father  Cancer Brother   
  doen not know details  No Known Problems Sister  No Known Problems Sister  No Known Problems Brother Social History Substance Use Topics  Smoking status: Former Smoker Packs/day: 1.00 Years: 8.00 Types: Cigarettes Quit date: 1/1/1978  Smokeless tobacco: Never Used Comment: quit 30 or more years ago  Alcohol use No  
  
Current Facility-Administered Medications Medication Dose Route Frequency Provider Last Rate Last Dose  pantoprazole (PROTONIX) 80 mg in 0.9% sodium chloride 250 mL infusion  80 mg IntraVENous CONTINUOUS Rafat Mata DO 25 mL/hr at 09/18/18 0306 80 mg at 09/18/18 5955  cefTRIAXone (ROCEPHIN) 1 g in 0.9% sodium chloride (MBP/ADV) 50 mL  1 g IntraVENous Q24H Khushboo Hernandez MD   Stopped at 09/18/18 4042  albuterol-ipratropium (DUO-NEB) 2.5 MG-0.5 MG/3 ML  3 mL Nebulization Q6H PRN Khushboo Hernandez MD      
 fentaNYL (DURAGESIC) 50 mcg/hr patch 1 Patch  1 Patch TransDERmal Q72H Khushboo Hernandez MD      
 0.9% sodium chloride infusion  100 mL/hr IntraVENous CONTINUOUS Ant Patricio  mL/hr at 09/18/18 1805 100 mL/hr at 09/18/18 1805  insulin lispro (HUMALOG) injection   SubCUTAneous Trace Hennessy MD   Stopped at 09/18/18 1821  ondansetron (ZOFRAN) injection 4 mg  4 mg IntraVENous Q6H PRN Khushboo Hernandez MD   4 mg at 09/18/18 0630  acetaminophen (TYLENOL) tablet 650 mg  650 mg Oral Q6H PRN Maxwell Chicas Pita Mejía MD   650 mg at 09/18/18 2511  promethazine (PHENERGAN) with saline injection 12.5 mg  12.5 mg IntraVENous Q4H PRN Sohail Clark MD   12.5 mg at 09/18/18 4248  
 0.9% sodium chloride infusion 250 mL  250 mL IntraVENous PRN Alin Bejarano MD      
 phytonadione (vitamin K1) (AQUA-MEPHYTON) 10 mg in 0.9% sodium chloride 50 mL IVPB  10 mg IntraVENous ONCE Alin Bejarano MD      
 0.9% sodium chloride infusion 250 mL  250 mL IntraVENous PRN Alin Bejarano MD      
 influenza vaccine 2018-19 (6 mos+)(PF) (FLUARIX QUAD/FLULAVAL QUAD) injection 0.5 mL  0.5 mL IntraMUSCular PRIOR TO DISCHARGE Alin Bejarano MD      
 HYDROmorphone (PF) (DILAUDID) injection 1 mg  1 mg IntraVENous Q3H PRN Alin Bejarano MD   1 mg at 09/18/18 1816 No Known Allergies Review of Systems: 
Unable to obtain 2/2 language barrier Objective:  
 
Vitals:  
 09/18/18 1250 09/18/18 1330 09/18/18 1402 09/18/18 1530 BP: 158/48 150/55 156/58 160/61 Pulse: 83 79 80 86 Resp: 20 19 20 18 Temp: 97.8 °F (36.6 °C) 97.6 °F (36.4 °C) 97.7 °F (36.5 °C) 97.7 °F (36.5 °C) SpO2: 97% 97% 98% 98% Weight: 52.5 kg (115 lb 11.2 oz) Height:      
  
 
Physical Exam: V/s reviewed GEN: frail/pale appearing female, oriented x3 HEENT: NCAT, EOMI 
CV: RRR no m/r/g Lungs: CTAB no W/R/R Abd: distended abdomen with peritoneal catheter in place Ext:  No edema bilaterally Assessment:  
 
Hospital Problems  Date Reviewed: 9/18/2018 Codes Class Noted POA * (Principal)Upper GI bleeding ICD-10-CM: K92.2 ICD-9-CM: 578.9  9/18/2018 Unknown Ascites ICD-10-CM: R18.8 ICD-9-CM: 789.59  9/18/2018 Unknown Supratherapeutic INR ICD-10-CM: R79.1 ICD-9-CM: 790.92  9/18/2018 Unknown Elevated lactic acid level ICD-10-CM: R79.89 ICD-9-CM: 276.2  9/18/2018 Unknown Breast cancer metastasized to liver [facility]) ICD-10-CM: C50.919, C78.7 ICD-9-CM: 174.9, 197.7  9/18/2018 Unknown Pancreatitis ICD-10-CM: K85.90 ICD-9-CM: 393.3  9/18/2018 Unknown Nausea & vomiting ICD-10-CM: R11.2 ICD-9-CM: 787.01  9/18/2018 Unknown Anticoagulated on Coumadin (Chronic) ICD-10-CM: Z51.81, Z79.01 
ICD-9-CM: V58.83, V58.61  5/24/2017 Yes Bony metastasis ([address] (Chronic) ICD-10-CM: C79.51 
ICD-9-CM: 198.5  2/17/2017 Yes DVT (deep venous thrombosis) (HCC) (Chronic) ICD-10-CM: H87.629 ICD-9-CM: 453.40  1/4/2017 Yes Overview Signed 11/19/2013  6:07 AM by Jluis Martin 11-18-13 There is nonocclusive thrombus within the left common femoral vein, superficial femoral vein, popliteal vein, and posterior tibial vein Accelerated hypertension ICD-10-CM: I10 
ICD-9-CM: 401.0  1/4/2017 Yes DM (diabetes mellitus) (HCC) (Chronic) ICD-10-CM: E11.9 ICD-9-CM: 250.00  10/7/2016 Yes Acute on chronic kidney failure (HCC) ICD-10-CM: N17.9, N18.9 ICD-9-CM: 584.9, 585.9  10/7/2016 Yes Anemia ICD-10-CM: D64.9 ICD-9-CM: 285.9  11/19/2013 Yes Overview Signed 11/19/2013  6:06 AM by Jluis Martin 11-18-13 admitted with anemia and transfused Plan:  
1) EMERY on CKD- with decreased UOP, azotemia (in the setting of GI bleed), hyperkalemia and lactic acidosis. Discussed with patient with assistance of  as well as her son regarding starting her on dialysis given her poor prognosis. They were both in agreement, that patient would not want dialysis and understand the implications of not pursuing it to include death. WIll plan on conservative measures overnight, volume resuscitation with NaHCO3 overnight and shifting potassium. 2) Hyperkalemia-  In the setting of blood transfusions. Will plan on Ca Gluconate,  D50, IV insulin 3) Anemia- acute GI bleed s/p transfusion and FFP. Monitoring. 4) Lactic acidosis- mulitfactorial.  Volume resuscitation with NaHC0 gtt. 4) Ac Signed By: Hitesh Nielsen MD   
 September 18, 2018

## 2019-03-21 NOTE — PROGRESS NOTES
ID, bedside report. She is awake, declines pain/nausea medication. She is able to express basic needs, using sign language and verbalization. Unaccompanied. 750- Assessment, ESAS, xi and fall risk completed at this time. Positioned for comfort. All safety/fall precautions continued as per previous and assessment completed. Continue CNA plan of care without changes at this time. Peripheral pulses +  No mottling or discoloration of extremities. She assists, anticipates and follows some commands with gestures to assist communication. She does request pain medication at this time. 830- Eyes closed. Skin warm, dry. She does not awake to voice. FLACC 0. Unaccompanied. 1000- Family in to visit. She denies nausea at this time. She accepts a snack from [employer] that they have brought with them. 1120- Assisted to commode to void and have small soft brown BM. She is dyspneic and c/o abd pain with this activity. Medicated. Family remains at the bedside. She verbalizes interest in food and family reports they will go get her what she likes, as she declines what I can offer at this time. 130pm- Restful. Family remains at the bedside. She denies need for medication. Family member is able to provide some interpretation. 1515- Family remains. She takes snacks and fluids quite regularly. She denies pain or nausea at this time. 1545- Up to void approx 100ml lizzy/brown urine on commode. She becomes too weak to stand to return and is assisted to bed without a brief. Brief applied in bed, but she is so dyspneic she cannot lay down. 02 2L/min PNC initiated and she is eager to apply it when she sees it. 1630- assisted to commode. She removes her oxygen and is quite short of breath. O2 reapplied and it relieves her distress within minutes. She denies pain at this time, taking sips of soup brought by family. 1810- Restful.   She again removes oxygen to use commode and Rohan Copeland is a 12 year old male who presents for well exam.  Patient presents with Mother.    Concerns raised today include: none    Social History:   School: [employer]  Hobbies/Activities: Track and trapshooting  Future Plans: unsure  Tobacco: Denies  EtOH: Denies  Illicit drugs or Homeopathic medicines: Denies  Family History: Negative for athletic cardiac events      PAST HISTORY:  Past medical, surgical and family histories reviewed and updated.    REVIEW OF SYSTEMS:  General:  Feeling well, no fever/chills or recent rapid weight changes.  Dermatologic:  No new or changes in existing moles or lesions.  No rashes.  Neurologic:  No dizziness, no headaches or numbness/tingling.  Respiratory:  No URI (upper respiratory infection) symptoms. No cough, wheeze, or SOB (shortness of breath).  Nodes:  No noted lymphadenopathy or swollen glands.  Cardiac:  No chest pain, palpitations, or skipped beats. No prior history of murmur. No history of syncope.  Gastrointestinal:  No emesis, diarrhea, constipation, or blood in stools.  Genitourinary:  No polyuria or dysuria, urgency or frequency.  Musculoskeletal:  No joint swelling/warmth, no recent injuries.  Hematologic:  No easy bruising or bleeding.  Psychiatric:  No depressive symptoms.    PHYSICAL EXAM:  Blood pressure 116/62, pulse 72, resp. rate 18, height 5' 2\" (1.575 m), weight 49 kg.  General:  Well-appearing male, no acute distress.  Dermatologic:  No lesions or rashes noted.  HEENT:  PERRL (pupils equal, round, and reactive to light), EOMI (extraocular movements are intact), no conjunctival injection.  No scleral icterus.  TMs (tympanic membranes) with normal landmarks bilaterally.  Oropharynx with moist mucous membranes, clear.  Neck:  Supple.  Nodes:  No adenopathy.  Lungs:  Clear to auscultation.  No wheezes, rales, or rhonchi.  Normal respiratory effort.  Cardiac:  Regular rate and rhythm, normal S1 and S2, no murmur appreciated.  Abdomen:  Soft,  despite my instruction to leave it on, she declines. Repositioned in bed for comfort. She denies pain, nausea at this time. Family member at the bedside. I verify that a minimum of hourly rounds have been provided for this patient during this shift. nontender, nondistended.  Normoactive bowel sounds.  No organomegaly or masses.  Genitalia:  Rodrigo 2 male without external lesions.  Testes without abnormalities.  No hernia.  Extremities:  Full ROM (range of motion) UE/LE (upper extremities/lower extremities).  Warm, well perfused.  No clubbing/cyanosis/edema.  Back:  No scoliosis.  Neurologic:  Grossly intact.  Strength 5/5 bilaterally.  Normal tone.  Gait non-ataxic, toe/heel walking intact.    ASSESSMENT:  Well 12 year old male adolescent.    BEHAVIOR/GUIDANCE:      Tobacco, EtOH, drug avoidance - DISCUSSED      Sexual activity & avoidance/Safe sex - DISCUSSED      Puberty - DISCUSSED      Accident prevention - DISCUSSED      School achievement - DISCUSSED      Family/Peer relationships - DISCUSSED      Regular physical activity - DISCUSSED      Healthy food choices - DISCUSSED    PLAN:  All questions answered.   [employer] [place] [employer] participation okay  Immunizations, labs per orders  Return visit in 1-2 years for WCE (well-child exam), prn illness/concerns    Kalani Diallo NP

## 2021-06-15 NOTE — PROGRESS NOTES
Edilson Peña Admission Date: 7/31/2018 Renal Daily Progress Note: 8/4/2018 Subjective:  
 
Patient seen and examined doing well. ROS: 
Constitutional: + fatigue; no fever, chills, weakness . CV: no chest pain, palpitations, edema. Respiratory: no dyspnea, cough, wheezing. GI: + abdominal distention; no nausea, vomiting, diarrhea, constipation Current Facility-Administered Medications Medication Dose Route Frequency  polyethylene glycol (MIRALAX) packet 17 g  17 g Oral DAILY  0.9% sodium chloride infusion 250 mL  250 mL IntraVENous PRN  
 warfarin (COUMADIN) tablet 1.5 mg - On Hold  1.5 mg Oral See Admin Instructions  albuterol-ipratropium (DUO-NEB) 2.5 MG-0.5 MG/3 ML  3 mL Nebulization Q6H PRN  
 amLODIPine (NORVASC) tablet 10 mg  10 mg Oral DAILY  carvedilol (COREG) tablet 3.125 mg  3.125 mg Oral BID WITH MEALS  
 hydrALAZINE (APRESOLINE) tablet 50 mg  50 mg Oral BID  
 HYDROcodone-acetaminophen (NORCO)  mg tablet 1 Tab  1 Tab Oral Q6H PRN  
 LORazepam (ATIVAN) tablet 1 mg  1 mg Oral Q6H PRN  
 megestrol (MEGACE) 400 mg/10 mL (10 mL) oral suspension 800 mg  800 mg Oral DAILY  mirtazapine (REMERON) tablet 30 mg  30 mg Oral QHS  montelukast (SINGULAIR) tablet 10 mg  10 mg Oral QHS  temazepam (RESTORIL) capsule 15 mg  15 mg Oral QHS PRN Objective:  
 
Vitals:  
 08/03/18 2253 08/04/18 0400 08/04/18 9125 08/04/18 0909 BP: 130/56 127/46 147/55 Pulse: 71 (!) 57 63 66 Resp: 16 18 17 Temp: 98.7 °F (37.1 °C) 98.3 °F (36.8 °C) 98.6 °F (37 °C) SpO2: 96% 96% 96% Weight:      
 
Intake and Output:  
08/02 1901 - 08/04 0700 In: 840 [P.O.:840] Out: 750 [Urine:750] 08/04 0701 - 08/04 1900 In: 320 [P.O.:320] Out: - Physical Exam:  
Constitutional:  the patient is well developed and in no acute distress HEENT:  Sclera clear, pupils equal, oral mucosa moist 
Lungs: clear bilaterally, no Thyroid function is normal.Total cholesterol is normal at 170. Good cholesterol slightly more elevated last year 36 must be over 40. Patient's triglycerides came down quite nicely previously 281 now 220. Continue exercise and healthy diet. Hepatitis C negative. But liver function elevated so I did order an ultrasound of his liver. Please let the patient know to get this done. A1c was normal.  No diabetes. CBC normal.Thank you. wheezing Cardiovascular:  RRR without M,G,R 
Abd/GI: soft and non-tender; with positive bowel sounds, + distension. Ext: warm without cyanosis. There is no lower leg edema. Musculoskeletal: moves all four extremities with equal strength Skin:  no jaundice or rashes, no wounds Neuro: no gross neuro deficits Musculoskeletal: can ambulate. No deformity Psychiatric: Calm. LAB Recent Labs 08/04/18 
 2350  08/03/18 
 9088  08/02/18 
 7324 WBC   --   2.3*   --   
HGB   --   9.0*   --   
HCT   --   27.6*   --   
PLT   --   149*   --   
INR  1.6  1.7  2.0 Recent Labs 08/04/18 
 0405  08/03/18 
 6386  08/02/18 
 8867 NA  143  144  146*  
K  4.7  5.1  4.9 CL  113*  114*  114* CO2  18*  21  20* GLU  142*  109*  105* BUN  58*  59*  60* CREA  3.53*  3.80*  3.86* MG  2.4  2.5*  2.6* PHOS  3.6   --    --   
ALB  2.5*   --   2.7* SGOT  22   --   32 No results for input(s): PH, PCO2, PO2, HCO3 in the last 72 hours. Assessment:  (Medical Decision Making) Hospital Problems  Date Reviewed: 7/31/2018 Codes Class Noted POA Other cirrhosis of liver ([facility] ICD-10-CM: Z77.68 ICD-9-CM: 571.5  7/31/2018 Yes Other ascites ICD-10-CM: R18.8 ICD-9-CM: 789.59  7/31/2018 Yes Acute-on-chronic kidney injury ([facility] ICD-10-CM: N17.9, N18.9 ICD-9-CM: 584.9, 585.9  7/31/2018 Unknown Metastatic breast cancer ([facility] (Chronic) ICD-10-CM: V37.646 ICD-9-CM: 174.9  2/17/2017 Yes * (Principal)Acute on chronic kidney failure (HCC) ICD-10-CM: N17.9, N18.9 ICD-9-CM: 584.9, 585.9  10/7/2016 Yes Plan:  (Medical Decision Making) 1. EMERY on CKD 2/2 volume depletion in the setting of attempts to control ascites and diuresis 
-Creatinine on admission 4.47 and has been improving with IVF Cr today 3.50  
baseline Cr 2.5-3.0 2. Hyperkalemia- resolved 3.  Metastatic breast cancer- Current treatment with Ibrance/fulvestrant per Onc. 
 
4. Ascites- s/p paracentesis GI following Continue hydration Jaswinder Melendez MD

## 2021-09-30 NOTE — PROGRESS NOTES
Spiritual Care visit. Follow up visit.  had been told that patient could understand English, but according to her , she does not.  did, however, pray with the couple. Understand that she is scheduled for  Transfer out of Unit.     Visit by Mayank Ashley M.Ed., Th.B. ,Staff  Patient

## 2022-04-26 NOTE — DISCHARGE INSTRUCTIONS
Aprenda sobre el derrame pleural - [ Learning About Pleural Effusion ]  ¿Qué es el derrame pleural?  El derrame pleural es la acumulación de líquido en el espacio entre los tejidos que recubren los pulmones y la pared torácica. Debido a la acumulación de líquido, es posible que los pulmones no puedan expandirse completamente, lo cual puede dificultar la respiración. El [place], o parte de Shira ramirez, puede colapsarse. El derrame pleural tiene muchas causas, caterina neumonía, cáncer, inflamación de los tejidos alrededor de los pulmones e insuficiencia cardíaca. El derrame pleural suele diagnosticarse por medio de kim radiografía y un examen físico. El médico escucha cómo el aire fluye en jesus pulmones. ¿Cuáles son los síntomas? Los síntomas del derrame pleural pueden incluir:  · Dificultad para respirar. · Falta de aire. · Dolor en el pecho. · [place]. · Tos. Un derrame pleural leve podría no causar ningún síntoma. ¿Cómo se trata el derrame pleural?  Los médicos quizás tengan que tratar la afección que está causando el derrame pleural. Por ejemplo, pueden darle medicamentos para tratar la neumonía o la insuficiencia cardíaca congestiva. Un derrame pleural leve a menudo desaparece por sí solo sin tratamiento. Extracción de líquido  El derrame pleural puede tratarse mediante la eliminación de líquido del espacio entre los tejidos que [employer]. [place] se hace con kim aguja que se coloca en el pecho (toracocentesis). Puede enviarse kim pequeña cantidad del líquido a un laboratorio para averiguar lo que está causando la acumulación de líquido. La extracción del líquido puede ayudar a [employer], tales caterina falta de aire y dolor en el pecho. Puede ayudar a que los pulmones se expandan más ampliamente. En algunos casos, si el derrame pleural no mejora, se puede colocar un catéter en el pecho. Neda es un tubo flexible que permite que el líquido drene de los pulmones.  El catéter [employer] hasta que el médico lo retira. Algunas personas pueden recibir un tratamiento que elimina el líquido y luego introduce un medicamento en la cavidad torácica. Pamplico ayuda a evitar que se vuelva a acumular demasiado líquido. La atención de seguimiento es kim parte clave de merrill tratamiento y seguridad. Asegúrese de hacer y acudir a todas las citas, y llame a merrill médico si está teniendo problemas. También es kim buena idea saber los resultados de jesus exámenes y mantener kim lista de los medicamentos que sharmila. ¿Dónde puede encontrar más información en inglés? Kim Myron a http://[email or web address] Advanced Materialsabena.info/. Escriba E678 en la búsqueda para aprender más acerca de \"Aprenda sobre el derrame pleural - [ Learning About Pleural Effusion ]. \"  Revisado: 23 Sterling, 2016  Versión del contenido: 11.1  © 3690-4092 Healthwise, Incorporated. Las instrucciones de cuidado fueron adaptadas bajo licencia por [employer] (which disclaims liability or warranty for this information). Si usted tiene [place] afección médica o sobre estas instrucciones, siempre pregunte a merrill profesional de sherrie. Healthwise, Incorporated niega toda garantía o responsabilidad por merrill uso de esta información. DISCHARGE SUMMARY from Nurse    The following personal items are in your possession at time of discharge:    Dental Appliances: None  Visual Aid: None     Home Medications: None  Jewelry: None  Clothing: Pajamas  Other Valuables: None             PATIENT INSTRUCTIONS:    After general anesthesia or intravenous sedation, for 24 hours or while taking prescription Narcotics:  · Limit your activities  · Do not drive and operate hazardous machinery  · Do not make important personal or business decisions  · Do  not drink alcoholic beverages  · If you have not urinated within 8 hours after discharge, please contact your surgeon on call.     Report the following to your surgeon:  · Excessive pain, swelling, redness or odor of or around the surgical area  · Temperature over 100.5  · Nausea and vomiting lasting longer than 4 hours or if unable to take medications  · Any signs of decreased circulation or nerve impairment to extremity: change in color, persistent  numbness, tingling, coldness or increase pain  · Any questions        What to do at Home:    *  Please give a list of your current medications to your Primary Care Provider. *  Please update this list whenever your medications are discontinued, doses are      changed, or new medications (including over-the-counter products) are added. *  Please carry medication information at all times in case of emergency situations. These are general instructions for a healthy lifestyle:    No smoking/ No tobacco products/ Avoid exposure to second hand smoke    Surgeon General's Warning:  Quitting smoking now greatly reduces serious risk to your health. Obesity, smoking, and sedentary lifestyle greatly increases your risk for illness    A healthy diet, regular physical exercise & weight monitoring are important for maintaining a healthy lifestyle    You may be retaining fluid if you have a history of heart failure or if you experience any of the following symptoms:  Weight gain of 3 pounds or more overnight or 5 pounds in a week, increased swelling in our hands or feet or shortness of breath while lying flat in bed. Please call your doctor as soon as you notice any of these symptoms; do not wait until your next office visit. Recognize signs and symptoms of STROKE:    F-face looks uneven    A-arms unable to move or move unevenly    S-speech slurred or non-existent    T-time-call 911 as soon as signs and symptoms begin-DO NOT go       Back to bed or wait to see if you get better-TIME IS BRAIN. Warning Signs of HEART ATTACK     Call 911 if you have these symptoms:   Chest discomfort.  Most heart attacks involve discomfort in the center of the chest that lasts more than a few minutes, or that goes away and comes back. It can feel like uncomfortable pressure, squeezing, fullness, or pain.  Discomfort in other areas of the upper body. Symptoms can include pain or discomfort in one or both arms, the back, neck, jaw, or stomach.  Shortness of breath with or without chest discomfort.  Other signs may include breaking out in a cold sweat, nausea, or lightheadedness. Don't wait more than five minutes to call 911 - MINUTES MATTER! Fast action can save your life. Calling 911 is almost always the fastest way to get lifesaving treatment. Emergency Medical Services staff can begin treatment when they arrive -- up to an hour sooner than if someone gets to the hospital by car. The discharge information has been reviewed with the patient. The patient verbalized understanding. Discharge medications reviewed with the patient and appropriate educational materials and side effects teaching were provided. WALLACE Toro notified and aware. As per PA, give dose of lantus and involve endo.

## 2023-06-20 NOTE — PROGRESS NOTES
Services    Services at [facility] is dedicated to helping providers establish a direct relationship with their non-English, limited-English proficient, or deaf patients.          Patricia Laguna [phone] available   Friday. January 6th (8:00am - 4:30pm)         For on call Monday to Friday (4:30pm - 8:00am)   On call Weekends (24 hours)   please refer to the monthly calendar online in [address] [place] [address], for the corresponding .           Thank you for this referral,       Glenna Loomis, [address] [place] [address]  /Patient [facility].  [address] [facility], [facility] [address] [facility]    [phone]    Pharmacy requesting freestyle lite kit, due to back oeder of other supplies

## 2023-07-26 NOTE — DISCHARGE SUMMARY
Discharge Note    Kenzie Vega  Admission date:  1/4/2017  Discharge date:  1/12/2017     Admitting Diagnosis:  Pleural effusion [J90]  Pleural effusion [J90]    Discharge Diagnoses:   Hospital Problems  Date Reviewed: 1/9/2017          Codes Class Noted POA    CKD (chronic kidney disease) (Chronic) ICD-10-CM: N18.9  ICD-9-CM: 585.9  1/4/2017 Yes    Overview Signed 5/14/2013 11:35 AM by Waylon Bansal RN     With acute rise- ? Dehydration and monitor             * (Principal)Acute respiratory failure with hypoxia ([facility] ICD-10-CM: J96.01  ICD-9-CM: 518.81  1/4/2017 Yes    Overview Signed 7/17/2013  6:15 AM by Armond Tapia     7-15-13 intubated   7-16-13 ;bronchoscopy with bal             DVT (deep venous thrombosis) ([facility] ICD-10-CM: I82.409  ICD-9-CM: 453.40  1/4/2017 Yes    Overview Signed 11/19/2013  6:07 AM by Armond Tapia     70-02-26 There is nonocclusive thrombus within the left common femoral vein, superficial femoral vein, popliteal vein, and posterior tibial vein               Accelerated hypertension ICD-10-CM: I10  ICD-9-CM: 401.0  1/4/2017 Yes        Breast cancer metastasized to lung [facility]) (Chronic) ICD-10-CM: C50.919, C78.00  ICD-9-CM: 174.9, 197.0  1/4/2017 Yes        Pleural effusion, right ICD-10-CM: J90  ICD-9-CM: 511.9  1/4/2017 Yes    Overview Addendum 10/8/2016  5:54 PM by Saundra Singletary MD     10/6/16 R thoracentesis 800 ml: pleural fluid creatine 4.1 = likely urinothorax             Hydronephrosis (Chronic) ICD-10-CM: N13.30  ICD-9-CM: 402  1/4/2017 Yes        Bilateral edema of lower extremity ICD-10-CM: R60.0  ICD-9-CM: 782.3  1/4/2017 Yes        Hematuria ICD-10-CM: R31.9  ICD-9-CM: 599.70  1/4/2017 Yes              Consultants:  Oncology  Nephrology  Urology consulted for hematuria    Studies/Procedures:  Right thoracentesis x2  CXR  Renal Ultrasound    Condition on Discharge:  stable    Disposition:  home      Presenting Illness:  78 y.o.   female presents with shortness of breath. The patient has a history of dCHF, old DVT, breast cancer with mets to lung and bones, and CKD with h/o R ureteral obstruction and stent placement. She presented with several days of progressive shortness of breath. Was admitted in October with similar situation with obstructed stent and associated R pleural effusion which was felt may represent urinothorax. With thoracentesis and adjustment of stent this resolved. She has continue to follow with urology and oncology. Her oncologist has her on coumadin for her history of DVT in 2013. They had noticed increasing LE edema and performed a TTE with only diastolic dysfunction and a PET scan with moderate right effusion but no progression of disease. She came to the ER with dyspnea but minimal cough, no purulent sputum, and no fever. A CXR shows a large right sided effusion. Labs reveal mildly elevated BNP, creatinine stable in the mid 2's, and normal WBC. She reports some increased hematuria and difficulty urinating recently. She was to follow up with urology tomorrow. She is hypoxic, currently on 2L O2. Hospital course:   was used during hospital due to differing language and clarity. Troponin was mildly elevated but not felt to be MI. Urology was consulted for hematuria and history of stent and anticoagulation held. Right tap was performed 1/5/16 with 1250ml removed and no malignant cells, only atypical cells, were reported. Urology did not feel effusion was from urine leak based on serum and pleural fluid creatinine levels. Symptoms improved after tap. Nephrology was consulted and felt was at her baseline creatinine 2.5 and diuretics were continued. Renal ultrasound with significant improvement in previous right hydronephrosis, now minimal.  Urology was asked to see again with hematuria and tld patient that hematuria is unavoidable with her stent.   Pleural fluid re-accumulated and Pleurex was discussed but she would rather have repeat thoracentesis. Repeat right tap 1/11/17 with 1350ml removed. Symptoms again improved and she has been weaned to room air. Is ready to go home and will follow up in the office as needed. She will resume Coumadin and have INR drawn on Monday at oncology office. She is being given Albuterol and lasix for PRN use. She will follow up with oncology on 1/17/17, Dr. King Mcwilliams. Physical Exam:   Constitution: the patient is well developed and in no acute distress  EENMT: Sclera clear, pupils equal, oral mucosa moist  Respiratory: decreased on R  Cardiovascular: RRR without M,G,R  Gastrointestinal: soft and non-tender; with positive bowel sounds. Musculoskeletal: warm without cyanosis. Skin: no jaundice or rashes,  Neurologic: no gross neuro deficits     Psychiatric: alert and oriented x 3      LAB  Recent Labs      01/12/17   0925  01/11/17   0716  01/10/17   0648   INR  1.4*  1.8*  2.1*     Recent Labs      01/12/17   0925  01/11/17   0716  01/10/17   0648   NA  146*  146*  149*   K  4.6  4.6  4.6   CL  112*  111*  115*   CO2  23  26  24   BUN  33*  32*  30*   CREA  2.27*  2.09*  1.85*     No results for input(s): PH, PCO2, PO2, HCO3 in the last 72 hours. Discharge Medications:   Current Discharge Medication List      START taking these medications    Details   furosemide (LASIX) 20 mg tablet Take 1 Tab by mouth as needed. Qty: 20 Tab, Refills: 1         CONTINUE these medications which have CHANGED    Details   albuterol (PROVENTIL HFA, VENTOLIN HFA, PROAIR HFA) 90 mcg/actuation inhaler Take 2 Puffs by inhalation every six (6) hours as needed for Wheezing. Qty: 1 Inhaler, Refills: 11         CONTINUE these medications which have NOT CHANGED    Details   fentaNYL (DURAGESIC) 50 mcg/hr PATCH 1 Patch by TransDERmal route every seventy-two (72) hours. Max Daily Amount: 1 Patch.  Indications: CHRONIC PAIN WITH OPIOID TOLERANCE  Qty: 10 Patch, Refills: 0    Associated Diagnoses: Breast cancer metastasized to lung, unspecified laterality ([facility] Cancer associated pain; Malignant neoplasm of left female breast, unspecified site of breast (HCC)      amlodipine (NORVASC) 10 mg tablet Take 1 tablet by mouth daily. Qty: 30 tablet, Refills: 11      warfarin (COUMADIN) 2 mg tablet Take 1 Tab by mouth daily. Qty: 30 Tab, Refills: 20    Associated Diagnoses: Deep vein thrombosis (DVT) of lower extremity, unspecified chronicity, unspecified laterality, unspecified vein (HCC)      glipiZIDE (GLUCOTROL) 5 mg tablet Take 1 Tab by mouth two (2) times a day. Qty: 60 Tab, Refills: 5    Associated Diagnoses: Bilateral malignant neoplasm of breast in female, unspecified site of breast (HCC)      palbociclib 125 mg cap Take 125 mg by mouth daily. Take one pill once a day for 3 weeks and then off for one week  Qty: 21 Tab, Refills: 5    Associated Diagnoses: Breast cancer metastasized to lung, unspecified laterality (HCC)      zolpidem (AMBIEN) 10 mg tablet Take 1 Tab by mouth nightly as needed for Sleep. Max Daily Amount: 10 mg.  Qty: 30 Tab, Refills: 2    Associated Diagnoses: Primary insomnia      HYDROcodone-acetaminophen (NORCO)  mg tablet Take 1 Tab by mouth every four (4) hours as needed. Max Daily Amount: 6 Tabs. Qty: 90 Tab, Refills: 0      budesonide-formoterol (SYMBICORT) 160-4.5 mcg/actuation HFA inhaler Take 2 Puffs by inhalation two (2) times a day. Qty: 1 Inhaler, Refills: 11      LORazepam (ATIVAN) 1 mg tablet Take 1 Tab by mouth every six (6) hours as needed for Anxiety. Max Daily Amount: 4 mg. Qty: 60 Tab, Refills: 0      atenolol (TENORMIN) 25 mg tablet Take 1 Tab by mouth every twelve (12) hours. Qty: 60 Tab, Refills: 0      metoclopramide (REGLAN) 5 mg/5 mL syrup Take 5 mL by mouth Before breakfast, lunch, dinner and at bedtime. Qty: 120 mL, Refills: 0      montelukast (SINGULAIR) 10 mg tablet Take 1 Tab by mouth nightly.   Qty: 30 Tab, Refills: 3    Associated Diagnoses: Malignant neoplasm of right female breast, unspecified site of breast ([facility]      ALPRAZolam (XANAX) 0.25 mg tablet Take 1 Tab by mouth three (3) times daily as needed for Anxiety. Max Daily Amount: 0.75 mg. Qty: 90 Tab, Refills: 2      megestrol (MEGACE) 400 mg/10 mL (10 mL) suspension Take 5 mL by mouth daily. Qty: 240 mL, Refills: 2    Associated Diagnoses: Anorexia      potassium chloride (K-DUR, KLOR-CON) 20 mEq tablet Take 1 Tab by mouth daily. Qty: 30 Tab, Refills: 3    Associated Diagnoses: Hypokalemia      nystatin (MYCOSTATIN) 100,000 unit/mL suspension Take 2 mL by mouth four (4) times daily. swish and spit  Qty: 480 mL, Refills: 0      calcium-vitamin D (OYSTER SHELL) 500 mg(1,250mg) -200 unit per tablet Take 1 Tab by mouth two (2) times daily (with meals). Qty: 60 Tab, Refills: 6               Followup/Outpt Studies:  --Follow up with [facility] as needed  --Follow up with oncology on 1/17/17. --Will add Lasix 20mg to take PRN  --Will resume Coumadin at 4mg tonight the 2mg nightly and have INR drawn on Monday, 1/16/17, at oncology office. --Albuterol inhaler PRN  --Follow up with urology as needed--has stent. --Total discharge greater than 30 minutes in duration. More than 50% of the time documented was spent in face-to-face contact with the patient and in the care of the patient on the floor/unit where the patient is located. Galindo Franklin NP    I have spoken with and examined the patient. I agree with the above assessment and plan as documented. Etiology for recurrent R pleural effusion is not completely certain given transudative characteristics, but the presence of atypical cells on 2 separate samples is suggestive that malignancy is involved. CKD and diastolic dysfunction may contribute but diuretics do not seem to help and may harm her renal function further. Thus, pleurX was discussed and patient declined. She would like to continue with serial thoracentesis on an as needed basis.     Gen: pleasant, NAD on room air  Lungs:  Decreased on right  Heart:  RRR with no Murmur/Rubs/Gallops  Ext: 1+ edema    F/u with Dr. Eliana Sue is scheduled next week. Resume warfarin therapy for DVT and check INR Monday  F/u as needed for thoracentesis. Lasix 20mg orally as needed for severe LE edema (not on chronically due to renal dysfunction). Per patient request, albuterol inhaler ordered.       Layla Cali MD Mercedes Flap Text: The defect edges were debeveled with a #15 scalpel blade. Given the location of the defect, shape of the defect and the proximity to free margins a Mercedes flap was deemed most appropriate. Using a sterile surgical marker, an appropriate advancement flap was drawn incorporating the defect and placing the expected incisions within the relaxed skin tension lines where possible. The area thus outlined was incised deep to adipose tissue with a #15 scalpel blade. The skin margins were undermined to an appropriate distance in all directions utilizing iris scissors. Following this, the designed flap was advanced and carried over into the primary defect and sutured into place.

## 2024-07-19 NOTE — H&P
[facility] Hematology Oncology Inpatient Hematology / Oncology History and Physical 
 
Reason for Asmission: EMERY Acute-on-chronic kidney injury ([facility] [address] History of Present Illness: Ms. Yoselin Turner is a 75 y.o. female admitted on 7/31/2018 with a primary diagnosis of acute on chronic kidney injury. She has a history of metastatic breast cancer, asthma, chronic diastolic heart failure, HTN, DM type 2, DVT, and chronic kidney disease. She is currently being treated with Ibrance/Faslodex. Most recently she has developed recurrent ascites with paracentesis twice over the last five weeks or so. She was placed on aldactone 50 mg daily to be taken in addition to her lasix 40 mg daily to try and help with fluid accumulation. She returned today for follow up and her creatinine was elevated above baseline to 4.47. She will be admitted for management of acute on chronic kidney injury. Review of Systems: 
Constitutional Denies fever, chills, weight loss, appetite changes, night sweats. + fatigue. HEENT Denies trauma, blurry vision, hearing loss, ear pain, nosebleeds, sore throat, neck pain and ear discharge. Skin Denies lesions or rashes. Lungs Denies dyspnea, cough, sputum production or hemoptysis. Cardiovascular Denies chest pain, palpitations, or lower extremity edema. Gastrointestinal Denies vomiting, changes in bowel habits, bloody or black stools. + ascites, abdominal discomfort, early satiety, occasional nausea.  Denies dysuria, frequency or hesitancy of urination. Neuro Denies headaches, visual changes or ataxia. Denies dizziness, tingling, tremors, sensory change, speech change, focal weakness or headaches. Hematology Denies easy bruising or bleeding, denies gingival bleeding or epistaxis. Endo Denies heat/cold intolerance, denies thyroid abnormalities. + diabetes. MSK Denies back pain, arthralgias, myalgias or frequent falls. + arm/leg pain. CT head with no acute changes consistent with this, chronic vascular changes  Sertraline has been discontinued in view of the patient being transitioned to hospice   Psychiatric/Behavioral Denies depression and substance abuse. The patient is not nervous/anxious. No Known Allergies Past Medical History:  
Diagnosis Date  Acute on chronic diastolic congestive heart failure ([address] 1/5/2016  Anemia of chronic disease 11/13/2017  Aortic valve stenosis  Arthritis   
 roverto hands  Asthma   
 till age 31  
 Cancer ([address]   
 roverto. breast ca mets bone/lung  Chemotherapy-induced neutropenia (HCC) 12/20/2016  CKD (chronic kidney disease) stage 4, GFR 15-29 ml/min ([facility])  Depression   
 managed with meds  Diastolic CHF ([facility] [address] Followed by Donte Barrdejan  Former cigarette smoker  HCAP (healthcare-associated pneumonia) 7/17/2013  History of DVT (deep vein thrombosis) Left leg DVT. on coumadin  Hypertension   
 managed with meds  Long term current use of anticoagulant Coumadin  Oxygen dependent   
 at bedtime 2L-3L NC  
 Port catheter in place Left chest port  Sepsis ([facility] [address] 7/19/2016  
 SOB (shortness of breath) on exertion  Type 2 diabetes mellitus ([facility] [address] PRN oral agent/AVG BS: 170-200/ s.s of hypoglycemia at 80  Unspecified adverse effect of anesthesia In [place] 28 yrs ago after second c -section-she was told her heart stopped d/t anesthesia-hospitalized for 5 days afterwards and followed by cardiologist  
 Vitamin B12 deficiency 11/13/2017 Past Surgical History:  
Procedure Laterality Date  COLONOSCOPY N/A 3/19/2018 COLONOSCOPY  BMI 23   performed by Kait Torres MD at [facility] X2  
 HX CYST REMOVAL    
 HX UROLOGICAL    
 stent placed  HX VASCULAR ACCESS  01/26/2012 Left chest port Family History Problem Relation Age of Onset  Heart Attack Mother  Seizures Mother  Alzheimer Father  Cancer Brother   
  doen not know details  No Known Problems Sister  No Known Problems Sister  No Known Problems Brother Social History Social History  Marital status:  Spouse name: N/A  
 Number of children: N/A  
 Years of education: N/A Occupational History  Not on file. Social History Main Topics  Smoking status: Former Smoker Packs/day: 1.00 Years: 8.00 Types: Cigarettes Quit date: 1978  Smokeless tobacco: Never Used Comment: quit 30 or more years ago  Alcohol use No  
 Drug use: No  
 Sexual activity: Not on file Other Topics Concern  Not on file Social History Narrative Current Facility-Administered Medications Medication Dose Route Frequency Provider Last Rate Last Dose  
 0.9% sodium chloride infusion  100 mL/hr IntraVENous CONTINUOUS Delmy Schilling NP      
 
 
OBJECTIVE: 
Patient Vitals for the past 8 hrs: 
 BP Temp Pulse Resp SpO2  
18 1226 157/70 98.4 °F (36.9 °C) 72 18 97 % Temp (24hrs), Av.1 °F (36.7 °C), Min:97.7 °F (36.5 °C), Max:98.4 °F (36.9 °C) Physical Exam: 
Constitutional: Well developed, thin female in no acute distress, sitting comfortably in the exam chair. HEENT: Normocephalic and atraumatic. Oropharynx is clear, mucous membranes are moist.  Extraocular muscles are intact. Sclerae anicteric. Neck supple. Skin Warm and dry. No bruising and no rash noted. No erythema. No pallor. Respiratory Lungs are clear to auscultation bilaterally without wheezes, rales or rhonchi, normal air exchange without accessory muscle use. CVS Normal rate, regular rhythm and normal S1 and S2. No murmurs, gallops, or rubs. Abdomen Soft, non-tender. Distended, ascites. Normoactive bowel sounds. No palpable mass. No hepatosplenomegaly. Neuro Grossly nonfocal with no obvious sensory or motor deficits. MSK Normal range of motion in general.  No edema and no tenderness. Psych Appropriate mood and affect. Labs:   
Recent Results (from the past 24 hour(s)) PROTHROMBIN TIME + INR  
 Collection Time: 07/31/18  9:01 AM  
Result Value Ref Range Prothrombin time 36.5 (H) 11.5 - 14.5 sec INR 3.0    
CBC WITH AUTOMATED DIFF Collection Time: 07/31/18  9:08 AM  
Result Value Ref Range WBC 3.5 (L) 4.3 - 11.1 K/uL  
 RBC 2.53 (L) 4.05 - 5.25 M/uL HGB 9.6 (L) 11.7 - 15.4 g/dL HCT 29.0 (L) 35.8 - 46.3 % .6 (H) 79.6 - 97.8 FL  
 MCH 37.9 (H) 26.1 - 32.9 PG  
 MCHC 33.1 31.4 - 35.0 g/dL  
 RDW 14.2 11.9 - 14.6 % PLATELET 572 493 - 977 K/uL MPV 11.3 10.8 - 14.1 FL ABSOLUTE NRBC 0.00 0.0 - 0.2 K/uL  
 DF AUTOMATED NEUTROPHILS 68 43 - 78 % LYMPHOCYTES 23 13 - 44 % MONOCYTES 7 4.0 - 12.0 % EOSINOPHILS 1 0.5 - 7.8 % BASOPHILS 1 0.0 - 2.0 %  
 ABS. NEUTROPHILS 2.4 1.7 - 8.2 K/UL  
 ABS. LYMPHOCYTES 0.8 0.5 - 4.6 K/UL  
 ABS. MONOCYTES 0.2 0.1 - 1.3 K/UL  
 ABS. EOSINOPHILS 0.0 0.0 - 0.8 K/UL  
 ABS. BASOPHILS 0.0 0.0 - 0.2 K/UL  
 NRBC 0.0 0.0 - 2.0  WBC METABOLIC PANEL, COMPREHENSIVE Collection Time: 07/31/18  9:08 AM  
Result Value Ref Range Sodium 138 136 - 145 mmol/L Potassium 5.0 3.5 - 5.1 mmol/L Chloride 106 98 - 107 mmol/L  
 CO2 20 (L) 21 - 32 mmol/L Anion gap 12 7 - 16 mmol/L Glucose 179 (H) 65 - 100 mg/dL BUN 73 (H) 8 - 23 MG/DL Creatinine 4.47 (H) 0.6 - 1.0 MG/DL  
 GFR est AA 13 (L) >60 ml/min/1.73m2 GFR est non-AA 10 (L) >60 ml/min/1.73m2 Calcium 8.5 8.3 - 10.4 MG/DL Bilirubin, total 0.6 0.2 - 1.1 MG/DL  
 ALT (SGPT) 26 12 - 65 U/L  
 AST (SGOT) 34 15 - 37 U/L Alk. phosphatase 236 (H) 50 - 136 U/L Protein, total 7.8 6.3 - 8.2 g/dL Albumin 3.3 3.2 - 4.6 g/dL Globulin 4.5 (H) 2.3 - 3.5 g/dL A-G Ratio 0.7 (L) 1.2 - 3.5 CANCER ANTIGEN (CA) 15-3 Collection Time: 07/31/18  9:08 AM  
Result Value Ref Range Cancer antigen 15-3 608.10 (H) 1.0 - 35.0 U/mL CEA Collection Time: 07/31/18  9:08 AM  
Result Value Ref Range .5 (H) 0.0 - 3.0 ng/mL Imaging: 
Pending. ASSESSMENT: 
Problem List  Date Reviewed: 6/12/2018 Codes Class Noted Other cirrhosis of liver ([place] [address] ICD-10-CM: W23.15 ICD-9-CM: 571.5  7/31/2018 Other ascites ICD-10-CM: R18.8 ICD-9-CM: 789.59  7/31/2018 Acute-on-chronic kidney injury ([place] [address] ICD-10-CM: N17.9, N18.9 ICD-9-CM: 584.9, 585.9  7/31/2018 History of blood clots ICD-10-CM: Z86.718 ICD-9-CM: V12.51  2/16/2018 Type 2 diabetes mellitus with nephropathy ([place] [address] ICD-10-CM: E11.21 
ICD-9-CM: 250.40, 583.81  1/16/2018 Recurrent depression ([place] [address] ICD-10-CM: F33.9 ICD-9-CM: 296.30  1/16/2018 Vitamin B12 deficiency ICD-10-CM: E53.8 ICD-9-CM: 266.2  11/13/2017 Anemia of chronic disease ICD-10-CM: D63.8 ICD-9-CM: 285.29  11/13/2017 Iron deficiency anemia (Chronic) ICD-10-CM: D50.9 ICD-9-CM: 280.9  7/10/2017 Type 2 diabetes mellitus without complication (HCC) (Chronic) ICD-10-CM: E11.9 ICD-9-CM: 250.00  7/10/2017 Folliculitis WQV-69-DN: L73.9 ICD-9-CM: 704.8  7/10/2017 Hypernatremia ICD-10-CM: E87.0 ICD-9-CM: 276.0  5/30/2017 Anxiety ICD-10-CM: F41.9 ICD-9-CM: 300.00  5/28/2017 Acute on chronic respiratory failure (HCC) ICD-10-CM: J96.20 ICD-9-CM: 518.84  5/24/2017 Aortic stenosis, moderate (Chronic) ICD-10-CM: I35.0 ICD-9-CM: 424.1  5/24/2017 Anticoagulated on Coumadin (Chronic) ICD-10-CM: Z51.81, Z79.01 
ICD-9-CM: V58.83, V58.61  5/24/2017 Volume overload ICD-10-CM: E87.70 ICD-9-CM: 276.69  5/24/2017 Breast cancer ([facility] [address] (Chronic) ICD-10-CM: C50.919 ICD-9-CM: 174.9  2/17/2017 Overview Addendum 2/27/2014 10:08 AM by Antonio Jon NP  
  Er+  pr + her-2 lawanda negative stage 4 Examination of the 3D tomographic PET images demonstrates marked hypermetabolism associated with both primary breast carcinomas. SUV max on the right is 20.5 and on the left 31.5.  There is matted high right axillary  
lymphadenopathy as well as a more hypermetabolic low right axillary lymph node which measures 12 x 9 mm and has an SUV max of 15.4. Small bilateral pulmonary hilar metastases are also noted as well as extensive skeletal metastatic disease which includes both proximal femora, both scapulae, innumerable ribs, virtually every vertebra, and the bony pelvis. No displaced pathologic fracture is identified on the CT images. Ct scan 2-12 Innumerable diffuse osseous metastases. . Multiple diffuse tiny lung nodules, and right hilar lymphadenopathy, also suspicious for metastases. 3. Bilateral breast masses, compatible with known carcinoma. CANCER ANTIGEN 27.29 1076 Oncology Flowsheet Day, Cycle cyclophosphamide (CYTOXAN) IV  
1/27/2012 Day 1, Cycle 1 600 mg/m2 = 996 mg  
2/17/2012 Day 1, Cycle 2 600 mg/m2 = 996 mg  
3/9/2012 Day 1, Cycle 3 600 mg/m2 = 996 mg  
3/30/2012 Day 1, Cycle 4 500 mg/m2 = 830 mg  
4/20/2012 Day 1, Cycle 5 500 mg/m2 = 830 mg Oncology Flowsheet DOCEtaxel (TAXOTERE) IV  
1/27/2012 75 mg/m2 = 125 mg  
2/17/2012 75 mg/m2 = 125 mg  
3/9/2012 75 mg/m2 = 125 mg  
3/30/2012 60 mg/m2 = 100 mg  
4/20/2012 60 mg/m2 = 100 mg  
7-19-12 post 6 cycles of TC improved breast masses switch to [employer] Clear response on pet ct scan Mixed response to chemotherapy: There has been a significant interval response in the right breast and axilla with decrease in size of spiculated right breast mass and numerous right axillary lymph nodes. Left breast mass also appears to have responded chemotherapy though there may be persistent chest wall invasion. 2. Interval evolution of widespread osseous metastatic disease with many lytic lesions now appearing more sclerotic. These are associated with continued FDG uptake which is difficult to differentiate from marrow reactivity given recent chemotherapy. At least one new osseous metastatic deposits is present in the posterior spinous process at L1.  Focally intense uptake is also present at approximately T5 
10-19-12 pain in arms legs back continued improvement on ct scan Decreased size of the largest lung nodules and near complete resolution . of many. No new masses in the chest, abdomen, or pelvis. 2. Decreased size of right axillary and right hilar lymph nodes. 3. Diffuse osseous lesions again seen. 4. Diverticulosis. 5. Atherosclerotic vascular disease ca 15-3 down to 50 On femara and aredia will see if we can get affinitor to start at next visit continue therapy 12-19-12 new headache and dizziness for MRI will start affinitor 1-14-13 improved pain breast mass right breast smaller width 7 cm x 5 
6-05-13 femara and aredia now on affinitor x 5 months 7-13 tumor markers falling 8-20-13 post admission for pneumonia medications to restart breast mass smaller reduce affinitor to 7.5 mg  
10-18-13 femara and affinitor pain right ilium intermittent markers smaller breast mass smaller on right 12-5-13 Reduce Afinitor to 5 mg daily. Continue coumadin for DVT. Continue Femara and Aredia. Repeat US and skeletal survey prior to next visit. 1-6-14 Feeling better with reduced dose. Ultrasound of breasts show decrease in mass sizes. Skeletal survey stable. Follow up in 2 months. Bony metastasis ([facility] [address] (Chronic) ICD-10-CM: C79.51 
ICD-9-CM: 198.5  2/17/2017 Acute respiratory failure ([facility] [address] ICD-10-CM: J96.00 
ICD-9-CM: 518.81  2/17/2017 Metastatic breast cancer ([facility] [address] (Chronic) ICD-10-CM: S36.031 ICD-9-CM: 174.9  2/17/2017 CKD (chronic kidney disease) (Chronic) ICD-10-CM: N18.9 ICD-9-CM: 585.9  1/4/2017 Overview Signed 5/14/2013 11:35 AM by Kenneth Pittman RN With acute rise- ? Dehydration and monitor DVT (deep venous thrombosis) (HCC) (Chronic) ICD-10-CM: A48.515 ICD-9-CM: 453.40  1/4/2017 Overview Signed 11/19/2013  6:07 AM by Ever Kel   11-18-13 There is nonocclusive thrombus within the left common femoral vein, superficial femoral vein, popliteal vein, and posterior tibial vein Accelerated hypertension ICD-10-CM: I10 
ICD-9-CM: 401.0  1/4/2017 Pleural effusion, right (Chronic) ICD-10-CM: J90 ICD-9-CM: 511.9  1/4/2017 Overview Addendum 5/26/2017 10:11 AM by Hari Pope NP  
  10/6/16 R thoracentesis 800 ml: pleural fluid creatine 4.1 = likely urinothorax 5/25/2017: Thoracentesis on right- 950 cc of /serosanguinous/ fluid Hydronephrosis (Chronic) ICD-10-CM: N13.30 ICD-9-CM: 113  1/4/2017 Bilateral edema of lower extremity ICD-10-CM: R60.0 ICD-9-CM: 782.3  1/4/2017 Hematuria ICD-10-CM: R31.9 ICD-9-CM: 599.70  1/4/2017 Chemotherapy-induced neutropenia (HCC) ICD-10-CM: D70.1, T45.1X5A 
ICD-9-CM: 288.03, E933.1  12/20/2016 Pancytopenia ([facility] [address] ICD-10-CM: Z79.714 ICD-9-CM: 284.19  10/10/2016 HTN (hypertension) (Chronic) ICD-10-CM: I10 
ICD-9-CM: 401.9  10/7/2016 DM (diabetes mellitus) (HCC) (Chronic) ICD-10-CM: E11.9 ICD-9-CM: 250.00  10/7/2016 * (Principal)Acute on chronic kidney failure (HCC) ICD-10-CM: N17.9, N18.9 ICD-9-CM: 584.9, 585.9  10/7/2016 Pulmonary edema ICD-10-CM: J81.1 ICD-9-CM: 043  1/3/2016 Elevated troponin ICD-10-CM: R74.8 ICD-9-CM: 790.6  10/20/2015 PND (paroxysmal nocturnal dyspnea) ICD-10-CM: R06.00 
ICD-9-CM: 786.09  2/25/2014 Leg swelling ICD-10-CM: M79.89 ICD-9-CM: 729.81  2/25/2014 Overview Signed 2/25/2014  2:45 PM by Laz Lopez NP Bilateral 
 
  
  
   
 Heart failure with preserved ejection fraction (HCC) ICD-10-CM: I50.30 ICD-9-CM: 428.9  2/25/2014 Anemia ICD-10-CM: D64.9 ICD-9-CM: 285.9  11/19/2013 Overview Signed 11/19/2013  6:06 AM by Pina Colvin 11-18-13 admitted with anemia and transfused Asthma (Chronic) ICD-10-CM: J45.909 ICD-9-CM: 493.90  10/22/2013  Overview Addendum 10/22/2013 10:55 AM by Brandon Santiago NP  
  8/2013: started on Symbicort 10/22/13: Added singulair Hypomagnesemia ICD-10-CM: Z77.72 
ICD-9-CM: 275.2  8/4/2013 Hypokalemia ICD-10-CM: E87.6 ICD-9-CM: 276.8  8/4/2013 PLAN: 
Metastatic Breast Cancer * Currently on treatment with Ibrance/fulvestrant. Acute on Chronic Kidney Injury * Gentle hydration. Hold Lasix and aldactone - watch closely for fluid overload. * Consult nephrology. * Renal ultrasound. Ascites * Consult IR for paracentesis. Send fluid for cytology. * Consult GI for cirrhosis. DVT prophylaxis - on Coumadin. Kamryn SOP's. Continue home medications. Lab studies and imaging studies were personally reviewed. Pertinent old records were reviewed. Soha Ragland  [address] Hematology Oncology [address] [place] [address] Office : [phone] Fax : [phone] Attending Addendum: 
I personally evaluated the patient with Barrie Lozano N.P.,  and agree with the assessment, findings and plan as documented. Appears stable, heart regular without murmur, lungs clear, abdomen benign. Acute on chronic kidney injury, she was recently started on low dose aldactone to help w ascites: admit to medicine floor, initiate w/u and management as outlined above. Will get nephrology and GI opinion as well while inpt. Christine Solis MD 
[address] [place] [address] Office : [phone] Fax : [phone]

## 2024-09-08 NOTE — PROGRESS NOTES
GI DAILY PROGRESS NOTE Admit Date:  9/18/2018 Today's Date:  9/19/2018 CC:  UGIB, HGB 6.7 Subjective:  
* used for this visit* No nausea. Has not had any vomiting or BM since admission. She has some abdominal pain this morning. HGB 10.0 this morning. She receiving 2 units PRBC and 1 unit FFP yesterday. INR 2.5. Heme/Onc was consulted regarding management of pleurex/ascites. They have seen pt and decision was made for comfort measures from this point. Hospice has been consulted for this morning. Nephrology consulted for worsening renal status. They are planning on conservative treatment as pt does not wish to start dialysis. Blood culture positive for gram pos cocci, hospitalist has started Vanco IV. Medications:  
Current Facility-Administered Medications Medication Dose Route Frequency  pantoprazole (PROTONIX) 80 mg in 0.9% sodium chloride 250 mL infusion  80 mg IntraVENous CONTINUOUS  
 cefTRIAXone (ROCEPHIN) 1 g in 0.9% sodium chloride (MBP/ADV) 50 mL  1 g IntraVENous Q24H  
 albuterol-ipratropium (DUO-NEB) 2.5 MG-0.5 MG/3 ML  3 mL Nebulization Q6H PRN  
 fentaNYL (DURAGESIC) 50 mcg/hr patch 1 Patch  1 Patch TransDERmal Q72H  
 insulin lispro (HUMALOG) injection   SubCUTAneous TIDAC  ondansetron (ZOFRAN) injection 4 mg  4 mg IntraVENous Q6H PRN  
 acetaminophen (TYLENOL) tablet 650 mg  650 mg Oral Q6H PRN  promethazine (PHENERGAN) with saline injection 12.5 mg  12.5 mg IntraVENous Q4H PRN  
 0.9% sodium chloride infusion 250 mL  250 mL IntraVENous PRN  
 0.9% sodium chloride infusion 250 mL  250 mL IntraVENous PRN  
 influenza vaccine 2018-19 (6 mos+)(PF) (FLUARIX QUAD/FLULAVAL QUAD) injection 0.5 mL  0.5 mL IntraMUSCular PRIOR TO DISCHARGE  
 HYDROmorphone (PF) (DILAUDID) injection 1 mg  1 mg IntraVENous Q3H PRN  
 sodium bicarbonate (8.4%) 150 mEq in dextrose 5% 1,000 mL infusion   IntraVENous CONTINUOUS Review of Systems: ROS was obtained, with pertinent positives as listed above. No chest pain or SOB. Diet:   
 
Objective:  
Vitals: 
Visit Vitals  /60  Pulse 93  Temp 98.9 °F (37.2 °C)  Resp 18  Ht 5' 1\" (1.549 m)  Wt 53.6 kg (118 lb 1.6 oz)  LMP 06/21/1998  SpO2 97%  Breastfeeding No  
 BMI 22.31 kg/m2 Intake/Output: 
09/19 0701 - 09/19 1900 In: -  
Out: 200 [Urine:200] 09/17 1901 - 09/19 0700 In: 1941 [P.O.:500; I.V.:1131] Out: 550 [Urine:550] Exam: 
General appearance: alert, cooperative, no distress Lungs: clear to auscultation bilaterally anteriorly Heart: regular rate and rhythm Abdomen: soft, non-tender. Bowel sounds normal. No masses, no organomegaly Extremities: extremities normal, atraumatic, no cyanosis or edema Neuro:  alert and oriented Data Review (Labs):   
Recent Labs  
   09/19/18 
 0900  09/19/18 
 0347  09/18/18 
 1913  09/18/18 
 1640  09/18/18 
 1639  09/18/18 
 1318  09/18/18 
 3783  09/18/18 
 1006  09/18/18 
 0121 WBC   --    --    --    --    --    --    --    --   13.5* HGB  10.0*  9.2*  9.4*   --    --   9.2*   --   6.7*  8.6* HCT  30.3*  27.3*  28.6*   --    --   29.2*   --   21.3*  27.5* PLT   --    --    --    --    --    --    --    --   406 MCV   --    --    --    --    --    --    --    --   117.5* NA   --   139   --    --   139  140   --    --   141 K   --   4.8   --    --   6.0*  5.9*   --    --   5.8*  
CL   --   104   --    --   107  107   --    --   105 CO2   --   23   --    --   15*  12*   --    --   17* BUN   --   113*   --    --   106*  108*   --    --   102* CREA   --   4.55*   --    --   4.74*  4.93*   --    --   4.54* CA   --   6.5*   --    --   6.5*  6.8*   --    --   7.5*  
GLU   --   325*   --    --   278*  221*   --    --   274* AP   --    --    --    --    --   240*   --    --   330* SGOT   --    --    --    --    --   224*   --    --   51* ALT   --    --    --    --    --   68*   --    --   28  
 79 TBILI   --    --    --    --    --   1.1   --    --   1.0 ALB   --    --    --    --    --   2.1*   --    --   2.3* TP   --    --    --    --    --   5.4*   --    --   6.2*  
LPSE   --    --    --    --    --    --    --    --   615* PTP  23.4*  26.2*   --   30.2*   --    --   51.1*   --   43.6* INR  2.2  2.5   --   3.1   --    --   6.0*   --   4.9* Acute abdominal series 9/17/18  
Comparison: February 17, 2018  Indication: Abdominal pain  
Findings:  
Chest:   
There is stable left-sided chest port. There is right lung volume loss. Chronic 
right pleural effusion and associated atelectasis. No focal consolidation in the 
left lung. No pneumothorax or pulmonary edema. Cardiac silhouette is enlarged, 
similar to prior exam. Mediastinal contour is within normal limits.  
Abdomen:   
There is paucity of bowel gas. There is suggestion of ascites. No evidence of 
free air.  
Right nephroureteral stent is seen. Surrounding bones are stable.  IMPRESSION IMPRESSION:  
1. Suggestion of ascites. Nonobstructive bowel gas pattern.  
2. Chronic changes in the right lung. No evidence of acute pulmonary disease. 
  
 
Assessment:  
 
Principal Problem: 
  Upper GI bleeding (9/18/2018) Active Problems: 
  Bony metastasis ([facility] (2/17/2017) DM (diabetes mellitus) ([facility] (10/7/2016) Anemia (11/19/2013) Overview: 11-18-13 admitted with anemia and transfused DVT (deep venous thrombosis) ([facility] (1/4/2017) Overview: 11-18-13 There is nonocclusive thrombus within the left common femoral  
    vein, superficial femoral vein, popliteal vein, and posterior tibial vein Accelerated hypertension (1/4/2017) Acute on chronic kidney failure ([address] (10/7/2016) Anticoagulated on Coumadin (5/24/2017) Ascites (9/18/2018) Supratherapeutic INR (9/18/2018) Elevated lactic acid level (9/18/2018) Breast cancer metastasized to liver ([address] (9/18/2018) Pancreatitis (9/18/2018) Nausea & vomiting (9/18/2018) Patient is a 70 y.o. female with PMH of CHF, metastatic breast cancer with liver involvement and ascites, CKD stage 5, DVT on coumadin, O2 dependent, DM2, who is seen in consultation at the request of Dr. Yissel Dyer for upper GIB and anemia. She had multiple episodes of coffee ground emesis and melena last night. HGB at admission was 8.6 and has dropped to 6.7 today. Coumadin has been held. INR at admission was 4.9. She was given IV Vit K, but unfortunately INR up to 6.0 today. She is currently receiving 2 units PRBC and 1 unit FFP. She is on Rocephin and pantoprazole gtt. She does have frequent paracentesis via peritoneal catheter for metastatic breast cancer with liver involvement. She reports that this is managed by oncology and is due today. She has generalized abdominal pain. No additional episodes of coffee ground emesis or melena since admission. Last EGD with findings of gastritis/HH/esophageal ring and colonoscopy with diverticulosis. Discussed EGD when INR <1.5 for further evaluation of GI bleeding. Pt is agreeable. No further signs of GIB since admission. HGB 10.0 on 9/19/18 s/p 2 units PRBC and 1 unit FFP. Heme/onc has evaluated pt. Pt has decided on comfort measures and Hospice consult has been placed for this am. Nephrology has been consulted and plans on conservative treatment for worsening renal function. Plan:  
 
-Monitor HGB and transfuse as needed 
-Monitor for signs of GIB-consider EGD if recurrence  
-Monitor INR-will need to be <1.5 for EGD 
-Continue PPI gtt 
-Rocephin as per hospitalist 
-Hospice consult pending for today 
-Continue to follow TRAVIS Turner 
  
 
Patient is seen and examined in collaboration with Dr. Basilio Schumacher. Assessment and plan as per Dr. Basilio Schumacher.

## 2024-11-06 NOTE — PROGRESS NOTES
Michele Pelletier  : 1950  Primary: Lauren Cisse Fap 100%  Secondary:  [address]  at [facility]  [address] , [address], [place] [address]  [phone]   [phone]          OUTPATIENT PHYSICAL THERAPY:Daily Note 2018    ICD-10: Treatment Diagnosis: muscle weakness generalized M 62.81  Precautions/Allergies:   Review of patient's allergies indicates no known allergies. Fall Risk Score: 9 (? 5 = High Risk)  MD Orders: oncology rehab MEDICAL/REFERRING DIAGNOSIS:  Other fatigue [R53.83]    DATE OF ONSET:   REFERRING PHYSICIAN: Leyda Be MD  RETURN PHYSICIAN APPOINTMENT: 4/10/18     INITIAL ASSESSMENT:  Ms. Alphonso Dorado presents following treatment for metastatic breast cancer since . She has developed significant fatigue and weakness. She spends most of the day in bed. She has had a fall in the recent past.  She uses a rolling walker. She could possibly benefit from home health, but is willing to attend therapy as outpatient. She will benefit from therapeutic exercises in order to increase strength and overall conditioning. PROBLEM LIST (Impacting functional limitations):  1. Decreased Strength  2. Decreased ADL/Functional Activities  3. Decreased Transfer Abilities  4. Decreased Balance  5. Increased Pain  6. Decreased Activity Tolerance  7. Increased Fatigue  8. Decreased [place] with Home Exercise Program INTERVENTIONS PLANNED:  1. Home Exercise Program (HEP)  2. Therapeutic Exercise/Strengthening   TREATMENT PLAN:  Effective Dates: 3/28/2018 TO 2018 (90 days). Frequency/Duration: 2 time a week for 90 Days  GOALS: (Goals have been discussed and agreed upon with patient.)  Short-Term Functional Goals: Time Frame: 4 weeks  1. The patient will be independent with HEP for strength within 4 weeks. 2. The patient will report a fatigue of 7 or less within 4 weeks. 3. The patient will gain 1/2 grade strength within 4 weeks.   4. The patient will improve Patient called this morning. He is needing a refill on his Metoprolol. Please send to his [facility] on file. Patient has 2 pills remaining.    Thanks!  Macey ROSALES   her TUG score by 2 seconds within 4 weeks. 5. The patient will improve her Tinetti score by 5 within 4 weeks. Discharge Goals: Time Frame: 8 weeks  1. The patient will report a fatigue level of 5 or less within 8 weeks. 2. The patient will improve her TUG score by 5 seconds within 8 weeks. 3. The patient will participate in a 6 minute walk within 8 weeks. Rehabilitation Potential For Stated Goals: [address] therapy, I certify that the treatment plan above will be carried out by a therapist or under their direction. Thank you for this referral,  Oren Allen PT     Referring Physician Signature: Willistine Cowden, MD              Date                    The information in this section was collected on 3/28/18 (except where otherwise noted). HISTORY:   History of Present Injury/Illness (Reason for Referral):  Metastatic breast cancer, chemo, fatigue, weakness  Past Medical History/Comorbidities:   Ms. Yury Cuellar  has a past medical history of Acute on chronic diastolic congestive heart failure ([address] (2016); Anemia of chronic disease (2017); Aortic valve stenosis; Arthritis; Asthma; Cancer ([address] Chemotherapy-induced neutropenia (HCC) (2016); CKD (chronic kidney disease) stage 4, GFR 15-29 ml/min ([address] Depression; Diastolic CHF ([address] Former cigarette smoker; HCAP (healthcare-associated pneumonia) (2013); History of DVT (deep vein thrombosis); Hypertension; Long term current use of anticoagulant; Oxygen dependent; Port catheter in place; Sepsis ([address] (2016); SOB (shortness of breath) on exertion; Type 2 diabetes mellitus ([address] Unspecified adverse effect of anesthesia; and Vitamin B12 deficiency (2017). Ms. Yury Cuellar  has a past surgical history that includes hx  section; hx cyst removal; hx urological; hx vascular access (2012); and colonoscopy (N/A, 3/19/2018).    Past Medical History:   Diagnosis Date    Acute on chronic diastolic congestive heart failure ([facility] 1/5/2016    Anemia of chronic disease 11/13/2017    Aortic valve stenosis     Arthritis     roverto hands    Asthma     till age 31    Cancer ([facility]     roverto. breast ca mets bone/lung    Chemotherapy-induced neutropenia (HCC) 12/20/2016    CKD (chronic kidney disease) stage 4, GFR 15-29 ml/min (HCC)     Depression     managed with meds    Diastolic CHF ([facility]     Followed by Donte Kerns    Former cigarette smoker     HCAP (healthcare-associated pneumonia) 7/17/2013    History of DVT (deep vein thrombosis)     Left leg DVT. on coumadin    Hypertension     managed with meds    Long term current use of anticoagulant     Coumadin    Oxygen dependent     at bedtime 2L-3L NC    Port catheter in place     Left chest port    Sepsis ([facility] 7/19/2016    SOB (shortness of breath) on exertion     Type 2 diabetes mellitus (HCC)     PRN oral agent/AVG BS: 170-200/ s.s of hypoglycemia at 79    Unspecified adverse effect of anesthesia     In [place] 28 yrs ago after second c -section-she was told her heart stopped d/t anesthesia-hospitalized for 5 days afterwards and followed by cardiologist    Vitamin B12 deficiency 11/13/2017     Past Surgical History:   Procedure Laterality Date    COLONOSCOPY N/A 3/19/2018    COLONOSCOPY  BMI 21   performed [facility] Duffy [facility] at [facility] ENDOSCOPY    HX [address]    HX CYST REMOVAL      HX UROLOGICAL      stent placed    HX VASCULAR ACCESS  01/26/2012    Left chest port       Social History/Living Environment:     lives with family  Prior Level of Function/Work/Activity:  independent  Dominant Side:         RIGHT  Current Medications:       Current Outpatient Prescriptions:     fentaNYL (DURAGESIC) 50 mcg/hr PATCH, 1 Patch by TransDERmal route every seventy-two (72) hours. Max Daily Amount: 1 Patch.  Indications: Chronic Pain with Opioid Tolerance, Disp: 10 Patch, Rfl: 0    HYDROcodone-acetaminophen (NORCO)  mg tablet, Take 1 Tab by mouth every six (6) hours as needed for Pain. Max Daily Amount: 4 Tabs., Disp: 90 Tab, Rfl: 0    GLIPIZIDE PO, Take  by mouth as needed. Pt only takes if BS > 200, Disp: , Rfl:     LORazepam (ATIVAN) 1 mg tablet, Take 1 Tab by mouth every six (6) hours as needed for Anxiety. Max Daily Amount: 4 mg., Disp: 60 Tab, Rfl: 0    palbociclib 125 mg cap, Take 1 Cap by mouth daily. Take one pill once a day for 3 weeks and then off for one week, Disp: 21 Cap, Rfl: 5    potassium chloride (K-DUR, KLOR-CON) 20 mEq tablet, Take 1 Tab by mouth two (2) times a day., Disp: 60 Tab, Rfl: 0    hydrALAZINE (APRESOLINE) 50 mg tablet, Take 1 Tab by mouth three (3) times daily. (Patient taking differently: Take 50 mg by mouth two (2) times a day.), Disp: 90 Tab, Rfl: 1    montelukast (SINGULAIR) 10 mg tablet, Take 1 Tab by mouth nightly., Disp: 30 Tab, Rfl: 11    furosemide (LASIX) 40 mg tablet, Take 1 Tab by mouth daily. (Patient taking differently: Take 40 mg by mouth every other day. Monday,Wednesday,Friday,Sunday), Disp: 30 Tab, Rfl: 11    albuterol-ipratropium (DUO-NEB) 2.5 mg-0.5 mg/3 ml nebu, 3 mL by Nebulization route every six (6) hours as needed. , Disp: 360 Nebule, Rfl: 11    carvedilol (COREG) 3.125 mg tablet, Take 1 Tab by mouth two (2) times daily (with meals). , Disp: 60 Tab, Rfl: 11    warfarin (COUMADIN) 1 mg tablet, Take 1.5 Tabs by mouth every evening., Disp: 45 Tab, Rfl: 0    mirtazapine (REMERON) 30 mg tablet, Take 1 Tab by mouth nightly., Disp: 30 Tab, Rfl: 1    amLODIPine (NORVASC) 10 mg tablet, Take 1 Tab by mouth daily. , Disp: 30 Tab, Rfl: 11  No current facility-administered medications for this encounter.      Facility-Administered Medications Ordered in Other Encounters:     epoetin lisset (EPOGEN;PROCRIT) injection 40,000 Units, 40,000 Units, SubCUTAneous, Q 14 DAYS, Chris Jacobs NP, 40,000 Units at 04/10/18 1429   Date Last Reviewed:  3/28/18   Number of Personal Factors/Comorbidities that affect the Plan of Care: 3+: HIGH COMPLEXITY   EXAMINATION:   ROM:          Within functional limits  Strength:          Grossly 4-/5 x 4 extremities  Functional Mobility:         Gait/Ambulation:  Uses walker, unsteady        Transfers: Moderate dificulty  Balance:          Decreased in standing and gait   Body Structures Involved:  1. Muscles Body Functions Affected:  1. Sensory/Pain  2. Neuromusculoskeletal  3. Movement Related Activities and Participation Affected:  1. General Tasks and Demands  2. Communication  3. Mobility   Number of elements (examined above) that affect the Plan of Care: 4+: HIGH COMPLEXITY   CLINICAL PRESENTATION:   Presentation: Stable and uncomplicated: LOW COMPLEXITY   CLINICAL DECISION MAKING:   Outcome Measure: Tool Used: 6-MINUTE WALK TEST  Unable to test  Score:  Initial:   feet Most Recent: X feet (Date: -- )   Interpretation of Score: Normal range varies but is approximately 5081-7182 Feet      Distance walked:   feet               Baseline End of Test   Heart Rate      Dyspnea (Mehul Scale)     Fatigue (Mehul Scale)     SpO2     BP       Score 2133 [phone] 15-0   Modifier CH CI CJ CK CL CM CN         Tool Used: Disabilities of the Arm, Shoulder and Hand (DASH) Questionnaire - Quick Version  Score:  Initial: 41/55  Most Recent: X/55 (Date: -- )   Interpretation of Score: The DASH is designed to measure the activities of daily living in person's with upper extremity dysfunction or pain. Each section is scored on a 1-5 scale, 5 representing the greatest disability. The scores of each section are added together for a total score of 55. Score 11 12-19 20-28 29-37 38-45 46-54 55   Modifier CH CI CJ CK CL CM CN         Tool Used: TINETTI  Score:  Initial:   Gait: 6/12  Balance: 10/16  TOTAL: 16/28 Most Recent:  Gait: /12  Balance: /16  TOTAL: /28   Interpretation of Score: The maximum score for the gait component is 12 points.  The maximum score for the balance component is 16 points. The maximum total score is 28 points. In general, patients who score below 19 are at a high risk for falls. Patients who score in the range of 19-24 indicate that the patient has a risk for falls. Score 28 27-23 22-18 17-12 11-6 5-1 0   Modifier CH CI CJ CK CL CM CN         Tool Used: Timed Up and Go (TUG)  Score:  Initial: 25 seconds Most Recent: X seconds (Date: -- )   Interpretation of Score: The test measures, in seconds, the time taken by an individual to stand up from a standard arm chair (seat height 46 cm [18 in], arm height 65 cm [25.6 in]), walk a distance of 3 meters (118 in, approx 10 ft), turn, walk back to the chair and sit down. If the individual takes longer than 14 seconds to complete TUG, this indicates risk for falls. Score 7 7.5-10.5 11-14 14.5-17.5 18-21 21.5-24.5 25+   Modifier CH CI CJ CK CL CM CN        Tool Used: ECOG Performance Survey Score  Score:  Initial: 3 Most Recent:      Interpretation of Score:   0 Fully active, able to carry on all pre-disease performance without restriction   1 Restricted in physically strenuous activity but ambulatory and able to carry out work of a light or sedentary nature, e.g., light house work, office work   2 Ambulatory and capable of all selfcare but unable to carry out any work activities. Up and about more than 50% of waking hours   3 Capable of only limited selfcare, confined to bed or chair more than 50% of waking hours   4 Completely disabled. Cannot carry on any selfcare. Totally confined to bed or chair   5 Dead        Medical Necessity:   · Patient is expected to demonstrate progress in strength, balance and gait to increase independence with household activity. Reason for Services/Other Comments:  · Patient continues to demonstrate capacity to improve strength, balance and gait which will increase independence and decrease amount of assistance required from caregiver.    Use of outcome tool(s) and clinical judgement create a POC that gives a: Questionable prediction of patient's progress: MODERATE COMPLEXITY            TREATMENT:   (In addition to Assessment/Re-Assessment sessions the following treatments were rendered)  Pre-treatment Symptoms/Complaints:  Fatigue 8/10, fatigue 11/10 after. weakness, decreased balance, altered gait. The patient reports she gets very tired after her injection of Procrit. Pain: Initial:     8/10 in the whole body with legs being worse Post Session:  8/10   O2 98 HR 67  /58    Long arc quads x 10 reps with 5 count hold with 1#, hip flexion x 10 reps with 1#  Standing up on toes, hip flexion, hip abduction, hip extension, hamstring curls x 10 reps with 1#  Bicep curls with 3# x 10 reps  Bilateral upper extremity with 2# x 10 reps  Scapular retraction with green theraband/ push forward x 10 reps  Sitting hamstring curls with green theraband x 10 reps  Sitting hip abduction with green theraband  Sitting hip adduction with inflated ball x 10 reps with 5 count hold  Sit to stand from elevated surface x 10 reps  Bilateral upper extremity with 2# overhead press x 10 reps  Supine bridging x 10 reps  Supine SLR x 10 reps  Short arc quads x 10 reps with 5 count hold  Added crunches in supine and wall push ups x 10 reps  as above. Given HEP:  Walk 1-2 minutes each waking hour when supervised, sit to stand from bedside, sit in chair instead of the bed each day. Added sit to stand, long arc quads and standing exercises at the sink daily. Bilateral upper extremity with light weight every other day. Given written instructions. Given red theraband for home use. [employer] Portal  Treatment/Session Assessment:    · Response to Treatment:  Tolerated the treatment fair. Significant fatigue currently. · Compliance with Program/Exercises: Will assess as treatment progresses. · Recommendations/Intent for next treatment session:  \"Next visit will focus on advancements to more challenging activities\".   Total Treatment Duration: 47 min  PT Patient Time In/Time Out  Time In: 0923  Time Out: [address] [facility],

## (undated) DEVICE — KIT,BARRIER,BON SECOURS-ST. FRANCIS: Brand: MEDLINE

## (undated) DEVICE — (D)BRUSH SCRB 3% ULTRDX NL CLN -- DISC BY MFR USE ITEM 181213

## (undated) DEVICE — GEL MEDC ULTRASOUND 5L -- REPLACED BY 326862

## (undated) DEVICE — SOLUTION IRRIG 3000ML H2O STRL BAG

## (undated) DEVICE — 48" PROBE COVER W/GEL, ULTRASOUND, STERILE: Brand: SITE-RITE

## (undated) DEVICE — KIT THORCENT 8FR L5IN POLYUR W/ 18/22/25GA NDL 3 W STPCOCK

## (undated) DEVICE — STERILE POLYISOPRENE POWDER-FREE SURGICAL GLOVES: Brand: PROTEXIS

## (undated) DEVICE — GUIDEWIRE ENDOSCP L150CM DIA0.038IN TIP L3CM PTFE FLX STR

## (undated) DEVICE — KENDALL SCD EXPRESS SLEEVES, KNEE LENGTH, MEDIUM: Brand: KENDALL SCD

## (undated) DEVICE — Device

## (undated) DEVICE — BLOCK BITE AD 60FR W/ VELC STRP ADDRESSES MOST PT AND

## (undated) DEVICE — TRAY PREP DRY W/ PREM GLV 2 APPL 6 SPNG 2 UNDPD 1 OVERWRAP

## (undated) DEVICE — CANNULA NSL ORAL AD FOR CAPNOFLEX CO2 O2 AIRLFE

## (undated) DEVICE — NDL PRT INJ NSAF BLNT 18GX1.5 --

## (undated) DEVICE — CONTAINER PREFIL FRMLN 40ML --

## (undated) DEVICE — CONNECTOR TBNG OD5-7MM O2 END DISP

## (undated) DEVICE — KENDALL RADIOLUCENT FOAM MONITORING ELECTRODE RECTANGULAR SHAPE: Brand: KENDALL

## (undated) DEVICE — PREP CHLORAPREP 10.5 ML ORG --

## (undated) DEVICE — CYSTO: Brand: MEDLINE INDUSTRIES, INC.

## (undated) DEVICE — SHEET, T, LAPAROTOMY, STERILE: Brand: MEDLINE

## (undated) DEVICE — CYSTO/BLADDER IRRIGATION SET, REGULATING CLAMP

## (undated) DEVICE — KIT STRT 1000ML PT DISCHRG BTL DSG GZ PD BLU WRAPPING INFO

## (undated) DEVICE — TOWEL SURG W16XL26IN BLU NONFENESTRATED DLX ST 2 PER PK

## (undated) DEVICE — SET INFUS 20GA L0.75IN 300PSI 5ML/SEC W/O Y INJ SITE ULT LO

## (undated) DEVICE — SYR 5ML 1/5 GRAD LL NSAF LF --

## (undated) DEVICE — CATH DRN PLEURX PLEURAL 15.5FR -- PLEURX

## (undated) DEVICE — AMD ANTIMICROBIAL GAUZE SPONGES,12 PLY USP TYPE VII, 0.2% POLYHEXAMETHYLENE BIGUANIDE HCI (PHMB): Brand: CURITY

## (undated) DEVICE — SYR 3ML LL TIP 1/10ML GRAD --

## (undated) DEVICE — FORCEPS BX L240CM JAW DIA2.8MM L CAP W/ NDL MIC MESH TOOTH